# Patient Record
Sex: MALE | Race: BLACK OR AFRICAN AMERICAN | NOT HISPANIC OR LATINO
[De-identification: names, ages, dates, MRNs, and addresses within clinical notes are randomized per-mention and may not be internally consistent; named-entity substitution may affect disease eponyms.]

---

## 2017-01-06 ENCOUNTER — APPOINTMENT (OUTPATIENT)
Dept: INTERNAL MEDICINE | Facility: HOSPITAL | Age: 62
End: 2017-01-06

## 2017-03-29 ENCOUNTER — INPATIENT (INPATIENT)
Facility: HOSPITAL | Age: 62
LOS: 4 days | Discharge: HOME | End: 2017-04-03
Attending: HOSPITALIST | Admitting: HOSPITALIST
Payer: MEDICARE

## 2017-03-29 VITALS
HEART RATE: 88 BPM | TEMPERATURE: 98 F | OXYGEN SATURATION: 100 % | SYSTOLIC BLOOD PRESSURE: 147 MMHG | DIASTOLIC BLOOD PRESSURE: 88 MMHG | RESPIRATION RATE: 16 BRPM

## 2017-03-29 DIAGNOSIS — D57.00 HB-SS DISEASE WITH CRISIS, UNSPECIFIED: ICD-10-CM

## 2017-03-29 DIAGNOSIS — N17.9 ACUTE KIDNEY FAILURE, UNSPECIFIED: ICD-10-CM

## 2017-03-29 DIAGNOSIS — I82.90 ACUTE EMBOLISM AND THROMBOSIS OF UNSPECIFIED VEIN: ICD-10-CM

## 2017-03-29 LAB
ALBUMIN SERPL ELPH-MCNC: 3.4 G/DL — SIGNIFICANT CHANGE UP (ref 3.3–5)
ALP SERPL-CCNC: 179 U/L — HIGH (ref 40–120)
ALT FLD-CCNC: 39 U/L — SIGNIFICANT CHANGE UP (ref 4–41)
ANISOCYTOSIS BLD QL: SIGNIFICANT CHANGE UP
AST SERPL-CCNC: 60 U/L — HIGH (ref 4–40)
BASOPHILS # BLD AUTO: 0.15 K/UL — SIGNIFICANT CHANGE UP (ref 0–0.2)
BASOPHILS NFR BLD AUTO: 1 % — SIGNIFICANT CHANGE UP (ref 0–2)
BASOPHILS NFR SPEC: 4 % — HIGH (ref 0–2)
BILIRUB SERPL-MCNC: 1.7 MG/DL — HIGH (ref 0.2–1.2)
BUN SERPL-MCNC: 21 MG/DL — SIGNIFICANT CHANGE UP (ref 7–23)
CALCIUM SERPL-MCNC: 9.3 MG/DL — SIGNIFICANT CHANGE UP (ref 8.4–10.5)
CHLORIDE SERPL-SCNC: 108 MMOL/L — HIGH (ref 98–107)
CHLORIDE UR-SCNC: 105 MMOL/L — SIGNIFICANT CHANGE UP
CO2 SERPL-SCNC: 20 MMOL/L — LOW (ref 22–31)
CREAT ?TM UR-MCNC: 27.69 MG/DL — SIGNIFICANT CHANGE UP
CREAT SERPL-MCNC: 1.58 MG/DL — HIGH (ref 0.5–1.3)
EOSINOPHIL # BLD AUTO: 1.63 K/UL — HIGH (ref 0–0.5)
EOSINOPHIL NFR BLD AUTO: 10.9 % — HIGH (ref 0–6)
EOSINOPHIL NFR FLD: 6 % — SIGNIFICANT CHANGE UP (ref 0–6)
GLUCOSE SERPL-MCNC: 88 MG/DL — SIGNIFICANT CHANGE UP (ref 70–99)
HCT VFR BLD CALC: 20.9 % — CRITICAL LOW (ref 39–50)
HGB BLD-MCNC: 6.7 G/DL — CRITICAL LOW (ref 13–17)
HOWELL-JOLLY BOD BLD QL SMEAR: PRESENT — SIGNIFICANT CHANGE UP
HYPOCHROMIA BLD QL: SLIGHT — SIGNIFICANT CHANGE UP
IMM GRANULOCYTES NFR BLD AUTO: 5.4 % — HIGH (ref 0–1.5)
LG PLATELETS BLD QL AUTO: SLIGHT — SIGNIFICANT CHANGE UP
LYMPHOCYTES # BLD AUTO: 1.91 K/UL — SIGNIFICANT CHANGE UP (ref 1–3.3)
LYMPHOCYTES # BLD AUTO: 12.7 % — LOW (ref 13–44)
LYMPHOCYTES NFR SPEC AUTO: 7 % — LOW (ref 13–44)
MCHC RBC-ENTMCNC: 24.6 PG — LOW (ref 27–34)
MCHC RBC-ENTMCNC: 32.1 % — SIGNIFICANT CHANGE UP (ref 32–36)
MCV RBC AUTO: 76.8 FL — LOW (ref 80–100)
MICROCYTES BLD QL: SIGNIFICANT CHANGE UP
MONOCYTES # BLD AUTO: 1.42 K/UL — HIGH (ref 0–0.9)
MONOCYTES NFR BLD AUTO: 9.5 % — SIGNIFICANT CHANGE UP (ref 2–14)
MONOCYTES NFR BLD: 13 % — HIGH (ref 2–9)
MYELOCYTES NFR BLD: 1 % — HIGH (ref 0–0)
NEUTROPHIL AB SER-ACNC: 67 % — SIGNIFICANT CHANGE UP (ref 43–77)
NEUTROPHILS # BLD AUTO: 9.1 K/UL — HIGH (ref 1.8–7.4)
NEUTROPHILS NFR BLD AUTO: 60.5 % — SIGNIFICANT CHANGE UP (ref 43–77)
NEUTS BAND # BLD: 2 % — SIGNIFICANT CHANGE UP (ref 0–6)
NRBC # BLD: 11 /100WBC — SIGNIFICANT CHANGE UP
OSMOLALITY UR: 325 MOSMO/KG — SIGNIFICANT CHANGE UP (ref 50–1200)
PLATELET # BLD AUTO: 361 K/UL — SIGNIFICANT CHANGE UP (ref 150–400)
PLATELET COUNT - ESTIMATE: NORMAL — SIGNIFICANT CHANGE UP
PMV BLD: 9.7 FL — SIGNIFICANT CHANGE UP (ref 7–13)
POLYCHROMASIA BLD QL SMEAR: SIGNIFICANT CHANGE UP
POTASSIUM SERPL-MCNC: 4.4 MMOL/L — SIGNIFICANT CHANGE UP (ref 3.5–5.3)
POTASSIUM SERPL-SCNC: 4.4 MMOL/L — SIGNIFICANT CHANGE UP (ref 3.5–5.3)
POTASSIUM UR-SCNC: 14.9 MEQ/L — SIGNIFICANT CHANGE UP
PROT SERPL-MCNC: 6.9 G/DL — SIGNIFICANT CHANGE UP (ref 6–8.3)
RBC # BLD: 2.72 M/UL — LOW (ref 4.2–5.8)
RBC # FLD: 26.7 % — HIGH (ref 10.3–14.5)
RETICS #: 338.7 10X3/UL — HIGH (ref 17–73)
RETICS/RBC NFR: 12.4 % — HIGH (ref 0.5–2.5)
SICKLE CELLS BLD QL SMEAR: SLIGHT — SIGNIFICANT CHANGE UP
SODIUM SERPL-SCNC: 143 MMOL/L — SIGNIFICANT CHANGE UP (ref 135–145)
SODIUM UR-SCNC: 116 MEQ/L — SIGNIFICANT CHANGE UP
TARGETS BLD QL SMEAR: SLIGHT — SIGNIFICANT CHANGE UP
WBC # BLD: 15.02 K/UL — HIGH (ref 3.8–10.5)
WBC # FLD AUTO: 15.02 K/UL — HIGH (ref 3.8–10.5)

## 2017-03-29 PROCEDURE — 99223 1ST HOSP IP/OBS HIGH 75: CPT | Mod: AI

## 2017-03-29 PROCEDURE — 71020: CPT | Mod: 26

## 2017-03-29 RX ORDER — HYDROMORPHONE HYDROCHLORIDE 2 MG/ML
1 INJECTION INTRAMUSCULAR; INTRAVENOUS; SUBCUTANEOUS ONCE
Qty: 0 | Refills: 0 | Status: DISCONTINUED | OUTPATIENT
Start: 2017-03-29 | End: 2017-03-29

## 2017-03-29 RX ORDER — FOLIC ACID 0.8 MG
1 TABLET ORAL DAILY
Qty: 0 | Refills: 0 | Status: DISCONTINUED | OUTPATIENT
Start: 2017-03-29 | End: 2017-04-03

## 2017-03-29 RX ORDER — MORPHINE SULFATE 50 MG/1
30 CAPSULE, EXTENDED RELEASE ORAL
Qty: 0 | Refills: 0 | Status: DISCONTINUED | OUTPATIENT
Start: 2017-03-29 | End: 2017-03-29

## 2017-03-29 RX ORDER — HYDROMORPHONE HYDROCHLORIDE 2 MG/ML
2 INJECTION INTRAMUSCULAR; INTRAVENOUS; SUBCUTANEOUS ONCE
Qty: 0 | Refills: 0 | Status: DISCONTINUED | OUTPATIENT
Start: 2017-03-29 | End: 2017-03-29

## 2017-03-29 RX ORDER — POLYETHYLENE GLYCOL 3350 17 G/17G
17 POWDER, FOR SOLUTION ORAL DAILY
Qty: 0 | Refills: 0 | Status: DISCONTINUED | OUTPATIENT
Start: 2017-03-29 | End: 2017-04-03

## 2017-03-29 RX ORDER — SODIUM CHLORIDE 9 MG/ML
1000 INJECTION INTRAMUSCULAR; INTRAVENOUS; SUBCUTANEOUS ONCE
Qty: 0 | Refills: 0 | Status: COMPLETED | OUTPATIENT
Start: 2017-03-29 | End: 2017-03-29

## 2017-03-29 RX ORDER — KETAMINE HYDROCHLORIDE 100 MG/ML
8 INJECTION INTRAMUSCULAR; INTRAVENOUS ONCE
Qty: 0 | Refills: 0 | Status: DISCONTINUED | OUTPATIENT
Start: 2017-03-29 | End: 2017-03-29

## 2017-03-29 RX ORDER — SENNA PLUS 8.6 MG/1
2 TABLET ORAL AT BEDTIME
Qty: 0 | Refills: 0 | Status: DISCONTINUED | OUTPATIENT
Start: 2017-03-29 | End: 2017-04-03

## 2017-03-29 RX ORDER — NALOXONE HYDROCHLORIDE 4 MG/.1ML
0.1 SPRAY NASAL
Qty: 0 | Refills: 0 | Status: DISCONTINUED | OUTPATIENT
Start: 2017-03-29 | End: 2017-04-03

## 2017-03-29 RX ORDER — MORPHINE SULFATE 50 MG/1
30 CAPSULE, EXTENDED RELEASE ORAL
Qty: 0 | Refills: 0 | Status: DISCONTINUED | OUTPATIENT
Start: 2017-03-29 | End: 2017-04-03

## 2017-03-29 RX ORDER — ENOXAPARIN SODIUM 100 MG/ML
40 INJECTION SUBCUTANEOUS EVERY 24 HOURS
Qty: 0 | Refills: 0 | Status: DISCONTINUED | OUTPATIENT
Start: 2017-03-29 | End: 2017-03-29

## 2017-03-29 RX ORDER — DOCUSATE SODIUM 100 MG
100 CAPSULE ORAL THREE TIMES A DAY
Qty: 0 | Refills: 0 | Status: DISCONTINUED | OUTPATIENT
Start: 2017-03-29 | End: 2017-04-03

## 2017-03-29 RX ORDER — MORPHINE SULFATE 50 MG/1
4 CAPSULE, EXTENDED RELEASE ORAL ONCE
Qty: 0 | Refills: 0 | Status: DISCONTINUED | OUTPATIENT
Start: 2017-03-29 | End: 2017-03-29

## 2017-03-29 RX ORDER — HEPARIN SODIUM 5000 [USP'U]/ML
5000 INJECTION INTRAVENOUS; SUBCUTANEOUS EVERY 8 HOURS
Qty: 0 | Refills: 0 | Status: DISCONTINUED | OUTPATIENT
Start: 2017-03-29 | End: 2017-04-03

## 2017-03-29 RX ORDER — KETAMINE HYDROCHLORIDE 100 MG/ML
5 INJECTION INTRAMUSCULAR; INTRAVENOUS ONCE
Qty: 0 | Refills: 0 | Status: DISCONTINUED | OUTPATIENT
Start: 2017-03-29 | End: 2017-03-29

## 2017-03-29 RX ORDER — KETOROLAC TROMETHAMINE 30 MG/ML
15 SYRINGE (ML) INJECTION EVERY 6 HOURS
Qty: 0 | Refills: 0 | Status: DISCONTINUED | OUTPATIENT
Start: 2017-03-29 | End: 2017-03-29

## 2017-03-29 RX ORDER — SODIUM CHLORIDE 9 MG/ML
1000 INJECTION, SOLUTION INTRAVENOUS
Qty: 0 | Refills: 0 | Status: DISCONTINUED | OUTPATIENT
Start: 2017-03-29 | End: 2017-04-03

## 2017-03-29 RX ORDER — ONDANSETRON 8 MG/1
4 TABLET, FILM COATED ORAL EVERY 6 HOURS
Qty: 0 | Refills: 0 | Status: DISCONTINUED | OUTPATIENT
Start: 2017-03-29 | End: 2017-04-03

## 2017-03-29 RX ADMIN — HYDROMORPHONE HYDROCHLORIDE 2 MILLIGRAM(S): 2 INJECTION INTRAMUSCULAR; INTRAVENOUS; SUBCUTANEOUS at 07:18

## 2017-03-29 RX ADMIN — MORPHINE SULFATE 30 MILLILITER(S): 50 CAPSULE, EXTENDED RELEASE ORAL at 19:46

## 2017-03-29 RX ADMIN — HYDROMORPHONE HYDROCHLORIDE 2 MILLIGRAM(S): 2 INJECTION INTRAMUSCULAR; INTRAVENOUS; SUBCUTANEOUS at 07:35

## 2017-03-29 RX ADMIN — HEPARIN SODIUM 5000 UNIT(S): 5000 INJECTION INTRAVENOUS; SUBCUTANEOUS at 21:53

## 2017-03-29 RX ADMIN — HYDROMORPHONE HYDROCHLORIDE 1 MILLIGRAM(S): 2 INJECTION INTRAMUSCULAR; INTRAVENOUS; SUBCUTANEOUS at 05:23

## 2017-03-29 RX ADMIN — Medication 100 MILLIGRAM(S): at 14:16

## 2017-03-29 RX ADMIN — MORPHINE SULFATE 4 MILLIGRAM(S): 50 CAPSULE, EXTENDED RELEASE ORAL at 10:05

## 2017-03-29 RX ADMIN — HYDROMORPHONE HYDROCHLORIDE 1 MILLIGRAM(S): 2 INJECTION INTRAMUSCULAR; INTRAVENOUS; SUBCUTANEOUS at 05:40

## 2017-03-29 RX ADMIN — MORPHINE SULFATE 4 MILLIGRAM(S): 50 CAPSULE, EXTENDED RELEASE ORAL at 09:47

## 2017-03-29 RX ADMIN — MORPHINE SULFATE 30 MILLILITER(S): 50 CAPSULE, EXTENDED RELEASE ORAL at 18:54

## 2017-03-29 RX ADMIN — MORPHINE SULFATE 30 MILLILITER(S): 50 CAPSULE, EXTENDED RELEASE ORAL at 14:08

## 2017-03-29 RX ADMIN — HYDROMORPHONE HYDROCHLORIDE 1 MILLIGRAM(S): 2 INJECTION INTRAMUSCULAR; INTRAVENOUS; SUBCUTANEOUS at 06:45

## 2017-03-29 RX ADMIN — SODIUM CHLORIDE 75 MILLILITER(S): 9 INJECTION, SOLUTION INTRAVENOUS at 10:10

## 2017-03-29 RX ADMIN — KETAMINE HYDROCHLORIDE 8 MILLIGRAM(S): 100 INJECTION INTRAMUSCULAR; INTRAVENOUS at 05:43

## 2017-03-29 RX ADMIN — HYDROMORPHONE HYDROCHLORIDE 1 MILLIGRAM(S): 2 INJECTION INTRAMUSCULAR; INTRAVENOUS; SUBCUTANEOUS at 06:30

## 2017-03-29 RX ADMIN — SODIUM CHLORIDE 1000 MILLILITER(S): 9 INJECTION INTRAMUSCULAR; INTRAVENOUS; SUBCUTANEOUS at 05:43

## 2017-03-29 NOTE — H&P ADULT. - MUSCULOSKELETAL
details… detailed exam no joint erythema/no calf tenderness/ROM intact/no joint swelling/no joint warmth

## 2017-03-29 NOTE — ED ADULT TRIAGE NOTE - CHIEF COMPLAINT QUOTE
Pt brought in by EMS from home with c/o SCC x 1d. States "pain all over body." States aleve taken without relief. Also c/o abd pain with nausea. Denies all other adverse symptoms at this time.

## 2017-03-29 NOTE — ED PROVIDER NOTE - ATTENDING CONTRIBUTION TO CARE
ED Attending Dr. Ac: 47 yo male with sickle cell disease in ED with pain to "entire body" today.  Pt states his sickle cell disease pain crises usually present this way.  No fever, N/V/D.  On exam pt uncomfortable appearing, heart RRR, lungs CTAB, abd NTND, extremities without swelling, strength 5/5 in all extremities and skin without rash.

## 2017-03-29 NOTE — ED ADULT NURSE NOTE - OBJECTIVE STATEMENT
Pt received into room 10 BIBA co generalized body pain 9/10 due to SCC. Pt states pain started yesterday and worsened. Pt A&Ox4, appears uncomfortable. Pt states his last SCC was approximately 3 weeks ago and this feels similar to that. Pt denies SOB. MD evaluated, VS as noted, Medicated as ordered. call bell within reach, will continue to monitor for safety and comfort.

## 2017-03-29 NOTE — H&P ADULT. - PROBLEM SELECTOR PLAN 2
Creat. 1.58. Etiology is not clear.  Check urine lytes, creat.  IVF hydration  Trend BUN/creat  Consider further w/u if no improvement

## 2017-03-29 NOTE — H&P ADULT. - RS GEN PE MLT RESP DETAILS PC
no rhonchi/no wheezes/no rales/airway patent/clear to auscultation bilaterally/breath sounds equal/good air movement

## 2017-03-29 NOTE — H&P ADULT. - HISTORY OF PRESENT ILLNESS
47 yo male w/ pmh of sickle cell disease (unknown type) presents w/ sickle cell pain crisis.  Kelli states that last crisis was several weeks ago, was seen at [facility] at that time.  States that he has pain all over his body, most pronounced in the joints.  Patient denies chest pain, states that he has never had acute chest before.  Denies SOB, n/v, fevers or chills.  PMD: Dr. Hamm  Takes Folic acid at home 47 yo male w/ pmh of sickle cell disease (unknown type) presents w/ sickle cell pain crisis.  Kelli states that last crisis was several weeks ago, was seen at [facility] at that time.  States that he has pain all over his body, most pronounced in the joints. Only took Alleve PRN for pain.   Patient denies chest pain, states that he has never had acute chest before.  Denies SOB, n/v, fevers or chills.    Also Takes Folic acid at home

## 2017-03-29 NOTE — ED PROVIDER NOTE - OBJECTIVE STATEMENT
47 yo male w/ pmh of sickle cell disease (unknown type) presents w/ sickle cell pain crisis.  Kelli states that last crisis was several weeks ago, was seen at [facility] at that time.  States that he has pain all over his body, most pronounced in the joints.  Patient denies chest pain, states that he has never had acute chest before.  Denies SOB, n/v, fevers or chills.  PMD: Dr. Hamm  Takes Folic acid at home

## 2017-03-29 NOTE — H&P ADULT. - ASSESSMENT
46 y.o. BM with h/o sickle cell anemia, c/w pain all over his body, similar to his previous episodes of VOC. No h/o ACS.

## 2017-03-30 ENCOUNTER — TRANSCRIPTION ENCOUNTER (OUTPATIENT)
Age: 62
End: 2017-03-30

## 2017-03-30 LAB
ALBUMIN SERPL ELPH-MCNC: 3.1 G/DL — LOW (ref 3.3–5)
ALP SERPL-CCNC: 175 U/L — HIGH (ref 40–120)
ALT FLD-CCNC: 37 U/L — SIGNIFICANT CHANGE UP (ref 4–41)
AST SERPL-CCNC: 67 U/L — HIGH (ref 4–40)
BASOPHILS # BLD AUTO: 0.09 K/UL — SIGNIFICANT CHANGE UP (ref 0–0.2)
BASOPHILS NFR BLD AUTO: 0.5 % — SIGNIFICANT CHANGE UP (ref 0–2)
BILIRUB SERPL-MCNC: 1.7 MG/DL — HIGH (ref 0.2–1.2)
BUN SERPL-MCNC: 16 MG/DL — SIGNIFICANT CHANGE UP (ref 7–23)
CALCIUM SERPL-MCNC: 9 MG/DL — SIGNIFICANT CHANGE UP (ref 8.4–10.5)
CHLORIDE SERPL-SCNC: 106 MMOL/L — SIGNIFICANT CHANGE UP (ref 98–107)
CO2 SERPL-SCNC: 15 MMOL/L — LOW (ref 22–31)
CREAT SERPL-MCNC: 1.17 MG/DL — SIGNIFICANT CHANGE UP (ref 0.5–1.3)
EOSINOPHIL # BLD AUTO: 0.39 K/UL — SIGNIFICANT CHANGE UP (ref 0–0.5)
EOSINOPHIL NFR BLD AUTO: 2.1 % — SIGNIFICANT CHANGE UP (ref 0–6)
GLUCOSE SERPL-MCNC: 83 MG/DL — SIGNIFICANT CHANGE UP (ref 70–99)
HCT VFR BLD CALC: 19.7 % — CRITICAL LOW (ref 39–50)
HGB BLD-MCNC: 6.4 G/DL — CRITICAL LOW (ref 13–17)
IMM GRANULOCYTES NFR BLD AUTO: 1 % — SIGNIFICANT CHANGE UP (ref 0–1.5)
LDH SERPL L TO P-CCNC: 848 U/L — HIGH (ref 135–225)
LYMPHOCYTES # BLD AUTO: 24.3 % — SIGNIFICANT CHANGE UP (ref 13–44)
LYMPHOCYTES # BLD AUTO: 4.51 K/UL — HIGH (ref 1–3.3)
MCHC RBC-ENTMCNC: 25.3 PG — LOW (ref 27–34)
MCHC RBC-ENTMCNC: 32.5 % — SIGNIFICANT CHANGE UP (ref 32–36)
MCV RBC AUTO: 77.9 FL — LOW (ref 80–100)
MONOCYTES # BLD AUTO: 1.56 K/UL — HIGH (ref 0–0.9)
MONOCYTES NFR BLD AUTO: 8.4 % — SIGNIFICANT CHANGE UP (ref 2–14)
NEUTROPHILS # BLD AUTO: 11.79 K/UL — HIGH (ref 1.8–7.4)
NEUTROPHILS NFR BLD AUTO: 63.7 % — SIGNIFICANT CHANGE UP (ref 43–77)
PLATELET # BLD AUTO: 315 K/UL — SIGNIFICANT CHANGE UP (ref 150–400)
PMV BLD: 9.2 FL — SIGNIFICANT CHANGE UP (ref 7–13)
POTASSIUM SERPL-MCNC: 5.3 MMOL/L — SIGNIFICANT CHANGE UP (ref 3.5–5.3)
POTASSIUM SERPL-SCNC: 5.3 MMOL/L — SIGNIFICANT CHANGE UP (ref 3.5–5.3)
PROT SERPL-MCNC: 6.8 G/DL — SIGNIFICANT CHANGE UP (ref 6–8.3)
RBC # BLD: 2.53 M/UL — LOW (ref 4.2–5.8)
RBC # FLD: 26.4 % — HIGH (ref 10.3–14.5)
RETICS #: 270.5 10X3/UL — HIGH (ref 17–73)
RETICS/RBC NFR: 10.7 % — HIGH (ref 0.5–2.5)
SODIUM SERPL-SCNC: 142 MMOL/L — SIGNIFICANT CHANGE UP (ref 135–145)
WBC # BLD: 18.53 K/UL — HIGH (ref 3.8–10.5)
WBC # FLD AUTO: 18.53 K/UL — HIGH (ref 3.8–10.5)

## 2017-03-30 PROCEDURE — 99233 SBSQ HOSP IP/OBS HIGH 50: CPT

## 2017-03-30 RX ORDER — ACETAMINOPHEN 500 MG
650 TABLET ORAL ONCE
Qty: 0 | Refills: 0 | Status: COMPLETED | OUTPATIENT
Start: 2017-03-30 | End: 2017-03-30

## 2017-03-30 RX ORDER — KETOROLAC TROMETHAMINE 30 MG/ML
30 SYRINGE (ML) INJECTION EVERY 6 HOURS
Qty: 0 | Refills: 0 | Status: DISCONTINUED | OUTPATIENT
Start: 2017-03-30 | End: 2017-04-01

## 2017-03-30 RX ADMIN — Medication 1 MILLIGRAM(S): at 12:05

## 2017-03-30 RX ADMIN — Medication 30 MILLIGRAM(S): at 18:11

## 2017-03-30 RX ADMIN — Medication 650 MILLIGRAM(S): at 03:01

## 2017-03-30 RX ADMIN — Medication 30 MILLIGRAM(S): at 12:20

## 2017-03-30 RX ADMIN — SODIUM CHLORIDE 75 MILLILITER(S): 9 INJECTION, SOLUTION INTRAVENOUS at 00:34

## 2017-03-30 RX ADMIN — Medication 30 MILLIGRAM(S): at 12:05

## 2017-03-30 RX ADMIN — HEPARIN SODIUM 5000 UNIT(S): 5000 INJECTION INTRAVENOUS; SUBCUTANEOUS at 15:06

## 2017-03-30 RX ADMIN — Medication 1 TABLET(S): at 12:05

## 2017-03-30 RX ADMIN — MORPHINE SULFATE 30 MILLILITER(S): 50 CAPSULE, EXTENDED RELEASE ORAL at 19:33

## 2017-03-30 RX ADMIN — MORPHINE SULFATE 30 MILLILITER(S): 50 CAPSULE, EXTENDED RELEASE ORAL at 07:15

## 2017-03-30 RX ADMIN — Medication 30 MILLIGRAM(S): at 17:56

## 2017-03-30 RX ADMIN — Medication 650 MILLIGRAM(S): at 02:16

## 2017-03-30 RX ADMIN — HEPARIN SODIUM 5000 UNIT(S): 5000 INJECTION INTRAVENOUS; SUBCUTANEOUS at 05:00

## 2017-03-30 RX ADMIN — HEPARIN SODIUM 5000 UNIT(S): 5000 INJECTION INTRAVENOUS; SUBCUTANEOUS at 21:24

## 2017-03-30 NOTE — DISCHARGE NOTE ADULT - CARE PROVIDER_API CALL
Lluvia Hamm), Internal Medicine  [id number] [address] [place] [address]  Phone: [phone]  Fax: [phone]

## 2017-03-30 NOTE — DISCHARGE NOTE ADULT - PLAN OF CARE
pain control Pain medication as needed. Activity as tolerated. Remain well hydrated. Follow up with hematologist in 1 week, call for appointment resolved Monitor kidney function as outpatient within 1 week of discharge.

## 2017-03-30 NOTE — DISCHARGE NOTE ADULT - CARE PROVIDERS DIRECT ADDRESSES
[email or web address]_Unknown

## 2017-03-30 NOTE — DISCHARGE NOTE ADULT - MEDICATION SUMMARY - MEDICATIONS TO TAKE
I will START or STAY ON the medications listed below when I get home from the hospital:    Percocet 5/325 oral tablet  -- 1 tab(s) by mouth every 6 hours MDD:4  -- Indication: For Pain management    senna oral tablet  -- 2 tab(s) by mouth once a day (at bedtime), As needed, Constipation  -- Indication: For constipation    docusate sodium 100 mg oral capsule  -- 1 cap(s) by mouth 3 times a day  -- Indication: For constipation    polyethylene glycol 3350 oral powder for reconstitution  -- 17 gram(s) by mouth once a day  -- Indication: For constipation    Multiple Vitamins oral tablet  -- 1 tab(s) by mouth once a day  -- Indication: For supplement    folic acid 1 mg oral tablet  -- 1 tab(s) by mouth once a day  -- Indication: For vitamin

## 2017-03-30 NOTE — DISCHARGE NOTE ADULT - PATIENT PORTAL LINK FT
“You can access the FollowHealth Patient Portal, offered by [facility], by registering with the following website: [email or web address]”

## 2017-03-30 NOTE — DISCHARGE NOTE ADULT - HOSPITAL COURSE
45 yo male w/ pmh of sickle cell disease (unknown type) presents w/ sickle cell pain crisis.     Hospital Course:    Hb-SS disease with crisis  -No signs of acute chest  -IVF hydration  -O2   -PCA with Morphine  -toradol added for pain control  -CBC, retic, CMP, LDH daily.     EMEKA  -Creat. 1.58.   -IVF hydration  -Trend BUN/creat  -resolved    DVT prophylaxis  -SQ Heparin 45 yo male w/ pmh of sickle cell disease (unknown type) presents w/ sickle cell pain crisis.     Hospital Course:    Hb-SS disease with crisis  -No signs of acute chest  -IVF hydration  -O2   -PCA with Morphine  -toradol added for pain control  -CBC, retic, CMP, LDH daily.     EMEKA  -Creat. 1.58.   -IVF hydration  -Trend BUN/creat  -resolved    DVT prophylaxis  -SQ Heparin    Dispo-home  Follow up with [facility] as outpatient within 1 week of discharge. 47 yo male w/ pmh of sickle cell disease (unknown type) presents w/ sickle cell pain crisis.     Hospital Course:    Hb-SS disease with crisis  -No signs of acute chest  -IVF hydration  -O2   -PCA with Morphine  -toradol added for pain control  -CBC, retic, CMP, LDH daily.     EMEKA  -Creat. 1.58.   -IVF hydration  -Trend BUN/creat  -resolved    DVT prophylaxis  -SQ Heparin    Dispo-home  Follow up with Nehemias or Dr Hamm as outpatient within 1 week of discharge.

## 2017-03-30 NOTE — DISCHARGE NOTE ADULT - CARE PLAN
Principal Discharge DX:	Hb-SS disease with crisis  Goal:	pain control  Instructions for follow-up, activity and diet:	Pain medication as needed. Activity as tolerated. Remain well hydrated. Follow up with hematologist in 1 week, call for appointment  Secondary Diagnosis:	EMEKA (acute kidney injury)  Goal:	resolved Principal Discharge DX:	Hb-SS disease with crisis  Goal:	pain control  Instructions for follow-up, activity and diet:	Pain medication as needed. Activity as tolerated. Remain well hydrated. Follow up with hematologist in 1 week, call for appointment  Secondary Diagnosis:	EMEKA (acute kidney injury)  Goal:	resolved  Instructions for follow-up, activity and diet:	Monitor kidney function as outpatient within 1 week of discharge.

## 2017-03-31 LAB
ALBUMIN SERPL ELPH-MCNC: 3 G/DL — LOW (ref 3.3–5)
ALP SERPL-CCNC: 221 U/L — HIGH (ref 40–120)
ALT FLD-CCNC: 32 U/L — SIGNIFICANT CHANGE UP (ref 4–41)
AST SERPL-CCNC: 53 U/L — HIGH (ref 4–40)
BILIRUB SERPL-MCNC: 1.9 MG/DL — HIGH (ref 0.2–1.2)
BUN SERPL-MCNC: 22 MG/DL — SIGNIFICANT CHANGE UP (ref 7–23)
CALCIUM SERPL-MCNC: 8.6 MG/DL — SIGNIFICANT CHANGE UP (ref 8.4–10.5)
CHLORIDE SERPL-SCNC: 101 MMOL/L — SIGNIFICANT CHANGE UP (ref 98–107)
CO2 SERPL-SCNC: 22 MMOL/L — SIGNIFICANT CHANGE UP (ref 22–31)
CREAT SERPL-MCNC: 1.23 MG/DL — SIGNIFICANT CHANGE UP (ref 0.5–1.3)
GLUCOSE SERPL-MCNC: 98 MG/DL — SIGNIFICANT CHANGE UP (ref 70–99)
HCT VFR BLD CALC: 18.4 % — CRITICAL LOW (ref 39–50)
HGB BLD-MCNC: 5.9 G/DL — CRITICAL LOW (ref 13–17)
LDH SERPL L TO P-CCNC: 730 U/L — HIGH (ref 135–225)
MCHC RBC-ENTMCNC: 24.6 PG — LOW (ref 27–34)
MCHC RBC-ENTMCNC: 32.1 % — SIGNIFICANT CHANGE UP (ref 32–36)
MCV RBC AUTO: 76.7 FL — LOW (ref 80–100)
NRBC FLD-RTO: 9.1 — SIGNIFICANT CHANGE UP
PLATELET # BLD AUTO: 300 K/UL — SIGNIFICANT CHANGE UP (ref 150–400)
PMV BLD: 9.2 FL — SIGNIFICANT CHANGE UP (ref 7–13)
POTASSIUM SERPL-MCNC: 4.7 MMOL/L — SIGNIFICANT CHANGE UP (ref 3.5–5.3)
POTASSIUM SERPL-SCNC: 4.7 MMOL/L — SIGNIFICANT CHANGE UP (ref 3.5–5.3)
PROT SERPL-MCNC: 6.5 G/DL — SIGNIFICANT CHANGE UP (ref 6–8.3)
RBC # BLD: 2.4 M/UL — LOW (ref 4.2–5.8)
RBC # FLD: 24.6 % — HIGH (ref 10.3–14.5)
RETICS #: 199.7 10X3/UL — HIGH (ref 17–73)
RETICS/RBC NFR: 8.3 % — HIGH (ref 0.5–2.5)
SODIUM SERPL-SCNC: 140 MMOL/L — SIGNIFICANT CHANGE UP (ref 135–145)
WBC # BLD: 14.68 K/UL — HIGH (ref 3.8–10.5)
WBC # FLD AUTO: 14.68 K/UL — HIGH (ref 3.8–10.5)

## 2017-03-31 PROCEDURE — 99233 SBSQ HOSP IP/OBS HIGH 50: CPT

## 2017-03-31 RX ADMIN — Medication 30 MILLIGRAM(S): at 00:38

## 2017-03-31 RX ADMIN — Medication 30 MILLIGRAM(S): at 12:43

## 2017-03-31 RX ADMIN — Medication 1 TABLET(S): at 12:28

## 2017-03-31 RX ADMIN — HEPARIN SODIUM 5000 UNIT(S): 5000 INJECTION INTRAVENOUS; SUBCUTANEOUS at 21:29

## 2017-03-31 RX ADMIN — HEPARIN SODIUM 5000 UNIT(S): 5000 INJECTION INTRAVENOUS; SUBCUTANEOUS at 14:15

## 2017-03-31 RX ADMIN — Medication 30 MILLIGRAM(S): at 00:23

## 2017-03-31 RX ADMIN — HEPARIN SODIUM 5000 UNIT(S): 5000 INJECTION INTRAVENOUS; SUBCUTANEOUS at 05:53

## 2017-03-31 RX ADMIN — MORPHINE SULFATE 30 MILLILITER(S): 50 CAPSULE, EXTENDED RELEASE ORAL at 07:20

## 2017-03-31 RX ADMIN — MORPHINE SULFATE 30 MILLILITER(S): 50 CAPSULE, EXTENDED RELEASE ORAL at 19:31

## 2017-03-31 RX ADMIN — Medication 30 MILLIGRAM(S): at 06:08

## 2017-03-31 RX ADMIN — Medication 1 MILLIGRAM(S): at 12:29

## 2017-03-31 RX ADMIN — Medication 30 MILLIGRAM(S): at 18:21

## 2017-03-31 RX ADMIN — Medication 30 MILLIGRAM(S): at 12:28

## 2017-03-31 RX ADMIN — Medication 30 MILLIGRAM(S): at 05:53

## 2017-04-01 LAB
APPEARANCE UR: CLEAR — SIGNIFICANT CHANGE UP
BACTERIA # UR AUTO: SIGNIFICANT CHANGE UP
BILIRUB UR-MCNC: NEGATIVE — SIGNIFICANT CHANGE UP
BLOOD UR QL VISUAL: NEGATIVE — SIGNIFICANT CHANGE UP
COLOR SPEC: YELLOW — SIGNIFICANT CHANGE UP
GLUCOSE UR-MCNC: NEGATIVE — SIGNIFICANT CHANGE UP
KETONES UR-MCNC: NEGATIVE — SIGNIFICANT CHANGE UP
LEUKOCYTE ESTERASE UR-ACNC: NEGATIVE — SIGNIFICANT CHANGE UP
NITRITE UR-MCNC: NEGATIVE — SIGNIFICANT CHANGE UP
PH UR: 6 — SIGNIFICANT CHANGE UP (ref 4.6–8)
PROT UR-MCNC: 30 — SIGNIFICANT CHANGE UP
RBC CASTS # UR COMP ASSIST: SIGNIFICANT CHANGE UP (ref 0–?)
SP GR SPEC: 1.01 — SIGNIFICANT CHANGE UP (ref 1–1.03)
SQUAMOUS # UR AUTO: SIGNIFICANT CHANGE UP
UROBILINOGEN FLD QL: NORMAL E.U. — SIGNIFICANT CHANGE UP (ref 0.1–0.2)
WBC UR QL: SIGNIFICANT CHANGE UP (ref 0–?)

## 2017-04-01 PROCEDURE — 99233 SBSQ HOSP IP/OBS HIGH 50: CPT

## 2017-04-01 RX ORDER — OXYCODONE HYDROCHLORIDE 5 MG/1
7.5 TABLET ORAL EVERY 6 HOURS
Qty: 0 | Refills: 0 | Status: DISCONTINUED | OUTPATIENT
Start: 2017-04-01 | End: 2017-04-03

## 2017-04-01 RX ORDER — OXYCODONE HYDROCHLORIDE 5 MG/1
5 TABLET ORAL EVERY 6 HOURS
Qty: 0 | Refills: 0 | Status: DISCONTINUED | OUTPATIENT
Start: 2017-04-01 | End: 2017-04-03

## 2017-04-01 RX ADMIN — Medication 1 MILLIGRAM(S): at 11:41

## 2017-04-01 RX ADMIN — Medication 1 TABLET(S): at 11:41

## 2017-04-01 RX ADMIN — MORPHINE SULFATE 30 MILLILITER(S): 50 CAPSULE, EXTENDED RELEASE ORAL at 14:55

## 2017-04-01 RX ADMIN — Medication 30 MILLIGRAM(S): at 06:50

## 2017-04-01 RX ADMIN — OXYCODONE HYDROCHLORIDE 5 MILLIGRAM(S): 5 TABLET ORAL at 19:10

## 2017-04-01 RX ADMIN — Medication 100 MILLIGRAM(S): at 21:25

## 2017-04-01 RX ADMIN — HEPARIN SODIUM 5000 UNIT(S): 5000 INJECTION INTRAVENOUS; SUBCUTANEOUS at 06:23

## 2017-04-01 RX ADMIN — HEPARIN SODIUM 5000 UNIT(S): 5000 INJECTION INTRAVENOUS; SUBCUTANEOUS at 13:17

## 2017-04-01 RX ADMIN — Medication 30 MILLIGRAM(S): at 06:23

## 2017-04-01 RX ADMIN — HEPARIN SODIUM 5000 UNIT(S): 5000 INJECTION INTRAVENOUS; SUBCUTANEOUS at 21:25

## 2017-04-01 RX ADMIN — OXYCODONE HYDROCHLORIDE 7.5 MILLIGRAM(S): 5 TABLET ORAL at 21:55

## 2017-04-01 RX ADMIN — MORPHINE SULFATE 30 MILLILITER(S): 50 CAPSULE, EXTENDED RELEASE ORAL at 19:21

## 2017-04-01 RX ADMIN — Medication 30 MILLIGRAM(S): at 00:50

## 2017-04-01 RX ADMIN — Medication 30 MILLIGRAM(S): at 00:26

## 2017-04-01 RX ADMIN — MORPHINE SULFATE 30 MILLILITER(S): 50 CAPSULE, EXTENDED RELEASE ORAL at 07:25

## 2017-04-01 RX ADMIN — OXYCODONE HYDROCHLORIDE 5 MILLIGRAM(S): 5 TABLET ORAL at 18:39

## 2017-04-01 RX ADMIN — OXYCODONE HYDROCHLORIDE 7.5 MILLIGRAM(S): 5 TABLET ORAL at 21:25

## 2017-04-02 LAB
ALBUMIN SERPL ELPH-MCNC: 3.3 G/DL — SIGNIFICANT CHANGE UP (ref 3.3–5)
ALP SERPL-CCNC: 350 U/L — HIGH (ref 40–120)
ALT FLD-CCNC: 44 U/L — HIGH (ref 4–41)
AST SERPL-CCNC: 53 U/L — HIGH (ref 4–40)
B PERT DNA SPEC QL NAA+PROBE: SIGNIFICANT CHANGE UP
BACTERIA UR CULT: SIGNIFICANT CHANGE UP
BASOPHILS # BLD AUTO: 0.04 K/UL — SIGNIFICANT CHANGE UP (ref 0–0.2)
BASOPHILS NFR BLD AUTO: 0.3 % — SIGNIFICANT CHANGE UP (ref 0–2)
BILIRUB SERPL-MCNC: 1.3 MG/DL — HIGH (ref 0.2–1.2)
BUN SERPL-MCNC: 20 MG/DL — SIGNIFICANT CHANGE UP (ref 7–23)
C PNEUM DNA SPEC QL NAA+PROBE: NOT DETECTED — SIGNIFICANT CHANGE UP
CALCIUM SERPL-MCNC: 9.1 MG/DL — SIGNIFICANT CHANGE UP (ref 8.4–10.5)
CHLORIDE SERPL-SCNC: 101 MMOL/L — SIGNIFICANT CHANGE UP (ref 98–107)
CO2 SERPL-SCNC: 24 MMOL/L — SIGNIFICANT CHANGE UP (ref 22–31)
CREAT SERPL-MCNC: 1.18 MG/DL — SIGNIFICANT CHANGE UP (ref 0.5–1.3)
EOSINOPHIL # BLD AUTO: 1.09 K/UL — HIGH (ref 0–0.5)
EOSINOPHIL NFR BLD AUTO: 7.2 % — HIGH (ref 0–6)
FLUAV H1 2009 PAND RNA SPEC QL NAA+PROBE: NOT DETECTED — SIGNIFICANT CHANGE UP
FLUAV H1 RNA SPEC QL NAA+PROBE: NOT DETECTED — SIGNIFICANT CHANGE UP
FLUAV H3 RNA SPEC QL NAA+PROBE: NOT DETECTED — SIGNIFICANT CHANGE UP
FLUAV SUBTYP SPEC NAA+PROBE: SIGNIFICANT CHANGE UP
FLUBV RNA SPEC QL NAA+PROBE: NOT DETECTED — SIGNIFICANT CHANGE UP
GLUCOSE SERPL-MCNC: 104 MG/DL — HIGH (ref 70–99)
HADV DNA SPEC QL NAA+PROBE: NOT DETECTED — SIGNIFICANT CHANGE UP
HCOV 229E RNA SPEC QL NAA+PROBE: NOT DETECTED — SIGNIFICANT CHANGE UP
HCOV HKU1 RNA SPEC QL NAA+PROBE: NOT DETECTED — SIGNIFICANT CHANGE UP
HCOV NL63 RNA SPEC QL NAA+PROBE: NOT DETECTED — SIGNIFICANT CHANGE UP
HCOV OC43 RNA SPEC QL NAA+PROBE: NOT DETECTED — SIGNIFICANT CHANGE UP
HCT VFR BLD CALC: 19.2 % — CRITICAL LOW (ref 39–50)
HGB BLD-MCNC: 5.9 G/DL — CRITICAL LOW (ref 13–17)
HMPV RNA SPEC QL NAA+PROBE: NOT DETECTED — SIGNIFICANT CHANGE UP
HPIV1 RNA SPEC QL NAA+PROBE: NOT DETECTED — SIGNIFICANT CHANGE UP
HPIV2 RNA SPEC QL NAA+PROBE: NOT DETECTED — SIGNIFICANT CHANGE UP
HPIV3 RNA SPEC QL NAA+PROBE: NOT DETECTED — SIGNIFICANT CHANGE UP
HPIV4 RNA SPEC QL NAA+PROBE: NOT DETECTED — SIGNIFICANT CHANGE UP
IMM GRANULOCYTES NFR BLD AUTO: 0.6 % — SIGNIFICANT CHANGE UP (ref 0–1.5)
LDH SERPL L TO P-CCNC: 589 U/L — HIGH (ref 135–225)
LYMPHOCYTES # BLD AUTO: 0.99 K/UL — LOW (ref 1–3.3)
LYMPHOCYTES # BLD AUTO: 6.6 % — LOW (ref 13–44)
M PNEUMO DNA SPEC QL NAA+PROBE: NOT DETECTED — SIGNIFICANT CHANGE UP
MAGNESIUM SERPL-MCNC: 2.4 MG/DL — SIGNIFICANT CHANGE UP (ref 1.6–2.6)
MCHC RBC-ENTMCNC: 23.5 PG — LOW (ref 27–34)
MCHC RBC-ENTMCNC: 30.7 % — LOW (ref 32–36)
MCV RBC AUTO: 76.5 FL — LOW (ref 80–100)
MONOCYTES # BLD AUTO: 1.01 K/UL — HIGH (ref 0–0.9)
MONOCYTES NFR BLD AUTO: 6.7 % — SIGNIFICANT CHANGE UP (ref 2–14)
NEUTROPHILS # BLD AUTO: 11.84 K/UL — HIGH (ref 1.8–7.4)
NEUTROPHILS NFR BLD AUTO: 78.6 % — HIGH (ref 43–77)
PHOSPHATE SERPL-MCNC: 2.6 MG/DL — SIGNIFICANT CHANGE UP (ref 2.5–4.5)
PLATELET # BLD AUTO: 377 K/UL — SIGNIFICANT CHANGE UP (ref 150–400)
PMV BLD: 9.9 FL — SIGNIFICANT CHANGE UP (ref 7–13)
POTASSIUM SERPL-MCNC: 4.5 MMOL/L — SIGNIFICANT CHANGE UP (ref 3.5–5.3)
POTASSIUM SERPL-SCNC: 4.5 MMOL/L — SIGNIFICANT CHANGE UP (ref 3.5–5.3)
PROT SERPL-MCNC: 7.1 G/DL — SIGNIFICANT CHANGE UP (ref 6–8.3)
RBC # BLD: 2.51 M/UL — LOW (ref 4.2–5.8)
RBC # FLD: 23.3 % — HIGH (ref 10.3–14.5)
RETICS #: 210.8 10X3/UL — HIGH (ref 17–73)
RETICS/RBC NFR: 8.4 % — HIGH (ref 0.5–2.5)
RSV RNA SPEC QL NAA+PROBE: NOT DETECTED — SIGNIFICANT CHANGE UP
RV+EV RNA SPEC QL NAA+PROBE: NOT DETECTED — SIGNIFICANT CHANGE UP
SODIUM SERPL-SCNC: 139 MMOL/L — SIGNIFICANT CHANGE UP (ref 135–145)
SPECIMEN SOURCE: SIGNIFICANT CHANGE UP
WBC # BLD: 15.06 K/UL — HIGH (ref 3.8–10.5)
WBC # FLD AUTO: 15.06 K/UL — HIGH (ref 3.8–10.5)

## 2017-04-02 PROCEDURE — 71010: CPT | Mod: 26

## 2017-04-02 PROCEDURE — 99233 SBSQ HOSP IP/OBS HIGH 50: CPT

## 2017-04-02 RX ORDER — ACETAMINOPHEN 500 MG
650 TABLET ORAL EVERY 6 HOURS
Qty: 0 | Refills: 0 | Status: DISCONTINUED | OUTPATIENT
Start: 2017-04-02 | End: 2017-04-03

## 2017-04-02 RX ADMIN — MORPHINE SULFATE 30 MILLILITER(S): 50 CAPSULE, EXTENDED RELEASE ORAL at 07:31

## 2017-04-02 RX ADMIN — HEPARIN SODIUM 5000 UNIT(S): 5000 INJECTION INTRAVENOUS; SUBCUTANEOUS at 21:46

## 2017-04-02 RX ADMIN — SODIUM CHLORIDE 75 MILLILITER(S): 9 INJECTION, SOLUTION INTRAVENOUS at 00:23

## 2017-04-02 RX ADMIN — MORPHINE SULFATE 30 MILLILITER(S): 50 CAPSULE, EXTENDED RELEASE ORAL at 19:18

## 2017-04-02 RX ADMIN — HEPARIN SODIUM 5000 UNIT(S): 5000 INJECTION INTRAVENOUS; SUBCUTANEOUS at 14:51

## 2017-04-02 RX ADMIN — HEPARIN SODIUM 5000 UNIT(S): 5000 INJECTION INTRAVENOUS; SUBCUTANEOUS at 06:12

## 2017-04-02 RX ADMIN — Medication 650 MILLIGRAM(S): at 06:54

## 2017-04-03 VITALS
SYSTOLIC BLOOD PRESSURE: 121 MMHG | DIASTOLIC BLOOD PRESSURE: 56 MMHG | HEART RATE: 90 BPM | RESPIRATION RATE: 18 BRPM | TEMPERATURE: 98 F | OXYGEN SATURATION: 100 %

## 2017-04-03 LAB
ALBUMIN SERPL ELPH-MCNC: 2.2 G/DL — LOW (ref 3.3–5)
ALP SERPL-CCNC: 45 U/L — SIGNIFICANT CHANGE UP (ref 40–120)
ALT FLD-CCNC: 32 U/L — SIGNIFICANT CHANGE UP (ref 4–41)
AST SERPL-CCNC: 21 U/L — SIGNIFICANT CHANGE UP (ref 4–40)
BILIRUB SERPL-MCNC: 2.4 MG/DL — HIGH (ref 0.2–1.2)
BUN SERPL-MCNC: 6 MG/DL — LOW (ref 7–23)
CALCIUM SERPL-MCNC: 7.7 MG/DL — LOW (ref 8.4–10.5)
CHLORIDE SERPL-SCNC: 107 MMOL/L — SIGNIFICANT CHANGE UP (ref 98–107)
CO2 SERPL-SCNC: 24 MMOL/L — SIGNIFICANT CHANGE UP (ref 22–31)
CREAT SERPL-MCNC: 1.3 MG/DL — SIGNIFICANT CHANGE UP (ref 0.5–1.3)
GLUCOSE SERPL-MCNC: 117 MG/DL — HIGH (ref 70–99)
HCT VFR BLD CALC: 24 % — LOW (ref 39–50)
HGB BLD-MCNC: 8 G/DL — LOW (ref 13–17)
LDH SERPL L TO P-CCNC: 123 U/L — LOW (ref 135–225)
MAGNESIUM SERPL-MCNC: 1.5 MG/DL — LOW (ref 1.6–2.6)
MCHC RBC-ENTMCNC: 26.3 PG — LOW (ref 27–34)
MCHC RBC-ENTMCNC: 33.3 % — SIGNIFICANT CHANGE UP (ref 32–36)
MCV RBC AUTO: 78.9 FL — LOW (ref 80–100)
PHOSPHATE SERPL-MCNC: 3.3 MG/DL — SIGNIFICANT CHANGE UP (ref 2.5–4.5)
POTASSIUM SERPL-MCNC: 3.1 MMOL/L — LOW (ref 3.5–5.3)
POTASSIUM SERPL-SCNC: 3.1 MMOL/L — LOW (ref 3.5–5.3)
PROT SERPL-MCNC: 4.6 G/DL — LOW (ref 6–8.3)
RBC # BLD: 3.04 M/UL — LOW (ref 4.2–5.8)
RBC # FLD: 20 % — HIGH (ref 10.3–14.5)
RETICS #: 82.7 10X3/UL — HIGH (ref 17–73)
RETICS/RBC NFR: 2.7 % — HIGH (ref 0.5–2.5)
SODIUM SERPL-SCNC: 144 MMOL/L — SIGNIFICANT CHANGE UP (ref 135–145)
SPECIMEN SOURCE: SIGNIFICANT CHANGE UP

## 2017-04-03 PROCEDURE — 99239 HOSP IP/OBS DSCHRG MGMT >30: CPT

## 2017-04-03 RX ORDER — DOCUSATE SODIUM 100 MG
1 CAPSULE ORAL
Qty: 0 | Refills: 0 | COMMUNITY
Start: 2017-04-03

## 2017-04-03 RX ORDER — POLYETHYLENE GLYCOL 3350 17 G/17G
17 POWDER, FOR SOLUTION ORAL
Qty: 0 | Refills: 0 | COMMUNITY
Start: 2017-04-03

## 2017-04-03 RX ORDER — MORPHINE SULFATE 50 MG/1
30 CAPSULE, EXTENDED RELEASE ORAL
Qty: 0 | Refills: 0 | Status: DISCONTINUED | OUTPATIENT
Start: 2017-04-03 | End: 2017-04-03

## 2017-04-03 RX ORDER — SENNA PLUS 8.6 MG/1
2 TABLET ORAL
Qty: 0 | Refills: 0 | COMMUNITY
Start: 2017-04-03

## 2017-04-03 RX ADMIN — Medication 1 MILLIGRAM(S): at 11:54

## 2017-04-03 RX ADMIN — Medication 1 TABLET(S): at 11:54

## 2017-04-03 RX ADMIN — HEPARIN SODIUM 5000 UNIT(S): 5000 INJECTION INTRAVENOUS; SUBCUTANEOUS at 06:16

## 2017-04-03 RX ADMIN — MORPHINE SULFATE 30 MILLILITER(S): 50 CAPSULE, EXTENDED RELEASE ORAL at 11:52

## 2017-04-03 RX ADMIN — MORPHINE SULFATE 30 MILLILITER(S): 50 CAPSULE, EXTENDED RELEASE ORAL at 07:14

## 2017-04-03 NOTE — PROVIDER CONTACT NOTE (MEDICATION) - ASSESSMENT
Pt c/o pain in B/L arms, legs, hips, and back. Pt states pain is 7/10 and he is unable to walk when he has a sickle cell crisis. Last PCA 4 hour review showed pt attempting the pump 17 times and receiving 14 doses of medication.

## 2017-04-03 NOTE — PROVIDER CONTACT NOTE (OTHER) - SITUATION
Pt refusing morning blood draw. States he wants the doctor to do an arterial draw.
Pt. has an elevated temp.
Pt. has elevated temp.
Patient has a temperature of 100.3

## 2017-04-03 NOTE — PROVIDER CONTACT NOTE (OTHER) - BACKGROUND
Pt admitted with sickle cell crisis.
Pt. came in for sickle cell crisis.
Pt. came in for sickle cell crisis.
Patient admitted for SS crisis

## 2017-04-03 NOTE — PROVIDER CONTACT NOTE (OTHER) - ACTION/TREATMENT ORDERED:
Provide patient with Tylenol.
Give Tylenol STAT. Continue to monitor.
Possible Tylenol to be given. Apply icepacks. Continue to monitor.
Will do an arterial draw later.

## 2017-04-03 NOTE — PROVIDER CONTACT NOTE (OTHER) - ASSESSMENT
Pt refusing blood draw because he is a difficult stick and doesn't want us sticking him numerous times. States he wants the doctor to do an arterial draw.
Pt. denies chest pain & SOB.
Pt. is in no acute distress. Pt. denies chest pain & SOB.
Patient A + O x's 4. Patient on PCA pump

## 2017-04-07 LAB — BACTERIA BLD CULT: SIGNIFICANT CHANGE UP

## 2018-06-02 NOTE — ED PROVIDER NOTE - CPE EDP CARDIAC NORM
Date of Surgery: 4/12/18    Procedure: Left L5/S1 discectomy    History: Sanchez Zarate is seen today for follow-up following the above listed procedure. Overall the patient is doing well he is back to work light duty.    Exam: Incision is healing well. There is no sign of infection. Neuro exam is stable. No signs of DVT.    Radiographs: No new films today    Assessment/Plan: Doing well postoperatively. I will plan to see the patient back for the next postop visit in 5 weeks. Thank you for the opportunity to participate in this patient's care. Please give me a call if there are any concerns or questions.       normal...

## 2018-07-20 ENCOUNTER — INPATIENT (INPATIENT)
Facility: HOSPITAL | Age: 63
LOS: 9 days | Discharge: HOME | End: 2018-07-30
Attending: HOSPITALIST | Admitting: HOSPITALIST
Payer: MEDICARE

## 2018-07-20 VITALS
DIASTOLIC BLOOD PRESSURE: 95 MMHG | TEMPERATURE: 98 F | RESPIRATION RATE: 18 BRPM | HEART RATE: 82 BPM | SYSTOLIC BLOOD PRESSURE: 129 MMHG | OXYGEN SATURATION: 100 %

## 2018-07-20 NOTE — ED ADULT NURSE NOTE - OBJECTIVE STATEMENT
Received pt in spot 15. AA0X3. H/o sickle cell disease. C/o body aches, cough and chest pain x 1 week. States last crisis was last year. Pt also endorses subjective fevers. VS as noted. NSR on cardiac monitor. Appears comfortable. Awaits MD curtis, will continue to monitor.

## 2018-07-20 NOTE — ED ADULT TRIAGE NOTE - CHIEF COMPLAINT QUOTE
Pt arrives from home via EMS with complaints of all over body pain secondary to SCC. Pt states he often forgets to take his prescribed daily dose of folic acid and "maybe take it 2-3 times a week".

## 2018-07-21 DIAGNOSIS — D57.00 HB-SS DISEASE WITH CRISIS, UNSPECIFIED: ICD-10-CM

## 2018-07-21 LAB
ALBUMIN SERPL ELPH-MCNC: 3.8 G/DL — SIGNIFICANT CHANGE UP (ref 3.3–5)
ALP SERPL-CCNC: 110 U/L — SIGNIFICANT CHANGE UP (ref 40–120)
ALT FLD-CCNC: 24 U/L — SIGNIFICANT CHANGE UP (ref 4–41)
APPEARANCE UR: CLEAR — SIGNIFICANT CHANGE UP
AST SERPL-CCNC: 55 U/L — HIGH (ref 4–40)
B PERT DNA SPEC QL NAA+PROBE: SIGNIFICANT CHANGE UP
BASE EXCESS BLDV CALC-SCNC: -0.3 MMOL/L — SIGNIFICANT CHANGE UP
BASOPHILS # BLD AUTO: 0.11 K/UL — SIGNIFICANT CHANGE UP (ref 0–0.2)
BASOPHILS NFR BLD AUTO: 1 % — SIGNIFICANT CHANGE UP (ref 0–2)
BILIRUB SERPL-MCNC: 1.3 MG/DL — HIGH (ref 0.2–1.2)
BILIRUB UR-MCNC: NEGATIVE — SIGNIFICANT CHANGE UP
BLOOD GAS VENOUS - CREATININE: 1.01 MG/DL — SIGNIFICANT CHANGE UP (ref 0.5–1.3)
BLOOD UR QL VISUAL: NEGATIVE — SIGNIFICANT CHANGE UP
BUN SERPL-MCNC: 10 MG/DL — SIGNIFICANT CHANGE UP (ref 7–23)
C PNEUM DNA SPEC QL NAA+PROBE: NOT DETECTED — SIGNIFICANT CHANGE UP
CALCIUM SERPL-MCNC: 9.3 MG/DL — SIGNIFICANT CHANGE UP (ref 8.4–10.5)
CHLORIDE BLDV-SCNC: 110 MMOL/L — HIGH (ref 96–108)
CHLORIDE SERPL-SCNC: 106 MMOL/L — SIGNIFICANT CHANGE UP (ref 98–107)
CO2 SERPL-SCNC: 19 MMOL/L — LOW (ref 22–31)
COLOR SPEC: COLORLESS — SIGNIFICANT CHANGE UP
CREAT SERPL-MCNC: 0.99 MG/DL — SIGNIFICANT CHANGE UP (ref 0.5–1.3)
EOSINOPHIL # BLD AUTO: 0.29 K/UL — SIGNIFICANT CHANGE UP (ref 0–0.5)
EOSINOPHIL NFR BLD AUTO: 2.6 % — SIGNIFICANT CHANGE UP (ref 0–6)
FLUAV H1 2009 PAND RNA SPEC QL NAA+PROBE: NOT DETECTED — SIGNIFICANT CHANGE UP
FLUAV H1 RNA SPEC QL NAA+PROBE: NOT DETECTED — SIGNIFICANT CHANGE UP
FLUAV H3 RNA SPEC QL NAA+PROBE: NOT DETECTED — SIGNIFICANT CHANGE UP
FLUAV SUBTYP SPEC NAA+PROBE: SIGNIFICANT CHANGE UP
FLUBV RNA SPEC QL NAA+PROBE: NOT DETECTED — SIGNIFICANT CHANGE UP
GAS PNL BLDV: 137 MMOL/L — SIGNIFICANT CHANGE UP (ref 136–146)
GLUCOSE BLDV-MCNC: 75 — SIGNIFICANT CHANGE UP (ref 70–99)
GLUCOSE SERPL-MCNC: 76 MG/DL — SIGNIFICANT CHANGE UP (ref 70–99)
GLUCOSE UR-MCNC: NEGATIVE — SIGNIFICANT CHANGE UP
HADV DNA SPEC QL NAA+PROBE: NOT DETECTED — SIGNIFICANT CHANGE UP
HCO3 BLDV-SCNC: 24 MMOL/L — SIGNIFICANT CHANGE UP (ref 20–27)
HCOV 229E RNA SPEC QL NAA+PROBE: NOT DETECTED — SIGNIFICANT CHANGE UP
HCOV HKU1 RNA SPEC QL NAA+PROBE: NOT DETECTED — SIGNIFICANT CHANGE UP
HCOV NL63 RNA SPEC QL NAA+PROBE: NOT DETECTED — SIGNIFICANT CHANGE UP
HCOV OC43 RNA SPEC QL NAA+PROBE: NOT DETECTED — SIGNIFICANT CHANGE UP
HCT VFR BLD CALC: 21.9 % — LOW (ref 39–50)
HCT VFR BLDV CALC: 25.2 % — LOW (ref 39–51)
HGB BLD-MCNC: 7 G/DL — CRITICAL LOW (ref 13–17)
HGB BLDV-MCNC: 8.1 G/DL — LOW (ref 13–17)
HMPV RNA SPEC QL NAA+PROBE: NOT DETECTED — SIGNIFICANT CHANGE UP
HPIV1 RNA SPEC QL NAA+PROBE: NOT DETECTED — SIGNIFICANT CHANGE UP
HPIV2 RNA SPEC QL NAA+PROBE: NOT DETECTED — SIGNIFICANT CHANGE UP
HPIV3 RNA SPEC QL NAA+PROBE: NOT DETECTED — SIGNIFICANT CHANGE UP
HPIV4 RNA SPEC QL NAA+PROBE: NOT DETECTED — SIGNIFICANT CHANGE UP
IMM GRANULOCYTES # BLD AUTO: 0.15 # — SIGNIFICANT CHANGE UP
IMM GRANULOCYTES NFR BLD AUTO: 1.4 % — SIGNIFICANT CHANGE UP (ref 0–1.5)
KETONES UR-MCNC: NEGATIVE — SIGNIFICANT CHANGE UP
LACTATE BLDV-MCNC: 0.6 MMOL/L — SIGNIFICANT CHANGE UP (ref 0.5–2)
LDH SERPL L TO P-CCNC: 285 U/L — HIGH (ref 135–225)
LEUKOCYTE ESTERASE UR-ACNC: NEGATIVE — SIGNIFICANT CHANGE UP
LIDOCAIN IGE QN: 37.7 U/L — SIGNIFICANT CHANGE UP (ref 7–60)
LYMPHOCYTES # BLD AUTO: 1.84 K/UL — SIGNIFICANT CHANGE UP (ref 1–3.3)
LYMPHOCYTES # BLD AUTO: 16.8 % — SIGNIFICANT CHANGE UP (ref 13–44)
M PNEUMO DNA SPEC QL NAA+PROBE: NOT DETECTED — SIGNIFICANT CHANGE UP
MCHC RBC-ENTMCNC: 25.2 PG — LOW (ref 27–34)
MCHC RBC-ENTMCNC: 32 % — SIGNIFICANT CHANGE UP (ref 32–36)
MCV RBC AUTO: 78.8 FL — LOW (ref 80–100)
MONOCYTES # BLD AUTO: 1.03 K/UL — HIGH (ref 0–0.9)
MONOCYTES NFR BLD AUTO: 9.4 % — SIGNIFICANT CHANGE UP (ref 2–14)
NEUTROPHILS # BLD AUTO: 7.53 K/UL — HIGH (ref 1.8–7.4)
NEUTROPHILS NFR BLD AUTO: 68.8 % — SIGNIFICANT CHANGE UP (ref 43–77)
NITRITE UR-MCNC: NEGATIVE — SIGNIFICANT CHANGE UP
NRBC # FLD: 0.67 — SIGNIFICANT CHANGE UP
NRBC FLD-RTO: 6.1 — SIGNIFICANT CHANGE UP
PCO2 BLDV: 38 MMHG — LOW (ref 41–51)
PH BLDV: 7.41 PH — SIGNIFICANT CHANGE UP (ref 7.32–7.43)
PH UR: 7 — SIGNIFICANT CHANGE UP (ref 4.6–8)
PLATELET # BLD AUTO: 284 K/UL — SIGNIFICANT CHANGE UP (ref 150–400)
PMV BLD: 10.4 FL — SIGNIFICANT CHANGE UP (ref 7–13)
PO2 BLDV: 42 MMHG — HIGH (ref 35–40)
POTASSIUM BLDV-SCNC: 3.9 MMOL/L — SIGNIFICANT CHANGE UP (ref 3.4–4.5)
POTASSIUM SERPL-MCNC: 4.8 MMOL/L — SIGNIFICANT CHANGE UP (ref 3.5–5.3)
POTASSIUM SERPL-SCNC: 4.8 MMOL/L — SIGNIFICANT CHANGE UP (ref 3.5–5.3)
PROT SERPL-MCNC: 7.5 G/DL — SIGNIFICANT CHANGE UP (ref 6–8.3)
PROT UR-MCNC: 30 MG/DL — HIGH
RBC # BLD: 2.78 M/UL — LOW (ref 4.2–5.8)
RBC # FLD: 22.2 % — HIGH (ref 10.3–14.5)
RBC CASTS # UR COMP ASSIST: SIGNIFICANT CHANGE UP (ref 0–?)
RETICS #: 190 K/UL — HIGH (ref 25–125)
RETICS/RBC NFR: 6.9 % — HIGH (ref 0.5–2.5)
RSV RNA SPEC QL NAA+PROBE: NOT DETECTED — SIGNIFICANT CHANGE UP
RV+EV RNA SPEC QL NAA+PROBE: NOT DETECTED — SIGNIFICANT CHANGE UP
SAO2 % BLDV: 70.8 % — SIGNIFICANT CHANGE UP (ref 60–85)
SODIUM SERPL-SCNC: 140 MMOL/L — SIGNIFICANT CHANGE UP (ref 135–145)
SP GR SPEC: 1.01 — SIGNIFICANT CHANGE UP (ref 1–1.04)
TROPONIN T, HIGH SENSITIVITY: 12 NG/L — SIGNIFICANT CHANGE UP (ref ?–14)
TROPONIN T, HIGH SENSITIVITY: 16 NG/L — SIGNIFICANT CHANGE UP (ref ?–14)
UROBILINOGEN FLD QL: NORMAL MG/DL — SIGNIFICANT CHANGE UP
WBC # BLD: 10.95 K/UL — HIGH (ref 3.8–10.5)
WBC # FLD AUTO: 10.95 K/UL — HIGH (ref 3.8–10.5)
WBC UR QL: SIGNIFICANT CHANGE UP (ref 0–?)

## 2018-07-21 PROCEDURE — 93010 ELECTROCARDIOGRAM REPORT: CPT

## 2018-07-21 PROCEDURE — 99223 1ST HOSP IP/OBS HIGH 75: CPT

## 2018-07-21 PROCEDURE — 71045 X-RAY EXAM CHEST 1 VIEW: CPT | Mod: 26

## 2018-07-21 PROCEDURE — 86079 PHYS BLOOD BANK SERV AUTHRJ: CPT

## 2018-07-21 PROCEDURE — 99222 1ST HOSP IP/OBS MODERATE 55: CPT | Mod: GC

## 2018-07-21 RX ORDER — ONDANSETRON 8 MG/1
4 TABLET, FILM COATED ORAL EVERY 6 HOURS
Qty: 0 | Refills: 0 | Status: DISCONTINUED | OUTPATIENT
Start: 2018-07-21 | End: 2018-07-30

## 2018-07-21 RX ORDER — MORPHINE SULFATE 50 MG/1
4 CAPSULE, EXTENDED RELEASE ORAL ONCE
Qty: 0 | Refills: 0 | Status: DISCONTINUED | OUTPATIENT
Start: 2018-07-21 | End: 2018-07-21

## 2018-07-21 RX ORDER — ACETAMINOPHEN 500 MG
650 TABLET ORAL EVERY 6 HOURS
Qty: 0 | Refills: 0 | Status: DISCONTINUED | OUTPATIENT
Start: 2018-07-21 | End: 2018-07-30

## 2018-07-21 RX ORDER — OXYCODONE HYDROCHLORIDE 5 MG/1
5 TABLET ORAL ONCE
Qty: 0 | Refills: 0 | Status: DISCONTINUED | OUTPATIENT
Start: 2018-07-21 | End: 2018-07-21

## 2018-07-21 RX ORDER — SENNA PLUS 8.6 MG/1
2 TABLET ORAL AT BEDTIME
Qty: 0 | Refills: 0 | Status: DISCONTINUED | OUTPATIENT
Start: 2018-07-21 | End: 2018-07-30

## 2018-07-21 RX ORDER — ONDANSETRON 8 MG/1
4 TABLET, FILM COATED ORAL ONCE
Qty: 0 | Refills: 0 | Status: COMPLETED | OUTPATIENT
Start: 2018-07-21 | End: 2018-07-21

## 2018-07-21 RX ORDER — SODIUM CHLORIDE 9 MG/ML
1000 INJECTION INTRAMUSCULAR; INTRAVENOUS; SUBCUTANEOUS ONCE
Qty: 0 | Refills: 0 | Status: COMPLETED | OUTPATIENT
Start: 2018-07-21 | End: 2018-07-21

## 2018-07-21 RX ORDER — FOLIC ACID 0.8 MG
1 TABLET ORAL DAILY
Qty: 0 | Refills: 0 | Status: DISCONTINUED | OUTPATIENT
Start: 2018-07-21 | End: 2018-07-21

## 2018-07-21 RX ORDER — DOCUSATE SODIUM 100 MG
100 CAPSULE ORAL THREE TIMES A DAY
Qty: 0 | Refills: 0 | Status: DISCONTINUED | OUTPATIENT
Start: 2018-07-21 | End: 2018-07-30

## 2018-07-21 RX ORDER — KETOROLAC TROMETHAMINE 30 MG/ML
30 SYRINGE (ML) INJECTION EVERY 6 HOURS
Qty: 0 | Refills: 0 | Status: DISCONTINUED | OUTPATIENT
Start: 2018-07-21 | End: 2018-07-23

## 2018-07-21 RX ORDER — SODIUM CHLORIDE 9 MG/ML
1000 INJECTION, SOLUTION INTRAVENOUS
Qty: 0 | Refills: 0 | Status: DISCONTINUED | OUTPATIENT
Start: 2018-07-21 | End: 2018-07-22

## 2018-07-21 RX ORDER — NALOXONE HYDROCHLORIDE 4 MG/.1ML
0.1 SPRAY NASAL
Qty: 0 | Refills: 0 | Status: DISCONTINUED | OUTPATIENT
Start: 2018-07-21 | End: 2018-07-30

## 2018-07-21 RX ORDER — FOLIC ACID 0.8 MG
1 TABLET ORAL DAILY
Qty: 0 | Refills: 0 | Status: DISCONTINUED | OUTPATIENT
Start: 2018-07-21 | End: 2018-07-30

## 2018-07-21 RX ORDER — MORPHINE SULFATE 50 MG/1
30 CAPSULE, EXTENDED RELEASE ORAL
Qty: 0 | Refills: 0 | Status: DISCONTINUED | OUTPATIENT
Start: 2018-07-21 | End: 2018-07-23

## 2018-07-21 RX ADMIN — OXYCODONE HYDROCHLORIDE 5 MILLIGRAM(S): 5 TABLET ORAL at 04:34

## 2018-07-21 RX ADMIN — Medication 30 MILLIGRAM(S): at 23:58

## 2018-07-21 RX ADMIN — MORPHINE SULFATE 30 MILLILITER(S): 50 CAPSULE, EXTENDED RELEASE ORAL at 12:48

## 2018-07-21 RX ADMIN — MORPHINE SULFATE 4 MILLIGRAM(S): 50 CAPSULE, EXTENDED RELEASE ORAL at 05:32

## 2018-07-21 RX ADMIN — Medication 30 MILLIGRAM(S): at 11:25

## 2018-07-21 RX ADMIN — MORPHINE SULFATE 30 MILLILITER(S): 50 CAPSULE, EXTENDED RELEASE ORAL at 20:13

## 2018-07-21 RX ADMIN — SODIUM CHLORIDE 1000 MILLILITER(S): 9 INJECTION INTRAMUSCULAR; INTRAVENOUS; SUBCUTANEOUS at 04:34

## 2018-07-21 RX ADMIN — Medication 1 MILLIGRAM(S): at 11:25

## 2018-07-21 RX ADMIN — Medication 30 MILLIGRAM(S): at 17:39

## 2018-07-21 RX ADMIN — Medication 30 MILLIGRAM(S): at 11:47

## 2018-07-21 RX ADMIN — Medication 100 MILLIGRAM(S): at 14:20

## 2018-07-21 RX ADMIN — Medication 30 MILLIGRAM(S): at 17:38

## 2018-07-21 RX ADMIN — ONDANSETRON 4 MILLIGRAM(S): 8 TABLET, FILM COATED ORAL at 02:10

## 2018-07-21 RX ADMIN — MORPHINE SULFATE 4 MILLIGRAM(S): 50 CAPSULE, EXTENDED RELEASE ORAL at 06:40

## 2018-07-21 RX ADMIN — OXYCODONE HYDROCHLORIDE 5 MILLIGRAM(S): 5 TABLET ORAL at 02:10

## 2018-07-21 RX ADMIN — MORPHINE SULFATE 4 MILLIGRAM(S): 50 CAPSULE, EXTENDED RELEASE ORAL at 06:16

## 2018-07-21 RX ADMIN — Medication 1 TABLET(S): at 11:25

## 2018-07-21 RX ADMIN — MORPHINE SULFATE 4 MILLIGRAM(S): 50 CAPSULE, EXTENDED RELEASE ORAL at 07:20

## 2018-07-21 RX ADMIN — MORPHINE SULFATE 4 MILLIGRAM(S): 50 CAPSULE, EXTENDED RELEASE ORAL at 04:37

## 2018-07-21 RX ADMIN — SODIUM CHLORIDE 100 MILLILITER(S): 9 INJECTION, SOLUTION INTRAVENOUS at 10:32

## 2018-07-21 NOTE — ED PROVIDER NOTE - ATTENDING CONTRIBUTION TO CARE
Dr. Ac: 49 yo male with sickle cell disease, not regularly taking opioid medications, usually takes NSAIDs, has not been in hospital for sickle call pain in over 7 months, in ED with 3 days of body aches/chest pain/leg pain consistent with pain from sickle cell disease in the past.  Pt states that symptoms began with "the flu"--productive cough, subjective fever and chills and chest pain.  For the most part pt states that pain in his chest feels like pain he has had with sickle cell disease before.  He came to ED tonight due to continued pain.  On exam pt chronically-ill appearing but in NAD, heart RRR, lungs CTAB, abd NTND, extremities without swelling, strength 5/5 in all extremities and skin without rash.

## 2018-07-21 NOTE — H&P ADULT - PROBLEM SELECTOR PLAN 1
-IVF 1/2 NS @75   -Folic acid/MVI  -PCA pump  -Bowel regimen to avoid opiod induced constipation  -monitor retic count, LDH  -RVP -IVF 1/2 NS @75   -Folic acid/MVI  -PCA pump/toradolx 2 days  -Bowel regimen to avoid opiod induced constipation  -monitor retic count, LDH  -RVP-P since been >1 week will treat symptomatically  -Chest pain likely secondary to SSC crisis reproducible at sternum and EKGx 2 non ischemic hs troponin change in 3 hr window 12-->16 suspect elevation due to LVH if recurrent CP/SOB would re-evaluate with EKG and cardiac enzymes no acute infiltrate on CXR and O2 sat 100 on RA no evidence of acute chest -IVF 1/2 NS @75   -Folic acid/MVI  -PCA pump/toradolx 2 days  -Bowel regimen to avoid opiod induced constipation  -monitor retic count, LDH  -RVP-P since been >1 week will treat symptomatically  -Chest pain likely secondary to SSC crisis reproducible at sternum and EKGx 2 non ischemic hs troponin change in 3 hr window 12-->16 suspect elevation due to LVH if recurrent CP/SOB would re-evaluate with EKG and cardiac enzymes no acute infiltrate on CXR and O2 sat 100 on RA no evidence of acute chest. case d/w cards fellow unlikely ACS f/u official recs -IVF 1/2 NS @75   -Folic acid/MVI  -PCA pump/toradolx 2 days  -Bowel regimen to avoid opiod induced constipation  -monitor retic count, LDH  -RVP-P since been >1 week will treat symptomatically  -Chest pain likely secondary to SSC crisis reproducible at sternum and EKGx 2 non ischemic hs troponin change in 3 hr window 12-->16 suspect elevation due to LVH if recurrent CP/SOB would re-evaluate with EKG and cardiac enzymes no acute infiltrate on CXR and O2 sat 100 on RA no evidence of acute chest. case d/w cards fellow unlikely ACS f/u official recs  -incentive spirometer -IVF 1/2 NS @75   -Folic acid/MVI  -PCA pump/toradolx 2 days  -Bowel regimen to avoid opiod induced constipation  -monitor retic count, LDH  -RVP-P since been >1 week will treat symptomatically  -Chest pain likely secondary to SSC crisis reproducible at sternum and EKGx 2 non ischemic hs troponin change in 3 hr window 12-->16 suspect elevation due to LVH if recurrent CP/SOB would re-evaluate with EKG and cardiac enzymes no acute infiltrate on CXR and O2 sat 100 on RA no evidence of acute chest. case d/w cards fellow unlikely ACS f/u official recs  -incentive spirometer  istop Reference #: [id number]

## 2018-07-21 NOTE — H&P ADULT - HISTORY OF PRESENT ILLNESS
48M hx of sickle cell disease, last taken Aleve in the morning, not on any chronic opioid medication per pt, however not a reliable historian, noted chest pain that started last week with cold like symptoms, + fevers and chills, + sob, with dyspnea on exertion (noted due to sickness), chest pain characterized as midsternal dull pain, non positional, +n/v (twice today), no diarrhea, no dysuria, + headache. Current pain is typical of usual pain    49 yo male with sickle cell disease, not regularly taking opioid medications, usually takes NSAIDs, has not been in hospital for sickle call pain in over 7 months, in ED with 3 days of body aches/chest pain/leg pain consistent with pain from sickle cell disease in the past.  Pt states that symptoms began with "the flu"--productive cough, subjective fever and chills and chest pain.  For the most part pt states that pain in his chest feels like pain he has had with sickle cell disease before.  He came to ED tonight due to continued pain.  On exam pt chronically-ill appearing but in NAD, heart RRR, lungs CTAB, abd NTND, extremities without swelling, strength 5/5 in all extremities and skin without rash 47 yo male w/ Hx SSD (not regularly taking opioid medications, usually takes NSAIDs, has not been in hospital for sickle call pain in over 7 month), p/w diffuse body pain.  Pt states began with "the flu"--productive cough, subjective fever and chills and chest pain.  Pt states that pain in the chest is over sternal area similar to prior episodes of crisis. In ED was given IVF and morphine which helped relieve pain from 10 to 7 admitted for sickle cell crisis.

## 2018-07-21 NOTE — H&P ADULT - NSHPLABSRESULTS_GEN_ALL_CORE
7.0    10.95 )-----------( 284      ( 2018 00:50 )             21.9           140  |  106  |  10  ----------------------------<  76  4.8   |  19<L>  |  0.99    Ca    9.3      2018 00:50    TPro  7.5  /  Alb  3.8  /  TBili  1.3<H>  /  DBili  x   /  AST  55<H>  /  ALT  24  /  AlkPhos  110        CAPILLARY BLOOD GLUCOSE          Urinalysis Basic - ( 2018 06:12 )    Color: COLORLESS / Appearance: CLEAR / S.007 / pH: 7.0  Gluc: NEGATIVE / Ketone: NEGATIVE  / Bili: NEGATIVE / Urobili: NORMAL mg/dL   Blood: NEGATIVE / Protein: 30 mg/dL / Nitrite: NEGATIVE   Leuk Esterase: NEGATIVE / RBC: 0-2 / WBC 0-2   Sq Epi: x / Non Sq Epi: x / Bacteria: x            Radiology  < from: Xray Chest 1 View AP/PA (18 @ 02:08) >    EXAM:  XR CHEST AP OR PA 1V        PROCEDURE DATE:  2018         INTERPRETATION:  CLINICAL INDICATION: Cough    TECHNIQUE: Frontal view of the chest dated 2018    COMPARISON: X-Ray of the chest dated     FINDINGS:  Lungs are clear bilaterally. No pleural effusion or pneumothorax noted.   Cardiac silhouette is enlarged. Chronic osseous stigmata of underlying   sickle cell disease.    IMPRESSION:  Clear lungs.    < end of copied text > 7.0    10.95 )-----------( 284      ( 2018 00:50 )             21.9       -    140  |  106  |  10  ----------------------------<  76  4.8   |  19<L>  |  0.99    Ca    9.3      2018 00:50    TPro  7.5  /  Alb  3.8  /  TBili  1.3<H>  /  DBili  x   /  AST  55<H>  /  ALT  24  /  AlkPhos  110        CAPILLARY BLOOD GLUCOSE          Urinalysis Basic - ( 2018 06:12 )    Color: COLORLESS / Appearance: CLEAR / S.007 / pH: 7.0  Gluc: NEGATIVE / Ketone: NEGATIVE  / Bili: NEGATIVE / Urobili: NORMAL mg/dL   Blood: NEGATIVE / Protein: 30 mg/dL / Nitrite: NEGATIVE   Leuk Esterase: NEGATIVE / RBC: 0-2 / WBC 0-2   Sq Epi: x / Non Sq Epi: x / Bacteria: x      EKG- NSR w/ LVH no acute ischemic changes x2       Radiology  < from: Xray Chest 1 View AP/PA (18 @ 02:08) >    EXAM:  XR CHEST AP OR PA 1V        PROCEDURE DATE:  2018         INTERPRETATION:  CLINICAL INDICATION: Cough    TECHNIQUE: Frontal view of the chest dated 2018    COMPARISON: X-Ray of the chest dated     FINDINGS:  Lungs are clear bilaterally. No pleural effusion or pneumothorax noted.   Cardiac silhouette is enlarged. Chronic osseous stigmata of underlying   sickle cell disease.    IMPRESSION:  Clear lungs.    < end of copied text >

## 2018-07-21 NOTE — ED PROVIDER NOTE - MEDICAL DECISION MAKING DETAILS
48M with ss crisis vs cardiac vs flu like illness, pain control, ekg, cxr, reasses 48M with ss crisis vs cardiac vs flu like illness, pain control, ekg, cxr, trend trops, reasses

## 2018-07-21 NOTE — H&P ADULT - NSHPPHYSICALEXAM_GEN_ALL_CORE
Vital Signs Last 24 Hrs  T(C): 36.6 (21 Jul 2018 07:29), Max: 36.9 (20 Jul 2018 23:53)  T(F): 97.9 (21 Jul 2018 07:29), Max: 98.5 (20 Jul 2018 23:53)  HR: 78 (21 Jul 2018 07:29) (73 - 82)  BP: 158/86 (21 Jul 2018 07:29) (129/95 - 170/75)  BP(mean): --  RR: 17 (21 Jul 2018 07:29) (15 - 18)  SpO2: 94% (21 Jul 2018 07:29) (94% - 100%)    PHYSICAL EXAM:  GENERAL: NAD, well-developed  HEAD:  Atraumatic, Normocephalic  EYES: EOMI, PERRLA, conjunctiva and sclera clear  NECK: Supple, No JVD  CHEST/LUNG: Clear to auscultation bilaterally; No wheeze  HEART: Regular rate and rhythm; No murmurs, rubs, or gallops  ABDOMEN: Soft, Nontender, Nondistended; Bowel sounds present  EXTREMITIES:  2+ Peripheral Pulses, No clubbing, cyanosis, or edema  PSYCH: AAOx3  NEUROLOGY: non-focal  SKIN: No rashes or lesions Vital Signs Last 24 Hrs  T(C): 36.6 (21 Jul 2018 07:29), Max: 36.9 (20 Jul 2018 23:53)  T(F): 97.9 (21 Jul 2018 07:29), Max: 98.5 (20 Jul 2018 23:53)  HR: 78 (21 Jul 2018 07:29) (73 - 82)  BP: 158/86 (21 Jul 2018 07:29) (129/95 - 170/75)  BP(mean): --  RR: 17 (21 Jul 2018 07:29) (15 - 18)  SpO2: 94% (21 Jul 2018 07:29) (94% - 100%)    PHYSICAL EXAM:  GENERAL: NAD, well-developed  HEAD:  Atraumatic, Normocephalic  EYES: EOMI, PERRLA, conjunctiva and sclera clear  NECK: Supple, No JVD  CHEST/LUNG: Clear to auscultation bilaterally; No wheeze  HEART: +sternal tenderness on palpation   ABDOMEN: Soft, Nontender, Nondistended; Bowel sounds present  EXTREMITIES:  2+ Peripheral Pulses, No clubbing, cyanosis, or edema  PSYCH: AAOx3  NEUROLOGY: non-focal  SKIN: No rashes or lesions

## 2018-07-21 NOTE — ED PROVIDER NOTE - PROGRESS NOTE DETAILS
Aureliano PGY2: pain unremitting to morphine,. requiring much higher doses than usual - will admit for pain control. pt agreeable

## 2018-07-21 NOTE — CONSULT NOTE ADULT - ASSESSMENT
48M wiht SCD presents with sickle cell crisis with chest pain a few days ago now with positive troponins.    Chest pain with troponin elevation. In setting of sickle cell crisis. Chest pain was 4 days ago and non-cardiac.   - non-cardiac CP, cont ot treat sickle cell crisis  - TTE for EKG findings suggestive of LVH 48M wiht SCD presents with sickle cell crisis with chest pain a few days ago now with positive troponins.    Chest pain with troponin elevation. In setting of sickle cell crisis. Chest pain was 4 days ago and non-cardiac.   - non-cardiac CP, cont to treat sickle cell crisis  - TTE for EKG findings suggestive of LVH    Please call back with any questions. 48M with SCD presents with sickle cell crisis with chest pain a few days ago now with positive troponins.    Chest pain with troponin elevation. In setting of sickle cell crisis. Chest pain was 4 days ago and non-cardiac.   - non-cardiac CP, cont to treat sickle cell crisis  - TTE for EKG findings suggestive of LVH    Please call back with any questions. 48M with SCD presents with sickle cell crisis with chest pain a few days ago now with positive troponins.    Chest pain with troponin elevation. In setting of sickle cell crisis. Chest pain was 4 days ago and non-cardiac. EKG without concern for ischemia.  - non-cardiac CP, cont to treat sickle cell crisis  - TTE for EKG findings suggestive of LVH    Please call back with any questions.

## 2018-07-21 NOTE — H&P ADULT - ASSESSMENT
Pt is a 49 yo M w/ hx SSD p/w VOC crisis after flu like illness recieved 16mg morphine in ED and oxy IR x 1 RVP pending Pt is a 47 yo M w/ hx SSD p/w VOC crisis after flu like illness. CXR neg for acute infiltrate. o2 sat 100 on RA recieved 16mg morphine in ED and oxy IR x 1 RVP pending + Cp reproducible at sternum for past few days EKG non ischemic

## 2018-07-21 NOTE — H&P ADULT - NSHPREVIEWOFSYSTEMS_GEN_ALL_CORE
Review of Systems:   CONSTITUTIONAL: No fever, weight loss, or fatigue  EYES: No eye pain, visual disturbances, or discharge  ENMT:  No difficulty hearing, tinnitus, vertigo; No sinus or throat pain  NECK: No pain or stiffness  BREASTS: No pain, masses, or nipple discharge  RESPIRATORY: No cough, wheezing, chills or hemoptysis; No shortness of breath  CARDIOVASCULAR: No chest pain, palpitations, dizziness, or leg swelling  GASTROINTESTINAL: No abdominal or epigastric pain. No nausea, vomiting, or hematemesis; No diarrhea or constipation. No melena or hematochezia.  GENITOURINARY: No dysuria, frequency, hematuria, or incontinence  NEUROLOGICAL: No headaches, memory loss, loss of strength, numbness, or tremors  SKIN: No itching, burning, rashes, or lesions   LYMPH NODES: No enlarged glands  ENDOCRINE: No heat or cold intolerance; No hair loss  MUSCULOSKELETAL: No joint pain or swelling; No muscle, back, or extremity pain  PSYCHIATRIC: No depression, anxiety, mood swings, or difficulty sleeping  HEME/LYMPH: No easy bruising, or bleeding gums  ALLERY AND IMMUNOLOGIC: No hives or eczema Review of Systems:   CONSTITUTIONAL: No, weight loss, or fatigue +subjective fever  EYES: No eye pain, visual disturbances, or discharge  ENMT:  No difficulty hearing, tinnitus, vertigo; No sinus or throat pain  NECK: No pain or stiffness  BREASTS: No pain, masses, or nipple discharge  RESPIRATORY: No cough, wheezing, chills or hemoptysis; No shortness of breath  CARDIOVASCULAR: No, palpitations, dizziness, or leg swelling +chest pain  GASTROINTESTINAL: No abdominal or epigastric pain. No nausea, vomiting, or hematemesis; No diarrhea or constipation. No melena or hematochezia.  GENITOURINARY: No dysuria, frequency, hematuria, or incontinence  NEUROLOGICAL: No headaches, memory loss, loss of strength, numbness, or tremors  SKIN: No itching, burning, rashes, or lesions   LYMPH NODES: No enlarged glands  ENDOCRINE: No heat or cold intolerance; No hair loss  MUSCULOSKELETAL: No joint pain or swelling; No muscle, back, or extremity pain +diffuse myalagia  PSYCHIATRIC: No depression, anxiety, mood swings, or difficulty sleeping  HEME/LYMPH: No easy bruising, or bleeding gums  ALLERY AND IMMUNOLOGIC: No hives or eczema

## 2018-07-21 NOTE — ED PROVIDER NOTE - MUSCULOSKELETAL, MLM
Spine appears normal, range of motion is not limited, + TTP of sternum, right hip and left knee, no obvious deformities, erythema, crepitus or joint swelling, ROM not limited

## 2018-07-21 NOTE — ED PROVIDER NOTE - OBJECTIVE STATEMENT
48M hx of sickle cell disease, last taken alleve in the morning, not on any chronic opioid medication per pt, however not a reliable historian, noted chest pain that started last week with cold like symptoms, + fevers and chills, + sob, with dyspnea on exertion (noted due to sickness), chest pain characterized as midsternal dull pain, non positional, +n/v (twice today), no diarrhea, no dysuria, + headache. Current pain is typical of usual pain. 48M hx of sickle cell disease, last taken Aleve in the morning, not on any chronic opioid medication per pt, however not a reliable historian, noted chest pain that started last week with cold like symptoms, + fevers and chills, + sob, with dyspnea on exertion (noted due to sickness), chest pain characterized as midsternal dull pain, non positional, +n/v (twice today), no diarrhea, no dysuria, + headache. Current pain is typical of usual pain. 48M hx of sickle cell disease, last taken Aleve in the morning, not on any chronic opioid medication per pt, however not a reliable historian, noted chest pain that started last week with cold like symptoms, + fevers and chills, + sob, with dyspnea on exertion (noted due to sickness), chest pain characterized as midsternal dull pain, non positional, +n/v (twice today), no diarrhea, no dysuria, + headache. Current pain is typical of usual pain, involving generalized area of chest, abdomen and left knee and right hip.

## 2018-07-22 DIAGNOSIS — E43 UNSPECIFIED SEVERE PROTEIN-CALORIE MALNUTRITION: ICD-10-CM

## 2018-07-22 DIAGNOSIS — R82.8 ABNORMAL FINDINGS ON CYTOLOGICAL AND HISTOLOGICAL EXAMINATION OF URINE: ICD-10-CM

## 2018-07-22 LAB
ALBUMIN SERPL ELPH-MCNC: 3.1 G/DL — LOW (ref 3.3–5)
ALP SERPL-CCNC: 118 U/L — SIGNIFICANT CHANGE UP (ref 40–120)
ALT FLD-CCNC: 19 U/L — SIGNIFICANT CHANGE UP (ref 4–41)
AST SERPL-CCNC: 34 U/L — SIGNIFICANT CHANGE UP (ref 4–40)
BASOPHILS # BLD AUTO: 0.06 K/UL — SIGNIFICANT CHANGE UP (ref 0–0.2)
BASOPHILS # BLD AUTO: 0.06 K/UL — SIGNIFICANT CHANGE UP (ref 0–0.2)
BASOPHILS NFR BLD AUTO: 0.4 % — SIGNIFICANT CHANGE UP (ref 0–2)
BASOPHILS NFR BLD AUTO: 0.5 % — SIGNIFICANT CHANGE UP (ref 0–2)
BILIRUB SERPL-MCNC: 1.5 MG/DL — HIGH (ref 0.2–1.2)
BUN SERPL-MCNC: 10 MG/DL — SIGNIFICANT CHANGE UP (ref 7–23)
CALCIUM SERPL-MCNC: 8.2 MG/DL — LOW (ref 8.4–10.5)
CHLORIDE SERPL-SCNC: 109 MMOL/L — HIGH (ref 98–107)
CO2 SERPL-SCNC: 19 MMOL/L — LOW (ref 22–31)
CREAT SERPL-MCNC: 0.99 MG/DL — SIGNIFICANT CHANGE UP (ref 0.5–1.3)
EOSINOPHIL # BLD AUTO: 0.5 K/UL — SIGNIFICANT CHANGE UP (ref 0–0.5)
EOSINOPHIL # BLD AUTO: 0.57 K/UL — HIGH (ref 0–0.5)
EOSINOPHIL NFR BLD AUTO: 4.1 % — SIGNIFICANT CHANGE UP (ref 0–6)
EOSINOPHIL NFR BLD AUTO: 4.5 % — SIGNIFICANT CHANGE UP (ref 0–6)
GLUCOSE SERPL-MCNC: 68 MG/DL — LOW (ref 70–99)
HCT VFR BLD CALC: 16.9 % — CRITICAL LOW (ref 39–50)
HCT VFR BLD CALC: 17.1 % — CRITICAL LOW (ref 39–50)
HGB BLD-MCNC: 5.4 G/DL — CRITICAL LOW (ref 13–17)
HGB BLD-MCNC: 5.6 G/DL — CRITICAL LOW (ref 13–17)
IMM GRANULOCYTES # BLD AUTO: 0.14 # — SIGNIFICANT CHANGE UP
IMM GRANULOCYTES # BLD AUTO: 0.21 # — SIGNIFICANT CHANGE UP
IMM GRANULOCYTES NFR BLD AUTO: 1.3 % — SIGNIFICANT CHANGE UP (ref 0–1.5)
IMM GRANULOCYTES NFR BLD AUTO: 1.5 % — SIGNIFICANT CHANGE UP (ref 0–1.5)
LDH SERPL L TO P-CCNC: 300 U/L — HIGH (ref 135–225)
LYMPHOCYTES # BLD AUTO: 1.59 K/UL — SIGNIFICANT CHANGE UP (ref 1–3.3)
LYMPHOCYTES # BLD AUTO: 1.65 K/UL — SIGNIFICANT CHANGE UP (ref 1–3.3)
LYMPHOCYTES # BLD AUTO: 11.5 % — LOW (ref 13–44)
LYMPHOCYTES # BLD AUTO: 14.8 % — SIGNIFICANT CHANGE UP (ref 13–44)
MCHC RBC-ENTMCNC: 24.7 PG — LOW (ref 27–34)
MCHC RBC-ENTMCNC: 25.2 PG — LOW (ref 27–34)
MCHC RBC-ENTMCNC: 31.6 % — LOW (ref 32–36)
MCHC RBC-ENTMCNC: 33.1 % — SIGNIFICANT CHANGE UP (ref 32–36)
MCV RBC AUTO: 76.1 FL — LOW (ref 80–100)
MCV RBC AUTO: 78.1 FL — LOW (ref 80–100)
MONOCYTES # BLD AUTO: 0.86 K/UL — SIGNIFICANT CHANGE UP (ref 0–0.9)
MONOCYTES # BLD AUTO: 0.97 K/UL — HIGH (ref 0–0.9)
MONOCYTES NFR BLD AUTO: 7 % — SIGNIFICANT CHANGE UP (ref 2–14)
MONOCYTES NFR BLD AUTO: 7.7 % — SIGNIFICANT CHANGE UP (ref 2–14)
NEUTROPHILS # BLD AUTO: 10.46 K/UL — HIGH (ref 1.8–7.4)
NEUTROPHILS # BLD AUTO: 7.93 K/UL — HIGH (ref 1.8–7.4)
NEUTROPHILS NFR BLD AUTO: 71.2 % — SIGNIFICANT CHANGE UP (ref 43–77)
NEUTROPHILS NFR BLD AUTO: 75.5 % — SIGNIFICANT CHANGE UP (ref 43–77)
NRBC # FLD: 0.68 — SIGNIFICANT CHANGE UP
NRBC # FLD: 0.81 — SIGNIFICANT CHANGE UP
NRBC FLD-RTO: 5.8 — SIGNIFICANT CHANGE UP
NRBC FLD-RTO: 6.1 — SIGNIFICANT CHANGE UP
PLATELET # BLD AUTO: 226 K/UL — SIGNIFICANT CHANGE UP (ref 150–400)
PLATELET # BLD AUTO: 244 K/UL — SIGNIFICANT CHANGE UP (ref 150–400)
PMV BLD: 10.6 FL — SIGNIFICANT CHANGE UP (ref 7–13)
PMV BLD: 9.9 FL — SIGNIFICANT CHANGE UP (ref 7–13)
POTASSIUM SERPL-MCNC: 4.6 MMOL/L — SIGNIFICANT CHANGE UP (ref 3.5–5.3)
POTASSIUM SERPL-SCNC: 4.6 MMOL/L — SIGNIFICANT CHANGE UP (ref 3.5–5.3)
PROT SERPL-MCNC: 6.5 G/DL — SIGNIFICANT CHANGE UP (ref 6–8.3)
RBC # BLD: 2.19 M/UL — LOW (ref 4.2–5.8)
RBC # BLD: 2.22 M/UL — LOW (ref 4.2–5.8)
RBC # FLD: 23.1 % — HIGH (ref 10.3–14.5)
RBC # FLD: 23.5 % — HIGH (ref 10.3–14.5)
RETICS #: 186 K/UL — HIGH (ref 25–125)
RETICS/RBC NFR: 8.5 % — HIGH (ref 0.5–2.5)
SODIUM SERPL-SCNC: 140 MMOL/L — SIGNIFICANT CHANGE UP (ref 135–145)
SPECIMEN SOURCE: SIGNIFICANT CHANGE UP
WBC # BLD: 11.14 K/UL — HIGH (ref 3.8–10.5)
WBC # BLD: 13.86 K/UL — HIGH (ref 3.8–10.5)
WBC # FLD AUTO: 11.14 K/UL — HIGH (ref 3.8–10.5)
WBC # FLD AUTO: 13.86 K/UL — HIGH (ref 3.8–10.5)

## 2018-07-22 PROCEDURE — 99233 SBSQ HOSP IP/OBS HIGH 50: CPT

## 2018-07-22 RX ORDER — SODIUM CHLORIDE 9 MG/ML
1000 INJECTION, SOLUTION INTRAVENOUS
Qty: 0 | Refills: 0 | Status: DISCONTINUED | OUTPATIENT
Start: 2018-07-22 | End: 2018-07-24

## 2018-07-22 RX ADMIN — MORPHINE SULFATE 30 MILLILITER(S): 50 CAPSULE, EXTENDED RELEASE ORAL at 07:56

## 2018-07-22 RX ADMIN — Medication 30 MILLIGRAM(S): at 17:12

## 2018-07-22 RX ADMIN — Medication 30 MILLIGRAM(S): at 00:13

## 2018-07-22 RX ADMIN — SODIUM CHLORIDE 150 MILLILITER(S): 9 INJECTION, SOLUTION INTRAVENOUS at 15:32

## 2018-07-22 RX ADMIN — Medication 1 TABLET(S): at 11:16

## 2018-07-22 RX ADMIN — Medication 30 MILLIGRAM(S): at 06:15

## 2018-07-22 RX ADMIN — Medication 30 MILLIGRAM(S): at 11:17

## 2018-07-22 RX ADMIN — MORPHINE SULFATE 30 MILLILITER(S): 50 CAPSULE, EXTENDED RELEASE ORAL at 20:08

## 2018-07-22 RX ADMIN — Medication 30 MILLIGRAM(S): at 06:30

## 2018-07-22 RX ADMIN — Medication 1 MILLIGRAM(S): at 11:17

## 2018-07-22 NOTE — PROVIDER CONTACT NOTE (CRITICAL VALUE NOTIFICATION) - BACKGROUND
Pt with sickle cell crisis, H&H trending downward, repeat CBC sent with results of Hgb 5.6, Hct 16.9

## 2018-07-22 NOTE — PROGRESS NOTE ADULT - PROBLEM SELECTOR PLAN 1
-IVF 1/2 NS-increased to 150cc/hr  -Folic acid/MVI, PCA pump/toradol x 2 days  -Bowel regimen to avoid opioid induced constipation, incentive spirometer  -monitor retic count, LDH  -RVP negative, Chest pain likely secondary to SSC crisis: TTE pending  Hgb of 5.6 noted, no chest pain, SOB, will continue to monitor and trend LDH, retic ct, no urgent need for transfusion at this time. If pain worsening, can transfuse 1 U PRBC

## 2018-07-23 LAB
-  AMIKACIN: SIGNIFICANT CHANGE UP
-  AMPICILLIN/SULBACTAM: SIGNIFICANT CHANGE UP
-  AMPICILLIN: SIGNIFICANT CHANGE UP
-  AZTREONAM: SIGNIFICANT CHANGE UP
-  CEFAZOLIN: SIGNIFICANT CHANGE UP
-  CEFEPIME: SIGNIFICANT CHANGE UP
-  CEFOXITIN: SIGNIFICANT CHANGE UP
-  CEFTAZIDIME: SIGNIFICANT CHANGE UP
-  CEFTRIAXONE: SIGNIFICANT CHANGE UP
-  CIPROFLOXACIN: SIGNIFICANT CHANGE UP
-  ERTAPENEM: SIGNIFICANT CHANGE UP
-  GENTAMICIN: SIGNIFICANT CHANGE UP
-  IMIPENEM: SIGNIFICANT CHANGE UP
-  LEVOFLOXACIN: SIGNIFICANT CHANGE UP
-  MEROPENEM: SIGNIFICANT CHANGE UP
-  NITROFURANTOIN: SIGNIFICANT CHANGE UP
-  PIPERACILLIN/TAZOBACTAM: SIGNIFICANT CHANGE UP
-  TIGECYCLINE: SIGNIFICANT CHANGE UP
-  TOBRAMYCIN: SIGNIFICANT CHANGE UP
-  TRIMETHOPRIM/SULFAMETHOXAZOLE: SIGNIFICANT CHANGE UP
ALBUMIN SERPL ELPH-MCNC: 3 G/DL — LOW (ref 3.3–5)
ALP SERPL-CCNC: 148 U/L — HIGH (ref 40–120)
ALT FLD-CCNC: 19 U/L — SIGNIFICANT CHANGE UP (ref 4–41)
ANTIBODY ID 1_1: SIGNIFICANT CHANGE UP
AST SERPL-CCNC: 31 U/L — SIGNIFICANT CHANGE UP (ref 4–40)
BACTERIA UR CULT: SIGNIFICANT CHANGE UP
BASOPHILS # BLD AUTO: 0.05 K/UL — SIGNIFICANT CHANGE UP (ref 0–0.2)
BASOPHILS NFR BLD AUTO: 0.3 % — SIGNIFICANT CHANGE UP (ref 0–2)
BILIRUB SERPL-MCNC: 1.9 MG/DL — HIGH (ref 0.2–1.2)
BLD GP AB SCN SERPL QL: POSITIVE — SIGNIFICANT CHANGE UP
BUN SERPL-MCNC: 10 MG/DL — SIGNIFICANT CHANGE UP (ref 7–23)
CALCIUM SERPL-MCNC: 7.8 MG/DL — LOW (ref 8.4–10.5)
CHLORIDE SERPL-SCNC: 107 MMOL/L — SIGNIFICANT CHANGE UP (ref 98–107)
CO2 SERPL-SCNC: 21 MMOL/L — LOW (ref 22–31)
CREAT SERPL-MCNC: 1.09 MG/DL — SIGNIFICANT CHANGE UP (ref 0.5–1.3)
DAT C3-SP REAG RBC QL: NEGATIVE — SIGNIFICANT CHANGE UP
DAT POLY-SP REAG RBC QL: NEGATIVE — SIGNIFICANT CHANGE UP
DIRECT COOMBS IGG: NEGATIVE — SIGNIFICANT CHANGE UP
EOSINOPHIL # BLD AUTO: 0.86 K/UL — HIGH (ref 0–0.5)
EOSINOPHIL NFR BLD AUTO: 6 % — SIGNIFICANT CHANGE UP (ref 0–6)
GLUCOSE SERPL-MCNC: 83 MG/DL — SIGNIFICANT CHANGE UP (ref 70–99)
HCT VFR BLD CALC: 15.8 % — CRITICAL LOW (ref 39–50)
HGB BLD-MCNC: 5.2 G/DL — CRITICAL LOW (ref 13–17)
IMM GRANULOCYTES # BLD AUTO: 0.47 # — SIGNIFICANT CHANGE UP
IMM GRANULOCYTES NFR BLD AUTO: 3.3 % — HIGH (ref 0–1.5)
LDH SERPL L TO P-CCNC: 301 U/L — HIGH (ref 135–225)
LYMPHOCYTES # BLD AUTO: 1.64 K/UL — SIGNIFICANT CHANGE UP (ref 1–3.3)
LYMPHOCYTES # BLD AUTO: 11.5 % — LOW (ref 13–44)
MAGNESIUM SERPL-MCNC: 1.9 MG/DL — SIGNIFICANT CHANGE UP (ref 1.6–2.6)
MCHC RBC-ENTMCNC: 24.9 PG — LOW (ref 27–34)
MCHC RBC-ENTMCNC: 32.9 % — SIGNIFICANT CHANGE UP (ref 32–36)
MCV RBC AUTO: 75.6 FL — LOW (ref 80–100)
METHOD TYPE: SIGNIFICANT CHANGE UP
MONOCYTES # BLD AUTO: 1.03 K/UL — HIGH (ref 0–0.9)
MONOCYTES NFR BLD AUTO: 7.2 % — SIGNIFICANT CHANGE UP (ref 2–14)
NEUTROPHILS # BLD AUTO: 10.25 K/UL — HIGH (ref 1.8–7.4)
NEUTROPHILS NFR BLD AUTO: 71.7 % — SIGNIFICANT CHANGE UP (ref 43–77)
NRBC # FLD: 1.69 — SIGNIFICANT CHANGE UP
NRBC FLD-RTO: 11.8 — SIGNIFICANT CHANGE UP
ORGANISM # SPEC MICROSCOPIC CNT: SIGNIFICANT CHANGE UP
ORGANISM # SPEC MICROSCOPIC CNT: SIGNIFICANT CHANGE UP
PHOSPHATE SERPL-MCNC: 2.5 MG/DL — SIGNIFICANT CHANGE UP (ref 2.5–4.5)
PLATELET # BLD AUTO: 233 K/UL — SIGNIFICANT CHANGE UP (ref 150–400)
PMV BLD: 10.4 FL — SIGNIFICANT CHANGE UP (ref 7–13)
POTASSIUM SERPL-MCNC: 3.9 MMOL/L — SIGNIFICANT CHANGE UP (ref 3.5–5.3)
POTASSIUM SERPL-SCNC: 3.9 MMOL/L — SIGNIFICANT CHANGE UP (ref 3.5–5.3)
PROT SERPL-MCNC: 6.3 G/DL — SIGNIFICANT CHANGE UP (ref 6–8.3)
RBC # BLD: 2.09 M/UL — LOW (ref 4.2–5.8)
RBC # FLD: 23.9 % — HIGH (ref 10.3–14.5)
RETICS #: 230 K/UL — HIGH (ref 25–125)
RETICS/RBC NFR: 11 % — HIGH (ref 0.5–2.5)
RH IG SCN BLD-IMP: POSITIVE — SIGNIFICANT CHANGE UP
SODIUM SERPL-SCNC: 141 MMOL/L — SIGNIFICANT CHANGE UP (ref 135–145)
WBC # BLD: 14.3 K/UL — HIGH (ref 3.8–10.5)
WBC # FLD AUTO: 14.3 K/UL — HIGH (ref 3.8–10.5)

## 2018-07-23 PROCEDURE — 99233 SBSQ HOSP IP/OBS HIGH 50: CPT | Mod: GC

## 2018-07-23 PROCEDURE — 86077 PHYS BLOOD BANK SERV XMATCH: CPT

## 2018-07-23 PROCEDURE — 99233 SBSQ HOSP IP/OBS HIGH 50: CPT

## 2018-07-23 RX ORDER — MORPHINE SULFATE 50 MG/1
30 CAPSULE, EXTENDED RELEASE ORAL
Qty: 0 | Refills: 0 | Status: DISCONTINUED | OUTPATIENT
Start: 2018-07-23 | End: 2018-07-25

## 2018-07-23 RX ORDER — KETOROLAC TROMETHAMINE 30 MG/ML
30 SYRINGE (ML) INJECTION EVERY 6 HOURS
Qty: 0 | Refills: 0 | Status: DISCONTINUED | OUTPATIENT
Start: 2018-07-23 | End: 2018-07-25

## 2018-07-23 RX ADMIN — Medication 30 MILLIGRAM(S): at 23:19

## 2018-07-23 RX ADMIN — Medication 1 TABLET(S): at 12:10

## 2018-07-23 RX ADMIN — Medication 30 MILLIGRAM(S): at 00:00

## 2018-07-23 RX ADMIN — MORPHINE SULFATE 30 MILLILITER(S): 50 CAPSULE, EXTENDED RELEASE ORAL at 20:16

## 2018-07-23 RX ADMIN — Medication 30 MILLIGRAM(S): at 05:32

## 2018-07-23 RX ADMIN — Medication 30 MILLIGRAM(S): at 23:34

## 2018-07-23 RX ADMIN — Medication 30 MILLIGRAM(S): at 12:09

## 2018-07-23 RX ADMIN — Medication 30 MILLIGRAM(S): at 00:15

## 2018-07-23 RX ADMIN — MORPHINE SULFATE 30 MILLILITER(S): 50 CAPSULE, EXTENDED RELEASE ORAL at 15:00

## 2018-07-23 RX ADMIN — Medication 1 MILLIGRAM(S): at 12:10

## 2018-07-23 RX ADMIN — MORPHINE SULFATE 30 MILLILITER(S): 50 CAPSULE, EXTENDED RELEASE ORAL at 08:28

## 2018-07-23 RX ADMIN — Medication 30 MILLIGRAM(S): at 14:13

## 2018-07-23 RX ADMIN — Medication 30 MILLIGRAM(S): at 17:27

## 2018-07-23 RX ADMIN — Medication 30 MILLIGRAM(S): at 17:12

## 2018-07-23 NOTE — CONSULT NOTE ADULT - ASSESSMENT
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 Sickle cell crisis: previous Hb electropharesis with HbSS but could be HbS beta thal given low MCV and mildly elevated A2%. He has been afebrile, has bibasilar crackles on exam but in no respiratory distress and is saturating 98% on room air. CXR from 7/21 with clear lungs. RVP negative, UA negative. Hgb is 5.2 today (baseline around 6-7).  - given pt appears well clinically and increased risk of transfusion reaction given presence of antibodies, will recommend no transfusion at this point  - c/w 1/2 NS, has bibasilar crackles on exam, no LE edema. Monitor volume status closely while getting IVF and consider repeat CXR  - c/w pain control with dilaudid PCA and bowel regimen  - please provide pt with an incentive spirometer and encourage use  - monitor CBC with retic count, CMP, and LDH    Lakeshia Leroy, PGY-5  Hematology-Oncology Fellow  Pager: [phone] ([facility]), [phone] ([facility])

## 2018-07-23 NOTE — CONSULT NOTE ADULT - SUBJECTIVE AND OBJECTIVE BOX
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 Complaining of CP, pain in legs and back and is typical of his previous crises. Last admission for pain crisis was in 4/2017 and reports being admitted about once a year for sickle cell crisis. He sees Dr. Lluvia Hamm, however, hasn't seen her since 2015. States he is not currently on hydroxyurea.     Has dry cough, no fevers/chills. No nausea/vomiting/abdominal pain/diarrhea. Has rib pain but no shortness of breath. No headaches or dizziness. Pain is better than when he first came in to the hospital. Baseline Hgb is between 6-7 and is currently 5.2.    PAST MEDICAL & SURGICAL HISTORY:  Sickle Cell Disease  No significant past surgical history    Allergies    No Known Allergies    Intolerances    MEDICATIONS  (STANDING):  docusate sodium 100 milliGRAM(s) Oral three times a day  folic acid 1 milliGRAM(s) Oral daily  ketorolac   Injectable 30 milliGRAM(s) IV Push every 6 hours  morphine PCA (5 mG/mL) 30 milliLiter(s) PCA Continuous PCA Continuous  multivitamin 1 Tablet(s) Oral daily  sodium chloride 0.45%. 1000 milliLiter(s) (150 mL/Hr) IV Continuous <Continuous>    MEDICATIONS  (PRN):  acetaminophen   Tablet 650 milliGRAM(s) Oral every 6 hours PRN For Temp greater than 38 C (100.4 F)  acetaminophen   Tablet. 650 milliGRAM(s) Oral every 6 hours PRN Mild Pain (1 - 3)  bisacodyl Suppository 10 milliGRAM(s) Rectal once PRN Constipation  naloxone Injectable 0.1 milliGRAM(s) IV Push every 3 minutes PRN For ANY of the following changes in patient status:  A. RR LESS THAN 10 breaths per minute, B. Oxygen saturation LESS THAN 90%, C. Sedation score of 6  ondansetron Injectable 4 milliGRAM(s) IV Push every 6 hours PRN Nausea  senna 2 Tablet(s) Oral at bedtime PRN Constipation      FAMILY HISTORY:  No pertinent family history in first degree relatives    SOCIAL HISTORY: No EtOH, no tobacco    REVIEW OF SYSTEMS: see HPI  All other review of systems is negative unless indicated above    VITALS:   T(F): 98.4 (07-23-18 @ 05:37), Max: 99 (07-22-18 @ 20:51)  HR: 82 (07-23-18 @ 05:37)  BP: 142/83 (07-23-18 @ 05:37)  RR: 18 (07-23-18 @ 05:37)  SpO2: 98% (07-23-18 @ 05:37)  Wt(kg): --    PHYSICAL EXAM:  GENERAL: resting in bed comfortably  EYES: EOMI, + scleral icterus  NECK: supple  RESP: respirations unlabored, bibasilar crackles  HEART: RRR, S1/S2  ABDOMEN: +BS, soft, NT, ND  EXTREMITIES: no LE edema, well healed previous leg ulcers  NEUROLOGIC: no focal deficits, AAO x 3    LABS:                         5.2    14.30 )-----------( 233      ( 23 Jul 2018 05:30 )             15.8     07-23    141  |  107  |  10  ----------------------------<  83  3.9   |  21<L>  |  1.09    Ca    7.8<L>      23 Jul 2018 05:30  Phos  2.5     07-23  Mg     1.9     07-23    TPro  6.3  /  Alb  3.0<L>  /  TBili  1.9<H>  /  DBili  x   /  AST  31  /  ALT  19  /  AlkPhos  148<H>  07-23    Phosphorus Level, Serum: 2.5 mg/dL (07-23 @ 05:30)  Magnesium, Serum: 1.9 mg/dL (07-23 @ 05:30)  Lactate Dehydrogenase, Serum: 301 U/L (07-23 @ 05:30)    IMAGING:  - Xray Chest 1 View AP/PA (07.21.18 @ 02:08) >  FINDINGS: Lungs are clear bilaterally. No pleural effusion or pneumothorax noted. Cardiac silhouette is enlarged. Chronic osseous stigmata of underlying sickle cell disease.    IMPRESSION: Clear lungs.

## 2018-07-23 NOTE — PROGRESS NOTE ADULT - PROBLEM SELECTOR PLAN 1
-IVF 1/2 NS 150cc/hr  -Folic acid/MVI, morphine PCA pump, toradol ATC x 3 days  Requested RN check IV functioning properly  -Bowel regimen to avoid opioid induced constipation, incentive spirometer  -monitor retic count, LDH, 1 U PRBC today to help with pain reduction  -RVP negative, Chest pain resolved  will continue to monitor and trend LDH, retic ct -IVF 1/2 NS 150cc/hr  -Folic acid/MVI, morphine PCA pump increase initial demand to 2mg, toradol ATC x 3 days  Requested RN check IV functioning properly  -Bowel regimen to avoid opioid induced constipation, incentive spirometer  -monitor retic count, LDH, Heme consult emailed.  Per Dr. Rockwell from blood bank, has complicated Abs in blood and difficult crossmatch  -RVP negative, Chest pain resolved  will continue to monitor and trend LDH, retic ct

## 2018-07-24 DIAGNOSIS — K76.9 LIVER DISEASE, UNSPECIFIED: ICD-10-CM

## 2018-07-24 LAB
ALBUMIN SERPL ELPH-MCNC: 3.2 G/DL — LOW (ref 3.3–5)
ALP SERPL-CCNC: 176 U/L — HIGH (ref 40–120)
ALT FLD-CCNC: 24 U/L — SIGNIFICANT CHANGE UP (ref 4–41)
AST SERPL-CCNC: 41 U/L — HIGH (ref 4–40)
BASE EXCESS BLDA CALC-SCNC: -1.8 MMOL/L — SIGNIFICANT CHANGE UP
BILIRUB SERPL-MCNC: 3 MG/DL — HIGH (ref 0.2–1.2)
BUN SERPL-MCNC: 13 MG/DL — SIGNIFICANT CHANGE UP (ref 7–23)
CALCIUM SERPL-MCNC: 8.2 MG/DL — LOW (ref 8.4–10.5)
CHLORIDE SERPL-SCNC: 105 MMOL/L — SIGNIFICANT CHANGE UP (ref 98–107)
CO2 SERPL-SCNC: 21 MMOL/L — LOW (ref 22–31)
CREAT SERPL-MCNC: 1.2 MG/DL — SIGNIFICANT CHANGE UP (ref 0.5–1.3)
GLUCOSE SERPL-MCNC: 93 MG/DL — SIGNIFICANT CHANGE UP (ref 70–99)
HCO3 BLDA-SCNC: 23 MMOL/L — SIGNIFICANT CHANGE UP (ref 22–26)
HCT VFR BLD CALC: 15.1 % — CRITICAL LOW (ref 39–50)
HGB A MFR BLD: 0 % — LOW
HGB A2 MFR BLD: 4.4 % — HIGH (ref 2.4–3.5)
HGB BLD-MCNC: 5 G/DL — CRITICAL LOW (ref 13–17)
HGB F MFR BLD: 3.3 % — HIGH (ref 0–1.5)
HGB S MFR BLD: 92.3 % — HIGH (ref 0–0)
LDH SERPL L TO P-CCNC: 335 U/L — HIGH (ref 135–225)
MAGNESIUM SERPL-MCNC: 1.9 MG/DL — SIGNIFICANT CHANGE UP (ref 1.6–2.6)
MCHC RBC-ENTMCNC: 25 PG — LOW (ref 27–34)
MCHC RBC-ENTMCNC: 33.1 % — SIGNIFICANT CHANGE UP (ref 32–36)
MCV RBC AUTO: 75.5 FL — LOW (ref 80–100)
NRBC # FLD: 4.45 — SIGNIFICANT CHANGE UP
NRBC FLD-RTO: 32.4 — SIGNIFICANT CHANGE UP
PCO2 BLDA: 36 MMHG — SIGNIFICANT CHANGE UP (ref 35–48)
PH BLDA: 7.41 PH — SIGNIFICANT CHANGE UP (ref 7.35–7.45)
PHOSPHATE SERPL-MCNC: 2.8 MG/DL — SIGNIFICANT CHANGE UP (ref 2.5–4.5)
PLATELET # BLD AUTO: 222 K/UL — SIGNIFICANT CHANGE UP (ref 150–400)
PMV BLD: 10.1 FL — SIGNIFICANT CHANGE UP (ref 7–13)
PO2 BLDA: 62 MMHG — LOW (ref 83–108)
POTASSIUM SERPL-MCNC: 4 MMOL/L — SIGNIFICANT CHANGE UP (ref 3.5–5.3)
POTASSIUM SERPL-SCNC: 4 MMOL/L — SIGNIFICANT CHANGE UP (ref 3.5–5.3)
PROT SERPL-MCNC: 6.3 G/DL — SIGNIFICANT CHANGE UP (ref 6–8.3)
RBC # BLD: 2 M/UL — LOW (ref 4.2–5.8)
RBC # FLD: 26.1 % — HIGH (ref 10.3–14.5)
RETICS #: 214 K/UL — HIGH (ref 25–125)
RETICS/RBC NFR: 10.7 % — HIGH (ref 0.5–2.5)
SAO2 % BLDA: 84.6 % — LOW (ref 95–99)
SICKLE CELL DISEASE SCREENING TEST: POSITIVE — SIGNIFICANT CHANGE UP
SODIUM SERPL-SCNC: 140 MMOL/L — SIGNIFICANT CHANGE UP (ref 135–145)
WBC # BLD: 13.74 K/UL — HIGH (ref 3.8–10.5)
WBC # FLD AUTO: 13.74 K/UL — HIGH (ref 3.8–10.5)

## 2018-07-24 PROCEDURE — 99233 SBSQ HOSP IP/OBS HIGH 50: CPT | Mod: 25

## 2018-07-24 PROCEDURE — 99232 SBSQ HOSP IP/OBS MODERATE 35: CPT | Mod: GC

## 2018-07-24 PROCEDURE — 99291 CRITICAL CARE FIRST HOUR: CPT

## 2018-07-24 PROCEDURE — 71045 X-RAY EXAM CHEST 1 VIEW: CPT | Mod: 26

## 2018-07-24 RX ORDER — IPRATROPIUM/ALBUTEROL SULFATE 18-103MCG
3 AEROSOL WITH ADAPTER (GRAM) INHALATION ONCE
Qty: 0 | Refills: 0 | Status: COMPLETED | OUTPATIENT
Start: 2018-07-24 | End: 2018-07-24

## 2018-07-24 RX ORDER — OXYCODONE HYDROCHLORIDE 5 MG/1
5 TABLET ORAL EVERY 4 HOURS
Qty: 0 | Refills: 0 | Status: DISCONTINUED | OUTPATIENT
Start: 2018-07-24 | End: 2018-07-25

## 2018-07-24 RX ORDER — SODIUM CHLORIDE 9 MG/ML
1000 INJECTION, SOLUTION INTRAVENOUS
Qty: 0 | Refills: 0 | Status: DISCONTINUED | OUTPATIENT
Start: 2018-07-24 | End: 2018-07-24

## 2018-07-24 RX ORDER — OXYCODONE HYDROCHLORIDE 5 MG/1
5 TABLET ORAL EVERY 4 HOURS
Qty: 0 | Refills: 0 | Status: DISCONTINUED | OUTPATIENT
Start: 2018-07-24 | End: 2018-07-24

## 2018-07-24 RX ORDER — FUROSEMIDE 40 MG
40 TABLET ORAL DAILY
Qty: 0 | Refills: 0 | Status: DISCONTINUED | OUTPATIENT
Start: 2018-07-24 | End: 2018-07-24

## 2018-07-24 RX ORDER — FUROSEMIDE 40 MG
40 TABLET ORAL ONCE
Qty: 0 | Refills: 0 | Status: COMPLETED | OUTPATIENT
Start: 2018-07-24 | End: 2018-07-24

## 2018-07-24 RX ADMIN — MORPHINE SULFATE 30 MILLILITER(S): 50 CAPSULE, EXTENDED RELEASE ORAL at 20:25

## 2018-07-24 RX ADMIN — OXYCODONE HYDROCHLORIDE 5 MILLIGRAM(S): 5 TABLET ORAL at 20:59

## 2018-07-24 RX ADMIN — Medication 3 MILLILITER(S): at 16:57

## 2018-07-24 RX ADMIN — Medication 30 MILLIGRAM(S): at 05:26

## 2018-07-24 RX ADMIN — Medication 1 TABLET(S): at 12:55

## 2018-07-24 RX ADMIN — Medication 40 MILLIGRAM(S): at 16:52

## 2018-07-24 RX ADMIN — Medication 1 MILLIGRAM(S): at 12:55

## 2018-07-24 RX ADMIN — Medication 30 MILLIGRAM(S): at 19:41

## 2018-07-24 RX ADMIN — Medication 40 MILLIGRAM(S): at 22:38

## 2018-07-24 RX ADMIN — Medication 30 MILLIGRAM(S): at 19:57

## 2018-07-24 RX ADMIN — MORPHINE SULFATE 30 MILLILITER(S): 50 CAPSULE, EXTENDED RELEASE ORAL at 07:50

## 2018-07-24 RX ADMIN — Medication 100 MILLIGRAM(S): at 05:26

## 2018-07-24 RX ADMIN — Medication 30 MILLIGRAM(S): at 05:41

## 2018-07-24 RX ADMIN — OXYCODONE HYDROCHLORIDE 5 MILLIGRAM(S): 5 TABLET ORAL at 12:55

## 2018-07-24 RX ADMIN — OXYCODONE HYDROCHLORIDE 5 MILLIGRAM(S): 5 TABLET ORAL at 21:59

## 2018-07-24 RX ADMIN — OXYCODONE HYDROCHLORIDE 5 MILLIGRAM(S): 5 TABLET ORAL at 13:55

## 2018-07-24 NOTE — CHART NOTE - NSCHARTNOTEFT_GEN_A_CORE
Informed by NP Harmony that patient was hypoxic to 77% on room air and with worsening SOB. On exam patient tachypneic on nonrebreather, saturating 100%. Crackles increased from AM exam with left basilar wheezing. STAT dose Lasix 40 IVP given. STAT CXR called and ordered. Duonebs x1 ordered. Hematology was notified and was present at bedside at time of event. As per hematology, 1 U PRBC ordered. Will reassess oxygenation after neb treatment. Will attempt to deescalate to nasal cannula afterwards. Low threshold for MICU consult.    Time spent: 20min Informed by NP Harmony that patient was hypoxic to 77% on room air and with worsening SOB. On exam patient tachypneic on nonrebreather, saturating 100%. Crackles increased from AM exam with left basilar wheezing. STAT dose Lasix 40 IVP given. STAT CXR called and ordered. Duonebs x1 ordered. Hematology was notified and was present at bedside at time of event. As per hematology, 1 U PRBC ordered. Will reassess oxygenation after neb treatment. Will attempt to deescalate to nasal cannula afterwards. Low threshold for MICU consult.    Critical care Time spent: 30min

## 2018-07-24 NOTE — CHART NOTE - NSCHARTNOTEFT_GEN_A_CORE
NUTRITION SERVICES                                                                                  MALNUTRITION ALERT     Attention Health Care Provider: Upon nutritional assessment by the Registered Dietitian your patient was determined to meet criteria / has evidence of the following diagnosis/diagnoses:    [ ] Mild Protein Calorie Malnutrition   [ ] Moderate Protein Calorie Malnutrition   [ X] Severe Protein Calorie Malnutrition   [ ] Unspecified Protein Calorie Malnutrition   [X ] Underweight / BMI <19  [ ] Morbid Obesity / BMI >40          PLAN OF CARE: Refer to Initial Dietitian Evaluation or Nutrition Follow-Up Documentation for Nutritional Recommendations.

## 2018-07-24 NOTE — DIETITIAN INITIAL EVALUATION ADULT. - ENERGY NEEDS
Current weight 101.6lbs Ht 5'9" BMI 15kg/m2  IBW: 160lbs (+/-10%) %IBW: 64%  No edema noted. Skin intact.  Protein needs based on IBW.

## 2018-07-24 NOTE — PROGRESS NOTE ADULT - PROBLEM SELECTOR PLAN 1
- crackles noted on exam but saturating well on room air  - IVF will be reduced, CXR ordered, IV to be replaced by US  - No transfusion per Heme recs given complicated alloantibodies  -Folic acid/MVI, morphine PCA pump 2mg, toradol ATC x 3 days  -Bowel regimen to avoid opioid induced constipation, incentive spirometer  -monitor retic count, LDH

## 2018-07-24 NOTE — DIETITIAN INITIAL EVALUATION ADULT. - PROBLEM SELECTOR PLAN 1
-IVF 1/2 NS @75   -Folic acid/MVI  -PCA pump/toradolx 2 days  -Bowel regimen to avoid opiod induced constipation  -monitor retic count, LDH  -RVP-P since been >1 week will treat symptomatically  -Chest pain likely secondary to SSC crisis reproducible at sternum and EKGx 2 non ischemic hs troponin change in 3 hr window 12-->16 suspect elevation due to LVH if recurrent CP/SOB would re-evaluate with EKG and cardiac enzymes no acute infiltrate on CXR and O2 sat 100 on RA no evidence of acute chest. case d/w cards fellow unlikely ACS f/u official recs  -incentive spirometer  istop Reference #: [id number]

## 2018-07-24 NOTE — PROGRESS NOTE ADULT - ASSESSMENT
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 They are aware of patient and can request more blood from [place] [facility] but that would take a few hours once we notify them since it is frozen blood.  - please get follow hgbE following transfusion  - continue to monitor sickle cell crisis labs: CBC with diff, T.bili, LDH, retic count  - continue other supportive management: 1/2 NS IVF, adequate pain control, incentive spirotmeter  - would speak to MICU about patient so they are aware of him in case he decompensates  - call hematology O/N with acute clinical change

## 2018-07-24 NOTE — DIETITIAN INITIAL EVALUATION ADULT. - OTHER INFO
Nutrition consult received for assessment. 49 y/o Male admitted for sickle cell crisis. Report intermittent nausea and vomiting contributed to diminished appetite and po intake. He also reports one episode of nausea and vomiting last night. No further episode reported. Feeling better and consuming 50% PO intake and 1x Ensure Enlive per day. Patient denies any nausea/vomiting/diarrhea/constipation or difficulty chewing and swallowing at present time. Usual body weight reported 125lbs and admits to weight loss though unable to report time frame. Last admission 3/29/17, patient weighed 119.9lbs and he currently weighs 101.6lbs (7/21/18). Encouraged small frequent po intakes of nutrient and protein dense foods. No questions or concerns voiced at this time.

## 2018-07-24 NOTE — DIETITIAN INITIAL EVALUATION ADULT. - PERTINENT LABORATORY DATA
07-24 Na140 mmol/L Glu 93 mg/dL K+ 4.0 mmol/L Cr  1.20 mg/dL BUN 13 mg/dL 07-24 Phos 2.8 mg/dL 07-24 Alb 3.2 g/dL<L>

## 2018-07-24 NOTE — DIETITIAN INITIAL EVALUATION ADULT. - PHYSICAL APPEARANCE
BMI 15kg/m2; Nutrition focused physical exam conducted - found signs of malnutrition [ ]absent [X ]present Subcutaneous fat loss: [Severe ] Orbital fat pads region, [ Severe]Buccal fat region, [Severe ]Triceps region,  [ Severe]Ribs region  Muscle wasting: [ Severe]Temples region, [Severe]Clavicle region, [Severe ]Shoulder region, [Severe ]Scapula region, [Severe ]Interosseous region,  [Severe]thigh region, [ Severe]Calf region/underweight

## 2018-07-24 NOTE — DIETITIAN INITIAL EVALUATION ADULT. - ETIOLOGY
Patient meets criteria for moderate malnutrition in the context of acute/chronic illness Patient meets criteria for Severe malnutrition in the context of acute/chronic illness

## 2018-07-25 ENCOUNTER — TRANSCRIPTION ENCOUNTER (OUTPATIENT)
Age: 63
End: 2018-07-25

## 2018-07-25 DIAGNOSIS — J96.01 ACUTE RESPIRATORY FAILURE WITH HYPOXIA: ICD-10-CM

## 2018-07-25 LAB
ANISOCYTOSIS BLD QL: SIGNIFICANT CHANGE UP
BASOPHILS NFR SPEC: 0.9 % — SIGNIFICANT CHANGE UP (ref 0–2)
BLASTS # FLD: 0 % — SIGNIFICANT CHANGE UP (ref 0–0)
DACRYOCYTES BLD QL SMEAR: SLIGHT — SIGNIFICANT CHANGE UP
EOSINOPHIL NFR FLD: 7.2 % — HIGH (ref 0–6)
GIANT PLATELETS BLD QL SMEAR: PRESENT — SIGNIFICANT CHANGE UP
HAPTOGLOB SERPL-MCNC: < 20 MG/DL — LOW (ref 34–200)
HCT VFR BLD CALC: 18.6 % — CRITICAL LOW (ref 39–50)
HCT VFR BLD CALC: 20.6 % — CRITICAL LOW (ref 39–50)
HGB A MFR BLD: 14 % — LOW
HGB A2 MFR BLD: 4.2 % — HIGH (ref 2.4–3.5)
HGB BLD-MCNC: 6.2 G/DL — CRITICAL LOW (ref 13–17)
HGB BLD-MCNC: 6.7 G/DL — CRITICAL LOW (ref 13–17)
HGB F MFR BLD: 2.8 % — HIGH (ref 0–1.5)
HGB S MFR BLD: 79 % — HIGH (ref 0–0)
HYPOCHROMIA BLD QL: SIGNIFICANT CHANGE UP
LDH SERPL L TO P-CCNC: 355 U/L — HIGH (ref 135–225)
LYMPHOCYTES NFR SPEC AUTO: 8.1 % — LOW (ref 13–44)
MACROCYTES BLD QL: SLIGHT — SIGNIFICANT CHANGE UP
MCHC RBC-ENTMCNC: 23.9 PG — LOW (ref 27–34)
MCHC RBC-ENTMCNC: 24.3 PG — LOW (ref 27–34)
MCHC RBC-ENTMCNC: 32.5 % — SIGNIFICANT CHANGE UP (ref 32–36)
MCHC RBC-ENTMCNC: 33.3 % — SIGNIFICANT CHANGE UP (ref 32–36)
MCV RBC AUTO: 72.9 FL — LOW (ref 80–100)
MCV RBC AUTO: 73.6 FL — LOW (ref 80–100)
METAMYELOCYTES # FLD: 0 % — SIGNIFICANT CHANGE UP (ref 0–1)
MICROCYTES BLD QL: SLIGHT — SIGNIFICANT CHANGE UP
MONOCYTES NFR BLD: 2.7 % — SIGNIFICANT CHANGE UP (ref 2–9)
MYELOCYTES NFR BLD: 0 % — SIGNIFICANT CHANGE UP (ref 0–0)
NEUTROPHIL AB SER-ACNC: 76.6 % — SIGNIFICANT CHANGE UP (ref 43–77)
NEUTS BAND # BLD: 4.5 % — SIGNIFICANT CHANGE UP (ref 0–6)
NRBC # FLD: 6.19 — SIGNIFICANT CHANGE UP
NRBC # FLD: 6.57 — SIGNIFICANT CHANGE UP
NRBC FLD-RTO: 46.5 — SIGNIFICANT CHANGE UP
NRBC FLD-RTO: 56.1 — SIGNIFICANT CHANGE UP
OTHER - HEMATOLOGY %: 0 — SIGNIFICANT CHANGE UP
OVALOCYTES BLD QL SMEAR: SLIGHT — SIGNIFICANT CHANGE UP
PLATELET # BLD AUTO: 263 K/UL — SIGNIFICANT CHANGE UP (ref 150–400)
PLATELET # BLD AUTO: 270 K/UL — SIGNIFICANT CHANGE UP (ref 150–400)
PLATELET COUNT - ESTIMATE: NORMAL — SIGNIFICANT CHANGE UP
PMV BLD: 10 FL — SIGNIFICANT CHANGE UP (ref 7–13)
PMV BLD: 9.8 FL — SIGNIFICANT CHANGE UP (ref 7–13)
POIKILOCYTOSIS BLD QL AUTO: SIGNIFICANT CHANGE UP
POLYCHROMASIA BLD QL SMEAR: SIGNIFICANT CHANGE UP
PROMYELOCYTES # FLD: 0 % — SIGNIFICANT CHANGE UP (ref 0–0)
RBC # BLD: 2.55 M/UL — LOW (ref 4.2–5.8)
RBC # BLD: 2.8 M/UL — LOW (ref 4.2–5.8)
RBC # FLD: 24.2 % — HIGH (ref 10.3–14.5)
RBC # FLD: 24.3 % — HIGH (ref 10.3–14.5)
RETICS #: 332 K/UL — HIGH (ref 25–125)
RETICS/RBC NFR: 11.9 % — HIGH (ref 0.5–2.5)
SCHISTOCYTES BLD QL AUTO: SLIGHT — SIGNIFICANT CHANGE UP
SICKLE CELLS BLD QL SMEAR: SIGNIFICANT CHANGE UP
SMUDGE CELLS # BLD: PRESENT — SIGNIFICANT CHANGE UP
TARGETS BLD QL SMEAR: SIGNIFICANT CHANGE UP
VARIANT LYMPHS # BLD: 0 % — SIGNIFICANT CHANGE UP
WBC # BLD: 11.71 K/UL — HIGH (ref 3.8–10.5)
WBC # BLD: 13.32 K/UL — HIGH (ref 3.8–10.5)
WBC # FLD AUTO: 11.71 K/UL — HIGH (ref 3.8–10.5)
WBC # FLD AUTO: 13.32 K/UL — HIGH (ref 3.8–10.5)

## 2018-07-25 PROCEDURE — 71045 X-RAY EXAM CHEST 1 VIEW: CPT | Mod: 26

## 2018-07-25 PROCEDURE — 99291 CRITICAL CARE FIRST HOUR: CPT

## 2018-07-25 PROCEDURE — 86079 PHYS BLOOD BANK SERV AUTHRJ: CPT

## 2018-07-25 PROCEDURE — 99233 SBSQ HOSP IP/OBS HIGH 50: CPT

## 2018-07-25 PROCEDURE — 99232 SBSQ HOSP IP/OBS MODERATE 35: CPT | Mod: GC

## 2018-07-25 RX ORDER — FUROSEMIDE 40 MG
40 TABLET ORAL ONCE
Qty: 0 | Refills: 0 | Status: COMPLETED | OUTPATIENT
Start: 2018-07-25 | End: 2018-07-25

## 2018-07-25 RX ORDER — CHLORHEXIDINE GLUCONATE 213 G/1000ML
1 SOLUTION TOPICAL
Qty: 0 | Refills: 0 | Status: DISCONTINUED | OUTPATIENT
Start: 2018-07-25 | End: 2018-07-30

## 2018-07-25 RX ORDER — MORPHINE SULFATE 50 MG/1
2 CAPSULE, EXTENDED RELEASE ORAL ONCE
Qty: 0 | Refills: 0 | Status: DISCONTINUED | OUTPATIENT
Start: 2018-07-25 | End: 2018-07-25

## 2018-07-25 RX ORDER — MORPHINE SULFATE 50 MG/1
30 CAPSULE, EXTENDED RELEASE ORAL
Qty: 0 | Refills: 0 | Status: DISCONTINUED | OUTPATIENT
Start: 2018-07-25 | End: 2018-07-25

## 2018-07-25 RX ORDER — SODIUM CHLORIDE 9 MG/ML
1000 INJECTION, SOLUTION INTRAVENOUS
Qty: 0 | Refills: 0 | Status: DISCONTINUED | OUTPATIENT
Start: 2018-07-25 | End: 2018-07-25

## 2018-07-25 RX ORDER — MORPHINE SULFATE 50 MG/1
4 CAPSULE, EXTENDED RELEASE ORAL EVERY 4 HOURS
Qty: 0 | Refills: 0 | Status: DISCONTINUED | OUTPATIENT
Start: 2018-07-25 | End: 2018-07-26

## 2018-07-25 RX ADMIN — MORPHINE SULFATE 2 MILLIGRAM(S): 50 CAPSULE, EXTENDED RELEASE ORAL at 23:07

## 2018-07-25 RX ADMIN — Medication 40 MILLIGRAM(S): at 14:18

## 2018-07-25 RX ADMIN — MORPHINE SULFATE 30 MILLILITER(S): 50 CAPSULE, EXTENDED RELEASE ORAL at 08:08

## 2018-07-25 RX ADMIN — MORPHINE SULFATE 4 MILLIGRAM(S): 50 CAPSULE, EXTENDED RELEASE ORAL at 17:55

## 2018-07-25 RX ADMIN — Medication 30 MILLIGRAM(S): at 14:44

## 2018-07-25 RX ADMIN — MORPHINE SULFATE 4 MILLIGRAM(S): 50 CAPSULE, EXTENDED RELEASE ORAL at 18:10

## 2018-07-25 RX ADMIN — MORPHINE SULFATE 2 MILLIGRAM(S): 50 CAPSULE, EXTENDED RELEASE ORAL at 23:20

## 2018-07-25 RX ADMIN — Medication 1 TABLET(S): at 11:14

## 2018-07-25 RX ADMIN — Medication 30 MILLIGRAM(S): at 06:46

## 2018-07-25 RX ADMIN — Medication 30 MILLIGRAM(S): at 06:31

## 2018-07-25 RX ADMIN — Medication 30 MILLIGRAM(S): at 00:42

## 2018-07-25 RX ADMIN — Medication 100 MILLIGRAM(S): at 14:18

## 2018-07-25 RX ADMIN — Medication 30 MILLIGRAM(S): at 14:59

## 2018-07-25 RX ADMIN — OXYCODONE HYDROCHLORIDE 5 MILLIGRAM(S): 5 TABLET ORAL at 11:00

## 2018-07-25 RX ADMIN — SODIUM CHLORIDE 100 MILLILITER(S): 9 INJECTION, SOLUTION INTRAVENOUS at 02:30

## 2018-07-25 RX ADMIN — Medication 30 MILLIGRAM(S): at 00:27

## 2018-07-25 RX ADMIN — MORPHINE SULFATE 30 MILLILITER(S): 50 CAPSULE, EXTENDED RELEASE ORAL at 03:45

## 2018-07-25 RX ADMIN — OXYCODONE HYDROCHLORIDE 5 MILLIGRAM(S): 5 TABLET ORAL at 09:57

## 2018-07-25 RX ADMIN — SODIUM CHLORIDE 100 MILLILITER(S): 9 INJECTION, SOLUTION INTRAVENOUS at 03:48

## 2018-07-25 RX ADMIN — Medication 1 MILLIGRAM(S): at 11:14

## 2018-07-25 NOTE — PROGRESS NOTE ADULT - PROBLEM SELECTOR PLAN 1
ABG done on 7/24 with hypoxemia, CXR personally reviewed this AM shows some improvement but still with vascular congestion and pleural effusion, patient received total of 80 lasix IVP on 7/24, no output recorded  Continue supplemental oxygen, currently off IVF  MICU reconsulted this AM  Incentive spirometry

## 2018-07-25 NOTE — DISCHARGE NOTE ADULT - PLAN OF CARE
Pt will be free from Pain Follow up with your PCP in 1-2 weeks. Call for an appointment Pt will be free from Shortness of breath Follow up with your PCP in 1-2 weeks. Call for an appointment -   Continue oral pain medication control Complete treatment for pneumonia - you need to complete a 5 day course stable Please follow up with your pcp within 1 week of discharge.  Please call to make an appointment within 1 week of discharge.  Echo:. Mitral annular calcification, otherwise normal mitral  valve. Mild mitral regurgitation.  2. Normal left ventricular internal dimensions and wall  thicknesses.  3. Mild global left ventricular systolic dysfunction.  4. Normal right ventricular size and function.  -7/30: lisinopril 10mg added.  Please check your blood pressure prior to taking your blood pressure medication.  Maintain adequate blood pressure control.

## 2018-07-25 NOTE — PROGRESS NOTE ADULT - ASSESSMENT
47 yo M w/ hx SSD p/w CP and VOC crisis after flu like illness. Course c/b acute hypoxic respiratory failure s/p 1 U PRBC on 7/24 with appropriate response in H/H

## 2018-07-25 NOTE — DISCHARGE NOTE ADULT - HOSPITAL COURSE
Pt is a 49 yo M w/ hx SSD p/w VOC    Hospital Course     Sickle cell anemia with crisis.   -IVF 1/2 NS @75   -Folic acid/MVI  -PCA pump  -Bowel regimen to avoid opioid induced constipation  -monitor retic count, LDH  -RVP. Negative   -Hematology Consulted   -7/24 s/p 1 unit PRBC. Crossmatched     Hypoxia  -Chest X-ray Vascular Congestion   -7/25 Repeat Chest x-ray:____________  - Duo nebs  -Lasix 40xs 1 given  - 100 % NB---> Deescalate to NC  -MICU Consulted   7/25 Accepted to MICU Service Pt is a 49 yo M w/ hx SSD p/w VOC    Hospital Course     Sickle cell anemia with crisis.   -PCA pump  -Bowel regimen to avoid opiod induced constipation  -RVP. Negative   --s/p 3 units PRBC   -tx out of MICU - 7/26   -stable       Hypoxia  -Chest Xray  - Duo nebs  -Lasix 40xs 1 given  - 100 % NB---> Deescalate to NC  MICU admission on 7/24  -7/26 transferred to [facility]  - likely 2/2 low hemoglobin as well as component of fluid overload   - now improved s/p 3 uPRBCs; hemoglobin now stable with improved hemolysis parameters   - repeat Hg electrophoresis with Hg >50% still   - continue to monitor hemolysis parameters   - continue other supportive management: pain control, incentive spirometer  -7/25: CXR:No significant interval change in pulmonary edema and small   right pleural effusion.  Enlarged cardiac silhouette again seen.  -Levaquin x 5 days ------   -Echo:. Mitral annular calcification, otherwise normal mitral  valve. Mild mitral regurgitation.  2. Normal left ventricular internal dimensions and wall  thicknesses.  3. Mild global left ventricular systolic dysfunction.  4. Normal right ventricular size and function.    Dispo: Home

## 2018-07-25 NOTE — PROVIDER CONTACT NOTE (OTHER) - ACTION/TREATMENT ORDERED:
ADS made aware. Possible central line placement as per ADS and recommendation by MANUEL HAMMOND. No IV access at this time.

## 2018-07-25 NOTE — DISCHARGE NOTE ADULT - MEDICATION SUMMARY - MEDICATIONS TO STOP TAKING
I will STOP taking the medications listed below when I get home from the hospital:    polyethylene glycol 3350 oral powder for reconstitution  -- 17 gram(s) by mouth once a day    Percocet 5/325 oral tablet  -- 1 tab(s) by mouth every 6 hours MDD:4

## 2018-07-25 NOTE — CONSULT NOTE ADULT - ATTENDING COMMENTS
Mr. Marquez was seen by the hematology consultation service today. He was admitted to the hospital a few days ago with chest pain and generalized body pain. He is currently on a PCA pump. He states he is admitted to the hospital anywhere from 0-2 times per year. He has not seen Dr. Hamm in followup for some time. He has never been treated with hydroxyurea, likely as a result of his low frequency of crises. Crises used to be much more common in the past. He has a history of malleolar leg ulcers which has not been problematic for many years. His hemoglobin is typically approximately 5-6 g, and he has multiple alloantibodies.    He is on a PCA pump for pain control this time. He does not have any one specific area her pain is significant. He does have a cough. The chest x-ray on admission was clear. He currently has some rales in the lower third of the lungs. We recommended he should have another CT scan at this time. His oxygen saturation levels have been good. His cough is nonproductive.    We agree that he should not be transfused at this particular time. He has multiple alloantibodies, and exposure to blood may result in further alloantibodies. This is typically his level of hemoglobin as baseline, and his reticulocyte count is adequate. I agree with the assessment and plan as stated above in Dr. Leroy's note.
acute chest syndrome with sickle cell disease/severe hypoxemia/needs blood/guarded
Hira Marquez is a 48 year old man with sickle cell disease. He presented with diffuse body pain, starting with flu like symptoms, including productive cough, subjective fever and chills. There was one episode of sharp chest pain on 7/17/18, lasting several hours. Chest pain was over sternal area,  similar to pain during prior episodes of crisis. In ED, he was given IV fluids and morphine. Baseline ECG showed sinus rhythm at 69 bpm. There was voltage for LVH with non-specific ST-segment and T-wave abnormality that may represent repolarization abnormality. Chest X-ray noted enlarged cardiac silhouette. Hgb on admission was 7.1 mg/dL. There was mild hypertension on admission. Initial HS troponin T was 12 ng/L. Subsequent level was 16 ng/L.  A transthoracic echocardiogram will be done to assess for LVH.

## 2018-07-25 NOTE — CHART NOTE - NSCHARTNOTEFT_GEN_A_CORE
Patient still reporting SOB, crackles and coarse breath sounds on exam. Saturating 87-89% on room air. STAT CXR placed. Spoke to MICU fellowKlaudia for official consult and recommendations. IV access to be obtained.

## 2018-07-25 NOTE — DISCHARGE NOTE ADULT - CARE PLAN
Principal Discharge DX:	Sickle cell anemia with crisis  Goal:	Pt will be free from Pain  Assessment and plan of treatment:	Follow up with your PCP in 1-2 weeks. Call for an appointment  Secondary Diagnosis:	Acute hypoxemic respiratory failure  Goal:	Pt will be free from Shortness of breath Principal Discharge DX:	Sickle cell anemia with crisis  Goal:	Pt will be free from Pain  Assessment and plan of treatment:	Follow up with your PCP in 1-2 weeks. Call for an appointment -   Continue oral pain medication control  Secondary Diagnosis:	Acute hypoxemic respiratory failure  Goal:	Pt will be free from Shortness of breath  Assessment and plan of treatment:	Complete treatment for pneumonia - you need to complete a 5 day course Principal Discharge DX:	Sickle cell anemia with crisis  Goal:	Pt will be free from Pain  Assessment and plan of treatment:	Follow up with your PCP in 1-2 weeks. Call for an appointment -   Continue oral pain medication control  Secondary Diagnosis:	Acute hypoxemic respiratory failure  Goal:	Pt will be free from Shortness of breath  Assessment and plan of treatment:	Complete treatment for pneumonia - you need to complete a 5 day course  Secondary Diagnosis:	Systolic CHF  Goal:	stable  Assessment and plan of treatment:	Please follow up with your pcp within 1 week of discharge.  Please call to make an appointment within 1 week of discharge.  Echo:. Mitral annular calcification, otherwise normal mitral  valve. Mild mitral regurgitation.  2. Normal left ventricular internal dimensions and wall  thicknesses.  3. Mild global left ventricular systolic dysfunction.  4. Normal right ventricular size and function.  -7/30: lisinopril 10mg added.  Please check your blood pressure prior to taking your blood pressure medication.  Maintain adequate blood pressure control.

## 2018-07-25 NOTE — CHART NOTE - NSCHARTNOTEFT_GEN_A_CORE
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  Recent Travel:  Country of Birth: [place]  Advance Directives:    Allergies    No Known Allergies    Intolerances        HOME MEDICATIONS:    REVIEW OF SYSTEMS:  Constitutional: [ ] fevers [ ] chills [ ] weight loss [ ] weight gain  HEENT: [ ] dry eyes [ ] eye irritation [ ] postnasal drip [ ] nasal congestion  CV: [ ] chest pain [ ] orthopnea [ ] palpitations [ ] murmur  Resp: [x ] cough [x ] shortness of breath [ ] dyspnea [ ] wheezing [ ] sputum [ ] hemoptysis  GI: [ ] nausea [ ] vomiting [ ] diarrhea [ ] constipation [ ] abd pain [ ] dysphagia   : [ ] dysuria [ ] nocturia [ ] hematuria [ ] increased urinary frequency  Musculoskeletal: [ ] back pain [ ] myalgias [ ] arthralgias [ ] fracture  Skin: [ ] rash [ ] itch  Neurological: [ ] headache [ ] dizziness [ ] syncope [ ] weakness [ ] numbness  Psychiatric: [ ] anxiety [ ] depression  Endocrine: [ ] diabetes [ ] thyroid problem  Hematologic/Lymphatic: [ ] anemia [ ] bleeding problem  Allergic/Immunologic: [ ] itchy eyes [ ] nasal discharge [ ] hives [ ] angioedema  [ ] All other systems negative  [ ] Unable to assess ROS because ________    OBJECTIVE:  ICU Vital Signs Last 24 Hrs  T(C): 36 (25 Jul 2018 12:15), Max: 37.3 (25 Jul 2018 09:50)  T(F): 96.8 (25 Jul 2018 12:15), Max: 99.1 (25 Jul 2018 09:50)  HR: 79 (25 Jul 2018 14:00) (79 - 110)  BP: 161/88 (25 Jul 2018 14:00) (124/79 - 165/97)  BP(mean): 106 (25 Jul 2018 14:00) (106 - 113)  ABP: --  ABP(mean): --  RR: 25 (25 Jul 2018 14:00) (18 - 28)  SpO2: 100% (25 Jul 2018 14:00) (77% - 100%)        CAPILLARY BLOOD GLUCOSE    PHYSICAL EXAM:   GEN: Age appropriate male, resting comfortably in bed, no acute distress, non toxic appearing, speaking in complete sentences. On NRB  HEENT: Conjunctiva and sclera normal  PULM: crackles bilaterally, no wheezes, rales, rhonchi  CV: tachycardic RRR, S1S2, no MRG  Abdominal: Soft, nontender to palpation, non-distended, +BS  Extremities: No edema or cyanosis  NEURO: AAOx4  Skin: No rashes, lesions    LINES:     HOSPITAL MEDICATIONS:  MEDICATIONS  (STANDING):  docusate sodium 100 milliGRAM(s) Oral three times a day  folic acid 1 milliGRAM(s) Oral daily  ketorolac   Injectable 30 milliGRAM(s) IV Push every 6 hours  morphine PCA (5 mG/mL) 30 milliLiter(s) PCA Continuous PCA Continuous  multivitamin 1 Tablet(s) Oral daily    MEDICATIONS  (PRN):  acetaminophen   Tablet 650 milliGRAM(s) Oral every 6 hours PRN For Temp greater than 38 C (100.4 F)  acetaminophen   Tablet. 650 milliGRAM(s) Oral every 6 hours PRN Mild Pain (1 - 3)  bisacodyl Suppository 10 milliGRAM(s) Rectal once PRN Constipation  naloxone Injectable 0.1 milliGRAM(s) IV Push every 3 minutes PRN For ANY of the following changes in patient status:  A. RR LESS THAN 10 breaths per minute, B. Oxygen saturation LESS THAN 90%, C. Sedation score of 6  ondansetron Injectable 4 milliGRAM(s) IV Push every 6 hours PRN Nausea  oxyCODONE    IR 5 milliGRAM(s) Oral every 4 hours PRN moderate and severe pain  senna 2 Tablet(s) Oral at bedtime PRN Constipation      LABS:                        6.2    13.32 )-----------( 263      ( 25 Jul 2018 01:35 )             18.6     Hgb Trend: 6.2<--, 5.0<--, 5.2<--, 5.6<--, 5.4<--  07-24    140  |  105  |  13  ----------------------------<  93  4.0   |  21<L>  |  1.20    Ca    8.2<L>      24 Jul 2018 05:30  Phos  2.8     07-24  Mg     1.9     07-24    TPro  6.3  /  Alb  3.2<L>  /  TBili  3.0<H>  /  DBili  x   /  AST  41<H>  /  ALT  24  /  AlkPhos  176<H>  07-24    Creatinine Trend: 1.20<--, 1.09<--, 0.99<--, 0.99<--      Arterial Blood Gas:  07-24 @ 16:45  7.41/36/62/23/84.6/-1.8  ABG lactate: --      NEUROLOGIC:  No alteration of mental status present.     SKIN:  Lines: PIVs  Decubiti: none    GI:  Diet: regular  GI stress prophylaxis not indicated     METABOLIC:  BMP showing evidence of no abnormalities  Liver functions tests shows mildly elevated AST (hemolysis) and T bili  Endocrine abnormalities include  07-24    140  |  105  |  13  ----------------------------<  93  4.0   |  21<L>  |  1.20    Ca    8.2<L>      24 Jul 2018 05:30  Phos  2.8     07-24  Mg     1.9     07-24    TPro  6.3  /  Alb  3.2<L>  /  TBili  3.0<H>  /  DBili  x   /  AST  41<H>  /  ALT  24  /  AlkPhos  176<H>  07-24      VOLUME ASSESSMENT:  No peripheral edema  No evidence of disturbance of effective circulating volume    HEMATOLOGIC:  CBC results shows evidence of acute on chronic anemia  Problem: anemia  Assessment: acute sickle cell crisis. Pt has inhibitor disease.   Plan: Pt received 1 unit of specialized pRBCs last night, and will receive 2 more now that in the unit.   Coag panel shows  DVT prophylaxis with                         6.2    13.32 )-----------( 263      ( 25 Jul 2018 01:35 )             18.6         INFECTIOUS DISEASE:  Pt without strong objective or clinical evidence of infection. Will observe off antibiotics    HEMODYNAMICS:  Patient is on pressors due to ____ shock (cardiogenic due to muscle or valve failure, obstructive due to peff, PE, PTX, hypovolemic, vasopegic)     CARDIOVASCULAR:      RESPIRATORY: CHIEF COMPLAINT: sickle cell crisis    HPI:    PAST MEDICAL & SURGICAL HISTORY:  Sickle Cell Disease  No significant past surgical history      FAMILY HISTORY:  No pertinent family history in first degree relatives      SOCIAL HISTORY:  Smoking: [ x] Never Smoked [ ] Former Smoker (__ packs x ___ years) [ ] Current Smoker  (__ packs x ___ years)  Substance Use: [ x] Never Used [ ] Used ____  EtOH Use: none  Marital Status: [ ] Single [ ]  [ ]  [ ]   Sexual History:   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  Recent Travel:  Country of Birth: [place]  Advance Directives:    Allergies    No Known Allergies    Intolerances        HOME MEDICATIONS:    REVIEW OF SYSTEMS:  Constitutional: [ ] fevers [ ] chills [ ] weight loss [ ] weight gain  HEENT: [ ] dry eyes [ ] eye irritation [ ] postnasal drip [ ] nasal congestion  CV: [ ] chest pain [ ] orthopnea [ ] palpitations [ ] murmur  Resp: [x ] cough [x ] shortness of breath [ ] dyspnea [ ] wheezing [ ] sputum [ ] hemoptysis  GI: [ ] nausea [ ] vomiting [ ] diarrhea [ ] constipation [ ] abd pain [ ] dysphagia   : [ ] dysuria [ ] nocturia [ ] hematuria [ ] increased urinary frequency  Musculoskeletal: [x ] back pain [ ] myalgias [ ] arthralgias [ ] fracture  Skin: [ ] rash [ ] itch  Neurological: [ ] headache [ ] dizziness [ ] syncope [ ] weakness [ ] numbness  Psychiatric: [ ] anxiety [ ] depression  Endocrine: [ ] diabetes [ ] thyroid problem  Hematologic/Lymphatic: [ x] anemia [ ] bleeding problem  Allergic/Immunologic: [ ] itchy eyes [ ] nasal discharge [ ] hives [ ] angioedema  [ x] All other systems negative  [ ] Unable to assess ROS because ________    OBJECTIVE:  ICU Vital Signs Last 24 Hrs  T(C): 36 (25 Jul 2018 12:15), Max: 37.3 (25 Jul 2018 09:50)  T(F): 96.8 (25 Jul 2018 12:15), Max: 99.1 (25 Jul 2018 09:50)  HR: 79 (25 Jul 2018 14:00) (79 - 110)  BP: 161/88 (25 Jul 2018 14:00) (124/79 - 165/97)  BP(mean): 106 (25 Jul 2018 14:00) (106 - 113)  ABP: --  ABP(mean): --  RR: 25 (25 Jul 2018 14:00) (18 - 28)  SpO2: 100% (25 Jul 2018 14:00) (77% - 100%)        CAPILLARY BLOOD GLUCOSE    PHYSICAL EXAM:   GEN: Age appropriate male, resting comfortably in bed, no acute distress, non toxic appearing, speaking in complete sentences. On NRB  HEENT: Conjunctiva and sclera normal  PULM: crackles bilaterally, no wheezes, rales, rhonchi  CV: tachycardic RRR, S1S2, no MRG  Abdominal: Soft, nontender to palpation, non-distended, +BS  Extremities: No edema or cyanosis  NEURO: AAOx4  Skin: No rashes, lesions    LINES:     HOSPITAL MEDICATIONS:  MEDICATIONS  (STANDING):  docusate sodium 100 milliGRAM(s) Oral three times a day  folic acid 1 milliGRAM(s) Oral daily  ketorolac   Injectable 30 milliGRAM(s) IV Push every 6 hours  morphine PCA (5 mG/mL) 30 milliLiter(s) PCA Continuous PCA Continuous  multivitamin 1 Tablet(s) Oral daily    MEDICATIONS  (PRN):  acetaminophen   Tablet 650 milliGRAM(s) Oral every 6 hours PRN For Temp greater than 38 C (100.4 F)  acetaminophen   Tablet. 650 milliGRAM(s) Oral every 6 hours PRN Mild Pain (1 - 3)  bisacodyl Suppository 10 milliGRAM(s) Rectal once PRN Constipation  naloxone Injectable 0.1 milliGRAM(s) IV Push every 3 minutes PRN For ANY of the following changes in patient status:  A. RR LESS THAN 10 breaths per minute, B. Oxygen saturation LESS THAN 90%, C. Sedation score of 6  ondansetron Injectable 4 milliGRAM(s) IV Push every 6 hours PRN Nausea  oxyCODONE    IR 5 milliGRAM(s) Oral every 4 hours PRN moderate and severe pain  senna 2 Tablet(s) Oral at bedtime PRN Constipation      LABS:                        6.2    13.32 )-----------( 263      ( 25 Jul 2018 01:35 )             18.6     Hgb Trend: 6.2<--, 5.0<--, 5.2<--, 5.6<--, 5.4<--  07-24    140  |  105  |  13  ----------------------------<  93  4.0   |  21<L>  |  1.20    Ca    8.2<L>      24 Jul 2018 05:30  Phos  2.8     07-24  Mg     1.9     07-24    TPro  6.3  /  Alb  3.2<L>  /  TBili  3.0<H>  /  DBili  x   /  AST  41<H>  /  ALT  24  /  AlkPhos  176<H>  07-24    Creatinine Trend: 1.20<--, 1.09<--, 0.99<--, 0.99<--      Arterial Blood Gas:  07-24 @ 16:45  7.41/36/62/23/84.6/-1.8  ABG lactate: --    ASSESSMENT AND PLAN:  48 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 Became acutely hypoxic overnight to the 70s, now on venturimask. Acute hypoxic respiratory failure. Admitted to MICU. Concern for volume overload, acute chest, CAP.     NEUROLOGIC:  No alteration of mental status present.     SKIN:  Lines: PIVs  Decubiti: none    GI:  Diet: regular  GI stress prophylaxis not indicated     METABOLIC:  BMP showing evidence of no abnormalities  Liver functions tests shows mildly elevated AST (hemolysis) and T bili  Endocrine abnormalities include  07-24    140  |  105  |  13  ----------------------------<  93  4.0   |  21<L>  |  1.20    Ca    8.2<L>      24 Jul 2018 05:30  Phos  2.8     07-24  Mg     1.9     07-24    TPro  6.3  /  Alb  3.2<L>  /  TBili  3.0<H>  /  DBili  x   /  AST  41<H>  /  ALT  24  /  AlkPhos  176<H>  07-24      VOLUME ASSESSMENT:  No peripheral edema  No evidence of disturbance of effective circulating volume    HEMATOLOGIC:  CBC results shows evidence of acute on chronic anemia  Problem: anemia  Assessment: acute sickle cell crisis. Pt has inhibitor disease.   Plan: Pt received 1 unit of specialized pRBCs last night, and will receive 2 more now that in the unit. Hem-onc and blood bank team following. Will attempt to avoid exchange transfusion. Goal Hb between 8-9.   C/w hydromorphone PCA pump for pain control. Dc ketorolac and oxy    Coag panel shows  DVT prophylaxis with HSQ                        6.2    13.32 )-----------( 263      ( 25 Jul 2018 01:35 )             18.6         INFECTIOUS DISEASE:  Concern for pneumonia given cough, dyspnea, bilateral pleural effusions and B lines suggest cardiac however will txt for cap and atypicals with levoflox 500 BID.     HEMODYNAMICS:  Patient is not on pressors     CARDIOVASCULAR:  Bilateral B lines and small pleural effusions. Concern for volume overload. Pt received 80mg IV furosemide yesterday however ins and out not recorded. Will dose 40 today and place pt on strict ICOs.     RESPIRATORY:  Acute hypoxic resp failure  Likely 2/2 to sickle cell crisis vs acute chest vs CAP  C/w NRB will try to de escalate  Abx as above   Incentive spirometry

## 2018-07-25 NOTE — CHART NOTE - NSCHARTNOTEFT_GEN_A_CORE
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  Documented antibodies include: anti-C, Fya, Fyb, Doa, Jsa, K, V, and  VS.  Currently, anti-Fya is detected.  The other historical allo-RBC antibodies below detectable levels but we can assume they are still present and can cause a hemolytic transfusion reaction.  Very rare phenotype matched PRBC are required for transfusion.     Patient was transfused yesterday with one unit of rare phenotype matched PRBC, and had an appropriate rise in Hgb and retention of Hgb A as demonstrated on his hemoglobin electropheresis post transfusion.  Due to the complex serological profile it is not possible to completely exclude the possibility that the patient may have developed a new RBC allo-antibody.  Patient must be monitored carefully for immediate and delay hemolysis.  Patient should be monitored for fluid overload during a transfusion.  Patient is probably in a high cardiac state.      BB has one more unit of phenotype matched PRBC ready for transfusion.  Further transfusions will require obtaining phenotype matched PRBC from the [place] [facility] [place] ([facility]) rare frozen blood inventory.  Frozen blood takes about 3-4 hours to be delivered to [facility] once ordered.  Frozen blood must be taken from the frozen storage, thawed, washed, prepared for transport, and then delivered to [facility] via special messenger.    I have discussed the process with the MICU team.  Patient will be transfused two units of blood today.  One unit already in the BB and one unit to be ordered from the [facility] frozen blood inventory.    Patient will be evaluated for additional transfusions, after transfusion of these two units.    Patient should be monitored carefully for transfusion reactions, including hemolytic reactions and transfusion related cardiac overload (TACO).    Hemal Rockwell MD  Director Transfusion Medicine Service

## 2018-07-25 NOTE — DISCHARGE NOTE ADULT - PATIENT PORTAL LINK FT
You can access the [facility] Patient Portal, offered by [facility], by registering with the following website: [email or web address]

## 2018-07-25 NOTE — PROVIDER CONTACT NOTE (OTHER) - SITUATION
No IV access at this time. IV infiltrated. Attempted to place IV with vein finder 3x by 2 RNs. Unsuccessful. Called Clinical Impact Nurse. Escalated to A D N.

## 2018-07-25 NOTE — PROGRESS NOTE ADULT - ASSESSMENT
47 yo M with HbSS disease p/w diffuse body pain and recent viral URI like symptoms admitted for sickle cell crisis.    #Sickle cell crisis with hypoxemic respiratory failure:  - hypoxemia likely 2/2 low hemoglobin and fluid overload in high cardiac output state  - he received 1 unit prbc yesterday and plan is for him to receive 2 more units. One of those units will be coming from [place] [facility] [place]. Discussed case with Dr. Rockwell.   - please get repeat hemoglobin electrophoresis following transfusions to re-evaluate burden of hgb S  - continue to monitor sickle cell crisis labs: CBC with diff, T.bili, LDH, retic count. He did not have CMP today, but please get.   - continue other supportive management: 1/2 NS IVF, adequate pain control, incentive spirometer

## 2018-07-25 NOTE — DISCHARGE NOTE ADULT - CARE PROVIDER_API CALL
Lluvia Hamm), Internal Medicine  [address] [address] [place], [place] [id number]  Phone: [phone]  Fax: [phone]

## 2018-07-25 NOTE — PROVIDER CONTACT NOTE (OTHER) - ASSESSMENT
+1 - +2 edema on LUE. Elevated. No difference in temperature, denies pain upon touch. No blood return.

## 2018-07-25 NOTE — DISCHARGE NOTE ADULT - MEDICATION SUMMARY - MEDICATIONS TO TAKE
I will START or STAY ON the medications listed below when I get home from the hospital:    acetaminophen 325 mg oral tablet  -- 2 tab(s) by mouth every 6 hours, As needed, For Temp greater than 38 C (100.4 F)/Pain  -- Indication: For pain/fever    lisinopril 10 mg oral tablet  -- 1 tab(s) by mouth once a day  -- Indication: For Htn    docusate sodium 100 mg oral capsule  -- 1 cap(s) by mouth 3 times a day  -- Indication: For bowel regimen    senna oral tablet  -- 2 tab(s) by mouth once a day (at bedtime), As needed, Constipation  -- Indication: For bowel regimen    Multiple Vitamins oral tablet  -- 1 tab(s) by mouth once a day  -- Indication: For Supplement    folic acid 1 mg oral tablet  -- 1 tab(s) by mouth once a day  -- Indication: For Supplement

## 2018-07-25 NOTE — CONSULT NOTE ADULT - SUBJECTIVE AND OBJECTIVE BOX
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 Became acutely hypoxic overnight to the 70s, now on venturimask.     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  Country of Birth: [place]     Allergies    No Known Allergies    Intolerances        HOME MEDICATIONS:    REVIEW OF SYSTEMS:    [ x] All other systems negative  [ ] Unable to assess ROS because ________    OBJECTIVE:  ICU Vital Signs Last 24 Hrs  T(C): 36.7 (25 Jul 2018 05:46), Max: 37.2 (24 Jul 2018 21:30)  T(F): 98.1 (25 Jul 2018 05:46), Max: 99 (24 Jul 2018 21:30)  HR: 96 (25 Jul 2018 05:46) (95 - 110)  BP: 124/79 (25 Jul 2018 05:46) (124/79 - 153/86)  BP(mean): --  ABP: --  ABP(mean): --  RR: 18 (25 Jul 2018 06:00) (18 - 19)  SpO2: 100% (25 Jul 2018 06:00) (77% - 100%)    CAPILLARY BLOOD GLUCOSE    PHYSICAL EXAM:   GEN: Age appropriate, resting comfortably in bed, no acute distress, non toxic appearing, speaking in complete sentences. on venturimask  HEENT: Conjunctiva and sclera normal  PULM: bilateral crackles  CV: tachy RRR, S1S2, no MRG  Abdominal: epigastric abd pain Soft, nontender to palpation, non-distended, +BS  Extremities: No edema or cyanosis  NEURO: AAOx3  Skin: No rashes, lesionsGeneral:     HOSPITAL MEDICATIONS:  MEDICATIONS  (STANDING):  docusate sodium 100 milliGRAM(s) Oral three times a day  folic acid 1 milliGRAM(s) Oral daily  ketorolac   Injectable 30 milliGRAM(s) IV Push every 6 hours  morphine PCA (5 mG/mL) 30 milliLiter(s) PCA Continuous PCA Continuous  multivitamin 1 Tablet(s) Oral daily    MEDICATIONS  (PRN):  acetaminophen   Tablet 650 milliGRAM(s) Oral every 6 hours PRN For Temp greater than 38 C (100.4 F)  acetaminophen   Tablet. 650 milliGRAM(s) Oral every 6 hours PRN Mild Pain (1 - 3)  bisacodyl Suppository 10 milliGRAM(s) Rectal once PRN Constipation  naloxone Injectable 0.1 milliGRAM(s) IV Push every 3 minutes PRN For ANY of the following changes in patient status:  A. RR LESS THAN 10 breaths per minute, B. Oxygen saturation LESS THAN 90%, C. Sedation score of 6  ondansetron Injectable 4 milliGRAM(s) IV Push every 6 hours PRN Nausea  oxyCODONE    IR 5 milliGRAM(s) Oral every 4 hours PRN moderate and severe pain  senna 2 Tablet(s) Oral at bedtime PRN Constipation      LABS:                        6.2    13.32 )-----------( 263      ( 25 Jul 2018 01:35 )             18.6     07-24    140  |  105  |  13  ----------------------------<  93  4.0   |  21<L>  |  1.20    Ca    8.2<L>      24 Jul 2018 05:30  Phos  2.8     07-24  Mg     1.9     07-24    TPro  6.3  /  Alb  3.2<L>  /  TBili  3.0<H>  /  DBili  x   /  AST  41<H>  /  ALT  24  /  AlkPhos  176<H>  07-24        Arterial Blood Gas:  07-24 @ 16:45  7.41/36/62/23/84.6/-1.8  ABG lactate: --

## 2018-07-25 NOTE — CONSULT NOTE ADULT - ASSESSMENT
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 Became acutely hypoxic overnight to the 70s, now on venturimask. Acute hypoxic respiratory failure. To be admitted to MICU.

## 2018-07-26 LAB
ALBUMIN SERPL ELPH-MCNC: 3.3 G/DL — SIGNIFICANT CHANGE UP (ref 3.3–5)
ALP SERPL-CCNC: 199 U/L — HIGH (ref 40–120)
ALT FLD-CCNC: 30 U/L — SIGNIFICANT CHANGE UP (ref 4–41)
AST SERPL-CCNC: 47 U/L — HIGH (ref 4–40)
BASOPHILS # BLD AUTO: 0.05 K/UL — SIGNIFICANT CHANGE UP (ref 0–0.2)
BASOPHILS NFR BLD AUTO: 0.4 % — SIGNIFICANT CHANGE UP (ref 0–2)
BILIRUB SERPL-MCNC: 4.3 MG/DL — HIGH (ref 0.2–1.2)
BUN SERPL-MCNC: 18 MG/DL — SIGNIFICANT CHANGE UP (ref 7–23)
CALCIUM SERPL-MCNC: 8.6 MG/DL — SIGNIFICANT CHANGE UP (ref 8.4–10.5)
CHLORIDE SERPL-SCNC: 99 MMOL/L — SIGNIFICANT CHANGE UP (ref 98–107)
CO2 SERPL-SCNC: 26 MMOL/L — SIGNIFICANT CHANGE UP (ref 22–31)
CREAT SERPL-MCNC: 1.21 MG/DL — SIGNIFICANT CHANGE UP (ref 0.5–1.3)
EOSINOPHIL # BLD AUTO: 0.52 K/UL — HIGH (ref 0–0.5)
EOSINOPHIL NFR BLD AUTO: 4.1 % — SIGNIFICANT CHANGE UP (ref 0–6)
GLUCOSE SERPL-MCNC: 103 MG/DL — HIGH (ref 70–99)
HAPTOGLOB SERPL-MCNC: 190 MG/DL — SIGNIFICANT CHANGE UP (ref 34–200)
HCT VFR BLD CALC: 26 % — LOW (ref 39–50)
HGB A MFR BLD: 35.7 % — LOW
HGB A2 MFR BLD: 3.7 % — HIGH (ref 2.4–3.5)
HGB BLD-MCNC: 8.7 G/DL — LOW (ref 13–17)
HGB F MFR BLD: 2.1 % — HIGH (ref 0–1.5)
HGB S MFR BLD: 58.5 % — HIGH (ref 0–0)
IMM GRANULOCYTES # BLD AUTO: 0.22 # — SIGNIFICANT CHANGE UP
IMM GRANULOCYTES NFR BLD AUTO: 1.7 % — HIGH (ref 0–1.5)
LDH SERPL L TO P-CCNC: 399 U/L — HIGH (ref 135–225)
LYMPHOCYTES # BLD AUTO: 0.06 K/UL — LOW (ref 1–3.3)
LYMPHOCYTES # BLD AUTO: 0.5 % — LOW (ref 13–44)
MAGNESIUM SERPL-MCNC: 1.6 MG/DL — SIGNIFICANT CHANGE UP (ref 1.6–2.6)
MCHC RBC-ENTMCNC: 25.3 PG — LOW (ref 27–34)
MCHC RBC-ENTMCNC: 33.5 % — SIGNIFICANT CHANGE UP (ref 32–36)
MCV RBC AUTO: 75.6 FL — LOW (ref 80–100)
MONOCYTES # BLD AUTO: 0.73 K/UL — SIGNIFICANT CHANGE UP (ref 0–0.9)
MONOCYTES NFR BLD AUTO: 5.8 % — SIGNIFICANT CHANGE UP (ref 2–14)
NEUTROPHILS # BLD AUTO: 11.11 K/UL — HIGH (ref 1.8–7.4)
NEUTROPHILS NFR BLD AUTO: 87.5 % — HIGH (ref 43–77)
NRBC # FLD: 7.76 — SIGNIFICANT CHANGE UP
NRBC FLD-RTO: 61.2 — SIGNIFICANT CHANGE UP
PHOSPHATE SERPL-MCNC: 3.7 MG/DL — SIGNIFICANT CHANGE UP (ref 2.5–4.5)
PLATELET # BLD AUTO: 248 K/UL — SIGNIFICANT CHANGE UP (ref 150–400)
PMV BLD: 9.7 FL — SIGNIFICANT CHANGE UP (ref 7–13)
POTASSIUM SERPL-MCNC: 3.7 MMOL/L — SIGNIFICANT CHANGE UP (ref 3.5–5.3)
POTASSIUM SERPL-SCNC: 3.7 MMOL/L — SIGNIFICANT CHANGE UP (ref 3.5–5.3)
PROT SERPL-MCNC: 6.4 G/DL — SIGNIFICANT CHANGE UP (ref 6–8.3)
RBC # BLD: 3.44 M/UL — LOW (ref 4.2–5.8)
RBC # FLD: 24.8 % — HIGH (ref 10.3–14.5)
RETICS #: 377 K/UL — HIGH (ref 25–125)
RETICS/RBC NFR: 11 % — HIGH (ref 0.5–2.5)
SODIUM SERPL-SCNC: 139 MMOL/L — SIGNIFICANT CHANGE UP (ref 135–145)
WBC # BLD: 12.69 K/UL — HIGH (ref 3.8–10.5)
WBC # FLD AUTO: 12.69 K/UL — HIGH (ref 3.8–10.5)

## 2018-07-26 PROCEDURE — 99232 SBSQ HOSP IP/OBS MODERATE 35: CPT | Mod: GC

## 2018-07-26 RX ORDER — HEPARIN SODIUM 5000 [USP'U]/ML
5000 INJECTION INTRAVENOUS; SUBCUTANEOUS EVERY 8 HOURS
Qty: 0 | Refills: 0 | Status: DISCONTINUED | OUTPATIENT
Start: 2018-07-26 | End: 2018-07-26

## 2018-07-26 RX ORDER — MORPHINE SULFATE 50 MG/1
30 CAPSULE, EXTENDED RELEASE ORAL
Qty: 0 | Refills: 0 | Status: DISCONTINUED | OUTPATIENT
Start: 2018-07-26 | End: 2018-07-26

## 2018-07-26 RX ORDER — MORPHINE SULFATE 50 MG/1
2 CAPSULE, EXTENDED RELEASE ORAL EVERY 4 HOURS
Qty: 0 | Refills: 0 | Status: DISCONTINUED | OUTPATIENT
Start: 2018-07-26 | End: 2018-07-27

## 2018-07-26 RX ADMIN — MORPHINE SULFATE 2 MILLIGRAM(S): 50 CAPSULE, EXTENDED RELEASE ORAL at 12:41

## 2018-07-26 RX ADMIN — MORPHINE SULFATE 2 MILLIGRAM(S): 50 CAPSULE, EXTENDED RELEASE ORAL at 17:49

## 2018-07-26 RX ADMIN — MORPHINE SULFATE 2 MILLIGRAM(S): 50 CAPSULE, EXTENDED RELEASE ORAL at 13:00

## 2018-07-26 RX ADMIN — MORPHINE SULFATE 2 MILLIGRAM(S): 50 CAPSULE, EXTENDED RELEASE ORAL at 18:24

## 2018-07-26 RX ADMIN — MORPHINE SULFATE 4 MILLIGRAM(S): 50 CAPSULE, EXTENDED RELEASE ORAL at 03:15

## 2018-07-26 RX ADMIN — MORPHINE SULFATE 2 MILLIGRAM(S): 50 CAPSULE, EXTENDED RELEASE ORAL at 23:44

## 2018-07-26 RX ADMIN — MORPHINE SULFATE 4 MILLIGRAM(S): 50 CAPSULE, EXTENDED RELEASE ORAL at 08:45

## 2018-07-26 RX ADMIN — MORPHINE SULFATE 4 MILLIGRAM(S): 50 CAPSULE, EXTENDED RELEASE ORAL at 09:00

## 2018-07-26 RX ADMIN — MORPHINE SULFATE 4 MILLIGRAM(S): 50 CAPSULE, EXTENDED RELEASE ORAL at 02:57

## 2018-07-26 NOTE — CHART NOTE - NSCHARTNOTEFT_GEN_A_CORE
MICU Transfer Note    Transfer from: MICU  Transfer to:  ( X) Medicine    (  ) Telemetry    (  ) RCU    (  ) Palliative    (  ) Stroke Unit    (  ) _______________  Accepting physican:     MICU COURSE:  Initially admitted to floor for sickle cell crisis. The course was complicated by acute hypoxic respiratory failure with O2 desat 70s with Hgb of 5's. He was started on Venturi mask and 1 u pRBC and admitted to MICU. Hgb goal is 8-9 and since his hgb was 6's, 2u pRBC were given (3u pRBC total).     Levofloxacin 500 IV (started on 7/25, day 2 today) was given for possible atypical PNA (patient initially had SOB or cough). Initially on NRB for O2 support and now currently 4L NC with O2 sat 99%. Previously given IV Lasix for pleural effusion, B lines and volume overload. No alexandre is placed. For pain control, hydromorphone PCA was started and now currently morphine IVP 2-4 mg PRN. Hgb currently at 8-9 which is our goal. No exchange transfusion was given.       ASSESSMENT & PLAN:   48 year old male with pmh of HbSS disease s/p multiple blood transfusion p/w diffuse body pain and recent viral URI like symptoms admitted for sickle cell crisis. Became acutely hypoxic overnight to the 70s, now on venturimask. Acute hypoxic respiratory failure. Admitted to MICU. Concern for volume overload, acute chest, CAP.     NEUROLOGIC:  No alteration of mental status present.     SKIN:  Lines: PIVs  Decubiti: none    GI:  Diet: regular  GI stress prophylaxis not indicated     METABOLIC:  BMP showing evidence of no abnormalities  Liver functions tests shows mildly elevated AST (hemolysis) and T bili.  Continue to trend    VOLUME ASSESSMENT:  Previously given Lasix for volume overload and pleural effusion  No peripheral edema    HEMATOLOGIC:  CBC results shows evidence of acute on chronic anemia  Now s/p 3u pRBC  Heme/onc and blood bank following  Attempt to avoid exchange transfusion  Goal Hb between 8-9.   Today Hgb 8.7  Previously hydromorphone PCA pump for pain control. Currently morphine IVP 2-4 mg PRN.  DVT prophylaxis with HSQ             INFECTIOUS DISEASE:  Concern for pneumonia given cough, dyspnea, bilateral pleural effusions and B lines suggest cardiac however will txt for cap and atypicals with levoflox 500 BID (day 2 today. Started on 7/25)    HEMODYNAMICS:  Patient is not on pressors     CARDIOVASCULAR:  Previously bilateral B lines and small pleural effusions. Concern for volume overload. Pt received 80mg IV furosemide 7/24 and another 40 7/25.   Monitor I&O    RESPIRATORY:  Acute hypoxic resp failure  Likely 2/2 to sickle cell crisis vs acute chest vs CAP  Previously on NRB, currently 4L NC with O2 99%  Abx as above   Incentive spirometry      For Follow-Up:  [ ] Follow hgb electrophoresis. Goal is hgb S <30%. Admitted with Hgb S >90%.   [ ] Trend CBC for Hgb. Currently 8.7. When blood transfusion is needed, follow up with heme/onc to avoid transfusion reactions. Pt previously had multiple blood transfusions and developed antibodies to multiple antigens.   [ ] C/w Levofloxacin     Vital Signs Last 24 Hrs  T(C): 36.6 (26 Jul 2018 04:00), Max: 37.3 (25 Jul 2018 09:50)  T(F): 97.9 (26 Jul 2018 04:00), Max: 99.1 (25 Jul 2018 09:50)  HR: 77 (26 Jul 2018 08:00) (72 - 99)  BP: 152/78 (26 Jul 2018 07:00) (106/57 - 165/97)  BP(mean): 95 (26 Jul 2018 07:00) (70 - 113)  RR: 20 (26 Jul 2018 08:00) (17 - 28)  SpO2: 91% (26 Jul 2018 08:00) (91% - 100%)  I&O's Summary    25 Jul 2018 07:01  -  26 Jul 2018 07:00  --------------------------------------------------------  IN: 1760 mL / OUT: 2700 mL / NET: -940 mL          MEDICATIONS  (STANDING):  chlorhexidine 4% Liquid 1 Application(s) Topical <User Schedule>  docusate sodium 100 milliGRAM(s) Oral three times a day  folic acid 1 milliGRAM(s) Oral daily  levoFLOXacin IVPB      levoFLOXacin IVPB 500 milliGRAM(s) IV Intermittent every 24 hours  multivitamin 1 Tablet(s) Oral daily    MEDICATIONS  (PRN):  acetaminophen   Tablet 650 milliGRAM(s) Oral every 6 hours PRN For Temp greater than 38 C (100.4 F)  acetaminophen   Tablet. 650 milliGRAM(s) Oral every 6 hours PRN Mild Pain (1 - 3)  bisacodyl Suppository 10 milliGRAM(s) Rectal once PRN Constipation  morphine  - Injectable 4 milliGRAM(s) IV Push every 4 hours PRN Severe Pain (7 - 10)  naloxone Injectable 0.1 milliGRAM(s) IV Push every 3 minutes PRN For ANY of the following changes in patient status:  A. RR LESS THAN 10 breaths per minute, B. Oxygen saturation LESS THAN 90%, C. Sedation score of 6  ondansetron Injectable 4 milliGRAM(s) IV Push every 6 hours PRN Nausea  senna 2 Tablet(s) Oral at bedtime PRN Constipation        LABS                                            8.7                   Neurophils% (auto):   87.5   (07-26 @ 00:19):    12.69)-----------(248          Lymphocytes% (auto):  0.5                                           26.0                   Eosinphils% (auto):   4.1      Manual%: Neutrophils x    ; Lymphocytes x    ; Eosinophils x    ; Bands%: x    ; Blasts x                                    139    |  99     |  18                  Calcium: 8.6   / iCa: x      (07-26 @ 00:19)    ----------------------------<  103       Magnesium: 1.6                              3.7     |  26     |  1.21             Phosphorous: 3.7      TPro  6.4    /  Alb  3.3    /  TBili  4.3    /  DBili  x      /  AST  47     /  ALT  30     /  AlkPhos  199    26 Jul 2018 00:19 MICU Transfer Note    Transfer from: MICU  Transfer to:  ( X) Medicine    (  ) Telemetry    (  ) RCU    (  ) Palliative    (  ) Stroke Unit    (  ) _______________  Accepting physican: Dr. Bradford    MICU COURSE:  Initially admitted to floor for sickle cell crisis. The course was complicated by acute hypoxic respiratory failure with O2 desat 70s with Hgb of 5's. He was started on Venturi mask and 1 u pRBC and admitted to MICU. Hgb goal is 8-9 and since his hgb was 6's, 2u pRBC were given (3u pRBC total).     Levofloxacin 500 IV (started on 7/25, day 2 today) was given for possible atypical PNA (patient initially had SOB or cough). Initially on NRB for O2 support and now currently 4L NC with O2 sat 99%. Previously given IV Lasix for pleural effusion, B lines and volume overload. No alexandre is placed. For pain control, hydromorphone PCA was started and now currently morphine IVP 2-4 mg PRN. Hgb currently at 8-9 which is our goal. No exchange transfusion was given.       ASSESSMENT & PLAN:   48 year old male with pmh of HbSS disease s/p multiple blood transfusion p/w diffuse body pain and recent viral URI like symptoms admitted for sickle cell crisis. Became acutely hypoxic overnight to the 70s, now on venturimask. Acute hypoxic respiratory failure. Admitted to MICU. Concern for volume overload, acute chest, CAP.     NEUROLOGIC:  No alteration of mental status present.     SKIN:  Lines: PIVs  Decubiti: none    GI:  Diet: regular  GI stress prophylaxis not indicated     METABOLIC:  BMP showing evidence of no abnormalities  Liver functions tests shows mildly elevated AST (hemolysis) and T bili.  Continue to trend    VOLUME ASSESSMENT:  Previously given Lasix for volume overload and pleural effusion  No peripheral edema    HEMATOLOGIC:  CBC results shows evidence of acute on chronic anemia  Now s/p 3u pRBC  Heme/onc and blood bank following  Attempt to avoid exchange transfusion  Goal Hb between 8-9.   Today Hgb 8.7  Previously hydromorphone PCA pump for pain control. Currently morphine IVP 2-4 mg PRN.  DVT prophylaxis with HSQ             INFECTIOUS DISEASE:  Concern for pneumonia given cough, dyspnea, bilateral pleural effusions and B lines suggest cardiac however will txt for cap and atypicals with levoflox 500 BID (day 2 today. Started on 7/25)    HEMODYNAMICS:  Patient is not on pressors     CARDIOVASCULAR:  Previously bilateral B lines and small pleural effusions. Concern for volume overload. Pt received 80mg IV furosemide 7/24 and another 40 7/25.   Monitor I&O    RESPIRATORY:  Acute hypoxic resp failure  Likely 2/2 to sickle cell crisis vs acute chest vs CAP  Previously on NRB, currently 4L NC with O2 99%  Abx as above   Incentive spirometry      For Follow-Up:  [ ] Follow hgb electrophoresis. Goal is hgb S <30%. Admitted with Hgb S >90%.   [ ] Trend CBC for Hgb. Currently 8.7. When blood transfusion is needed, follow up with heme/onc to avoid transfusion reactions. Pt previously had multiple blood transfusions and developed antibodies to multiple antigens.   [ ] C/w Levofloxacin     Vital Signs Last 24 Hrs  T(C): 36.6 (26 Jul 2018 04:00), Max: 37.3 (25 Jul 2018 09:50)  T(F): 97.9 (26 Jul 2018 04:00), Max: 99.1 (25 Jul 2018 09:50)  HR: 77 (26 Jul 2018 08:00) (72 - 99)  BP: 152/78 (26 Jul 2018 07:00) (106/57 - 165/97)  BP(mean): 95 (26 Jul 2018 07:00) (70 - 113)  RR: 20 (26 Jul 2018 08:00) (17 - 28)  SpO2: 91% (26 Jul 2018 08:00) (91% - 100%)  I&O's Summary    25 Jul 2018 07:01  -  26 Jul 2018 07:00  --------------------------------------------------------  IN: 1760 mL / OUT: 2700 mL / NET: -940 mL          MEDICATIONS  (STANDING):  chlorhexidine 4% Liquid 1 Application(s) Topical <User Schedule>  docusate sodium 100 milliGRAM(s) Oral three times a day  folic acid 1 milliGRAM(s) Oral daily  levoFLOXacin IVPB      levoFLOXacin IVPB 500 milliGRAM(s) IV Intermittent every 24 hours  multivitamin 1 Tablet(s) Oral daily    MEDICATIONS  (PRN):  acetaminophen   Tablet 650 milliGRAM(s) Oral every 6 hours PRN For Temp greater than 38 C (100.4 F)  acetaminophen   Tablet. 650 milliGRAM(s) Oral every 6 hours PRN Mild Pain (1 - 3)  bisacodyl Suppository 10 milliGRAM(s) Rectal once PRN Constipation  morphine  - Injectable 4 milliGRAM(s) IV Push every 4 hours PRN Severe Pain (7 - 10)  naloxone Injectable 0.1 milliGRAM(s) IV Push every 3 minutes PRN For ANY of the following changes in patient status:  A. RR LESS THAN 10 breaths per minute, B. Oxygen saturation LESS THAN 90%, C. Sedation score of 6  ondansetron Injectable 4 milliGRAM(s) IV Push every 6 hours PRN Nausea  senna 2 Tablet(s) Oral at bedtime PRN Constipation        LABS                                            8.7                   Neurophils% (auto):   87.5   (07-26 @ 00:19):    12.69)-----------(248          Lymphocytes% (auto):  0.5                                           26.0                   Eosinphils% (auto):   4.1      Manual%: Neutrophils x    ; Lymphocytes x    ; Eosinophils x    ; Bands%: x    ; Blasts x                                    139    |  99     |  18                  Calcium: 8.6   / iCa: x      (07-26 @ 00:19)    ----------------------------<  103       Magnesium: 1.6                              3.7     |  26     |  1.21             Phosphorous: 3.7      TPro  6.4    /  Alb  3.3    /  TBili  4.3    /  DBili  x      /  AST  47     /  ALT  30     /  AlkPhos  199    26 Jul 2018 00:19

## 2018-07-26 NOTE — CHART NOTE - NSCHARTNOTEFT_GEN_A_CORE
MAR TRANSFER NOTE     48M PMH of HbSS disease originally admitted 7/21 with diffuse body pain 2/2 sickle cell crisis likely 2/2 viral URI.  Patient had increasing respiratory distress and became acutely hypoxic on 7/25 with concern for acute chest syndrome, was placed on venturimask.  CXR w/o infiltrate but with increased pulmonary edema and small R pleural effusion.  Bedside sono w/ blines suggestive of edema however patient treated empirically for CAP w/ levaquin 500 mg daily.  Patient diuresed w/ lasix for pleural effusions.  Patient received 3 U PRBCs total during admission as recommended by heme-onc, patient w/ multiple allo-antibodies requiring rare PRBCs per blood bank, and high risk for hemolytic reaction.  Patient did not require exchange transfusion.  Patient clinically improved, now     Velma English, PGY-3  [id number] MAR TRANSFER NOTE     48M PMH of HbSS disease originally admitted 7/21 with diffuse body pain 2/2 sickle cell crisis likely 2/2 viral URI.  Patient had increasing respiratory distress and became acutely hypoxic on 7/25 with concern for acute chest syndrome, was placed on venturimask.  CXR w/o infiltrate but with increased pulmonary edema and small R pleural effusion.  Bedside sono w/ blines suggestive of edema however patient treated empirically for CAP w/ levaquin 500 mg daily.  Patient diuresed w/ lasix for pleural effusions.  Patient received 3 U PRBCs total during admission as recommended by heme-onc, patient w/ multiple allo-antibodies requiring rare PRBCs per blood bank, and high risk for hemolytic reaction.  Patient did not require exchange transfusion.  Patient clinically improved, now on 4L NC.      To do:   - careful monitoring of respiratory status   - f/u heme onc recs - requesting repeat hemoglobin electrophoresis following transfusions to re-evaluate burden of hgb S  - c/w levaquin for 5 days for PNA  - pain control w/ morphine    Velma English, PGY-3  [id number] MAR TRANSFER NOTE     48M PMH of HbSS disease originally admitted 7/21 with diffuse body pain 2/2 sickle cell crisis likely 2/2 viral URI.  Patient had increasing respiratory distress and became acutely hypoxic on 7/25 with concern for acute chest syndrome, was placed on venturimask.  CXR w/o infiltrate but with increased pulmonary edema and small R pleural effusion.  Bedside sono w/ blines suggestive of edema however patient treated empirically for CAP w/ levaquin 500 mg daily.  Patient diuresed w/ lasix for pleural effusions.  Patient received 3 U PRBCs total during admission as recommended by heme-onc, patient w/ multiple allo-antibodies requiring rare PRBCs per blood bank, and high risk for hemolytic reaction.  Patient did not require exchange transfusion.  Patient clinically improved, now on 4L NC.      To do:   - careful monitoring of respiratory status   - f/u heme onc recs - requesting repeat hemoglobin electrophoresis following transfusions to re-evaluate burden of hgb S  - c/w levaquin for 5 days for PNA  - pain control w/ morphine    Patient to be signed out to the ADS NP by the MICU team.     Velma English, PGY-3  [id number] MAR TRANSFER NOTE     48M PMH of HbSS disease originally admitted 7/21 with diffuse body pain 2/2 sickle cell crisis likely 2/2 viral URI.  Patient had increasing respiratory distress and became acutely hypoxic on 7/25 with concern for acute chest syndrome, was placed on venturimask.  CXR w/o infiltrate but with increased pulmonary edema and small R pleural effusion.  Bedside sono w/ blines suggestive of edema however patient treated empirically for CAP w/ levaquin 500 mg daily.  Patient diuresed w/ lasix for pleural effusions.  Patient received 3 U PRBCs total during admission as recommended by heme-onc, patient w/ multiple allo-antibodies requiring rare PRBCs per blood bank, and high risk for hemolytic reaction.  Patient did not require exchange transfusion.  Patient clinically improved, now on 2L NC.      To do:   - careful monitoring of respiratory status   - f/u heme onc recs - requesting repeat hemoglobin electrophoresis following transfusions to re-evaluate burden of hgb S  - c/w levaquin for 5 days for PNA  - pain control w/ morphine    Patient to be signed out to the ADS NP by the MICU team.     Velma English, PGY-3  [id number]

## 2018-07-26 NOTE — PROGRESS NOTE ADULT - ASSESSMENT
Pt is a 47 yo M with HbSS disease p/w diffuse body pain and recent viral URI like symptoms admitted for sickle cell crisis. Course complicated for hypoxia secondary to fluid overload vs acute chest. Now improved after blood transfusions.     #Sickle cell crisis with hypoxemic respiratory failure  - hypoxemia likely 2/2 low hemoglobin as well as component of fluid overload   - now improved s/p 3 uPRBCs; hemoglobin now stable with improved hemolysis parameters   - repeat Hg electrophoresis with Hg >50% still   - continue to monitor hemolysis parameters   - continue other supportive management: pain control, incentive spirometer    Discussed with attending,  Kat Lombardo, PGY-4  Hematology-Oncology Fellow  [phone] ([place]) [phone] ([facility])

## 2018-07-27 LAB
ALBUMIN SERPL ELPH-MCNC: 3.2 G/DL — LOW (ref 3.3–5)
ALP SERPL-CCNC: 181 U/L — HIGH (ref 40–120)
ALT FLD-CCNC: 29 U/L — SIGNIFICANT CHANGE UP (ref 4–41)
AST SERPL-CCNC: 68 U/L — HIGH (ref 4–40)
BILIRUB DIRECT SERPL-MCNC: 0.5 MG/DL — HIGH (ref 0.1–0.2)
BILIRUB SERPL-MCNC: 3.5 MG/DL — HIGH (ref 0.2–1.2)
BUN SERPL-MCNC: 17 MG/DL — SIGNIFICANT CHANGE UP (ref 7–23)
CALCIUM SERPL-MCNC: 8.8 MG/DL — SIGNIFICANT CHANGE UP (ref 8.4–10.5)
CHLORIDE SERPL-SCNC: 102 MMOL/L — SIGNIFICANT CHANGE UP (ref 98–107)
CO2 SERPL-SCNC: 25 MMOL/L — SIGNIFICANT CHANGE UP (ref 22–31)
CREAT SERPL-MCNC: 1.03 MG/DL — SIGNIFICANT CHANGE UP (ref 0.5–1.3)
GLUCOSE SERPL-MCNC: 83 MG/DL — SIGNIFICANT CHANGE UP (ref 70–99)
HCT VFR BLD CALC: 25 % — LOW (ref 39–50)
HGB BLD-MCNC: 8.3 G/DL — LOW (ref 13–17)
MAGNESIUM SERPL-MCNC: 2 MG/DL — SIGNIFICANT CHANGE UP (ref 1.6–2.6)
MCHC RBC-ENTMCNC: 25.8 PG — LOW (ref 27–34)
MCHC RBC-ENTMCNC: 33.2 % — SIGNIFICANT CHANGE UP (ref 32–36)
MCV RBC AUTO: 77.6 FL — LOW (ref 80–100)
NRBC # FLD: 4.81 — SIGNIFICANT CHANGE UP
NRBC FLD-RTO: 46.2 — SIGNIFICANT CHANGE UP
PLATELET # BLD AUTO: 256 K/UL — SIGNIFICANT CHANGE UP (ref 150–400)
PMV BLD: 10 FL — SIGNIFICANT CHANGE UP (ref 7–13)
POTASSIUM SERPL-MCNC: 5 MMOL/L — SIGNIFICANT CHANGE UP (ref 3.5–5.3)
POTASSIUM SERPL-SCNC: 5 MMOL/L — SIGNIFICANT CHANGE UP (ref 3.5–5.3)
PROT SERPL-MCNC: 6.7 G/DL — SIGNIFICANT CHANGE UP (ref 6–8.3)
RBC # BLD: 3.22 M/UL — LOW (ref 4.2–5.8)
RBC # FLD: 25 % — HIGH (ref 10.3–14.5)
SODIUM SERPL-SCNC: 139 MMOL/L — SIGNIFICANT CHANGE UP (ref 135–145)
WBC # BLD: 10.42 K/UL — SIGNIFICANT CHANGE UP (ref 3.8–10.5)
WBC # FLD AUTO: 10.42 K/UL — SIGNIFICANT CHANGE UP (ref 3.8–10.5)

## 2018-07-27 PROCEDURE — 93306 TTE W/DOPPLER COMPLETE: CPT | Mod: 26

## 2018-07-27 PROCEDURE — 99233 SBSQ HOSP IP/OBS HIGH 50: CPT

## 2018-07-27 RX ORDER — OXYCODONE HYDROCHLORIDE 5 MG/1
10 TABLET ORAL EVERY 6 HOURS
Qty: 0 | Refills: 0 | Status: DISCONTINUED | OUTPATIENT
Start: 2018-07-27 | End: 2018-07-30

## 2018-07-27 RX ORDER — OXYCODONE HYDROCHLORIDE 5 MG/1
5 TABLET ORAL EVERY 6 HOURS
Qty: 0 | Refills: 0 | Status: DISCONTINUED | OUTPATIENT
Start: 2018-07-27 | End: 2018-07-30

## 2018-07-27 RX ORDER — MORPHINE SULFATE 50 MG/1
1 CAPSULE, EXTENDED RELEASE ORAL EVERY 8 HOURS
Qty: 0 | Refills: 0 | Status: DISCONTINUED | OUTPATIENT
Start: 2018-07-27 | End: 2018-07-30

## 2018-07-27 RX ADMIN — MORPHINE SULFATE 1 MILLIGRAM(S): 50 CAPSULE, EXTENDED RELEASE ORAL at 12:00

## 2018-07-27 RX ADMIN — MORPHINE SULFATE 1 MILLIGRAM(S): 50 CAPSULE, EXTENDED RELEASE ORAL at 11:45

## 2018-07-27 RX ADMIN — Medication 1 MILLIGRAM(S): at 13:17

## 2018-07-27 RX ADMIN — MORPHINE SULFATE 2 MILLIGRAM(S): 50 CAPSULE, EXTENDED RELEASE ORAL at 00:49

## 2018-07-27 RX ADMIN — Medication 1 TABLET(S): at 13:17

## 2018-07-27 RX ADMIN — OXYCODONE HYDROCHLORIDE 10 MILLIGRAM(S): 5 TABLET ORAL at 23:07

## 2018-07-27 RX ADMIN — MORPHINE SULFATE 2 MILLIGRAM(S): 50 CAPSULE, EXTENDED RELEASE ORAL at 07:45

## 2018-07-27 RX ADMIN — MORPHINE SULFATE 2 MILLIGRAM(S): 50 CAPSULE, EXTENDED RELEASE ORAL at 07:28

## 2018-07-27 NOTE — PROGRESS NOTE ADULT - PROBLEM SELECTOR PLAN 5
Ucx with 10-49k Ecoli, asymptomatic  Monitor off Abx
Ucx with 10-49k Ecoli, asymptomatic  currently on Levaquin for CAP, which covers according to sensitivity.

## 2018-07-27 NOTE — PROGRESS NOTE ADULT - PROBLEM SELECTOR PLAN 4
nutrition consult appreciated  Ensure TID
Ucx with 10-49k Ecoli, asymptomatic  Monitor off Abx
nutrition consult appreciated  Ensure TID

## 2018-07-27 NOTE — PROGRESS NOTE ADULT - PROBLEM SELECTOR PLAN 2
- s/p 3 U PRBC on 7/24, has complicated alloantibodies in blood, difficult crossmatch  - Folic acid/MVI, Pain controlled on IV morphine, taper to oral oxycodone as tolerated.   -Bowel regimen to avoid opioid induced constipation, incentive spirometer  -monitor retic count, LDH  -Heme consult appreciated, repeat Hg electrophoresis with Hbs >50% still
Ucx with 10-49k Ecoli, asymptomatic  Monitor off Abx
Ucx with 10-49k Ecoli, asymptomatic  Monitor off Abx
Procedure team to be contacted for IV access this AM  s/p 1 U PRBC on 7/24, has complicated alloantibodies in blood, difficult crossmatch  -Folic acid/MVI, morphine PCA pump 2mg (on Oxy IR po until IV access can be obtained), toradol ATC x 3 days  -Bowel regimen to avoid opioid induced constipation, incentive spirometer  -monitor retic count, LDH
elevated LFTS likely due to sickle cell hepatopathy  Continue IVF, monitor for respiratory distress  Trend LFTs

## 2018-07-27 NOTE — PROGRESS NOTE ADULT - PROBLEM SELECTOR PROBLEM 2
Abnormal urine cytology
Abnormal urine cytology
Sickle cell anemia with crisis
Sickle cell anemia with crisis
Hepatopathy

## 2018-07-27 NOTE — PROGRESS NOTE ADULT - ATTENDING COMMENTS
Mr. Marquez is in the ICU today. He appears much better. His oxygen saturation is 100% on supplemental oxygen. His lung status deteriorated yesterday. Combination of congestive heart failure and probable acute chest syndrome. He received one unit of blood yesterday, and his hemoglobin 8 today is 14%. Another unit is being deglycerolized for transfusion today. Another unit is being requested from the [facility] is compatible with his antibody panel.    We wanted to avoid transfusing him if possible, but his lung status deteriorated and he requires transfusion. With his low hemoglobin to begin with, and a short red blood survival of his sickle cells, 3 and this may be adequate to decline his hemoglobin S. levels less than 50% of total.    I agree with the assessment and plan as stated above in the note from Dr. Almodovar.
Mr. Marquez is markedly better today. He received 3 units of match blood. He has multiple alloantibodies. He is markedly improved. His lungs are much clearer with only some residual crepitation in the right base. His pain is improving as well. He is no longer requiring oxygen.    No further transfusion is planned at this time. He was not exchanged because of the high risk involved with aloe antigens. All questions were answered to the best of my ability and to his apparent satisfaction. I agree with the assessment and plan as stated in a note from Dr. Lombardo.
Mr. Marquez was seen in consultation yesterday and was stable. We did not recommend transfusion giving a great number of alloantibodies that he has in his blood. At that time, there was no significant findings on physical examination other than the presence of rales in both bases. It was not clear if these were chronic or acute, but his symptoms are minimal. We recommend a repeat chest x-ray at that time and decided that we should hold on transfusions given that the risk was likely greater than the benefit.    Today, we were contacted because he had difficulty breathing with a low oxygen saturation. A blood gas was being performed at the time he arrived. He was on a nonrebreather mask. Once again, he has significant rales in both bases, seemingly more than yesterday. Dr. Massey was also at the bedside, and she said there were more rales underwear this morning. He was administered Lasix.    We contacted the blood bank in regards to transfusion. They had one unit on hand, we recommend that this could be given at this time. This may make a difference in his oxygen carrying capacity. We recommend that he be monitored closely at this time, and that the MICU team should evaluate him. There is additional blood available for him, but it is frozen requires processing to deglycerolize the units. The least amount of blood he receives with some clinical benefit, the better off he may be in the long run, as exposure to further antigens are previously exposed antigen may trigger significant hemolysis or hyper-hemolysis. I agree with the assessment and plan as stated above and Dr. Almodovar's note.
Dispo: pending pain control, MICU consult
Dispo: pending pain control, CXR
D/c home planning once tolerated oral pain medication. Istop checked reference [id number]. Pt was not on any narcotics at home for pain. He want to avoid narcotics as much as possible. F/u with Dr. Hamm out patient. Explained above plan.

## 2018-07-27 NOTE — PROGRESS NOTE ADULT - PROBLEM SELECTOR PLAN 3
nutrition consult  Ensure TID
elevated LFTS likely due to sickle cell hepatopathy, trending down.   Off IVF given increased fluid overload
nutrition consult  Ensure added
nutrition consult  Ensure added
elevated LFTS likely due to sickle cell hepatopathy  Off IVF given increased fluid overload  Trend LFTs- was not able to obtain CMP this AM

## 2018-07-27 NOTE — PROGRESS NOTE ADULT - PROBLEM SELECTOR PROBLEM 1
Acute hypoxemic respiratory failure
Sickle cell anemia with crisis
Sickle cell anemia with crisis
Acute hypoxemic respiratory failure
Sickle cell anemia with crisis

## 2018-07-27 NOTE — PROGRESS NOTE ADULT - PROBLEM SELECTOR PLAN 1
- s/p MICU, tolerated well on NC, currently saturated well on Rm air.   - CXR w/o infiltrate but with increased pulmonary edema and small R pleural effusion.  MICU Bedside US showed B lines suggestive of edema, Pt was treated empirically for CAP w/ levaquin 500 mg daily (will finish 5 days), and s/p diuresed w/ lasix for pleural effusions.  off IVF  Incentive spirometry

## 2018-07-27 NOTE — PROGRESS NOTE ADULT - PROBLEM SELECTOR PROBLEM 3
Severe protein-calorie malnutrition
Hepatopathy
Severe protein-calorie malnutrition
Severe protein-calorie malnutrition
Hepatopathy

## 2018-07-28 PROCEDURE — 99233 SBSQ HOSP IP/OBS HIGH 50: CPT

## 2018-07-28 PROCEDURE — 93010 ELECTROCARDIOGRAM REPORT: CPT

## 2018-07-28 RX ADMIN — MORPHINE SULFATE 1 MILLIGRAM(S): 50 CAPSULE, EXTENDED RELEASE ORAL at 19:10

## 2018-07-28 RX ADMIN — MORPHINE SULFATE 1 MILLIGRAM(S): 50 CAPSULE, EXTENDED RELEASE ORAL at 09:47

## 2018-07-28 RX ADMIN — OXYCODONE HYDROCHLORIDE 10 MILLIGRAM(S): 5 TABLET ORAL at 13:39

## 2018-07-28 RX ADMIN — OXYCODONE HYDROCHLORIDE 10 MILLIGRAM(S): 5 TABLET ORAL at 23:04

## 2018-07-28 RX ADMIN — Medication 1 MILLIGRAM(S): at 13:39

## 2018-07-28 RX ADMIN — MORPHINE SULFATE 1 MILLIGRAM(S): 50 CAPSULE, EXTENDED RELEASE ORAL at 01:55

## 2018-07-28 RX ADMIN — OXYCODONE HYDROCHLORIDE 10 MILLIGRAM(S): 5 TABLET ORAL at 22:34

## 2018-07-28 RX ADMIN — OXYCODONE HYDROCHLORIDE 10 MILLIGRAM(S): 5 TABLET ORAL at 06:16

## 2018-07-28 RX ADMIN — OXYCODONE HYDROCHLORIDE 10 MILLIGRAM(S): 5 TABLET ORAL at 07:15

## 2018-07-28 RX ADMIN — CHLORHEXIDINE GLUCONATE 1 APPLICATION(S): 213 SOLUTION TOPICAL at 13:36

## 2018-07-28 RX ADMIN — MORPHINE SULFATE 1 MILLIGRAM(S): 50 CAPSULE, EXTENDED RELEASE ORAL at 10:00

## 2018-07-28 RX ADMIN — OXYCODONE HYDROCHLORIDE 10 MILLIGRAM(S): 5 TABLET ORAL at 00:07

## 2018-07-28 RX ADMIN — MORPHINE SULFATE 1 MILLIGRAM(S): 50 CAPSULE, EXTENDED RELEASE ORAL at 18:58

## 2018-07-28 RX ADMIN — Medication 1 TABLET(S): at 13:39

## 2018-07-28 RX ADMIN — MORPHINE SULFATE 1 MILLIGRAM(S): 50 CAPSULE, EXTENDED RELEASE ORAL at 01:02

## 2018-07-28 RX ADMIN — OXYCODONE HYDROCHLORIDE 10 MILLIGRAM(S): 5 TABLET ORAL at 14:30

## 2018-07-28 NOTE — PROGRESS NOTE ADULT - ASSESSMENT
49 yo M w/ hx SSD p/w CP and VOC crisis after flu like illness. Course c/b acute hypoxic respiratory failure s/p 1 U PRBC on 7/24 with appropriate response in H/H    1. Acute hypoxemic respiratory failure.     s/p MICU, tolerated well on NC, currently saturated well on Rm air.   - CXR w/o infiltrate but with increased pulmonary edema and small R pleural effusion.    s/p abx   and lasix given   2. Sickle cell anemia with crisis.    s/p 3 U PRBC on 7/24, has complicated alloantibodies in blood, difficult crossmatch  - Folic acid/MVI,   Pain controlled on IV morphine, taper to oral oxycodone as tolerated.   -Bowel regimen to avoid opioid induced constipation, incentive spirometer  -monitor retic count, LDH  -Heme consult appreciated, repeat Hg electrophoresis with Hbs >50% still.     3.  Hepatopathy.     elevated LFTS likely due to sickle cell hepatopathy, trending down.   Off IVF given increased fluid overload.      4. Severe protein-calorie malnutrition.     nutrition consult appreciated  Ensure TID.     5. Abnormal urine cytology.     Ucx with 10-49k Ecoli, asymptomatic  currently on Levaquin for CAP, which covers according to sensitivity.     Gi and DVT prophylaxis

## 2018-07-29 LAB
ALBUMIN SERPL ELPH-MCNC: 3.2 G/DL — LOW (ref 3.3–5)
ALP SERPL-CCNC: 201 U/L — HIGH (ref 40–120)
ALT FLD-CCNC: 22 U/L — SIGNIFICANT CHANGE UP (ref 4–41)
AST SERPL-CCNC: 34 U/L — SIGNIFICANT CHANGE UP (ref 4–40)
BILIRUB DIRECT SERPL-MCNC: 0.5 MG/DL — HIGH (ref 0.1–0.2)
BILIRUB SERPL-MCNC: 3.1 MG/DL — HIGH (ref 0.2–1.2)
BUN SERPL-MCNC: 14 MG/DL — SIGNIFICANT CHANGE UP (ref 7–23)
CALCIUM SERPL-MCNC: 9.8 MG/DL — SIGNIFICANT CHANGE UP (ref 8.4–10.5)
CHLORIDE SERPL-SCNC: 98 MMOL/L — SIGNIFICANT CHANGE UP (ref 98–107)
CO2 SERPL-SCNC: 23 MMOL/L — SIGNIFICANT CHANGE UP (ref 22–31)
CREAT SERPL-MCNC: 1.07 MG/DL — SIGNIFICANT CHANGE UP (ref 0.5–1.3)
GLUCOSE SERPL-MCNC: 83 MG/DL — SIGNIFICANT CHANGE UP (ref 70–99)
HCT VFR BLD CALC: 28.3 % — LOW (ref 39–50)
HGB BLD-MCNC: 9.5 G/DL — LOW (ref 13–17)
MAGNESIUM SERPL-MCNC: 2 MG/DL — SIGNIFICANT CHANGE UP (ref 1.6–2.6)
MCHC RBC-ENTMCNC: 25.9 PG — LOW (ref 27–34)
MCHC RBC-ENTMCNC: 33.6 % — SIGNIFICANT CHANGE UP (ref 32–36)
MCV RBC AUTO: 77.1 FL — LOW (ref 80–100)
NRBC # FLD: 1.15 — SIGNIFICANT CHANGE UP
NRBC FLD-RTO: 10.7 — SIGNIFICANT CHANGE UP
PLATELET # BLD AUTO: 290 K/UL — SIGNIFICANT CHANGE UP (ref 150–400)
PMV BLD: 10 FL — SIGNIFICANT CHANGE UP (ref 7–13)
POTASSIUM SERPL-MCNC: 4.1 MMOL/L — SIGNIFICANT CHANGE UP (ref 3.5–5.3)
POTASSIUM SERPL-SCNC: 4.1 MMOL/L — SIGNIFICANT CHANGE UP (ref 3.5–5.3)
PROT SERPL-MCNC: 7.3 G/DL — SIGNIFICANT CHANGE UP (ref 6–8.3)
RBC # BLD: 3.67 M/UL — LOW (ref 4.2–5.8)
RBC # FLD: 24.3 % — HIGH (ref 10.3–14.5)
SODIUM SERPL-SCNC: 135 MMOL/L — SIGNIFICANT CHANGE UP (ref 135–145)
WBC # BLD: 10.7 K/UL — HIGH (ref 3.8–10.5)
WBC # FLD AUTO: 10.7 K/UL — HIGH (ref 3.8–10.5)

## 2018-07-29 PROCEDURE — 99233 SBSQ HOSP IP/OBS HIGH 50: CPT

## 2018-07-29 RX ADMIN — Medication 1 MILLIGRAM(S): at 12:33

## 2018-07-29 RX ADMIN — Medication 1 TABLET(S): at 12:33

## 2018-07-29 RX ADMIN — CHLORHEXIDINE GLUCONATE 1 APPLICATION(S): 213 SOLUTION TOPICAL at 12:35

## 2018-07-29 NOTE — PROGRESS NOTE ADULT - ASSESSMENT
49 yo M w/ hx SSD p/w CP and VOC crisis after flu like illness. Course c/b acute hypoxic respiratory failure s/p 1 U PRBC on 7/24 with appropriate response in H/H    1. Acute hypoxemic respiratory failure.     s/p MICU, tolerated well on NC, currently saturated well on Rm air.   - CXR w/o infiltrate but with increased pulmonary edema and small R pleural effusion.    s/p abx   and lasix given   2. Sickle cell anemia with crisis.    s/p 3 U PRBC on 7/24, has complicated alloantibodies in blood, difficult crossmatch  - Folic acid/MVI,   Pain controlled on IV morphine, taper to oral oxycodone as tolerated.   -Bowel regimen to avoid opioid induced constipation, incentive spirometer  -monitor retic count, LDH  -Heme consult appreciated, repeat Hg electrophoresis with Hbs >50% still.     3.  Hepatopathy.     elevated LFTS likely due to sickle cell hepatopathy, trending down.   Off IVF given increased fluid overload.      4. Severe protein-calorie malnutrition.     nutrition consult appreciated  Ensure TID.     5. Abnormal urine cytology.     Ucx with 10-49k Ecoli, asymptomatic  currently on Levaquin for CAP, which covers according to sensitivity.     Gi and DVT prophylaxis      DC home today

## 2018-07-30 PROCEDURE — 99239 HOSP IP/OBS DSCHRG MGMT >30: CPT

## 2018-07-30 PROCEDURE — 99222 1ST HOSP IP/OBS MODERATE 55: CPT | Mod: GC

## 2018-07-30 RX ORDER — ACETAMINOPHEN 500 MG
2 TABLET ORAL
Qty: 0 | Refills: 0 | COMMUNITY
Start: 2018-07-30

## 2018-07-30 RX ORDER — DOCUSATE SODIUM 100 MG
1 CAPSULE ORAL
Qty: 90 | Refills: 0 | OUTPATIENT
Start: 2018-07-30 | End: 2018-08-28

## 2018-07-30 RX ORDER — SENNA PLUS 8.6 MG/1
2 TABLET ORAL
Qty: 60 | Refills: 0
Start: 2018-07-30 | End: 2018-08-28

## 2018-07-30 RX ORDER — LISINOPRIL 2.5 MG/1
10 TABLET ORAL DAILY
Qty: 0 | Refills: 0 | Status: DISCONTINUED | OUTPATIENT
Start: 2018-07-30 | End: 2018-07-30

## 2018-07-30 RX ORDER — LISINOPRIL 2.5 MG/1
1 TABLET ORAL
Qty: 30 | Refills: 0 | OUTPATIENT
Start: 2018-07-30 | End: 2018-08-28

## 2018-07-30 RX ADMIN — Medication 1 TABLET(S): at 14:18

## 2018-07-30 RX ADMIN — Medication 1 MILLIGRAM(S): at 14:18

## 2018-07-30 RX ADMIN — LISINOPRIL 10 MILLIGRAM(S): 2.5 TABLET ORAL at 14:18

## 2018-07-30 NOTE — PROGRESS NOTE ADULT - ASSESSMENT
47 yo M w/ hx SSD p/w CP and VOC crisis after flu like illness. Course c/b acute hypoxic respiratory failure s/p 1 U PRBC on 7/24 with appropriate response in H/H    1. Acute hypoxemic respiratory failure.    - likely due to VOC, now resolved, s/p MICU, currently saturated well on Room air.   - CXR w/o infiltrate but with increased pulmonary edema and small R pleural effusion likely reactive to VOC and possible acute systolic CHF.    - s/p abx and diuresis, now clinically euvolemic and asymptomatic   2. Sickle cell anemia with crisis.    s/p 3 U PRBC on 7/24, has complicated alloantibodies in blood, difficult crossmatch  - Folic acid/MVI,   - Pain controlled now transitioned to oral oxy IR  - Bowel regimen to avoid opioid induced constipation, incentive spirometer  - monitor retic count, LDH  - Heme consult appreciated, repeat Hg electrophoresis with Hbs >50% still.     3.  Hepatopathy.   - elevated LFTS likely due to sickle cell hepatopathy, trending down.   - Off IVF given increased fluid overload and dx of CHF     4. Severe protein-calorie malnutrition.    - nutrition consult appreciated  - Ensure TID.     5. Asymptomatic bacteruria   -Ucx with 10-49k Ecoli, asymptomatic  - currently on Levaquin for CAP, which covers according to sensitivity.     6. acute on chronic systolic CHF  - noted on TTE with mild systolic dysfx, with acute hypoxic resp failure and effusion, now s/p diuresis and euvolemic. would hold off on Lasix as decompensated CHF was only in setting of IVF administration and with EF of 52%, likely no need for diuresis will start Ace-i for both better BP vontrol and cardiomyopathy out-pt cards follow up     7. Essential HTN  - start Ace-i     DC home today, time spent on DC planning 35 min 47 yo M w/ hx SSD p/w CP and VOC crisis after flu like illness. Course c/b acute hypoxic respiratory failure s/p 1 U PRBC on 7/24 with appropriate response in H/H    1. Acute hypoxemic respiratory failure.    - likely due to VOC, now resolved, s/p MICU, currently saturated well on Room air.   - CXR w/o infiltrate but with increased pulmonary edema and small R pleural effusion likely reactive to VOC and possible acute systolic CHF.    - s/p abx and diuresis, now clinically euvolemic and asymptomatic   2. Sickle cell anemia with crisis.    s/p 3 U PRBC on 7/24, clinically resolved crisis   - Folic acid/MVI,   - Pain controlled now transitioned to oral oxy IR  - Bowel regimen to avoid opioid induced constipation, incentive spirometer  - monitor retic count, LDH  - Heme consult appreciated, repeat Hg electrophoresis with Hbs >50% still.     3.  Hepatopathy.   - elevated LFTS likely due to sickle cell hepatopathy, trending down.   - Off IVF given increased fluid overload and dx of CHF     4. Severe protein-calorie malnutrition.    - nutrition consult appreciated  - Ensure TID.     5. Asymptomatic bacteruria   -Ucx with 10-49k Ecoli, asymptomatic  - currently on Levaquin for CAP, which covers according to sensitivity.     6. acute on chronic systolic CHF  - noted on TTE with mild systolic dysfx, with acute hypoxic resp failure and effusion, now s/p diuresis and euvolemic. would hold off on Lasix as decompensated CHF was only in setting of IVF administration and with EF of 52%, likely no need for diuresis will start Ace-i for both better BP vontrol and cardiomyopathy out-pt cards follow up     7. Essential HTN  - start Ace-i     pt doesnot want opiates at home, he usually takes OTC NSAIDs as needed. will follow up with Dr Noris BLANCO home today, time spent on FRANCIS planning 35 min

## 2018-07-30 NOTE — PROGRESS NOTE ADULT - SUBJECTIVE AND OBJECTIVE BOX
48y old  Male who presents with a chief complaint of sickle cell crisis       patient seen and examine at bed side, no acute issue         MEDICATIONS  (STANDING):  chlorhexidine 4% Liquid 1 Application(s) Topical <User Schedule>  docusate sodium 100 milliGRAM(s) Oral three times a day  folic acid 1 milliGRAM(s) Oral daily  levoFLOXacin IVPB      levoFLOXacin IVPB 500 milliGRAM(s) IV Intermittent every 24 hours  multivitamin 1 Tablet(s) Oral daily    MEDICATIONS  (PRN):  acetaminophen   Tablet 650 milliGRAM(s) Oral every 6 hours PRN For Temp greater than 38 C (100.4 F)  acetaminophen   Tablet. 650 milliGRAM(s) Oral every 6 hours PRN Mild Pain (1 - 3)  bisacodyl Suppository 10 milliGRAM(s) Rectal once PRN Constipation  morphine  - Injectable 1 milliGRAM(s) IV Push every 8 hours PRN breakthrough pain  naloxone Injectable 0.1 milliGRAM(s) IV Push every 3 minutes PRN For ANY of the following changes in patient status:  A. RR LESS THAN 10 breaths per minute, B. Oxygen saturation LESS THAN 90%, C. Sedation score of 6  ondansetron Injectable 4 milliGRAM(s) IV Push every 6 hours PRN Nausea  oxyCODONE    IR 5 milliGRAM(s) Oral every 6 hours PRN Moderate Pain (4 - 6)  oxyCODONE    IR 10 milliGRAM(s) Oral every 6 hours PRN Severe Pain (7 - 10)  senna 2 Tablet(s) Oral at bedtime PRN Constipation      Vital Signs Last 24 Hrs  T(C): 37.3 (28 Jul 2018 09:41), Max: 37.3 (28 Jul 2018 09:41)  T(F): 99.1 (28 Jul 2018 09:41), Max: 99.1 (28 Jul 2018 09:41)  HR: 70 (28 Jul 2018 10:00) (70 - 95)  BP: 146/75 (28 Jul 2018 10:00) (141/81 - 148/76)  BP(mean): --  RR: 17 (28 Jul 2018 10:00) (17 - 18)  SpO2: 98% (28 Jul 2018 10:00) (98% - 100%)                  PHYSICAL EXAM:  GENERAL: NAD, well-developed  HEAD:  Atraumatic, Normocephalic  EYES: EOMI, PERRLA, conjunctiva and sclera clear  NECK: Supple, No JVD  CHEST/LUNG: Clear to auscultation bilaterally; No wheeze  HEART: Regular rate and rhythm; No murmurs, rubs, or gallops  ABDOMEN: Soft, Nontender, Nondistended; Bowel sounds present  EXTREMITIES:  2+ Peripheral Pulses, No clubbing, cyanosis, or edema  PSYCH: AAOx3  NEUROLOGY: non-focal  SKIN: No rashes or lesions    LABS:                                          8.3    10.42 )-----------( 256      ( 27 Jul 2018 08:23 )             25.0       07-27    139  |  102  |  17  ----------------------------<  83  5.0   |  25  |  1.03    Ca    8.8      27 Jul 2018 08:23  Mg     2.0     07-27    TPro  6.7  /  Alb  3.2<L>  /  TBili  3.5<H>  /  DBili  0.5<H>  /  AST  68<H>  /  ALT  29  /  AlkPhos  181<H>  07                  RADIOLOGY & ADDITIONAL TESTS:    Imaging Personally Reviewed:    Consultant(s) Notes Reviewed:  heme, MICU    Care Discussed with Consultants/Other Providers:
48y old  Male who presents with a chief complaint of sickle cell crisis       patient seen and examine at bed side, no acute issue   he state he feel weak and still have body aches      MEDICATIONS  (STANDING):  chlorhexidine 4% Liquid 1 Application(s) Topical <User Schedule>  docusate sodium 100 milliGRAM(s) Oral three times a day  folic acid 1 milliGRAM(s) Oral daily  levoFLOXacin IVPB      levoFLOXacin IVPB 500 milliGRAM(s) IV Intermittent every 24 hours  multivitamin 1 Tablet(s) Oral daily    MEDICATIONS  (PRN):  acetaminophen   Tablet 650 milliGRAM(s) Oral every 6 hours PRN For Temp greater than 38 C (100.4 F)  acetaminophen   Tablet. 650 milliGRAM(s) Oral every 6 hours PRN Mild Pain (1 - 3)  bisacodyl Suppository 10 milliGRAM(s) Rectal once PRN Constipation  morphine  - Injectable 1 milliGRAM(s) IV Push every 8 hours PRN breakthrough pain  naloxone Injectable 0.1 milliGRAM(s) IV Push every 3 minutes PRN For ANY of the following changes in patient status:  A. RR LESS THAN 10 breaths per minute, B. Oxygen saturation LESS THAN 90%, C. Sedation score of 6  ondansetron Injectable 4 milliGRAM(s) IV Push every 6 hours PRN Nausea  oxyCODONE    IR 5 milliGRAM(s) Oral every 6 hours PRN Moderate Pain (4 - 6)  oxyCODONE    IR 10 milliGRAM(s) Oral every 6 hours PRN Severe Pain (7 - 10)  senna 2 Tablet(s) Oral at bedtime PRN Constipation    Vital Signs Last 24 Hrs  T(C): 36.8 (29 Jul 2018 05:56), Max: 36.9 (28 Jul 2018 22:22)  T(F): 98.2 (29 Jul 2018 05:56), Max: 98.4 (28 Jul 2018 22:22)  HR: 74 (29 Jul 2018 05:56) (68 - 107)  BP: 149/88 (29 Jul 2018 05:56) (130/94 - 168/92)  BP(mean): --  RR: 17 (29 Jul 2018 05:56) (17 - 21)  SpO2: 100% (29 Jul 2018 05:56) (98% - 100%)                  PHYSICAL EXAM:  GENERAL: NAD, well-developed  HEAD:  Atraumatic, Normocephalic  EYES: EOMI, PERRLA, conjunctiva and sclera clear  NECK: Supple, No JVD  CHEST/LUNG: Clear to auscultation bilaterally; No wheeze  HEART: Regular rate and rhythm; No murmurs, rubs, or gallops  ABDOMEN: Soft, Nontender, Nondistended; Bowel sounds present  EXTREMITIES:  2+ Peripheral Pulses, No clubbing, cyanosis, or edema  PSYCH: AAOx3  NEUROLOGY: non-focal  SKIN: No rashes or lesions    LABS:                                                     9.5    10.70 )-----------( 290      ( 29 Jul 2018 06:15 )             28.3       07-29    135  |  98  |  14  ----------------------------<  83  4.1   |  23  |  1.07    Ca    9.8      29 Jul 2018 06:15  Mg     2.0     07-29    TPro  7.3  /  Alb  3.2<L>  /  TBili  3.1<H>  /  DBili  0.5<H>  /  AST  34  /  ALT  22  /  AlkPhos  201<H>  07-29                RADIOLOGY & ADDITIONAL TESTS:    Imaging Personally Reviewed:    Consultant(s) Notes Reviewed:  heme, MICU    Care Discussed with Consultants/Other Providers:
Hematology Follow-up    INTERVAL HPI/OVERNIGHT EVENTS:  Patient transferred to MICU today due to persistently high oxygen requirements. He complains of persistent SOB. He received 1 unit of prbc O/N. He denies fevers, chills, chest pain. He has a non-productive cough. Sickle cell crisis pain is slightly improved.     VITAL SIGNS:  T(F): 98.2 (07-25-18 @ 16:00)  HR: 85 (07-25-18 @ 17:00)  BP: 138/69 (07-25-18 @ 17:00)  RR: 21 (07-25-18 @ 17:00)  SpO2: 93% (07-25-18 @ 17:00)  Wt(kg): --    PHYSICAL EXAM:    Constitutional: AAOx3, NAD,   Eyes: PERRL, EOMI, sclera non-icteric  Neck: supple, no masses, no JVD  Respiratory: CTA b/l, good air entry b/l, no wheezing, rhonchi, rales, with normal respiratory effort and no intercostal retractions  Cardiovascular: RRR, normal S1S2, no M/R/G  Gastrointestinal: soft, NTND, no masses palpable, BS normal in all four quadrants, no HSM  Extremities:  no c/c/e  Neurological: Grossly intact  Skin: Normal temperature    MEDICATIONS  (STANDING):  docusate sodium 100 milliGRAM(s) Oral three times a day  folic acid 1 milliGRAM(s) Oral daily  levoFLOXacin IVPB      morphine PCA (5 mG/mL) 30 milliLiter(s) PCA Continuous PCA Continuous  multivitamin 1 Tablet(s) Oral daily    MEDICATIONS  (PRN):  acetaminophen   Tablet 650 milliGRAM(s) Oral every 6 hours PRN For Temp greater than 38 C (100.4 F)  acetaminophen   Tablet. 650 milliGRAM(s) Oral every 6 hours PRN Mild Pain (1 - 3)  bisacodyl Suppository 10 milliGRAM(s) Rectal once PRN Constipation  morphine  - Injectable 4 milliGRAM(s) IV Push every 4 hours PRN Severe Pain (7 - 10)  naloxone Injectable 0.1 milliGRAM(s) IV Push every 3 minutes PRN For ANY of the following changes in patient status:  A. RR LESS THAN 10 breaths per minute, B. Oxygen saturation LESS THAN 90%, C. Sedation score of 6  ondansetron Injectable 4 milliGRAM(s) IV Push every 6 hours PRN Nausea  senna 2 Tablet(s) Oral at bedtime PRN Constipation      No Known Allergies      LABS:                        6.7    11.71 )-----------( 270      ( 25 Jul 2018 17:27 )             20.6     07-24    140  |  105  |  13  ----------------------------<  93  4.0   |  21<L>  |  1.20    Ca    8.2<L>      24 Jul 2018 05:30  Phos  2.8     07-24  Mg     1.9     07-24    TPro  6.3  /  Alb  3.2<L>  /  TBili  3.0<H>  /  DBili  x   /  AST  41<H>  /  ALT  24  /  AlkPhos  176<H>  07-24     Haptoglobin, Serum: < 20 mg/dL (07-25 @ 14:37)        RADIOLOGY & ADDITIONAL TESTS:  Studies reviewed.
Hematology Follow-up    INTERVAL HPI/OVERNIGHT EVENTS:  We were called by medicine team this afternoon after patient became acutely hypoxic with oxygen saturation in the 70s on room air. He was placed on non re-breather and CXR and ABG ordered. Patient complaining of worsening SOB and cough. He denies chest pain. Crisis pain is about the same. He denies fevers or chills.     VITAL SIGNS:  T(F): 98.3 (07-24-18 @ 15:49)  HR: 95 (07-24-18 @ 16:57)  BP: 139/82 (07-24-18 @ 15:49)  RR: 19 (07-24-18 @ 16:15)  SpO2: 100% (07-24-18 @ 16:57)  Wt(kg): --    PHYSICAL EXAM:    Constitutional: AAOx3, NAD,   Eyes: PERRL, EOMI, sclera non-icteric  Neck: supple, no masses, no JVD  Respiratory: CTA b/l, good air entry b/l, no wheezing, rhonchi, rales, with normal respiratory effort and no intercostal retractions  Cardiovascular: RRR, normal S1S2, no M/R/G  Gastrointestinal: soft, NTND, no masses palpable, BS normal in all four quadrants, no HSM  Extremities:  no c/c/e  Neurological: Grossly intact  Skin: Normal temperature    MEDICATIONS  (STANDING):  docusate sodium 100 milliGRAM(s) Oral three times a day  folic acid 1 milliGRAM(s) Oral daily  furosemide   Injectable 40 milliGRAM(s) IV Push daily  furosemide   Injectable 40 milliGRAM(s) IV Push daily  ketorolac   Injectable 30 milliGRAM(s) IV Push every 6 hours  morphine PCA (5 mG/mL) 30 milliLiter(s) PCA Continuous PCA Continuous  multivitamin 1 Tablet(s) Oral daily    MEDICATIONS  (PRN):  acetaminophen   Tablet 650 milliGRAM(s) Oral every 6 hours PRN For Temp greater than 38 C (100.4 F)  acetaminophen   Tablet. 650 milliGRAM(s) Oral every 6 hours PRN Mild Pain (1 - 3)  bisacodyl Suppository 10 milliGRAM(s) Rectal once PRN Constipation  naloxone Injectable 0.1 milliGRAM(s) IV Push every 3 minutes PRN For ANY of the following changes in patient status:  A. RR LESS THAN 10 breaths per minute, B. Oxygen saturation LESS THAN 90%, C. Sedation score of 6  ondansetron Injectable 4 milliGRAM(s) IV Push every 6 hours PRN Nausea  senna 2 Tablet(s) Oral at bedtime PRN Constipation      No Known Allergies      LABS:                        5.0    13.74 )-----------( 222      ( 24 Jul 2018 05:30 )             15.1     07-24    140  |  105  |  13  ----------------------------<  93  4.0   |  21<L>  |  1.20    Ca    8.2<L>      24 Jul 2018 05:30  Phos  2.8     07-24  Mg     1.9     07-24    TPro  6.3  /  Alb  3.2<L>  /  TBili  3.0<H>  /  DBili  x   /  AST  41<H>  /  ALT  24  /  AlkPhos  176<H>  07-24     Lactate Dehydrogenase, Serum: 335 U/L (07-24 @ 05:30)        RADIOLOGY & ADDITIONAL TESTS:  Studies reviewed.
INTERVAL HPI/OVERNIGHT EVENTS:  Pt transitioned to NC. He feels much improved and having no issues with breathing. Still having pain but improved. No fevers/chills.     MEDICATIONS  (STANDING):  chlorhexidine 4% Liquid 1 Application(s) Topical <User Schedule>  docusate sodium 100 milliGRAM(s) Oral three times a day  folic acid 1 milliGRAM(s) Oral daily  levoFLOXacin IVPB      levoFLOXacin IVPB 500 milliGRAM(s) IV Intermittent every 24 hours  multivitamin 1 Tablet(s) Oral daily    MEDICATIONS  (PRN):  acetaminophen   Tablet 650 milliGRAM(s) Oral every 6 hours PRN For Temp greater than 38 C (100.4 F)  acetaminophen   Tablet. 650 milliGRAM(s) Oral every 6 hours PRN Mild Pain (1 - 3)  bisacodyl Suppository 10 milliGRAM(s) Rectal once PRN Constipation  morphine  - Injectable 2 milliGRAM(s) IV Push every 4 hours PRN Severe Pain (7 - 10)  naloxone Injectable 0.1 milliGRAM(s) IV Push every 3 minutes PRN For ANY of the following changes in patient status:  A. RR LESS THAN 10 breaths per minute, B. Oxygen saturation LESS THAN 90%, C. Sedation score of 6  ondansetron Injectable 4 milliGRAM(s) IV Push every 6 hours PRN Nausea  senna 2 Tablet(s) Oral at bedtime PRN Constipation    Allergies    No Known Allergies    Intolerances          VITAL SIGNS:  T(F): 98 (07-26-18 @ 08:00)  HR: 81 (07-26-18 @ 11:46)  BP: 146/94 (07-26-18 @ 11:46)  RR: 19 (07-26-18 @ 11:46)  SpO2: 94% (07-26-18 @ 11:46)  Wt(kg): --    PHYSICAL EXAM:    Constitutional: NAD, lying comfortably in bed  Eyes: EOMI, PERRLA, scleral icterus   Neck: supple, no masses, no JVD  Respiratory: right lower base crackles, otherwise clear   Cardiovascular: RRR, no M/R/G  Gastrointestinal: +BS, soft, NTND, no hepatosplenomegaly  Extremities: no c/c/e  Neurological: nonfocal    LABS:                        8.7    12.69 )-----------( 248      ( 26 Jul 2018 00:19 )             26.0     07-26    139  |  99  |  18  ----------------------------<  103<H>  3.7   |  26  |  1.21    Ca    8.6      26 Jul 2018 00:19  Phos  3.7     07-26  Mg     1.6     07-26    TPro  6.4  /  Alb  3.3  /  TBili  4.3<H>  /  DBili  x   /  AST  47<H>  /  ALT  30  /  AlkPhos  199<H>  07-26          RADIOLOGY & ADDITIONAL TESTS:  Studies reviewed.
Patient is a 48y old  Male who presents with a chief complaint of sickle cell crisis (2018 09:12)      SUBJECTIVE / OVERNIGHT EVENTS: No acute events overnight. Reports pain all over mostly in all joints. Pain currently 6/10. Denies chest pain, SOB, N/V/D. Requesting ensure. Per patient, last crisis was 1 yr ago. So far only requiring 2-3mg on PCA pump    MEDICATIONS  (STANDING):  docusate sodium 100 milliGRAM(s) Oral three times a day  folic acid 1 milliGRAM(s) Oral daily  ketorolac   Injectable 30 milliGRAM(s) IV Push every 6 hours  morphine PCA (5 mG/mL) 30 milliLiter(s) PCA Continuous PCA Continuous  multivitamin 1 Tablet(s) Oral daily  sodium chloride 0.45%. 1000 milliLiter(s) (100 mL/Hr) IV Continuous <Continuous>    MEDICATIONS  (PRN):  acetaminophen   Tablet 650 milliGRAM(s) Oral every 6 hours PRN For Temp greater than 38 C (100.4 F)  acetaminophen   Tablet. 650 milliGRAM(s) Oral every 6 hours PRN Mild Pain (1 - 3)  bisacodyl Suppository 10 milliGRAM(s) Rectal once PRN Constipation  naloxone Injectable 0.1 milliGRAM(s) IV Push every 3 minutes PRN For ANY of the following changes in patient status:  A. RR LESS THAN 10 breaths per minute, B. Oxygen saturation LESS THAN 90%, C. Sedation score of 6  ondansetron Injectable 4 milliGRAM(s) IV Push every 6 hours PRN Nausea  senna 2 Tablet(s) Oral at bedtime PRN Constipation      T(C): 37.1 (18 @ 05:17), Max: 37.1 (18 @ 05:17)  HR: 83 (18 @ 05:17) (72 - 83)  BP: 129/66 (18 @ 05:17) (129/66 - 149/98)  RR: 18 (18 @ 05:17) (16 - 18)  SpO2: 100% (18 @ 05:17) (100% - 100%)  CAPILLARY BLOOD GLUCOSE        I&O's Summary      PHYSICAL EXAM:  GENERAL: frail and thin man, no apparent distress, on room air  HEAD:  Atraumatic, Normocephalic  EYES: EOMI, sclera clear b/l  CHEST/LUNG: Clear to auscultation bilaterally; No wheezing or crackles  HEART: s1/s2, no murmurs appreciated  ABDOMEN: Soft, Nontender, Nondistended; Bowel sounds present  EXTREMITIES:  No clubbing, cyanosis, or edema  MSK: No joint effusions  NEUROLOGY: awake, alert, responds to Qs appropriately, no gross deficits  SKIN: No rashes or lesions    LABS:                        5.6    13.86 )-----------( 244      ( 2018 07:42 )             16.9     -    140  |  109<H>  |  10  ----------------------------<  68<L>  4.6   |  19<L>  |  0.99    Ca    8.2<L>      2018 05:25    TPro  6.5  /  Alb  3.1<L>  /  TBili  1.5<H>  /  DBili  x   /  AST  34  /  ALT  19  /  AlkPhos  118            Urinalysis Basic - ( 2018 06:12 )    Color: COLORLESS / Appearance: CLEAR / S.007 / pH: 7.0  Gluc: NEGATIVE / Ketone: NEGATIVE  / Bili: NEGATIVE / Urobili: NORMAL mg/dL   Blood: NEGATIVE / Protein: 30 mg/dL / Nitrite: NEGATIVE   Leuk Esterase: NEGATIVE / RBC: 0-2 / WBC 0-2   Sq Epi: x / Non Sq Epi: x / Bacteria: x        RADIOLOGY & ADDITIONAL TESTS:
Patient is a 48y old  Male who presents with a chief complaint of sickle cell crisis (21 Jul 2018 09:12)      SUBJECTIVE / OVERNIGHT EVENTS: No acute events overnight. 4 hr totals of 1, 8 and 2mg overnight. Reports pain is severe this AM, 10/10 still in all joints. States he hasn't felt improvement when pushing the morphine PCA. Also states he doesn't want to become dependent on morphine. Hasn't followed up with a hematologist in long time and unsure of name of doctor.    MEDICATIONS  (STANDING):  docusate sodium 100 milliGRAM(s) Oral three times a day  folic acid 1 milliGRAM(s) Oral daily  ketorolac   Injectable 30 milliGRAM(s) IV Push every 6 hours  morphine PCA (5 mG/mL) 30 milliLiter(s) PCA Continuous PCA Continuous  multivitamin 1 Tablet(s) Oral daily  sodium chloride 0.45%. 1000 milliLiter(s) (150 mL/Hr) IV Continuous <Continuous>    MEDICATIONS  (PRN):  acetaminophen   Tablet 650 milliGRAM(s) Oral every 6 hours PRN For Temp greater than 38 C (100.4 F)  acetaminophen   Tablet. 650 milliGRAM(s) Oral every 6 hours PRN Mild Pain (1 - 3)  bisacodyl Suppository 10 milliGRAM(s) Rectal once PRN Constipation  naloxone Injectable 0.1 milliGRAM(s) IV Push every 3 minutes PRN For ANY of the following changes in patient status:  A. RR LESS THAN 10 breaths per minute, B. Oxygen saturation LESS THAN 90%, C. Sedation score of 6  ondansetron Injectable 4 milliGRAM(s) IV Push every 6 hours PRN Nausea  senna 2 Tablet(s) Oral at bedtime PRN Constipation      T(C): 36.9 (07-23-18 @ 05:37), Max: 37.2 (07-22-18 @ 20:51)  HR: 82 (07-23-18 @ 05:37) (82 - 90)  BP: 142/83 (07-23-18 @ 05:37) (134/72 - 144/73)  RR: 18 (07-23-18 @ 05:37) (18 - 18)  SpO2: 98% (07-23-18 @ 05:37) (97% - 98%)  CAPILLARY BLOOD GLUCOSE        I&O's Summary    PHYSICAL EXAM:  GENERAL: frail and thin man, no apparent distress, on room air  EYES: EOMI, sclera clear b/l  CHEST/LUNG: Clear to auscultation bilaterally; No wheezing or crackles  HEART: s1/s2, no murmurs appreciated  ABDOMEN: Soft, Nontender, Nondistended; Bowel sounds present  EXTREMITIES:  No clubbing, cyanosis, or edema  MSK: No joint effusions  NEUROLOGY: awake, alert, responds to Qs appropriately, no gross deficits  SKIN: No rashes or lesions  LABS:                        5.2    14.30 )-----------( 233      ( 23 Jul 2018 05:30 )             15.8     07-23    141  |  107  |  10  ----------------------------<  83  3.9   |  21<L>  |  1.09    Ca    7.8<L>      23 Jul 2018 05:30  Phos  2.5     07-23  Mg     1.9     07-23    TPro  6.3  /  Alb  3.0<L>  /  TBili  1.9<H>  /  DBili  x   /  AST  31  /  ALT  19  /  AlkPhos  148<H>  07-23              RADIOLOGY & ADDITIONAL TESTS:
Patient is a 48y old  Male who presents with a chief complaint of sickle cell crisis (25 Jul 2018 10:40)      SUBJECTIVE / OVERNIGHT EVENTS: feels better, more energetic today, pain controlled.    ROS:  No HA/DZ  No Vision changes   No CP, SOB  No N/V/D  No Edema  No Rash  NO weakness, numbness    MEDICATIONS  (STANDING):  chlorhexidine 4% Liquid 1 Application(s) Topical <User Schedule>  docusate sodium 100 milliGRAM(s) Oral three times a day  folic acid 1 milliGRAM(s) Oral daily  levoFLOXacin IVPB      levoFLOXacin IVPB 500 milliGRAM(s) IV Intermittent every 24 hours  multivitamin 1 Tablet(s) Oral daily    MEDICATIONS  (PRN):  acetaminophen   Tablet 650 milliGRAM(s) Oral every 6 hours PRN For Temp greater than 38 C (100.4 F)  acetaminophen   Tablet. 650 milliGRAM(s) Oral every 6 hours PRN Mild Pain (1 - 3)  bisacodyl Suppository 10 milliGRAM(s) Rectal once PRN Constipation  morphine  - Injectable 1 milliGRAM(s) IV Push every 8 hours PRN breakthrough pain  naloxone Injectable 0.1 milliGRAM(s) IV Push every 3 minutes PRN For ANY of the following changes in patient status:  A. RR LESS THAN 10 breaths per minute, B. Oxygen saturation LESS THAN 90%, C. Sedation score of 6  ondansetron Injectable 4 milliGRAM(s) IV Push every 6 hours PRN Nausea  oxyCODONE    IR 5 milliGRAM(s) Oral every 6 hours PRN Moderate Pain (4 - 6)  oxyCODONE    IR 10 milliGRAM(s) Oral every 6 hours PRN Severe Pain (7 - 10)  senna 2 Tablet(s) Oral at bedtime PRN Constipation      T(C): 36.7 (07-30-18 @ 06:28)  HR: 78 (07-30-18 @ 06:28)  BP: 143/89 (07-30-18 @ 06:28)  RR: 18 (07-30-18 @ 06:28)  SpO2: 100% (07-30-18 @ 06:28)  CAPILLARY BLOOD GLUCOSE        I&O's Summary    29 Jul 2018 07:01  -  30 Jul 2018 07:00  --------------------------------------------------------  IN: 0 mL / OUT: 2200 mL / NET: -2200 mL        PHYSICAL EXAM:  GENERAL: NAD, well-developed, AOx3  HEAD:  Atraumatic, Normocephalic  EYES: EOMI, PERRL, conjunctiva and sclera clear  NECK: Supple, No JVD  CHEST/LUNG: Clear to auscultation bilaterally  HEART: Regular rate and rhythm; No murmurs, rubs, or gallops, No Edema  ABDOMEN: Soft, Nontender, Nondistended; Bowel sounds present  EXTREMITIES:  2+ Peripheral Pulses, No clubbing, cyanosis  PSYCH: No SI/HI  NEUROLOGY: non-focal  SKIN: No rashes or lesions    LABS:                        9.5    10.70 )-----------( 290      ( 29 Jul 2018 06:15 )             28.3     07-29    135  |  98  |  14  ----------------------------<  83  4.1   |  23  |  1.07    Ca    9.8      29 Jul 2018 06:15  Mg     2.0     07-29    TPro  7.3  /  Alb  3.2<L>  /  TBili  3.1<H>  /  DBili  0.5<H>  /  AST  34  /  ALT  22  /  AlkPhos  201<H>  07-29                  RADIOLOGY & ADDITIONAL TESTS:    Imaging Personally Reviewed:    Consultant(s) Notes Reviewed:      Care Discussed with Consultants/Other Providers:
Patient is a 48y old  Male who presents with a chief complaint of sickle cell crisis (25 Jul 2018 10:40)      SUBJECTIVE / OVERNIGHT EVENTS: saturated well on RM air, pain controlled.     MEDICATIONS  (STANDING):  chlorhexidine 4% Liquid 1 Application(s) Topical <User Schedule>  docusate sodium 100 milliGRAM(s) Oral three times a day  folic acid 1 milliGRAM(s) Oral daily  levoFLOXacin IVPB      levoFLOXacin IVPB 500 milliGRAM(s) IV Intermittent every 24 hours  multivitamin 1 Tablet(s) Oral daily    MEDICATIONS  (PRN):  acetaminophen   Tablet 650 milliGRAM(s) Oral every 6 hours PRN For Temp greater than 38 C (100.4 F)  acetaminophen   Tablet. 650 milliGRAM(s) Oral every 6 hours PRN Mild Pain (1 - 3)  bisacodyl Suppository 10 milliGRAM(s) Rectal once PRN Constipation  morphine  - Injectable 1 milliGRAM(s) IV Push every 8 hours PRN breakthrough pain  naloxone Injectable 0.1 milliGRAM(s) IV Push every 3 minutes PRN For ANY of the following changes in patient status:  A. RR LESS THAN 10 breaths per minute, B. Oxygen saturation LESS THAN 90%, C. Sedation score of 6  ondansetron Injectable 4 milliGRAM(s) IV Push every 6 hours PRN Nausea  oxyCODONE    IR 5 milliGRAM(s) Oral every 6 hours PRN Moderate Pain (4 - 6)  oxyCODONE    IR 10 milliGRAM(s) Oral every 6 hours PRN Severe Pain (7 - 10)  senna 2 Tablet(s) Oral at bedtime PRN Constipation      Vital Signs Last 24 Hrs  T(C): 37 (27 Jul 2018 13:48), Max: 37.3 (27 Jul 2018 05:37)  T(F): 98.6 (27 Jul 2018 13:48), Max: 99.2 (27 Jul 2018 05:37)  HR: 95 (27 Jul 2018 13:48) (78 - 95)  BP: 141/81 (27 Jul 2018 13:48) (133/80 - 148/90)  BP(mean): --  RR: 17 (27 Jul 2018 13:48) (17 - 18)  SpO2: 100% (27 Jul 2018 13:48) (95% - 100%)  CAPILLARY BLOOD GLUCOSE        I&O's Summary    26 Jul 2018 07:01  -  27 Jul 2018 07:00  --------------------------------------------------------  IN: 540 mL / OUT: 450 mL / NET: 90 mL        PHYSICAL EXAM:  GENERAL: NAD, well-developed  HEAD:  Atraumatic, Normocephalic  EYES: EOMI, PERRLA, conjunctiva and sclera clear  NECK: Supple, No JVD  CHEST/LUNG: Clear to auscultation bilaterally; No wheeze  HEART: Regular rate and rhythm; No murmurs, rubs, or gallops  ABDOMEN: Soft, Nontender, Nondistended; Bowel sounds present  EXTREMITIES:  2+ Peripheral Pulses, No clubbing, cyanosis, or edema  PSYCH: AAOx3  NEUROLOGY: non-focal  SKIN: No rashes or lesions    LABS:                        8.3    10.42 )-----------( 256      ( 27 Jul 2018 08:23 )             25.0     07-27    139  |  102  |  17  ----------------------------<  83  5.0   |  25  |  1.03    Ca    8.8      27 Jul 2018 08:23  Phos  3.7     07-26  Mg     2.0     07-27    TPro  6.7  /  Alb  3.2<L>  /  TBili  3.5<H>  /  DBili  0.5<H>  /  AST  68<H>  /  ALT  29  /  AlkPhos  181<H>  07-27                  RADIOLOGY & ADDITIONAL TESTS:    Imaging Personally Reviewed:    Consultant(s) Notes Reviewed:  heme, MICU    Care Discussed with Consultants/Other Providers:
Patient is a 48y old  Male who presents with a chief complaint of sickle cell crisis (21 Jul 2018 09:12)      SUBJECTIVE / OVERNIGHT EVENTS: No acute events overnight. Still reports SOB, states pain is generalized in abdomen and joints rated 6/10 today. No N/V/D. Lost IV access early this AM. Patient has poor venous access and multiple attempts were tried on 7/24 for venous access.    MEDICATIONS  (STANDING):  docusate sodium 100 milliGRAM(s) Oral three times a day  folic acid 1 milliGRAM(s) Oral daily  ketorolac   Injectable 30 milliGRAM(s) IV Push every 6 hours  morphine PCA (5 mG/mL) 30 milliLiter(s) PCA Continuous PCA Continuous  multivitamin 1 Tablet(s) Oral daily    MEDICATIONS  (PRN):  acetaminophen   Tablet 650 milliGRAM(s) Oral every 6 hours PRN For Temp greater than 38 C (100.4 F)  acetaminophen   Tablet. 650 milliGRAM(s) Oral every 6 hours PRN Mild Pain (1 - 3)  bisacodyl Suppository 10 milliGRAM(s) Rectal once PRN Constipation  naloxone Injectable 0.1 milliGRAM(s) IV Push every 3 minutes PRN For ANY of the following changes in patient status:  A. RR LESS THAN 10 breaths per minute, B. Oxygen saturation LESS THAN 90%, C. Sedation score of 6  ondansetron Injectable 4 milliGRAM(s) IV Push every 6 hours PRN Nausea  oxyCODONE    IR 5 milliGRAM(s) Oral every 4 hours PRN moderate and severe pain  senna 2 Tablet(s) Oral at bedtime PRN Constipation      T(C): 36.7 (07-25-18 @ 05:46), Max: 37.2 (07-24-18 @ 21:30)  HR: 96 (07-25-18 @ 05:46) (95 - 110)  BP: 124/79 (07-25-18 @ 05:46) (124/79 - 153/86)  RR: 18 (07-25-18 @ 06:00) (18 - 19)  SpO2: 100% (07-25-18 @ 06:00) (77% - 100%)  CAPILLARY BLOOD GLUCOSE        I&O's Summary    PHYSICAL EXAM:  GENERAL: frail and thin man, found on venti mask saturating 100% but on room air 87-89%  EYES: sclera clear b/l  CHEST/LUNG: bibasilar crackles and coarse breath sounds b/l, abdominal breathing   HEART: s1/s2, no murmurs appreciated  ABDOMEN: Soft, TTP, Nondistended; Bowel sounds present  EXTREMITIES:  No clubbing, cyanosis, or edema  NEUROLOGY: awake, alert, responds to Qs appropriately, no gross deficits  SKIN: No rashes or lesions    LABS:                        6.2    13.32 )-----------( 263      ( 25 Jul 2018 01:35 )             18.6     07-24    140  |  105  |  13  ----------------------------<  93  4.0   |  21<L>  |  1.20    Ca    8.2<L>      24 Jul 2018 05:30  Phos  2.8     07-24  Mg     1.9     07-24    TPro  6.3  /  Alb  3.2<L>  /  TBili  3.0<H>  /  DBili  x   /  AST  41<H>  /  ALT  24  /  AlkPhos  176<H>  07-24              RADIOLOGY & ADDITIONAL TESTS:
Patient is a 48y old  Male who presents with a chief complaint of sickle cell crisis (21 Jul 2018 09:12)      SUBJECTIVE / OVERNIGHT EVENTS: Seen and examined with BELÉN Isaac. No acute events overnight. Only attempting to use PCA 2-4 times every 4 hrs. IV nonfunctional and needs to be replaced per RN. States pain mostly in abdomen today and back with some SOB. States pain in joints is improved today. Denies chest pain, N/V/D. Having normal BMs.    MEDICATIONS  (STANDING):  docusate sodium 100 milliGRAM(s) Oral three times a day  folic acid 1 milliGRAM(s) Oral daily  ketorolac   Injectable 30 milliGRAM(s) IV Push every 6 hours  morphine PCA (5 mG/mL) 30 milliLiter(s) PCA Continuous PCA Continuous  multivitamin 1 Tablet(s) Oral daily  sodium chloride 0.45%. 1000 milliLiter(s) (150 mL/Hr) IV Continuous <Continuous>    MEDICATIONS  (PRN):  acetaminophen   Tablet 650 milliGRAM(s) Oral every 6 hours PRN For Temp greater than 38 C (100.4 F)  acetaminophen   Tablet. 650 milliGRAM(s) Oral every 6 hours PRN Mild Pain (1 - 3)  bisacodyl Suppository 10 milliGRAM(s) Rectal once PRN Constipation  naloxone Injectable 0.1 milliGRAM(s) IV Push every 3 minutes PRN For ANY of the following changes in patient status:  A. RR LESS THAN 10 breaths per minute, B. Oxygen saturation LESS THAN 90%, C. Sedation score of 6  ondansetron Injectable 4 milliGRAM(s) IV Push every 6 hours PRN Nausea  senna 2 Tablet(s) Oral at bedtime PRN Constipation      T(C): 37.2 (07-24-18 @ 04:53), Max: 37.2 (07-24-18 @ 04:53)  HR: 92 (07-24-18 @ 05:10) (92 - 102)  BP: 145/79 (07-24-18 @ 04:53) (145/79 - 148/80)  RR: 18 (07-24-18 @ 04:53) (18 - 18)  SpO2: 98% (07-24-18 @ 04:53) (98% - 98%)  CAPILLARY BLOOD GLUCOSE        I&O's Summary    PHYSICAL EXAM:  GENERAL: frail and thin man, no apparent distress, on room air  EYES: sclera clear b/l  CHEST/LUNG: bibasilar crackles  HEART: s1/s2, no murmurs appreciated  ABDOMEN: Soft, diffusely TTP, Nondistended; Bowel sounds present  EXTREMITIES:  No clubbing, cyanosis, or edema  NEUROLOGY: awake, alert, responds to Qs appropriately, no gross deficits  SKIN: No rashes or lesions, IV on right antecubital region almost out of skin    LABS:                        5.0    13.74 )-----------( 222      ( 24 Jul 2018 05:30 )             15.1     07-24    140  |  105  |  13  ----------------------------<  93  4.0   |  21<L>  |  1.20    Ca    8.2<L>      24 Jul 2018 05:30  Phos  2.8     07-24  Mg     1.9     07-24    TPro  6.3  /  Alb  3.2<L>  /  TBili  3.0<H>  /  DBili  x   /  AST  41<H>  /  ALT  24  /  AlkPhos  176<H>  07-24              RADIOLOGY & ADDITIONAL TESTS:

## 2018-07-30 NOTE — PROGRESS NOTE ADULT - PROVIDER SPECIALTY LIST ADULT
Heme/Onc
Hospitalist

## 2018-07-31 VITALS
TEMPERATURE: 98 F | DIASTOLIC BLOOD PRESSURE: 90 MMHG | HEART RATE: 85 BPM | OXYGEN SATURATION: 100 % | RESPIRATION RATE: 18 BRPM | SYSTOLIC BLOOD PRESSURE: 151 MMHG

## 2018-07-31 DIAGNOSIS — K59.00 CONSTIPATION, UNSPECIFIED: ICD-10-CM

## 2018-08-01 ENCOUNTER — OUTPATIENT (OUTPATIENT)
Dept: OUTPATIENT SERVICES | Facility: HOSPITAL | Age: 63
LOS: 1 days | End: 2018-08-01
Payer: MEDICARE

## 2018-08-29 DIAGNOSIS — Z71.89 OTHER SPECIFIED COUNSELING: ICD-10-CM

## 2018-10-04 ENCOUNTER — INPATIENT (INPATIENT)
Facility: HOSPITAL | Age: 63
LOS: 7 days | Discharge: HOME | End: 2018-10-12
Attending: INTERNAL MEDICINE | Admitting: INTERNAL MEDICINE
Payer: MEDICARE

## 2018-10-04 VITALS
TEMPERATURE: 98 F | SYSTOLIC BLOOD PRESSURE: 137 MMHG | OXYGEN SATURATION: 100 % | DIASTOLIC BLOOD PRESSURE: 66 MMHG | HEART RATE: 78 BPM | RESPIRATION RATE: 18 BRPM

## 2018-10-04 DIAGNOSIS — D57.00 HB-SS DISEASE WITH CRISIS, UNSPECIFIED: ICD-10-CM

## 2018-10-04 DIAGNOSIS — Z29.9 ENCOUNTER FOR PROPHYLACTIC MEASURES, UNSPECIFIED: ICD-10-CM

## 2018-10-04 DIAGNOSIS — N17.9 ACUTE KIDNEY FAILURE, UNSPECIFIED: ICD-10-CM

## 2018-10-04 DIAGNOSIS — R07.9 CHEST PAIN, UNSPECIFIED: ICD-10-CM

## 2018-10-04 LAB
ALBUMIN SERPL ELPH-MCNC: 3.8 G/DL — SIGNIFICANT CHANGE UP (ref 3.3–5)
ALP SERPL-CCNC: 98 U/L — SIGNIFICANT CHANGE UP (ref 40–120)
ALT FLD-CCNC: 15 U/L — SIGNIFICANT CHANGE UP (ref 4–41)
ANISOCYTOSIS BLD QL: SLIGHT — SIGNIFICANT CHANGE UP
APTT BLD: 29.4 SEC — SIGNIFICANT CHANGE UP (ref 27.5–37.4)
AST SERPL-CCNC: 30 U/L — SIGNIFICANT CHANGE UP (ref 4–40)
BASOPHILS # BLD AUTO: 0.05 K/UL — SIGNIFICANT CHANGE UP (ref 0–0.2)
BASOPHILS NFR BLD AUTO: 0.5 % — SIGNIFICANT CHANGE UP (ref 0–2)
BASOPHILS NFR SPEC: 0 % — SIGNIFICANT CHANGE UP (ref 0–2)
BILIRUB SERPL-MCNC: 2.7 MG/DL — HIGH (ref 0.2–1.2)
BLASTS # FLD: 0 % — SIGNIFICANT CHANGE UP (ref 0–0)
BUN SERPL-MCNC: 13 MG/DL — SIGNIFICANT CHANGE UP (ref 7–23)
CALCIUM SERPL-MCNC: 8.3 MG/DL — LOW (ref 8.4–10.5)
CHLORIDE SERPL-SCNC: 107 MMOL/L — SIGNIFICANT CHANGE UP (ref 98–107)
CO2 SERPL-SCNC: 21 MMOL/L — LOW (ref 22–31)
CREAT SERPL-MCNC: 1.39 MG/DL — HIGH (ref 0.5–1.3)
EOSINOPHIL # BLD AUTO: 1.25 K/UL — HIGH (ref 0–0.5)
EOSINOPHIL NFR BLD AUTO: 13.6 % — HIGH (ref 0–6)
EOSINOPHIL NFR FLD: 9.4 % — HIGH (ref 0–6)
GIANT PLATELETS BLD QL SMEAR: PRESENT — SIGNIFICANT CHANGE UP
GLUCOSE SERPL-MCNC: 70 MG/DL — SIGNIFICANT CHANGE UP (ref 70–99)
HCT VFR BLD CALC: 18.6 % — CRITICAL LOW (ref 39–50)
HGB BLD-MCNC: 6.4 G/DL — CRITICAL LOW (ref 13–17)
HYPOCHROMIA BLD QL: SIGNIFICANT CHANGE UP
IMM GRANULOCYTES # BLD AUTO: 0.07 # — SIGNIFICANT CHANGE UP
IMM GRANULOCYTES NFR BLD AUTO: 0.8 % — SIGNIFICANT CHANGE UP (ref 0–1.5)
INR BLD: 1.09 — SIGNIFICANT CHANGE UP (ref 0.88–1.17)
LYMPHOCYTES # BLD AUTO: 1.94 K/UL — SIGNIFICANT CHANGE UP (ref 1–3.3)
LYMPHOCYTES # BLD AUTO: 21.1 % — SIGNIFICANT CHANGE UP (ref 13–44)
LYMPHOCYTES NFR SPEC AUTO: 18.9 % — SIGNIFICANT CHANGE UP (ref 13–44)
MACROCYTES BLD QL: SLIGHT — SIGNIFICANT CHANGE UP
MCHC RBC-ENTMCNC: 29.8 PG — SIGNIFICANT CHANGE UP (ref 27–34)
MCHC RBC-ENTMCNC: 34.4 % — SIGNIFICANT CHANGE UP (ref 32–36)
MCV RBC AUTO: 86.5 FL — SIGNIFICANT CHANGE UP (ref 80–100)
METAMYELOCYTES # FLD: 0.9 % — SIGNIFICANT CHANGE UP (ref 0–1)
MONOCYTES # BLD AUTO: 1.02 K/UL — HIGH (ref 0–0.9)
MONOCYTES NFR BLD AUTO: 11.1 % — SIGNIFICANT CHANGE UP (ref 2–14)
MONOCYTES NFR BLD: 17 % — HIGH (ref 2–9)
MYELOCYTES NFR BLD: 0 % — SIGNIFICANT CHANGE UP (ref 0–0)
NEUTROPHIL AB SER-ACNC: 49.1 % — SIGNIFICANT CHANGE UP (ref 43–77)
NEUTROPHILS # BLD AUTO: 4.87 K/UL — SIGNIFICANT CHANGE UP (ref 1.8–7.4)
NEUTROPHILS NFR BLD AUTO: 52.9 % — SIGNIFICANT CHANGE UP (ref 43–77)
NEUTS BAND # BLD: 0.9 % — SIGNIFICANT CHANGE UP (ref 0–6)
NRBC # BLD: 1.9 /100WBC — SIGNIFICANT CHANGE UP
NRBC # FLD: 0.17 — SIGNIFICANT CHANGE UP
NRBC FLD-RTO: 1.8 — SIGNIFICANT CHANGE UP
OTHER - HEMATOLOGY %: 0 — SIGNIFICANT CHANGE UP
OVALOCYTES BLD QL SMEAR: SIGNIFICANT CHANGE UP
PLATELET # BLD AUTO: 271 K/UL — SIGNIFICANT CHANGE UP (ref 150–400)
PLATELET COUNT - ESTIMATE: NORMAL — SIGNIFICANT CHANGE UP
PMV BLD: 10.7 FL — SIGNIFICANT CHANGE UP (ref 7–13)
POIKILOCYTOSIS BLD QL AUTO: SLIGHT — SIGNIFICANT CHANGE UP
POLYCHROMASIA BLD QL SMEAR: SLIGHT — SIGNIFICANT CHANGE UP
POTASSIUM SERPL-MCNC: 4.6 MMOL/L — SIGNIFICANT CHANGE UP (ref 3.5–5.3)
POTASSIUM SERPL-SCNC: 4.6 MMOL/L — SIGNIFICANT CHANGE UP (ref 3.5–5.3)
PROMYELOCYTES # FLD: 0 % — SIGNIFICANT CHANGE UP (ref 0–0)
PROT SERPL-MCNC: 6.8 G/DL — SIGNIFICANT CHANGE UP (ref 6–8.3)
PROTHROM AB SERPL-ACNC: 12.5 SEC — SIGNIFICANT CHANGE UP (ref 9.8–13.1)
RBC # BLD: 2.15 M/UL — LOW (ref 4.2–5.8)
RBC # FLD: 21 % — HIGH (ref 10.3–14.5)
RETICS #: 208 K/UL — HIGH (ref 25–125)
RETICS/RBC NFR: 9.7 % — HIGH (ref 0.5–2.5)
SCHISTOCYTES BLD QL AUTO: SLIGHT — SIGNIFICANT CHANGE UP
SICKLE CELLS BLD QL SMEAR: SLIGHT — SIGNIFICANT CHANGE UP
SMUDGE CELLS # BLD: PRESENT — SIGNIFICANT CHANGE UP
SODIUM SERPL-SCNC: 142 MMOL/L — SIGNIFICANT CHANGE UP (ref 135–145)
TARGETS BLD QL SMEAR: SLIGHT — SIGNIFICANT CHANGE UP
TROPONIN T, HIGH SENSITIVITY: 10 NG/L — SIGNIFICANT CHANGE UP (ref ?–14)
TROPONIN T, HIGH SENSITIVITY: 11 NG/L — SIGNIFICANT CHANGE UP (ref ?–14)
VARIANT LYMPHS # BLD: 3.8 % — SIGNIFICANT CHANGE UP
WBC # BLD: 9.2 K/UL — SIGNIFICANT CHANGE UP (ref 3.8–10.5)
WBC # FLD AUTO: 9.2 K/UL — SIGNIFICANT CHANGE UP (ref 3.8–10.5)

## 2018-10-04 PROCEDURE — 71045 X-RAY EXAM CHEST 1 VIEW: CPT | Mod: 26

## 2018-10-04 PROCEDURE — 99223 1ST HOSP IP/OBS HIGH 75: CPT | Mod: AI

## 2018-10-04 RX ORDER — SODIUM CHLORIDE 9 MG/ML
1000 INJECTION INTRAMUSCULAR; INTRAVENOUS; SUBCUTANEOUS ONCE
Qty: 0 | Refills: 0 | Status: COMPLETED | OUTPATIENT
Start: 2018-10-04 | End: 2018-10-04

## 2018-10-04 RX ORDER — HYDROMORPHONE HYDROCHLORIDE 2 MG/ML
1 INJECTION INTRAMUSCULAR; INTRAVENOUS; SUBCUTANEOUS ONCE
Qty: 0 | Refills: 0 | Status: DISCONTINUED | OUTPATIENT
Start: 2018-10-04 | End: 2018-10-04

## 2018-10-04 RX ORDER — HEPARIN SODIUM 5000 [USP'U]/ML
5000 INJECTION INTRAVENOUS; SUBCUTANEOUS EVERY 12 HOURS
Qty: 0 | Refills: 0 | Status: DISCONTINUED | OUTPATIENT
Start: 2018-10-04 | End: 2018-10-12

## 2018-10-04 RX ORDER — ACETAMINOPHEN 500 MG
650 TABLET ORAL EVERY 6 HOURS
Qty: 0 | Refills: 0 | Status: DISCONTINUED | OUTPATIENT
Start: 2018-10-04 | End: 2018-10-12

## 2018-10-04 RX ORDER — HYDROMORPHONE HYDROCHLORIDE 2 MG/ML
30 INJECTION INTRAMUSCULAR; INTRAVENOUS; SUBCUTANEOUS
Qty: 0 | Refills: 0 | Status: DISCONTINUED | OUTPATIENT
Start: 2018-10-04 | End: 2018-10-11

## 2018-10-04 RX ORDER — NALOXONE HYDROCHLORIDE 4 MG/.1ML
0.1 SPRAY NASAL
Qty: 0 | Refills: 0 | Status: DISCONTINUED | OUTPATIENT
Start: 2018-10-04 | End: 2018-10-12

## 2018-10-04 RX ORDER — KETOROLAC TROMETHAMINE 30 MG/ML
15 SYRINGE (ML) INJECTION ONCE
Qty: 0 | Refills: 0 | Status: DISCONTINUED | OUTPATIENT
Start: 2018-10-04 | End: 2018-10-04

## 2018-10-04 RX ORDER — SENNA PLUS 8.6 MG/1
2 TABLET ORAL AT BEDTIME
Qty: 0 | Refills: 0 | Status: DISCONTINUED | OUTPATIENT
Start: 2018-10-04 | End: 2018-10-12

## 2018-10-04 RX ORDER — DOCUSATE SODIUM 100 MG
100 CAPSULE ORAL THREE TIMES A DAY
Qty: 0 | Refills: 0 | Status: DISCONTINUED | OUTPATIENT
Start: 2018-10-04 | End: 2018-10-12

## 2018-10-04 RX ORDER — ONDANSETRON 8 MG/1
4 TABLET, FILM COATED ORAL EVERY 6 HOURS
Qty: 0 | Refills: 0 | Status: DISCONTINUED | OUTPATIENT
Start: 2018-10-04 | End: 2018-10-12

## 2018-10-04 RX ORDER — FOLIC ACID 0.8 MG
1 TABLET ORAL DAILY
Qty: 0 | Refills: 0 | Status: DISCONTINUED | OUTPATIENT
Start: 2018-10-04 | End: 2018-10-12

## 2018-10-04 RX ORDER — SODIUM CHLORIDE 9 MG/ML
1000 INJECTION, SOLUTION INTRAVENOUS
Qty: 0 | Refills: 0 | Status: DISCONTINUED | OUTPATIENT
Start: 2018-10-04 | End: 2018-10-12

## 2018-10-04 RX ORDER — INFLUENZA VIRUS VACCINE 15; 15; 15; 15 UG/.5ML; UG/.5ML; UG/.5ML; UG/.5ML
0.5 SUSPENSION INTRAMUSCULAR ONCE
Qty: 0 | Refills: 0 | Status: COMPLETED | OUTPATIENT
Start: 2018-10-04 | End: 2018-10-12

## 2018-10-04 RX ORDER — MORPHINE SULFATE 50 MG/1
2 CAPSULE, EXTENDED RELEASE ORAL
Qty: 0 | Refills: 0 | Status: DISCONTINUED | OUTPATIENT
Start: 2018-10-04 | End: 2018-10-04

## 2018-10-04 RX ADMIN — HYDROMORPHONE HYDROCHLORIDE 1 MILLIGRAM(S): 2 INJECTION INTRAMUSCULAR; INTRAVENOUS; SUBCUTANEOUS at 07:28

## 2018-10-04 RX ADMIN — HYDROMORPHONE HYDROCHLORIDE 1 MILLIGRAM(S): 2 INJECTION INTRAMUSCULAR; INTRAVENOUS; SUBCUTANEOUS at 12:10

## 2018-10-04 RX ADMIN — HYDROMORPHONE HYDROCHLORIDE 1 MILLIGRAM(S): 2 INJECTION INTRAMUSCULAR; INTRAVENOUS; SUBCUTANEOUS at 06:10

## 2018-10-04 RX ADMIN — HYDROMORPHONE HYDROCHLORIDE 1 MILLIGRAM(S): 2 INJECTION INTRAMUSCULAR; INTRAVENOUS; SUBCUTANEOUS at 05:53

## 2018-10-04 RX ADMIN — HYDROMORPHONE HYDROCHLORIDE 1 MILLIGRAM(S): 2 INJECTION INTRAMUSCULAR; INTRAVENOUS; SUBCUTANEOUS at 12:40

## 2018-10-04 RX ADMIN — Medication 15 MILLIGRAM(S): at 06:10

## 2018-10-04 RX ADMIN — Medication 100 MILLIGRAM(S): at 22:34

## 2018-10-04 RX ADMIN — HYDROMORPHONE HYDROCHLORIDE 30 MILLILITER(S): 2 INJECTION INTRAMUSCULAR; INTRAVENOUS; SUBCUTANEOUS at 17:33

## 2018-10-04 RX ADMIN — Medication 15 MILLIGRAM(S): at 05:53

## 2018-10-04 RX ADMIN — SODIUM CHLORIDE 50 MILLILITER(S): 9 INJECTION, SOLUTION INTRAVENOUS at 16:00

## 2018-10-04 RX ADMIN — HYDROMORPHONE HYDROCHLORIDE 1 MILLIGRAM(S): 2 INJECTION INTRAMUSCULAR; INTRAVENOUS; SUBCUTANEOUS at 08:02

## 2018-10-04 RX ADMIN — HYDROMORPHONE HYDROCHLORIDE 30 MILLILITER(S): 2 INJECTION INTRAMUSCULAR; INTRAVENOUS; SUBCUTANEOUS at 19:13

## 2018-10-04 RX ADMIN — Medication 100 MILLIGRAM(S): at 13:19

## 2018-10-04 RX ADMIN — SODIUM CHLORIDE 1000 MILLILITER(S): 9 INJECTION INTRAMUSCULAR; INTRAVENOUS; SUBCUTANEOUS at 05:53

## 2018-10-04 RX ADMIN — SODIUM CHLORIDE 1000 MILLILITER(S): 9 INJECTION INTRAMUSCULAR; INTRAVENOUS; SUBCUTANEOUS at 07:00

## 2018-10-04 RX ADMIN — SENNA PLUS 2 TABLET(S): 8.6 TABLET ORAL at 22:34

## 2018-10-04 RX ADMIN — Medication 650 MILLIGRAM(S): at 22:33

## 2018-10-04 RX ADMIN — Medication 1 MILLIGRAM(S): at 17:32

## 2018-10-04 NOTE — CONSULT NOTE ADULT - ATTENDING COMMENTS
No suspicion for ACS. ECG is non-specific. No need for inpt echo, given study in July. Please call with questions.

## 2018-10-04 NOTE — CONSULT NOTE ADULT - ASSESSMENT
48M with SCD (not regularly taking opioid medications, usually takes NSAIDs, last admission was in july 2018) presenting with diffuse body pain.  Diffuse body pain, including chest pain. Not as bad as prior.  hsTrop 10 ->11, prior admission had troponin 16 and 12.    #Abnormal EKG  -- unlikely to be new ischemic process  -- repeat ECG  -- hsTrop not concerning for ACS  -- additionally hgb 6.4, would not proceed to cath lab in setting of sickle crisis  -- can repeat TTE    Tavo Brooks MD 48M with SCD (not regularly taking opioid medications, usually takes NSAIDs, last admission was in july 2018) presenting with diffuse body pain.  Diffuse body pain, including chest pain. Not as bad as prior.  hsTrop 10 ->11, prior admission had troponin 16 and 12.    #Abnormal EKG  -- unlikely to be new ischemic process  -- repeat ECG  -- can check CK and CK MB  -- hsTrop not concerning for ACS  -- additionally hgb 6.4, would not proceed to cath lab in setting of sickle crisis  -- can repeat TTE    Tavo Brooks MD

## 2018-10-04 NOTE — ED PROVIDER NOTE - NS ED ROS FT
GENERAL: No fever or chills, //             EYES: no change in vision, //             HEENT: no trouble swallowing or speaking, //             CARDIAC: +chest pain, //              PULMONARY: no cough or SOB, //             GI: no abdominal pain, no nausea or no vomiting, no diarrhea or constipation, //             : No changes in urination,  //            SKIN: no rashes,  //            NEURO: no headache,  //             MSK: as per HPI //  otherwise as HPI or negative.

## 2018-10-04 NOTE — ED ADULT NURSE REASSESSMENT NOTE - NS ED NURSE REASSESS COMMENT FT1
Pt continues to complain of generalized body pain, states it has gotten a little better but still in pain, MD Rao made aware, will continue to monitor.
Called to give report, no answer from floor, report faxed, confirmation copy in chart, pt resting comfortably, VSS, reports partial relief from pain 8/10, NAD, not requesting pain meds at this time. Will continue to monitor.
Pt continues to complain of generalized body pain, pain meds given as ordered, VSS, pt stable, resting comfortably will continue to monitor.

## 2018-10-04 NOTE — ED PROVIDER NOTE - CARE PLAN
Principal Discharge DX:	Sickle cell anemia with crisis Principal Discharge DX:	Sickle cell anemia with crisis  Secondary Diagnosis:	Chest pain in adult  Secondary Diagnosis:	Anemia, unspecified type

## 2018-10-04 NOTE — ED ADULT NURSE NOTE - OBJECTIVE STATEMENT
Pt presents to  4, A&O, ambulatory at baseline w/o assistance, here for evaluation of generalized body pain, when asked questions pt does not give much detail, just states yes/no, as per staff this is pt baseline, pt was recently seen for sickle cell- discharge papers were found on patient, Denies chest pain, shortness of breath, palpitations, diaphoresis, headaches, fevers, dizziness, nausea, vomiting, diarrhea, or urinary symptoms at this time. Pt NAD, VSS, pt stable, and respirations even and unlabored. IV established in right AC with a 22G by ANGELICA Mccrary, labs drawn and sent, call bell in reach, warm blanket provided, bed in lowest position, side rails up x2. Will continue to monitor. Pt presents to  4, A&O, ambulatory at baseline w/o assistance, here for evaluation of generalized body pain, when asked questions pt does not give much detail, mainly answers yes/no and can state pain level, as per staff this is pt baseline, pt was recently seen for sickle cell- discharge papers were found on patient, Denies chest pain, shortness of breath, palpitations, diaphoresis, headaches, fevers, dizziness, nausea, vomiting, diarrhea, or urinary symptoms at this time. Pt NAD, VSS, pt stable, and respirations even and unlabored. IV established in right AC with a 20G by ANGELICA Mccrary, labs drawn and sent, call bell in reach, warm blanket provided, bed in lowest position, side rails up x2. Will continue to monitor. Pt presents to  4, A&O, ambulatory at baseline w/o assistance, here for evaluation of generalized body pain, when asked questions pt does not give much detail, mainly answers yes/no and can state pain level, as per staff this is pt baseline, pt was recently seen 7/30 for sickle cell- discharge papers were found on patient. Takes advil and motrin at home w/o relief, skin is intact. Denies chest pain, shortness of breath, palpitations, diaphoresis, headaches, fevers, dizziness, nausea, vomiting, diarrhea, or urinary symptoms at this time. Pt NAD, VSS, pt stable, and respirations even and unlabored. IV established in right AC with a 20G by ANGELICA Mccrary, labs drawn and sent, call bell in reach, warm blanket provided, bed in lowest position, side rails up x2. Will continue to monitor.

## 2018-10-04 NOTE — ED PROVIDER NOTE - OBJECTIVE STATEMENT
49 y/o M w/ PMH of sickle cell disease presenting w/ full body pain. Stated the pain started this evening. Reports this feeling similar to his past episodes of sickle cell related pain. He said he was last admitted in July. States he has been taking Tylenol and Alieve for pain. Last took Alieve around 8pm this evening. Reports only taking folic acid and no other medications. States this pain is not as severe as the last time he was admitted. Reports pain in the center of his chest that is described as heavy. It does not radiate. His pain is moderate. No additional complaints.

## 2018-10-04 NOTE — ED ADULT NURSE REASSESSMENT NOTE - GENERAL PATIENT STATE
comfortable appearance/cooperative/resting/sleeping
comfortable appearance/resting/sleeping/cooperative
smiling/interactive/cooperative/comfortable appearance

## 2018-10-04 NOTE — H&P ADULT - HISTORY OF PRESENT ILLNESS
48 y/M PMH of SCD (not regularly taking opioid medications, usually takes NSAIDs, last admission was in july 2018) aw  diffuse body pain.   Generalized body pain x 1 day  chest pain , non radiating, Pain similar to previous SCC  States he has been taking Tylenol and Alieve for pain. Last took Alieve around 8pm this evening.      Last admission for SCC was in  July 2018 likely secondary to a viral illness. At that time his  course was complicated by acute hypoxic respiratory failure secondary to fluid overload vs acute chest with O2 desat 70s with Hgb of 5's. He was started on Venturi mask, admitted to MICU and  was  (3u pRBC total. He was also started on Levofloxacin for possible atypical PNA and was given IV Lasix for pleural effusion, B lines and volume overload.      Reports only taking folic acid and no other medications. States this pain is not as severe as the last time he was admitted. Reports pain in the center of his chest that is described as heavy.        In ED, pt was given 1 L bolusm  Dilaudid x 2,Ketorolac x 1, and was  admitted for sickle cell crisis. 48 y/M PMH of SCD (not regularly taking opioid medications, usually takes NSAIDs, last admission was in july 2018) aw  diffuse body pain.   Generalized body pain x 1 day. Pain 8/10, sharp, relief with meds  Chest pain , non radiating, similar to previous SCC  States this pain is not as severe as the last time he was admitted.  He usually takes  Tylenol and Aleve for pain.   Last took Alieve around 8pm yesterday   Denies fever/chills/SOB  pt with cold like symptoms a week back which is now improving  Last admission for SCC was in  July 2018 likely secondary to a viral illness. At that time his  course was complicated by acute hypoxic respiratory failure secondary to fluid overload vs acute chest with O2 desat 70s with Hgb of 5's. He was started on Venturi mask, admitted to MICU and  was  (3u pRBC total. He was also started on Levofloxacin for possible atypical PNA and was given IV Lasix for pleural effusion, B lines and volume overload.         In ED, pt was given 1 L bolusm  Dilaudid x 2,Ketorolac x 1, and was  admitted for sickle cell crisis.

## 2018-10-04 NOTE — H&P ADULT - PROBLEM SELECTOR PLAN 3
likely pre-renal  Assess response to IVF   Avoid nephrotoxins  Monitor I& Os likely secondary to NSAID use   Will hold lisinopril   Assess response to IVF   Avoid nephrotoxins  Monitor I& Os

## 2018-10-04 NOTE — ED PROVIDER NOTE - MEDICAL DECISION MAKING DETAILS
49 y/o M w/ full body pain. Likely related to his sickle cell disease, possible another episode of crisis. Pt with new EKG changes. Will get trops, CXR, labs, fluids, pain control.

## 2018-10-04 NOTE — CONSULT NOTE ADULT - SUBJECTIVE AND OBJECTIVE BOX
Patient seen and evaluated @ 12:50 PM  Chief Complaint: diffuse body pain    HPI:  48M with SCD (not regularly taking opioid medications, usually takes NSAIDs, last admission was in july 2018) presenting with diffuse body pain.   Generalized body pain x 1 day. Chest pain, non radiating. Pain similar to previous SCC  States he has been taking Tylenol and Aleve for pain. Last took Aleve around 8pm yesterday.    Last admission for SCC was in  July 2018 likely secondary to a viral illness. At that time his course was complicated by acute hypoxic respiratory failure secondary to fluid overload vs acute chest with O2 desat 70s with Hgb of 5's. He was started on Venturi mask, admitted to MICU and was transfused 3u pRBC total. He was also started on Levofloxacin for possible atypical PNA and was given IV Lasix for pleural effusion, B lines and volume overload.      Reports only taking folic acid and no other medications. States this pain is not as severe as the last time he was admitted. Reports pain in the center of his chest that is described as heavy.     In ED, pt was given 1 L bolus Dilaudid x 2, Ketorolac x 1, and was admitted for sickle cell crisis.    Patient reports diffuse body pain, not primarily in chest. No SOB. No exertional symptoms. No palpitations, nausea, vomiting, diaphoresis.    EKG sinus with LVH. Troponin 10 -> 11.    PMH:   Sickle Cell Disease    PSH:   No significant past surgical history    Medications:   docusate sodium 100 milliGRAM(s) Oral three times a day  heparin  Injectable 5000 Unit(s) SubCutaneous every 12 hours  HYDROmorphone PCA (1 mG/mL) 30 milliLiter(s) PCA Continuous PCA Continuous  influenza   Vaccine 0.5 milliLiter(s) IntraMuscular once  naloxone Injectable 0.1 milliGRAM(s) IV Push every 3 minutes PRN  ondansetron Injectable 4 milliGRAM(s) IV Push every 6 hours PRN  senna 2 Tablet(s) Oral at bedtime  sodium chloride 0.45%. 1000 milliLiter(s) IV Continuous <Continuous>    Allergies:  No Known Allergies    FAMILY HISTORY:  No pertinent family history in first degree relatives    Social History:  [place]    Review of Systems:  Constitutional: [ ] Fever [ ] Chills [ ] Fatigue [ ] Weight change   HEENT: [ ] Blurred vision [ ] Eye Pain [ ] Headache [ ] Runny nose [ ] Sore Throat   Respiratory: [ ] Cough [ ] Wheezing [ ] Shortness of breath  Cardiovascular: [ ] Chest Pain [ ] Palpitations [ ] ARRIETA [ ] PND [ ] Orthopnea  Gastrointestinal: [ ] Abdominal Pain [ ] Diarrhea [ ] Constipation [ ] Hemorrhoids [ ] Nausea [ ] Vomiting  Genitourinary: [ ] Nocturia [ ] Dysuria [ ] Incontinence  Extremities: [ ] Swelling [ ] Joint Pain  Neurologic: [ ] Focal deficit [ ] Paresthesias [ ] Syncope  Lymphatic: [ ] Swelling [ ] Lymphadenopathy   Skin: [ ] Rash [ ] Ecchymoses [ ] Wounds [ ] Lesions  Psychiatry: [ ] Depression [ ] Suicidal/Homicidal Ideation [ ] Anxiety [ ] Sleep Disturbances  [x] 10 point review of systems is otherwise negative except as mentioned above            [ ]Unable to obtain    Physical Exam:  T(C): 36.4 (10-04-18 @ 08:34), Max: 36.7 (10-04-18 @ 02:23)  HR: 73 (10-04-18 @ 08:34) (68 - 78)  BP: 158/68 (10-04-18 @ 08:34) (136/76 - 158/68)  RR: 16 (10-04-18 @ 08:34) (16 - 18)  SpO2: 100% (10-04-18 @ 08:34) (100% - 100%)  Wt(kg): --    Daily     Daily     Appearance: NAD  Eyes: PERRL, EOMI  HENT: Normal oral [place], NC/AT  Cardiovascular: normal S1 and S2, RRR, no m/r/g, no edema, normal JVP  Respiratory: Clear to auscultation bilaterally  Gastrointestinal: Soft, non-tender, non-distended, BS+  Musculoskeletal: No clubbing, no joint deformity   Neurologic: Non-focal  Lymphatic: No lymphadenopathy  Psychiatry: AAOx3, mood & affect appropriate  Skin: No rashes, no ecchymoses, no cyanosis    Cardiovascular Diagnostic Testing:  ECG: sinus 73 bpm, LVH, TWI in V5-V6    Echo:  TTE 7/27/2018  52%  CONCLUSIONS:  1. Mitral annular calcification, otherwise normal mitral  valve. Mild mitral regurgitation.  2. Normal left ventricular internal dimensions and wall  thicknesses.  3. Mild global left ventricular systolic dysfunction.  4. Normal right ventricular size and function.    Stress Testing: none    Cath: none    Imaging:  CXR  IMPRESSION:   Clear lungs.    Labs:                        6.4    9.20  )-----------( 271      ( 04 Oct 2018 04:10 )             18.6     10-04    142  |  107  |  13  ----------------------------<  70  4.6   |  21<L>  |  1.39<H>    Ca    8.3<L>      04 Oct 2018 04:10    TPro  6.8  /  Alb  3.8  /  TBili  2.7<H>  /  DBili  x   /  AST  30  /  ALT  15  /  AlkPhos  98  10-04    PT/INR - ( 04 Oct 2018 04:10 )   PT: 12.5 SEC;   INR: 1.09          PTT - ( 04 Oct 2018 04:10 )  PTT:29.4 SEC

## 2018-10-04 NOTE — ED ADULT TRIAGE NOTE - CHIEF COMPLAINT QUOTE
Pt brought in by EMS for full body pain, and palpitations 2/2 SCC. PT states this pain is similar to usual pain.

## 2018-10-04 NOTE — H&P ADULT - NEGATIVE CARDIOVASCULAR SYMPTOMS
no orthopnea/no peripheral edema/no dyspnea on exertion/no chest pain/no paroxysmal nocturnal dyspnea/no palpitations

## 2018-10-04 NOTE — ED PROVIDER NOTE - PHYSICAL EXAMINATION
Gen: NAD, AOx3, able to make needs known, tired appearing, non-toxic //            Head: NCAT //            HEENT: oral mucosa moist, normal conjunctiva //            Lung: CTAB, no respiratory distress, no wheezes/rhonchi/rales B/L, speaking in full sentences. //            CV: RRR, no murmurs or rubs //            Abd: soft, NTND, no guarding //            MSK: no edema LE b/l, no visible deformities //            Neuro: No focal sensory or motor deficits //            Skin: Warm, well perfused //            Psych: normal affect.

## 2018-10-04 NOTE — ED PROVIDER NOTE - ATTENDING CONTRIBUTION TO CARE
pt with SS presenting with his typical crisis which includes chest pain. No differences reported.  No fever and no resp symptoms.  Took NSAIDs at home without relief.      Gen: Well appearing in NAD  Head: NC/AT  Neck: trachea midline  Resp:  No distress CTAB  CV: RRR  Ext: no deformities  Neuro:  A&O appears non focal  Skin:  Warm and dry as visualized  Psych:  Normal affect and mood     Pt with SS crisis but labs showing significant anemia.  EKG changes also present with an indeterminate trop.  Aggressive pain control.  Will admit

## 2018-10-04 NOTE — ED ADULT NURSE NOTE - NSIMPLEMENTINTERV_GEN_ALL_ED
Implemented All Universal Safety Interventions:  [place] to call system. Call bell, personal items and telephone within reach. Instruct patient to call for assistance. Room bathroom lighting operational. Non-slip footwear when patient is off stretcher. Physically safe environment: no spills, clutter or unnecessary equipment. Stretcher in lowest position, wheels locked, appropriate side rails in place.

## 2018-10-04 NOTE — H&P ADULT - PROBLEM SELECTOR PLAN 1
Hb 6.4  Reviewed previous admission from July 2018- pt was started on Morphine PCA pump  Trend CBC/LDH/Retic count presenting with generalized pain   Hb 6.4   Reviewed previous admission from July 2018- pt was started on Morphine PCA pump. Will initiate Dilaudid PCA as pt with mild EMEKA at this time  Initiate gentle hydration NS at 50/hr given previous history of fluid overload and LV dysfunction   Trend CBC/LDH/Retic count/T bili  Advise bowel regimen/Incentive spirometry

## 2018-10-04 NOTE — ED PROVIDER NOTE - PROGRESS NOTE DETAILS
Jalen: Pt reports his pain has improved. When asked, states he would like to try and get his pain under control in the emergency department and if it does he feels he can go home. Nello: Hemoglobin 6.4. Pt to be admitted.

## 2018-10-04 NOTE — H&P ADULT - PROBLEM SELECTOR PLAN 2
Troponin x 2 negative   EKG with LVH, TWI in V5    TTE from 7/2018 with Mild global left ventricular systolic dysfunction. Troponin x 2 negative   EKG with LVH, TWI in V5    TTE from 7/2018 with Mild global left ventricular systolic dysfunction.  Cardiology consult called- DW Fellow- FU recommendations

## 2018-10-05 DIAGNOSIS — R79.89 OTHER SPECIFIED ABNORMAL FINDINGS OF BLOOD CHEMISTRY: ICD-10-CM

## 2018-10-05 LAB
APPEARANCE UR: CLEAR — SIGNIFICANT CHANGE UP
B PERT DNA SPEC QL NAA+PROBE: SIGNIFICANT CHANGE UP
BILIRUB UR-MCNC: NEGATIVE — SIGNIFICANT CHANGE UP
BLOOD UR QL VISUAL: SIGNIFICANT CHANGE UP
BUN SERPL-MCNC: 12 MG/DL — SIGNIFICANT CHANGE UP (ref 7–23)
C PNEUM DNA SPEC QL NAA+PROBE: NOT DETECTED — SIGNIFICANT CHANGE UP
CALCIUM SERPL-MCNC: 8.1 MG/DL — LOW (ref 8.4–10.5)
CHLORIDE SERPL-SCNC: 103 MMOL/L — SIGNIFICANT CHANGE UP (ref 98–107)
CHLORIDE UR-SCNC: 71 MMOL/L — SIGNIFICANT CHANGE UP
CO2 SERPL-SCNC: 22 MMOL/L — SIGNIFICANT CHANGE UP (ref 22–31)
COLOR SPEC: YELLOW — SIGNIFICANT CHANGE UP
CREAT ?TM UR-MCNC: 55.3 MG/DL — SIGNIFICANT CHANGE UP
CREAT SERPL-MCNC: 1.09 MG/DL — SIGNIFICANT CHANGE UP (ref 0.5–1.3)
FLUAV H1 2009 PAND RNA SPEC QL NAA+PROBE: NOT DETECTED — SIGNIFICANT CHANGE UP
FLUAV H1 RNA SPEC QL NAA+PROBE: NOT DETECTED — SIGNIFICANT CHANGE UP
FLUAV H3 RNA SPEC QL NAA+PROBE: NOT DETECTED — SIGNIFICANT CHANGE UP
FLUAV SUBTYP SPEC NAA+PROBE: SIGNIFICANT CHANGE UP
FLUBV RNA SPEC QL NAA+PROBE: NOT DETECTED — SIGNIFICANT CHANGE UP
GLUCOSE SERPL-MCNC: 107 MG/DL — HIGH (ref 70–99)
GLUCOSE UR-MCNC: NEGATIVE — SIGNIFICANT CHANGE UP
HADV DNA SPEC QL NAA+PROBE: NOT DETECTED — SIGNIFICANT CHANGE UP
HCOV 229E RNA SPEC QL NAA+PROBE: NOT DETECTED — SIGNIFICANT CHANGE UP
HCOV HKU1 RNA SPEC QL NAA+PROBE: NOT DETECTED — SIGNIFICANT CHANGE UP
HCOV NL63 RNA SPEC QL NAA+PROBE: NOT DETECTED — SIGNIFICANT CHANGE UP
HCOV OC43 RNA SPEC QL NAA+PROBE: NOT DETECTED — SIGNIFICANT CHANGE UP
HCT VFR BLD CALC: 19.8 % — CRITICAL LOW (ref 39–50)
HGB BLD-MCNC: 6.7 G/DL — CRITICAL LOW (ref 13–17)
HMPV RNA SPEC QL NAA+PROBE: NOT DETECTED — SIGNIFICANT CHANGE UP
HPIV1 RNA SPEC QL NAA+PROBE: NOT DETECTED — SIGNIFICANT CHANGE UP
HPIV2 RNA SPEC QL NAA+PROBE: NOT DETECTED — SIGNIFICANT CHANGE UP
HPIV3 RNA SPEC QL NAA+PROBE: NOT DETECTED — SIGNIFICANT CHANGE UP
HPIV4 RNA SPEC QL NAA+PROBE: NOT DETECTED — SIGNIFICANT CHANGE UP
KETONES UR-MCNC: NEGATIVE — SIGNIFICANT CHANGE UP
LDH SERPL L TO P-CCNC: 317 U/L — HIGH (ref 135–225)
LEUKOCYTE ESTERASE UR-ACNC: NEGATIVE — SIGNIFICANT CHANGE UP
M PNEUMO DNA SPEC QL NAA+PROBE: NOT DETECTED — SIGNIFICANT CHANGE UP
MCHC RBC-ENTMCNC: 29.3 PG — SIGNIFICANT CHANGE UP (ref 27–34)
MCHC RBC-ENTMCNC: 33.8 % — SIGNIFICANT CHANGE UP (ref 32–36)
MCV RBC AUTO: 86.5 FL — SIGNIFICANT CHANGE UP (ref 80–100)
NITRITE UR-MCNC: NEGATIVE — SIGNIFICANT CHANGE UP
NRBC # FLD: 0.08 — SIGNIFICANT CHANGE UP
PH UR: 7 — SIGNIFICANT CHANGE UP (ref 5–8)
PLATELET # BLD AUTO: 259 K/UL — SIGNIFICANT CHANGE UP (ref 150–400)
PMV BLD: 10.4 FL — SIGNIFICANT CHANGE UP (ref 7–13)
POTASSIUM SERPL-MCNC: 4.3 MMOL/L — SIGNIFICANT CHANGE UP (ref 3.5–5.3)
POTASSIUM SERPL-SCNC: 4.3 MMOL/L — SIGNIFICANT CHANGE UP (ref 3.5–5.3)
POTASSIUM UR-SCNC: 26.3 MMOL/L — SIGNIFICANT CHANGE UP
PROT UR-MCNC: SIGNIFICANT CHANGE UP
RBC # BLD: 2.29 M/UL — LOW (ref 4.2–5.8)
RBC # FLD: 21 % — HIGH (ref 10.3–14.5)
RBC CASTS # UR COMP ASSIST: SIGNIFICANT CHANGE UP (ref 0–?)
RETICS #: 199 K/UL — HIGH (ref 25–125)
RETICS/RBC NFR: 8.7 % — HIGH (ref 0.5–2.5)
RSV RNA SPEC QL NAA+PROBE: NOT DETECTED — SIGNIFICANT CHANGE UP
RV+EV RNA SPEC QL NAA+PROBE: POSITIVE — HIGH
SODIUM SERPL-SCNC: 137 MMOL/L — SIGNIFICANT CHANGE UP (ref 135–145)
SODIUM UR-SCNC: 86 MMOL/L — SIGNIFICANT CHANGE UP
SP GR SPEC: 1.01 — SIGNIFICANT CHANGE UP (ref 1–1.04)
UROBILINOGEN FLD QL: NORMAL — SIGNIFICANT CHANGE UP
UUN UR-MCNC: 267 MG/DL — SIGNIFICANT CHANGE UP
WBC # BLD: 16.31 K/UL — HIGH (ref 3.8–10.5)
WBC # FLD AUTO: 16.31 K/UL — HIGH (ref 3.8–10.5)
WBC UR QL: SIGNIFICANT CHANGE UP (ref 0–?)

## 2018-10-05 PROCEDURE — 99233 SBSQ HOSP IP/OBS HIGH 50: CPT

## 2018-10-05 RX ORDER — IBUPROFEN 200 MG
600 TABLET ORAL ONCE
Qty: 0 | Refills: 0 | Status: COMPLETED | OUTPATIENT
Start: 2018-10-05 | End: 2018-10-05

## 2018-10-05 RX ORDER — TAMSULOSIN HYDROCHLORIDE 0.4 MG/1
0.4 CAPSULE ORAL AT BEDTIME
Qty: 0 | Refills: 0 | Status: DISCONTINUED | OUTPATIENT
Start: 2018-10-05 | End: 2018-10-12

## 2018-10-05 RX ADMIN — Medication 100 MILLIGRAM(S): at 21:53

## 2018-10-05 RX ADMIN — HEPARIN SODIUM 5000 UNIT(S): 5000 INJECTION INTRAVENOUS; SUBCUTANEOUS at 17:33

## 2018-10-05 RX ADMIN — Medication 650 MILLIGRAM(S): at 00:38

## 2018-10-05 RX ADMIN — Medication 600 MILLIGRAM(S): at 03:56

## 2018-10-05 RX ADMIN — TAMSULOSIN HYDROCHLORIDE 0.4 MILLIGRAM(S): 0.4 CAPSULE ORAL at 21:56

## 2018-10-05 RX ADMIN — Medication 650 MILLIGRAM(S): at 22:53

## 2018-10-05 RX ADMIN — Medication 600 MILLIGRAM(S): at 02:56

## 2018-10-05 RX ADMIN — HEPARIN SODIUM 5000 UNIT(S): 5000 INJECTION INTRAVENOUS; SUBCUTANEOUS at 06:15

## 2018-10-05 RX ADMIN — Medication 650 MILLIGRAM(S): at 21:53

## 2018-10-05 RX ADMIN — SODIUM CHLORIDE 75 MILLILITER(S): 9 INJECTION, SOLUTION INTRAVENOUS at 14:08

## 2018-10-05 RX ADMIN — SODIUM CHLORIDE 50 MILLILITER(S): 9 INJECTION, SOLUTION INTRAVENOUS at 07:16

## 2018-10-05 RX ADMIN — Medication 1 MILLIGRAM(S): at 12:44

## 2018-10-05 RX ADMIN — SENNA PLUS 2 TABLET(S): 8.6 TABLET ORAL at 21:53

## 2018-10-05 RX ADMIN — HYDROMORPHONE HYDROCHLORIDE 30 MILLILITER(S): 2 INJECTION INTRAMUSCULAR; INTRAVENOUS; SUBCUTANEOUS at 19:17

## 2018-10-05 RX ADMIN — Medication 100 MILLIGRAM(S): at 14:08

## 2018-10-05 RX ADMIN — HYDROMORPHONE HYDROCHLORIDE 30 MILLILITER(S): 2 INJECTION INTRAMUSCULAR; INTRAVENOUS; SUBCUTANEOUS at 07:15

## 2018-10-05 RX ADMIN — Medication 100 MILLIGRAM(S): at 06:14

## 2018-10-05 NOTE — PROGRESS NOTE ADULT - PROBLEM SELECTOR PLAN 3
-likely secondary to sickle cell crisis  Troponin x 2 negative   EKG with LVH, TWI in V5    TTE from 7/2018 with Mild global left ventricular systolic dysfunction.  -repeat TTE  -follow up full cards consult

## 2018-10-05 NOTE — PROGRESS NOTE ADULT - PROBLEM SELECTOR PLAN 1
-fever ON presenting with generalized pain mild cold as per pt--> check RVP  -Trend CBC/LDH/Retic count/T bili- follow up AM labs  -dilaudid PCA encouraged   -lungs clear increase IVF to 75cc hr given likely insensible loss from fever and acute stress state from SCC frequent lung exam   -bowel regimen/Incentive spirometry

## 2018-10-05 NOTE — PROGRESS NOTE ADULT - SUBJECTIVE AND OBJECTIVE BOX
Patient is a 48y old  Male who presents with a chief complaint of SCC x 1-2 days (04 Oct 2018 11:16)      SUBJECTIVE / OVERNIGHT EVENTS: Pt sleeping bed states has pain on lateral aspect of LT leg in bone reports no swelling. febrile ON-101.3 Tele reviewed no acute events     MEDICATIONS  (STANDING):  docusate sodium 100 milliGRAM(s) Oral three times a day  folic acid 1 milliGRAM(s) Oral daily  heparin  Injectable 5000 Unit(s) SubCutaneous every 12 hours  HYDROmorphone PCA (1 mG/mL) 30 milliLiter(s) PCA Continuous PCA Continuous  influenza   Vaccine 0.5 milliLiter(s) IntraMuscular once  senna 2 Tablet(s) Oral at bedtime  sodium chloride 0.45%. 1000 milliLiter(s) (50 mL/Hr) IV Continuous <Continuous>  tamsulosin 0.4 milliGRAM(s) Oral at bedtime    MEDICATIONS  (PRN):  acetaminophen   Tablet .. 650 milliGRAM(s) Oral every 6 hours PRN Temp greater or equal to 38C (100.4F)  naloxone Injectable 0.1 milliGRAM(s) IV Push every 3 minutes PRN For ANY of the following changes in patient status:  A. RR LESS THAN 10 breaths per minute, B. Oxygen saturation LESS THAN 90%, C. Sedation score of 6  ondansetron Injectable 4 milliGRAM(s) IV Push every 6 hours PRN Nausea        CAPILLARY BLOOD GLUCOSE        I&O's Summary      T(C): 37.3 (10-05-18 @ 07:38), Max: 38.5 (10-04-18 @ 22:23)  HR: 66 (10-05-18 @ 07:38) (66 - 90)  BP: 118/56 (10-05-18 @ 07:38) (107/56 - 157/62)  RR: 17 (10-05-18 @ 07:38) (16 - 26)  SpO2: 98% (10-05-18 @ 07:38) (97% - 100%)    PHYSICAL EXAM:  GENERAL: NAD, well-developed  HEAD:  Atraumatic, Normocephalic  EYES: EOMI, PERRLA, conjunctiva and sclera clear  NECK: Supple, No JVD  CHEST/LUNG: Clear to auscultation bilaterally; No wheeze  HEART: Regular rate and rhythm; No murmurs, rubs, or gallops  ABDOMEN: Soft, Nontender, Nondistended; Bowel sounds present  EXTREMITIES:  2+ Peripheral Pulses, No clubbing, cyanosis, or edema  PSYCH: AAOx3  NEUROLOGY: non-focal  SKIN: No rashes or lesions    LABS:                        6.4    9.20  )-----------( 271      ( 04 Oct 2018 04:10 )             18.6     10-04    142  |  107  |  13  ----------------------------<  70  4.6   |  21<L>  |  1.39<H>    Ca    8.3<L>      04 Oct 2018 04:10    TPro  6.8  /  Alb  3.8  /  TBili  2.7<H>  /  DBili  x   /  AST  30  /  ALT  15  /  AlkPhos  98  10-04    PT/INR - ( 04 Oct 2018 04:10 )   PT: 12.5 SEC;   INR: 1.09          PTT - ( 04 Oct 2018 04:10 )  PTT:29.4 SEC      Urinalysis Basic - ( 04 Oct 2018 23:30 )    Color: YELLOW / Appearance: CLEAR / S.015 / pH: 7.0  Gluc: NEGATIVE / Ketone: NEGATIVE  / Bili: NEGATIVE / Urobili: NORMAL   Blood: TRACE / Protein: TRACE / Nitrite: NEGATIVE   Leuk Esterase: NEGATIVE / RBC: 2-5 / WBC 0-2   Sq Epi: x / Non Sq Epi: x / Bacteria: x          RADIOLOGY & ADDITIONAL TESTS:    Imaging Personally Reviewed:    Consultant(s) Notes Reviewed:      Care Discussed with Consultants/Other Providers:

## 2018-10-06 DIAGNOSIS — B34.1 ENTEROVIRUS INFECTION, UNSPECIFIED: ICD-10-CM

## 2018-10-06 LAB
APPEARANCE UR: CLEAR — SIGNIFICANT CHANGE UP
BACTERIA # UR AUTO: SIGNIFICANT CHANGE UP
BILIRUB UR-MCNC: NEGATIVE — SIGNIFICANT CHANGE UP
BLOOD UR QL VISUAL: NEGATIVE — SIGNIFICANT CHANGE UP
COLOR SPEC: SIGNIFICANT CHANGE UP
EPI CELLS # UR: SIGNIFICANT CHANGE UP
GLUCOSE UR-MCNC: NEGATIVE — SIGNIFICANT CHANGE UP
KETONES UR-MCNC: NEGATIVE — SIGNIFICANT CHANGE UP
LEUKOCYTE ESTERASE UR-ACNC: NEGATIVE — SIGNIFICANT CHANGE UP
MUCOUS THREADS # UR AUTO: SIGNIFICANT CHANGE UP
NITRITE UR-MCNC: NEGATIVE — SIGNIFICANT CHANGE UP
PH UR: 6.5 — SIGNIFICANT CHANGE UP (ref 5–8)
PROT UR-MCNC: 20 — SIGNIFICANT CHANGE UP
RBC CASTS # UR COMP ASSIST: SIGNIFICANT CHANGE UP (ref 0–?)
SP GR SPEC: 1.01 — SIGNIFICANT CHANGE UP (ref 1–1.04)
SPECIMEN SOURCE: SIGNIFICANT CHANGE UP
SPECIMEN SOURCE: SIGNIFICANT CHANGE UP
UROBILINOGEN FLD QL: NORMAL — SIGNIFICANT CHANGE UP
WBC UR QL: SIGNIFICANT CHANGE UP (ref 0–?)

## 2018-10-06 PROCEDURE — 93010 ELECTROCARDIOGRAM REPORT: CPT

## 2018-10-06 PROCEDURE — 99233 SBSQ HOSP IP/OBS HIGH 50: CPT

## 2018-10-06 RX ADMIN — TAMSULOSIN HYDROCHLORIDE 0.4 MILLIGRAM(S): 0.4 CAPSULE ORAL at 22:53

## 2018-10-06 RX ADMIN — HYDROMORPHONE HYDROCHLORIDE 30 MILLILITER(S): 2 INJECTION INTRAMUSCULAR; INTRAVENOUS; SUBCUTANEOUS at 07:23

## 2018-10-06 RX ADMIN — Medication 1 MILLIGRAM(S): at 12:39

## 2018-10-06 RX ADMIN — Medication 100 MILLIGRAM(S): at 22:53

## 2018-10-06 RX ADMIN — HEPARIN SODIUM 5000 UNIT(S): 5000 INJECTION INTRAVENOUS; SUBCUTANEOUS at 18:18

## 2018-10-06 RX ADMIN — SODIUM CHLORIDE 75 MILLILITER(S): 9 INJECTION, SOLUTION INTRAVENOUS at 07:24

## 2018-10-06 RX ADMIN — Medication 100 MILLIGRAM(S): at 06:26

## 2018-10-06 RX ADMIN — HEPARIN SODIUM 5000 UNIT(S): 5000 INJECTION INTRAVENOUS; SUBCUTANEOUS at 06:26

## 2018-10-06 RX ADMIN — Medication 650 MILLIGRAM(S): at 09:31

## 2018-10-06 RX ADMIN — Medication 650 MILLIGRAM(S): at 09:01

## 2018-10-06 RX ADMIN — Medication 100 MILLIGRAM(S): at 12:39

## 2018-10-06 RX ADMIN — HYDROMORPHONE HYDROCHLORIDE 30 MILLILITER(S): 2 INJECTION INTRAMUSCULAR; INTRAVENOUS; SUBCUTANEOUS at 19:41

## 2018-10-06 RX ADMIN — SODIUM CHLORIDE 75 MILLILITER(S): 9 INJECTION, SOLUTION INTRAVENOUS at 16:12

## 2018-10-06 NOTE — PROGRESS NOTE ADULT - PROBLEM SELECTOR PLAN 4
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  - card rec no further w/u

## 2018-10-06 NOTE — PROGRESS NOTE ADULT - PROBLEM SELECTOR PLAN 3
Now improving, no labs today for comparison   - monitor Cr-f/u labs  - hold lisinopril   - consider hydralazine if BP is an issue  - UA remains negative  - on trial of flomax Now improving, no labs today for comparison; FeNa 1.2%  - monitor Cr-f/u labs  - hold lisinopril   - consider hydralazine if BP is an issue  - UA remains negative  - on trial of flomax

## 2018-10-06 NOTE — PROGRESS NOTE ADULT - SUBJECTIVE AND OBJECTIVE BOX
Patient is a 48y old  Male who presents with a chief complaint of SCC x 1-2 days    SUBJECTIVE / OVERNIGHT EVENTS:    Still with pain in hips and legs  No SOB, no CP  trying not to use PCA  doesn't want to ambulate   BM yesterday  dysuria reported to RN, currently denies     MEDICATIONS  (STANDING):  docusate sodium 100 milliGRAM(s) Oral three times a day  folic acid 1 milliGRAM(s) Oral daily  heparin  Injectable 5000 Unit(s) SubCutaneous every 12 hours  HYDROmorphone PCA (1 mG/mL) 30 milliLiter(s) PCA Continuous PCA Continuous  influenza   Vaccine 0.5 milliLiter(s) IntraMuscular once  senna 2 Tablet(s) Oral at bedtime  sodium chloride 0.45%. 1000 milliLiter(s) (75 mL/Hr) IV Continuous <Continuous>  tamsulosin 0.4 milliGRAM(s) Oral at bedtime    MEDICATIONS  (PRN):  acetaminophen   Tablet .. 650 milliGRAM(s) Oral every 6 hours PRN Temp greater or equal to 38C (100.4F)  naloxone Injectable 0.1 milliGRAM(s) IV Push every 3 minutes PRN For ANY of the following changes in patient status:  A. RR LESS THAN 10 breaths per minute, B. Oxygen saturation LESS THAN 90%, C. Sedation score of 6  ondansetron Injectable 4 milliGRAM(s) IV Push every 6 hours PRN Nausea    T(C): 37 (10-06-18 @ 14:44), Max: 38.3 (10-06-18 @ 09:00)  HR: 86 (10-06-18 @ 14:44) (77 - 110)  BP: 136/78 (10-06-18 @ 14:44) (122/79 - 171/88)  RR: 18 (10-06-18 @ 14:44) (17 - 20)  SpO2: 100% (10-06-18 @ 14:44) (97% - 100%)    PHYSICAL EXAM:  GENERAL: NAD, well-developed  HEAD:  Atraumatic, Normocephalic  EYES: EOMI, PERRLA, conjunctiva and sclera clear  NECK: Supple, No JVD  CHEST/LUNG: Clear to auscultation bilaterally; No wheeze  HEART: Regular rate and rhythm; No murmurs, rubs, or gallops  ABDOMEN: Soft, Nontender, Nondistended; Bowel sounds present  EXTREMITIES:   warm and well perfused, No clubbing, cyanosis, or edema  PSYCH: AAOx3  NEUROLOGY: non-focal  SKIN: No rashes or lesions    LABS: refused labs    Urinalysis Basic - ( 06 Oct 2018 10:23 )    Color: LT. YELLOW / Appearance: CLEAR / S.007 / pH: 6.5  Gluc: NEGATIVE / Ketone: NEGATIVE  / Bili: NEGATIVE / Urobili: NORMAL   Blood: NEGATIVE / Protein: 20 / Nitrite: NEGATIVE   Leuk Esterase: NEGATIVE / RBC: 0-2 / WBC 0-2   Sq Epi: x / Non Sq Epi: OCC / Bacteria: FEW    Microbiology:     Blood Cx: NG x 24 hours  RVP + enterovirus Patient is a 48y old  Male who presents with a chief complaint of SCC x 1-2 days    SUBJECTIVE / OVERNIGHT EVENTS:    Still with pain in hips and legs  No SOB, no CP  trying not to use PCA  doesn't want to ambulate   BM yesterday  dysuria reported to RN, currently denies     Tele: tachy 120s, sinus    MEDICATIONS  (STANDING):  docusate sodium 100 milliGRAM(s) Oral three times a day  folic acid 1 milliGRAM(s) Oral daily  heparin  Injectable 5000 Unit(s) SubCutaneous every 12 hours  HYDROmorphone PCA (1 mG/mL) 30 milliLiter(s) PCA Continuous PCA Continuous  influenza   Vaccine 0.5 milliLiter(s) IntraMuscular once  senna 2 Tablet(s) Oral at bedtime  sodium chloride 0.45%. 1000 milliLiter(s) (75 mL/Hr) IV Continuous <Continuous>  tamsulosin 0.4 milliGRAM(s) Oral at bedtime    MEDICATIONS  (PRN):  acetaminophen   Tablet .. 650 milliGRAM(s) Oral every 6 hours PRN Temp greater or equal to 38C (100.4F)  naloxone Injectable 0.1 milliGRAM(s) IV Push every 3 minutes PRN For ANY of the following changes in patient status:  A. RR LESS THAN 10 breaths per minute, B. Oxygen saturation LESS THAN 90%, C. Sedation score of 6  ondansetron Injectable 4 milliGRAM(s) IV Push every 6 hours PRN Nausea    T(C): 37 (10-06-18 @ 14:44), Max: 38.3 (10-06-18 @ 09:00)  HR: 86 (10-06-18 @ 14:44) (77 - 110)  BP: 136/78 (10-06-18 @ 14:44) (122/79 - 171/88)  RR: 18 (10-06-18 @ 14:44) (17 - 20)  SpO2: 100% (10-06-18 @ 14:44) (97% - 100%)    PHYSICAL EXAM:  GENERAL: NAD, well-developed  HEAD:  Atraumatic, Normocephalic  EYES: EOMI, PERRLA, conjunctiva and sclera clear  NECK: Supple, No JVD  CHEST/LUNG: Clear to auscultation bilaterally; No wheeze  HEART: Regular rate and rhythm; No murmurs, rubs, or gallops  ABDOMEN: Soft, Nontender, Nondistended; Bowel sounds present  EXTREMITIES:   warm and well perfused, No clubbing, cyanosis, or edema  PSYCH: AAOx3  NEUROLOGY: non-focal  SKIN: No rashes or lesions    LABS: refused labs    Urinalysis Basic - ( 06 Oct 2018 10:23 )    Color: LT. YELLOW / Appearance: CLEAR / S.007 / pH: 6.5  Gluc: NEGATIVE / Ketone: NEGATIVE  / Bili: NEGATIVE / Urobili: NORMAL   Blood: NEGATIVE / Protein: 20 / Nitrite: NEGATIVE   Leuk Esterase: NEGATIVE / RBC: 0-2 / WBC 0-2   Sq Epi: x / Non Sq Epi: OCC / Bacteria: FEW    Microbiology:     Blood Cx: NG x 24 hours  RVP + enterovirus

## 2018-10-06 NOTE — DOWNTIME INTERRUPTION NOTE - WHICH MANUAL FORMS INITIATED?
Medication Administration Record Patient Controlled Analgeisa: Intravenous, Epidural, Regional Analgesia

## 2018-10-06 NOTE — PROGRESS NOTE ADULT - PROBLEM SELECTOR PLAN 1
Still with fever, enterovirus +; pain improving but not resolved   - refused labs today, amenable tomorrow   - Dilaudid PCA encouraged   - c/w IVF  - bowel regimen/incentive spirometry

## 2018-10-07 DIAGNOSIS — I50.22 CHRONIC SYSTOLIC (CONGESTIVE) HEART FAILURE: ICD-10-CM

## 2018-10-07 DIAGNOSIS — R63.6 UNDERWEIGHT: ICD-10-CM

## 2018-10-07 LAB
ALBUMIN SERPL ELPH-MCNC: 4 G/DL — SIGNIFICANT CHANGE UP (ref 3.3–5)
ALP SERPL-CCNC: 122 U/L — HIGH (ref 40–120)
ALT FLD-CCNC: 15 U/L — SIGNIFICANT CHANGE UP (ref 4–41)
AST SERPL-CCNC: 24 U/L — SIGNIFICANT CHANGE UP (ref 4–40)
BILIRUB SERPL-MCNC: 4.1 MG/DL — HIGH (ref 0.2–1.2)
BUN SERPL-MCNC: 14 MG/DL — SIGNIFICANT CHANGE UP (ref 7–23)
CALCIUM SERPL-MCNC: 9.3 MG/DL — SIGNIFICANT CHANGE UP (ref 8.4–10.5)
CHLORIDE SERPL-SCNC: 101 MMOL/L — SIGNIFICANT CHANGE UP (ref 98–107)
CO2 SERPL-SCNC: 20 MMOL/L — LOW (ref 22–31)
CREAT SERPL-MCNC: 1.06 MG/DL — SIGNIFICANT CHANGE UP (ref 0.5–1.3)
GLUCOSE SERPL-MCNC: 80 MG/DL — SIGNIFICANT CHANGE UP (ref 70–99)
HAPTOGLOB SERPL-MCNC: < 20 MG/DL — LOW (ref 34–200)
HCT VFR BLD CALC: 22.8 % — LOW (ref 39–50)
HGB BLD-MCNC: 7.7 G/DL — LOW (ref 13–17)
LDH SERPL L TO P-CCNC: 290 U/L — HIGH (ref 135–225)
MAGNESIUM SERPL-MCNC: 2.1 MG/DL — SIGNIFICANT CHANGE UP (ref 1.6–2.6)
MCHC RBC-ENTMCNC: 29.1 PG — SIGNIFICANT CHANGE UP (ref 27–34)
MCHC RBC-ENTMCNC: 33.8 % — SIGNIFICANT CHANGE UP (ref 32–36)
MCV RBC AUTO: 86 FL — SIGNIFICANT CHANGE UP (ref 80–100)
NRBC # FLD: 0.04 — SIGNIFICANT CHANGE UP
PHOSPHATE SERPL-MCNC: 3 MG/DL — SIGNIFICANT CHANGE UP (ref 2.5–4.5)
PLATELET # BLD AUTO: 285 K/UL — SIGNIFICANT CHANGE UP (ref 150–400)
PMV BLD: 10.8 FL — SIGNIFICANT CHANGE UP (ref 7–13)
POTASSIUM SERPL-MCNC: 4.4 MMOL/L — SIGNIFICANT CHANGE UP (ref 3.5–5.3)
POTASSIUM SERPL-SCNC: 4.4 MMOL/L — SIGNIFICANT CHANGE UP (ref 3.5–5.3)
PROT SERPL-MCNC: 7.9 G/DL — SIGNIFICANT CHANGE UP (ref 6–8.3)
RBC # BLD: 2.65 M/UL — LOW (ref 4.2–5.8)
RBC # FLD: 20.4 % — HIGH (ref 10.3–14.5)
SODIUM SERPL-SCNC: 137 MMOL/L — SIGNIFICANT CHANGE UP (ref 135–145)
WBC # BLD: 14.2 K/UL — HIGH (ref 3.8–10.5)
WBC # FLD AUTO: 14.2 K/UL — HIGH (ref 3.8–10.5)

## 2018-10-07 PROCEDURE — 99232 SBSQ HOSP IP/OBS MODERATE 35: CPT

## 2018-10-07 RX ORDER — LISINOPRIL 2.5 MG/1
2.5 TABLET ORAL DAILY
Qty: 0 | Refills: 0 | Status: DISCONTINUED | OUTPATIENT
Start: 2018-10-07 | End: 2018-10-12

## 2018-10-07 RX ORDER — METOPROLOL TARTRATE 50 MG
25 TABLET ORAL DAILY
Qty: 0 | Refills: 0 | Status: DISCONTINUED | OUTPATIENT
Start: 2018-10-07 | End: 2018-10-12

## 2018-10-07 RX ADMIN — HEPARIN SODIUM 5000 UNIT(S): 5000 INJECTION INTRAVENOUS; SUBCUTANEOUS at 17:29

## 2018-10-07 RX ADMIN — HYDROMORPHONE HYDROCHLORIDE 30 MILLILITER(S): 2 INJECTION INTRAMUSCULAR; INTRAVENOUS; SUBCUTANEOUS at 19:30

## 2018-10-07 RX ADMIN — HYDROMORPHONE HYDROCHLORIDE 30 MILLILITER(S): 2 INJECTION INTRAMUSCULAR; INTRAVENOUS; SUBCUTANEOUS at 08:18

## 2018-10-07 RX ADMIN — Medication 1 MILLIGRAM(S): at 11:51

## 2018-10-07 RX ADMIN — HEPARIN SODIUM 5000 UNIT(S): 5000 INJECTION INTRAVENOUS; SUBCUTANEOUS at 08:19

## 2018-10-07 RX ADMIN — LISINOPRIL 2.5 MILLIGRAM(S): 2.5 TABLET ORAL at 11:51

## 2018-10-07 RX ADMIN — HYDROMORPHONE HYDROCHLORIDE 30 MILLILITER(S): 2 INJECTION INTRAMUSCULAR; INTRAVENOUS; SUBCUTANEOUS at 22:00

## 2018-10-07 RX ADMIN — TAMSULOSIN HYDROCHLORIDE 0.4 MILLIGRAM(S): 0.4 CAPSULE ORAL at 22:05

## 2018-10-07 RX ADMIN — Medication 25 MILLIGRAM(S): at 11:51

## 2018-10-07 NOTE — PROGRESS NOTE ADULT - PROBLEM SELECTOR PLAN 1
Fever subsided since yesterday at 9 am (100.9), Tmax this am 100.1, enterovirus +; pain improving but not resolved   - refused labs 10/6, today encouraged to provide/allow labs   - Dilaudid PCA, will attempt PO transition   - c/w IVF  - bowel regimen/incentive spirometry Fever subsided since yesterday at 9 am (100.9), Tmax this am 100.1, enterovirus +; pain improving but not resolved   - refused labs 10/6, labs today improved   - Dilaudid PCA, minimal use, prefers PCA over transition to PO, will transition to PO tomorrow   - c/w IVF, lungs clear   - bowel regimen/incentive spirometry

## 2018-10-07 NOTE — PROGRESS NOTE ADULT - PROBLEM SELECTOR PLAN 5
TTE 7/2018:  Mild global left ventricular systolic dysfunction; Normal right ventricular size and function  - resume lisinopril if Cr stable, low dose of 5 mg  - will add low dose BB, metoprolol 25 XL TTE 7/2018:  Mild global left ventricular systolic dysfunction; Normal right ventricular size and function  - resume lisinopril, low dose of 5 mg  - will add low dose BB, metoprolol 25 XL

## 2018-10-07 NOTE — PROGRESS NOTE ADULT - PROBLEM SELECTOR PLAN 6
BMI 15  - ensure added   - nutrition eval BMI 15, no known weight loss, reports always skinny   - ensure added   - nutrition eval

## 2018-10-07 NOTE — PROGRESS NOTE ADULT - SUBJECTIVE AND OBJECTIVE BOX
Patient is a 48y old  Male who presents with a chief complaint of SCC    SUBJECTIVE / OVERNIGHT EVENTS:    No overnight events, no CP, SOB, nausea/vomiting    Tele:  PCA usage: 0.5-1.5 mg PCA usage x 4 hour interval    MEDICATIONS  (STANDING):  docusate sodium 100 milliGRAM(s) Oral three times a day  folic acid 1 milliGRAM(s) Oral daily  heparin  Injectable 5000 Unit(s) SubCutaneous every 12 hours  HYDROmorphone PCA (1 mG/mL) 30 milliLiter(s) PCA Continuous PCA Continuous  influenza   Vaccine 0.5 milliLiter(s) IntraMuscular once  senna 2 Tablet(s) Oral at bedtime  sodium chloride 0.45%. 1000 milliLiter(s) (75 mL/Hr) IV Continuous <Continuous>  tamsulosin 0.4 milliGRAM(s) Oral at bedtime    MEDICATIONS  (PRN):  acetaminophen   Tablet .. 650 milliGRAM(s) Oral every 6 hours PRN Temp greater or equal to 38C (100.4F)  naloxone Injectable 0.1 milliGRAM(s) IV Push every 3 minutes PRN For ANY of the following changes in patient status:  A. RR LESS THAN 10 breaths per minute, B. Oxygen saturation LESS THAN 90%, C. Sedation score of 6  ondansetron Injectable 4 milliGRAM(s) IV Push every 6 hours PRN Nausea    T(C): 37.8 (10-07-18 @ 08:10), Max: 38.3 (10-06-18 @ 09:00)  HR: 86 (10-07-18 @ 08:10) (86 - 110)  BP: 114/78 (10-07-18 @ 08:10) (112/73 - 136/78)  RR: 18 (10-07-18 @ 08:10) (16 - 18)  SpO2: 99% (10-07-18 @ 08:10) (98% - 100%)      I&O's Summary    07 Oct 2018 07:01  -  07 Oct 2018 08:30  --------------------------------------------------------  IN: 0 mL / OUT: 900 mL / NET: -900 mL    PHYSICAL EXAM:  GENERAL: NAD, thin  HEAD:  Atraumatic, Normocephalic  EYES: EOMI, PERRLA, conjunctiva and sclera clear  NECK: Supple, No JVD  CHEST/LUNG: Clear to auscultation bilaterally; No wheeze  HEART: Regular rate and rhythm; No murmurs, rubs, or gallops  ABDOMEN: Soft, Nontender, Nondistended; Bowel sounds present  EXTREMITIES:   warm and well perfused, No clubbing, cyanosis, or edema  PSYCH: AAOx3  NEUROLOGY: non-focal  SKIN: No rashes or lesions    LABS:  am labs pending            Microbiology:     RVP: + enterovirus  Blood Cx 2 sets: NG x 48 hours    Consultant(s) Notes Reviewed:    Care Discussed with Consultants/Other Providers: Patient is a 48y old  Male who presents with a chief complaint of SCC    SUBJECTIVE / OVERNIGHT EVENTS:    No overnight events, no CP, SOB, nausea/vomiting  Still with pain, feels improved but not ready for dc    Tele: PAT 130s, ST 120s  PCA usage: 0.5-1.5 mg PCA usage x 4 hour interval    MEDICATIONS  (STANDING):  docusate sodium 100 milliGRAM(s) Oral three times a day  folic acid 1 milliGRAM(s) Oral daily  heparin  Injectable 5000 Unit(s) SubCutaneous every 12 hours  HYDROmorphone PCA (1 mG/mL) 30 milliLiter(s) PCA Continuous PCA Continuous  influenza   Vaccine 0.5 milliLiter(s) IntraMuscular once  senna 2 Tablet(s) Oral at bedtime  sodium chloride 0.45%. 1000 milliLiter(s) (75 mL/Hr) IV Continuous <Continuous>  tamsulosin 0.4 milliGRAM(s) Oral at bedtime    MEDICATIONS  (PRN):  acetaminophen   Tablet .. 650 milliGRAM(s) Oral every 6 hours PRN Temp greater or equal to 38C (100.4F)  naloxone Injectable 0.1 milliGRAM(s) IV Push every 3 minutes PRN For ANY of the following changes in patient status:  A. RR LESS THAN 10 breaths per minute, B. Oxygen saturation LESS THAN 90%, C. Sedation score of 6  ondansetron Injectable 4 milliGRAM(s) IV Push every 6 hours PRN Nausea    T(C): 37.8 (10-07-18 @ 08:10), Max: 38.3 (10-06-18 @ 09:00)  HR: 86 (10-07-18 @ 08:10) (86 - 110)  BP: 114/78 (10-07-18 @ 08:10) (112/73 - 136/78)  RR: 18 (10-07-18 @ 08:10) (16 - 18)  SpO2: 99% (10-07-18 @ 08:10) (98% - 100%)    I&O's Summary    07 Oct 2018 07:01  -  07 Oct 2018 08:30  --------------------------------------------------------  IN: 0 mL / OUT: 900 mL / NET: -900 mL    PHYSICAL EXAM:  GENERAL: NAD, thin  HEAD:  Atraumatic, Normocephalic  EYES: EOMI, PERRLA, conjunctiva and sclera clear  NECK: Supple, No JVD  CHEST/LUNG: Clear to auscultation bilaterally; No wheeze  HEART: Regular rate and rhythm; No murmurs, rubs, or gallops  ABDOMEN: Soft, Nontender, Nondistended; Bowel sounds present  EXTREMITIES:   warm and well perfused, No clubbing, cyanosis, or edema  PSYCH: AAOx3  NEUROLOGY: non-focal  SKIN: No rashes or lesions    LABS:                        7.7    14.20 )-----------( 285      ( 07 Oct 2018 08:28 )             22.8     10-07    137  |  101  |  14  ----------------------------<  80  4.4   |  20<L>  |  1.06    Ca    9.3      07 Oct 2018 08:18  Phos  3.0     10-07  Mg     2.1     10-07    TPro  7.9  /  Alb  4.0  /  TBili  4.1<H>  /  DBili  x   /  AST  24  /  ALT  15  /  AlkPhos  122<H>  10-07      RVP: + enterovirus  Blood Cx 2 sets: NG x 48 hours    Consultant(s) Notes Reviewed:    Care Discussed with Consultants/Other Providers: Patient is a 48y old  Male who presents with a chief complaint of SCC    SUBJECTIVE / OVERNIGHT EVENTS:    No overnight events, no CP, SOB, nausea/vomiting  Still with pain, feels improved but not ready for dc  BM yest  still mild dysuria, improve from prior    Tele: PAT 130s, ST 120s  PCA usage: 0.5-1.5 mg PCA usage x 4 hour interval    MEDICATIONS  (STANDING):  docusate sodium 100 milliGRAM(s) Oral three times a day  folic acid 1 milliGRAM(s) Oral daily  heparin  Injectable 5000 Unit(s) SubCutaneous every 12 hours  HYDROmorphone PCA (1 mG/mL) 30 milliLiter(s) PCA Continuous PCA Continuous  influenza   Vaccine 0.5 milliLiter(s) IntraMuscular once  senna 2 Tablet(s) Oral at bedtime  sodium chloride 0.45%. 1000 milliLiter(s) (75 mL/Hr) IV Continuous <Continuous>  tamsulosin 0.4 milliGRAM(s) Oral at bedtime    MEDICATIONS  (PRN):  acetaminophen   Tablet .. 650 milliGRAM(s) Oral every 6 hours PRN Temp greater or equal to 38C (100.4F)  naloxone Injectable 0.1 milliGRAM(s) IV Push every 3 minutes PRN For ANY of the following changes in patient status:  A. RR LESS THAN 10 breaths per minute, B. Oxygen saturation LESS THAN 90%, C. Sedation score of 6  ondansetron Injectable 4 milliGRAM(s) IV Push every 6 hours PRN Nausea    T(C): 37.8 (10-07-18 @ 08:10), Max: 38.3 (10-06-18 @ 09:00)  HR: 86 (10-07-18 @ 08:10) (86 - 110)  BP: 114/78 (10-07-18 @ 08:10) (112/73 - 136/78)  RR: 18 (10-07-18 @ 08:10) (16 - 18)  SpO2: 99% (10-07-18 @ 08:10) (98% - 100%)    I&O's Summary    07 Oct 2018 07:01  -  07 Oct 2018 08:30  --------------------------------------------------------  IN: 0 mL / OUT: 900 mL / NET: -900 mL    PHYSICAL EXAM:  GENERAL: NAD, thin  HEAD:  Atraumatic, Normocephalic  EYES: EOMI, PERRLA, conjunctiva and sclera clear  NECK: Supple, No JVD  CHEST/LUNG: Clear to auscultation bilaterally; No wheeze  HEART: Regular rate and rhythm; No murmurs, rubs, or gallops  ABDOMEN: Soft, Nontender, Nondistended; Bowel sounds present  EXTREMITIES:   warm and well perfused, No clubbing, cyanosis, or edema  PSYCH: AAOx3  NEUROLOGY: non-focal  SKIN: lipoma mid back    LABS:                        7.7    14.20 )-----------( 285      ( 07 Oct 2018 08:28 )             22.8     10-07    137  |  101  |  14  ----------------------------<  80  4.4   |  20<L>  |  1.06    Ca    9.3      07 Oct 2018 08:18  Phos  3.0     10-07  Mg     2.1     10-07    TPro  7.9  /  Alb  4.0  /  TBili  4.1<H>  /  DBili  x   /  AST  24  /  ALT  15  /  AlkPhos  122<H>  10-07      RVP: + enterovirus  Blood Cx 2 sets: NG x 48 hours    Consultant(s) Notes Reviewed:    Care Discussed with Consultants/Other Providers:

## 2018-10-07 NOTE — PROGRESS NOTE ADULT - PROBLEM SELECTOR PLAN 3
Improved, no labs yet for trend/comparison; FeNa 1.2%  - monitor Cr  - f/u labs  - lisinopril on hold for EMEKA, BP ok, if creatinine stable can resume  - UA remains negative  - on trial of flomax Improved, no labs yet for trend/comparison; FeNa 1.2%  - monitor Cr  - f/u labs  - lisinopril on hold for EMEKA, BP ok, creatinine stable resume  - UA remains negative  - on trial of flomax

## 2018-10-07 NOTE — PROGRESS NOTE ADULT - PROBLEM SELECTOR PLAN 4
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  - card eval appreciated, rec no further w/u Likely secondary to sickle cell crisis  - Troponin x 2 negative   - EKG with LVH, TWI in V5   - TTE from 7/2018 with Mild global left ventricular systolic dysfunction.  - card eval appreciated, rec no further w/u  - ST/PAT, start metoprolol

## 2018-10-08 LAB
BACTERIA UR CULT: SIGNIFICANT CHANGE UP
SPECIMEN SOURCE: SIGNIFICANT CHANGE UP

## 2018-10-08 PROCEDURE — 99233 SBSQ HOSP IP/OBS HIGH 50: CPT

## 2018-10-08 RX ADMIN — SENNA PLUS 2 TABLET(S): 8.6 TABLET ORAL at 21:16

## 2018-10-08 RX ADMIN — LISINOPRIL 2.5 MILLIGRAM(S): 2.5 TABLET ORAL at 06:10

## 2018-10-08 RX ADMIN — Medication 1 MILLIGRAM(S): at 13:40

## 2018-10-08 RX ADMIN — HYDROMORPHONE HYDROCHLORIDE 30 MILLILITER(S): 2 INJECTION INTRAMUSCULAR; INTRAVENOUS; SUBCUTANEOUS at 05:55

## 2018-10-08 RX ADMIN — Medication 25 MILLIGRAM(S): at 06:10

## 2018-10-08 RX ADMIN — Medication 100 MILLIGRAM(S): at 13:40

## 2018-10-08 RX ADMIN — HEPARIN SODIUM 5000 UNIT(S): 5000 INJECTION INTRAVENOUS; SUBCUTANEOUS at 06:10

## 2018-10-08 RX ADMIN — Medication 100 MILLIGRAM(S): at 06:11

## 2018-10-08 RX ADMIN — SODIUM CHLORIDE 75 MILLILITER(S): 9 INJECTION, SOLUTION INTRAVENOUS at 03:00

## 2018-10-08 RX ADMIN — HYDROMORPHONE HYDROCHLORIDE 30 MILLILITER(S): 2 INJECTION INTRAMUSCULAR; INTRAVENOUS; SUBCUTANEOUS at 07:15

## 2018-10-08 RX ADMIN — HEPARIN SODIUM 5000 UNIT(S): 5000 INJECTION INTRAVENOUS; SUBCUTANEOUS at 18:09

## 2018-10-08 RX ADMIN — TAMSULOSIN HYDROCHLORIDE 0.4 MILLIGRAM(S): 0.4 CAPSULE ORAL at 21:16

## 2018-10-08 RX ADMIN — SODIUM CHLORIDE 75 MILLILITER(S): 9 INJECTION, SOLUTION INTRAVENOUS at 21:16

## 2018-10-08 RX ADMIN — HYDROMORPHONE HYDROCHLORIDE 30 MILLILITER(S): 2 INJECTION INTRAMUSCULAR; INTRAVENOUS; SUBCUTANEOUS at 19:32

## 2018-10-08 RX ADMIN — HYDROMORPHONE HYDROCHLORIDE 30 MILLILITER(S): 2 INJECTION INTRAMUSCULAR; INTRAVENOUS; SUBCUTANEOUS at 01:56

## 2018-10-08 RX ADMIN — Medication 100 MILLIGRAM(S): at 21:16

## 2018-10-08 NOTE — DIETITIAN INITIAL EVALUATION ADULT. - NS AS NUTRI INTERV STRATEGIES
1- Continue current diet order, which remains appropriate at this time. 2- increase Ensure to Ensure Enlive 240mls 3x daily (1050kcal, 60g protein). 3- Monitor weights, labs, BM's, skin integrity, p.o. intake. 4- Please Encourage po intake, assist with meals and menu selections, provide alternatives PRN. 5- RD remains available, re-consult as needed. Jennie Camacho RD Pager #[phone]

## 2018-10-08 NOTE — PROGRESS NOTE ADULT - PROBLEM SELECTOR PLAN 3
Improved, no labs yet for trend/comparison; FeNa 1.2%  - monitor Cr  -improved  - lisinopril on hold for EMEKA, BP ok, creatinine stable resume  - UA remains negative  - on trial of flomax

## 2018-10-08 NOTE — DIETITIAN INITIAL EVALUATION ADULT. - PERTINENT LABORATORY DATA
10-07 Na137 mmol/L Glu 80 mg/dL K+ 4.4 mmol/L Cr  1.06 mg/dL BUN 14 mg/dL 10-07 Phos 3.0 mg/dL 10-07 Alb 4.0 g/dL

## 2018-10-08 NOTE — DIETITIAN INITIAL EVALUATION ADULT. - PROBLEM SELECTOR PLAN 2
Troponin x 2 negative   EKG with LVH, TWI in V5    TTE from 7/2018 with Mild global left ventricular systolic dysfunction.  Cardiology consult called- DW Fellow- FU recommendations

## 2018-10-08 NOTE — PROGRESS NOTE ADULT - PROBLEM SELECTOR PLAN 5
TTE 7/2018:  Mild global left ventricular systolic dysfunction; Normal right ventricular size and function  - resume lisinopril, low dose of 5 mg  - will add low dose BB, metoprolol 25 XL

## 2018-10-08 NOTE — DIETITIAN INITIAL EVALUATION ADULT. - PROBLEM SELECTOR PLAN 3
likely secondary to NSAID use   Will hold lisinopril   Assess response to IVF   Avoid nephrotoxins  Monitor I& Os

## 2018-10-08 NOTE — PROGRESS NOTE ADULT - PROBLEM SELECTOR PLAN 4
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  - card eval appreciated, rec no further w/u  - ST/PAT, start metoprolol-no further episode of tachycardia

## 2018-10-08 NOTE — DIETITIAN INITIAL EVALUATION ADULT. - OTHER INFO
Nutrition consult received for assessment.  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  Pt reports "fair" PO intake and appetite at this time.  Pt ate ~ 50-75% lunch meal, but 0% supplement ordered 2/2 dislike of chocolate flavor. RDN to implement pt's preferences. Patient denies any nausea/vomiting/diarrhea/constipation or difficulty chewing and swallowing. Usual stated Wt is 120-130 pounds, suggest a 12-22 pounds Wt. loss since "the beginning of this year" per patient. PO intake and supplement intake.  Pt with Hx of severe malnutrition, Dx July '18. At this time Pt was 101.6 @ 7/21, this is consistent with Wt. obtained on this admission.

## 2018-10-08 NOTE — PROGRESS NOTE ADULT - PROBLEM SELECTOR PLAN 1
Fever likely secondary  to enterovirus infection temp curve improved   - Dilaudid PCA will tranisition to PO in AM  - c/w IVF, lungs clear   - bowel regimen/incentive spirometry  -refused labs today

## 2018-10-08 NOTE — PROGRESS NOTE ADULT - SUBJECTIVE AND OBJECTIVE BOX
Patient is a 48y old  Male who presents with a chief complaint of Sickle Crisis, Enterovirus infection (07 Oct 2018 08:23)      SUBJECTIVE / OVERNIGHT EVENTS: States noted pain this AM in LT lateral thigh and back requesting to stay on PCA pump. discussed minimal usage on PCA pump feels like he is getting better and tries not to use the pump educated on pump usage if in pain to use pump. Denies fever/SOB/N/V.     MEDICATIONS  (STANDING):  docusate sodium 100 milliGRAM(s) Oral three times a day  folic acid 1 milliGRAM(s) Oral daily  heparin  Injectable 5000 Unit(s) SubCutaneous every 12 hours  HYDROmorphone PCA (1 mG/mL) 30 milliLiter(s) PCA Continuous PCA Continuous  influenza   Vaccine 0.5 milliLiter(s) IntraMuscular once  lisinopril 2.5 milliGRAM(s) Oral daily  metoprolol succinate ER 25 milliGRAM(s) Oral daily  senna 2 Tablet(s) Oral at bedtime  sodium chloride 0.45%. 1000 milliLiter(s) (75 mL/Hr) IV Continuous <Continuous>  tamsulosin 0.4 milliGRAM(s) Oral at bedtime    MEDICATIONS  (PRN):  acetaminophen   Tablet .. 650 milliGRAM(s) Oral every 6 hours PRN Temp greater or equal to 38C (100.4F)  naloxone Injectable 0.1 milliGRAM(s) IV Push every 3 minutes PRN For ANY of the following changes in patient status:  A. RR LESS THAN 10 breaths per minute, B. Oxygen saturation LESS THAN 90%, C. Sedation score of 6  ondansetron Injectable 4 milliGRAM(s) IV Push every 6 hours PRN Nausea        CAPILLARY BLOOD GLUCOSE        I&O's Summary    07 Oct 2018 07:01  -  08 Oct 2018 07:00  --------------------------------------------------------  IN: 1170 mL / OUT: 2000 mL / NET: -830 mL    08 Oct 2018 07:01  -  08 Oct 2018 11:07  --------------------------------------------------------  IN: 0 mL / OUT: 400 mL / NET: -400 mL        T(C): 37.1 (10-08-18 @ 05:55), Max: 37.2 (10-07-18 @ 21:56)  HR: 70 (10-08-18 @ 05:55) (68 - 86)  BP: 110/69 (10-08-18 @ 05:55) (94/54 - 117/67)  RR: 18 (10-08-18 @ 05:55) (16 - 18)  SpO2: 100% (10-08-18 @ 05:55) (94% - 100%)    PHYSICAL EXAM:  GENERAL: NAD, well-developed  HEAD:  Atraumatic, Normocephalic  EYES: EOMI, PERRLA, conjunctiva and sclera clear  NECK: Supple, No JVD  CHEST/LUNG: Clear to auscultation bilaterally; No wheeze  HEART: Regular rate and rhythm; No murmurs, rubs, or gallops  ABDOMEN: Soft, Nontender, Nondistended; Bowel sounds present  EXTREMITIES:  2+ Peripheral Pulses, No clubbing, cyanosis, or edema  PSYCH: AAOx3      LABS:                        7.7    14.20 )-----------( 285      ( 07 Oct 2018 08:28 )             22.8     10-07    137  |  101  |  14  ----------------------------<  80  4.4   |  20<L>  |  1.06    Ca    9.3      07 Oct 2018 08:18  Phos  3.0     10-07  Mg     2.1     10-07    TPro  7.9  /  Alb  4.0  /  TBili  4.1<H>  /  DBili  x   /  AST  24  /  ALT  15  /  AlkPhos  122<H>  10-07        Specimen Source: URINE MIDSTREAM (10.06.18 @ 14:21)    Culture - Blood:   NO ORGANISMS ISOLATED  NO ORGANISMS ISOLATED AT 72 HRS. (10.05.18 @ 00:05)    Culture - Blood:   NO ORGANISMS ISOLATED  NO ORGANISMS ISOLATED AT 72 HRS. (10.05.18 @ 00:02)    RADIOLOGY & ADDITIONAL TESTS:    Imaging Personally Reviewed:    Consultant(s) Notes Reviewed:      Care Discussed with Consultants/Other Providers:

## 2018-10-09 LAB
BUN SERPL-MCNC: 14 MG/DL — SIGNIFICANT CHANGE UP (ref 7–23)
C TRACH RRNA SPEC QL NAA+PROBE: SIGNIFICANT CHANGE UP
CALCIUM SERPL-MCNC: 9.1 MG/DL — SIGNIFICANT CHANGE UP (ref 8.4–10.5)
CHLORIDE SERPL-SCNC: 102 MMOL/L — SIGNIFICANT CHANGE UP (ref 98–107)
CO2 SERPL-SCNC: 20 MMOL/L — LOW (ref 22–31)
CREAT SERPL-MCNC: 0.99 MG/DL — SIGNIFICANT CHANGE UP (ref 0.5–1.3)
GLUCOSE SERPL-MCNC: 94 MG/DL — SIGNIFICANT CHANGE UP (ref 70–99)
HCT VFR BLD CALC: 19.3 % — CRITICAL LOW (ref 39–50)
HGB BLD-MCNC: 6.3 G/DL — CRITICAL LOW (ref 13–17)
MAGNESIUM SERPL-MCNC: 1.9 MG/DL — SIGNIFICANT CHANGE UP (ref 1.6–2.6)
MCHC RBC-ENTMCNC: 27.5 PG — SIGNIFICANT CHANGE UP (ref 27–34)
MCHC RBC-ENTMCNC: 32.6 % — SIGNIFICANT CHANGE UP (ref 32–36)
MCV RBC AUTO: 84.3 FL — SIGNIFICANT CHANGE UP (ref 80–100)
N GONORRHOEA RRNA SPEC QL NAA+PROBE: SIGNIFICANT CHANGE UP
NRBC # FLD: 0.22 — SIGNIFICANT CHANGE UP
NRBC FLD-RTO: 1.6 — SIGNIFICANT CHANGE UP
PLATELET # BLD AUTO: 312 K/UL — SIGNIFICANT CHANGE UP (ref 150–400)
PMV BLD: 10.2 FL — SIGNIFICANT CHANGE UP (ref 7–13)
POTASSIUM SERPL-MCNC: 4.5 MMOL/L — SIGNIFICANT CHANGE UP (ref 3.5–5.3)
POTASSIUM SERPL-SCNC: 4.5 MMOL/L — SIGNIFICANT CHANGE UP (ref 3.5–5.3)
RBC # BLD: 2.29 M/UL — LOW (ref 4.2–5.8)
RBC # FLD: 21.6 % — HIGH (ref 10.3–14.5)
SODIUM SERPL-SCNC: 136 MMOL/L — SIGNIFICANT CHANGE UP (ref 135–145)
SPECIMEN SOURCE: SIGNIFICANT CHANGE UP
WBC # BLD: 13.39 K/UL — HIGH (ref 3.8–10.5)
WBC # FLD AUTO: 13.39 K/UL — HIGH (ref 3.8–10.5)

## 2018-10-09 PROCEDURE — 99233 SBSQ HOSP IP/OBS HIGH 50: CPT

## 2018-10-09 RX ORDER — HYDROMORPHONE HYDROCHLORIDE 2 MG/ML
2 INJECTION INTRAMUSCULAR; INTRAVENOUS; SUBCUTANEOUS ONCE
Qty: 0 | Refills: 0 | Status: DISCONTINUED | OUTPATIENT
Start: 2018-10-09 | End: 2018-10-09

## 2018-10-09 RX ADMIN — HEPARIN SODIUM 5000 UNIT(S): 5000 INJECTION INTRAVENOUS; SUBCUTANEOUS at 17:56

## 2018-10-09 RX ADMIN — Medication 25 MILLIGRAM(S): at 05:52

## 2018-10-09 RX ADMIN — HYDROMORPHONE HYDROCHLORIDE 30 MILLILITER(S): 2 INJECTION INTRAMUSCULAR; INTRAVENOUS; SUBCUTANEOUS at 20:54

## 2018-10-09 RX ADMIN — SODIUM CHLORIDE 50 MILLILITER(S): 9 INJECTION, SOLUTION INTRAVENOUS at 21:09

## 2018-10-09 RX ADMIN — HYDROMORPHONE HYDROCHLORIDE 2 MILLIGRAM(S): 2 INJECTION INTRAMUSCULAR; INTRAVENOUS; SUBCUTANEOUS at 19:53

## 2018-10-09 RX ADMIN — HYDROMORPHONE HYDROCHLORIDE 2 MILLIGRAM(S): 2 INJECTION INTRAMUSCULAR; INTRAVENOUS; SUBCUTANEOUS at 20:53

## 2018-10-09 RX ADMIN — Medication 1 MILLIGRAM(S): at 13:02

## 2018-10-09 RX ADMIN — LISINOPRIL 2.5 MILLIGRAM(S): 2.5 TABLET ORAL at 05:52

## 2018-10-09 RX ADMIN — HYDROMORPHONE HYDROCHLORIDE 30 MILLILITER(S): 2 INJECTION INTRAMUSCULAR; INTRAVENOUS; SUBCUTANEOUS at 16:57

## 2018-10-09 RX ADMIN — HYDROMORPHONE HYDROCHLORIDE 30 MILLILITER(S): 2 INJECTION INTRAMUSCULAR; INTRAVENOUS; SUBCUTANEOUS at 07:29

## 2018-10-09 RX ADMIN — TAMSULOSIN HYDROCHLORIDE 0.4 MILLIGRAM(S): 0.4 CAPSULE ORAL at 21:10

## 2018-10-09 RX ADMIN — HEPARIN SODIUM 5000 UNIT(S): 5000 INJECTION INTRAVENOUS; SUBCUTANEOUS at 05:52

## 2018-10-09 NOTE — PROGRESS NOTE ADULT - PROBLEM SELECTOR PLAN 4
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  - card eval appreciated, rec no further w/u  - ST/PAT, on metoprolol-no further episode of tachycardia

## 2018-10-09 NOTE — PROGRESS NOTE ADULT - PROBLEM SELECTOR PLAN 5
TTE 7/2018:  Mild global left ventricular systolic dysfunction; Normal right ventricular size and function  - resume lisinopril, low dose of 5 mg  -  metoprolol 25 XL

## 2018-10-09 NOTE — PROGRESS NOTE ADULT - SUBJECTIVE AND OBJECTIVE BOX
Patient is a 48y old  Male who presents with a chief complaint of SCC x 1-2 days (08 Oct 2018 11:06)      SUBJECTIVE / OVERNIGHT EVENTS: afebrile. used 6.5 mg of dilaudid our past 24 hrs    MEDICATIONS  (STANDING):  docusate sodium 100 milliGRAM(s) Oral three times a day  folic acid 1 milliGRAM(s) Oral daily  heparin  Injectable 5000 Unit(s) SubCutaneous every 12 hours  HYDROmorphone PCA (1 mG/mL) 30 milliLiter(s) PCA Continuous PCA Continuous  influenza   Vaccine 0.5 milliLiter(s) IntraMuscular once  lisinopril 2.5 milliGRAM(s) Oral daily  metoprolol succinate ER 25 milliGRAM(s) Oral daily  senna 2 Tablet(s) Oral at bedtime  sodium chloride 0.45%. 1000 milliLiter(s) (75 mL/Hr) IV Continuous <Continuous>  tamsulosin 0.4 milliGRAM(s) Oral at bedtime    MEDICATIONS  (PRN):  acetaminophen   Tablet .. 650 milliGRAM(s) Oral every 6 hours PRN Temp greater or equal to 38C (100.4F)  naloxone Injectable 0.1 milliGRAM(s) IV Push every 3 minutes PRN For ANY of the following changes in patient status:  A. RR LESS THAN 10 breaths per minute, B. Oxygen saturation LESS THAN 90%, C. Sedation score of 6  ondansetron Injectable 4 milliGRAM(s) IV Push every 6 hours PRN Nausea        CAPILLARY BLOOD GLUCOSE        I&O's Summary    08 Oct 2018 07:01  -  09 Oct 2018 07:00  --------------------------------------------------------  IN: 0 mL / OUT: 400 mL / NET: -400 mL        T(C): 36.7 (10-09-18 @ 07:26), Max: 36.9 (10-08-18 @ 11:14)  HR: 75 (10-09-18 @ 07:26) (63 - 76)  BP: 117/53 (10-09-18 @ 07:26) (111/65 - 130/62)  RR: 18 (10-09-18 @ 07:26) (16 - 18)  SpO2: 100% (10-09-18 @ 07:26) (100% - 100%)    PHYSICAL EXAM:  GENERAL: NAD, well-developed  HEAD:  Atraumatic, Normocephalic  EYES: EOMI, PERRLA, conjunctiva and sclera clear  NECK: Supple, No JVD  CHEST/LUNG: Clear to auscultation bilaterally; No wheeze  HEART: Regular rate and rhythm; No murmurs, rubs, or gallops  ABDOMEN: Soft, Nontender, Nondistended; Bowel sounds present  EXTREMITIES:  2+ Peripheral Pulses, No clubbing, cyanosis, or edema  PSYCH: AAOx3  NEUROLOGY: non-focal  SKIN: No rashes or lesions    LABS:                      RADIOLOGY & ADDITIONAL TESTS:    Imaging Personally Reviewed:    Consultant(s) Notes Reviewed:      Care Discussed with Consultants/Other Providers: Patient is a 48y old  Male who presents with a chief complaint of SCC x 1-2 days (08 Oct 2018 11:06)      SUBJECTIVE / OVERNIGHT EVENTS: afebrile. used 6.5 mg of dilaudid our past 24 hrs. does not feel ready to switch to orals today.     MEDICATIONS  (STANDING):  docusate sodium 100 milliGRAM(s) Oral three times a day  folic acid 1 milliGRAM(s) Oral daily  heparin  Injectable 5000 Unit(s) SubCutaneous every 12 hours  HYDROmorphone PCA (1 mG/mL) 30 milliLiter(s) PCA Continuous PCA Continuous  influenza   Vaccine 0.5 milliLiter(s) IntraMuscular once  lisinopril 2.5 milliGRAM(s) Oral daily  metoprolol succinate ER 25 milliGRAM(s) Oral daily  senna 2 Tablet(s) Oral at bedtime  sodium chloride 0.45%. 1000 milliLiter(s) (75 mL/Hr) IV Continuous <Continuous>  tamsulosin 0.4 milliGRAM(s) Oral at bedtime    MEDICATIONS  (PRN):  acetaminophen   Tablet .. 650 milliGRAM(s) Oral every 6 hours PRN Temp greater or equal to 38C (100.4F)  naloxone Injectable 0.1 milliGRAM(s) IV Push every 3 minutes PRN For ANY of the following changes in patient status:  A. RR LESS THAN 10 breaths per minute, B. Oxygen saturation LESS THAN 90%, C. Sedation score of 6  ondansetron Injectable 4 milliGRAM(s) IV Push every 6 hours PRN Nausea        CAPILLARY BLOOD GLUCOSE        I&O's Summary    08 Oct 2018 07:01  -  09 Oct 2018 07:00  --------------------------------------------------------  IN: 0 mL / OUT: 400 mL / NET: -400 mL        T(C): 36.7 (10-09-18 @ 07:26), Max: 36.9 (10-08-18 @ 11:14)  HR: 75 (10-09-18 @ 07:26) (63 - 76)  BP: 117/53 (10-09-18 @ 07:26) (111/65 - 130/62)  RR: 18 (10-09-18 @ 07:26) (16 - 18)  SpO2: 100% (10-09-18 @ 07:26) (100% - 100%)    PHYSICAL EXAM:  GENERAL: NAD, well-developed  HEAD:  Atraumatic, Normocephalic  EYES: EOMI, PERRLA, conjunctiva and sclera clear  NECK: Supple, No JVD  CHEST/LUNG: Clear to auscultation bilaterally; No wheeze  HEART: Regular rate and rhythm; No murmurs, rubs, or gallops  ABDOMEN: Soft, Nontender, Nondistended; Bowel sounds present  EXTREMITIES:  2+ Peripheral Pulses, No clubbing, cyanosis, or edema  PSYCH: AAOx3  NEUROLOGY: non-focal  SKIN: No rashes or lesions    LABS:          10-09    136  |  102  |  14  ----------------------------<  94  4.5   |  20<L>  |  0.99    Ca    9.1      09 Oct 2018 06:55  Mg     1.9     10-09        CAPILLARY BLOOD GLUCOSE                  Radiology                RADIOLOGY & ADDITIONAL TESTS:    Imaging Personally Reviewed:    Consultant(s) Notes Reviewed:      Care Discussed with Consultants/Other Providers:

## 2018-10-09 NOTE — PROGRESS NOTE ADULT - PROBLEM SELECTOR PLAN 1
Fever likely secondary  to enterovirus infection temp curve improved   - Dilaudid PCA will tranisition to PO in AM  - c/w IVF, lungs clear   - bowel regimen/incentive spirometry  -labs reviewed yesterday LDH improved Fever likely secondary  to enterovirus infection temp curve improved   - Dilaudid PCA on current dose   - c/w IVF, lungs clear   - bowel regimen/incentive spirometry  -f/u labs today

## 2018-10-10 LAB
BACTERIA BLD CULT: SIGNIFICANT CHANGE UP
BACTERIA BLD CULT: SIGNIFICANT CHANGE UP
BUN SERPL-MCNC: 11 MG/DL — SIGNIFICANT CHANGE UP (ref 7–23)
CALCIUM SERPL-MCNC: 9.3 MG/DL — SIGNIFICANT CHANGE UP (ref 8.4–10.5)
CHLORIDE SERPL-SCNC: 97 MMOL/L — LOW (ref 98–107)
CO2 SERPL-SCNC: 23 MMOL/L — SIGNIFICANT CHANGE UP (ref 22–31)
CREAT SERPL-MCNC: 0.94 MG/DL — SIGNIFICANT CHANGE UP (ref 0.5–1.3)
GLUCOSE SERPL-MCNC: 104 MG/DL — HIGH (ref 70–99)
HCT VFR BLD CALC: 20 % — CRITICAL LOW (ref 39–50)
HGB BLD-MCNC: 6.7 G/DL — CRITICAL LOW (ref 13–17)
MAGNESIUM SERPL-MCNC: 1.8 MG/DL — SIGNIFICANT CHANGE UP (ref 1.6–2.6)
MCHC RBC-ENTMCNC: 28.2 PG — SIGNIFICANT CHANGE UP (ref 27–34)
MCHC RBC-ENTMCNC: 33.5 % — SIGNIFICANT CHANGE UP (ref 32–36)
MCV RBC AUTO: 84 FL — SIGNIFICANT CHANGE UP (ref 80–100)
NRBC # FLD: 0.81 — SIGNIFICANT CHANGE UP
NRBC FLD-RTO: 7 — SIGNIFICANT CHANGE UP
PLATELET # BLD AUTO: 294 K/UL — SIGNIFICANT CHANGE UP (ref 150–400)
PMV BLD: 10 FL — SIGNIFICANT CHANGE UP (ref 7–13)
POTASSIUM SERPL-MCNC: 4.7 MMOL/L — SIGNIFICANT CHANGE UP (ref 3.5–5.3)
POTASSIUM SERPL-SCNC: 4.7 MMOL/L — SIGNIFICANT CHANGE UP (ref 3.5–5.3)
RBC # BLD: 2.38 M/UL — LOW (ref 4.2–5.8)
RBC # FLD: 22.2 % — HIGH (ref 10.3–14.5)
SODIUM SERPL-SCNC: 134 MMOL/L — LOW (ref 135–145)
WBC # BLD: 11.55 K/UL — HIGH (ref 3.8–10.5)
WBC # FLD AUTO: 11.55 K/UL — HIGH (ref 3.8–10.5)

## 2018-10-10 PROCEDURE — 99233 SBSQ HOSP IP/OBS HIGH 50: CPT

## 2018-10-10 RX ORDER — KETOROLAC TROMETHAMINE 30 MG/ML
15 SYRINGE (ML) INJECTION EVERY 6 HOURS
Qty: 0 | Refills: 0 | Status: DISCONTINUED | OUTPATIENT
Start: 2018-10-10 | End: 2018-10-12

## 2018-10-10 RX ADMIN — Medication 15 MILLIGRAM(S): at 17:52

## 2018-10-10 RX ADMIN — SODIUM CHLORIDE 50 MILLILITER(S): 9 INJECTION, SOLUTION INTRAVENOUS at 16:00

## 2018-10-10 RX ADMIN — HEPARIN SODIUM 5000 UNIT(S): 5000 INJECTION INTRAVENOUS; SUBCUTANEOUS at 17:52

## 2018-10-10 RX ADMIN — LISINOPRIL 2.5 MILLIGRAM(S): 2.5 TABLET ORAL at 05:25

## 2018-10-10 RX ADMIN — HYDROMORPHONE HYDROCHLORIDE 30 MILLILITER(S): 2 INJECTION INTRAMUSCULAR; INTRAVENOUS; SUBCUTANEOUS at 07:31

## 2018-10-10 RX ADMIN — Medication 25 MILLIGRAM(S): at 05:25

## 2018-10-10 RX ADMIN — SODIUM CHLORIDE 50 MILLILITER(S): 9 INJECTION, SOLUTION INTRAVENOUS at 07:29

## 2018-10-10 RX ADMIN — HEPARIN SODIUM 5000 UNIT(S): 5000 INJECTION INTRAVENOUS; SUBCUTANEOUS at 05:25

## 2018-10-10 RX ADMIN — HYDROMORPHONE HYDROCHLORIDE 30 MILLILITER(S): 2 INJECTION INTRAMUSCULAR; INTRAVENOUS; SUBCUTANEOUS at 19:08

## 2018-10-10 RX ADMIN — TAMSULOSIN HYDROCHLORIDE 0.4 MILLIGRAM(S): 0.4 CAPSULE ORAL at 21:43

## 2018-10-10 RX ADMIN — Medication 1 MILLIGRAM(S): at 11:46

## 2018-10-10 RX ADMIN — Medication 15 MILLIGRAM(S): at 18:00

## 2018-10-10 NOTE — PROGRESS NOTE ADULT - PROBLEM SELECTOR PLAN 3
Improved, no labs yet for trend/comparison; FeNa 1.2%  - monitor Cr  -improved  - lisinopril on hold for EMEKA, BP ok, creatinine stable resume  - UA remains negative  - on trial of flomax Improved, no labs yet for trend/comparison; FeNa 1.2%  - monitor Cr  -improved  - lisinopril on hold for EMEKA, BP ok, creatinine stable resume  - UA remains negative G/C neg   - on trial of flomax

## 2018-10-10 NOTE — PROGRESS NOTE ADULT - SUBJECTIVE AND OBJECTIVE BOX
Patient is a 48y old  Male who presents with a chief complaint of SCC x 1-2 days (09 Oct 2018 09:11)      SUBJECTIVE / OVERNIGHT EVENTS:    MEDICATIONS  (STANDING):  docusate sodium 100 milliGRAM(s) Oral three times a day  folic acid 1 milliGRAM(s) Oral daily  heparin  Injectable 5000 Unit(s) SubCutaneous every 12 hours  HYDROmorphone PCA (1 mG/mL) 30 milliLiter(s) PCA Continuous PCA Continuous  influenza   Vaccine 0.5 milliLiter(s) IntraMuscular once  lisinopril 2.5 milliGRAM(s) Oral daily  metoprolol succinate ER 25 milliGRAM(s) Oral daily  senna 2 Tablet(s) Oral at bedtime  sodium chloride 0.45%. 1000 milliLiter(s) (50 mL/Hr) IV Continuous <Continuous>  tamsulosin 0.4 milliGRAM(s) Oral at bedtime    MEDICATIONS  (PRN):  acetaminophen   Tablet .. 650 milliGRAM(s) Oral every 6 hours PRN Temp greater or equal to 38C (100.4F)  naloxone Injectable 0.1 milliGRAM(s) IV Push every 3 minutes PRN For ANY of the following changes in patient status:  A. RR LESS THAN 10 breaths per minute, B. Oxygen saturation LESS THAN 90%, C. Sedation score of 6  ondansetron Injectable 4 milliGRAM(s) IV Push every 6 hours PRN Nausea        CAPILLARY BLOOD GLUCOSE        I&O's Summary      T(C): 37 (10-10-18 @ 11:15), Max: 37 (10-10-18 @ 11:15)  HR: 82 (10-10-18 @ 11:15) (65 - 98)  BP: 127/67 (10-10-18 @ 11:15) (112/70 - 136/63)  RR: 17 (10-10-18 @ 11:15) (17 - 18)  SpO2: 100% (10-10-18 @ 11:15) (99% - 100%)    PHYSICAL EXAM:  GENERAL: NAD, well-developed  HEAD:  Atraumatic, Normocephalic  EYES: EOMI, PERRLA, conjunctiva and sclera clear  NECK: Supple, No JVD  CHEST/LUNG: Clear to auscultation bilaterally; No wheeze  HEART: Regular rate and rhythm; No murmurs, rubs, or gallops  ABDOMEN: Soft, Nontender, Nondistended; Bowel sounds present  EXTREMITIES:  2+ Peripheral Pulses, No clubbing, cyanosis, or edema  PSYCH: AAOx3  NEUROLOGY: non-focal  SKIN: No rashes or lesions    LABS:                        6.7    11.55 )-----------( 294      ( 10 Oct 2018 07:35 )             20.0     10-10    134<L>  |  97<L>  |  11  ----------------------------<  104<H>  4.7   |  23  |  0.94    Ca    9.3      10 Oct 2018 07:35  Mg     1.8     10-10                  RADIOLOGY & ADDITIONAL TESTS:    Imaging Personally Reviewed:    Consultant(s) Notes Reviewed:      Care Discussed with Consultants/Other Providers: Patient is a 48y old  Male who presents with a chief complaint of SCC x 1-2 days (09 Oct 2018 09:11)      SUBJECTIVE / OVERNIGHT EVENTS: lost IV access attempted to be converted to PO meds yesterday intense pain had to be placed on PCA. used 15 mg IV dilaudid on PCA pump since midnight last night. Pain all over on bones this AM states better. denies N/N +BM yesterday    MEDICATIONS  (STANDING):  docusate sodium 100 milliGRAM(s) Oral three times a day  folic acid 1 milliGRAM(s) Oral daily  heparin  Injectable 5000 Unit(s) SubCutaneous every 12 hours  HYDROmorphone PCA (1 mG/mL) 30 milliLiter(s) PCA Continuous PCA Continuous  influenza   Vaccine 0.5 milliLiter(s) IntraMuscular once  lisinopril 2.5 milliGRAM(s) Oral daily  metoprolol succinate ER 25 milliGRAM(s) Oral daily  senna 2 Tablet(s) Oral at bedtime  sodium chloride 0.45%. 1000 milliLiter(s) (50 mL/Hr) IV Continuous <Continuous>  tamsulosin 0.4 milliGRAM(s) Oral at bedtime    MEDICATIONS  (PRN):  acetaminophen   Tablet .. 650 milliGRAM(s) Oral every 6 hours PRN Temp greater or equal to 38C (100.4F)  naloxone Injectable 0.1 milliGRAM(s) IV Push every 3 minutes PRN For ANY of the following changes in patient status:  A. RR LESS THAN 10 breaths per minute, B. Oxygen saturation LESS THAN 90%, C. Sedation score of 6  ondansetron Injectable 4 milliGRAM(s) IV Push every 6 hours PRN Nausea        CAPILLARY BLOOD GLUCOSE        I&O's Summary      T(C): 37 (10-10-18 @ 11:15), Max: 37 (10-10-18 @ 11:15)  HR: 82 (10-10-18 @ 11:15) (65 - 98)  BP: 127/67 (10-10-18 @ 11:15) (112/70 - 136/63)  RR: 17 (10-10-18 @ 11:15) (17 - 18)  SpO2: 100% (10-10-18 @ 11:15) (99% - 100%)    PHYSICAL EXAM:  GENERAL: NAD, well-developed  HEAD:  Atraumatic, Normocephalic  EYES: EOMI, PERRLA, conjunctiva and sclera clear  NECK: Supple, No JVD  CHEST/LUNG: Clear to auscultation bilaterally; No wheeze  HEART: Regular rate and rhythm; No murmurs, rubs, or gallops  ABDOMEN: Soft, Nontender, Nondistended; Bowel sounds present  EXTREMITIES:  2+ Peripheral Pulses, No clubbing, cyanosis, or edema  PSYCH: AAOx3  NEUROLOGY: non-focal  SKIN: No rashes or lesions    LABS:                        6.7    11.55 )-----------( 294      ( 10 Oct 2018 07:35 )             20.0     10-10    134<L>  |  97<L>  |  11  ----------------------------<  104<H>  4.7   |  23  |  0.94    Ca    9.3      10 Oct 2018 07:35  Mg     1.8     10-10                  RADIOLOGY & ADDITIONAL TESTS:    Imaging Personally Reviewed:    Consultant(s) Notes Reviewed:      Care Discussed with Consultants/Other Providers:

## 2018-10-10 NOTE — PROGRESS NOTE ADULT - PROBLEM SELECTOR PLAN 1
Fever likely secondary  to enterovirus infection temp curve improved   - Dilaudid PCA on current dose   - c/w IVF, lungs clear   - bowel regimen/incentive spirometry  -f/u labs today Fever likely secondary  to enterovirus infection temp curve improved   - Dilaudid PCA on current dose add ketoralac 15 IV q6 to minimize opiate usage  - c/w IVF, lungs clear   - bowel regimen/incentive spirometry  -

## 2018-10-11 LAB
ALBUMIN SERPL ELPH-MCNC: 3.6 G/DL — SIGNIFICANT CHANGE UP (ref 3.3–5)
ALP SERPL-CCNC: 155 U/L — HIGH (ref 40–120)
ALT FLD-CCNC: 19 U/L — SIGNIFICANT CHANGE UP (ref 4–41)
AST SERPL-CCNC: 32 U/L — SIGNIFICANT CHANGE UP (ref 4–40)
BASOPHILS # BLD AUTO: 0.04 K/UL — SIGNIFICANT CHANGE UP (ref 0–0.2)
BASOPHILS NFR BLD AUTO: 0.4 % — SIGNIFICANT CHANGE UP (ref 0–2)
BILIRUB DIRECT SERPL-MCNC: 0.6 MG/DL — HIGH (ref 0.1–0.2)
BILIRUB SERPL-MCNC: 2.2 MG/DL — HIGH (ref 0.2–1.2)
BUN SERPL-MCNC: 21 MG/DL — SIGNIFICANT CHANGE UP (ref 7–23)
CALCIUM SERPL-MCNC: 9 MG/DL — SIGNIFICANT CHANGE UP (ref 8.4–10.5)
CHLORIDE SERPL-SCNC: 99 MMOL/L — SIGNIFICANT CHANGE UP (ref 98–107)
CO2 SERPL-SCNC: 24 MMOL/L — SIGNIFICANT CHANGE UP (ref 22–31)
CREAT SERPL-MCNC: 1.22 MG/DL — SIGNIFICANT CHANGE UP (ref 0.5–1.3)
EOSINOPHIL # BLD AUTO: 0.55 K/UL — HIGH (ref 0–0.5)
EOSINOPHIL NFR BLD AUTO: 5.7 % — SIGNIFICANT CHANGE UP (ref 0–6)
GLUCOSE SERPL-MCNC: 88 MG/DL — SIGNIFICANT CHANGE UP (ref 70–99)
HCT VFR BLD CALC: 18.9 % — CRITICAL LOW (ref 39–50)
HGB BLD-MCNC: 6.3 G/DL — CRITICAL LOW (ref 13–17)
IMM GRANULOCYTES # BLD AUTO: 0.12 # — SIGNIFICANT CHANGE UP
IMM GRANULOCYTES NFR BLD AUTO: 1.2 % — SIGNIFICANT CHANGE UP (ref 0–1.5)
LDH SERPL L TO P-CCNC: 320 U/L — HIGH (ref 135–225)
LYMPHOCYTES # BLD AUTO: 1.21 K/UL — SIGNIFICANT CHANGE UP (ref 1–3.3)
LYMPHOCYTES # BLD AUTO: 12.5 % — LOW (ref 13–44)
MAGNESIUM SERPL-MCNC: 2.1 MG/DL — SIGNIFICANT CHANGE UP (ref 1.6–2.6)
MCHC RBC-ENTMCNC: 27.4 PG — SIGNIFICANT CHANGE UP (ref 27–34)
MCHC RBC-ENTMCNC: 33.3 % — SIGNIFICANT CHANGE UP (ref 32–36)
MCV RBC AUTO: 82.2 FL — SIGNIFICANT CHANGE UP (ref 80–100)
MONOCYTES # BLD AUTO: 0.95 K/UL — HIGH (ref 0–0.9)
MONOCYTES NFR BLD AUTO: 9.8 % — SIGNIFICANT CHANGE UP (ref 2–14)
NEUTROPHILS # BLD AUTO: 6.83 K/UL — SIGNIFICANT CHANGE UP (ref 1.8–7.4)
NEUTROPHILS NFR BLD AUTO: 70.4 % — SIGNIFICANT CHANGE UP (ref 43–77)
NRBC # FLD: 0.84 — SIGNIFICANT CHANGE UP
NRBC FLD-RTO: 8.7 — SIGNIFICANT CHANGE UP
PLATELET # BLD AUTO: 276 K/UL — SIGNIFICANT CHANGE UP (ref 150–400)
PMV BLD: 10.4 FL — SIGNIFICANT CHANGE UP (ref 7–13)
POTASSIUM SERPL-MCNC: 4.8 MMOL/L — SIGNIFICANT CHANGE UP (ref 3.5–5.3)
POTASSIUM SERPL-SCNC: 4.8 MMOL/L — SIGNIFICANT CHANGE UP (ref 3.5–5.3)
PROT SERPL-MCNC: 6.9 G/DL — SIGNIFICANT CHANGE UP (ref 6–8.3)
RBC # BLD: 2.3 M/UL — LOW (ref 4.2–5.8)
RBC # FLD: 22.5 % — HIGH (ref 10.3–14.5)
RETICS #: 181 K/UL — HIGH (ref 25–125)
RETICS/RBC NFR: 7.9 % — HIGH (ref 0.5–2.5)
SODIUM SERPL-SCNC: 135 MMOL/L — SIGNIFICANT CHANGE UP (ref 135–145)
WBC # BLD: 9.7 K/UL — SIGNIFICANT CHANGE UP (ref 3.8–10.5)
WBC # FLD AUTO: 9.7 K/UL — SIGNIFICANT CHANGE UP (ref 3.8–10.5)

## 2018-10-11 PROCEDURE — 99233 SBSQ HOSP IP/OBS HIGH 50: CPT

## 2018-10-11 RX ORDER — HYDROMORPHONE HYDROCHLORIDE 2 MG/ML
30 INJECTION INTRAMUSCULAR; INTRAVENOUS; SUBCUTANEOUS
Qty: 0 | Refills: 0 | Status: DISCONTINUED | OUTPATIENT
Start: 2018-10-11 | End: 2018-10-12

## 2018-10-11 RX ADMIN — Medication 1 MILLIGRAM(S): at 14:31

## 2018-10-11 RX ADMIN — Medication 15 MILLIGRAM(S): at 21:14

## 2018-10-11 RX ADMIN — Medication 15 MILLIGRAM(S): at 05:37

## 2018-10-11 RX ADMIN — TAMSULOSIN HYDROCHLORIDE 0.4 MILLIGRAM(S): 0.4 CAPSULE ORAL at 22:18

## 2018-10-11 RX ADMIN — HEPARIN SODIUM 5000 UNIT(S): 5000 INJECTION INTRAVENOUS; SUBCUTANEOUS at 05:37

## 2018-10-11 RX ADMIN — Medication 15 MILLIGRAM(S): at 14:45

## 2018-10-11 RX ADMIN — SODIUM CHLORIDE 50 MILLILITER(S): 9 INJECTION, SOLUTION INTRAVENOUS at 10:32

## 2018-10-11 RX ADMIN — Medication 100 MILLIGRAM(S): at 22:18

## 2018-10-11 RX ADMIN — HYDROMORPHONE HYDROCHLORIDE 30 MILLILITER(S): 2 INJECTION INTRAMUSCULAR; INTRAVENOUS; SUBCUTANEOUS at 10:29

## 2018-10-11 RX ADMIN — HYDROMORPHONE HYDROCHLORIDE 30 MILLILITER(S): 2 INJECTION INTRAMUSCULAR; INTRAVENOUS; SUBCUTANEOUS at 07:10

## 2018-10-11 RX ADMIN — HYDROMORPHONE HYDROCHLORIDE 30 MILLILITER(S): 2 INJECTION INTRAMUSCULAR; INTRAVENOUS; SUBCUTANEOUS at 19:17

## 2018-10-11 RX ADMIN — HEPARIN SODIUM 5000 UNIT(S): 5000 INJECTION INTRAVENOUS; SUBCUTANEOUS at 18:33

## 2018-10-11 RX ADMIN — Medication 15 MILLIGRAM(S): at 14:30

## 2018-10-11 RX ADMIN — Medication 15 MILLIGRAM(S): at 00:02

## 2018-10-11 RX ADMIN — Medication 15 MILLIGRAM(S): at 20:10

## 2018-10-11 RX ADMIN — Medication 25 MILLIGRAM(S): at 05:37

## 2018-10-11 NOTE — PROGRESS NOTE ADULT - PROBLEM SELECTOR PLAN 1
Fever likely secondary  to enterovirus infection temp curve improved   - decrease Dilaudid PCA 0.3 mg demand cont with  ketoralac 15 IV q6 to minimize opiate usage decreased PCA usage if improved will transition to oral in AM  - c/w IVF, lungs clear   - bowel regimen/incentive spirometry Fever likely secondary  to enterovirus infection temp curve improved   - decrease Dilaudid PCA 0.3 mg demand cont with  ketoralac 15 IV q6 to minimize opiate usage decreased PCA usage if improved will transition to oral in AM pt agreeable.  - c/w IVF, lungs clear   - bowel regimen/incentive spirometry

## 2018-10-11 NOTE — PROGRESS NOTE ADULT - SUBJECTIVE AND OBJECTIVE BOX
Patient is a 48y old  Male who presents with a chief complaint of SCC x 1-2 days (10 Oct 2018 11:39)      SUBJECTIVE / OVERNIGHT EVENTS: 8mg dilaudid used since yesterday 8 am.     MEDICATIONS  (STANDING):  docusate sodium 100 milliGRAM(s) Oral three times a day  folic acid 1 milliGRAM(s) Oral daily  heparin  Injectable 5000 Unit(s) SubCutaneous every 12 hours  HYDROmorphone PCA (1 mG/mL) 30 milliLiter(s) PCA Continuous PCA Continuous  influenza   Vaccine 0.5 milliLiter(s) IntraMuscular once  ketorolac   Injectable 15 milliGRAM(s) IV Push every 6 hours  lisinopril 2.5 milliGRAM(s) Oral daily  metoprolol succinate ER 25 milliGRAM(s) Oral daily  senna 2 Tablet(s) Oral at bedtime  sodium chloride 0.45%. 1000 milliLiter(s) (50 mL/Hr) IV Continuous <Continuous>  tamsulosin 0.4 milliGRAM(s) Oral at bedtime    MEDICATIONS  (PRN):  acetaminophen   Tablet .. 650 milliGRAM(s) Oral every 6 hours PRN Temp greater or equal to 38C (100.4F)  naloxone Injectable 0.1 milliGRAM(s) IV Push every 3 minutes PRN For ANY of the following changes in patient status:  A. RR LESS THAN 10 breaths per minute, B. Oxygen saturation LESS THAN 90%, C. Sedation score of 6  ondansetron Injectable 4 milliGRAM(s) IV Push every 6 hours PRN Nausea        CAPILLARY BLOOD GLUCOSE        I&O's Summary      T(C): 36.9 (10-11-18 @ 07:00), Max: 37 (10-10-18 @ 11:15)  HR: 80 (10-11-18 @ 07:00) (75 - 84)  BP: 114/41 (10-11-18 @ 07:00) (99/55 - 127/67)  RR: 18 (10-11-18 @ 07:00) (17 - 18)  SpO2: 98% (10-11-18 @ 07:00) (98% - 100%)    PHYSICAL EXAM:  GENERAL: NAD, well-developed  HEAD:  Atraumatic, Normocephalic  EYES: EOMI, PERRLA, conjunctiva and sclera clear  NECK: Supple, No JVD  CHEST/LUNG: Clear to auscultation bilaterally; No wheeze  HEART: Regular rate and rhythm; No murmurs, rubs, or gallops  ABDOMEN: Soft, Nontender, Nondistended; Bowel sounds present  EXTREMITIES:  2+ Peripheral Pulses, No clubbing, cyanosis, or edema  PSYCH: AAOx3  NEUROLOGY: non-focal  SKIN: No rashes or lesions    LABS:                        6.3    9.70  )-----------( 276      ( 11 Oct 2018 07:45 )             18.9     10-10    134<L>  |  97<L>  |  11  ----------------------------<  104<H>  4.7   |  23  |  0.94    Ca    9.3      10 Oct 2018 07:35  Mg     1.8     10-10                  RADIOLOGY & ADDITIONAL TESTS:    Imaging Personally Reviewed:    Consultant(s) Notes Reviewed:      Care Discussed with Consultants/Other Providers: Patient is a 48y old  Male who presents with a chief complaint of SCC x 1-2 days (10 Oct 2018 11:39)      SUBJECTIVE / OVERNIGHT EVENTS: 8mg dilaudid used since yesterday 8 am. pain improved     MEDICATIONS  (STANDING):  docusate sodium 100 milliGRAM(s) Oral three times a day  folic acid 1 milliGRAM(s) Oral daily  heparin  Injectable 5000 Unit(s) SubCutaneous every 12 hours  HYDROmorphone PCA (1 mG/mL) 30 milliLiter(s) PCA Continuous PCA Continuous  influenza   Vaccine 0.5 milliLiter(s) IntraMuscular once  ketorolac   Injectable 15 milliGRAM(s) IV Push every 6 hours  lisinopril 2.5 milliGRAM(s) Oral daily  metoprolol succinate ER 25 milliGRAM(s) Oral daily  senna 2 Tablet(s) Oral at bedtime  sodium chloride 0.45%. 1000 milliLiter(s) (50 mL/Hr) IV Continuous <Continuous>  tamsulosin 0.4 milliGRAM(s) Oral at bedtime    MEDICATIONS  (PRN):  acetaminophen   Tablet .. 650 milliGRAM(s) Oral every 6 hours PRN Temp greater or equal to 38C (100.4F)  naloxone Injectable 0.1 milliGRAM(s) IV Push every 3 minutes PRN For ANY of the following changes in patient status:  A. RR LESS THAN 10 breaths per minute, B. Oxygen saturation LESS THAN 90%, C. Sedation score of 6  ondansetron Injectable 4 milliGRAM(s) IV Push every 6 hours PRN Nausea        CAPILLARY BLOOD GLUCOSE        I&O's Summary      T(C): 36.9 (10-11-18 @ 07:00), Max: 37 (10-10-18 @ 11:15)  HR: 80 (10-11-18 @ 07:00) (75 - 84)  BP: 114/41 (10-11-18 @ 07:00) (99/55 - 127/67)  RR: 18 (10-11-18 @ 07:00) (17 - 18)  SpO2: 98% (10-11-18 @ 07:00) (98% - 100%)    PHYSICAL EXAM:  GENERAL: NAD, well-developed  HEAD:  Atraumatic, Normocephalic  EYES: EOMI, PERRLA, conjunctiva and sclera clear  NECK: Supple, No JVD  CHEST/LUNG: Clear to auscultation bilaterally; No wheeze  HEART: Regular rate and rhythm; No murmurs, rubs, or gallops  ABDOMEN: Soft, Nontender, Nondistended; Bowel sounds present  EXTREMITIES:  2+ Peripheral Pulses, No clubbing, cyanosis, or edema  PSYCH: AAOx3  NEUROLOGY: non-focal  SKIN: No rashes or lesions    LABS:                        6.3    9.70  )-----------( 276      ( 11 Oct 2018 07:45 )             18.9     10-10    134<L>  |  97<L>  |  11  ----------------------------<  104<H>  4.7   |  23  |  0.94    Ca    9.3      10 Oct 2018 07:35  Mg     1.8     10-10                  RADIOLOGY & ADDITIONAL TESTS:    Imaging Personally Reviewed:    Consultant(s) Notes Reviewed:      Care Discussed with Consultants/Other Providers:

## 2018-10-11 NOTE — PROGRESS NOTE ADULT - PROBLEM SELECTOR PLAN 3
Improved, no labs yet for trend/comparison; FeNa 1.2%  - monitor Cr  -improved  - lisinopril on hold for EMEKA, BP ok, creatinine stable resume  - UA remains negative G/C neg   - on trial of flomax

## 2018-10-12 ENCOUNTER — TRANSCRIPTION ENCOUNTER (OUTPATIENT)
Age: 63
End: 2018-10-12

## 2018-10-12 VITALS
DIASTOLIC BLOOD PRESSURE: 60 MMHG | TEMPERATURE: 98 F | HEART RATE: 62 BPM | OXYGEN SATURATION: 100 % | SYSTOLIC BLOOD PRESSURE: 116 MMHG | RESPIRATION RATE: 16 BRPM

## 2018-10-12 PROCEDURE — 99239 HOSP IP/OBS DSCHRG MGMT >30: CPT

## 2018-10-12 RX ORDER — HYDROMORPHONE HYDROCHLORIDE 2 MG/ML
4 INJECTION INTRAMUSCULAR; INTRAVENOUS; SUBCUTANEOUS EVERY 4 HOURS
Qty: 0 | Refills: 0 | Status: DISCONTINUED | OUTPATIENT
Start: 2018-10-12 | End: 2018-10-12

## 2018-10-12 RX ORDER — TAMSULOSIN HYDROCHLORIDE 0.4 MG/1
1 CAPSULE ORAL
Qty: 30 | Refills: 0
Start: 2018-10-12 | End: 2018-11-10

## 2018-10-12 RX ORDER — HYDROMORPHONE HYDROCHLORIDE 2 MG/ML
1 INJECTION INTRAMUSCULAR; INTRAVENOUS; SUBCUTANEOUS
Qty: 30 | Refills: 0 | OUTPATIENT
Start: 2018-10-12 | End: 2018-10-16

## 2018-10-12 RX ORDER — LISINOPRIL 2.5 MG/1
1 TABLET ORAL
Qty: 30 | Refills: 0 | OUTPATIENT
Start: 2018-10-12 | End: 2018-11-10

## 2018-10-12 RX ORDER — METOPROLOL TARTRATE 50 MG
1 TABLET ORAL
Qty: 30 | Refills: 0 | OUTPATIENT
Start: 2018-10-12 | End: 2018-11-10

## 2018-10-12 RX ADMIN — Medication 15 MILLIGRAM(S): at 12:07

## 2018-10-12 RX ADMIN — Medication 25 MILLIGRAM(S): at 05:55

## 2018-10-12 RX ADMIN — INFLUENZA VIRUS VACCINE 0.5 MILLILITER(S): 15; 15; 15; 15 SUSPENSION INTRAMUSCULAR at 14:38

## 2018-10-12 RX ADMIN — SODIUM CHLORIDE 50 MILLILITER(S): 9 INJECTION, SOLUTION INTRAVENOUS at 12:07

## 2018-10-12 RX ADMIN — Medication 15 MILLIGRAM(S): at 03:06

## 2018-10-12 RX ADMIN — Medication 1 MILLIGRAM(S): at 12:13

## 2018-10-12 RX ADMIN — HEPARIN SODIUM 5000 UNIT(S): 5000 INJECTION INTRAVENOUS; SUBCUTANEOUS at 05:55

## 2018-10-12 RX ADMIN — LISINOPRIL 2.5 MILLIGRAM(S): 2.5 TABLET ORAL at 05:55

## 2018-10-12 RX ADMIN — HYDROMORPHONE HYDROCHLORIDE 30 MILLILITER(S): 2 INJECTION INTRAMUSCULAR; INTRAVENOUS; SUBCUTANEOUS at 07:48

## 2018-10-12 RX ADMIN — Medication 100 MILLIGRAM(S): at 13:29

## 2018-10-12 RX ADMIN — HYDROMORPHONE HYDROCHLORIDE 4 MILLIGRAM(S): 2 INJECTION INTRAMUSCULAR; INTRAVENOUS; SUBCUTANEOUS at 13:33

## 2018-10-12 RX ADMIN — Medication 15 MILLIGRAM(S): at 04:07

## 2018-10-12 RX ADMIN — Medication 100 MILLIGRAM(S): at 05:55

## 2018-10-12 NOTE — PROGRESS NOTE ADULT - SUBJECTIVE AND OBJECTIVE BOX
Patient is a 48y old  Male who presents with a chief complaint of SCC x 1-2 days (11 Oct 2018 09:01)      SUBJECTIVE / OVERNIGHT EVENTS:    MEDICATIONS  (STANDING):  docusate sodium 100 milliGRAM(s) Oral three times a day  folic acid 1 milliGRAM(s) Oral daily  heparin  Injectable 5000 Unit(s) SubCutaneous every 12 hours  HYDROmorphone PCA (1 mG/mL) 30 milliLiter(s) PCA Continuous PCA Continuous  influenza   Vaccine 0.5 milliLiter(s) IntraMuscular once  ketorolac   Injectable 15 milliGRAM(s) IV Push every 6 hours  lisinopril 2.5 milliGRAM(s) Oral daily  metoprolol succinate ER 25 milliGRAM(s) Oral daily  senna 2 Tablet(s) Oral at bedtime  sodium chloride 0.45%. 1000 milliLiter(s) (50 mL/Hr) IV Continuous <Continuous>  tamsulosin 0.4 milliGRAM(s) Oral at bedtime    MEDICATIONS  (PRN):  acetaminophen   Tablet .. 650 milliGRAM(s) Oral every 6 hours PRN Temp greater or equal to 38C (100.4F)  naloxone Injectable 0.1 milliGRAM(s) IV Push every 3 minutes PRN For ANY of the following changes in patient status:  A. RR LESS THAN 10 breaths per minute, B. Oxygen saturation LESS THAN 90%, C. Sedation score of 6  ondansetron Injectable 4 milliGRAM(s) IV Push every 6 hours PRN Nausea        CAPILLARY BLOOD GLUCOSE        I&O's Summary    11 Oct 2018 07:01  -  12 Oct 2018 07:00  --------------------------------------------------------  IN: 0 mL / OUT: 375 mL / NET: -375 mL        T(C): 36.8 (10-12-18 @ 07:45), Max: 37.2 (10-11-18 @ 14:25)  HR: 60 (10-12-18 @ 07:45) (60 - 82)  BP: 106/63 (10-12-18 @ 07:45) (99/515 - 132/72)  RR: 18 (10-12-18 @ 07:45) (18 - 18)  SpO2: 100% (10-12-18 @ 07:45) (99% - 100%)    PHYSICAL EXAM:  GENERAL: NAD, well-developed  HEAD:  Atraumatic, Normocephalic  EYES: EOMI, PERRLA, conjunctiva and sclera clear  NECK: Supple, No JVD  CHEST/LUNG: Clear to auscultation bilaterally; No wheeze  HEART: Regular rate and rhythm; No murmurs, rubs, or gallops  ABDOMEN: Soft, Nontender, Nondistended; Bowel sounds present  EXTREMITIES:  2+ Peripheral Pulses, No clubbing, cyanosis, or edema  PSYCH: AAOx3  NEUROLOGY: non-focal  SKIN: No rashes or lesions    LABS:                        6.3    9.70  )-----------( 276      ( 11 Oct 2018 07:45 )             18.9     10-11    135  |  99  |  21  ----------------------------<  88  4.8   |  24  |  1.22    Ca    9.0      11 Oct 2018 07:45  Mg     2.1     10-11    TPro  6.9  /  Alb  3.6  /  TBili  2.2<H>  /  DBili  0.6<H>  /  AST  32  /  ALT  19  /  AlkPhos  155<H>  10-11                RADIOLOGY & ADDITIONAL TESTS:    Imaging Personally Reviewed:    Consultant(s) Notes Reviewed:      Care Discussed with Consultants/Other Providers: Patient is a 48y old  Male who presents with a chief complaint of SCC x 1-2 days (11 Oct 2018 09:01)      SUBJECTIVE / OVERNIGHT EVENTS: reports generalized aches and pain improved. PCA pump maximal use in 4 hrs period 1.5 mg IV dilaudid.     MEDICATIONS  (STANDING):  docusate sodium 100 milliGRAM(s) Oral three times a day  folic acid 1 milliGRAM(s) Oral daily  heparin  Injectable 5000 Unit(s) SubCutaneous every 12 hours  HYDROmorphone PCA (1 mG/mL) 30 milliLiter(s) PCA Continuous PCA Continuous  influenza   Vaccine 0.5 milliLiter(s) IntraMuscular once  ketorolac   Injectable 15 milliGRAM(s) IV Push every 6 hours  lisinopril 2.5 milliGRAM(s) Oral daily  metoprolol succinate ER 25 milliGRAM(s) Oral daily  senna 2 Tablet(s) Oral at bedtime  sodium chloride 0.45%. 1000 milliLiter(s) (50 mL/Hr) IV Continuous <Continuous>  tamsulosin 0.4 milliGRAM(s) Oral at bedtime    MEDICATIONS  (PRN):  acetaminophen   Tablet .. 650 milliGRAM(s) Oral every 6 hours PRN Temp greater or equal to 38C (100.4F)  naloxone Injectable 0.1 milliGRAM(s) IV Push every 3 minutes PRN For ANY of the following changes in patient status:  A. RR LESS THAN 10 breaths per minute, B. Oxygen saturation LESS THAN 90%, C. Sedation score of 6  ondansetron Injectable 4 milliGRAM(s) IV Push every 6 hours PRN Nausea        CAPILLARY BLOOD GLUCOSE        I&O's Summary    11 Oct 2018 07:01  -  12 Oct 2018 07:00  --------------------------------------------------------  IN: 0 mL / OUT: 375 mL / NET: -375 mL        T(C): 36.8 (10-12-18 @ 07:45), Max: 37.2 (10-11-18 @ 14:25)  HR: 60 (10-12-18 @ 07:45) (60 - 82)  BP: 106/63 (10-12-18 @ 07:45) (99/515 - 132/72)  RR: 18 (10-12-18 @ 07:45) (18 - 18)  SpO2: 100% (10-12-18 @ 07:45) (99% - 100%)    PHYSICAL EXAM:  GENERAL: NAD, well-developed  HEAD:  Atraumatic, Normocephalic  EYES: EOMI, PERRLA, conjunctiva and sclera clear  NECK: Supple, No JVD  CHEST/LUNG: Clear to auscultation bilaterally; No wheeze  HEART: Regular rate and rhythm; No murmurs, rubs, or gallops  ABDOMEN: Soft, Nontender, Nondistended; Bowel sounds present  EXTREMITIES:  2+ Peripheral Pulses, No clubbing, cyanosis, or edema  PSYCH: AAOx3      LABS:                        6.3    9.70  )-----------( 276      ( 11 Oct 2018 07:45 )             18.9     10-11    135  |  99  |  21  ----------------------------<  88  4.8   |  24  |  1.22    Ca    9.0      11 Oct 2018 07:45  Mg     2.1     10-11    TPro  6.9  /  Alb  3.6  /  TBili  2.2<H>  /  DBili  0.6<H>  /  AST  32  /  ALT  19  /  AlkPhos  155<H>  10-11                RADIOLOGY & ADDITIONAL TESTS:    Imaging Personally Reviewed:    Consultant(s) Notes Reviewed:      Care Discussed with Consultants/Other Providers:

## 2018-10-12 NOTE — DISCHARGE NOTE ADULT - MEDICATION SUMMARY - MEDICATIONS TO TAKE
I will START or STAY ON the medications listed below when I get home from the hospital:    HYDROmorphone 4 mg oral tablet  -- 1 tab(s) by mouth every 4 hours MDD:6 tabs  -- Indication: For Pain control    acetaminophen 325 mg oral tablet  -- 2 tab(s) by mouth every 6 hours, As needed, For Temp greater than 38 C (100.4 F)/Pain  -- Indication: For pain control/fever    lisinopril 2.5 mg oral tablet  -- 1 tab(s) by mouth once a day  -- Indication: For Essential Hypertension    tamsulosin 0.4 mg oral capsule  -- 1 cap(s) by mouth once a day (at bedtime)  -- Indication: For BPH    metoprolol succinate 25 mg oral tablet, extended release  -- 1 tab(s) by mouth once a day  -- Indication: For Essential hypertension    docusate sodium 100 mg oral capsule  -- 1 cap(s) by mouth 3 times a day  -- Indication: For Constipation    senna oral tablet  -- 2 tab(s) by mouth once a day (at bedtime), As needed, Constipation  -- Indication: For Constipation    folic acid 1 mg oral tablet  -- 1 tab(s) by mouth once a day  -- Indication: For Supplement

## 2018-10-12 NOTE — DISCHARGE NOTE ADULT - PLAN OF CARE
Relief of pain from sickle cell crisis Discharged with PO dilaudid  Outpatient appointment with sickle cell specialist Outpatient follow up with cardiologist for echo  Continue home meds  Diet education Patient was treated symptomatically  Continue oral hydration as necessary if symptomatic Discharged with PO dilaudid  Outpatient appointment with sickle cell specialist, Dr. Hamm, set up for Monday 10/15/2018 @ Outpatient follow up with cardiologist for routine echocardiogram  Continue home meds  Diet education Discharged with PO dilaudid  Outpatient appointment with sickle cell specialist, Dr. Hamm, set up for 10/18 @ 4PM.

## 2018-10-12 NOTE — PROGRESS NOTE ADULT - PROBLEM SELECTOR PLAN 5
TTE 7/2018:  Mild global left ventricular systolic dysfunction; Normal right ventricular size and function  - resume lisinopril, low dose of 5 mg  -  metoprolol 25 XL TTE 7/2018:  Mild global left ventricular systolic dysfunction; Normal right ventricular size and function  - resume lisinopril, low dose of 2.5 mg  -  metoprolol 25 XL

## 2018-10-12 NOTE — PROGRESS NOTE ADULT - PROBLEM SELECTOR PROBLEM 5
Chronic systolic heart failure
Need for prophylactic measure
Chronic systolic heart failure

## 2018-10-12 NOTE — PROGRESS NOTE ADULT - PROBLEM SELECTOR PROBLEM 2
Enterovirus infection
Creatinine elevation
Enterovirus infection

## 2018-10-12 NOTE — DISCHARGE NOTE ADULT - PATIENT PORTAL LINK FT
You can access the [facility] Patient Portal, offered by [facility], by registering with the following website: [email or web address]

## 2018-10-12 NOTE — DISCHARGE NOTE ADULT - HOSPITAL COURSE
48 y/M PMH of SCD (not regularly taking opioid medications, usually takes NSAIDs, last admission was in july 2018) aw diffuse body pain.   Generalized body pain x 1 day. Pain 8/10, sharp, relief with meds  Chest pain , non radiating, similar to previous SCC  States this pain is not as severe as the last time he was admitted.  He usually takes  Tylenol and Aleve for pain.   Last took Alieve around 8pm yesterday   Denies fever/chills/SOB  pt with cold like symptoms a week back which is now improving  Last admission for SCC was in  July 2018 likely secondary to a viral illness. At that time his  course was complicated by acute hypoxic respiratory failure secondary to fluid overload vs acute chest with O2 desat 70s with Hgb of 5's. He was started on Venturi mask, admitted to MICU and  was  (3u pRBC total. He was also started on Levofloxacin for possible atypical PNA and was given IV Lasix for pleural effusion, B lines and volume overload.      In ED, pt was given 1 L bolusm  Dilaudid x 2,Ketorolac x 1, and was  admitted for sickle cell crisis.   Respiratory viral panel found +Enterovirus/Rhinovirus and treated symptomatically  PCA pump of Dilaudid managed by attending for sickle cell crisis and discharged with PO Dilaudid.   Consulted with attending for patient to follow up outpatient with sickle cell specialist.  Case reviewed with attending who cleared for discharge. 48 y/M PMH of SCD (not regularly taking opioid medications, usually takes NSAIDs, last admission was in july 2018) aw diffuse body pain.   Generalized body pain x 1 day. Pain 8/10, sharp, relief with meds  Chest pain , non radiating, similar to previous SCC,  States this pain is not as severe as the last time he was admitted. He usually takes  Tylenol and Aleve for pain.  Last took Alieve around 8pm yesterday  Denies fever/chills/SOB  pt with cold like symptoms a week back which is now improving Last admission for SCC was in  July 2018 likely secondary to a viral illness. At that time his  course was complicated by acute hypoxic respiratory failure secondary to fluid overload vs acute chest with O2 desat 70s with Hgb of 5's. He was started on Venturi mask, admitted to MICU and  was  (3u pRBC total. He was also started on Levofloxacin for possible atypical PNA and was given IV Lasix for pleural effusion, B lines and volume overload.      In ED, pt was given 1 L bolusm  Dilaudid x 2,Ketorolac x 1, and was  admitted for sickle cell crisis.   Respiratory viral panel found +Enterovirus/Rhinovirus and treated symptomatically  PCA pump of Dilaudid managed by attending for sickle cell crisis and discharged with PO Dilaudid.   Consulted with attending for patient to follow up outpatient with sickle cell specialist.  Cardiology evaluated and chest pain was likely sickle induced, no need for inpatient ischemic evaluation.   Case reviewed with attending who cleared for discharge.

## 2018-10-12 NOTE — PROGRESS NOTE ADULT - REASON FOR ADMISSION
Sickle Crisis, Enterovirus infection
SCC x 1-2 days
SCC
SCC x 1-2 days

## 2018-10-12 NOTE — PHYSICAL THERAPY INITIAL EVALUATION ADULT - ADDITIONAL COMMENTS
Pt. was left supine in bed post PT Evaluation, NAD, call bell within reach, +bed alarm. RN made aware of pt. status and participation in PT.

## 2018-10-12 NOTE — PROGRESS NOTE ADULT - PROBLEM SELECTOR PROBLEM 3
Creatinine elevation
Chest pain in adult
Creatinine elevation

## 2018-10-12 NOTE — PROGRESS NOTE ADULT - PROBLEM SELECTOR PROBLEM 1
Sickle cell anemia with crisis

## 2018-10-12 NOTE — PHYSICAL THERAPY INITIAL EVALUATION ADULT - CRITERIA FOR SKILLED THERAPEUTIC INTERVENTIONS
impairments found/anticipated discharge recommendation/therapy frequency/predicted duration of therapy intervention/rehab potential

## 2018-10-12 NOTE — PROGRESS NOTE ADULT - PROBLEM SELECTOR PLAN 2
Likely source of fevers  - blood cultures 2 sets, NGTD, UA negative  - c/w supportive care
-likely secondary to NSAID use/possibly pre-renal state  -monitor Cr-f/u labs  -Will hold lisinopril   -consider hydralazine if BP is an issue  Monitor I& Os  -check Fe urea and bladder scan UA neg states has some trouble voiding will start flomax
Likely source of fevers  - blood cultures 2 sets, NGTD, UA negative  - c/w supportive care
Likely source of fevers  - f/u cultures  - c/w supportive care
Likely source of fevers  - blood cultures 2 sets, NG x 48 hours, UA negative  - c/w supportive care

## 2018-10-12 NOTE — PROGRESS NOTE ADULT - PROBLEM SELECTOR PLAN 1
Fever likely secondary  to enterovirus infection temp curve improved   - decrease Dilaudid PCA 0.3 mg demand cont with  ketoralac 15 IV q6 to minimize opiate usage decreased PCA usage if improved will transition to oral in AM pt agreeable.  - c/w IVF, lungs clear   - bowel regimen/incentive spirometry Fever likely secondary  to enterovirus infection temp curve improved   - change to dilaudid 4 mg PO q4 PRN for pain   - c/w IVF, lungs clear   - bowel regimen/incentive spirometry  -outpt f/u with Dr. Lluvia Hamm Fever likely secondary  to enterovirus infection temp curve improved   - change to dilaudid 4 mg PO q4 PRN for pain   - s/p IVF, lungs clear   - bowel regimen/incentive spirometry  -outpt f/u with d/w Dr. Lluvia Hamm

## 2018-10-12 NOTE — PROGRESS NOTE ADULT - PROBLEM SELECTOR PLAN 3
Improved, no labs yet for trend/comparison; FeNa 1.2%  - monitor Cr  -improved  - lisinopril on hold for EMEKA, BP ok, creatinine stable resume  - UA remains negative G/C neg   - on trial of flomax Improved, no labs yet for trend/comparison; FeNa 1.2%  - monitor Cr  -improved  - UA remains negative G/C neg   - on trial of flomax Improved FeNa 1.2%  - monitor Cr  -improved  - UA remains negative G/C neg   - on trial of flomax

## 2018-10-12 NOTE — DISCHARGE NOTE ADULT - CARE PLAN
Principal Discharge DX:	Sickle cell anemia with crisis  Goal:	Relief of pain from sickle cell crisis  Assessment and plan of treatment:	Discharged with PO dilaudid  Outpatient appointment with sickle cell specialist  Secondary Diagnosis:	Chronic systolic heart failure  Assessment and plan of treatment:	Outpatient follow up with cardiologist for echo  Continue home meds  Diet education  Secondary Diagnosis:	Enterovirus infection  Assessment and plan of treatment:	Patient was treated symptomatically  Continue oral hydration as necessary if symptomatic Principal Discharge DX:	Sickle cell anemia with crisis  Goal:	Relief of pain from sickle cell crisis  Assessment and plan of treatment:	Discharged with PO dilaudid  Outpatient appointment with sickle cell specialist, Dr. Hamm, set up for Monday 10/15/2018 @  Secondary Diagnosis:	Chronic systolic heart failure  Assessment and plan of treatment:	Outpatient follow up with cardiologist for routine echocardiogram  Continue home meds  Diet education  Secondary Diagnosis:	Enterovirus infection  Assessment and plan of treatment:	Patient was treated symptomatically  Continue oral hydration as necessary if symptomatic Principal Discharge DX:	Sickle cell anemia with crisis  Goal:	Relief of pain from sickle cell crisis  Assessment and plan of treatment:	Discharged with PO dilaudid  Outpatient appointment with sickle cell specialist, Dr. Hamm, set up for 10/18 @ 4PM.  Secondary Diagnosis:	Chronic systolic heart failure  Assessment and plan of treatment:	Outpatient follow up with cardiologist for routine echocardiogram  Continue home meds  Diet education  Secondary Diagnosis:	Enterovirus infection  Assessment and plan of treatment:	Patient was treated symptomatically  Continue oral hydration as necessary if symptomatic

## 2018-10-12 NOTE — DISCHARGE NOTE ADULT - CARE PROVIDER_API CALL
Lluvia Hamm), Internal Medicine  [id number] [address] [place], [place] [address]  Phone: [phone]  Fax: [phone]

## 2018-10-12 NOTE — PROGRESS NOTE ADULT - ASSESSMENT
47 yo M with HTN, mild chronic systolic CHF (EF 52%, TTE 7/2018) sickle cell disease p/w generalized aches and chest pain EKG non-ischemic and trop negative, still with pain, some fevers likely due to enterovirus.
49 yo M with HTN, mild chronic systolic CHF (EF 52%, TTE 7/2018) sickle cell disease p/w generalized aches and chest pain EKG non-ischemic and trop negative, still with pain, some fevers likely due to enterovirus.
49 yo M with sickle cell disease p/w generalized aches and chest pain EKG non-ischemic and trop negative, still with pain, some fevers likely due to enterovirus.
Pt is a 47 yo M w/ Hx SSD,   p/w generalized aches and chest pain. EKG non ischemic consistent with LVH CXR- neg. Hs trop-10-11 Cr 1.07 7/29-->1.39
47 yo M with HTN, mild chronic systolic CHF (EF 52%, TTE 7/2018) sickle cell disease p/w generalized aches and chest pain EKG non-ischemic and trop negative, still with pain, some fevers likely due to enterovirus.

## 2018-10-12 NOTE — PROGRESS NOTE ADULT - PROBLEM SELECTOR PLAN 4
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  - card eval appreciated, rec no further w/u  - ST/PAT, on metoprolol-no further episode of tachycardia Likely secondary to sickle cell crisis  - Troponin x 2 negative   - EKG with LVH, TWI in V5   - TTE from 7/2018 with Mild global left ventricular systolic dysfunction.  - card eval appreciated, rec no further w/u  - ST/PAT, on metoprolol-no further episode of tachycardia off tele

## 2018-10-12 NOTE — PROGRESS NOTE ADULT - PROBLEM SELECTOR PROBLEM 4
Chest pain in adult
Need for prophylactic measure
Chest pain in adult

## 2018-10-18 ENCOUNTER — OUTPATIENT (OUTPATIENT)
Dept: OUTPATIENT SERVICES | Facility: HOSPITAL | Age: 63
LOS: 1 days | End: 2018-10-18

## 2018-10-18 ENCOUNTER — APPOINTMENT (OUTPATIENT)
Dept: INTERNAL MEDICINE | Facility: HOSPITAL | Age: 63
End: 2018-10-18
Payer: MEDICARE

## 2018-10-18 VITALS — SYSTOLIC BLOOD PRESSURE: 130 MMHG | OXYGEN SATURATION: 99 % | HEART RATE: 74 BPM | DIASTOLIC BLOOD PRESSURE: 70 MMHG

## 2018-10-18 PROCEDURE — 99214 OFFICE O/P EST MOD 30 MIN: CPT

## 2018-10-19 DIAGNOSIS — D57.1 SICKLE-CELL DISEASE WITHOUT CRISIS: ICD-10-CM

## 2018-10-19 NOTE — HISTORY OF PRESENT ILLNESS
[FreeTextEntry1] : 43 yo male ( birthdate incorrect) here for routine sickle cell appointment. He was last seen in 2015. This past year, his number of hospitalizations has increased markedly. He has had 4  major pain crises. He still refuses narcotic treatment at home ( ISTOP neg for the last 6 months)\par No complaints today.Last hospitalization was 1 week ago.\par \par PE: thin male in nad\par vss-\par icteric\par Heent- poor dentition\par lungs- cta\par cor:rrr-m\par abd- soft, nt\par ext: -c/c/e\par neuro- affect generally simple, otherwise non-focal\par \par HGB_ 6s\par creat- 1.22\par \par A/P- 43 yo male with HGB SS\par 1. start HU-1000 mg QD, 20 mg /kg\par 2. ophtho referral\par 3. echo - LV dysfunction\par 4. f/u one month\par \par Lluvia Hamm MD\par \par

## 2018-12-25 ENCOUNTER — INPATIENT (INPATIENT)
Facility: HOSPITAL | Age: 63
LOS: 7 days | Discharge: HOME | End: 2019-01-02
Attending: INTERNAL MEDICINE | Admitting: INTERNAL MEDICINE
Payer: MEDICARE

## 2018-12-25 VITALS
TEMPERATURE: 98 F | RESPIRATION RATE: 16 BRPM | OXYGEN SATURATION: 100 % | HEART RATE: 80 BPM | SYSTOLIC BLOOD PRESSURE: 129 MMHG | DIASTOLIC BLOOD PRESSURE: 79 MMHG

## 2018-12-25 DIAGNOSIS — D57.00 HB-SS DISEASE WITH CRISIS, UNSPECIFIED: ICD-10-CM

## 2018-12-25 DIAGNOSIS — D64.89 OTHER SPECIFIED ANEMIAS: ICD-10-CM

## 2018-12-25 DIAGNOSIS — Z29.9 ENCOUNTER FOR PROPHYLACTIC MEASURES, UNSPECIFIED: ICD-10-CM

## 2018-12-25 LAB
ALBUMIN SERPL ELPH-MCNC: 4 G/DL — SIGNIFICANT CHANGE UP (ref 3.3–5)
ALP SERPL-CCNC: 77 U/L — SIGNIFICANT CHANGE UP (ref 40–120)
ALT FLD-CCNC: 17 U/L — SIGNIFICANT CHANGE UP (ref 4–41)
ANISOCYTOSIS BLD QL: SLIGHT — SIGNIFICANT CHANGE UP
AST SERPL-CCNC: 33 U/L — SIGNIFICANT CHANGE UP (ref 4–40)
B PERT DNA SPEC QL NAA+PROBE: NOT DETECTED — SIGNIFICANT CHANGE UP
BASOPHILS # BLD AUTO: 0.06 K/UL — SIGNIFICANT CHANGE UP (ref 0–0.2)
BASOPHILS NFR BLD AUTO: 0.6 % — SIGNIFICANT CHANGE UP (ref 0–2)
BASOPHILS NFR SPEC: 2.7 % — HIGH (ref 0–2)
BILIRUB SERPL-MCNC: 2 MG/DL — HIGH (ref 0.2–1.2)
BLASTS # FLD: 0 % — SIGNIFICANT CHANGE UP (ref 0–0)
BUN SERPL-MCNC: 12 MG/DL — SIGNIFICANT CHANGE UP (ref 7–23)
C PNEUM DNA SPEC QL NAA+PROBE: NOT DETECTED — SIGNIFICANT CHANGE UP
CALCIUM SERPL-MCNC: 9.1 MG/DL — SIGNIFICANT CHANGE UP (ref 8.4–10.5)
CHLORIDE SERPL-SCNC: 111 MMOL/L — HIGH (ref 98–107)
CO2 SERPL-SCNC: 19 MMOL/L — LOW (ref 22–31)
CREAT SERPL-MCNC: 1.13 MG/DL — SIGNIFICANT CHANGE UP (ref 0.5–1.3)
DACRYOCYTES BLD QL SMEAR: SLIGHT — SIGNIFICANT CHANGE UP
EOSINOPHIL # BLD AUTO: 0.5 K/UL — SIGNIFICANT CHANGE UP (ref 0–0.5)
EOSINOPHIL NFR BLD AUTO: 5.3 % — SIGNIFICANT CHANGE UP (ref 0–6)
EOSINOPHIL NFR FLD: 9 % — HIGH (ref 0–6)
FLUAV H1 2009 PAND RNA SPEC QL NAA+PROBE: NOT DETECTED — SIGNIFICANT CHANGE UP
FLUAV H1 RNA SPEC QL NAA+PROBE: NOT DETECTED — SIGNIFICANT CHANGE UP
FLUAV H3 RNA SPEC QL NAA+PROBE: NOT DETECTED — SIGNIFICANT CHANGE UP
FLUAV SUBTYP SPEC NAA+PROBE: SIGNIFICANT CHANGE UP
FLUBV RNA SPEC QL NAA+PROBE: NOT DETECTED — SIGNIFICANT CHANGE UP
GIANT PLATELETS BLD QL SMEAR: PRESENT — SIGNIFICANT CHANGE UP
GLUCOSE SERPL-MCNC: 88 MG/DL — SIGNIFICANT CHANGE UP (ref 70–99)
HADV DNA SPEC QL NAA+PROBE: NOT DETECTED — SIGNIFICANT CHANGE UP
HCOV PNL SPEC NAA+PROBE: SIGNIFICANT CHANGE UP
HCT VFR BLD CALC: 17.6 % — CRITICAL LOW (ref 39–50)
HGB BLD-MCNC: 6 G/DL — CRITICAL LOW (ref 13–17)
HMPV RNA SPEC QL NAA+PROBE: NOT DETECTED — SIGNIFICANT CHANGE UP
HPIV1 RNA SPEC QL NAA+PROBE: NOT DETECTED — SIGNIFICANT CHANGE UP
HPIV2 RNA SPEC QL NAA+PROBE: NOT DETECTED — SIGNIFICANT CHANGE UP
HPIV3 RNA SPEC QL NAA+PROBE: NOT DETECTED — SIGNIFICANT CHANGE UP
HPIV4 RNA SPEC QL NAA+PROBE: NOT DETECTED — SIGNIFICANT CHANGE UP
IMM GRANULOCYTES # BLD AUTO: 0.05 # — SIGNIFICANT CHANGE UP
IMM GRANULOCYTES NFR BLD AUTO: 0.5 % — SIGNIFICANT CHANGE UP (ref 0–1.5)
LYMPHOCYTES # BLD AUTO: 1.21 K/UL — SIGNIFICANT CHANGE UP (ref 1–3.3)
LYMPHOCYTES # BLD AUTO: 12.8 % — LOW (ref 13–44)
LYMPHOCYTES NFR SPEC AUTO: 9.9 % — LOW (ref 13–44)
MACROCYTES BLD QL: SLIGHT — SIGNIFICANT CHANGE UP
MCHC RBC-ENTMCNC: 28 PG — SIGNIFICANT CHANGE UP (ref 27–34)
MCHC RBC-ENTMCNC: 34.1 % — SIGNIFICANT CHANGE UP (ref 32–36)
MCV RBC AUTO: 82.2 FL — SIGNIFICANT CHANGE UP (ref 80–100)
METAMYELOCYTES # FLD: 0 % — SIGNIFICANT CHANGE UP (ref 0–1)
MONOCYTES # BLD AUTO: 1.05 K/UL — HIGH (ref 0–0.9)
MONOCYTES NFR BLD AUTO: 11.1 % — SIGNIFICANT CHANGE UP (ref 2–14)
MONOCYTES NFR BLD: 6.3 % — SIGNIFICANT CHANGE UP (ref 2–9)
MYELOCYTES NFR BLD: 0 % — SIGNIFICANT CHANGE UP (ref 0–0)
NEUTROPHIL AB SER-ACNC: 72.1 % — SIGNIFICANT CHANGE UP (ref 43–77)
NEUTROPHILS # BLD AUTO: 6.59 K/UL — SIGNIFICANT CHANGE UP (ref 1.8–7.4)
NEUTROPHILS NFR BLD AUTO: 69.7 % — SIGNIFICANT CHANGE UP (ref 43–77)
NEUTS BAND # BLD: 0 % — SIGNIFICANT CHANGE UP (ref 0–6)
NRBC # BLD: 3.6 /100WBC — SIGNIFICANT CHANGE UP
NRBC # FLD: 0.22 — SIGNIFICANT CHANGE UP
NRBC FLD-RTO: 2.3 — SIGNIFICANT CHANGE UP
OTHER - HEMATOLOGY %: 0 — SIGNIFICANT CHANGE UP
PLATELET # BLD AUTO: 311 K/UL — SIGNIFICANT CHANGE UP (ref 150–400)
PLATELET COUNT - ESTIMATE: NORMAL — SIGNIFICANT CHANGE UP
PMV BLD: 9.3 FL — SIGNIFICANT CHANGE UP (ref 7–13)
POIKILOCYTOSIS BLD QL AUTO: SIGNIFICANT CHANGE UP
POTASSIUM SERPL-MCNC: 4.6 MMOL/L — SIGNIFICANT CHANGE UP (ref 3.5–5.3)
POTASSIUM SERPL-SCNC: 4.6 MMOL/L — SIGNIFICANT CHANGE UP (ref 3.5–5.3)
PROMYELOCYTES # FLD: 0 % — SIGNIFICANT CHANGE UP (ref 0–0)
PROT SERPL-MCNC: 6.7 G/DL — SIGNIFICANT CHANGE UP (ref 6–8.3)
RBC # BLD: 2.14 M/UL — LOW (ref 4.2–5.8)
RBC # FLD: 23.2 % — HIGH (ref 10.3–14.5)
RETICS #: 166 K/UL — HIGH (ref 25–125)
RETICS/RBC NFR: 7.8 % — HIGH (ref 0.5–2.5)
RSV RNA SPEC QL NAA+PROBE: NOT DETECTED — SIGNIFICANT CHANGE UP
RV+EV RNA SPEC QL NAA+PROBE: NOT DETECTED — SIGNIFICANT CHANGE UP
SICKLE CELLS BLD QL SMEAR: SIGNIFICANT CHANGE UP
SMUDGE CELLS # BLD: PRESENT — SIGNIFICANT CHANGE UP
SODIUM SERPL-SCNC: 141 MMOL/L — SIGNIFICANT CHANGE UP (ref 135–145)
TARGETS BLD QL SMEAR: SLIGHT — SIGNIFICANT CHANGE UP
VARIANT LYMPHS # BLD: 0 % — SIGNIFICANT CHANGE UP
WBC # BLD: 9.46 K/UL — SIGNIFICANT CHANGE UP (ref 3.8–10.5)
WBC # FLD AUTO: 9.46 K/UL — SIGNIFICANT CHANGE UP (ref 3.8–10.5)

## 2018-12-25 PROCEDURE — 71046 X-RAY EXAM CHEST 2 VIEWS: CPT | Mod: 26

## 2018-12-25 PROCEDURE — 99223 1ST HOSP IP/OBS HIGH 75: CPT

## 2018-12-25 RX ORDER — SODIUM CHLORIDE 9 MG/ML
1000 INJECTION, SOLUTION INTRAVENOUS
Qty: 0 | Refills: 0 | Status: DISCONTINUED | OUTPATIENT
Start: 2018-12-25 | End: 2018-12-27

## 2018-12-25 RX ORDER — ONDANSETRON 8 MG/1
4 TABLET, FILM COATED ORAL EVERY 6 HOURS
Qty: 0 | Refills: 0 | Status: DISCONTINUED | OUTPATIENT
Start: 2018-12-25 | End: 2019-01-02

## 2018-12-25 RX ORDER — FOLIC ACID 0.8 MG
1 TABLET ORAL DAILY
Qty: 0 | Refills: 0 | Status: DISCONTINUED | OUTPATIENT
Start: 2018-12-25 | End: 2019-01-02

## 2018-12-25 RX ORDER — HYDROMORPHONE HYDROCHLORIDE 2 MG/ML
1 INJECTION INTRAMUSCULAR; INTRAVENOUS; SUBCUTANEOUS ONCE
Qty: 0 | Refills: 0 | Status: DISCONTINUED | OUTPATIENT
Start: 2018-12-25 | End: 2018-12-25

## 2018-12-25 RX ORDER — MORPHINE SULFATE 50 MG/1
30 CAPSULE, EXTENDED RELEASE ORAL
Qty: 0 | Refills: 0 | Status: DISCONTINUED | OUTPATIENT
Start: 2018-12-25 | End: 2018-12-30

## 2018-12-25 RX ORDER — ENOXAPARIN SODIUM 100 MG/ML
40 INJECTION SUBCUTANEOUS DAILY
Qty: 0 | Refills: 0 | Status: DISCONTINUED | OUTPATIENT
Start: 2018-12-25 | End: 2019-01-02

## 2018-12-25 RX ORDER — MORPHINE SULFATE 50 MG/1
30 CAPSULE, EXTENDED RELEASE ORAL
Qty: 0 | Refills: 0 | Status: DISCONTINUED | OUTPATIENT
Start: 2018-12-25 | End: 2018-12-25

## 2018-12-25 RX ADMIN — HYDROMORPHONE HYDROCHLORIDE 1 MILLIGRAM(S): 2 INJECTION INTRAMUSCULAR; INTRAVENOUS; SUBCUTANEOUS at 06:09

## 2018-12-25 RX ADMIN — HYDROMORPHONE HYDROCHLORIDE 1 MILLIGRAM(S): 2 INJECTION INTRAMUSCULAR; INTRAVENOUS; SUBCUTANEOUS at 09:18

## 2018-12-25 RX ADMIN — ENOXAPARIN SODIUM 40 MILLIGRAM(S): 100 INJECTION SUBCUTANEOUS at 13:30

## 2018-12-25 RX ADMIN — SODIUM CHLORIDE 100 MILLILITER(S): 9 INJECTION, SOLUTION INTRAVENOUS at 13:30

## 2018-12-25 RX ADMIN — MORPHINE SULFATE 30 MILLILITER(S): 50 CAPSULE, EXTENDED RELEASE ORAL at 19:29

## 2018-12-25 RX ADMIN — MORPHINE SULFATE 30 MILLILITER(S): 50 CAPSULE, EXTENDED RELEASE ORAL at 13:29

## 2018-12-25 RX ADMIN — HYDROMORPHONE HYDROCHLORIDE 1 MILLIGRAM(S): 2 INJECTION INTRAMUSCULAR; INTRAVENOUS; SUBCUTANEOUS at 07:29

## 2018-12-25 RX ADMIN — Medication 1 MILLIGRAM(S): at 13:30

## 2018-12-25 RX ADMIN — HYDROMORPHONE HYDROCHLORIDE 1 MILLIGRAM(S): 2 INJECTION INTRAMUSCULAR; INTRAVENOUS; SUBCUTANEOUS at 09:19

## 2018-12-25 NOTE — H&P ADULT - PROBLEM SELECTOR PLAN 1
-CXR reviewed unremarkable  -check UA, RVP, stool studies r/o infectious cause sickle cell crisis   -istop checked, no records  -pain control discussed with pharmacy- appears to respond to morphine better than Dilaudid based on previous records. Will start morphine PCA 2mg q6 with 4hr limit of 10mg as previously ordered. titrate as needed  -hold bowel regimen for now as pt has diarrhea  -start IVF 1/2NS@ 100cc/hr  -start folate, MVI -CXR reviewed unremarkable  -check UA, RVP, stool studies r/o infectious cause sickle cell crisis   -istop checked, no records  -pain control discussed with pharmacy- appears to respond to morphine better than Dilaudid based on previous records. Will start morphine PCA 2mg q6min with 4hr limit of 10mg as previously ordered. titrate as needed  -hold bowel regimen for now as pt has diarrhea  -start IVF 1/2NS@ 100cc/hr  -start folate, MVI

## 2018-12-25 NOTE — ED ADULT NURSE NOTE - NSIMPLEMENTINTERV_GEN_ALL_ED
Implemented All Fall Risk Interventions:  [place] to call system. Call bell, personal items and telephone within reach. Instruct patient to call for assistance. Room bathroom lighting operational. Non-slip footwear when patient is off stretcher. Physically safe environment: no spills, clutter or unnecessary equipment. Stretcher in lowest position, wheels locked, appropriate side rails in place. Provide visual cue, wrist band, yellow gown, etc. Monitor gait and stability. Monitor for mental status changes and reorient to person, place, and time. Review medications for side effects contributing to fall risk. Reinforce activity limits and safety measures with patient and family.

## 2018-12-25 NOTE — ED PROVIDER NOTE - OBJECTIVE STATEMENT
48M PMH sickle cell disease p/w several days of pain all over body. No specific worse pain in head, chest, back or abdomen (but does have pain in all of those places as well). Feels like multiple similar episodes attributed to sickle crisis. Associated w/ subjective fevers which pt states is also usual for him. Slight cough, rhinorrhea x1w. HAd sore throat 1w ago now resolved. No other specific precipitating factors. Has unchanged ARRIETA over last few mos, no SOB. PMD Noris, Taking occasional advil for the pain w/ minimal relief.   Last crisis ~2.5 mos ago

## 2018-12-25 NOTE — H&P ADULT - HISTORY OF PRESENT ILLNESS
HPI:    48 y/M PMH of SCD (not regularly taking opioid medications, usually takes NSAIDs, last admission was in Oct 2018) p/w diffuse body pain.   Generalized body pain x 1 day. Pain 8/10, sharp, relief with meds  Chest pain , non radiating, similar to previous SCC,  States this pain is not as severe as the last time he was admitted. He usually takes  Tylenol and Aleve for pain.  Last took Alieve around 8pm yesterday  Denies fever/chills/SOB  pt with cold like symptoms a week back which is now improving Last admission for SCC was in  July 2018 likely secondary to a viral illness. At that time his  course was complicated by acute hypoxic respiratory failure secondary to fluid overload vs acute chest with O2 desat 70s with Hgb of 5's. He was started on Venturi mask, admitted to MICU and  was  (3u pRBC total. He was also started on Levofloxacin for possible atypical PNA and was given IV Lasix for pleural effusion, B lines and volume overload.        PAST MEDICAL & SURGICAL HISTORY:  Left ventricular dysfunction  Sickle Cell Disease  No significant past surgical history      Review of Systems:   CONSTITUTIONAL: No fever, weight loss, or fatigue  EYES: No eye pain, visual disturbances, or discharge  ENMT:  No difficulty hearing, tinnitus, vertigo; No sinus or throat pain  NECK: No pain or stiffness  BREASTS: No pain, masses, or nipple discharge  RESPIRATORY: No cough, wheezing, chills or hemoptysis; No shortness of breath  CARDIOVASCULAR: No chest pain, palpitations, dizziness, or leg swelling  GASTROINTESTINAL: No abdominal or epigastric pain. No nausea, vomiting, or hematemesis; No diarrhea or constipation. No melena or hematochezia.  GENITOURINARY: No dysuria, frequency, hematuria, or incontinence  NEUROLOGICAL: No headaches, memory loss, loss of strength, numbness, or tremors  SKIN: No itching, burning, rashes, or lesions   LYMPH NODES: No enlarged glands  ENDOCRINE: No heat or cold intolerance; No hair loss  MUSCULOSKELETAL: No joint pain or swelling; No muscle, back, or extremity pain  PSYCHIATRIC: No depression, anxiety, mood swings, or difficulty sleeping  HEME/LYMPH: No easy bruising, or bleeding gums  ALLERGY AND IMMUNOLOGIC: No hives or eczema    Allergies    No Known Drug Allergies  peanuts (Anaphylaxis)    Intolerances        Social History:     FAMILY HISTORY:  No pertinent family history in first degree relatives      MEDICATIONS  (STANDING):  enoxaparin Injectable 40 milliGRAM(s) SubCutaneous daily  folic acid 1 milliGRAM(s) Oral daily  morphine PCA (1 mG/mL) 30 milliLiter(s) PCA Continuous PCA Continuous  sodium chloride 0.45%. 1000 milliLiter(s) (100 mL/Hr) IV Continuous <Continuous>    MEDICATIONS  (PRN):  ondansetron Injectable 4 milliGRAM(s) IV Push every 6 hours PRN Nausea      T(C): 36.8 (12-25-18 @ 11:08), Max: 36.8 (12-25-18 @ 11:08)  HR: 61 (12-25-18 @ 11:08) (60 - 80)  BP: 147/69 (12-25-18 @ 11:08) (129/79 - 170/81)  RR: 16 (12-25-18 @ 11:08) (14 - 16)  SpO2: 100% (12-25-18 @ 11:08) (100% - 100%)    CAPILLARY BLOOD GLUCOSE        I&O's Summary      PHYSICAL EXAM:  GENERAL: NAD, well-developed  HEAD:  Atraumatic, Normocephalic  EYES: EOMI, PERRLA, conjunctiva and sclera clear  NECK: Supple, No elevated JVD  CHEST/LUNG: Clear to auscultation bilaterally; No wheeze  HEART: Regular rate and rhythm; No murmurs, rubs, or gallops  ABDOMEN: Soft, Nontender, Nondistended; Bowel sounds present  EXTREMITIES:  2+ Peripheral Pulses, No clubbing, cyanosis, or edema  PSYCH: AAOx3  NEUROLOGY: CN II-XII grossly intact, moving all extremities  SKIN: No rashes or lesions    LABS:                        6.0    9.46  )-----------( 311      ( 25 Dec 2018 05:26 )             17.6     12-25    141  |  111<H>  |  12  ----------------------------<  88  4.6   |  19<L>  |  1.13    Ca    9.1      25 Dec 2018 05:26    TPro  6.7  /  Alb  4.0  /  TBili  2.0<H>  /  DBili  x   /  AST  33  /  ALT  17  /  AlkPhos  77  12-25                RADIOLOGY & ADDITIONAL TESTS:    ECG Personally Reviewed -     Imaging Personally Reviewed:    Consultant(s) Notes Reviewed:      Care Discussed with Consultants/Other Providers: HPI:    48 y/M PMH of sickle cell disease (SS)  (not regularly taking opioid medications, usually takes NSAIDs, last admission was in Oct 2018), previous acute chest syndrome required MICU admission,  p/w diffuse body pain. Pt reports generalized body pain since 3 days, worse in head, chest, lower abdomen.  Pain 8/10, sharp, relief with meds.  Pain is persistent,  non radiating, similar to previous SCC. He endorses feeling sick in the past week, ROS +subjective fever, nasal congestion, sore throat, cough, N/V, diarrhea (4 non- bloody watery stool yesterday), and dysuria.     In ER, pt afebrile, VSS. Labs notable for Hgb 6, retic 7%. Received iv dilaudid 1mg x 3. Admitted for sickle cell pain crisis     PAST MEDICAL & SURGICAL HISTORY:    Sickle Cell Disease    No significant past surgical history      Review of Systems:   CONSTITUTIONAL: + fever, weight loss, or fatigue  EYES: No eye pain, visual disturbances, or discharge  ENMT: + nasal congestion, +sore throat  NECK: No pain or stiffness  BREASTS: No pain, masses, or nipple discharge  RESPIRATORY: + cough. No wheezing, chills or hemoptysis; No shortness of breath  CARDIOVASCULAR:+ chest pain. No palpitations, dizziness, or leg swelling  GASTROINTESTINAL: + abdominal  pain. + nausea, vomiting, diarrhea . No melena or hematochezia.  GENITOURINARY: + dysuria, No frequency, hematuria, or incontinence  NEUROLOGICAL: No headaches, memory loss, loss of strength, numbness, or tremors  SKIN: No itching, burning, rashes, or lesions   LYMPH NODES: No enlarged glands  ENDOCRINE: No heat or cold intolerance; No hair loss  MUSCULOSKELETAL: No joint pain or swelling; No muscle, back, or extremity pain  PSYCHIATRIC: No depression, anxiety, mood swings, or difficulty sleeping  HEME/LYMPH: No easy bruising, or bleeding gums  ALLERGY AND IMMUNOLOGIC: No hives or eczema    Allergies    No Known Drug Allergies  peanuts (Anaphylaxis)        Social History:     Denies ETOH, tobacco, and illicit drug use     FAMILY HISTORY:    No pertinent family history in first degree relatives      MEDICATIONS  (STANDING):  enoxaparin Injectable 40 milliGRAM(s) SubCutaneous daily  folic acid 1 milliGRAM(s) Oral daily  morphine PCA (1 mG/mL) 30 milliLiter(s) PCA Continuous PCA Continuous  sodium chloride 0.45%. 1000 milliLiter(s) (100 mL/Hr) IV Continuous <Continuous>    MEDICATIONS  (PRN):  ondansetron Injectable 4 milliGRAM(s) IV Push every 6 hours PRN Nausea      T(C): 36.8 (12-25-18 @ 11:08), Max: 36.8 (12-25-18 @ 11:08)  HR: 61 (12-25-18 @ 11:08) (60 - 80)  BP: 147/69 (12-25-18 @ 11:08) (129/79 - 170/81)  RR: 16 (12-25-18 @ 11:08) (14 - 16)  SpO2: 100% (12-25-18 @ 11:08) (100% - 100%)    CAPILLARY BLOOD GLUCOSE        PHYSICAL EXAM:  GENERAL: thin aa male lying in bed in NAD  HEAD:  Atraumatic, Normocephalic  EYES: EOMI, PERRLA, conjunctiva and sclera clear  NECK: Supple, No elevated JVD  CHEST/LUNG: Clear to auscultation bilaterally; No wheeze  HEART: Regular rate and rhythm; No murmurs, rubs, or gallops  ABDOMEN: soft, diffuse TTP  EXTREMITIES:  2+ Peripheral Pulses, No clubbing, cyanosis, or edema  PSYCH: AAOx3   NEUROLOGY: CN II-XII grossly intact, moving all extremities  SKIN: No rashes or lesions    LABS:                        6.0    9.46  )-----------( 311      ( 25 Dec 2018 05:26 )             17.6     12-25    141  |  111<H>  |  12  ----------------------------<  88  4.6   |  19<L>  |  1.13    Ca    9.1      25 Dec 2018 05:26    TPro  6.7  /  Alb  4.0  /  TBili  2.0<H>  /  DBili  x   /  AST  33  /  ALT  17  /  AlkPhos  77  12-25                RADIOLOGY & ADDITIONAL TESTS:  CXR- clear lungs

## 2018-12-25 NOTE — ED PROVIDER NOTE - ATTENDING CONTRIBUTION TO CARE
48M PMH sickle cell disease p/w several days of pain all over body. Subjective fevers. These symptoms are similar to multiple prior episodes. Slight cough/rhinorrhea x1w. Vitals wnl, exam as above, ekg grossly unchanged from prior.   ddx: Likely sickle crisis, possible URI.  CBC, cmp, retic. CXR. Symptom control, reassess.

## 2018-12-25 NOTE — H&P ADULT - PROBLEM SELECTOR PLAN 2
anemia likely d/t SCD. hgb close to baseline. no e/o acute bleeding  -hold pRBC transfusion for now. cont to trend hgb, retic count

## 2018-12-25 NOTE — ED ADULT TRIAGE NOTE - CHIEF COMPLAINT QUOTE
Pt brought in for pain to chest, back and knees that began since over the weekend. Pt reports taking Advil without much relief and came in for evaluation. Pt appears in no acute distress, vs as noted.

## 2018-12-25 NOTE — ED PROVIDER NOTE - PROGRESS NOTE DETAILS
US IV placed to L. AC. labs sent. pain medication to be given shortly Klepfish: Labs grossly at pt's baseline. Giving additional pain meds. cxr wnl. Will reassess. Ho: pt s/o to me, still complaining of severe pain. endorsed to hospitalist for sickle cell crisis.

## 2018-12-25 NOTE — ED ADULT NURSE NOTE - OBJECTIVE STATEMENT
pt a&o x3 c/o chest and arm pain since over the weekend. pt states he was told to take motrin but with no relief. reports that this pain is similar to his sickle cell pain. appears comfortable. md at bedside. nad noted will monitor

## 2018-12-26 LAB
ALBUMIN SERPL ELPH-MCNC: 3.6 G/DL — SIGNIFICANT CHANGE UP (ref 3.3–5)
ALP SERPL-CCNC: 72 U/L — SIGNIFICANT CHANGE UP (ref 40–120)
ALT FLD-CCNC: 15 U/L — SIGNIFICANT CHANGE UP (ref 4–41)
AST SERPL-CCNC: 34 U/L — SIGNIFICANT CHANGE UP (ref 4–40)
BASOPHILS # BLD AUTO: 0.08 K/UL — SIGNIFICANT CHANGE UP (ref 0–0.2)
BASOPHILS NFR BLD AUTO: 0.7 % — SIGNIFICANT CHANGE UP (ref 0–2)
BILIRUB SERPL-MCNC: 1.7 MG/DL — HIGH (ref 0.2–1.2)
BUN SERPL-MCNC: 8 MG/DL — SIGNIFICANT CHANGE UP (ref 7–23)
CALCIUM SERPL-MCNC: 8.8 MG/DL — SIGNIFICANT CHANGE UP (ref 8.4–10.5)
CHLORIDE SERPL-SCNC: 108 MMOL/L — HIGH (ref 98–107)
CO2 SERPL-SCNC: 19 MMOL/L — LOW (ref 22–31)
CREAT SERPL-MCNC: 0.94 MG/DL — SIGNIFICANT CHANGE UP (ref 0.5–1.3)
EOSINOPHIL # BLD AUTO: 0.99 K/UL — HIGH (ref 0–0.5)
EOSINOPHIL NFR BLD AUTO: 8.6 % — HIGH (ref 0–6)
GLUCOSE SERPL-MCNC: 102 MG/DL — HIGH (ref 70–99)
HCT VFR BLD CALC: 18.5 % — CRITICAL LOW (ref 39–50)
HGB BLD-MCNC: 6.2 G/DL — CRITICAL LOW (ref 13–17)
IMM GRANULOCYTES # BLD AUTO: 0.07 # — SIGNIFICANT CHANGE UP
IMM GRANULOCYTES NFR BLD AUTO: 0.6 % — SIGNIFICANT CHANGE UP (ref 0–1.5)
LDH SERPL L TO P-CCNC: 332 U/L — HIGH (ref 135–225)
LYMPHOCYTES # BLD AUTO: 1.55 K/UL — SIGNIFICANT CHANGE UP (ref 1–3.3)
LYMPHOCYTES # BLD AUTO: 13.4 % — SIGNIFICANT CHANGE UP (ref 13–44)
MAGNESIUM SERPL-MCNC: 1.6 MG/DL — SIGNIFICANT CHANGE UP (ref 1.6–2.6)
MCHC RBC-ENTMCNC: 27.7 PG — SIGNIFICANT CHANGE UP (ref 27–34)
MCHC RBC-ENTMCNC: 33.5 % — SIGNIFICANT CHANGE UP (ref 32–36)
MCV RBC AUTO: 82.6 FL — SIGNIFICANT CHANGE UP (ref 80–100)
MONOCYTES # BLD AUTO: 0.76 K/UL — SIGNIFICANT CHANGE UP (ref 0–0.9)
MONOCYTES NFR BLD AUTO: 6.6 % — SIGNIFICANT CHANGE UP (ref 2–14)
NEUTROPHILS # BLD AUTO: 8.1 K/UL — HIGH (ref 1.8–7.4)
NEUTROPHILS NFR BLD AUTO: 70.1 % — SIGNIFICANT CHANGE UP (ref 43–77)
NRBC # FLD: 0.17 — SIGNIFICANT CHANGE UP
NRBC FLD-RTO: 1.5 — SIGNIFICANT CHANGE UP
PHOSPHATE SERPL-MCNC: 3.3 MG/DL — SIGNIFICANT CHANGE UP (ref 2.5–4.5)
PLATELET # BLD AUTO: 299 K/UL — SIGNIFICANT CHANGE UP (ref 150–400)
PMV BLD: 9.4 FL — SIGNIFICANT CHANGE UP (ref 7–13)
POTASSIUM SERPL-MCNC: 4.6 MMOL/L — SIGNIFICANT CHANGE UP (ref 3.5–5.3)
POTASSIUM SERPL-SCNC: 4.6 MMOL/L — SIGNIFICANT CHANGE UP (ref 3.5–5.3)
PROT SERPL-MCNC: 6.4 G/DL — SIGNIFICANT CHANGE UP (ref 6–8.3)
RBC # BLD: 2.24 M/UL — LOW (ref 4.2–5.8)
RBC # FLD: 22.3 % — HIGH (ref 10.3–14.5)
SODIUM SERPL-SCNC: 137 MMOL/L — SIGNIFICANT CHANGE UP (ref 135–145)
WBC # BLD: 11.55 K/UL — HIGH (ref 3.8–10.5)
WBC # FLD AUTO: 11.55 K/UL — HIGH (ref 3.8–10.5)

## 2018-12-26 PROCEDURE — 99233 SBSQ HOSP IP/OBS HIGH 50: CPT

## 2018-12-26 RX ORDER — POLYETHYLENE GLYCOL 3350 17 G/17G
17 POWDER, FOR SOLUTION ORAL DAILY
Qty: 0 | Refills: 0 | Status: DISCONTINUED | OUTPATIENT
Start: 2018-12-26 | End: 2019-01-02

## 2018-12-26 RX ORDER — DOCUSATE SODIUM 100 MG
100 CAPSULE ORAL DAILY
Qty: 0 | Refills: 0 | Status: DISCONTINUED | OUTPATIENT
Start: 2018-12-26 | End: 2018-12-27

## 2018-12-26 RX ADMIN — MORPHINE SULFATE 30 MILLILITER(S): 50 CAPSULE, EXTENDED RELEASE ORAL at 07:07

## 2018-12-26 RX ADMIN — Medication 1 MILLIGRAM(S): at 12:37

## 2018-12-26 RX ADMIN — MORPHINE SULFATE 30 MILLILITER(S): 50 CAPSULE, EXTENDED RELEASE ORAL at 12:35

## 2018-12-26 RX ADMIN — ENOXAPARIN SODIUM 40 MILLIGRAM(S): 100 INJECTION SUBCUTANEOUS at 12:37

## 2018-12-26 RX ADMIN — MORPHINE SULFATE 30 MILLILITER(S): 50 CAPSULE, EXTENDED RELEASE ORAL at 19:12

## 2018-12-26 RX ADMIN — MORPHINE SULFATE 30 MILLILITER(S): 50 CAPSULE, EXTENDED RELEASE ORAL at 07:20

## 2018-12-26 RX ADMIN — SODIUM CHLORIDE 100 MILLILITER(S): 9 INJECTION, SOLUTION INTRAVENOUS at 12:37

## 2018-12-26 NOTE — PROGRESS NOTE ADULT - SUBJECTIVE AND OBJECTIVE BOX
Patient is a 48y old  Male who presents with a chief complaint of generalized body pain     SUBJECTIVE / OVERNIGHT EVENTS: patient seen and examined by bedside at 11:15 AM, pt reports generalized body pain, denies CP, SOB, n/v  Diarrhea has resolved       MEDICATIONS  (STANDING):  enoxaparin Injectable 40 milliGRAM(s) SubCutaneous daily  folic acid 1 milliGRAM(s) Oral daily  morphine PCA (1 mG/mL) 30 milliLiter(s) PCA Continuous PCA Continuous  polyethylene glycol 3350 17 Gram(s) Oral daily  sodium chloride 0.45%. 1000 milliLiter(s) (100 mL/Hr) IV Continuous <Continuous>    MEDICATIONS  (PRN):  docusate sodium 100 milliGRAM(s) Oral daily PRN Constipation  ondansetron Injectable 4 milliGRAM(s) IV Push every 6 hours PRN Nausea      Vital Signs Last 24 Hrs  T(C): 37.1 (26 Dec 2018 13:20), Max: 37.1 (26 Dec 2018 13:20)  T(F): 98.8 (26 Dec 2018 13:20), Max: 98.8 (26 Dec 2018 13:20)  HR: 72 (26 Dec 2018 13:20) (62 - 76)  BP: 134/68 (26 Dec 2018 13:20) (122/78 - 143/62)  BP(mean): --  RR: 18 (26 Dec 2018 13:20) (16 - 18)  SpO2: 98% (26 Dec 2018 13:20) (96% - 100%)  CAPILLARY BLOOD GLUCOSE        I&O's Summary      PHYSICAL EXAM:  GENERAL: NAD, well-developed  HEAD:  Atraumatic, Normocephalic  EYES: EOMI, PERRLA, conjunctiva and sclera clear  NECK: Supple,   CHEST/LUNG: Clear to auscultation bilaterally; No wheeze  HEART: Regular rate and rhythm;   ABDOMEN: Soft, Nontender, Nondistended; Bowel sounds present  EXTREMITIES:  2+ Peripheral Pulses, No clubbing, cyanosis, or edema  PSYCH: AAOx3  NEUROLOGY: non-focal  SKIN: No rashes or lesions    LABS:                        6.0    9.46  )-----------( 311      ( 25 Dec 2018 05:26 )             17.6     12-26    137  |  108<H>  |  8   ----------------------------<  102<H>  4.6   |  19<L>  |  0.94    Ca    8.8      26 Dec 2018 09:55  Phos  3.3     12-26  Mg     1.6     12-26    TPro  6.4  /  Alb  3.6  /  TBili  1.7<H>  /  DBili  x   /  AST  34  /  ALT  15  /  AlkPhos  72  12-26

## 2018-12-26 NOTE — PROGRESS NOTE ADULT - PROBLEM SELECTOR PLAN 1
-CXR unremarkable, RVP negative   -check UA,  stool studies r/o infectious cause sickle cell crisis   -istop checked, no records  -pain control discussed with pharmacy- appears to respond to morphine better than Dilaudid based on previous records. Will start morphine PCA 2mg q6min with 4hr limit of 10mg as previously ordered. titrate as needed  - bowel regimen on hold  as pt had reported  diarrhea  -start IVF 1/2NS@ 100cc/hr  -start folate, MVI  -trend cbc, retic, LDH, LFTs

## 2018-12-27 ENCOUNTER — TRANSCRIPTION ENCOUNTER (OUTPATIENT)
Age: 63
End: 2018-12-27

## 2018-12-27 DIAGNOSIS — I51.9 HEART DISEASE, UNSPECIFIED: ICD-10-CM

## 2018-12-27 LAB
ALBUMIN SERPL ELPH-MCNC: 3.8 G/DL — SIGNIFICANT CHANGE UP (ref 3.3–5)
ALP SERPL-CCNC: 76 U/L — SIGNIFICANT CHANGE UP (ref 40–120)
ALT FLD-CCNC: 16 U/L — SIGNIFICANT CHANGE UP (ref 4–41)
AST SERPL-CCNC: 32 U/L — SIGNIFICANT CHANGE UP (ref 4–40)
BASOPHILS # BLD AUTO: 0.05 K/UL — SIGNIFICANT CHANGE UP (ref 0–0.2)
BASOPHILS NFR BLD AUTO: 0.5 % — SIGNIFICANT CHANGE UP (ref 0–2)
BILIRUB SERPL-MCNC: 1.9 MG/DL — HIGH (ref 0.2–1.2)
BUN SERPL-MCNC: 9 MG/DL — SIGNIFICANT CHANGE UP (ref 7–23)
CALCIUM SERPL-MCNC: 9.3 MG/DL — SIGNIFICANT CHANGE UP (ref 8.4–10.5)
CHLORIDE SERPL-SCNC: 107 MMOL/L — SIGNIFICANT CHANGE UP (ref 98–107)
CO2 SERPL-SCNC: 22 MMOL/L — SIGNIFICANT CHANGE UP (ref 22–31)
CREAT SERPL-MCNC: 0.88 MG/DL — SIGNIFICANT CHANGE UP (ref 0.5–1.3)
EOSINOPHIL # BLD AUTO: 1.26 K/UL — HIGH (ref 0–0.5)
EOSINOPHIL NFR BLD AUTO: 12.2 % — HIGH (ref 0–6)
GLUCOSE SERPL-MCNC: 99 MG/DL — SIGNIFICANT CHANGE UP (ref 70–99)
HCT VFR BLD CALC: 19.5 % — CRITICAL LOW (ref 39–50)
HGB BLD-MCNC: 6.4 G/DL — CRITICAL LOW (ref 13–17)
IMM GRANULOCYTES # BLD AUTO: 0.08 # — SIGNIFICANT CHANGE UP
IMM GRANULOCYTES NFR BLD AUTO: 0.8 % — SIGNIFICANT CHANGE UP (ref 0–1.5)
LDH SERPL L TO P-CCNC: 328 U/L — HIGH (ref 135–225)
LYMPHOCYTES # BLD AUTO: 1.23 K/UL — SIGNIFICANT CHANGE UP (ref 1–3.3)
LYMPHOCYTES # BLD AUTO: 12 % — LOW (ref 13–44)
MCHC RBC-ENTMCNC: 27.6 PG — SIGNIFICANT CHANGE UP (ref 27–34)
MCHC RBC-ENTMCNC: 32.8 % — SIGNIFICANT CHANGE UP (ref 32–36)
MCV RBC AUTO: 84.1 FL — SIGNIFICANT CHANGE UP (ref 80–100)
MONOCYTES # BLD AUTO: 0.73 K/UL — SIGNIFICANT CHANGE UP (ref 0–0.9)
MONOCYTES NFR BLD AUTO: 7.1 % — SIGNIFICANT CHANGE UP (ref 2–14)
NEUTROPHILS # BLD AUTO: 6.94 K/UL — SIGNIFICANT CHANGE UP (ref 1.8–7.4)
NEUTROPHILS NFR BLD AUTO: 67.4 % — SIGNIFICANT CHANGE UP (ref 43–77)
NRBC # FLD: 0.16 — SIGNIFICANT CHANGE UP
NRBC FLD-RTO: 1.6 — SIGNIFICANT CHANGE UP
PLATELET # BLD AUTO: 334 K/UL — SIGNIFICANT CHANGE UP (ref 150–400)
PMV BLD: 10.1 FL — SIGNIFICANT CHANGE UP (ref 7–13)
POTASSIUM SERPL-MCNC: 4.5 MMOL/L — SIGNIFICANT CHANGE UP (ref 3.5–5.3)
POTASSIUM SERPL-SCNC: 4.5 MMOL/L — SIGNIFICANT CHANGE UP (ref 3.5–5.3)
PROT SERPL-MCNC: 6.7 G/DL — SIGNIFICANT CHANGE UP (ref 6–8.3)
RBC # BLD: 2.32 M/UL — LOW (ref 4.2–5.8)
RBC # FLD: 22.3 % — HIGH (ref 10.3–14.5)
RETICS #: 187 K/UL — HIGH (ref 25–125)
RETICS/RBC NFR: 8.1 % — HIGH (ref 0.5–2.5)
SODIUM SERPL-SCNC: 140 MMOL/L — SIGNIFICANT CHANGE UP (ref 135–145)
WBC # BLD: 10.29 K/UL — SIGNIFICANT CHANGE UP (ref 3.8–10.5)
WBC # FLD AUTO: 10.29 K/UL — SIGNIFICANT CHANGE UP (ref 3.8–10.5)

## 2018-12-27 PROCEDURE — 99233 SBSQ HOSP IP/OBS HIGH 50: CPT

## 2018-12-27 RX ORDER — SODIUM CHLORIDE 9 MG/ML
1000 INJECTION, SOLUTION INTRAVENOUS
Qty: 0 | Refills: 0 | Status: DISCONTINUED | OUTPATIENT
Start: 2018-12-27 | End: 2019-01-01

## 2018-12-27 RX ORDER — DOCUSATE SODIUM 100 MG
100 CAPSULE ORAL THREE TIMES A DAY
Qty: 0 | Refills: 0 | Status: DISCONTINUED | OUTPATIENT
Start: 2018-12-27 | End: 2019-01-02

## 2018-12-27 RX ADMIN — SODIUM CHLORIDE 75 MILLILITER(S): 9 INJECTION, SOLUTION INTRAVENOUS at 19:06

## 2018-12-27 RX ADMIN — SODIUM CHLORIDE 75 MILLILITER(S): 9 INJECTION, SOLUTION INTRAVENOUS at 17:21

## 2018-12-27 RX ADMIN — ENOXAPARIN SODIUM 40 MILLIGRAM(S): 100 INJECTION SUBCUTANEOUS at 11:37

## 2018-12-27 RX ADMIN — Medication 1 MILLIGRAM(S): at 11:38

## 2018-12-27 RX ADMIN — MORPHINE SULFATE 30 MILLILITER(S): 50 CAPSULE, EXTENDED RELEASE ORAL at 19:06

## 2018-12-27 RX ADMIN — MORPHINE SULFATE 30 MILLILITER(S): 50 CAPSULE, EXTENDED RELEASE ORAL at 07:10

## 2018-12-27 RX ADMIN — SODIUM CHLORIDE 100 MILLILITER(S): 9 INJECTION, SOLUTION INTRAVENOUS at 07:11

## 2018-12-27 NOTE — DISCHARGE NOTE ADULT - HOSPITAL COURSE
48 y/M PMH of sickle cell disease (SS)  (not regularly taking opioid medications, usually takes NSAIDs, last admission was in Oct 2018), previous acute chest syndrome required MICU admission,  p/w diffuse body pain. Pt reports generalized body pain since 3 days, worse in head, chest, lower abdomen.  Pain 8/10, sharp, relief with meds.  Pain is persistent,  non radiating, similar to previous SCC. He endorses feeling sick in the past week, ROS +subjective fever, nasal congestion, sore throat, cough, N/V, diarrhea (4 non- bloody watery stool yesterday), and dysuria.  In ER, pt afebrile, VSS. Labs notable for Hgb 6, retic 7%. Received iv dilaudid 1mg x 3. Admitted for sickle cell pain crisis    COURSE______ 48 y/M PMH of sickle cell disease (SS)  (not regularly taking opioid medications, usually takes NSAIDs, last admission was in Oct 2018), previous acute chest syndrome required MICU admission,  p/w diffuse body pain. Pt reports generalized body pain since 3 days, worse in head, chest, lower abdomen.  Pain 8/10, sharp, relief with meds.  Pain is persistent,  non radiating, similar to previous SCC. He endorses feeling sick in the past week, ROS +subjective fever, nasal congestion, sore throat, cough, N/V, diarrhea (4 non- bloody watery stool yesterday), and dysuria.  In ER, pt afebrile, VSS. Labs notable for Hgb 6, retic 7%. Received iv dilaudid 1mg x 3. Admitted for sickle cell pain crisis    Sickle cell crisis.    -CXR reviewed unremarkable  -istop checked, no records  -pain control discussed with pharmacy- appears to respond to morphine better than Dilaudid based on previous records. Will start morphine PCA 2mg q6min with 4hr limit of 10mg as previously ordered. titrate as needed  -hold bowel regimen for now as pt has diarrhea  -start IVF 1/2NS@ 100cc/hr  -start folate, MVI.     Anemia due to other cause.    anemia likely d/t SCD. hgb close to baseline. no e/o acute bleeding  -hold pRBC transfusion for now. cont to trend hgb, retic count.     Chronic Systolic Heart Failure   - damon consulted - low dose BB ordered  - f/u outpatient with damon    Dispo - Home 48 y/M PMH of sickle cell disease (SS)  (not regularly taking opioid medications, usually takes NSAIDs, last admission was in Oct 2018), previous acute chest syndrome required MICU admission,  p/w diffuse body pain. Pt reports generalized body pain since 3 days, worse in head, chest, lower abdomen.  Pain 8/10, sharp, relief with meds.  Pain is persistent,  non radiating, similar to previous SCC. He endorses feeling sick in the past week, ROS +subjective fever, nasal congestion, sore throat, cough, N/V, diarrhea (4 non- bloody watery stool yesterday), and dysuria.  In ER, pt afebrile, VSS. Labs notable for Hgb 6, retic 7%. Received iv dilaudid 1mg x 3. Admitted for sickle cell pain crisis    Sickle cell crisis.    -CXR reviewed unremarkable  -istop checked, no records  -pain control discussed with pharmacy- appears to respond to morphine better than Dilaudid based on previous records.     Anemia due to other cause.    anemia likely d/t SCD. hgb close to baseline. no e/o acute bleeding  Pt asymptomatic     Chronic Systolic Heart Failure   - damon consulted - low dose BB ordered  repeat TTE grossly normal   - f/u outpatient with damon    Dispo - Home

## 2018-12-27 NOTE — DISCHARGE NOTE ADULT - CARE PLAN
Principal Discharge DX:	Sickle cell crisis  Goal:	Resolution and return to baseline  Assessment and plan of treatment:	Continue with your prescribed medications as directed, as well as, pain medications as needed, bowel regimen as needed for constipation, and vitamin/mineral supplementation. Follow-up with your hematologist as outpatient for further care/recommendations. Monitor for signs/symptoms indicating worsening of disease, such as, easy bleeding/bruising, pale skin, fatigue, dizziness, increased heart rate, or chest pain. Principal Discharge DX:	Sickle cell crisis  Goal:	Resolution and return to baseline  Assessment and plan of treatment:	Continue with your prescribed medications as directed, as well as, pain medications as needed, bowel regimen as needed for constipation, and vitamin/mineral supplementation. Follow-up with your hematologist as outpatient for further care/recommendations. Monitor for signs/symptoms indicating worsening of disease, such as, easy bleeding/bruising, pale skin, fatigue, dizziness, increased heart rate, or chest pain.  Secondary Diagnosis:	Left ventricular dysfunction  Assessment and plan of treatment:	Follow up with cardiology outpatient Principal Discharge DX:	Sickle cell crisis  Goal:	Resolution and return to baseline  Assessment and plan of treatment:	Continue with your prescribed medications as directed, as well as, pain medications as needed, bowel regimen as needed for constipation, and vitamin/mineral supplementation. Follow-up with your hematologist as outpatient for further care/recommendations. Monitor for signs/symptoms indicating worsening of disease, such as, easy bleeding/bruising, pale skin, fatigue, dizziness, increased heart rate, or chest pain.  Secondary Diagnosis:	Left ventricular dysfunction  Assessment and plan of treatment:	Follow up with cardiology outpatient - ECHO performed inpatient with ejection fraction of 53%. Continue with Lopressor as directed

## 2018-12-27 NOTE — DISCHARGE NOTE ADULT - MEDICATION SUMMARY - MEDICATIONS TO STOP TAKING
I will STOP taking the medications listed below when I get home from the hospital:    acetaminophen 325 mg oral tablet  -- 2 tab(s) by mouth every 6 hours, As needed, For Temp greater than 38 C (100.4 F)/Pain    HYDROmorphone 4 mg oral tablet  -- 1 tab(s) by mouth every 4 hours MDD:6 tabs    lisinopril 2.5 mg oral tablet  -- 1 tab(s) by mouth once a day I will STOP taking the medications listed below when I get home from the hospital:    HYDROmorphone 4 mg oral tablet  -- 1 tab(s) by mouth every 4 hours MDD:6 tabs    lisinopril 2.5 mg oral tablet  -- 1 tab(s) by mouth once a day

## 2018-12-27 NOTE — DISCHARGE NOTE ADULT - MEDICATION SUMMARY - MEDICATIONS TO TAKE
I will START or STAY ON the medications listed below when I get home from the hospital:    morphine 15 mg oral tablet  -- 1 tab(s) by mouth every 6 hours, As needed, Severe Pain MDD:4 tablets  -- Indication: For Pain    tamsulosin 0.4 mg oral capsule  -- 1 cap(s) by mouth once a day (at bedtime)  -- Indication: For BPH    metoprolol tartrate 25 mg oral tablet  -- Take half a tablet (12.5mg) by mouth two times per day   -- It is very important that you take or use this exactly as directed.  Do not skip doses or discontinue unless directed by your doctor.  May cause drowsiness.  Alcohol may intensify this effect.  Use care when operating dangerous machinery.  Some non-prescription drugs may aggravate your condition.  Read all labels carefully.  If a warning appears, check with your doctor before taking.  Take with food or milk.  This drug may impair the ability to drive or operate machinery.  Use care until you become familiar with its effects.    -- Indication: For Hypertension    polyethylene glycol 3350 oral powder for reconstitution  -- 17 gram(s) by mouth once a day  -- Indication: For Constipation    docusate sodium 100 mg oral capsule  -- 1 cap(s) by mouth 3 times a day as needed for constipation   -- Indication: For Constipation    senna oral tablet  -- 2 tab(s) by mouth once a day (at bedtime), As needed, Constipation  -- Indication: For Constipation    folic acid 1 mg oral tablet  -- 1 tab(s) by mouth once a day  -- Indication: For Supplementation I will START or STAY ON the medications listed below when I get home from the hospital:    morphine 15 mg oral tablet  -- 1 tab(s) by mouth every 6 hours, As needed, Severe Pain MDD:4 tablets  -- Indication: For Pain    tamsulosin 0.4 mg oral capsule  -- 1 cap(s) by mouth once a day (at bedtime)  -- Indication: For BPH    metoprolol tartrate 25 mg oral tablet  -- Take half a tablet (12.5mg) by mouth two times per day   -- It is very important that you take or use this exactly as directed.  Do not skip doses or discontinue unless directed by your doctor.  May cause drowsiness.  Alcohol may intensify this effect.  Use care when operating dangerous machinery.  Some non-prescription drugs may aggravate your condition.  Read all labels carefully.  If a warning appears, check with your doctor before taking.  Take with food or milk.  This drug may impair the ability to drive or operate machinery.  Use care until you become familiar with its effects.    -- Indication: For Left ventricular dysfunction    polyethylene glycol 3350 oral powder for reconstitution  -- 17 gram(s) by mouth once a day  -- Indication: For Constipation    docusate sodium 100 mg oral capsule  -- 1 cap(s) by mouth 3 times a day as needed for constipation   -- Indication: For Constipation    senna oral tablet  -- 2 tab(s) by mouth once a day (at bedtime), As needed, Constipation  -- Indication: For Constipation    folic acid 1 mg oral tablet  -- 1 tab(s) by mouth once a day  -- Indication: For Supplementation

## 2018-12-27 NOTE — DISCHARGE NOTE ADULT - PLAN OF CARE
Continue with your prescribed medications as directed, as well as, pain medications as needed, bowel regimen as needed for constipation, and vitamin/mineral supplementation. Follow-up with your hematologist as outpatient for further care/recommendations. Monitor for signs/symptoms indicating worsening of disease, such as, easy bleeding/bruising, pale skin, fatigue, dizziness, increased heart rate, or chest pain. Resolution and return to baseline Follow up with cardiology outpatient Follow up with cardiology outpatient - ECHO performed inpatient with ejection fraction of 53%. Continue with Lopressor as directed

## 2018-12-27 NOTE — DISCHARGE NOTE ADULT - CARE PROVIDERS DIRECT ADDRESSES
,DirectAddress_Unknown ,DirectAddress_Unknown,[email or web address]

## 2018-12-27 NOTE — PROGRESS NOTE ADULT - SUBJECTIVE AND OBJECTIVE BOX
Patient is a 48y old  Male who presents with a chief complaint of Sickle cell crisis (27 Dec 2018 10:17)      SUBJECTIVE / OVERNIGHT EVENTS: patient seen and examined by bedside at 11:40 AM, still c/o generalized body pain, denies headache, dizziness, SOB, CP, Palpitations , N/V/D,       MEDICATIONS  (STANDING):  enoxaparin Injectable 40 milliGRAM(s) SubCutaneous daily  folic acid 1 milliGRAM(s) Oral daily  morphine PCA (1 mG/mL) 30 milliLiter(s) PCA Continuous PCA Continuous  polyethylene glycol 3350 17 Gram(s) Oral daily  sodium chloride 0.45%. 1000 milliLiter(s) (100 mL/Hr) IV Continuous <Continuous>    MEDICATIONS  (PRN):  docusate sodium 100 milliGRAM(s) Oral daily PRN Constipation  ondansetron Injectable 4 milliGRAM(s) IV Push every 6 hours PRN Nausea      Vital Signs Last 24 Hrs  T(C): 36.8 (27 Dec 2018 13:20), Max: 36.8 (26 Dec 2018 17:20)  T(F): 98.2 (27 Dec 2018 13:20), Max: 98.2 (26 Dec 2018 17:20)  HR: 76 (27 Dec 2018 13:20) (71 - 88)  BP: 112/52 (27 Dec 2018 13:20) (112/52 - 137/55)  BP(mean): --  RR: 17 (27 Dec 2018 13:20) (17 - 18)  SpO2: 95% (27 Dec 2018 13:20) (95% - 100%)  CAPILLARY BLOOD GLUCOSE        I&O's Summary      PHYSICAL EXAM:  GENERAL: NAD, well-developed  HEAD:  Atraumatic, Normocephalic  EYES: EOMI, PERRLA, conjunctiva and sclera clear  NECK: Supple,   CHEST/LUNG: Clear to auscultation bilaterally; No wheeze  HEART: Regular rate and rhythm;   ABDOMEN: Soft, Nontender, Nondistended; Bowel sounds present  EXTREMITIES:  2+ Peripheral Pulses, No clubbing, cyanosis, or edema  PSYCH: AAOx3  NEUROLOGY: non-focal  SKIN: No rashes or lesions    LABS:                        6.4    10.29 )-----------( 334      ( 27 Dec 2018 07:30 )             19.5     12-27    140  |  107  |  9   ----------------------------<  99  4.5   |  22  |  0.88    Ca    9.3      27 Dec 2018 07:30  Phos  3.3     12-26  Mg     1.6     12-26    TPro  6.7  /  Alb  3.8  /  TBili  1.9<H>  /  DBili  x   /  AST  32  /  ALT  16  /  AlkPhos  76  12-27

## 2018-12-27 NOTE — DISCHARGE NOTE ADULT - CARE PROVIDER_API CALL
Uli Hawkins), Cardiovascular Disease; Internal Medicine  [address] [address]  Phone: [phone]  Fax: [phone] Uli Hawkins), Cardiovascular Disease; Internal Medicine  [address] [address]  Phone: [phone]  Fax: [phone]    Lluvia Hamm), Internal Medicine  [address] [place] [address]  Phone: [phone]  Fax: [phone]

## 2018-12-27 NOTE — PROGRESS NOTE ADULT - PROBLEM SELECTOR PLAN 1
-CXR unremarkable, RVP negative   -no dysuria and diarrhea, will continue to monitor   -istop checked, no records  -pain control discussed with pharmacy- appears to respond to morphine better than Dilaudid based on previous records. Will start morphine PCA 2mg q6min with 4hr limit of 10mg as previously ordered. titrate as needed  - bowel regimen PRN   -start IVF 1/2NS@ 100cc/hr  -start folate, MVI  -trend cbc, retic, LDH, LFTs

## 2018-12-27 NOTE — PROGRESS NOTE ADULT - PROBLEM SELECTOR PLAN 3
-lovenox for DVT ppx  -regular diet TTE in July 2018 had shown mild global systolic dysfn, pt not on any cardiac medication  will d/w Pmd    will cut back fluids to 75 ml/hr

## 2018-12-27 NOTE — DISCHARGE NOTE ADULT - PATIENT PORTAL LINK FT
You can access the [facility] Patient Portal, offered by [facility], by registering with the following website: [email or web address]

## 2018-12-28 LAB
ALBUMIN SERPL ELPH-MCNC: 3.7 G/DL — SIGNIFICANT CHANGE UP (ref 3.3–5)
ALP SERPL-CCNC: 80 U/L — SIGNIFICANT CHANGE UP (ref 40–120)
ALT FLD-CCNC: 17 U/L — SIGNIFICANT CHANGE UP (ref 4–41)
APPEARANCE UR: CLEAR — SIGNIFICANT CHANGE UP
AST SERPL-CCNC: 31 U/L — SIGNIFICANT CHANGE UP (ref 4–40)
BILIRUB DIRECT SERPL-MCNC: 0.3 MG/DL — HIGH (ref 0.1–0.2)
BILIRUB SERPL-MCNC: 1.9 MG/DL — HIGH (ref 0.2–1.2)
BILIRUB SERPL-MCNC: 1.9 MG/DL — HIGH (ref 0.2–1.2)
BILIRUB UR-MCNC: NEGATIVE — SIGNIFICANT CHANGE UP
BLOOD UR QL VISUAL: NEGATIVE — SIGNIFICANT CHANGE UP
BUN SERPL-MCNC: 9 MG/DL — SIGNIFICANT CHANGE UP (ref 7–23)
CALCIUM SERPL-MCNC: 9.2 MG/DL — SIGNIFICANT CHANGE UP (ref 8.4–10.5)
CHLORIDE SERPL-SCNC: 106 MMOL/L — SIGNIFICANT CHANGE UP (ref 98–107)
CO2 SERPL-SCNC: 24 MMOL/L — SIGNIFICANT CHANGE UP (ref 22–31)
COLOR SPEC: SIGNIFICANT CHANGE UP
CREAT SERPL-MCNC: 0.86 MG/DL — SIGNIFICANT CHANGE UP (ref 0.5–1.3)
GLUCOSE SERPL-MCNC: 116 MG/DL — HIGH (ref 70–99)
GLUCOSE UR-MCNC: NEGATIVE — SIGNIFICANT CHANGE UP
HAPTOGLOB SERPL-MCNC: < 20 MG/DL — LOW (ref 34–200)
KETONES UR-MCNC: NEGATIVE — SIGNIFICANT CHANGE UP
LDH SERPL L TO P-CCNC: 336 U/L — HIGH (ref 135–225)
LEUKOCYTE ESTERASE UR-ACNC: NEGATIVE — SIGNIFICANT CHANGE UP
MAGNESIUM SERPL-MCNC: 1.7 MG/DL — SIGNIFICANT CHANGE UP (ref 1.6–2.6)
NITRITE UR-MCNC: NEGATIVE — SIGNIFICANT CHANGE UP
PH UR: 6.5 — SIGNIFICANT CHANGE UP (ref 5–8)
PHOSPHATE SERPL-MCNC: 3 MG/DL — SIGNIFICANT CHANGE UP (ref 2.5–4.5)
POTASSIUM SERPL-MCNC: 4.7 MMOL/L — SIGNIFICANT CHANGE UP (ref 3.5–5.3)
POTASSIUM SERPL-SCNC: 4.7 MMOL/L — SIGNIFICANT CHANGE UP (ref 3.5–5.3)
PROT SERPL-MCNC: 6.5 G/DL — SIGNIFICANT CHANGE UP (ref 6–8.3)
PROT UR-MCNC: 10 — SIGNIFICANT CHANGE UP
SODIUM SERPL-SCNC: 140 MMOL/L — SIGNIFICANT CHANGE UP (ref 135–145)
SP GR SPEC: 1.01 — SIGNIFICANT CHANGE UP (ref 1–1.04)
UROBILINOGEN FLD QL: NORMAL — SIGNIFICANT CHANGE UP

## 2018-12-28 PROCEDURE — 99233 SBSQ HOSP IP/OBS HIGH 50: CPT

## 2018-12-28 RX ORDER — METOPROLOL TARTRATE 50 MG
12.5 TABLET ORAL
Qty: 0 | Refills: 0 | Status: DISCONTINUED | OUTPATIENT
Start: 2018-12-28 | End: 2019-01-02

## 2018-12-28 RX ADMIN — Medication 1 MILLIGRAM(S): at 13:30

## 2018-12-28 RX ADMIN — Medication 12.5 MILLIGRAM(S): at 19:00

## 2018-12-28 RX ADMIN — SODIUM CHLORIDE 75 MILLILITER(S): 9 INJECTION, SOLUTION INTRAVENOUS at 07:23

## 2018-12-28 RX ADMIN — SODIUM CHLORIDE 75 MILLILITER(S): 9 INJECTION, SOLUTION INTRAVENOUS at 13:29

## 2018-12-28 RX ADMIN — ENOXAPARIN SODIUM 40 MILLIGRAM(S): 100 INJECTION SUBCUTANEOUS at 13:30

## 2018-12-28 RX ADMIN — MORPHINE SULFATE 30 MILLILITER(S): 50 CAPSULE, EXTENDED RELEASE ORAL at 07:23

## 2018-12-28 RX ADMIN — MORPHINE SULFATE 30 MILLILITER(S): 50 CAPSULE, EXTENDED RELEASE ORAL at 19:31

## 2018-12-28 RX ADMIN — MORPHINE SULFATE 30 MILLILITER(S): 50 CAPSULE, EXTENDED RELEASE ORAL at 09:04

## 2018-12-28 NOTE — PROGRESS NOTE ADULT - SUBJECTIVE AND OBJECTIVE BOX
Patient is a 48y old  Male who presents with a chief complaint of Sickle cell crisis (27 Dec 2018 14:48)      SUBJECTIVE / OVERNIGHT EVENTS: patient seen and examined by bedside qt 9:25 Am, pt feeling better, pain better controlled , denies headache, dizziness, SOB, CP, Palpitations , N/V/D, abdominal pain        MEDICATIONS  (STANDING):  docusate sodium 100 milliGRAM(s) Oral three times a day  enoxaparin Injectable 40 milliGRAM(s) SubCutaneous daily  folic acid 1 milliGRAM(s) Oral daily  metoprolol tartrate 12.5 milliGRAM(s) Oral two times a day  morphine PCA (1 mG/mL) 30 milliLiter(s) PCA Continuous PCA Continuous  polyethylene glycol 3350 17 Gram(s) Oral daily  sodium chloride 0.45%. 1000 milliLiter(s) (75 mL/Hr) IV Continuous <Continuous>    MEDICATIONS  (PRN):  ondansetron Injectable 4 milliGRAM(s) IV Push every 6 hours PRN Nausea      Vital Signs Last 24 Hrs  T(C): 36.8 (28 Dec 2018 09:15), Max: 37.1 (27 Dec 2018 17:20)  T(F): 98.3 (28 Dec 2018 09:15), Max: 98.8 (27 Dec 2018 17:20)  HR: 68 (28 Dec 2018 09:15) (68 - 87)  BP: 132/51 (28 Dec 2018 09:15) (112/52 - 152/71)  BP(mean): --  RR: 18 (28 Dec 2018 09:15) (17 - 18)  SpO2: 96% (28 Dec 2018 09:15) (95% - 100%)  CAPILLARY BLOOD GLUCOSE        I&O's Summary      PHYSICAL EXAM:  GENERAL: NAD, well-developed  HEAD:  Atraumatic, Normocephalic  EYES: EOMI, PERRLA, conjunctiva and sclera clear  NECK: Supple, No JVD  CHEST/LUNG: Clear to auscultation bilaterally; No wheeze  HEART: Regular rate and rhythm; No murmurs, rubs, or gallops  ABDOMEN: Soft, Nontender, Nondistended; Bowel sounds present  EXTREMITIES:  2+ Peripheral Pulses, No clubbing, cyanosis, or edema  PSYCH: AAOx3  NEUROLOGY: non-focal  SKIN: No rashes or lesions    LABS:                        6.4    10.29 )-----------( 334      ( 27 Dec 2018 07:30 )             19.5         140  |  106  |  9   ----------------------------<  116<H>  4.7   |  24  |  0.86    Ca    9.2      28 Dec 2018 07:35  Phos  3.0       Mg     1.7         TPro  6.5  /  Alb  3.7  /  TBili  1.9<H>  /  DBili  0.3<H>  /  AST  31  /  ALT  17  /  AlkPhos  80            Urinalysis Basic - ( 28 Dec 2018 04:00 )    Color: LIGHT YELLOW / Appearance: CLEAR / S.008 / pH: 6.5  Gluc: NEGATIVE / Ketone: NEGATIVE  / Bili: NEGATIVE / Urobili: NORMAL   Blood: NEGATIVE / Protein: 10 / Nitrite: NEGATIVE   Leuk Esterase: NEGATIVE / RBC: x / WBC x   Sq Epi: x / Non Sq Epi: x / Bacteria: x        RADIOLOGY & ADDITIONAL TESTS:    Imaging Personally Reviewed:    Consultant(s) Notes Reviewed:      Care Discussed with Consultants/Other Providers: Patient is a 48y old  Male who presents with a chief complaint of Sickle cell crisis (27 Dec 2018 14:48)      SUBJECTIVE / OVERNIGHT EVENTS: patient seen and examined by bedside qt 9:25 Am, pt feeling better, pain better controlled , denies headache, dizziness, SOB, CP, Palpitations , N/V/D, abdominal pain        MEDICATIONS  (STANDING):  docusate sodium 100 milliGRAM(s) Oral three times a day  enoxaparin Injectable 40 milliGRAM(s) SubCutaneous daily  folic acid 1 milliGRAM(s) Oral daily  metoprolol tartrate 12.5 milliGRAM(s) Oral two times a day  morphine PCA (1 mG/mL) 30 milliLiter(s) PCA Continuous PCA Continuous  polyethylene glycol 3350 17 Gram(s) Oral daily  sodium chloride 0.45%. 1000 milliLiter(s) (75 mL/Hr) IV Continuous <Continuous>    MEDICATIONS  (PRN):  ondansetron Injectable 4 milliGRAM(s) IV Push every 6 hours PRN Nausea      Vital Signs Last 24 Hrs  T(C): 36.8 (28 Dec 2018 09:15), Max: 37.1 (27 Dec 2018 17:20)  T(F): 98.3 (28 Dec 2018 09:15), Max: 98.8 (27 Dec 2018 17:20)  HR: 68 (28 Dec 2018 09:15) (68 - 87)  BP: 132/51 (28 Dec 2018 09:15) (112/52 - 152/71)  BP(mean): --  RR: 18 (28 Dec 2018 09:15) (17 - 18)  SpO2: 96% (28 Dec 2018 09:15) (95% - 100%)  CAPILLARY BLOOD GLUCOSE        I&O's Summary    PHYSICAL EXAM:  GENERAL: NAD, well-developed  HEAD:  Atraumatic, Normocephalic  EYES: EOMI, PERRLA, conjunctiva and sclera clear  NECK: Supple,   CHEST/LUNG: Clear to auscultation bilaterally; No wheeze  HEART: Regular rate and rhythm;   ABDOMEN: Soft, Nontender, Nondistended; Bowel sounds present  EXTREMITIES:  2+ Peripheral Pulses, No clubbing, cyanosis, or edema  PSYCH: AAOx3  NEUROLOGY: non-focal  SKIN: No rashes or lesions      LABS:                        6.4    10.29 )-----------( 334      ( 27 Dec 2018 07:30 )             19.5         140  |  106  |  9   ----------------------------<  116<H>  4.7   |  24  |  0.86    Ca    9.2      28 Dec 2018 07:35  Phos  3.0       Mg     1.7         TPro  6.5  /  Alb  3.7  /  TBili  1.9<H>  /  DBili  0.3<H>  /  AST  31  /  ALT  17  /  AlkPhos  80            Urinalysis Basic - ( 28 Dec 2018 04:00 )    Color: LIGHT YELLOW / Appearance: CLEAR / S.008 / pH: 6.5  Gluc: NEGATIVE / Ketone: NEGATIVE  / Bili: NEGATIVE / Urobili: NORMAL   Blood: NEGATIVE / Protein: 10 / Nitrite: NEGATIVE   Leuk Esterase: NEGATIVE / RBC: x / WBC x   Sq Epi: x / Non Sq Epi: x / Bacteria: x         Consultant(s) Notes Reviewed:  cardiology     Care Discussed with Consultants/Other Providers: cardiology

## 2018-12-28 NOTE — CONSULT NOTE ADULT - ASSESSMENT
chronic systolic chf  likely nonischemic cm due to high outpt failure   optimize treatment of his sickle cell disease  will start low dose BB    Sickle cell disease  pain control  O2    Outpt follow up with me

## 2018-12-28 NOTE — CONSULT NOTE ADULT - SUBJECTIVE AND OBJECTIVE BOX
CHIEF COMPLAINT:Patient is a 48y old  Male who presents with a chief complaint of Sickle cell crisis (27 Dec 2018 14:48)      HISTORY OF PRESENT ILLNESS:  this is a pleasant 48 year old with history as below , SSD noted to have low EF  He denies any chest pain, sob, palpitation, dizziness or syncope.     PAST MEDICAL & SURGICAL HISTORY:  Left ventricular dysfunction  Sickle Cell Disease  No significant past surgical history          MEDICATIONS:  enoxaparin Injectable 40 milliGRAM(s) SubCutaneous daily        morphine PCA (1 mG/mL) 30 milliLiter(s) PCA Continuous PCA Continuous  ondansetron Injectable 4 milliGRAM(s) IV Push every 6 hours PRN    docusate sodium 100 milliGRAM(s) Oral three times a day  polyethylene glycol 3350 17 Gram(s) Oral daily      folic acid 1 milliGRAM(s) Oral daily  sodium chloride 0.45%. 1000 milliLiter(s) IV Continuous <Continuous>      FAMILY HISTORY:  No pertinent family history in first degree relatives      Non-contributory    SOCIAL HISTORY:    No tobacco, drugs or etoh    Allergies    No Known Drug Allergies  peanuts (Anaphylaxis)    Intolerances    	    REVIEW OF SYSTEMS:  as above  The rest of the 14 points ROS reviewed and except above they are unremarkable.        PHYSICAL EXAM:  T(C): 36.8 (12-28-18 @ 09:15), Max: 37.1 (12-27-18 @ 17:20)  HR: 68 (12-28-18 @ 09:15) (68 - 87)  BP: 132/51 (12-28-18 @ 09:15) (112/52 - 152/71)  RR: 18 (12-28-18 @ 09:15) (17 - 18)  SpO2: 96% (12-28-18 @ 09:15) (95% - 100%)  Wt(kg): --  I&O's Summary      Appearance: Normal	  HEENT:   no gross abnormality   Cardiovascular: Normal S1 S2,    Murmur:   Neck: JVP normal  Respiratory: Lungs clear   Gastrointestinal:  Soft, Non-tender  Skin: normal   Neuro: No gross deficits.   Psychiatry:  Mood & affect flat  Ext: No edema    LABS/DATA:    TELEMETRY: 	    ECG:  	   	  CARDIAC MARKERS:                                      6.4    10.29 )-----------( 334      ( 27 Dec 2018 07:30 )             19.5     12-28    140  |  106  |  9   ----------------------------<  116<H>  4.7   |  24  |  0.86    Ca    9.2      28 Dec 2018 07:35  Phos  3.0     12-28  Mg     1.7     12-28    TPro  6.5  /  Alb  3.7  /  TBili  1.9<H>  /  DBili  0.3<H>  /  AST  31  /  ALT  17  /  AlkPhos  80  12-28    proBNP:   Lipid Profile:   HgA1c:   TSH:

## 2018-12-28 NOTE — PROGRESS NOTE ADULT - ATTENDING COMMENTS
Sharmin Alonzo MD   Pager  # [phone] DC planning in 1-2 days if stable     Sharmin Alonzo MD   Pager  # [phone]

## 2018-12-28 NOTE — PROGRESS NOTE ADULT - PROBLEM SELECTOR PLAN 3
TTE in July 2018 had shown mild global systolic dysfn, pt not on any cardiac medication  will d/w Pmd    will cut back fluids to 75 ml/hr TTE in July 2018 had shown mild global systolic dysfn, pt not on any cardiac medication  cardiology eval appreciated , likely chronic systolic HF  , likely nonischemic cm due to high outpt failure   Cardiology recommend adding low dose BB    cut back fluids to 75 ml/hr

## 2018-12-29 PROCEDURE — 99233 SBSQ HOSP IP/OBS HIGH 50: CPT

## 2018-12-29 RX ADMIN — Medication 1 MILLIGRAM(S): at 12:16

## 2018-12-29 RX ADMIN — SODIUM CHLORIDE 75 MILLILITER(S): 9 INJECTION, SOLUTION INTRAVENOUS at 17:50

## 2018-12-29 RX ADMIN — Medication 12.5 MILLIGRAM(S): at 05:36

## 2018-12-29 RX ADMIN — MORPHINE SULFATE 30 MILLILITER(S): 50 CAPSULE, EXTENDED RELEASE ORAL at 19:16

## 2018-12-29 RX ADMIN — MORPHINE SULFATE 30 MILLILITER(S): 50 CAPSULE, EXTENDED RELEASE ORAL at 07:38

## 2018-12-29 RX ADMIN — SODIUM CHLORIDE 75 MILLILITER(S): 9 INJECTION, SOLUTION INTRAVENOUS at 01:37

## 2018-12-29 RX ADMIN — MORPHINE SULFATE 30 MILLILITER(S): 50 CAPSULE, EXTENDED RELEASE ORAL at 12:13

## 2018-12-29 RX ADMIN — ENOXAPARIN SODIUM 40 MILLIGRAM(S): 100 INJECTION SUBCUTANEOUS at 12:16

## 2018-12-29 RX ADMIN — Medication 12.5 MILLIGRAM(S): at 17:50

## 2018-12-29 NOTE — PROGRESS NOTE ADULT - PROBLEM SELECTOR PLAN 3
TTE in July 2018 had shown mild global systolic dysfn, pt not on any cardiac medication  cardiology eval appreciated , likely chronic systolic HF  , likely nonischemic cm due to high outpt failure   Cardiology recommend adding low dose BB    cut back fluids to 75 ml/hr TTE in July 2018 had shown mild global systolic dysfn, pt not on any cardiac medication  cardiology eval appreciated , likely chronic systolic HF  , likely nonischemic cm due to high outpt failure   Cardiology recommend adding low dose BB ,will monitor ,pt advised to f/u with cardiology as outpt for optimization of HF treatment    cut back fluids to 75 ml/hr

## 2018-12-29 NOTE — PROGRESS NOTE ADULT - PROBLEM SELECTOR PLAN 2
anemia likely d/t SCD. hgb close to baseline. no e/o acute bleeding  -hold pRBC transfusion for now. cont to trend hgb, retic count anemia likely d/t SCD. hgb close to baseline. no e/o acute bleeding  -hold pRBC transfusion for now. cont to trend hgb, retic count  - check CBC

## 2018-12-29 NOTE — PROGRESS NOTE ADULT - SUBJECTIVE AND OBJECTIVE BOX
Patient is a 48y old  Male who presents with a chief complaint of Sickle cell crisis (27 Dec 2018 14:48)      SUBJECTIVE / OVERNIGHT EVENTS:    MEDICATIONS  (STANDING):  docusate sodium 100 milliGRAM(s) Oral three times a day  enoxaparin Injectable 40 milliGRAM(s) SubCutaneous daily  folic acid 1 milliGRAM(s) Oral daily  metoprolol tartrate 12.5 milliGRAM(s) Oral two times a day  morphine PCA (1 mG/mL) 30 milliLiter(s) PCA Continuous PCA Continuous  polyethylene glycol 3350 17 Gram(s) Oral daily  sodium chloride 0.45%. 1000 milliLiter(s) (75 mL/Hr) IV Continuous <Continuous>    MEDICATIONS  (PRN):  ondansetron Injectable 4 milliGRAM(s) IV Push every 6 hours PRN Nausea      Vital Signs Last 24 Hrs  T(C): 36.8 (29 Dec 2018 13:31), Max: 37.2 (28 Dec 2018 21:30)  T(F): 98.3 (29 Dec 2018 13:31), Max: 99 (28 Dec 2018 21:30)  HR: 77 (29 Dec 2018 13:31) (65 - 85)  BP: 99/60 (29 Dec 2018 13:31) (99/60 - 129/52)  BP(mean): --  RR: 18 (29 Dec 2018 13:31) (18 - 18)  SpO2: 99% (29 Dec 2018 13:31) (96% - 100%)  CAPILLARY BLOOD GLUCOSE        I&O's Summary      PHYSICAL EXAM:  GENERAL: NAD, well-developed  HEAD:  Atraumatic, Normocephalic  EYES: EOMI, PERRLA, conjunctiva and sclera clear  NECK: Supple, No JVD  CHEST/LUNG: Clear to auscultation bilaterally; No wheeze  HEART: Regular rate and rhythm; No murmurs, rubs, or gallops  ABDOMEN: Soft, Nontender, Nondistended; Bowel sounds present  EXTREMITIES:  2+ Peripheral Pulses, No clubbing, cyanosis, or edema  PSYCH: AAOx3  NEUROLOGY: non-focal  SKIN: No rashes or lesions    LABS:        140  |  106  |  9   ----------------------------<  116<H>  4.7   |  24  |  0.86    Ca    9.2      28 Dec 2018 07:35  Phos  3.0       Mg     1.7         TPro  6.5  /  Alb  3.7  /  TBili  1.9<H>  /  DBili  0.3<H>  /  AST  31  /  ALT  17  /  AlkPhos  80            Urinalysis Basic - ( 28 Dec 2018 04:00 )    Color: LIGHT YELLOW / Appearance: CLEAR / S.008 / pH: 6.5  Gluc: NEGATIVE / Ketone: NEGATIVE  / Bili: NEGATIVE / Urobili: NORMAL   Blood: NEGATIVE / Protein: 10 / Nitrite: NEGATIVE   Leuk Esterase: NEGATIVE / RBC: x / WBC x   Sq Epi: x / Non Sq Epi: x / Bacteria: x        RADIOLOGY & ADDITIONAL TESTS:    Imaging Personally Reviewed:    Consultant(s) Notes Reviewed:      Care Discussed with Consultants/Other Providers: Patient is a 48y old  Male who presents with a chief complaint of Sickle cell crisis (27 Dec 2018 14:48)      SUBJECTIVE / OVERNIGHT EVENTS: patient seen and examined by bedside at 12;25 Pm, pt still has generalized bidy pain , but has improved, denies CP,  sob, palpitations       MEDICATIONS  (STANDING):  docusate sodium 100 milliGRAM(s) Oral three times a day  enoxaparin Injectable 40 milliGRAM(s) SubCutaneous daily  folic acid 1 milliGRAM(s) Oral daily  metoprolol tartrate 12.5 milliGRAM(s) Oral two times a day  morphine PCA (1 mG/mL) 30 milliLiter(s) PCA Continuous PCA Continuous  polyethylene glycol 3350 17 Gram(s) Oral daily  sodium chloride 0.45%. 1000 milliLiter(s) (75 mL/Hr) IV Continuous <Continuous>    MEDICATIONS  (PRN):  ondansetron Injectable 4 milliGRAM(s) IV Push every 6 hours PRN Nausea      Vital Signs Last 24 Hrs  T(C): 36.8 (29 Dec 2018 13:31), Max: 37.2 (28 Dec 2018 21:30)  T(F): 98.3 (29 Dec 2018 13:31), Max: 99 (28 Dec 2018 21:30)  HR: 77 (29 Dec 2018 13:31) (65 - 85)  BP: 99/60 (29 Dec 2018 13:31) (99/60 - 129/52)  BP(mean): --  RR: 18 (29 Dec 2018 13:31) (18 - 18)  SpO2: 99% (29 Dec 2018 13:31) (96% - 100%)  CAPILLARY BLOOD GLUCOSE            PHYSICAL EXAM:  GENERAL: NAD, well-developed  HEAD:  Atraumatic, Normocephalic  EYES: EOMI, PERRLA, conjunctiva and sclera clear  NECK: Supple,   CHEST/LUNG: Clear to auscultation bilaterally; No wheeze  HEART: Regular rate and rhythm;   ABDOMEN: Soft, Nontender, Nondistended; Bowel sounds present  EXTREMITIES:  2+ Peripheral Pulses, No clubbing, cyanosis, or edema  PSYCH: AAOx3  NEUROLOGY: non-focal  SKIN: No rashes or lesions    LABS:        140  |  106  |  9   ----------------------------<  116<H>  4.7   |  24  |  0.86    Ca    9.2      28 Dec 2018 07:35  Phos  3.0       Mg     1.7         TPro  6.5  /  Alb  3.7  /  TBili  1.9<H>  /  DBili  0.3<H>  /  AST  31  /  ALT  17  /  AlkPhos  80            Urinalysis Basic - ( 28 Dec 2018 04:00 )    Color: LIGHT YELLOW / Appearance: CLEAR / S.008 / pH: 6.5  Gluc: NEGATIVE / Ketone: NEGATIVE  / Bili: NEGATIVE / Urobili: NORMAL   Blood: NEGATIVE / Protein: 10 / Nitrite: NEGATIVE   Leuk Esterase: NEGATIVE / RBC: x / WBC x   Sq Epi: x / Non Sq Epi: x / Bacteria: x          Consultant(s) Notes Reviewed:   cardiology

## 2018-12-29 NOTE — PROGRESS NOTE ADULT - PROBLEM SELECTOR PLAN 1
-CXR unremarkable, RVP negative   -no dysuria and diarrhea, will continue to monitor   -istop checked, no records  -pain control discussed with pharmacy- appears to respond to morphine better than Dilaudid based on previous records. Will start morphine PCA 2mg q6min with 4hr limit of 10mg as previously ordered. titrate as needed  - bowel regimen PRN   -start IVF 1/2NS@ 100cc/hr  -start folate, MVI  -trend cbc, retic, LDH, LFTs -CXR unremarkable, RVP negative   -no dysuria and diarrhea, will continue to monitor   -istop checked, no records  -pain control discussed with pharmacy- appears to respond to morphine better than Dilaudid based on previous records. Will start morphine PCA 2mg q6min with 4hr limit of 10mg as previously ordered. titrate as needed  - bowel regimen PRN   -c/w IVF 1/2NS@ 1  -start folate, MVI  -trend cbc, retic, LDH, LFTs

## 2018-12-29 NOTE — PROGRESS NOTE ADULT - SUBJECTIVE AND OBJECTIVE BOX
Subjective: Patient seen and examined. No new events except as noted.     SUBJECTIVE/ROS:  No chest pain, dyspnea, palpitation, or dizziness.       MEDICATIONS:  MEDICATIONS  (STANDING):  docusate sodium 100 milliGRAM(s) Oral three times a day  enoxaparin Injectable 40 milliGRAM(s) SubCutaneous daily  folic acid 1 milliGRAM(s) Oral daily  metoprolol tartrate 12.5 milliGRAM(s) Oral two times a day  morphine PCA (1 mG/mL) 30 milliLiter(s) PCA Continuous PCA Continuous  polyethylene glycol 3350 17 Gram(s) Oral daily  sodium chloride 0.45%. 1000 milliLiter(s) (75 mL/Hr) IV Continuous <Continuous>      PHYSICAL EXAM:  T(C): 36.8 (12-29-18 @ 13:31), Max: 37.2 (12-28-18 @ 21:30)  HR: 77 (12-29-18 @ 13:31) (65 - 85)  BP: 99/60 (12-29-18 @ 13:31) (99/60 - 129/52)  RR: 18 (12-29-18 @ 13:31) (18 - 18)  SpO2: 99% (12-29-18 @ 13:31) (96% - 100%)  Wt(kg): --  I&O's Summary      JVP: Normal  Neck: supple  Lung: clear   CV: S1 S2 , Murmur:  Abd: soft  Ext: No edema  neuro: Awake / alert  Psych: flat affect  Skin: normal        LABS/DATA:    CARDIAC MARKERS:            12-28    140  |  106  |  9   ----------------------------<  116<H>  4.7   |  24  |  0.86    Ca    9.2      28 Dec 2018 07:35  Phos  3.0     12-28  Mg     1.7     12-28    TPro  6.5  /  Alb  3.7  /  TBili  1.9<H>  /  DBili  0.3<H>  /  AST  31  /  ALT  17  /  AlkPhos  80  12-28    proBNP:   Lipid Profile:   HgA1c:   TSH:     TELE:  EKG:

## 2018-12-30 LAB
ALBUMIN SERPL ELPH-MCNC: 3.8 G/DL — SIGNIFICANT CHANGE UP (ref 3.3–5)
ALP SERPL-CCNC: 87 U/L — SIGNIFICANT CHANGE UP (ref 40–120)
ALT FLD-CCNC: 18 U/L — SIGNIFICANT CHANGE UP (ref 4–41)
AST SERPL-CCNC: 32 U/L — SIGNIFICANT CHANGE UP (ref 4–40)
BASOPHILS # BLD AUTO: 0.08 K/UL — SIGNIFICANT CHANGE UP (ref 0–0.2)
BASOPHILS NFR BLD AUTO: 0.8 % — SIGNIFICANT CHANGE UP (ref 0–2)
BILIRUB DIRECT SERPL-MCNC: 0.3 MG/DL — HIGH (ref 0.1–0.2)
BILIRUB SERPL-MCNC: 2.5 MG/DL — HIGH (ref 0.2–1.2)
BILIRUB SERPL-MCNC: 2.5 MG/DL — HIGH (ref 0.2–1.2)
BUN SERPL-MCNC: 13 MG/DL — SIGNIFICANT CHANGE UP (ref 7–23)
CALCIUM SERPL-MCNC: 9.5 MG/DL — SIGNIFICANT CHANGE UP (ref 8.4–10.5)
CHLORIDE SERPL-SCNC: 101 MMOL/L — SIGNIFICANT CHANGE UP (ref 98–107)
CO2 SERPL-SCNC: 26 MMOL/L — SIGNIFICANT CHANGE UP (ref 22–31)
CREAT SERPL-MCNC: 0.99 MG/DL — SIGNIFICANT CHANGE UP (ref 0.5–1.3)
EOSINOPHIL # BLD AUTO: 1.22 K/UL — HIGH (ref 0–0.5)
EOSINOPHIL NFR BLD AUTO: 11.7 % — HIGH (ref 0–6)
GLUCOSE SERPL-MCNC: 108 MG/DL — HIGH (ref 70–99)
HAPTOGLOB SERPL-MCNC: < 20 MG/DL — LOW (ref 34–200)
HCT VFR BLD CALC: 20.5 % — CRITICAL LOW (ref 39–50)
HGB BLD-MCNC: 6.8 G/DL — CRITICAL LOW (ref 13–17)
IMM GRANULOCYTES # BLD AUTO: 0.06 # — SIGNIFICANT CHANGE UP
IMM GRANULOCYTES NFR BLD AUTO: 0.6 % — SIGNIFICANT CHANGE UP (ref 0–1.5)
LDH SERPL L TO P-CCNC: 361 U/L — HIGH (ref 135–225)
LYMPHOCYTES # BLD AUTO: 1.23 K/UL — SIGNIFICANT CHANGE UP (ref 1–3.3)
LYMPHOCYTES # BLD AUTO: 11.8 % — LOW (ref 13–44)
MAGNESIUM SERPL-MCNC: 1.8 MG/DL — SIGNIFICANT CHANGE UP (ref 1.6–2.6)
MCHC RBC-ENTMCNC: 28 PG — SIGNIFICANT CHANGE UP (ref 27–34)
MCHC RBC-ENTMCNC: 33.2 % — SIGNIFICANT CHANGE UP (ref 32–36)
MCV RBC AUTO: 84.4 FL — SIGNIFICANT CHANGE UP (ref 80–100)
MONOCYTES # BLD AUTO: 1.04 K/UL — HIGH (ref 0–0.9)
MONOCYTES NFR BLD AUTO: 9.9 % — SIGNIFICANT CHANGE UP (ref 2–14)
NEUTROPHILS # BLD AUTO: 6.83 K/UL — SIGNIFICANT CHANGE UP (ref 1.8–7.4)
NEUTROPHILS NFR BLD AUTO: 65.2 % — SIGNIFICANT CHANGE UP (ref 43–77)
NRBC # FLD: 0.08 — SIGNIFICANT CHANGE UP
PHOSPHATE SERPL-MCNC: 3.6 MG/DL — SIGNIFICANT CHANGE UP (ref 2.5–4.5)
PLATELET # BLD AUTO: 286 K/UL — SIGNIFICANT CHANGE UP (ref 150–400)
PMV BLD: 9.5 FL — SIGNIFICANT CHANGE UP (ref 7–13)
POTASSIUM SERPL-MCNC: 4.6 MMOL/L — SIGNIFICANT CHANGE UP (ref 3.5–5.3)
POTASSIUM SERPL-SCNC: 4.6 MMOL/L — SIGNIFICANT CHANGE UP (ref 3.5–5.3)
PROT SERPL-MCNC: 6.9 G/DL — SIGNIFICANT CHANGE UP (ref 6–8.3)
RBC # BLD: 2.43 M/UL — LOW (ref 4.2–5.8)
RBC # FLD: 21.1 % — HIGH (ref 10.3–14.5)
RETICS #: 182 K/UL — HIGH (ref 25–125)
RETICS/RBC NFR: 7.5 % — HIGH (ref 0.5–2.5)
SODIUM SERPL-SCNC: 138 MMOL/L — SIGNIFICANT CHANGE UP (ref 135–145)
WBC # BLD: 10.46 K/UL — SIGNIFICANT CHANGE UP (ref 3.8–10.5)
WBC # FLD AUTO: 10.46 K/UL — SIGNIFICANT CHANGE UP (ref 3.8–10.5)

## 2018-12-30 PROCEDURE — 99232 SBSQ HOSP IP/OBS MODERATE 35: CPT

## 2018-12-30 PROCEDURE — 93306 TTE W/DOPPLER COMPLETE: CPT | Mod: 26

## 2018-12-30 RX ORDER — MORPHINE SULFATE 50 MG/1
30 CAPSULE, EXTENDED RELEASE ORAL
Qty: 0 | Refills: 0 | Status: DISCONTINUED | OUTPATIENT
Start: 2018-12-30 | End: 2019-01-01

## 2018-12-30 RX ORDER — MORPHINE SULFATE 50 MG/1
30 CAPSULE, EXTENDED RELEASE ORAL
Qty: 0 | Refills: 0 | Status: DISCONTINUED | OUTPATIENT
Start: 2018-12-30 | End: 2018-12-30

## 2018-12-30 RX ADMIN — MORPHINE SULFATE 30 MILLILITER(S): 50 CAPSULE, EXTENDED RELEASE ORAL at 18:08

## 2018-12-30 RX ADMIN — Medication 12.5 MILLIGRAM(S): at 05:54

## 2018-12-30 RX ADMIN — Medication 100 MILLIGRAM(S): at 21:48

## 2018-12-30 RX ADMIN — MORPHINE SULFATE 30 MILLILITER(S): 50 CAPSULE, EXTENDED RELEASE ORAL at 09:53

## 2018-12-30 RX ADMIN — Medication 1 MILLIGRAM(S): at 12:32

## 2018-12-30 RX ADMIN — MORPHINE SULFATE 30 MILLILITER(S): 50 CAPSULE, EXTENDED RELEASE ORAL at 07:30

## 2018-12-30 RX ADMIN — Medication 12.5 MILLIGRAM(S): at 18:11

## 2018-12-30 RX ADMIN — SODIUM CHLORIDE 75 MILLILITER(S): 9 INJECTION, SOLUTION INTRAVENOUS at 18:12

## 2018-12-30 RX ADMIN — SODIUM CHLORIDE 75 MILLILITER(S): 9 INJECTION, SOLUTION INTRAVENOUS at 18:06

## 2018-12-30 RX ADMIN — ENOXAPARIN SODIUM 40 MILLIGRAM(S): 100 INJECTION SUBCUTANEOUS at 12:32

## 2018-12-30 RX ADMIN — MORPHINE SULFATE 30 MILLILITER(S): 50 CAPSULE, EXTENDED RELEASE ORAL at 19:04

## 2018-12-30 NOTE — PROGRESS NOTE ADULT - PROBLEM SELECTOR PLAN 3
TTE in July 2018 had shown mild global systolic dysfn, pt not on any cardiac medication  cardiology eval appreciated , likely chronic systolic HF  , likely nonischemic cm due to high outpt failure   Cardiology recommend adding low dose BB ,will monitor ,pt advised to f/u with cardiology as outpt for optimization of HF treatment    cut back fluids to 75 ml/hr

## 2018-12-30 NOTE — PROGRESS NOTE ADULT - PROBLEM SELECTOR PLAN 1
-CXR unremarkable, RVP negative   -no dysuria and diarrhea, will continue to monitor   -istop checked, no records  -pain control discussed with pharmacy- appears to respond to morphine better than Dilaudid based on previous records. Will start morphine PCA 2mg q6min with 4hr limit of 10mg as previously ordered. titrate as needed  - bowel regimen PRN   -c/w IVF 1/2NS@ 1  -start folate, MVI  -trend cbc, retic, LDH, LFTs -CXR unremarkable, RVP negative   -no dysuria and diarrhea, will continue to monitor   -istop checked, no records  -pain control discussed with pharmacy- appears to respond to morphine better than Dilaudid based on previous records. Will start morphine PCA 2mg q6min with 4hr limit of 10mg as previously ordered. titrate as needed  - bowel regimen PRN   -c/w IVF 1/2NS@   -start folate, MVI  -trend cbc, retic, LDH, LFTs, stable at this time

## 2018-12-30 NOTE — PROGRESS NOTE ADULT - PROBLEM SELECTOR PLAN 2
anemia likely d/t SCD. hgb close to baseline. no e/o acute bleeding  -hold pRBC transfusion for now. cont to trend hgb, retic count  - check CBC anemia likely d/t SCD. hgb close to baseline. no e/o acute bleeding  -hold pRBC transfusion for now. cont to trend hgb, retic count  - monitor  CBC

## 2018-12-30 NOTE — PROGRESS NOTE ADULT - SUBJECTIVE AND OBJECTIVE BOX
Patient is a 48y old  Male who presents with a chief complaint of Sickle cell crisis (27 Dec 2018 14:48)      SUBJECTIVE / OVERNIGHT EVENTS:    MEDICATIONS  (STANDING):  docusate sodium 100 milliGRAM(s) Oral three times a day  enoxaparin Injectable 40 milliGRAM(s) SubCutaneous daily  folic acid 1 milliGRAM(s) Oral daily  metoprolol tartrate 12.5 milliGRAM(s) Oral two times a day  morphine PCA (1 mG/mL) 30 milliLiter(s) PCA Continuous PCA Continuous  polyethylene glycol 3350 17 Gram(s) Oral daily  sodium chloride 0.45%. 1000 milliLiter(s) (75 mL/Hr) IV Continuous <Continuous>    MEDICATIONS  (PRN):  ondansetron Injectable 4 milliGRAM(s) IV Push every 6 hours PRN Nausea      Vital Signs Last 24 Hrs  T(C): 36.8 (30 Dec 2018 14:00), Max: 36.9 (30 Dec 2018 09:30)  T(F): 98.3 (30 Dec 2018 14:00), Max: 98.5 (30 Dec 2018 09:30)  HR: 70 (30 Dec 2018 14:00) (69 - 100)  BP: 112/61 (30 Dec 2018 14:00) (112/61 - 126/47)  BP(mean): --  RR: 18 (30 Dec 2018 14:00) (18 - 18)  SpO2: 95% (30 Dec 2018 14:00) (95% - 99%)  CAPILLARY BLOOD GLUCOSE        I&O's Summary      PHYSICAL EXAM:  GENERAL: NAD, well-developed  HEAD:  Atraumatic, Normocephalic  EYES: EOMI, PERRLA, conjunctiva and sclera clear  NECK: Supple, No JVD  CHEST/LUNG: Clear to auscultation bilaterally; No wheeze  HEART: Regular rate and rhythm; No murmurs, rubs, or gallops  ABDOMEN: Soft, Nontender, Nondistended; Bowel sounds present  EXTREMITIES:  2+ Peripheral Pulses, No clubbing, cyanosis, or edema  PSYCH: AAOx3  NEUROLOGY: non-focal  SKIN: No rashes or lesions    LABS:                        6.8    10.46 )-----------( 286      ( 30 Dec 2018 12:49 )             20.5     12-30    138  |  101  |  13  ----------------------------<  108<H>  4.6   |  26  |  0.99    Ca    9.5      30 Dec 2018 12:49  Phos  3.6     12-30  Mg     1.8     12-30    TPro  6.9  /  Alb  3.8  /  TBili  2.5<H>  /  DBili  0.3<H>  /  AST  32  /  ALT  18  /  AlkPhos  87  12-30              RADIOLOGY & ADDITIONAL TESTS:    Imaging Personally Reviewed:    Consultant(s) Notes Reviewed:      Care Discussed with Consultants/Other Providers: Patient is a 48y old  Male who presents with a chief complaint of Sickle cell crisis (27 Dec 2018 14:48)      SUBJECTIVE / OVERNIGHT EVENTS: patient seen and examined by bedside at 11:05 am, pt c/o generalized body pain, denies headache, dizziness, SOB, CP, Palpitations , N/V/D,      MEDICATIONS  (STANDING):  docusate sodium 100 milliGRAM(s) Oral three times a day  enoxaparin Injectable 40 milliGRAM(s) SubCutaneous daily  folic acid 1 milliGRAM(s) Oral daily  metoprolol tartrate 12.5 milliGRAM(s) Oral two times a day  morphine PCA (1 mG/mL) 30 milliLiter(s) PCA Continuous PCA Continuous  polyethylene glycol 3350 17 Gram(s) Oral daily  sodium chloride 0.45%. 1000 milliLiter(s) (75 mL/Hr) IV Continuous <Continuous>    MEDICATIONS  (PRN):  ondansetron Injectable 4 milliGRAM(s) IV Push every 6 hours PRN Nausea      Vital Signs Last 24 Hrs  T(C): 36.8 (30 Dec 2018 14:00), Max: 36.9 (30 Dec 2018 09:30)  T(F): 98.3 (30 Dec 2018 14:00), Max: 98.5 (30 Dec 2018 09:30)  HR: 70 (30 Dec 2018 14:00) (69 - 100)  BP: 112/61 (30 Dec 2018 14:00) (112/61 - 126/47)  BP(mean): --  RR: 18 (30 Dec 2018 14:00) (18 - 18)  SpO2: 95% (30 Dec 2018 14:00) (95% - 99%)            PHYSICAL EXAM:  GENERAL: NAD, well-developed  HEAD:  Atraumatic, Normocephalic  EYES: EOMI, PERRLA, conjunctiva and sclera clear  NECK: Supple,   CHEST/LUNG: Clear to auscultation bilaterally; No wheeze  HEART: Regular rate and rhythm;   ABDOMEN: Soft, Nontender, Nondistended; Bowel sounds present  EXTREMITIES:  2+ Peripheral Pulses, No clubbing, cyanosis, or edema  PSYCH: AAOx3  NEUROLOGY: non-focal  SKIN: No rashes or lesions      LABS:                        6.8    10.46 )-----------( 286      ( 30 Dec 2018 12:49 )             20.5     12-30    138  |  101  |  13  ----------------------------<  108<H>  4.6   |  26  |  0.99    Ca    9.5      30 Dec 2018 12:49  Phos  3.6     12-30  Mg     1.8     12-30    TPro  6.9  /  Alb  3.8  /  TBili  2.5<H>  /  DBili  0.3<H>  /  AST  32  /  ALT  18  /  AlkPhos  87  12-30                Consultant(s) Notes Reviewed:  cardiology

## 2018-12-30 NOTE — PROGRESS NOTE ADULT - SUBJECTIVE AND OBJECTIVE BOX
Subjective: Patient seen and examined. No new events except as noted.     SUBJECTIVE/ROS:  feels ok     MEDICATIONS:  MEDICATIONS  (STANDING):  docusate sodium 100 milliGRAM(s) Oral three times a day  enoxaparin Injectable 40 milliGRAM(s) SubCutaneous daily  folic acid 1 milliGRAM(s) Oral daily  metoprolol tartrate 12.5 milliGRAM(s) Oral two times a day  morphine PCA (1 mG/mL) 30 milliLiter(s) PCA Continuous PCA Continuous  polyethylene glycol 3350 17 Gram(s) Oral daily  sodium chloride 0.45%. 1000 milliLiter(s) (75 mL/Hr) IV Continuous <Continuous>      PHYSICAL EXAM:  T(C): 36.8 (12-30-18 @ 05:52), Max: 36.9 (12-29-18 @ 09:29)  HR: 100 (12-30-18 @ 05:52) (69 - 100)  BP: 121/71 (12-30-18 @ 05:52) (99/60 - 129/52)  RR: 18 (12-30-18 @ 05:52) (18 - 18)  SpO2: 95% (12-30-18 @ 05:52) (95% - 99%)  Wt(kg): --  I&O's Summary      JVP: Normal  Neck: supple  Lung: clear   CV: S1 S2 , Murmur:  Abd: soft  Ext: No edema  neuro: Awake / alert  Psych: flat affect  Skin: normal        LABS/DATA:    CARDIAC MARKERS:                  proBNP:   Lipid Profile:   HgA1c:   TSH:     TELE:  EKG:

## 2018-12-31 LAB
ALBUMIN SERPL ELPH-MCNC: 3.9 G/DL — SIGNIFICANT CHANGE UP (ref 3.3–5)
ALP SERPL-CCNC: 94 U/L — SIGNIFICANT CHANGE UP (ref 40–120)
ALT FLD-CCNC: 15 U/L — SIGNIFICANT CHANGE UP (ref 4–41)
AST SERPL-CCNC: 33 U/L — SIGNIFICANT CHANGE UP (ref 4–40)
BASOPHILS # BLD AUTO: 0.08 K/UL — SIGNIFICANT CHANGE UP (ref 0–0.2)
BASOPHILS NFR BLD AUTO: 0.7 % — SIGNIFICANT CHANGE UP (ref 0–2)
BILIRUB DIRECT SERPL-MCNC: 0.3 MG/DL — HIGH (ref 0.1–0.2)
BILIRUB SERPL-MCNC: 2.5 MG/DL — HIGH (ref 0.2–1.2)
BILIRUB SERPL-MCNC: 2.5 MG/DL — HIGH (ref 0.2–1.2)
BUN SERPL-MCNC: 17 MG/DL — SIGNIFICANT CHANGE UP (ref 7–23)
CALCIUM SERPL-MCNC: 9.5 MG/DL — SIGNIFICANT CHANGE UP (ref 8.4–10.5)
CHLORIDE SERPL-SCNC: 101 MMOL/L — SIGNIFICANT CHANGE UP (ref 98–107)
CO2 SERPL-SCNC: 27 MMOL/L — SIGNIFICANT CHANGE UP (ref 22–31)
CREAT SERPL-MCNC: 1.05 MG/DL — SIGNIFICANT CHANGE UP (ref 0.5–1.3)
EOSINOPHIL # BLD AUTO: 0.89 K/UL — HIGH (ref 0–0.5)
EOSINOPHIL NFR BLD AUTO: 8.3 % — HIGH (ref 0–6)
GLUCOSE SERPL-MCNC: 103 MG/DL — HIGH (ref 70–99)
HAPTOGLOB SERPL-MCNC: < 20 MG/DL — LOW (ref 34–200)
HCT VFR BLD CALC: 21.5 % — LOW (ref 39–50)
HGB BLD-MCNC: 6.9 G/DL — CRITICAL LOW (ref 13–17)
IMM GRANULOCYTES # BLD AUTO: 0.05 # — SIGNIFICANT CHANGE UP
IMM GRANULOCYTES NFR BLD AUTO: 0.5 % — SIGNIFICANT CHANGE UP (ref 0–1.5)
LDH SERPL L TO P-CCNC: 348 U/L — HIGH (ref 135–225)
LYMPHOCYTES # BLD AUTO: 1.21 K/UL — SIGNIFICANT CHANGE UP (ref 1–3.3)
LYMPHOCYTES # BLD AUTO: 11.3 % — LOW (ref 13–44)
MAGNESIUM SERPL-MCNC: 1.9 MG/DL — SIGNIFICANT CHANGE UP (ref 1.6–2.6)
MCHC RBC-ENTMCNC: 27.3 PG — SIGNIFICANT CHANGE UP (ref 27–34)
MCHC RBC-ENTMCNC: 32.1 % — SIGNIFICANT CHANGE UP (ref 32–36)
MCV RBC AUTO: 85 FL — SIGNIFICANT CHANGE UP (ref 80–100)
MONOCYTES # BLD AUTO: 0.99 K/UL — HIGH (ref 0–0.9)
MONOCYTES NFR BLD AUTO: 9.2 % — SIGNIFICANT CHANGE UP (ref 2–14)
NEUTROPHILS # BLD AUTO: 7.53 K/UL — HIGH (ref 1.8–7.4)
NEUTROPHILS NFR BLD AUTO: 70 % — SIGNIFICANT CHANGE UP (ref 43–77)
NRBC # FLD: 0.09 — SIGNIFICANT CHANGE UP
PHOSPHATE SERPL-MCNC: 3.6 MG/DL — SIGNIFICANT CHANGE UP (ref 2.5–4.5)
PLATELET # BLD AUTO: 290 K/UL — SIGNIFICANT CHANGE UP (ref 150–400)
PMV BLD: 9.6 FL — SIGNIFICANT CHANGE UP (ref 7–13)
POTASSIUM SERPL-MCNC: 4.5 MMOL/L — SIGNIFICANT CHANGE UP (ref 3.5–5.3)
POTASSIUM SERPL-SCNC: 4.5 MMOL/L — SIGNIFICANT CHANGE UP (ref 3.5–5.3)
PROT SERPL-MCNC: 7.1 G/DL — SIGNIFICANT CHANGE UP (ref 6–8.3)
RBC # BLD: 2.53 M/UL — LOW (ref 4.2–5.8)
RBC # FLD: 21 % — HIGH (ref 10.3–14.5)
RETICS #: 182 K/UL — HIGH (ref 25–125)
RETICS/RBC NFR: 7.4 % — HIGH (ref 0.5–2.5)
SODIUM SERPL-SCNC: 138 MMOL/L — SIGNIFICANT CHANGE UP (ref 135–145)
WBC # BLD: 10.75 K/UL — HIGH (ref 3.8–10.5)
WBC # FLD AUTO: 10.75 K/UL — HIGH (ref 3.8–10.5)

## 2018-12-31 PROCEDURE — 99233 SBSQ HOSP IP/OBS HIGH 50: CPT

## 2018-12-31 RX ADMIN — ENOXAPARIN SODIUM 40 MILLIGRAM(S): 100 INJECTION SUBCUTANEOUS at 13:12

## 2018-12-31 RX ADMIN — SODIUM CHLORIDE 75 MILLILITER(S): 9 INJECTION, SOLUTION INTRAVENOUS at 17:18

## 2018-12-31 RX ADMIN — SODIUM CHLORIDE 75 MILLILITER(S): 9 INJECTION, SOLUTION INTRAVENOUS at 07:11

## 2018-12-31 RX ADMIN — Medication 12.5 MILLIGRAM(S): at 05:32

## 2018-12-31 RX ADMIN — MORPHINE SULFATE 30 MILLILITER(S): 50 CAPSULE, EXTENDED RELEASE ORAL at 07:09

## 2018-12-31 RX ADMIN — SODIUM CHLORIDE 75 MILLILITER(S): 9 INJECTION, SOLUTION INTRAVENOUS at 05:32

## 2018-12-31 RX ADMIN — Medication 1 MILLIGRAM(S): at 13:12

## 2018-12-31 RX ADMIN — MORPHINE SULFATE 30 MILLILITER(S): 50 CAPSULE, EXTENDED RELEASE ORAL at 19:09

## 2018-12-31 RX ADMIN — Medication 12.5 MILLIGRAM(S): at 17:19

## 2018-12-31 RX ADMIN — Medication 100 MILLIGRAM(S): at 05:32

## 2018-12-31 NOTE — PROGRESS NOTE ADULT - PROBLEM SELECTOR PLAN 3
TTE in July 2018 had shown mild global systolic dysfn, repeat TTE 12/30: grossly normal   cardiology eval appreciated  Cardiology recommend adding low dose BB, pt advised to f/u with cardiology as outpt

## 2018-12-31 NOTE — PROGRESS NOTE ADULT - PROBLEM SELECTOR PLAN 1
CXR unremarkable, RVP negative   pain control discussed with pharmacy by prior team  appears to respond to morphine better than Dilaudid based on previous records.   c/w morphine PCA which pt rarely used it   - bowel regimen PRN  -c/w IVF 1/2NS  c/w folate, MVI

## 2018-12-31 NOTE — PROGRESS NOTE ADULT - SUBJECTIVE AND OBJECTIVE BOX
Patient is a 48y old  Male who presents with a chief complaint of Sickle cell crisis (30 Dec 2018 14:23)      SUBJECTIVE / OVERNIGHT EVENTS:  Pt reports he has pain in stomach and lower back which was his SCD pain. denied fever/cp/sob    MEDICATIONS  (STANDING):  docusate sodium 100 milliGRAM(s) Oral three times a day  enoxaparin Injectable 40 milliGRAM(s) SubCutaneous daily  folic acid 1 milliGRAM(s) Oral daily  metoprolol tartrate 12.5 milliGRAM(s) Oral two times a day  morphine PCA (1 mG/mL) 30 milliLiter(s) PCA Continuous PCA Continuous  polyethylene glycol 3350 17 Gram(s) Oral daily  sodium chloride 0.45%. 1000 milliLiter(s) (75 mL/Hr) IV Continuous <Continuous>    MEDICATIONS  (PRN):  ondansetron Injectable 4 milliGRAM(s) IV Push every 6 hours PRN Nausea      T(C): 36.7 (12-31-18 @ 13:25), Max: 37.2 (12-31-18 @ 01:28)  HR: 82 (12-31-18 @ 13:25) (72 - 722)  BP: 114/65 (12-31-18 @ 13:25) (114/65 - 130/53)  RR: 18 (12-31-18 @ 13:25) (17 - 18)  SpO2: 100% (12-31-18 @ 13:25) (85% - 100%)  CAPILLARY BLOOD GLUCOSE        I&O's Summary      PHYSICAL EXAM:  GENERAL: NAD, thin built   HEAD:  Atraumatic, Normocephalic  EYES: EOMI, PERRLA, conjunctiva and sclera clear  NECK: Supple, No JVD  CHEST/LUNG: Clear to auscultation bilaterally; No wheeze  HEART: s1 s2, regular rhythm and rate   ABDOMEN: Soft, Nontender, Nondistended; Bowel sounds present  EXTREMITIES:  2+ Peripheral Pulses, No clubbing, cyanosis, or edema  PSYCH: AAOx3, calm   NEUROLOGY: non-focal  SKIN: No rashes or lesions    LABS:                        6.9    10.75 )-----------( 290      ( 31 Dec 2018 08:45 )             21.5     12-31    138  |  101  |  17  ----------------------------<  103<H>  4.5   |  27  |  1.05    Ca    9.5      31 Dec 2018 08:45  Phos  3.6     12-31  Mg     1.9     12-31    TPro  7.1  /  Alb  3.9  /  TBili  2.5<H>  /  DBili  0.3<H>  /  AST  33  /  ALT  15  /  AlkPhos  94  12-31              RADIOLOGY & ADDITIONAL TESTS:    Imaging Personally Reviewed: < from: Transthoracic Echocardiogram (12.30.18 @ 14:31) >  CONCLUSIONS:  1. Normal trileaflet aortic valve. Minimal aortic  regurgitation.  2. Mildly dilated left atrium.  LA volume index = 35 cc/m2.  3. Eccentric left ventricular hypertrophy (dilated left  ventricle with normal relative wall thickness).  4. Normal left ventricular systolic function. No segmental  wall motion abnormalities.  5. Normal right ventricular size and function.    < end of copied text >      Consultant(s) Notes Reviewed:      Care Discussed with Consultants/Other Providers:

## 2018-12-31 NOTE — PROGRESS NOTE ADULT - SUBJECTIVE AND OBJECTIVE BOX
Subjective: Patient seen and examined. No new events except as noted.     SUBJECTIVE/ROS:  resting       MEDICATIONS:  MEDICATIONS  (STANDING):  docusate sodium 100 milliGRAM(s) Oral three times a day  enoxaparin Injectable 40 milliGRAM(s) SubCutaneous daily  folic acid 1 milliGRAM(s) Oral daily  metoprolol tartrate 12.5 milliGRAM(s) Oral two times a day  morphine PCA (1 mG/mL) 30 milliLiter(s) PCA Continuous PCA Continuous  polyethylene glycol 3350 17 Gram(s) Oral daily  sodium chloride 0.45%. 1000 milliLiter(s) (75 mL/Hr) IV Continuous <Continuous>      PHYSICAL EXAM:  T(C): 37.1 (12-31-18 @ 05:25), Max: 37.2 (12-31-18 @ 01:28)  HR: 78 (12-31-18 @ 05:25) (70 - 722)  BP: 121/60 (12-31-18 @ 05:25) (112/61 - 130/53)  RR: 18 (12-31-18 @ 05:25) (17 - 18)  SpO2: 100% (12-31-18 @ 05:25) (85% - 100%)  Wt(kg): --  I&O's Summary        JVP: Normal  Neck: supple  Lung: clear   CV: S1 S2 , Murmur:  Abd: soft  Ext: No edema  neuro: Awake / alert  Psych: flat affect  Skin: normal        LABS/DATA:    CARDIAC MARKERS:                                6.8    10.46 )-----------( 286      ( 30 Dec 2018 12:49 )             20.5     12-30    138  |  101  |  13  ----------------------------<  108<H>  4.6   |  26  |  0.99    Ca    9.5      30 Dec 2018 12:49  Phos  3.6     12-30  Mg     1.8     12-30    TPro  6.9  /  Alb  3.8  /  TBili  2.5<H>  /  DBili  0.3<H>  /  AST  32  /  ALT  18  /  AlkPhos  87  12-30    proBNP:   Lipid Profile:   HgA1c:   TSH:     TELE:  EKG:

## 2018-12-31 NOTE — PROGRESS NOTE ADULT - PROBLEM SELECTOR PLAN 2
anemia likely d/t SCD. hgb close to baseline. no e/o acute bleeding  -hold pRBC transfusion for now as pt is asymptomatic   cont to trend hgb, retic count

## 2019-01-01 LAB
ALBUMIN SERPL ELPH-MCNC: 3.8 G/DL — SIGNIFICANT CHANGE UP (ref 3.3–5)
ALP SERPL-CCNC: 99 U/L — SIGNIFICANT CHANGE UP (ref 40–120)
ALT FLD-CCNC: 21 U/L — SIGNIFICANT CHANGE UP (ref 4–41)
AST SERPL-CCNC: 34 U/L — SIGNIFICANT CHANGE UP (ref 4–40)
BASOPHILS # BLD AUTO: 0.06 K/UL — SIGNIFICANT CHANGE UP (ref 0–0.2)
BASOPHILS NFR BLD AUTO: 0.7 % — SIGNIFICANT CHANGE UP (ref 0–2)
BILIRUB SERPL-MCNC: 2.3 MG/DL — HIGH (ref 0.2–1.2)
BUN SERPL-MCNC: 19 MG/DL — SIGNIFICANT CHANGE UP (ref 7–23)
CALCIUM SERPL-MCNC: 9.7 MG/DL — SIGNIFICANT CHANGE UP (ref 8.4–10.5)
CHLORIDE SERPL-SCNC: 101 MMOL/L — SIGNIFICANT CHANGE UP (ref 98–107)
CO2 SERPL-SCNC: 26 MMOL/L — SIGNIFICANT CHANGE UP (ref 22–31)
CREAT SERPL-MCNC: 0.97 MG/DL — SIGNIFICANT CHANGE UP (ref 0.5–1.3)
EOSINOPHIL # BLD AUTO: 0.99 K/UL — HIGH (ref 0–0.5)
EOSINOPHIL NFR BLD AUTO: 11.3 % — HIGH (ref 0–6)
GLUCOSE SERPL-MCNC: 98 MG/DL — SIGNIFICANT CHANGE UP (ref 70–99)
HCT VFR BLD CALC: 19.3 % — CRITICAL LOW (ref 39–50)
HGB BLD-MCNC: 6.4 G/DL — CRITICAL LOW (ref 13–17)
IMM GRANULOCYTES # BLD AUTO: 0.06 # — SIGNIFICANT CHANGE UP
IMM GRANULOCYTES NFR BLD AUTO: 0.7 % — SIGNIFICANT CHANGE UP (ref 0–1.5)
LDH SERPL L TO P-CCNC: 338 U/L — HIGH (ref 135–225)
LYMPHOCYTES # BLD AUTO: 1.62 K/UL — SIGNIFICANT CHANGE UP (ref 1–3.3)
LYMPHOCYTES # BLD AUTO: 18.5 % — SIGNIFICANT CHANGE UP (ref 13–44)
MAGNESIUM SERPL-MCNC: 2 MG/DL — SIGNIFICANT CHANGE UP (ref 1.6–2.6)
MCHC RBC-ENTMCNC: 28.2 PG — SIGNIFICANT CHANGE UP (ref 27–34)
MCHC RBC-ENTMCNC: 33.2 % — SIGNIFICANT CHANGE UP (ref 32–36)
MCV RBC AUTO: 85 FL — SIGNIFICANT CHANGE UP (ref 80–100)
MONOCYTES # BLD AUTO: 1.14 K/UL — HIGH (ref 0–0.9)
MONOCYTES NFR BLD AUTO: 13 % — SIGNIFICANT CHANGE UP (ref 2–14)
NEUTROPHILS # BLD AUTO: 4.9 K/UL — SIGNIFICANT CHANGE UP (ref 1.8–7.4)
NEUTROPHILS NFR BLD AUTO: 55.8 % — SIGNIFICANT CHANGE UP (ref 43–77)
NRBC # FLD: 0.07 — SIGNIFICANT CHANGE UP
PHOSPHATE SERPL-MCNC: 3.8 MG/DL — SIGNIFICANT CHANGE UP (ref 2.5–4.5)
PLATELET # BLD AUTO: 256 K/UL — SIGNIFICANT CHANGE UP (ref 150–400)
PMV BLD: 9.5 FL — SIGNIFICANT CHANGE UP (ref 7–13)
POTASSIUM SERPL-MCNC: 4.7 MMOL/L — SIGNIFICANT CHANGE UP (ref 3.5–5.3)
POTASSIUM SERPL-SCNC: 4.7 MMOL/L — SIGNIFICANT CHANGE UP (ref 3.5–5.3)
PROT SERPL-MCNC: 6.5 G/DL — SIGNIFICANT CHANGE UP (ref 6–8.3)
RBC # BLD: 2.27 M/UL — LOW (ref 4.2–5.8)
RBC # FLD: 20.9 % — HIGH (ref 10.3–14.5)
RETICS #: 172 K/UL — HIGH (ref 25–125)
RETICS/RBC NFR: 7.5 % — HIGH (ref 0.5–2.5)
SODIUM SERPL-SCNC: 137 MMOL/L — SIGNIFICANT CHANGE UP (ref 135–145)
WBC # BLD: 8.77 K/UL — SIGNIFICANT CHANGE UP (ref 3.8–10.5)
WBC # FLD AUTO: 8.77 K/UL — SIGNIFICANT CHANGE UP (ref 3.8–10.5)

## 2019-01-01 PROCEDURE — 99232 SBSQ HOSP IP/OBS MODERATE 35: CPT

## 2019-01-01 RX ORDER — MORPHINE SULFATE 50 MG/1
15 CAPSULE, EXTENDED RELEASE ORAL EVERY 6 HOURS
Qty: 0 | Refills: 0 | Status: DISCONTINUED | OUTPATIENT
Start: 2019-01-01 | End: 2019-01-02

## 2019-01-01 RX ADMIN — ENOXAPARIN SODIUM 40 MILLIGRAM(S): 100 INJECTION SUBCUTANEOUS at 11:18

## 2019-01-01 RX ADMIN — MORPHINE SULFATE 30 MILLILITER(S): 50 CAPSULE, EXTENDED RELEASE ORAL at 03:39

## 2019-01-01 RX ADMIN — MORPHINE SULFATE 30 MILLILITER(S): 50 CAPSULE, EXTENDED RELEASE ORAL at 07:13

## 2019-01-01 RX ADMIN — Medication 12.5 MILLIGRAM(S): at 17:40

## 2019-01-01 RX ADMIN — SODIUM CHLORIDE 75 MILLILITER(S): 9 INJECTION, SOLUTION INTRAVENOUS at 06:10

## 2019-01-01 RX ADMIN — Medication 12.5 MILLIGRAM(S): at 06:05

## 2019-01-01 RX ADMIN — Medication 1 MILLIGRAM(S): at 11:18

## 2019-01-01 NOTE — PROGRESS NOTE ADULT - PROBLEM SELECTOR PLAN 1
CXR unremarkable, RVP negative   pain control discussed with pharmacy by prior team  appears to respond to morphine better than Dilaudid based on previous records.   VOC likely resolved. Pt rarely used the PCA, will switch to oral pain medicine   - bowel regimen PRN  c/w folate, MVI

## 2019-01-01 NOTE — PROGRESS NOTE ADULT - SUBJECTIVE AND OBJECTIVE BOX
Patient is a 48y old  Male who presents with a chief complaint of SCD (31 Dec 2018 15:47)      SUBJECTIVE / OVERNIGHT EVENTS:  Pt reports still has diffuse body pain, but improved. denied cp/sob/fever.     MEDICATIONS  (STANDING):  docusate sodium 100 milliGRAM(s) Oral three times a day  enoxaparin Injectable 40 milliGRAM(s) SubCutaneous daily  folic acid 1 milliGRAM(s) Oral daily  metoprolol tartrate 12.5 milliGRAM(s) Oral two times a day  polyethylene glycol 3350 17 Gram(s) Oral daily    MEDICATIONS  (PRN):  morphine  IR 15 milliGRAM(s) Oral every 6 hours PRN Severe Pain (7 - 10)  ondansetron Injectable 4 milliGRAM(s) IV Push every 6 hours PRN Nausea      T(C): 36.9 (01-01-19 @ 14:00), Max: 37.1 (01-01-19 @ 02:06)  HR: 75 (01-01-19 @ 14:00) (75 - 100)  BP: 119/82 (01-01-19 @ 14:00) (110/85 - 123/72)  RR: 18 (01-01-19 @ 14:00) (18 - 18)  SpO2: 100% (01-01-19 @ 14:00) (100% - 100%)  CAPILLARY BLOOD GLUCOSE        I&O's Summary      PHYSICAL EXAM:  GENERAL: NAD, thin built   HEAD:  Atraumatic, Normocephalic  EYES: EOMI, PERRLA, conjunctiva and sclera clear  NECK: Supple, No JVD  CHEST/LUNG: Clear to auscultation bilaterally; No wheeze  HEART: s1 s2, regular rhythm and rate   ABDOMEN: Soft, Nontender, Nondistended; Bowel sounds present  EXTREMITIES:  2+ Peripheral Pulses, No clubbing, cyanosis, or edema  PSYCH: AAOx3, calm   NEUROLOGY: non-focal  SKIN: No rashes or lesions    LABS:                        6.4    8.77  )-----------( 256      ( 01 Jan 2019 11:00 )             19.3     01-01    137  |  101  |  19  ----------------------------<  98  4.7   |  26  |  0.97    Ca    9.7      01 Jan 2019 11:00  Phos  3.8     01-01  Mg     2.0     01-01    TPro  6.5  /  Alb  3.8  /  TBili  2.3<H>  /  DBili  x   /  AST  34  /  ALT  21  /  AlkPhos  99  01-01              RADIOLOGY & ADDITIONAL TESTS:    Imaging Personally Reviewed:    Consultant(s) Notes Reviewed:      Care Discussed with Consultants/Other Providers:

## 2019-01-01 NOTE — PROGRESS NOTE ADULT - SUBJECTIVE AND OBJECTIVE BOX
Subjective: Patient seen and examined. No new events except as noted.     SUBJECTIVE/ROS:  feels ok     MEDICATIONS:  MEDICATIONS  (STANDING):  docusate sodium 100 milliGRAM(s) Oral three times a day  enoxaparin Injectable 40 milliGRAM(s) SubCutaneous daily  folic acid 1 milliGRAM(s) Oral daily  metoprolol tartrate 12.5 milliGRAM(s) Oral two times a day  morphine PCA (1 mG/mL) 30 milliLiter(s) PCA Continuous PCA Continuous  polyethylene glycol 3350 17 Gram(s) Oral daily  sodium chloride 0.45%. 1000 milliLiter(s) (75 mL/Hr) IV Continuous <Continuous>      PHYSICAL EXAM:  T(C): 36.8 (01-01-19 @ 06:02), Max: 37.1 (01-01-19 @ 02:06)  HR: 88 (01-01-19 @ 06:02) (82 - 100)  BP: 112/80 (01-01-19 @ 06:02) (111/65 - 123/72)  RR: 18 (01-01-19 @ 06:02) (18 - 18)  SpO2: 100% (01-01-19 @ 06:02) (100% - 100%)  Wt(kg): --  I&O's Summary        Appearance: Normal	  HEENT:   no gross abnormality   Cardiovascular: Normal S1 S2,    Murmur:   Neck: JVP normal  Respiratory: Lungs clear   Gastrointestinal:  Soft, Non-tender  Skin: normal   Neuro: No gross deficits.   Psychiatry:  Mood & affect flat  Ext: No edema      LABS/DATA:    CARDIAC MARKERS:                                6.9    10.75 )-----------( 290      ( 31 Dec 2018 08:45 )             21.5     12-31    138  |  101  |  17  ----------------------------<  103<H>  4.5   |  27  |  1.05    Ca    9.5      31 Dec 2018 08:45  Phos  3.6     12-31  Mg     1.9     12-31    TPro  7.1  /  Alb  3.9  /  TBili  2.5<H>  /  DBili  0.3<H>  /  AST  33  /  ALT  15  /  AlkPhos  94  12-31    proBNP:   Lipid Profile:   HgA1c:   TSH:     TELE:  EKG:

## 2019-01-01 NOTE — PROGRESS NOTE ADULT - PROBLEM SELECTOR PLAN 4
-lovenox for DVT ppx  -regular diet

## 2019-01-02 VITALS
OXYGEN SATURATION: 100 % | RESPIRATION RATE: 18 BRPM | SYSTOLIC BLOOD PRESSURE: 126 MMHG | DIASTOLIC BLOOD PRESSURE: 79 MMHG | HEART RATE: 74 BPM | TEMPERATURE: 98 F

## 2019-01-02 LAB
ALBUMIN SERPL ELPH-MCNC: 4 G/DL — SIGNIFICANT CHANGE UP (ref 3.3–5)
ALP SERPL-CCNC: 102 U/L — SIGNIFICANT CHANGE UP (ref 40–120)
ALT FLD-CCNC: 19 U/L — SIGNIFICANT CHANGE UP (ref 4–41)
AST SERPL-CCNC: 31 U/L — SIGNIFICANT CHANGE UP (ref 4–40)
BASOPHILS # BLD AUTO: 0.08 K/UL — SIGNIFICANT CHANGE UP (ref 0–0.2)
BASOPHILS NFR BLD AUTO: 0.9 % — SIGNIFICANT CHANGE UP (ref 0–2)
BILIRUB SERPL-MCNC: 2.3 MG/DL — HIGH (ref 0.2–1.2)
BUN SERPL-MCNC: 17 MG/DL — SIGNIFICANT CHANGE UP (ref 7–23)
CALCIUM SERPL-MCNC: 9.8 MG/DL — SIGNIFICANT CHANGE UP (ref 8.4–10.5)
CHLORIDE SERPL-SCNC: 101 MMOL/L — SIGNIFICANT CHANGE UP (ref 98–107)
CO2 SERPL-SCNC: 26 MMOL/L — SIGNIFICANT CHANGE UP (ref 22–31)
CREAT SERPL-MCNC: 1.01 MG/DL — SIGNIFICANT CHANGE UP (ref 0.5–1.3)
EOSINOPHIL # BLD AUTO: 1.42 K/UL — HIGH (ref 0–0.5)
EOSINOPHIL NFR BLD AUTO: 16.1 % — HIGH (ref 0–6)
GLUCOSE SERPL-MCNC: 91 MG/DL — SIGNIFICANT CHANGE UP (ref 70–99)
HCT VFR BLD CALC: 19.8 % — CRITICAL LOW (ref 39–50)
HGB BLD-MCNC: 6.6 G/DL — CRITICAL LOW (ref 13–17)
IMM GRANULOCYTES # BLD AUTO: 0.05 # — SIGNIFICANT CHANGE UP
IMM GRANULOCYTES NFR BLD AUTO: 0.6 % — SIGNIFICANT CHANGE UP (ref 0–1.5)
LDH SERPL L TO P-CCNC: 348 U/L — HIGH (ref 135–225)
LYMPHOCYTES # BLD AUTO: 1.64 K/UL — SIGNIFICANT CHANGE UP (ref 1–3.3)
LYMPHOCYTES # BLD AUTO: 18.6 % — SIGNIFICANT CHANGE UP (ref 13–44)
MAGNESIUM SERPL-MCNC: 2.1 MG/DL — SIGNIFICANT CHANGE UP (ref 1.6–2.6)
MCHC RBC-ENTMCNC: 28.2 PG — SIGNIFICANT CHANGE UP (ref 27–34)
MCHC RBC-ENTMCNC: 33.3 % — SIGNIFICANT CHANGE UP (ref 32–36)
MCV RBC AUTO: 84.6 FL — SIGNIFICANT CHANGE UP (ref 80–100)
MONOCYTES # BLD AUTO: 1.02 K/UL — HIGH (ref 0–0.9)
MONOCYTES NFR BLD AUTO: 11.6 % — SIGNIFICANT CHANGE UP (ref 2–14)
NEUTROPHILS # BLD AUTO: 4.61 K/UL — SIGNIFICANT CHANGE UP (ref 1.8–7.4)
NEUTROPHILS NFR BLD AUTO: 52.2 % — SIGNIFICANT CHANGE UP (ref 43–77)
NRBC # FLD: 0.06 — SIGNIFICANT CHANGE UP
PHOSPHATE SERPL-MCNC: 3.5 MG/DL — SIGNIFICANT CHANGE UP (ref 2.5–4.5)
PLATELET # BLD AUTO: 280 K/UL — SIGNIFICANT CHANGE UP (ref 150–400)
PMV BLD: 9.9 FL — SIGNIFICANT CHANGE UP (ref 7–13)
POTASSIUM SERPL-MCNC: 4.5 MMOL/L — SIGNIFICANT CHANGE UP (ref 3.5–5.3)
POTASSIUM SERPL-SCNC: 4.5 MMOL/L — SIGNIFICANT CHANGE UP (ref 3.5–5.3)
PROT SERPL-MCNC: 7 G/DL — SIGNIFICANT CHANGE UP (ref 6–8.3)
RBC # BLD: 2.34 M/UL — LOW (ref 4.2–5.8)
RBC # FLD: 20.7 % — HIGH (ref 10.3–14.5)
RETICS #: 182 K/UL — HIGH (ref 25–125)
RETICS/RBC NFR: 7.8 % — HIGH (ref 0.5–2.5)
SODIUM SERPL-SCNC: 138 MMOL/L — SIGNIFICANT CHANGE UP (ref 135–145)
WBC # BLD: 8.82 K/UL — SIGNIFICANT CHANGE UP (ref 3.8–10.5)
WBC # FLD AUTO: 8.82 K/UL — SIGNIFICANT CHANGE UP (ref 3.8–10.5)

## 2019-01-02 PROCEDURE — 99239 HOSP IP/OBS DSCHRG MGMT >30: CPT

## 2019-01-02 RX ORDER — METOPROLOL TARTRATE 50 MG
0.5 TABLET ORAL
Qty: 15 | Refills: 0 | OUTPATIENT
Start: 2019-01-02

## 2019-01-02 RX ORDER — POLYETHYLENE GLYCOL 3350 17 G/17G
17 POWDER, FOR SOLUTION ORAL
Qty: 0 | Refills: 0 | DISCHARGE
Start: 2019-01-02

## 2019-01-02 RX ORDER — MORPHINE SULFATE 50 MG/1
1 CAPSULE, EXTENDED RELEASE ORAL
Qty: 20 | Refills: 0 | OUTPATIENT
Start: 2019-01-02 | End: 2019-01-06

## 2019-01-02 RX ORDER — DOCUSATE SODIUM 100 MG
1 CAPSULE ORAL
Qty: 90 | Refills: 0
Start: 2019-01-02 | End: 2019-01-31

## 2019-01-02 RX ADMIN — MORPHINE SULFATE 15 MILLIGRAM(S): 50 CAPSULE, EXTENDED RELEASE ORAL at 01:51

## 2019-01-02 RX ADMIN — ENOXAPARIN SODIUM 40 MILLIGRAM(S): 100 INJECTION SUBCUTANEOUS at 13:22

## 2019-01-02 RX ADMIN — Medication 1 MILLIGRAM(S): at 13:22

## 2019-01-02 RX ADMIN — Medication 12.5 MILLIGRAM(S): at 06:17

## 2019-01-02 RX ADMIN — MORPHINE SULFATE 15 MILLIGRAM(S): 50 CAPSULE, EXTENDED RELEASE ORAL at 03:15

## 2019-01-02 NOTE — PROVIDER CONTACT NOTE (CRITICAL VALUE NOTIFICATION) - BACKGROUND
pt admitted for sickle cell crisis
Patient admitted for sickle cell crisis.
pt admitted for sickle cell crisis

## 2019-01-02 NOTE — PROVIDER CONTACT NOTE (CRITICAL VALUE NOTIFICATION) - ASSESSMENT
asymptomatic
Pt not in any distress
asymptomatic
Patient alert and oriented to baseline. No s/s of distress or bleeding noted.
asymptomatic

## 2019-01-02 NOTE — PROGRESS NOTE ADULT - ASSESSMENT
47 yo M with SCD here for pain crisis, possibly triggered by viral illness. Currently no e/o ACS
chronic systolic chf  likely nonischemic cm due to high outpt failure   optimize treatment of his sickle cell disease  cont  low dose BB  stable   outpt follow up
47 yo M with SCD here for pain crisis, possibly triggered by viral illness. Currently no e/o ACS
47 yo M with SCD here for pain crisis, possibly triggered by viral illness. Currently no e/o ACS
49 yo M with SCD here for pain crisis, possibly triggered by viral illness. Currently no e/o ACS
chronic systolic chf  likely nonischemic cm due to high outpt failure   optimize treatment of his sickle cell disease  cont  low dose BB    Sickle cell disease  pain control  O2    Outpt follow up with me
chronic systolic chf  likely nonischemic cm due to high outpt failure   optimize treatment of his sickle cell disease  cont  low dose BB  stable
chronic systolic chf  likely nonischemic cm due to high outpt failure   optimize treatment of his sickle cell disease  cont  low dose BB  stable   outpt follow up
chronic systolic chf  stable  cont BB   outpt follow up
49 yo M with SCD here for pain crisis, possibly triggered by viral illness. Currently no e/o ACS

## 2019-01-02 NOTE — PROGRESS NOTE ADULT - PROVIDER SPECIALTY LIST ADULT
Cardiology
Hospitalist
Cardiology
Hospitalist
Hospitalist

## 2019-01-02 NOTE — PROGRESS NOTE ADULT - PROBLEM SELECTOR PROBLEM 1
Sickle cell crisis

## 2019-01-02 NOTE — PROGRESS NOTE ADULT - PROBLEM SELECTOR PROBLEM 2
Anemia due to other cause

## 2019-01-02 NOTE — PROVIDER CONTACT NOTE (CRITICAL VALUE NOTIFICATION) - RECOMMENDATIONS
continue to monitor
ADS will be notified
at patients baseline
continue to monitor
continue to monitor

## 2019-01-02 NOTE — PROVIDER CONTACT NOTE (CRITICAL VALUE NOTIFICATION) - ACTION/TREATMENT ORDERED:
nothing new ordered at this time
ADS [place] [id number], made aware
nothing new ordered at this time
nothing new ordered at this time
will monitor

## 2019-01-02 NOTE — PROGRESS NOTE ADULT - SUBJECTIVE AND OBJECTIVE BOX
Patient is a 48y old  Male who presents with a chief complaint of VOC (01 Jan 2019 17:16)      SUBJECTIVE / OVERNIGHT EVENTS:  Pt feels well, denied cp/sob/fever. Pt only required morphine IR 15mg x 1 at 1am. Pt is ambulatory. + BM    MEDICATIONS  (STANDING):  docusate sodium 100 milliGRAM(s) Oral three times a day  enoxaparin Injectable 40 milliGRAM(s) SubCutaneous daily  folic acid 1 milliGRAM(s) Oral daily  metoprolol tartrate 12.5 milliGRAM(s) Oral two times a day  polyethylene glycol 3350 17 Gram(s) Oral daily    MEDICATIONS  (PRN):  morphine  IR 15 milliGRAM(s) Oral every 6 hours PRN Severe Pain (7 - 10)  ondansetron Injectable 4 milliGRAM(s) IV Push every 6 hours PRN Nausea      T(C): 36.9 (01-02-19 @ 06:13), Max: 36.9 (01-01-19 @ 14:00)  HR: 64 (01-02-19 @ 06:13) (63 - 76)  BP: 120/70 (01-02-19 @ 06:13) (105/52 - 120/70)  RR: 18 (01-02-19 @ 06:13) (18 - 18)  SpO2: 98% (01-02-19 @ 06:13) (98% - 100%)  CAPILLARY BLOOD GLUCOSE        I&O's Summary      PHYSICAL EXAM:  GENERAL: NAD, thin built  HEAD:  Atraumatic, Normocephalic  EYES: EOMI, PERRLA, conjunctiva and sclera clear  NECK: Supple, No JVD  CHEST/LUNG: Clear to auscultation bilaterally; No wheeze  HEART: s1 s2, regular rhythm and rate   ABDOMEN: Soft, Nontender, Nondistended; Bowel sounds present  EXTREMITIES:  2+ Peripheral Pulses, No clubbing, cyanosis, or edema  PSYCH: AAOx3, calm   NEUROLOGY: non-focal  SKIN: No rashes or lesions    LABS:                        6.6    8.82  )-----------( 280      ( 02 Jan 2019 07:00 )             19.8     01-02    138  |  101  |  17  ----------------------------<  91  4.5   |  26  |  1.01    Ca    9.8      02 Jan 2019 07:00  Phos  3.5     01-02  Mg     2.1     01-02    TPro  7.0  /  Alb  4.0  /  TBili  2.3<H>  /  DBili  x   /  AST  31  /  ALT  19  /  AlkPhos  102  01-02              RADIOLOGY & ADDITIONAL TESTS:    Imaging Personally Reviewed:    Consultant(s) Notes Reviewed:      Care Discussed with Consultants/Other Providers:

## 2019-01-02 NOTE — PROGRESS NOTE ADULT - SUBJECTIVE AND OBJECTIVE BOX
Subjective: Patient seen and examined. No new events except as noted.     SUBJECTIVE/ROS:  No chest pain, dyspnea, palpitation, or dizziness.       MEDICATIONS:  MEDICATIONS  (STANDING):  docusate sodium 100 milliGRAM(s) Oral three times a day  enoxaparin Injectable 40 milliGRAM(s) SubCutaneous daily  folic acid 1 milliGRAM(s) Oral daily  metoprolol tartrate 12.5 milliGRAM(s) Oral two times a day  polyethylene glycol 3350 17 Gram(s) Oral daily      PHYSICAL EXAM:  T(C): 36.9 (01-02-19 @ 06:13), Max: 37.1 (01-01-19 @ 10:00)  HR: 64 (01-02-19 @ 06:13) (63 - 80)  BP: 120/70 (01-02-19 @ 06:13) (105/52 - 120/70)  RR: 18 (01-02-19 @ 06:13) (18 - 18)  SpO2: 98% (01-02-19 @ 06:13) (98% - 100%)  Wt(kg): --  I&O's Summary        Appearance: Normal	  HEENT:   no gross abnormality   Cardiovascular: Normal S1 S2,    Murmur:   Neck: JVP normal  Respiratory: Lungs clear   Gastrointestinal:  Soft, Non-tender  Skin: normal   Neuro: No gross deficits.   Psychiatry:  Mood & affect flat  Ext: No edema      LABS/DATA:    CARDIAC MARKERS:                                6.6    8.82  )-----------( 280      ( 02 Jan 2019 07:00 )             19.8     01-02    138  |  101  |  17  ----------------------------<  91  4.5   |  26  |  1.01    Ca    9.8      02 Jan 2019 07:00  Phos  3.5     01-02  Mg     2.1     01-02    TPro  7.0  /  Alb  4.0  /  TBili  2.3<H>  /  DBili  x   /  AST  31  /  ALT  19  /  AlkPhos  102  01-02    proBNP:   Lipid Profile:   HgA1c:   TSH:     TELE:  EKG:

## 2019-01-02 NOTE — PROGRESS NOTE ADULT - PROBLEM SELECTOR PLAN 3
Chronic systolic heart failure, TTE in July 2018 had shown mild global systolic dysfn, repeat TTE 12/30: grossly normal   cardiology eval appreciated  Cardiology recommend adding low dose BB, pt advised to f/u with cardiology as outpt  Pt appears euvolemic at this time

## 2019-01-02 NOTE — PROVIDER CONTACT NOTE (CRITICAL VALUE NOTIFICATION) - SITUATION
Critical lab results Hemoglobin 6.6/ Hematocrit 19.8
6.8/20.5 hgb/hct
Hbg 6, Hct 18.5
hgb 6.4/19.3
6.9/21.5 hgb/hct

## 2019-01-31 ENCOUNTER — APPOINTMENT (OUTPATIENT)
Dept: INTERNAL MEDICINE | Facility: HOSPITAL | Age: 64
End: 2019-01-31
Payer: MEDICARE

## 2019-01-31 ENCOUNTER — OUTPATIENT (OUTPATIENT)
Dept: OUTPATIENT SERVICES | Facility: HOSPITAL | Age: 64
LOS: 1 days | End: 2019-01-31

## 2019-01-31 VITALS
HEIGHT: 70 IN | OXYGEN SATURATION: 99 % | HEART RATE: 77 BPM | BODY MASS INDEX: 17.04 KG/M2 | WEIGHT: 119 LBS | SYSTOLIC BLOOD PRESSURE: 135 MMHG | DIASTOLIC BLOOD PRESSURE: 84 MMHG

## 2019-01-31 PROCEDURE — 99213 OFFICE O/P EST LOW 20 MIN: CPT

## 2019-02-05 DIAGNOSIS — I10 ESSENTIAL (PRIMARY) HYPERTENSION: ICD-10-CM

## 2019-02-05 DIAGNOSIS — D57.1 SICKLE-CELL DISEASE WITHOUT CRISIS: ICD-10-CM

## 2019-02-28 ENCOUNTER — APPOINTMENT (OUTPATIENT)
Dept: INTERNAL MEDICINE | Facility: HOSPITAL | Age: 64
End: 2019-02-28

## 2019-03-14 ENCOUNTER — APPOINTMENT (OUTPATIENT)
Dept: OPHTHALMOLOGY | Facility: CLINIC | Age: 64
End: 2019-03-14

## 2019-04-08 ENCOUNTER — APPOINTMENT (OUTPATIENT)
Dept: INTERNAL MEDICINE | Facility: HOSPITAL | Age: 64
End: 2019-04-08
Payer: MEDICARE

## 2019-04-08 ENCOUNTER — OUTPATIENT (OUTPATIENT)
Dept: OUTPATIENT SERVICES | Facility: HOSPITAL | Age: 64
LOS: 1 days | End: 2019-04-08

## 2019-04-08 VITALS — SYSTOLIC BLOOD PRESSURE: 135 MMHG | DIASTOLIC BLOOD PRESSURE: 79 MMHG

## 2019-04-08 VITALS
WEIGHT: 119 LBS | BODY MASS INDEX: 17.04 KG/M2 | OXYGEN SATURATION: 99 % | HEART RATE: 89 BPM | SYSTOLIC BLOOD PRESSURE: 161 MMHG | HEIGHT: 70 IN | DIASTOLIC BLOOD PRESSURE: 88 MMHG

## 2019-04-08 PROCEDURE — 99213 OFFICE O/P EST LOW 20 MIN: CPT

## 2019-04-08 RX ORDER — HYDROXYUREA 500 MG/1
500 CAPSULE ORAL DAILY
Qty: 60 | Refills: 6 | Status: DISCONTINUED | COMMUNITY
Start: 2018-10-18 | End: 2019-04-08

## 2019-04-08 NOTE — HISTORY OF PRESENT ILLNESS
[de-identified] : 43 yo male with HGB SS  here for f/u.Says that HU caused him more pain than not- Uses motrin for pain. Never uses opiates at home.\par Is being evicted from his home because the landlords are selling the building. He does not receive enough funding from disability to  find new housing. He is not in contact with his family, and feels that they would not take him in. \par \par PE: thin male in nad\par neuro-A and O X 3, non-focal\par he has mild mental retardation\par vss-\par icteric\par Heent- poor dentition\par lungs- cta\par cor:rrr-m\par abd- soft, nt\par ext: -c/c/e\par neuro- affect generally simple, otherwise non-focal\par \par \par A/P- 43 yo male with HGB SS\par 1. DC HU-patient is not tolerating it\par 2. ophtho-scheduled\par 3. f/u three months\par 4. refer to  for possible help with housing dilemna\par \par \par Lluvia Hamm MD\par \par

## 2019-04-08 NOTE — HISTORY OF PRESENT ILLNESS
[de-identified] : 43 yo male with HGB SS  here for f/u. Never started HU. Was unable to get to his pharmacy. No pain. Also never scheduled ophtho.\par No complaints today\par \par PE: thin male in nad\par vss-\par icteric\par Heent- poor dentition\par lungs- cta\par cor:rrr-m\par abd- soft, nt\par ext: -c/c/e\par neuro- affect generally simple, otherwise non-focal\par \par HGB_ 6s\par creat- 1.22\par \par A/P- 43 yo male with HGB SS\par 1. DC Hu because the patient does not tolerate it. \par 2. ophtho- has appointment \par 3. f/u one month\par \par \par Lluvia Hamm MD\par \par

## 2019-04-08 NOTE — HISTORY OF PRESENT ILLNESS
[de-identified] : 41 yo male with HGB SS  here for f/u. Never started HU. Was unable to get to his pharmacy. No pain. Also never scheduled ophtho.\par No complaints today\par \par PE: thin male in nad\par vss-\par icteric\par Heent- poor dentition\par lungs- cta\par cor:rrr-m\par abd- soft, nt\par ext: -c/c/e\par neuro- affect generally simple, otherwise non-focal\par \par HGB_ 6s\par creat- 1.22\par \par A/P- 41 yo male with HGB SS\par 1. DC Hu because the patient does not tolerate it. \par 2. ophtho- has appointment \par 3. f/u one month\par \par \par Lluvia Hamm MD\par \par

## 2019-04-08 NOTE — HISTORY OF PRESENT ILLNESS
[de-identified] : 41 yo male with HGB SS  here for f/u. Never started HU. Was unable to get to his pharmacy. No pain. Also never scheduled ophtho.\par No complaints today\par \par PE: thin male in nad\par vss-\par icteric\par Heent- poor dentition\par lungs- cta\par cor:rrr-m\par abd- soft, nt\par ext: -c/c/e\par neuro- affect generally simple, otherwise non-focal\par \par HGB_ 6s\par creat- 1.22\par \par A/P- 41 yo male with HGB SS\par 1. DC Hu because the patient does not tolerate it. \par 2. ophtho- has appointment \par 3. f/u one month\par \par \par Lluvia Hamm MD\par \par

## 2019-04-08 NOTE — HISTORY OF PRESENT ILLNESS
[de-identified] : 43 yo male with HGB SS  here for f/u. Never started HU. Was unable to get to his pharmacy. No pain. Also never scheduled ophtho.\par No complaints today\par \par PE: thin male in nad\par vss-\par icteric\par Heent- poor dentition\par lungs- cta\par cor:rrr-m\par abd- soft, nt\par ext: -c/c/e\par neuro- affect generally simple, otherwise non-focal\par \par HGB_ 6s\par creat- 1.22\par \par A/P- 43 yo male with HGB SS\par 1. DC Hu because the patient does not tolerate it. \par 2. ophtho- has appointment \par 3. f/u one month\par \par \par Lluvia Hamm MD\par \par

## 2019-04-08 NOTE — DISCUSSION/SUMMARY
[FreeTextEntry1] : Received VM from patient who is requesting a return call. T/C to patient who was not available, VM left requesting he contact me is assistance still required.

## 2019-04-09 DIAGNOSIS — D57.1 SICKLE-CELL DISEASE WITHOUT CRISIS: ICD-10-CM

## 2019-05-01 ENCOUNTER — INPATIENT (INPATIENT)
Facility: HOSPITAL | Age: 64
LOS: 13 days | Discharge: HOME | End: 2019-05-15
Attending: HOSPITALIST | Admitting: HOSPITALIST
Payer: MEDICARE

## 2019-05-01 VITALS
TEMPERATURE: 99 F | RESPIRATION RATE: 16 BRPM | DIASTOLIC BLOOD PRESSURE: 84 MMHG | OXYGEN SATURATION: 95 % | SYSTOLIC BLOOD PRESSURE: 130 MMHG | HEART RATE: 82 BPM

## 2019-05-01 NOTE — ED ADULT TRIAGE NOTE - CHIEF COMPLAINT QUOTE
Patient c/o "pain all over" since the weekend. Patient denies any fevers/chills. Hx. SCC, Patient reports pain is typical to crisis. Last took tylenol 3am with no relief.

## 2019-05-02 DIAGNOSIS — E63.9 NUTRITIONAL DEFICIENCY, UNSPECIFIED: ICD-10-CM

## 2019-05-02 DIAGNOSIS — Z79.899 OTHER LONG TERM (CURRENT) DRUG THERAPY: ICD-10-CM

## 2019-05-02 DIAGNOSIS — D57.00 HB-SS DISEASE WITH CRISIS, UNSPECIFIED: ICD-10-CM

## 2019-05-02 DIAGNOSIS — N17.9 ACUTE KIDNEY FAILURE, UNSPECIFIED: ICD-10-CM

## 2019-05-02 DIAGNOSIS — R19.7 DIARRHEA, UNSPECIFIED: ICD-10-CM

## 2019-05-02 DIAGNOSIS — R05 COUGH: ICD-10-CM

## 2019-05-02 DIAGNOSIS — R30.0 DYSURIA: ICD-10-CM

## 2019-05-02 DIAGNOSIS — D64.89 OTHER SPECIFIED ANEMIAS: ICD-10-CM

## 2019-05-02 LAB
ALBUMIN SERPL ELPH-MCNC: 3.7 G/DL — SIGNIFICANT CHANGE UP (ref 3.3–5)
ALBUMIN SERPL ELPH-MCNC: 4 G/DL — SIGNIFICANT CHANGE UP (ref 3.3–5)
ALP SERPL-CCNC: 62 U/L — SIGNIFICANT CHANGE UP (ref 40–120)
ALP SERPL-CCNC: 66 U/L — SIGNIFICANT CHANGE UP (ref 40–120)
ALT FLD-CCNC: 15 U/L — SIGNIFICANT CHANGE UP (ref 4–41)
ALT FLD-CCNC: 19 U/L — SIGNIFICANT CHANGE UP (ref 4–41)
ANION GAP SERPL CALC-SCNC: 12 MMO/L — SIGNIFICANT CHANGE UP (ref 7–14)
ANION GAP SERPL CALC-SCNC: 9 MMO/L — SIGNIFICANT CHANGE UP (ref 7–14)
ANISOCYTOSIS BLD QL: SLIGHT — SIGNIFICANT CHANGE UP
ANTIBODY ID 1_1: SIGNIFICANT CHANGE UP
ANTIBODY ID 1_2: SIGNIFICANT CHANGE UP
APPEARANCE UR: CLEAR — SIGNIFICANT CHANGE UP
APTT BLD: 28.3 SEC — SIGNIFICANT CHANGE UP (ref 27.5–36.3)
AST SERPL-CCNC: 32 U/L — SIGNIFICANT CHANGE UP (ref 4–40)
AST SERPL-CCNC: 33 U/L — SIGNIFICANT CHANGE UP (ref 4–40)
B PERT DNA SPEC QL NAA+PROBE: NOT DETECTED — SIGNIFICANT CHANGE UP
BACTERIA # UR AUTO: SIGNIFICANT CHANGE UP
BASOPHILS # BLD AUTO: 0.08 K/UL — SIGNIFICANT CHANGE UP (ref 0–0.2)
BASOPHILS # BLD AUTO: 0.1 K/UL — SIGNIFICANT CHANGE UP (ref 0–0.2)
BASOPHILS # BLD AUTO: 0.11 K/UL — SIGNIFICANT CHANGE UP (ref 0–0.2)
BASOPHILS NFR BLD AUTO: 0.6 % — SIGNIFICANT CHANGE UP (ref 0–2)
BASOPHILS NFR BLD AUTO: 0.7 % — SIGNIFICANT CHANGE UP (ref 0–2)
BASOPHILS NFR BLD AUTO: 0.8 % — SIGNIFICANT CHANGE UP (ref 0–2)
BASOPHILS NFR SPEC: 0 % — SIGNIFICANT CHANGE UP (ref 0–2)
BILIRUB SERPL-MCNC: 2.2 MG/DL — HIGH (ref 0.2–1.2)
BILIRUB SERPL-MCNC: 2.2 MG/DL — HIGH (ref 0.2–1.2)
BILIRUB UR-MCNC: NEGATIVE — SIGNIFICANT CHANGE UP
BLASTS # FLD: 0 % — SIGNIFICANT CHANGE UP (ref 0–0)
BLD GP AB SCN SERPL QL: POSITIVE — SIGNIFICANT CHANGE UP
BLOOD UR QL VISUAL: NEGATIVE — SIGNIFICANT CHANGE UP
BUN SERPL-MCNC: 12 MG/DL — SIGNIFICANT CHANGE UP (ref 7–23)
BUN SERPL-MCNC: 15 MG/DL — SIGNIFICANT CHANGE UP (ref 7–23)
C PNEUM DNA SPEC QL NAA+PROBE: NOT DETECTED — SIGNIFICANT CHANGE UP
CALCIUM SERPL-MCNC: 8.8 MG/DL — SIGNIFICANT CHANGE UP (ref 8.4–10.5)
CALCIUM SERPL-MCNC: 9.3 MG/DL — SIGNIFICANT CHANGE UP (ref 8.4–10.5)
CHLORIDE SERPL-SCNC: 108 MMOL/L — HIGH (ref 98–107)
CHLORIDE SERPL-SCNC: 111 MMOL/L — HIGH (ref 98–107)
CO2 SERPL-SCNC: 20 MMOL/L — LOW (ref 22–31)
CO2 SERPL-SCNC: 20 MMOL/L — LOW (ref 22–31)
COLOR SPEC: SIGNIFICANT CHANGE UP
CREAT SERPL-MCNC: 1.2 MG/DL — SIGNIFICANT CHANGE UP (ref 0.5–1.3)
CREAT SERPL-MCNC: 1.33 MG/DL — HIGH (ref 0.5–1.3)
DACRYOCYTES BLD QL SMEAR: SLIGHT — SIGNIFICANT CHANGE UP
DAT POLY-SP REAG RBC QL: NEGATIVE — SIGNIFICANT CHANGE UP
ELLIPTOCYTES BLD QL SMEAR: SLIGHT — SIGNIFICANT CHANGE UP
EOSINOPHIL # BLD AUTO: 1.25 K/UL — HIGH (ref 0–0.5)
EOSINOPHIL # BLD AUTO: 1.66 K/UL — HIGH (ref 0–0.5)
EOSINOPHIL # BLD AUTO: 1.94 K/UL — HIGH (ref 0–0.5)
EOSINOPHIL NFR BLD AUTO: 12.3 % — HIGH (ref 0–6)
EOSINOPHIL NFR BLD AUTO: 13.8 % — HIGH (ref 0–6)
EOSINOPHIL NFR BLD AUTO: 9.9 % — HIGH (ref 0–6)
EOSINOPHIL NFR FLD: 9.5 % — HIGH (ref 0–6)
FLUAV H1 2009 PAND RNA SPEC QL NAA+PROBE: NOT DETECTED — SIGNIFICANT CHANGE UP
FLUAV H1 RNA SPEC QL NAA+PROBE: NOT DETECTED — SIGNIFICANT CHANGE UP
FLUAV H3 RNA SPEC QL NAA+PROBE: NOT DETECTED — SIGNIFICANT CHANGE UP
FLUAV SUBTYP SPEC NAA+PROBE: NOT DETECTED — SIGNIFICANT CHANGE UP
FLUBV RNA SPEC QL NAA+PROBE: NOT DETECTED — SIGNIFICANT CHANGE UP
GIANT PLATELETS BLD QL SMEAR: PRESENT — SIGNIFICANT CHANGE UP
GLUCOSE SERPL-MCNC: 73 MG/DL — SIGNIFICANT CHANGE UP (ref 70–99)
GLUCOSE SERPL-MCNC: 84 MG/DL — SIGNIFICANT CHANGE UP (ref 70–99)
GLUCOSE UR-MCNC: NEGATIVE — SIGNIFICANT CHANGE UP
HADV DNA SPEC QL NAA+PROBE: NOT DETECTED — SIGNIFICANT CHANGE UP
HAPTOGLOB SERPL-MCNC: < 20 MG/DL — LOW (ref 34–200)
HBA1C BLD-MCNC: < 4 % — LOW (ref 4–5.6)
HCOV PNL SPEC NAA+PROBE: SIGNIFICANT CHANGE UP
HCT VFR BLD CALC: 18.1 % — CRITICAL LOW (ref 39–50)
HCT VFR BLD CALC: 18.5 % — CRITICAL LOW (ref 39–50)
HCT VFR BLD CALC: 19.2 % — CRITICAL LOW (ref 39–50)
HGB BLD-MCNC: 5.8 G/DL — CRITICAL LOW (ref 13–17)
HGB BLD-MCNC: 5.8 G/DL — CRITICAL LOW (ref 13–17)
HGB BLD-MCNC: 6.1 G/DL — CRITICAL LOW (ref 13–17)
HMPV RNA SPEC QL NAA+PROBE: NOT DETECTED — SIGNIFICANT CHANGE UP
HPIV1 RNA SPEC QL NAA+PROBE: NOT DETECTED — SIGNIFICANT CHANGE UP
HPIV2 RNA SPEC QL NAA+PROBE: NOT DETECTED — SIGNIFICANT CHANGE UP
HPIV3 RNA SPEC QL NAA+PROBE: NOT DETECTED — SIGNIFICANT CHANGE UP
HPIV4 RNA SPEC QL NAA+PROBE: NOT DETECTED — SIGNIFICANT CHANGE UP
HYALINE CASTS # UR AUTO: NEGATIVE — SIGNIFICANT CHANGE UP
HYPOCHROMIA BLD QL: SLIGHT — SIGNIFICANT CHANGE UP
IMM GRANULOCYTES NFR BLD AUTO: 1.3 % — SIGNIFICANT CHANGE UP (ref 0–1.5)
IMM GRANULOCYTES NFR BLD AUTO: 1.4 % — SIGNIFICANT CHANGE UP (ref 0–1.5)
IMM GRANULOCYTES NFR BLD AUTO: 1.4 % — SIGNIFICANT CHANGE UP (ref 0–1.5)
INR BLD: 1.15 — SIGNIFICANT CHANGE UP (ref 0.88–1.17)
KETONES UR-MCNC: NEGATIVE — SIGNIFICANT CHANGE UP
LDH SERPL L TO P-CCNC: 298 U/L — HIGH (ref 135–225)
LEUKOCYTE ESTERASE UR-ACNC: NEGATIVE — SIGNIFICANT CHANGE UP
LIDOCAIN IGE QN: 38.7 U/L — SIGNIFICANT CHANGE UP (ref 7–60)
LYMPHOCYTES # BLD AUTO: 1.07 K/UL — SIGNIFICANT CHANGE UP (ref 1–3.3)
LYMPHOCYTES # BLD AUTO: 1.86 K/UL — SIGNIFICANT CHANGE UP (ref 1–3.3)
LYMPHOCYTES # BLD AUTO: 14.7 % — SIGNIFICANT CHANGE UP (ref 13–44)
LYMPHOCYTES # BLD AUTO: 19.5 % — SIGNIFICANT CHANGE UP (ref 13–44)
LYMPHOCYTES # BLD AUTO: 2.74 K/UL — SIGNIFICANT CHANGE UP (ref 1–3.3)
LYMPHOCYTES # BLD AUTO: 7.9 % — LOW (ref 13–44)
LYMPHOCYTES NFR SPEC AUTO: 9.6 % — LOW (ref 13–44)
MAGNESIUM SERPL-MCNC: 1.7 MG/DL — SIGNIFICANT CHANGE UP (ref 1.6–2.6)
MANUAL SMEAR VERIFICATION: SIGNIFICANT CHANGE UP
MCHC RBC-ENTMCNC: 22.7 PG — LOW (ref 27–34)
MCHC RBC-ENTMCNC: 23.9 PG — LOW (ref 27–34)
MCHC RBC-ENTMCNC: 23.9 PG — LOW (ref 27–34)
MCHC RBC-ENTMCNC: 31.4 % — LOW (ref 32–36)
MCHC RBC-ENTMCNC: 31.8 % — LOW (ref 32–36)
MCHC RBC-ENTMCNC: 32 % — SIGNIFICANT CHANGE UP (ref 32–36)
MCV RBC AUTO: 72.5 FL — LOW (ref 80–100)
MCV RBC AUTO: 74.5 FL — LOW (ref 80–100)
MCV RBC AUTO: 75.3 FL — LOW (ref 80–100)
METAMYELOCYTES # FLD: 0 % — SIGNIFICANT CHANGE UP (ref 0–1)
MICROCYTES BLD QL: SLIGHT — SIGNIFICANT CHANGE UP
MONOCYTES # BLD AUTO: 1.12 K/UL — HIGH (ref 0–0.9)
MONOCYTES # BLD AUTO: 1.2 K/UL — HIGH (ref 0–0.9)
MONOCYTES # BLD AUTO: 1.24 K/UL — HIGH (ref 0–0.9)
MONOCYTES NFR BLD AUTO: 8.3 % — SIGNIFICANT CHANGE UP (ref 2–14)
MONOCYTES NFR BLD AUTO: 8.8 % — SIGNIFICANT CHANGE UP (ref 2–14)
MONOCYTES NFR BLD AUTO: 9.5 % — SIGNIFICANT CHANGE UP (ref 2–14)
MONOCYTES NFR BLD: 5.2 % — SIGNIFICANT CHANGE UP (ref 2–9)
MYELOCYTES NFR BLD: 0 % — SIGNIFICANT CHANGE UP (ref 0–0)
NEUTROPHIL AB SER-ACNC: 72.2 % — SIGNIFICANT CHANGE UP (ref 43–77)
NEUTROPHILS # BLD AUTO: 7.8 K/UL — HIGH (ref 1.8–7.4)
NEUTROPHILS # BLD AUTO: 8.1 K/UL — HIGH (ref 1.8–7.4)
NEUTROPHILS # BLD AUTO: 9.37 K/UL — HIGH (ref 1.8–7.4)
NEUTROPHILS NFR BLD AUTO: 55.7 % — SIGNIFICANT CHANGE UP (ref 43–77)
NEUTROPHILS NFR BLD AUTO: 63.9 % — SIGNIFICANT CHANGE UP (ref 43–77)
NEUTROPHILS NFR BLD AUTO: 69.5 % — SIGNIFICANT CHANGE UP (ref 43–77)
NEUTS BAND # BLD: 0 % — SIGNIFICANT CHANGE UP (ref 0–6)
NITRITE UR-MCNC: NEGATIVE — SIGNIFICANT CHANGE UP
NRBC # BLD: 3 /100WBC — SIGNIFICANT CHANGE UP
NRBC # FLD: 0.24 K/UL — SIGNIFICANT CHANGE UP (ref 0–0)
NRBC # FLD: 0.29 K/UL — SIGNIFICANT CHANGE UP (ref 0–0)
NRBC # FLD: 0.44 K/UL — SIGNIFICANT CHANGE UP (ref 0–0)
NRBC FLD-RTO: 1.9 — SIGNIFICANT CHANGE UP
NRBC FLD-RTO: 2.1 — SIGNIFICANT CHANGE UP
NRBC FLD-RTO: 3.3 — SIGNIFICANT CHANGE UP
OTHER - HEMATOLOGY %: 0 — SIGNIFICANT CHANGE UP
OVALOCYTES BLD QL SMEAR: SLIGHT — SIGNIFICANT CHANGE UP
PH UR: 6 — SIGNIFICANT CHANGE UP (ref 5–8)
PHOSPHATE SERPL-MCNC: 3.4 MG/DL — SIGNIFICANT CHANGE UP (ref 2.5–4.5)
PLATELET # BLD AUTO: 234 K/UL — SIGNIFICANT CHANGE UP (ref 150–400)
PLATELET # BLD AUTO: 262 K/UL — SIGNIFICANT CHANGE UP (ref 150–400)
PLATELET # BLD AUTO: 271 K/UL — SIGNIFICANT CHANGE UP (ref 150–400)
PLATELET COUNT - ESTIMATE: NORMAL — SIGNIFICANT CHANGE UP
PMV BLD: 10.1 FL — SIGNIFICANT CHANGE UP (ref 7–13)
PMV BLD: 9.7 FL — SIGNIFICANT CHANGE UP (ref 7–13)
PMV BLD: 9.9 FL — SIGNIFICANT CHANGE UP (ref 7–13)
POIKILOCYTOSIS BLD QL AUTO: SIGNIFICANT CHANGE UP
POLYCHROMASIA BLD QL SMEAR: SLIGHT — SIGNIFICANT CHANGE UP
POTASSIUM SERPL-MCNC: 4.3 MMOL/L — SIGNIFICANT CHANGE UP (ref 3.5–5.3)
POTASSIUM SERPL-MCNC: 4.5 MMOL/L — SIGNIFICANT CHANGE UP (ref 3.5–5.3)
POTASSIUM SERPL-SCNC: 4.3 MMOL/L — SIGNIFICANT CHANGE UP (ref 3.5–5.3)
POTASSIUM SERPL-SCNC: 4.5 MMOL/L — SIGNIFICANT CHANGE UP (ref 3.5–5.3)
PROMYELOCYTES # FLD: 0 % — SIGNIFICANT CHANGE UP (ref 0–0)
PROT SERPL-MCNC: 6.4 G/DL — SIGNIFICANT CHANGE UP (ref 6–8.3)
PROT SERPL-MCNC: 6.8 G/DL — SIGNIFICANT CHANGE UP (ref 6–8.3)
PROT UR-MCNC: 30 — SIGNIFICANT CHANGE UP
PROTHROM AB SERPL-ACNC: 12.8 SEC — SIGNIFICANT CHANGE UP (ref 9.8–13.1)
RBC # BLD: 2.43 M/UL — LOW (ref 4.2–5.8)
RBC # BLD: 2.55 M/UL — LOW (ref 4.2–5.8)
RBC # BLD: 2.55 M/UL — LOW (ref 4.2–5.8)
RBC # FLD: 21.9 % — HIGH (ref 10.3–14.5)
RBC # FLD: 23.1 % — HIGH (ref 10.3–14.5)
RBC # FLD: 24 % — HIGH (ref 10.3–14.5)
RBC CASTS # UR COMP ASSIST: SIGNIFICANT CHANGE UP (ref 0–?)
RETICS #: 133 K/UL — HIGH (ref 25–125)
RETICS #: 158 K/UL — HIGH (ref 25–125)
RETICS/RBC NFR: 5.2 % — HIGH (ref 0.5–2.5)
RETICS/RBC NFR: 6.5 % — HIGH (ref 0.5–2.5)
RH IG SCN BLD-IMP: POSITIVE — SIGNIFICANT CHANGE UP
RSV RNA SPEC QL NAA+PROBE: NOT DETECTED — SIGNIFICANT CHANGE UP
RV+EV RNA SPEC QL NAA+PROBE: NOT DETECTED — SIGNIFICANT CHANGE UP
SICKLE CELLS BLD QL SMEAR: SLIGHT — SIGNIFICANT CHANGE UP
SMUDGE CELLS # BLD: PRESENT — SIGNIFICANT CHANGE UP
SODIUM SERPL-SCNC: 140 MMOL/L — SIGNIFICANT CHANGE UP (ref 135–145)
SODIUM SERPL-SCNC: 140 MMOL/L — SIGNIFICANT CHANGE UP (ref 135–145)
SP GR SPEC: 1.01 — SIGNIFICANT CHANGE UP (ref 1–1.04)
SQUAMOUS # UR AUTO: SIGNIFICANT CHANGE UP
TARGETS BLD QL SMEAR: SLIGHT — SIGNIFICANT CHANGE UP
TSH SERPL-MCNC: 7.64 UIU/ML — HIGH (ref 0.27–4.2)
UROBILINOGEN FLD QL: NORMAL — SIGNIFICANT CHANGE UP
VARIANT LYMPHS # BLD: 3.5 % — SIGNIFICANT CHANGE UP
WBC # BLD: 12.67 K/UL — HIGH (ref 3.8–10.5)
WBC # BLD: 13.5 K/UL — HIGH (ref 3.8–10.5)
WBC # BLD: 14.02 K/UL — HIGH (ref 3.8–10.5)
WBC # FLD AUTO: 12.67 K/UL — HIGH (ref 3.8–10.5)
WBC # FLD AUTO: 13.5 K/UL — HIGH (ref 3.8–10.5)
WBC # FLD AUTO: 14.02 K/UL — HIGH (ref 3.8–10.5)
WBC UR QL: SIGNIFICANT CHANGE UP (ref 0–?)

## 2019-05-02 PROCEDURE — 12345: CPT | Mod: NC

## 2019-05-02 PROCEDURE — 99223 1ST HOSP IP/OBS HIGH 75: CPT

## 2019-05-02 PROCEDURE — 86077 PHYS BLOOD BANK SERV XMATCH: CPT

## 2019-05-02 PROCEDURE — 71046 X-RAY EXAM CHEST 2 VIEWS: CPT | Mod: 26

## 2019-05-02 RX ORDER — HYDROMORPHONE HYDROCHLORIDE 2 MG/ML
1 INJECTION INTRAMUSCULAR; INTRAVENOUS; SUBCUTANEOUS ONCE
Qty: 0 | Refills: 0 | Status: DISCONTINUED | OUTPATIENT
Start: 2019-05-02 | End: 2019-05-02

## 2019-05-02 RX ORDER — ONDANSETRON 8 MG/1
4 TABLET, FILM COATED ORAL ONCE
Qty: 0 | Refills: 0 | Status: COMPLETED | OUTPATIENT
Start: 2019-05-02 | End: 2019-05-02

## 2019-05-02 RX ORDER — DOCUSATE SODIUM 100 MG
100 CAPSULE ORAL THREE TIMES A DAY
Qty: 0 | Refills: 0 | Status: DISCONTINUED | OUTPATIENT
Start: 2019-05-02 | End: 2019-05-15

## 2019-05-02 RX ORDER — ONDANSETRON 8 MG/1
4 TABLET, FILM COATED ORAL EVERY 6 HOURS
Qty: 0 | Refills: 0 | Status: DISCONTINUED | OUTPATIENT
Start: 2019-05-02 | End: 2019-05-15

## 2019-05-02 RX ORDER — HYDROXYUREA 500 MG/1
2 CAPSULE ORAL
Qty: 0 | Refills: 0 | COMMUNITY

## 2019-05-02 RX ORDER — TAMSULOSIN HYDROCHLORIDE 0.4 MG/1
0.4 CAPSULE ORAL AT BEDTIME
Qty: 0 | Refills: 0 | Status: DISCONTINUED | OUTPATIENT
Start: 2019-05-02 | End: 2019-05-15

## 2019-05-02 RX ORDER — FOLIC ACID 0.8 MG
1 TABLET ORAL DAILY
Qty: 0 | Refills: 0 | Status: DISCONTINUED | OUTPATIENT
Start: 2019-05-02 | End: 2019-05-15

## 2019-05-02 RX ORDER — HYDROMORPHONE HYDROCHLORIDE 2 MG/ML
30 INJECTION INTRAMUSCULAR; INTRAVENOUS; SUBCUTANEOUS
Qty: 0 | Refills: 0 | Status: DISCONTINUED | OUTPATIENT
Start: 2019-05-02 | End: 2019-05-06

## 2019-05-02 RX ORDER — DIPHENHYDRAMINE HCL 50 MG
25 CAPSULE ORAL EVERY 6 HOURS
Qty: 0 | Refills: 0 | Status: DISCONTINUED | OUTPATIENT
Start: 2019-05-02 | End: 2019-05-15

## 2019-05-02 RX ORDER — FAMOTIDINE 10 MG/ML
20 INJECTION INTRAVENOUS ONCE
Qty: 0 | Refills: 0 | Status: COMPLETED | OUTPATIENT
Start: 2019-05-02 | End: 2019-05-02

## 2019-05-02 RX ORDER — HEPARIN SODIUM 5000 [USP'U]/ML
5000 INJECTION INTRAVENOUS; SUBCUTANEOUS EVERY 12 HOURS
Qty: 0 | Refills: 0 | Status: DISCONTINUED | OUTPATIENT
Start: 2019-05-02 | End: 2019-05-05

## 2019-05-02 RX ORDER — SODIUM CHLORIDE 9 MG/ML
1000 INJECTION INTRAMUSCULAR; INTRAVENOUS; SUBCUTANEOUS ONCE
Qty: 0 | Refills: 0 | Status: COMPLETED | OUTPATIENT
Start: 2019-05-02 | End: 2019-05-02

## 2019-05-02 RX ORDER — SODIUM CHLORIDE 9 MG/ML
1000 INJECTION, SOLUTION INTRAVENOUS
Qty: 0 | Refills: 0 | Status: DISCONTINUED | OUTPATIENT
Start: 2019-05-02 | End: 2019-05-03

## 2019-05-02 RX ORDER — NALOXONE HYDROCHLORIDE 4 MG/.1ML
0.1 SPRAY NASAL
Qty: 0 | Refills: 0 | Status: DISCONTINUED | OUTPATIENT
Start: 2019-05-02 | End: 2019-05-15

## 2019-05-02 RX ORDER — SENNA PLUS 8.6 MG/1
2 TABLET ORAL AT BEDTIME
Qty: 0 | Refills: 0 | Status: DISCONTINUED | OUTPATIENT
Start: 2019-05-02 | End: 2019-05-15

## 2019-05-02 RX ADMIN — FAMOTIDINE 20 MILLIGRAM(S): 10 INJECTION INTRAVENOUS at 03:51

## 2019-05-02 RX ADMIN — TAMSULOSIN HYDROCHLORIDE 0.4 MILLIGRAM(S): 0.4 CAPSULE ORAL at 21:18

## 2019-05-02 RX ADMIN — ONDANSETRON 4 MILLIGRAM(S): 8 TABLET, FILM COATED ORAL at 03:51

## 2019-05-02 RX ADMIN — Medication 1 MILLIGRAM(S): at 13:16

## 2019-05-02 RX ADMIN — HYDROMORPHONE HYDROCHLORIDE 1 MILLIGRAM(S): 2 INJECTION INTRAMUSCULAR; INTRAVENOUS; SUBCUTANEOUS at 10:07

## 2019-05-02 RX ADMIN — HYDROMORPHONE HYDROCHLORIDE 1 MILLIGRAM(S): 2 INJECTION INTRAMUSCULAR; INTRAVENOUS; SUBCUTANEOUS at 08:48

## 2019-05-02 RX ADMIN — HYDROMORPHONE HYDROCHLORIDE 30 MILLILITER(S): 2 INJECTION INTRAMUSCULAR; INTRAVENOUS; SUBCUTANEOUS at 20:29

## 2019-05-02 RX ADMIN — HEPARIN SODIUM 5000 UNIT(S): 5000 INJECTION INTRAVENOUS; SUBCUTANEOUS at 06:27

## 2019-05-02 RX ADMIN — Medication 100 MILLIGRAM(S): at 16:43

## 2019-05-02 RX ADMIN — HYDROMORPHONE HYDROCHLORIDE 1 MILLIGRAM(S): 2 INJECTION INTRAMUSCULAR; INTRAVENOUS; SUBCUTANEOUS at 02:19

## 2019-05-02 RX ADMIN — HYDROMORPHONE HYDROCHLORIDE 1 MILLIGRAM(S): 2 INJECTION INTRAMUSCULAR; INTRAVENOUS; SUBCUTANEOUS at 03:51

## 2019-05-02 RX ADMIN — HYDROMORPHONE HYDROCHLORIDE 1 MILLIGRAM(S): 2 INJECTION INTRAMUSCULAR; INTRAVENOUS; SUBCUTANEOUS at 08:47

## 2019-05-02 RX ADMIN — HYDROMORPHONE HYDROCHLORIDE 1 MILLIGRAM(S): 2 INJECTION INTRAMUSCULAR; INTRAVENOUS; SUBCUTANEOUS at 02:34

## 2019-05-02 RX ADMIN — HEPARIN SODIUM 5000 UNIT(S): 5000 INJECTION INTRAVENOUS; SUBCUTANEOUS at 18:11

## 2019-05-02 RX ADMIN — SODIUM CHLORIDE 100 MILLILITER(S): 9 INJECTION, SOLUTION INTRAVENOUS at 18:12

## 2019-05-02 RX ADMIN — HYDROMORPHONE HYDROCHLORIDE 1 MILLIGRAM(S): 2 INJECTION INTRAMUSCULAR; INTRAVENOUS; SUBCUTANEOUS at 04:06

## 2019-05-02 RX ADMIN — SODIUM CHLORIDE 100 MILLILITER(S): 9 INJECTION, SOLUTION INTRAVENOUS at 06:38

## 2019-05-02 RX ADMIN — HYDROMORPHONE HYDROCHLORIDE 1 MILLIGRAM(S): 2 INJECTION INTRAMUSCULAR; INTRAVENOUS; SUBCUTANEOUS at 06:27

## 2019-05-02 RX ADMIN — SODIUM CHLORIDE 2000 MILLILITER(S): 9 INJECTION INTRAMUSCULAR; INTRAVENOUS; SUBCUTANEOUS at 02:19

## 2019-05-02 RX ADMIN — HYDROMORPHONE HYDROCHLORIDE 30 MILLILITER(S): 2 INJECTION INTRAMUSCULAR; INTRAVENOUS; SUBCUTANEOUS at 13:09

## 2019-05-02 RX ADMIN — Medication 25 MILLIGRAM(S): at 06:27

## 2019-05-02 RX ADMIN — Medication 100 MILLIGRAM(S): at 21:18

## 2019-05-02 NOTE — H&P ADULT - PROBLEM SELECTOR PLAN 2
- Hgb = 5.8 9 (6.6 in 01/2019)  - no reports of any bleeding, and none appreciated in the ED  - no transfusion indicated  - states not taking iron, but takes a type of MVI  - f/u trend w/ repeat lab-work  - Hematology consult in the AM

## 2019-05-02 NOTE — H&P ADULT - PROBLEM SELECTOR PLAN 5
- noted with intermittent rattling, non-productive coughing with sore throat; present for ~ one week, per patient  -  has central patchy erythema on tongue  - f/u RVP (in progress)  - CXR without any acute findings  - no sick contacts  - Robitussin PRN  -

## 2019-05-02 NOTE — H&P ADULT - NSICDXFAMILYHX_GEN_ALL_CORE_FT
FAMILY HISTORY:  No pertinent family history in first degree relatives FAMILY HISTORY:  Family history of sickle cell trait, ~ presumed in parents & 7 siblings (none suffer w/ the disease, per patient)  FH: hypertension, ~ mother

## 2019-05-02 NOTE — ED PROVIDER NOTE - ATTENDING CONTRIBUTION TO CARE
HPI: 49M presenting with SCC, similar to his SCC pain. Pain started over the weekends, and getting worse today. Endorsed to pain all over his body. No fever, chills, but had one episode nausea and non blood emesis today after drinking ginger ale. No hx of acute chest syndrome in the past. hx of blood transfusion in the past due to anemia, last 5 years ago. Pt took Ibuprofen and Tylenol without improvements. Denies travel, trauma, rash.   EXAM: NAD, abd tenderness to palpation in epigastrium, no rebound, no guarding. lungs ctab, heart RRR, no M/R/G/JVD. no BLE edema. FROM on all joints but limited by pain.   MDM: concern for SCC and anemia. Will obtain labs, provide pain meds, obtain CXR and reassess. Possible admission given previous labs showing severe anemia. Denies GIB.

## 2019-05-02 NOTE — H&P ADULT - HISTORY OF PRESENT ILLNESS
49 year old male, with past history significant for Sickle cell disease w/ Crisis, Left ventricular dysfunction and pRBC transfusion, presented to the ED secondary to worsening of sickle cell type pains.  Seen and evaluated at bedside;    Vital signs upon ED presentation as follows: BP = 130/84, HR = 82, RR = 16, T = 37 C (98.6 F), O2 Sat = 99% on RA.  Diagnosed with Sickle Cell Crisis and Anemia Due to Other Cause.  Prescribed NaCl 1 liter, famotidine 20 mg IV x one, Dilaudid 1 mg IV x 2 and Zofran 4 mg IV x one in the ED. 49 year old male, with past history significant for Sickle cell disease (SS; does not usually take opioids, but takes NSAIDs instead), Sickle cell crisis, Acute chest syndrome, Left ventricular dysfunction and pRBC transfusion, presented to the ED secondary to worsening of sickle cell type pains.  Seen and evaluated at bedside;    Vital signs upon ED presentation as follows: BP = 130/84, HR = 82, RR = 16, T = 37 C (98.6 F), O2 Sat = 99% on RA.  Diagnosed with Sickle Cell Crisis and Anemia Due to Other Cause.  Prescribed NaCl 1 liter, famotidine 20 mg IV x one, Dilaudid 1 mg IV x 2 and Zofran 4 mg IV x one in the ED. 49 year old male, with past history significant for Sickle cell disease (SS; does not usually take opioids, but takes NSAIDs instead), Sickle cell crisis, Acute chest syndrome, Left ventricular dysfunction and pRBC transfusion, presented to the ED secondary to worsening of sickle cell type pains.  Seen and evaluated at bedside; restless, but in NAD.  Patient reports onset of generalized body pains - chiefly of the joints, approximately one week ago; thinks this episode is triggered by not having any heat at home - "someone" having turned it off.  Patient has also been suffering with a non-productive cough and sore throat since then, and is dehydrated.  Reports burning with micturition, as well as decreased urine output for the past approximately one week.  Feels dizzy with deep inspiration.  No report of fevers, chills, vomiting.  States had 3 bowel movements since yesterday AM; the first was difficult, and the others were diarrheal.  Comments on poor appetite and drinks Ensure to help balance his diet.  Always feel cold.    States he no longer takes hydroxyurea because of stomach upset; indicates matter discussed with hematologist.  Only medications taken at home are Aleve, Tylenol and folic acid - per patient.      Vital signs upon ED presentation as follows: BP = 130/84, HR = 82, RR = 16, T = 37 C (98.6 F), O2 Sat = 99% on RA.  Diagnosed with Sickle Cell Crisis and Anemia Due to Other Cause.  Prescribed NaCl 1 liter, famotidine 20 mg IV x one, Dilaudid 1 mg IV x 2 and Zofran 4 mg IV x one in the ED.    Previous charts, including recent radiological studies, as well as Allscripts reviewed.

## 2019-05-02 NOTE — H&P ADULT - NSICDXPASTMEDICALHX_GEN_ALL_CORE_FT
PAST MEDICAL HISTORY:  H/O transfusion ~ pRBC    Left ventricular dysfunction     Sickle Cell Disease PAST MEDICAL HISTORY:  Acute chest syndrome     H/O transfusion ~ pRBC    Left ventricular dysfunction     Sickle Cell Disease PAST MEDICAL HISTORY:  Acute chest syndrome     Constipation     H/O transfusion ~ pRBC    Hypertension     Intellectual disability ~ mild    Left ventricular dysfunction     Sickle Cell Disease

## 2019-05-02 NOTE — ED ADULT NURSE REASSESSMENT NOTE - NS ED NURSE REASSESS COMMENT FT1
Patient received from previous RN. Patient receiving continuous fluids via #20g left AC. Patient requesting break-through pain medication, last dose received 2 hrs prior for pain related to SCC. Patient provided comfort care with apple juice. Patient to receive Dilaudid 1 mg pending verification. Will continue to monitor patient, VS WNL. Patient received from previous RN. Patient receiving continuous fluids via #20g left AC. Patient requesting break-through pain medication, last dose received 2 hrs prior for pain related to SCC. Patient provided comfort care with apple juice. Patient to receive Dilaudid 1 mg pending verification. Patient to receive PCA pump when transferred to medicine floor. ADS contacted for pain medication order. RVP pending for patient to be transferred to floor. Will continue to monitor patient, VS WNL. Patient received from previous RN. Patient receiving continuous fluids via #20g left AC. Patient requesting break-through pain medication, last dose received 2 hrs prior for pain related to SCC. Patient provided comfort care with apple juice. Patient to receive Dilaudid 1 mg pending verification. Patient to receive PCA pump when transferred to medicine floor. ADS contacted for pain medication order. RVP pending for patient to be transferred to floor. Will continue to monitor patient, VS WNL.    Pt's Hbg <6.0, ADS #[id number] aware.

## 2019-05-02 NOTE — PROGRESS NOTE ADULT - PROBLEM SELECTOR PLAN 6
- states difficulty w/ first bowel movement, and having diarrhea with successive bathroom usage (x 2)  - because of Dilaudid PCA, placed on senna and colace once daily  - please modify bowel regimen, as needed  - ensure optimal hydration  -  TSH mildly elevated f/u as outpt

## 2019-05-02 NOTE — H&P ADULT - MUSCULOSKELETAL COMMENTS
generalized pain - especially of the joints - related to sickle cell crisis mildly decreased ROM of some joints due to pain

## 2019-05-02 NOTE — PROGRESS NOTE ADULT - PROBLEM SELECTOR PLAN 7
- chronic issue and drinks Ensure to supplement diet  - appears very thin and malnourished  - regular diet w/ Ensure TID prescribed  - may need Nutrition consult

## 2019-05-02 NOTE — H&P ADULT - PROBLEM SELECTOR PLAN 1
- onset one week ago, but with acute worsening x one day  - not relieved w/ usual Aleve or Tylenol  - likely triggers - cold, dehydration, possible infection  - reports no longer taking Hydroxyurea because of GI upset (states discussed w/ hematologist)  - continues on folic acid  - s/p Dilaudid 1 mg IV x 2, IVF NS 1 liter, Zofran 4 mg IV x one, famotidine 20 mg IV x one in the ED  - Dilaudid PCA started, along w/ Zofran, Benadryl  - continues on folic acid  - IVF hydration of 0.45 NS at 100 mL/Hr x 20 hours added  - f/u RVP  - Hematology consult in the AM (Lluvia Hamm MD)

## 2019-05-02 NOTE — PROGRESS NOTE ADULT - PROBLEM SELECTOR PLAN 1
- onset one week ago, but with acute worsening x one day  - not relieved w/ usual Aleve or Tylenol  - likely triggers - cold, dehydration, possible infection  - reports no longer taking Hydroxyurea because of GI upset (states discussed w/ hematologist)  - continues on folic acid  - s/p Dilaudid 1 mg IV x 2, IVF NS 1 liter, Zofran 4 mg IV x one, famotidine 20 mg IV x one in the ED  - Cont Dilaudid PCA along w/ Zofran, Benadryl  - continues on folic acid  - IVF hydration of 0.45 NS at 100 mL/Hr x 20 hours added  - f/u RVP - onset one week ago, but with acute worsening x one day  - not relieved w/ usual Aleve or Tylenol  - likely triggers - cold, dehydration, possible infection  - reports no longer taking Hydroxyurea because of GI upset (states discussed w/ hematologist)  - continues on folic acid  - s/p Dilaudid 1 mg IV x 2, IVF NS 1 liter, Zofran 4 mg IV x one, famotidine 20 mg IV x one in the ED  - Cont Dilaudid PCA along w/ Zofran, Benadryl  - continues on folic acid  - IVF hydration of 0.45 NS at 100 mL/Hr x 20 hours added  -  RVP neg

## 2019-05-02 NOTE — H&P ADULT - PROBLEM SELECTOR PLAN 7
- chronic issue and drinks Ensure to supplement diet  - appears very thin and malnourished  - regular diet w/ Ensure TID prescribed  - f/u AM lab-work  - may need Nutrition consult  - could also benefit from appetite enhancer/stimulant, if Hematology agrees

## 2019-05-02 NOTE — ED ADULT NURSE NOTE - OBJECTIVE STATEMENT
pt received in spot 2, A&Ox4, NAD. c/o sickle cell crisis with generalized body pain x 3 days. unrelieved with Tylenol or Advil at home. endorses nausea, vomiting, denes abdominal pain. appears comfortable, unable to obtain IV. MD Galloway aware, will continue to monitor.

## 2019-05-02 NOTE — H&P ADULT - NSHPLABSRESULTS_GEN_ALL_CORE
5.8    12.67 )-----------( 271      ( 02 May 2019 02:24 )             18.5       05-02    140  |  108<H>  |  15  ----------------------------<  73  4.5   |  20<L>  |  1.33<H>    Ca    9.3      02 May 2019 02:24    TPro  6.8  /  Alb  4.0  /  TBili  2.2<H>  /  DBili  x   /  AST  32  /  ALT  19  /  AlkPhos  66  05-02          PT/INR - ( 02 May 2019 03:50 )   PT: 12.8 SEC;   INR: 1.15          PTT - ( 02 May 2019 03:50 )  PTT:28.3 SEC    Lactate Trend    CAPILLARY BLOOD GLUCOSE

## 2019-05-02 NOTE — ED ADULT NURSE NOTE - NSIMPLEMENTINTERV_GEN_ALL_ED
Implemented All Universal Safety Interventions:  [place] to call system. Call bell, personal items and telephone within reach. Instruct patient to call for assistance. Room bathroom lighting operational. Non-slip footwear when patient is off stretcher. Physically safe environment: no spills, clutter or unnecessary equipment. Stretcher in lowest position, wheels locked, appropriate side rails in place.

## 2019-05-02 NOTE — H&P ADULT - PROBLEM SELECTOR PLAN 3
- BUN/Cr = 15/1.33 (previously 17/1.01 in 01/2019)  - appears very dehydrated, which has likely contributed to onset of sickle cell crisis  - IVF hydration as above  - has EF of 53%  - f/u renal function; if no improvement, would obtain urine electrolytes, creatinine, renal ultrasound, nephrology consult  - lisinopril on hold (noted no Allscripts; but patient reports not taking any meds, aside from FA & MVI at home)  - renal function possibly affected by urinary retention due to suspected non-compliance w/ Flomax (restarted)

## 2019-05-02 NOTE — H&P ADULT - PROBLEM SELECTOR PLAN 8
- patient does not appear to adequately understand the importance of medication adherence  - states only takes folic acid, "focus factor"  - per Allscripts, should be taking hydroxyurea 1000 mg QD (states stopped because of GI upset, and had discussed it w/ hematologist), tamsulosin (now w/ oliguria), lisinopril 10 mg, as well as bowel regimen  - likely being affected by GI upset  - encourage medication compliance, and if necessary, would prescribe GI prophylaxis PRN  - would explain/discuss each med and allow for feedback re understanding

## 2019-05-02 NOTE — H&P ADULT - PROBLEM SELECTOR PLAN 6
- states difficulty w/ first bowel movement, and having diarrhea with successive bathroom usage (x 2)  - because of Dilaudid PCA, placed on senna and colace once daily  - please modify bowel regimen, as needed  - ensure optimal hydration  - f/u TSH

## 2019-05-02 NOTE — PROGRESS NOTE ADULT - PROBLEM SELECTOR PLAN 8
- patient does not appear to adequately understand the importance of medication adherence  - states only takes folic acid, "focus factor"  - per Allscripts, should be taking hydroxyurea 1000 mg QD (states stopped because of GI upset, and had discussed it w/ hematologist), tamsulosin (now w/ oliguria), lisinopril 10 mg, as well as bowel regimen  - likely being affected by GI upset  - encourage medication compliance, and if necessary, would prescribe GI prophylaxis PRN

## 2019-05-02 NOTE — ED ADULT NURSE REASSESSMENT NOTE - NS ED NURSE REASSESS COMMENT FT1
Patient is asleep, appears comfortable and in no apparent distress.  Dr. Lee at bedside from medicine.  Will continue to monitor patient.

## 2019-05-02 NOTE — H&P ADULT - ASSESSMENT
49 year old male, with past history significant for Sickle cell disease w/ Crisis, Left ventricular dysfunction and pRBC transfusion, presented to the ED secondary to worsening of sickle cell type pains.    Vital signs upon ED presentation as follows: BP = 130/84, HR = 82, RR = 16, T = 37 C (98.6 F), O2 Sat = 99% on RA.  Diagnosed with Sickle Cell Crisis and Anemia Due to Other Cause.  Admitted for further management of: 49 year old male, with past history significant for Sickle cell disease w/ Crisis, Left ventricular dysfunction and pRBC transfusion, presented to the ED secondary to worsening of sickle cell type pains.  Vital signs upon ED presentation as follows: BP = 130/84, HR = 82, RR = 16, T = 37 C (98.6 F), O2 Sat = 99% on RA.  Diagnosed with Sickle Cell Crisis and Anemia Due to Other Cause.  Admitted for further management of:

## 2019-05-02 NOTE — H&P ADULT - PROBLEM SELECTOR PLAN 4
- for approximately one week, and associated with burning, per patient  - impaired renal function appreciated (see above)  - likely due to non-compliance w/ Flomax (restarted)  - IVF in progress; evaluate for urinary retention  - UA returned negative for signs of infection; no abx

## 2019-05-02 NOTE — ED PROVIDER NOTE - OBJECTIVE STATEMENT
49M presenting with SCC, similar to his SCC pain. Pain started over the weekends, and getting worse today. Endorsed to pain all over his body. No fever, chills, nausea or vomiting. No hx of acute chest syndrome in the past. hx of blood transfusion in the past. Pt took Ibuprofen and Tylenol without improvements.

## 2019-05-02 NOTE — PROGRESS NOTE ADULT - PROBLEM SELECTOR PLAN 5
- noted with intermittent rattling, non-productive coughing with sore throat; present for ~ one week, per patient  - f/u RVP (in progress)  - CXR without any acute findings  - no sick contacts  - Robitussin PRN - noted with intermittent rattling, non-productive coughing with sore throat; present for ~ one week, per patient  - RVP neg  - CXR without any acute findings  - no sick contacts  - Robitussin PRN

## 2019-05-03 DIAGNOSIS — R79.89 OTHER SPECIFIED ABNORMAL FINDINGS OF BLOOD CHEMISTRY: ICD-10-CM

## 2019-05-03 LAB
ALBUMIN SERPL ELPH-MCNC: 4 G/DL — SIGNIFICANT CHANGE UP (ref 3.3–5)
ALP SERPL-CCNC: 73 U/L — SIGNIFICANT CHANGE UP (ref 40–120)
ALT FLD-CCNC: 17 U/L — SIGNIFICANT CHANGE UP (ref 4–41)
ANION GAP SERPL CALC-SCNC: 14 MMO/L — SIGNIFICANT CHANGE UP (ref 7–14)
AST SERPL-CCNC: 35 U/L — SIGNIFICANT CHANGE UP (ref 4–40)
BASOPHILS # BLD AUTO: 0.11 K/UL — SIGNIFICANT CHANGE UP (ref 0–0.2)
BASOPHILS NFR BLD AUTO: 0.6 % — SIGNIFICANT CHANGE UP (ref 0–2)
BILIRUB SERPL-MCNC: 2.4 MG/DL — HIGH (ref 0.2–1.2)
BUN SERPL-MCNC: 9 MG/DL — SIGNIFICANT CHANGE UP (ref 7–23)
CALCIUM SERPL-MCNC: 9.1 MG/DL — SIGNIFICANT CHANGE UP (ref 8.4–10.5)
CHLORIDE SERPL-SCNC: 105 MMOL/L — SIGNIFICANT CHANGE UP (ref 98–107)
CO2 SERPL-SCNC: 20 MMOL/L — LOW (ref 22–31)
CREAT SERPL-MCNC: 1.27 MG/DL — SIGNIFICANT CHANGE UP (ref 0.5–1.3)
EOSINOPHIL # BLD AUTO: 1.1 K/UL — HIGH (ref 0–0.5)
EOSINOPHIL NFR BLD AUTO: 5.6 % — SIGNIFICANT CHANGE UP (ref 0–6)
GLUCOSE SERPL-MCNC: 91 MG/DL — SIGNIFICANT CHANGE UP (ref 70–99)
HCT VFR BLD CALC: 19.7 % — CRITICAL LOW (ref 39–50)
HGB BLD-MCNC: 6.4 G/DL — CRITICAL LOW (ref 13–17)
IMM GRANULOCYTES NFR BLD AUTO: 1.1 % — SIGNIFICANT CHANGE UP (ref 0–1.5)
LDH SERPL L TO P-CCNC: 310 U/L — HIGH (ref 135–225)
LYMPHOCYTES # BLD AUTO: 1.17 K/UL — SIGNIFICANT CHANGE UP (ref 1–3.3)
LYMPHOCYTES # BLD AUTO: 6 % — LOW (ref 13–44)
MAGNESIUM SERPL-MCNC: 1.6 MG/DL — SIGNIFICANT CHANGE UP (ref 1.6–2.6)
MANUAL SMEAR VERIFICATION: SIGNIFICANT CHANGE UP
MCHC RBC-ENTMCNC: 24 PG — LOW (ref 27–34)
MCHC RBC-ENTMCNC: 32.5 % — SIGNIFICANT CHANGE UP (ref 32–36)
MCV RBC AUTO: 73.8 FL — LOW (ref 80–100)
MONOCYTES # BLD AUTO: 1.7 K/UL — HIGH (ref 0–0.9)
MONOCYTES NFR BLD AUTO: 8.7 % — SIGNIFICANT CHANGE UP (ref 2–14)
NEUTROPHILS # BLD AUTO: 15.25 K/UL — HIGH (ref 1.8–7.4)
NEUTROPHILS NFR BLD AUTO: 78 % — HIGH (ref 43–77)
NRBC # FLD: 0.82 K/UL — SIGNIFICANT CHANGE UP (ref 0–0)
NRBC FLD-RTO: 4.2 — SIGNIFICANT CHANGE UP
PHOSPHATE SERPL-MCNC: 2.2 MG/DL — LOW (ref 2.5–4.5)
PLATELET # BLD AUTO: 299 K/UL — SIGNIFICANT CHANGE UP (ref 150–400)
PMV BLD: 10 FL — SIGNIFICANT CHANGE UP (ref 7–13)
POTASSIUM SERPL-MCNC: 4.5 MMOL/L — SIGNIFICANT CHANGE UP (ref 3.5–5.3)
POTASSIUM SERPL-SCNC: 4.5 MMOL/L — SIGNIFICANT CHANGE UP (ref 3.5–5.3)
PROT SERPL-MCNC: 7.2 G/DL — SIGNIFICANT CHANGE UP (ref 6–8.3)
RBC # BLD: 2.67 M/UL — LOW (ref 4.2–5.8)
RBC # FLD: 23.6 % — HIGH (ref 10.3–14.5)
RETICS #: 206 K/UL — HIGH (ref 25–125)
RETICS/RBC NFR: 7.6 % — HIGH (ref 0.5–2.5)
SODIUM SERPL-SCNC: 139 MMOL/L — SIGNIFICANT CHANGE UP (ref 135–145)
T4 FREE SERPL-MCNC: 1.12 NG/DL — SIGNIFICANT CHANGE UP (ref 0.9–1.8)
WBC # BLD: 19.54 K/UL — HIGH (ref 3.8–10.5)
WBC # FLD AUTO: 19.54 K/UL — HIGH (ref 3.8–10.5)

## 2019-05-03 PROCEDURE — 99233 SBSQ HOSP IP/OBS HIGH 50: CPT

## 2019-05-03 PROCEDURE — 99222 1ST HOSP IP/OBS MODERATE 55: CPT | Mod: GC

## 2019-05-03 RX ORDER — SODIUM,POTASSIUM PHOSPHATES 278-250MG
1 POWDER IN PACKET (EA) ORAL
Qty: 0 | Refills: 0 | Status: COMPLETED | OUTPATIENT
Start: 2019-05-03 | End: 2019-05-04

## 2019-05-03 RX ORDER — ACETAMINOPHEN 500 MG
650 TABLET ORAL EVERY 6 HOURS
Qty: 0 | Refills: 0 | Status: DISCONTINUED | OUTPATIENT
Start: 2019-05-03 | End: 2019-05-15

## 2019-05-03 RX ORDER — SODIUM CHLORIDE 9 MG/ML
1000 INJECTION, SOLUTION INTRAVENOUS
Qty: 0 | Refills: 0 | Status: DISCONTINUED | OUTPATIENT
Start: 2019-05-03 | End: 2019-05-05

## 2019-05-03 RX ADMIN — HYDROMORPHONE HYDROCHLORIDE 30 MILLILITER(S): 2 INJECTION INTRAMUSCULAR; INTRAVENOUS; SUBCUTANEOUS at 07:39

## 2019-05-03 RX ADMIN — Medication 1 TABLET(S): at 14:03

## 2019-05-03 RX ADMIN — SODIUM CHLORIDE 125 MILLILITER(S): 9 INJECTION, SOLUTION INTRAVENOUS at 18:45

## 2019-05-03 RX ADMIN — Medication 1 MILLIGRAM(S): at 14:04

## 2019-05-03 RX ADMIN — Medication 100 MILLIGRAM(S): at 21:47

## 2019-05-03 RX ADMIN — HEPARIN SODIUM 5000 UNIT(S): 5000 INJECTION INTRAVENOUS; SUBCUTANEOUS at 18:44

## 2019-05-03 RX ADMIN — Medication 650 MILLIGRAM(S): at 04:49

## 2019-05-03 RX ADMIN — TAMSULOSIN HYDROCHLORIDE 0.4 MILLIGRAM(S): 0.4 CAPSULE ORAL at 21:47

## 2019-05-03 RX ADMIN — HYDROMORPHONE HYDROCHLORIDE 30 MILLILITER(S): 2 INJECTION INTRAMUSCULAR; INTRAVENOUS; SUBCUTANEOUS at 20:28

## 2019-05-03 RX ADMIN — Medication 650 MILLIGRAM(S): at 03:56

## 2019-05-03 RX ADMIN — Medication 1 TABLET(S): at 18:45

## 2019-05-03 RX ADMIN — Medication 100 MILLIGRAM(S): at 05:57

## 2019-05-03 RX ADMIN — Medication 1 TABLET(S): at 21:47

## 2019-05-03 RX ADMIN — Medication 100 MILLIGRAM(S): at 14:04

## 2019-05-03 RX ADMIN — HEPARIN SODIUM 5000 UNIT(S): 5000 INJECTION INTRAVENOUS; SUBCUTANEOUS at 05:57

## 2019-05-03 NOTE — PROGRESS NOTE ADULT - PROBLEM SELECTOR PLAN 1
- onset one week ago, but with acute worsening x one day  - not relieved w/ usual Aleve or Tylenol  -now with fever of unclear etiology, f/u blood culture, will get ID consult  - continues on folic acid  - Cont Dilaudid PCA along w/ Zofran, Benadryl  - continues on folic acid  - IVF hydration with 0.45 NS  -  RVP neg  - Discussed with Dr. Hamm who reports that he is usually pleasant and talkative given irritability will get CT head, also pmd is concerned about homelessness, per pt he is living in [place] and is not homeless, discussed with CM as well who said that she spoke with him and reports that he lives in a room in [place].  Addendum: Discussed with ID, per ID had ruq mild tenderness, will get RUQ sono, no abx for now, if spikes again will rpt blood cultures and start on zosyn  f/u RUQ sono and CT head, plan discussed with Resident covering ads

## 2019-05-03 NOTE — CONSULT NOTE ADULT - ATTENDING COMMENTS
Shefali Winkler MD  Pager: [phone]  After 5 PM or weekends please call fellow on call or office [phone]

## 2019-05-03 NOTE — CONSULT NOTE ADULT - SUBJECTIVE AND OBJECTIVE BOX
HPI:  49 year old male, with past history significant for Sickle cell disease (SS; does not usually take opioids, but takes NSAIDs instead) who presented to [facility] on 19 secondary to worsening of sickle cell type pains. Of note, last admitted 19 - 19 for sickle cell crisis.   Seen and evaluated at bedside; restless, but in NAD.  Patient reports onset of generalized body pains - chiefly of the joints, approximately one week ago; thinks this episode is triggered by not having any heat at home - "someone" having turned it off.  Patient has also been suffering with a non-productive cough and sore throat since then, and is dehydrated.  Reports burning with micturition, as well as decreased urine output for the past approximately one week.  Feels dizzy with deep inspiration.  No report of fevers, chills, vomiting.  States had 3 bowel movements since yesterday AM; the first was difficult, and the others were diarrheal.  Comments on poor appetite and drinks Ensure to help balance his diet.  Always feel cold.    On presentation afebrile, normotensive and not tachycardic. WBC on presentation of 12.67 with 63.9% PMN and 9.9% Eos. U/A with 0-2 WBC. RVP negative. CXR with clear lungs. Febrile overnight to Tmax of 103.       PAST MEDICAL & SURGICAL HISTORY:  Hypertension  Constipation  Intellectual disability: ~ mild  Acute chest syndrome  H/O transfusion: ~ pRBC  Left ventricular dysfunction  Sickle Cell Disease  No significant past surgical history      Allergies  No Known Drug Allergies  peanuts (Anaphylaxis)        ANTIMICROBIALS:      OTHER MEDS: MEDICATIONS  (STANDING):  acetaminophen   Tablet .. 650 every 6 hours PRN  diphenhydrAMINE 25 every 6 hours PRN  docusate sodium 100 three times a day  guaiFENesin   Syrup  (Sugar-Free) 100 every 6 hours PRN  heparin  Injectable 5000 every 12 hours  HYDROmorphone PCA (1 mG/mL) 30 PCA Continuous  ondansetron Injectable 4 every 6 hours PRN  senna 2 at bedtime PRN  tamsulosin 0.4 at bedtime      SOCIAL HISTORY:  [ ] etoh [ ] tobacco [ ] former smoker [ ] IVDU    FAMILY HISTORY:  Family history of sickle cell trait: ~ presumed in parents &amp; 7 siblings (none suffer w/ the disease, per patient)  FH: hypertension: ~ mother      REVIEW OF SYSTEMS  [  ] ROS unobtainable because:    [  ] All other systems negative except as noted below:	    Constitutional:  [ ] fever [ ] chills  [ ] weight loss  [ ] weakness  Skin:  [ ] rash [ ] phlebitis	  Eyes: [ ] icterus [ ] pain  [ ] discharge	  ENMT: [ ] sore throat  [ ] thrush [ ] ulcers [ ] exudates  Respiratory: [ ] dyspnea [ ] hemoptysis [ ] cough [ ] sputum	  Cardiovascular:  [ ] chest pain [ ] palpitations [ ] edema	  Gastrointestinal:  [ ] nausea [ ] vomiting [ ] diarrhea [ ] constipation [ ] pain	  Genitourinary:  [ ] dysuria [ ] frequency [ ] hematuria [ ] discharge [ ] flank pain  [ ] incontinence  Musculoskeletal:  [ ] myalgias [ ] arthralgias [ ] arthritis  [ ] back pain  Neurological:  [ ] headache [ ] seizures  [ ] confusion/altered mental status  Psychiatric:  [ ] anxiety [ ] depression	  Hematology/Lymphatics:  [ ] lymphadenopathy  Endocrine:  [ ] adrenal [ ] thyroid  Allergic/Immunologic:	 [ ] transplant [ ] seasonal    Vital Signs Last 24 Hrs  T(F): 98.7 (19 @ 10:32), Max: 103 (19 @ 01:43)    Vital Signs Last 24 Hrs  HR: 94 (19 @ 10:32) (82 - 121)  BP: 116/63 (19 @ 10:32) (111/57 - 149/80)  RR: 18 (19 @ 10:32)  SpO2: 100% (19 @ 10:32) (94% - 100%)  Wt(kg): --    PHYSICAL EXAM:  General: non-toxic  HEAD/EYES: anicteric, PERRL  ENT:  supple  Cardiovascular:   S1, S2  Respiratory:  clear bilaterally  GI:  soft, non-tender, normal bowel sounds  :  no CVA tenderness   Musculoskeletal:  no synovitis  Neurologic:  grossly non-focal  Skin:  no rash  Lymph: no lymphadenopathy  Psychiatric:  appropriate affect  Vascular:  no phlebitis                                6.4    19.54 )-----------( 299      ( 03 May 2019 04:04 )             19.7           139  |  105  |  9   ----------------------------<  91  4.5   |  20<L>  |  1.27    Ca    9.1      03 May 2019 04:03  Phos  2.2     05-03  Mg     1.6     05-03    TPro  7.2  /  Alb  4.0  /  TBili  2.4<H>  /  DBili  x   /  AST  35  /  ALT  17  /  AlkPhos  73  05-03      Urinalysis Basic - ( 02 May 2019 07:40 )    Color: LIGHT YELLOW / Appearance: CLEAR / S.011 / pH: 6.0  Gluc: NEGATIVE / Ketone: NEGATIVE  / Bili: NEGATIVE / Urobili: NORMAL   Blood: NEGATIVE / Protein: 30 / Nitrite: NEGATIVE   Leuk Esterase: NEGATIVE / RBC: 0-2 / WBC 0-2   Sq Epi: OCC / Non Sq Epi: x / Bacteria: FEW        MICROBIOLOGY:          v            RADIOLOGY: HPI:  49 year old male PMH Sickle cell disease (SS; does not usually take opioids, but takes NSAIDs instead) who presented to [facility] on 19 secondary to worsening of sickle cell type pains. Of note, last admitted 19 - 19 for sickle cell crisis. Patient notes that over the preceding Saturday and  he developed sore throat, nonproductive cough as well as chills. He notes improvement in symptoms and resolution of fever but then develops worsening generalized joint pains which is typical of his sickle cell crisis over the 48 hours prior to presentation. He denies N/V/D but does not abdominal pain which occurs in context of his sickle cell crises. Denies dysuria, urinary frequency or hesitancy.      On presentation afebrile, normotensive and not tachycardic. WBC on presentation of 12.67 with 63.9% PMN and 9.9% Eos. U/A with 0-2 WBC. RVP negative. CXR with clear lungs. Febrile overnight to Tmax of 103.       PAST MEDICAL & SURGICAL HISTORY:  Hypertension  Constipation  Intellectual disability: ~ mild  Acute chest syndrome  H/O transfusion: ~ pRBC  Left ventricular dysfunction  Sickle Cell Disease  No significant past surgical history      Allergies  No Known Drug Allergies  peanuts (Anaphylaxis)        ANTIMICROBIALS:      OTHER MEDS: MEDICATIONS  (STANDING):  acetaminophen   Tablet .. 650 every 6 hours PRN  diphenhydrAMINE 25 every 6 hours PRN  docusate sodium 100 three times a day  guaiFENesin   Syrup  (Sugar-Free) 100 every 6 hours PRN  heparin  Injectable 5000 every 12 hours  HYDROmorphone PCA (1 mG/mL) 30 PCA Continuous  ondansetron Injectable 4 every 6 hours PRN  senna 2 at bedtime PRN  tamsulosin 0.4 at bedtime      SOCIAL HISTORY:  [ ] etoh [ ] tobacco [ ] former smoker [ ] IVDU    FAMILY HISTORY:  Family history of sickle cell trait: ~ presumed in parents &amp; 7 siblings (none suffer w/ the disease, per patient)  FH: hypertension: ~ mother      REVIEW OF SYSTEMS  [  ] ROS unobtainable because:    [  ] All other systems negative except as noted below:	    Constitutional:  [ ] fever [ ] chills  [ ] weight loss  [ ] weakness  Skin:  [ ] rash [ ] phlebitis	  Eyes: [ ] icterus [ ] pain  [ ] discharge	  ENMT: [ ] sore throat  [ ] thrush [ ] ulcers [ ] exudates  Respiratory: [ ] dyspnea [ ] hemoptysis [ ] cough [ ] sputum	  Cardiovascular:  [ ] chest pain [ ] palpitations [ ] edema	  Gastrointestinal:  [ ] nausea [ ] vomiting [ ] diarrhea [ ] constipation [ ] pain	  Genitourinary:  [ ] dysuria [ ] frequency [ ] hematuria [ ] discharge [ ] flank pain  [ ] incontinence  Musculoskeletal:  [ ] myalgias [ ] arthralgias [ ] arthritis  [ ] back pain  Neurological:  [ ] headache [ ] seizures  [ ] confusion/altered mental status  Psychiatric:  [ ] anxiety [ ] depression	  Hematology/Lymphatics:  [ ] lymphadenopathy  Endocrine:  [ ] adrenal [ ] thyroid  Allergic/Immunologic:	 [ ] transplant [ ] seasonal    Vital Signs Last 24 Hrs  T(F): 98.7 (19 @ 10:32), Max: 103 (19 @ 01:43)    Vital Signs Last 24 Hrs  HR: 94 (19 @ 10:32) (82 - 121)  BP: 116/63 (19 @ 10:32) (111/57 - 149/80)  RR: 18 (19 @ 10:32)  SpO2: 100% (19 @ 10:32) (94% - 100%)  Wt(kg): --    PHYSICAL EXAM:  General: non-toxic  HEAD/EYES: anicteric, PERRL  ENT:  supple  Cardiovascular:   S1, S2  Respiratory:  clear bilaterally  GI:  soft, non-tender, normal bowel sounds  :  no CVA tenderness   Musculoskeletal:  no synovitis  Neurologic:  grossly non-focal  Skin:  no rash  Lymph: no lymphadenopathy  Psychiatric:  appropriate affect  Vascular:  no phlebitis                                6.4    19.54 )-----------( 299      ( 03 May 2019 04:04 )             19.7       05-03    139  |  105  |  9   ----------------------------<  91  4.5   |  20<L>  |  1.27    Ca    9.1      03 May 2019 04:03  Phos  2.2     05-03  Mg     1.6     05-    TPro  7.2  /  Alb  4.0  /  TBili  2.4<H>  /  DBili  x   /  AST  35  /  ALT  17  /  AlkPhos  73  05-03      Urinalysis Basic - ( 02 May 2019 07:40 )    Color: LIGHT YELLOW / Appearance: CLEAR / S.011 / pH: 6.0  Gluc: NEGATIVE / Ketone: NEGATIVE  / Bili: NEGATIVE / Urobili: NORMAL   Blood: NEGATIVE / Protein: 30 / Nitrite: NEGATIVE   Leuk Esterase: NEGATIVE / RBC: 0-2 / WBC 0-2   Sq Epi: OCC / Non Sq Epi: x / Bacteria: FEW        MICROBIOLOGY:          v            RADIOLOGY: HPI:    49 year old male PMH Sickle cell disease (SS; does not usually take opioids, but takes NSAIDs instead) who presented to [facility] on 19 secondary to worsening of sickle cell type pains. Of note, last admitted 19 - 19 for sickle cell crisis. Patient notes that over the preceding Saturday and  he developed sore throat, nonproductive cough as well as chills. He notes improvement in symptoms and resolution of fever but then develops worsening generalized joint pains which is typical of his sickle cell crisis over the 48 hours prior to presentation. He denies N/V/D but does not abdominal pain which occurs in context of his sickle cell crises. Denies dysuria, urinary frequency or hesitancy.      On presentation afebrile, normotensive and not tachycardic. WBC on presentation of 12.67 with 63.9% PMN and 9.9% Eos. U/A with 0-2 WBC. RVP negative. CXR with clear lungs. Febrile overnight to Tmax of 103.       PAST MEDICAL & SURGICAL HISTORY:  Hypertension  Constipation  Intellectual disability: ~ mild  Acute chest syndrome  H/O transfusion: ~ pRBC  Left ventricular dysfunction  Sickle Cell Disease  No significant past surgical history      Allergies  No Known Drug Allergies  peanuts (Anaphylaxis)        ANTIMICROBIALS:      OTHER MEDS: MEDICATIONS  (STANDING):  acetaminophen   Tablet .. 650 every 6 hours PRN  diphenhydrAMINE 25 every 6 hours PRN  docusate sodium 100 three times a day  guaiFENesin   Syrup  (Sugar-Free) 100 every 6 hours PRN  heparin  Injectable 5000 every 12 hours  HYDROmorphone PCA (1 mG/mL) 30 PCA Continuous  ondansetron Injectable 4 every 6 hours PRN  senna 2 at bedtime PRN  tamsulosin 0.4 at bedtime      SOCIAL HISTORY:  Denies prior smoking history, etoh use or recreational drug use. Born in [place] and moved to  in . No recent travel. Believes he had TB testing when he moved to . No personal history of TB and no family or friend exposures. Denies incarceration Hx.     FAMILY HISTORY:  Family history of sickle cell trait: ~ presumed in parents &amp; 7 siblings (none suffer w/ the disease, per patient)  FH: hypertension: ~ mother      REVIEW OF SYSTEMS  [  ] ROS unobtainable because:    [ x ] All other systems negative except as noted below:	    Constitutional:  [x ] fever [ ] chills  [ ] weight loss  [ ] weakness  Skin:  [ ] rash [ ] phlebitis	  Eyes: [ ] icterus [ ] pain  [ ] discharge	  ENMT: [ ] sore throat  [ ] thrush [ ] ulcers [ ] exudates  Respiratory: [ ] dyspnea [ ] hemoptysis [x ] cough [ ] sputum	  Cardiovascular:  [ ] chest pain [ ] palpitations [ ] edema	  Gastrointestinal:  [ ] nausea [ ] vomiting [ ] diarrhea [ ] constipation [ ] pain	  Genitourinary:  [ ] dysuria [ ] frequency [ ] hematuria [ ] discharge [ ] flank pain  [ ] incontinence  Musculoskeletal:  [ x] myalgias [x ] arthralgias [ ] arthritis  [ x] back pain  Neurological:  [ ] headache [ ] seizures  [ ] confusion/altered mental status  Psychiatric:  [ ] anxiety [ ] depression	  Hematology/Lymphatics:  [ ] lymphadenopathy  Endocrine:  [ ] adrenal [ ] thyroid  Allergic/Immunologic:	 [ ] transplant [ ] seasonal    Vital Signs Last 24 Hrs  T(F): 98.7 (19 @ 10:32), Max: 103 (19 @ 01:43)    Vital Signs Last 24 Hrs  HR: 94 (19 @ 10:32) (82 - 121)  BP: 116/63 (19 @ 10:32) (111/57 - 149/80)  RR: 18 (19 @ 10:32)  SpO2: 100% (19 @ 10:32) (94% - 100%)  Wt(kg): --    PHYSICAL EXAMINATION:  General: Alert and Awake, NAD  HEENT: PERRL, EOMI, No subconjunctival hemorrhages, Oropharynx Clear, MMM  Neck: Supple, No CARLIE  Cardiac: RRR, No M/R/G  Resp: CTAB, No Wh/Rh/Ra  Abdomen: NBS, ND, Diffusely tender to palpation but worse in RLQ, No HSM, No rigidity or guarding  MSK: +Deformity below level of the left wrist with some surrounding tenderness to palpation. No LE edema. No stigmata of IE. No evidence of phlebitis. No evidence of synovitis.  Back: No CVA Tenderness  : No alexandre  Skin: No rashes or lesions. Skin is warm and dry to the touch.   Neuro: Alert and Awake. CN 2-12 Grossly intact. Moves all four extremities spontaneously.  Psych: Calm, Pleasant, Cooperative                        6.4    19.54 )-----------( 299      ( 03 May 2019 04:04 )             19.7       05-03    139  |  105  |  9   ----------------------------<  91  4.5   |  20<L>  |  1.27    Ca    9.1      03 May 2019 04:03  Phos  2.2     05-03  Mg     1.6     05-03    TPro  7.2  /  Alb  4.0  /  TBili  2.4<H>  /  DBili  x   /  AST  35  /  ALT  17  /  AlkPhos  73  05-03      Urinalysis Basic - ( 02 May 2019 07:40 )    Color: LIGHT YELLOW / Appearance: CLEAR / S.011 / pH: 6.0  Gluc: NEGATIVE / Ketone: NEGATIVE  / Bili: NEGATIVE / Urobili: NORMAL   Blood: NEGATIVE / Protein: 30 / Nitrite: NEGATIVE   Leuk Esterase: NEGATIVE / RBC: 0-2 / WBC 0-2   Sq Epi: OCC / Non Sq Epi: x / Bacteria: FEW        MICROBIOLOGY:    Rapid Respiratory Viral Panel (19 @ 07:40)    Adenovirus (RapRVP): Not Detected    Influenza A (RapRVP): Not Detected    Influenza AH1 2009 (RapRVP): Not Detected    Influenza AH1 (RapRVP): Not Detected    Influenza AH3 (RapRVP): Not Detected    Influenza B (RapRVP): Not Detected    Parainfluenza 1 (RapRVP): Not Detected    Parainfluenza 2 (RapRVP): Not Detected    Parainfluenza 3 (RapRVP): Not Detected    Parainfluenza 4 (RapRVP): Not Detected    Resp Syncytial Virus (RapRVP): Not Detected    Chlamydia pneumoniae (RapRVP): Not Detected    Mycoplasma pneumoniae (RapRVP): Not Detected    Entero/Rhinovirus (RapRVP): Not Detected    hMPV (RapRVP): Not Detected    Coronavirus (229E,HKU1,NL63,OC43): Not Detected This Respiratory Panel uses polymerase chain reaction (PCR)  to detect for adenovirus; coronavirus (HKU1, NL63, 229E,  OC43); human metapneumovirus (hMPV); human  enterovirus/rhinovirus (Entero/RV); influenza A; influenza  A/H1: influenza A/H3; influenza A/H1-2009; influenza B;  parainfluenza viruses 1,2,3,4; respiratory syncytial virus;  Mycoplasma pneumoniae; and Chlamydophila pneumoniae.    RADIOLOGY:    EXAM:  XR CHEST PA LAT 2V    PROCEDURE DATE:  May  2 2019   Clear lungs. HPI:    49 year old male PMH Sickle cell disease (SS; does not usually take opioids, but takes NSAIDs instead) who presented to [facility] on 19 secondary to worsening of sickle cell type pains. Of note, last admitted 19 - 19 for sickle cell crisis. Patient notes that over the preceding Saturday and  he developed sore throat, nonproductive cough as well as chills. He notes improvement in symptoms and resolution of fever but then develops worsening generalized joint pains which is typical of his sickle cell crisis over the 48 hours prior to presentation. He denies N/V/D but does not abdominal pain which occurs in context of his sickle cell crises. Denies dysuria, urinary frequency or hesitancy.      On presentation afebrile, normotensive and not tachycardic. WBC on presentation of 12.67 with 63.9% PMN and 9.9% Eos. U/A with 0-2 WBC. RVP negative. CXR with clear lungs. Febrile overnight to Tmax of 103.     PAST MEDICAL & SURGICAL HISTORY:  Hypertension  Constipation  Intellectual disability: ~ mild  Acute chest syndrome  H/O transfusion: ~ pRBC  Left ventricular dysfunction  Sickle Cell Disease  No significant past surgical history      Allergies  No Known Drug Allergies  peanuts (Anaphylaxis)        ANTIMICROBIALS:      OTHER MEDS: MEDICATIONS  (STANDING):  acetaminophen   Tablet .. 650 every 6 hours PRN  diphenhydrAMINE 25 every 6 hours PRN  docusate sodium 100 three times a day  guaiFENesin   Syrup  (Sugar-Free) 100 every 6 hours PRN  heparin  Injectable 5000 every 12 hours  HYDROmorphone PCA (1 mG/mL) 30 PCA Continuous  ondansetron Injectable 4 every 6 hours PRN  senna 2 at bedtime PRN  tamsulosin 0.4 at bedtime      SOCIAL HISTORY:  Denies prior smoking history, etoh use or recreational drug use. Born in [place] and moved to  in . No recent travel. Believes he had TB testing when he moved to . No personal history of TB and no family or friend exposures. Denies incarceration Hx.     FAMILY HISTORY:  Family history of sickle cell trait: ~ presumed in parents &amp; 7 siblings (none suffer w/ the disease, per patient)  FH: hypertension: ~ mother      REVIEW OF SYSTEMS  [  ] ROS unobtainable because:    [ x ] All other systems negative except as noted below:	    Constitutional:  [x ] fever [ ] chills  [ ] weight loss  [ ] weakness  Skin:  [ ] rash [ ] phlebitis	  Eyes: [ ] icterus [ ] pain  [ ] discharge	  ENMT: [ ] sore throat  [ ] thrush [ ] ulcers [ ] exudates  Respiratory: [ ] dyspnea [ ] hemoptysis [x ] cough [ ] sputum	  Cardiovascular:  [ ] chest pain [ ] palpitations [ ] edema	  Gastrointestinal:  [ ] nausea [ ] vomiting [ ] diarrhea [ ] constipation [ ] pain	  Genitourinary:  [ ] dysuria [ ] frequency [ ] hematuria [ ] discharge [ ] flank pain  [ ] incontinence  Musculoskeletal:  [ x] myalgias [x ] arthralgias [ ] arthritis  [ x] back pain  Neurological:  [ ] headache [ ] seizures  [ ] confusion/altered mental status  Psychiatric:  [ ] anxiety [ ] depression	  Hematology/Lymphatics:  [ ] lymphadenopathy  Endocrine:  [ ] adrenal [ ] thyroid  Allergic/Immunologic:	 [ ] transplant [ ] seasonal    Vital Signs Last 24 Hrs  T(F): 98.7 (19 @ 10:32), Max: 103 (19 @ 01:43)    Vital Signs Last 24 Hrs  HR: 94 (19 @ 10:32) (82 - 121)  BP: 116/63 (19 @ 10:32) (111/57 - 149/80)  RR: 18 (19 @ 10:32)  SpO2: 100% (19 @ 10:32) (94% - 100%)  Wt(kg): --    PHYSICAL EXAMINATION:  General: Alert and Awake, NAD  HEENT: PERRL, EOMI, No subconjunctival hemorrhages, Oropharynx Clear, MMM  Neck: Supple, No CARLIE  Cardiac: RRR, No M/R/G  Resp: CTAB, No Wh/Rh/Ra  Abdomen: NBS, ND, Diffusely tender to palpation but worse in RLQ, No HSM, No rigidity or guarding  MSK: +Deformity below level of the left wrist with some surrounding tenderness to palpation. No LE edema. No stigmata of IE. No evidence of phlebitis. No evidence of synovitis.  Back: No CVA Tenderness  : No alexandre  Skin: No rashes or lesions. Skin is warm and dry to the touch.   Neuro: Alert and Awake. CN 2-12 Grossly intact. Moves all four extremities spontaneously.  Psych: Calm, Pleasant, Cooperative                        6.4    19.54 )-----------( 299      ( 03 May 2019 04:04 )             19.7       05-03    139  |  105  |  9   ----------------------------<  91  4.5   |  20<L>  |  1.27    Ca    9.1      03 May 2019 04:03  Phos  2.2     05-03  Mg     1.6     05-03    TPro  7.2  /  Alb  4.0  /  TBili  2.4<H>  /  DBili  x   /  AST  35  /  ALT  17  /  AlkPhos  73  05-03      Urinalysis Basic - ( 02 May 2019 07:40 )    Color: LIGHT YELLOW / Appearance: CLEAR / S.011 / pH: 6.0  Gluc: NEGATIVE / Ketone: NEGATIVE  / Bili: NEGATIVE / Urobili: NORMAL   Blood: NEGATIVE / Protein: 30 / Nitrite: NEGATIVE   Leuk Esterase: NEGATIVE / RBC: 0-2 / WBC 0-2   Sq Epi: OCC / Non Sq Epi: x / Bacteria: FEW        MICROBIOLOGY:    Rapid Respiratory Viral Panel (19 @ 07:40)    Adenovirus (RapRVP): Not Detected    Influenza A (RapRVP): Not Detected    Influenza AH1 2009 (RapRVP): Not Detected    Influenza AH1 (RapRVP): Not Detected    Influenza AH3 (RapRVP): Not Detected    Influenza B (RapRVP): Not Detected    Parainfluenza 1 (RapRVP): Not Detected    Parainfluenza 2 (RapRVP): Not Detected    Parainfluenza 3 (RapRVP): Not Detected    Parainfluenza 4 (RapRVP): Not Detected    Resp Syncytial Virus (RapRVP): Not Detected    Chlamydia pneumoniae (RapRVP): Not Detected    Mycoplasma pneumoniae (RapRVP): Not Detected    Entero/Rhinovirus (RapRVP): Not Detected    hMPV (RapRVP): Not Detected    Coronavirus (229E,HKU1,NL63,OC43): Not Detected This Respiratory Panel uses polymerase chain reaction (PCR)  to detect for adenovirus; coronavirus (HKU1, NL63, 229E,  OC43); human metapneumovirus (hMPV); human  enterovirus/rhinovirus (Entero/RV); influenza A; influenza  A/H1: influenza A/H3; influenza A/H1-2009; influenza B;  parainfluenza viruses 1,2,3,4; respiratory syncytial virus;  Mycoplasma pneumoniae; and Chlamydophila pneumoniae.    RADIOLOGY:    EXAM:  XR CHEST PA LAT 2V    PROCEDURE DATE:  May  2 2019   Clear lungs.

## 2019-05-03 NOTE — CONSULT NOTE ADULT - ASSESSMENT
49 year old male PMH Sickle cell disease (SS; does not usually take opioids, but takes NSAIDs instead) who presented to [facility] on 5/2/19 secondary to worsening of sickle cell type pain and possible preceding viral URI. On presentation afebrile, normotensive and not tachycardic. WBC on presentation of 12.67 with 63.9% PMN and 9.9% Eos. U/A with 0-2 WBC. RVP negative. CXR with clear lungs. Febrile overnight to Tmax of 103.     Overall, fever in a sickle cell crisis patient. Most likely fever is secondary to sickle cell - if there is source of infection possibly pulmonary as he has some residual cough - however, CXR clear. Patient doing well off of antibiotics. 49 year old male PMH Sickle cell disease (SS; does not usually take opioids, but takes NSAIDs instead) who presented to [facility] on 5/2/19 secondary to worsening of sickle cell type pain and possible preceding viral URI. On presentation afebrile, normotensive and not tachycardic. WBC on presentation of 12.67 with 63.9% PMN and 9.9% Eos. U/A with 0-2 WBC. RVP negative. CXR with clear lungs. Febrile overnight to Tmax of 103.     Overall, fever in a sickle cell crisis patient. Most likely fever is secondary to sickle cell - if there is source of infection possibly pulmonary as he has some residual cough - however, CXR clear. Patient doing well off of antibiotics. CXR commenting on Gallstone and abdominal pain on palpation - would obtain US Abdomen    --Recommend monitoring off of antibiotics  --Recommend RUQ US  --Followup on Prelim BCx  --If recurrence of fever would obtain 2 more sets of BCx and start Zosyn 49 year old male PMH Sickle cell disease (SS; does not usually take opioids, but takes NSAIDs instead) who presented to [facility] on 5/2/19 secondary to worsening of sickle cell type pain and possible preceding viral URI. On presentation afebrile, normotensive and not tachycardic. WBC on presentation of 12.67 with 63.9% PMN and 9.9% Eos. U/A with 0-2 WBC. RVP negative. CXR with clear lungs. Febrile overnight to Tmax of 103.     Overall, fever in a sickle cell crisis patient. Most likely fever is secondary to sickle cell - if there is source of infection possibly pulmonary as he has some residual cough - however, CXR clear. Patient doing well off of antibiotics. CXR commenting on Gallstone and abdominal pain on palpation - would obtain US Abdomen    --Recommend monitoring off of antibiotics  --Recommend RUQ US  --Followup on Prelim BCx  --If recurrence of fever would obtain 2 more sets of BCx and start Zosyn    ID service will follow over weekend.

## 2019-05-04 LAB
ALBUMIN SERPL ELPH-MCNC: 3.9 G/DL — SIGNIFICANT CHANGE UP (ref 3.3–5)
ALP SERPL-CCNC: 68 U/L — SIGNIFICANT CHANGE UP (ref 40–120)
ALT FLD-CCNC: 18 U/L — SIGNIFICANT CHANGE UP (ref 4–41)
ANION GAP SERPL CALC-SCNC: 13 MMO/L — SIGNIFICANT CHANGE UP (ref 7–14)
AST SERPL-CCNC: 34 U/L — SIGNIFICANT CHANGE UP (ref 4–40)
BASOPHILS # BLD AUTO: 0.08 K/UL — SIGNIFICANT CHANGE UP (ref 0–0.2)
BASOPHILS NFR BLD AUTO: 0.6 % — SIGNIFICANT CHANGE UP (ref 0–2)
BILIRUB SERPL-MCNC: 1.9 MG/DL — HIGH (ref 0.2–1.2)
BUN SERPL-MCNC: 13 MG/DL — SIGNIFICANT CHANGE UP (ref 7–23)
CALCIUM SERPL-MCNC: 9 MG/DL — SIGNIFICANT CHANGE UP (ref 8.4–10.5)
CHLORIDE SERPL-SCNC: 99 MMOL/L — SIGNIFICANT CHANGE UP (ref 98–107)
CO2 SERPL-SCNC: 24 MMOL/L — SIGNIFICANT CHANGE UP (ref 22–31)
CREAT SERPL-MCNC: 1.09 MG/DL — SIGNIFICANT CHANGE UP (ref 0.5–1.3)
EOSINOPHIL # BLD AUTO: 0.53 K/UL — HIGH (ref 0–0.5)
EOSINOPHIL NFR BLD AUTO: 4.2 % — SIGNIFICANT CHANGE UP (ref 0–6)
GLUCOSE SERPL-MCNC: 115 MG/DL — HIGH (ref 70–99)
HAPTOGLOB SERPL-MCNC: < 20 MG/DL — LOW (ref 34–200)
HCT VFR BLD CALC: 19.7 % — CRITICAL LOW (ref 39–50)
HGB BLD-MCNC: 6.4 G/DL — CRITICAL LOW (ref 13–17)
IMM GRANULOCYTES NFR BLD AUTO: 0.8 % — SIGNIFICANT CHANGE UP (ref 0–1.5)
LDH SERPL L TO P-CCNC: 318 U/L — HIGH (ref 135–225)
LYMPHOCYTES # BLD AUTO: 1.72 K/UL — SIGNIFICANT CHANGE UP (ref 1–3.3)
LYMPHOCYTES # BLD AUTO: 13.6 % — SIGNIFICANT CHANGE UP (ref 13–44)
MAGNESIUM SERPL-MCNC: 1.9 MG/DL — SIGNIFICANT CHANGE UP (ref 1.6–2.6)
MANUAL SMEAR VERIFICATION: SIGNIFICANT CHANGE UP
MCHC RBC-ENTMCNC: 23.7 PG — LOW (ref 27–34)
MCHC RBC-ENTMCNC: 32.5 % — SIGNIFICANT CHANGE UP (ref 32–36)
MCV RBC AUTO: 73 FL — LOW (ref 80–100)
MONOCYTES # BLD AUTO: 1.28 K/UL — HIGH (ref 0–0.9)
MONOCYTES NFR BLD AUTO: 10.1 % — SIGNIFICANT CHANGE UP (ref 2–14)
NEUTROPHILS # BLD AUTO: 8.98 K/UL — HIGH (ref 1.8–7.4)
NEUTROPHILS NFR BLD AUTO: 70.7 % — SIGNIFICANT CHANGE UP (ref 43–77)
NRBC # FLD: 0.35 K/UL — SIGNIFICANT CHANGE UP (ref 0–0)
NRBC FLD-RTO: 2.8 — SIGNIFICANT CHANGE UP
PHOSPHATE SERPL-MCNC: 3.4 MG/DL — SIGNIFICANT CHANGE UP (ref 2.5–4.5)
PLATELET # BLD AUTO: 299 K/UL — SIGNIFICANT CHANGE UP (ref 150–400)
PMV BLD: 9.8 FL — SIGNIFICANT CHANGE UP (ref 7–13)
POTASSIUM SERPL-MCNC: 4.2 MMOL/L — SIGNIFICANT CHANGE UP (ref 3.5–5.3)
POTASSIUM SERPL-SCNC: 4.2 MMOL/L — SIGNIFICANT CHANGE UP (ref 3.5–5.3)
PROT SERPL-MCNC: 7.3 G/DL — SIGNIFICANT CHANGE UP (ref 6–8.3)
RBC # BLD: 2.7 M/UL — LOW (ref 4.2–5.8)
RBC # FLD: 23.9 % — HIGH (ref 10.3–14.5)
RETICS #: 202 K/UL — HIGH (ref 25–125)
RETICS/RBC NFR: 7.5 % — HIGH (ref 0.5–2.5)
SODIUM SERPL-SCNC: 136 MMOL/L — SIGNIFICANT CHANGE UP (ref 135–145)
SPECIMEN SOURCE: SIGNIFICANT CHANGE UP
T4 FREE SERPL-MCNC: 1.09 NG/DL — SIGNIFICANT CHANGE UP (ref 0.9–1.8)
WBC # BLD: 12.69 K/UL — HIGH (ref 3.8–10.5)
WBC # FLD AUTO: 12.69 K/UL — HIGH (ref 3.8–10.5)

## 2019-05-04 PROCEDURE — 99232 SBSQ HOSP IP/OBS MODERATE 35: CPT

## 2019-05-04 PROCEDURE — 70450 CT HEAD/BRAIN W/O DYE: CPT | Mod: 26

## 2019-05-04 PROCEDURE — 99233 SBSQ HOSP IP/OBS HIGH 50: CPT

## 2019-05-04 RX ORDER — KETOROLAC TROMETHAMINE 30 MG/ML
15 SYRINGE (ML) INJECTION ONCE
Qty: 0 | Refills: 0 | Status: DISCONTINUED | OUTPATIENT
Start: 2019-05-04 | End: 2019-05-04

## 2019-05-04 RX ORDER — ONDANSETRON 8 MG/1
4 TABLET, FILM COATED ORAL ONCE
Qty: 0 | Refills: 0 | Status: COMPLETED | OUTPATIENT
Start: 2019-05-04 | End: 2019-05-04

## 2019-05-04 RX ORDER — HYDROMORPHONE HYDROCHLORIDE 2 MG/ML
1 INJECTION INTRAMUSCULAR; INTRAVENOUS; SUBCUTANEOUS ONCE
Qty: 0 | Refills: 0 | Status: DISCONTINUED | OUTPATIENT
Start: 2019-05-04 | End: 2019-05-04

## 2019-05-04 RX ADMIN — SODIUM CHLORIDE 125 MILLILITER(S): 9 INJECTION, SOLUTION INTRAVENOUS at 09:36

## 2019-05-04 RX ADMIN — ONDANSETRON 4 MILLIGRAM(S): 8 TABLET, FILM COATED ORAL at 14:57

## 2019-05-04 RX ADMIN — Medication 1 MILLIGRAM(S): at 14:41

## 2019-05-04 RX ADMIN — HEPARIN SODIUM 5000 UNIT(S): 5000 INJECTION INTRAVENOUS; SUBCUTANEOUS at 05:17

## 2019-05-04 RX ADMIN — SODIUM CHLORIDE 125 MILLILITER(S): 9 INJECTION, SOLUTION INTRAVENOUS at 00:05

## 2019-05-04 RX ADMIN — Medication 100 MILLIGRAM(S): at 14:40

## 2019-05-04 RX ADMIN — Medication 15 MILLIGRAM(S): at 14:41

## 2019-05-04 RX ADMIN — Medication 100 MILLIGRAM(S): at 23:18

## 2019-05-04 RX ADMIN — Medication 15 MILLIGRAM(S): at 14:56

## 2019-05-04 RX ADMIN — Medication 1 TABLET(S): at 09:05

## 2019-05-04 RX ADMIN — Medication 100 MILLIGRAM(S): at 05:17

## 2019-05-04 RX ADMIN — HYDROMORPHONE HYDROCHLORIDE 30 MILLILITER(S): 2 INJECTION INTRAMUSCULAR; INTRAVENOUS; SUBCUTANEOUS at 20:30

## 2019-05-04 RX ADMIN — TAMSULOSIN HYDROCHLORIDE 0.4 MILLIGRAM(S): 0.4 CAPSULE ORAL at 23:18

## 2019-05-04 RX ADMIN — HYDROMORPHONE HYDROCHLORIDE 30 MILLILITER(S): 2 INJECTION INTRAMUSCULAR; INTRAVENOUS; SUBCUTANEOUS at 08:37

## 2019-05-04 RX ADMIN — Medication 25 MILLIGRAM(S): at 14:41

## 2019-05-04 NOTE — PROGRESS NOTE ADULT - PROBLEM SELECTOR PLAN 1
- onset one week ago, but with acute worsening x one day  - not relieved w/ usual Aleve or Tylenol  -now with fever of unclear etiology likely secondary to sickle cell crisis, blood culture neg to date, appreciate ID consult and f/u, will give toradol 1 dose until iv acces is established, f/u ultrasound abdomen, and ct head  - continues on folic acid  - Cont Dilaudid PCA along w/ Zofran, Benadryl  - continues on folic acid  - IVF hydration with 0.45 NS  -  RVP neg  - Discussed with Dr. Hamm on 5/3 who reports that he is usually pleasant and talkative given irritability will get CT head, also pmd is concerned about homelessness, per pt he is living in [place] and is not homeless, discussed with CM as well who said that she spoke with him and reports that he lives in a room in [place]  f/u RUQ sono and CT head, plan discussed with NP  Discussed with NP to attempt iv access as well  observe off abx, leukocytosis improving, afebrile now

## 2019-05-05 DIAGNOSIS — D69.6 THROMBOCYTOPENIA, UNSPECIFIED: ICD-10-CM

## 2019-05-05 LAB
BASOPHILS # BLD AUTO: 0.05 K/UL — SIGNIFICANT CHANGE UP (ref 0–0.2)
BASOPHILS NFR BLD AUTO: 0.5 % — SIGNIFICANT CHANGE UP (ref 0–2)
EOSINOPHIL # BLD AUTO: 1.13 K/UL — HIGH (ref 0–0.5)
EOSINOPHIL NFR BLD AUTO: 10.7 % — HIGH (ref 0–6)
HCT VFR BLD CALC: 19.8 % — CRITICAL LOW (ref 39–50)
HCT VFR BLD CALC: 21.1 % — LOW (ref 39–50)
HGB BLD-MCNC: 6.3 G/DL — CRITICAL LOW (ref 13–17)
HGB BLD-MCNC: 6.5 G/DL — CRITICAL LOW (ref 13–17)
IMM GRANULOCYTES NFR BLD AUTO: 0.6 % — SIGNIFICANT CHANGE UP (ref 0–1.5)
LYMPHOCYTES # BLD AUTO: 1.34 K/UL — SIGNIFICANT CHANGE UP (ref 1–3.3)
LYMPHOCYTES # BLD AUTO: 12.7 % — LOW (ref 13–44)
MCHC RBC-ENTMCNC: 23.3 PG — LOW (ref 27–34)
MCHC RBC-ENTMCNC: 23.8 PG — LOW (ref 27–34)
MCHC RBC-ENTMCNC: 30.8 % — LOW (ref 32–36)
MCHC RBC-ENTMCNC: 31.8 % — LOW (ref 32–36)
MCV RBC AUTO: 74.7 FL — LOW (ref 80–100)
MCV RBC AUTO: 75.6 FL — LOW (ref 80–100)
MONOCYTES # BLD AUTO: 0.77 K/UL — SIGNIFICANT CHANGE UP (ref 0–0.9)
MONOCYTES NFR BLD AUTO: 7.3 % — SIGNIFICANT CHANGE UP (ref 2–14)
NEUTROPHILS # BLD AUTO: 7.18 K/UL — SIGNIFICANT CHANGE UP (ref 1.8–7.4)
NEUTROPHILS NFR BLD AUTO: 68.2 % — SIGNIFICANT CHANGE UP (ref 43–77)
NRBC # FLD: 0.1 K/UL — SIGNIFICANT CHANGE UP (ref 0–0)
NRBC # FLD: 0.19 K/UL — SIGNIFICANT CHANGE UP (ref 0–0)
NRBC FLD-RTO: 1.8 — SIGNIFICANT CHANGE UP
PLATELET # BLD AUTO: 146 K/UL — LOW (ref 150–400)
PLATELET # BLD AUTO: 279 K/UL — SIGNIFICANT CHANGE UP (ref 150–400)
PMV BLD: 9.2 FL — SIGNIFICANT CHANGE UP (ref 7–13)
PMV BLD: 9.8 FL — SIGNIFICANT CHANGE UP (ref 7–13)
RBC # BLD: 2.65 M/UL — LOW (ref 4.2–5.8)
RBC # BLD: 2.79 M/UL — LOW (ref 4.2–5.8)
RBC # FLD: 22.2 % — HIGH (ref 10.3–14.5)
RBC # FLD: 23.3 % — HIGH (ref 10.3–14.5)
WBC # BLD: 10.53 K/UL — HIGH (ref 3.8–10.5)
WBC # BLD: 11.44 K/UL — HIGH (ref 3.8–10.5)
WBC # FLD AUTO: 10.53 K/UL — HIGH (ref 3.8–10.5)
WBC # FLD AUTO: 11.44 K/UL — HIGH (ref 3.8–10.5)

## 2019-05-05 PROCEDURE — 99233 SBSQ HOSP IP/OBS HIGH 50: CPT

## 2019-05-05 RX ORDER — SODIUM CHLORIDE 9 MG/ML
1000 INJECTION, SOLUTION INTRAVENOUS
Qty: 0 | Refills: 0 | Status: DISCONTINUED | OUTPATIENT
Start: 2019-05-05 | End: 2019-05-14

## 2019-05-05 RX ADMIN — HEPARIN SODIUM 5000 UNIT(S): 5000 INJECTION INTRAVENOUS; SUBCUTANEOUS at 05:07

## 2019-05-05 RX ADMIN — SODIUM CHLORIDE 80 MILLILITER(S): 9 INJECTION, SOLUTION INTRAVENOUS at 21:41

## 2019-05-05 RX ADMIN — TAMSULOSIN HYDROCHLORIDE 0.4 MILLIGRAM(S): 0.4 CAPSULE ORAL at 21:41

## 2019-05-05 RX ADMIN — Medication 100 MILLIGRAM(S): at 21:40

## 2019-05-05 RX ADMIN — HYDROMORPHONE HYDROCHLORIDE 30 MILLILITER(S): 2 INJECTION INTRAMUSCULAR; INTRAVENOUS; SUBCUTANEOUS at 21:39

## 2019-05-05 RX ADMIN — Medication 100 MILLIGRAM(S): at 13:04

## 2019-05-05 RX ADMIN — Medication 1 MILLIGRAM(S): at 13:04

## 2019-05-05 RX ADMIN — Medication 100 MILLIGRAM(S): at 05:07

## 2019-05-05 RX ADMIN — HYDROMORPHONE HYDROCHLORIDE 30 MILLILITER(S): 2 INJECTION INTRAMUSCULAR; INTRAVENOUS; SUBCUTANEOUS at 18:43

## 2019-05-05 NOTE — PROGRESS NOTE ADULT - PROBLEM SELECTOR PLAN 1
-now with fever of unclear etiology likely secondary to sickle cell crisis, blood culture neg to date, appreciate ID consult, f/u ID recs, pt with minimal RUQ pain, await ultrasound abdomen.  Ct head done was neg ( pt had irritability and wound not answer questions 2 days ago and Dr. Hamm was concerned as that is not his usual self, spoke to her about his admission)  - continues on folic acid  - Cont Dilaudid PCA along w/ Zofran, Benadryl  - continues on folic acid  - IVF hydration with 0.45 NS  -  RVP neg  - Discussed with Dr. Hamm on 5/3 who reports that he is usually pleasant and talkative given irritability will get CT head, also pmd is concerned about homelessness, per pt he is living in [place] and is not homeless, discussed with CM as well who said that she spoke with him and reports that he lives in a room in [place]  f/u RUQ sono, plan discussed with NP  observe off abx, leukocytosis improving, afebrile now

## 2019-05-06 PROCEDURE — 99232 SBSQ HOSP IP/OBS MODERATE 35: CPT

## 2019-05-06 PROCEDURE — 76700 US EXAM ABDOM COMPLETE: CPT | Mod: 26

## 2019-05-06 RX ORDER — HYDROMORPHONE HYDROCHLORIDE 2 MG/ML
30 INJECTION INTRAMUSCULAR; INTRAVENOUS; SUBCUTANEOUS
Qty: 0 | Refills: 0 | Status: DISCONTINUED | OUTPATIENT
Start: 2019-05-06 | End: 2019-05-07

## 2019-05-06 RX ADMIN — Medication 100 MILLIGRAM(S): at 05:03

## 2019-05-06 RX ADMIN — Medication 100 MILLIGRAM(S): at 21:48

## 2019-05-06 RX ADMIN — HYDROMORPHONE HYDROCHLORIDE 30 MILLILITER(S): 2 INJECTION INTRAMUSCULAR; INTRAVENOUS; SUBCUTANEOUS at 08:02

## 2019-05-06 RX ADMIN — TAMSULOSIN HYDROCHLORIDE 0.4 MILLIGRAM(S): 0.4 CAPSULE ORAL at 21:48

## 2019-05-06 RX ADMIN — Medication 1 MILLIGRAM(S): at 12:25

## 2019-05-06 RX ADMIN — SODIUM CHLORIDE 80 MILLILITER(S): 9 INJECTION, SOLUTION INTRAVENOUS at 08:02

## 2019-05-06 RX ADMIN — HYDROMORPHONE HYDROCHLORIDE 30 MILLILITER(S): 2 INJECTION INTRAMUSCULAR; INTRAVENOUS; SUBCUTANEOUS at 16:52

## 2019-05-06 RX ADMIN — HYDROMORPHONE HYDROCHLORIDE 30 MILLILITER(S): 2 INJECTION INTRAMUSCULAR; INTRAVENOUS; SUBCUTANEOUS at 20:24

## 2019-05-06 RX ADMIN — SODIUM CHLORIDE 80 MILLILITER(S): 9 INJECTION, SOLUTION INTRAVENOUS at 21:48

## 2019-05-06 NOTE — PROGRESS NOTE ADULT - PROBLEM SELECTOR PLAN 1
-now with fever of unclear etiology likely secondary to sickle cell crisis, blood culture neg to date, appreciate ID consult, f/u ID recs, pt with minimal RUQ pain, await ultrasound abdomen.  Ct head done was neg   - continues on folic acid  - Cont Dilaudid PCA along w/ Zofran, Benadryl  - continues on folic acid  - IVF hydration with 0.45 NS  -  RVP neg  - Dr Castro with Dr. Hamm on 5/3 who reports that he is usually pleasant and talkative given irritability will get CT head, also pmd is concerned about homelessness, per pt he is living in [place] and is not homeless, discussed with CM as well who said that she spoke with him and reports that he lives in a room in [place]  observe off abx, leukocytosis improving, afebrile now -now with fever of unclear etiology likely secondary to sickle cell crisis, blood culture neg to date, appreciate ID consult, f/u ID recs, pt with minimal RUQ pain, await ultrasound abdomen.  Ct head done was neg   - continues on folic acid  - Cont Dilaudid PCA along w/ Zofran, Benadryl  - continues on folic acid  - IVF hydration with 0.45 NS  -  RVP neg  - Dr Castro with Dr. Hamm on 5/3 who reports that he is usually pleasant and talkative given irritability will get CT head, also pmd is concerned about homelessness, per pt he is living in [place] and is not homeless, discussed with CM as well who said that she spoke with him and reports that he lives in a room in [place]  observe off abx, leukocytosis improving, afebrile now  will dec pump as pt not pushing excessively

## 2019-05-06 NOTE — PROGRESS NOTE ADULT - PROBLEM SELECTOR PLAN 5
- patient does not appear to adequately understand the importance of medication adherence  - states only takes folic acid, "focus factor"  - per Allscripts, should be taking hydroxyurea 1000 mg QD (states stopped because of GI upset, and had discussed it w/ hematologist), tamsulosin (now w/ oliguria), lisinopril 10 mg, as well as bowel regimen

## 2019-05-07 LAB
ALBUMIN SERPL ELPH-MCNC: 3.8 G/DL — SIGNIFICANT CHANGE UP (ref 3.3–5)
ALP SERPL-CCNC: 71 U/L — SIGNIFICANT CHANGE UP (ref 40–120)
ALT FLD-CCNC: 14 U/L — SIGNIFICANT CHANGE UP (ref 4–41)
ANION GAP SERPL CALC-SCNC: 11 MMO/L — SIGNIFICANT CHANGE UP (ref 7–14)
AST SERPL-CCNC: 23 U/L — SIGNIFICANT CHANGE UP (ref 4–40)
BASOPHILS # BLD AUTO: 0.06 K/UL — SIGNIFICANT CHANGE UP (ref 0–0.2)
BASOPHILS NFR BLD AUTO: 0.6 % — SIGNIFICANT CHANGE UP (ref 0–2)
BILIRUB SERPL-MCNC: 1.5 MG/DL — HIGH (ref 0.2–1.2)
BUN SERPL-MCNC: 9 MG/DL — SIGNIFICANT CHANGE UP (ref 7–23)
CALCIUM SERPL-MCNC: 9.6 MG/DL — SIGNIFICANT CHANGE UP (ref 8.4–10.5)
CHLORIDE SERPL-SCNC: 103 MMOL/L — SIGNIFICANT CHANGE UP (ref 98–107)
CO2 SERPL-SCNC: 24 MMOL/L — SIGNIFICANT CHANGE UP (ref 22–31)
CREAT SERPL-MCNC: 0.92 MG/DL — SIGNIFICANT CHANGE UP (ref 0.5–1.3)
EOSINOPHIL # BLD AUTO: 1.68 K/UL — HIGH (ref 0–0.5)
EOSINOPHIL NFR BLD AUTO: 16.3 % — HIGH (ref 0–6)
GLUCOSE SERPL-MCNC: 96 MG/DL — SIGNIFICANT CHANGE UP (ref 70–99)
HCT VFR BLD CALC: 21.5 % — LOW (ref 39–50)
HGB BLD-MCNC: 6.7 G/DL — CRITICAL LOW (ref 13–17)
IMM GRANULOCYTES NFR BLD AUTO: 0.6 % — SIGNIFICANT CHANGE UP (ref 0–1.5)
LDH SERPL L TO P-CCNC: 279 U/L — HIGH (ref 135–225)
LYMPHOCYTES # BLD AUTO: 19.5 % — SIGNIFICANT CHANGE UP (ref 13–44)
LYMPHOCYTES # BLD AUTO: 2.01 K/UL — SIGNIFICANT CHANGE UP (ref 1–3.3)
MCHC RBC-ENTMCNC: 23.7 PG — LOW (ref 27–34)
MCHC RBC-ENTMCNC: 31.2 % — LOW (ref 32–36)
MCV RBC AUTO: 76 FL — LOW (ref 80–100)
MONOCYTES # BLD AUTO: 1.11 K/UL — HIGH (ref 0–0.9)
MONOCYTES NFR BLD AUTO: 10.8 % — SIGNIFICANT CHANGE UP (ref 2–14)
NEUTROPHILS # BLD AUTO: 5.38 K/UL — SIGNIFICANT CHANGE UP (ref 1.8–7.4)
NEUTROPHILS NFR BLD AUTO: 52.2 % — SIGNIFICANT CHANGE UP (ref 43–77)
NRBC # FLD: 0.07 K/UL — SIGNIFICANT CHANGE UP (ref 0–0)
PLATELET # BLD AUTO: 333 K/UL — SIGNIFICANT CHANGE UP (ref 150–400)
PMV BLD: 9.7 FL — SIGNIFICANT CHANGE UP (ref 7–13)
POTASSIUM SERPL-MCNC: 4.4 MMOL/L — SIGNIFICANT CHANGE UP (ref 3.5–5.3)
POTASSIUM SERPL-SCNC: 4.4 MMOL/L — SIGNIFICANT CHANGE UP (ref 3.5–5.3)
PROT SERPL-MCNC: 6.9 G/DL — SIGNIFICANT CHANGE UP (ref 6–8.3)
RBC # BLD: 2.83 M/UL — LOW (ref 4.2–5.8)
RBC # FLD: 21.6 % — HIGH (ref 10.3–14.5)
RETICS #: 178 K/UL — HIGH (ref 25–125)
RETICS/RBC NFR: 6.3 % — HIGH (ref 0.5–2.5)
SODIUM SERPL-SCNC: 138 MMOL/L — SIGNIFICANT CHANGE UP (ref 135–145)
WBC # BLD: 10.3 K/UL — SIGNIFICANT CHANGE UP (ref 3.8–10.5)
WBC # FLD AUTO: 10.3 K/UL — SIGNIFICANT CHANGE UP (ref 3.8–10.5)

## 2019-05-07 PROCEDURE — 99232 SBSQ HOSP IP/OBS MODERATE 35: CPT

## 2019-05-07 RX ORDER — HYDROMORPHONE HYDROCHLORIDE 2 MG/ML
30 INJECTION INTRAMUSCULAR; INTRAVENOUS; SUBCUTANEOUS
Qty: 0 | Refills: 0 | Status: DISCONTINUED | OUTPATIENT
Start: 2019-05-07 | End: 2019-05-08

## 2019-05-07 RX ORDER — KETOROLAC TROMETHAMINE 30 MG/ML
15 SYRINGE (ML) INJECTION EVERY 8 HOURS
Qty: 0 | Refills: 0 | Status: DISCONTINUED | OUTPATIENT
Start: 2019-05-07 | End: 2019-05-09

## 2019-05-07 RX ADMIN — SODIUM CHLORIDE 80 MILLILITER(S): 9 INJECTION, SOLUTION INTRAVENOUS at 21:08

## 2019-05-07 RX ADMIN — SODIUM CHLORIDE 80 MILLILITER(S): 9 INJECTION, SOLUTION INTRAVENOUS at 09:31

## 2019-05-07 RX ADMIN — Medication 100 MILLIGRAM(S): at 06:06

## 2019-05-07 RX ADMIN — Medication 15 MILLIGRAM(S): at 21:08

## 2019-05-07 RX ADMIN — Medication 15 MILLIGRAM(S): at 21:23

## 2019-05-07 RX ADMIN — HYDROMORPHONE HYDROCHLORIDE 30 MILLILITER(S): 2 INJECTION INTRAMUSCULAR; INTRAVENOUS; SUBCUTANEOUS at 17:00

## 2019-05-07 RX ADMIN — HYDROMORPHONE HYDROCHLORIDE 30 MILLILITER(S): 2 INJECTION INTRAMUSCULAR; INTRAVENOUS; SUBCUTANEOUS at 08:13

## 2019-05-07 RX ADMIN — TAMSULOSIN HYDROCHLORIDE 0.4 MILLIGRAM(S): 0.4 CAPSULE ORAL at 21:08

## 2019-05-07 RX ADMIN — HYDROMORPHONE HYDROCHLORIDE 30 MILLILITER(S): 2 INJECTION INTRAMUSCULAR; INTRAVENOUS; SUBCUTANEOUS at 20:08

## 2019-05-07 RX ADMIN — Medication 1 MILLIGRAM(S): at 13:20

## 2019-05-07 NOTE — PROGRESS NOTE ADULT - PROBLEM SELECTOR PLAN 1
-fever resolved  Ct head done was neg   - continues on folic acid  - Dilaudid pca lowered  - IVF hydration with 0.45 NS  -  RVP neg  - Dr Castro with Dr. Hamm on 5/3 who reports that he is usually pleasant and talkative given irritability will get CT head, also pmd is concerned about homelessness, per pt he is living in [place] and is not homeless, discussed with CM as well who said that she spoke with him and reports that he lives in a room in [place]  trial of toradol for pain

## 2019-05-08 LAB
ALBUMIN SERPL ELPH-MCNC: 3.8 G/DL — SIGNIFICANT CHANGE UP (ref 3.3–5)
ALP SERPL-CCNC: 69 U/L — SIGNIFICANT CHANGE UP (ref 40–120)
ALT FLD-CCNC: 16 U/L — SIGNIFICANT CHANGE UP (ref 4–41)
ANION GAP SERPL CALC-SCNC: 13 MMO/L — SIGNIFICANT CHANGE UP (ref 7–14)
AST SERPL-CCNC: 25 U/L — SIGNIFICANT CHANGE UP (ref 4–40)
BACTERIA BLD CULT: SIGNIFICANT CHANGE UP
BASOPHILS # BLD AUTO: 0.08 K/UL — SIGNIFICANT CHANGE UP (ref 0–0.2)
BASOPHILS NFR BLD AUTO: 0.7 % — SIGNIFICANT CHANGE UP (ref 0–2)
BILIRUB SERPL-MCNC: 1.7 MG/DL — HIGH (ref 0.2–1.2)
BUN SERPL-MCNC: 12 MG/DL — SIGNIFICANT CHANGE UP (ref 7–23)
CALCIUM SERPL-MCNC: 9.7 MG/DL — SIGNIFICANT CHANGE UP (ref 8.4–10.5)
CHLORIDE SERPL-SCNC: 100 MMOL/L — SIGNIFICANT CHANGE UP (ref 98–107)
CO2 SERPL-SCNC: 24 MMOL/L — SIGNIFICANT CHANGE UP (ref 22–31)
CREAT SERPL-MCNC: 0.98 MG/DL — SIGNIFICANT CHANGE UP (ref 0.5–1.3)
EOSINOPHIL # BLD AUTO: 1.52 K/UL — HIGH (ref 0–0.5)
EOSINOPHIL NFR BLD AUTO: 13.2 % — HIGH (ref 0–6)
GLUCOSE SERPL-MCNC: 93 MG/DL — SIGNIFICANT CHANGE UP (ref 70–99)
HCT VFR BLD CALC: 22 % — LOW (ref 39–50)
HGB BLD-MCNC: 6.8 G/DL — CRITICAL LOW (ref 13–17)
IMM GRANULOCYTES NFR BLD AUTO: 0.7 % — SIGNIFICANT CHANGE UP (ref 0–1.5)
LDH SERPL L TO P-CCNC: 275 U/L — HIGH (ref 135–225)
LYMPHOCYTES # BLD AUTO: 19.4 % — SIGNIFICANT CHANGE UP (ref 13–44)
LYMPHOCYTES # BLD AUTO: 2.23 K/UL — SIGNIFICANT CHANGE UP (ref 1–3.3)
MCHC RBC-ENTMCNC: 23.3 PG — LOW (ref 27–34)
MCHC RBC-ENTMCNC: 30.9 % — LOW (ref 32–36)
MCV RBC AUTO: 75.3 FL — LOW (ref 80–100)
MONOCYTES # BLD AUTO: 1.1 K/UL — HIGH (ref 0–0.9)
MONOCYTES NFR BLD AUTO: 9.5 % — SIGNIFICANT CHANGE UP (ref 2–14)
NEUTROPHILS # BLD AUTO: 6.51 K/UL — SIGNIFICANT CHANGE UP (ref 1.8–7.4)
NEUTROPHILS NFR BLD AUTO: 56.5 % — SIGNIFICANT CHANGE UP (ref 43–77)
NRBC # FLD: 0.09 K/UL — SIGNIFICANT CHANGE UP (ref 0–0)
PLATELET # BLD AUTO: 354 K/UL — SIGNIFICANT CHANGE UP (ref 150–400)
PMV BLD: 9.9 FL — SIGNIFICANT CHANGE UP (ref 7–13)
POTASSIUM SERPL-MCNC: 4.5 MMOL/L — SIGNIFICANT CHANGE UP (ref 3.5–5.3)
POTASSIUM SERPL-SCNC: 4.5 MMOL/L — SIGNIFICANT CHANGE UP (ref 3.5–5.3)
PROT SERPL-MCNC: 7 G/DL — SIGNIFICANT CHANGE UP (ref 6–8.3)
RBC # BLD: 2.92 M/UL — LOW (ref 4.2–5.8)
RBC # FLD: 22 % — HIGH (ref 10.3–14.5)
RETICS #: 172 K/UL — HIGH (ref 25–125)
RETICS/RBC NFR: 5.9 % — HIGH (ref 0.5–2.5)
SODIUM SERPL-SCNC: 137 MMOL/L — SIGNIFICANT CHANGE UP (ref 135–145)
WBC # BLD: 11.52 K/UL — HIGH (ref 3.8–10.5)
WBC # FLD AUTO: 11.52 K/UL — HIGH (ref 3.8–10.5)

## 2019-05-08 PROCEDURE — 99232 SBSQ HOSP IP/OBS MODERATE 35: CPT

## 2019-05-08 RX ORDER — HYDROMORPHONE HYDROCHLORIDE 2 MG/ML
30 INJECTION INTRAMUSCULAR; INTRAVENOUS; SUBCUTANEOUS
Qty: 0 | Refills: 0 | Status: DISCONTINUED | OUTPATIENT
Start: 2019-05-08 | End: 2019-05-09

## 2019-05-08 RX ADMIN — HYDROMORPHONE HYDROCHLORIDE 30 MILLILITER(S): 2 INJECTION INTRAMUSCULAR; INTRAVENOUS; SUBCUTANEOUS at 08:30

## 2019-05-08 RX ADMIN — Medication 15 MILLIGRAM(S): at 14:40

## 2019-05-08 RX ADMIN — HYDROMORPHONE HYDROCHLORIDE 30 MILLILITER(S): 2 INJECTION INTRAMUSCULAR; INTRAVENOUS; SUBCUTANEOUS at 20:05

## 2019-05-08 RX ADMIN — Medication 100 MILLIGRAM(S): at 06:02

## 2019-05-08 RX ADMIN — TAMSULOSIN HYDROCHLORIDE 0.4 MILLIGRAM(S): 0.4 CAPSULE ORAL at 21:40

## 2019-05-08 RX ADMIN — Medication 15 MILLIGRAM(S): at 21:56

## 2019-05-08 RX ADMIN — Medication 15 MILLIGRAM(S): at 06:02

## 2019-05-08 RX ADMIN — Medication 15 MILLIGRAM(S): at 06:15

## 2019-05-08 RX ADMIN — Medication 15 MILLIGRAM(S): at 14:25

## 2019-05-08 RX ADMIN — Medication 1 MILLIGRAM(S): at 14:22

## 2019-05-08 RX ADMIN — HYDROMORPHONE HYDROCHLORIDE 30 MILLILITER(S): 2 INJECTION INTRAMUSCULAR; INTRAVENOUS; SUBCUTANEOUS at 17:27

## 2019-05-08 RX ADMIN — Medication 15 MILLIGRAM(S): at 21:41

## 2019-05-08 NOTE — PROVIDER CONTACT NOTE (CRITICAL VALUE NOTIFICATION) - ASSESSMENT
Hemoglobin 6.4, Hematocrit 19.7. Patient sleeping. No s/s of distress.
Asymptomatic
Hemoglobin 6.1, Hematocrit 19.2. Patient sleeping in bed. No s/s of acute distress.
Hemoglobin 6.4, Hematocrit 19.7. No s/s of acute distress.
No s/s of distress
PT ASYMPTOMATIC
Patient stable, asymptomatic.

## 2019-05-08 NOTE — PROGRESS NOTE ADULT - PROBLEM SELECTOR PLAN 1
-fever resolved  Ct head done was neg   - continues on folic acid  - Dilaudid pca lowered  - IVF hydration with 0.45 NS  -  RVP neg  - Dr Castro with Dr. Hamm on 5/3 who reports that he is usually pleasant and talkative given irritability will get CT head, also pmd is concerned about homelessness, per pt he is living in [place] and is not homeless, discussed with CM as well who said that she spoke with him and reports that he lives in a room in [place]  trial of toradol for pain

## 2019-05-09 LAB
ALBUMIN SERPL ELPH-MCNC: 3.32 G/DL — SIGNIFICANT CHANGE UP (ref 3.3–5)
ALP SERPL-CCNC: 65 U/L — SIGNIFICANT CHANGE UP (ref 40–120)
ALT FLD-CCNC: 13 U/L — SIGNIFICANT CHANGE UP (ref 4–41)
ANION GAP SERPL CALC-SCNC: 10 MMO/L — SIGNIFICANT CHANGE UP (ref 7–14)
ANISOCYTOSIS BLD QL: SLIGHT — SIGNIFICANT CHANGE UP
AST SERPL-CCNC: 20 U/L — SIGNIFICANT CHANGE UP (ref 4–40)
BASOPHILS # BLD AUTO: 0.08 K/UL — SIGNIFICANT CHANGE UP (ref 0–0.2)
BASOPHILS NFR BLD AUTO: 0.5 % — SIGNIFICANT CHANGE UP (ref 0–2)
BASOPHILS NFR SPEC: 1.8 % — SIGNIFICANT CHANGE UP (ref 0–2)
BILIRUB SERPL-MCNC: 1.2 MG/DL — SIGNIFICANT CHANGE UP (ref 0.2–1.2)
BLASTS # FLD: 0 % — SIGNIFICANT CHANGE UP (ref 0–0)
BUN SERPL-MCNC: 13 MG/DL — SIGNIFICANT CHANGE UP (ref 7–23)
CALCIUM SERPL-MCNC: 9.1 MG/DL — SIGNIFICANT CHANGE UP (ref 8.4–10.5)
CHLORIDE SERPL-SCNC: 103 MMOL/L — SIGNIFICANT CHANGE UP (ref 98–107)
CO2 SERPL-SCNC: 23 MMOL/L — SIGNIFICANT CHANGE UP (ref 22–31)
CREAT SERPL-MCNC: 1.04 MG/DL — SIGNIFICANT CHANGE UP (ref 0.5–1.3)
EOSINOPHIL # BLD AUTO: 2.11 K/UL — HIGH (ref 0–0.5)
EOSINOPHIL NFR BLD AUTO: 12.1 % — HIGH (ref 0–6)
EOSINOPHIL NFR FLD: 20.2 % — HIGH (ref 0–6)
GIANT PLATELETS BLD QL SMEAR: PRESENT — SIGNIFICANT CHANGE UP
GLUCOSE SERPL-MCNC: 95 MG/DL — SIGNIFICANT CHANGE UP (ref 70–99)
HCT VFR BLD CALC: 19.8 % — CRITICAL LOW (ref 39–50)
HGB BLD-MCNC: 6.2 G/DL — CRITICAL LOW (ref 13–17)
HYPOCHROMIA BLD QL: SLIGHT — SIGNIFICANT CHANGE UP
IMM GRANULOCYTES NFR BLD AUTO: 0.8 % — SIGNIFICANT CHANGE UP (ref 0–1.5)
LDH SERPL L TO P-CCNC: 236 U/L — HIGH (ref 135–225)
LYMPHOCYTES # BLD AUTO: 1.85 K/UL — SIGNIFICANT CHANGE UP (ref 1–3.3)
LYMPHOCYTES # BLD AUTO: 10.6 % — LOW (ref 13–44)
LYMPHOCYTES NFR SPEC AUTO: 11.9 % — LOW (ref 13–44)
MACROCYTES BLD QL: SLIGHT — SIGNIFICANT CHANGE UP
MAGNESIUM SERPL-MCNC: 1.9 MG/DL — SIGNIFICANT CHANGE UP (ref 1.6–2.6)
MCHC RBC-ENTMCNC: 23.5 PG — LOW (ref 27–34)
MCHC RBC-ENTMCNC: 31.3 % — LOW (ref 32–36)
MCV RBC AUTO: 75 FL — LOW (ref 80–100)
METAMYELOCYTES # FLD: 0 % — SIGNIFICANT CHANGE UP (ref 0–1)
MICROCYTES BLD QL: SLIGHT — SIGNIFICANT CHANGE UP
MONOCYTES # BLD AUTO: 1.32 K/UL — HIGH (ref 0–0.9)
MONOCYTES NFR BLD AUTO: 7.6 % — SIGNIFICANT CHANGE UP (ref 2–14)
MONOCYTES NFR BLD: 2.8 % — SIGNIFICANT CHANGE UP (ref 2–9)
MYELOCYTES NFR BLD: 0 % — SIGNIFICANT CHANGE UP (ref 0–0)
NEUTROPHIL AB SER-ACNC: 63.3 % — SIGNIFICANT CHANGE UP (ref 43–77)
NEUTROPHILS # BLD AUTO: 11.92 K/UL — HIGH (ref 1.8–7.4)
NEUTROPHILS NFR BLD AUTO: 68.4 % — SIGNIFICANT CHANGE UP (ref 43–77)
NEUTS BAND # BLD: 0 % — SIGNIFICANT CHANGE UP (ref 0–6)
NRBC # BLD: 1 /100WBC — SIGNIFICANT CHANGE UP
NRBC # FLD: 0.14 K/UL — SIGNIFICANT CHANGE UP (ref 0–0)
OTHER - HEMATOLOGY %: 0 — SIGNIFICANT CHANGE UP
PHOSPHATE SERPL-MCNC: 3.3 MG/DL — SIGNIFICANT CHANGE UP (ref 2.5–4.5)
PLATELET # BLD AUTO: 315 K/UL — SIGNIFICANT CHANGE UP (ref 150–400)
PLATELET COUNT - ESTIMATE: NORMAL — SIGNIFICANT CHANGE UP
PMV BLD: 9.3 FL — SIGNIFICANT CHANGE UP (ref 7–13)
POIKILOCYTOSIS BLD QL AUTO: SIGNIFICANT CHANGE UP
POLYCHROMASIA BLD QL SMEAR: SLIGHT — SIGNIFICANT CHANGE UP
POTASSIUM SERPL-MCNC: 4.5 MMOL/L — SIGNIFICANT CHANGE UP (ref 3.5–5.3)
POTASSIUM SERPL-SCNC: 4.5 MMOL/L — SIGNIFICANT CHANGE UP (ref 3.5–5.3)
PROMYELOCYTES # FLD: 0 % — SIGNIFICANT CHANGE UP (ref 0–0)
PROT SERPL-MCNC: 6.3 G/DL — SIGNIFICANT CHANGE UP (ref 6–8.3)
RBC # BLD: 2.64 M/UL — LOW (ref 4.2–5.8)
RBC # FLD: 21 % — HIGH (ref 10.3–14.5)
RETICS #: 189 K/UL — HIGH (ref 25–125)
RETICS/RBC NFR: 7.2 % — HIGH (ref 0.5–2.5)
SICKLE CELLS BLD QL SMEAR: SLIGHT — SIGNIFICANT CHANGE UP
SMUDGE CELLS # BLD: PRESENT — SIGNIFICANT CHANGE UP
SODIUM SERPL-SCNC: 136 MMOL/L — SIGNIFICANT CHANGE UP (ref 135–145)
TARGETS BLD QL SMEAR: SIGNIFICANT CHANGE UP
VARIANT LYMPHS # BLD: 0 % — SIGNIFICANT CHANGE UP
WBC # BLD: 17.42 K/UL — HIGH (ref 3.8–10.5)
WBC # FLD AUTO: 17.42 K/UL — HIGH (ref 3.8–10.5)

## 2019-05-09 PROCEDURE — 99232 SBSQ HOSP IP/OBS MODERATE 35: CPT

## 2019-05-09 RX ORDER — HYDROMORPHONE HYDROCHLORIDE 2 MG/ML
30 INJECTION INTRAMUSCULAR; INTRAVENOUS; SUBCUTANEOUS
Refills: 0 | Status: DISCONTINUED | OUTPATIENT
Start: 2019-05-09 | End: 2019-05-13

## 2019-05-09 RX ADMIN — SODIUM CHLORIDE 80 MILLILITER(S): 9 INJECTION, SOLUTION INTRAVENOUS at 13:39

## 2019-05-09 RX ADMIN — Medication 100 MILLIGRAM(S): at 13:39

## 2019-05-09 RX ADMIN — Medication 15 MILLIGRAM(S): at 14:39

## 2019-05-09 RX ADMIN — Medication 1 MILLIGRAM(S): at 13:39

## 2019-05-09 RX ADMIN — Medication 15 MILLIGRAM(S): at 06:27

## 2019-05-09 RX ADMIN — Medication 15 MILLIGRAM(S): at 13:39

## 2019-05-09 RX ADMIN — Medication 15 MILLIGRAM(S): at 06:42

## 2019-05-09 RX ADMIN — HYDROMORPHONE HYDROCHLORIDE 30 MILLILITER(S): 2 INJECTION INTRAMUSCULAR; INTRAVENOUS; SUBCUTANEOUS at 18:47

## 2019-05-09 RX ADMIN — HYDROMORPHONE HYDROCHLORIDE 30 MILLILITER(S): 2 INJECTION INTRAMUSCULAR; INTRAVENOUS; SUBCUTANEOUS at 08:18

## 2019-05-09 RX ADMIN — HYDROMORPHONE HYDROCHLORIDE 30 MILLILITER(S): 2 INJECTION INTRAMUSCULAR; INTRAVENOUS; SUBCUTANEOUS at 13:36

## 2019-05-09 RX ADMIN — HYDROMORPHONE HYDROCHLORIDE 30 MILLILITER(S): 2 INJECTION INTRAMUSCULAR; INTRAVENOUS; SUBCUTANEOUS at 20:07

## 2019-05-09 RX ADMIN — Medication 100 MILLIGRAM(S): at 06:33

## 2019-05-09 NOTE — PROVIDER CONTACT NOTE (CRITICAL VALUE NOTIFICATION) - TEST AND RESULT REPORTED:
Hemoglobin 6.4, Hematocrit 19.7
H.H 6.5
H/H 6.7/21.5
HGB 6.3 HTC 19.8
Hemoglobin 5.8, hematocrit 18.1
Hemoglobin 6.1, Hematocrit 19.2
Hemoglobin 6.4, Hematocrit 19.7
Hgb 6.8
Hgb. 6.2, Hct. 19.2

## 2019-05-09 NOTE — PROVIDER CONTACT NOTE (CRITICAL VALUE NOTIFICATION) - BACKGROUND
Patient admitted for sickle cell crisis.
Sickle Cell Crisis
LAST H/H 6.4
Patient admitted for sickle cell crisis
Patient admitted for sickle cell crisis
Patient admitted with sickle cell crisis. Previous HGB 6.5, HTC 21.1
SCC
sickle cell crisis, anemia

## 2019-05-09 NOTE — PROGRESS NOTE ADULT - PROBLEM SELECTOR PLAN 1
-fever resolved  Ct head done was neg   - continues on folic acid  - Dilaudid pca lowered- pt simple- thinks the pump is helping him- always in bed- says pain bad at night- wants to go home next week  - IVF hydration with 0.45 NS  -  RVP neg  - Dr Castro with Dr. Hamm on 5/3 who reports that he is usually pleasant and talkative given irritability will get CT head, also pmd is concerned about homelessness, per pt he is living in [place] and is not homeless, discussed with CM as well who said that she spoke with him and reports that he lives in a room in [place]  trial of toradol for pain

## 2019-05-09 NOTE — PROVIDER CONTACT NOTE (CRITICAL VALUE NOTIFICATION) - NAME OF MD/NP/PA/DO NOTIFIED:
ADS [id number] Sokul
Nancie Osorio (BELÉN)
BELÉN Coronado ADS
Deanna NP ADS #[id number]
Ivonne Zaidi
MD
[facility]
Nancie Osorio (BELÉN)
ads QIAN

## 2019-05-09 NOTE — PROVIDER CONTACT NOTE (CRITICAL VALUE NOTIFICATION) - PERSON GIVING RESULT:
Salvador Samuel
AYLEEN HINTON
Dhiraj/ Brayan Sanchez
Esdras Katelyn
Jaison Mcknight, Microbiology
Larissa, K
Sunday Perry
Sunday. Vicky Hearn
Traci Plascencia

## 2019-05-09 NOTE — PROVIDER CONTACT NOTE (CRITICAL VALUE NOTIFICATION) - ACTION/TREATMENT ORDERED:
ADS [id number] Sokul aware. No new orders.
No interventions ordered at this time. Will continue to monitor.
Cont to monitor
NO NEW ORDERS
No orders.

## 2019-05-09 NOTE — PROVIDER CONTACT NOTE (CRITICAL VALUE NOTIFICATION) - SITUATION
Denies pain, chest pain or shortness of breath
Hemoglobin 6.4, Hematocrit 19.7
HG 6.5
Hemoglobin 5.8, hematocrit 18.1
Hemoglobin 6.1, Hematocrit 19.2
Hemoglobin 6.4, Hematocrit 19.7
Hgb 6.8
Patient HGB 6.3 HTC 19.8
Pt with H/H 6.7/21.5 NP Kylah made aware. 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 No new orders given by NP.

## 2019-05-10 DIAGNOSIS — N40.0 BENIGN PROSTATIC HYPERPLASIA WITHOUT LOWER URINARY TRACT SYMPTOMS: ICD-10-CM

## 2019-05-10 DIAGNOSIS — D64.9 ANEMIA, UNSPECIFIED: ICD-10-CM

## 2019-05-10 DIAGNOSIS — R50.9 FEVER, UNSPECIFIED: ICD-10-CM

## 2019-05-10 DIAGNOSIS — Z29.9 ENCOUNTER FOR PROPHYLACTIC MEASURES, UNSPECIFIED: ICD-10-CM

## 2019-05-10 PROCEDURE — 99232 SBSQ HOSP IP/OBS MODERATE 35: CPT

## 2019-05-10 RX ADMIN — TAMSULOSIN HYDROCHLORIDE 0.4 MILLIGRAM(S): 0.4 CAPSULE ORAL at 02:27

## 2019-05-10 RX ADMIN — Medication 100 MILLIGRAM(S): at 13:27

## 2019-05-10 RX ADMIN — SODIUM CHLORIDE 80 MILLILITER(S): 9 INJECTION, SOLUTION INTRAVENOUS at 21:54

## 2019-05-10 RX ADMIN — HYDROMORPHONE HYDROCHLORIDE 30 MILLILITER(S): 2 INJECTION INTRAMUSCULAR; INTRAVENOUS; SUBCUTANEOUS at 08:28

## 2019-05-10 RX ADMIN — TAMSULOSIN HYDROCHLORIDE 0.4 MILLIGRAM(S): 0.4 CAPSULE ORAL at 21:54

## 2019-05-10 RX ADMIN — Medication 1 MILLIGRAM(S): at 13:27

## 2019-05-10 RX ADMIN — HYDROMORPHONE HYDROCHLORIDE 30 MILLILITER(S): 2 INJECTION INTRAMUSCULAR; INTRAVENOUS; SUBCUTANEOUS at 20:23

## 2019-05-10 NOTE — CHART NOTE - NSCHARTNOTEFT_GEN_A_CORE
NUTRITION SERVICES                                                                                  MALNUTRITION ALERT     Attention Health Care Provider: Upon nutritional assessment by the Registered Dietitian your patient was determined to meet criteria / has evidence of the following diagnosis/diagnoses:    [ ] Mild Protein Calorie Malnutrition   [ ] Moderate Protein Calorie Malnutrition   [X] Severe Protein Calorie Malnutrition   [ ] Unspecified Protein Calorie Malnutrition   [X] Underweight / BMI <19  [ ] Morbid Obesity / BMI >40          PLAN OF CARE: Refer to Initial Dietitian Evaluation or Nutrition Follow-Up Documentation for Nutritional Recommendations.

## 2019-05-10 NOTE — DIETITIAN INITIAL EVALUATION ADULT. - PERTINENT LABORATORY DATA
05-09 Na136 mmol/L Glu 95 mg/dL K+ 4.5 mmol/L Cr  1.04 mg/dL BUN 13 mg/dL 05-09 Phos 3.3 mg/dL 05-09 Alb 3.32 g/dL 05-02 [id number] < 4.0 %<L>

## 2019-05-10 NOTE — DIETITIAN INITIAL EVALUATION ADULT. - PHYSICAL APPEARANCE
underweight/BMI 18.4kg/m2, Nutrition focused physical exam conducted - found signs of malnutrition [ ]absent [X ]presentSubcutaneous fat loss: [Severe ] Orbital fat pads region, [Severe ]Buccal fat region, [ Severe]Triceps region,  Muscle wasting: [Severe ]Temples region, [ Severe]Clavicle region, [Severe ]Shoulder region, [Severe ]Scapula region, [Severe ]Interosseous region,  [Severe ]thigh region, [Severe ]Calf region

## 2019-05-10 NOTE — DIETITIAN INITIAL EVALUATION ADULT. - OTHER INFO
Initial Dietitian Evaluation 2/2 to extended length of stay. Patient admitted for sickle cell crisis and anemia. Patient reports appetite had been low PTA x 2 weeks 2/2 pain and during the course of admission. However, last few days started to feel  better and appetite improving. Doesn't like hospital food options. Encouraged menu selection and other food alternatives provided. Patient consumed ~50% of breakfast as observed completed tray at bedside. He also consumes PO supplement Ensure Enlive 240mls 3x daily (1050kcal, 60g protein) per day. Allergies to peanut, pork, shellfish and lactose intolerance noted in chart. No other food allergies reported. Discussed importance of having balanced meals, nutrient/protein dense foods, and snacks between meals. Patient was very receptive to information provided and verbalized good understanding. No nausea, vomiting, diarrhea, constipation or any difficulty chewing/swallowing reported.

## 2019-05-10 NOTE — DIETITIAN INITIAL EVALUATION ADULT. - ENERGY NEEDS
Weight 110.6lbs (5/2) Height 69" BMI 16.3kg/m2  IBW: 160lbs (+/-10%) %IBW: 69%  No edema or pressure injury per flowsheet.

## 2019-05-10 NOTE — PROGRESS NOTE ADULT - PROBLEM SELECTOR PLAN 1
-Continues to have pain however improving  -C/w PCA pump and IVF for now  -C/w incentive spirometry, folic acid and bowel regimen

## 2019-05-10 NOTE — DIETITIAN INITIAL EVALUATION ADULT. - NS FNS WEIGHT CHANGE REASON
Patient recall usual weight to be 130lbs (1/2018) and had lost weight to 101.4lbs (10/2018)-per RDN note 10/8/2018. Started to gain weight back and recalls weight of 119lbs in April 2019. Confirmed with outpatient record, patient weighed 119lbs on 4/8/19. He weighed 110.6lbs on 5/2/19 this admission. This is indicative of 7% weight loss over 1 month./unintentional

## 2019-05-10 NOTE — DIETITIAN INITIAL EVALUATION ADULT. - PERTINENT MEDS FT
MEDICATIONS  (STANDING):  docusate sodium 100 milliGRAM(s) Oral three times a day  folic acid 1 milliGRAM(s) Oral daily  HYDROmorphone PCA (1 mG/mL) 30 milliLiter(s) PCA Continuous PCA Continuous  sodium chloride 0.45%. 1000 milliLiter(s) (80 mL/Hr) IV Continuous <Continuous>  tamsulosin 0.4 milliGRAM(s) Oral at bedtime    MEDICATIONS  (PRN):  acetaminophen   Tablet .. 650 milliGRAM(s) Oral every 6 hours PRN Temp greater or equal to 38.5C (101.3F)  diphenhydrAMINE 25 milliGRAM(s) Oral every 6 hours PRN Rash and/or Itching  guaiFENesin   Syrup  (Sugar-Free) 100 milliGRAM(s) Oral every 6 hours PRN Cough  naloxone Injectable 0.1 milliGRAM(s) IV Push every 3 minutes PRN For ANY of the following changes in patient status:  A. RR LESS THAN 10 breaths per minute, B. Oxygen saturation LESS THAN 90%, C. Sedation score of 6  ondansetron Injectable 4 milliGRAM(s) IV Push every 6 hours PRN Nausea  senna 2 Tablet(s) Oral at bedtime PRN Constipation

## 2019-05-11 ENCOUNTER — TRANSCRIPTION ENCOUNTER (OUTPATIENT)
Age: 64
End: 2019-05-11

## 2019-05-11 LAB
ALBUMIN SERPL ELPH-MCNC: 3.72 G/DL — SIGNIFICANT CHANGE UP (ref 3.3–5)
ALP SERPL-CCNC: 78 U/L — SIGNIFICANT CHANGE UP (ref 40–120)
ALT FLD-CCNC: 14 U/L — SIGNIFICANT CHANGE UP (ref 4–41)
ANION GAP SERPL CALC-SCNC: 10 MMO/L — SIGNIFICANT CHANGE UP (ref 7–14)
AST SERPL-CCNC: 22 U/L — SIGNIFICANT CHANGE UP (ref 4–40)
BASOPHILS # BLD AUTO: 0.09 K/UL — SIGNIFICANT CHANGE UP (ref 0–0.2)
BASOPHILS NFR BLD AUTO: 0.9 % — SIGNIFICANT CHANGE UP (ref 0–2)
BILIRUB SERPL-MCNC: 1.4 MG/DL — HIGH (ref 0.2–1.2)
BUN SERPL-MCNC: 13 MG/DL — SIGNIFICANT CHANGE UP (ref 7–23)
CALCIUM SERPL-MCNC: 9.9 MG/DL — SIGNIFICANT CHANGE UP (ref 8.4–10.5)
CHLORIDE SERPL-SCNC: 106 MMOL/L — SIGNIFICANT CHANGE UP (ref 98–107)
CO2 SERPL-SCNC: 24 MMOL/L — SIGNIFICANT CHANGE UP (ref 22–31)
CREAT SERPL-MCNC: 0.96 MG/DL — SIGNIFICANT CHANGE UP (ref 0.5–1.3)
EOSINOPHIL # BLD AUTO: 1.77 K/UL — HIGH (ref 0–0.5)
EOSINOPHIL NFR BLD AUTO: 17.6 % — HIGH (ref 0–6)
FERRITIN SERPL-MCNC: 48.08 NG/ML — SIGNIFICANT CHANGE UP (ref 30–400)
GLUCOSE SERPL-MCNC: 97 MG/DL — SIGNIFICANT CHANGE UP (ref 70–99)
HCT VFR BLD CALC: 23.5 % — LOW (ref 39–50)
HGB BLD-MCNC: 7.1 G/DL — LOW (ref 13–17)
IMM GRANULOCYTES NFR BLD AUTO: 0.6 % — SIGNIFICANT CHANGE UP (ref 0–1.5)
IRON SATN MFR SERPL: 303 UG/DL — SIGNIFICANT CHANGE UP (ref 155–535)
IRON SATN MFR SERPL: 35 UG/DL — LOW (ref 45–165)
LDH SERPL L TO P-CCNC: 229 U/L — HIGH (ref 135–225)
LYMPHOCYTES # BLD AUTO: 1.32 K/UL — SIGNIFICANT CHANGE UP (ref 1–3.3)
LYMPHOCYTES # BLD AUTO: 13.1 % — SIGNIFICANT CHANGE UP (ref 13–44)
MAGNESIUM SERPL-MCNC: 1.8 MG/DL — SIGNIFICANT CHANGE UP (ref 1.6–2.6)
MCHC RBC-ENTMCNC: 23.2 PG — LOW (ref 27–34)
MCHC RBC-ENTMCNC: 30.2 % — LOW (ref 32–36)
MCV RBC AUTO: 76.8 FL — LOW (ref 80–100)
MONOCYTES # BLD AUTO: 0.75 K/UL — SIGNIFICANT CHANGE UP (ref 0–0.9)
MONOCYTES NFR BLD AUTO: 7.5 % — SIGNIFICANT CHANGE UP (ref 2–14)
NEUTROPHILS # BLD AUTO: 6.05 K/UL — SIGNIFICANT CHANGE UP (ref 1.8–7.4)
NEUTROPHILS NFR BLD AUTO: 60.3 % — SIGNIFICANT CHANGE UP (ref 43–77)
NRBC # FLD: 0.04 K/UL — SIGNIFICANT CHANGE UP (ref 0–0)
PHOSPHATE SERPL-MCNC: 3.5 MG/DL — SIGNIFICANT CHANGE UP (ref 2.5–4.5)
PLATELET # BLD AUTO: 376 K/UL — SIGNIFICANT CHANGE UP (ref 150–400)
PMV BLD: 9.1 FL — SIGNIFICANT CHANGE UP (ref 7–13)
POTASSIUM SERPL-MCNC: 4.5 MMOL/L — SIGNIFICANT CHANGE UP (ref 3.5–5.3)
POTASSIUM SERPL-SCNC: 4.5 MMOL/L — SIGNIFICANT CHANGE UP (ref 3.5–5.3)
PROT SERPL-MCNC: 7 G/DL — SIGNIFICANT CHANGE UP (ref 6–8.3)
RBC # BLD: 3.06 M/UL — LOW (ref 4.2–5.8)
RBC # FLD: 20.1 % — HIGH (ref 10.3–14.5)
RETICS #: 171 K/UL — HIGH (ref 25–125)
RETICS/RBC NFR: 5.6 % — HIGH (ref 0.5–2.5)
SODIUM SERPL-SCNC: 140 MMOL/L — SIGNIFICANT CHANGE UP (ref 135–145)
UIBC SERPL-MCNC: 268.1 UG/DL — SIGNIFICANT CHANGE UP (ref 110–370)
WBC # BLD: 10.04 K/UL — SIGNIFICANT CHANGE UP (ref 3.8–10.5)
WBC # FLD AUTO: 10.04 K/UL — SIGNIFICANT CHANGE UP (ref 3.8–10.5)

## 2019-05-11 PROCEDURE — 99232 SBSQ HOSP IP/OBS MODERATE 35: CPT

## 2019-05-11 RX ADMIN — Medication 100 MILLIGRAM(S): at 13:13

## 2019-05-11 RX ADMIN — HYDROMORPHONE HYDROCHLORIDE 30 MILLILITER(S): 2 INJECTION INTRAMUSCULAR; INTRAVENOUS; SUBCUTANEOUS at 20:46

## 2019-05-11 RX ADMIN — TAMSULOSIN HYDROCHLORIDE 0.4 MILLIGRAM(S): 0.4 CAPSULE ORAL at 21:27

## 2019-05-11 RX ADMIN — SODIUM CHLORIDE 80 MILLILITER(S): 9 INJECTION, SOLUTION INTRAVENOUS at 20:47

## 2019-05-11 RX ADMIN — HYDROMORPHONE HYDROCHLORIDE 30 MILLILITER(S): 2 INJECTION INTRAMUSCULAR; INTRAVENOUS; SUBCUTANEOUS at 08:03

## 2019-05-11 RX ADMIN — Medication 1 MILLIGRAM(S): at 11:34

## 2019-05-11 RX ADMIN — Medication 100 MILLIGRAM(S): at 21:27

## 2019-05-11 NOTE — PROVIDER CONTACT NOTE (OTHER) - ASSESSMENT
Patient's temp 103. Patient sleeping in bed. No s/s of acute distress.
/97. Patient is asymptomatic. Vitals were rechecked. Repeat BP showed to be 136/83.

## 2019-05-11 NOTE — DISCHARGE NOTE PROVIDER - CARE PROVIDER_API CALL
Lluvia Hamm)  Internal Medicine  [id number] [address] [place] [address]  Phone: [phone]  Fax: [phone]  Follow Up Time: Lluvia Hamm)  Internal Medicine  [address] [place] [address]  Phone: [phone]  Fax: [phone]  Follow Up Time:     Radha Rizzo; MPH)  Orthopaedic Surgery  [address] [place] [address] [address]  [place] [address]  Phone: [phone]  Fax: [phone]  Follow Up Time:

## 2019-05-11 NOTE — PROVIDER CONTACT NOTE (OTHER) - BACKGROUND
Patient admitted for sickle cell crisis.
Patient admitted for sickle cell crisis. PMH includes constipation, HTN, left ventricular dysfunction

## 2019-05-11 NOTE — DISCHARGE NOTE PROVIDER - NSDCCPCAREPLAN_GEN_ALL_CORE_FT
PRINCIPAL DISCHARGE DIAGNOSIS  Diagnosis: Sickle cell crisis  Assessment and Plan of Treatment: Stay hydrated with water. Continue medications as prescribed. Follow up with your PCP for further evaluation and refills of pain medication. Please call to make an appointment        SECONDARY DISCHARGE DIAGNOSES  Diagnosis: Anemia due to other cause  Assessment and Plan of Treatment: PRINCIPAL DISCHARGE DIAGNOSIS  Diagnosis: Sickle cell crisis  Assessment and Plan of Treatment: Stay hydrated with water. Continue medications as prescribed. Follow up with your PCP for further evaluation and refills of pain medication. Please call to make an appointment      SECONDARY DISCHARGE DIAGNOSES  Diagnosis: BPH (benign prostatic hyperplasia)  Assessment and Plan of Treatment: Continue with Flomax    Diagnosis: Anemia due to other cause  Assessment and Plan of Treatment:     Diagnosis: BPH (benign prostatic hyperplasia)  Assessment and Plan of Treatment: BPH (benign prostatic hyperplasia)

## 2019-05-11 NOTE — DISCHARGE NOTE PROVIDER - HOSPITAL COURSE
49 year old male, with past history significant for Sickle cell disease w/ Crisis, Left ventricular dysfunction and pRBC transfusion, presented to the ED secondary to worsening of sickle cell type pains.  Vital signs upon ED presentation as follows: BP = 130/84, HR = 82, RR = 16, T = 37 C (98.6 F), O2 Sat = 99% on RA.  Diagnosed with Sickle Cell Crisis and fever. Infectious workup negative, ID consulted and rec observing off abx. CT head neg for acute pathology. For pain crisis, treated w/ IVF and PCA dilaudid. Encouraged incentive spirometry. Pt also found to have EMEKA, Cr improved s/p IVF, lisinopril held. Flomax restarted. Mildly elevated TSH, Free T4 WNL- can f/u with PCP as outpatient. Educated patient on importance of medication adherence.         Dispo: 49 year old male, with past history significant for Sickle cell disease w/ Crisis, Left ventricular dysfunction and pRBC transfusion, presented to the ED secondary to worsening of sickle cell type pains.  Vital signs upon ED presentation as follows: BP = 130/84, HR = 82, RR = 16, T = 37 C (98.6 F), O2 Sat = 99% on RA.  Diagnosed with Sickle Cell Crisis and fever. Infectious workup negative, ID consulted and rec observing off abx. CT head neg for acute pathology. For pain crisis, treated w/ IVF and PCA dilaudid. Encouraged incentive spirometry. Pt also found to have EMEKA, Cr improved s/p IVF, lisinopril held. Flomax restarted. Mildly elevated TSH, Free T4 WNL- can f/u with PCP as outpatient. Educated patient on importance of medication adherence.         Dispo: Home 49 year old male, with past history significant for Sickle cell disease w/ Crisis, Left ventricular dysfunction and pRBC transfusion, presented to the ED secondary to worsening of sickle cell type pains.  Vital signs upon ED presentation as follows: BP = 130/84, HR = 82, RR = 16, T = 37 C (98.6 F), O2 Sat = 99% on RA.  Diagnosed with Sickle Cell Crisis and fever. Infectious workup negative, ID consulted and rec observing off abx. CT head neg for acute pathology. For pain crisis, treated w/ IVF and PCA dilaudid. Encouraged incentive spirometry. Pt also found to have EMEKA, Cr improved s/p IVF, lisinopril held. Flomax restarted. Mildly elevated TSH, Free T4 WNL- can f/u with PCP as outpatient. Educated patient on importance of medication adherence.         Dispo: Home             pt preferred to take his  home meds on d/c for pain- attempted to give script for oxycodone however d/t discrepancy in  on his insurance card- this could not be filled at [facility].  pt will take his own meds at home.      remote h/o left wrist fracture- xray done for pain- d/w ortho- no acute intervention- cna w/u w/ wrist ortho Dr. Rizzo    also wrote letter for [employer] housing for pt

## 2019-05-11 NOTE — DISCHARGE NOTE PROVIDER - CARE PROVIDERS DIRECT ADDRESSES
[email or web address]

## 2019-05-12 LAB
ALBUMIN SERPL ELPH-MCNC: 3.8 G/DL — SIGNIFICANT CHANGE UP (ref 3.3–5)
ALP SERPL-CCNC: 76 U/L — SIGNIFICANT CHANGE UP (ref 40–120)
ALT FLD-CCNC: 16 U/L — SIGNIFICANT CHANGE UP (ref 4–41)
ANION GAP SERPL CALC-SCNC: 12 MMO/L — SIGNIFICANT CHANGE UP (ref 7–14)
AST SERPL-CCNC: 20 U/L — SIGNIFICANT CHANGE UP (ref 4–40)
BASOPHILS # BLD AUTO: 0.11 K/UL — SIGNIFICANT CHANGE UP (ref 0–0.2)
BASOPHILS NFR BLD AUTO: 1.2 % — SIGNIFICANT CHANGE UP (ref 0–2)
BILIRUB SERPL-MCNC: 1.3 MG/DL — HIGH (ref 0.2–1.2)
BUN SERPL-MCNC: 14 MG/DL — SIGNIFICANT CHANGE UP (ref 7–23)
CALCIUM SERPL-MCNC: 9.5 MG/DL — SIGNIFICANT CHANGE UP (ref 8.4–10.5)
CHLORIDE SERPL-SCNC: 103 MMOL/L — SIGNIFICANT CHANGE UP (ref 98–107)
CO2 SERPL-SCNC: 22 MMOL/L — SIGNIFICANT CHANGE UP (ref 22–31)
CREAT SERPL-MCNC: 0.97 MG/DL — SIGNIFICANT CHANGE UP (ref 0.5–1.3)
EOSINOPHIL # BLD AUTO: 1.21 K/UL — HIGH (ref 0–0.5)
EOSINOPHIL NFR BLD AUTO: 12.7 % — HIGH (ref 0–6)
GLUCOSE SERPL-MCNC: 111 MG/DL — HIGH (ref 70–99)
HCT VFR BLD CALC: 23.7 % — LOW (ref 39–50)
HGB BLD-MCNC: 7.3 G/DL — LOW (ref 13–17)
IMM GRANULOCYTES NFR BLD AUTO: 0.5 % — SIGNIFICANT CHANGE UP (ref 0–1.5)
LDH SERPL L TO P-CCNC: 213 U/L — SIGNIFICANT CHANGE UP (ref 135–225)
LYMPHOCYTES # BLD AUTO: 1.66 K/UL — SIGNIFICANT CHANGE UP (ref 1–3.3)
LYMPHOCYTES # BLD AUTO: 17.4 % — SIGNIFICANT CHANGE UP (ref 13–44)
MAGNESIUM SERPL-MCNC: 1.9 MG/DL — SIGNIFICANT CHANGE UP (ref 1.6–2.6)
MCHC RBC-ENTMCNC: 23 PG — LOW (ref 27–34)
MCHC RBC-ENTMCNC: 30.8 % — LOW (ref 32–36)
MCV RBC AUTO: 74.8 FL — LOW (ref 80–100)
MONOCYTES # BLD AUTO: 1.01 K/UL — HIGH (ref 0–0.9)
MONOCYTES NFR BLD AUTO: 10.6 % — SIGNIFICANT CHANGE UP (ref 2–14)
NEUTROPHILS # BLD AUTO: 5.52 K/UL — SIGNIFICANT CHANGE UP (ref 1.8–7.4)
NEUTROPHILS NFR BLD AUTO: 57.6 % — SIGNIFICANT CHANGE UP (ref 43–77)
NRBC # FLD: 0.05 K/UL — SIGNIFICANT CHANGE UP (ref 0–0)
PHOSPHATE SERPL-MCNC: 3.7 MG/DL — SIGNIFICANT CHANGE UP (ref 2.5–4.5)
PLATELET # BLD AUTO: 450 K/UL — HIGH (ref 150–400)
PMV BLD: 9.6 FL — SIGNIFICANT CHANGE UP (ref 7–13)
POTASSIUM SERPL-MCNC: 4.1 MMOL/L — SIGNIFICANT CHANGE UP (ref 3.5–5.3)
POTASSIUM SERPL-SCNC: 4.1 MMOL/L — SIGNIFICANT CHANGE UP (ref 3.5–5.3)
PROT SERPL-MCNC: 7 G/DL — SIGNIFICANT CHANGE UP (ref 6–8.3)
RBC # BLD: 3.17 M/UL — LOW (ref 4.2–5.8)
RBC # FLD: 20.2 % — HIGH (ref 10.3–14.5)
RETICS #: 162 K/UL — HIGH (ref 25–125)
RETICS/RBC NFR: 5.1 % — HIGH (ref 0.5–2.5)
SODIUM SERPL-SCNC: 137 MMOL/L — SIGNIFICANT CHANGE UP (ref 135–145)
WBC # BLD: 9.56 K/UL — SIGNIFICANT CHANGE UP (ref 3.8–10.5)
WBC # FLD AUTO: 9.56 K/UL — SIGNIFICANT CHANGE UP (ref 3.8–10.5)

## 2019-05-12 PROCEDURE — 99232 SBSQ HOSP IP/OBS MODERATE 35: CPT

## 2019-05-12 RX ORDER — FERROUS SULFATE 325(65) MG
325 TABLET ORAL DAILY
Refills: 0 | Status: DISCONTINUED | OUTPATIENT
Start: 2019-05-12 | End: 2019-05-15

## 2019-05-12 RX ADMIN — Medication 1 MILLIGRAM(S): at 13:05

## 2019-05-12 RX ADMIN — HYDROMORPHONE HYDROCHLORIDE 30 MILLILITER(S): 2 INJECTION INTRAMUSCULAR; INTRAVENOUS; SUBCUTANEOUS at 20:30

## 2019-05-12 RX ADMIN — Medication 100 MILLIGRAM(S): at 05:27

## 2019-05-12 RX ADMIN — HYDROMORPHONE HYDROCHLORIDE 30 MILLILITER(S): 2 INJECTION INTRAMUSCULAR; INTRAVENOUS; SUBCUTANEOUS at 08:11

## 2019-05-12 RX ADMIN — Medication 100 MILLIGRAM(S): at 21:50

## 2019-05-12 RX ADMIN — TAMSULOSIN HYDROCHLORIDE 0.4 MILLIGRAM(S): 0.4 CAPSULE ORAL at 21:50

## 2019-05-12 RX ADMIN — Medication 100 MILLIGRAM(S): at 13:05

## 2019-05-12 NOTE — PROGRESS NOTE ADULT - PROBLEM SELECTOR PROBLEM 5
Prophylactic measure
Cough in adult
Elevated TSH
Medication management
Prophylactic measure
Prophylactic measure

## 2019-05-12 NOTE — PROGRESS NOTE ADULT - PROBLEM SELECTOR PLAN 4
-c/w flomax
- BUN/Cr = 15/1.33 (previously 17/1.01 in 01/2019)  - renal function improved  - lisinopril on hold (noted no Allscripts; but patient reports not taking any meds, aside from FA & MVI at home)  - renal function possibly affected by urinary retention due to suspected non-compliance w/ Flomax (restarted), cont flomax
- for approximately one week, and associated with burning, per patient  - impaired renal function appreciated (see above)  - likely due to non-compliance w/ Flomax (restarted)  - IVF in progress; evaluate for urinary retention  - UA returned negative for signs of infection; no abx
-c/w flomax
-c/w flomax
mildly elevated tsh, nl free t4, can f/u as outpt.

## 2019-05-12 NOTE — PROGRESS NOTE ADULT - PROBLEM SELECTOR PROBLEM 4
BPH (benign prostatic hyperplasia)
Acute renal failure, unspecified acute renal failure type
BPH (benign prostatic hyperplasia)
BPH (benign prostatic hyperplasia)
Dysuria
Elevated TSH

## 2019-05-13 LAB
ALBUMIN SERPL ELPH-MCNC: 3.56 G/DL — SIGNIFICANT CHANGE UP (ref 3.3–5)
ALP SERPL-CCNC: 71 U/L — SIGNIFICANT CHANGE UP (ref 40–120)
ALT FLD-CCNC: 12 U/L — SIGNIFICANT CHANGE UP (ref 4–41)
ANION GAP SERPL CALC-SCNC: 12 MMO/L — SIGNIFICANT CHANGE UP (ref 7–14)
AST SERPL-CCNC: 20 U/L — SIGNIFICANT CHANGE UP (ref 4–40)
BASOPHILS # BLD AUTO: 0.11 K/UL — SIGNIFICANT CHANGE UP (ref 0–0.2)
BASOPHILS NFR BLD AUTO: 1.3 % — SIGNIFICANT CHANGE UP (ref 0–2)
BILIRUB SERPL-MCNC: 1.2 MG/DL — SIGNIFICANT CHANGE UP (ref 0.2–1.2)
BUN SERPL-MCNC: 13 MG/DL — SIGNIFICANT CHANGE UP (ref 7–23)
CALCIUM SERPL-MCNC: 9.5 MG/DL — SIGNIFICANT CHANGE UP (ref 8.4–10.5)
CHLORIDE SERPL-SCNC: 107 MMOL/L — SIGNIFICANT CHANGE UP (ref 98–107)
CO2 SERPL-SCNC: 21 MMOL/L — LOW (ref 22–31)
CREAT SERPL-MCNC: 1 MG/DL — SIGNIFICANT CHANGE UP (ref 0.5–1.3)
EOSINOPHIL # BLD AUTO: 1.16 K/UL — HIGH (ref 0–0.5)
EOSINOPHIL NFR BLD AUTO: 13.2 % — HIGH (ref 0–6)
GLUCOSE SERPL-MCNC: 79 MG/DL — SIGNIFICANT CHANGE UP (ref 70–99)
HCT VFR BLD CALC: 22.4 % — LOW (ref 39–50)
HGB BLD-MCNC: 6.9 G/DL — CRITICAL LOW (ref 13–17)
IMM GRANULOCYTES NFR BLD AUTO: 0.7 % — SIGNIFICANT CHANGE UP (ref 0–1.5)
LDH SERPL L TO P-CCNC: 214 U/L — SIGNIFICANT CHANGE UP (ref 135–225)
LYMPHOCYTES # BLD AUTO: 1.71 K/UL — SIGNIFICANT CHANGE UP (ref 1–3.3)
LYMPHOCYTES # BLD AUTO: 19.5 % — SIGNIFICANT CHANGE UP (ref 13–44)
MAGNESIUM SERPL-MCNC: 1.8 MG/DL — SIGNIFICANT CHANGE UP (ref 1.6–2.6)
MCHC RBC-ENTMCNC: 23.2 PG — LOW (ref 27–34)
MCHC RBC-ENTMCNC: 30.8 % — LOW (ref 32–36)
MCV RBC AUTO: 75.4 FL — LOW (ref 80–100)
MONOCYTES # BLD AUTO: 0.95 K/UL — HIGH (ref 0–0.9)
MONOCYTES NFR BLD AUTO: 10.8 % — SIGNIFICANT CHANGE UP (ref 2–14)
NEUTROPHILS # BLD AUTO: 4.79 K/UL — SIGNIFICANT CHANGE UP (ref 1.8–7.4)
NEUTROPHILS NFR BLD AUTO: 54.5 % — SIGNIFICANT CHANGE UP (ref 43–77)
NRBC # FLD: 0.02 K/UL — SIGNIFICANT CHANGE UP (ref 0–0)
PHOSPHATE SERPL-MCNC: 3.5 MG/DL — SIGNIFICANT CHANGE UP (ref 2.5–4.5)
PLATELET # BLD AUTO: 418 K/UL — HIGH (ref 150–400)
PMV BLD: 9.8 FL — SIGNIFICANT CHANGE UP (ref 7–13)
POTASSIUM SERPL-MCNC: 4.8 MMOL/L — SIGNIFICANT CHANGE UP (ref 3.5–5.3)
POTASSIUM SERPL-SCNC: 4.8 MMOL/L — SIGNIFICANT CHANGE UP (ref 3.5–5.3)
PROT SERPL-MCNC: 6.8 G/DL — SIGNIFICANT CHANGE UP (ref 6–8.3)
RBC # BLD: 2.97 M/UL — LOW (ref 4.2–5.8)
RBC # FLD: 20.1 % — HIGH (ref 10.3–14.5)
RETICS #: 128 K/UL — HIGH (ref 25–125)
RETICS/RBC NFR: 4.3 % — HIGH (ref 0.5–2.5)
SODIUM SERPL-SCNC: 140 MMOL/L — SIGNIFICANT CHANGE UP (ref 135–145)
WBC # BLD: 8.78 K/UL — SIGNIFICANT CHANGE UP (ref 3.8–10.5)
WBC # FLD AUTO: 8.78 K/UL — SIGNIFICANT CHANGE UP (ref 3.8–10.5)

## 2019-05-13 PROCEDURE — 73110 X-RAY EXAM OF WRIST: CPT | Mod: 26,LT

## 2019-05-13 PROCEDURE — 99232 SBSQ HOSP IP/OBS MODERATE 35: CPT

## 2019-05-13 RX ORDER — HYDROMORPHONE HYDROCHLORIDE 2 MG/ML
30 INJECTION INTRAMUSCULAR; INTRAVENOUS; SUBCUTANEOUS
Refills: 0 | Status: DISCONTINUED | OUTPATIENT
Start: 2019-05-13 | End: 2019-05-14

## 2019-05-13 RX ORDER — HYDROMORPHONE HYDROCHLORIDE 2 MG/ML
2 INJECTION INTRAMUSCULAR; INTRAVENOUS; SUBCUTANEOUS EVERY 4 HOURS
Refills: 0 | Status: DISCONTINUED | OUTPATIENT
Start: 2019-05-13 | End: 2019-05-14

## 2019-05-13 RX ADMIN — Medication 1 MILLIGRAM(S): at 11:46

## 2019-05-13 RX ADMIN — TAMSULOSIN HYDROCHLORIDE 0.4 MILLIGRAM(S): 0.4 CAPSULE ORAL at 21:22

## 2019-05-13 RX ADMIN — Medication 100 MILLIGRAM(S): at 21:22

## 2019-05-13 RX ADMIN — Medication 100 MILLIGRAM(S): at 06:07

## 2019-05-13 RX ADMIN — HYDROMORPHONE HYDROCHLORIDE 30 MILLILITER(S): 2 INJECTION INTRAMUSCULAR; INTRAVENOUS; SUBCUTANEOUS at 20:36

## 2019-05-13 RX ADMIN — HYDROMORPHONE HYDROCHLORIDE 30 MILLILITER(S): 2 INJECTION INTRAMUSCULAR; INTRAVENOUS; SUBCUTANEOUS at 11:15

## 2019-05-13 RX ADMIN — Medication 325 MILLIGRAM(S): at 11:46

## 2019-05-13 RX ADMIN — HYDROMORPHONE HYDROCHLORIDE 30 MILLILITER(S): 2 INJECTION INTRAMUSCULAR; INTRAVENOUS; SUBCUTANEOUS at 08:04

## 2019-05-14 PROCEDURE — 99232 SBSQ HOSP IP/OBS MODERATE 35: CPT

## 2019-05-14 RX ORDER — HYDROMORPHONE HYDROCHLORIDE 2 MG/ML
2 INJECTION INTRAMUSCULAR; INTRAVENOUS; SUBCUTANEOUS EVERY 4 HOURS
Refills: 0 | Status: DISCONTINUED | OUTPATIENT
Start: 2019-05-14 | End: 2019-05-15

## 2019-05-14 RX ORDER — KETOROLAC TROMETHAMINE 30 MG/ML
15 SYRINGE (ML) INJECTION ONCE
Refills: 0 | Status: DISCONTINUED | OUTPATIENT
Start: 2019-05-14 | End: 2019-05-14

## 2019-05-14 RX ADMIN — Medication 15 MILLIGRAM(S): at 23:43

## 2019-05-14 RX ADMIN — TAMSULOSIN HYDROCHLORIDE 0.4 MILLIGRAM(S): 0.4 CAPSULE ORAL at 21:15

## 2019-05-14 RX ADMIN — Medication 15 MILLIGRAM(S): at 23:28

## 2019-05-14 RX ADMIN — Medication 1 MILLIGRAM(S): at 12:23

## 2019-05-14 RX ADMIN — HYDROMORPHONE HYDROCHLORIDE 2 MILLIGRAM(S): 2 INJECTION INTRAMUSCULAR; INTRAVENOUS; SUBCUTANEOUS at 22:15

## 2019-05-14 RX ADMIN — HYDROMORPHONE HYDROCHLORIDE 2 MILLIGRAM(S): 2 INJECTION INTRAMUSCULAR; INTRAVENOUS; SUBCUTANEOUS at 21:12

## 2019-05-14 RX ADMIN — HYDROMORPHONE HYDROCHLORIDE 30 MILLILITER(S): 2 INJECTION INTRAMUSCULAR; INTRAVENOUS; SUBCUTANEOUS at 08:07

## 2019-05-14 RX ADMIN — Medication 325 MILLIGRAM(S): at 12:23

## 2019-05-14 RX ADMIN — SENNA PLUS 2 TABLET(S): 8.6 TABLET ORAL at 21:13

## 2019-05-14 RX ADMIN — Medication 100 MILLIGRAM(S): at 21:16

## 2019-05-14 NOTE — PROGRESS NOTE ADULT - PROVIDER SPECIALTY LIST ADULT
Hospitalist
Infectious Disease
Infectious Disease
Hospitalist
Hospitalist

## 2019-05-14 NOTE — PROGRESS NOTE ADULT - REASON FOR ADMISSION
Sickle cell crisis

## 2019-05-14 NOTE — PROGRESS NOTE ADULT - PROBLEM SELECTOR PROBLEM 2
Fever
Anemia due to other cause
BPH (benign prostatic hyperplasia)
BPH (benign prostatic hyperplasia)
Elevated TSH
Elevated TSH
Fever
Fever

## 2019-05-14 NOTE — PROGRESS NOTE ADULT - PROBLEM SELECTOR PLAN 2
-Fever of 103 on 5/3  -No fevers since  -Infectious workup negative  -Likely in setting of VOC  -ID input appreciated  -Monitor off abx
- Hgb = 5.8 9 (6.6 in 01/2019)  - no reports of any bleeding, and none appreciated in the ED  - no transfusion indicated  - states not taking iron, but takes a type of MVI  - f/u trend
- Hgb = 5.8 9 (6.6 in 01/2019)  - no reports of any bleeding, and none appreciated in the ED  - no transfusion indicated  - states not taking iron, but takes a type of MVI  - f/u trend w/ repeat lab-work
- no transfusion indicated  - states not taking iron, but takes a type of MVI  - f/u trend
-Fever of 103 on 5/3  -No fevers since  -Infectious workup negative  -Likely in setting of VOC  -ID input appreciated  -Monitor off abx
-Fever of 103 on 5/3  -No fevers since  -Infectious workup negative  -Likely in setting of VOC  -ID input appreciated  -Monitor off abx
-c/w flomax
-c/w flomax
mildly elevated tsh, nl free t4, can f/u as outpt.
mildly elevated tsh, nl free t4, can f/u as outpt.

## 2019-05-14 NOTE — PROGRESS NOTE ADULT - SUBJECTIVE AND OBJECTIVE BOX
Dr. Goldstein pager [phone]    Patient is a 49y old  Male who presents with a chief complaint of Sickle cell crisis (08 May 2019 17:02)      SUBJECTIVE / OVERNIGHT EVENTS: pt still in pain when seen at 1235p- not pushing the pump a lot during the day but said that the pain worsens at night    MEDICATIONS  (STANDING):  docusate sodium 100 milliGRAM(s) Oral three times a day  folic acid 1 milliGRAM(s) Oral daily  HYDROmorphone PCA (1 mG/mL) 30 milliLiter(s) PCA Continuous PCA Continuous  sodium chloride 0.45%. 1000 milliLiter(s) (80 mL/Hr) IV Continuous <Continuous>  tamsulosin 0.4 milliGRAM(s) Oral at bedtime    MEDICATIONS  (PRN):  acetaminophen   Tablet .. 650 milliGRAM(s) Oral every 6 hours PRN Temp greater or equal to 38.5C (101.3F)  diphenhydrAMINE 25 milliGRAM(s) Oral every 6 hours PRN Rash and/or Itching  guaiFENesin   Syrup  (Sugar-Free) 100 milliGRAM(s) Oral every 6 hours PRN Cough  naloxone Injectable 0.1 milliGRAM(s) IV Push every 3 minutes PRN For ANY of the following changes in patient status:  A. RR LESS THAN 10 breaths per minute, B. Oxygen saturation LESS THAN 90%, C. Sedation score of 6  ondansetron Injectable 4 milliGRAM(s) IV Push every 6 hours PRN Nausea  senna 2 Tablet(s) Oral at bedtime PRN Constipation      Meds ordered within last 24hours  HYDROmorphone PCA (1 mG/mL): Known as DILAUDID PCA 1 mG/mL    Volume:                            30 milliLiter(s)    Initial Bolus Dose:            0 milliGRAM(s)    Initial Demand Dose:       0.4 milliGRAM(s)    Lockout:                            6 Minute(s)    Continuous Rate:             0 mg/hr    Four Hour Limit:                4 milliGRAM(s)  Administration Instructions: This is a Look-alike/Sound-alike Medication  Special Instructions: ~ Please start as soon as patient arrives on the floor or in a PCA compatible unit.  Thanks.  Provider's Contact #: [phone] (05-08 @ 17:04)      T(C): 36.6 (05-09-19 @ 14:00), Max: 37.2 (05-08-19 @ 21:14)  HR: 68 (05-09-19 @ 14:00) (68 - 97)  BP: 141/71 (05-09-19 @ 14:00) (123/59 - 149/69)  RR: 16 (05-09-19 @ 14:00) (16 - 20)  SpO2: 100% (05-09-19 @ 14:00) (94% - 100%)    CAPILLARY BLOOD GLUCOSE        I&O's Summary      PHYSICAL EXAM:  GENERAL: NAD  CHEST/LUNG: Clear to auscultation bilaterally; No wheeze  HEART: Regular rate and rhythm; No murmurs, rubs, or gallops  ABDOMEN: Soft, Nontender, Nondistended; Bowel sounds present  EXTREMITIES:  No clubbing, cyanosis, or edema        LABS:                        6.2    17.42 )-----------( 315      ( 09 May 2019 05:40 )             19.8     05-09    136  |  103  |  13  ----------------------------<  95  4.5   |  23  |  1.04    Ca    9.1      09 May 2019 05:40  Phos  3.3     05-09  Mg     1.9     05-09    TPro  6.3  /  Alb  3.32  /  TBili  1.2  /  DBili  x   /  AST  20  /  ALT  13  /  AlkPhos  65  05-09              RADIOLOGY & ADDITIONAL TESTS:    Imaging Personally Reviewed:    Consultant(s) Notes Reviewed:      Care Discussed with Consultants/Other Providers:
CC: Patient is a 49y old  Male who presents with a chief complaint of Sickle cell crisis (03 May 2019 18:51)    ID following for fever, Sickle cell crisis    Interval History/ROS:  c/o pain in joint mostly hips, legs. abdominal pain less. afebrile.    Rest of ROS negative.    Allergies  No Known Drug Allergies  peanuts (Anaphylaxis)    ANTIMICROBIALS:  none    OTHER MEDS:  acetaminophen   Tablet .. 650 milliGRAM(s) Oral every 6 hours PRN  diphenhydrAMINE 25 milliGRAM(s) Oral every 6 hours PRN  docusate sodium 100 milliGRAM(s) Oral three times a day  folic acid 1 milliGRAM(s) Oral daily  guaiFENesin   Syrup  (Sugar-Free) 100 milliGRAM(s) Oral every 6 hours PRN  heparin  Injectable 5000 Unit(s) SubCutaneous every 12 hours  HYDROmorphone  Injectable 1 milliGRAM(s) IV Push once  HYDROmorphone PCA (1 mG/mL) 30 milliLiter(s) PCA Continuous PCA Continuous  naloxone Injectable 0.1 milliGRAM(s) IV Push every 3 minutes PRN  ondansetron Injectable 4 milliGRAM(s) IV Push every 6 hours PRN  senna 2 Tablet(s) Oral at bedtime PRN  sodium chloride 0.45%. 1000 milliLiter(s) IV Continuous <Continuous>  tamsulosin 0.4 milliGRAM(s) Oral at bedtime    PE:  Vital Signs Last 24 Hrs  T(F): 98.8 (05-06-19 @ 14:30), Max: 99.3 (05-05-19 @ 18:14)  HR: 90 (05-06-19 @ 14:30)  BP: 111/73 (05-06-19 @ 14:30)  RR: 16 (05-06-19 @ 14:30)  SpO2: 99% (05-06-19 @ 14:30) (98% - 99%)    Gen: AOx3, NAD, non-toxic, non icteric  CV: S1+S2 normal, no murmurs  Resp: Clear bilat, no resp distress  Abd: Soft, nontender, +BS  Ext: No LE edema, no wounds  : No Burns  IV/Skin: No thrombophlebitis  Neuro: no focal deficits    LABS:                             6.3    11.44 )-----------( 279      ( 05 May 2019 15:40 )             19.8         MICROBIOLOGY:    BLOOD  05-03-19 --  --  --    Culture - Blood (05.03.19 @ 04:02)    Culture - Blood:   NO ORGANISMS ISOLATED  NO ORGANISMS ISOLATED AT 72 HRS.    Specimen Source: BLOOD        RADIOLOGY:    < from: Xray Chest 2 Views PA/Lat (05.02.19 @ 01:26) >  IMPRESSION:   Clear lungs.    < end of copied text >  < from: US Abdomen Complete (05.06.19 @ 07:57) >  IMPRESSION:     Cholelithiasis in an underdistended gallbladder. No sonographic evidence   of acute cholecystitis. However, if clinical concern persists, consider   nuclear medicineHIDA scan for further evaluation.    < end of copied text >
CC: Patient is a 49y old  Male who presents with a chief complaint of Sickle cell crisis (03 May 2019 18:51)    ID following for fever, Sickle cell crisis    Interval History/ROS: Patient remains with diffuse body pain. Afebrile. Leukocytosis improving.    Rest of ROS negative.    Allergies  No Known Drug Allergies  peanuts (Anaphylaxis)    ANTIMICROBIALS:  None    OTHER MEDS:  acetaminophen   Tablet .. 650 milliGRAM(s) Oral every 6 hours PRN  diphenhydrAMINE 25 milliGRAM(s) Oral every 6 hours PRN  docusate sodium 100 milliGRAM(s) Oral three times a day  folic acid 1 milliGRAM(s) Oral daily  guaiFENesin   Syrup  (Sugar-Free) 100 milliGRAM(s) Oral every 6 hours PRN  heparin  Injectable 5000 Unit(s) SubCutaneous every 12 hours  HYDROmorphone  Injectable 1 milliGRAM(s) IV Push once  HYDROmorphone PCA (1 mG/mL) 30 milliLiter(s) PCA Continuous PCA Continuous  naloxone Injectable 0.1 milliGRAM(s) IV Push every 3 minutes PRN  ondansetron Injectable 4 milliGRAM(s) IV Push every 6 hours PRN  senna 2 Tablet(s) Oral at bedtime PRN  sodium chloride 0.45%. 1000 milliLiter(s) IV Continuous <Continuous>  tamsulosin 0.4 milliGRAM(s) Oral at bedtime    PE:    Vital Signs Last 24 Hrs  T(C): 37.6 (04 May 2019 09:48), Max: 37.6 (04 May 2019 09:48)  T(F): 99.6 (04 May 2019 09:48), Max: 99.6 (04 May 2019 09:48)  HR: 88 (04 May 2019 09:48) (78 - 91)  BP: 129/84 (04 May 2019 09:48) (124/58 - 145/69)  BP(mean): --  RR: 18 (04 May 2019 09:48) (17 - 18)  SpO2: 97% (04 May 2019 09:48) (97% - 100%)    Gen: AOx3, NAD, non-toxic  CV: S1+S2 normal, no murmurs  Resp: Clear bilat, no resp distress  Abd: Soft, nontender, +BS  Ext: No LE edema, no wounds  : No Burns  IV/Skin: No thrombophlebitis  Neuro: no focal deficits    LABS:                          6.4    12.69 )-----------( 299      ( 04 May 2019 06:49 )             19.7       05-04    136  |  99  |  13  ----------------------------<  115<H>  4.2   |  24  |  1.09    Ca    9.0      04 May 2019 06:49  Phos  3.4     05-04  Mg     1.9     05-04    TPro  7.3  /  Alb  3.9  /  TBili  1.9<H>  /  DBili  x   /  AST  34  /  ALT  18  /  AlkPhos  68  05-04    MICROBIOLOGY:  v  BLOOD  05-03-19 --  --  --    RADIOLOGY:    < from: Xray Chest 2 Views PA/Lat (05.02.19 @ 01:26) >  IMPRESSION:   Clear lungs.    < end of copied text >
Dr. Goldstein pager [phone]    Patient is a 49y old  Male who presents with a chief complaint of Sickle cell crisis (05 May 2019 15:13)      SUBJECTIVE / OVERNIGHT EVENTS: pt smiling; seen at 3p- feels a bit better; pca  helping    MEDICATIONS  (STANDING):  docusate sodium 100 milliGRAM(s) Oral three times a day  folic acid 1 milliGRAM(s) Oral daily  HYDROmorphone PCA (1 mG/mL) 30 milliLiter(s) PCA Continuous PCA Continuous  sodium chloride 0.45%. 1000 milliLiter(s) (80 mL/Hr) IV Continuous <Continuous>  tamsulosin 0.4 milliGRAM(s) Oral at bedtime    MEDICATIONS  (PRN):  acetaminophen   Tablet .. 650 milliGRAM(s) Oral every 6 hours PRN Temp greater or equal to 38.5C (101.3F)  diphenhydrAMINE 25 milliGRAM(s) Oral every 6 hours PRN Rash and/or Itching  guaiFENesin   Syrup  (Sugar-Free) 100 milliGRAM(s) Oral every 6 hours PRN Cough  naloxone Injectable 0.1 milliGRAM(s) IV Push every 3 minutes PRN For ANY of the following changes in patient status:  A. RR LESS THAN 10 breaths per minute, B. Oxygen saturation LESS THAN 90%, C. Sedation score of 6  ondansetron Injectable 4 milliGRAM(s) IV Push every 6 hours PRN Nausea  senna 2 Tablet(s) Oral at bedtime PRN Constipation      Meds ordered within last 24hours  sodium chloride 0.45%.: Solution, 1000 milliLiter(s) infuse at 80 mL/Hr  Provider's Contact #: [phone] (05-05 @ 18:35)      T(C): 37.1 (05-06-19 @ 14:30), Max: 37.4 (05-05-19 @ 18:14)  HR: 90 (05-06-19 @ 14:30) (88 - 95)  BP: 111/73 (05-06-19 @ 14:30) (111/73 - 131/74)  RR: 16 (05-06-19 @ 14:30) (16 - 18)  SpO2: 99% (05-06-19 @ 14:30) (98% - 99%)    CAPILLARY BLOOD GLUCOSE        I&O's Summary      PHYSICAL EXAM:  GENERAL: NAD  CHEST/LUNG: Clear to auscultation bilaterally; No wheeze  HEART: Regular rate and rhythm; No murmurs, rubs, or gallops  ABDOMEN: Soft, Nontender, Nondistended; Bowel sounds present  EXTREMITIES:  No clubbing, cyanosis, or edema  NEURO: A&Ox3      LABS:                        6.3    11.44 )-----------( 279      ( 05 May 2019 15:40 )             19.8                     RADIOLOGY & ADDITIONAL TESTS:    Imaging Personally Reviewed:    Consultant(s) Notes Reviewed:      Care Discussed with Consultants/Other Providers:
Dr. Goldstein pager [phone]    Patient is a 49y old  Male who presents with a chief complaint of Sickle cell crisis (13 May 2019 16:21)      SUBJECTIVE / OVERNIGHT EVENTS: pt seen at - did not want to go home today- unclear why he wants to stay another day- told him he will be discharged tomorrow- pca turned off.      MEDICATIONS  (STANDING):  docusate sodium 100 milliGRAM(s) Oral three times a day  ferrous    sulfate 325 milliGRAM(s) Oral daily  folic acid 1 milliGRAM(s) Oral daily  tamsulosin 0.4 milliGRAM(s) Oral at bedtime    MEDICATIONS  (PRN):  acetaminophen   Tablet .. 650 milliGRAM(s) Oral every 6 hours PRN Temp greater or equal to 38.5C (101.3F)  diphenhydrAMINE 25 milliGRAM(s) Oral every 6 hours PRN Rash and/or Itching  guaiFENesin   Syrup  (Sugar-Free) 100 milliGRAM(s) Oral every 6 hours PRN Cough  HYDROmorphone   Tablet 2 milliGRAM(s) Oral every 4 hours PRN moderate to severe pain  naloxone Injectable 0.1 milliGRAM(s) IV Push every 3 minutes PRN For ANY of the following changes in patient status:  A. RR LESS THAN 10 breaths per minute, B. Oxygen saturation LESS THAN 90%, C. Sedation score of 6  ondansetron Injectable 4 milliGRAM(s) IV Push every 6 hours PRN Nausea  senna 2 Tablet(s) Oral at bedtime PRN Constipation      Meds ordered within last 24hours  HYDROmorphone   Tablet: [Ordered as DILAUDID]  2 milliGRAM(s), Oral, every 4 hours, PRN for moderate to severe pain  Provider's Contact #: [phone] (05-14 @ 14:04)      T(C): 36.7 (05-14-19 @ 14:55), Max: 37 (05-13-19 @ 21:21)  HR: 98 (05-14-19 @ 14:55) (76 - 98)  BP: 126/73 (05-14-19 @ 14:55) (118/71 - 126/73)  RR: 16 (05-14-19 @ 14:55) (16 - 18)  SpO2: 100% (05-14-19 @ 14:55) (100% - 100%)    CAPILLARY BLOOD GLUCOSE        I&O's Summary    13 May 2019 07:01  -  14 May 2019 07:00  --------------------------------------------------------  IN: 640 mL / OUT: 1900 mL / NET: -1260 mL        PHYSICAL EXAM:  GENERAL: NAD  CHEST/LUNG: Clear to auscultation bilaterally; No wheeze  HEART: Regular rate and rhythm; No murmurs, rubs, or gallops  ABDOMEN: Soft, Nontender, Nondistended; Bowel sounds present  EXTREMITIES:  No clubbing, cyanosis, or edema  NEURO: A&Ox3      LABS:                        6.9    8.78  )-----------( 418      ( 13 May 2019 06:05 )             22.4     05-13    140  |  107  |  13  ----------------------------<  79  4.8   |  21<L>  |  1.00    Ca    9.5      13 May 2019 06:05  Phos  3.5     05-13  Mg     1.8     05-13    TPro  6.8  /  Alb  3.56  /  TBili  1.2  /  DBili  x   /  AST  20  /  ALT  12  /  AlkPhos  71  05-13              RADIOLOGY & ADDITIONAL TESTS:    Imaging Personally Reviewed:    Consultant(s) Notes Reviewed:      Care Discussed with Consultants/Other Providers:
Dr. Goldstein pager [phone]    Patient is a 49y old  Male who presents with a chief complaint of Sickle cell crisis (07 May 2019 16:27)      SUBJECTIVE / OVERNIGHT EVENTS: sleeping but arousable this am at 1030  pushed pump last night a few times    MEDICATIONS  (STANDING):  docusate sodium 100 milliGRAM(s) Oral three times a day  folic acid 1 milliGRAM(s) Oral daily  HYDROmorphone PCA (1 mG/mL) 30 milliLiter(s) PCA Continuous PCA Continuous  ketorolac   Injectable 15 milliGRAM(s) IV Push every 8 hours  sodium chloride 0.45%. 1000 milliLiter(s) (80 mL/Hr) IV Continuous <Continuous>  tamsulosin 0.4 milliGRAM(s) Oral at bedtime    MEDICATIONS  (PRN):  acetaminophen   Tablet .. 650 milliGRAM(s) Oral every 6 hours PRN Temp greater or equal to 38.5C (101.3F)  diphenhydrAMINE 25 milliGRAM(s) Oral every 6 hours PRN Rash and/or Itching  guaiFENesin   Syrup  (Sugar-Free) 100 milliGRAM(s) Oral every 6 hours PRN Cough  naloxone Injectable 0.1 milliGRAM(s) IV Push every 3 minutes PRN For ANY of the following changes in patient status:  A. RR LESS THAN 10 breaths per minute, B. Oxygen saturation LESS THAN 90%, C. Sedation score of 6  ondansetron Injectable 4 milliGRAM(s) IV Push every 6 hours PRN Nausea  senna 2 Tablet(s) Oral at bedtime PRN Constipation      Meds ordered within last 24hours      T(C): 36.8 (05-08-19 @ 15:40), Max: 37.2 (05-08-19 @ 05:43)  HR: 105 (05-08-19 @ 15:40) (92 - 105)  BP: 131/84 (05-08-19 @ 15:40) (131/84 - 151/93)  RR: 18 (05-08-19 @ 15:40) (18 - 18)  SpO2: 100% (05-08-19 @ 15:40) (97% - 100%)    CAPILLARY BLOOD GLUCOSE        I&O's Summary    07 May 2019 07:01  -  08 May 2019 07:00  --------------------------------------------------------  IN: 0 mL / OUT: 1100 mL / NET: -1100 mL        PHYSICAL EXAM:  GENERAL: NAD  CHEST/LUNG: Clear to auscultation bilaterally; No wheeze  HEART: Regular rate and rhythm; No murmurs, rubs, or gallops  ABDOMEN: Soft, Nontender, Nondistended; Bowel sounds present  EXTREMITIES:  No clubbing, cyanosis, or edema  NEURO: A&Ox3      LABS:                        6.8    11.52 )-----------( 354      ( 08 May 2019 07:07 )             22.0     05-08    137  |  100  |  12  ----------------------------<  93  4.5   |  24  |  0.98    Ca    9.7      08 May 2019 07:07    TPro  7.0  /  Alb  3.8  /  TBili  1.7<H>  /  DBili  x   /  AST  25  /  ALT  16  /  AlkPhos  69  05-08              RADIOLOGY & ADDITIONAL TESTS:    Imaging Personally Reviewed:    Consultant(s) Notes Reviewed:      Care Discussed with Consultants/Other Providers:
Dr. Goldstein pager [phone]    Patient is a 49y old  Male who presents with a chief complaint of Sickle cell crisis (06 May 2019 17:44)      SUBJECTIVE / OVERNIGHT EVENTS: pt seen and examined at 330p- said he's a little better- pca is helping (Not pushing pump a lot); agrees to trial of toradol    MEDICATIONS  (STANDING):  docusate sodium 100 milliGRAM(s) Oral three times a day  folic acid 1 milliGRAM(s) Oral daily  HYDROmorphone PCA (1 mG/mL) 30 milliLiter(s) PCA Continuous PCA Continuous  ketorolac   Injectable 15 milliGRAM(s) IV Push every 8 hours  sodium chloride 0.45%. 1000 milliLiter(s) (80 mL/Hr) IV Continuous <Continuous>  tamsulosin 0.4 milliGRAM(s) Oral at bedtime    MEDICATIONS  (PRN):  acetaminophen   Tablet .. 650 milliGRAM(s) Oral every 6 hours PRN Temp greater or equal to 38.5C (101.3F)  diphenhydrAMINE 25 milliGRAM(s) Oral every 6 hours PRN Rash and/or Itching  guaiFENesin   Syrup  (Sugar-Free) 100 milliGRAM(s) Oral every 6 hours PRN Cough  naloxone Injectable 0.1 milliGRAM(s) IV Push every 3 minutes PRN For ANY of the following changes in patient status:  A. RR LESS THAN 10 breaths per minute, B. Oxygen saturation LESS THAN 90%, C. Sedation score of 6  ondansetron Injectable 4 milliGRAM(s) IV Push every 6 hours PRN Nausea  senna 2 Tablet(s) Oral at bedtime PRN Constipation      Meds ordered within last 24hours  HYDROmorphone PCA (1 mG/mL): Known as DILAUDID PCA 1 mG/mL    Volume:                            30 milliLiter(s)    Initial Bolus Dose:            0 milliGRAM(s)    Initial Demand Dose:       0.5 milliGRAM(s)    Lockout:                            6 Minute(s)    Continuous Rate:             0 mg/hr    Four Hour Limit:                5 milliGRAM(s)  Administration Instructions: This is a Look-alike/Sound-alike Medication  Special Instructions: ~ Please start as soon as patient arrives on the floor or in a PCA compatible unit.  Thanks.  Provider's Contact #: [phone] (05-07 @ 15:59)  ketorolac   Injectable: [Ordered as TORADOL Injectable]  15 milliGRAM(s), IV Push, every 8 hours, Stop After 2 Days  Administration Instructions: IF IV PUSH, ADMINISTER OVER 1 MINUTE  Provider's Contact #: [phone] (05-07 @ 15:59)      T(C): 36.7 (05-07-19 @ 13:24), Max: 37 (05-06-19 @ 21:57)  HR: 81 (05-07-19 @ 13:24) (81 - 93)  BP: 119/65 (05-07-19 @ 13:24) (119/65 - 145/76)  RR: 17 (05-07-19 @ 13:24) (17 - 18)  SpO2: 98% (05-07-19 @ 13:24) (97% - 100%)    CAPILLARY BLOOD GLUCOSE        I&O's Summary      PHYSICAL EXAM:  GENERAL: NAD  CHEST/LUNG: Clear to auscultation bilaterally; No wheeze  HEART: Regular rate and rhythm; No murmurs, rubs, or gallops  ABDOMEN: Soft, Nontender, Nondistended; Bowel sounds present  EXTREMITIES:  No clubbing, cyanosis, or edema  NEURO: A&Ox3      LABS:                        6.7    10.30 )-----------( 333      ( 07 May 2019 06:35 )             21.5     05-07    138  |  103  |  9   ----------------------------<  96  4.4   |  24  |  0.92    Ca    9.6      07 May 2019 06:35    TPro  6.9  /  Alb  3.8  /  TBili  1.5<H>  /  DBili  x   /  AST  23  /  ALT  14  /  AlkPhos  71  05-07              RADIOLOGY & ADDITIONAL TESTS:    Imaging Personally Reviewed:    Consultant(s) Notes Reviewed:      Care Discussed with Consultants/Other Providers:
Dr. Goldstein pager [phone]    Patient is a 49y old  Male who presents with a chief complaint of Sickle cell crisis (12 May 2019 11:19)      SUBJECTIVE / OVERNIGHT EVENTS: pt seen at 1030a- still in pain but not pushing the pump  agreeable to taking oral dilaudid    MEDICATIONS  (STANDING):  docusate sodium 100 milliGRAM(s) Oral three times a day  ferrous    sulfate 325 milliGRAM(s) Oral daily  folic acid 1 milliGRAM(s) Oral daily  HYDROmorphone PCA (1 mG/mL) 30 milliLiter(s) PCA Continuous PCA Continuous  sodium chloride 0.45%. 1000 milliLiter(s) (80 mL/Hr) IV Continuous <Continuous>  tamsulosin 0.4 milliGRAM(s) Oral at bedtime    MEDICATIONS  (PRN):  acetaminophen   Tablet .. 650 milliGRAM(s) Oral every 6 hours PRN Temp greater or equal to 38.5C (101.3F)  diphenhydrAMINE 25 milliGRAM(s) Oral every 6 hours PRN Rash and/or Itching  guaiFENesin   Syrup  (Sugar-Free) 100 milliGRAM(s) Oral every 6 hours PRN Cough  HYDROmorphone  Injectable 2 milliGRAM(s) IV Push every 4 hours PRN moderate and severe pain  naloxone Injectable 0.1 milliGRAM(s) IV Push every 3 minutes PRN For ANY of the following changes in patient status:  A. RR LESS THAN 10 breaths per minute, B. Oxygen saturation LESS THAN 90%, C. Sedation score of 6  ondansetron Injectable 4 milliGRAM(s) IV Push every 6 hours PRN Nausea  senna 2 Tablet(s) Oral at bedtime PRN Constipation      Meds ordered within last 24hours  ferrous    sulfate:   325 milliGRAM(s), Oral, daily  Administration Instructions: Contains 65mG elemental iron  Provider's Contact #: [phone] (05-12 @ 17:00)  HYDROmorphone  Injectable: [Ordered as DILAUDID Injectable]  2 milliGRAM(s), IV Push, every 4 hours, PRN for moderate and severe pain  Administration Instructions: This is a Look-alike/Sound-alike Medication  Provider's Contact #: [phone] (05-13 @ 10:47)  HYDROmorphone PCA (1 mG/mL): Known as DILAUDID PCA 1 mG/mL    Volume:                            30 milliLiter(s)    Initial Bolus Dose:            0 milliGRAM(s)    Initial Demand Dose:       0.3 milliGRAM(s)    Lockout:                            6 Minute(s)    Continuous Rate:             0 mg/hr    Four Hour Limit:                3 milliGRAM(s)  Administration Instructions: This is a Look-alike/Sound-alike Medication  Special Instructions: ~ Please start as soon as patient arrives on the floor or in a PCA compatible unit.  Thanks.  Provider's Contact #: [phone] (05-13 @ 10:50)      T(C): 36.8 (05-13-19 @ 14:53), Max: 36.9 (05-13-19 @ 06:06)  HR: 85 (05-13-19 @ 14:53) (80 - 85)  BP: 133/75 (05-13-19 @ 14:53) (132/91 - 133/75)  RR: 16 (05-13-19 @ 14:53) (16 - 18)  SpO2: 100% (05-13-19 @ 14:53) (100% - 100%)    CAPILLARY BLOOD GLUCOSE        I&O's Summary      PHYSICAL EXAM:  GENERAL: NAD  CHEST/LUNG: Clear to auscultation bilaterally; No wheeze  HEART: Regular rate and rhythm; No murmurs, rubs, or gallops  ABDOMEN: Soft, Nontender, Nondistended; Bowel sounds present  EXTREMITIES:  No clubbing, cyanosis, or edema        LABS:                        6.9    8.78  )-----------( 418      ( 13 May 2019 06:05 )             22.4     05-13    140  |  107  |  13  ----------------------------<  79  4.8   |  21<L>  |  1.00    Ca    9.5      13 May 2019 06:05  Phos  3.5     05-13  Mg     1.8     05-13    TPro  6.8  /  Alb  3.56  /  TBili  1.2  /  DBili  x   /  AST  20  /  ALT  12  /  AlkPhos  71  05-13              RADIOLOGY & ADDITIONAL TESTS:    Imaging Personally Reviewed:    Consultant(s) Notes Reviewed:      Care Discussed with Consultants/Other Providers:
Patient is a 49y old  Male who presents with a chief complaint of Sickle cell crisis (02 May 2019 05:34)      SUBJECTIVE / OVERNIGHT EVENTS: pt seen and examined at 2:35pm, no overnight events, reports having pain all over and something wrong with his iv, has some dry cough, no sob, no chest pain, no other new issues reported.    MEDICATIONS  (STANDING):  docusate sodium 100 milliGRAM(s) Oral three times a day  folic acid 1 milliGRAM(s) Oral daily  heparin  Injectable 5000 Unit(s) SubCutaneous every 12 hours  HYDROmorphone PCA (1 mG/mL) 30 milliLiter(s) PCA Continuous PCA Continuous  sodium chloride 0.45%. 1000 milliLiter(s) (100 mL/Hr) IV Continuous <Continuous>  tamsulosin 0.4 milliGRAM(s) Oral at bedtime    MEDICATIONS  (PRN):  diphenhydrAMINE 25 milliGRAM(s) Oral every 6 hours PRN Rash and/or Itching  guaiFENesin   Syrup  (Sugar-Free) 100 milliGRAM(s) Oral every 6 hours PRN Cough  naloxone Injectable 0.1 milliGRAM(s) IV Push every 3 minutes PRN For ANY of the following changes in patient status:  A. RR LESS THAN 10 breaths per minute, B. Oxygen saturation LESS THAN 90%, C. Sedation score of 6  ondansetron Injectable 4 milliGRAM(s) IV Push every 6 hours PRN Nausea  senna 2 Tablet(s) Oral at bedtime PRN Constipation      Vital Signs Last 24 Hrs  T(C): 37 (02 May 2019 18:00), Max: 37.2 (02 May 2019 03:49)  T(F): 98.6 (02 May 2019 18:00), Max: 98.9 (02 May 2019 03:49)  HR: 107 (02 May 2019 18:00) (66 - 107)  BP: 127/62 (02 May 2019 18:00) (127/62 - 154/74)  BP(mean): --  RR: 15 (02 May 2019 18:00) (15 - 18)  SpO2: 100% (02 May 2019 18:00) (94% - 100%)  CAPILLARY BLOOD GLUCOSE        I&O's Summary    02 May 2019 07:01  -  02 May 2019 19:34  --------------------------------------------------------  IN: 0 mL / OUT: 200 mL / NET: -200 mL        PHYSICAL EXAM:  GENERAL: NAD, well-developed  CHEST/LUNG: Clear to auscultation bilaterally; No wheeze  HEART: Regular rate and rhythm; No murmurs  ABDOMEN: Soft, Nontender, Nondistended  EXTREMITIES: no LE edema  PSYCH: Calm  NEUROLOGY: AAOx3  SKIN: No rashes or lesions    LABS:                        5.8    14.02 )-----------( 262      ( 02 May 2019 09:40 )             18.1     05-02    140  |  111<H>  |  12  ----------------------------<  84  4.3   |  20<L>  |  1.20    Ca    8.8      02 May 2019 09:40  Phos  3.4     05-  Mg     1.7     05-    TPro  6.4  /  Alb  3.7  /  TBili  2.2<H>  /  DBili  x   /  AST  33  /  ALT  15  /  AlkPhos  62  05-02    PT/INR - ( 02 May 2019 03:50 )   PT: 12.8 SEC;   INR: 1.15          PTT - ( 02 May 2019 03:50 )  PTT:28.3 SEC      Urinalysis Basic - ( 02 May 2019 07:40 )    Color: LIGHT YELLOW / Appearance: CLEAR / S.011 / pH: 6.0  Gluc: NEGATIVE / Ketone: NEGATIVE  / Bili: NEGATIVE / Urobili: NORMAL   Blood: NEGATIVE / Protein: 30 / Nitrite: NEGATIVE   Leuk Esterase: NEGATIVE / RBC: 0-2 / WBC 0-2   Sq Epi: OCC / Non Sq Epi: x / Bacteria: FEW        RADIOLOGY & ADDITIONAL TESTS:    Imaging Personally Reviewed:    Consultant(s) Notes Reviewed:      Care Discussed with Consultants/Other Providers:
Patient is a 49y old  Male who presents with a chief complaint of Sickle cell crisis (03 May 2019 13:26)      SUBJECTIVE / OVERNIGHT EVENTS: pt seen and examined at 1:45pm, had fever up to 103 overnight, reports pain is better today, is irritable wants to be left alone, denies any sob, cough, dysuria, diarrhea or chest pain, no other new issues reported.      MEDICATIONS  (STANDING):  docusate sodium 100 milliGRAM(s) Oral three times a day  folic acid 1 milliGRAM(s) Oral daily  heparin  Injectable 5000 Unit(s) SubCutaneous every 12 hours  HYDROmorphone PCA (1 mG/mL) 30 milliLiter(s) PCA Continuous PCA Continuous  potassium acid phosphate/sodium acid phosphate tablet (K-PHOS No. 2) 1 Tablet(s) Oral four times a day with meals  sodium chloride 0.45%. 1000 milliLiter(s) (125 mL/Hr) IV Continuous <Continuous>  tamsulosin 0.4 milliGRAM(s) Oral at bedtime    MEDICATIONS  (PRN):  acetaminophen   Tablet .. 650 milliGRAM(s) Oral every 6 hours PRN Temp greater or equal to 38.5C (101.3F)  diphenhydrAMINE 25 milliGRAM(s) Oral every 6 hours PRN Rash and/or Itching  guaiFENesin   Syrup  (Sugar-Free) 100 milliGRAM(s) Oral every 6 hours PRN Cough  naloxone Injectable 0.1 milliGRAM(s) IV Push every 3 minutes PRN For ANY of the following changes in patient status:  A. RR LESS THAN 10 breaths per minute, B. Oxygen saturation LESS THAN 90%, C. Sedation score of 6  ondansetron Injectable 4 milliGRAM(s) IV Push every 6 hours PRN Nausea  senna 2 Tablet(s) Oral at bedtime PRN Constipation      Vital Signs Last 24 Hrs  T(C): 36.9 (03 May 2019 18:46), Max: 39.4 (03 May 2019 01:43)  T(F): 98.4 (03 May 2019 18:46), Max: 103 (03 May 2019 01:43)  HR: 89 (03 May 2019 18:46) (78 - 121)  BP: 145/69 (03 May 2019 18:46) (111/57 - 145/69)  BP(mean): --  RR: 18 (03 May 2019 18:46) (16 - 22)  SpO2: 100% (03 May 2019 18:46) (95% - 100%)  CAPILLARY BLOOD GLUCOSE      POCT Blood Glucose.: 152 mg/dL (03 May 2019 11:56)  POCT Blood Glucose.: 103 mg/dL (03 May 2019 08:04)    I&O's Summary    02 May 2019 07:01  -  03 May 2019 07:00  --------------------------------------------------------  IN: 700 mL / OUT: 200 mL / NET: 500 mL    03 May 2019 07:01  -  03 May 2019 18:51  --------------------------------------------------------  IN: 0 mL / OUT: 700 mL / NET: -700 mL        PHYSICAL EXAM:  GENERAL: NAD, well-developed  CHEST/LUNG: Clear to auscultation bilaterally; No wheeze  HEART: Regular rate and rhythm; No murmurs  ABDOMEN: Soft, Nontender, Nondistended  EXTREMITIES: no LE edema  PSYCH: Calm  NEUROLOGY: AAOx3 is irritable however, asking why I am asking all these questions  SKIN: No rashes or lesions      LABS:                        6.4    19.54 )-----------( 299      ( 03 May 2019 04:04 )             19.7     05-03    139  |  105  |  9   ----------------------------<  91  4.5   |  20<L>  |  1.27    Ca    9.1      03 May 2019 04:03  Phos  2.2     05-03  Mg     1.6     05-03    TPro  7.2  /  Alb  4.0  /  TBili  2.4<H>  /  DBili  x   /  AST  35  /  ALT  17  /  AlkPhos  73  05-03    PT/INR - ( 02 May 2019 03:50 )   PT: 12.8 SEC;   INR: 1.15          PTT - ( 02 May 2019 03:50 )  PTT:28.3 SEC      Urinalysis Basic - ( 02 May 2019 07:40 )    Color: LIGHT YELLOW / Appearance: CLEAR / S.011 / pH: 6.0  Gluc: NEGATIVE / Ketone: NEGATIVE  / Bili: NEGATIVE / Urobili: NORMAL   Blood: NEGATIVE / Protein: 30 / Nitrite: NEGATIVE   Leuk Esterase: NEGATIVE / RBC: 0-2 / WBC 0-2   Sq Epi: OCC / Non Sq Epi: x / Bacteria: FEW        RADIOLOGY & ADDITIONAL TESTS:    Imaging Personally Reviewed:    Consultant(s) Notes Reviewed:      Care Discussed with Consultants/Other Providers:
Patient is a 49y old  Male who presents with a chief complaint of Sickle cell crisis (04 May 2019 13:41)      SUBJECTIVE / OVERNIGHT EVENTS: Pt seen and examined at 2:15pm, overnight lost iv access and has not been getting dilaudid pca, afebrile, c/o pain all over and his abdomen, refused to go for ct scan due to his pain, per nsg could not get iv access, has some non productive cough, denies any sob, dysuria, diarrhea or chest pain, no other new issues reported.        MEDICATIONS  (STANDING):  docusate sodium 100 milliGRAM(s) Oral three times a day  folic acid 1 milliGRAM(s) Oral daily  heparin  Injectable 5000 Unit(s) SubCutaneous every 12 hours  HYDROmorphone PCA (1 mG/mL) 30 milliLiter(s) PCA Continuous PCA Continuous  ketorolac   Injectable 15 milliGRAM(s) IntraMuscular once  ondansetron   Disintegrating Tablet 4 milliGRAM(s) Oral once  sodium chloride 0.45%. 1000 milliLiter(s) (125 mL/Hr) IV Continuous <Continuous>  tamsulosin 0.4 milliGRAM(s) Oral at bedtime    MEDICATIONS  (PRN):  acetaminophen   Tablet .. 650 milliGRAM(s) Oral every 6 hours PRN Temp greater or equal to 38.5C (101.3F)  diphenhydrAMINE 25 milliGRAM(s) Oral every 6 hours PRN Rash and/or Itching  guaiFENesin   Syrup  (Sugar-Free) 100 milliGRAM(s) Oral every 6 hours PRN Cough  naloxone Injectable 0.1 milliGRAM(s) IV Push every 3 minutes PRN For ANY of the following changes in patient status:  A. RR LESS THAN 10 breaths per minute, B. Oxygen saturation LESS THAN 90%, C. Sedation score of 6  ondansetron Injectable 4 milliGRAM(s) IV Push every 6 hours PRN Nausea  senna 2 Tablet(s) Oral at bedtime PRN Constipation      Vital Signs Last 24 Hrs  T(C): 37.6 (04 May 2019 09:48), Max: 37.6 (04 May 2019 09:48)  T(F): 99.6 (04 May 2019 09:48), Max: 99.6 (04 May 2019 09:48)  HR: 88 (04 May 2019 09:48) (84 - 91)  BP: 129/84 (04 May 2019 09:48) (129/84 - 145/69)  BP(mean): --  RR: 18 (04 May 2019 09:48) (17 - 18)  SpO2: 97% (04 May 2019 09:48) (97% - 100%)  CAPILLARY BLOOD GLUCOSE        I&O's Summary    03 May 2019 07:01  -  04 May 2019 07:00  --------------------------------------------------------  IN: 0 mL / OUT: 700 mL / NET: -700 mL        PHYSICAL EXAM:  GENERAL: NAD, well-developed  CHEST/LUNG: Clear to auscultation bilaterally; No wheeze  HEART: Regular rate and rhythm; No murmurs  ABDOMEN: Soft, mildly tender in the epigastrium, RUQ and RLQ, Nondistended  EXTREMITIES: no LE edema  PSYCH: Calm  NEUROLOGY: AAOx3, moves all extremities  SKIN: No rashes or lesions      LABS:                        6.4    12.69 )-----------( 299      ( 04 May 2019 06:49 )             19.7     05-04    136  |  99  |  13  ----------------------------<  115<H>  4.2   |  24  |  1.09    Ca    9.0      04 May 2019 06:49  Phos  3.4     05-04  Mg     1.9     05-04    TPro  7.3  /  Alb  3.9  /  TBili  1.9<H>  /  DBili  x   /  AST  34  /  ALT  18  /  AlkPhos  68  05-04              RADIOLOGY & ADDITIONAL TESTS:    Imaging Personally Reviewed:    Consultant(s) Notes Reviewed:      Care Discussed with Consultants/Other Providers:
Patient is a 49y old  Male who presents with a chief complaint of Sickle cell crisis (04 May 2019 14:42)      SUBJECTIVE / OVERNIGHT EVENTS: Pt seen and examined at 2:35pm, no overnight events, afebrile, pt feels his pain is better today, no sob, or chest pain, had bm yesterday, still with minimal cough, no other new issues reported.        MEDICATIONS  (STANDING):  docusate sodium 100 milliGRAM(s) Oral three times a day  folic acid 1 milliGRAM(s) Oral daily  HYDROmorphone PCA (1 mG/mL) 30 milliLiter(s) PCA Continuous PCA Continuous  sodium chloride 0.45%. 1000 milliLiter(s) (125 mL/Hr) IV Continuous <Continuous>  tamsulosin 0.4 milliGRAM(s) Oral at bedtime    MEDICATIONS  (PRN):  acetaminophen   Tablet .. 650 milliGRAM(s) Oral every 6 hours PRN Temp greater or equal to 38.5C (101.3F)  diphenhydrAMINE 25 milliGRAM(s) Oral every 6 hours PRN Rash and/or Itching  guaiFENesin   Syrup  (Sugar-Free) 100 milliGRAM(s) Oral every 6 hours PRN Cough  naloxone Injectable 0.1 milliGRAM(s) IV Push every 3 minutes PRN For ANY of the following changes in patient status:  A. RR LESS THAN 10 breaths per minute, B. Oxygen saturation LESS THAN 90%, C. Sedation score of 6  ondansetron Injectable 4 milliGRAM(s) IV Push every 6 hours PRN Nausea  senna 2 Tablet(s) Oral at bedtime PRN Constipation      Vital Signs Last 24 Hrs  T(C): 37 (05 May 2019 13:03), Max: 37.4 (05 May 2019 04:00)  T(F): 98.6 (05 May 2019 13:03), Max: 99.3 (05 May 2019 04:00)  HR: 94 (05 May 2019 13:03) (85 - 113)  BP: 119/74 (05 May 2019 13:03) (119/74 - 149/84)  BP(mean): --  RR: 16 (05 May 2019 13:03) (15 - 17)  SpO2: 97% (05 May 2019 13:03) (97% - 100%)  CAPILLARY BLOOD GLUCOSE        I&O's Summary    04 May 2019 07:01  -  05 May 2019 07:00  --------------------------------------------------------  IN: 1500 mL / OUT: 750 mL / NET: 750 mL        PHYSICAL EXAM:  GENERAL: NAD, well-developed  CHEST/LUNG: Clear to auscultation bilaterally; No wheeze  HEART: Regular rate and rhythm; No murmurs  ABDOMEN: Soft, mildly tender in the epigastrium, RUQ Nondistended  EXTREMITIES: no LE edema  PSYCH: Calm  NEUROLOGY: AAOx3  SKIN: No rashes or lesions        LABS:                        6.5    10.53 )-----------( 146      ( 05 May 2019 05:25 )             21.1     05-04    136  |  99  |  13  ----------------------------<  115<H>  4.2   |  24  |  1.09    Ca    9.0      04 May 2019 06:49  Phos  3.4     05-04  Mg     1.9     05-04    TPro  7.3  /  Alb  3.9  /  TBili  1.9<H>  /  DBili  x   /  AST  34  /  ALT  18  /  AlkPhos  68  05-04              RADIOLOGY & ADDITIONAL TESTS:    Imaging Personally Reviewed:    Consultant(s) Notes Reviewed:      Care Discussed with Consultants/Other Providers:
Patient is a 49y old  Male who presents with a chief complaint of Sickle cell crisis (09 May 2019 16:33)    SUBJECTIVE / OVERNIGHT EVENTS: Still with pain in left hip down to leg and also occasional back pain. Overall states that he is doing better     MEDICATIONS  (STANDING):  docusate sodium 100 milliGRAM(s) Oral three times a day  folic acid 1 milliGRAM(s) Oral daily  HYDROmorphone PCA (1 mG/mL) 30 milliLiter(s) PCA Continuous PCA Continuous  sodium chloride 0.45%. 1000 milliLiter(s) (80 mL/Hr) IV Continuous <Continuous>  tamsulosin 0.4 milliGRAM(s) Oral at bedtime    MEDICATIONS  (PRN):  acetaminophen   Tablet .. 650 milliGRAM(s) Oral every 6 hours PRN Temp greater or equal to 38.5C (101.3F)  diphenhydrAMINE 25 milliGRAM(s) Oral every 6 hours PRN Rash and/or Itching  guaiFENesin   Syrup  (Sugar-Free) 100 milliGRAM(s) Oral every 6 hours PRN Cough  naloxone Injectable 0.1 milliGRAM(s) IV Push every 3 minutes PRN For ANY of the following changes in patient status:  A. RR LESS THAN 10 breaths per minute, B. Oxygen saturation LESS THAN 90%, C. Sedation score of 6  ondansetron Injectable 4 milliGRAM(s) IV Push every 6 hours PRN Nausea  senna 2 Tablet(s) Oral at bedtime PRN Constipation    Vital Signs Last 24 Hrs  T(C): 36.7 (11 May 2019 11:30), Max: 36.9 (10 May 2019 22:01)  T(F): 98 (11 May 2019 11:30), Max: 98.4 (10 May 2019 22:01)  HR: 88 (11 May 2019 11:30) (85 - 89)  BP: 127/81 (11 May 2019 11:30) (127/81 - 135/84)  BP(mean): --  RR: 16 (11 May 2019 11:30) (16 - 18)  SpO2: 100% (11 May 2019 11:30) (100% - 100%)    I&O's Summary    09 May 2019 07:01  -  10 May 2019 07:00  --------------------------------------------------------  IN: 0 mL / OUT: 300 mL / NET: -300 mL        PHYSICAL EXAM:  GENERAL: NAD, well-developed  EYES: EOMI, PERRLA  NECK: Supple, No JVD  CHEST/LUNG: Clear to auscultation bilaterally; No wheeze  HEART: Regular rate and rhythm; No murmurs, rubs, or gallops  ABDOMEN: Soft, Nontender, Nondistended; Bowel sounds present  EXTREMITIES:  2+ Peripheral Pulses, No clubbing, cyanosis, or edema  PSYCH: AAOx3  NEUROLOGY: non-focal  SKIN: No rashes or lesions    LABS:                                   7.1    10.04 )-----------( 376      ( 11 May 2019 09:25 )             23.5   05-11    140  |  106  |  13  ----------------------------<  97  4.5   |  24  |  0.96    Ca    9.9      11 May 2019 09:25  Phos  3.5     05-11  Mg     1.8     05-11    TPro  7.0  /  Alb  3.72  /  TBili  1.4<H>  /  DBili  x   /  AST  22  /  ALT  14  /  AlkPhos  78  05-11      RADIOLOGY & ADDITIONAL TESTS:    Imaging Personally Reviewed:    Consultant(s) Notes Reviewed:      Care Discussed with Consultants/Other Providers:    Assessment and Plan:
Patient is a 49y old  Male who presents with a chief complaint of Sickle cell crisis (09 May 2019 16:33)    SUBJECTIVE / OVERNIGHT EVENTS: Still with pain in left hip down to leg and also occasional back pain. Overall states that he is doing better     MEDICATIONS  (STANDING):  docusate sodium 100 milliGRAM(s) Oral three times a day  folic acid 1 milliGRAM(s) Oral daily  HYDROmorphone PCA (1 mG/mL) 30 milliLiter(s) PCA Continuous PCA Continuous  sodium chloride 0.45%. 1000 milliLiter(s) (80 mL/Hr) IV Continuous <Continuous>  tamsulosin 0.4 milliGRAM(s) Oral at bedtime    MEDICATIONS  (PRN):  acetaminophen   Tablet .. 650 milliGRAM(s) Oral every 6 hours PRN Temp greater or equal to 38.5C (101.3F)  diphenhydrAMINE 25 milliGRAM(s) Oral every 6 hours PRN Rash and/or Itching  guaiFENesin   Syrup  (Sugar-Free) 100 milliGRAM(s) Oral every 6 hours PRN Cough  naloxone Injectable 0.1 milliGRAM(s) IV Push every 3 minutes PRN For ANY of the following changes in patient status:  A. RR LESS THAN 10 breaths per minute, B. Oxygen saturation LESS THAN 90%, C. Sedation score of 6  ondansetron Injectable 4 milliGRAM(s) IV Push every 6 hours PRN Nausea  senna 2 Tablet(s) Oral at bedtime PRN Constipation    Vital Signs Last 24 Hrs  T(C): 36.7 (12 May 2019 06:31), Max: 37.1 (11 May 2019 15:08)  T(F): 98 (12 May 2019 06:31), Max: 98.7 (11 May 2019 15:08)  HR: 88 (12 May 2019 06:31) (88 - 95)  BP: 151/95 (12 May 2019 06:31) (127/81 - 181/97)  BP(mean): --  RR: 18 (12 May 2019 06:31) (16 - 18)  SpO2: 100% (12 May 2019 06:31) (99% - 100%)      PHYSICAL EXAM:  GENERAL: NAD, well-developed  EYES: EOMI, PERRLA  NECK: Supple, No JVD  CHEST/LUNG: Clear to auscultation bilaterally; No wheeze  HEART: Regular rate and rhythm; No murmurs, rubs, or gallops  ABDOMEN: Soft, Nontender, Nondistended; Bowel sounds present  EXTREMITIES:  2+ Peripheral Pulses, No clubbing, cyanosis, or edema  PSYCH: AAOx3  NEUROLOGY: non-focal  SKIN: No rashes or lesions    LABS:                                   7.3    9.56  )-----------( 450      ( 12 May 2019 06:29 )             23.7   05-12    137  |  103  |  14  ----------------------------<  111<H>  4.1   |  22  |  0.97    Ca    9.5      12 May 2019 06:29  Phos  3.7     05-12  Mg     1.9     05-12    TPro  7.0  /  Alb  3.8  /  TBili  1.3<H>  /  DBili  x   /  AST  20  /  ALT  16  /  AlkPhos  76  05-12      RADIOLOGY & ADDITIONAL TESTS:    Imaging Personally Reviewed:    Consultant(s) Notes Reviewed:      Care Discussed with Consultants/Other Providers:    Assessment and Plan:
Patient is a 49y old  Male who presents with a chief complaint of Sickle cell crisis (09 May 2019 16:33)    SUBJECTIVE / OVERNIGHT EVENTS: Still with pain in left hip down to leg and also occasional back pain. Overall states that his is doing better     MEDICATIONS  (STANDING):  docusate sodium 100 milliGRAM(s) Oral three times a day  folic acid 1 milliGRAM(s) Oral daily  HYDROmorphone PCA (1 mG/mL) 30 milliLiter(s) PCA Continuous PCA Continuous  sodium chloride 0.45%. 1000 milliLiter(s) (80 mL/Hr) IV Continuous <Continuous>  tamsulosin 0.4 milliGRAM(s) Oral at bedtime    MEDICATIONS  (PRN):  acetaminophen   Tablet .. 650 milliGRAM(s) Oral every 6 hours PRN Temp greater or equal to 38.5C (101.3F)  diphenhydrAMINE 25 milliGRAM(s) Oral every 6 hours PRN Rash and/or Itching  guaiFENesin   Syrup  (Sugar-Free) 100 milliGRAM(s) Oral every 6 hours PRN Cough  naloxone Injectable 0.1 milliGRAM(s) IV Push every 3 minutes PRN For ANY of the following changes in patient status:  A. RR LESS THAN 10 breaths per minute, B. Oxygen saturation LESS THAN 90%, C. Sedation score of 6  ondansetron Injectable 4 milliGRAM(s) IV Push every 6 hours PRN Nausea  senna 2 Tablet(s) Oral at bedtime PRN Constipation    Vital Signs Last 24 Hrs  T(C): 36.8 (10 May 2019 05:40), Max: 37 (09 May 2019 18:08)  T(F): 98.2 (10 May 2019 05:40), Max: 98.6 (09 May 2019 18:08)  HR: 76 (10 May 2019 05:40) (68 - 88)  BP: 147/78 (10 May 2019 05:40) (137/79 - 147/78)  BP(mean): --  RR: 18 (10 May 2019 05:40) (16 - 18)  SpO2: 100% (10 May 2019 05:40) (100% - 100%)      I&O's Summary    09 May 2019 07:01  -  10 May 2019 07:00  --------------------------------------------------------  IN: 0 mL / OUT: 300 mL / NET: -300 mL        PHYSICAL EXAM:  GENERAL: NAD, well-developed  EYES: EOMI, PERRLA  NECK: Supple, No JVD  CHEST/LUNG: Clear to auscultation bilaterally; No wheeze  HEART: Regular rate and rhythm; No murmurs, rubs, or gallops  ABDOMEN: Soft, Nontender, Nondistended; Bowel sounds present  EXTREMITIES:  2+ Peripheral Pulses, No clubbing, cyanosis, or edema  PSYCH: AAOx3  NEUROLOGY: non-focal  SKIN: No rashes or lesions    LABS:                        6.2    17.42 )-----------( 315      ( 09 May 2019 05:40 )             19.8     05-09    136  |  103  |  13  ----------------------------<  95  4.5   |  23  |  1.04    Ca    9.1      09 May 2019 05:40  Phos  3.3     05-09  Mg     1.9     05-09    TPro  6.3  /  Alb  3.32  /  TBili  1.2  /  DBili  x   /  AST  20  /  ALT  13  /  AlkPhos  65  05-09      RADIOLOGY & ADDITIONAL TESTS:    Imaging Personally Reviewed:    Consultant(s) Notes Reviewed:      Care Discussed with Consultants/Other Providers:    Assessment and Plan:

## 2019-05-14 NOTE — PROVIDER CONTACT NOTE (MEDICATION) - ASSESSMENT
Pt c/o generalized joint pain in shoulders, arms. back and legs. 2mg Dilaudid PO administered. Pt reassessed as having increase in pain and no relief from Dilaudid.

## 2019-05-14 NOTE — PROGRESS NOTE ADULT - PROBLEM SELECTOR PLAN 1
-on po dilaudid- crisis has resolved- needs to leave by tomorrow- asking us to arrange cab home  -C/w incentive spirometry, folic acid and bowel regimen

## 2019-05-14 NOTE — PROGRESS NOTE ADULT - PROBLEM SELECTOR PLAN 3
-Likely due to sickle cell disease  -At baseline  -Currently asymptomatic  -No need for transfusion at this time  -Check iron studies
- BUN/Cr = 15/1.33 (previously 17/1.01 in 01/2019)  - appears very dehydrated, which has likely contributed to onset of sickle cell crisis  - IVF hydration as above  - has EF of 53%  - f/u renal function; if no improvement, would obtain urine electrolytes, creatinine, renal ultrasound  - lisinopril on hold (noted no [employer]; but patient reports not taking any meds, aside from FA & MVI at home)  - renal function possibly affected by urinary retention due to suspected non-compliance w/ Flomax (restarted)
- BUN/Cr = 15/1.33 (previously 17/1.01 in 01/2019)  - appears very dehydrated, which has likely contributed to onset of sickle cell crisis  - IVF hydration as above  - has EF of 53%  - f/u renal function; if no improvement, would obtain urine electrolytes, creatinine, renal ultrasound  - lisinopril on hold (noted no [employer]; but patient reports not taking any meds, aside from FA & MVI at home)  - renal function possibly affected by urinary retention due to suspected non-compliance w/ Flomax (restarted)
- BUN/Cr = 15/1.33 (previously 17/1.01 in 01/2019)  - renal function improved  - lisinopril on hold (noted no [employer]; but patient reports not taking any meds, aside from FA & MVI at home)  - renal function possibly affected by urinary retention due to suspected non-compliance w/ Flomax (restarted), cont flomax
- BUN/Cr = 15/1.33 (previously 17/1.01 in 01/2019)  - renal function improved  - lisinopril on hold (noted no [employer]; but patient reports not taking any meds, aside from FA & MVI at home)  - renal function possibly affected by urinary retention due to suspected non-compliance w/ Flomax (restarted), cont flomax
- patient does not appear to adequately understand the importance of medication adherence  - states only takes folic acid, "focus factor"  - per [employer], should be taking hydroxyurea 1000 mg QD (states stopped because of GI upset, and had discussed it w/ hematologist), tamsulosin (now w/ oliguria), lisinopril 10 mg, as well as bowel regimen
- patient does not appear to adequately understand the importance of medication adherence  - states only takes folic acid, "focus factor"  - per [employer], should be taking hydroxyurea 1000 mg QD (states stopped because of GI upset, and had discussed it w/ hematologist), tamsulosin (now w/ oliguria), lisinopril 10 mg, as well as bowel regimen
-Likely due to sickle cell disease  -At baseline  -Currently asymptomatic  -No need for transfusion at this time  -Check iron studies
-Likely due to sickle cell disease  -At baseline  -Currently asymptomatic  -No need for transfusion at this time  -Check iron studies
-scds  -Nutrition - severe protein fran malnutrition- encourage PO intake
-scds  -Nutrition - severe protein fran malnutrition- encourage PO intake
will rpt cbc later today, hsq d/c, f/u plt count, discussed with NP

## 2019-05-14 NOTE — PROGRESS NOTE ADULT - PROBLEM SELECTOR PROBLEM 1
Sickle cell crisis

## 2019-05-14 NOTE — PROGRESS NOTE ADULT - ASSESSMENT
49 year old male, with past history significant for Sickle cell disease w/ Crisis, Left ventricular dysfunction and pRBC transfusion, presented to the ED secondary to worsening of sickle cell type pains. Had fever this adm no source found
49 year old male, with past history significant for Sickle cell disease w/ Crisis, Left ventricular dysfunction and pRBC transfusion, presented to the ED secondary to worsening of sickle cell type pains. had fever this adm no source found  slowly titrating down pca pump
49 year old male PMH Sickle cell disease (SS; does not usually take opioids, but takes NSAIDs instead) who presented to [facility] on 5/2/19 secondary to worsening of sickle cell type pain and possible preceding viral URI. On presentation afebrile, normotensive and not tachycardic. WBC on presentation of 12.67 with 63.9% PMN and 9.9% Eos. U/A with 0-2 WBC. RVP negative. CXR with clear lungs. Febrile to Tmax of 103 on 5/3.  Afebrile since.    Fever suspect secondary to sickle cell       Recommend:  -Continue to monitor off of antibiotics    Shefali Winkler MD  Pager: [phone]  After 5 PM or weekends please call fellow on call or office [phone]
49 year old male PMH Sickle cell disease (SS; does not usually take opioids, but takes NSAIDs instead) who presented to [facility] on 5/2/19 secondary to worsening of sickle cell type pain and possible preceding viral URI. On presentation afebrile, normotensive and not tachycardic. WBC on presentation of 12.67 with 63.9% PMN and 9.9% Eos. U/A with 0-2 WBC. RVP negative. CXR with clear lungs. Febrile to Tmax of 103.     Fever suspect secondary to sickle cell   CXR commenting on Gallstone and abdominal pain on palpation - pending US Abdomen    Recommend:  -Continue to monitor off of antibiotics  -F/U RUQ US  -Followup on Prelim BCx  -If recurrence of fever would obtain 2 more sets of BCx and start Zosyn    ID service available on the weekend. For questions, please call [phone].    Jesus Pereira MD  Pager [phone]  After 5pm/weekends call [phone]
49 year old male, with past history significant for Sickle cell disease w/ Crisis, Left ventricular dysfunction and pRBC transfusion, presented to the ED secondary to worsening of sickle cell type pains.  Vital signs upon ED presentation as follows: BP = 130/84, HR = 82, RR = 16, T = 37 C (98.6 F), O2 Sat = 99% on RA.  Diagnosed with Sickle Cell Crisis and Anemia Due to Other Cause.  Admitted for further management of: VOC
49 year old male, with past history significant for Sickle cell disease w/ Crisis, Left ventricular dysfunction and pRBC transfusion, presented to the ED secondary to worsening of sickle cell type pains.  Vital signs upon ED presentation as follows: BP = 130/84, HR = 82, RR = 16, T = 37 C (98.6 F), O2 Sat = 99% on RA.  Diagnosed with Sickle Cell Crisis and Anemia Due to Other Cause.  Admitted for further management of: VOC now with fever
49 year old male, with past history significant for Sickle cell disease w/ Crisis, Left ventricular dysfunction and pRBC transfusion, presented to the ED secondary to worsening of sickle cell type pains. Had fever this adm no source found
49 year old male, with past history significant for Sickle cell disease w/ Crisis, Left ventricular dysfunction and pRBC transfusion, presented to the ED secondary to worsening of sickle cell type pains. Had fever this adm no source found
49 year old male, with past history significant for Sickle cell disease w/ Crisis, Left ventricular dysfunction and pRBC transfusion, presented to the ED secondary to worsening of sickle cell type pains. Had fever this adm no source found- not pushing the pump a lot- trying to get pt home- switched to PO
49 year old male, with past history significant for Sickle cell disease w/ Crisis, Left ventricular dysfunction and pRBC transfusion, presented to the ED secondary to worsening of sickle cell type pains. Had fever this adm no source found- not pushing the pump a lot- trying to get pt home- switched to PO
49 year old male, with past history significant for Sickle cell disease w/ Crisis, Left ventricular dysfunction and pRBC transfusion, presented to the ED secondary to worsening of sickle cell type pains. had fever this adm
49 year old male, with past history significant for Sickle cell disease w/ Crisis, Left ventricular dysfunction and pRBC transfusion, presented to the ED secondary to worsening of sickle cell type pains. had fever this adm
49 year old male, with past history significant for Sickle cell disease w/ Crisis, Left ventricular dysfunction and pRBC transfusion, presented to the ED secondary to worsening of sickle cell type pains. had fever this adm no source found  slowly titrating down pca pump

## 2019-05-14 NOTE — PROGRESS NOTE ADULT - PROBLEM SELECTOR PROBLEM 3
Anemia
Acute renal failure, unspecified acute renal failure type
Anemia
Anemia
Medication management
Medication management
Prophylactic measure
Prophylactic measure
Thrombocytopenia

## 2019-05-15 ENCOUNTER — TRANSCRIPTION ENCOUNTER (OUTPATIENT)
Age: 64
End: 2019-05-15

## 2019-05-15 VITALS
SYSTOLIC BLOOD PRESSURE: 140 MMHG | TEMPERATURE: 98 F | DIASTOLIC BLOOD PRESSURE: 82 MMHG | OXYGEN SATURATION: 100 % | HEART RATE: 75 BPM | RESPIRATION RATE: 16 BRPM

## 2019-05-15 PROCEDURE — 99239 HOSP IP/OBS DSCHRG MGMT >30: CPT

## 2019-05-15 RX ORDER — LISINOPRIL 2.5 MG/1
1 TABLET ORAL
Qty: 0 | Refills: 0 | DISCHARGE

## 2019-05-15 RX ORDER — OXYCODONE HYDROCHLORIDE 5 MG/1
1 TABLET ORAL
Qty: 20 | Refills: 0
Start: 2019-05-15 | End: 2019-05-19

## 2019-05-15 RX ADMIN — HYDROMORPHONE HYDROCHLORIDE 2 MILLIGRAM(S): 2 INJECTION INTRAMUSCULAR; INTRAVENOUS; SUBCUTANEOUS at 02:51

## 2019-05-15 RX ADMIN — Medication 100 MILLIGRAM(S): at 07:03

## 2019-05-15 RX ADMIN — HYDROMORPHONE HYDROCHLORIDE 2 MILLIGRAM(S): 2 INJECTION INTRAMUSCULAR; INTRAVENOUS; SUBCUTANEOUS at 10:56

## 2019-05-15 RX ADMIN — Medication 100 MILLIGRAM(S): at 11:57

## 2019-05-15 RX ADMIN — Medication 325 MILLIGRAM(S): at 11:56

## 2019-05-15 RX ADMIN — HYDROMORPHONE HYDROCHLORIDE 2 MILLIGRAM(S): 2 INJECTION INTRAMUSCULAR; INTRAVENOUS; SUBCUTANEOUS at 03:50

## 2019-05-15 RX ADMIN — HYDROMORPHONE HYDROCHLORIDE 2 MILLIGRAM(S): 2 INJECTION INTRAMUSCULAR; INTRAVENOUS; SUBCUTANEOUS at 09:56

## 2019-05-15 RX ADMIN — Medication 1 MILLIGRAM(S): at 11:56

## 2019-05-15 NOTE — DISCHARGE NOTE NURSING/CASE MANAGEMENT/SOCIAL WORK - NSDCDPATPORTLINK_GEN_ALL_CORE
You can access the [email or web address] Patient Portal, offered by [facility], by registering with the following website: [email or web address]

## 2019-05-15 NOTE — CHART NOTE - NSCHARTNOTEFT_GEN_A_CORE
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

## 2019-05-30 NOTE — PROGRESS NOTE ADULT - PROBLEM SELECTOR PLAN 5
Breath sounds clear and equal bilaterally. TTE 7/2018:  Mild global left ventricular systolic dysfunction; Normal right ventricular size and function  - resume lisinopril, low dose of 5 mg  -  metoprolol 25 XL

## 2019-06-19 PROBLEM — Z92.89 PERSONAL HISTORY OF OTHER MEDICAL TREATMENT: Chronic | Status: ACTIVE | Noted: 2019-05-02

## 2019-06-19 PROBLEM — I10 ESSENTIAL (PRIMARY) HYPERTENSION: Chronic | Status: ACTIVE | Noted: 2019-05-02

## 2019-06-19 PROBLEM — F79 UNSPECIFIED INTELLECTUAL DISABILITIES: Chronic | Status: ACTIVE | Noted: 2019-05-02

## 2019-06-27 ENCOUNTER — APPOINTMENT (OUTPATIENT)
Dept: OPHTHALMOLOGY | Facility: CLINIC | Age: 64
End: 2019-06-27

## 2019-07-11 NOTE — CONSULT NOTE ADULT - SUBJECTIVE AND OBJECTIVE BOX
Addended by: ANDERS GARCIA on: 7/11/2019 04:30 PM     Modules accepted: Orders     Patient seen and evaluated at bedside    Chief Complaint: generalized pain    HPI:  48M wiht SCD presents with diffuse body pain that started a few days ago. Pt states it began with flu like symptoms with productive cough, subjective fever and chills. He had one episode of sharp chest pain on Tuesday that lasted for a few hours. Patient states that pain in the chest is over sternal area similar to prior episodes of crisis. In ED was given IVF and morphine which helped relieve pain from 10 to 7 admitted for sickle cell crisis. His HsT went from 16 to 12 in approx 3 hours.     Patient denies any cardiac history. He says he doesn't have chest pain or SOB at baseline. He says he can walk more than a few blocks without any complaints. CP last Tuesday did not get worse with exertion and felt like a stabbing sensation.      PMH:   Sickle Cell Disease      PSH:   No significant past surgical history      Medications:   acetaminophen   Tablet 650 milliGRAM(s) Oral every 6 hours PRN  acetaminophen   Tablet. 650 milliGRAM(s) Oral every 6 hours PRN  bisacodyl Suppository 10 milliGRAM(s) Rectal once PRN  docusate sodium 100 milliGRAM(s) Oral three times a day  folic acid 1 milliGRAM(s) Oral daily  ketorolac   Injectable 30 milliGRAM(s) IV Push every 6 hours  morphine PCA (5 mG/mL) 30 milliLiter(s) PCA Continuous PCA Continuous  multivitamin 1 Tablet(s) Oral daily  naloxone Injectable 0.1 milliGRAM(s) IV Push every 3 minutes PRN  ondansetron Injectable 4 milliGRAM(s) IV Push every 6 hours PRN  senna 2 Tablet(s) Oral at bedtime PRN  sodium chloride 0.45%. 1000 milliLiter(s) IV Continuous <Continuous>      Allergies:  No Known Allergies      FAMILY HISTORY:  No pertinent family history in first degree relatives      Social History:  Smoking History:  Alcohol Use:  Drug Use:    Review of Systems:  REVIEW OF SYSTEMS:  CONSTITUTIONAL: No weakness, fevers or chills  EYES/ENT: No visual changes;  No dysphagia  NECK: No pain or stiffness  RESPIRATORY: No cough, wheezing, hemoptysis; No shortness of breath  CARDIOVASCULAR: No chest pain or palpitations; No lower extremity edema  GASTROINTESTINAL: No abdominal or epigastric pain. No nausea, vomiting, or hematemesis; No diarrhea or constipation. No melena or hematochezia.  BACK: No back pain  GENITOURINARY: No dysuria, frequency or hematuria  NEUROLOGICAL: No numbness or weakness  SKIN: No itching, burning, rashes, or lesions   All other review of systems is negative unless indicated above.    Physical Exam:  T(F): 97.9 (07-21), Max: 98.5 (07-20)  HR: 78 (07-21) (73 - 82)  BP: 158/86 (07-21) (129/95 - 170/75)  RR: 17 (07-21)  SpO2: 94% (07-21)  GENERAL: No acute distress, well-developed  HEAD:  Atraumatic, Normocephalic  ENT: EOMI, PERRLA, conjunctiva and sclera clear, Neck supple, No JVD, moist mucosa  CHEST/LUNG: Clear to auscultation bilaterally; No wheeze, equal breath sounds bilaterally   BACK: No spinal tenderness  HEART: Regular rate and rhythm; No murmurs, rubs, or gallops  ABDOMEN: Soft, Nontender, Nondistended; Bowel sounds present  EXTREMITIES:  No clubbing, cyanosis, or edema  PSYCH: Nl behavior, nl affect  NEUROLOGY: AAOx3, non-focal, cranial nerves intact  SKIN: Normal color, No rashes or lesions  LINES:    Cardiovascular Diagnostic Testing:    ECG: Personally reviewed    Echo:    Stress Testing:    Cath:    Interpretation of Telemetry:    Imaging:    Labs: Personally reviewed                        7.0    10.95 )-----------( 284      ( 21 Jul 2018 00:50 )             21.9     07-21    140  |  106  |  10  ----------------------------<  76  4.8   |  19<L>  |  0.99    Ca    9.3      21 Jul 2018 00:50    TPro  7.5  /  Alb  3.8  /  TBili  1.3<H>  /  DBili  x   /  AST  55<H>  /  ALT  24  /  AlkPhos  110  07-21 Patient seen and evaluated at bedside    Chief Complaint: generalized pain    HPI:  48M wiht SCD presents with diffuse body pain that started a few days ago. Pt states it began with flu like symptoms with productive cough, subjective fever and chills. He had one episode of sharp chest pain on Tuesday that lasted for a few hours. Patient states that pain in the chest is over sternal area similar to prior episodes of crisis. In ED was given IVF and morphine which helped relieve pain from 10 to 7 admitted for sickle cell crisis. His HsT went from 16 to 12 in approx 3 hours.     Patient denies any cardiac history. He says he doesn't have chest pain or SOB at baseline. He says he can walk more than a few blocks without any complaints. CP last Tuesday did not get worse with exertion and felt like a stabbing sensation.      PMH:   Sickle Cell Disease      PSH:   No significant past surgical history      Medications:   acetaminophen   Tablet 650 milliGRAM(s) Oral every 6 hours PRN  acetaminophen   Tablet. 650 milliGRAM(s) Oral every 6 hours PRN  bisacodyl Suppository 10 milliGRAM(s) Rectal once PRN  docusate sodium 100 milliGRAM(s) Oral three times a day  folic acid 1 milliGRAM(s) Oral daily  ketorolac   Injectable 30 milliGRAM(s) IV Push every 6 hours  morphine PCA (5 mG/mL) 30 milliLiter(s) PCA Continuous PCA Continuous  multivitamin 1 Tablet(s) Oral daily  naloxone Injectable 0.1 milliGRAM(s) IV Push every 3 minutes PRN  ondansetron Injectable 4 milliGRAM(s) IV Push every 6 hours PRN  senna 2 Tablet(s) Oral at bedtime PRN  sodium chloride 0.45%. 1000 milliLiter(s) IV Continuous <Continuous>      Allergies:  No Known Allergies      FAMILY HISTORY:  No pertinent family history in first degree relatives    Review of Systems:  REVIEW OF SYSTEMS:  CONSTITUTIONAL: +weakness; no fevers or chills  EYES/ENT: No visual changes;  No dysphagia  NECK: No pain or stiffness  RESPIRATORY: No cough, wheezing, hemoptysis; No shortness of breath  CARDIOVASCULAR: No chest pain or palpitations; No lower extremity edema  GASTROINTESTINAL: No abdominal or epigastric pain. No nausea, vomiting, or hematemesis; No diarrhea or constipation. No melena or hematochezia.  BACK: No back pain  GENITOURINARY: No dysuria, frequency or hematuria  NEUROLOGICAL: No numbness or weakness  SKIN: No itching, burning, rashes, or lesions     Physical Exam:  T(F): 97.9 (07-21), Max: 98.5 (07-20)  HR: 78 (07-21) (73 - 82)  BP: 158/86 (07-21) (129/95 - 170/75)  RR: 17 (07-21)  SpO2: 94% (07-21)  GENERAL: No acute distress, well-developed  HEAD:  Atraumatic, Normocephalic  ENT: EOMI, No JVD, moist mucosa  CHEST/LUNG: Clear to auscultation bilaterally; No wheeze, equal breath sounds bilaterally   HEART: Regular rate and rhythm; No murmurs, rubs, or gallops  ABDOMEN: Soft, Nontender, Nondistended; Bowel sounds present  EXTREMITIES:  No clubbing, cyanosis, or edema  PSYCH: Nl behavior, nl affect  NEUROLOGY: AAOx3, non-focal    Cardiovascular Diagnostic Testing:    ECG: Personally reviewed  HR 69, SR with APCs, LVH with resultant non-specific ST-T wave abnormalities    Labs: Personally reviewed                        7.0    10.95 )-----------( 284      ( 21 Jul 2018 00:50 )             21.9     07-21    140  |  106  |  10  ----------------------------<  76  4.8   |  19<L>  |  0.99    Ca    9.3      21 Jul 2018 00:50    TPro  7.5  /  Alb  3.8  /  TBili  1.3<H>  /  DBili  x   /  AST  55<H>  /  ALT  24  /  AlkPhos  110  07-21

## 2019-08-27 ENCOUNTER — INPATIENT (INPATIENT)
Facility: HOSPITAL | Age: 64
LOS: 7 days | Discharge: HOME | End: 2019-09-04
Attending: HOSPITALIST | Admitting: HOSPITALIST
Payer: MEDICARE

## 2019-08-27 VITALS
RESPIRATION RATE: 16 BRPM | SYSTOLIC BLOOD PRESSURE: 153 MMHG | OXYGEN SATURATION: 100 % | TEMPERATURE: 98 F | DIASTOLIC BLOOD PRESSURE: 72 MMHG | HEART RATE: 72 BPM

## 2019-08-27 DIAGNOSIS — I10 ESSENTIAL (PRIMARY) HYPERTENSION: ICD-10-CM

## 2019-08-27 DIAGNOSIS — Z29.9 ENCOUNTER FOR PROPHYLACTIC MEASURES, UNSPECIFIED: ICD-10-CM

## 2019-08-27 DIAGNOSIS — R07.9 CHEST PAIN, UNSPECIFIED: ICD-10-CM

## 2019-08-27 DIAGNOSIS — D57.00 HB-SS DISEASE WITH CRISIS, UNSPECIFIED: ICD-10-CM

## 2019-08-27 LAB
ALBUMIN SERPL ELPH-MCNC: 4.1 G/DL — SIGNIFICANT CHANGE UP (ref 3.3–5)
ALP SERPL-CCNC: 79 U/L — SIGNIFICANT CHANGE UP (ref 40–120)
ALT FLD-CCNC: 15 U/L — SIGNIFICANT CHANGE UP (ref 4–41)
AMPHET UR-MCNC: NEGATIVE — SIGNIFICANT CHANGE UP
ANION GAP SERPL CALC-SCNC: 11 MMO/L — SIGNIFICANT CHANGE UP (ref 7–14)
ANISOCYTOSIS BLD QL: SIGNIFICANT CHANGE UP
ANTIBODY ID 1_1: SIGNIFICANT CHANGE UP
ANTIBODY ID 1_2: SIGNIFICANT CHANGE UP
APAP SERPL-MCNC: < 15 UG/ML — LOW (ref 15–25)
APPEARANCE UR: CLEAR — SIGNIFICANT CHANGE UP
APTT BLD: 28.1 SEC — SIGNIFICANT CHANGE UP (ref 27.5–36.3)
AST SERPL-CCNC: 28 U/L — SIGNIFICANT CHANGE UP (ref 4–40)
BACTERIA # UR AUTO: NEGATIVE — SIGNIFICANT CHANGE UP
BARBITURATES UR SCN-MCNC: NEGATIVE — SIGNIFICANT CHANGE UP
BASOPHILS # BLD AUTO: 0.1 K/UL — SIGNIFICANT CHANGE UP (ref 0–0.2)
BASOPHILS NFR BLD AUTO: 0.9 % — SIGNIFICANT CHANGE UP (ref 0–2)
BASOPHILS NFR SPEC: 0 % — SIGNIFICANT CHANGE UP (ref 0–2)
BENZODIAZ UR-MCNC: NEGATIVE — SIGNIFICANT CHANGE UP
BILIRUB SERPL-MCNC: 1.3 MG/DL — HIGH (ref 0.2–1.2)
BILIRUB UR-MCNC: NEGATIVE — SIGNIFICANT CHANGE UP
BLASTS # FLD: 0 % — SIGNIFICANT CHANGE UP (ref 0–0)
BLD GP AB SCN SERPL QL: POSITIVE — SIGNIFICANT CHANGE UP
BLOOD UR QL VISUAL: NEGATIVE — SIGNIFICANT CHANGE UP
BUN SERPL-MCNC: 17 MG/DL — SIGNIFICANT CHANGE UP (ref 7–23)
CALCIUM SERPL-MCNC: 9.1 MG/DL — SIGNIFICANT CHANGE UP (ref 8.4–10.5)
CANNABINOIDS UR-MCNC: NEGATIVE — SIGNIFICANT CHANGE UP
CHLORIDE SERPL-SCNC: 110 MMOL/L — HIGH (ref 98–107)
CO2 SERPL-SCNC: 20 MMOL/L — LOW (ref 22–31)
COCAINE METAB.OTHER UR-MCNC: NEGATIVE — SIGNIFICANT CHANGE UP
COLOR SPEC: YELLOW — SIGNIFICANT CHANGE UP
CREAT SERPL-MCNC: 1.16 MG/DL — SIGNIFICANT CHANGE UP (ref 0.5–1.3)
DAT POLY-SP REAG RBC QL: NEGATIVE — SIGNIFICANT CHANGE UP
EOSINOPHIL # BLD AUTO: 1 K/UL — HIGH (ref 0–0.5)
EOSINOPHIL NFR BLD AUTO: 8.5 % — HIGH (ref 0–6)
EOSINOPHIL NFR FLD: 7.6 % — HIGH (ref 0–6)
ETHANOL BLD-MCNC: < 10 MG/DL — SIGNIFICANT CHANGE UP
GIANT PLATELETS BLD QL SMEAR: PRESENT — SIGNIFICANT CHANGE UP
GLUCOSE SERPL-MCNC: 80 MG/DL — SIGNIFICANT CHANGE UP (ref 70–99)
GLUCOSE UR-MCNC: NEGATIVE — SIGNIFICANT CHANGE UP
HCT VFR BLD CALC: 20 % — CRITICAL LOW (ref 39–50)
HGB BLD-MCNC: 6.4 G/DL — CRITICAL LOW (ref 13–17)
HYALINE CASTS # UR AUTO: NEGATIVE — SIGNIFICANT CHANGE UP
HYPOCHROMIA BLD QL: SLIGHT — SIGNIFICANT CHANGE UP
IMM GRANULOCYTES NFR BLD AUTO: 0.8 % — SIGNIFICANT CHANGE UP (ref 0–1.5)
INR BLD: 1.03 — SIGNIFICANT CHANGE UP (ref 0.88–1.17)
KETONES UR-MCNC: NEGATIVE — SIGNIFICANT CHANGE UP
LDH SERPL L TO P-CCNC: 257 U/L — HIGH (ref 135–225)
LEUKOCYTE ESTERASE UR-ACNC: NEGATIVE — SIGNIFICANT CHANGE UP
LYMPHOCYTES # BLD AUTO: 1.7 K/UL — SIGNIFICANT CHANGE UP (ref 1–3.3)
LYMPHOCYTES # BLD AUTO: 14.5 % — SIGNIFICANT CHANGE UP (ref 13–44)
LYMPHOCYTES NFR SPEC AUTO: 13.3 % — SIGNIFICANT CHANGE UP (ref 13–44)
MCHC RBC-ENTMCNC: 23.8 PG — LOW (ref 27–34)
MCHC RBC-ENTMCNC: 32 % — SIGNIFICANT CHANGE UP (ref 32–36)
MCV RBC AUTO: 74.3 FL — LOW (ref 80–100)
METAMYELOCYTES # FLD: 0 % — SIGNIFICANT CHANGE UP (ref 0–1)
METHADONE UR-MCNC: NEGATIVE — SIGNIFICANT CHANGE UP
MICROCYTES BLD QL: SLIGHT — SIGNIFICANT CHANGE UP
MONOCYTES # BLD AUTO: 0.94 K/UL — HIGH (ref 0–0.9)
MONOCYTES NFR BLD AUTO: 8 % — SIGNIFICANT CHANGE UP (ref 2–14)
MONOCYTES NFR BLD: 11.4 % — HIGH (ref 2–9)
MYELOCYTES NFR BLD: 0 % — SIGNIFICANT CHANGE UP (ref 0–0)
NEUTROPHIL AB SER-ACNC: 64.8 % — SIGNIFICANT CHANGE UP (ref 43–77)
NEUTROPHILS # BLD AUTO: 7.87 K/UL — HIGH (ref 1.8–7.4)
NEUTROPHILS NFR BLD AUTO: 67.3 % — SIGNIFICANT CHANGE UP (ref 43–77)
NEUTS BAND # BLD: 0 % — SIGNIFICANT CHANGE UP (ref 0–6)
NITRITE UR-MCNC: NEGATIVE — SIGNIFICANT CHANGE UP
NRBC # BLD: 4 /100WBC — SIGNIFICANT CHANGE UP
NRBC # FLD: 0.27 K/UL — SIGNIFICANT CHANGE UP (ref 0–0)
NRBC FLD-RTO: 2.3 — SIGNIFICANT CHANGE UP
OPIATES UR-MCNC: NEGATIVE — SIGNIFICANT CHANGE UP
OTHER - HEMATOLOGY %: 0 — SIGNIFICANT CHANGE UP
OVALOCYTES BLD QL SMEAR: SLIGHT — SIGNIFICANT CHANGE UP
OXYCODONE UR-MCNC: NEGATIVE — SIGNIFICANT CHANGE UP
PCP UR-MCNC: NEGATIVE — SIGNIFICANT CHANGE UP
PH UR: 6 — SIGNIFICANT CHANGE UP (ref 5–8)
PLATELET # BLD AUTO: 395 K/UL — SIGNIFICANT CHANGE UP (ref 150–400)
PLATELET COUNT - ESTIMATE: NORMAL — SIGNIFICANT CHANGE UP
PMV BLD: 9.5 FL — SIGNIFICANT CHANGE UP (ref 7–13)
POIKILOCYTOSIS BLD QL AUTO: SIGNIFICANT CHANGE UP
POLYCHROMASIA BLD QL SMEAR: SLIGHT — SIGNIFICANT CHANGE UP
POTASSIUM SERPL-MCNC: 4.3 MMOL/L — SIGNIFICANT CHANGE UP (ref 3.5–5.3)
POTASSIUM SERPL-SCNC: 4.3 MMOL/L — SIGNIFICANT CHANGE UP (ref 3.5–5.3)
PROMYELOCYTES # FLD: 0 % — SIGNIFICANT CHANGE UP (ref 0–0)
PROT SERPL-MCNC: 7.4 G/DL — SIGNIFICANT CHANGE UP (ref 6–8.3)
PROT UR-MCNC: 20 — SIGNIFICANT CHANGE UP
PROTHROM AB SERPL-ACNC: 11.5 SEC — SIGNIFICANT CHANGE UP (ref 9.8–13.1)
RBC # BLD: 2.69 M/UL — LOW (ref 4.2–5.8)
RBC # FLD: 23.1 % — HIGH (ref 10.3–14.5)
RBC CASTS # UR COMP ASSIST: SIGNIFICANT CHANGE UP (ref 0–?)
RETICS #: 159 K/UL — HIGH (ref 25–125)
RETICS/RBC NFR: 5.9 % — HIGH (ref 0.5–2.5)
RH IG SCN BLD-IMP: POSITIVE — SIGNIFICANT CHANGE UP
SALICYLATES SERPL-MCNC: < 5 MG/DL — LOW (ref 15–30)
SICKLE CELLS BLD QL SMEAR: SLIGHT — SIGNIFICANT CHANGE UP
SMUDGE CELLS # BLD: PRESENT — SIGNIFICANT CHANGE UP
SODIUM SERPL-SCNC: 141 MMOL/L — SIGNIFICANT CHANGE UP (ref 135–145)
SP GR SPEC: 1.01 — SIGNIFICANT CHANGE UP (ref 1–1.04)
SQUAMOUS # UR AUTO: SIGNIFICANT CHANGE UP
TARGETS BLD QL SMEAR: SIGNIFICANT CHANGE UP
TROPONIN T, HIGH SENSITIVITY: 11 NG/L — SIGNIFICANT CHANGE UP (ref ?–14)
TROPONIN T, HIGH SENSITIVITY: 14 NG/L — SIGNIFICANT CHANGE UP (ref ?–14)
UROBILINOGEN FLD QL: NORMAL — SIGNIFICANT CHANGE UP
VARIANT LYMPHS # BLD: 2.9 % — SIGNIFICANT CHANGE UP
WBC # BLD: 11.7 K/UL — HIGH (ref 3.8–10.5)
WBC # FLD AUTO: 11.7 K/UL — HIGH (ref 3.8–10.5)
WBC UR QL: SIGNIFICANT CHANGE UP (ref 0–?)

## 2019-08-27 PROCEDURE — 99223 1ST HOSP IP/OBS HIGH 75: CPT

## 2019-08-27 PROCEDURE — 86077 PHYS BLOOD BANK SERV XMATCH: CPT

## 2019-08-27 PROCEDURE — 71045 X-RAY EXAM CHEST 1 VIEW: CPT | Mod: 26

## 2019-08-27 RX ORDER — HEPARIN SODIUM 5000 [USP'U]/ML
5000 INJECTION INTRAVENOUS; SUBCUTANEOUS EVERY 8 HOURS
Refills: 0 | Status: DISCONTINUED | OUTPATIENT
Start: 2019-08-27 | End: 2019-08-27

## 2019-08-27 RX ORDER — ENOXAPARIN SODIUM 100 MG/ML
40 INJECTION SUBCUTANEOUS DAILY
Refills: 0 | Status: DISCONTINUED | OUTPATIENT
Start: 2019-08-27 | End: 2019-08-30

## 2019-08-27 RX ORDER — ASPIRIN/CALCIUM CARB/MAGNESIUM 324 MG
162 TABLET ORAL ONCE
Refills: 0 | Status: COMPLETED | OUTPATIENT
Start: 2019-08-27 | End: 2019-08-27

## 2019-08-27 RX ORDER — IBUPROFEN 200 MG
1 TABLET ORAL
Qty: 0 | Refills: 0 | DISCHARGE

## 2019-08-27 RX ORDER — DIPHENHYDRAMINE HCL 50 MG
25 CAPSULE ORAL EVERY 6 HOURS
Refills: 0 | Status: DISCONTINUED | OUTPATIENT
Start: 2019-08-27 | End: 2019-09-04

## 2019-08-27 RX ORDER — SODIUM CHLORIDE 9 MG/ML
1000 INJECTION INTRAMUSCULAR; INTRAVENOUS; SUBCUTANEOUS ONCE
Refills: 0 | Status: COMPLETED | OUTPATIENT
Start: 2019-08-27 | End: 2019-08-27

## 2019-08-27 RX ORDER — IBUPROFEN 200 MG
600 TABLET ORAL THREE TIMES A DAY
Refills: 0 | Status: DISCONTINUED | OUTPATIENT
Start: 2019-08-27 | End: 2019-08-27

## 2019-08-27 RX ORDER — MORPHINE SULFATE 50 MG/1
30 CAPSULE, EXTENDED RELEASE ORAL
Refills: 0 | Status: DISCONTINUED | OUTPATIENT
Start: 2019-08-27 | End: 2019-08-30

## 2019-08-27 RX ORDER — MORPHINE SULFATE 50 MG/1
4 CAPSULE, EXTENDED RELEASE ORAL ONCE
Refills: 0 | Status: DISCONTINUED | OUTPATIENT
Start: 2019-08-27 | End: 2019-08-27

## 2019-08-27 RX ORDER — SODIUM CHLORIDE 9 MG/ML
1000 INJECTION INTRAMUSCULAR; INTRAVENOUS; SUBCUTANEOUS
Refills: 0 | Status: DISCONTINUED | OUTPATIENT
Start: 2019-08-27 | End: 2019-08-27

## 2019-08-27 RX ORDER — ACETAMINOPHEN 500 MG
650 TABLET ORAL EVERY 6 HOURS
Refills: 0 | Status: DISCONTINUED | OUTPATIENT
Start: 2019-08-27 | End: 2019-09-04

## 2019-08-27 RX ORDER — FOLIC ACID 0.8 MG
1 TABLET ORAL
Qty: 0 | Refills: 0 | DISCHARGE

## 2019-08-27 RX ORDER — MORPHINE SULFATE 50 MG/1
30 CAPSULE, EXTENDED RELEASE ORAL
Refills: 0 | Status: DISCONTINUED | OUTPATIENT
Start: 2019-08-27 | End: 2019-08-27

## 2019-08-27 RX ORDER — SODIUM CHLORIDE 9 MG/ML
1000 INJECTION, SOLUTION INTRAVENOUS
Refills: 0 | Status: DISCONTINUED | OUTPATIENT
Start: 2019-08-27 | End: 2019-09-03

## 2019-08-27 RX ORDER — FOLIC ACID 0.8 MG
1 TABLET ORAL DAILY
Refills: 0 | Status: DISCONTINUED | OUTPATIENT
Start: 2019-08-27 | End: 2019-09-04

## 2019-08-27 RX ADMIN — MORPHINE SULFATE 4 MILLIGRAM(S): 50 CAPSULE, EXTENDED RELEASE ORAL at 14:10

## 2019-08-27 RX ADMIN — Medication 162 MILLIGRAM(S): at 07:21

## 2019-08-27 RX ADMIN — Medication 1 MILLIGRAM(S): at 18:14

## 2019-08-27 RX ADMIN — MORPHINE SULFATE 4 MILLIGRAM(S): 50 CAPSULE, EXTENDED RELEASE ORAL at 14:20

## 2019-08-27 RX ADMIN — MORPHINE SULFATE 4 MILLIGRAM(S): 50 CAPSULE, EXTENDED RELEASE ORAL at 07:21

## 2019-08-27 RX ADMIN — SODIUM CHLORIDE 1000 MILLILITER(S): 9 INJECTION INTRAMUSCULAR; INTRAVENOUS; SUBCUTANEOUS at 06:44

## 2019-08-27 RX ADMIN — HEPARIN SODIUM 5000 UNIT(S): 5000 INJECTION INTRAVENOUS; SUBCUTANEOUS at 12:16

## 2019-08-27 RX ADMIN — MORPHINE SULFATE 4 MILLIGRAM(S): 50 CAPSULE, EXTENDED RELEASE ORAL at 09:16

## 2019-08-27 RX ADMIN — MORPHINE SULFATE 30 MILLILITER(S): 50 CAPSULE, EXTENDED RELEASE ORAL at 20:13

## 2019-08-27 RX ADMIN — SODIUM CHLORIDE 75 MILLILITER(S): 9 INJECTION, SOLUTION INTRAVENOUS at 17:54

## 2019-08-27 RX ADMIN — SODIUM CHLORIDE 75 MILLILITER(S): 9 INJECTION INTRAMUSCULAR; INTRAVENOUS; SUBCUTANEOUS at 12:17

## 2019-08-27 RX ADMIN — MORPHINE SULFATE 4 MILLIGRAM(S): 50 CAPSULE, EXTENDED RELEASE ORAL at 08:02

## 2019-08-27 RX ADMIN — Medication 600 MILLIGRAM(S): at 12:16

## 2019-08-27 RX ADMIN — SODIUM CHLORIDE 1000 MILLILITER(S): 9 INJECTION INTRAMUSCULAR; INTRAVENOUS; SUBCUTANEOUS at 09:16

## 2019-08-27 RX ADMIN — Medication 600 MILLIGRAM(S): at 13:10

## 2019-08-27 RX ADMIN — ENOXAPARIN SODIUM 40 MILLIGRAM(S): 100 INJECTION SUBCUTANEOUS at 18:14

## 2019-08-27 NOTE — H&P ADULT - LYMPHATIC
posterior cervical L/anterior cervical L/supraclavicular R/posterior cervical R/supraclavicular L/anterior cervical R

## 2019-08-27 NOTE — H&P ADULT - PROBLEM SELECTOR PLAN 2
Admit to telemetry for monitoring   Hematology consult  Lovenox 40mg SQ daily  CXR done: heart size could not be accurately assessed, otherwise normal Pain mgmt, FA, IVF, Consider Hematology c/s Pain mgmt, FA, IVF

## 2019-08-27 NOTE — ED PROCEDURE NOTE - ATTENDING CONTRIBUTION TO CARE
I was physically present for the E/M service provided. I was physically present for the key portions of the service provided. TIMBO.

## 2019-08-27 NOTE — H&P ADULT - MUSCULOSKELETAL
detailed exam no joint erythema/ROM intact/no joint swelling/normal/no joint warmth/no calf tenderness/normal strength details… ROM intact/no joint swelling/no joint erythema/no calf tenderness/no joint warmth/normal strength

## 2019-08-27 NOTE — H&P ADULT - ASSESSMENT
Patient is a 49 year old male with a history of sickle cell disease, acute chest syndrome, LV dysfunction, and hypertension presenting with diffuse body aches and chest pain admitted to telemetry for further evaluation and treatment of sick cell crisis. Patient is a 49 year old male with a history of sickle cell disease presenting with diffuse body aches and chest pain admitted to telemetry for further evaluation and treatment of sick cell crisis.     EKG- NSR @ 87 q waves v5-6, I, L Patient is a 49 year old male with a history of sickle cell disease presenting with diffuse body aches and chest pain admitted to telemetry for further evaluation and treatment of sick cell crisis.

## 2019-08-27 NOTE — H&P ADULT - NSHPPHYSICALEXAM_GEN_ALL_CORE
Vital Signs Last 24 Hrs  T(C): 36.8 (08-27-19 @ 11:10), Max: 36.8 (08-27-19 @ 03:09)  T(F): 98.2 (08-27-19 @ 11:10), Max: 98.3 (08-27-19 @ 03:09)  HR: 58 (08-27-19 @ 11:10) (58 - 72)  BP: 159/44 (08-27-19 @ 11:10) (147/51 - 159/44)  BP(mean): --  RR: 18 (08-27-19 @ 11:10) (16 - 18)  SpO2: 100% (08-27-19 @ 11:10) (100% - 100%)

## 2019-08-27 NOTE — H&P ADULT - NEGATIVE OPHTHALMOLOGIC SYMPTOMS
no photophobia/no pain L/no scleral injection R/no discharge L/no scleral injection L/no lacrimation L/no lacrimation R/no blurred vision R/no discharge R/no irritation L/no irritation R/no pain R/no loss of vision R/no loss of vision L/no blurred vision L/no diplopia

## 2019-08-27 NOTE — H&P ADULT - NSICDXFAMILYHX_GEN_ALL_CORE_FT
FAMILY HISTORY:  Family history of sickle cell trait, ~ presumed in parents & 7 siblings (none suffer w/ the disease, per patient)  FH: hypertension, ~ mother

## 2019-08-27 NOTE — ED PROVIDER NOTE - PROGRESS NOTE DETAILS
Given presentation, as well as med hx, will admit for further care and evaluation. Stable, no acute distress. Concern for sickle cell crisis vs cardiac etiology vs other. Will admit to medicine on tele.   Nahun Calvo MD, PGY3 Emergency Medicine

## 2019-08-27 NOTE — H&P ADULT - HISTORY OF PRESENT ILLNESS
Patient is a 49 year old male with a history of sickle cell disease, acute chest syndrome, LV dysfunction, and hypertension presenting with diffuse body aches and chest pain. Patient states that he began having intermittent bilateral pain in his chest, shoulders, upper back, abdomen and knees one week ago which has progressively worsened to 8/10 and become constant since last night. Patient takes Tylenol and Advil for pain as needed, but does not take any other medication. He admits to feeling nauseous, lightheaded, fatigued and weak. Patient experienced similar episode in April 2019 and was hospitalized. Patient sees Dr. Lluvia Hamm at the sickle cell clinic every two months and has his next appointment at the end of September.   Patient reports pain and difficulty urinating for the past three weeks. Patient denies blood in his urine, frequency and urgency.   Patient states that he has a "cold" for the past week with sore throat, sneezing and nonproductive cough. Patient denies shortness of breath, fever, chills, nausea, vomiting, diarrhea, headache, nasal congestion. 49 year old male with a history of sickle cell disease presenting with diffuse body aches and chest pain. Pt admits to an exertional, reproducible and pleuritic component to his CP, and also a positional component where it feels better lying down and worse sitting up, no dietary component. Patient states that he began having intermittent bilateral pain in his chest, shoulders, upper back, abdomen and knees one week ago which has progressively worsened to 8/10 and became constant since last night. Patient takes Tylenol and Advil for pain as needed, but does not take any other medication. He admits to feeling nauseous, lightheaded, fatigued and weak. Patient experienced a similar episode in April 2019 and was hospitalized. Patient sees Dr. Lluvia Hamm at the sickle cell clinic every two months and has his next appointment at the end of September. Pt also c/o ARRIETA after 1 block over the last 1 week.  Patient reports pain and difficulty urinating for the past three weeks. Patient denies blood in his urine, frequency and urgency. No recent prolonged travel.  Patient states that he has a "cold" for the past week with sore throat, sneezing and nonproductive cough. Patient denies shortness of breath, fever, chills, nausea, vomiting, diarrhea, headache, nasal congestion.

## 2019-08-27 NOTE — H&P ADULT - NEGATIVE CARDIOVASCULAR SYMPTOMS
no peripheral edema/no claudication/no dyspnea on exertion/no palpitations/no paroxysmal nocturnal dyspnea/no orthopnea no peripheral edema/no claudication/no palpitations/no paroxysmal nocturnal dyspnea/no orthopnea

## 2019-08-27 NOTE — H&P ADULT - NEGATIVE GASTROINTESTINAL SYMPTOMS
no abdominal pain/no melena/no jaundice/no vomiting/no constipation/no change in bowel habits/no hematochezia/no hiccoughs/no flatulence/no steatorrhea/no diarrhea

## 2019-08-27 NOTE — ED ADULT NURSE NOTE - OBJECTIVE STATEMENT
Pt received in trauma C, A&OX3 c/o chest pain. Pt has history of sickle cell, acute chest syndrome, p/w chest pain and overall body aches, primarily R sided chest and R sided abdomen, started earlier tonight. Also reporting SOB earlier, headache, nausea, lightheadedness. Respirations even and unlabored. NSR on cardiac monitor. Denies SOB at this time, fevers, vomiting or diarrhea. Pt appears in no acute or apparent distress. US IV placed by MD. Labs drawn and sent. Will continue to monitor.

## 2019-08-27 NOTE — H&P ADULT - NSHPSOCIALHISTORY_GEN_ALL_CORE
Patient lives alone in [place]. Patient works and is a student at [employer]. Patient denies history of smoking, drinking or illicit drug use.

## 2019-08-27 NOTE — H&P ADULT - RS GEN PE MLT RESP DETAILS PC
no rales/no wheezes/respirations non-labored/no intercostal retractions/no subcutaneous emphysema/normal/clear to auscultation bilaterally/no rhonchi/good air movement/no chest wall tenderness/airway patent/breath sounds equal no rhonchi/no rales/respirations non-labored/no chest wall tenderness/no subcutaneous emphysema/no wheezes/good air movement/no intercostal retractions/clear to auscultation bilaterally/airway patent/breath sounds equal

## 2019-08-27 NOTE — H&P ADULT - NEGATIVE SKIN SYMPTOMS
no itching/no dryness/no brittle nails/no change in size/color of mole/no tumor/no pitted nails/no rash/no hair loss

## 2019-08-27 NOTE — H&P ADULT - NEGATIVE GENERAL SYMPTOMS
no fever/no chills/no weight gain/no polyuria/no sweating/no anorexia/no weight loss/no polyphagia/no polydipsia

## 2019-08-27 NOTE — ED ADULT TRIAGE NOTE - CHIEF COMPLAINT QUOTE
Pt comes in for c/o all over joint pain and CP for the last 2 days, denies taking medication for pain and has hx of acute CP. Pt appears in no acute distress, vs as noted

## 2019-08-27 NOTE — ED PROVIDER NOTE - OBJECTIVE STATEMENT
49M, hx sickle cell disease, hx acute chest, LV dysfunction, presents w chief complaint of diffuse body aches and chest pain. Patient states he woke up tonight with diffuse body aches as well as right sided chest pain and right sided abdominal pain. Also endorses shortness of breath since waking up, as well as nausea, headache, lightheadedness, and feeling off balance. Also endorses 1 week dry cough, as well as intermittent dysuria. Denies vomiting, diarrhea, fevers or chills, blurry vision, weakness, numbness. Patient denies meds, tobacco, ethanol, or drug use, allergies. Denies recent illness.

## 2019-08-27 NOTE — H&P ADULT - NEGATIVE ENMT SYMPTOMS
no hearing difficulty/no vertigo/no gum bleeding/no dry mouth/no ear pain/no tinnitus/no sinus symptoms/no recurrent cold sores/no abnormal taste sensation/no nasal obstruction/no nasal congestion/no nasal discharge/no post-nasal discharge/no nose bleeds

## 2019-08-27 NOTE — H&P ADULT - NEGATIVE GENERAL GENITOURINARY SYMPTOMS
no renal colic/no flank pain L/no flank pain R/no urine discoloration/no bladder infections/no gas in urine/no hematuria/no incontinence/no urinary hesitancy/normal urinary frequency/no nocturia

## 2019-08-27 NOTE — ED PROVIDER NOTE - NS ED ROS FT
diffuse body aches, chest pain. right sided abdominal pain. shortness of breath, nausea, headache, lightheadedness, feeling off balance

## 2019-08-27 NOTE — H&P ADULT - PROBLEM SELECTOR PLAN 1
Admit to telemetry for monitoring  Hematology consult  Treat underlying cause  Consider cardiology consult Admit to telemetry for monitoring, serial CE's, ASA, F/U MD note

## 2019-08-27 NOTE — H&P ADULT - PHARYNX
normal/no redness/no peritonsillar abscess/no discharge no redness/no peritonsillar abscess/no discharge

## 2019-08-27 NOTE — H&P ADULT - NSICDXPASTMEDICALHX_GEN_ALL_CORE_FT
PAST MEDICAL HISTORY:  Acute chest syndrome     Constipation     H/O transfusion ~ pRBC    Hypertension     Intellectual disability ~ mild    Left ventricular dysfunction     Sickle Cell Disease PAST MEDICAL HISTORY:  Acute chest syndrome Pt unaware    Constipation     H/O transfusion ~ pRBC    Hypertension     Intellectual disability ~ mild    Left ventricular dysfunction mild LVD on echo in 7/18, normal LVF in 12/18    Sickle Cell Disease

## 2019-08-27 NOTE — ED PROVIDER NOTE - PMH
Acute chest syndrome    Constipation    H/O transfusion  ~ pRBC  Hypertension    Intellectual disability  ~ mild  Left ventricular dysfunction    Sickle Cell Disease

## 2019-08-27 NOTE — H&P ADULT - GASTROINTESTINAL DETAILS
no distention/no masses palpable/bowel sounds normal/no bruit/soft/no guarding/no rebound tenderness/no rigidity/no organomegaly/normal/nontender no masses palpable/bowel sounds normal/no bruit/no guarding/soft/no rebound tenderness/no rigidity/no organomegaly/no distention/nontender

## 2019-08-27 NOTE — ED PROVIDER NOTE - PHYSICAL EXAMINATION
General: Well appearing, alert, oriented, no acute distress. Resting in bed.  HEENT: PERRLA EOMI. No trauma/bruising noted to head or face. No lip/tongue/throat swelling noted on exam.  CV: Regular rate and rhythm, S1/S2, no murmurs/rubs/gallops noted on exam. No tenderness to palpation to chest wall.  Lungs: Clear to ascultation bilaterally, no wheezes/crackles/rales noted on exam. Equal chest wall excursion noted.   Abdomen: Soft, non tender, non distended, no guarding or rebound. No CVA tenderness to palpation.   MSK: Full ROM of upper and lower extremities bilaterally. No tenderness to palpation to extremities. Full ROM of neck. No C-spine or spinal bony tenderness to palpation. No gross deformities noted to extremities.  Neuro: Awake, A+O x4, moving all extremities spontaneously. CN 2-12 grossly intact. No nystagmus noted. Strength and sensation grossly intact to all extremities.   Extremities: No swelling or edema noted to extremities.   Skin: No rash noted on exam.

## 2019-08-27 NOTE — H&P ADULT - ATTENDING COMMENTS
I was physically present for the key portions of the evaluation and management (E/M) service provided.  I agree with the above history, physical, and plan which I have reviewed and edited where appropriate.     49M h/o SCD, mild MR, presenting with diffuse body aches (chest, shoulders, upper back, abdomen and knees) that started about 1 week ago but acutely worsened in past 24 hours. Patient reports symptoms similar to previous sickle cell crisis. Took advil and motrin at home with some pain relief. Reports pleuritic and positional component to his CP, feels better lying down and worse sitting up, no dietary component. Admits to feeling nauseous, lightheaded, sore throat, fatigued and weak. Also reports pain and difficulty urinating for the past three weeks. Patient denies blood in his urine, frequency and urgency. No recent prolonged travel. Last hospitalization in May 2019 for SCC. Patient sees Dr. Lluvia Hamm at the sickle cell clinic every two months and has his next appointment at the end of September. A/f SCC:     #SCC  -c/w 1/2 NS IVF  -PCA pump for pain control    -folate qd  -Dr. Hamm emailed about admission, patient not currently on hydrea per allscripts review, poorly tolerated   -no evidence of acute chest on CXR   -infectious w/u: check RVP, UA, urine cx and blood cultures   -H/H, bili at baseline and will trend.   -dvt ppx- lovenox     #CP   -suspect 2/2 SCC   -trop 14>11. EKG NSR with no ischemic change   -given pleuritic nature, will check TTE     Plan discussed with resident.

## 2019-08-27 NOTE — ED PROVIDER NOTE - CLINICAL SUMMARY MEDICAL DECISION MAKING FREE TEXT BOX
49M, hx sickle cell disease, hx acute chest, LV dysfunction, presents w chief complaint of diffuse body aches and chest pain. Associated with numerous other sx including cough, shortness of breath. Exam s above, resting in bed, no acute distress, vitals stable. Eval for ACS vs sickle cell crisis vs other. Labs, trop, UA, CXR, and re-assessment. Currently stable, no acute distress. Will continue to follow up and re-assess. Case discussed with Attending.  Nahun Calvo MD, PGY3 Emergency Medicine

## 2019-08-27 NOTE — ED PROVIDER NOTE - ATTENDING CONTRIBUTION TO CARE
Afebrile. Awake and Alert. Lungs CTA. Heart RRR. Abdomen soft NTND. CN II-XII grossly intact. Moves all extremities without lateralization.    h/o SCC w/ CP and SOB r/o Chest crisis, no hypoxia or tachycardia, no fever  r/o ACS  r/o SCC  r/o PNA Afebrile. Awake and Alert. Lungs CTA. Heart RRR. Abdomen soft NTND. CN II-XII grossly intact. Moves all extremities without lateralization. Bizarre affect, pt avoids eye contact and provides limited history.    h/o SCC w/ CP and SOB r/o Chest crisis, no hypoxia or tachycardia, no fever  r/o ACS  r/o SCC  r/o PNA

## 2019-08-28 LAB
ALBUMIN SERPL ELPH-MCNC: 3.6 G/DL — SIGNIFICANT CHANGE UP (ref 3.3–5)
ALP SERPL-CCNC: 71 U/L — SIGNIFICANT CHANGE UP (ref 40–120)
ALT FLD-CCNC: 14 U/L — SIGNIFICANT CHANGE UP (ref 4–41)
ANION GAP SERPL CALC-SCNC: 11 MMO/L — SIGNIFICANT CHANGE UP (ref 7–14)
AST SERPL-CCNC: 31 U/L — SIGNIFICANT CHANGE UP (ref 4–40)
BASOPHILS # BLD AUTO: 0.12 K/UL — SIGNIFICANT CHANGE UP (ref 0–0.2)
BASOPHILS NFR BLD AUTO: 1 % — SIGNIFICANT CHANGE UP (ref 0–2)
BILIRUB SERPL-MCNC: 1.5 MG/DL — HIGH (ref 0.2–1.2)
BUN SERPL-MCNC: 14 MG/DL — SIGNIFICANT CHANGE UP (ref 7–23)
CALCIUM SERPL-MCNC: 9 MG/DL — SIGNIFICANT CHANGE UP (ref 8.4–10.5)
CHLORIDE SERPL-SCNC: 109 MMOL/L — HIGH (ref 98–107)
CO2 SERPL-SCNC: 20 MMOL/L — LOW (ref 22–31)
CREAT SERPL-MCNC: 0.97 MG/DL — SIGNIFICANT CHANGE UP (ref 0.5–1.3)
EOSINOPHIL # BLD AUTO: 1.4 K/UL — HIGH (ref 0–0.5)
EOSINOPHIL NFR BLD AUTO: 11.2 % — HIGH (ref 0–6)
GLUCOSE SERPL-MCNC: 75 MG/DL — SIGNIFICANT CHANGE UP (ref 70–99)
HCT VFR BLD CALC: 20.8 % — CRITICAL LOW (ref 39–50)
HGB BLD-MCNC: 6.7 G/DL — CRITICAL LOW (ref 13–17)
IMM GRANULOCYTES NFR BLD AUTO: 0.6 % — SIGNIFICANT CHANGE UP (ref 0–1.5)
LDH SERPL L TO P-CCNC: 266 U/L — HIGH (ref 135–225)
LYMPHOCYTES # BLD AUTO: 1.7 K/UL — SIGNIFICANT CHANGE UP (ref 1–3.3)
LYMPHOCYTES # BLD AUTO: 13.6 % — SIGNIFICANT CHANGE UP (ref 13–44)
MAGNESIUM SERPL-MCNC: 1.8 MG/DL — SIGNIFICANT CHANGE UP (ref 1.6–2.6)
MCHC RBC-ENTMCNC: 24.2 PG — LOW (ref 27–34)
MCHC RBC-ENTMCNC: 32.2 % — SIGNIFICANT CHANGE UP (ref 32–36)
MCV RBC AUTO: 75.1 FL — LOW (ref 80–100)
MONOCYTES # BLD AUTO: 0.99 K/UL — HIGH (ref 0–0.9)
MONOCYTES NFR BLD AUTO: 7.9 % — SIGNIFICANT CHANGE UP (ref 2–14)
NEUTROPHILS # BLD AUTO: 8.25 K/UL — HIGH (ref 1.8–7.4)
NEUTROPHILS NFR BLD AUTO: 65.7 % — SIGNIFICANT CHANGE UP (ref 43–77)
NRBC # FLD: 0.25 K/UL — SIGNIFICANT CHANGE UP (ref 0–0)
NRBC FLD-RTO: 2 — SIGNIFICANT CHANGE UP
PHOSPHATE SERPL-MCNC: 3.6 MG/DL — SIGNIFICANT CHANGE UP (ref 2.5–4.5)
PLATELET # BLD AUTO: 384 K/UL — SIGNIFICANT CHANGE UP (ref 150–400)
PMV BLD: 9.9 FL — SIGNIFICANT CHANGE UP (ref 7–13)
POTASSIUM SERPL-MCNC: 4.7 MMOL/L — SIGNIFICANT CHANGE UP (ref 3.5–5.3)
POTASSIUM SERPL-SCNC: 4.7 MMOL/L — SIGNIFICANT CHANGE UP (ref 3.5–5.3)
PROT SERPL-MCNC: 6.8 G/DL — SIGNIFICANT CHANGE UP (ref 6–8.3)
RBC # BLD: 2.77 M/UL — LOW (ref 4.2–5.8)
RBC # FLD: 23.2 % — HIGH (ref 10.3–14.5)
RETICS #: 196 K/UL — HIGH (ref 25–125)
RETICS/RBC NFR: 7 % — HIGH (ref 0.5–2.5)
SODIUM SERPL-SCNC: 140 MMOL/L — SIGNIFICANT CHANGE UP (ref 135–145)
WBC # BLD: 12.54 K/UL — HIGH (ref 3.8–10.5)
WBC # FLD AUTO: 12.54 K/UL — HIGH (ref 3.8–10.5)

## 2019-08-28 PROCEDURE — 99233 SBSQ HOSP IP/OBS HIGH 50: CPT

## 2019-08-28 PROCEDURE — 99222 1ST HOSP IP/OBS MODERATE 55: CPT

## 2019-08-28 RX ORDER — SENNA PLUS 8.6 MG/1
2 TABLET ORAL AT BEDTIME
Refills: 0 | Status: DISCONTINUED | OUTPATIENT
Start: 2019-08-28 | End: 2019-09-04

## 2019-08-28 RX ORDER — DOCUSATE SODIUM 100 MG
100 CAPSULE ORAL THREE TIMES A DAY
Refills: 0 | Status: DISCONTINUED | OUTPATIENT
Start: 2019-08-28 | End: 2019-09-04

## 2019-08-28 RX ADMIN — MORPHINE SULFATE 30 MILLILITER(S): 50 CAPSULE, EXTENDED RELEASE ORAL at 07:07

## 2019-08-28 RX ADMIN — Medication 1 MILLIGRAM(S): at 13:13

## 2019-08-28 RX ADMIN — SODIUM CHLORIDE 75 MILLILITER(S): 9 INJECTION, SOLUTION INTRAVENOUS at 21:22

## 2019-08-28 RX ADMIN — Medication 100 MILLIGRAM(S): at 13:13

## 2019-08-28 RX ADMIN — MORPHINE SULFATE 30 MILLILITER(S): 50 CAPSULE, EXTENDED RELEASE ORAL at 19:08

## 2019-08-28 RX ADMIN — Medication 100 MILLIGRAM(S): at 21:22

## 2019-08-28 RX ADMIN — ENOXAPARIN SODIUM 40 MILLIGRAM(S): 100 INJECTION SUBCUTANEOUS at 13:13

## 2019-08-28 RX ADMIN — SENNA PLUS 2 TABLET(S): 8.6 TABLET ORAL at 21:21

## 2019-08-28 NOTE — PROGRESS NOTE ADULT - PROBLEM SELECTOR PLAN 2
hasn't been pushing pump, but pump was also malfunctioning, now fixed.  on prior admissions pt has required PCA, will cw PCA for now and if not using, will transition to PRN or PO   cw FA and 1/2ns hydration  no s.s of acute chest, h/h close to baseline, no need for transfusion at this time

## 2019-08-28 NOTE — PROGRESS NOTE ADULT - SUBJECTIVE AND OBJECTIVE BOX
Patient is a 49y old  Male who presents with a chief complaint of chest pain (27 Aug 2019 11:08)      SUBJECTIVE / OVERNIGHT EVENTS: pt hasn't been pressing pump, says pain better but still present. pump tubing was also kinked, nursing addressed and fixed. CP still present. on Tele with 17 beats Vtach (5 sec). pt states at home he has been having palpitations, no LOC, but at times DZ. denies SOB     ROS:  No HA  No Vision changes   No SOB  No N/V/D  No Edema  No Rash  NO weakness, numbness    MEDICATIONS  (STANDING):  enoxaparin Injectable 40 milliGRAM(s) SubCutaneous daily  folic acid 1 milliGRAM(s) Oral daily  morphine PCA (5 mG/mL) 30 milliLiter(s) PCA Continuous PCA Continuous  sodium chloride 0.45%. 1000 milliLiter(s) (75 mL/Hr) IV Continuous <Continuous>    MEDICATIONS  (PRN):  acetaminophen   Tablet .. 650 milliGRAM(s) Oral every 6 hours PRN Temp greater or equal to 38C (100.4F), Mild Pain (1 - 3)  diphenhydrAMINE 25 milliGRAM(s) Oral every 6 hours PRN Rash and/or Itching      T(C): 36.9 (19 @ 08:10)  HR: 63 (19 @ 08:10)  BP: 144/76 (19 @ 08:10)  RR: 16 (19 @ 08:10)  SpO2: 97% (19 @ 08:10)  CAPILLARY BLOOD GLUCOSE        I&O's Summary    27 Aug 2019 07:  -  28 Aug 2019 07:00  --------------------------------------------------------  IN: 925 mL / OUT: 1000 mL / NET: -75 mL    28 Aug 2019 07:  -  28 Aug 2019 10:54  --------------------------------------------------------  IN: 360 mL / OUT: 0 mL / NET: 360 mL        PHYSICAL EXAM:  GENERAL: NAD, well-developed, AOx3  HEAD:  Atraumatic, Normocephalic  EYES: EOMI, PERRL, conjunctiva and sclera clear  NECK: Supple, No JVD  CHEST/LUNG: Clear to auscultation bilaterally  HEART: Regular rate and rhythm; No murmurs, rubs, or gallops, No Edema  ABDOMEN: Soft, Nontender, Nondistended; Bowel sounds present  EXTREMITIES:  2+ Peripheral Pulses, No clubbing, cyanosis  PSYCH: No SI/HI  NEUROLOGY: non-focal  SKIN: No rashes or lesions    LABS:                        6.7    12.54 )-----------( 384      ( 28 Aug 2019 06:00 )             20.8     08-    140  |  109<H>  |  14  ----------------------------<  75  4.7   |  20<L>  |  0.97    Ca    9.0      28 Aug 2019 06:00  Phos  3.6       Mg     1.8         TPro  6.8  /  Alb  3.6  /  TBili  1.5<H>  /  DBili  x   /  AST  31  /  ALT  14  /  AlkPhos  71      PT/INR - ( 27 Aug 2019 05:30 )   PT: 11.5 SEC;   INR: 1.03          PTT - ( 27 Aug 2019 05:30 )  PTT:28.1 SEC      Urinalysis Basic - ( 27 Aug 2019 06:40 )    Color: YELLOW / Appearance: CLEAR / S.011 / pH: 6.0  Gluc: NEGATIVE / Ketone: NEGATIVE  / Bili: NEGATIVE / Urobili: NORMAL   Blood: NEGATIVE / Protein: 20 / Nitrite: NEGATIVE   Leuk Esterase: NEGATIVE / RBC: 0-2 / WBC 0-2   Sq Epi: OCC / Non Sq Epi: x / Bacteria: NEGATIVE            RADIOLOGY & ADDITIONAL TESTS:    Imaging Personally Reviewed:    Consultant(s) Notes Reviewed:      Care Discussed with Consultants/Other Providers:

## 2019-08-28 NOTE — PROGRESS NOTE ADULT - ASSESSMENT
Patient is a 49 year old male with a history of sickle cell disease presenting with diffuse body aches and chest pain admitted to telemetry for further evaluation and treatment of sick cell crisis.

## 2019-08-28 NOTE — PROGRESS NOTE ADULT - PROBLEM SELECTOR PLAN 1
- likely due to VOC, atypica. Trops neg.   prior TTE reviewed, known mild systolic chf, EF 52%, LVH. CP is typical for his VOC. on Tele however with NSVT and states with palpitations at home. on review of [facility] admissions was on Ace-i and bb. here HR borderline to resume BB. repeat TTE pending, EKG unchanged from prior and not acutely ischemic. will have cards eval for  tele findings

## 2019-08-28 NOTE — PROGRESS NOTE ADULT - PROBLEM SELECTOR PLAN 3
Patient does not take medications to lower blood pressure, (last measured 147/51)  Monitor blood pressure - partly due to pain   DASH diet

## 2019-08-29 LAB
ANION GAP SERPL CALC-SCNC: 11 MMO/L — SIGNIFICANT CHANGE UP (ref 7–14)
BUN SERPL-MCNC: 14 MG/DL — SIGNIFICANT CHANGE UP (ref 7–23)
CALCIUM SERPL-MCNC: 9.3 MG/DL — SIGNIFICANT CHANGE UP (ref 8.4–10.5)
CHLORIDE SERPL-SCNC: 109 MMOL/L — HIGH (ref 98–107)
CO2 SERPL-SCNC: 21 MMOL/L — LOW (ref 22–31)
CREAT SERPL-MCNC: 0.98 MG/DL — SIGNIFICANT CHANGE UP (ref 0.5–1.3)
GLUCOSE SERPL-MCNC: 66 MG/DL — LOW (ref 70–99)
HCT VFR BLD CALC: 20.3 % — CRITICAL LOW (ref 39–50)
HGB BLD-MCNC: 6.4 G/DL — CRITICAL LOW (ref 13–17)
MCHC RBC-ENTMCNC: 23.5 PG — LOW (ref 27–34)
MCHC RBC-ENTMCNC: 31.5 % — LOW (ref 32–36)
MCV RBC AUTO: 74.6 FL — LOW (ref 80–100)
NRBC # FLD: 0.23 K/UL — SIGNIFICANT CHANGE UP (ref 0–0)
NRBC FLD-RTO: 2.1 — SIGNIFICANT CHANGE UP
PLATELET # BLD AUTO: 348 K/UL — SIGNIFICANT CHANGE UP (ref 150–400)
PMV BLD: 9.8 FL — SIGNIFICANT CHANGE UP (ref 7–13)
POTASSIUM SERPL-MCNC: 4.5 MMOL/L — SIGNIFICANT CHANGE UP (ref 3.5–5.3)
POTASSIUM SERPL-SCNC: 4.5 MMOL/L — SIGNIFICANT CHANGE UP (ref 3.5–5.3)
RBC # BLD: 2.72 M/UL — LOW (ref 4.2–5.8)
RBC # FLD: 22.9 % — HIGH (ref 10.3–14.5)
SODIUM SERPL-SCNC: 141 MMOL/L — SIGNIFICANT CHANGE UP (ref 135–145)
WBC # BLD: 10.93 K/UL — HIGH (ref 3.8–10.5)
WBC # FLD AUTO: 10.93 K/UL — HIGH (ref 3.8–10.5)

## 2019-08-29 PROCEDURE — 99233 SBSQ HOSP IP/OBS HIGH 50: CPT

## 2019-08-29 RX ORDER — ONDANSETRON 8 MG/1
4 TABLET, FILM COATED ORAL ONCE
Refills: 0 | Status: COMPLETED | OUTPATIENT
Start: 2019-08-29 | End: 2019-08-29

## 2019-08-29 RX ORDER — METOPROLOL TARTRATE 50 MG
12.5 TABLET ORAL
Refills: 0 | Status: DISCONTINUED | OUTPATIENT
Start: 2019-08-29 | End: 2019-09-04

## 2019-08-29 RX ADMIN — ENOXAPARIN SODIUM 40 MILLIGRAM(S): 100 INJECTION SUBCUTANEOUS at 14:52

## 2019-08-29 RX ADMIN — Medication 12.5 MILLIGRAM(S): at 17:21

## 2019-08-29 RX ADMIN — MORPHINE SULFATE 30 MILLILITER(S): 50 CAPSULE, EXTENDED RELEASE ORAL at 07:07

## 2019-08-29 RX ADMIN — SODIUM CHLORIDE 75 MILLILITER(S): 9 INJECTION, SOLUTION INTRAVENOUS at 22:06

## 2019-08-29 RX ADMIN — SODIUM CHLORIDE 75 MILLILITER(S): 9 INJECTION, SOLUTION INTRAVENOUS at 15:13

## 2019-08-29 RX ADMIN — Medication 1 MILLIGRAM(S): at 14:52

## 2019-08-29 RX ADMIN — MORPHINE SULFATE 30 MILLILITER(S): 50 CAPSULE, EXTENDED RELEASE ORAL at 19:15

## 2019-08-29 RX ADMIN — Medication 100 MILLIGRAM(S): at 22:05

## 2019-08-29 RX ADMIN — ONDANSETRON 4 MILLIGRAM(S): 8 TABLET, FILM COATED ORAL at 12:58

## 2019-08-29 RX ADMIN — Medication 100 MILLIGRAM(S): at 14:52

## 2019-08-29 RX ADMIN — SENNA PLUS 2 TABLET(S): 8.6 TABLET ORAL at 22:05

## 2019-08-29 RX ADMIN — Medication 100 MILLIGRAM(S): at 05:21

## 2019-08-29 RX ADMIN — SODIUM CHLORIDE 75 MILLILITER(S): 9 INJECTION, SOLUTION INTRAVENOUS at 05:21

## 2019-08-29 NOTE — PROGRESS NOTE ADULT - PROBLEM SELECTOR PLAN 1
- likely due to VOC, improving   prior TTE reviewed, known mild systolic chf, EF 52%, LVH. CP is typical for his VOC. on Tele however with NSVT and states with palpitations at home. on review of [facility] admissions was on Ace-i and bb. HR improved so will start low dose BB as per cards and possible NST once crisis is resolved.

## 2019-08-29 NOTE — PROGRESS NOTE ADULT - ASSESSMENT
49M with PMH of sickle cell disease presenting with diffuse body aches and chest pain admitted to telemetry for further evaluation and treatment of sick cell crisis, consulted for NSVT.     #NSVT  -Pt had 17 beats NSVT 2 nights ago (hemodynamically stable at time with HR 64, /59, pt asymptomatic)  -Trops 14 -> 11  -Prior echo showed mid systolic HF, EF 52%, LVH  -Echo ordered, awaiting results  -Suggest low dose metoprolol after acute crisis is treated  -Will need stress test in future

## 2019-08-29 NOTE — PROGRESS NOTE ADULT - PROBLEM SELECTOR PLAN 2
now using pump  h/h stable, no s.s of acute chest  cw 1/2ns, FA, bowel regimen  monitor hemolysis labs   no e/o infx

## 2019-08-29 NOTE — PROGRESS NOTE ADULT - SUBJECTIVE AND OBJECTIVE BOX
Patient is a 49y old  Male who presents with a chief complaint of chest pain (29 Aug 2019 10:53)      SUBJECTIVE / OVERNIGHT EVENTS: This morning CP is much improved, is suing the PCA. still has body aches similar to prior crisis. remains afebrile, no SOB, no palpations, no LOC. on Tele, PVC, no further vtach     ROS:  No HA/DZ  No Vision changes   No SOB  No N/V/D  No Edema  No Rash  NO weakness, numbness    MEDICATIONS  (STANDING):  docusate sodium 100 milliGRAM(s) Oral three times a day  enoxaparin Injectable 40 milliGRAM(s) SubCutaneous daily  folic acid 1 milliGRAM(s) Oral daily  morphine PCA (5 mG/mL) 30 milliLiter(s) PCA Continuous PCA Continuous  senna 2 Tablet(s) Oral at bedtime  sodium chloride 0.45%. 1000 milliLiter(s) (75 mL/Hr) IV Continuous <Continuous>    MEDICATIONS  (PRN):  acetaminophen   Tablet .. 650 milliGRAM(s) Oral every 6 hours PRN Temp greater or equal to 38C (100.4F), Mild Pain (1 - 3)  diphenhydrAMINE 25 milliGRAM(s) Oral every 6 hours PRN Rash and/or Itching      T(C): 37 (08-29-19 @ 05:00)  HR: 72 (08-29-19 @ 05:00)  BP: 134/72 (08-29-19 @ 05:00)  RR: 17 (08-29-19 @ 05:00)  SpO2: 98% (08-29-19 @ 05:00)  CAPILLARY BLOOD GLUCOSE        I&O's Summary    28 Aug 2019 07:01  -  29 Aug 2019 07:00  --------------------------------------------------------  IN: 1675 mL / OUT: 1100 mL / NET: 575 mL    29 Aug 2019 07:01  -  29 Aug 2019 11:18  --------------------------------------------------------  IN: 150 mL / OUT: 0 mL / NET: 150 mL        PHYSICAL EXAM:  GENERAL: NAD, well-developed, AOx3  HEAD:  Atraumatic, Normocephalic  EYES: EOMI, PERRL, conjunctiva and sclera clear  NECK: Supple, No JVD  CHEST/LUNG: Clear to auscultation bilaterally  HEART: Regular rate and rhythm; No murmurs, rubs, or gallops, No Edema  ABDOMEN: Soft, Nontender, Nondistended; Bowel sounds present  EXTREMITIES:  2+ Peripheral Pulses, No clubbing, cyanosis  PSYCH: No SI/HI  NEUROLOGY: non-focal  SKIN: No rashes or lesions    LABS:                        6.4    10.93 )-----------( 348      ( 29 Aug 2019 05:45 )             20.3     08-29    141  |  109<H>  |  14  ----------------------------<  66<L>  4.5   |  21<L>  |  0.98    Ca    9.3      29 Aug 2019 05:45  Phos  3.6     08-28  Mg     1.8     08-28    TPro  6.8  /  Alb  3.6  /  TBili  1.5<H>  /  DBili  x   /  AST  31  /  ALT  14  /  AlkPhos  71  08-28                  RADIOLOGY & ADDITIONAL TESTS:    Imaging Personally Reviewed:    Consultant(s) Notes Reviewed:      Care Discussed with Consultants/Other Providers:

## 2019-08-29 NOTE — PROGRESS NOTE ADULT - PROBLEM SELECTOR PLAN 3
Patient does not take medications to lower blood pressure, (last measured 147/51)  Monitor blood pressure - partly due to pain   DASH diet  BB for NSVT

## 2019-08-29 NOTE — PROGRESS NOTE ADULT - SUBJECTIVE AND OBJECTIVE BOX
Patient is a 49y old  Male who presents with a chief complaint of chest pain (28 Aug 2019 10:54)      HPI:  49 year old male with a history of sickle cell disease presenting with diffuse body aches and chest pain. Pt admits to an exertional, reproducible and pleuritic component to his CP, and also a positional component where it feels better lying down and worse sitting up, no dietary component. Patient states that he began having intermittent bilateral pain in his chest, shoulders, upper back, abdomen and knees one week ago which has progressively worsened to 8/10 and became constant since last night. Patient takes Tylenol and Advil for pain as needed, but does not take any other medication. He admits to feeling nauseous, lightheaded, fatigued and weak. Patient experienced a similar episode in 2019 and was hospitalized. Patient sees Dr. Lluvia Hamm at the sickle cell clinic every two months and has his next appointment at the end of September. Pt also c/o ARRIETA after 1 block over the last 1 week.  Patient reports pain and difficulty urinating for the past three weeks. Patient denies blood in his urine, frequency and urgency. No recent prolonged travel.  Patient states that he has a "cold" for the past week with sore throat, sneezing and nonproductive cough. Patient denies shortness of breath, fever, chills, nausea, vomiting, diarrhea, headache, nasal congestion. (27 Aug 2019 11:08)    Today, pt is stating that the pain medications he's been receiving have been helping to alleviate his pain crisis. Does not currently endorse CP, SOB, palpitations, diaphoresis, N/V/D.       PAST MEDICAL & SURGICAL HISTORY:  Hypertension  Constipation  Intellectual disability: ~ mild  Acute chest syndrome: Pt unaware  H/O transfusion: ~ pRBC  Left ventricular dysfunction: mild LVD on echo in , normal LVF in   Sickle Cell Disease  No significant past surgical history            ECHO  FINDINGS:      MEDICATIONS  (STANDING):  docusate sodium 100 milliGRAM(s) Oral three times a day  enoxaparin Injectable 40 milliGRAM(s) SubCutaneous daily  folic acid 1 milliGRAM(s) Oral daily  morphine PCA (5 mG/mL) 30 milliLiter(s) PCA Continuous PCA Continuous  senna 2 Tablet(s) Oral at bedtime  sodium chloride 0.45%. 1000 milliLiter(s) (75 mL/Hr) IV Continuous <Continuous>    MEDICATIONS  (PRN):  acetaminophen   Tablet .. 650 milliGRAM(s) Oral every 6 hours PRN Temp greater or equal to 38C (100.4F), Mild Pain (1 - 3)  diphenhydrAMINE 25 milliGRAM(s) Oral every 6 hours PRN Rash and/or Itching      FAMILY HISTORY:  Family history of sickle cell trait: ~ presumed in parents &amp; 7 siblings (none suffer w/ the disease, per patient)  FH: hypertension: ~ mother          REVIEW OF SYSTEMS:    CONSTITUTIONAL: No weakness, fevers or chills  EYES/ENT: No visual changes;  No vertigo or throat pain   NECK: No pain or stiffness  RESPIRATORY: No cough, wheezing, hemoptysis; No shortness of breath  CARDIOVASCULAR: Chest pain, palpitations  GASTROINTESTINAL: No abdominal or epigastric pain. Nausea. No vomiting, or hematemesis; No diarrhea or constipation. No melena or hematochezia.  GENITOURINARY: No dysuria, frequency or hematuria  NEUROLOGICAL: No numbness or weakness  All other review of systems is negative unless indicated above.SOCIAL HISTORY:    CIGARETTES:    ALCOHOL:  Vital Signs Last 24 Hrs  T(C): 36.5 (28 Aug 2019 13:10), Max: 36.9 (28 Aug 2019 04:10)  T(F): 97.7 (28 Aug 2019 13:10), Max: 98.5 (28 Aug 2019 04:10)  HR: 73 (28 Aug 2019 13:10) (63 - 73)  BP: 134/73 (28 Aug 2019 13:10) (113/59 - 152/73)  BP(mean): --  RR: 16 (28 Aug 2019 13:10) (16 - 17)  SpO2: 95% (28 Aug 2019 13:10) (94% - 100%)    VITALS:     GENERAL: NAD, lying in bed comfortably  HEAD:  Atraumatic, Normocephalic  EYES: EOMI, PERRLA, conjunctiva and sclera clear  ENT: Moist mucous membranes  NECK: Supple, No JVD  CHEST/LUNG: Clear to auscultation bilaterally; No rales, rhonchi, wheezing, or rubs. Unlabored respirations  HEART: Regular rate and rhythm; No murmurs, rubs, or gallops  ABDOMEN: Bowel sounds present; Soft, Nontender, Nondistended. No hepatomegaly  EXTREMITIES:  2+ Peripheral Pulses, brisk capillary refill. No clubbing, cyanosis, or edema  NERVOUS SYSTEM:  Alert & Oriented X3, speech clear. No deficits   MSK: FROM all 4 extremities, full and equal strength  SKIN: No rashes or lesions        ECG:    I&O's Detail    27 Aug 2019 07:01  -  28 Aug 2019 07:00  --------------------------------------------------------  IN:    Oral Fluid: 100 mL    sodium chloride 0.45%.: 825 mL  Total IN: 925 mL    OUT:    Voided: 1000 mL  Total OUT: 1000 mL    Total NET: -75 mL      28 Aug 2019 07:01  -  28 Aug 2019 15:11  --------------------------------------------------------  IN:    Oral Fluid: 600 mL  Total IN: 600 mL    OUT:    Voided: 250 mL  Total OUT: 250 mL    Total NET: 350 mL          LABS:                        6.7    12.54 )-----------( 384      ( 28 Aug 2019 06:00 )             20.8     0828    140  |  109<H>  |  14  ----------------------------<  75  4.7   |  20<L>  |  0.97    Ca    9.0      28 Aug 2019 06:00  Phos  3.6       Mg     1.8         TPro  6.8  /  Alb  3.6  /  TBili  1.5<H>  /  DBili  x   /  AST  31  /  ALT  14  /  AlkPhos  71          PT/INR - ( 27 Aug 2019 05:30 )   PT: 11.5 SEC;   INR: 1.03          PTT - ( 27 Aug 2019 05:30 )  PTT:28.1 SEC  Urinalysis Basic - ( 27 Aug 2019 06:40 )    Color: YELLOW / Appearance: CLEAR / S.011 / pH: 6.0  Gluc: NEGATIVE / Ketone: NEGATIVE  / Bili: NEGATIVE / Urobili: NORMAL   Blood: NEGATIVE / Protein: 20 / Nitrite: NEGATIVE   Leuk Esterase: NEGATIVE / RBC: 0-2 / WBC 0-2   Sq Epi: OCC / Non Sq Epi: x / Bacteria: NEGATIVE      I&O's Summary    27 Aug 2019 07:  -  28 Aug 2019 07:00  --------------------------------------------------------  IN: 925 mL / OUT: 1000 mL / NET: -75 mL    28 Aug 2019 07:  -  28 Aug 2019 15:11  --------------------------------------------------------  IN: 600 mL / OUT: 250 mL / NET: 350 mL      BNP  RADIOLOGY & ADDITIONAL STUDIES:

## 2019-08-30 LAB
ANION GAP SERPL CALC-SCNC: 12 MMO/L — SIGNIFICANT CHANGE UP (ref 7–14)
BASOPHILS # BLD AUTO: 0.1 K/UL — SIGNIFICANT CHANGE UP (ref 0–0.2)
BASOPHILS NFR BLD AUTO: 0.9 % — SIGNIFICANT CHANGE UP (ref 0–2)
BUN SERPL-MCNC: 14 MG/DL — SIGNIFICANT CHANGE UP (ref 7–23)
CALCIUM SERPL-MCNC: 9.2 MG/DL — SIGNIFICANT CHANGE UP (ref 8.4–10.5)
CHLORIDE SERPL-SCNC: 105 MMOL/L — SIGNIFICANT CHANGE UP (ref 98–107)
CLOSURE TME COLL+EPINEP BLD: 308 K/UL — SIGNIFICANT CHANGE UP (ref 150–400)
CO2 SERPL-SCNC: 23 MMOL/L — SIGNIFICANT CHANGE UP (ref 22–31)
CREAT SERPL-MCNC: 1.04 MG/DL — SIGNIFICANT CHANGE UP (ref 0.5–1.3)
EOSINOPHIL # BLD AUTO: 1.66 K/UL — HIGH (ref 0–0.5)
EOSINOPHIL NFR BLD AUTO: 15.6 % — HIGH (ref 0–6)
GLUCOSE SERPL-MCNC: 87 MG/DL — SIGNIFICANT CHANGE UP (ref 70–99)
HCT VFR BLD CALC: 23 % — LOW (ref 39–50)
HCT VFR BLD CALC: 25 % — LOW (ref 39–50)
HGB BLD-MCNC: 7 G/DL — CRITICAL LOW (ref 13–17)
HGB BLD-MCNC: 7.9 G/DL — LOW (ref 13–17)
IMM GRANULOCYTES NFR BLD AUTO: 0.8 % — SIGNIFICANT CHANGE UP (ref 0–1.5)
LYMPHOCYTES # BLD AUTO: 19.5 % — SIGNIFICANT CHANGE UP (ref 13–44)
LYMPHOCYTES # BLD AUTO: 2.08 K/UL — SIGNIFICANT CHANGE UP (ref 1–3.3)
MAGNESIUM SERPL-MCNC: 1.9 MG/DL — SIGNIFICANT CHANGE UP (ref 1.6–2.6)
MCHC RBC-ENTMCNC: 23.5 PG — LOW (ref 27–34)
MCHC RBC-ENTMCNC: 23.6 PG — LOW (ref 27–34)
MCHC RBC-ENTMCNC: 30.4 % — LOW (ref 32–36)
MCHC RBC-ENTMCNC: 31.6 % — LOW (ref 32–36)
MCV RBC AUTO: 74.6 FL — LOW (ref 80–100)
MCV RBC AUTO: 77.2 FL — LOW (ref 80–100)
MONOCYTES # BLD AUTO: 0.81 K/UL — SIGNIFICANT CHANGE UP (ref 0–0.9)
MONOCYTES NFR BLD AUTO: 7.6 % — SIGNIFICANT CHANGE UP (ref 2–14)
NEUTROPHILS # BLD AUTO: 5.92 K/UL — SIGNIFICANT CHANGE UP (ref 1.8–7.4)
NEUTROPHILS NFR BLD AUTO: 55.6 % — SIGNIFICANT CHANGE UP (ref 43–77)
NRBC # FLD: 0.06 K/UL — SIGNIFICANT CHANGE UP (ref 0–0)
NRBC # FLD: 0.2 K/UL — SIGNIFICANT CHANGE UP (ref 0–0)
NRBC FLD-RTO: 2.1 — SIGNIFICANT CHANGE UP
NRBC FLD-RTO: SIGNIFICANT CHANGE UP
PLATELET # BLD AUTO: 178 K/UL — SIGNIFICANT CHANGE UP (ref 150–400)
PLATELET # BLD AUTO: SIGNIFICANT CHANGE UP K/UL (ref 150–400)
PMV BLD: 10.6 FL — SIGNIFICANT CHANGE UP (ref 7–13)
PMV BLD: SIGNIFICANT CHANGE UP FL (ref 7–13)
POTASSIUM SERPL-MCNC: 4.9 MMOL/L — SIGNIFICANT CHANGE UP (ref 3.5–5.3)
POTASSIUM SERPL-SCNC: 4.9 MMOL/L — SIGNIFICANT CHANGE UP (ref 3.5–5.3)
RBC # BLD: 2.98 M/UL — LOW (ref 4.2–5.8)
RBC # BLD: 3.35 M/UL — LOW (ref 4.2–5.8)
RBC # FLD: 22.5 % — HIGH (ref 10.3–14.5)
RBC # FLD: 22.7 % — HIGH (ref 10.3–14.5)
REVIEW TO FOLLOW: YES — SIGNIFICANT CHANGE UP
SODIUM SERPL-SCNC: 140 MMOL/L — SIGNIFICANT CHANGE UP (ref 135–145)
WBC # BLD: 10.65 K/UL — HIGH (ref 3.8–10.5)
WBC # BLD: 9.36 K/UL — SIGNIFICANT CHANGE UP (ref 3.8–10.5)
WBC # FLD AUTO: 10.65 K/UL — HIGH (ref 3.8–10.5)
WBC # FLD AUTO: 9.36 K/UL — SIGNIFICANT CHANGE UP (ref 3.8–10.5)

## 2019-08-30 PROCEDURE — 93306 TTE W/DOPPLER COMPLETE: CPT | Mod: 26

## 2019-08-30 PROCEDURE — 99233 SBSQ HOSP IP/OBS HIGH 50: CPT

## 2019-08-30 RX ORDER — NALOXONE HYDROCHLORIDE 4 MG/.1ML
0.1 SPRAY NASAL
Refills: 0 | Status: DISCONTINUED | OUTPATIENT
Start: 2019-08-30 | End: 2019-09-04

## 2019-08-30 RX ORDER — ONDANSETRON 8 MG/1
4 TABLET, FILM COATED ORAL EVERY 6 HOURS
Refills: 0 | Status: DISCONTINUED | OUTPATIENT
Start: 2019-08-30 | End: 2019-09-04

## 2019-08-30 RX ORDER — MORPHINE SULFATE 50 MG/1
30 CAPSULE, EXTENDED RELEASE ORAL
Refills: 0 | Status: DISCONTINUED | OUTPATIENT
Start: 2019-08-30 | End: 2019-09-03

## 2019-08-30 RX ADMIN — Medication 12.5 MILLIGRAM(S): at 06:14

## 2019-08-30 RX ADMIN — Medication 100 MILLIGRAM(S): at 06:14

## 2019-08-30 RX ADMIN — Medication 100 MILLIGRAM(S): at 13:05

## 2019-08-30 RX ADMIN — Medication 1 MILLIGRAM(S): at 13:05

## 2019-08-30 RX ADMIN — MORPHINE SULFATE 30 MILLILITER(S): 50 CAPSULE, EXTENDED RELEASE ORAL at 13:35

## 2019-08-30 RX ADMIN — MORPHINE SULFATE 30 MILLILITER(S): 50 CAPSULE, EXTENDED RELEASE ORAL at 07:10

## 2019-08-30 RX ADMIN — MORPHINE SULFATE 30 MILLILITER(S): 50 CAPSULE, EXTENDED RELEASE ORAL at 19:08

## 2019-08-30 NOTE — PROGRESS NOTE ADULT - SUBJECTIVE AND OBJECTIVE BOX
Patient is a 49y old  Male who presents with a chief complaint of chest pain (30 Aug 2019 08:07)      SUBJECTIVE / OVERNIGHT EVENTS: pain is not as well controlled today, pushed total of 7 times yesterday with 3 times overnight. pt states his usual DD is around 1.5ml, I reviewed prior dises and it is accurate that he is usually on higher DD for morphine PCA. CP overall better.     ROS:  No HA/DZ  No Vision changes   No SOB  No N/V/D  No Edema  No Rash  NO weakness, numbness    MEDICATIONS  (STANDING):  docusate sodium 100 milliGRAM(s) Oral three times a day  folic acid 1 milliGRAM(s) Oral daily  metoprolol tartrate 12.5 milliGRAM(s) Oral two times a day  morphine PCA (5 mG/mL) 30 milliLiter(s) PCA Continuous PCA Continuous  senna 2 Tablet(s) Oral at bedtime  sodium chloride 0.45%. 1000 milliLiter(s) (75 mL/Hr) IV Continuous <Continuous>    MEDICATIONS  (PRN):  acetaminophen   Tablet .. 650 milliGRAM(s) Oral every 6 hours PRN Temp greater or equal to 38C (100.4F), Mild Pain (1 - 3)  diphenhydrAMINE 25 milliGRAM(s) Oral every 6 hours PRN Rash and/or Itching      T(C): 36.6 (08-30-19 @ 11:00)  HR: 63 (08-30-19 @ 11:00)  BP: 114/56 (08-30-19 @ 11:00)  RR: 16 (08-30-19 @ 11:00)  SpO2: 100% (08-30-19 @ 11:00)  CAPILLARY BLOOD GLUCOSE        I&O's Summary    29 Aug 2019 07:01  -  30 Aug 2019 07:00  --------------------------------------------------------  IN: 2000 mL / OUT: 2500 mL / NET: -500 mL    30 Aug 2019 07:01  -  30 Aug 2019 12:54  --------------------------------------------------------  IN: 440 mL / OUT: 125 mL / NET: 315 mL        PHYSICAL EXAM:  GENERAL: NAD, well-developed, AOx3  HEAD:  Atraumatic, Normocephalic  EYES: EOMI, PERRL, conjunctiva and sclera clear  NECK: Supple, No JVD  CHEST/LUNG: Clear to auscultation bilaterally  HEART: Regular rate and rhythm; No murmurs, rubs, or gallops, No Edema  ABDOMEN: Soft, Nontender, Nondistended; Bowel sounds present  EXTREMITIES:  2+ Peripheral Pulses, No clubbing, cyanosis  PSYCH: No SI/HI  NEUROLOGY: non-focal  SKIN: No rashes or lesions    LABS:                        Test not performed 08/30/19 1117:  HEMOGLOBIN previously reported as: 7.9  L g/dL  Test not performed 08/30/19 1113:  WBC COUNT previously reported as: 10.65  H K/uL  PLS. DISREGARD RESULTS.  CLOTTED SPECIMEN,NOTIFIED GARCIA ALBERTO RN )-----------( Test not performed    ( 30 Aug 2019 09:55 )             Test not performed 08/30/19 1117:  HEMATOCRIT previously reported as: 25.0  L %    08-30    140  |  105  |  14  ----------------------------<  87  4.9   |  23  |  1.04    Ca    9.2      30 Aug 2019 06:00  Mg     1.9     08-30                    RADIOLOGY & ADDITIONAL TESTS:    Imaging Personally Reviewed:    Consultant(s) Notes Reviewed:      Care Discussed with Consultants/Other Providers: Patient is a 49y old  Male who presents with a chief complaint of chest pain (30 Aug 2019 08:07)      SUBJECTIVE / OVERNIGHT EVENTS: pain is not as well controlled today, pushed total of 7 times yesterday daytime and with 3 pushes overnight. pt states his usual DD is around 1.5ml, I reviewed prior doses and it is accurate that he is usually on higher DD for morphine PCA. CP overall better.     ROS:  No HA/DZ  No Vision changes   No SOB  No N/V/D  No Edema  No Rash  NO weakness, numbness    MEDICATIONS  (STANDING):  docusate sodium 100 milliGRAM(s) Oral three times a day  folic acid 1 milliGRAM(s) Oral daily  metoprolol tartrate 12.5 milliGRAM(s) Oral two times a day  morphine PCA (5 mG/mL) 30 milliLiter(s) PCA Continuous PCA Continuous  senna 2 Tablet(s) Oral at bedtime  sodium chloride 0.45%. 1000 milliLiter(s) (75 mL/Hr) IV Continuous <Continuous>    MEDICATIONS  (PRN):  acetaminophen   Tablet .. 650 milliGRAM(s) Oral every 6 hours PRN Temp greater or equal to 38C (100.4F), Mild Pain (1 - 3)  diphenhydrAMINE 25 milliGRAM(s) Oral every 6 hours PRN Rash and/or Itching      T(C): 36.6 (08-30-19 @ 11:00)  HR: 63 (08-30-19 @ 11:00)  BP: 114/56 (08-30-19 @ 11:00)  RR: 16 (08-30-19 @ 11:00)  SpO2: 100% (08-30-19 @ 11:00)  CAPILLARY BLOOD GLUCOSE        I&O's Summary    29 Aug 2019 07:01  -  30 Aug 2019 07:00  --------------------------------------------------------  IN: 2000 mL / OUT: 2500 mL / NET: -500 mL    30 Aug 2019 07:01  -  30 Aug 2019 12:54  --------------------------------------------------------  IN: 440 mL / OUT: 125 mL / NET: 315 mL        PHYSICAL EXAM:  GENERAL: NAD, well-developed, AOx3  HEAD:  Atraumatic, Normocephalic  EYES: EOMI, PERRL, conjunctiva and sclera clear  NECK: Supple, No JVD  CHEST/LUNG: Clear to auscultation bilaterally  HEART: Regular rate and rhythm; No murmurs, rubs, or gallops, No Edema  ABDOMEN: Soft, Nontender, Nondistended; Bowel sounds present  EXTREMITIES:  2+ Peripheral Pulses, No clubbing, cyanosis  PSYCH: No SI/HI  NEUROLOGY: non-focal  SKIN: No rashes or lesions    LABS:                        Test not performed 08/30/19 1117:  HEMOGLOBIN previously reported as: 7.9  L g/dL  Test not performed 08/30/19 1113:  WBC COUNT previously reported as: 10.65  H K/uL  PLS. DISREGARD RESULTS.  CLOTTED SPECIMEN,NOTIFIED GARCIA ALBERTO RN )-----------( Test not performed    ( 30 Aug 2019 09:55 )             Test not performed 08/30/19 1117:  HEMATOCRIT previously reported as: 25.0  L %    08-30    140  |  105  |  14  ----------------------------<  87  4.9   |  23  |  1.04    Ca    9.2      30 Aug 2019 06:00  Mg     1.9     08-30                    RADIOLOGY & ADDITIONAL TESTS:    Imaging Personally Reviewed:    Consultant(s) Notes Reviewed:      Care Discussed with Consultants/Other Providers:

## 2019-08-30 NOTE — PROGRESS NOTE ADULT - PROBLEM SELECTOR PLAN 2
now using pump - dw nursing in detail.  he is not on opiates at home and therefore not very tolerant, as well as with underlying MR, therefore not utilizing PCA as much as other VOC patients tend to. switching to standing IV will take the benefit of OD prevention and pt autonomy that PCA offers, and based on clinical picture he is in crisis. pain not well controlled today, will increase DD to to 1.5 mg and 4 hour limit to 16 mg. once crisis is resolving, will switch to po   cw 1/2 ns, FA, incentive spirometry, monitor counts   2 cbcs clotted today, so unclear if low plt is accurate, for now DCd Lovenox and ask for repeat CBC and if plt truly low, will check blue top and send HIT and if +, will send GITA assay

## 2019-08-30 NOTE — CONSULT NOTE ADULT - ASSESSMENT
49M with PMH of sickle cell disease presenting with diffuse body aches and chest pain admitted to telemetry for further evaluation and treatment of sick cell crisis, consulted for NSVT.     #NSVT  -Pt had 17 beats NSVT last night 21:55  -Hemodynamically stable at time with HR 64, /59, pt asymptomatic   -Trops 14 -> 11  -Prior echo showed mid systolic HF, EF 52%, LVH  -Agree with obtaining repeat echo  -Suggest low dose metoprolol after acute crisis is treated  -Will need stress test in future
49M with PMH of sickle cell disease presenting with diffuse body aches and chest pain admitted to telemetry for further evaluation and treatment of sick cell crisis, consulted for NSVT.     #NSVT  -Pt had 17 beats NSVT 8/27  -Hemodynamically stable at time with HR 64, /59, pt asymptomatic   -Trops 14 -> 11  -Prior echo showed mid systolic HF, EF 52%, LVH  -Awaiting results of repeat echo  -Suggest low dose metoprolol after acute crisis is treated  -Will need stress test in future

## 2019-08-30 NOTE — PROGRESS NOTE ADULT - ASSESSMENT
Patient is a 49 year old male with a history of sickle cell disease, mild MR, chronic mild systolic CHF, presenting with diffuse body aches and chest pain admitted to telemetry for further evaluation and treatment of sick cell crisis.

## 2019-08-30 NOTE — CONSULT NOTE ADULT - SUBJECTIVE AND OBJECTIVE BOX
Patient is a 49y old  Male who presents with a chief complaint of chest pain (28 Aug 2019 10:54)      HPI:  49 year old male with a history of sickle cell disease presenting with diffuse body aches and chest pain. Pt admits to an exertional, reproducible and pleuritic component to his CP, and also a positional component where it feels better lying down and worse sitting up, no dietary component. Patient states that he began having intermittent bilateral pain in his chest, shoulders, upper back, abdomen and knees one week ago which has progressively worsened to 8/10 and became constant since last night. Patient takes Tylenol and Advil for pain as needed, but does not take any other medication. He admits to feeling nauseous, lightheaded, fatigued and weak. Patient experienced a similar episode in 2019 and was hospitalized. Patient sees Dr. Lluvia Hamm at the [facility] every two months and has his next appointment at the end of September. Pt also c/o ARRIETA after 1 block over the last 1 week.  Patient reports pain and difficulty urinating for the past three weeks. Patient denies blood in his urine, frequency and urgency. No recent prolonged travel.  Patient states that he has a "cold" for the past week with sore throat, sneezing and nonproductive cough. Patient denies shortness of breath, fever, chills, nausea, vomiting, diarrhea, headache, nasal congestion. (27 Aug 2019 11:08)      PAST MEDICAL & SURGICAL HISTORY:  Hypertension  Constipation  Intellectual disability: ~ mild  Acute chest syndrome: Pt unaware  H/O transfusion: ~ pRBC  Left ventricular dysfunction: mild LVD on echo in , normal LVF in   Sickle Cell Disease  No significant past surgical history            ECHO  FINDINGS:      MEDICATIONS  (STANDING):  docusate sodium 100 milliGRAM(s) Oral three times a day  enoxaparin Injectable 40 milliGRAM(s) SubCutaneous daily  folic acid 1 milliGRAM(s) Oral daily  morphine PCA (5 mG/mL) 30 milliLiter(s) PCA Continuous PCA Continuous  senna 2 Tablet(s) Oral at bedtime  sodium chloride 0.45%. 1000 milliLiter(s) (75 mL/Hr) IV Continuous <Continuous>    MEDICATIONS  (PRN):  acetaminophen   Tablet .. 650 milliGRAM(s) Oral every 6 hours PRN Temp greater or equal to 38C (100.4F), Mild Pain (1 - 3)  diphenhydrAMINE 25 milliGRAM(s) Oral every 6 hours PRN Rash and/or Itching      FAMILY HISTORY:  Family history of sickle cell trait: ~ presumed in parents &amp; 7 siblings (none suffer w/ the disease, per patient)  FH: hypertension: ~ mother          REVIEW OF SYSTEMS:    CONSTITUTIONAL: No weakness, fevers or chills  EYES/ENT: No visual changes;  No vertigo or throat pain   NECK: No pain or stiffness  RESPIRATORY: No cough, wheezing, hemoptysis; No shortness of breath  CARDIOVASCULAR: Chest pain, palpitations  GASTROINTESTINAL: No abdominal or epigastric pain. Nausea. No vomiting, or hematemesis; No diarrhea or constipation. No melena or hematochezia.  GENITOURINARY: No dysuria, frequency or hematuria  NEUROLOGICAL: No numbness or weakness  All other review of systems is negative unless indicated above.SOCIAL HISTORY:    CIGARETTES:    ALCOHOL:  Vital Signs Last 24 Hrs  T(C): 36.5 (28 Aug 2019 13:10), Max: 36.9 (28 Aug 2019 04:10)  T(F): 97.7 (28 Aug 2019 13:10), Max: 98.5 (28 Aug 2019 04:10)  HR: 73 (28 Aug 2019 13:10) (63 - 73)  BP: 134/73 (28 Aug 2019 13:10) (113/59 - 152/73)  BP(mean): --  RR: 16 (28 Aug 2019 13:10) (16 - 17)  SpO2: 95% (28 Aug 2019 13:10) (94% - 100%)    VITALS:     GENERAL: NAD, lying in bed comfortably  HEAD:  Atraumatic, Normocephalic  EYES: EOMI, PERRLA, conjunctiva and sclera clear  ENT: Moist mucous membranes  NECK: Supple, No JVD  CHEST/LUNG: Clear to auscultation bilaterally; No rales, rhonchi, wheezing, or rubs. Unlabored respirations  HEART: Regular rate and rhythm; No murmurs, rubs, or gallops  ABDOMEN: Bowel sounds present; Soft, Nontender, Nondistended. No hepatomegaly  EXTREMITIES:  2+ Peripheral Pulses, brisk capillary refill. No clubbing, cyanosis, or edema  NERVOUS SYSTEM:  Alert & Oriented X3, speech clear. No deficits   MSK: FROM all 4 extremities, full and equal strength  SKIN: No rashes or lesions        ECG:    I&O's Detail    27 Aug 2019 07:01  -  28 Aug 2019 07:00  --------------------------------------------------------  IN:    Oral Fluid: 100 mL    sodium chloride 0.45%.: 825 mL  Total IN: 925 mL    OUT:    Voided: 1000 mL  Total OUT: 1000 mL    Total NET: -75 mL      28 Aug 2019 07:  -  28 Aug 2019 15:11  --------------------------------------------------------  IN:    Oral Fluid: 600 mL  Total IN: 600 mL    OUT:    Voided: 250 mL  Total OUT: 250 mL    Total NET: 350 mL          LABS:                        6.7    12.54 )-----------( 384      ( 28 Aug 2019 06:00 )             20.8     08-28    140  |  109<H>  |  14  ----------------------------<  75  4.7   |  20<L>  |  0.97    Ca    9.0      28 Aug 2019 06:00  Phos  3.6     -  Mg     1.8         TPro  6.8  /  Alb  3.6  /  TBili  1.5<H>  /  DBili  x   /  AST  31  /  ALT  14  /  AlkPhos  71  08-        PT/INR - ( 27 Aug 2019 05:30 )   PT: 11.5 SEC;   INR: 1.03          PTT - ( 27 Aug 2019 05:30 )  PTT:28.1 SEC  Urinalysis Basic - ( 27 Aug 2019 06:40 )    Color: YELLOW / Appearance: CLEAR / S.011 / pH: 6.0  Gluc: NEGATIVE / Ketone: NEGATIVE  / Bili: NEGATIVE / Urobili: NORMAL   Blood: NEGATIVE / Protein: 20 / Nitrite: NEGATIVE   Leuk Esterase: NEGATIVE / RBC: 0-2 / WBC 0-2   Sq Epi: OCC / Non Sq Epi: x / Bacteria: NEGATIVE      I&O's Summary    27 Aug 2019 07:01  -  28 Aug 2019 07:00  --------------------------------------------------------  IN: 925 mL / OUT: 1000 mL / NET: -75 mL    28 Aug 2019 07:01  -  28 Aug 2019 15:11  --------------------------------------------------------  IN: 600 mL / OUT: 250 mL / NET: 350 mL      BNP  RADIOLOGY & ADDITIONAL STUDIES:
Patient is a 49y old  Male who presents with a chief complaint of chest pain (28 Aug 2019 10:54)      HPI:  49 year old male with a history of sickle cell disease presenting with diffuse body aches and chest pain. Pt admits to an exertional, reproducible and pleuritic component to his CP, and also a positional component where it feels better lying down and worse sitting up, no dietary component. Patient states that he began having intermittent bilateral pain in his chest, shoulders, upper back, abdomen and knees one week ago which has progressively worsened to 8/10 and became constant since last night. Patient takes Tylenol and Advil for pain as needed, but does not take any other medication. He admits to feeling nauseous, lightheaded, fatigued and weak. Patient experienced a similar episode in 2019 and was hospitalized. Patient sees Dr. Lluvia Hamm at the sickle cell clinic every two months and has his next appointment at the end of September. Pt also c/o ARRIETA after 1 block over the last 1 week.  Patient reports pain and difficulty urinating for the past three weeks. Patient denies blood in his urine, frequency and urgency. No recent prolonged travel.  Patient states that he has a "cold" for the past week with sore throat, sneezing and nonproductive cough. Patient denies shortness of breath, fever, chills, nausea, vomiting, diarrhea, headache, nasal congestion. (27 Aug 2019 11:08)      PAST MEDICAL & SURGICAL HISTORY:  Hypertension  Constipation  Intellectual disability: ~ mild  Acute chest syndrome: Pt unaware  H/O transfusion: ~ pRBC  Left ventricular dysfunction: mild LVD on echo in , normal LVF in   Sickle Cell Disease  No significant past surgical history            ECHO  FINDINGS:      MEDICATIONS  (STANDING):  docusate sodium 100 milliGRAM(s) Oral three times a day  enoxaparin Injectable 40 milliGRAM(s) SubCutaneous daily  folic acid 1 milliGRAM(s) Oral daily  morphine PCA (5 mG/mL) 30 milliLiter(s) PCA Continuous PCA Continuous  senna 2 Tablet(s) Oral at bedtime  sodium chloride 0.45%. 1000 milliLiter(s) (75 mL/Hr) IV Continuous <Continuous>    MEDICATIONS  (PRN):  acetaminophen   Tablet .. 650 milliGRAM(s) Oral every 6 hours PRN Temp greater or equal to 38C (100.4F), Mild Pain (1 - 3)  diphenhydrAMINE 25 milliGRAM(s) Oral every 6 hours PRN Rash and/or Itching      FAMILY HISTORY:  Family history of sickle cell trait: ~ presumed in parents &amp; 7 siblings (none suffer w/ the disease, per patient)  FH: hypertension: ~ mother          REVIEW OF SYSTEMS:    CONSTITUTIONAL: No weakness, fevers or chills  EYES/ENT: No visual changes;  No vertigo or throat pain   NECK: No pain or stiffness  RESPIRATORY: No cough, wheezing, hemoptysis; No shortness of breath  CARDIOVASCULAR: Chest pain, palpitations  GASTROINTESTINAL: No abdominal or epigastric pain. Nausea. No vomiting, or hematemesis; No diarrhea or constipation. No melena or hematochezia.  GENITOURINARY: No dysuria, frequency or hematuria  NEUROLOGICAL: No numbness or weakness  All other review of systems is negative unless indicated above.SOCIAL HISTORY:    CIGARETTES:    ALCOHOL:  Vital Signs Last 24 Hrs  T(C): 36.5 (28 Aug 2019 13:10), Max: 36.9 (28 Aug 2019 04:10)  T(F): 97.7 (28 Aug 2019 13:10), Max: 98.5 (28 Aug 2019 04:10)  HR: 73 (28 Aug 2019 13:10) (63 - 73)  BP: 134/73 (28 Aug 2019 13:10) (113/59 - 152/73)  BP(mean): --  RR: 16 (28 Aug 2019 13:10) (16 - 17)  SpO2: 95% (28 Aug 2019 13:10) (94% - 100%)    VITALS:     GENERAL: NAD, lying in bed comfortably  HEAD:  Atraumatic, Normocephalic  EYES: EOMI, PERRLA, conjunctiva and sclera clear  ENT: Moist mucous membranes  NECK: Supple, No JVD  CHEST/LUNG: Clear to auscultation bilaterally; No rales, rhonchi, wheezing, or rubs. Unlabored respirations  HEART: Regular rate and rhythm; No murmurs, rubs, or gallops  ABDOMEN: Bowel sounds present; Soft, Nontender, Nondistended. No hepatomegaly  EXTREMITIES:  2+ Peripheral Pulses, brisk capillary refill. No clubbing, cyanosis, or edema  NERVOUS SYSTEM:  Alert & Oriented X3, speech clear. No deficits   MSK: FROM all 4 extremities, full and equal strength  SKIN: No rashes or lesions        ECG:    I&O's Detail    27 Aug 2019 07:01  -  28 Aug 2019 07:00  --------------------------------------------------------  IN:    Oral Fluid: 100 mL    sodium chloride 0.45%.: 825 mL  Total IN: 925 mL    OUT:    Voided: 1000 mL  Total OUT: 1000 mL    Total NET: -75 mL      28 Aug 2019 07:  -  28 Aug 2019 15:11  --------------------------------------------------------  IN:    Oral Fluid: 600 mL  Total IN: 600 mL    OUT:    Voided: 250 mL  Total OUT: 250 mL    Total NET: 350 mL          LABS:                        6.7    12.54 )-----------( 384      ( 28 Aug 2019 06:00 )             20.8     08-28    140  |  109<H>  |  14  ----------------------------<  75  4.7   |  20<L>  |  0.97    Ca    9.0      28 Aug 2019 06:00  Phos  3.6     -  Mg     1.8         TPro  6.8  /  Alb  3.6  /  TBili  1.5<H>  /  DBili  x   /  AST  31  /  ALT  14  /  AlkPhos  71  08-        PT/INR - ( 27 Aug 2019 05:30 )   PT: 11.5 SEC;   INR: 1.03          PTT - ( 27 Aug 2019 05:30 )  PTT:28.1 SEC  Urinalysis Basic - ( 27 Aug 2019 06:40 )    Color: YELLOW / Appearance: CLEAR / S.011 / pH: 6.0  Gluc: NEGATIVE / Ketone: NEGATIVE  / Bili: NEGATIVE / Urobili: NORMAL   Blood: NEGATIVE / Protein: 20 / Nitrite: NEGATIVE   Leuk Esterase: NEGATIVE / RBC: 0-2 / WBC 0-2   Sq Epi: OCC / Non Sq Epi: x / Bacteria: NEGATIVE      I&O's Summary    27 Aug 2019 07:01  -  28 Aug 2019 07:00  --------------------------------------------------------  IN: 925 mL / OUT: 1000 mL / NET: -75 mL    28 Aug 2019 07:01  -  28 Aug 2019 15:11  --------------------------------------------------------  IN: 600 mL / OUT: 250 mL / NET: 350 mL      BNP  RADIOLOGY & ADDITIONAL STUDIES:

## 2019-08-30 NOTE — PROGRESS NOTE ADULT - PROBLEM SELECTOR PLAN 1
- likely due to VOC, improving   - repeat TTE reviewed, EF a bit lower than prior but overall unchanged cardiac fx. - NSVT on tele, started on lopressor. will defer ischemic w.u to cards

## 2019-08-31 PROCEDURE — 99232 SBSQ HOSP IP/OBS MODERATE 35: CPT | Mod: GC

## 2019-08-31 RX ADMIN — Medication 12.5 MILLIGRAM(S): at 05:29

## 2019-08-31 RX ADMIN — MORPHINE SULFATE 30 MILLILITER(S): 50 CAPSULE, EXTENDED RELEASE ORAL at 18:59

## 2019-08-31 RX ADMIN — Medication 100 MILLIGRAM(S): at 05:29

## 2019-08-31 RX ADMIN — Medication 1 MILLIGRAM(S): at 13:24

## 2019-08-31 RX ADMIN — MORPHINE SULFATE 30 MILLILITER(S): 50 CAPSULE, EXTENDED RELEASE ORAL at 07:04

## 2019-08-31 RX ADMIN — Medication 12.5 MILLIGRAM(S): at 18:59

## 2019-08-31 RX ADMIN — Medication 100 MILLIGRAM(S): at 23:20

## 2019-08-31 RX ADMIN — SODIUM CHLORIDE 75 MILLILITER(S): 9 INJECTION, SOLUTION INTRAVENOUS at 19:28

## 2019-08-31 RX ADMIN — Medication 100 MILLIGRAM(S): at 13:24

## 2019-08-31 RX ADMIN — SENNA PLUS 2 TABLET(S): 8.6 TABLET ORAL at 23:20

## 2019-08-31 NOTE — PROVIDER CONTACT NOTE (OTHER) - RECOMMENDATIONS
Pt educated on the importance of lab work and monitoring lab values to appropriately provide medical treatment.

## 2019-08-31 NOTE — PROGRESS NOTE ADULT - PROBLEM SELECTOR PLAN 2
-improving on current PCA pump settings.  -will re-assess tomorrow and consider downtitrating settings if pain improving.  -plt count re-assessed, is 308.   -can still hold lovenox as long as patient ambulating.

## 2019-08-31 NOTE — PROGRESS NOTE ADULT - SUBJECTIVE AND OBJECTIVE BOX
Patient is a 49y old  Male who presents with a chief complaint of chest pain (30 Aug 2019 08:07)      SUBJECTIVE / OVERNIGHT EVENTS: pain is improving today. No events on telemetry. No other complainst.         MEDICATIONS  (STANDING):  docusate sodium 100 milliGRAM(s) Oral three times a day  folic acid 1 milliGRAM(s) Oral daily  metoprolol tartrate 12.5 milliGRAM(s) Oral two times a day  morphine PCA (5 mG/mL) 30 milliLiter(s) PCA Continuous PCA Continuous  senna 2 Tablet(s) Oral at bedtime  sodium chloride 0.45%. 1000 milliLiter(s) (75 mL/Hr) IV Continuous <Continuous>    MEDICATIONS  (PRN):  acetaminophen   Tablet .. 650 milliGRAM(s) Oral every 6 hours PRN Temp greater or equal to 38C (100.4F), Mild Pain (1 - 3)  diphenhydrAMINE 25 milliGRAM(s) Oral every 6 hours PRN Rash and/or Itching      Vital Signs Last 24 Hrs  T(C): 36.5 (31 Aug 2019 11:00), Max: 36.8 (30 Aug 2019 19:00)  T(F): 97.7 (31 Aug 2019 11:00), Max: 98.3 (30 Aug 2019 23:00)  HR: 78 (31 Aug 2019 11:00) (61 - 78)  BP: 137/63 (31 Aug 2019 11:00) (108/74 - 158/82)  BP(mean): --  RR: 16 (31 Aug 2019 11:00) (16 - 16)  SpO2: 100% (31 Aug 2019 11:00) (100% - 100%)      PHYSICAL EXAM:  GENERAL: NAD, well-developed, AOx3  EYES: EOMI,  conjunctiva and sclera clear  NECK: Supple, No JVD  CHEST/LUNG: Clear to auscultation bilaterally  HEART: Regular rate and rhythm; No murmurs, rubs, or gallops, No Edema  ABDOMEN: Soft, Nontender, Nondistended; Bowel sounds present  EXTREMITIES:  2+ Peripheral Pulses, No clubbing, cyanosis  PSYCH: No SI/HI  NEUROLOGY: non-focal  SKIN: No rashes or lesions    LABS:                  Platelet Count, Blue Top (08.30.19 @ 16:50)    Platelet, Blue: 308: Platelet count performed using whole blood from Sodium  Citrate anticoagulant tube. k/uL      140  |  105  |  14  ----------------------------<  87  4.9   |  23  |  1.04    Ca    9.2      30 Aug 2019 06:00  Mg     1.9     08-30                    RADIOLOGY & ADDITIONAL TESTS:    Imaging Personally Reviewed:    Consultant(s) Notes Reviewed:      Care Discussed with Consultants/Other Providers:

## 2019-08-31 NOTE — PROGRESS NOTE ADULT - PROBLEM SELECTOR PLAN 1
- likely due to VOC, improving   -no events on telemetry, continue with beta blocker,  -TTE reviewed, EF=46%, not far off from 52% and could be technique.  -defer to cards, but doubt any further inpatient workup necessary from cards standpoitn.

## 2019-09-01 DIAGNOSIS — R30.0 DYSURIA: ICD-10-CM

## 2019-09-01 LAB
ANION GAP SERPL CALC-SCNC: 11 MMO/L — SIGNIFICANT CHANGE UP (ref 7–14)
APPEARANCE UR: CLEAR — SIGNIFICANT CHANGE UP
BASOPHILS # BLD AUTO: 0.1 K/UL — SIGNIFICANT CHANGE UP (ref 0–0.2)
BASOPHILS NFR BLD AUTO: 0.9 % — SIGNIFICANT CHANGE UP (ref 0–2)
BILIRUB UR-MCNC: NEGATIVE — SIGNIFICANT CHANGE UP
BLOOD UR QL VISUAL: NEGATIVE — SIGNIFICANT CHANGE UP
BUN SERPL-MCNC: 18 MG/DL — SIGNIFICANT CHANGE UP (ref 7–23)
CALCIUM SERPL-MCNC: 9.5 MG/DL — SIGNIFICANT CHANGE UP (ref 8.4–10.5)
CHLORIDE SERPL-SCNC: 104 MMOL/L — SIGNIFICANT CHANGE UP (ref 98–107)
CO2 SERPL-SCNC: 23 MMOL/L — SIGNIFICANT CHANGE UP (ref 22–31)
COLOR SPEC: SIGNIFICANT CHANGE UP
CREAT SERPL-MCNC: 1.01 MG/DL — SIGNIFICANT CHANGE UP (ref 0.5–1.3)
EOSINOPHIL # BLD AUTO: 1.68 K/UL — HIGH (ref 0–0.5)
EOSINOPHIL NFR BLD AUTO: 15.6 % — HIGH (ref 0–6)
GLUCOSE SERPL-MCNC: 99 MG/DL — SIGNIFICANT CHANGE UP (ref 70–99)
GLUCOSE UR-MCNC: NEGATIVE — SIGNIFICANT CHANGE UP
HCT VFR BLD CALC: 20.5 % — CRITICAL LOW (ref 39–50)
HGB BLD-MCNC: 6.6 G/DL — CRITICAL LOW (ref 13–17)
IMM GRANULOCYTES NFR BLD AUTO: 0.6 % — SIGNIFICANT CHANGE UP (ref 0–1.5)
KETONES UR-MCNC: NEGATIVE — SIGNIFICANT CHANGE UP
LEUKOCYTE ESTERASE UR-ACNC: NEGATIVE — SIGNIFICANT CHANGE UP
LYMPHOCYTES # BLD AUTO: 1.44 K/UL — SIGNIFICANT CHANGE UP (ref 1–3.3)
LYMPHOCYTES # BLD AUTO: 13.3 % — SIGNIFICANT CHANGE UP (ref 13–44)
MAGNESIUM SERPL-MCNC: 1.8 MG/DL — SIGNIFICANT CHANGE UP (ref 1.6–2.6)
MCHC RBC-ENTMCNC: 24.4 PG — LOW (ref 27–34)
MCHC RBC-ENTMCNC: 32.2 % — SIGNIFICANT CHANGE UP (ref 32–36)
MCV RBC AUTO: 75.9 FL — LOW (ref 80–100)
MONOCYTES # BLD AUTO: 0.93 K/UL — HIGH (ref 0–0.9)
MONOCYTES NFR BLD AUTO: 8.6 % — SIGNIFICANT CHANGE UP (ref 2–14)
NEUTROPHILS # BLD AUTO: 6.57 K/UL — SIGNIFICANT CHANGE UP (ref 1.8–7.4)
NEUTROPHILS NFR BLD AUTO: 61 % — SIGNIFICANT CHANGE UP (ref 43–77)
NITRITE UR-MCNC: NEGATIVE — SIGNIFICANT CHANGE UP
NRBC # FLD: 0.17 K/UL — SIGNIFICANT CHANGE UP (ref 0–0)
NRBC FLD-RTO: 1.6 — SIGNIFICANT CHANGE UP
PH UR: 7 — SIGNIFICANT CHANGE UP (ref 5–8)
PLATELET # BLD AUTO: 317 K/UL — SIGNIFICANT CHANGE UP (ref 150–400)
PMV BLD: 10.2 FL — SIGNIFICANT CHANGE UP (ref 7–13)
POTASSIUM SERPL-MCNC: 4.5 MMOL/L — SIGNIFICANT CHANGE UP (ref 3.5–5.3)
POTASSIUM SERPL-SCNC: 4.5 MMOL/L — SIGNIFICANT CHANGE UP (ref 3.5–5.3)
PROT UR-MCNC: NEGATIVE — SIGNIFICANT CHANGE UP
RBC # BLD: 2.7 M/UL — LOW (ref 4.2–5.8)
RBC # FLD: 22.5 % — HIGH (ref 10.3–14.5)
SODIUM SERPL-SCNC: 138 MMOL/L — SIGNIFICANT CHANGE UP (ref 135–145)
SP GR SPEC: 1.01 — SIGNIFICANT CHANGE UP (ref 1–1.04)
UROBILINOGEN FLD QL: NORMAL — SIGNIFICANT CHANGE UP
WBC # BLD: 10.79 K/UL — HIGH (ref 3.8–10.5)
WBC # FLD AUTO: 10.79 K/UL — HIGH (ref 3.8–10.5)

## 2019-09-01 PROCEDURE — 99233 SBSQ HOSP IP/OBS HIGH 50: CPT | Mod: GC

## 2019-09-01 RX ORDER — TAMSULOSIN HYDROCHLORIDE 0.4 MG/1
0.4 CAPSULE ORAL AT BEDTIME
Refills: 0 | Status: DISCONTINUED | OUTPATIENT
Start: 2019-09-01 | End: 2019-09-04

## 2019-09-01 RX ADMIN — SODIUM CHLORIDE 75 MILLILITER(S): 9 INJECTION, SOLUTION INTRAVENOUS at 18:57

## 2019-09-01 RX ADMIN — MORPHINE SULFATE 30 MILLILITER(S): 50 CAPSULE, EXTENDED RELEASE ORAL at 18:57

## 2019-09-01 RX ADMIN — SODIUM CHLORIDE 75 MILLILITER(S): 9 INJECTION, SOLUTION INTRAVENOUS at 05:09

## 2019-09-01 RX ADMIN — MORPHINE SULFATE 30 MILLILITER(S): 50 CAPSULE, EXTENDED RELEASE ORAL at 23:40

## 2019-09-01 RX ADMIN — SODIUM CHLORIDE 75 MILLILITER(S): 9 INJECTION, SOLUTION INTRAVENOUS at 07:13

## 2019-09-01 RX ADMIN — Medication 12.5 MILLIGRAM(S): at 17:27

## 2019-09-01 RX ADMIN — MORPHINE SULFATE 30 MILLILITER(S): 50 CAPSULE, EXTENDED RELEASE ORAL at 07:15

## 2019-09-01 RX ADMIN — Medication 100 MILLIGRAM(S): at 23:12

## 2019-09-01 RX ADMIN — Medication 100 MILLIGRAM(S): at 05:09

## 2019-09-01 RX ADMIN — Medication 12.5 MILLIGRAM(S): at 05:09

## 2019-09-01 RX ADMIN — TAMSULOSIN HYDROCHLORIDE 0.4 MILLIGRAM(S): 0.4 CAPSULE ORAL at 23:12

## 2019-09-01 RX ADMIN — Medication 1 MILLIGRAM(S): at 13:38

## 2019-09-01 RX ADMIN — SENNA PLUS 2 TABLET(S): 8.6 TABLET ORAL at 23:12

## 2019-09-01 RX ADMIN — Medication 100 MILLIGRAM(S): at 13:37

## 2019-09-01 NOTE — PROGRESS NOTE ADULT - PROBLEM SELECTOR PLAN 3
-improving on current PCA pump settings.  -despite patient not using pump, does not want to change settings until tomorrow  -can still hold lovenox as long as patient ambulating. -improving on current PCA pump settings.  -despite patient not using pump, patient very reluctant to decrease pump settings  -decrease tomorrow hopefully.   -can still hold lovenox as long as patient ambulating.

## 2019-09-01 NOTE — PROGRESS NOTE ADULT - PROBLEM SELECTOR PLAN 1
patient complaining of dysuria. This plus urinary frequency and "dribblng" poitns more towards prostate issues.  -start flomax  -UA.  -patient is not obstructed, no need for alexandre at this time. improvied. UA negative.  -patient is not obstructed, no need for alexandre at this time.

## 2019-09-01 NOTE — PROGRESS NOTE ADULT - SUBJECTIVE AND OBJECTIVE BOX
Patient is a 49y old  Male who presents with a chief complaint of chest pain (30 Aug 2019 08:07)      SUBJECTIVE / OVERNIGHT EVENTS: No acute events overnight. Patient complaining about burning in penis with urination and dribbling of urine.  Pain improving.  States has an "uneasiness" about him, but denies any abdominal or chest pain, no palpitations, no cough, no SOB.  Tele reviewed with very few PVCs.        MEDICATIONS  (STANDING):  docusate sodium 100 milliGRAM(s) Oral three times a day  folic acid 1 milliGRAM(s) Oral daily  metoprolol tartrate 12.5 milliGRAM(s) Oral two times a day  morphine PCA (5 mG/mL) 30 milliLiter(s) PCA Continuous PCA Continuous  senna 2 Tablet(s) Oral at bedtime  sodium chloride 0.45%. 1000 milliLiter(s) (75 mL/Hr) IV Continuous <Continuous>    MEDICATIONS  (PRN):  acetaminophen   Tablet .. 650 milliGRAM(s) Oral every 6 hours PRN Temp greater or equal to 38C (100.4F), Mild Pain (1 - 3)  diphenhydrAMINE 25 milliGRAM(s) Oral every 6 hours PRN Rash and/or Itching    Vital Signs Last 24 Hrs  T(C): 36.6 (01 Sep 2019 11:00), Max: 36.9 (31 Aug 2019 19:00)  T(F): 97.8 (01 Sep 2019 11:00), Max: 98.4 (31 Aug 2019 19:00)  HR: 75 (01 Sep 2019 11:00) (64 - 79)  BP: 123/75 (01 Sep 2019 11:00) (113/74 - 137/78)  BP(mean): --  RR: 16 (01 Sep 2019 11:00) (16 - 18)  SpO2: 98% (01 Sep 2019 11:00) (98% - 100%)    PHYSICAL EXAM:  GENERAL: NAD, well-developed, AOx3  EYES: EOMI,  conjunctiva and sclera clear  NECK: Supple, No JVD  CHEST/LUNG: Clear to auscultation bilaterally  HEART: Regular rate and rhythm; No murmurs, rubs, or gallops, No Edema  ABDOMEN: Soft, Nontender, Nondistended; Bowel sounds present  EXTREMITIES:  2+ Peripheral Pulses, No clubbing, cyanosis  PSYCH: No SI/HI  NEUROLOGY: non-focal  SKIN: No rashes or lesions  unofficial POCUS: No bladder distension.     LABS:                                   6.6    10.79 )-----------( 317      ( 01 Sep 2019 07:00 )             20.5   09-01    138  |  104  |  18  ----------------------------<  99  4.5   |  23  |  1.01    Ca    9.5      01 Sep 2019 07:00  Mg     1.8     09-01                      RADIOLOGY & ADDITIONAL TESTS:    Imaging Personally Reviewed:    Consultant(s) Notes Reviewed:      Care Discussed with Consultants/Other Providers: Patient is a 49y old  Male who presents with a chief complaint of chest pain (30 Aug 2019 08:07)      SUBJECTIVE / OVERNIGHT EVENTS: No acute events overnight. Patient complaining about burning in penis with urination and dribbling of urine.  Pain improving.  States has an "uneasiness" about him, but denies any abdominal or chest pain, no palpitations, no cough, no SOB.  Tele reviewed with very few PVCs.        MEDICATIONS  (STANDING):  docusate sodium 100 milliGRAM(s) Oral three times a day  folic acid 1 milliGRAM(s) Oral daily  metoprolol tartrate 12.5 milliGRAM(s) Oral two times a day  morphine PCA (5 mG/mL) 30 milliLiter(s) PCA Continuous PCA Continuous  senna 2 Tablet(s) Oral at bedtime  sodium chloride 0.45%. 1000 milliLiter(s) (75 mL/Hr) IV Continuous <Continuous>    MEDICATIONS  (PRN):  acetaminophen   Tablet .. 650 milliGRAM(s) Oral every 6 hours PRN Temp greater or equal to 38C (100.4F), Mild Pain (1 - 3)  diphenhydrAMINE 25 milliGRAM(s) Oral every 6 hours PRN Rash and/or Itching    Vital Signs Last 24 Hrs  T(C): 36.6 (01 Sep 2019 11:00), Max: 36.9 (31 Aug 2019 19:00)  T(F): 97.8 (01 Sep 2019 11:00), Max: 98.4 (31 Aug 2019 19:00)  HR: 75 (01 Sep 2019 11:00) (64 - 79)  BP: 123/75 (01 Sep 2019 11:00) (113/74 - 137/78)  BP(mean): --  RR: 16 (01 Sep 2019 11:00) (16 - 18)  SpO2: 98% (01 Sep 2019 11:00) (98% - 100%)    PHYSICAL EXAM:  GENERAL: NAD, well-developed, AOx3  EYES: EOMI,  conjunctiva and sclera clear  NECK: Supple, No JVD  CHEST/LUNG: Clear to auscultation bilaterally  HEART: Regular rate and rhythm; No murmurs, rubs, or gallops, No Edema  ABDOMEN: Soft, Nontender, Nondistended; Bowel sounds present  : penis examined. Foreskin easily retracted. No signs of phimosis, erythema.  Nio pain at urethral meatus.   EXTREMITIES:  2+ Peripheral Pulses, No clubbing, cyanosis  PSYCH: No SI/HI  NEUROLOGY: non-focal  SKIN: No rashes or lesions  unofficial POCUS: No bladder distension.     LABS:                                   6.6    10.79 )-----------( 317      ( 01 Sep 2019 07:00 )             20.5   09-01    138  |  104  |  18  ----------------------------<  99  4.5   |  23  |  1.01    Ca    9.5      01 Sep 2019 07:00  Mg     1.8     09-01                      RADIOLOGY & ADDITIONAL TESTS:    Imaging Personally Reviewed:    Consultant(s) Notes Reviewed:      Care Discussed with Consultants/Other Providers: Patient is a 49y old  Male who presents with a chief complaint of chest pain (30 Aug 2019 08:07)      SUBJECTIVE / OVERNIGHT EVENTS: No acute events overnight. Burning in penis improved along with uneasy feeling from yestrday. Patient is very reluctant to go down on the PCA pump, even though utilization is not much.  Discussed will keep on for 24 more hours and then downtitrate vs. transition to PO.        MEDICATIONS  (STANDING):  docusate sodium 100 milliGRAM(s) Oral three times a day  folic acid 1 milliGRAM(s) Oral daily  metoprolol tartrate 12.5 milliGRAM(s) Oral two times a day  morphine PCA (5 mG/mL) 30 milliLiter(s) PCA Continuous PCA Continuous  senna 2 Tablet(s) Oral at bedtime  sodium chloride 0.45%. 1000 milliLiter(s) (75 mL/Hr) IV Continuous <Continuous>    MEDICATIONS  (PRN):  acetaminophen   Tablet .. 650 milliGRAM(s) Oral every 6 hours PRN Temp greater or equal to 38C (100.4F), Mild Pain (1 - 3)  diphenhydrAMINE 25 milliGRAM(s) Oral every 6 hours PRN Rash and/or Itching    Vital Signs Last 24 Hrs  T(C): 36.8 (02 Sep 2019 11:53), Max: 36.8 (02 Sep 2019 11:53)  T(F): 98.2 (02 Sep 2019 11:53), Max: 98.2 (02 Sep 2019 11:53)  HR: 66 (02 Sep 2019 11:53) (60 - 75)  BP: 145/68 (02 Sep 2019 11:53) (110/67 - 145/68)  BP(mean): --  RR: 16 (02 Sep 2019 11:53) (16 - 16)  SpO2: 100% (02 Sep 2019 11:53) (98% - 100%)  PHYSICAL EXAM:  GENERAL: NAD, well-developed, AOx3  EYES: EOMI,  conjunctiva and sclera clear  NECK: Supple, No JVD  CHEST/LUNG: Clear to auscultation bilaterally  HEART: Regular rate and rhythm; No murmurs, rubs, or gallops, No Edema  ABDOMEN: Soft, Nontender, Nondistended; Bowel sounds present    EXTREMITIES:  2+ Peripheral Pulses, No clubbing, cyanosis  PSYCH: No SI/HI  NEUROLOGY: non-focal  SKIN: No rashes or lesions  unofficial POCUS: No bladder distension.     LABS:                                   7.5    13.61 )-----------( 362      ( 02 Sep 2019 06:00 )             24.5   09-02    137  |  102  |  17  ----------------------------<  78  4.6   |  24  |  0.88    Ca    9.6      02 Sep 2019 06:00  Mg     1.8     09-02                        RADIOLOGY & ADDITIONAL TESTS:    Imaging Personally Reviewed:    Consultant(s) Notes Reviewed:      Care Discussed with Consultants/Other Providers:

## 2019-09-02 LAB
ANION GAP SERPL CALC-SCNC: 11 MMO/L — SIGNIFICANT CHANGE UP (ref 7–14)
BASOPHILS # BLD AUTO: 0.12 K/UL — SIGNIFICANT CHANGE UP (ref 0–0.2)
BASOPHILS NFR BLD AUTO: 0.9 % — SIGNIFICANT CHANGE UP (ref 0–2)
BUN SERPL-MCNC: 17 MG/DL — SIGNIFICANT CHANGE UP (ref 7–23)
CALCIUM SERPL-MCNC: 9.6 MG/DL — SIGNIFICANT CHANGE UP (ref 8.4–10.5)
CHLORIDE SERPL-SCNC: 102 MMOL/L — SIGNIFICANT CHANGE UP (ref 98–107)
CO2 SERPL-SCNC: 24 MMOL/L — SIGNIFICANT CHANGE UP (ref 22–31)
CREAT SERPL-MCNC: 0.88 MG/DL — SIGNIFICANT CHANGE UP (ref 0.5–1.3)
EOSINOPHIL # BLD AUTO: 1.93 K/UL — HIGH (ref 0–0.5)
EOSINOPHIL NFR BLD AUTO: 14.2 % — HIGH (ref 0–6)
GLUCOSE SERPL-MCNC: 78 MG/DL — SIGNIFICANT CHANGE UP (ref 70–99)
HCT VFR BLD CALC: 24.5 % — LOW (ref 39–50)
HGB BLD-MCNC: 7.5 G/DL — LOW (ref 13–17)
IMM GRANULOCYTES NFR BLD AUTO: 0.6 % — SIGNIFICANT CHANGE UP (ref 0–1.5)
LYMPHOCYTES # BLD AUTO: 16.1 % — SIGNIFICANT CHANGE UP (ref 13–44)
LYMPHOCYTES # BLD AUTO: 2.19 K/UL — SIGNIFICANT CHANGE UP (ref 1–3.3)
MAGNESIUM SERPL-MCNC: 1.8 MG/DL — SIGNIFICANT CHANGE UP (ref 1.6–2.6)
MCHC RBC-ENTMCNC: 23.6 PG — LOW (ref 27–34)
MCHC RBC-ENTMCNC: 30.6 % — LOW (ref 32–36)
MCV RBC AUTO: 77 FL — LOW (ref 80–100)
MONOCYTES # BLD AUTO: 1.22 K/UL — HIGH (ref 0–0.9)
MONOCYTES NFR BLD AUTO: 9 % — SIGNIFICANT CHANGE UP (ref 2–14)
NEUTROPHILS # BLD AUTO: 8.07 K/UL — HIGH (ref 1.8–7.4)
NEUTROPHILS NFR BLD AUTO: 59.2 % — SIGNIFICANT CHANGE UP (ref 43–77)
NRBC # FLD: 0.14 K/UL — SIGNIFICANT CHANGE UP (ref 0–0)
NRBC FLD-RTO: 1 — SIGNIFICANT CHANGE UP
PLATELET # BLD AUTO: 362 K/UL — SIGNIFICANT CHANGE UP (ref 150–400)
PMV BLD: 9.6 FL — SIGNIFICANT CHANGE UP (ref 7–13)
POTASSIUM SERPL-MCNC: 4.6 MMOL/L — SIGNIFICANT CHANGE UP (ref 3.5–5.3)
POTASSIUM SERPL-SCNC: 4.6 MMOL/L — SIGNIFICANT CHANGE UP (ref 3.5–5.3)
RBC # BLD: 3.18 M/UL — LOW (ref 4.2–5.8)
RBC # FLD: 22.2 % — HIGH (ref 10.3–14.5)
SODIUM SERPL-SCNC: 137 MMOL/L — SIGNIFICANT CHANGE UP (ref 135–145)
WBC # BLD: 13.61 K/UL — HIGH (ref 3.8–10.5)
WBC # FLD AUTO: 13.61 K/UL — HIGH (ref 3.8–10.5)

## 2019-09-02 PROCEDURE — 99232 SBSQ HOSP IP/OBS MODERATE 35: CPT | Mod: GC

## 2019-09-02 RX ADMIN — SODIUM CHLORIDE 75 MILLILITER(S): 9 INJECTION, SOLUTION INTRAVENOUS at 05:13

## 2019-09-02 RX ADMIN — SENNA PLUS 2 TABLET(S): 8.6 TABLET ORAL at 21:09

## 2019-09-02 RX ADMIN — Medication 12.5 MILLIGRAM(S): at 05:13

## 2019-09-02 RX ADMIN — MORPHINE SULFATE 30 MILLILITER(S): 50 CAPSULE, EXTENDED RELEASE ORAL at 23:00

## 2019-09-02 RX ADMIN — MORPHINE SULFATE 30 MILLILITER(S): 50 CAPSULE, EXTENDED RELEASE ORAL at 19:00

## 2019-09-02 RX ADMIN — Medication 12.5 MILLIGRAM(S): at 17:07

## 2019-09-02 RX ADMIN — Medication 100 MILLIGRAM(S): at 21:09

## 2019-09-02 RX ADMIN — Medication 100 MILLIGRAM(S): at 05:13

## 2019-09-02 RX ADMIN — Medication 1 MILLIGRAM(S): at 13:46

## 2019-09-02 RX ADMIN — Medication 100 MILLIGRAM(S): at 13:46

## 2019-09-02 RX ADMIN — TAMSULOSIN HYDROCHLORIDE 0.4 MILLIGRAM(S): 0.4 CAPSULE ORAL at 21:09

## 2019-09-02 RX ADMIN — MORPHINE SULFATE 30 MILLILITER(S): 50 CAPSULE, EXTENDED RELEASE ORAL at 06:55

## 2019-09-02 RX ADMIN — SODIUM CHLORIDE 75 MILLILITER(S): 9 INJECTION, SOLUTION INTRAVENOUS at 19:07

## 2019-09-02 NOTE — PROGRESS NOTE ADULT - SUBJECTIVE AND OBJECTIVE BOX
Patient is a 49y old  Male who presents with a chief complaint of chest pain (30 Aug 2019 08:07)    ********THIS IS A COPY FORWARD FROM SEPTEMBER 1ST NOTE. THIS INFORMATION REFLECTS WHAT HAPPENED ON SEPTEMBER 2ND.  I ACCIDENTALLY EDITED SEPT 1ST NOTE INSTAD OF NEW NOTE FOR SEPTEMBER 2ND AND CANNOT CHANGE.        SUBJECTIVE / OVERNIGHT EVENTS: No acute events overnight. Burning in penis improved along with uneasy feeling from yestrday. Patient is very reluctant to go down on the PCA pump, even though utilization is not much.  Discussed will keep on for 24 more hours and then downtitrate vs. transition to PO.        MEDICATIONS  (STANDING):  docusate sodium 100 milliGRAM(s) Oral three times a day  folic acid 1 milliGRAM(s) Oral daily  metoprolol tartrate 12.5 milliGRAM(s) Oral two times a day  morphine PCA (5 mG/mL) 30 milliLiter(s) PCA Continuous PCA Continuous  senna 2 Tablet(s) Oral at bedtime  sodium chloride 0.45%. 1000 milliLiter(s) (75 mL/Hr) IV Continuous <Continuous>    MEDICATIONS  (PRN):  acetaminophen   Tablet .. 650 milliGRAM(s) Oral every 6 hours PRN Temp greater or equal to 38C (100.4F), Mild Pain (1 - 3)  diphenhydrAMINE 25 milliGRAM(s) Oral every 6 hours PRN Rash and/or Itching    Vital Signs Last 24 Hrs  T(C): 36.8 (02 Sep 2019 11:53), Max: 36.8 (02 Sep 2019 11:53)  T(F): 98.2 (02 Sep 2019 11:53), Max: 98.2 (02 Sep 2019 11:53)  HR: 66 (02 Sep 2019 11:53) (60 - 75)  BP: 145/68 (02 Sep 2019 11:53) (110/67 - 145/68)  BP(mean): --  RR: 16 (02 Sep 2019 11:53) (16 - 16)  SpO2: 100% (02 Sep 2019 11:53) (98% - 100%)  PHYSICAL EXAM:  GENERAL: NAD, well-developed, AOx3  EYES: EOMI,  conjunctiva and sclera clear  NECK: Supple, No JVD  CHEST/LUNG: Clear to auscultation bilaterally  HEART: Regular rate and rhythm; No murmurs, rubs, or gallops, No Edema  ABDOMEN: Soft, Nontender, Nondistended; Bowel sounds present    EXTREMITIES:  2+ Peripheral Pulses, No clubbing, cyanosis  PSYCH: No SI/HI  NEUROLOGY: non-focal  SKIN: No rashes or lesions  unofficial POCUS: No bladder distension.     LABS:                                   7.5    13.61 )-----------( 362      ( 02 Sep 2019 06:00 )             24.5   09-02    137  |  102  |  17  ----------------------------<  78  4.6   |  24  |  0.88    Ca    9.6      02 Sep 2019 06:00  Mg     1.8     09-02                        RADIOLOGY & ADDITIONAL TESTS:    Imaging Personally Reviewed:    Consultant(s) Notes Reviewed:      Care Discussed with Consultants/Other Providers:

## 2019-09-02 NOTE — PROGRESS NOTE ADULT - PROBLEM SELECTOR PLAN 3
-improving on current PCA pump settings.  -despite patient not using pump, patient very reluctant to decrease pump settings  -decrease tomorrow hopefully.   -can still hold lovenox as long as patient ambulating.

## 2019-09-03 ENCOUNTER — TRANSCRIPTION ENCOUNTER (OUTPATIENT)
Age: 64
End: 2019-09-03

## 2019-09-03 PROCEDURE — 99232 SBSQ HOSP IP/OBS MODERATE 35: CPT

## 2019-09-03 RX ORDER — TAMSULOSIN HYDROCHLORIDE 0.4 MG/1
1 CAPSULE ORAL
Qty: 30 | Refills: 0
Start: 2019-09-03 | End: 2019-10-02

## 2019-09-03 RX ORDER — ACETAMINOPHEN 500 MG
2 TABLET ORAL
Qty: 0 | Refills: 0 | DISCHARGE
Start: 2019-09-03

## 2019-09-03 RX ORDER — METOPROLOL TARTRATE 50 MG
12.5 TABLET ORAL
Qty: 30 | Refills: 0
Start: 2019-09-03 | End: 2019-10-02

## 2019-09-03 RX ORDER — ACETAMINOPHEN 500 MG
2 TABLET ORAL
Qty: 0 | Refills: 0 | DISCHARGE

## 2019-09-03 RX ORDER — FOLIC ACID 0.8 MG
1 TABLET ORAL
Qty: 0 | Refills: 0 | DISCHARGE
Start: 2019-09-03

## 2019-09-03 RX ORDER — IBUPROFEN 200 MG
400 TABLET ORAL EVERY 8 HOURS
Refills: 0 | Status: DISCONTINUED | OUTPATIENT
Start: 2019-09-03 | End: 2019-09-04

## 2019-09-03 RX ADMIN — SENNA PLUS 2 TABLET(S): 8.6 TABLET ORAL at 21:07

## 2019-09-03 RX ADMIN — TAMSULOSIN HYDROCHLORIDE 0.4 MILLIGRAM(S): 0.4 CAPSULE ORAL at 21:07

## 2019-09-03 RX ADMIN — MORPHINE SULFATE 30 MILLILITER(S): 50 CAPSULE, EXTENDED RELEASE ORAL at 06:59

## 2019-09-03 RX ADMIN — Medication 12.5 MILLIGRAM(S): at 17:34

## 2019-09-03 RX ADMIN — Medication 12.5 MILLIGRAM(S): at 06:02

## 2019-09-03 RX ADMIN — Medication 100 MILLIGRAM(S): at 17:33

## 2019-09-03 RX ADMIN — Medication 100 MILLIGRAM(S): at 21:07

## 2019-09-03 RX ADMIN — Medication 1 MILLIGRAM(S): at 17:33

## 2019-09-03 RX ADMIN — SODIUM CHLORIDE 75 MILLILITER(S): 9 INJECTION, SOLUTION INTRAVENOUS at 06:58

## 2019-09-03 RX ADMIN — Medication 100 MILLIGRAM(S): at 06:02

## 2019-09-03 NOTE — DISCHARGE NOTE PROVIDER - CARE PROVIDERS DIRECT ADDRESSES
[email or web address] [email or web address]

## 2019-09-03 NOTE — DISCHARGE NOTE PROVIDER - CARE PROVIDER_API CALL
Kalia Paris)  Cardiovascular Disease; Nuclear Cardiology  [id number] [address] [place] [address]  Phone: [phone]  Fax: [phone]  Follow Up Time:

## 2019-09-03 NOTE — PROGRESS NOTE ADULT - PROBLEM SELECTOR PLAN 2
- likely due to VOC, improving   -no events on telemetry, continue with beta blocker,  -TTE reviewed, EF=46%, not far off from 52% and could be technique.  -Dw cards, no plan for inpt stress

## 2019-09-03 NOTE — DISCHARGE NOTE PROVIDER - HOSPITAL COURSE
Patient is a 49 year old male with a history of sickle cell disease, chronic mild systolic CHF, presenting with diffuse body aches and chest pain admitted to telemetry for further evaluation and treatment of sick cell crisis.                  Problem/Plan - 1:    ·  Problem: Dysuria.  Plan: improved. UA negative.    -patient is not obstructed, no need for Burns at this time.          Problem/Plan - 2:    ·  Problem: Chest pain.  Plan: - likely due to VOC, improving      continue with beta blocker, Tele with NSVT    -TTE reviewed, EF=46%, not far off from 52% and could be technique.    -Dw cards, no plan for in-pt stress. out-pt follow up         Problem/Plan - 3:    ·  Problem: Sickle cell anemia with crisis.  Plan: -resolved, DC PCA and resume home orals.          Problem/Plan - 4:    ·  Problem: Hypertension.  Plan: systolic 130's-150's,    -hold off on other medications, was started on metoprolol.          Problem/Plan - 5:    ·  Problem: Prophylactic measure.  Plan: SCD    DC home today, Time spent 35 min. Patient is a 49 year old male with a history of sickle cell disease, chronic mild systolic CHF, presenting with diffuse body aches and chest pain admitted to telemetry for further evaluation and treatment of sick cell crisis.                 Dysuria     -UA negative.    -patient is not obstructed, no need for Burns                 Chest pain      continue with beta blocker, Tele with NSVT    -TTE reviewed, EF=46%, not far off from 52% and could be technique.    -Dw cards, no plan for in-pt stress. out-pt follow up            Sickle cell anemia with crisis.      on pca pump for pain control    pain resolved, DC PCA and resume home orals.             Hypertension    systolic 130's-150's,    -hold off on other medications, was started on metoprolol.

## 2019-09-03 NOTE — DISCHARGE NOTE PROVIDER - NSDCCPCAREPLAN_GEN_ALL_CORE_FT
PRINCIPAL DISCHARGE DIAGNOSIS  Diagnosis: Sickle cell anemia with crisis  Assessment and Plan of Treatment: follow up with your doctor and contine home medications.  keep hydrated      SECONDARY DISCHARGE DIAGNOSES  Diagnosis: Chest pain  Assessment and Plan of Treatment: follow up with cardiology will need stress test as an outpatient.  call for appointment    Diagnosis: Dysuria  Assessment and Plan of Treatment: follow up with urology as needed call for appiontment.  follow up with your primary care doctor    Diagnosis: Hypertension  Assessment and Plan of Treatment: continue to take your blood pressure medication.  follow up with your primary care doctor call for apointment

## 2019-09-03 NOTE — DISCHARGE NOTE PROVIDER - NSFOLLOWUPCLINICS_GEN_ALL_ED_FT
[facility] - Urology  Urology  [address], 3rd & [address]th [place] [address]  Phone: [phone]  Fax:   Follow Up Time:

## 2019-09-03 NOTE — PROGRESS NOTE ADULT - SUBJECTIVE AND OBJECTIVE BOX
Patient is a 49y old  Male who presents with a chief complaint of chest pain (02 Sep 2019 12:00)      SUBJECTIVE / OVERNIGHT EVENTS: feels well, denies further CP.     ROS:  No HA/DZ  No Vision changes   No CP, SOB  No N/V/D  No Edema  No Rash  NO weakness, numbness    MEDICATIONS  (STANDING):  docusate sodium 100 milliGRAM(s) Oral three times a day  folic acid 1 milliGRAM(s) Oral daily  metoprolol tartrate 12.5 milliGRAM(s) Oral two times a day  morphine PCA (5 mG/mL) 30 milliLiter(s) PCA Continuous PCA Continuous  senna 2 Tablet(s) Oral at bedtime  sodium chloride 0.45%. 1000 milliLiter(s) (75 mL/Hr) IV Continuous <Continuous>  tamsulosin 0.4 milliGRAM(s) Oral at bedtime    MEDICATIONS  (PRN):  acetaminophen   Tablet .. 650 milliGRAM(s) Oral every 6 hours PRN Temp greater or equal to 38C (100.4F), Mild Pain (1 - 3)  diphenhydrAMINE 25 milliGRAM(s) Oral every 6 hours PRN Rash and/or Itching  naloxone Injectable 0.1 milliGRAM(s) IV Push every 3 minutes PRN For ANY of the following changes in patient status:  A. RR LESS THAN 10 breaths per minute, B. Oxygen saturation LESS THAN 90%, C. Sedation score of 6  ondansetron Injectable 4 milliGRAM(s) IV Push every 6 hours PRN Nausea      T(C): 36.7 (19 @ 11:00)  HR: 76 (19 @ 11:00)  BP: 119/67 (19 @ 11:00)  RR: 16 (19 @ 11:00)  SpO2: 99% (19 @ 11:00)  CAPILLARY BLOOD GLUCOSE        I&O's Summary    02 Sep 2019 07:01  -  03 Sep 2019 07:00  --------------------------------------------------------  IN: 0 mL / OUT: 600 mL / NET: -600 mL        PHYSICAL EXAM:  GENERAL: NAD, well-developed, AOx3  HEAD:  Atraumatic, Normocephalic  EYES: EOMI, PERRL, conjunctiva and sclera clear  NECK: Supple, No JVD  CHEST/LUNG: Clear to auscultation bilaterally  HEART: Regular rate and rhythm; No murmurs, rubs, or gallops, No Edema  ABDOMEN: Soft, Nontender, Nondistended; Bowel sounds present  EXTREMITIES:  2+ Peripheral Pulses, No clubbing, cyanosis  PSYCH: No SI/HI  NEUROLOGY: non-focal  SKIN: No rashes or lesions    LABS:                        7.5    13.61 )-----------( 362      ( 02 Sep 2019 06:00 )             24.5     09-02    137  |  102  |  17  ----------------------------<  78  4.6   |  24  |  0.88    Ca    9.6      02 Sep 2019 06:00  Mg     1.8     09-02            Urinalysis Basic - ( 01 Sep 2019 14:45 )    Color: LIGHT YELLOW / Appearance: CLEAR / S.008 / pH: 7.0  Gluc: NEGATIVE / Ketone: NEGATIVE  / Bili: NEGATIVE / Urobili: NORMAL   Blood: NEGATIVE / Protein: NEGATIVE / Nitrite: NEGATIVE   Leuk Esterase: NEGATIVE / RBC: x / WBC x   Sq Epi: x / Non Sq Epi: x / Bacteria: x            RADIOLOGY & ADDITIONAL TESTS:    Imaging Personally Reviewed:    Consultant(s) Notes Reviewed:      Care Discussed with Consultants/Other Providers:

## 2019-09-03 NOTE — DISCHARGE NOTE NURSING/CASE MANAGEMENT/SOCIAL WORK - PATIENT PORTAL LINK FT
You can access the FollowMyHealth Patient Portal offered by [facility] by registering at the following website: [email or web address]. By joining [facility]’s FollowMyHealth portal, you will also be able to view your health information using other applications (apps) compatible with our system.

## 2019-09-03 NOTE — DISCHARGE NOTE PROVIDER - NSDCFUSCHEDAPPT_GEN_ALL_CORE_FT
DINORA SHOEMAKER ; 09/20/2019 ; NPP Intmed OP [id number] [facility] DINORA SHOEMAKER ; 09/20/2019 ; NPP Intmed OP [id number] [facility] DINORA SHOEMAKER ; 09/20/2019 ; NPP Intmed OP [id number] [facility] DINORA SHOEMAKER ; 09/20/2019 ; NPP Intmed OP [id number] [facility]

## 2019-09-04 VITALS
HEART RATE: 71 BPM | SYSTOLIC BLOOD PRESSURE: 113 MMHG | OXYGEN SATURATION: 99 % | TEMPERATURE: 98 F | DIASTOLIC BLOOD PRESSURE: 62 MMHG | RESPIRATION RATE: 17 BRPM

## 2019-09-04 PROCEDURE — 99239 HOSP IP/OBS DSCHRG MGMT >30: CPT

## 2019-09-04 RX ADMIN — Medication 100 MILLIGRAM(S): at 06:06

## 2019-09-04 RX ADMIN — Medication 12.5 MILLIGRAM(S): at 06:06

## 2019-09-04 RX ADMIN — Medication 1 MILLIGRAM(S): at 13:05

## 2019-09-04 NOTE — PROGRESS NOTE ADULT - SUBJECTIVE AND OBJECTIVE BOX
Patient is a 49y old  Male who presents with a chief complaint of chest pain (03 Sep 2019 13:41)      SUBJECTIVE / OVERNIGHT EVENTS: no events, didn't want to go home yesterday     ROS:  No HA/DZ  No Vision changes   No CP, SOB  No N/V/D  No Edema  No Rash  NO weakness, numbness    MEDICATIONS  (STANDING):  docusate sodium 100 milliGRAM(s) Oral three times a day  folic acid 1 milliGRAM(s) Oral daily  metoprolol tartrate 12.5 milliGRAM(s) Oral two times a day  senna 2 Tablet(s) Oral at bedtime  tamsulosin 0.4 milliGRAM(s) Oral at bedtime    MEDICATIONS  (PRN):  acetaminophen   Tablet .. 650 milliGRAM(s) Oral every 6 hours PRN Temp greater or equal to 38C (100.4F), Mild Pain (1 - 3)  diphenhydrAMINE 25 milliGRAM(s) Oral every 6 hours PRN Rash and/or Itching  ibuprofen  Tablet. 400 milliGRAM(s) Oral every 8 hours PRN Moderate Pain (4 - 6), Severe Pain (7 - 10)  naloxone Injectable 0.1 milliGRAM(s) IV Push every 3 minutes PRN For ANY of the following changes in patient status:  A. RR LESS THAN 10 breaths per minute, B. Oxygen saturation LESS THAN 90%, C. Sedation score of 6  ondansetron Injectable 4 milliGRAM(s) IV Push every 6 hours PRN Nausea      T(C): 36.8 (09-04-19 @ 06:05)  HR: 77 (09-04-19 @ 06:05)  BP: 115/65 (09-04-19 @ 06:05)  RR: 16 (09-04-19 @ 06:05)  SpO2: 99% (09-04-19 @ 06:05)  CAPILLARY BLOOD GLUCOSE        I&O's Summary    03 Sep 2019 07:01  -  04 Sep 2019 07:00  --------------------------------------------------------  IN: 0 mL / OUT: 1300 mL / NET: -1300 mL        PHYSICAL EXAM:  GENERAL: NAD, well-developed, AOx3  HEAD:  Atraumatic, Normocephalic  EYES: EOMI, PERRL, conjunctiva and sclera clear  NECK: Supple, No JVD  CHEST/LUNG: Clear to auscultation bilaterally  HEART: Regular rate and rhythm; No murmurs, rubs, or gallops, No Edema  ABDOMEN: Soft, Nontender, Nondistended; Bowel sounds present  EXTREMITIES:  2+ Peripheral Pulses, No clubbing, cyanosis  PSYCH: No SI/HI  NEUROLOGY: non-focal  SKIN: No rashes or lesions    LABS:                        RADIOLOGY & ADDITIONAL TESTS:    Imaging Personally Reviewed:    Consultant(s) Notes Reviewed:      Care Discussed with Consultants/Other Providers:

## 2019-09-20 ENCOUNTER — OUTPATIENT (OUTPATIENT)
Dept: OUTPATIENT SERVICES | Facility: HOSPITAL | Age: 64
LOS: 1 days | End: 2019-09-20

## 2019-09-20 ENCOUNTER — APPOINTMENT (OUTPATIENT)
Dept: INTERNAL MEDICINE | Facility: CLINIC | Age: 64
End: 2019-09-20
Payer: MEDICARE

## 2019-09-20 VITALS
OXYGEN SATURATION: 98 % | HEART RATE: 73 BPM | BODY MASS INDEX: 17.18 KG/M2 | WEIGHT: 120 LBS | DIASTOLIC BLOOD PRESSURE: 80 MMHG | HEIGHT: 70 IN | SYSTOLIC BLOOD PRESSURE: 125 MMHG

## 2019-09-20 PROCEDURE — 99213 OFFICE O/P EST LOW 20 MIN: CPT

## 2019-09-23 DIAGNOSIS — D57.1 SICKLE-CELL DISEASE WITHOUT CRISIS: ICD-10-CM

## 2019-09-23 NOTE — HISTORY OF PRESENT ILLNESS
[de-identified] : 41 yo male with HGB SS  here for f/u.Says that HU caused him more pain than not- Would like to start Endari-\par Best [phone]\par Uses motrin for pain. Never uses opiates at home.\par Is being evicted from his home because the landlords are selling the building. He does not receive enough funding from disability to  find new housing. He is not in contact with his family, and feels that they would not take him in. \par \par PE: thin male in nad\par neuro-A and O X 3, non-focal\par he has mild mental retardation\par vss-\par icteric\par Heent- poor dentition\par lungs- cta\par cor:rrr-m\par abd- soft, nt\par ext: -c/c/e\par neuro- affect generally simple, otherwise non-focal\par \par \par A/P- 41 yo male with HGB SS\par 1. DC HU-patient is not tolerating it, start Endari\par 2. ophtho-scheduled\par 3. f/u three months\par 4. refer "[facility]" for possible help with housing dilemna\par \par Lluvia Hamm MD\par \par

## 2019-10-03 NOTE — PATIENT PROFILE ADULT - NSPROEXTENSIONSOFSELF_GEN_A_NUR
Detail Level: Detailed Show Follow-Up Variable: Yes Quality 138: Melanoma: Coordination Of Care: A treatment plan was communicated to the physicians providing continuing care within one month of diagnosis outlining: diagnosis, tumor thickness and a plan for surgery or alternate care. none

## 2019-11-01 ENCOUNTER — APPOINTMENT (OUTPATIENT)
Dept: INTERNAL MEDICINE | Facility: CLINIC | Age: 64
End: 2019-11-01

## 2019-12-02 ENCOUNTER — APPOINTMENT (OUTPATIENT)
Dept: INTERNAL MEDICINE | Facility: CLINIC | Age: 64
End: 2019-12-02

## 2020-01-09 ENCOUNTER — INPATIENT (INPATIENT)
Facility: HOSPITAL | Age: 65
LOS: 21 days | Discharge: HOME | End: 2020-01-31
Attending: HOSPITALIST | Admitting: HOSPITALIST
Payer: MEDICARE

## 2020-01-09 VITALS
OXYGEN SATURATION: 98 % | TEMPERATURE: 98 F | SYSTOLIC BLOOD PRESSURE: 101 MMHG | HEART RATE: 71 BPM | RESPIRATION RATE: 20 BRPM | WEIGHT: 134.92 LBS | DIASTOLIC BLOOD PRESSURE: 38 MMHG | HEIGHT: 69 IN

## 2020-01-09 DIAGNOSIS — D57.00 HB-SS DISEASE WITH CRISIS, UNSPECIFIED: ICD-10-CM

## 2020-01-09 LAB
ALBUMIN SERPL ELPH-MCNC: 3 G/DL — LOW (ref 3.3–5)
ALP SERPL-CCNC: 170 U/L — HIGH (ref 40–120)
ALT FLD-CCNC: 39 U/L — SIGNIFICANT CHANGE UP (ref 12–78)
ANION GAP SERPL CALC-SCNC: 10 MMOL/L — SIGNIFICANT CHANGE UP (ref 5–17)
ANISOCYTOSIS BLD QL: SIGNIFICANT CHANGE UP
AST SERPL-CCNC: 52 U/L — HIGH (ref 15–37)
BASOPHILS # BLD AUTO: 0 K/UL — SIGNIFICANT CHANGE UP (ref 0–0.2)
BASOPHILS NFR BLD AUTO: 0 % — SIGNIFICANT CHANGE UP (ref 0–2)
BILIRUB SERPL-MCNC: 0.9 MG/DL — SIGNIFICANT CHANGE UP (ref 0.2–1.2)
BUN SERPL-MCNC: 19 MG/DL — SIGNIFICANT CHANGE UP (ref 7–23)
CALCIUM SERPL-MCNC: 8.4 MG/DL — LOW (ref 8.5–10.1)
CHLORIDE SERPL-SCNC: 114 MMOL/L — HIGH (ref 96–108)
CO2 SERPL-SCNC: 18 MMOL/L — LOW (ref 22–31)
CREAT SERPL-MCNC: 1.68 MG/DL — HIGH (ref 0.5–1.3)
DACRYOCYTES BLD QL SMEAR: SLIGHT — SIGNIFICANT CHANGE UP
ELLIPTOCYTES BLD QL SMEAR: SLIGHT — SIGNIFICANT CHANGE UP
EOSINOPHIL # BLD AUTO: 0 K/UL — SIGNIFICANT CHANGE UP (ref 0–0.5)
EOSINOPHIL NFR BLD AUTO: 0 % — SIGNIFICANT CHANGE UP (ref 0–6)
GLUCOSE SERPL-MCNC: 78 MG/DL — SIGNIFICANT CHANGE UP (ref 70–99)
HCT VFR BLD CALC: 17.9 % — CRITICAL LOW (ref 39–50)
HGB BLD-MCNC: 5.5 G/DL — CRITICAL LOW (ref 13–17)
HYPOCHROMIA BLD QL: SIGNIFICANT CHANGE UP
LACTATE SERPL-SCNC: 0.7 MMOL/L — SIGNIFICANT CHANGE UP (ref 0.7–2)
LG PLATELETS BLD QL AUTO: SLIGHT — SIGNIFICANT CHANGE UP
LYMPHOCYTES # BLD AUTO: 0.64 K/UL — LOW (ref 1–3.3)
LYMPHOCYTES # BLD AUTO: 4 % — LOW (ref 13–44)
MACROCYTES BLD QL: SLIGHT — SIGNIFICANT CHANGE UP
MANUAL SMEAR VERIFICATION: SIGNIFICANT CHANGE UP
MCHC RBC-ENTMCNC: 22 PG — LOW (ref 27–34)
MCHC RBC-ENTMCNC: 30.7 GM/DL — LOW (ref 32–36)
MCV RBC AUTO: 71.6 FL — LOW (ref 80–100)
MICROCYTES BLD QL: SLIGHT — SIGNIFICANT CHANGE UP
MONOCYTES # BLD AUTO: 0.48 K/UL — SIGNIFICANT CHANGE UP (ref 0–0.9)
MONOCYTES NFR BLD AUTO: 3 % — SIGNIFICANT CHANGE UP (ref 2–14)
MYELOCYTES NFR BLD: 1 % — HIGH (ref 0–0)
NEUTROPHILS # BLD AUTO: 14.76 K/UL — HIGH (ref 1.8–7.4)
NEUTROPHILS NFR BLD AUTO: 86 % — HIGH (ref 43–77)
NEUTS BAND # BLD: 6 % — SIGNIFICANT CHANGE UP (ref 0–8)
NRBC # BLD: 17 /100 — HIGH (ref 0–0)
NRBC # BLD: SIGNIFICANT CHANGE UP /100 WBCS (ref 0–0)
PLAT MORPH BLD: NORMAL — SIGNIFICANT CHANGE UP
PLATELET # BLD AUTO: 265 K/UL — SIGNIFICANT CHANGE UP (ref 150–400)
POIKILOCYTOSIS BLD QL AUTO: SIGNIFICANT CHANGE UP
POLYCHROMASIA BLD QL SMEAR: SLIGHT — SIGNIFICANT CHANGE UP
POTASSIUM SERPL-MCNC: 4.7 MMOL/L — SIGNIFICANT CHANGE UP (ref 3.5–5.3)
POTASSIUM SERPL-SCNC: 4.7 MMOL/L — SIGNIFICANT CHANGE UP (ref 3.5–5.3)
PROT SERPL-MCNC: 7.3 GM/DL — SIGNIFICANT CHANGE UP (ref 6–8.3)
RBC # BLD: 2.5 M/UL — LOW (ref 4.2–5.8)
RBC # BLD: 2.5 M/UL — LOW (ref 4.2–5.8)
RBC # FLD: 22.2 % — HIGH (ref 10.3–14.5)
RBC BLD AUTO: ABNORMAL
RETICS #: 73.5 K/UL — SIGNIFICANT CHANGE UP (ref 25–125)
RETICS/RBC NFR: 2.9 % — HIGH (ref 0.5–2.5)
SCHISTOCYTES BLD QL AUTO: SLIGHT — SIGNIFICANT CHANGE UP
SICKLE CELLS BLD QL SMEAR: SLIGHT — SIGNIFICANT CHANGE UP
SODIUM SERPL-SCNC: 142 MMOL/L — SIGNIFICANT CHANGE UP (ref 135–145)
TARGETS BLD QL SMEAR: SLIGHT — SIGNIFICANT CHANGE UP
WBC # BLD: 16.04 K/UL — HIGH (ref 3.8–10.5)
WBC # FLD AUTO: 16.04 K/UL — HIGH (ref 3.8–10.5)

## 2020-01-09 PROCEDURE — 99291 CRITICAL CARE FIRST HOUR: CPT

## 2020-01-09 PROCEDURE — 99222 1ST HOSP IP/OBS MODERATE 55: CPT | Mod: AI

## 2020-01-09 PROCEDURE — 86077 PHYS BLOOD BANK SERV XMATCH: CPT

## 2020-01-09 PROCEDURE — 71045 X-RAY EXAM CHEST 1 VIEW: CPT | Mod: 26

## 2020-01-09 RX ORDER — ACETAMINOPHEN 500 MG
650 TABLET ORAL EVERY 6 HOURS
Refills: 0 | Status: DISCONTINUED | OUTPATIENT
Start: 2020-01-09 | End: 2020-01-10

## 2020-01-09 RX ORDER — SODIUM CHLORIDE 9 MG/ML
1000 INJECTION, SOLUTION INTRAVENOUS
Refills: 0 | Status: DISCONTINUED | OUTPATIENT
Start: 2020-01-09 | End: 2020-01-18

## 2020-01-09 RX ORDER — SODIUM CHLORIDE 9 MG/ML
1000 INJECTION, SOLUTION INTRAVENOUS ONCE
Refills: 0 | Status: COMPLETED | OUTPATIENT
Start: 2020-01-09 | End: 2020-01-09

## 2020-01-09 RX ORDER — INFLUENZA VIRUS VACCINE 15; 15; 15; 15 UG/.5ML; UG/.5ML; UG/.5ML; UG/.5ML
0.5 SUSPENSION INTRAMUSCULAR ONCE
Refills: 0 | Status: DISCONTINUED | OUTPATIENT
Start: 2020-01-09 | End: 2020-01-31

## 2020-01-09 RX ORDER — HYDROMORPHONE HYDROCHLORIDE 2 MG/ML
1 INJECTION INTRAMUSCULAR; INTRAVENOUS; SUBCUTANEOUS EVERY 4 HOURS
Refills: 0 | Status: DISCONTINUED | OUTPATIENT
Start: 2020-01-09 | End: 2020-01-10

## 2020-01-09 RX ORDER — HYDROMORPHONE HYDROCHLORIDE 2 MG/ML
1 INJECTION INTRAMUSCULAR; INTRAVENOUS; SUBCUTANEOUS ONCE
Refills: 0 | Status: DISCONTINUED | OUTPATIENT
Start: 2020-01-09 | End: 2020-01-09

## 2020-01-09 RX ORDER — DIPHENHYDRAMINE HCL 50 MG
50 CAPSULE ORAL ONCE
Refills: 0 | Status: COMPLETED | OUTPATIENT
Start: 2020-01-09 | End: 2020-01-09

## 2020-01-09 RX ORDER — DIPHENHYDRAMINE HCL 50 MG
50 CAPSULE ORAL EVERY 4 HOURS
Refills: 0 | Status: DISCONTINUED | OUTPATIENT
Start: 2020-01-09 | End: 2020-01-31

## 2020-01-09 RX ADMIN — Medication 50 MILLIGRAM(S): at 09:41

## 2020-01-09 RX ADMIN — SODIUM CHLORIDE 1000 MILLILITER(S): 9 INJECTION, SOLUTION INTRAVENOUS at 09:58

## 2020-01-09 RX ADMIN — HYDROMORPHONE HYDROCHLORIDE 1 MILLIGRAM(S): 2 INJECTION INTRAMUSCULAR; INTRAVENOUS; SUBCUTANEOUS at 09:41

## 2020-01-09 RX ADMIN — HYDROMORPHONE HYDROCHLORIDE 1 MILLIGRAM(S): 2 INJECTION INTRAMUSCULAR; INTRAVENOUS; SUBCUTANEOUS at 22:19

## 2020-01-09 RX ADMIN — Medication 50 MILLIGRAM(S): at 18:06

## 2020-01-09 RX ADMIN — SODIUM CHLORIDE 1000 MILLILITER(S): 9 INJECTION, SOLUTION INTRAVENOUS at 11:15

## 2020-01-09 RX ADMIN — HYDROMORPHONE HYDROCHLORIDE 1 MILLIGRAM(S): 2 INJECTION INTRAMUSCULAR; INTRAVENOUS; SUBCUTANEOUS at 22:44

## 2020-01-09 RX ADMIN — HYDROMORPHONE HYDROCHLORIDE 1 MILLIGRAM(S): 2 INJECTION INTRAMUSCULAR; INTRAVENOUS; SUBCUTANEOUS at 18:05

## 2020-01-09 RX ADMIN — HYDROMORPHONE HYDROCHLORIDE 1 MILLIGRAM(S): 2 INJECTION INTRAMUSCULAR; INTRAVENOUS; SUBCUTANEOUS at 14:40

## 2020-01-09 RX ADMIN — HYDROMORPHONE HYDROCHLORIDE 1 MILLIGRAM(S): 2 INJECTION INTRAMUSCULAR; INTRAVENOUS; SUBCUTANEOUS at 18:30

## 2020-01-09 RX ADMIN — Medication 50 MILLIGRAM(S): at 22:21

## 2020-01-09 RX ADMIN — SODIUM CHLORIDE 100 MILLILITER(S): 9 INJECTION, SOLUTION INTRAVENOUS at 11:58

## 2020-01-09 NOTE — ED ADULT NURSE NOTE - OBJECTIVE STATEMENT
BIBA,  pt c/o generalized body pain, states he is having a sickle cell crisis x 2 days. pt presented to the Ed c/o generalized body pain and coughing. pt states he is having a sickle cell crisis x 2 days. pt is laying calmly in bed

## 2020-01-09 NOTE — H&P ADULT - NSHPPHYSICALEXAM_GEN_ALL_CORE
ICU Vital Signs Last 24 Hrs  T(C): 36.8 (09 Jan 2020 09:33), Max: 36.8 (09 Jan 2020 09:33)  T(F): 98.3 (09 Jan 2020 09:33), Max: 98.3 (09 Jan 2020 09:33)  HR: 62 (09 Jan 2020 09:33) (62 - 71)  BP: 145/73 (09 Jan 2020 09:33) (101/38 - 145/73)  BP(mean): --  ABP: --  ABP(mean): --  RR: 16 (09 Jan 2020 09:33) (16 - 20)  SpO2: 100% (09 Jan 2020 09:33) (98% - 100%)  GENERAL: NAD well-developed  HEAD:  Atraumatic, Normocephalic  EYES: EOMI, PERRLA, conjunctiva and sclera clear  ENMT: No tonsillar erythema, exudates, or enlargement; Moist mucous membranes, Good dentition, No lesions  NECK: Supple, No JVD, Normal thyroid  NERVOUS SYSTEM:  Alert & Oriented X3, Good concentration; Motor Strength 5/5 B/L upper and lower extremities; DTRs 2+ intact and symmetric  CHEST/LUNG: Clear to percussion bilaterally; No rales, rhonchi, wheezing, or rubs  HEART: Regular rate and rhythm; No murmurs, rubs, or gallops  ABDOMEN: Soft, Nontender, Nondistended; Bowel sounds present  EXTREMITIES:  2+ Peripheral Pulses, No clubbing, cyanosis, or edema  LYMPH: No lymphadenopathy   SKIN: No rashes or lesions

## 2020-01-09 NOTE — H&P ADULT - HISTORY OF PRESENT ILLNESS
The patient is a 49y Male complaining of body pain. with history of  SCC x 2 days; cough x 2 weeks; patient has prior [facility] records under  1955

## 2020-01-09 NOTE — PROCEDURE NOTE - ADDITIONAL PROCEDURE DETAILS
pt s/p midline placement inserted into the right brachial vein under US guidance.  4fr x 20cm SL.  Pt tolerated procedure well  -midline can be accessed

## 2020-01-09 NOTE — ED ADULT NURSE REASSESSMENT NOTE - NS ED NURSE REASSESS COMMENT FT1
Breakfast relief Pt received bed 13 s/p midline insertion left arm registration at bedside Pt in no distress.

## 2020-01-09 NOTE — H&P ADULT - NSHPREVIEWOFSYSTEMS_GEN_ALL_CORE

## 2020-01-09 NOTE — H&P ADULT - ASSESSMENT
49 male with history of ssd with painful crisis 49 male with history of ssd with painful crisis       IMPROVE VTE Individual Risk Assessment          RISK                                                          Points  [  ] Previous VTE                                                3  [  ] Thrombophilia                                             2  [  ] Lower limb paralysis                                   2        (unable to hold up >15 seconds)    [  ] Current Cancer                                             2         (within 6 months)  [  ] Immobilization > 24 hrs                              1  [  ] ICU/CCU stay > 24 hours                             1  [  ] Age > 60                                                         1    IMPROVE VTE Score: 0

## 2020-01-09 NOTE — ED ADULT NURSE REASSESSMENT NOTE - NS ED NURSE REASSESS COMMENT FT1
Dr. lópez made aware of pt's condition. pt is laying calm in bed at this time. no acute distress noted. will continue to monitor,

## 2020-01-10 LAB
ABO RH CONFIRMATION: SIGNIFICANT CHANGE UP
ANION GAP SERPL CALC-SCNC: 6 MMOL/L — SIGNIFICANT CHANGE UP (ref 5–17)
BLD GP AB SCN SERPL QL: SIGNIFICANT CHANGE UP
BUN SERPL-MCNC: 13 MG/DL — SIGNIFICANT CHANGE UP (ref 7–23)
CALCIUM SERPL-MCNC: 8.4 MG/DL — LOW (ref 8.5–10.1)
CHLORIDE SERPL-SCNC: 112 MMOL/L — HIGH (ref 96–108)
CO2 SERPL-SCNC: 20 MMOL/L — LOW (ref 22–31)
CREAT SERPL-MCNC: 1.26 MG/DL — SIGNIFICANT CHANGE UP (ref 0.5–1.3)
GLUCOSE SERPL-MCNC: 100 MG/DL — HIGH (ref 70–99)
HCT VFR BLD CALC: 17.6 % — CRITICAL LOW (ref 39–50)
HGB BLD-MCNC: 5.6 G/DL — CRITICAL LOW (ref 13–17)
MCHC RBC-ENTMCNC: 21.9 PG — LOW (ref 27–34)
MCHC RBC-ENTMCNC: 31.8 GM/DL — LOW (ref 32–36)
MCV RBC AUTO: 68.8 FL — LOW (ref 80–100)
NRBC # BLD: 16 /100 WBCS — HIGH (ref 0–0)
PLATELET # BLD AUTO: 295 K/UL — SIGNIFICANT CHANGE UP (ref 150–400)
POTASSIUM SERPL-MCNC: 4.2 MMOL/L — SIGNIFICANT CHANGE UP (ref 3.5–5.3)
POTASSIUM SERPL-SCNC: 4.2 MMOL/L — SIGNIFICANT CHANGE UP (ref 3.5–5.3)
RBC # BLD: 2.56 M/UL — LOW (ref 4.2–5.8)
RBC # FLD: 22.3 % — HIGH (ref 10.3–14.5)
SODIUM SERPL-SCNC: 138 MMOL/L — SIGNIFICANT CHANGE UP (ref 135–145)
WBC # BLD: 14.7 K/UL — HIGH (ref 3.8–10.5)
WBC # FLD AUTO: 14.7 K/UL — HIGH (ref 3.8–10.5)

## 2020-01-10 PROCEDURE — 99232 SBSQ HOSP IP/OBS MODERATE 35: CPT

## 2020-01-10 RX ORDER — ACETAMINOPHEN 500 MG
650 TABLET ORAL ONCE
Refills: 0 | Status: COMPLETED | OUTPATIENT
Start: 2020-01-10 | End: 2020-01-11

## 2020-01-10 RX ORDER — OXYCODONE AND ACETAMINOPHEN 5; 325 MG/1; MG/1
1 TABLET ORAL EVERY 6 HOURS
Refills: 0 | Status: DISCONTINUED | OUTPATIENT
Start: 2020-01-10 | End: 2020-01-15

## 2020-01-10 RX ORDER — HYDROMORPHONE HYDROCHLORIDE 2 MG/ML
2 INJECTION INTRAMUSCULAR; INTRAVENOUS; SUBCUTANEOUS EVERY 4 HOURS
Refills: 0 | Status: DISCONTINUED | OUTPATIENT
Start: 2020-01-10 | End: 2020-01-12

## 2020-01-10 RX ORDER — ACETAMINOPHEN 500 MG
650 TABLET ORAL EVERY 6 HOURS
Refills: 0 | Status: DISCONTINUED | OUTPATIENT
Start: 2020-01-10 | End: 2020-01-31

## 2020-01-10 RX ORDER — POLYETHYLENE GLYCOL 3350 17 G/17G
17 POWDER, FOR SOLUTION ORAL DAILY
Refills: 0 | Status: DISCONTINUED | OUTPATIENT
Start: 2020-01-10 | End: 2020-01-31

## 2020-01-10 RX ADMIN — HYDROMORPHONE HYDROCHLORIDE 1 MILLIGRAM(S): 2 INJECTION INTRAMUSCULAR; INTRAVENOUS; SUBCUTANEOUS at 15:47

## 2020-01-10 RX ADMIN — HYDROMORPHONE HYDROCHLORIDE 1 MILLIGRAM(S): 2 INJECTION INTRAMUSCULAR; INTRAVENOUS; SUBCUTANEOUS at 02:40

## 2020-01-10 RX ADMIN — SODIUM CHLORIDE 100 MILLILITER(S): 9 INJECTION, SOLUTION INTRAVENOUS at 02:06

## 2020-01-10 RX ADMIN — SODIUM CHLORIDE 100 MILLILITER(S): 9 INJECTION, SOLUTION INTRAVENOUS at 11:18

## 2020-01-10 RX ADMIN — Medication 50 MILLIGRAM(S): at 07:14

## 2020-01-10 RX ADMIN — HYDROMORPHONE HYDROCHLORIDE 1 MILLIGRAM(S): 2 INJECTION INTRAMUSCULAR; INTRAVENOUS; SUBCUTANEOUS at 07:35

## 2020-01-10 RX ADMIN — Medication 650 MILLIGRAM(S): at 23:20

## 2020-01-10 RX ADMIN — HYDROMORPHONE HYDROCHLORIDE 1 MILLIGRAM(S): 2 INJECTION INTRAMUSCULAR; INTRAVENOUS; SUBCUTANEOUS at 07:14

## 2020-01-10 RX ADMIN — Medication 650 MILLIGRAM(S): at 01:40

## 2020-01-10 RX ADMIN — HYDROMORPHONE HYDROCHLORIDE 2 MILLIGRAM(S): 2 INJECTION INTRAMUSCULAR; INTRAVENOUS; SUBCUTANEOUS at 20:51

## 2020-01-10 RX ADMIN — Medication 650 MILLIGRAM(S): at 00:46

## 2020-01-10 RX ADMIN — HYDROMORPHONE HYDROCHLORIDE 1 MILLIGRAM(S): 2 INJECTION INTRAMUSCULAR; INTRAVENOUS; SUBCUTANEOUS at 15:32

## 2020-01-10 RX ADMIN — Medication 50 MILLIGRAM(S): at 20:51

## 2020-01-10 RX ADMIN — HYDROMORPHONE HYDROCHLORIDE 1 MILLIGRAM(S): 2 INJECTION INTRAMUSCULAR; INTRAVENOUS; SUBCUTANEOUS at 11:14

## 2020-01-10 RX ADMIN — HYDROMORPHONE HYDROCHLORIDE 1 MILLIGRAM(S): 2 INJECTION INTRAMUSCULAR; INTRAVENOUS; SUBCUTANEOUS at 11:30

## 2020-01-10 RX ADMIN — HYDROMORPHONE HYDROCHLORIDE 1 MILLIGRAM(S): 2 INJECTION INTRAMUSCULAR; INTRAVENOUS; SUBCUTANEOUS at 02:20

## 2020-01-10 RX ADMIN — Medication 650 MILLIGRAM(S): at 22:22

## 2020-01-10 RX ADMIN — HYDROMORPHONE HYDROCHLORIDE 2 MILLIGRAM(S): 2 INJECTION INTRAMUSCULAR; INTRAVENOUS; SUBCUTANEOUS at 21:12

## 2020-01-10 NOTE — PROGRESS NOTE ADULT - PROBLEM SELECTOR PLAN 1
analgesics /benadryl   intravenous hydration   hgb at 5.6low will transfuse 2 units prbc    ensure on oxygen and IVF analgesics /benadryl   intravenous hydration   hgb at 5.6 low  no old o compare , will transfuse 2 units prbc    ensure on oxygen and IVF

## 2020-01-10 NOTE — PROGRESS NOTE ADULT - SUBJECTIVE AND OBJECTIVE BOX
Patient is a 49y old  Male who presents with a chief complaint of     OVERNIGHT EVENTS:      REVIEW OF SYSTEMS: denies chest pain/SOB, diaphoresis, no F/C, cough, dizziness, headache, blurry vision, nausea, vomiting, abdominal pain. Rest unremarkable     MEDICATIONS  (STANDING):  dextrose 5% + sodium chloride 0.9%. 1000 milliLiter(s) (100 mL/Hr) IV Continuous <Continuous>  influenza   Vaccine 0.5 milliLiter(s) IntraMuscular once    MEDICATIONS  (PRN):  acetaminophen   Tablet .. 650 milliGRAM(s) Oral every 6 hours PRN Mild Pain (1 - 3)  diphenhydrAMINE 50 milliGRAM(s) Oral every 4 hours PRN Rash and/or Itching  HYDROmorphone  Injectable 1 milliGRAM(s) IV Push every 4 hours PRN Moderate Pain (4 - 6)      Allergies    No Known Drug Allergies  peanuts (Angioedema)    Intolerances        SUBJECTIVE: in bed in NAD, no acute events overnight     T(F): 100.4 (01-10-20 @ 11:40), Max: 100.4 (01-10-20 @ 11:40)  HR: 91 (01-10-20 @ 11:40) (79 - 91)  BP: 153/80 (01-10-20 @ 11:40) (111/69 - 153/80)  RR: 18 (01-10-20 @ 11:40) (16 - 19)  SpO2: 97% (01-10-20 @ 11:40) (97% - 100%)  Wt(kg): --    PHYSICAL EXAM:  GENERAL: NAD, well-groomed, well-developed  HEAD:  Atraumatic, Normocephalic  EYES: EOMI, PERRLA, conjunctiva and sclera clear  ENMT: No tonsillar erythema, exudates, or enlargement; Moist mucous membranes, Good dentition, No lesions  NECK: Supple, No JVD, Normal thyroid  CHEST/LUNG: Clear to  auscultation bilaterally; No rales, rhonchi, wheezing, or rubs  bilaterally  HEART: Regular rate and rhythm; No murmurs, rubs, or gallops  ABDOMEN: Soft, Nontender, Nondistended; Bowel sounds present  EXTREMITIES:  2+ Peripheral Pulses, No clubbing, cyanosis, or edema BL LE  LYMPH: No lymphadenopathy noted  SKIN: No rashes or lesions  NERVOUS SYSTEM:  Alert & Oriented X3, Good concentration; Motor Strength 5/5 B/L upper and lower extremities;   DTRs 2+ intact and symmetric, sensation intact BL    LABS:                        5.6    14.70 )-----------( 295      ( 10 Henri 2020 07:23 )             17.6     01-10    138  |  112<H>  |  13  ----------------------------<  100<H>  4.2   |  20<L>  |  1.26    Ca    8.4<L>      10 Ehnri 2020 07:23    TPro  7.3  /  Alb  3.0<L>  /  TBili  0.9  /  DBili  x   /  AST  52<H>  /  ALT  39  /  AlkPhos  170<H>  01-09        Cultures;   CAPILLARY BLOOD GLUCOSE        Lipid panel:           RADIOLOGY & ADDITIONAL TESTS:      Imaging Personally Reviewed:  [ x] YES      Consultant(s) Notes Reviewed:  [x ] YES     Care Discussed with [x ] Consultants [X ] Patient [x ] Family  [x ]    [x ]  Other; RN

## 2020-01-11 DIAGNOSIS — R50.9 FEVER, UNSPECIFIED: ICD-10-CM

## 2020-01-11 DIAGNOSIS — N17.9 ACUTE KIDNEY FAILURE, UNSPECIFIED: ICD-10-CM

## 2020-01-11 LAB
ANION GAP SERPL CALC-SCNC: 10 MMOL/L — SIGNIFICANT CHANGE UP (ref 5–17)
APPEARANCE UR: ABNORMAL
BACTERIA # UR AUTO: ABNORMAL
BILIRUB UR-MCNC: NEGATIVE — SIGNIFICANT CHANGE UP
BUN SERPL-MCNC: 14 MG/DL — SIGNIFICANT CHANGE UP (ref 7–23)
CALCIUM SERPL-MCNC: 8.6 MG/DL — SIGNIFICANT CHANGE UP (ref 8.5–10.1)
CHLORIDE SERPL-SCNC: 111 MMOL/L — HIGH (ref 96–108)
CO2 SERPL-SCNC: 19 MMOL/L — LOW (ref 22–31)
COLOR SPEC: YELLOW — SIGNIFICANT CHANGE UP
CREAT SERPL-MCNC: 1.19 MG/DL — SIGNIFICANT CHANGE UP (ref 0.5–1.3)
DIFF PNL FLD: ABNORMAL
EPI CELLS # UR: SIGNIFICANT CHANGE UP
FLU A RESULT: SIGNIFICANT CHANGE UP
FLU A RESULT: SIGNIFICANT CHANGE UP
FLUAV AG NPH QL: SIGNIFICANT CHANGE UP
FLUBV AG NPH QL: SIGNIFICANT CHANGE UP
GLUCOSE SERPL-MCNC: 93 MG/DL — SIGNIFICANT CHANGE UP (ref 70–99)
GLUCOSE UR QL: NEGATIVE MG/DL — SIGNIFICANT CHANGE UP
HCT VFR BLD CALC: 24.5 % — LOW (ref 39–50)
HGB BLD-MCNC: 7.8 G/DL — LOW (ref 13–17)
KETONES UR-MCNC: NEGATIVE — SIGNIFICANT CHANGE UP
LEUKOCYTE ESTERASE UR-ACNC: ABNORMAL
MAGNESIUM SERPL-MCNC: 1.9 MG/DL — SIGNIFICANT CHANGE UP (ref 1.6–2.6)
MCHC RBC-ENTMCNC: 23.3 PG — LOW (ref 27–34)
MCHC RBC-ENTMCNC: 31.8 GM/DL — LOW (ref 32–36)
MCV RBC AUTO: 73.1 FL — LOW (ref 80–100)
NITRITE UR-MCNC: NEGATIVE — SIGNIFICANT CHANGE UP
NRBC # BLD: 8 /100 WBCS — HIGH (ref 0–0)
PH UR: 6 — SIGNIFICANT CHANGE UP (ref 5–8)
PHOSPHATE SERPL-MCNC: 3.6 MG/DL — SIGNIFICANT CHANGE UP (ref 2.5–4.5)
PLATELET # BLD AUTO: 254 K/UL — SIGNIFICANT CHANGE UP (ref 150–400)
POTASSIUM SERPL-MCNC: 4.3 MMOL/L — SIGNIFICANT CHANGE UP (ref 3.5–5.3)
POTASSIUM SERPL-SCNC: 4.3 MMOL/L — SIGNIFICANT CHANGE UP (ref 3.5–5.3)
PROT UR-MCNC: 100 MG/DL
RBC # BLD: 3.35 M/UL — LOW (ref 4.2–5.8)
RBC # BLD: 3.35 M/UL — LOW (ref 4.2–5.8)
RBC # FLD: 23.3 % — HIGH (ref 10.3–14.5)
RBC CASTS # UR COMP ASSIST: SIGNIFICANT CHANGE UP /HPF (ref 0–4)
RETICS #: 114.9 K/UL — SIGNIFICANT CHANGE UP (ref 25–125)
RETICS/RBC NFR: 3.4 % — HIGH (ref 0.5–2.5)
RSV RESULT: SIGNIFICANT CHANGE UP
RSV RNA RESP QL NAA+PROBE: SIGNIFICANT CHANGE UP
SODIUM SERPL-SCNC: 140 MMOL/L — SIGNIFICANT CHANGE UP (ref 135–145)
SP GR SPEC: 1.01 — SIGNIFICANT CHANGE UP (ref 1.01–1.02)
UROBILINOGEN FLD QL: NEGATIVE MG/DL — SIGNIFICANT CHANGE UP
WBC # BLD: 18.44 K/UL — HIGH (ref 3.8–10.5)
WBC # FLD AUTO: 18.44 K/UL — HIGH (ref 3.8–10.5)
WBC UR QL: ABNORMAL

## 2020-01-11 PROCEDURE — 99233 SBSQ HOSP IP/OBS HIGH 50: CPT

## 2020-01-11 RX ADMIN — HYDROMORPHONE HYDROCHLORIDE 2 MILLIGRAM(S): 2 INJECTION INTRAMUSCULAR; INTRAVENOUS; SUBCUTANEOUS at 15:34

## 2020-01-11 RX ADMIN — SODIUM CHLORIDE 100 MILLILITER(S): 9 INJECTION, SOLUTION INTRAVENOUS at 05:30

## 2020-01-11 RX ADMIN — Medication 50 MILLIGRAM(S): at 10:54

## 2020-01-11 RX ADMIN — HYDROMORPHONE HYDROCHLORIDE 2 MILLIGRAM(S): 2 INJECTION INTRAMUSCULAR; INTRAVENOUS; SUBCUTANEOUS at 23:54

## 2020-01-11 RX ADMIN — Medication 50 MILLIGRAM(S): at 23:53

## 2020-01-11 RX ADMIN — HYDROMORPHONE HYDROCHLORIDE 2 MILLIGRAM(S): 2 INJECTION INTRAMUSCULAR; INTRAVENOUS; SUBCUTANEOUS at 15:50

## 2020-01-11 RX ADMIN — Medication 50 MILLIGRAM(S): at 01:23

## 2020-01-11 RX ADMIN — HYDROMORPHONE HYDROCHLORIDE 2 MILLIGRAM(S): 2 INJECTION INTRAMUSCULAR; INTRAVENOUS; SUBCUTANEOUS at 20:27

## 2020-01-11 RX ADMIN — HYDROMORPHONE HYDROCHLORIDE 2 MILLIGRAM(S): 2 INJECTION INTRAMUSCULAR; INTRAVENOUS; SUBCUTANEOUS at 19:57

## 2020-01-11 RX ADMIN — HYDROMORPHONE HYDROCHLORIDE 2 MILLIGRAM(S): 2 INJECTION INTRAMUSCULAR; INTRAVENOUS; SUBCUTANEOUS at 05:31

## 2020-01-11 RX ADMIN — Medication 50 MILLIGRAM(S): at 15:34

## 2020-01-11 RX ADMIN — HYDROMORPHONE HYDROCHLORIDE 2 MILLIGRAM(S): 2 INJECTION INTRAMUSCULAR; INTRAVENOUS; SUBCUTANEOUS at 05:55

## 2020-01-11 RX ADMIN — SODIUM CHLORIDE 100 MILLILITER(S): 9 INJECTION, SOLUTION INTRAVENOUS at 15:34

## 2020-01-11 RX ADMIN — Medication 650 MILLIGRAM(S): at 07:00

## 2020-01-11 RX ADMIN — HYDROMORPHONE HYDROCHLORIDE 2 MILLIGRAM(S): 2 INJECTION INTRAMUSCULAR; INTRAVENOUS; SUBCUTANEOUS at 01:23

## 2020-01-11 RX ADMIN — Medication 50 MILLIGRAM(S): at 05:32

## 2020-01-11 RX ADMIN — Medication 650 MILLIGRAM(S): at 05:32

## 2020-01-11 RX ADMIN — HYDROMORPHONE HYDROCHLORIDE 2 MILLIGRAM(S): 2 INJECTION INTRAMUSCULAR; INTRAVENOUS; SUBCUTANEOUS at 01:45

## 2020-01-11 RX ADMIN — Medication 50 MILLIGRAM(S): at 19:57

## 2020-01-11 RX ADMIN — HYDROMORPHONE HYDROCHLORIDE 2 MILLIGRAM(S): 2 INJECTION INTRAMUSCULAR; INTRAVENOUS; SUBCUTANEOUS at 10:52

## 2020-01-11 RX ADMIN — HYDROMORPHONE HYDROCHLORIDE 2 MILLIGRAM(S): 2 INJECTION INTRAMUSCULAR; INTRAVENOUS; SUBCUTANEOUS at 11:10

## 2020-01-11 NOTE — PROGRESS NOTE ADULT - PROBLEM SELECTOR PLAN 1
analgesics /benadryl   intravenous hydration   hgb at 5.6 -->7.8,  no old o compare , s/p 2 units on 1/10/2020   ensure on oxygen and IVF

## 2020-01-11 NOTE — PROGRESS NOTE ADULT - SUBJECTIVE AND OBJECTIVE BOX
Patient is a 49y old  Male who presents with a chief complaint of     OVERNIGHT EVENTS:      REVIEW OF SYSTEMS: denies chest pain/SOB, diaphoresis, no F/C, cough, dizziness, headache, blurry vision, nausea, vomiting, abdominal pain. Rest unremarkable   MEDICATIONS  (STANDING):  dextrose 5% + sodium chloride 0.9%. 1000 milliLiter(s) (100 mL/Hr) IV Continuous <Continuous>  influenza   Vaccine 0.5 milliLiter(s) IntraMuscular once  polyethylene glycol 3350 17 Gram(s) Oral daily    MEDICATIONS  (PRN):  acetaminophen   Tablet .. 650 milliGRAM(s) Oral every 6 hours PRN Temp greater or equal to 38C (100.4F), Mild Pain (1 - 3)  diphenhydrAMINE 50 milliGRAM(s) Oral every 4 hours PRN Rash and/or Itching  HYDROmorphone  Injectable 2 milliGRAM(s) IV Push every 4 hours PRN Severe Pain (7 - 10)  oxycodone    5 mG/acetaminophen 325 mG 1 Tablet(s) Oral every 6 hours PRN Moderate Pain (4 - 6)      Allergies    No Known Drug Allergies  peanuts (Angioedema)    Intolerances        SUBJECTIVE: in bed in NAD, no acute events overnight     Vital Signs Last 24 Hrs  T(C): 37.6 (11 Jan 2020 11:26), Max: 38.3 (11 Jan 2020 05:29)  T(F): 99.7 (11 Jan 2020 11:26), Max: 101 (11 Jan 2020 05:29)  HR: 91 (11 Jan 2020 11:26) (78 - 99)  BP: 148/79 (11 Jan 2020 11:26) (132/63 - 160/85)  BP(mean): --  RR: 16 (11 Jan 2020 11:26) (16 - 20)  SpO2: 97% (11 Jan 2020 11:26) (95% - 100%)    PHYSICAL EXAM:  GENERAL: NAD, well-groomed, well-developed, thin   HEAD:  Atraumatic, Normocephalic  EYES: EOMI, PERRLA, conjunctiva and sclera clear  ENMT: No tonsillar erythema, exudates, or enlargement; Moist mucous membranes, Good dentition, No lesions  NECK: Supple, No JVD, Normal thyroid  CHEST/LUNG: Clear to  auscultation bilaterally; No rales, rhonchi, wheezing, or rubs  bilaterally  HEART: Regular rate and rhythm; No murmurs, rubs, or gallops  ABDOMEN: Soft, Nontender, Nondistended; Bowel sounds present  EXTREMITIES:  2+ Peripheral Pulses, No clubbing, cyanosis, or edema BL LE  SKIN: No rashes or lesions  NERVOUS SYSTEM:  Alert & Oriented X3, Good concentration; Motor Strength 5/5 B/L upper and lower extremities;   DTRs 2+ intact and symmetric, sensation intact BL    LABS:                                 7.8    18.44 )-----------( 254      ( 11 Jan 2020 07:47 )             24.5   01-11    140  |  111<H>  |  14  ----------------------------<  93  4.3   |  19<L>  |  1.19    Ca    8.6      11 Jan 2020 07:47  Phos  3.6     01-11  Mg     1.9     01-11        Cultures;   CAPILLARY BLOOD GLUCOSE        Lipid panel:           RADIOLOGY & ADDITIONAL TESTS:      Imaging Personally Reviewed:  [ x] YES      Consultant(s) Notes Reviewed:  [x ] YES     Care Discussed with [x ] Consultants [X ] Patient [x ] Family  [x ]    [x ]  Other; RN

## 2020-01-11 NOTE — PROGRESS NOTE ADULT - PROBLEM SELECTOR PLAN 3
unckera I deu to scc , tranfusion or infection   cxr and blood cx on admission remain negative   will check ua urine cx,  flu and rvp .   monitor wbc

## 2020-01-12 DIAGNOSIS — I50.22 CHRONIC SYSTOLIC (CONGESTIVE) HEART FAILURE: ICD-10-CM

## 2020-01-12 DIAGNOSIS — N40.0 BENIGN PROSTATIC HYPERPLASIA WITHOUT LOWER URINARY TRACT SYMPTOMS: ICD-10-CM

## 2020-01-12 LAB
ANION GAP SERPL CALC-SCNC: 6 MMOL/L — SIGNIFICANT CHANGE UP (ref 5–17)
BUN SERPL-MCNC: 12 MG/DL — SIGNIFICANT CHANGE UP (ref 7–23)
CALCIUM SERPL-MCNC: 8.6 MG/DL — SIGNIFICANT CHANGE UP (ref 8.5–10.1)
CHLORIDE SERPL-SCNC: 113 MMOL/L — HIGH (ref 96–108)
CO2 SERPL-SCNC: 22 MMOL/L — SIGNIFICANT CHANGE UP (ref 22–31)
CREAT SERPL-MCNC: 1.14 MG/DL — SIGNIFICANT CHANGE UP (ref 0.5–1.3)
GLUCOSE SERPL-MCNC: 94 MG/DL — SIGNIFICANT CHANGE UP (ref 70–99)
HCT VFR BLD CALC: 22.8 % — LOW (ref 39–50)
HGB BLD-MCNC: 7.1 G/DL — LOW (ref 13–17)
MAGNESIUM SERPL-MCNC: 1.8 MG/DL — SIGNIFICANT CHANGE UP (ref 1.6–2.6)
MCHC RBC-ENTMCNC: 22.9 PG — LOW (ref 27–34)
MCHC RBC-ENTMCNC: 31.1 GM/DL — LOW (ref 32–36)
MCV RBC AUTO: 73.5 FL — LOW (ref 80–100)
NRBC # BLD: 8 /100 WBCS — HIGH (ref 0–0)
PHOSPHATE SERPL-MCNC: 2.9 MG/DL — SIGNIFICANT CHANGE UP (ref 2.5–4.5)
PLATELET # BLD AUTO: 259 K/UL — SIGNIFICANT CHANGE UP (ref 150–400)
POTASSIUM SERPL-MCNC: 4.1 MMOL/L — SIGNIFICANT CHANGE UP (ref 3.5–5.3)
POTASSIUM SERPL-SCNC: 4.1 MMOL/L — SIGNIFICANT CHANGE UP (ref 3.5–5.3)
RAPID RVP RESULT: SIGNIFICANT CHANGE UP
RBC # BLD: 3.1 M/UL — LOW (ref 4.2–5.8)
RBC # BLD: 3.1 M/UL — LOW (ref 4.2–5.8)
RBC # FLD: 23.7 % — HIGH (ref 10.3–14.5)
RETICS #: 96.1 K/UL — SIGNIFICANT CHANGE UP (ref 25–125)
RETICS/RBC NFR: 3.1 % — HIGH (ref 0.5–2.5)
SODIUM SERPL-SCNC: 141 MMOL/L — SIGNIFICANT CHANGE UP (ref 135–145)
WBC # BLD: 11.07 K/UL — HIGH (ref 3.8–10.5)
WBC # FLD AUTO: 11.07 K/UL — HIGH (ref 3.8–10.5)

## 2020-01-12 PROCEDURE — 99233 SBSQ HOSP IP/OBS HIGH 50: CPT

## 2020-01-12 RX ORDER — TAMSULOSIN HYDROCHLORIDE 0.4 MG/1
0.4 CAPSULE ORAL AT BEDTIME
Refills: 0 | Status: DISCONTINUED | OUTPATIENT
Start: 2020-01-12 | End: 2020-01-31

## 2020-01-12 RX ORDER — METOPROLOL TARTRATE 50 MG
25 TABLET ORAL
Refills: 0 | Status: DISCONTINUED | OUTPATIENT
Start: 2020-01-12 | End: 2020-01-31

## 2020-01-12 RX ORDER — HYDROMORPHONE HYDROCHLORIDE 2 MG/ML
3 INJECTION INTRAMUSCULAR; INTRAVENOUS; SUBCUTANEOUS EVERY 4 HOURS
Refills: 0 | Status: DISCONTINUED | OUTPATIENT
Start: 2020-01-12 | End: 2020-01-15

## 2020-01-12 RX ORDER — FLUTICASONE PROPIONATE 50 MCG
1 SPRAY, SUSPENSION NASAL
Refills: 0 | Status: DISCONTINUED | OUTPATIENT
Start: 2020-01-12 | End: 2020-01-31

## 2020-01-12 RX ADMIN — Medication 50 MILLIGRAM(S): at 15:11

## 2020-01-12 RX ADMIN — SODIUM CHLORIDE 100 MILLILITER(S): 9 INJECTION, SOLUTION INTRAVENOUS at 17:15

## 2020-01-12 RX ADMIN — Medication 50 MILLIGRAM(S): at 04:32

## 2020-01-12 RX ADMIN — POLYETHYLENE GLYCOL 3350 17 GRAM(S): 17 POWDER, FOR SOLUTION ORAL at 11:30

## 2020-01-12 RX ADMIN — HYDROMORPHONE HYDROCHLORIDE 3 MILLIGRAM(S): 2 INJECTION INTRAMUSCULAR; INTRAVENOUS; SUBCUTANEOUS at 21:28

## 2020-01-12 RX ADMIN — HYDROMORPHONE HYDROCHLORIDE 3 MILLIGRAM(S): 2 INJECTION INTRAMUSCULAR; INTRAVENOUS; SUBCUTANEOUS at 21:58

## 2020-01-12 RX ADMIN — Medication 50 MILLIGRAM(S): at 21:29

## 2020-01-12 RX ADMIN — HYDROMORPHONE HYDROCHLORIDE 2 MILLIGRAM(S): 2 INJECTION INTRAMUSCULAR; INTRAVENOUS; SUBCUTANEOUS at 04:32

## 2020-01-12 RX ADMIN — HYDROMORPHONE HYDROCHLORIDE 2 MILLIGRAM(S): 2 INJECTION INTRAMUSCULAR; INTRAVENOUS; SUBCUTANEOUS at 00:24

## 2020-01-12 RX ADMIN — HYDROMORPHONE HYDROCHLORIDE 2 MILLIGRAM(S): 2 INJECTION INTRAMUSCULAR; INTRAVENOUS; SUBCUTANEOUS at 04:47

## 2020-01-12 RX ADMIN — HYDROMORPHONE HYDROCHLORIDE 3 MILLIGRAM(S): 2 INJECTION INTRAMUSCULAR; INTRAVENOUS; SUBCUTANEOUS at 15:10

## 2020-01-12 RX ADMIN — Medication 1 SPRAY(S): at 17:15

## 2020-01-12 RX ADMIN — SODIUM CHLORIDE 100 MILLILITER(S): 9 INJECTION, SOLUTION INTRAVENOUS at 05:46

## 2020-01-12 RX ADMIN — Medication 50 MILLIGRAM(S): at 09:43

## 2020-01-12 RX ADMIN — TAMSULOSIN HYDROCHLORIDE 0.4 MILLIGRAM(S): 0.4 CAPSULE ORAL at 21:29

## 2020-01-12 RX ADMIN — HYDROMORPHONE HYDROCHLORIDE 3 MILLIGRAM(S): 2 INJECTION INTRAMUSCULAR; INTRAVENOUS; SUBCUTANEOUS at 15:25

## 2020-01-12 RX ADMIN — Medication 25 MILLIGRAM(S): at 17:15

## 2020-01-12 RX ADMIN — HYDROMORPHONE HYDROCHLORIDE 2 MILLIGRAM(S): 2 INJECTION INTRAMUSCULAR; INTRAVENOUS; SUBCUTANEOUS at 09:43

## 2020-01-12 RX ADMIN — HYDROMORPHONE HYDROCHLORIDE 2 MILLIGRAM(S): 2 INJECTION INTRAMUSCULAR; INTRAVENOUS; SUBCUTANEOUS at 10:00

## 2020-01-12 NOTE — DIETITIAN INITIAL EVALUATION ADULT. - PERTINENT LABORATORY DATA
01-12 Na 141 mmol/L Glu 94 mg/dL K+ 4.1 mmol/L Cr 1.14 mg/dL BUN 12 mg/dL Phos 2.9 mg/dL Alb 3.0(1/9)   PAB n/a   Hgb 7.1 g/dL<L> Hct 22.8 %<L> HgA1C n/a    Glucose, Serum: 94 mg/dL   24Hr FS:, WBC=11.07(1/12)

## 2020-01-12 NOTE — DIETITIAN INITIAL EVALUATION ADULT. - OTHER INFO
Per RN pt alert & forgetful. Unable to obtain diet hx due to pt refusing diet hx. Observed pt not eating lunch today. Pt consuming ~60-15% meals during hospitalization & RN states pt requesting Ensure Enlive supplement. Last BM x 1(1/11).   Pt states VAD=423#. Noted wt loss ?amount time. Per RN pt alert & forgetful. Unable to obtain diet hx due to pt refusing diet hx. Observed pt not eating lunch today. Pt consuming ~60-15% meals during hospitalization & RN states pt requesting Ensure Enlive supplement. Last BM x 1(1/11).   Pt states WOA=985#. Noted wt loss ?amount time.    Pt with food insecurity, eligible nutrition related diagnosis stating "I'm not refusing Food as health, I just don't want to talk about it now!". Pt unable to express if he wants [facility] program at this time. Will reassess at a later time.

## 2020-01-12 NOTE — PROGRESS NOTE ADULT - PROBLEM SELECTOR PLAN 1
analgesics /benadryl   intravenous hydration   hgb at 5.6 -->7.8-->7.1 no old to compare , s/p 2 units on 1/10/2020   ensure on oxygen and IVF analgesics /benadryl   intravenous hydration   hgb at 5.6 -->7.8-->7.1 no old to compare , s/p 2 units on 1/10/2020   ensure on oxygen and IVF   refusess oxygen

## 2020-01-12 NOTE — DIETITIAN INITIAL EVALUATION ADULT. - PHYSICAL APPEARANCE
emaciated/BMI=15.6 BMI=15.6/other (specify)/emaciated Nutrition focused physical exam conducted: Subcutaneous fat loss: [moderate ] Orbital fat pads region, [ severe]Buccal fat region, [severe ]Triceps region,  [moderate ]Ribs region.  Muscle wasting: [severe ]Temples region, [mild ]Clavicle region, [ mild]Shoulder region, [unable ]Scapula region, [moderate ]Interosseous region,  [severe ]thigh region, [severe ]Calf region

## 2020-01-12 NOTE — PROGRESS NOTE ADULT - SUBJECTIVE AND OBJECTIVE BOX
Patient is a 49y old  Male who presents with a chief complaint of     OVERNIGHT EVENTS:    MEDICATIONS  (STANDING):  dextrose 5% + sodium chloride 0.9%. 1000 milliLiter(s) (100 mL/Hr) IV Continuous <Continuous>  influenza   Vaccine 0.5 milliLiter(s) IntraMuscular once  polyethylene glycol 3350 17 Gram(s) Oral daily    MEDICATIONS  (PRN):  acetaminophen   Tablet .. 650 milliGRAM(s) Oral every 6 hours PRN Temp greater or equal to 38C (100.4F), Mild Pain (1 - 3)  diphenhydrAMINE 50 milliGRAM(s) Oral every 4 hours PRN Rash and/or Itching  HYDROmorphone  Injectable 2 milliGRAM(s) IV Push every 4 hours PRN Severe Pain (7 - 10)  oxycodone    5 mG/acetaminophen 325 mG 1 Tablet(s) Oral every 6 hours PRN Moderate Pain (4 - 6)    Allergies    No Known Drug Allergies  peanuts (Angioedema)    Intolerances        SUBJECTIVE: in bed in NAD, no acute events overnight     Vital Signs Last 24 Hrs  T(C): 37.1 (12 Jan 2020 05:22), Max: 37.6 (11 Jan 2020 11:26)  T(F): 98.7 (12 Jan 2020 05:22), Max: 99.7 (11 Jan 2020 11:26)  HR: 66 (12 Jan 2020 05:22) (66 - 92)  BP: 151/79 (12 Jan 2020 05:22) (148/79 - 165/87)  BP(mean): --  RR: 18 (12 Jan 2020 05:22) (16 - 20)  SpO2: 95% (12 Jan 2020 05:22) (95% - 100%)      PHYSICAL EXAM:  GENERAL: NAD, well-groomed, well-developed, thin   HEAD:  Atraumatic, Normocephalic  EYES: EOMI, PERRLA, conjunctiva and sclera clear  ENMT: No tonsillar erythema, exudates, or enlargement; Moist mucous membranes, Good dentition, No lesions  NECK: Supple, No JVD, Normal thyroid  CHEST/LUNG: Clear to  auscultation bilaterally; No rales, rhonchi, wheezing, or rubs  bilaterally  HEART: Regular rate and rhythm; No murmurs, rubs, or gallops  ABDOMEN: Soft, Nontender, Nondistended; Bowel sounds present  EXTREMITIES:  2+ Peripheral Pulses, No clubbing, cyanosis, or edema BL LE  SKIN: No rashes or lesions  NERVOUS SYSTEM:  Alert & Oriented X3, Good concentration; Motor Strength 5/5 B/L upper and lower extremities;   DTRs 2+ intact and symmetric, sensation intact BL    LABS:                                                 7.1    11.07 )-----------( 259      ( 12 Jan 2020 08:59 )             22.8   01-12    141  |  113<H>  |  12  ----------------------------<  94  4.1   |  22  |  1.14    Ca    8.6      12 Jan 2020 08:59  Phos  2.9     01-12  Mg     1.8     01-12        Cultures;   CAPILLARY BLOOD GLUCOSE        Lipid panel:           RADIOLOGY & ADDITIONAL TESTS:      Imaging Personally Reviewed:  [ x] YES      Consultant(s) Notes Reviewed:  [x ] YES     Care Discussed with [x ] Consultants [X ] Patient [x ] Family  [x ]    [x ]  Other; RN Patient is a 49y old  Male who presents with a chief complaint of     OVERNIGHT EVENTS:    MEDICATIONS  (STANDING):  dextrose 5% + sodium chloride 0.9%. 1000 milliLiter(s) (100 mL/Hr) IV Continuous <Continuous>  influenza   Vaccine 0.5 milliLiter(s) IntraMuscular once  polyethylene glycol 3350 17 Gram(s) Oral daily    MEDICATIONS  (PRN):  acetaminophen   Tablet .. 650 milliGRAM(s) Oral every 6 hours PRN Temp greater or equal to 38C (100.4F), Mild Pain (1 - 3)  diphenhydrAMINE 50 milliGRAM(s) Oral every 4 hours PRN Rash and/or Itching  HYDROmorphone  Injectable 2 milliGRAM(s) IV Push every 4 hours PRN Severe Pain (7 - 10)  oxycodone    5 mG/acetaminophen 325 mG 1 Tablet(s) Oral every 6 hours PRN Moderate Pain (4 - 6)    Allergies    No Known Drug Allergies  peanuts (Angioedema)    Intolerances        SUBJECTIVE: in bed c/o pain oall over and nasal congestion   refusing to wear oxygen      Vital Signs Last 24 Hrs  T(C): 37.1 (12 Jan 2020 05:22), Max: 37.6 (11 Jan 2020 11:26)  T(F): 98.7 (12 Jan 2020 05:22), Max: 99.7 (11 Jan 2020 11:26)  HR: 66 (12 Jan 2020 05:22) (66 - 92)  BP: 151/79 (12 Jan 2020 05:22) (148/79 - 165/87)  BP(mean): --  RR: 18 (12 Jan 2020 05:22) (16 - 20)  SpO2: 95% (12 Jan 2020 05:22) (95% - 100%)      PHYSICAL EXAM:  GENERAL: NAD, well-groomed, well-developed, thin   HEAD:  Atraumatic, Normocephalic  EYES: EOMI, PERRLA, conjunctiva and sclera clear  ENMT: No tonsillar erythema, exudates, or enlargement; Moist mucous membranes, Good dentition, No lesions  NECK: Supple, No JVD, Normal thyroid  CHEST/LUNG: Clear to  auscultation bilaterally; No rales, rhonchi, wheezing, or rubs  bilaterally  HEART: Regular rate and rhythm; No murmurs, rubs, or gallops  ABDOMEN: Soft, Nontender, Nondistended; Bowel sounds present  EXTREMITIES:  2+ Peripheral Pulses, No clubbing, cyanosis, or edema BL LE  SKIN: No rashes or lesions  NERVOUS SYSTEM:  Alert & Oriented X3, Good concentration; Motor Strength 5/5 B/L upper and lower extremities;   DTRs 2+ intact and symmetric, sensation intact BL    LABS:                                                 7.1    11.07 )-----------( 259      ( 12 Jan 2020 08:59 )             22.8   01-12    141  |  113<H>  |  12  ----------------------------<  94  4.1   |  22  |  1.14    Ca    8.6      12 Jan 2020 08:59  Phos  2.9     01-12  Mg     1.8     01-12        Cultures;   CAPILLARY BLOOD GLUCOSE        Lipid panel:           RADIOLOGY & ADDITIONAL TESTS:      Imaging Personally Reviewed:  [ x] YES      Consultant(s) Notes Reviewed:  [x ] YES     Care Discussed with [x ] Consultants [X ] Patient [x ] Family  [x ]    [x ]  Other; RN Patient is a 49y old  Male who presents with a chief complaint of     OVERNIGHT EVENTS:    MEDICATIONS  (STANDING):  dextrose 5% + sodium chloride 0.9%. 1000 milliLiter(s) (100 mL/Hr) IV Continuous <Continuous>  influenza   Vaccine 0.5 milliLiter(s) IntraMuscular once  polyethylene glycol 3350 17 Gram(s) Oral daily    MEDICATIONS  (PRN):  acetaminophen   Tablet .. 650 milliGRAM(s) Oral every 6 hours PRN Temp greater or equal to 38C (100.4F), Mild Pain (1 - 3)  diphenhydrAMINE 50 milliGRAM(s) Oral every 4 hours PRN Rash and/or Itching  HYDROmorphone  Injectable 2 milliGRAM(s) IV Push every 4 hours PRN Severe Pain (7 - 10)  oxycodone    5 mG/acetaminophen 325 mG 1 Tablet(s) Oral every 6 hours PRN Moderate Pain (4 - 6)    Allergies    No Known Drug Allergies  peanuts (Angioedema)    Intolerances        SUBJECTIVE: in bed c/o pain oall over and nasal congestion   refusing to wear oxygen      Vital Signs Last 24 Hrs  T(C): 37.1 (12 Jan 2020 05:22), Max: 37.6 (11 Jan 2020 11:26)  T(F): 98.7 (12 Jan 2020 05:22), Max: 99.7 (11 Jan 2020 11:26)  HR: 66 (12 Jan 2020 05:22) (66 - 92)  BP: 151/79 (12 Jan 2020 05:22) (148/79 - 165/87)  BP(mean): --  RR: 18 (12 Jan 2020 05:22) (16 - 20)  SpO2: 95% (12 Jan 2020 05:22) (95% - 100%)      PHYSICAL EXAM:  GENERAL: NAD, well-groomed, well-developed, thin   HEAD:  Atraumatic, Normocephalic  EYES: EOMI, PERRLA, conjunctiva and sclera clear  ENMT: No tonsillar erythema, exudates, or enlargement; Moist mucous membranes, Good dentition, No lesions  NECK: Supple, No JVD, Normal thyroid  CHEST/LUNG: Clear to  auscultation bilaterally; No rales, rhonchi, wheezing, or rubs  bilaterally  HEART: Regular rate and rhythm; No murmurs, rubs, or gallops  ABDOMEN: Soft, Nontender, Nondistended; Bowel sounds present  EXTREMITIES:  2+ Peripheral Pulses, No clubbing, cyanosis, or edema BL LE  SKIN: No rashes or lesions  NERVOUS SYSTEM:  Alert & Oriented X3, Good concentration; Motor Strength 5/5 B/L upper and lower extremities; but doesn't like to talk and seem annoyed with any questioning .   DTRs 2+ intact and symmetric, sensation intact BL    LABS:                                                 7.1    11.07 )-----------( 259      ( 12 Jan 2020 08:59 )             22.8   01-12    141  |  113<H>  |  12  ----------------------------<  94  4.1   |  22  |  1.14    Ca    8.6      12 Jan 2020 08:59  Phos  2.9     01-12  Mg     1.8     01-12        Cultures;   CAPILLARY BLOOD GLUCOSE        Lipid panel:           RADIOLOGY & ADDITIONAL TESTS:      Imaging Personally Reviewed:  [ x] YES      Consultant(s) Notes Reviewed:  [x ] YES     Care Discussed with [x ] Consultants [X ] Patient [x ] Family  [x ]    [x ]  Other; RN

## 2020-01-12 NOTE — PROGRESS NOTE ADULT - PROBLEM SELECTOR PLAN 3
resolved   unclear  if due to scc , transfusion or infection   cxr and blood cx on admission remain negative  ua  noted   await urine cx,    flu and rvp .  are negative  monitor wbc which is down trending  no antibx for now

## 2020-01-12 NOTE — CHART NOTE - NSCHARTNOTEFT_GEN_A_CORE
Upon Nutritional Assessment by the Registered Dietitian your patient was determined to meet criteria / has evidence of the following diagnosis/diagnoses:          [ ]  Mild Protein Calorie Malnutrition        [ ]  Moderate Protein Calorie Malnutrition        [x ] Severe Protein Calorie Malnutrition        [ ] Unspecified Protein Calorie Malnutrition        [x ] Underweight / BMI <19        [ ] Morbid Obesity / BMI > 40      Findings as based on:  •  Comprehensive nutrition assessment and consultation  •  Calorie counts (nutrient intake analysis)  •  Food acceptance and intake status from observations by staff  •  Follow up  •  Patient education  •  Intervention secondary to interdisciplinary rounds  •   concerns      Treatment:    The following diet has been recommended:  Regular/Ensure Enlive 3 x day(1050kcal & 60gm protein)    PROVIDER Section:     By signing this assessment you are acknowledging and agree with the diagnosis/diagnoses assigned by the Registered Dietitian    Comments:

## 2020-01-12 NOTE — DIETITIAN INITIAL EVALUATION ADULT. - PERTINENT MEDS FT
acetaminophen   Tablet .. PRN  dextrose 5% + sodium chloride 0.9%.  diphenhydrAMINE PRN  HYDROmorphone  Injectable PRN  influenza   Vaccine  oxycodone    5 mG/acetaminophen 325 mG PRN  polyethylene glycol 3350

## 2020-01-12 NOTE — PROGRESS NOTE ADULT - PROBLEM SELECTOR PLAN 5
reviewed prior admission info from [facility] noted pt with Congestive Heart Failure  currently presumed chronic and stable not in acute exacerbation   will resume metoprolol.

## 2020-01-13 DIAGNOSIS — E43 UNSPECIFIED SEVERE PROTEIN-CALORIE MALNUTRITION: ICD-10-CM

## 2020-01-13 LAB
HCT VFR BLD CALC: 22.7 % — LOW (ref 39–50)
HGB BLD-MCNC: 7.2 G/DL — LOW (ref 13–17)
MCHC RBC-ENTMCNC: 23.3 PG — LOW (ref 27–34)
MCHC RBC-ENTMCNC: 31.7 GM/DL — LOW (ref 32–36)
MCV RBC AUTO: 73.5 FL — LOW (ref 80–100)
NRBC # BLD: 4 /100 WBCS — HIGH (ref 0–0)
PLATELET # BLD AUTO: 315 K/UL — SIGNIFICANT CHANGE UP (ref 150–400)
RBC # BLD: 3.09 M/UL — LOW (ref 4.2–5.8)
RBC # FLD: 23.7 % — HIGH (ref 10.3–14.5)
WBC # BLD: 17.41 K/UL — HIGH (ref 3.8–10.5)
WBC # FLD AUTO: 17.41 K/UL — HIGH (ref 3.8–10.5)

## 2020-01-13 PROCEDURE — 99233 SBSQ HOSP IP/OBS HIGH 50: CPT

## 2020-01-13 RX ORDER — HEPARIN SODIUM 5000 [USP'U]/ML
5000 INJECTION INTRAVENOUS; SUBCUTANEOUS EVERY 12 HOURS
Refills: 0 | Status: DISCONTINUED | OUTPATIENT
Start: 2020-01-13 | End: 2020-01-31

## 2020-01-13 RX ADMIN — Medication 25 MILLIGRAM(S): at 17:16

## 2020-01-13 RX ADMIN — Medication 50 MILLIGRAM(S): at 01:50

## 2020-01-13 RX ADMIN — HYDROMORPHONE HYDROCHLORIDE 3 MILLIGRAM(S): 2 INJECTION INTRAMUSCULAR; INTRAVENOUS; SUBCUTANEOUS at 02:11

## 2020-01-13 RX ADMIN — HYDROMORPHONE HYDROCHLORIDE 3 MILLIGRAM(S): 2 INJECTION INTRAMUSCULAR; INTRAVENOUS; SUBCUTANEOUS at 21:09

## 2020-01-13 RX ADMIN — SODIUM CHLORIDE 100 MILLILITER(S): 9 INJECTION, SOLUTION INTRAVENOUS at 15:24

## 2020-01-13 RX ADMIN — Medication 50 MILLIGRAM(S): at 08:21

## 2020-01-13 RX ADMIN — HYDROMORPHONE HYDROCHLORIDE 3 MILLIGRAM(S): 2 INJECTION INTRAMUSCULAR; INTRAVENOUS; SUBCUTANEOUS at 16:00

## 2020-01-13 RX ADMIN — HYDROMORPHONE HYDROCHLORIDE 3 MILLIGRAM(S): 2 INJECTION INTRAMUSCULAR; INTRAVENOUS; SUBCUTANEOUS at 21:32

## 2020-01-13 RX ADMIN — Medication 25 MILLIGRAM(S): at 05:43

## 2020-01-13 RX ADMIN — HYDROMORPHONE HYDROCHLORIDE 3 MILLIGRAM(S): 2 INJECTION INTRAMUSCULAR; INTRAVENOUS; SUBCUTANEOUS at 01:51

## 2020-01-13 RX ADMIN — SODIUM CHLORIDE 100 MILLILITER(S): 9 INJECTION, SOLUTION INTRAVENOUS at 05:50

## 2020-01-13 RX ADMIN — HEPARIN SODIUM 5000 UNIT(S): 5000 INJECTION INTRAVENOUS; SUBCUTANEOUS at 17:16

## 2020-01-13 RX ADMIN — HYDROMORPHONE HYDROCHLORIDE 3 MILLIGRAM(S): 2 INJECTION INTRAMUSCULAR; INTRAVENOUS; SUBCUTANEOUS at 08:21

## 2020-01-13 RX ADMIN — Medication 1 SPRAY(S): at 05:43

## 2020-01-13 RX ADMIN — TAMSULOSIN HYDROCHLORIDE 0.4 MILLIGRAM(S): 0.4 CAPSULE ORAL at 21:15

## 2020-01-13 RX ADMIN — Medication 50 MILLIGRAM(S): at 15:25

## 2020-01-13 RX ADMIN — HYDROMORPHONE HYDROCHLORIDE 3 MILLIGRAM(S): 2 INJECTION INTRAMUSCULAR; INTRAVENOUS; SUBCUTANEOUS at 09:00

## 2020-01-13 RX ADMIN — SODIUM CHLORIDE 100 MILLILITER(S): 9 INJECTION, SOLUTION INTRAVENOUS at 17:18

## 2020-01-13 RX ADMIN — HYDROMORPHONE HYDROCHLORIDE 3 MILLIGRAM(S): 2 INJECTION INTRAMUSCULAR; INTRAVENOUS; SUBCUTANEOUS at 15:24

## 2020-01-13 RX ADMIN — Medication 50 MILLIGRAM(S): at 21:14

## 2020-01-13 RX ADMIN — Medication 1 SPRAY(S): at 17:16

## 2020-01-13 NOTE — PROGRESS NOTE ADULT - PROBLEM SELECTOR PLAN 1
analgesics /benadryl   intravenous hydration   hgb at 5.6 -->7.8-->7.1 no old to compare , s/p 2 units on 1/10/2020   ensure on oxygen and IVF   refusess oxygen

## 2020-01-13 NOTE — PROGRESS NOTE ADULT - SUBJECTIVE AND OBJECTIVE BOX
Patient is a 49y old  Male who presents with a chief complaint of     OVERNIGHT EVENTS: none    MEDICATIONS  (STANDING):  dextrose 5% + sodium chloride 0.9%. 1000 milliLiter(s) (100 mL/Hr) IV Continuous <Continuous>  fluticasone propionate 50 MICROgram(s)/spray Nasal Spray 1 Spray(s) Both Nostrils two times a day  influenza   Vaccine 0.5 milliLiter(s) IntraMuscular once  metoprolol tartrate 25 milliGRAM(s) Oral two times a day  polyethylene glycol 3350 17 Gram(s) Oral daily  tamsulosin 0.4 milliGRAM(s) Oral at bedtime    MEDICATIONS  (PRN):  acetaminophen   Tablet .. 650 milliGRAM(s) Oral every 6 hours PRN Temp greater or equal to 38C (100.4F), Mild Pain (1 - 3)  diphenhydrAMINE 50 milliGRAM(s) Oral every 4 hours PRN Rash and/or Itching  HYDROmorphone  Injectable 3 milliGRAM(s) IV Push every 4 hours PRN Severe Pain (7 - 10)  oxycodone    5 mG/acetaminophen 325 mG 1 Tablet(s) Oral every 6 hours PRN Moderate Pain (4 - 6)  Allergies    No Known Drug Allergies  peanuts (Angioedema)    Intolerances        SUBJECTIVE: in bed c/o pain all over and nasal congestion   refusing to wear oxygen      Vital Signs Last 24 Hrs  T(C): 37 (13 Jan 2020 05:41), Max: 37 (13 Jan 2020 05:41)  T(F): 98.6 (13 Jan 2020 05:41), Max: 98.6 (13 Jan 2020 05:41)  HR: 90 (13 Jan 2020 05:41) (64 - 90)  BP: 139/83 (13 Jan 2020 05:41) (120/75 - 162/82)  BP(mean): --  RR: 18 (13 Jan 2020 05:41) (16 - 18)  SpO2: 98% (13 Jan 2020 05:41) (96% - 98%)    PHYSICAL EXAM:  GENERAL: NAD, well-groomed, well-developed, thin   HEAD:  Atraumatic, Normocephalic  EYES: EOMI, PERRLA, conjunctiva and sclera clear  ENMT: No tonsillar erythema, exudates, or enlargement; Moist mucous membranes, Good dentition, No lesions  NECK: Supple, No JVD, Normal thyroid  CHEST/LUNG: Clear to  auscultation bilaterally; No rales, rhonchi, wheezing, or rubs  bilaterally  HEART: Regular rate and rhythm; No murmurs, rubs, or gallops  ABDOMEN: Soft, Nontender, Nondistended; Bowel sounds present  EXTREMITIES:  2+ Peripheral Pulses, No clubbing, cyanosis, or edema BL LE  SKIN: No rashes or lesions  NERVOUS SYSTEM:  Alert & Oriented X3, Good concentration; Motor Strength 5/5 B/L upper and lower extremities; but doesn't like to talk and seem annoyed with any questioning .   DTRs 2+ intact and symmetric, sensation intact BL    LABS:                                            7.1    11.07 )-----------( 259      ( 12 Jan 2020 08:59 )             22.8   01-12    141  |  113<H>  |  12  ----------------------------<  94  4.1   |  22  |  1.14    Ca    8.6      12 Jan 2020 08:59  Phos  2.9     01-12  Mg     1.8     01-12        Cultures;   CAPILLARY BLOOD GLUCOSE        Lipid panel:           RADIOLOGY & ADDITIONAL TESTS:      Imaging Personally Reviewed:  [ x] YES      Consultant(s) Notes Reviewed:  [x ] YES     Care Discussed with [x ] Consultants [X ] Patient [x ] Family  [x ]    [x ]  Other; RN Patient is a 49y old  Male who presents with a chief complaint of     OVERNIGHT EVENTS: none    MEDICATIONS  (STANDING):  dextrose 5% + sodium chloride 0.9%. 1000 milliLiter(s) (100 mL/Hr) IV Continuous <Continuous>  fluticasone propionate 50 MICROgram(s)/spray Nasal Spray 1 Spray(s) Both Nostrils two times a day  influenza   Vaccine 0.5 milliLiter(s) IntraMuscular once  metoprolol tartrate 25 milliGRAM(s) Oral two times a day  polyethylene glycol 3350 17 Gram(s) Oral daily  tamsulosin 0.4 milliGRAM(s) Oral at bedtime    MEDICATIONS  (PRN):  acetaminophen   Tablet .. 650 milliGRAM(s) Oral every 6 hours PRN Temp greater or equal to 38C (100.4F), Mild Pain (1 - 3)  diphenhydrAMINE 50 milliGRAM(s) Oral every 4 hours PRN Rash and/or Itching  HYDROmorphone  Injectable 3 milliGRAM(s) IV Push every 4 hours PRN Severe Pain (7 - 10)  oxycodone    5 mG/acetaminophen 325 mG 1 Tablet(s) Oral every 6 hours PRN Moderate Pain (4 - 6)  Allergies    No Known Drug Allergies  peanuts (Angioedema)    Intolerances        SUBJECTIVE: in bed states pain is better and mild improvement in nasal congestion    still refusing  to wear oxygen      Vital Signs Last 24 Hrs  T(C): 37 (13 Jan 2020 05:41), Max: 37 (13 Jan 2020 05:41)  T(F): 98.6 (13 Jan 2020 05:41), Max: 98.6 (13 Jan 2020 05:41)  HR: 90 (13 Jan 2020 05:41) (64 - 90)  BP: 139/83 (13 Jan 2020 05:41) (120/75 - 162/82)  BP(mean): --  RR: 18 (13 Jan 2020 05:41) (16 - 18)  SpO2: 98% (13 Jan 2020 05:41) (96% - 98%)    PHYSICAL EXAM:  GENERAL: NAD, well-groomed, well-developed, thin   HEAD:  Atraumatic, Normocephalic  EYES: EOMI, PERRLA, conjunctiva and sclera clear  ENMT: No tonsillar erythema, exudates, or enlargement; Moist mucous membranes, Good dentition, No lesions  NECK: Supple, No JVD, Normal thyroid  CHEST/LUNG: Clear to  auscultation bilaterally; No rales, rhonchi, wheezing, or rubs  bilaterally  HEART: Regular rate and rhythm; No murmurs, rubs, or gallops  ABDOMEN: Soft, Nontender, Nondistended; Bowel sounds present  EXTREMITIES:  2+ Peripheral Pulses, No clubbing, cyanosis, or edema BL LE  SKIN: No rashes or lesions  NERVOUS SYSTEM:  Alert & Oriented X3, Good concentration; Motor Strength 5/5 B/L upper and lower extremities; but doesn't like to talk and seem annoyed with any questioning .   DTRs 2+ intact and symmetric, sensation intact BL    LABS:                                            7.1    11.07 )-----------( 259      ( 12 Jan 2020 08:59 )             22.8   01-12    141  |  113<H>  |  12  ----------------------------<  94  4.1   |  22  |  1.14    Ca    8.6      12 Jan 2020 08:59  Phos  2.9     01-12  Mg     1.8     01-12        Cultures;   CAPILLARY BLOOD GLUCOSE        Lipid panel:           RADIOLOGY & ADDITIONAL TESTS:      Imaging Personally Reviewed:  [ x] YES      Consultant(s) Notes Reviewed:  [x ] YES     Care Discussed with [x ] Consultants [X ] Patient [x ] Family  [x ]    [x ]  Other; RN

## 2020-01-13 NOTE — PROGRESS NOTE ADULT - PROBLEM SELECTOR PLAN 3
resolved   unclear  if due to scc , transfusion or infection   cxr and blood cx on admission remain negative  ua  noted   await urine cx,    flu and rvp .  are negative  monitor wbc which is down trending  no antibx for now resolved last 48 hours  unclear  if due to scc , transfusion or infection   cxr and blood cx on admission remain negative  ua  noted   f/u  urine cx,    flu and rvp .  are negative  monitor wbc

## 2020-01-13 NOTE — PROGRESS NOTE ADULT - PROBLEM SELECTOR PLAN 5
reviewed prior admission info from [facility] noted pt with Congestive Heart Failure  currently presumed chronic and stable not in acute exacerbation   will resume metoprolol. reviewed prior admission info from [facility] noted pt with Congestive Heart Failure  currently presumed chronic and stable not in acute exacerbation    resumed metoprolol.

## 2020-01-14 LAB
-  AMIKACIN: SIGNIFICANT CHANGE UP
-  AMPICILLIN/SULBACTAM: SIGNIFICANT CHANGE UP
-  AMPICILLIN: SIGNIFICANT CHANGE UP
-  AZTREONAM: SIGNIFICANT CHANGE UP
-  CEFAZOLIN: SIGNIFICANT CHANGE UP
-  CEFEPIME: SIGNIFICANT CHANGE UP
-  CEFOXITIN: SIGNIFICANT CHANGE UP
-  CEFTRIAXONE: SIGNIFICANT CHANGE UP
-  CIPROFLOXACIN: SIGNIFICANT CHANGE UP
-  GENTAMICIN: SIGNIFICANT CHANGE UP
-  IMIPENEM: SIGNIFICANT CHANGE UP
-  LEVOFLOXACIN: SIGNIFICANT CHANGE UP
-  MEROPENEM: SIGNIFICANT CHANGE UP
-  NITROFURANTOIN: SIGNIFICANT CHANGE UP
-  PIPERACILLIN/TAZOBACTAM: SIGNIFICANT CHANGE UP
-  TIGECYCLINE: SIGNIFICANT CHANGE UP
-  TOBRAMYCIN: SIGNIFICANT CHANGE UP
-  TRIMETHOPRIM/SULFAMETHOXAZOLE: SIGNIFICANT CHANGE UP
ANION GAP SERPL CALC-SCNC: 9 MMOL/L — SIGNIFICANT CHANGE UP (ref 5–17)
BUN SERPL-MCNC: 12 MG/DL — SIGNIFICANT CHANGE UP (ref 7–23)
CALCIUM SERPL-MCNC: 8.7 MG/DL — SIGNIFICANT CHANGE UP (ref 8.5–10.1)
CHLORIDE SERPL-SCNC: 113 MMOL/L — HIGH (ref 96–108)
CO2 SERPL-SCNC: 22 MMOL/L — SIGNIFICANT CHANGE UP (ref 22–31)
CREAT SERPL-MCNC: 1.1 MG/DL — SIGNIFICANT CHANGE UP (ref 0.5–1.3)
CULTURE RESULTS: SIGNIFICANT CHANGE UP
GLUCOSE SERPL-MCNC: 96 MG/DL — SIGNIFICANT CHANGE UP (ref 70–99)
HCT VFR BLD CALC: 23 % — LOW (ref 39–50)
HGB BLD-MCNC: 7.1 G/DL — LOW (ref 13–17)
MAGNESIUM SERPL-MCNC: 1.7 MG/DL — SIGNIFICANT CHANGE UP (ref 1.6–2.6)
MCHC RBC-ENTMCNC: 23 PG — LOW (ref 27–34)
MCHC RBC-ENTMCNC: 30.9 GM/DL — LOW (ref 32–36)
MCV RBC AUTO: 74.4 FL — LOW (ref 80–100)
METHOD TYPE: SIGNIFICANT CHANGE UP
NRBC # BLD: 4 /100 WBCS — HIGH (ref 0–0)
ORGANISM # SPEC MICROSCOPIC CNT: SIGNIFICANT CHANGE UP
ORGANISM # SPEC MICROSCOPIC CNT: SIGNIFICANT CHANGE UP
PHOSPHATE SERPL-MCNC: 3 MG/DL — SIGNIFICANT CHANGE UP (ref 2.5–4.5)
PLATELET # BLD AUTO: 352 K/UL — SIGNIFICANT CHANGE UP (ref 150–400)
POTASSIUM SERPL-MCNC: 4.3 MMOL/L — SIGNIFICANT CHANGE UP (ref 3.5–5.3)
POTASSIUM SERPL-SCNC: 4.3 MMOL/L — SIGNIFICANT CHANGE UP (ref 3.5–5.3)
RBC # BLD: 3.09 M/UL — LOW (ref 4.2–5.8)
RBC # BLD: 3.09 M/UL — LOW (ref 4.2–5.8)
RBC # FLD: 23.8 % — HIGH (ref 10.3–14.5)
RETICS #: 102.6 K/UL — SIGNIFICANT CHANGE UP (ref 25–125)
RETICS/RBC NFR: 3.3 % — HIGH (ref 0.5–2.5)
SODIUM SERPL-SCNC: 144 MMOL/L — SIGNIFICANT CHANGE UP (ref 135–145)
SPECIMEN SOURCE: SIGNIFICANT CHANGE UP
WBC # BLD: 13.33 K/UL — HIGH (ref 3.8–10.5)
WBC # FLD AUTO: 13.33 K/UL — HIGH (ref 3.8–10.5)

## 2020-01-14 PROCEDURE — 99233 SBSQ HOSP IP/OBS HIGH 50: CPT

## 2020-01-14 RX ORDER — CEFTRIAXONE 500 MG/1
1000 INJECTION, POWDER, FOR SOLUTION INTRAMUSCULAR; INTRAVENOUS EVERY 24 HOURS
Refills: 0 | Status: COMPLETED | OUTPATIENT
Start: 2020-01-14 | End: 2020-01-18

## 2020-01-14 RX ADMIN — HYDROMORPHONE HYDROCHLORIDE 3 MILLIGRAM(S): 2 INJECTION INTRAMUSCULAR; INTRAVENOUS; SUBCUTANEOUS at 10:01

## 2020-01-14 RX ADMIN — HYDROMORPHONE HYDROCHLORIDE 3 MILLIGRAM(S): 2 INJECTION INTRAMUSCULAR; INTRAVENOUS; SUBCUTANEOUS at 14:18

## 2020-01-14 RX ADMIN — Medication 25 MILLIGRAM(S): at 05:38

## 2020-01-14 RX ADMIN — HYDROMORPHONE HYDROCHLORIDE 3 MILLIGRAM(S): 2 INJECTION INTRAMUSCULAR; INTRAVENOUS; SUBCUTANEOUS at 14:03

## 2020-01-14 RX ADMIN — HYDROMORPHONE HYDROCHLORIDE 3 MILLIGRAM(S): 2 INJECTION INTRAMUSCULAR; INTRAVENOUS; SUBCUTANEOUS at 03:33

## 2020-01-14 RX ADMIN — HYDROMORPHONE HYDROCHLORIDE 3 MILLIGRAM(S): 2 INJECTION INTRAMUSCULAR; INTRAVENOUS; SUBCUTANEOUS at 10:16

## 2020-01-14 RX ADMIN — TAMSULOSIN HYDROCHLORIDE 0.4 MILLIGRAM(S): 0.4 CAPSULE ORAL at 22:16

## 2020-01-14 RX ADMIN — HYDROMORPHONE HYDROCHLORIDE 3 MILLIGRAM(S): 2 INJECTION INTRAMUSCULAR; INTRAVENOUS; SUBCUTANEOUS at 18:22

## 2020-01-14 RX ADMIN — SODIUM CHLORIDE 100 MILLILITER(S): 9 INJECTION, SOLUTION INTRAVENOUS at 11:44

## 2020-01-14 RX ADMIN — Medication 50 MILLIGRAM(S): at 03:13

## 2020-01-14 RX ADMIN — HYDROMORPHONE HYDROCHLORIDE 3 MILLIGRAM(S): 2 INJECTION INTRAMUSCULAR; INTRAVENOUS; SUBCUTANEOUS at 18:07

## 2020-01-14 RX ADMIN — Medication 1 SPRAY(S): at 05:39

## 2020-01-14 RX ADMIN — HEPARIN SODIUM 5000 UNIT(S): 5000 INJECTION INTRAVENOUS; SUBCUTANEOUS at 18:06

## 2020-01-14 RX ADMIN — HEPARIN SODIUM 5000 UNIT(S): 5000 INJECTION INTRAVENOUS; SUBCUTANEOUS at 05:39

## 2020-01-14 RX ADMIN — HYDROMORPHONE HYDROCHLORIDE 3 MILLIGRAM(S): 2 INJECTION INTRAMUSCULAR; INTRAVENOUS; SUBCUTANEOUS at 03:08

## 2020-01-14 RX ADMIN — Medication 25 MILLIGRAM(S): at 18:09

## 2020-01-14 RX ADMIN — CEFTRIAXONE 100 MILLIGRAM(S): 500 INJECTION, POWDER, FOR SOLUTION INTRAMUSCULAR; INTRAVENOUS at 14:00

## 2020-01-14 NOTE — PHYSICAL THERAPY INITIAL EVALUATION ADULT - CRITERIA FOR SKILLED THERAPEUTIC INTERVENTIONS
anticipated discharge recommendation/therapy frequency/impairments found/rehab potential/predicted duration of therapy intervention/risk reduction/prevention/functional limitations in following categories

## 2020-01-14 NOTE — PROGRESS NOTE ADULT - PROBLEM SELECTOR PLAN 1
analgesics /benadryl   intravenous hydration   hgb at 5.6 -->7.8-->7.1 no old to compare , s/p 2 units on 1/10/2020   ensure on oxygen and IVF   refuses oxygen or at least everytime I see him the oxygen is off despite multilple conversations to replace

## 2020-01-14 NOTE — PHYSICAL THERAPY INITIAL EVALUATION ADULT - GAIT TRAINING, PT EVAL
Patient will ambulate 500 feet with rolling walker independently for community ambulation in 2-3 weeks.

## 2020-01-14 NOTE — PROGRESS NOTE ADULT - PROBLEM SELECTOR PLAN 5
reviewed prior admission info from [facility] noted pt with Congestive Heart Failure  currently presumed chronic and stable not in acute exacerbation    resumed metoprolol.

## 2020-01-14 NOTE — PROGRESS NOTE ADULT - PROBLEM SELECTOR PLAN 3
resolved last 72 hours  unclear  if due to scc , transfusion or infection   cxr and blood cx on admission remain negative  ua  noted   f/u  urine cx GNR    flu and rvp  are negative  monitor wbc resolved last 72 hours  unclear  if due to scc , transfusion or infection   cxr and blood cx on admission remain negative  ua  noted   f/u urine cx GNR will start Rocephin as pt now reports dysuria  flu and rvp  are negative  monitor wbc resolved last 72 hours  unclear  if due to scc , transfusion or infection   cxr and blood cx on admission remain negative  ua  noted   1/14/2020  urine cx GNR f/u results , will start Rocephin as pt now reports dysuria  flu and rvp  are negative  monitor wbc

## 2020-01-14 NOTE — PHYSICAL THERAPY INITIAL EVALUATION ADULT - GAIT DEVIATIONS NOTED, PT EVAL
toe walking (patient reports he is having ankle pain and does not want to put pressure on heels)/decreased quyen/decreased step length/decreased stride length/decreased weight-shifting ability

## 2020-01-14 NOTE — PROGRESS NOTE ADULT - PROBLEM SELECTOR PLAN 4
reviewed prior health record and noted pt on flomax from prior [facility] admission reviewed prior health record and noted pt on Flomax from prior [facility] admission

## 2020-01-14 NOTE — PROGRESS NOTE ADULT - SUBJECTIVE AND OBJECTIVE BOX
Patient is a 49y old  Male who presents with a chief complaint of     OVERNIGHT EVENTS: none      MEDICATIONS  (STANDING):  dextrose 5% + sodium chloride 0.9%. 1000 milliLiter(s) (100 mL/Hr) IV Continuous <Continuous>  fluticasone propionate 50 MICROgram(s)/spray Nasal Spray 1 Spray(s) Both Nostrils two times a day  heparin  Injectable 5000 Unit(s) SubCutaneous every 12 hours  influenza   Vaccine 0.5 milliLiter(s) IntraMuscular once  metoprolol tartrate 25 milliGRAM(s) Oral two times a day  polyethylene glycol 3350 17 Gram(s) Oral daily  tamsulosin 0.4 milliGRAM(s) Oral at bedtime    MEDICATIONS  (PRN):  acetaminophen   Tablet .. 650 milliGRAM(s) Oral every 6 hours PRN Temp greater or equal to 38C (100.4F), Mild Pain (1 - 3)  diphenhydrAMINE 50 milliGRAM(s) Oral every 4 hours PRN Rash and/or Itching  HYDROmorphone  Injectable 3 milliGRAM(s) IV Push every 4 hours PRN Severe Pain (7 - 10)  oxycodone    5 mG/acetaminophen 325 mG 1 Tablet(s) Oral every 6 hours PRN Moderate Pain (4 - 6)  Allergies    No Known Drug Allergies  peanuts (Angioedema)    Intolerances        SUBJECTIVE: in bed states pain is better and mild improvement in nasal congestion    still refusing  to wear oxygen     Vital Signs Last 24 Hrs  T(C): 36.9 (14 Jan 2020 05:35), Max: 37.1 (14 Jan 2020 00:50)  T(F): 98.5 (14 Jan 2020 05:35), Max: 98.8 (14 Jan 2020 00:50)  HR: 73 (14 Jan 2020 05:35) (64 - 77)  BP: 148/78 (14 Jan 2020 05:35) (142/79 - 154/77)  BP(mean): --  RR: 16 (14 Jan 2020 05:35) (16 - 19)  SpO2: 96% (14 Jan 2020 05:35) (95% - 100%)  PHYSICAL EXAM:  GENERAL: NAD, well-groomed, well-developed, thin   HEAD:  Atraumatic, Normocephalic  EYES: EOMI, PERRLA, conjunctiva and sclera clear  ENMT: No tonsillar erythema, exudates, or enlargement; Moist mucous membranes, Good dentition, No lesions  NECK: Supple, No JVD, Normal thyroid  CHEST/LUNG: Clear to  auscultation bilaterally; No rales, rhonchi, wheezing, or rubs  bilaterally  HEART: Regular rate and rhythm; No murmurs, rubs, or gallops  ABDOMEN: Soft, Nontender, Nondistended; Bowel sounds present  EXTREMITIES:  2+ Peripheral Pulses, No clubbing, cyanosis, or edema BL LE  SKIN: No rashes or lesions  NERVOUS SYSTEM:  Alert & Oriented X3, Good concentration; Motor Strength 5/5 B/L upper and lower extremities; but doesn't like to talk and seems annoyed with any questioning .   DTRs 2+ intact and symmetric, sensation intact BL    LABS:                                    Cultures;   CAPILLARY BLOOD GLUCOSE        Lipid panel:           RADIOLOGY & ADDITIONAL TESTS:      Imaging Personally Reviewed:  [ x] YES      Consultant(s) Notes Reviewed:  [x ] YES     Care Discussed with [x ] Consultants [X ] Patient [x ] Family  [x ]    [x ]  Other; RN

## 2020-01-14 NOTE — PHYSICAL THERAPY INITIAL EVALUATION ADULT - ADDITIONAL COMMENTS
As per patient, he lives in a private house with 2 steps to enter, no steps once inside. Patient denies owning assistive device.

## 2020-01-14 NOTE — PHYSICAL THERAPY INITIAL EVALUATION ADULT - BALANCE TRAINING, PT EVAL
Patient will be normal in static and dynamic standing balance with rolling walker in 2-3 weeks to improve safety and decrease risk of falls.

## 2020-01-15 PROCEDURE — 99232 SBSQ HOSP IP/OBS MODERATE 35: CPT

## 2020-01-15 RX ORDER — HYDROMORPHONE HYDROCHLORIDE 2 MG/ML
1.5 INJECTION INTRAMUSCULAR; INTRAVENOUS; SUBCUTANEOUS EVERY 4 HOURS
Refills: 0 | Status: DISCONTINUED | OUTPATIENT
Start: 2020-01-15 | End: 2020-01-16

## 2020-01-15 RX ORDER — HYDROMORPHONE HYDROCHLORIDE 2 MG/ML
2 INJECTION INTRAMUSCULAR; INTRAVENOUS; SUBCUTANEOUS EVERY 4 HOURS
Refills: 0 | Status: DISCONTINUED | OUTPATIENT
Start: 2020-01-15 | End: 2020-01-16

## 2020-01-15 RX ADMIN — SODIUM CHLORIDE 100 MILLILITER(S): 9 INJECTION, SOLUTION INTRAVENOUS at 08:45

## 2020-01-15 RX ADMIN — Medication 25 MILLIGRAM(S): at 17:39

## 2020-01-15 RX ADMIN — TAMSULOSIN HYDROCHLORIDE 0.4 MILLIGRAM(S): 0.4 CAPSULE ORAL at 22:16

## 2020-01-15 RX ADMIN — CEFTRIAXONE 100 MILLIGRAM(S): 500 INJECTION, POWDER, FOR SOLUTION INTRAMUSCULAR; INTRAVENOUS at 12:03

## 2020-01-15 RX ADMIN — Medication 50 MILLIGRAM(S): at 17:39

## 2020-01-15 RX ADMIN — HYDROMORPHONE HYDROCHLORIDE 2 MILLIGRAM(S): 2 INJECTION INTRAMUSCULAR; INTRAVENOUS; SUBCUTANEOUS at 17:39

## 2020-01-15 RX ADMIN — SODIUM CHLORIDE 100 MILLILITER(S): 9 INJECTION, SOLUTION INTRAVENOUS at 22:16

## 2020-01-15 RX ADMIN — HYDROMORPHONE HYDROCHLORIDE 3 MILLIGRAM(S): 2 INJECTION INTRAMUSCULAR; INTRAVENOUS; SUBCUTANEOUS at 08:05

## 2020-01-15 RX ADMIN — HYDROMORPHONE HYDROCHLORIDE 3 MILLIGRAM(S): 2 INJECTION INTRAMUSCULAR; INTRAVENOUS; SUBCUTANEOUS at 12:45

## 2020-01-15 RX ADMIN — Medication 50 MILLIGRAM(S): at 12:33

## 2020-01-15 RX ADMIN — HYDROMORPHONE HYDROCHLORIDE 2 MILLIGRAM(S): 2 INJECTION INTRAMUSCULAR; INTRAVENOUS; SUBCUTANEOUS at 23:13

## 2020-01-15 RX ADMIN — HYDROMORPHONE HYDROCHLORIDE 3 MILLIGRAM(S): 2 INJECTION INTRAMUSCULAR; INTRAVENOUS; SUBCUTANEOUS at 12:32

## 2020-01-15 RX ADMIN — Medication 25 MILLIGRAM(S): at 05:16

## 2020-01-15 RX ADMIN — HYDROMORPHONE HYDROCHLORIDE 3 MILLIGRAM(S): 2 INJECTION INTRAMUSCULAR; INTRAVENOUS; SUBCUTANEOUS at 03:20

## 2020-01-15 RX ADMIN — HYDROMORPHONE HYDROCHLORIDE 3 MILLIGRAM(S): 2 INJECTION INTRAMUSCULAR; INTRAVENOUS; SUBCUTANEOUS at 03:04

## 2020-01-15 RX ADMIN — HYDROMORPHONE HYDROCHLORIDE 2 MILLIGRAM(S): 2 INJECTION INTRAMUSCULAR; INTRAVENOUS; SUBCUTANEOUS at 17:55

## 2020-01-15 RX ADMIN — Medication 50 MILLIGRAM(S): at 07:50

## 2020-01-15 RX ADMIN — Medication 50 MILLIGRAM(S): at 22:16

## 2020-01-15 RX ADMIN — Medication 1 SPRAY(S): at 05:16

## 2020-01-15 RX ADMIN — HYDROMORPHONE HYDROCHLORIDE 2 MILLIGRAM(S): 2 INJECTION INTRAMUSCULAR; INTRAVENOUS; SUBCUTANEOUS at 22:16

## 2020-01-15 RX ADMIN — HYDROMORPHONE HYDROCHLORIDE 3 MILLIGRAM(S): 2 INJECTION INTRAMUSCULAR; INTRAVENOUS; SUBCUTANEOUS at 07:51

## 2020-01-15 RX ADMIN — HEPARIN SODIUM 5000 UNIT(S): 5000 INJECTION INTRAVENOUS; SUBCUTANEOUS at 05:16

## 2020-01-15 RX ADMIN — HEPARIN SODIUM 5000 UNIT(S): 5000 INJECTION INTRAVENOUS; SUBCUTANEOUS at 17:38

## 2020-01-15 NOTE — PROGRESS NOTE ADULT - PROBLEM SELECTOR PLAN 5
reviewed prior admission info from [facility] noted pt with Congestive Heart Failure  currently presumed chronic and stable not in acute exacerbation    resumed metoprolol.  lungs clear

## 2020-01-15 NOTE — PROGRESS NOTE ADULT - SUBJECTIVE AND OBJECTIVE BOX
Patient is a 49y old  Male who presents with a chief complaint of     OVERNIGHT EVENTS: still c.o left leg pain       MEDICATIONS  (STANDING):  cefTRIAXone   IVPB 1000 milliGRAM(s) IV Intermittent every 24 hours  dextrose 5% + sodium chloride 0.9%. 1000 milliLiter(s) (100 mL/Hr) IV Continuous <Continuous>  fluticasone propionate 50 MICROgram(s)/spray Nasal Spray 1 Spray(s) Both Nostrils two times a day  heparin  Injectable 5000 Unit(s) SubCutaneous every 12 hours  HYDROmorphone   Tablet 2 milliGRAM(s) Oral every 4 hours  influenza   Vaccine 0.5 milliLiter(s) IntraMuscular once  metoprolol tartrate 25 milliGRAM(s) Oral two times a day  polyethylene glycol 3350 17 Gram(s) Oral daily  tamsulosin 0.4 milliGRAM(s) Oral at bedtime    MEDICATIONS  (PRN):  acetaminophen   Tablet .. 650 milliGRAM(s) Oral every 6 hours PRN Temp greater or equal to 38C (100.4F), Mild Pain (1 - 3)  diphenhydrAMINE 50 milliGRAM(s) Oral every 4 hours PRN Rash and/or Itching  HYDROmorphone  Injectable 1.5 milliGRAM(s) IV Push every 4 hours PRN Severe Pain (7 - 10)  oxycodone    5 mG/acetaminophen 325 mG 1 Tablet(s) Oral every 6 hours PRN Moderate Pain (4 - 6)      No Known Drug Allergies  peanuts (Angioedema)    Intolerances        Vital Signs Last 24 Hrs  T(C): 37 (15 Henri 2020 12:29), Max: 37.1 (14 Jan 2020 23:46)  T(F): 98.6 (15 Henri 2020 12:29), Max: 98.8 (14 Jan 2020 23:46)  HR: 75 (15 Henri 2020 12:29) (55 - 78)  BP: 137/70 (15 Henri 2020 12:29) (118/76 - 137/70)  BP(mean): --  RR: 16 (15 Henri 2020 12:29) (16 - 18)  SpO2: 98% (15 Henri 2020 12:29) (98% - 100%)      PHYSICAL EXAM:  GENERAL: NAD, well-groomed, well-developed, thin   HEAD:  Atraumatic, Normocephalic  EYES: EOMI, PERRLA, conjunctiva and sclera clear  ENMT: No tonsillar erythema, exudates, or enlargement; Moist mucous membranes, Good dentition, No lesions  NECK: Supple, No JVD, Normal thyroid  CHEST/LUNG: Clear to  auscultation bilaterally; No rales, rhonchi, wheezing, or rubs  bilaterally  HEART: Regular rate and rhythm; No murmurs, rubs, or gallops  ABDOMEN: Soft, Nontender, Nondistended; Bowel sounds present  EXTREMITIES:  2+ Peripheral Pulses, No clubbing, cyanosis, or edema BL LE  SKIN: No rashes or lesions  NERVOUS SYSTEM:  Alert & Oriented X3, Good concentration; Motor Strength 5/5 B/L upper and lower extremities; but doesn't like to talk and seems annoyed with any questioning .   DTRs 2+ intact and symmetric, sensation intact BL    LABS:                        7.1    13.33 )-----------( 352      ( 14 Jan 2020 10:04 )             23.0     01-14    144  |  113<H>  |  12  ----------------------------<  96  4.3   |  22  |  1.10    Ca    8.7      14 Jan 2020 10:04  Phos  3.0     01-14  Mg     1.7     01-14                                              Cultures;   CAPILLARY BLOOD GLUCOSE        Lipid panel:           RADIOLOGY & ADDITIONAL TESTS:      Imaging Personally Reviewed:  [ x] YES      Consultant(s) Notes Reviewed:  [x ] YES     Care Discussed with [x ] Consultants [X ] Patient [x ] Family  [x ]    [x ]  Other; RN

## 2020-01-15 NOTE — PROGRESS NOTE ADULT - PROBLEM SELECTOR PLAN 3
resolved  unclear  if due to scc  vs UTI   1/14/2020  urine cx + for klebsiella   c/w  antibiotics  till the 16   flu and rvp  are negative  monitor wbc

## 2020-01-15 NOTE — PROGRESS NOTE ADULT - PROBLEM SELECTOR PROBLEM 4
Benign prostatic hyperplasia without lower urinary tract symptoms

## 2020-01-15 NOTE — PROGRESS NOTE ADULT - PROBLEM SELECTOR PLAN 1
analgesics /benadryl. decreased IV dilaudid and added po. c.w titration off IV meds   cbc stable   intravenous hydration   hgb at 5.6 -->7.8-->7.1 no old to compare , s/p 2 units on 1/10/2020   ensure on oxygen and IVF   refuses oxygen routinely

## 2020-01-16 ENCOUNTER — APPOINTMENT (OUTPATIENT)
Dept: INTERNAL MEDICINE | Facility: CLINIC | Age: 65
End: 2020-01-16

## 2020-01-16 LAB
HCT VFR BLD CALC: 25 % — LOW (ref 39–50)
HGB BLD-MCNC: 7.7 G/DL — LOW (ref 13–17)
MCHC RBC-ENTMCNC: 23.3 PG — LOW (ref 27–34)
MCHC RBC-ENTMCNC: 30.8 GM/DL — LOW (ref 32–36)
MCV RBC AUTO: 75.5 FL — LOW (ref 80–100)
NRBC # BLD: 4 /100 WBCS — HIGH (ref 0–0)
PLATELET # BLD AUTO: 400 K/UL — SIGNIFICANT CHANGE UP (ref 150–400)
RBC # BLD: 3.31 M/UL — LOW (ref 4.2–5.8)
RBC # FLD: 23.2 % — HIGH (ref 10.3–14.5)
WBC # BLD: 10.27 K/UL — SIGNIFICANT CHANGE UP (ref 3.8–10.5)
WBC # FLD AUTO: 10.27 K/UL — SIGNIFICANT CHANGE UP (ref 3.8–10.5)

## 2020-01-16 PROCEDURE — 99233 SBSQ HOSP IP/OBS HIGH 50: CPT

## 2020-01-16 RX ORDER — FLUTICASONE PROPIONATE 50 MCG
1 SPRAY, SUSPENSION NASAL ONCE
Refills: 0 | Status: COMPLETED | OUTPATIENT
Start: 2020-01-16 | End: 2020-01-16

## 2020-01-16 RX ORDER — HYDROMORPHONE HYDROCHLORIDE 2 MG/ML
2 INJECTION INTRAMUSCULAR; INTRAVENOUS; SUBCUTANEOUS EVERY 6 HOURS
Refills: 0 | Status: DISCONTINUED | OUTPATIENT
Start: 2020-01-16 | End: 2020-01-17

## 2020-01-16 RX ADMIN — HEPARIN SODIUM 5000 UNIT(S): 5000 INJECTION INTRAVENOUS; SUBCUTANEOUS at 17:47

## 2020-01-16 RX ADMIN — Medication 50 MILLIGRAM(S): at 02:16

## 2020-01-16 RX ADMIN — HYDROMORPHONE HYDROCHLORIDE 2 MILLIGRAM(S): 2 INJECTION INTRAMUSCULAR; INTRAVENOUS; SUBCUTANEOUS at 17:47

## 2020-01-16 RX ADMIN — Medication 50 MILLIGRAM(S): at 23:21

## 2020-01-16 RX ADMIN — HYDROMORPHONE HYDROCHLORIDE 2 MILLIGRAM(S): 2 INJECTION INTRAMUSCULAR; INTRAVENOUS; SUBCUTANEOUS at 23:21

## 2020-01-16 RX ADMIN — Medication 25 MILLIGRAM(S): at 17:47

## 2020-01-16 RX ADMIN — CEFTRIAXONE 100 MILLIGRAM(S): 500 INJECTION, POWDER, FOR SOLUTION INTRAMUSCULAR; INTRAVENOUS at 12:01

## 2020-01-16 RX ADMIN — HYDROMORPHONE HYDROCHLORIDE 2 MILLIGRAM(S): 2 INJECTION INTRAMUSCULAR; INTRAVENOUS; SUBCUTANEOUS at 02:14

## 2020-01-16 RX ADMIN — SODIUM CHLORIDE 100 MILLILITER(S): 9 INJECTION, SOLUTION INTRAVENOUS at 05:57

## 2020-01-16 RX ADMIN — HEPARIN SODIUM 5000 UNIT(S): 5000 INJECTION INTRAVENOUS; SUBCUTANEOUS at 05:56

## 2020-01-16 RX ADMIN — Medication 25 MILLIGRAM(S): at 05:56

## 2020-01-16 RX ADMIN — Medication 50 MILLIGRAM(S): at 06:16

## 2020-01-16 RX ADMIN — Medication 50 MILLIGRAM(S): at 10:22

## 2020-01-16 RX ADMIN — HYDROMORPHONE HYDROCHLORIDE 2 MILLIGRAM(S): 2 INJECTION INTRAMUSCULAR; INTRAVENOUS; SUBCUTANEOUS at 10:37

## 2020-01-16 RX ADMIN — Medication 1 SPRAY(S): at 03:20

## 2020-01-16 RX ADMIN — HYDROMORPHONE HYDROCHLORIDE 2 MILLIGRAM(S): 2 INJECTION INTRAMUSCULAR; INTRAVENOUS; SUBCUTANEOUS at 06:16

## 2020-01-16 RX ADMIN — TAMSULOSIN HYDROCHLORIDE 0.4 MILLIGRAM(S): 0.4 CAPSULE ORAL at 22:18

## 2020-01-16 RX ADMIN — HYDROMORPHONE HYDROCHLORIDE 2 MILLIGRAM(S): 2 INJECTION INTRAMUSCULAR; INTRAVENOUS; SUBCUTANEOUS at 03:14

## 2020-01-16 RX ADMIN — HYDROMORPHONE HYDROCHLORIDE 2 MILLIGRAM(S): 2 INJECTION INTRAMUSCULAR; INTRAVENOUS; SUBCUTANEOUS at 10:22

## 2020-01-16 RX ADMIN — Medication 50 MILLIGRAM(S): at 17:47

## 2020-01-16 RX ADMIN — HYDROMORPHONE HYDROCHLORIDE 2 MILLIGRAM(S): 2 INJECTION INTRAMUSCULAR; INTRAVENOUS; SUBCUTANEOUS at 07:15

## 2020-01-16 RX ADMIN — HYDROMORPHONE HYDROCHLORIDE 2 MILLIGRAM(S): 2 INJECTION INTRAMUSCULAR; INTRAVENOUS; SUBCUTANEOUS at 18:29

## 2020-01-16 RX ADMIN — SODIUM CHLORIDE 100 MILLILITER(S): 9 INJECTION, SOLUTION INTRAVENOUS at 22:19

## 2020-01-16 NOTE — PROGRESS NOTE ADULT - ASSESSMENT
49 male with history of ssd with painful crisis        Problem/Plan - 1:  ·  Problem: Sickle cell crisis.  Plan: analgesics /benadryl  Titrated meds to PO Dilaudid  intravenous hydration   hgb at 5.6 -->7.8-->7.1 no old to compare , s/p 2 units on 1/10/2020   ensure on oxygen and IVF   refuses oxygen routinely.      Problem/Plan - 2:  ·  Problem: EMEKA (acute kidney injury).  Plan: POA resolved.      Problem/Plan - 3:  ·  Problem: Fever, unspecified fever cause.  Plan: resolved  unclear  if due to scc  vs UTI   1/14/2020  urine cx + for klebsiella   c/w  antibiotics, end 1/19  flu and rvp  are negative  monitor wbc.      Problem/Plan - 4:  ·  Problem: Benign prostatic hyperplasia without lower urinary tract symptoms.  Plan: reviewed prior health record and noted pt on Flomax from prior [facility] admission.      Problem/Plan - 5:  ·  Problem: Chronic systolic congestive heart failure.  Plan: reviewed prior admission info from [facility] noted pt with Congestive Heart Failure  currently presumed chronic and stable not in acute exacerbation    resumed metoprolol.  lungs clear.      Problem/Plan - 6:  Problem: Severe protein-calorie malnutrition. Plan: appreciate nutrition consult.

## 2020-01-16 NOTE — PROGRESS NOTE ADULT - SUBJECTIVE AND OBJECTIVE BOX
49 male with history of ssd with painful crisis.    Today:  Pt seen at bedside, states he is doing well with his PO meds, still in some pain.        PAST MEDICAL & SURGICAL HISTORY:  Sickle cell disease  No significant past surgical history      REVIEW OF SYSTEMS:  + pain      MEDICATIONS  (STANDING):  cefTRIAXone   IVPB 1000 milliGRAM(s) IV Intermittent every 24 hours  dextrose 5% + sodium chloride 0.9%. 1000 milliLiter(s) (100 mL/Hr) IV Continuous <Continuous>  fluticasone propionate 50 MICROgram(s)/spray Nasal Spray 1 Spray(s) Both Nostrils two times a day  heparin  Injectable 5000 Unit(s) SubCutaneous every 12 hours  influenza   Vaccine 0.5 milliLiter(s) IntraMuscular once  metoprolol tartrate 25 milliGRAM(s) Oral two times a day  polyethylene glycol 3350 17 Gram(s) Oral daily  tamsulosin 0.4 milliGRAM(s) Oral at bedtime    MEDICATIONS  (PRN):  acetaminophen   Tablet .. 650 milliGRAM(s) Oral every 6 hours PRN Temp greater or equal to 38C (100.4F), Mild Pain (1 - 3)  diphenhydrAMINE 50 milliGRAM(s) Oral every 4 hours PRN Rash and/or Itching  HYDROmorphone   Tablet 2 milliGRAM(s) Oral every 6 hours PRN Severe Pain (7 - 10)      Allergies    No Known Drug Allergies  peanuts (Angioedema)      FAMILY HISTORY:  No pertinent family history in first degree relatives      Vital Signs Last 24 Hrs  T(C): 36.8 (16 Jan 2020 12:28), Max: 37.1 (16 Jan 2020 00:45)  T(F): 98.2 (16 Jan 2020 12:28), Max: 98.8 (16 Jan 2020 00:45)  HR: 77 (16 Jan 2020 12:28) (63 - 77)  BP: 144/78 (16 Jan 2020 12:28) (144/78 - 176/93)  BP(mean): --  RR: 16 (16 Jan 2020 12:28) (16 - 17)  SpO2: 96% (16 Jan 2020 12:28) (95% - 96%)    PHYSICAL EXAM:    GENERAL: NAD, well-groomed, well-developed  HEAD:  Atraumatic, Normocephalic  EYES: EOMI, PERRLA, conjunctiva and sclera clear  ENMT: No tonsillar erythema, exudates, or enlargement; Moist mucous membranes, Good dentition, No lesions  NECK: Supple, No JVD, Normal thyroid  NERVOUS SYSTEM:  Alert & Oriented X3, Good concentration  CHEST/LUNG: Clear to percussion bilaterally; No rales, rhonchi, wheezing, or rubs  HEART: Regular rate and rhythm; No murmurs, rubs, or gallops  ABDOMEN: Soft, Nontender, Nondistended; Bowel sounds present      LABS:                        7.7    10.27 )-----------( 400      ( 16 Jan 2020 07:48 )             25.0

## 2020-01-16 NOTE — CHART NOTE - NSCHARTNOTEFT_GEN_A_CORE
Assessment: Pt seen for follow-up & continues chronic severe malnutrition. Pt with sikckle cell crisis on pain medication & hx chronic CHF    Factors impacting intake: [x ] none [ ] nausea  [ ] vomiting [ ] diarrhea [ ] constipation  [ ]chewing problems [ ] swallowing issues  [ ] other:     Diet Prescription: Diet, Regular:   Supplement Feeding Modality:  Oral  Ensure Enlive Cans or Servings Per Day:  1       Frequency:  Three Times a day (01-12-20 @ 12:54)    Intake: Po intake 50-75% meals & supplements & tolerated well. Last BM x 2(1/15)    Current Weight: Weight (kg): Wt=48.3kg(1/16), Wt=48.1 (01-09 @ 18:36)  % Weight Change  Wt stable x 1 week    Physical appearance: No edema    Pertinent Medications: MEDICATIONS  (STANDING):  cefTRIAXone   IVPB 1000 milliGRAM(s) IV Intermittent every 24 hours  dextrose 5% + sodium chloride 0.9%. 1000 milliLiter(s) (100 mL/Hr) IV Continuous <Continuous>  fluticasone propionate 50 MICROgram(s)/spray Nasal Spray 1 Spray(s) Both Nostrils two times a day  heparin  Injectable 5000 Unit(s) SubCutaneous every 12 hours  HYDROmorphone   Tablet 2 milliGRAM(s) Oral every 4 hours  influenza   Vaccine 0.5 milliLiter(s) IntraMuscular once  metoprolol tartrate 25 milliGRAM(s) Oral two times a day  polyethylene glycol 3350 17 Gram(s) Oral daily  tamsulosin 0.4 milliGRAM(s) Oral at bedtime    MEDICATIONS  (PRN):  acetaminophen   Tablet .. 650 milliGRAM(s) Oral every 6 hours PRN Temp greater or equal to 38C (100.4F), Mild Pain (1 - 3)  diphenhydrAMINE 50 milliGRAM(s) Oral every 4 hours PRN Rash and/or Itching  HYDROmorphone  Injectable 1.5 milliGRAM(s) IV Push every 4 hours PRN Severe Pain (7 - 10)    Pertinent Labs: 01-14 Na 144   Glu 96   K+ 4.3   Cr 1.10   BUN 12   Phos 3.0   Alb 3.0(1/9)   PAB n/a   Hgb 7.7 g/dL<L> Hct 25.0 %<L> HgA1C n/a     24Hr FS:  Skin: intact    Estimated Needs:   [ ] no change since previous assessment (1/12/2020 )  [ ] recalculated:     Previous Nutrition Diagnosis: #1 Malnutrition...   Malnutrition Severe malnutrition in the context of chronic illness.   Etiology Inadequate energy & protein intake related to sickle cell.   Signs/Symptoms Severe subcutaneous fat loss & muscle wasting.     Goal/Expected Outcome Pt to consume >75% meals/supplements; maintain stable wt +-2#.      Nutrition Diagnosis is [x ] ongoing  [ ] resolved  [ ] improved  [ ] not applicable       New Nutrition Diagnosis: [x ] not applicable       Interventions:   Recommend  [ ] Continue:   [x ] Change Diet To: Low Na/Ensure Enlive 3 x day(1050kcal & 60gm protein/Dan active 2 x day  [ ] Nutrition Supplement:  [ ] Nutrition Support:  [ ] Other:     Monitoring and Evaluation:   [x ] PO intake [ x ] Tolerance to diet prescription [ x ] weights [ x ] labs[ x ] follow up per protocol  [ ] other:

## 2020-01-17 PROCEDURE — 99233 SBSQ HOSP IP/OBS HIGH 50: CPT

## 2020-01-17 RX ORDER — HYDROMORPHONE HYDROCHLORIDE 2 MG/ML
1 INJECTION INTRAMUSCULAR; INTRAVENOUS; SUBCUTANEOUS EVERY 6 HOURS
Refills: 0 | Status: DISCONTINUED | OUTPATIENT
Start: 2020-01-17 | End: 2020-01-19

## 2020-01-17 RX ORDER — SENNA PLUS 8.6 MG/1
2 TABLET ORAL AT BEDTIME
Refills: 0 | Status: DISCONTINUED | OUTPATIENT
Start: 2020-01-17 | End: 2020-01-31

## 2020-01-17 RX ORDER — POLYETHYLENE GLYCOL 3350 17 G/17G
17 POWDER, FOR SOLUTION ORAL AT BEDTIME
Refills: 0 | Status: DISCONTINUED | OUTPATIENT
Start: 2020-01-17 | End: 2020-01-31

## 2020-01-17 RX ADMIN — Medication 1 SPRAY(S): at 05:27

## 2020-01-17 RX ADMIN — Medication 50 MILLIGRAM(S): at 18:11

## 2020-01-17 RX ADMIN — SODIUM CHLORIDE 100 MILLILITER(S): 9 INJECTION, SOLUTION INTRAVENOUS at 06:20

## 2020-01-17 RX ADMIN — HYDROMORPHONE HYDROCHLORIDE 1 MILLIGRAM(S): 2 INJECTION INTRAMUSCULAR; INTRAVENOUS; SUBCUTANEOUS at 18:30

## 2020-01-17 RX ADMIN — HYDROMORPHONE HYDROCHLORIDE 1 MILLIGRAM(S): 2 INJECTION INTRAMUSCULAR; INTRAVENOUS; SUBCUTANEOUS at 18:11

## 2020-01-17 RX ADMIN — Medication 50 MILLIGRAM(S): at 05:26

## 2020-01-17 RX ADMIN — Medication 50 MILLIGRAM(S): at 11:24

## 2020-01-17 RX ADMIN — HYDROMORPHONE HYDROCHLORIDE 2 MILLIGRAM(S): 2 INJECTION INTRAMUSCULAR; INTRAVENOUS; SUBCUTANEOUS at 06:38

## 2020-01-17 RX ADMIN — HYDROMORPHONE HYDROCHLORIDE 2 MILLIGRAM(S): 2 INJECTION INTRAMUSCULAR; INTRAVENOUS; SUBCUTANEOUS at 02:05

## 2020-01-17 RX ADMIN — SODIUM CHLORIDE 100 MILLILITER(S): 9 INJECTION, SOLUTION INTRAVENOUS at 22:50

## 2020-01-17 RX ADMIN — CEFTRIAXONE 100 MILLIGRAM(S): 500 INJECTION, POWDER, FOR SOLUTION INTRAMUSCULAR; INTRAVENOUS at 11:33

## 2020-01-17 RX ADMIN — Medication 25 MILLIGRAM(S): at 05:27

## 2020-01-17 RX ADMIN — TAMSULOSIN HYDROCHLORIDE 0.4 MILLIGRAM(S): 0.4 CAPSULE ORAL at 22:12

## 2020-01-17 RX ADMIN — Medication 1 SPRAY(S): at 18:12

## 2020-01-17 RX ADMIN — HYDROMORPHONE HYDROCHLORIDE 2 MILLIGRAM(S): 2 INJECTION INTRAMUSCULAR; INTRAVENOUS; SUBCUTANEOUS at 05:27

## 2020-01-17 RX ADMIN — HYDROMORPHONE HYDROCHLORIDE 2 MILLIGRAM(S): 2 INJECTION INTRAMUSCULAR; INTRAVENOUS; SUBCUTANEOUS at 11:23

## 2020-01-17 RX ADMIN — HYDROMORPHONE HYDROCHLORIDE 2 MILLIGRAM(S): 2 INJECTION INTRAMUSCULAR; INTRAVENOUS; SUBCUTANEOUS at 11:58

## 2020-01-17 RX ADMIN — Medication 25 MILLIGRAM(S): at 18:10

## 2020-01-17 RX ADMIN — SODIUM CHLORIDE 100 MILLILITER(S): 9 INJECTION, SOLUTION INTRAVENOUS at 11:28

## 2020-01-17 RX ADMIN — HEPARIN SODIUM 5000 UNIT(S): 5000 INJECTION INTRAVENOUS; SUBCUTANEOUS at 05:26

## 2020-01-17 RX ADMIN — POLYETHYLENE GLYCOL 3350 17 GRAM(S): 17 POWDER, FOR SOLUTION ORAL at 11:22

## 2020-01-17 NOTE — PROGRESS NOTE ADULT - ASSESSMENT
49 male with history of ssd with painful crisis        Problem/Plan - 1:  ·  Problem: Sickle cell crisis.  Plan: analgesics /benadryl  intravenous hydration   hgb stable, no old to compare , s/p 2 units on 1/10/2020 for hgb <6.   ensure on oxygen and IVF   refuses oxygen routinely.   Restarted IVP dilaudid PRN at low dose; try to titrate tomorrow     Problem/Plan - 2:  ·  Problem: EMEKA (acute kidney injury).  Plan: POA resolved.      Problem/Plan - 3:  ·  Problem: Fever, unspecified fever cause.  Plan: resolved  unclear  if due to scc  vs UTI   1/14/2020  urine cx + for klebsiella   c/w  antibiotics x 5 days  flu and rvp  are negative  monitor wbc.      Problem/Plan - 4:  ·  Problem: Benign prostatic hyperplasia without lower urinary tract symptoms.  Plan: reviewed prior health record and noted pt on Fl[facility].      Problem/Plan - 5:  ·  Problem: Chronic systolic congestive heart failure.  Plan: reviewed prior admission info [facility] noted pt with Congestive Heart Failure  currently presumed chronic and stable not in acute exacerbation    resumed metoprolol.  lungs clear.      Problem/Plan - 6:  Problem: Severe protein-calorie malnutrition. Plan: appreciate nutrition consult.

## 2020-01-17 NOTE — PROGRESS NOTE ADULT - SUBJECTIVE AND OBJECTIVE BOX
49 male with history of ssd with painful crisis.    Today:  Pt seen at bedside, states his pain has gotten much worse after staying only on PO meds since yesterday.        PAST MEDICAL & SURGICAL HISTORY:  Sickle cell disease  No significant past surgical history      REVIEW OF SYSTEMS:  + pain            MEDICATIONS  (STANDING):  cefTRIAXone   IVPB 1000 milliGRAM(s) IV Intermittent every 24 hours  dextrose 5% + sodium chloride 0.9%. 1000 milliLiter(s) (100 mL/Hr) IV Continuous <Continuous>  fluticasone propionate 50 MICROgram(s)/spray Nasal Spray 1 Spray(s) Both Nostrils two times a day  heparin  Injectable 5000 Unit(s) SubCutaneous every 12 hours  influenza   Vaccine 0.5 milliLiter(s) IntraMuscular once  metoprolol tartrate 25 milliGRAM(s) Oral two times a day  polyethylene glycol 3350 17 Gram(s) Oral daily  tamsulosin 0.4 milliGRAM(s) Oral at bedtime    MEDICATIONS  (PRN):  acetaminophen   Tablet .. 650 milliGRAM(s) Oral every 6 hours PRN Temp greater or equal to 38C (100.4F), Mild Pain (1 - 3)  diphenhydrAMINE 50 milliGRAM(s) Oral every 4 hours PRN Rash and/or Itching  HYDROmorphone  Injectable 1 milliGRAM(s) IV Push every 6 hours PRN Severe Pain (7 - 10)      Allergies  No Known Drug Allergies  peanuts (Angioedema)        FAMILY HISTORY:  No pertinent family history in first degree relatives      Vital Signs Last 24 Hrs  T(C): 36.9 (17 Jan 2020 11:29), Max: 37.4 (16 Jan 2020 17:48)  T(F): 98.4 (17 Jan 2020 11:29), Max: 99.3 (16 Jan 2020 17:48)  HR: 66 (17 Jan 2020 11:29) (66 - 79)  BP: 155/92 (17 Jan 2020 11:29) (114/78 - 155/92)  RR: 16 (17 Jan 2020 11:29) (16 - 17)  SpO2: 97% (17 Jan 2020 11:29) (96% - 99%)    PHYSICAL EXAM:    GENERAL: NAD, well-groomed, well-developed  HEAD:  Atraumatic, Normocephalic  EYES: EOMI, PERRLA, conjunctiva and sclera clear  ENMT: No tonsillar erythema, exudates, or enlargement; Moist mucous membranes, Good dentition, No lesions  NECK: Supple, No JVD, Normal thyroid  NERVOUS SYSTEM:  Alert & Oriented X3, Good concentration  CHEST/LUNG: Clear to percussion bilaterally; No rales, rhonchi, wheezing, or rubs  HEART: Regular rate and rhythm; No murmurs, rubs, or gallops  ABDOMEN: Soft, Nontender, Nondistended; Bowel sounds present  EXTREMITIES:  2+ Peripheral Pulses, No clubbing, cyanosis, or edema      LABS:                        7.7    10.27 )-----------( 400      ( 16 Jan 2020 07:48 )             25.0

## 2020-01-18 LAB
ALBUMIN SERPL ELPH-MCNC: 3.1 G/DL — LOW (ref 3.3–5)
ALP SERPL-CCNC: 289 U/L — HIGH (ref 40–120)
ALT FLD-CCNC: 36 U/L — SIGNIFICANT CHANGE UP (ref 12–78)
ANION GAP SERPL CALC-SCNC: 7 MMOL/L — SIGNIFICANT CHANGE UP (ref 5–17)
ANISOCYTOSIS BLD QL: SIGNIFICANT CHANGE UP
AST SERPL-CCNC: 41 U/L — HIGH (ref 15–37)
BASOPHILS # BLD AUTO: 0 K/UL — SIGNIFICANT CHANGE UP (ref 0–0.2)
BASOPHILS NFR BLD AUTO: 0 % — SIGNIFICANT CHANGE UP (ref 0–2)
BILIRUB SERPL-MCNC: 1.5 MG/DL — HIGH (ref 0.2–1.2)
BUN SERPL-MCNC: 10 MG/DL — SIGNIFICANT CHANGE UP (ref 7–23)
CALCIUM SERPL-MCNC: 9.5 MG/DL — SIGNIFICANT CHANGE UP (ref 8.5–10.1)
CHLORIDE SERPL-SCNC: 110 MMOL/L — HIGH (ref 96–108)
CO2 SERPL-SCNC: 25 MMOL/L — SIGNIFICANT CHANGE UP (ref 22–31)
CREAT SERPL-MCNC: 1.08 MG/DL — SIGNIFICANT CHANGE UP (ref 0.5–1.3)
DACRYOCYTES BLD QL SMEAR: SLIGHT — SIGNIFICANT CHANGE UP
ELLIPTOCYTES BLD QL SMEAR: SLIGHT — SIGNIFICANT CHANGE UP
EOSINOPHIL # BLD AUTO: 0.49 K/UL — SIGNIFICANT CHANGE UP (ref 0–0.5)
EOSINOPHIL NFR BLD AUTO: 3 % — SIGNIFICANT CHANGE UP (ref 0–6)
GLUCOSE SERPL-MCNC: 100 MG/DL — HIGH (ref 70–99)
HCT VFR BLD CALC: 24.4 % — LOW (ref 39–50)
HGB BLD-MCNC: 7.3 G/DL — LOW (ref 13–17)
HYPOCHROMIA BLD QL: SIGNIFICANT CHANGE UP
LG PLATELETS BLD QL AUTO: SLIGHT — SIGNIFICANT CHANGE UP
LYMPHOCYTES # BLD AUTO: 1.63 K/UL — SIGNIFICANT CHANGE UP (ref 1–3.3)
LYMPHOCYTES # BLD AUTO: 10 % — LOW (ref 13–44)
MACROCYTES BLD QL: SLIGHT — SIGNIFICANT CHANGE UP
MANUAL SMEAR VERIFICATION: SIGNIFICANT CHANGE UP
MCHC RBC-ENTMCNC: 22.4 PG — LOW (ref 27–34)
MCHC RBC-ENTMCNC: 29.9 GM/DL — LOW (ref 32–36)
MCV RBC AUTO: 74.8 FL — LOW (ref 80–100)
METAMYELOCYTES # FLD: 1 % — HIGH (ref 0–0)
MICROCYTES BLD QL: SIGNIFICANT CHANGE UP
MONOCYTES # BLD AUTO: 1.46 K/UL — HIGH (ref 0–0.9)
MONOCYTES NFR BLD AUTO: 9 % — SIGNIFICANT CHANGE UP (ref 2–14)
MYELOCYTES NFR BLD: 1 % — HIGH (ref 0–0)
NEUTROPHILS # BLD AUTO: 12.19 K/UL — HIGH (ref 1.8–7.4)
NEUTROPHILS NFR BLD AUTO: 74 % — SIGNIFICANT CHANGE UP (ref 43–77)
NEUTS BAND # BLD: 1 % — SIGNIFICANT CHANGE UP (ref 0–8)
NRBC # BLD: 18 /100 — HIGH (ref 0–0)
NRBC # BLD: SIGNIFICANT CHANGE UP /100 WBCS (ref 0–0)
OVALOCYTES BLD QL SMEAR: SLIGHT — SIGNIFICANT CHANGE UP
PLAT MORPH BLD: ABNORMAL
PLATELET # BLD AUTO: 488 K/UL — HIGH (ref 150–400)
PLATELET COUNT - ESTIMATE: ABNORMAL
POIKILOCYTOSIS BLD QL AUTO: SLIGHT — SIGNIFICANT CHANGE UP
POLYCHROMASIA BLD QL SMEAR: SLIGHT — SIGNIFICANT CHANGE UP
POTASSIUM SERPL-MCNC: 4 MMOL/L — SIGNIFICANT CHANGE UP (ref 3.5–5.3)
POTASSIUM SERPL-SCNC: 4 MMOL/L — SIGNIFICANT CHANGE UP (ref 3.5–5.3)
PROT SERPL-MCNC: 8.1 GM/DL — SIGNIFICANT CHANGE UP (ref 6–8.3)
RBC # BLD: 3.26 M/UL — LOW (ref 4.2–5.8)
RBC # FLD: 23.6 % — HIGH (ref 10.3–14.5)
RBC BLD AUTO: ABNORMAL
SCHISTOCYTES BLD QL AUTO: SLIGHT — SIGNIFICANT CHANGE UP
SMUDGE CELLS # BLD: PRESENT — SIGNIFICANT CHANGE UP
SODIUM SERPL-SCNC: 142 MMOL/L — SIGNIFICANT CHANGE UP (ref 135–145)
SPHEROCYTES BLD QL SMEAR: SLIGHT — SIGNIFICANT CHANGE UP
TARGETS BLD QL SMEAR: SIGNIFICANT CHANGE UP
VARIANT LYMPHS # BLD: 1 % — SIGNIFICANT CHANGE UP (ref 0–6)
WBC # BLD: 16.25 K/UL — HIGH (ref 3.8–10.5)
WBC # FLD AUTO: 16.25 K/UL — HIGH (ref 3.8–10.5)

## 2020-01-18 PROCEDURE — 99233 SBSQ HOSP IP/OBS HIGH 50: CPT

## 2020-01-18 RX ORDER — HYDROMORPHONE HYDROCHLORIDE 2 MG/ML
1 INJECTION INTRAMUSCULAR; INTRAVENOUS; SUBCUTANEOUS ONCE
Refills: 0 | Status: DISCONTINUED | OUTPATIENT
Start: 2020-01-18 | End: 2020-01-18

## 2020-01-18 RX ORDER — OXYCODONE AND ACETAMINOPHEN 5; 325 MG/1; MG/1
1 TABLET ORAL EVERY 4 HOURS
Refills: 0 | Status: DISCONTINUED | OUTPATIENT
Start: 2020-01-18 | End: 2020-01-20

## 2020-01-18 RX ORDER — SODIUM CHLORIDE 9 MG/ML
1000 INJECTION, SOLUTION INTRAVENOUS
Refills: 0 | Status: DISCONTINUED | OUTPATIENT
Start: 2020-01-18 | End: 2020-01-19

## 2020-01-18 RX ADMIN — SODIUM CHLORIDE 150 MILLILITER(S): 9 INJECTION, SOLUTION INTRAVENOUS at 18:16

## 2020-01-18 RX ADMIN — OXYCODONE AND ACETAMINOPHEN 1 TABLET(S): 5; 325 TABLET ORAL at 14:11

## 2020-01-18 RX ADMIN — HYDROMORPHONE HYDROCHLORIDE 1 MILLIGRAM(S): 2 INJECTION INTRAMUSCULAR; INTRAVENOUS; SUBCUTANEOUS at 00:06

## 2020-01-18 RX ADMIN — HYDROMORPHONE HYDROCHLORIDE 1 MILLIGRAM(S): 2 INJECTION INTRAMUSCULAR; INTRAVENOUS; SUBCUTANEOUS at 06:49

## 2020-01-18 RX ADMIN — OXYCODONE AND ACETAMINOPHEN 1 TABLET(S): 5; 325 TABLET ORAL at 21:22

## 2020-01-18 RX ADMIN — HYDROMORPHONE HYDROCHLORIDE 1 MILLIGRAM(S): 2 INJECTION INTRAMUSCULAR; INTRAVENOUS; SUBCUTANEOUS at 13:22

## 2020-01-18 RX ADMIN — SODIUM CHLORIDE 100 MILLILITER(S): 9 INJECTION, SOLUTION INTRAVENOUS at 09:47

## 2020-01-18 RX ADMIN — Medication 1 SPRAY(S): at 06:06

## 2020-01-18 RX ADMIN — POLYETHYLENE GLYCOL 3350 17 GRAM(S): 17 POWDER, FOR SOLUTION ORAL at 17:15

## 2020-01-18 RX ADMIN — OXYCODONE AND ACETAMINOPHEN 1 TABLET(S): 5; 325 TABLET ORAL at 13:11

## 2020-01-18 RX ADMIN — HYDROMORPHONE HYDROCHLORIDE 1 MILLIGRAM(S): 2 INJECTION INTRAMUSCULAR; INTRAVENOUS; SUBCUTANEOUS at 23:53

## 2020-01-18 RX ADMIN — Medication 25 MILLIGRAM(S): at 17:13

## 2020-01-18 RX ADMIN — HYDROMORPHONE HYDROCHLORIDE 1 MILLIGRAM(S): 2 INJECTION INTRAMUSCULAR; INTRAVENOUS; SUBCUTANEOUS at 18:13

## 2020-01-18 RX ADMIN — HYDROMORPHONE HYDROCHLORIDE 1 MILLIGRAM(S): 2 INJECTION INTRAMUSCULAR; INTRAVENOUS; SUBCUTANEOUS at 18:28

## 2020-01-18 RX ADMIN — HYDROMORPHONE HYDROCHLORIDE 1 MILLIGRAM(S): 2 INJECTION INTRAMUSCULAR; INTRAVENOUS; SUBCUTANEOUS at 12:07

## 2020-01-18 RX ADMIN — Medication 25 MILLIGRAM(S): at 06:05

## 2020-01-18 RX ADMIN — HEPARIN SODIUM 5000 UNIT(S): 5000 INJECTION INTRAVENOUS; SUBCUTANEOUS at 17:13

## 2020-01-18 RX ADMIN — Medication 50 MILLIGRAM(S): at 00:09

## 2020-01-18 RX ADMIN — TAMSULOSIN HYDROCHLORIDE 0.4 MILLIGRAM(S): 0.4 CAPSULE ORAL at 22:25

## 2020-01-18 RX ADMIN — OXYCODONE AND ACETAMINOPHEN 1 TABLET(S): 5; 325 TABLET ORAL at 18:13

## 2020-01-18 RX ADMIN — HYDROMORPHONE HYDROCHLORIDE 1 MILLIGRAM(S): 2 INJECTION INTRAMUSCULAR; INTRAVENOUS; SUBCUTANEOUS at 04:11

## 2020-01-18 RX ADMIN — OXYCODONE AND ACETAMINOPHEN 1 TABLET(S): 5; 325 TABLET ORAL at 21:52

## 2020-01-18 RX ADMIN — HYDROMORPHONE HYDROCHLORIDE 1 MILLIGRAM(S): 2 INJECTION INTRAMUSCULAR; INTRAVENOUS; SUBCUTANEOUS at 06:01

## 2020-01-18 RX ADMIN — HYDROMORPHONE HYDROCHLORIDE 1 MILLIGRAM(S): 2 INJECTION INTRAMUSCULAR; INTRAVENOUS; SUBCUTANEOUS at 04:48

## 2020-01-18 RX ADMIN — CEFTRIAXONE 100 MILLIGRAM(S): 500 INJECTION, POWDER, FOR SOLUTION INTRAMUSCULAR; INTRAVENOUS at 12:08

## 2020-01-18 RX ADMIN — HYDROMORPHONE HYDROCHLORIDE 1 MILLIGRAM(S): 2 INJECTION INTRAMUSCULAR; INTRAVENOUS; SUBCUTANEOUS at 00:40

## 2020-01-18 RX ADMIN — OXYCODONE AND ACETAMINOPHEN 1 TABLET(S): 5; 325 TABLET ORAL at 17:13

## 2020-01-18 RX ADMIN — HEPARIN SODIUM 5000 UNIT(S): 5000 INJECTION INTRAVENOUS; SUBCUTANEOUS at 06:05

## 2020-01-18 NOTE — PROGRESS NOTE ADULT - ASSESSMENT
49 male with history of sickle cell disease was presented with acute pain all over his body. patient was diagnosed with acute sickle cell crisis with severe pain. patient was started on oxygen, IVF and iv pain meds. patient also received blood transfusion for worsening anemia. patient was found to have fever and finished antibiotic course for 5 days for UTI. patient's  pain meds were changed to oral but pain got worse and patient was again started back on iv dilaudid.     ·     Sickle cell crisis with acute severe generalized pain.   cont iv dilaudid. add percocet prn. increase IVF. advised to use oxygen. watch for any oversedation.     ·	 EMEKA (acute kidney injury).  resolved.     ·	Klebsiella UTI. finished antibiotic course.       ·  : Benign prostatic hyperplasia without lower urinary tract symptoms.  Plan: reviewed prior health record and noted pt on Flomax from prior [facility] admission.     ·   Chronic systolic congestive heart failure.  compensated. follow I and Os on IVF. follow for any respiratory distress with volume overload.     ·	 Severe protein-calorie malnutrition.  appreciate nutrition consult.    DVT PPx: HSQ  Full code

## 2020-01-18 NOTE — PROGRESS NOTE ADULT - SUBJECTIVE AND OBJECTIVE BOX
CHIEF COMPLAINT: follow up of SSD with acute painful crisis. patient is demanding more iv pain meds. stated having pain all over the body. not using oxygen. no n/v/d/sob       PHYSICAL EXAM:    GENERAL: malnourished.   CHEST/LUNG: Clear to ausculation bilaterally, no wheezing, no crackles   HEART: Regular rate and rhythm; No murmurs, rubs  ABDOMEN: Soft, Nontender, Nondistended; Bowel sounds present  EXTREMITIES:  Moving all four extremities spontaneously, No clubbing, cyanosis, or edema  NERVOUS SYSTEM:  Grossly non focal.  Psychiatry: AAO x 3, mood is appropriate     ------------------------------------------------------------------------------------------------------------------------------------------------------------  OBJECTIVE DATA:     Vital Signs Last 24 Hrs  T(C): 37.4 (2020 11:10), Max: 37.7 (2020 05:00)  T(F): 99.4 (2020 11:10), Max: 99.9 (2020 05:00)  HR: 82 (2020 11:10) (66 - 82)  BP: 163/80 (2020 11:10) (149/83 - 163/80)  BP(mean): --  RR: 17 (2020 11:10) (17 - 18)  SpO2: 97% (2020 11:10) (96% - 99%)           Daily     Daily Weight in k (2020 05:00)      LABS:                        7.3    16.25 )-----------( 488      ( 2020 07:40 )             24.4             01-18    142  |  110<H>  |  10  ----------------------------<  100<H>  4.0   |  25  |  1.08    Ca    9.5      2020 07:40    TPro  8.1  /  Alb  3.1<L>  /  TBili  1.5<H>  /  DBili  x   /  AST  41<H>  /  ALT  36  /  AlkPhos  289<H>  01-18                        	    Interval Radiology studies: reviewed     MEDICATIONS  (STANDING):  dextrose 5% + sodium chloride 0.9%. 1000 milliLiter(s) (150 mL/Hr) IV Continuous <Continuous>  fluticasone propionate 50 MICROgram(s)/spray Nasal Spray 1 Spray(s) Both Nostrils two times a day  heparin  Injectable 5000 Unit(s) SubCutaneous every 12 hours  influenza   Vaccine 0.5 milliLiter(s) IntraMuscular once  metoprolol tartrate 25 milliGRAM(s) Oral two times a day  polyethylene glycol 3350 17 Gram(s) Oral daily  senna 2 Tablet(s) Oral at bedtime  tamsulosin 0.4 milliGRAM(s) Oral at bedtime    MEDICATIONS  (PRN):  acetaminophen   Tablet .. 650 milliGRAM(s) Oral every 6 hours PRN Temp greater or equal to 38C (100.4F), Mild Pain (1 - 3)  diphenhydrAMINE 50 milliGRAM(s) Oral every 4 hours PRN Rash and/or Itching  HYDROmorphone  Injectable 1 milliGRAM(s) IV Push every 6 hours PRN Severe Pain (7 - 10)  oxycodone    5 mG/acetaminophen 325 mG 1 Tablet(s) Oral every 4 hours PRN Moderate Pain (4 - 6)  polyethylene glycol 3350 17 Gram(s) Oral at bedtime PRN Constipation      --------------------------------------------------------------------------------------------------------------------------------------------------------------

## 2020-01-19 LAB
ANION GAP SERPL CALC-SCNC: 9 MMOL/L — SIGNIFICANT CHANGE UP (ref 5–17)
BUN SERPL-MCNC: 8 MG/DL — SIGNIFICANT CHANGE UP (ref 7–23)
CALCIUM SERPL-MCNC: 9 MG/DL — SIGNIFICANT CHANGE UP (ref 8.5–10.1)
CHLORIDE SERPL-SCNC: 115 MMOL/L — HIGH (ref 96–108)
CO2 SERPL-SCNC: 22 MMOL/L — SIGNIFICANT CHANGE UP (ref 22–31)
CREAT SERPL-MCNC: 1.1 MG/DL — SIGNIFICANT CHANGE UP (ref 0.5–1.3)
GLUCOSE SERPL-MCNC: 102 MG/DL — HIGH (ref 70–99)
HCT VFR BLD CALC: 22.1 % — LOW (ref 39–50)
HGB BLD-MCNC: 6.8 G/DL — CRITICAL LOW (ref 13–17)
MCHC RBC-ENTMCNC: 23.4 PG — LOW (ref 27–34)
MCHC RBC-ENTMCNC: 30.8 GM/DL — LOW (ref 32–36)
MCV RBC AUTO: 76.2 FL — LOW (ref 80–100)
NRBC # BLD: 5 /100 WBCS — HIGH (ref 0–0)
PLATELET # BLD AUTO: 408 K/UL — HIGH (ref 150–400)
POTASSIUM SERPL-MCNC: 4 MMOL/L — SIGNIFICANT CHANGE UP (ref 3.5–5.3)
POTASSIUM SERPL-SCNC: 4 MMOL/L — SIGNIFICANT CHANGE UP (ref 3.5–5.3)
RBC # BLD: 2.9 M/UL — LOW (ref 4.2–5.8)
RBC # FLD: 24.2 % — HIGH (ref 10.3–14.5)
SODIUM SERPL-SCNC: 146 MMOL/L — HIGH (ref 135–145)
WBC # BLD: 19.12 K/UL — HIGH (ref 3.8–10.5)
WBC # FLD AUTO: 19.12 K/UL — HIGH (ref 3.8–10.5)

## 2020-01-19 PROCEDURE — 71045 X-RAY EXAM CHEST 1 VIEW: CPT | Mod: 26

## 2020-01-19 PROCEDURE — 99233 SBSQ HOSP IP/OBS HIGH 50: CPT

## 2020-01-19 RX ORDER — HYDROMORPHONE HYDROCHLORIDE 2 MG/ML
2 INJECTION INTRAMUSCULAR; INTRAVENOUS; SUBCUTANEOUS ONCE
Refills: 0 | Status: DISCONTINUED | OUTPATIENT
Start: 2020-01-19 | End: 2020-01-19

## 2020-01-19 RX ORDER — HYDROMORPHONE HYDROCHLORIDE 2 MG/ML
1 INJECTION INTRAMUSCULAR; INTRAVENOUS; SUBCUTANEOUS EVERY 4 HOURS
Refills: 0 | Status: DISCONTINUED | OUTPATIENT
Start: 2020-01-19 | End: 2020-01-20

## 2020-01-19 RX ORDER — HYDROMORPHONE HYDROCHLORIDE 2 MG/ML
1 INJECTION INTRAMUSCULAR; INTRAVENOUS; SUBCUTANEOUS ONCE
Refills: 0 | Status: DISCONTINUED | OUTPATIENT
Start: 2020-01-19 | End: 2020-01-19

## 2020-01-19 RX ORDER — SODIUM CHLORIDE 9 MG/ML
2000 INJECTION, SOLUTION INTRAVENOUS
Refills: 0 | Status: DISCONTINUED | OUTPATIENT
Start: 2020-01-19 | End: 2020-01-20

## 2020-01-19 RX ORDER — SODIUM CHLORIDE 9 MG/ML
1000 INJECTION, SOLUTION INTRAVENOUS
Refills: 0 | Status: DISCONTINUED | OUTPATIENT
Start: 2020-01-19 | End: 2020-01-19

## 2020-01-19 RX ADMIN — Medication 25 MILLIGRAM(S): at 05:38

## 2020-01-19 RX ADMIN — HYDROMORPHONE HYDROCHLORIDE 1 MILLIGRAM(S): 2 INJECTION INTRAMUSCULAR; INTRAVENOUS; SUBCUTANEOUS at 00:23

## 2020-01-19 RX ADMIN — TAMSULOSIN HYDROCHLORIDE 0.4 MILLIGRAM(S): 0.4 CAPSULE ORAL at 22:08

## 2020-01-19 RX ADMIN — HEPARIN SODIUM 5000 UNIT(S): 5000 INJECTION INTRAVENOUS; SUBCUTANEOUS at 05:39

## 2020-01-19 RX ADMIN — SODIUM CHLORIDE 125 MILLILITER(S): 9 INJECTION, SOLUTION INTRAVENOUS at 22:10

## 2020-01-19 RX ADMIN — HYDROMORPHONE HYDROCHLORIDE 2 MILLIGRAM(S): 2 INJECTION INTRAMUSCULAR; INTRAVENOUS; SUBCUTANEOUS at 10:01

## 2020-01-19 RX ADMIN — SENNA PLUS 2 TABLET(S): 8.6 TABLET ORAL at 22:08

## 2020-01-19 RX ADMIN — HYDROMORPHONE HYDROCHLORIDE 2 MILLIGRAM(S): 2 INJECTION INTRAMUSCULAR; INTRAVENOUS; SUBCUTANEOUS at 10:30

## 2020-01-19 RX ADMIN — HYDROMORPHONE HYDROCHLORIDE 1 MILLIGRAM(S): 2 INJECTION INTRAMUSCULAR; INTRAVENOUS; SUBCUTANEOUS at 04:35

## 2020-01-19 RX ADMIN — Medication 1 SPRAY(S): at 05:40

## 2020-01-19 RX ADMIN — HYDROMORPHONE HYDROCHLORIDE 1 MILLIGRAM(S): 2 INJECTION INTRAMUSCULAR; INTRAVENOUS; SUBCUTANEOUS at 14:45

## 2020-01-19 RX ADMIN — OXYCODONE AND ACETAMINOPHEN 1 TABLET(S): 5; 325 TABLET ORAL at 13:00

## 2020-01-19 RX ADMIN — OXYCODONE AND ACETAMINOPHEN 1 TABLET(S): 5; 325 TABLET ORAL at 12:25

## 2020-01-19 RX ADMIN — HYDROMORPHONE HYDROCHLORIDE 1 MILLIGRAM(S): 2 INJECTION INTRAMUSCULAR; INTRAVENOUS; SUBCUTANEOUS at 14:11

## 2020-01-19 RX ADMIN — HYDROMORPHONE HYDROCHLORIDE 1 MILLIGRAM(S): 2 INJECTION INTRAMUSCULAR; INTRAVENOUS; SUBCUTANEOUS at 05:05

## 2020-01-19 RX ADMIN — OXYCODONE AND ACETAMINOPHEN 1 TABLET(S): 5; 325 TABLET ORAL at 18:36

## 2020-01-19 RX ADMIN — HYDROMORPHONE HYDROCHLORIDE 1 MILLIGRAM(S): 2 INJECTION INTRAMUSCULAR; INTRAVENOUS; SUBCUTANEOUS at 22:34

## 2020-01-19 RX ADMIN — OXYCODONE AND ACETAMINOPHEN 1 TABLET(S): 5; 325 TABLET ORAL at 09:00

## 2020-01-19 RX ADMIN — Medication 1 SPRAY(S): at 17:41

## 2020-01-19 RX ADMIN — HEPARIN SODIUM 5000 UNIT(S): 5000 INJECTION INTRAVENOUS; SUBCUTANEOUS at 17:41

## 2020-01-19 RX ADMIN — OXYCODONE AND ACETAMINOPHEN 1 TABLET(S): 5; 325 TABLET ORAL at 17:36

## 2020-01-19 RX ADMIN — SODIUM CHLORIDE 125 MILLILITER(S): 9 INJECTION, SOLUTION INTRAVENOUS at 17:45

## 2020-01-19 RX ADMIN — HYDROMORPHONE HYDROCHLORIDE 1 MILLIGRAM(S): 2 INJECTION INTRAMUSCULAR; INTRAVENOUS; SUBCUTANEOUS at 06:49

## 2020-01-19 RX ADMIN — HYDROMORPHONE HYDROCHLORIDE 1 MILLIGRAM(S): 2 INJECTION INTRAMUSCULAR; INTRAVENOUS; SUBCUTANEOUS at 22:04

## 2020-01-19 RX ADMIN — Medication 25 MILLIGRAM(S): at 17:41

## 2020-01-19 RX ADMIN — OXYCODONE AND ACETAMINOPHEN 1 TABLET(S): 5; 325 TABLET ORAL at 02:40

## 2020-01-19 RX ADMIN — POLYETHYLENE GLYCOL 3350 17 GRAM(S): 17 POWDER, FOR SOLUTION ORAL at 11:32

## 2020-01-19 RX ADMIN — OXYCODONE AND ACETAMINOPHEN 1 TABLET(S): 5; 325 TABLET ORAL at 08:05

## 2020-01-19 RX ADMIN — OXYCODONE AND ACETAMINOPHEN 1 TABLET(S): 5; 325 TABLET ORAL at 03:10

## 2020-01-19 RX ADMIN — SODIUM CHLORIDE 125 MILLILITER(S): 9 INJECTION, SOLUTION INTRAVENOUS at 08:49

## 2020-01-19 RX ADMIN — HYDROMORPHONE HYDROCHLORIDE 1 MILLIGRAM(S): 2 INJECTION INTRAMUSCULAR; INTRAVENOUS; SUBCUTANEOUS at 06:19

## 2020-01-19 NOTE — PROGRESS NOTE ADULT - ASSESSMENT
49 male with history of sickle cell disease was presented with acute pain all over his body. patient was diagnosed with acute sickle cell crisis with severe pain. patient was started on oxygen, IVF and iv pain meds. patient also received blood transfusion for worsening anemia. patient was found to have fever and finished antibiotic course for 5 days for UTI. patient's  pain meds were changed to oral but pain got worse and patient was again started back on iv dilaudid.     ·     Sickle cell crisis with acute severe generalized pain.   Increase iv dilaudid and cont percocet. change IVF to 1/2 for hypernatremia. advised to use oxygen. watch for any oversedation. follow I and Os.     ·	SOB. CXR reviewed by me showed no acute infiltrates. no antibiotics. patient does have low grade fever and leukocytosis. follow CBC closely. if any fever spikes. might need to repeat infectious work up. low grade fever could be sickle cell crisis.   ·	Acute on chronic anemia. give one unit of blood transfusion. follow CBC, LDH and bili. watch for any volume overload.      ·	 EMEKA (acute kidney injury).  resolved.     ·	Klebsiella UTI. finished antibiotic course.       ·  : Benign prostatic hyperplasia without lower urinary tract symptoms.  Plan: reviewed prior health record and noted pt on Flomax from prior [facility] admission.     ·   Chronic systolic congestive heart failure.  compensated. follow I and Os on IVF.     ·	 Severe protein-calorie malnutrition.  appreciate nutrition consult.    DVT PPx: HSQ  Full code

## 2020-01-19 NOTE — PROGRESS NOTE ADULT - SUBJECTIVE AND OBJECTIVE BOX
CHIEF COMPLAINT: complaining of severe pain all over. + sob. s/p CXR this morning. no fever. no vomiting. no DM or active gross bleeding       PHYSICAL EXAM:    GENERAL: malnourished.   CHEST/LUNG: Clear to ausculation bilaterally, no wheezing, no crackles   HEART: Regular rate and rhythm; No murmurs, rubs  ABDOMEN: Soft, Nontender, Nondistended; Bowel sounds present  EXTREMITIES:  Moving all four extremities spontaneously, No clubbing, cyanosis, or edema  NERVOUS SYSTEM:  Grossly non focal.  Psychiatry: AAO x 3, mood is appropriate     ------------------------------------------------------------------------------------------------------------------------------------------------------------  OBJECTIVE DATA:       Vital Signs Last 24 Hrs  T(C): 37.3 (2020 05:54), Max: 37.7 (2020 04:03)  T(F): 99.2 (2020 05:54), Max: 99.8 (2020 04:03)  HR: 93 (2020 05:54) (81 - 93)  BP: 162/80 (2020 05:54) (156/83 - 174/84)  BP(mean): --  RR: 20 (2020 05:54) (17 - 24)  SpO2: 95% (2020 05:54) (95% - 100%)           Daily     Daily Weight in k.6 (2020 05:54)      LABS:                        6.8    19.12 )-----------( 408      ( 2020 07:06 )             22.1             01-19    146<H>  |  115<H>  |  8   ----------------------------<  102<H>  4.0   |  22  |  1.10    Ca    9.0      2020 07:06    TPro  8.1  /  Alb  3.1<L>  /  TBili  1.5<H>  /  DBili  x   /  AST  41<H>  /  ALT  36  /  AlkPhos  289<H>  01-18                       CAPILLARY BLOOD GLUCOSE                Interval Radiology studies: reviewed     MEDICATIONS  (STANDING):  dextrose 5% + sodium chloride 0.45%. 2000 milliLiter(s) (125 mL/Hr) IV Continuous <Continuous>  fluticasone propionate 50 MICROgram(s)/spray Nasal Spray 1 Spray(s) Both Nostrils two times a day  heparin  Injectable 5000 Unit(s) SubCutaneous every 12 hours  influenza   Vaccine 0.5 milliLiter(s) IntraMuscular once  metoprolol tartrate 25 milliGRAM(s) Oral two times a day  polyethylene glycol 3350 17 Gram(s) Oral daily  senna 2 Tablet(s) Oral at bedtime  tamsulosin 0.4 milliGRAM(s) Oral at bedtime    MEDICATIONS  (PRN):  acetaminophen   Tablet .. 650 milliGRAM(s) Oral every 6 hours PRN Temp greater or equal to 38C (100.4F), Mild Pain (1 - 3)  diphenhydrAMINE 50 milliGRAM(s) Oral every 4 hours PRN Rash and/or Itching  HYDROmorphone  Injectable 1 milliGRAM(s) IV Push every 4 hours PRN Severe Pain (7 - 10)  oxycodone    5 mG/acetaminophen 325 mG 1 Tablet(s) Oral every 4 hours PRN Moderate Pain (4 - 6)  polyethylene glycol 3350 17 Gram(s) Oral at bedtime PRN Constipation

## 2020-01-19 NOTE — CHART NOTE - NSCHARTNOTEFT_GEN_A_CORE
Called to evaluate pt C/O SOB. Pt was examined in bed; stated "I can't catch my breath".  Denies CP or palpitation  Diaphoretic (had 2 thermal undershirts and gown)    64 year old  male with history of sickle cell disease was presented with acute pain all over his body. patient was diagnosed with acute sickle cell crisis with severe pain. patient was started on oxygen, IVF and iv pain meds. patient also received blood transfusion for worsening anemia.  Pain management with IV Dilaudid; IVF was increased to 150 ml/hr on 1/18 now with SOB which resolved with O2 and stopping IVF    A+Ox3  CV: S1S2 reg  Respiratory; crackles left base      T(C): 37.7 (19 Jan 2020 04:03), Max: 37.7 (19 Jan 2020 04:03)  T(F): 99.8 (19 Jan 2020 04:03), Max: 99.8 (19 Jan 2020 04:03)  HR: 92 (19 Jan 2020 04:03) (81 - 92)  BP: 156/83 (19 Jan 2020 04:03) (156/83 - 174/84)  RR: 24 (19 Jan 2020 04:03) (17 - 24)  SpO2: 100% (18 Jan 2020 23:58) (97% - 100%)    Plan  CXR  O2 2L NC  pain management Called to evaluate pt C/O SOB. Pt was examined in bed; stated "I can't catch my breath".  Denies CP or palpitation  Diaphoretic (had 2 thermal undershirts and gown)    64 year old  male with history of sickle cell disease was presented with acute pain all over his body. patient was diagnosed with acute sickle cell crisis with severe pain. patient was started on oxygen, IVF and iv pain meds. patient also received blood transfusion for worsening anemia.  Pain management with IV Dilaudid; IVF was increased to 150 ml/hr on 1/18 now with SOB which resolved with O2  and Dilaudid IV. IVF was stopped at 0415; will  be resumed at 75 ml/hr if pt remains asymptomatic.    A+Ox3  CV: S1S2 reg  Respiratory; crackles left base      T(C): 37.7 (19 Jan 2020 04:03), Max: 37.7 (19 Jan 2020 04:03)  T(F): 99.8 (19 Jan 2020 04:03), Max: 99.8 (19 Jan 2020 04:03)  HR: 92 (19 Jan 2020 04:03) (81 - 92)  BP: 156/83 (19 Jan 2020 04:03) (156/83 - 174/84)  RR: 24 (19 Jan 2020 04:03) (17 - 24)  SpO2: 100% (18 Jan 2020 23:58) (97% - 100%)    Plan  CXR  O2 2L NC  pain management

## 2020-01-20 LAB
ALBUMIN SERPL ELPH-MCNC: 2.7 G/DL — LOW (ref 3.3–5)
ALP SERPL-CCNC: 227 U/L — HIGH (ref 40–120)
ALT FLD-CCNC: 30 U/L — SIGNIFICANT CHANGE UP (ref 12–78)
ANION GAP SERPL CALC-SCNC: 5 MMOL/L — SIGNIFICANT CHANGE UP (ref 5–17)
APPEARANCE UR: CLEAR — SIGNIFICANT CHANGE UP
AST SERPL-CCNC: 51 U/L — HIGH (ref 15–37)
BACTERIA # UR AUTO: ABNORMAL
BILIRUB DIRECT SERPL-MCNC: 0.66 MG/DL — HIGH (ref 0.05–0.2)
BILIRUB INDIRECT FLD-MCNC: 1.5 MG/DL — HIGH (ref 0.2–1)
BILIRUB SERPL-MCNC: 2.2 MG/DL — HIGH (ref 0.2–1.2)
BILIRUB UR-MCNC: NEGATIVE — SIGNIFICANT CHANGE UP
BUN SERPL-MCNC: 13 MG/DL — SIGNIFICANT CHANGE UP (ref 7–23)
CALCIUM SERPL-MCNC: 8.6 MG/DL — SIGNIFICANT CHANGE UP (ref 8.5–10.1)
CHLORIDE SERPL-SCNC: 109 MMOL/L — HIGH (ref 96–108)
CO2 SERPL-SCNC: 24 MMOL/L — SIGNIFICANT CHANGE UP (ref 22–31)
COLOR SPEC: YELLOW — SIGNIFICANT CHANGE UP
CREAT SERPL-MCNC: 1.24 MG/DL — SIGNIFICANT CHANGE UP (ref 0.5–1.3)
DAT IGG-SP REAG RBC-IMP: ABNORMAL
DIFF PNL FLD: ABNORMAL
EPI CELLS # UR: SIGNIFICANT CHANGE UP
GLUCOSE SERPL-MCNC: 109 MG/DL — HIGH (ref 70–99)
GLUCOSE UR QL: NEGATIVE MG/DL — SIGNIFICANT CHANGE UP
HCT VFR BLD CALC: 20.1 % — CRITICAL LOW (ref 39–50)
HGB BLD-MCNC: 6.2 G/DL — CRITICAL LOW (ref 13–17)
IAT COMP-SP REAG SERPL QL: SIGNIFICANT CHANGE UP
KETONES UR-MCNC: ABNORMAL
LDH SERPL L TO P-CCNC: 698 U/L — HIGH (ref 50–242)
LEUKOCYTE ESTERASE UR-ACNC: NEGATIVE — SIGNIFICANT CHANGE UP
MCHC RBC-ENTMCNC: 23.1 PG — LOW (ref 27–34)
MCHC RBC-ENTMCNC: 30.8 GM/DL — LOW (ref 32–36)
MCV RBC AUTO: 75 FL — LOW (ref 80–100)
NITRITE UR-MCNC: NEGATIVE — SIGNIFICANT CHANGE UP
NRBC # BLD: 4 /100 WBCS — HIGH (ref 0–0)
PH UR: 6 — SIGNIFICANT CHANGE UP (ref 5–8)
PLATELET # BLD AUTO: 446 K/UL — HIGH (ref 150–400)
POTASSIUM SERPL-MCNC: 3.6 MMOL/L — SIGNIFICANT CHANGE UP (ref 3.5–5.3)
POTASSIUM SERPL-SCNC: 3.6 MMOL/L — SIGNIFICANT CHANGE UP (ref 3.5–5.3)
PROT SERPL-MCNC: 7.1 GM/DL — SIGNIFICANT CHANGE UP (ref 6–8.3)
PROT UR-MCNC: 500 MG/DL
RBC # BLD: 2.68 M/UL — LOW (ref 4.2–5.8)
RBC # FLD: 24.9 % — HIGH (ref 10.3–14.5)
RBC CASTS # UR COMP ASSIST: SIGNIFICANT CHANGE UP /HPF (ref 0–4)
SODIUM SERPL-SCNC: 138 MMOL/L — SIGNIFICANT CHANGE UP (ref 135–145)
SP GR SPEC: 1.01 — SIGNIFICANT CHANGE UP (ref 1.01–1.02)
UROBILINOGEN FLD QL: NEGATIVE MG/DL — SIGNIFICANT CHANGE UP
WBC # BLD: 16.62 K/UL — HIGH (ref 3.8–10.5)
WBC # FLD AUTO: 16.62 K/UL — HIGH (ref 3.8–10.5)
WBC UR QL: SIGNIFICANT CHANGE UP

## 2020-01-20 PROCEDURE — 99232 SBSQ HOSP IP/OBS MODERATE 35: CPT

## 2020-01-20 PROCEDURE — 71250 CT THORAX DX C-: CPT | Mod: 26

## 2020-01-20 RX ORDER — SODIUM CHLORIDE 9 MG/ML
1000 INJECTION, SOLUTION INTRAVENOUS
Refills: 0 | Status: DISCONTINUED | OUTPATIENT
Start: 2020-01-20 | End: 2020-01-23

## 2020-01-20 RX ORDER — ACETAMINOPHEN 500 MG
325 TABLET ORAL ONCE
Refills: 0 | Status: COMPLETED | OUTPATIENT
Start: 2020-01-20 | End: 2020-01-20

## 2020-01-20 RX ORDER — HYDROMORPHONE HYDROCHLORIDE 2 MG/ML
1 INJECTION INTRAMUSCULAR; INTRAVENOUS; SUBCUTANEOUS ONCE
Refills: 0 | Status: DISCONTINUED | OUTPATIENT
Start: 2020-01-20 | End: 2020-01-20

## 2020-01-20 RX ORDER — HYDROMORPHONE HYDROCHLORIDE 2 MG/ML
2 INJECTION INTRAMUSCULAR; INTRAVENOUS; SUBCUTANEOUS EVERY 4 HOURS
Refills: 0 | Status: DISCONTINUED | OUTPATIENT
Start: 2020-01-20 | End: 2020-01-25

## 2020-01-20 RX ADMIN — Medication 25 MILLIGRAM(S): at 05:27

## 2020-01-20 RX ADMIN — Medication 650 MILLIGRAM(S): at 12:13

## 2020-01-20 RX ADMIN — HYDROMORPHONE HYDROCHLORIDE 2 MILLIGRAM(S): 2 INJECTION INTRAMUSCULAR; INTRAVENOUS; SUBCUTANEOUS at 20:44

## 2020-01-20 RX ADMIN — HYDROMORPHONE HYDROCHLORIDE 1 MILLIGRAM(S): 2 INJECTION INTRAMUSCULAR; INTRAVENOUS; SUBCUTANEOUS at 01:32

## 2020-01-20 RX ADMIN — HYDROMORPHONE HYDROCHLORIDE 2 MILLIGRAM(S): 2 INJECTION INTRAMUSCULAR; INTRAVENOUS; SUBCUTANEOUS at 16:19

## 2020-01-20 RX ADMIN — SODIUM CHLORIDE 125 MILLILITER(S): 9 INJECTION, SOLUTION INTRAVENOUS at 22:28

## 2020-01-20 RX ADMIN — HYDROMORPHONE HYDROCHLORIDE 1 MILLIGRAM(S): 2 INJECTION INTRAMUSCULAR; INTRAVENOUS; SUBCUTANEOUS at 10:24

## 2020-01-20 RX ADMIN — Medication 25 MILLIGRAM(S): at 17:47

## 2020-01-20 RX ADMIN — Medication 325 MILLIGRAM(S): at 01:31

## 2020-01-20 RX ADMIN — Medication 650 MILLIGRAM(S): at 13:09

## 2020-01-20 RX ADMIN — Medication 325 MILLIGRAM(S): at 00:59

## 2020-01-20 RX ADMIN — SODIUM CHLORIDE 125 MILLILITER(S): 9 INJECTION, SOLUTION INTRAVENOUS at 12:07

## 2020-01-20 RX ADMIN — HYDROMORPHONE HYDROCHLORIDE 2 MILLIGRAM(S): 2 INJECTION INTRAMUSCULAR; INTRAVENOUS; SUBCUTANEOUS at 20:23

## 2020-01-20 RX ADMIN — Medication 1 SPRAY(S): at 05:28

## 2020-01-20 RX ADMIN — OXYCODONE AND ACETAMINOPHEN 1 TABLET(S): 5; 325 TABLET ORAL at 00:18

## 2020-01-20 RX ADMIN — HYDROMORPHONE HYDROCHLORIDE 1 MILLIGRAM(S): 2 INJECTION INTRAMUSCULAR; INTRAVENOUS; SUBCUTANEOUS at 05:28

## 2020-01-20 RX ADMIN — Medication 50 MILLIGRAM(S): at 20:23

## 2020-01-20 RX ADMIN — HYDROMORPHONE HYDROCHLORIDE 1 MILLIGRAM(S): 2 INJECTION INTRAMUSCULAR; INTRAVENOUS; SUBCUTANEOUS at 02:02

## 2020-01-20 RX ADMIN — HYDROMORPHONE HYDROCHLORIDE 1 MILLIGRAM(S): 2 INJECTION INTRAMUSCULAR; INTRAVENOUS; SUBCUTANEOUS at 10:40

## 2020-01-20 RX ADMIN — POLYETHYLENE GLYCOL 3350 17 GRAM(S): 17 POWDER, FOR SOLUTION ORAL at 11:19

## 2020-01-20 RX ADMIN — Medication 50 MILLIGRAM(S): at 16:19

## 2020-01-20 RX ADMIN — HEPARIN SODIUM 5000 UNIT(S): 5000 INJECTION INTRAVENOUS; SUBCUTANEOUS at 05:27

## 2020-01-20 RX ADMIN — HYDROMORPHONE HYDROCHLORIDE 2 MILLIGRAM(S): 2 INJECTION INTRAMUSCULAR; INTRAVENOUS; SUBCUTANEOUS at 16:35

## 2020-01-20 RX ADMIN — HEPARIN SODIUM 5000 UNIT(S): 5000 INJECTION INTRAVENOUS; SUBCUTANEOUS at 17:47

## 2020-01-20 RX ADMIN — SENNA PLUS 2 TABLET(S): 8.6 TABLET ORAL at 22:23

## 2020-01-20 RX ADMIN — Medication 50 MILLIGRAM(S): at 10:24

## 2020-01-20 RX ADMIN — SODIUM CHLORIDE 75 MILLILITER(S): 9 INJECTION, SOLUTION INTRAVENOUS at 23:15

## 2020-01-20 RX ADMIN — Medication 1 SPRAY(S): at 17:47

## 2020-01-20 RX ADMIN — OXYCODONE AND ACETAMINOPHEN 1 TABLET(S): 5; 325 TABLET ORAL at 00:48

## 2020-01-20 RX ADMIN — SODIUM CHLORIDE 125 MILLILITER(S): 9 INJECTION, SOLUTION INTRAVENOUS at 04:07

## 2020-01-20 RX ADMIN — TAMSULOSIN HYDROCHLORIDE 0.4 MILLIGRAM(S): 0.4 CAPSULE ORAL at 22:23

## 2020-01-20 NOTE — CHART NOTE - NSCHARTNOTEFT_GEN_A_CORE
fever   called to bedside for pt with fever 100.8 rectal c/o + non productive  cough , +diaphoresis , no chills, no sore throat, no polyuria, no dysuria, no headache , co pain to b/l hips thigh's and calves intractable to medication   gen adult male with cachexia   heent ncat  pharynx slight erythema no exudate , moist buccal mucosa  lungs -clear  cvs s1s2 rr no rg  abd sorfr bs+ ,nt,nd  ext right hip tender to palpation b/l calves tender no edema     imp   fever /pain  ,1/19 4am no opacity   order blood culture, ua   1  mg dilaudid for breakthrough pain x 1 dose

## 2020-01-20 NOTE — CHART NOTE - NSCHARTNOTEFT_GEN_A_CORE
Assessment:     Factors impacting intake: [ ] none [ ] nausea  [ ] vomiting [ ] diarrhea [ ] constipation  [ ]chewing problems [ ] swallowing issues  [ ] other:     Diet Prescription: Diet, Regular:   Low Sodium  Supplement Feeding Modality:  Oral  Ensure Enlive Cans or Servings Per Day:  1       Frequency:  Three Times a day (01-16-20 @ 10:36)    Intake: Po intake 50-75% meals & states consuming Ensure Enlive ~1 x day. Pt refusing breakfast this am due to c/o generalized pain. Last BM x 3 (1/15); pt on Miralax & senna; RN aware    Current Weight: Wt=48.3kg(1/20), Wt=48.1kg(1/9)  % Weight Change Wt remains stable x 11 days    Physical appearance:  No edema; Nutrition focused physical exam conducted: Subcutaneous fat loss: [ ] Orbital fat pads region, [ ]Buccal fat region, [ ]Triceps region,  [ ]Ribs region.  Muscle wasting: [ ]Temples region, [ ]Clavicle region, [ ]Shoulder region, [ ]Scapula region, [ ]Interosseous region,  [ ]thigh region, [ ]Calf region    Pertinent Medications: MEDICATIONS  (STANDING):  dextrose 5% + sodium chloride 0.45%. 2000 milliLiter(s) (125 mL/Hr) IV Continuous <Continuous>  fluticasone propionate 50 MICROgram(s)/spray Nasal Spray 1 Spray(s) Both Nostrils two times a day  heparin  Injectable 5000 Unit(s) SubCutaneous every 12 hours  influenza   Vaccine 0.5 milliLiter(s) IntraMuscular once  metoprolol tartrate 25 milliGRAM(s) Oral two times a day  polyethylene glycol 3350 17 Gram(s) Oral daily  senna 2 Tablet(s) Oral at bedtime  tamsulosin 0.4 milliGRAM(s) Oral at bedtime    MEDICATIONS  (PRN):  acetaminophen   Tablet .. 650 milliGRAM(s) Oral every 6 hours PRN Temp greater or equal to 38C (100.4F), Mild Pain (1 - 3)  diphenhydrAMINE 50 milliGRAM(s) Oral every 4 hours PRN Rash and/or Itching  HYDROmorphone  Injectable 1 milliGRAM(s) IV Push every 4 hours PRN Severe Pain (7 - 10)  oxycodone    5 mG/acetaminophen 325 mG 1 Tablet(s) Oral every 4 hours PRN Moderate Pain (4 - 6)  polyethylene glycol 3350 17 Gram(s) Oral at bedtime PRN Constipation    Pertinent Labs: 01-20 Na 138 mmol/L Glu 109 mg/dL<H> K+ 3.6 mmol/L Cr 1.24 mg/dL BUN 13 mg/dL Phos n/a   Alb 2.7 g/dL<L> PAB n/a   Hgb 6.2 g/dL<LL> Hct 20.1 %<LL> HgA1C n/a    Glucose, Serum: 109 mg/dL <H>   24Hr FS:  Skin: intact    Estimated Needs:   [ ] no change since previous assessment (1/12/19)  [ ] recalculated:     Previous Nutrition Diagnosis:  #1 Malnutrition...   Malnutrition Severe malnutrition in the context of chronic illness.   Etiology Inadequate energy & protein intake related to sickle cell.   Signs/Symptoms Severe subcutaneous fat loss & muscle wasting.     Goal/Expected Outcome Pt to consume >75% meals/supplements; maintain stable wt +-2#  Nutrition Diagnosis is [x ] ongoing  [ ] resolved  [ ] improved  [ ] not applicable       New Nutrition Diagnosis: [x ] not applicable       Interventions:   Recommend  [x ] Continue: Low Na/Ensure Enlive 3 x day(1050kcal & 60gm protein)  [ ] Change Diet To:  [ ] Nutrition Supplement:  [ ] Nutrition Support:  [ ] Other:     Monitoring and Evaluation:   [ ] PO intake [ x ] Tolerance to diet prescription [ x ] weights [ x ] labs[ x ] follow up per protocol  [ ] other: Assessment: Pt seen for follow-up & continues Chronic severe malnutrition. Pt with sickle cell remains on Pain medications. Pt refuses meals at times due to pain & continues po supplements Ensure Enlive 3 x day .     Factors impacting intake: [ ] none [ ] nausea  [ ] vomiting [ ] diarrhea [ ] constipation  [ ]chewing problems [ ] swallowing issues  [x ] other: generalized pain    Diet Prescription: Diet, Regular:   Low Sodium  Supplement Feeding Modality:  Oral  Ensure Enlive Cans or Servings Per Day:  1       Frequency:  Three Times a day (01-16-20 @ 10:36)    Intake: Po intake 50-75% meals & states consuming Ensure Enlive ~1 x day. Pt refusing breakfast this am due to c/o generalized pain. Last BM x 3 (1/15); pt on Miralax & senna; RN aware    Current Weight: Wt=48.3kg(1/20), Wt=48.1kg(1/9)  % Weight Change Wt remains stable x 11 days    Physical appearance:  No edema; Nutrition focused physical exam conducted: Subcutaneous fat loss: [Moderate ] Orbital fat pads region, [severe ]Buccal fat region, [severe ]Triceps region,  [moderate ]Ribs region.  Muscle wasting: [severe ]Temples region, [mild ]Clavicle region, [mild ]Shoulder region, [unable ]Scapula region, [moderate ]Interosseous region,  [severe ]thigh region, [severe ]Calf region    Pertinent Medications: MEDICATIONS  (STANDING):  dextrose 5% + sodium chloride 0.45%. 2000 milliLiter(s) (125 mL/Hr) IV Continuous <Continuous>  fluticasone propionate 50 MICROgram(s)/spray Nasal Spray 1 Spray(s) Both Nostrils two times a day  heparin  Injectable 5000 Unit(s) SubCutaneous every 12 hours  influenza   Vaccine 0.5 milliLiter(s) IntraMuscular once  metoprolol tartrate 25 milliGRAM(s) Oral two times a day  polyethylene glycol 3350 17 Gram(s) Oral daily  senna 2 Tablet(s) Oral at bedtime  tamsulosin 0.4 milliGRAM(s) Oral at bedtime    MEDICATIONS  (PRN):  acetaminophen   Tablet .. 650 milliGRAM(s) Oral every 6 hours PRN Temp greater or equal to 38C (100.4F), Mild Pain (1 - 3)  diphenhydrAMINE 50 milliGRAM(s) Oral every 4 hours PRN Rash and/or Itching  HYDROmorphone  Injectable 1 milliGRAM(s) IV Push every 4 hours PRN Severe Pain (7 - 10)  oxycodone    5 mG/acetaminophen 325 mG 1 Tablet(s) Oral every 4 hours PRN Moderate Pain (4 - 6)  polyethylene glycol 3350 17 Gram(s) Oral at bedtime PRN Constipation    Pertinent Labs: 01-20 Na 138 mmol/L Glu 109 mg/dL<H> K+ 3.6 mmol/L Cr 1.24 mg/dL BUN 13 mg/dL Phos n/a   Alb 2.7 g/dL<L> PAB n/a   Hgb 6.2 g/dL<LL> Hct 20.1 %<LL> HgA1C n/a    Glucose, Serum: 109 mg/dL <H>   24Hr FS:  Skin: intact    Estimated Needs:   [ ] no change since previous assessment (1/12/19)  [ ] recalculated:     Previous Nutrition Diagnosis:  #1 Malnutrition...   Malnutrition Severe malnutrition in the context of chronic illness.   Etiology Inadequate energy & protein intake related to sickle cell.   Signs/Symptoms Severe subcutaneous fat loss & muscle wasting.     Goal/Expected Outcome Pt to consume >75% meals/supplements; not met at times   Pt to maintain stable wt +-2#; met  Nutrition Diagnosis is [x ] ongoing  [ ] resolved  [ ] improved  [ ] not applicable       New Nutrition Diagnosis: [x ] not applicable       Interventions:   Recommend  [x ] Continue: Low Na/Ensure Enlive 3 x day(1050kcal & 60gm protein)  [ ] Change Diet To:  [ ] Nutrition Supplement:  [ ] Nutrition Support:  [ ] Other:     Monitoring and Evaluation:   [x ] PO intake [ x ] Tolerance to diet prescription [ x ] weights [ x ] labs[ x ] follow up per protocol  [ ] other:

## 2020-01-20 NOTE — PROGRESS NOTE ADULT - SUBJECTIVE AND OBJECTIVE BOX
CHIEF COMPLAINT: complaining of severe pain all over.  01/20/2020 patient spiking fevers     T(C): 38.2 (01-20-20 @ 13:09), Max: 38.7 (01-20-20 @ 12:13)  HR: 104 (01-20-20 @ 11:13) (99 - 104)  BP: 154/86 (01-20-20 @ 11:13) (130/69 - 154/86)  RR: 16 (01-20-20 @ 11:13) (16 - 18)  SpO2: 96% (01-20-20 @ 11:13) (96% - 96%)    MEDICATIONS  (STANDING):  dextrose 5% + sodium chloride 0.45%. 2000 milliLiter(s) (125 mL/Hr) IV Continuous <Continuous>  fluticasone propionate 50 MICROgram(s)/spray Nasal Spray 1 Spray(s) Both Nostrils two times a day  heparin  Injectable 5000 Unit(s) SubCutaneous every 12 hours  influenza   Vaccine 0.5 milliLiter(s) IntraMuscular once  metoprolol tartrate 25 milliGRAM(s) Oral two times a day  polyethylene glycol 3350 17 Gram(s) Oral daily  senna 2 Tablet(s) Oral at bedtime  tamsulosin 0.4 milliGRAM(s) Oral at bedtime    MEDICATIONS  (PRN):  acetaminophen   Tablet .. 650 milliGRAM(s) Oral every 6 hours PRN Temp greater or equal to 38C (100.4F), Mild Pain (1 - 3)  diphenhydrAMINE 50 milliGRAM(s) Oral every 4 hours PRN Rash and/or Itching  HYDROmorphone  Injectable 1 milliGRAM(s) IV Push every 4 hours PRN Severe Pain (7 - 10)  oxycodone    5 mG/acetaminophen 325 mG 1 Tablet(s) Oral every 4 hours PRN Moderate Pain (4 - 6)  polyethylene glycol 3350 17 Gram(s) Oral at bedtime PRN Constipation    PHYSICAL EXAM:    GENERAL: malnourished.   CHEST/LUNG: Clear to ausculation bilaterally, no wheezing, no crackles   HEART: Regular rate and rhythm; No murmurs, rubs  ABDOMEN: Soft, Nontender, Nondistended; Bowel sounds present  EXTREMITIES:  Moving all four extremities spontaneously, No clubbing, cyanosis, or edema  NERVOUS SYSTEM:  Grossly non focal.  Psychiatry: AAO x 3, mood is appropriate     ------------------------------------------------------------------------------------------------------------------------------------------------------------                         6.2    16.62 )-----------( 446      ( 20 Jan 2020 06:51 )             20.1           LIVER FUNCTIONS - ( 20 Jan 2020 06:51 )  Alb: 2.7 g/dL / Pro: 7.1 gm/dL / ALK PHOS: 227 U/L / ALT: 30 U/L / AST: 51 U/L / GGT: x             138|109|13<109  3.6|24|1.24  8.6,--,--  01-20 @ 06:51      Interval Radiology studies: reviewed

## 2020-01-20 NOTE — PROGRESS NOTE ADULT - ASSESSMENT
49 male with history of sickle cell disease was presented with acute pain all over his body. patient was diagnosed with acute sickle cell crisis with severe pain. patient was started on oxygen, IVF and iv pain meds. patient also received blood transfusion for worsening anemia. patient was found to have fever and finished antibiotic course for 5 days for UTI. patient's  pain meds were changed to oral but pain got worse and patient was again started back on iv dilaudid.       . Fevers ? source, likely from sickle cell crisis, follow up blood cultures, CT chest r/o pneumonia.     ·     Sickle cell crisis with acute severe generalized pain.   Increase iv dilaudid and cont percocet. change IVF to 1/2 for hypernatremia. advised to use oxygen. watch for any oversedation. follow I and Os.     ·	Acute on chronic anemia. give one unit of blood transfusion. follow CBC, LDH and bili. watch for any volume overload.      ·	 EMEKA (acute kidney injury).  resolving     ·	Klebsiella UTI. finished antibiotic course.       ·  : Benign prostatic hyperplasia without lower urinary tract symptoms.  Plan: reviewed prior health record and noted pt on Flomax from prior [facility] admission.     ·   Chronic systolic congestive heart failure.  compensated. follow I and Os on IVF.     ·	 Severe protein-calorie malnutrition.  appreciate nutrition consult.    DVT PPx: HSQ  Full code

## 2020-01-21 LAB
ALLERGY+IMMUNOLOGY DIAG STUDY NOTE: SIGNIFICANT CHANGE UP
ANION GAP SERPL CALC-SCNC: 9 MMOL/L — SIGNIFICANT CHANGE UP (ref 5–17)
BASOPHILS # BLD AUTO: 0.05 K/UL — SIGNIFICANT CHANGE UP (ref 0–0.2)
BASOPHILS NFR BLD AUTO: 0.3 % — SIGNIFICANT CHANGE UP (ref 0–2)
BUN SERPL-MCNC: 18 MG/DL — SIGNIFICANT CHANGE UP (ref 7–23)
CALCIUM SERPL-MCNC: 8.7 MG/DL — SIGNIFICANT CHANGE UP (ref 8.5–10.1)
CHLORIDE SERPL-SCNC: 109 MMOL/L — HIGH (ref 96–108)
CO2 SERPL-SCNC: 22 MMOL/L — SIGNIFICANT CHANGE UP (ref 22–31)
CREAT SERPL-MCNC: 1.22 MG/DL — SIGNIFICANT CHANGE UP (ref 0.5–1.3)
EOSINOPHIL # BLD AUTO: 0 K/UL — SIGNIFICANT CHANGE UP (ref 0–0.5)
EOSINOPHIL NFR BLD AUTO: 0 % — SIGNIFICANT CHANGE UP (ref 0–6)
GLUCOSE SERPL-MCNC: 126 MG/DL — HIGH (ref 70–99)
HCT VFR BLD CALC: 18.7 % — CRITICAL LOW (ref 39–50)
HGB BLD-MCNC: 5.9 G/DL — CRITICAL LOW (ref 13–17)
IMM GRANULOCYTES NFR BLD AUTO: 1 % — SIGNIFICANT CHANGE UP (ref 0–1.5)
LDH SERPL L TO P-CCNC: 755 U/L — HIGH (ref 50–242)
LYMPHOCYTES # BLD AUTO: 1.28 K/UL — SIGNIFICANT CHANGE UP (ref 1–3.3)
LYMPHOCYTES # BLD AUTO: 8.7 % — LOW (ref 13–44)
MCHC RBC-ENTMCNC: 23 PG — LOW (ref 27–34)
MCHC RBC-ENTMCNC: 31.6 GM/DL — LOW (ref 32–36)
MCV RBC AUTO: 72.8 FL — LOW (ref 80–100)
MONOCYTES # BLD AUTO: 1.58 K/UL — HIGH (ref 0–0.9)
MONOCYTES NFR BLD AUTO: 10.7 % — SIGNIFICANT CHANGE UP (ref 2–14)
NEUTROPHILS # BLD AUTO: 11.65 K/UL — HIGH (ref 1.8–7.4)
NEUTROPHILS NFR BLD AUTO: 79.3 % — HIGH (ref 43–77)
NRBC # BLD: 3 /100 WBCS — HIGH (ref 0–0)
PLATELET # BLD AUTO: 410 K/UL — HIGH (ref 150–400)
POTASSIUM SERPL-MCNC: 3.8 MMOL/L — SIGNIFICANT CHANGE UP (ref 3.5–5.3)
POTASSIUM SERPL-SCNC: 3.8 MMOL/L — SIGNIFICANT CHANGE UP (ref 3.5–5.3)
RBC # BLD: 2.56 M/UL — LOW (ref 4.2–5.8)
RBC # BLD: 2.57 M/UL — LOW (ref 4.2–5.8)
RBC # FLD: 24.1 % — HIGH (ref 10.3–14.5)
RETICS #: 145.9 K/UL — HIGH (ref 25–125)
RETICS/RBC NFR: 5.7 % — HIGH (ref 0.5–2.5)
SODIUM SERPL-SCNC: 140 MMOL/L — SIGNIFICANT CHANGE UP (ref 135–145)
WBC # BLD: 14.71 K/UL — HIGH (ref 3.8–10.5)
WBC # FLD AUTO: 14.71 K/UL — HIGH (ref 3.8–10.5)

## 2020-01-21 PROCEDURE — 83020 HEMOGLOBIN ELECTROPHORESIS: CPT | Mod: 26

## 2020-01-21 PROCEDURE — 99222 1ST HOSP IP/OBS MODERATE 55: CPT

## 2020-01-21 PROCEDURE — 99232 SBSQ HOSP IP/OBS MODERATE 35: CPT

## 2020-01-21 RX ORDER — ACETAMINOPHEN 500 MG
750 TABLET ORAL ONCE
Refills: 0 | Status: COMPLETED | OUTPATIENT
Start: 2020-01-21 | End: 2020-01-21

## 2020-01-21 RX ADMIN — Medication 25 MILLIGRAM(S): at 18:55

## 2020-01-21 RX ADMIN — Medication 50 MILLIGRAM(S): at 09:14

## 2020-01-21 RX ADMIN — HYDROMORPHONE HYDROCHLORIDE 2 MILLIGRAM(S): 2 INJECTION INTRAMUSCULAR; INTRAVENOUS; SUBCUTANEOUS at 00:23

## 2020-01-21 RX ADMIN — Medication 650 MILLIGRAM(S): at 00:16

## 2020-01-21 RX ADMIN — Medication 1 SPRAY(S): at 05:33

## 2020-01-21 RX ADMIN — Medication 25 MILLIGRAM(S): at 05:32

## 2020-01-21 RX ADMIN — HEPARIN SODIUM 5000 UNIT(S): 5000 INJECTION INTRAVENOUS; SUBCUTANEOUS at 18:55

## 2020-01-21 RX ADMIN — HYDROMORPHONE HYDROCHLORIDE 2 MILLIGRAM(S): 2 INJECTION INTRAMUSCULAR; INTRAVENOUS; SUBCUTANEOUS at 20:19

## 2020-01-21 RX ADMIN — HYDROMORPHONE HYDROCHLORIDE 2 MILLIGRAM(S): 2 INJECTION INTRAMUSCULAR; INTRAVENOUS; SUBCUTANEOUS at 04:33

## 2020-01-21 RX ADMIN — POLYETHYLENE GLYCOL 3350 17 GRAM(S): 17 POWDER, FOR SOLUTION ORAL at 13:06

## 2020-01-21 RX ADMIN — SODIUM CHLORIDE 75 MILLILITER(S): 9 INJECTION, SOLUTION INTRAVENOUS at 05:32

## 2020-01-21 RX ADMIN — Medication 650 MILLIGRAM(S): at 14:03

## 2020-01-21 RX ADMIN — HYDROMORPHONE HYDROCHLORIDE 2 MILLIGRAM(S): 2 INJECTION INTRAMUSCULAR; INTRAVENOUS; SUBCUTANEOUS at 14:21

## 2020-01-21 RX ADMIN — Medication 650 MILLIGRAM(S): at 13:06

## 2020-01-21 RX ADMIN — HEPARIN SODIUM 5000 UNIT(S): 5000 INJECTION INTRAVENOUS; SUBCUTANEOUS at 05:15

## 2020-01-21 RX ADMIN — Medication 50 MILLIGRAM(S): at 00:23

## 2020-01-21 RX ADMIN — SENNA PLUS 2 TABLET(S): 8.6 TABLET ORAL at 21:57

## 2020-01-21 RX ADMIN — Medication 300 MILLIGRAM(S): at 23:55

## 2020-01-21 RX ADMIN — HYDROMORPHONE HYDROCHLORIDE 2 MILLIGRAM(S): 2 INJECTION INTRAMUSCULAR; INTRAVENOUS; SUBCUTANEOUS at 14:06

## 2020-01-21 RX ADMIN — TAMSULOSIN HYDROCHLORIDE 0.4 MILLIGRAM(S): 0.4 CAPSULE ORAL at 21:58

## 2020-01-21 RX ADMIN — HYDROMORPHONE HYDROCHLORIDE 2 MILLIGRAM(S): 2 INJECTION INTRAMUSCULAR; INTRAVENOUS; SUBCUTANEOUS at 00:53

## 2020-01-21 RX ADMIN — HYDROMORPHONE HYDROCHLORIDE 2 MILLIGRAM(S): 2 INJECTION INTRAMUSCULAR; INTRAVENOUS; SUBCUTANEOUS at 05:00

## 2020-01-21 RX ADMIN — HYDROMORPHONE HYDROCHLORIDE 2 MILLIGRAM(S): 2 INJECTION INTRAMUSCULAR; INTRAVENOUS; SUBCUTANEOUS at 09:14

## 2020-01-21 RX ADMIN — Medication 650 MILLIGRAM(S): at 01:14

## 2020-01-21 RX ADMIN — HYDROMORPHONE HYDROCHLORIDE 2 MILLIGRAM(S): 2 INJECTION INTRAMUSCULAR; INTRAVENOUS; SUBCUTANEOUS at 09:29

## 2020-01-21 RX ADMIN — HYDROMORPHONE HYDROCHLORIDE 2 MILLIGRAM(S): 2 INJECTION INTRAMUSCULAR; INTRAVENOUS; SUBCUTANEOUS at 20:03

## 2020-01-21 RX ADMIN — Medication 50 MILLIGRAM(S): at 04:33

## 2020-01-21 NOTE — PROGRESS NOTE ADULT - SUBJECTIVE AND OBJECTIVE BOX
CHIEF COMPLAINT: complaining of severe pain all over.    01/21/2020 patient spiking fevers, no complaints, reports pain much better than yesterday.     T(C): 37.7 (01-21-20 @ 15:14), Max: 38.7 (01-21-20 @ 11:11)  HR: 102 (01-21-20 @ 11:11) (99 - 102)  BP: 146/71 (01-21-20 @ 11:11) (127/71 - 146/71)  RR: 20 (01-21-20 @ 11:11) (16 - 20)  SpO2: 98% (01-21-20 @ 11:11) (97% - 98%)     MEDICATIONS  (STANDING):  dextrose 5% + sodium chloride 0.45%. 1000 milliLiter(s) (75 mL/Hr) IV Continuous <Continuous>  fluticasone propionate 50 MICROgram(s)/spray Nasal Spray 1 Spray(s) Both Nostrils two times a day  heparin  Injectable 5000 Unit(s) SubCutaneous every 12 hours  influenza   Vaccine 0.5 milliLiter(s) IntraMuscular once  metoprolol tartrate 25 milliGRAM(s) Oral two times a day  polyethylene glycol 3350 17 Gram(s) Oral daily  senna 2 Tablet(s) Oral at bedtime  tamsulosin 0.4 milliGRAM(s) Oral at bedtime    MEDICATIONS  (PRN):  acetaminophen   Tablet .. 650 milliGRAM(s) Oral every 6 hours PRN Temp greater or equal to 38C (100.4F), Mild Pain (1 - 3)  diphenhydrAMINE 50 milliGRAM(s) Oral every 4 hours PRN Rash and/or Itching  HYDROmorphone  Injectable 2 milliGRAM(s) IV Push every 4 hours PRN Severe Pain (7 - 10)  oxycodone    5 mG/acetaminophen 325 mG 1 Tablet(s) Oral every 4 hours PRN Moderate Pain (4 - 6)  polyethylene glycol 3350 17 Gram(s) Oral at bedtime PRN Constipation    PHYSICAL EXAM:    GENERAL: malnourished.   CHEST/LUNG: Clear to ausculation bilaterally, no wheezing, no crackles   HEART: Regular rate and rhythm; No murmurs, rubs  ABDOMEN: Soft, Nontender, Nondistended; Bowel sounds present  EXTREMITIES:  Moving all four extremities spontaneously, No clubbing, cyanosis, or edema  NERVOUS SYSTEM:  Grossly non focal.  Psychiatry: AAO x 3, mood is appropriate     ------------------------------------------------------------------------------------------------------------------------------------------------------------                        5.9    14.71 )-----------( 410      ( 21 Jan 2020 13:53 )             18.7           LIVER FUNCTIONS - ( 20 Jan 2020 06:51 )  Alb: 2.7 g/dL / Pro: 7.1 gm/dL / ALK PHOS: 227 U/L / ALT: 30 U/L / AST: 51 U/L / GGT: x             140|109|18<126  3.8|22|1.22  8.7,--,--  01-21 @ 13:53  01-20 @ 06:51      Interval Radiology studies: reviewed

## 2020-01-21 NOTE — CONSULT NOTE ADULT - ASSESSMENT
The patient is a 49y Male complaining of body pain. with history of  SCC x 2 days; cough x 2 weeks; patient has prior [facility] records under  1955 (2020 11:18)Pt in the hospital for last 10 days .  on admission he was found to have UTI with klebsiella and was treated with Rocephin for five days. no dysuria right now .fever + chills+ mild throat pain ,now seen with :   1.Fever since last two days with leukocytosis .Pt had resolved to normal and then went up again in the last few days and is resolving now  LDH and  Retic count high   fever likely sec to crisis   blood c/s ordered yesterday so far neg  Repeat UA neg   send RVP ,mild throat discomfort  monitor off antibiotics   IV fluids   2.Klebsiella UTI :treated with Rocephin for five days

## 2020-01-21 NOTE — PROGRESS NOTE ADULT - ASSESSMENT
49 male with history of sickle cell disease was presented with acute pain all over his body. patient was diagnosed with acute sickle cell crisis with severe pain. patient was started on oxygen, IVF and iv pain meds. patient also received blood transfusion for worsening anemia. patient was found to have fever and finished antibiotic course for 5 days for UTI. patient's  pain meds were changed to oral but pain got worse and patient was again started back on iv dilaudid.       ·	 Fevers Patient spiking fevers spoke to ID, ? source, likely from sickle cell crisis, follow up blood cultures, reviewed CT chest shows atelectasis, no pneumonia, follow up Hb Electrophoresis, Hem consult called today, follow up recs.    ·     Sickle cell crisis with acute severe generalized pain.   Increased iv dilaudid 2 mg iv q4 prn , continue with percocet. continue with iv hydration, Oxygen.     ·	Acute on chronic anemia. s/p PRBC transfusion, follow CBC, LDH and bili.     ·	 EMEKA (acute kidney injury).  resolving     ·	Klebsiella UTI. finished antibiotic course.       ·  : Benign prostatic hyperplasia without lower urinary tract symptoms.  Plan: reviewed prior health record and noted pt on Flomax from prior [facility] admission.     ·   Chronic systolic congestive heart failure.  compensated. follow I and Os on IVF.     ·	 Severe protein-calorie malnutrition.  appreciate nutrition consult.    DVT PPx: HSQ  Full code

## 2020-01-21 NOTE — CONSULT NOTE ADULT - SUBJECTIVE AND OBJECTIVE BOX
Infectious Diseases - Attending at Dr. Kunz    HPI:  The patient is a 49y Male complaining of body pain. with history of  SCC x 2 days; cough x 2 weeks; patient has prior [facility] records under  1955 (2020 11:18)Pt in the hospital for last 10 days .he started having fever since last two days   on admission he was found to have UTI with klebsiella and was treated with Rocephin for five days. no dysuria right now .fever + chills+ mild throat pain       PAST MEDICAL & SURGICAL HISTORY:  Sickle cell disease  No significant past surgical history      Allergies    No Known Drug Allergies  peanuts (Angioedema)    Intolerances        FAMILY HISTORY:  No h/o Dm,HTN in mother ,father      SOCIAL HISTORY: smoker    REVIEW OF SYSTEMS:    Constitutional: + fever, fatigue,generalised body aches  Eyes: No eye pain, visual disturbances, or discharge  ENT:  No difficulty hearing, tinnitus, vertigo; No sinus or throat pain  Neck: No pain or stiffness  Respiratory: No cough, wheezing, chills or hemoptysis  Cardiovascular: No chest pain, palpitations, shortness of breath, dizziness or leg swelling  Gastrointestinal: No abdominal or epigastric pain. No nausea, vomiting or hematemesis; No diarrhea or constipation. No melena or hematochezia.  Genitourinary: No dysuria, frequency, hematuria or incontinence  Neurological: No headaches, memory loss, loss of strength, numbness or tremors  Skin: No itching, burning, rashes or lesions       MEDICATIONS  (STANDING):  dextrose 5% + sodium chloride 0.45%. 1000 milliLiter(s) (75 mL/Hr) IV Continuous <Continuous>  fluticasone propionate 50 MICROgram(s)/spray Nasal Spray 1 Spray(s) Both Nostrils two times a day  heparin  Injectable 5000 Unit(s) SubCutaneous every 12 hours  influenza   Vaccine 0.5 milliLiter(s) IntraMuscular once  metoprolol tartrate 25 milliGRAM(s) Oral two times a day  polyethylene glycol 3350 17 Gram(s) Oral daily  senna 2 Tablet(s) Oral at bedtime  tamsulosin 0.4 milliGRAM(s) Oral at bedtime    MEDICATIONS  (PRN):  acetaminophen   Tablet .. 650 milliGRAM(s) Oral every 6 hours PRN Temp greater or equal to 38C (100.4F), Mild Pain (1 - 3)  diphenhydrAMINE 50 milliGRAM(s) Oral every 4 hours PRN Rash and/or Itching  HYDROmorphone  Injectable 2 milliGRAM(s) IV Push every 4 hours PRN Severe Pain (7 - 10)  oxycodone    5 mG/acetaminophen 325 mG 1 Tablet(s) Oral every 4 hours PRN Moderate Pain (4 - 6)  polyethylene glycol 3350 17 Gram(s) Oral at bedtime PRN Constipation      Vital Signs Last 24 Hrs  T(C): 38.7 (2020 11:11), Max: 38.7 (2020 11:11)  T(F): 101.7 (2020 11:11), Max: 101.7 (2020 11:11)  HR: 102 (2020 11:11) (89 - 106)  BP: 146/71 (2020 11:11) (127/71 - 148/88)  BP(mean): --  RR: 20 (2020 11:11) (16 - 20)  SpO2: 98% (2020 11:11) (95% - 98%)    PHYSICAL EXAM:    Constitutional: NAD, thin built  HEENT: PERRLA, EOMI, Normal Hearing, MMM  Neck: No LAD, No JVD  Back: Normal spine flexure, No CVA tenderness  Respiratory: CTAB/L  Cardiovascular: S1 and S2, RRR, no M/G/R  Gastrointestinal: BS+, soft, NT/ND  Extremities: No peripheral edema  Vascular: 2+ peripheral pulses  Neurological: A/O x 3, no focal deficits  Skin: No rashes      LABS:                        5.9    14.71 )-----------( 410      ( 2020 13:53 )             18.7     -    138  |  109<H>  |  13  ----------------------------<  109<H>  3.6   |  24  |  1.24    Ca    8.6      2020 06:51    TPro  7.1  /  Alb  2.7<L>  /  TBili  2.2<H>  /  DBili  .66<H>  /  AST  51<H>  /  ALT  30  /  AlkPhos  227<H>        Urinalysis Basic - ( 2020 04:20 )    Color: Yellow / Appearance: Clear / S.010 / pH: x  Gluc: x / Ketone: Trace  / Bili: Negative / Urobili: Negative mg/dL   Blood: x / Protein: 500 mg/dL / Nitrite: Negative   Leuk Esterase: Negative / RBC: 0-2 /HPF / WBC 3-5   Sq Epi: x / Non Sq Epi: Occasional / Bacteria: Occasional        MICROBIOLOGY:  RECENT CULTURES:   .Blood Blood XXXX XXXX   No growth to date.          RADIOLOGY & ADDITIONAL STUDIES:    Ct chest  IMPRESSION:     Minimal bibasilar atelectasis/scarring. No pneumonia.    Diffuse osseous changes consistent with a history of sickle cell disease.

## 2020-01-22 LAB
ANION GAP SERPL CALC-SCNC: 7 MMOL/L — SIGNIFICANT CHANGE UP (ref 5–17)
BLD GP AB SCN SERPL QL: SIGNIFICANT CHANGE UP
BUN SERPL-MCNC: 20 MG/DL — SIGNIFICANT CHANGE UP (ref 7–23)
CALCIUM SERPL-MCNC: 8.6 MG/DL — SIGNIFICANT CHANGE UP (ref 8.5–10.1)
CHLORIDE SERPL-SCNC: 110 MMOL/L — HIGH (ref 96–108)
CO2 SERPL-SCNC: 24 MMOL/L — SIGNIFICANT CHANGE UP (ref 22–31)
CREAT SERPL-MCNC: 1.13 MG/DL — SIGNIFICANT CHANGE UP (ref 0.5–1.3)
GLUCOSE SERPL-MCNC: 97 MG/DL — SIGNIFICANT CHANGE UP (ref 70–99)
HCT VFR BLD CALC: 21.4 % — LOW (ref 39–50)
HGB BLD-MCNC: 6.7 G/DL — CRITICAL LOW (ref 13–17)
HGB S BLD QL: POSITIVE
LDH SERPL L TO P-CCNC: 696 U/L — HIGH (ref 50–242)
MCHC RBC-ENTMCNC: 23.5 PG — LOW (ref 27–34)
MCHC RBC-ENTMCNC: 31.3 GM/DL — LOW (ref 32–36)
MCV RBC AUTO: 75.1 FL — LOW (ref 80–100)
NRBC # BLD: 2 /100 WBCS — HIGH (ref 0–0)
OB PNL STL: NEGATIVE — SIGNIFICANT CHANGE UP
PLATELET # BLD AUTO: 326 K/UL — SIGNIFICANT CHANGE UP (ref 150–400)
POTASSIUM SERPL-MCNC: 4.1 MMOL/L — SIGNIFICANT CHANGE UP (ref 3.5–5.3)
POTASSIUM SERPL-SCNC: 4.1 MMOL/L — SIGNIFICANT CHANGE UP (ref 3.5–5.3)
RAPID RVP RESULT: SIGNIFICANT CHANGE UP
RBC # BLD: 2.85 M/UL — LOW (ref 4.2–5.8)
RBC # FLD: 23.5 % — HIGH (ref 10.3–14.5)
SODIUM SERPL-SCNC: 141 MMOL/L — SIGNIFICANT CHANGE UP (ref 135–145)
SOLUBILITY: POSITIVE
WBC # BLD: 12.25 K/UL — HIGH (ref 3.8–10.5)
WBC # FLD AUTO: 12.25 K/UL — HIGH (ref 3.8–10.5)

## 2020-01-22 PROCEDURE — 99233 SBSQ HOSP IP/OBS HIGH 50: CPT

## 2020-01-22 PROCEDURE — 99232 SBSQ HOSP IP/OBS MODERATE 35: CPT

## 2020-01-22 RX ADMIN — SENNA PLUS 2 TABLET(S): 8.6 TABLET ORAL at 23:17

## 2020-01-22 RX ADMIN — Medication 650 MILLIGRAM(S): at 13:26

## 2020-01-22 RX ADMIN — HYDROMORPHONE HYDROCHLORIDE 2 MILLIGRAM(S): 2 INJECTION INTRAMUSCULAR; INTRAVENOUS; SUBCUTANEOUS at 12:26

## 2020-01-22 RX ADMIN — HYDROMORPHONE HYDROCHLORIDE 2 MILLIGRAM(S): 2 INJECTION INTRAMUSCULAR; INTRAVENOUS; SUBCUTANEOUS at 23:56

## 2020-01-22 RX ADMIN — TAMSULOSIN HYDROCHLORIDE 0.4 MILLIGRAM(S): 0.4 CAPSULE ORAL at 23:17

## 2020-01-22 RX ADMIN — HEPARIN SODIUM 5000 UNIT(S): 5000 INJECTION INTRAVENOUS; SUBCUTANEOUS at 05:10

## 2020-01-22 RX ADMIN — Medication 1 SPRAY(S): at 05:10

## 2020-01-22 RX ADMIN — Medication 50 MILLIGRAM(S): at 12:26

## 2020-01-22 RX ADMIN — HYDROMORPHONE HYDROCHLORIDE 2 MILLIGRAM(S): 2 INJECTION INTRAMUSCULAR; INTRAVENOUS; SUBCUTANEOUS at 01:22

## 2020-01-22 RX ADMIN — Medication 25 MILLIGRAM(S): at 05:10

## 2020-01-22 RX ADMIN — SODIUM CHLORIDE 75 MILLILITER(S): 9 INJECTION, SOLUTION INTRAVENOUS at 23:17

## 2020-01-22 RX ADMIN — HEPARIN SODIUM 5000 UNIT(S): 5000 INJECTION INTRAVENOUS; SUBCUTANEOUS at 18:01

## 2020-01-22 RX ADMIN — Medication 25 MILLIGRAM(S): at 18:01

## 2020-01-22 RX ADMIN — SODIUM CHLORIDE 75 MILLILITER(S): 9 INJECTION, SOLUTION INTRAVENOUS at 06:13

## 2020-01-22 RX ADMIN — POLYETHYLENE GLYCOL 3350 17 GRAM(S): 17 POWDER, FOR SOLUTION ORAL at 12:26

## 2020-01-22 RX ADMIN — HYDROMORPHONE HYDROCHLORIDE 2 MILLIGRAM(S): 2 INJECTION INTRAMUSCULAR; INTRAVENOUS; SUBCUTANEOUS at 23:22

## 2020-01-22 RX ADMIN — HYDROMORPHONE HYDROCHLORIDE 2 MILLIGRAM(S): 2 INJECTION INTRAMUSCULAR; INTRAVENOUS; SUBCUTANEOUS at 12:41

## 2020-01-22 RX ADMIN — Medication 750 MILLIGRAM(S): at 00:29

## 2020-01-22 RX ADMIN — Medication 650 MILLIGRAM(S): at 12:26

## 2020-01-22 RX ADMIN — HYDROMORPHONE HYDROCHLORIDE 2 MILLIGRAM(S): 2 INJECTION INTRAMUSCULAR; INTRAVENOUS; SUBCUTANEOUS at 01:06

## 2020-01-22 NOTE — PROGRESS NOTE ADULT - SUBJECTIVE AND OBJECTIVE BOX
Patient is a 64y old  Male who presents with a chief complaint of sickle cell crisis (21 Jan 2020 14:17)      INTERVAL HPI / OVERNIGHT EVENTS: doing ok , less body pain and fever resolving     MEDICATIONS  (STANDING):  dextrose 5% + sodium chloride 0.45%. 1000 milliLiter(s) (75 mL/Hr) IV Continuous <Continuous>  fluticasone propionate 50 MICROgram(s)/spray Nasal Spray 1 Spray(s) Both Nostrils two times a day  heparin  Injectable 5000 Unit(s) SubCutaneous every 12 hours  influenza   Vaccine 0.5 milliLiter(s) IntraMuscular once  metoprolol tartrate 25 milliGRAM(s) Oral two times a day  polyethylene glycol 3350 17 Gram(s) Oral daily  senna 2 Tablet(s) Oral at bedtime  tamsulosin 0.4 milliGRAM(s) Oral at bedtime    MEDICATIONS  (PRN):  acetaminophen   Tablet .. 650 milliGRAM(s) Oral every 6 hours PRN Temp greater or equal to 38C (100.4F), Mild Pain (1 - 3)  diphenhydrAMINE 50 milliGRAM(s) Oral every 4 hours PRN Rash and/or Itching  HYDROmorphone  Injectable 2 milliGRAM(s) IV Push every 4 hours PRN Severe Pain (7 - 10)  oxycodone    5 mG/acetaminophen 325 mG 1 Tablet(s) Oral every 4 hours PRN Moderate Pain (4 - 6)  polyethylene glycol 3350 17 Gram(s) Oral at bedtime PRN Constipation      Vital Signs Last 24 Hrs  T(C): 37.4 (22 Jan 2020 14:44), Max: 38.6 (22 Jan 2020 00:17)  T(F): 99.3 (22 Jan 2020 14:44), Max: 101.5 (22 Jan 2020 00:17)  HR: 82 (22 Jan 2020 06:54) (82 - 123)  BP: 144/84 (22 Jan 2020 12:04) (109/59 - 144/84)  BP(mean): --  RR: 16 (22 Jan 2020 12:04) (16 - 20)  SpO2: 96% (22 Jan 2020 12:04) (95% - 98%)    Review of systems:  General : +fever /chills, fatigue  CVS : no chest pain, palpitations  Lungs : no shortness of breath, cough  GI : no abdominal pain, vomiting, diarrhea   : no dysuria,hematuria        PHYSICAL EXAM:  General :NAD  Constitutional:  thin built  Respiratory: CTAB/L  Cardiovascular: S1 and S2, RRR, no M/G/R  Gastrointestinal: BS+, soft, NT/ND  Extremities: No peripheral edema  Vascular: 2+ peripheral pulses  Skin: No rashes      LABS:                        6.7    12.25 )-----------( 326      ( 22 Jan 2020 07:47 )             21.4     01-22    141  |  110<H>  |  20  ----------------------------<  97  4.1   |  24  |  1.13    Ca    8.6      22 Jan 2020 07:47            MICROBIOLOGY:  RECENT CULTURES:  01-20 .Blood Blood XXXX XXXX   No growth to date.    01-20 .Blood Blood XXXX XXXX   No growth to date.          RADIOLOGY & ADDITIONAL STUDIES:

## 2020-01-22 NOTE — PROGRESS NOTE ADULT - ASSESSMENT
The patient is a 49y Male complaining of body pain. with history of  SCC x 2 days; cough x 2 weeks; patient has prior [facility] records under  1955 (2020 11:18)Pt in the hospital for last 10 days .  on admission he was found to have UTI with klebsiella and was treated with Rocephin for five days. no dysuria right now .fever + chills+ mild throat pain ,now seen with :   1.Fever since last two days with leukocytosis : resolving now  LDH and  Retic count high   fever likely sec to crisis   blood c/s ordered yesterday so far neg  Repeat UA neg   negative RVP ,mild throat discomfort  monitor off antibiotics   IV fluids     2.Klebsiella UTI :treated with Rocephin for five days

## 2020-01-22 NOTE — PROGRESS NOTE ADULT - SUBJECTIVE AND OBJECTIVE BOX
CHIEF COMPLAINT: complaining of severe pain all over.      MEDICATIONS  (STANDING):  dextrose 5% + sodium chloride 0.45%. 1000 milliLiter(s) (75 mL/Hr) IV Continuous <Continuous>  fluticasone propionate 50 MICROgram(s)/spray Nasal Spray 1 Spray(s) Both Nostrils two times a day  heparin  Injectable 5000 Unit(s) SubCutaneous every 12 hours  influenza   Vaccine 0.5 milliLiter(s) IntraMuscular once  metoprolol tartrate 25 milliGRAM(s) Oral two times a day  polyethylene glycol 3350 17 Gram(s) Oral daily  senna 2 Tablet(s) Oral at bedtime  tamsulosin 0.4 milliGRAM(s) Oral at bedtime    MEDICATIONS  (PRN):  acetaminophen   Tablet .. 650 milliGRAM(s) Oral every 6 hours PRN Temp greater or equal to 38C (100.4F), Mild Pain (1 - 3)  diphenhydrAMINE 50 milliGRAM(s) Oral every 4 hours PRN Rash and/or Itching  HYDROmorphone  Injectable 2 milliGRAM(s) IV Push every 4 hours PRN Severe Pain (7 - 10)  oxycodone    5 mG/acetaminophen 325 mG 1 Tablet(s) Oral every 4 hours PRN Moderate Pain (4 - 6)  polyethylene glycol 3350 17 Gram(s) Oral at bedtime PRN Constipation        Vital Signs Last 24 Hrs  T(C): 36.7 (22 Jan 2020 06:54), Max: 38.7 (21 Jan 2020 11:11)  T(F): 98 (22 Jan 2020 06:54), Max: 101.7 (21 Jan 2020 11:11)  HR: 82 (22 Jan 2020 06:54) (82 - 123)  BP: 139/74 (22 Jan 2020 06:54) (109/59 - 146/71)  BP(mean): --  RR: 18 (22 Jan 2020 06:54) (18 - 20)  SpO2: 97% (22 Jan 2020 06:54) (95% - 98%)    PHYSICAL EXAM:    GENERAL: malnourished.   CHEST/LUNG: Clear to ausculation bilaterally, no wheezing, no crackles   HEART: Regular rate and rhythm; No murmurs, rubs  ABDOMEN: Soft, Nontender, Nondistended; Bowel sounds present  EXTREMITIES:  Moving all four extremities spontaneously, No clubbing, cyanosis, or edema  NERVOUS SYSTEM:  Grossly non focal.  Psychiatry: AAO x 3, mood is appropriate     ------------------------------------------------------------------------------------------------------------------------------------------------------------               LABS:                         6.7    12.25 )-----------( 326      ( 22 Jan 2020 07:47 )             21.4     01-22    141  |  110<H>  |  20  ----------------------------<  97  4.1   |  24  |  1.13    Ca    8.6      22 Jan 2020 07:47          Interval Radiology studies: reviewed

## 2020-01-22 NOTE — CONSULT NOTE ADULT - SUBJECTIVE AND OBJECTIVE BOX
Reason for Consultation: SCD    HPI: Patient is a 64y Male seen on consultatioin for the evaluation and management of SCD    49 male with history of sickle cell disease was presented with acute pain all over his body. patient was diagnosed with acute sickle cell crisis with severe pain. patient was started on oxygen, IVF and iv pain meds. patient also received blood transfusion for worsening anemia. patient was found to have fever and finished antibiotic course for 5 days for UTI. patient's  pain meds were changed to oral but pain got worse and patient was again started back on iv dilaudid.       PAST MEDICAL & SURGICAL HISTORY:  Sickle cell disease  No significant past surgical history      MEDICATIONS  (STANDING):  dextrose 5% + sodium chloride 0.45%. 1000 milliLiter(s) (75 mL/Hr) IV Continuous <Continuous>  fluticasone propionate 50 MICROgram(s)/spray Nasal Spray 1 Spray(s) Both Nostrils two times a day  heparin  Injectable 5000 Unit(s) SubCutaneous every 12 hours  influenza   Vaccine 0.5 milliLiter(s) IntraMuscular once  metoprolol tartrate 25 milliGRAM(s) Oral two times a day  polyethylene glycol 3350 17 Gram(s) Oral daily  senna 2 Tablet(s) Oral at bedtime  tamsulosin 0.4 milliGRAM(s) Oral at bedtime    MEDICATIONS  (PRN):  acetaminophen   Tablet .. 650 milliGRAM(s) Oral every 6 hours PRN Temp greater or equal to 38C (100.4F), Mild Pain (1 - 3)  diphenhydrAMINE 50 milliGRAM(s) Oral every 4 hours PRN Rash and/or Itching  HYDROmorphone  Injectable 2 milliGRAM(s) IV Push every 4 hours PRN Severe Pain (7 - 10)  oxycodone    5 mG/acetaminophen 325 mG 1 Tablet(s) Oral every 4 hours PRN Moderate Pain (4 - 6)  polyethylene glycol 3350 17 Gram(s) Oral at bedtime PRN Constipation      Allergies    No Known Drug Allergies  peanuts (Angioedema)    Intolerances        SOCIAL HISTORY:    Smoking Status: no  Alcohol: no  Occupation: yes    FAMILY HISTORY:  No pertinent family history in first degree relatives        Vital Signs Last 24 Hrs  T(C): 36.7 (22 Jan 2020 06:54), Max: 38.7 (21 Jan 2020 11:11)  T(F): 98 (22 Jan 2020 06:54), Max: 101.7 (21 Jan 2020 11:11)  HR: 82 (22 Jan 2020 06:54) (82 - 123)  BP: 139/74 (22 Jan 2020 06:54) (109/59 - 146/71)  BP(mean): --  RR: 18 (22 Jan 2020 06:54) (18 - 20)  SpO2: 97% (22 Jan 2020 06:54) (95% - 98%)    PHYSICAL EXAM:    general - AAO x 3  HEENT - No Icterus  CVS - RRR  RS - AE B/L  Abd - soft, NT  Ext - Pulses +        LABS:                        6.7    12.25 )-----------( 326      ( 22 Jan 2020 07:47 )             21.4     01-22    141  |  110<H>  |  20  ----------------------------<  97  4.1   |  24  |  1.13    Ca    8.6      22 Jan 2020 07:47            Culture - Blood (collected 20 Jan 2020 15:49)  Source: .Blood Blood  Preliminary Report (21 Jan 2020 16:01):    No growth to date.    Culture - Blood (collected 20 Jan 2020 11:00)  Source: .Blood Blood  Preliminary Report (21 Jan 2020 11:01):    No growth to date.    Culture - Urine (collected 12 Jan 2020 16:32)  Source: .Urine Clean Catch (Midstream)  Final Report (14 Jan 2020 19:19):    >100,000 CFU/ml Klebsiella pneumoniae  Organism: Klebsiella pneumoniae (14 Jan 2020 19:19)  Organism: Klebsiella pneumoniae (14 Jan 2020 19:19)        RADIOLOGY & ADDITIONAL STUDIES:

## 2020-01-22 NOTE — PROGRESS NOTE ADULT - ASSESSMENT
49 male with history of sickle cell disease was presented with acute pain all over his body. patient was diagnosed with acute sickle cell crisis with severe pain. patient was started on oxygen, IVF and iv pain meds. patient also received blood transfusion for worsening anemia. patient was found to have fever and finished antibiotic course for 5 days for UTI. patient's  pain meds were changed to oral but pain got worse and patient was again started back on iv dilaudid.       ·	 Fevers Patient spiking fevers  my colleague obtained ID consult  which  I have reviewed,  ? source, likely from sickle cell crisis, follow up blood cultures and RVP  done on 1/20/2020, CT chest shows atelectasis, no pneumonia,  ·	 follow up Hb Electrophoresis as ordered by my colleague , Hem consult  was also called by colleague and pending   ·	.    ·     Sickle cell crisis with acute severe generalized pain.   Increased iv dilaudid 2 mg iv q4 prn , continue with percocet. continue with iv hydration, Oxygen.     ·	Acute on chronic anemia. s/p 1 PRBC transfusion on 1/21/2020  ( had 2 prbc on 1/10/2020 during this admission)i.     ·	 EMEKA (acute kidney injury).  resolved    ·	Klebsiella UTI. finished antibiotic course.       ·  : Benign prostatic hyperplasia without lower urinary tract symptoms.  Plan: reviewed prior health record and noted pt on Flomax from prior [facility] admission.     ·   Chronic systolic congestive heart failure.  compensated. follow I and Os on IVF.     ·	 Severe protein-calorie malnutrition.  appreciate nutrition consult.    DVT PPx: HSQ  Full code    will need Jamie at discharge

## 2020-01-23 DIAGNOSIS — D57.1 SICKLE-CELL DISEASE WITHOUT CRISIS: ICD-10-CM

## 2020-01-23 LAB
HCT VFR BLD CALC: 20.2 % — CRITICAL LOW (ref 39–50)
HGB BLD-MCNC: 6.4 G/DL — CRITICAL LOW (ref 13–17)
LDH SERPL L TO P-CCNC: 623 U/L — HIGH (ref 50–242)
MCHC RBC-ENTMCNC: 23.1 PG — LOW (ref 27–34)
MCHC RBC-ENTMCNC: 31.7 GM/DL — LOW (ref 32–36)
MCV RBC AUTO: 72.9 FL — LOW (ref 80–100)
NRBC # BLD: 3 /100 WBCS — HIGH (ref 0–0)
PLATELET # BLD AUTO: 407 K/UL — HIGH (ref 150–400)
RBC # BLD: 2.77 M/UL — LOW (ref 4.2–5.8)
RBC # BLD: 2.77 M/UL — LOW (ref 4.2–5.8)
RBC # FLD: 22.9 % — HIGH (ref 10.3–14.5)
RETICS #: 153.7 K/UL — HIGH (ref 25–125)
RETICS/RBC NFR: 5.6 % — HIGH (ref 0.5–2.5)
WBC # BLD: 11.21 K/UL — HIGH (ref 3.8–10.5)
WBC # FLD AUTO: 11.21 K/UL — HIGH (ref 3.8–10.5)

## 2020-01-23 PROCEDURE — 99233 SBSQ HOSP IP/OBS HIGH 50: CPT

## 2020-01-23 RX ORDER — SODIUM CHLORIDE 9 MG/ML
1000 INJECTION, SOLUTION INTRAVENOUS
Refills: 0 | Status: DISCONTINUED | OUTPATIENT
Start: 2020-01-23 | End: 2020-01-31

## 2020-01-23 RX ADMIN — HEPARIN SODIUM 5000 UNIT(S): 5000 INJECTION INTRAVENOUS; SUBCUTANEOUS at 05:21

## 2020-01-23 RX ADMIN — Medication 1 SPRAY(S): at 05:21

## 2020-01-23 RX ADMIN — Medication 25 MILLIGRAM(S): at 05:21

## 2020-01-23 RX ADMIN — SODIUM CHLORIDE 75 MILLILITER(S): 9 INJECTION, SOLUTION INTRAVENOUS at 08:27

## 2020-01-23 RX ADMIN — HYDROMORPHONE HYDROCHLORIDE 2 MILLIGRAM(S): 2 INJECTION INTRAMUSCULAR; INTRAVENOUS; SUBCUTANEOUS at 03:36

## 2020-01-23 RX ADMIN — Medication 50 MILLIGRAM(S): at 13:37

## 2020-01-23 RX ADMIN — HYDROMORPHONE HYDROCHLORIDE 2 MILLIGRAM(S): 2 INJECTION INTRAMUSCULAR; INTRAVENOUS; SUBCUTANEOUS at 04:16

## 2020-01-23 RX ADMIN — SODIUM CHLORIDE 125 MILLILITER(S): 9 INJECTION, SOLUTION INTRAVENOUS at 23:34

## 2020-01-23 RX ADMIN — HYDROMORPHONE HYDROCHLORIDE 2 MILLIGRAM(S): 2 INJECTION INTRAMUSCULAR; INTRAVENOUS; SUBCUTANEOUS at 13:37

## 2020-01-23 RX ADMIN — HYDROMORPHONE HYDROCHLORIDE 2 MILLIGRAM(S): 2 INJECTION INTRAMUSCULAR; INTRAVENOUS; SUBCUTANEOUS at 13:50

## 2020-01-23 RX ADMIN — Medication 50 MILLIGRAM(S): at 08:54

## 2020-01-23 RX ADMIN — Medication 50 MILLIGRAM(S): at 23:35

## 2020-01-23 RX ADMIN — HYDROMORPHONE HYDROCHLORIDE 2 MILLIGRAM(S): 2 INJECTION INTRAMUSCULAR; INTRAVENOUS; SUBCUTANEOUS at 23:34

## 2020-01-23 RX ADMIN — HYDROMORPHONE HYDROCHLORIDE 2 MILLIGRAM(S): 2 INJECTION INTRAMUSCULAR; INTRAVENOUS; SUBCUTANEOUS at 09:05

## 2020-01-23 RX ADMIN — Medication 25 MILLIGRAM(S): at 17:54

## 2020-01-23 RX ADMIN — HYDROMORPHONE HYDROCHLORIDE 2 MILLIGRAM(S): 2 INJECTION INTRAMUSCULAR; INTRAVENOUS; SUBCUTANEOUS at 08:53

## 2020-01-23 NOTE — PROGRESS NOTE ADULT - SUBJECTIVE AND OBJECTIVE BOX
CHIEF COMPLAINT: complaining of severe pain all over.    MEDICATIONS  (STANDING):  dextrose 5% + sodium chloride 0.45%. 1000 milliLiter(s) (75 mL/Hr) IV Continuous <Continuous>  fluticasone propionate 50 MICROgram(s)/spray Nasal Spray 1 Spray(s) Both Nostrils two times a day  heparin  Injectable 5000 Unit(s) SubCutaneous every 12 hours  influenza   Vaccine 0.5 milliLiter(s) IntraMuscular once  metoprolol tartrate 25 milliGRAM(s) Oral two times a day  polyethylene glycol 3350 17 Gram(s) Oral daily  senna 2 Tablet(s) Oral at bedtime  tamsulosin 0.4 milliGRAM(s) Oral at bedtime    MEDICATIONS  (PRN):  acetaminophen   Tablet .. 650 milliGRAM(s) Oral every 6 hours PRN Temp greater or equal to 38C (100.4F), Mild Pain (1 - 3)  diphenhydrAMINE 50 milliGRAM(s) Oral every 4 hours PRN Rash and/or Itching  HYDROmorphone  Injectable 2 milliGRAM(s) IV Push every 4 hours PRN Severe Pain (7 - 10)  oxycodone    5 mG/acetaminophen 325 mG 1 Tablet(s) Oral every 4 hours PRN Moderate Pain (4 - 6)  polyethylene glycol 3350 17 Gram(s) Oral at bedtime PRN Constipation    Vital Signs Last 24 Hrs  T(C): 37.5 (23 Jan 2020 05:16), Max: 37.9 (22 Jan 2020 12:04)  T(F): 99.5 (23 Jan 2020 05:16), Max: 100.2 (22 Jan 2020 12:04)  HR: 87 (23 Jan 2020 05:16) (75 - 87)  BP: 137/61 (23 Jan 2020 05:16) (124/69 - 144/84)  BP(mean): --  RR: 16 (23 Jan 2020 05:16) (16 - 18)  SpO2: 97% (23 Jan 2020 05:16) (94% - 99%)  PHYSICAL EXAM:    GENERAL: malnourished.   CHEST/LUNG: Clear to ausculation bilaterally, no wheezing, no crackles   HEART: Regular rate and rhythm; No murmurs, rubs  ABDOMEN: Soft, Nontender, Nondistended; Bowel sounds present  EXTREMITIES:  Moving all four extremities spontaneously, No clubbing, cyanosis, or edema  NERVOUS SYSTEM:  Grossly non focal.  Psychiatry: AAO x 3, mood is appropriate     ------------------------------------------------------------------------------------------------------------------------------------------------------------               LABS:                                        6.4    11.21 )-----------( 407      ( 23 Jan 2020 08:00 )             20.2           Interval Radiology studies: reviewed

## 2020-01-23 NOTE — PROGRESS NOTE ADULT - SUBJECTIVE AND OBJECTIVE BOX
still complaing of pain    Vital Signs Last 24 Hrs  T(C): 37.5 (23 Jan 2020 05:16), Max: 37.9 (22 Jan 2020 12:04)  T(F): 99.5 (23 Jan 2020 05:16), Max: 100.2 (22 Jan 2020 12:04)  HR: 87 (23 Jan 2020 05:16) (75 - 87)  BP: 137/61 (23 Jan 2020 05:16) (124/69 - 144/84)  BP(mean): --  RR: 16 (23 Jan 2020 05:16) (16 - 18)  SpO2: 97% (23 Jan 2020 05:16) (94% - 99%)    PHYSICAL EXAM:    general - AAO x 3  HEENT - No Icterus  CVS - RRR  RS - AE B/L  Abd - soft, NT  Ext - Pulses +        LABS:                        6.4    11.21 )-----------( 407      ( 23 Jan 2020 08:00 )             20.2     01-22    141  |  110<H>  |  20  ----------------------------<  97  4.1   |  24  |  1.13    Ca    8.6      22 Jan 2020 07:47            Culture - Blood (collected 20 Jan 2020 15:49)  Source: .Blood Blood  Preliminary Report (21 Jan 2020 16:01):    No growth to date.    Culture - Blood (collected 20 Jan 2020 11:00)  Source: .Blood Blood  Preliminary Report (21 Jan 2020 11:01):    No growth to date.        RADIOLOGY & ADDITIONAL STUDIES:

## 2020-01-23 NOTE — PROGRESS NOTE ADULT - ASSESSMENT
49 male with history of sickle cell disease was presented with acute pain all over his body. patient was diagnosed with acute sickle cell crisis with severe pain. patient was started on oxygen, IVF and iv pain meds. patient also received blood transfusion for worsening anemia. patient was found to have fever and finished antibiotic course for 5 days for UTI. patient's  pain meds were changed to oral but pain got worse and patient was again started back on iv dilaudid.       ·	 Fevers Patient spiking fevers  my colleague obtained ID consult  which  I have reviewed,  ? source, likely from sickle cell crisis, follow up blood cultures and RVP  done on 1/20/2020 are negative, CT chest shows atelectasis, no pneumonia, also ID recommend monitor off antibx last fever at midnight on 1/22/2020    ·	 follow up Hb Electrophoresis as ordered by my colleague ,  ·	 Hem consult  was called by my colleague and reviewed continue with current management    ·	.    ·     Sickle cell crisis with acute severe generalized pain.    iv dilaudid 2 mg iv q4 prn , continue with percocet. continue with iv hydration, Oxygen (still refuses).     ·	Acute on chronic anemia. s/p 1 PRBC transfusion on 1/21/2020  ( had 2 prbc on 1/10/2020 during this admission),  ·	 1/23/2020 may require another f/u am labs    ·	 EMEKA (acute kidney injury).  resolved    ·	Klebsiella UTI. finished antibiotic course.       ·  : Benign prostatic hyperplasia without lower urinary tract symptoms.  Plan: reviewed prior health record and noted pt on Flomax from prior [facility] admission.     ·   Chronic systolic congestive heart failure.  compensated. follow I and Os on IVF.     ·	 Severe protein-calorie malnutrition.  appreciate nutrition consult.    DVT PPx: HSQ  Full code    will need LOYDA at discharge

## 2020-01-24 LAB
HCT VFR BLD CALC: 23.1 % — LOW (ref 39–50)
HEMOGLOBIN INTERPRETATION: SIGNIFICANT CHANGE UP
HGB A MFR BLD: 1.6 % — LOW (ref 95.8–98)
HGB A2 MFR BLD: 3.3 % — HIGH (ref 2–3.2)
HGB BLD-MCNC: 7 G/DL — CRITICAL LOW (ref 13–17)
HGB F MFR BLD: 2.7 % — HIGH (ref 0–1)
HGB S MFR BLD: 92.4 % — HIGH
MCHC RBC-ENTMCNC: 22.9 PG — LOW (ref 27–34)
MCHC RBC-ENTMCNC: 30.3 GM/DL — LOW (ref 32–36)
MCV RBC AUTO: 75.5 FL — LOW (ref 80–100)
NRBC # BLD: 3 /100 WBCS — HIGH (ref 0–0)
PLATELET # BLD AUTO: SIGNIFICANT CHANGE UP K/UL (ref 150–400)
RBC # BLD: 3.06 M/UL — LOW (ref 4.2–5.8)
RBC # FLD: 24.1 % — HIGH (ref 10.3–14.5)
WBC # BLD: 9.08 K/UL — SIGNIFICANT CHANGE UP (ref 3.8–10.5)
WBC # FLD AUTO: 9.08 K/UL — SIGNIFICANT CHANGE UP (ref 3.8–10.5)

## 2020-01-24 PROCEDURE — 99233 SBSQ HOSP IP/OBS HIGH 50: CPT

## 2020-01-24 RX ADMIN — TAMSULOSIN HYDROCHLORIDE 0.4 MILLIGRAM(S): 0.4 CAPSULE ORAL at 21:06

## 2020-01-24 RX ADMIN — HYDROMORPHONE HYDROCHLORIDE 2 MILLIGRAM(S): 2 INJECTION INTRAMUSCULAR; INTRAVENOUS; SUBCUTANEOUS at 03:54

## 2020-01-24 RX ADMIN — Medication 1 SPRAY(S): at 06:22

## 2020-01-24 RX ADMIN — HEPARIN SODIUM 5000 UNIT(S): 5000 INJECTION INTRAVENOUS; SUBCUTANEOUS at 18:30

## 2020-01-24 RX ADMIN — Medication 50 MILLIGRAM(S): at 09:41

## 2020-01-24 RX ADMIN — Medication 50 MILLIGRAM(S): at 03:55

## 2020-01-24 RX ADMIN — Medication 1 SPRAY(S): at 18:30

## 2020-01-24 RX ADMIN — HYDROMORPHONE HYDROCHLORIDE 2 MILLIGRAM(S): 2 INJECTION INTRAMUSCULAR; INTRAVENOUS; SUBCUTANEOUS at 12:32

## 2020-01-24 RX ADMIN — HYDROMORPHONE HYDROCHLORIDE 2 MILLIGRAM(S): 2 INJECTION INTRAMUSCULAR; INTRAVENOUS; SUBCUTANEOUS at 21:04

## 2020-01-24 RX ADMIN — HYDROMORPHONE HYDROCHLORIDE 2 MILLIGRAM(S): 2 INJECTION INTRAMUSCULAR; INTRAVENOUS; SUBCUTANEOUS at 04:24

## 2020-01-24 RX ADMIN — Medication 25 MILLIGRAM(S): at 18:30

## 2020-01-24 RX ADMIN — HYDROMORPHONE HYDROCHLORIDE 2 MILLIGRAM(S): 2 INJECTION INTRAMUSCULAR; INTRAVENOUS; SUBCUTANEOUS at 20:05

## 2020-01-24 RX ADMIN — SODIUM CHLORIDE 125 MILLILITER(S): 9 INJECTION, SOLUTION INTRAVENOUS at 14:42

## 2020-01-24 RX ADMIN — HEPARIN SODIUM 5000 UNIT(S): 5000 INJECTION INTRAVENOUS; SUBCUTANEOUS at 06:22

## 2020-01-24 RX ADMIN — SENNA PLUS 2 TABLET(S): 8.6 TABLET ORAL at 21:06

## 2020-01-24 RX ADMIN — HYDROMORPHONE HYDROCHLORIDE 2 MILLIGRAM(S): 2 INJECTION INTRAMUSCULAR; INTRAVENOUS; SUBCUTANEOUS at 09:39

## 2020-01-24 RX ADMIN — HYDROMORPHONE HYDROCHLORIDE 2 MILLIGRAM(S): 2 INJECTION INTRAMUSCULAR; INTRAVENOUS; SUBCUTANEOUS at 00:04

## 2020-01-24 RX ADMIN — Medication 50 MILLIGRAM(S): at 20:05

## 2020-01-24 RX ADMIN — Medication 25 MILLIGRAM(S): at 06:21

## 2020-01-24 NOTE — PROGRESS NOTE ADULT - ASSESSMENT
49 male with history of sickle cell disease was presented with acute pain all over his body. patient was diagnosed with acute sickle cell crisis with severe pain. patient was started on oxygen, IVF and iv pain meds. patient also received blood transfusion for worsening anemia. patient was found to have fever and finished antibiotic course for 5 days for UTI. patient's  pain meds were changed to oral but pain got worse and patient was again started back on iv dilaudid.       ·	 Fevers Patient spiking fevers  my colleague obtained ID consult  which  I have reviewed,  ? source, likely from sickle cell crisis, follow up blood cultures and RVP  done on 1/20/2020 are negative, CT chest shows atelectasis, no pneumonia, also ID recommend monitor off antibx last fever at midnight on 1/22/2020    ·	 follow up Hb Electrophoresis as ordered by my colleague ,  ·	 Hem consult  was called by my colleague and reviewed continue with current management    ·	.    ·     Sickle cell crisis with acute severe generalized pain.    iv dilaudid 2 mg iv q4 prn , continue with percocet. continue with iv hydration, Oxygen (still refuses).     ·	Acute on chronic anemia. s/p 1 PRBC transfusion on 1/21/2020  ( had 2 prbc on 1/10/2020 during this admission),  ·	 1/23/2020 may require another f/u am labs    ·	 EMEKA (acute kidney injury).  resolved    ·	Klebsiella UTI. finished antibiotic course.       ·  : Benign prostatic hyperplasia without lower urinary tract symptoms.  Plan: reviewed prior health record and noted pt on Flomax from prior [facility] admission.     ·   Chronic systolic congestive heart failure.  compensated. follow I and Os on IVF.     ·	 Severe protein-calorie malnutrition.  appreciate nutrition consult.    DVT PPx: HSQ  Full code    will need LOYDA at discharge

## 2020-01-24 NOTE — PROGRESS NOTE ADULT - SUBJECTIVE AND OBJECTIVE BOX
Patient is a 64y old  Male who presents with a chief complaint of sickle cell crisis (21 Jan 2020 14:17)      INTERVAL HPI/OVERNIGHT EVENTS:no events     MEDICATIONS  (STANDING):  dextrose 5% + sodium chloride 0.45%. 1000 milliLiter(s) (125 mL/Hr) IV Continuous <Continuous>  fluticasone propionate 50 MICROgram(s)/spray Nasal Spray 1 Spray(s) Both Nostrils two times a day  heparin  Injectable 5000 Unit(s) SubCutaneous every 12 hours  influenza   Vaccine 0.5 milliLiter(s) IntraMuscular once  metoprolol tartrate 25 milliGRAM(s) Oral two times a day  polyethylene glycol 3350 17 Gram(s) Oral daily  senna 2 Tablet(s) Oral at bedtime  tamsulosin 0.4 milliGRAM(s) Oral at bedtime    MEDICATIONS  (PRN):  acetaminophen   Tablet .. 650 milliGRAM(s) Oral every 6 hours PRN Temp greater or equal to 38C (100.4F), Mild Pain (1 - 3)  diphenhydrAMINE 50 milliGRAM(s) Oral every 4 hours PRN Rash and/or Itching  HYDROmorphone  Injectable 2 milliGRAM(s) IV Push every 4 hours PRN Severe Pain (7 - 10)  oxycodone    5 mG/acetaminophen 325 mG 1 Tablet(s) Oral every 4 hours PRN Moderate Pain (4 - 6)  polyethylene glycol 3350 17 Gram(s) Oral at bedtime PRN Constipation      Allergies    No Known Drug Allergies  peanuts (Angioedema)    Intolerances         Vital Signs Last 24 Hrs  T(C): 37.3 (24 Jan 2020 11:57), Max: 37.8 (23 Jan 2020 23:51)  T(F): 99.2 (24 Jan 2020 11:57), Max: 100 (23 Jan 2020 23:51)  HR: 84 (24 Jan 2020 11:57) (75 - 98)  BP: 129/81 (24 Jan 2020 11:57) (125/61 - 143/73)  BP(mean): --  RR: 16 (24 Jan 2020 11:57) (16 - 18)  SpO2: 96% (24 Jan 2020 11:57) (96% - 100%)    PHYSICAL EXAM:  GENERAL: NAD, well-groomed, well-developed  HEAD:  Atraumatic, Normocephalic  EYES: EOMI, PERRLA, conjunctiva and sclera clear  ENMT: No tonsillar erythema, exudates, or enlargement; Moist mucous membranes, Good dentition, No lesions  NECK: Supple, No JVD, Normal thyroid  NERVOUS SYSTEM:  Alert & Oriented X3, Good concentration; Motor Strength 5/5 B/L upper and lower extremities; DTRs 2+ intact and symmetric  CHEST/LUNG: Clear to percussion bilaterally; No rales, rhonchi, wheezing, or rubs  HEART: Regular rate and rhythm; No murmurs, rubs, or gallops  ABDOMEN: Soft, Nontender, Nondistended; Bowel sounds present  EXTREMITIES:  2+ Peripheral Pulses, No clubbing, cyanosis, or edema  LYMPH: No lymphadenopathy noted  SKIN: No rashes or lesions    LABS:                        7.0    9.08  )-----------( clumped    ( 24 Jan 2020 07:50 )             23.1               CAPILLARY BLOOD GLUCOSE          RADIOLOGY & ADDITIONAL TESTS:    Imaging Personally Reviewed:  [ X] YES  [ ] NO    Consultant(s) Notes Reviewed:  [ X] YES  [ ] NO    Care Discussed with Consultants/Other Providers [X ] YES  [ ] NO

## 2020-01-24 NOTE — PROGRESS NOTE ADULT - SUBJECTIVE AND OBJECTIVE BOX
lying in bed, feeling better    Vital Signs Last 24 Hrs  T(C): 36.6 (24 Jan 2020 05:46), Max: 37.8 (23 Jan 2020 23:51)  T(F): 97.9 (24 Jan 2020 05:46), Max: 100 (23 Jan 2020 23:51)  HR: 75 (24 Jan 2020 05:46) (75 - 98)  BP: 125/61 (24 Jan 2020 05:46) (115/73 - 143/73)  BP(mean): --  RR: 16 (24 Jan 2020 05:46) (16 - 18)  SpO2: 98% (24 Jan 2020 05:46) (97% - 100%)    PHYSICAL EXAM:    general - AAO x 3  HEENT - No Icterus  CVS - RRR  RS - AE B/L  Abd - soft, NT  Ext - Pulses +        LABS:                        7.0    9.08  )-----------( clumped    ( 24 Jan 2020 07:50 )             23.1                 Culture - Blood (collected 20 Jan 2020 15:49)  Source: .Blood Blood  Preliminary Report (21 Jan 2020 16:01):    No growth to date.    Culture - Blood (collected 20 Jan 2020 11:00)  Source: .Blood Blood  Preliminary Report (21 Jan 2020 11:01):    No growth to date.        RADIOLOGY & ADDITIONAL STUDIES:

## 2020-01-24 NOTE — PROGRESS NOTE ADULT - PROBLEM SELECTOR PLAN 1
- pain meds, ivf, folic acid  - DVT Prophylaxsis  - patient follows with Dr Holley at [facility]

## 2020-01-25 LAB
CULTURE RESULTS: SIGNIFICANT CHANGE UP
CULTURE RESULTS: SIGNIFICANT CHANGE UP
HCT VFR BLD CALC: 20.6 % — CRITICAL LOW (ref 39–50)
HCT VFR BLD CALC: 22.4 % — LOW (ref 39–50)
HGB BLD-MCNC: 6.5 G/DL — CRITICAL LOW (ref 13–17)
HGB BLD-MCNC: 7 G/DL — CRITICAL LOW (ref 13–17)
MCHC RBC-ENTMCNC: 23.2 PG — LOW (ref 27–34)
MCHC RBC-ENTMCNC: 23.2 PG — LOW (ref 27–34)
MCHC RBC-ENTMCNC: 31.3 GM/DL — LOW (ref 32–36)
MCHC RBC-ENTMCNC: 31.6 GM/DL — LOW (ref 32–36)
MCV RBC AUTO: 73.6 FL — LOW (ref 80–100)
MCV RBC AUTO: 74.2 FL — LOW (ref 80–100)
NRBC # BLD: 4 /100 WBCS — HIGH (ref 0–0)
NRBC # BLD: 4 /100 WBCS — HIGH (ref 0–0)
PLATELET # BLD AUTO: 384 K/UL — SIGNIFICANT CHANGE UP (ref 150–400)
PLATELET # BLD AUTO: 408 K/UL — HIGH (ref 150–400)
RBC # BLD: 2.8 M/UL — LOW (ref 4.2–5.8)
RBC # BLD: 3.02 M/UL — LOW (ref 4.2–5.8)
RBC # FLD: 24.5 % — HIGH (ref 10.3–14.5)
RBC # FLD: 24.8 % — HIGH (ref 10.3–14.5)
SPECIMEN SOURCE: SIGNIFICANT CHANGE UP
SPECIMEN SOURCE: SIGNIFICANT CHANGE UP
WBC # BLD: 12.63 K/UL — HIGH (ref 3.8–10.5)
WBC # BLD: 9.19 K/UL — SIGNIFICANT CHANGE UP (ref 3.8–10.5)
WBC # FLD AUTO: 12.63 K/UL — HIGH (ref 3.8–10.5)
WBC # FLD AUTO: 9.19 K/UL — SIGNIFICANT CHANGE UP (ref 3.8–10.5)

## 2020-01-25 PROCEDURE — 99233 SBSQ HOSP IP/OBS HIGH 50: CPT

## 2020-01-25 RX ORDER — HYDROMORPHONE HYDROCHLORIDE 2 MG/ML
2 INJECTION INTRAMUSCULAR; INTRAVENOUS; SUBCUTANEOUS EVERY 6 HOURS
Refills: 0 | Status: DISCONTINUED | OUTPATIENT
Start: 2020-01-25 | End: 2020-01-28

## 2020-01-25 RX ADMIN — SODIUM CHLORIDE 125 MILLILITER(S): 9 INJECTION, SOLUTION INTRAVENOUS at 01:30

## 2020-01-25 RX ADMIN — SENNA PLUS 2 TABLET(S): 8.6 TABLET ORAL at 21:37

## 2020-01-25 RX ADMIN — HYDROMORPHONE HYDROCHLORIDE 2 MILLIGRAM(S): 2 INJECTION INTRAMUSCULAR; INTRAVENOUS; SUBCUTANEOUS at 13:26

## 2020-01-25 RX ADMIN — HEPARIN SODIUM 5000 UNIT(S): 5000 INJECTION INTRAVENOUS; SUBCUTANEOUS at 18:37

## 2020-01-25 RX ADMIN — HYDROMORPHONE HYDROCHLORIDE 2 MILLIGRAM(S): 2 INJECTION INTRAMUSCULAR; INTRAVENOUS; SUBCUTANEOUS at 06:15

## 2020-01-25 RX ADMIN — HYDROMORPHONE HYDROCHLORIDE 2 MILLIGRAM(S): 2 INJECTION INTRAMUSCULAR; INTRAVENOUS; SUBCUTANEOUS at 05:27

## 2020-01-25 RX ADMIN — Medication 1 SPRAY(S): at 05:28

## 2020-01-25 RX ADMIN — Medication 25 MILLIGRAM(S): at 05:28

## 2020-01-25 RX ADMIN — HYDROMORPHONE HYDROCHLORIDE 2 MILLIGRAM(S): 2 INJECTION INTRAMUSCULAR; INTRAVENOUS; SUBCUTANEOUS at 01:30

## 2020-01-25 RX ADMIN — SODIUM CHLORIDE 125 MILLILITER(S): 9 INJECTION, SOLUTION INTRAVENOUS at 21:53

## 2020-01-25 RX ADMIN — HYDROMORPHONE HYDROCHLORIDE 2 MILLIGRAM(S): 2 INJECTION INTRAMUSCULAR; INTRAVENOUS; SUBCUTANEOUS at 22:08

## 2020-01-25 RX ADMIN — HYDROMORPHONE HYDROCHLORIDE 2 MILLIGRAM(S): 2 INJECTION INTRAMUSCULAR; INTRAVENOUS; SUBCUTANEOUS at 13:41

## 2020-01-25 RX ADMIN — Medication 25 MILLIGRAM(S): at 18:37

## 2020-01-25 RX ADMIN — Medication 50 MILLIGRAM(S): at 01:30

## 2020-01-25 RX ADMIN — Medication 50 MILLIGRAM(S): at 05:28

## 2020-01-25 RX ADMIN — HYDROMORPHONE HYDROCHLORIDE 2 MILLIGRAM(S): 2 INJECTION INTRAMUSCULAR; INTRAVENOUS; SUBCUTANEOUS at 04:14

## 2020-01-25 RX ADMIN — HEPARIN SODIUM 5000 UNIT(S): 5000 INJECTION INTRAVENOUS; SUBCUTANEOUS at 05:27

## 2020-01-25 RX ADMIN — HYDROMORPHONE HYDROCHLORIDE 2 MILLIGRAM(S): 2 INJECTION INTRAMUSCULAR; INTRAVENOUS; SUBCUTANEOUS at 21:48

## 2020-01-25 RX ADMIN — SODIUM CHLORIDE 125 MILLILITER(S): 9 INJECTION, SOLUTION INTRAVENOUS at 12:05

## 2020-01-25 RX ADMIN — TAMSULOSIN HYDROCHLORIDE 0.4 MILLIGRAM(S): 0.4 CAPSULE ORAL at 21:37

## 2020-01-25 NOTE — PROGRESS NOTE ADULT - SUBJECTIVE AND OBJECTIVE BOX
lying in bed    Vital Signs Last 24 Hrs  T(C): 37.3 (25 Jan 2020 05:26), Max: 37.8 (24 Jan 2020 16:00)  T(F): 99.2 (25 Jan 2020 05:26), Max: 100.1 (24 Jan 2020 16:00)  HR: 87 (25 Jan 2020 05:26) (80 - 104)  BP: 137/74 (25 Jan 2020 05:26) (129/78 - 137/74)  BP(mean): --  RR: 16 (25 Jan 2020 05:26) (16 - 18)  SpO2: 97% (25 Jan 2020 05:26) (96% - 97%)    PHYSICAL EXAM:    general - AAO x 3  HEENT - No Icterus  CVS - RRR  RS - AE B/L  Abd - soft, NT  Ext - Pulses +        LABS:                        6.5    9.19  )-----------( 384      ( 25 Jan 2020 10:06 )             20.6                 Culture - Blood (collected 20 Jan 2020 15:49)  Source: .Blood Blood  Preliminary Report (21 Jan 2020 16:01):    No growth to date.    Culture - Blood (collected 20 Jan 2020 11:00)  Source: .Blood Blood  Preliminary Report (21 Jan 2020 11:01):    No growth to date.        RADIOLOGY & ADDITIONAL STUDIES:

## 2020-01-25 NOTE — PROGRESS NOTE ADULT - SUBJECTIVE AND OBJECTIVE BOX
64y old  Male who presents with a chief complaint of sickle cell crisis.    Today:  Pt seen at bedside, still complains of pain and weakness.        PAST MEDICAL & SURGICAL HISTORY:  Sickle cell disease  No significant past surgical history      REVIEW OF SYSTEMS:  Weakness, pain      MEDICATIONS  (STANDING):  dextrose 5% + sodium chloride 0.45%. 1000 milliLiter(s) (125 mL/Hr) IV Continuous <Continuous>  fluticasone propionate 50 MICROgram(s)/spray Nasal Spray 1 Spray(s) Both Nostrils two times a day  heparin  Injectable 5000 Unit(s) SubCutaneous every 12 hours  influenza   Vaccine 0.5 milliLiter(s) IntraMuscular once  metoprolol tartrate 25 milliGRAM(s) Oral two times a day  polyethylene glycol 3350 17 Gram(s) Oral daily  senna 2 Tablet(s) Oral at bedtime  tamsulosin 0.4 milliGRAM(s) Oral at bedtime    MEDICATIONS  (PRN):  acetaminophen   Tablet .. 650 milliGRAM(s) Oral every 6 hours PRN Temp greater or equal to 38C (100.4F), Mild Pain (1 - 3)  diphenhydrAMINE 50 milliGRAM(s) Oral every 4 hours PRN Rash and/or Itching  HYDROmorphone  Injectable 2 milliGRAM(s) IV Push every 4 hours PRN Severe Pain (7 - 10)  oxycodone    5 mG/acetaminophen 325 mG 1 Tablet(s) Oral every 4 hours PRN Moderate Pain (4 - 6)  polyethylene glycol 3350 17 Gram(s) Oral at bedtime PRN Constipation      Allergies    No Known Drug Allergies  peanuts (Angioedema)        FAMILY HISTORY:  No pertinent family history in first degree relatives      Vital Signs Last 24 Hrs  T(C): 37.3 (25 Jan 2020 05:26), Max: 37.8 (24 Jan 2020 16:00)  T(F): 99.2 (25 Jan 2020 05:26), Max: 100.1 (24 Jan 2020 16:00)  HR: 87 (25 Jan 2020 05:26) (80 - 104)  BP: 137/74 (25 Jan 2020 05:26) (129/78 - 137/74)  RR: 16 (25 Jan 2020 05:26) (16 - 18)  SpO2: 97% (25 Jan 2020 05:26) (96% - 97%)    PHYSICAL EXAM:    GENERAL: NAD, well-groomed, well-developed  HEAD:  Atraumatic, Normocephalic  EYES: EOMI, PERRLA, conjunctiva and sclera clear  ENMT: No tonsillar erythema, exudates, or enlargement; Moist mucous membranes, Good dentition, No lesions  NECK: Supple, No JVD, Normal thyroid  NERVOUS SYSTEM:  Alert & Oriented X3, Good concentration  CHEST/LUNG: Clear to percussion bilaterally; No rales, rhonchi, wheezing, or rubs  HEART: Regular rate and rhythm; No murmurs, rubs, or gallops  ABDOMEN: Soft, Nontender, Nondistended; Bowel sounds present      LABS:                        6.5    9.19  )-----------( 384      ( 25 Jan 2020 10:06 )             20.6

## 2020-01-25 NOTE — PROGRESS NOTE ADULT - ASSESSMENT
49 male with history of sickle cell disease was presented with acute pain all over his body. patient was diagnosed with acute sickle cell crisis with severe pain. patient was started on oxygen, IVF and iv pain meds. patient also received blood transfusion for worsening anemia. patient was found to have fever and finished antibiotic course for 5 days for UTI. patient's  pain meds were changed to oral but pain got worse and patient was again started back on iv dilaudid.       ·	 Fevers Patient spiking fevers  my colleague obtained ID consult  which  I have reviewed,  ? source, likely from sickle cell crisis, follow up blood cultures and RVP  done on 1/20/2020 are negative, CT chest shows atelectasis, no pneumonia, also ID recommend monitor off antibx last fever at midnight on 1/22/2020.  Pt had low grade temp 100.1 1/24.    ·	 Hem consult  was called by my colleague and reviewed continue with current management    ·	.    ·     Sickle cell crisis with acute severe generalized pain.    Taper iv dilaudid to 2 mg iv q6 prn , continue with percocet. continue with iv hydration, Oxygen (still refuses).     ·	Acute on chronic anemia. s/p 1 PRBC transfusion on 1/21/2020  ( had 2 prbc on 1/10/2020 during this admission),  ·	 1/23/2020 may require another f/u am labs    ·	 EMEKA (acute kidney injury).  resolved    ·	Klebsiella UTI. finished antibiotic course.       ·  : Benign prostatic hyperplasia without lower urinary tract symptoms.  Plan: reviewed prior health record and noted pt on Flomax from prior [facility] admission.     ·   Chronic systolic congestive heart failure.  compensated. follow I and Os on IVF.     ·	 Severe protein-calorie malnutrition.  appreciate nutrition consult.    DVT PPx: HSQ  Full code    will need LOYDA at discharge

## 2020-01-26 LAB
ANION GAP SERPL CALC-SCNC: 10 MMOL/L — SIGNIFICANT CHANGE UP (ref 5–17)
ANISOCYTOSIS BLD QL: SIGNIFICANT CHANGE UP
BASOPHILS # BLD AUTO: 0 K/UL — SIGNIFICANT CHANGE UP (ref 0–0.2)
BASOPHILS NFR BLD AUTO: 0 % — SIGNIFICANT CHANGE UP (ref 0–2)
BUN SERPL-MCNC: 13 MG/DL — SIGNIFICANT CHANGE UP (ref 7–23)
CALCIUM SERPL-MCNC: 9.4 MG/DL — SIGNIFICANT CHANGE UP (ref 8.5–10.1)
CHLORIDE SERPL-SCNC: 106 MMOL/L — SIGNIFICANT CHANGE UP (ref 96–108)
CO2 SERPL-SCNC: 23 MMOL/L — SIGNIFICANT CHANGE UP (ref 22–31)
CREAT SERPL-MCNC: 1.03 MG/DL — SIGNIFICANT CHANGE UP (ref 0.5–1.3)
DACRYOCYTES BLD QL SMEAR: SLIGHT — SIGNIFICANT CHANGE UP
ELLIPTOCYTES BLD QL SMEAR: SLIGHT — SIGNIFICANT CHANGE UP
EOSINOPHIL # BLD AUTO: 1.12 K/UL — HIGH (ref 0–0.5)
EOSINOPHIL NFR BLD AUTO: 10 % — HIGH (ref 0–6)
GLUCOSE SERPL-MCNC: 99 MG/DL — SIGNIFICANT CHANGE UP (ref 70–99)
HCT VFR BLD CALC: 20.7 % — CRITICAL LOW (ref 39–50)
HGB BLD-MCNC: 6.3 G/DL — CRITICAL LOW (ref 13–17)
HYPOCHROMIA BLD QL: SIGNIFICANT CHANGE UP
LYMPHOCYTES # BLD AUTO: 2.9 K/UL — SIGNIFICANT CHANGE UP (ref 1–3.3)
LYMPHOCYTES # BLD AUTO: 26 % — SIGNIFICANT CHANGE UP (ref 13–44)
MACROCYTES BLD QL: SLIGHT — SIGNIFICANT CHANGE UP
MANUAL SMEAR VERIFICATION: SIGNIFICANT CHANGE UP
MCHC RBC-ENTMCNC: 22.7 PG — LOW (ref 27–34)
MCHC RBC-ENTMCNC: 30.4 GM/DL — LOW (ref 32–36)
MCV RBC AUTO: 74.5 FL — LOW (ref 80–100)
MICROCYTES BLD QL: SLIGHT — SIGNIFICANT CHANGE UP
MONOCYTES # BLD AUTO: 1.34 K/UL — HIGH (ref 0–0.9)
MONOCYTES NFR BLD AUTO: 12 % — SIGNIFICANT CHANGE UP (ref 2–14)
NEUTROPHILS # BLD AUTO: 5.8 K/UL — SIGNIFICANT CHANGE UP (ref 1.8–7.4)
NEUTROPHILS NFR BLD AUTO: 48 % — SIGNIFICANT CHANGE UP (ref 43–77)
NEUTS BAND # BLD: 4 % — SIGNIFICANT CHANGE UP (ref 0–8)
NRBC # BLD: 12 /100 — HIGH (ref 0–0)
NRBC # BLD: SIGNIFICANT CHANGE UP /100 WBCS (ref 0–0)
PLAT MORPH BLD: NORMAL — SIGNIFICANT CHANGE UP
PLATELET # BLD AUTO: 405 K/UL — HIGH (ref 150–400)
POIKILOCYTOSIS BLD QL AUTO: SIGNIFICANT CHANGE UP
POLYCHROMASIA BLD QL SMEAR: SLIGHT — SIGNIFICANT CHANGE UP
POTASSIUM SERPL-MCNC: 4.3 MMOL/L — SIGNIFICANT CHANGE UP (ref 3.5–5.3)
POTASSIUM SERPL-SCNC: 4.3 MMOL/L — SIGNIFICANT CHANGE UP (ref 3.5–5.3)
RBC # BLD: 2.78 M/UL — LOW (ref 4.2–5.8)
RBC # FLD: 25.2 % — HIGH (ref 10.3–14.5)
RBC BLD AUTO: ABNORMAL
SICKLE CELLS BLD QL SMEAR: SLIGHT — SIGNIFICANT CHANGE UP
SODIUM SERPL-SCNC: 139 MMOL/L — SIGNIFICANT CHANGE UP (ref 135–145)
TARGETS BLD QL SMEAR: SIGNIFICANT CHANGE UP
WBC # BLD: 11.15 K/UL — HIGH (ref 3.8–10.5)
WBC # FLD AUTO: 11.15 K/UL — HIGH (ref 3.8–10.5)

## 2020-01-26 PROCEDURE — 99233 SBSQ HOSP IP/OBS HIGH 50: CPT

## 2020-01-26 RX ADMIN — TAMSULOSIN HYDROCHLORIDE 0.4 MILLIGRAM(S): 0.4 CAPSULE ORAL at 22:21

## 2020-01-26 RX ADMIN — HYDROMORPHONE HYDROCHLORIDE 2 MILLIGRAM(S): 2 INJECTION INTRAMUSCULAR; INTRAVENOUS; SUBCUTANEOUS at 04:40

## 2020-01-26 RX ADMIN — Medication 25 MILLIGRAM(S): at 05:08

## 2020-01-26 RX ADMIN — HEPARIN SODIUM 5000 UNIT(S): 5000 INJECTION INTRAVENOUS; SUBCUTANEOUS at 17:03

## 2020-01-26 RX ADMIN — HYDROMORPHONE HYDROCHLORIDE 2 MILLIGRAM(S): 2 INJECTION INTRAMUSCULAR; INTRAVENOUS; SUBCUTANEOUS at 16:28

## 2020-01-26 RX ADMIN — HYDROMORPHONE HYDROCHLORIDE 2 MILLIGRAM(S): 2 INJECTION INTRAMUSCULAR; INTRAVENOUS; SUBCUTANEOUS at 10:38

## 2020-01-26 RX ADMIN — HYDROMORPHONE HYDROCHLORIDE 2 MILLIGRAM(S): 2 INJECTION INTRAMUSCULAR; INTRAVENOUS; SUBCUTANEOUS at 10:23

## 2020-01-26 RX ADMIN — HYDROMORPHONE HYDROCHLORIDE 2 MILLIGRAM(S): 2 INJECTION INTRAMUSCULAR; INTRAVENOUS; SUBCUTANEOUS at 16:45

## 2020-01-26 RX ADMIN — HYDROMORPHONE HYDROCHLORIDE 2 MILLIGRAM(S): 2 INJECTION INTRAMUSCULAR; INTRAVENOUS; SUBCUTANEOUS at 22:27

## 2020-01-26 RX ADMIN — Medication 25 MILLIGRAM(S): at 17:03

## 2020-01-26 RX ADMIN — Medication 50 MILLIGRAM(S): at 10:24

## 2020-01-26 RX ADMIN — SODIUM CHLORIDE 125 MILLILITER(S): 9 INJECTION, SOLUTION INTRAVENOUS at 16:03

## 2020-01-26 RX ADMIN — Medication 1 SPRAY(S): at 17:03

## 2020-01-26 RX ADMIN — Medication 1 SPRAY(S): at 05:08

## 2020-01-26 RX ADMIN — HYDROMORPHONE HYDROCHLORIDE 2 MILLIGRAM(S): 2 INJECTION INTRAMUSCULAR; INTRAVENOUS; SUBCUTANEOUS at 04:19

## 2020-01-26 RX ADMIN — HYDROMORPHONE HYDROCHLORIDE 2 MILLIGRAM(S): 2 INJECTION INTRAMUSCULAR; INTRAVENOUS; SUBCUTANEOUS at 22:57

## 2020-01-26 RX ADMIN — Medication 50 MILLIGRAM(S): at 00:31

## 2020-01-26 RX ADMIN — Medication 50 MILLIGRAM(S): at 22:26

## 2020-01-26 RX ADMIN — HEPARIN SODIUM 5000 UNIT(S): 5000 INJECTION INTRAVENOUS; SUBCUTANEOUS at 05:08

## 2020-01-26 NOTE — PROGRESS NOTE ADULT - SUBJECTIVE AND OBJECTIVE BOX
lying in bed, complaning of pain    Vital Signs Last 24 Hrs  T(C): 37.7 (26 Jan 2020 06:24), Max: 37.7 (26 Jan 2020 06:24)  T(F): 99.9 (26 Jan 2020 06:24), Max: 99.9 (26 Jan 2020 06:24)  HR: 89 (26 Jan 2020 06:24) (78 - 89)  BP: 117/47 (26 Jan 2020 06:24) (112/49 - 156/63)  BP(mean): --  RR: 16 (26 Jan 2020 06:24) (16 - 16)  SpO2: 99% (26 Jan 2020 06:24) (96% - 100%)    PHYSICAL EXAM:    general - AAO x 3  HEENT - No Icterus  CVS - RRR  RS - AE B/L  Abd - soft, NT  Ext - Pulses +        LABS:                        6.3    11.15 )-----------( 405      ( 26 Jan 2020 10:28 )             20.7     01-26    139  |  106  |  13  ----------------------------<  99  4.3   |  23  |  1.03    Ca    9.4      26 Jan 2020 10:28            Culture - Blood (collected 20 Jan 2020 15:49)  Source: .Blood Blood  Final Report (25 Jan 2020 16:01):    No growth at 5 days.    Culture - Blood (collected 20 Jan 2020 11:00)  Source: .Blood Blood  Final Report (25 Jan 2020 11:01):    No growth at 5 days.        RADIOLOGY & ADDITIONAL STUDIES:

## 2020-01-26 NOTE — PROGRESS NOTE ADULT - SUBJECTIVE AND OBJECTIVE BOX
64y old  Male who presents with a chief complaint of sickle cell crisis.    Today:  Pt seen at bedside, still complains of pain and weakness.          PAST MEDICAL & SURGICAL HISTORY:  Sickle cell disease  No significant past surgical history      REVIEW OF SYSTEMS:  Pain, weakness+      MEDICATIONS  (STANDING):  dextrose 5% + sodium chloride 0.45%. 1000 milliLiter(s) (125 mL/Hr) IV Continuous <Continuous>  fluticasone propionate 50 MICROgram(s)/spray Nasal Spray 1 Spray(s) Both Nostrils two times a day  heparin  Injectable 5000 Unit(s) SubCutaneous every 12 hours  influenza   Vaccine 0.5 milliLiter(s) IntraMuscular once  metoprolol tartrate 25 milliGRAM(s) Oral two times a day  polyethylene glycol 3350 17 Gram(s) Oral daily  senna 2 Tablet(s) Oral at bedtime  tamsulosin 0.4 milliGRAM(s) Oral at bedtime    MEDICATIONS  (PRN):  acetaminophen   Tablet .. 650 milliGRAM(s) Oral every 6 hours PRN Temp greater or equal to 38C (100.4F), Mild Pain (1 - 3)  diphenhydrAMINE 50 milliGRAM(s) Oral every 4 hours PRN Rash and/or Itching  HYDROmorphone  Injectable 2 milliGRAM(s) IV Push every 6 hours PRN Severe Pain (7 - 10)  polyethylene glycol 3350 17 Gram(s) Oral at bedtime PRN Constipation      Allergies  No Known Drug Allergies  peanuts (Angioedema)        FAMILY HISTORY:  No pertinent family history in first degree relatives      Vital Signs Last 24 Hrs  T(C): 37.7 (26 Jan 2020 06:24), Max: 37.7 (26 Jan 2020 06:24)  T(F): 99.9 (26 Jan 2020 06:24), Max: 99.9 (26 Jan 2020 06:24)  HR: 89 (26 Jan 2020 06:24) (78 - 89)  BP: 117/47 (26 Jan 2020 06:24) (112/49 - 156/63)  RR: 16 (26 Jan 2020 06:24) (16 - 16)  SpO2: 99% (26 Jan 2020 06:24) (96% - 100%)    PHYSICAL EXAM:    GENERAL: NAD, well-groomed, well-developed  HEAD:  Atraumatic, Normocephalic  EYES: EOMI, PERRLA, conjunctiva and sclera clear  ENMT: No tonsillar erythema, exudates, or enlargement; Moist mucous membranes, Good dentition, No lesions  NECK: Supple, No JVD, Normal thyroid  NERVOUS SYSTEM:  Alert & Oriented X3, Good concentration  CHEST/LUNG: Clear to percussion bilaterally; No rales, rhonchi, wheezing, or rubs  HEART: Regular rate and rhythm; No murmurs, rubs, or gallops  ABDOMEN: Soft, Nontender, Nondistended; Bowel sounds present    LABS:                        6.3    11.15 )-----------( 405      ( 26 Jan 2020 10:28 )             20.7     01-26    139  |  106  |  13  ----------------------------<  99  4.3   |  23  |  1.03    Ca    9.4      26 Jan 2020 10:28

## 2020-01-26 NOTE — PROVIDER CONTACT NOTE (CRITICAL VALUE NOTIFICATION) - TEST AND RESULT REPORTED:
hemoglobin  hemoglobin 6.3 hematocrit 20.7 hemoglobin 6.3 hematocrit 20.7  hemoglobin 6.3 hematocrit 20.7

## 2020-01-26 NOTE — PROGRESS NOTE ADULT - ASSESSMENT
49 male with history of sickle cell disease was presented with acute pain all over his body. patient was diagnosed with acute sickle cell crisis with severe pain. patient was started on oxygen, IVF and iv pain meds. patient also received blood transfusion for worsening anemia. patient was found to have fever and finished antibiotic course for 5 days for UTI. patient's  pain meds were changed to oral but pain got worse and patient was again started back on iv dilaudid.       ·	 Fevers Patient spiking fevers  my colleague obtained ID consult  which  I have reviewed,  ? source, likely from sickle cell crisis, follow up blood cultures and RVP  done on 1/20/2020 are negative, CT chest shows atelectasis, no pneumonia, also ID recommend monitor off antibx last fever at midnight on 1/22/2020.  Pt had low grade temp 100.1 1/24.  No fevers since.    ·	 Hem consult  was called by my colleague and reviewed continue with current management    ·	.    ·     Sickle cell crisis with acute severe generalized pain.    Taper iv dilaudid to 2 mg iv q6 prn , continue with percocet. continue with iv hydration, Oxygen (still refuses).     ·	Acute on chronic anemia. s/p 1 PRBC transfusion on 1/21/2020  ( had 2 prbc on 1/10/2020 during this admission),  ·	 1/23/2020 may require another, monitor AM labs    ·	 EMEKA (acute kidney injury).  resolved    ·	Klebsiella UTI. finished antibiotic course.       ·  : Benign prostatic hyperplasia without lower urinary tract symptoms.  Plan: reviewed prior health record and noted pt on Flomax from prior [facility] admission.     ·   Chronic systolic congestive heart failure.  compensated. follow I and Os on IVF.     ·	 Severe protein-calorie malnutrition.  appreciate nutrition consult.    DVT PPx: HSQ  Full code    will need LOYDA at discharge

## 2020-01-27 LAB
ANION GAP SERPL CALC-SCNC: 6 MMOL/L — SIGNIFICANT CHANGE UP (ref 5–17)
BASOPHILS # BLD AUTO: 0.12 K/UL — SIGNIFICANT CHANGE UP (ref 0–0.2)
BASOPHILS NFR BLD AUTO: 1.2 % — SIGNIFICANT CHANGE UP (ref 0–2)
BUN SERPL-MCNC: 11 MG/DL — SIGNIFICANT CHANGE UP (ref 7–23)
CALCIUM SERPL-MCNC: 9.2 MG/DL — SIGNIFICANT CHANGE UP (ref 8.5–10.1)
CHLORIDE SERPL-SCNC: 105 MMOL/L — SIGNIFICANT CHANGE UP (ref 96–108)
CO2 SERPL-SCNC: 26 MMOL/L — SIGNIFICANT CHANGE UP (ref 22–31)
CREAT SERPL-MCNC: 1.13 MG/DL — SIGNIFICANT CHANGE UP (ref 0.5–1.3)
EOSINOPHIL # BLD AUTO: 1 K/UL — HIGH (ref 0–0.5)
EOSINOPHIL NFR BLD AUTO: 9.9 % — HIGH (ref 0–6)
GLUCOSE SERPL-MCNC: 102 MG/DL — HIGH (ref 70–99)
HCT VFR BLD CALC: 21.7 % — LOW (ref 39–50)
HGB BLD-MCNC: 6.8 G/DL — CRITICAL LOW (ref 13–17)
IMM GRANULOCYTES NFR BLD AUTO: 1.9 % — HIGH (ref 0–1.5)
LYMPHOCYTES # BLD AUTO: 2.11 K/UL — SIGNIFICANT CHANGE UP (ref 1–3.3)
LYMPHOCYTES # BLD AUTO: 20.9 % — SIGNIFICANT CHANGE UP (ref 13–44)
MCHC RBC-ENTMCNC: 23.5 PG — LOW (ref 27–34)
MCHC RBC-ENTMCNC: 31.3 GM/DL — LOW (ref 32–36)
MCV RBC AUTO: 75.1 FL — LOW (ref 80–100)
MONOCYTES # BLD AUTO: 1.29 K/UL — HIGH (ref 0–0.9)
MONOCYTES NFR BLD AUTO: 12.8 % — SIGNIFICANT CHANGE UP (ref 2–14)
NEUTROPHILS # BLD AUTO: 5.38 K/UL — SIGNIFICANT CHANGE UP (ref 1.8–7.4)
NEUTROPHILS NFR BLD AUTO: 53.3 % — SIGNIFICANT CHANGE UP (ref 43–77)
NRBC # BLD: 10 /100 WBCS — HIGH (ref 0–0)
PLATELET # BLD AUTO: 404 K/UL — HIGH (ref 150–400)
POTASSIUM SERPL-MCNC: 4.1 MMOL/L — SIGNIFICANT CHANGE UP (ref 3.5–5.3)
POTASSIUM SERPL-SCNC: 4.1 MMOL/L — SIGNIFICANT CHANGE UP (ref 3.5–5.3)
RBC # BLD: 2.89 M/UL — LOW (ref 4.2–5.8)
RBC # FLD: 25.7 % — HIGH (ref 10.3–14.5)
SODIUM SERPL-SCNC: 137 MMOL/L — SIGNIFICANT CHANGE UP (ref 135–145)
WBC # BLD: 10.09 K/UL — SIGNIFICANT CHANGE UP (ref 3.8–10.5)
WBC # FLD AUTO: 10.09 K/UL — SIGNIFICANT CHANGE UP (ref 3.8–10.5)

## 2020-01-27 PROCEDURE — 99233 SBSQ HOSP IP/OBS HIGH 50: CPT

## 2020-01-27 RX ADMIN — Medication 50 MILLIGRAM(S): at 12:04

## 2020-01-27 RX ADMIN — Medication 1 SPRAY(S): at 17:05

## 2020-01-27 RX ADMIN — HEPARIN SODIUM 5000 UNIT(S): 5000 INJECTION INTRAVENOUS; SUBCUTANEOUS at 17:05

## 2020-01-27 RX ADMIN — HYDROMORPHONE HYDROCHLORIDE 2 MILLIGRAM(S): 2 INJECTION INTRAMUSCULAR; INTRAVENOUS; SUBCUTANEOUS at 12:55

## 2020-01-27 RX ADMIN — HYDROMORPHONE HYDROCHLORIDE 2 MILLIGRAM(S): 2 INJECTION INTRAMUSCULAR; INTRAVENOUS; SUBCUTANEOUS at 05:55

## 2020-01-27 RX ADMIN — SODIUM CHLORIDE 125 MILLILITER(S): 9 INJECTION, SOLUTION INTRAVENOUS at 11:14

## 2020-01-27 RX ADMIN — SODIUM CHLORIDE 125 MILLILITER(S): 9 INJECTION, SOLUTION INTRAVENOUS at 17:07

## 2020-01-27 RX ADMIN — Medication 25 MILLIGRAM(S): at 05:26

## 2020-01-27 RX ADMIN — HYDROMORPHONE HYDROCHLORIDE 2 MILLIGRAM(S): 2 INJECTION INTRAMUSCULAR; INTRAVENOUS; SUBCUTANEOUS at 19:00

## 2020-01-27 RX ADMIN — Medication 1 SPRAY(S): at 05:25

## 2020-01-27 RX ADMIN — HEPARIN SODIUM 5000 UNIT(S): 5000 INJECTION INTRAVENOUS; SUBCUTANEOUS at 05:26

## 2020-01-27 RX ADMIN — TAMSULOSIN HYDROCHLORIDE 0.4 MILLIGRAM(S): 0.4 CAPSULE ORAL at 22:02

## 2020-01-27 RX ADMIN — HYDROMORPHONE HYDROCHLORIDE 2 MILLIGRAM(S): 2 INJECTION INTRAMUSCULAR; INTRAVENOUS; SUBCUTANEOUS at 18:25

## 2020-01-27 RX ADMIN — HYDROMORPHONE HYDROCHLORIDE 2 MILLIGRAM(S): 2 INJECTION INTRAMUSCULAR; INTRAVENOUS; SUBCUTANEOUS at 12:04

## 2020-01-27 RX ADMIN — HYDROMORPHONE HYDROCHLORIDE 2 MILLIGRAM(S): 2 INJECTION INTRAMUSCULAR; INTRAVENOUS; SUBCUTANEOUS at 05:25

## 2020-01-27 RX ADMIN — Medication 50 MILLIGRAM(S): at 18:25

## 2020-01-27 RX ADMIN — Medication 25 MILLIGRAM(S): at 17:05

## 2020-01-27 RX ADMIN — Medication 50 MILLIGRAM(S): at 05:25

## 2020-01-27 NOTE — PROGRESS NOTE ADULT - SUBJECTIVE AND OBJECTIVE BOX
feels better    Vital Signs Last 24 Hrs  T(C): 36 (27 Jan 2020 05:19), Max: 37.1 (27 Jan 2020 00:39)  T(F): 96.8 (27 Jan 2020 05:19), Max: 98.8 (27 Jan 2020 00:39)  HR: 85 (27 Jan 2020 05:19) (85 - 97)  BP: 146/89 (27 Jan 2020 05:19) (145/59 - 155/57)  BP(mean): --  RR: 17 (27 Jan 2020 05:19) (16 - 17)  SpO2: 97% (27 Jan 2020 05:19) (97% - 97%)    PHYSICAL EXAM:    general - AAO x 3  HEENT - No Icterus  CVS - RRR  RS - AE B/L  Abd - soft, NT  Ext - Pulses +        LABS:                        6.3    11.15 )-----------( 405      ( 26 Jan 2020 10:28 )             20.7     01-26    139  |  106  |  13  ----------------------------<  99  4.3   |  23  |  1.03    Ca    9.4      26 Jan 2020 10:28            Culture - Blood (collected 20 Jan 2020 15:49)  Source: .Blood Blood  Final Report (25 Jan 2020 16:01):    No growth at 5 days.    Culture - Blood (collected 20 Jan 2020 11:00)  Source: .Blood Blood  Final Report (25 Jan 2020 11:01):    No growth at 5 days.        RADIOLOGY & ADDITIONAL STUDIES:

## 2020-01-27 NOTE — PROGRESS NOTE ADULT - ASSESSMENT
49 male with history of sickle cell disease was presented with acute pain all over his body. patient was diagnosed with acute sickle cell crisis with severe pain. patient was started on oxygen, IVF and iv pain meds. patient also received blood transfusion for worsening anemia. patient was found to have fever and finished antibiotic course for 5 days for UTI. patient's  pain meds were changed to oral but pain got worse and patient was again started back on iv dilaudid.       ·	 Fevers Patient was spiking fevers my colleague obtained ID consult which was reviewed,  ? source, likely from sickle cell crisis, follow up blood cultures and RVP  done on 1/20/2020 are negative, CT chest shows atelectasis, no pneumonia, also ID recommend monitor off antibx last fever at midnight on 1/22/2020.  Pt had low grade temp 100.1 1/24.  No fevers since.    ·	 Hem consult  was called by my colleague and reviewed continue with current management    ·	.    ·     Sickle cell crisis with acute severe generalized pain.    Taper iv dilaudid to 2 mg iv q6 prn , continue with percocet. continue with iv hydration, Oxygen (still refuses intermittently).     ·	Acute on chronic anemia. s/p 1 PRBC transfusion on 1/21/2020  (had 2 prbc on 1/10/2020 during this admission),  ·	 1/23/2020 may require another, monitor AM labs    ·	 EMEKA (acute kidney injury).  resolved    ·	Klebsiella UTI. finished antibiotic course.       ·  : Benign prostatic hyperplasia without lower urinary tract symptoms.  Plan: reviewed prior health record and noted pt on Flomax from prior [facility] admission.     ·   Chronic systolic congestive heart failure.  compensated. follow I and Os on IVF.     ·	 Severe protein-calorie malnutrition.  appreciate nutrition consult.    DVT PPx: HSQ  Full code    will need LOYDA at discharge

## 2020-01-27 NOTE — PROGRESS NOTE ADULT - SUBJECTIVE AND OBJECTIVE BOX
64y old  Male who presents with a chief complaint of sickle cell crisis.    Today:  Pt seen at bedside, states his pain is improving.          PAST MEDICAL & SURGICAL HISTORY:  Sickle cell disease  No significant past surgical history      REVIEW OF SYSTEMS:  + Pain      MEDICATIONS  (STANDING):  dextrose 5% + sodium chloride 0.45%. 1000 milliLiter(s) (125 mL/Hr) IV Continuous <Continuous>  fluticasone propionate 50 MICROgram(s)/spray Nasal Spray 1 Spray(s) Both Nostrils two times a day  heparin  Injectable 5000 Unit(s) SubCutaneous every 12 hours  influenza   Vaccine 0.5 milliLiter(s) IntraMuscular once  metoprolol tartrate 25 milliGRAM(s) Oral two times a day  polyethylene glycol 3350 17 Gram(s) Oral daily  senna 2 Tablet(s) Oral at bedtime  tamsulosin 0.4 milliGRAM(s) Oral at bedtime    MEDICATIONS  (PRN):  acetaminophen   Tablet .. 650 milliGRAM(s) Oral every 6 hours PRN Temp greater or equal to 38C (100.4F), Mild Pain (1 - 3)  diphenhydrAMINE 50 milliGRAM(s) Oral every 4 hours PRN Rash and/or Itching  HYDROmorphone  Injectable 2 milliGRAM(s) IV Push every 6 hours PRN Severe Pain (7 - 10)  polyethylene glycol 3350 17 Gram(s) Oral at bedtime PRN Constipation      Allergies  No Known Drug Allergies  peanuts (Angioedema)        FAMILY HISTORY:  No pertinent family history in first degree relatives      Vital Signs Last 24 Hrs  T(C): 36.9 (27 Jan 2020 11:16), Max: 37.1 (27 Jan 2020 00:39)  T(F): 98.5 (27 Jan 2020 11:16), Max: 98.8 (27 Jan 2020 00:39)  HR: 73 (27 Jan 2020 11:16) (73 - 97)  BP: 144/72 (27 Jan 2020 11:16) (144/72 - 155/57)  RR: 18 (27 Jan 2020 11:16) (16 - 18)  SpO2: 93% (27 Jan 2020 11:16) (93% - 97%)    PHYSICAL EXAM:    GENERAL: NAD, well-groomed  HEAD:  Atraumatic, Normocephalic  EYES: EOMI, PERRLA, conjunctiva and sclera clear  ENMT: No tonsillar erythema, exudates, or enlargement; Moist mucous membranes, Good dentition, No lesions  NECK: Supple, No JVD, Normal thyroid  NERVOUS SYSTEM:  Alert & Oriented X3, Good concentration  CHEST/LUNG: Clear to percussion bilaterally; No rales, rhonchi, wheezing, or rubs  HEART: Regular rate and rhythm; No murmurs, rubs, or gallops  ABDOMEN: Soft, Nontender, Nondistended; Bowel sounds present      LABS:                        6.8    10.09 )-----------( 404      ( 27 Jan 2020 10:43 )             21.7     01-27    137  |  105  |  11  ----------------------------<  102<H>  4.1   |  26  |  1.13    Ca    9.2      27 Jan 2020 10:43

## 2020-01-27 NOTE — PROVIDER CONTACT NOTE (CRITICAL VALUE NOTIFICATION) - ACTION/TREATMENT ORDERED:
Cisco Simental NP
Cisco Simental NP made aware
Will notify the attending MD
monitoring
no new order giving, will continue to monitor.
continue monitoring H-H .

## 2020-01-27 NOTE — CHART NOTE - NSCHARTNOTEFT_GEN_A_CORE
Assessment: Pt seen for and remains c severe malnutrition. Pt admitted with sickle cell, CHF & EMEKA. Pt reported improved PO intake with meals and supplements. Pt reported consuming 100% of supplement 3 x per day. Improved PO intake of food items but still remains <50%. Pt reports some nausea at meal time. Provided encouragement to pt to continue good PO intake. Pt taught back to RD.     Factors impacting intake: [ ] none [ x] nausea  [ ] vomiting [ ] diarrhea [ ] constipation  [ ]chewing problems [ ] swallowing issues  [ x] other: varying appetite      Diet Prescription: Diet, Regular:   Low Sodium  Supplement Feeding Modality:  Oral  Ensure Enlive Cans or Servings Per Day:  1       Frequency:  Three Times a day (01-16-20 @ 10:36)    Intake: 0-50% meals;  75% or > of supplements     Current Weight: 46.9 kg ( 01-25, most recent)   ; 48 kg (01-18)   % Weight Change: 2% loss ( 2.5# x 1 week )     Physical appearance: BMI 15.2; no edema noted     Pertinent Medications: MEDICATIONS  (STANDING):  dextrose 5% + sodium chloride 0.45%. 1000 milliLiter(s) (125 mL/Hr) IV Continuous <Continuous>  fluticasone propionate 50 MICROgram(s)/spray Nasal Spray 1 Spray(s) Both Nostrils two times a day  heparin  Injectable 5000 Unit(s) SubCutaneous every 12 hours  influenza   Vaccine 0.5 milliLiter(s) IntraMuscular once  metoprolol tartrate 25 milliGRAM(s) Oral two times a day  polyethylene glycol 3350 17 Gram(s) Oral daily  senna 2 Tablet(s) Oral at bedtime  tamsulosin 0.4 milliGRAM(s) Oral at bedtime    MEDICATIONS  (PRN):  acetaminophen   Tablet .. 650 milliGRAM(s) Oral every 6 hours PRN Temp greater or equal to 38C (100.4F), Mild Pain (1 - 3)  diphenhydrAMINE 50 milliGRAM(s) Oral every 4 hours PRN Rash and/or Itching  HYDROmorphone  Injectable 2 milliGRAM(s) IV Push every 6 hours PRN Severe Pain (7 - 10)  polyethylene glycol 3350 17 Gram(s) Oral at bedtime PRN Constipation    Pertinent Labs: 01-27 Na 137 mmol/L Glu 102 mg/dL<H> K+ 4.1 mmol/L Cr 1.13 mg/dL BUN 11 mg/dL Phos n/a   Alb n/a   PAB n/a   Hgb 6.8 g/dL<LL> Hct 21.7 %<L> HgA1C n/a    Glucose, Serum: 102 mg/dL <H>   24Hr FS:  Skin: intact     Estimated Needs:   [x ] no change since previous assessment (01/12/2020)  [ ] recalculated:     Previous Nutrition Diagnosis:   Malnutrition Severe malnutrition in the context of chronic illness.   Etiology Inadequate energy & protein intake related to sickle cell.   Signs/Symptoms Severe subcutaneous fat loss & muscle wasting.     Goal/Expected Outcome Pt to consume >75% meals/supplements; Pt to maintain stable wt +-2# (not met but improved ; not met )     Nutrition Diagnosis is [ x] ongoing  [ ] resolved  [ x] improved  [ ] not applicable       New Nutrition Diagnosis: [ x] not applicable       Interventions:   Recommend  [x ] Continue: Current diet Rx   [ ] Change Diet To:  [ ] Nutrition Supplement:  [ ] Nutrition Support:  [ ] Other:     Monitoring and Evaluation:   [x ] PO intake [ x ] Tolerance to diet prescription [ x ] weights [ x ] labs[ x ] follow up per protocol  [ ] other:

## 2020-01-28 PROCEDURE — 99233 SBSQ HOSP IP/OBS HIGH 50: CPT

## 2020-01-28 RX ORDER — HYDROMORPHONE HYDROCHLORIDE 2 MG/ML
1 INJECTION INTRAMUSCULAR; INTRAVENOUS; SUBCUTANEOUS EVERY 6 HOURS
Refills: 0 | Status: DISCONTINUED | OUTPATIENT
Start: 2020-01-28 | End: 2020-01-29

## 2020-01-28 RX ORDER — HYDROMORPHONE HYDROCHLORIDE 2 MG/ML
2 INJECTION INTRAMUSCULAR; INTRAVENOUS; SUBCUTANEOUS EVERY 6 HOURS
Refills: 0 | Status: DISCONTINUED | OUTPATIENT
Start: 2020-01-28 | End: 2020-01-28

## 2020-01-28 RX ADMIN — SODIUM CHLORIDE 125 MILLILITER(S): 9 INJECTION, SOLUTION INTRAVENOUS at 02:44

## 2020-01-28 RX ADMIN — HYDROMORPHONE HYDROCHLORIDE 2 MILLIGRAM(S): 2 INJECTION INTRAMUSCULAR; INTRAVENOUS; SUBCUTANEOUS at 01:09

## 2020-01-28 RX ADMIN — HYDROMORPHONE HYDROCHLORIDE 2 MILLIGRAM(S): 2 INJECTION INTRAMUSCULAR; INTRAVENOUS; SUBCUTANEOUS at 01:41

## 2020-01-28 RX ADMIN — HEPARIN SODIUM 5000 UNIT(S): 5000 INJECTION INTRAVENOUS; SUBCUTANEOUS at 18:04

## 2020-01-28 RX ADMIN — Medication 50 MILLIGRAM(S): at 08:10

## 2020-01-28 RX ADMIN — HYDROMORPHONE HYDROCHLORIDE 2 MILLIGRAM(S): 2 INJECTION INTRAMUSCULAR; INTRAVENOUS; SUBCUTANEOUS at 14:20

## 2020-01-28 RX ADMIN — Medication 50 MILLIGRAM(S): at 19:05

## 2020-01-28 RX ADMIN — HEPARIN SODIUM 5000 UNIT(S): 5000 INJECTION INTRAVENOUS; SUBCUTANEOUS at 05:34

## 2020-01-28 RX ADMIN — HYDROMORPHONE HYDROCHLORIDE 2 MILLIGRAM(S): 2 INJECTION INTRAMUSCULAR; INTRAVENOUS; SUBCUTANEOUS at 13:26

## 2020-01-28 RX ADMIN — Medication 1 SPRAY(S): at 18:03

## 2020-01-28 RX ADMIN — HYDROMORPHONE HYDROCHLORIDE 1 MILLIGRAM(S): 2 INJECTION INTRAMUSCULAR; INTRAVENOUS; SUBCUTANEOUS at 19:05

## 2020-01-28 RX ADMIN — HYDROMORPHONE HYDROCHLORIDE 2 MILLIGRAM(S): 2 INJECTION INTRAMUSCULAR; INTRAVENOUS; SUBCUTANEOUS at 09:00

## 2020-01-28 RX ADMIN — Medication 25 MILLIGRAM(S): at 18:04

## 2020-01-28 RX ADMIN — HYDROMORPHONE HYDROCHLORIDE 2 MILLIGRAM(S): 2 INJECTION INTRAMUSCULAR; INTRAVENOUS; SUBCUTANEOUS at 08:10

## 2020-01-28 RX ADMIN — TAMSULOSIN HYDROCHLORIDE 0.4 MILLIGRAM(S): 0.4 CAPSULE ORAL at 22:02

## 2020-01-28 RX ADMIN — HYDROMORPHONE HYDROCHLORIDE 1 MILLIGRAM(S): 2 INJECTION INTRAMUSCULAR; INTRAVENOUS; SUBCUTANEOUS at 19:44

## 2020-01-28 RX ADMIN — Medication 25 MILLIGRAM(S): at 05:34

## 2020-01-28 RX ADMIN — SODIUM CHLORIDE 125 MILLILITER(S): 9 INJECTION, SOLUTION INTRAVENOUS at 11:24

## 2020-01-28 NOTE — PROGRESS NOTE ADULT - SUBJECTIVE AND OBJECTIVE BOX
lying in bed    Vital Signs Last 24 Hrs  T(C): 37.1 (28 Jan 2020 06:16), Max: 37.1 (28 Jan 2020 06:16)  T(F): 98.7 (28 Jan 2020 06:16), Max: 98.7 (28 Jan 2020 06:16)  HR: 81 (28 Jan 2020 06:16) (73 - 82)  BP: 123/57 (28 Jan 2020 06:16) (123/57 - 144/72)  BP(mean): --  RR: 16 (28 Jan 2020 06:16) (16 - 18)  SpO2: 96% (28 Jan 2020 06:16) (93% - 98%)    PHYSICAL EXAM:    general - AAO x 3  HEENT - No Icterus  CVS - RRR  RS - AE B/L  Abd - soft, NT  Ext - Pulses +        LABS:                        6.8    10.09 )-----------( 404      ( 27 Jan 2020 10:43 )             21.7     01-27    137  |  105  |  11  ----------------------------<  102<H>  4.1   |  26  |  1.13    Ca    9.2      27 Jan 2020 10:43            Culture - Blood (collected 20 Jan 2020 15:49)  Source: .Blood Blood  Final Report (25 Jan 2020 16:01):    No growth at 5 days.    Culture - Blood (collected 20 Jan 2020 11:00)  Source: .Blood Blood  Final Report (25 Jan 2020 11:01):    No growth at 5 days.        RADIOLOGY & ADDITIONAL STUDIES:

## 2020-01-28 NOTE — PROGRESS NOTE ADULT - ASSESSMENT
49 male with history of sickle cell disease was presented with acute pain all over his body. patient was diagnosed with acute sickle cell crisis with severe pain. patient was started on oxygen, IVF and iv pain meds. patient also received blood transfusion for worsening anemia. patient was found to have fever and finished antibiotic course for 5 days for UTI. patient's  pain meds were changed to oral but pain got worse and patient was again started back on iv dilaudid.       ·	 Fevers Patient was spiking fevers my colleague obtained ID consult which was reviewed,  ? source, likely from sickle cell crisis, follow up blood cultures and RVP  done on 1/20/2020 are negative, CT chest shows atelectasis, no pneumonia, also ID recommend monitor off antibx last fever at midnight on 1/22/2020.  Pt had low grade temp 100.1 1/24.  No fevers since.    ·	 Hem consult  was called by my colleague and reviewed continue with current management    ·	.    ·     Sickle cell crisis with acute severe generalized pain.    Taper iv dilaudid to 1 mg iv q6 prn (1/28), try to taper to PO 1/29, continue with percocet. continue with iv hydration, Oxygen (still refuses intermittently).     ·	Acute on chronic anemia. s/p 1 PRBC transfusion on 1/21/2020  (had 2 prbc on 1/10/2020 during this admission),  ·	 1/23/2020 may require another, monitor AM labs    ·	 EMEKA (acute kidney injury).  resolved    ·	Klebsiella UTI. finished antibiotic course.       ·  : Benign prostatic hyperplasia without lower urinary tract symptoms.  Plan: reviewed prior health record and noted pt on Flomax from prior [facility] admission.     ·   Chronic systolic congestive heart failure.  compensated. follow I and Os on IVF.     ·	 Severe protein-calorie malnutrition.  appreciate nutrition consult.    DVT PPx: HSQ  Full code    will need LOYDA at discharge  If h/h stable and tolerates opiate taper can DC to LOYDA. 49 male with history of sickle cell disease was presented with acute pain all over his body. patient was diagnosed with acute sickle cell crisis with severe pain. patient was started on oxygen, IVF and iv pain meds. patient also received blood transfusion for worsening anemia. patient was found to have fever and finished antibiotic course for 5 days for UTI. patient's  pain meds were changed to oral but pain got worse and patient was again started back on iv dilaudid.       ·	 Fevers Patient was spiking fevers my colleague obtained ID consult which was reviewed,  ? source, likely from sickle cell crisis, follow up blood cultures and RVP  done on 1/20/2020 are negative, CT chest shows atelectasis, no pneumonia, also ID recommend monitor off antibx last fever at midnight on 1/22/2020.  Pt had low grade temp 100.1 1/24.  No fevers since.    ·	 Hem consult  was called by my colleague and reviewed continue with current management    ·	.    ·     Sickle cell crisis with acute severe generalized pain-Patient refused O2, fluids, and labs frequently   Taper iv dilaudid to 1 mg iv q6 prn (1/28), try to taper to PO 1/29, continue with percocet. continue with iv hydration, Oxygen (still refuses intermittently).     ·	Acute on chronic anemia. s/p 1 PRBC transfusion on 1/21/2020  (had 2 prbc on 1/10/2020 during this admission),  ·	 1/23/2020 may require another, monitor AM labs    ·	 EMEKA (acute kidney injury).  resolved    ·	Klebsiella UTI. finished antibiotic course.       ·  : Benign prostatic hyperplasia without lower urinary tract symptoms.  Plan: reviewed prior health record and noted pt on Flomax from prior [facility] admission.     ·   Chronic systolic congestive heart failure.  compensated. follow I and Os on IVF.     ·	 Severe protein-calorie malnutrition.  appreciate nutrition consult.    DVT PPx: HSQ  Full code    will need LOYDA at discharge  If h/h stable and tolerates opiate taper can DC to LOYDA.

## 2020-01-28 NOTE — PROGRESS NOTE ADULT - PROBLEM SELECTOR PLAN 1
- patient feels better  - patient follows with Dr Holley for SCD  - DVT prophylaxsis  - pain meds, folic acid

## 2020-01-28 NOTE — PROGRESS NOTE ADULT - SUBJECTIVE AND OBJECTIVE BOX
64y old  Male who presents with a chief complaint of sickle cell crisis.    Today:  Pt seen at bedside, states his pain is improving.          PAST MEDICAL & SURGICAL HISTORY:  Sickle cell disease  No significant past surgical history      REVIEW OF SYSTEMS:  + pain      MEDICATIONS  (STANDING):  dextrose 5% + sodium chloride 0.45%. 1000 milliLiter(s) (125 mL/Hr) IV Continuous <Continuous>  fluticasone propionate 50 MICROgram(s)/spray Nasal Spray 1 Spray(s) Both Nostrils two times a day  heparin  Injectable 5000 Unit(s) SubCutaneous every 12 hours  influenza   Vaccine 0.5 milliLiter(s) IntraMuscular once  metoprolol tartrate 25 milliGRAM(s) Oral two times a day  polyethylene glycol 3350 17 Gram(s) Oral daily  senna 2 Tablet(s) Oral at bedtime  tamsulosin 0.4 milliGRAM(s) Oral at bedtime    MEDICATIONS  (PRN):  acetaminophen   Tablet .. 650 milliGRAM(s) Oral every 6 hours PRN Temp greater or equal to 38C (100.4F), Mild Pain (1 - 3)  diphenhydrAMINE 50 milliGRAM(s) Oral every 4 hours PRN Rash and/or Itching  HYDROmorphone  Injectable 1 milliGRAM(s) IV Push every 6 hours PRN Severe Pain (7 - 10)  polyethylene glycol 3350 17 Gram(s) Oral at bedtime PRN Constipation      Allergies  No Known Drug Allergies  peanuts (Angioedema)        FAMILY HISTORY:  No pertinent family history in first degree relatives      Vital Signs Last 24 Hrs  T(C): 36.8 (28 Jan 2020 11:05), Max: 37.1 (28 Jan 2020 06:16)  T(F): 98.2 (28 Jan 2020 11:05), Max: 98.7 (28 Jan 2020 06:16)  HR: 80 (28 Jan 2020 11:05) (80 - 81)  BP: 130/73 (28 Jan 2020 11:05) (123/57 - 130/73)  RR: 16 (28 Jan 2020 11:05) (16 - 18)  SpO2: 97% (28 Jan 2020 11:05) (96% - 97%)    PHYSICAL EXAM:    GENERAL: NAD, well-groomed, well-developed  HEAD:  Atraumatic, Normocephalic  EYES: EOMI, PERRLA, conjunctiva and sclera clear  ENMT: No tonsillar erythema, exudates, or enlargement; Moist mucous membranes, Good dentition, No lesions  NECK: Supple, No JVD, Normal thyroid  NERVOUS SYSTEM:  Alert & Oriented X3, Good concentration  CHEST/LUNG: Clear to percussion bilaterally; No rales, rhonchi, wheezing, or rubs  HEART: Regular rate and rhythm; No murmurs, rubs, or gallops  ABDOMEN: Soft, Nontender, Nondistended; Bowel sounds present      LABS:                        6.8    10.09 )-----------( 404      ( 27 Jan 2020 10:43 )             21.7     01-27    137  |  105  |  11  ----------------------------<  102<H>  4.1   |  26  |  1.13    Ca    9.2      27 Jan 2020 10:43

## 2020-01-29 PROCEDURE — 99232 SBSQ HOSP IP/OBS MODERATE 35: CPT

## 2020-01-29 RX ORDER — HYDROMORPHONE HYDROCHLORIDE 2 MG/ML
4 INJECTION INTRAMUSCULAR; INTRAVENOUS; SUBCUTANEOUS EVERY 6 HOURS
Refills: 0 | Status: DISCONTINUED | OUTPATIENT
Start: 2020-01-29 | End: 2020-01-31

## 2020-01-29 RX ORDER — MORPHINE SULFATE 50 MG/1
30 CAPSULE, EXTENDED RELEASE ORAL
Refills: 0 | Status: DISCONTINUED | OUTPATIENT
Start: 2020-01-29 | End: 2020-01-31

## 2020-01-29 RX ADMIN — HYDROMORPHONE HYDROCHLORIDE 1 MILLIGRAM(S): 2 INJECTION INTRAMUSCULAR; INTRAVENOUS; SUBCUTANEOUS at 01:58

## 2020-01-29 RX ADMIN — SODIUM CHLORIDE 125 MILLILITER(S): 9 INJECTION, SOLUTION INTRAVENOUS at 02:53

## 2020-01-29 RX ADMIN — Medication 25 MILLIGRAM(S): at 17:19

## 2020-01-29 RX ADMIN — HYDROMORPHONE HYDROCHLORIDE 1 MILLIGRAM(S): 2 INJECTION INTRAMUSCULAR; INTRAVENOUS; SUBCUTANEOUS at 01:49

## 2020-01-29 RX ADMIN — TAMSULOSIN HYDROCHLORIDE 0.4 MILLIGRAM(S): 0.4 CAPSULE ORAL at 21:44

## 2020-01-29 RX ADMIN — HYDROMORPHONE HYDROCHLORIDE 1 MILLIGRAM(S): 2 INJECTION INTRAMUSCULAR; INTRAVENOUS; SUBCUTANEOUS at 09:15

## 2020-01-29 RX ADMIN — Medication 1 SPRAY(S): at 17:19

## 2020-01-29 RX ADMIN — HEPARIN SODIUM 5000 UNIT(S): 5000 INJECTION INTRAVENOUS; SUBCUTANEOUS at 06:01

## 2020-01-29 RX ADMIN — Medication 25 MILLIGRAM(S): at 06:01

## 2020-01-29 RX ADMIN — HEPARIN SODIUM 5000 UNIT(S): 5000 INJECTION INTRAVENOUS; SUBCUTANEOUS at 17:18

## 2020-01-29 RX ADMIN — HYDROMORPHONE HYDROCHLORIDE 4 MILLIGRAM(S): 2 INJECTION INTRAMUSCULAR; INTRAVENOUS; SUBCUTANEOUS at 21:42

## 2020-01-29 RX ADMIN — HYDROMORPHONE HYDROCHLORIDE 4 MILLIGRAM(S): 2 INJECTION INTRAMUSCULAR; INTRAVENOUS; SUBCUTANEOUS at 22:12

## 2020-01-29 RX ADMIN — HYDROMORPHONE HYDROCHLORIDE 1 MILLIGRAM(S): 2 INJECTION INTRAMUSCULAR; INTRAVENOUS; SUBCUTANEOUS at 09:00

## 2020-01-29 RX ADMIN — MORPHINE SULFATE 30 MILLIGRAM(S): 50 CAPSULE, EXTENDED RELEASE ORAL at 17:19

## 2020-01-29 RX ADMIN — MORPHINE SULFATE 30 MILLIGRAM(S): 50 CAPSULE, EXTENDED RELEASE ORAL at 18:20

## 2020-01-29 RX ADMIN — SODIUM CHLORIDE 125 MILLILITER(S): 9 INJECTION, SOLUTION INTRAVENOUS at 17:18

## 2020-01-29 NOTE — PROGRESS NOTE ADULT - SUBJECTIVE AND OBJECTIVE BOX
lying in bed    Vital Signs Last 24 Hrs  T(C): 37.1 (29 Jan 2020 05:16), Max: 37.3 (29 Jan 2020 00:10)  T(F): 98.8 (29 Jan 2020 05:16), Max: 99.2 (29 Jan 2020 00:10)  HR: 89 (29 Jan 2020 05:16) (78 - 89)  BP: 128/85 (29 Jan 2020 05:16) (109/71 - 130/73)  BP(mean): --  RR: 16 (29 Jan 2020 05:16) (16 - 18)  SpO2: 99% (29 Jan 2020 05:16) (97% - 99%)    PHYSICAL EXAM:    general - AAO x 3  HEENT - No Icterus  CVS - RRR  RS - AE B/L  Abd - soft, NT  Ext - Pulses +        LABS:                        6.8    10.09 )-----------( 404      ( 27 Jan 2020 10:43 )             21.7     01-27    137  |  105  |  11  ----------------------------<  102<H>  4.1   |  26  |  1.13    Ca    9.2      27 Jan 2020 10:43            Culture - Blood (collected 20 Jan 2020 15:49)  Source: .Blood Blood  Final Report (25 Jan 2020 16:01):    No growth at 5 days.    Culture - Blood (collected 20 Jan 2020 11:00)  Source: .Blood Blood  Final Report (25 Jan 2020 11:01):    No growth at 5 days.        RADIOLOGY & ADDITIONAL STUDIES:

## 2020-01-29 NOTE — PROGRESS NOTE ADULT - ASSESSMENT
49 male with history of sickle cell disease was presented with acute pain all over his body. patient was diagnosed with acute sickle cell crisis with severe pain. patient was started on oxygen, IVF and iv pain meds. patient also received blood transfusion for worsening anemia. patient was found to have fever and finished antibiotic course for 5 days for UTI. patient's  pain meds were changed to oral but pain got worse and patient was again started back on iv dilaudid.       ·	 Fevers Patient was spiking fevers my colleague obtained ID consult which was reviewed,  ? source, likely from sickle cell crisis, follow up blood cultures and RVP  done on 1/20/2020 are negative, CT chest shows atelectasis, no pneumonia, also ID recommend monitor off antibx last fever at midnight on 1/22/2020.  Pt had low grade temp 100.1 1/24.  No fevers since.    ·	 Hem consult  was called by my colleague and reviewed continue with current management. as per heme stable for dc, no need to transfuse     ·	.    ·     Sickle cell crisis with acute severe generalized pain-Patient refused O2, fluids, and labs frequently  will change to PO Dilaudid and add ms contin     ·	Acute on chronic anemia. s/p 1 PRBC transfusion on 1/21/2020  (had 2 prbc on 1/10/2020 during this admission),  ·	 1/23/2020 . as per heme no need for transfusion     ·	 EMEKA (acute kidney injury).  resolved    ·	Klebsiella UTI. finished antibiotic course.       ·  : Benign prostatic hyperplasia without lower urinary tract symptoms.  Plan: reviewed prior health record and noted pt on Flomax from prior [facility] admission.     ·   Chronic systolic congestive heart failure.  compensated.     ·	 Severe protein-calorie malnutrition.  appreciate nutrition consult.    DVT PPx: HSQ  Full code   pt refusing LOYDA. possible dc tomorrow if tolerating po meds

## 2020-01-30 ENCOUNTER — TRANSCRIPTION ENCOUNTER (OUTPATIENT)
Age: 65
End: 2020-01-30

## 2020-01-30 LAB
HCT VFR BLD CALC: 22.3 % — LOW (ref 39–50)
HGB BLD-MCNC: 6.9 G/DL — CRITICAL LOW (ref 13–17)
MCHC RBC-ENTMCNC: 23.4 PG — LOW (ref 27–34)
MCHC RBC-ENTMCNC: 30.9 GM/DL — LOW (ref 32–36)
MCV RBC AUTO: 75.6 FL — LOW (ref 80–100)
NRBC # BLD: 2 /100 WBCS — HIGH (ref 0–0)
PLATELET # BLD AUTO: 358 K/UL — SIGNIFICANT CHANGE UP (ref 150–400)
RBC # BLD: 2.95 M/UL — LOW (ref 4.2–5.8)
RBC # FLD: 26.4 % — HIGH (ref 10.3–14.5)
WBC # BLD: 9.09 K/UL — SIGNIFICANT CHANGE UP (ref 3.8–10.5)
WBC # FLD AUTO: 9.09 K/UL — SIGNIFICANT CHANGE UP (ref 3.8–10.5)

## 2020-01-30 PROCEDURE — 99239 HOSP IP/OBS DSCHRG MGMT >30: CPT

## 2020-01-30 RX ORDER — HYDROMORPHONE HYDROCHLORIDE 2 MG/ML
1 INJECTION INTRAMUSCULAR; INTRAVENOUS; SUBCUTANEOUS
Qty: 9 | Refills: 0
Start: 2020-01-30 | End: 2020-02-01

## 2020-01-30 RX ORDER — TAMSULOSIN HYDROCHLORIDE 0.4 MG/1
1 CAPSULE ORAL
Qty: 30 | Refills: 0
Start: 2020-01-30 | End: 2020-02-28

## 2020-01-30 RX ORDER — SENNA PLUS 8.6 MG/1
2 TABLET ORAL
Qty: 0 | Refills: 0 | DISCHARGE
Start: 2020-01-30

## 2020-01-30 RX ORDER — METOPROLOL TARTRATE 50 MG
1 TABLET ORAL
Qty: 60 | Refills: 0
Start: 2020-01-30 | End: 2020-02-28

## 2020-01-30 RX ORDER — MORPHINE SULFATE 50 MG/1
1 CAPSULE, EXTENDED RELEASE ORAL
Qty: 8 | Refills: 0
Start: 2020-01-30 | End: 2020-02-02

## 2020-01-30 RX ADMIN — TAMSULOSIN HYDROCHLORIDE 0.4 MILLIGRAM(S): 0.4 CAPSULE ORAL at 21:25

## 2020-01-30 RX ADMIN — Medication 1 SPRAY(S): at 18:34

## 2020-01-30 RX ADMIN — Medication 1 SPRAY(S): at 05:24

## 2020-01-30 RX ADMIN — Medication 25 MILLIGRAM(S): at 18:37

## 2020-01-30 RX ADMIN — HYDROMORPHONE HYDROCHLORIDE 4 MILLIGRAM(S): 2 INJECTION INTRAMUSCULAR; INTRAVENOUS; SUBCUTANEOUS at 11:34

## 2020-01-30 RX ADMIN — MORPHINE SULFATE 30 MILLIGRAM(S): 50 CAPSULE, EXTENDED RELEASE ORAL at 06:00

## 2020-01-30 RX ADMIN — HYDROMORPHONE HYDROCHLORIDE 4 MILLIGRAM(S): 2 INJECTION INTRAMUSCULAR; INTRAVENOUS; SUBCUTANEOUS at 04:16

## 2020-01-30 RX ADMIN — HYDROMORPHONE HYDROCHLORIDE 4 MILLIGRAM(S): 2 INJECTION INTRAMUSCULAR; INTRAVENOUS; SUBCUTANEOUS at 23:06

## 2020-01-30 RX ADMIN — Medication 650 MILLIGRAM(S): at 03:11

## 2020-01-30 RX ADMIN — HYDROMORPHONE HYDROCHLORIDE 4 MILLIGRAM(S): 2 INJECTION INTRAMUSCULAR; INTRAVENOUS; SUBCUTANEOUS at 23:36

## 2020-01-30 RX ADMIN — HEPARIN SODIUM 5000 UNIT(S): 5000 INJECTION INTRAVENOUS; SUBCUTANEOUS at 05:25

## 2020-01-30 RX ADMIN — SODIUM CHLORIDE 125 MILLILITER(S): 9 INJECTION, SOLUTION INTRAVENOUS at 12:14

## 2020-01-30 RX ADMIN — HYDROMORPHONE HYDROCHLORIDE 4 MILLIGRAM(S): 2 INJECTION INTRAMUSCULAR; INTRAVENOUS; SUBCUTANEOUS at 10:34

## 2020-01-30 RX ADMIN — Medication 650 MILLIGRAM(S): at 02:41

## 2020-01-30 RX ADMIN — Medication 25 MILLIGRAM(S): at 05:26

## 2020-01-30 RX ADMIN — MORPHINE SULFATE 30 MILLIGRAM(S): 50 CAPSULE, EXTENDED RELEASE ORAL at 05:26

## 2020-01-30 RX ADMIN — HYDROMORPHONE HYDROCHLORIDE 4 MILLIGRAM(S): 2 INJECTION INTRAMUSCULAR; INTRAVENOUS; SUBCUTANEOUS at 03:46

## 2020-01-30 RX ADMIN — HEPARIN SODIUM 5000 UNIT(S): 5000 INJECTION INTRAVENOUS; SUBCUTANEOUS at 18:37

## 2020-01-30 RX ADMIN — Medication 50 MILLIGRAM(S): at 03:47

## 2020-01-30 NOTE — DISCHARGE NOTE PROVIDER - NSDCMRMEDTOKEN_GEN_ALL_CORE_FT
Dilaudid 2 mg oral tablet: 1 tab(s) orally every 8 hours MDD:3  metoprolol tartrate 25 mg oral tablet: 1 tab(s) orally 2 times a day  morphine 30 mg/8 to 12 hr oral tablet, extended release: 1 tab(s) orally 2 times a day MDD:2  senna oral tablet: 2 tab(s) orally once a day (at bedtime)  tamsulosin 0.4 mg oral capsule: 1 cap(s) orally once a day (at bedtime)

## 2020-01-30 NOTE — DISCHARGE NOTE PROVIDER - NSDCCPCAREPLAN_GEN_ALL_CORE_FT
PRINCIPAL DISCHARGE DIAGNOSIS  Diagnosis: Sickle cell crisis  Assessment and Plan of Treatment: 49 male with history of sickle cell disease was presented with acute pain all over his body. patient was diagnosed with acute sickle cell crisis with severe pain. patient was started on oxygen, IVF and iv pain meds. patient also received blood transfusion for worsening anemia. patient was found to have fever and finished antibiotic course for 5 days for UTI. PT now remains afebrile and cbc stable  patient's  pain meds were changed to oral and tolerating and stable for discharge.   Fevers Patient was spiking fevers my colleague obtained ID consult which was reviewed,  ? source, likely from sickle cell crisis, follow up blood cultures and RVP  done on 1/20/2020 are negative, CT chest shows atelectasis, no pneumonia, also ID recommend monitor off antibx last fever at midnight on 1/22/2020.  Pt had low grade temp 100.1 1/24.  No fevers since.  as per heme stable for dc, CBC at baseline    Benign prostatic hyperplasia without lower urinary tract symptoms.  Plan: reviewed prior health record and noted pt on Flomax from prior [facility] admission.   Chronic systolic congestive heart failure.  compensated.

## 2020-01-30 NOTE — PROGRESS NOTE ADULT - SUBJECTIVE AND OBJECTIVE BOX
lying in bed    Vital Signs Last 24 Hrs  T(C): 36.5 (30 Jan 2020 05:25), Max: 37 (29 Jan 2020 23:58)  T(F): 97.7 (30 Jan 2020 05:25), Max: 98.6 (29 Jan 2020 23:58)  HR: 91 (30 Jan 2020 05:25) (80 - 91)  BP: 112/79 (30 Jan 2020 05:25) (105/45 - 134/71)  BP(mean): --  RR: 16 (30 Jan 2020 05:25) (16 - 18)  SpO2: 95% (30 Jan 2020 05:25) (95% - 100%)    PHYSICAL EXAM:    general - AAO x 3  HEENT - No Icterus  CVS - RRR  RS - AE B/L  Abd - soft, NT  Ext - Pulses +        LABS:                        6.9    9.09  )-----------( 358      ( 30 Jan 2020 08:02 )             22.3                 Culture - Blood (collected 20 Jan 2020 15:49)  Source: .Blood Blood  Final Report (25 Jan 2020 16:01):    No growth at 5 days.    Culture - Blood (collected 20 Jan 2020 11:00)  Source: .Blood Blood  Final Report (25 Jan 2020 11:01):    No growth at 5 days.        RADIOLOGY & ADDITIONAL STUDIES:

## 2020-01-30 NOTE — PROVIDER CONTACT NOTE (CRITICAL VALUE NOTIFICATION) - NAME OF MD/NP/PA/DO NOTIFIED:
BELÉN Simental
BELÉN Simental
Cisco Simental NP
Cisco Simental NP
Dr Ivey
Larissa SILVA
Liss SILVA
Marilin Simental
SHIRA Whitfield
dr clifford
house pa
Dr. Finley

## 2020-01-30 NOTE — DISCHARGE NOTE PROVIDER - HOSPITAL COURSE
49 male with history of sickle cell disease was presented with acute pain all over his body. patient was diagnosed with acute sickle cell crisis with severe pain. patient was started on oxygen, IVF and iv pain meds. patient also received blood transfusion for worsening anemia. patient was found to have fever and finished antibiotic course for 5 days for UTI. PT now remians afebile and cbc stable  patient's  pain meds were changed to oral and tolerating and stable for discharge.         Fevers Patient was spiking fevers my colleague obtained ID consult which was reviewed,  ? source, likely from sickle cell crisis, follow up blood cultures and RVP  done on 1/20/2020 are negative, CT chest shows atelectasis, no pneumonia, also ID recommend monitor off antibx last fever at midnight on 1/22/2020.  Pt had low grade temp 100.1 1/24.  No fevers since.    as per heme stable for dc, CBC at baseline          Benign prostatic hyperplasia without lower urinary tract symptoms.  Plan: reviewed prior health record and noted pt on Flomax from prior [facility] admission.         Chronic systolic congestive heart failure.  compensated.

## 2020-01-30 NOTE — PROGRESS NOTE ADULT - PROBLEM SELECTOR PLAN 1
- patient at baseline Hb as per him, he is not symptomatic  - pain is better   - folic acid, pain meds  - patient can f/u with Primary Heme/Onc as outpatient Dr vidal

## 2020-01-31 ENCOUNTER — TRANSCRIPTION ENCOUNTER (OUTPATIENT)
Age: 65
End: 2020-01-31

## 2020-01-31 VITALS
OXYGEN SATURATION: 98 % | TEMPERATURE: 98 F | SYSTOLIC BLOOD PRESSURE: 110 MMHG | DIASTOLIC BLOOD PRESSURE: 71 MMHG | RESPIRATION RATE: 16 BRPM | HEART RATE: 72 BPM

## 2020-01-31 RX ORDER — HYDROMORPHONE HYDROCHLORIDE 2 MG/ML
1 INJECTION INTRAMUSCULAR; INTRAVENOUS; SUBCUTANEOUS
Qty: 9 | Refills: 0
Start: 2020-01-31

## 2020-01-31 RX ORDER — MORPHINE SULFATE 50 MG/1
1 CAPSULE, EXTENDED RELEASE ORAL
Qty: 8 | Refills: 0
Start: 2020-01-31 | End: 2020-02-03

## 2020-01-31 RX ORDER — TAMSULOSIN HYDROCHLORIDE 0.4 MG/1
1 CAPSULE ORAL
Qty: 30 | Refills: 0
Start: 2020-01-31 | End: 2020-02-29

## 2020-01-31 RX ORDER — METOPROLOL TARTRATE 50 MG
1 TABLET ORAL
Qty: 60 | Refills: 0
Start: 2020-01-31 | End: 2020-02-29

## 2020-01-31 RX ADMIN — HYDROMORPHONE HYDROCHLORIDE 4 MILLIGRAM(S): 2 INJECTION INTRAMUSCULAR; INTRAVENOUS; SUBCUTANEOUS at 05:08

## 2020-01-31 RX ADMIN — Medication 1 SPRAY(S): at 05:09

## 2020-01-31 RX ADMIN — HEPARIN SODIUM 5000 UNIT(S): 5000 INJECTION INTRAVENOUS; SUBCUTANEOUS at 05:11

## 2020-01-31 RX ADMIN — MORPHINE SULFATE 30 MILLIGRAM(S): 50 CAPSULE, EXTENDED RELEASE ORAL at 06:23

## 2020-01-31 RX ADMIN — MORPHINE SULFATE 30 MILLIGRAM(S): 50 CAPSULE, EXTENDED RELEASE ORAL at 06:24

## 2020-01-31 RX ADMIN — Medication 25 MILLIGRAM(S): at 05:11

## 2020-01-31 NOTE — PROGRESS NOTE ADULT - SUBJECTIVE AND OBJECTIVE BOX
patient feels better    Vital Signs Last 24 Hrs  T(C): 37 (31 Jan 2020 05:17), Max: 37.1 (30 Jan 2020 23:55)  T(F): 98.6 (31 Jan 2020 05:17), Max: 98.7 (30 Jan 2020 23:55)  HR: 100 (31 Jan 2020 05:17) (76 - 100)  BP: 121/69 (31 Jan 2020 05:17) (118/68 - 140/86)  BP(mean): --  RR: 16 (31 Jan 2020 05:17) (16 - 18)  SpO2: 99% (31 Jan 2020 05:17) (98% - 99%)    PHYSICAL EXAM:    general - AAO x 3  HEENT - No Icterus  CVS - RRR  RS - AE B/L  Abd - soft, NT  Ext - Pulses +        LABS:                        6.9    9.09  )-----------( 358      ( 30 Jan 2020 08:02 )             22.3                   RADIOLOGY & ADDITIONAL STUDIES:

## 2020-01-31 NOTE — DISCHARGE NOTE NURSING/CASE MANAGEMENT/SOCIAL WORK - PATIENT PORTAL LINK FT
You can access the FollowMyHealth Patient Portal offered by [facility] by registering at the following website: [email or web address]. By joining [facility]’s FollowMyHealth portal, you will also be able to view your health information using other applications (apps) compatible with our system.

## 2020-01-31 NOTE — CHART NOTE - NSCHARTNOTEFT_GEN_A_CORE
Assessment: Pt seen for severe malnutrition follow-up. Pt admitted with sickle cell crisis with severe pain; pt also with acute on chronic anemia s/p several PRBC transfusions during admission, Klebsiella UTI (finished antibiotic course), BPH, and CHF.    Factors impacting intake: [ ] none [ ] nausea  [ ] vomiting [ ] diarrhea [ ] constipation  [ ]chewing problems [ ] swallowing issues [x] other: generalized pain    Diet Prescription: Diet, Regular:   Low Sodium  Supplement Feeding Modality:  Oral  Ensure Enlive Cans or Servings Per Day:  1       Frequency:  Three Times a day (01-16-20 @ 10:36)    Intake:     Current Weight:   % Weight Change    Pertinent Medications: MEDICATIONS  (STANDING):  dextrose 5% + sodium chloride 0.45%. 1000 milliLiter(s) (125 mL/Hr) IV Continuous <Continuous>  fluticasone propionate 50 MICROgram(s)/spray Nasal Spray 1 Spray(s) Both Nostrils two times a day  heparin  Injectable 5000 Unit(s) SubCutaneous every 12 hours  influenza   Vaccine 0.5 milliLiter(s) IntraMuscular once  metoprolol tartrate 25 milliGRAM(s) Oral two times a day  morphine ER Tablet 30 milliGRAM(s) Oral two times a day  polyethylene glycol 3350 17 Gram(s) Oral daily  senna 2 Tablet(s) Oral at bedtime  tamsulosin 0.4 milliGRAM(s) Oral at bedtime    MEDICATIONS  (PRN):  acetaminophen   Tablet .. 650 milliGRAM(s) Oral every 6 hours PRN Temp greater or equal to 38C (100.4F), Mild Pain (1 - 3)  diphenhydrAMINE 50 milliGRAM(s) Oral every 4 hours PRN Rash and/or Itching  HYDROmorphone   Tablet 4 milliGRAM(s) Oral every 6 hours PRN Severe Pain (7 - 10)  polyethylene glycol 3350 17 Gram(s) Oral at bedtime PRN Constipation    Pertinent Labs:      CAPILLARY BLOOD GLUCOSE        Skin:     Estimated Needs:   [ ] no change since previous assessment  [ ] recalculated:     Previous Nutrition Diagnosis:   [ ] Inadequate Energy Intake [ ]Inadequate Oral Intake [ ] Excessive Energy Intake   [ ] Underweight [ ] Increased Nutrient Needs [ ] Overweight/Obesity   [ ] Altered GI Function [ ] Unintended Weight Loss [ ] Food & Nutrition Related Knowledge Deficit [ ] Malnutrition     Nutrition Diagnosis is [ ] ongoing  [ ] resolved [ ] not applicable     New Nutrition Diagnosis: [ ] not applicable       Interventions:   Recommend  [ ] Change Diet To:  [ ] Nutrition Supplement  [ ] Nutrition Support  [ ] Other:     Monitoring and Evaluation:   [ ] PO intake [ x ] Tolerance to diet prescription [ x ] weights [ x ] labs[ x ] follow up per protocol  [ ] other: Assessment: Pt admitted with sickle cell crisis with severe pain; pt also with acute on chronic anemia s/p several PRBC transfusions during admission, Klebsiella UTI (finished antibiotic course), BPH, CHF, and severe chronic protein-calorie malnutrition.    Factors impacting intake: [ ] none [ ] nausea  [ ] vomiting [ ] diarrhea [ ] constipation  [ ]chewing problems [ ] swallowing issues [x] other: generalized pain; persistent lack of appetite    Diet Prescription: Diet, Regular:   Low Sodium  Supplement Feeding Modality:  Oral  Ensure Enlive Cans or Servings Per Day:  1       Frequency:  Three Times a day (01-16-20 @ 10:36)    Intake: Po intake varies, 0-75%; Pt reported enjoying and consuming Ensure Enlive supplements; Pt with independent feeding skill.    Current Weight: 46.5 kg (1/31), 47 kg (1/24)  % Weight Change: 1%  (0.5 kg) wt loss x 1 week; No edema    Nutrition focused physical exam conducted; Subcutaneous fat loss: [severe] Orbital fat pads region, [severe]Buccal fat region, [severe]Triceps region,  [moderate]Ribs region.  Muscle wasting: [severe]Temples region, [mild]Clavicle region, [moderate]Shoulder region, [unable]Scapula region, [moderate]Interosseous region,  [severe]thigh region, [severe]Calf region    Pertinent Medications: MEDICATIONS  (STANDING):  dextrose 5% + sodium chloride 0.45%. 1000 milliLiter(s) (125 mL/Hr) IV Continuous <Continuous>  fluticasone propionate 50 MICROgram(s)/spray Nasal Spray 1 Spray(s) Both Nostrils two times a day  heparin  Injectable 5000 Unit(s) SubCutaneous every 12 hours  influenza   Vaccine 0.5 milliLiter(s) IntraMuscular once  metoprolol tartrate 25 milliGRAM(s) Oral two times a day  morphine ER Tablet 30 milliGRAM(s) Oral two times a day  polyethylene glycol 3350 17 Gram(s) Oral daily  senna 2 Tablet(s) Oral at bedtime  tamsulosin 0.4 milliGRAM(s) Oral at bedtime    MEDICATIONS  (PRN):  acetaminophen   Tablet .. 650 milliGRAM(s) Oral every 6 hours PRN Temp greater or equal to 38C (100.4F), Mild Pain (1 - 3)  diphenhydrAMINE 50 milliGRAM(s) Oral every 4 hours PRN Rash and/or Itching  HYDROmorphone   Tablet 4 milliGRAM(s) Oral every 6 hours PRN Severe Pain (7 - 10)  polyethylene glycol 3350 17 Gram(s) Oral at bedtime PRN Constipation    Pertinent Labs:     CAPILLARY BLOOD GLUCOSE    Skin: WDL    Estimated Needs:   [x] no change since previous assessment on 1/12/20  [ ] recalculated:     Previous Nutrition Diagnosis:   Malnutrition Severe malnutrition in the context of chronic illness.   Etiology Inadequate energy & protein intake related to sickle cell.   Signs/Symptoms Severe subcutaneous fat loss & muscle wasting.     Goal/Expected Outcome Pt to consume >75% meals/supplements (goal not consistently met); Pt to maintain stable wt +-2# (goal met)    Nutrition Diagnosis is [x] ongoing  [ ] resolved [ ] not applicable     New Nutrition Diagnosis: [x] not applicable       Interventions:   Recommend  [x] Continue with current diet as rx'd  [ ] Change Diet To:  [ ] Nutrition Supplement  [ ] Nutrition Support  [ ] Other:     Monitoring and Evaluation:   [ x ] PO intake [ x ] Tolerance to diet prescription [ x ] weights [ x ] labs[ x ] follow up per protocol  [ ] other:

## 2020-01-31 NOTE — PROGRESS NOTE ADULT - PROBLEM SELECTOR PLAN 1
- patient at baseline Hb as per him, he is not symptomatic  - pain is better   - folic acid, pain meds  - patient can f/u with Primary Heme/Onc as outpatient Dr vidal.

## 2020-02-04 DIAGNOSIS — Z91.010 ALLERGY TO PEANUTS: ICD-10-CM

## 2020-02-04 DIAGNOSIS — B96.1 KLEBSIELLA PNEUMONIAE [K. PNEUMONIAE] AS THE CAUSE OF DISEASES CLASSIFIED ELSEWHERE: ICD-10-CM

## 2020-02-04 DIAGNOSIS — D57.00 HB-SS DISEASE WITH CRISIS, UNSPECIFIED: ICD-10-CM

## 2020-02-04 DIAGNOSIS — N39.0 URINARY TRACT INFECTION, SITE NOT SPECIFIED: ICD-10-CM

## 2020-02-04 DIAGNOSIS — I50.22 CHRONIC SYSTOLIC (CONGESTIVE) HEART FAILURE: ICD-10-CM

## 2020-02-04 DIAGNOSIS — E43 UNSPECIFIED SEVERE PROTEIN-CALORIE MALNUTRITION: ICD-10-CM

## 2020-02-04 DIAGNOSIS — N17.9 ACUTE KIDNEY FAILURE, UNSPECIFIED: ICD-10-CM

## 2020-02-04 DIAGNOSIS — N40.0 BENIGN PROSTATIC HYPERPLASIA WITHOUT LOWER URINARY TRACT SYMPTOMS: ICD-10-CM

## 2020-02-06 ENCOUNTER — APPOINTMENT (OUTPATIENT)
Dept: HEMATOLOGY ONCOLOGY | Facility: CLINIC | Age: 65
End: 2020-02-06

## 2020-02-07 ENCOUNTER — INPATIENT (INPATIENT)
Facility: HOSPITAL | Age: 65
LOS: 6 days | Discharge: HOME | End: 2020-02-14
Attending: HOSPITALIST | Admitting: HOSPITALIST
Payer: MEDICARE

## 2020-02-07 VITALS
HEART RATE: 108 BPM | SYSTOLIC BLOOD PRESSURE: 111 MMHG | TEMPERATURE: 98 F | OXYGEN SATURATION: 97 % | DIASTOLIC BLOOD PRESSURE: 63 MMHG | RESPIRATION RATE: 20 BRPM

## 2020-02-07 DIAGNOSIS — Z02.9 ENCOUNTER FOR ADMINISTRATIVE EXAMINATIONS, UNSPECIFIED: ICD-10-CM

## 2020-02-07 DIAGNOSIS — Z29.9 ENCOUNTER FOR PROPHYLACTIC MEASURES, UNSPECIFIED: ICD-10-CM

## 2020-02-07 DIAGNOSIS — N17.9 ACUTE KIDNEY FAILURE, UNSPECIFIED: ICD-10-CM

## 2020-02-07 DIAGNOSIS — I50.22 CHRONIC SYSTOLIC (CONGESTIVE) HEART FAILURE: ICD-10-CM

## 2020-02-07 DIAGNOSIS — D57.00 HB-SS DISEASE WITH CRISIS, UNSPECIFIED: ICD-10-CM

## 2020-02-07 DIAGNOSIS — B34.9 VIRAL INFECTION, UNSPECIFIED: ICD-10-CM

## 2020-02-07 LAB
ALBUMIN SERPL ELPH-MCNC: 3.7 G/DL — SIGNIFICANT CHANGE UP (ref 3.3–5)
ALP SERPL-CCNC: 178 U/L — HIGH (ref 40–120)
ALT FLD-CCNC: 19 U/L — SIGNIFICANT CHANGE UP (ref 4–41)
ANION GAP SERPL CALC-SCNC: 11 MMO/L — SIGNIFICANT CHANGE UP (ref 7–14)
AST SERPL-CCNC: 40 U/L — SIGNIFICANT CHANGE UP (ref 4–40)
B PERT DNA SPEC QL NAA+PROBE: NOT DETECTED — SIGNIFICANT CHANGE UP
BASOPHILS # BLD AUTO: 0.11 K/UL — SIGNIFICANT CHANGE UP (ref 0–0.2)
BASOPHILS NFR BLD AUTO: 0.9 % — SIGNIFICANT CHANGE UP (ref 0–2)
BILIRUB SERPL-MCNC: 2.2 MG/DL — HIGH (ref 0.2–1.2)
BUN SERPL-MCNC: 35 MG/DL — HIGH (ref 7–23)
C PNEUM DNA SPEC QL NAA+PROBE: NOT DETECTED — SIGNIFICANT CHANGE UP
CALCIUM SERPL-MCNC: 9.5 MG/DL — SIGNIFICANT CHANGE UP (ref 8.4–10.5)
CHLORIDE SERPL-SCNC: 103 MMOL/L — SIGNIFICANT CHANGE UP (ref 98–107)
CO2 SERPL-SCNC: 20 MMOL/L — LOW (ref 22–31)
CREAT SERPL-MCNC: 1.45 MG/DL — HIGH (ref 0.5–1.3)
EOSINOPHIL # BLD AUTO: 0.21 K/UL — SIGNIFICANT CHANGE UP (ref 0–0.5)
EOSINOPHIL NFR BLD AUTO: 1.8 % — SIGNIFICANT CHANGE UP (ref 0–6)
FLUAV H1 2009 PAND RNA SPEC QL NAA+PROBE: NOT DETECTED — SIGNIFICANT CHANGE UP
FLUAV H1 RNA SPEC QL NAA+PROBE: NOT DETECTED — SIGNIFICANT CHANGE UP
FLUAV H3 RNA SPEC QL NAA+PROBE: NOT DETECTED — SIGNIFICANT CHANGE UP
FLUAV SUBTYP SPEC NAA+PROBE: NOT DETECTED — SIGNIFICANT CHANGE UP
FLUBV RNA SPEC QL NAA+PROBE: NOT DETECTED — SIGNIFICANT CHANGE UP
GLUCOSE SERPL-MCNC: 88 MG/DL — SIGNIFICANT CHANGE UP (ref 70–99)
HADV DNA SPEC QL NAA+PROBE: NOT DETECTED — SIGNIFICANT CHANGE UP
HCOV PNL SPEC NAA+PROBE: SIGNIFICANT CHANGE UP
HCT VFR BLD CALC: 22.9 % — LOW (ref 39–50)
HGB BLD-MCNC: 7.5 G/DL — LOW (ref 13–17)
HMPV RNA SPEC QL NAA+PROBE: NOT DETECTED — SIGNIFICANT CHANGE UP
HPIV1 RNA SPEC QL NAA+PROBE: NOT DETECTED — SIGNIFICANT CHANGE UP
HPIV2 RNA SPEC QL NAA+PROBE: NOT DETECTED — SIGNIFICANT CHANGE UP
HPIV3 RNA SPEC QL NAA+PROBE: NOT DETECTED — SIGNIFICANT CHANGE UP
HPIV4 RNA SPEC QL NAA+PROBE: NOT DETECTED — SIGNIFICANT CHANGE UP
IMM GRANULOCYTES NFR BLD AUTO: 0.8 % — SIGNIFICANT CHANGE UP (ref 0–1.5)
LYMPHOCYTES # BLD AUTO: 1.38 K/UL — SIGNIFICANT CHANGE UP (ref 1–3.3)
LYMPHOCYTES # BLD AUTO: 11.7 % — LOW (ref 13–44)
MCHC RBC-ENTMCNC: 24.8 PG — LOW (ref 27–34)
MCHC RBC-ENTMCNC: 32.8 % — SIGNIFICANT CHANGE UP (ref 32–36)
MCV RBC AUTO: 75.8 FL — LOW (ref 80–100)
MONOCYTES # BLD AUTO: 1.13 K/UL — HIGH (ref 0–0.9)
MONOCYTES NFR BLD AUTO: 9.5 % — SIGNIFICANT CHANGE UP (ref 2–14)
NEUTROPHILS # BLD AUTO: 8.92 K/UL — HIGH (ref 1.8–7.4)
NEUTROPHILS NFR BLD AUTO: 75.3 % — SIGNIFICANT CHANGE UP (ref 43–77)
NRBC # FLD: 0.07 K/UL — SIGNIFICANT CHANGE UP (ref 0–0)
PLATELET # BLD AUTO: 413 K/UL — HIGH (ref 150–400)
PMV BLD: 9.4 FL — SIGNIFICANT CHANGE UP (ref 7–13)
POTASSIUM SERPL-MCNC: 5.3 MMOL/L — SIGNIFICANT CHANGE UP (ref 3.5–5.3)
POTASSIUM SERPL-SCNC: 5.3 MMOL/L — SIGNIFICANT CHANGE UP (ref 3.5–5.3)
PROT SERPL-MCNC: 8 G/DL — SIGNIFICANT CHANGE UP (ref 6–8.3)
RBC # BLD: 3.02 M/UL — LOW (ref 4.2–5.8)
RBC # FLD: 27.7 % — HIGH (ref 10.3–14.5)
RETICS #: 145 K/UL — HIGH (ref 25–125)
RETICS/RBC NFR: 4.8 % — HIGH (ref 0.5–2.5)
RSV RNA SPEC QL NAA+PROBE: NOT DETECTED — SIGNIFICANT CHANGE UP
RV+EV RNA SPEC QL NAA+PROBE: NOT DETECTED — SIGNIFICANT CHANGE UP
SODIUM SERPL-SCNC: 134 MMOL/L — LOW (ref 135–145)
WBC # BLD: 11.84 K/UL — HIGH (ref 3.8–10.5)
WBC # FLD AUTO: 11.84 K/UL — HIGH (ref 3.8–10.5)

## 2020-02-07 PROCEDURE — 71046 X-RAY EXAM CHEST 2 VIEWS: CPT | Mod: 26

## 2020-02-07 PROCEDURE — 99223 1ST HOSP IP/OBS HIGH 75: CPT

## 2020-02-07 PROCEDURE — 76700 US EXAM ABDOM COMPLETE: CPT | Mod: 26

## 2020-02-07 RX ORDER — SODIUM CHLORIDE 9 MG/ML
1000 INJECTION, SOLUTION INTRAVENOUS
Refills: 0 | Status: DISCONTINUED | OUTPATIENT
Start: 2020-02-07 | End: 2020-02-11

## 2020-02-07 RX ORDER — SODIUM CHLORIDE 9 MG/ML
1000 INJECTION INTRAMUSCULAR; INTRAVENOUS; SUBCUTANEOUS ONCE
Refills: 0 | Status: COMPLETED | OUTPATIENT
Start: 2020-02-07 | End: 2020-02-07

## 2020-02-07 RX ORDER — HYDROMORPHONE HYDROCHLORIDE 2 MG/ML
2 INJECTION INTRAMUSCULAR; INTRAVENOUS; SUBCUTANEOUS ONCE
Refills: 0 | Status: DISCONTINUED | OUTPATIENT
Start: 2020-02-07 | End: 2020-02-07

## 2020-02-07 RX ORDER — ONDANSETRON 8 MG/1
4 TABLET, FILM COATED ORAL EVERY 6 HOURS
Refills: 0 | Status: DISCONTINUED | OUTPATIENT
Start: 2020-02-07 | End: 2020-02-14

## 2020-02-07 RX ORDER — HYDROMORPHONE HYDROCHLORIDE 2 MG/ML
30 INJECTION INTRAMUSCULAR; INTRAVENOUS; SUBCUTANEOUS
Refills: 0 | Status: DISCONTINUED | OUTPATIENT
Start: 2020-02-07 | End: 2020-02-12

## 2020-02-07 RX ORDER — TAMSULOSIN HYDROCHLORIDE 0.4 MG/1
0.4 CAPSULE ORAL AT BEDTIME
Refills: 0 | Status: DISCONTINUED | OUTPATIENT
Start: 2020-02-07 | End: 2020-02-14

## 2020-02-07 RX ORDER — METOPROLOL TARTRATE 50 MG
25 TABLET ORAL
Refills: 0 | Status: DISCONTINUED | OUTPATIENT
Start: 2020-02-07 | End: 2020-02-14

## 2020-02-07 RX ORDER — SENNA PLUS 8.6 MG/1
2 TABLET ORAL AT BEDTIME
Refills: 0 | Status: DISCONTINUED | OUTPATIENT
Start: 2020-02-07 | End: 2020-02-14

## 2020-02-07 RX ORDER — INFLUENZA VIRUS VACCINE 15; 15; 15; 15 UG/.5ML; UG/.5ML; UG/.5ML; UG/.5ML
0.5 SUSPENSION INTRAMUSCULAR ONCE
Refills: 0 | Status: COMPLETED | OUTPATIENT
Start: 2020-02-07 | End: 2020-02-07

## 2020-02-07 RX ORDER — FOLIC ACID 0.8 MG
1 TABLET ORAL DAILY
Refills: 0 | Status: DISCONTINUED | OUTPATIENT
Start: 2020-02-07 | End: 2020-02-14

## 2020-02-07 RX ORDER — NALOXONE HYDROCHLORIDE 4 MG/.1ML
0.1 SPRAY NASAL
Refills: 0 | Status: DISCONTINUED | OUTPATIENT
Start: 2020-02-07 | End: 2020-02-12

## 2020-02-07 RX ORDER — ACETAMINOPHEN 500 MG
650 TABLET ORAL EVERY 6 HOURS
Refills: 0 | Status: DISCONTINUED | OUTPATIENT
Start: 2020-02-07 | End: 2020-02-14

## 2020-02-07 RX ORDER — ENOXAPARIN SODIUM 100 MG/ML
40 INJECTION SUBCUTANEOUS DAILY
Refills: 0 | Status: DISCONTINUED | OUTPATIENT
Start: 2020-02-07 | End: 2020-02-14

## 2020-02-07 RX ORDER — HYDROMORPHONE HYDROCHLORIDE 2 MG/ML
1 INJECTION INTRAMUSCULAR; INTRAVENOUS; SUBCUTANEOUS EVERY 4 HOURS
Refills: 0 | Status: DISCONTINUED | OUTPATIENT
Start: 2020-02-07 | End: 2020-02-09

## 2020-02-07 RX ADMIN — SENNA PLUS 2 TABLET(S): 8.6 TABLET ORAL at 23:22

## 2020-02-07 RX ADMIN — HYDROMORPHONE HYDROCHLORIDE 2 MILLIGRAM(S): 2 INJECTION INTRAMUSCULAR; INTRAVENOUS; SUBCUTANEOUS at 06:14

## 2020-02-07 RX ADMIN — SODIUM CHLORIDE 1000 MILLILITER(S): 9 INJECTION INTRAMUSCULAR; INTRAVENOUS; SUBCUTANEOUS at 06:14

## 2020-02-07 RX ADMIN — SODIUM CHLORIDE 1000 MILLILITER(S): 9 INJECTION INTRAMUSCULAR; INTRAVENOUS; SUBCUTANEOUS at 06:42

## 2020-02-07 RX ADMIN — HYDROMORPHONE HYDROCHLORIDE 2 MILLIGRAM(S): 2 INJECTION INTRAMUSCULAR; INTRAVENOUS; SUBCUTANEOUS at 06:56

## 2020-02-07 RX ADMIN — HYDROMORPHONE HYDROCHLORIDE 2 MILLIGRAM(S): 2 INJECTION INTRAMUSCULAR; INTRAVENOUS; SUBCUTANEOUS at 06:41

## 2020-02-07 RX ADMIN — TAMSULOSIN HYDROCHLORIDE 0.4 MILLIGRAM(S): 0.4 CAPSULE ORAL at 23:22

## 2020-02-07 RX ADMIN — Medication 1 MILLIGRAM(S): at 12:46

## 2020-02-07 RX ADMIN — HYDROMORPHONE HYDROCHLORIDE 2 MILLIGRAM(S): 2 INJECTION INTRAMUSCULAR; INTRAVENOUS; SUBCUTANEOUS at 06:00

## 2020-02-07 RX ADMIN — Medication 25 MILLIGRAM(S): at 17:14

## 2020-02-07 RX ADMIN — HYDROMORPHONE HYDROCHLORIDE 30 MILLILITER(S): 2 INJECTION INTRAMUSCULAR; INTRAVENOUS; SUBCUTANEOUS at 20:29

## 2020-02-07 RX ADMIN — SODIUM CHLORIDE 100 MILLILITER(S): 9 INJECTION, SOLUTION INTRAVENOUS at 12:53

## 2020-02-07 RX ADMIN — HYDROMORPHONE HYDROCHLORIDE 30 MILLILITER(S): 2 INJECTION INTRAMUSCULAR; INTRAVENOUS; SUBCUTANEOUS at 12:48

## 2020-02-07 RX ADMIN — SODIUM CHLORIDE 1000 MILLILITER(S): 9 INJECTION INTRAMUSCULAR; INTRAVENOUS; SUBCUTANEOUS at 05:14

## 2020-02-07 RX ADMIN — ENOXAPARIN SODIUM 40 MILLIGRAM(S): 100 INJECTION SUBCUTANEOUS at 12:45

## 2020-02-07 NOTE — H&P ADULT - NSHPREVIEWOFSYSTEMS_GEN_ALL_CORE
Review of Systems:   CONSTITUTIONAL: No fever, weight loss, or fatigue  EYES: No eye pain, visual disturbances, or discharge  ENMT:  No difficulty hearing, tinnitus, vertigo; No sinus or throat pain  NECK: No pain or stiffness  BREASTS: No pain, masses, or nipple discharge  RESPIRATORY: No cough, wheezing, chills or hemoptysis; No shortness of breath  CARDIOVASCULAR: No chest pain, palpitations, dizziness, or leg swelling  GASTROINTESTINAL:. No hematemesis; No diarrhea or constipation. No melena or hematochezia. +abdominal pain with nausea and vomiting   GENITOURINARY: No dysuria, frequency, hematuria, or incontinence  NEUROLOGICAL: No headaches, memory loss, loss of strength, numbness, or tremors  SKIN: No itching, burning, rashes, or lesions   LYMPH NODES: No enlarged glands  ENDOCRINE: No heat or cold intolerance; No hair loss  MUSCULOSKELETAL: No joint pain or swelling; No muscle, back, or extremity pain  PSYCHIATRIC: No depression, anxiety, mood swings, or difficulty sleeping  HEME/LYMPH: No easy bruising, or bleeding gums  ALLERGY AND IMMUNOLOGIC: No hives or eczema Review of Systems:   CONSTITUTIONAL: No fever, weight loss, or fatigue  EYES: No eye pain, visual disturbances, or discharge  ENMT:  No difficulty hearing, tinnitus, vertigo; No sinus or throat pain  NECK: No pain or stiffness  BREASTS: No pain, masses, or nipple discharge  RESPIRATORY: No cough, wheezing, chills or hemoptysis; No shortness of breath  CARDIOVASCULAR: No chest pain, palpitations, dizziness, or leg swelling  GASTROINTESTINAL:. No hematemesis; No diarrhea or constipation. No melena or hematochezia. +abdominal pain with nausea and vomiting   GENITOURINARY: No dysuria, frequency, hematuria, or incontinence  NEUROLOGICAL: No headaches, memory loss, loss of strength, numbness, or tremors  SKIN: No itching, burning, rashes, or lesions   LYMPH NODES: No enlarged glands  ENDOCRINE: No heat or cold intolerance; No hair loss  MUSCULOSKELETAL: No joint pain or swelling; +b/l arm and leg and lower back pain similar to prior sickle crisis  PSYCHIATRIC: No depression, anxiety, mood swings, or difficulty sleeping  HEME/LYMPH: No easy bruising, or bleeding gums  ALLERGY AND IMMUNOLOGIC: No hives or eczema

## 2020-02-07 NOTE — H&P ADULT - HISTORY OF PRESENT ILLNESS
Pt is a 48 yo M w/ Hx SSD, HTN, Chronic systolic HF, mild intellectual disability p/w pain. Pt states on thursday began having abdominal pain unable to recall location with associated nausea and vomiting NBNB with subjective fever. He took Tylenol and ibuprofen which did not alleviate pain. He subsequently began having pain in his arm, Legs and lower back similar to his sickle cell crisis pain. Denies sick contacts,  black or bloody stool.

## 2020-02-07 NOTE — ED PROVIDER NOTE - NS ED ROS FT
GENERAL: no fever, chills  HEENT: no cough, congestion, odynophagia, dysphagia  CARDIAC: no chest pain, palpitations, lightheadedness  PULM: no dyspnea, wheezing   GI: + abdominal pain, nausea  : no urinary dysuria, frequency, incontinence, hematuria  NEURO: no headache, motor weakness, sensory changes  MSK: no joint or muscle pain  SKIN: no rashes, hives  HEME: no active bleeding, bruising

## 2020-02-07 NOTE — H&P ADULT - NSICDXPASTMEDICALHX_GEN_ALL_CORE_FT
PAST MEDICAL HISTORY:  Acute chest syndrome Pt unaware    Constipation     H/O transfusion ~ pRBC    Hypertension     Intellectual disability ~ mild    Left ventricular dysfunction mild LVD on echo in 7/18, normal LVF in 12/18    Sickle Cell Disease

## 2020-02-07 NOTE — ED ADULT NURSE NOTE - CHIEF COMPLAINT QUOTE
Pt. brought to [facility] ED for sickle cell pain. Pt.'s last crisis was in December, 2019. c/o "pain all over" NAD noted.
The patient is a 67y Female complaining of weakness.

## 2020-02-07 NOTE — ED ADULT NURSE REASSESSMENT NOTE - NS ED NURSE REASSESS COMMENT FT1
pt tolerated medication well and indicates pain level is 2/10. pt is smiling and laughing with staff.

## 2020-02-07 NOTE — ED PROVIDER NOTE - CLINICAL SUMMARY MEDICAL DECISION MAKING FREE TEXT BOX
49M with hx of DM, HTN, HLD, cholecystectomy presenting with 2 weeks of constant abdominal pain associated with nausea. Will eval for ACS. Plan - basics, retic, CXR, IV pain meds, IVF, likely tba. 50 y/o M with sickle cell disease with 1 week of body pain in setting of associated URI sxs.  Poss viral uri, vasooclusive crises, no resp sxs cp or fever to suggest ACS - will obtain labs, cxr, ivfs, pain meds, rvp, reassess.

## 2020-02-07 NOTE — H&P ADULT - ASSESSMENT
Pt is 30 yo M w/ hx SCD, HTN, chronic systolic HF EF 46% (8/19) p/w pain. Pt is 30 yo M w/ hx SCD, HTN, chronic systolic HF EF 46% (8/19) p/w abdominal pain and pain in b/l arms and legs. Pt is 28 yo M w/ hx SCD, HTN, chronic systolic HF EF 46% (8/19) p/w abdominal pain and pain in b/l arms and legs. RVP neg CXR- no acute infiltrate. EKG- NSR w/ LVH no acute ST/T wave changes. mild elevation in Cr

## 2020-02-07 NOTE — H&P ADULT - PROBLEM SELECTOR PLAN 4
IMPROVE VTE Individual Risk Assessment    RISK                                                          Points  [] Previous VTE                                           3  [] Thrombophilia                                        2  [] Lower limb paralysis                              2   [] Current Cancer                                       2   [x] Immobilization > 24 hrs                        1  [] ICU/CCU stay > 24 hours                       1  [] Age > 60                                                   1    IMPROVE VTE Score: 2 lovenox sq

## 2020-02-07 NOTE — H&P ADULT - PROBLEM SELECTOR PLAN 1
-IVF 1/2 NS @ 100ml/hr  -PCA   -folic acid  -retic count/LDH/LFTs daily -possibly secondary to acute viral illness vs gallstone  -IVF 1/2 NS @ 100ml/hr  -PCA   -folic acid  -retic count/LDH/LFTs daily  -check fractionated alkphos  -Pt is poor historian will check US r/o billary process prior US showing cholelithiasis

## 2020-02-07 NOTE — ED PROVIDER NOTE - PMH
Acute chest syndrome  Pt unaware  Constipation    H/O transfusion  ~ pRBC  Hypertension    Intellectual disability  ~ mild  Left ventricular dysfunction  mild LVD on echo in 7/18, normal LVF in 12/18  Sickle Cell Disease

## 2020-02-07 NOTE — ED ADULT NURSE REASSESSMENT NOTE - NS ED NURSE REASSESS COMMENT FT1
pt alert,oriented x3. states comfortable at present. pt noted with l upper arm iv in place. ultrasound by md. awaits med bed. will continue to monitor

## 2020-02-07 NOTE — ED ADULT TRIAGE NOTE - CHIEF COMPLAINT QUOTE
Pt. brought to [facility] ED for sickle cell pain. Pt.'s last crisis was in December, 2019. c/o "pain all over" NAD noted.

## 2020-02-07 NOTE — H&P ADULT - PROBLEM SELECTOR PLAN 5
Transitions of Care Status:  1.  Name of PCP: Lluvia Hamm   2.  PCP Contacted on Admission: [x ] Y    [ ] N    3.  PCP contacted at Discharge: [ ] Y    [ ] N    [ ] N/A  4.  Post-Discharge Appointment Date and Location:  5.  Summary of Handoff given to PCP:

## 2020-02-07 NOTE — H&P ADULT - PROBLEM SELECTOR PLAN 2
-Cr 0.88 9/2/2019--Cr 1.45 2/7/2019  -gentle IVF -Cr 0.88 9/2/2019--Cr 1.45 2/7/2019  -check US r/o hydropnephrosis hx of BPH   -PVR   -cont flomax  -gentle IVF

## 2020-02-07 NOTE — H&P ADULT - NSHPPHYSICALEXAM_GEN_ALL_CORE
Vital Signs Last 24 Hrs  T(C): 37.6 (07 Feb 2020 05:19), Max: 37.6 (07 Feb 2020 05:19)  T(F): 99.7 (07 Feb 2020 05:19), Max: 99.7 (07 Feb 2020 05:19)  HR: 87 (07 Feb 2020 06:00) (87 - 108)  BP: 115/75 (07 Feb 2020 06:00) (105/67 - 115/75)  BP(mean): --  RR: 20 (07 Feb 2020 06:00) (16 - 20)  SpO2: 99% (07 Feb 2020 06:00) (97% - 99%)    PHYSICAL EXAM:  GENERAL: NAD, well-developed  HEAD:  Atraumatic, Normocephalic  EYES: EOMI, PERRLA, conjunctiva and sclera clear  NECK: Supple, No JVD  CHEST/LUNG: Clear to auscultation bilaterally; No wheeze  HEART: Regular rate and rhythm; No murmurs, rubs, or gallops  ABDOMEN: Soft, Nontender, Nondistended; Bowel sounds present  EXTREMITIES:  2+ Peripheral Pulses, No clubbing, cyanosis, or edema  PSYCH: AAOx3  NEUROLOGY: non-focal  SKIN: No rashes or lesions Vital Signs Last 24 Hrs  T(C): 37.6 (07 Feb 2020 05:19), Max: 37.6 (07 Feb 2020 05:19)  T(F): 99.7 (07 Feb 2020 05:19), Max: 99.7 (07 Feb 2020 05:19)  HR: 87 (07 Feb 2020 06:00) (87 - 108)  BP: 115/75 (07 Feb 2020 06:00) (105/67 - 115/75)  BP(mean): --  RR: 20 (07 Feb 2020 06:00) (16 - 20)  SpO2: 99% (07 Feb 2020 06:00) (97% - 99%)    PHYSICAL EXAM:  GENERAL: NAD, well-developed  HEAD:  Atraumatic, Normocephalic  EYES: EOMI conjunctiva and sclera clear  NECK: Supple, No JVD  CHEST/LUNG: Clear to auscultation bilaterally; No wheeze  HEART: Regular rate and rhythm; No murmurs, rubs, or gallops  ABDOMEN: Soft, Nontender Bowel sounds present +vague abdominal tenderness unable to localize   EXTREMITIES:  2+ Peripheral Pulses, No clubbing, cyanosis, or edema  PSYCH: AAOx3  NEUROLOGY: non-focal

## 2020-02-07 NOTE — H&P ADULT - NSHPLABSRESULTS_GEN_ALL_CORE
7.5    11.84 )-----------( 413      ( 07 Feb 2020 04:15 )             22.9       02-07    134<L>  |  103  |  35<H>  ----------------------------<  88  5.3   |  20<L>  |  1.45<H>    Ca    9.5      07 Feb 2020 04:15    TPro  8.0  /  Alb  3.7  /  TBili  2.2<H>  /  DBili  x   /  AST  40  /  ALT  19  /  AlkPhos  178<H>  02-07      CAPILLARY BLOOD GLUCOSE                  Radiology 7.5    11.84 )-----------( 413      ( 07 Feb 2020 04:15 )             22.9       02-07    134<L>  |  103  |  35<H>  ----------------------------<  88  5.3   |  20<L>  |  1.45<H>    Ca    9.5      07 Feb 2020 04:15    TPro  8.0  /  Alb  3.7  /  TBili  2.2<H>  /  DBili  x   /  AST  40  /  ALT  19  /  AlkPhos  178<H>  02-07      CAPILLARY BLOOD GLUCOSE                  Radiology    < from: US Abdomen Complete (05.06.19 @ 07:57) >    TECHNIQUE: Sonography of the abdomen.     FINDINGS:    Liver: Within normal limits.    Bile ducts: Borderline prominent extrahepatic common bile duct measuring   up to 6 mm. No intrahepatic biliary distention.    Gallbladder: Cholelithiasis. Underdistended gallbladder. No   pericholecystic fluid. Negative sonographic Mcghee sign.    Pancreas: Visualized portions are within normal limits.    Spleen: 9.3 cm. Within normal limits.    Right kidney: 7.5 cm. No hydronephrosis.    Left kidney: 7.2 cm.  No hydronephrosis.    Ascites: None.    Aorta and IVC: Visualized portions are within normal limits.    IMPRESSION:     Cholelithiasis in an underdistended gallbladder. No sonographic evidence   of acute cholecystitis. However, if clinical concern persists, consider   nuclear medicineHIDA scan for further evaluation.    < end of copied text > 7.5    11.84 )-----------( 413      ( 07 Feb 2020 04:15 )             22.9       02-07    134<L>  |  103  |  35<H>  ----------------------------<  88  5.3   |  20<L>  |  1.45<H>    Ca    9.5      07 Feb 2020 04:15    TPro  8.0  /  Alb  3.7  /  TBili  2.2<H>  /  DBili  x   /  AST  40  /  ALT  19  /  AlkPhos  178<H>  02-07      CAPILLARY BLOOD GLUCOSE                  Radiology    < from: US Abdomen Complete (05.06.19 @ 07:57) >    TECHNIQUE: Sonography of the abdomen.     FINDINGS:    Liver: Within normal limits.    Bile ducts: Borderline prominent extrahepatic common bile duct measuring   up to 6 mm. No intrahepatic biliary distention.    Gallbladder: Cholelithiasis. Underdistended gallbladder. No   pericholecystic fluid. Negative sonographic Mcghee sign.    Pancreas: Visualized portions are within normal limits.    Spleen: 9.3 cm. Within normal limits.    Right kidney: 7.5 cm. No hydronephrosis.    Left kidney: 7.2 cm.  No hydronephrosis.    Ascites: None.    Aorta and IVC: Visualized portions are within normal limits.    IMPRESSION:     Cholelithiasis in an underdistended gallbladder. No sonographic evidence   of acute cholecystitis. However, if clinical concern persists, consider   nuclear medicineHIDA scan for further evaluation.    < end of copied text >    EKG-NSR 71 BPM with LVH no ST/T wave changes

## 2020-02-07 NOTE — ED PROVIDER NOTE - OBJECTIVE STATEMENT
49M with hx of DM, HTN, HLD, cholecystectomy presenting with 2 weeks of constant abdominal pain associated with nausea. Patient also has associated bilateral feet burning. Pain is worse before and after eating. No actual vomiting. Last BM was today. No blood in stool. Patient had an endoscopy 2-3 years ago showing "redness in the stomach". Patient denies hx of abdominal surgery. 49M with hx of sickle cell disease here with body pain.  Pt reports 1 week of diffuse body pain associated with subjective fever and chills, rhinorrhea, sore throat.  He was due to follow-up with libby (Dr Hamm) yesterday, but missed his appointment because he felt unwell.  No recent travel, known sick contacts.

## 2020-02-08 ENCOUNTER — TRANSCRIPTION ENCOUNTER (OUTPATIENT)
Age: 65
End: 2020-02-08

## 2020-02-08 LAB
ALBUMIN SERPL ELPH-MCNC: 3.2 G/DL — LOW (ref 3.3–5)
ALP SERPL-CCNC: 134 U/L — HIGH (ref 40–120)
ALT FLD-CCNC: 13 U/L — SIGNIFICANT CHANGE UP (ref 4–41)
ANION GAP SERPL CALC-SCNC: 6 MMO/L — LOW (ref 7–14)
ANISOCYTOSIS BLD QL: SIGNIFICANT CHANGE UP
AST SERPL-CCNC: 25 U/L — SIGNIFICANT CHANGE UP (ref 4–40)
BASOPHILS # BLD AUTO: 0.08 K/UL — SIGNIFICANT CHANGE UP (ref 0–0.2)
BASOPHILS NFR BLD AUTO: 0.7 % — SIGNIFICANT CHANGE UP (ref 0–2)
BASOPHILS NFR SPEC: 1.8 % — SIGNIFICANT CHANGE UP (ref 0–2)
BILIRUB SERPL-MCNC: 1.6 MG/DL — HIGH (ref 0.2–1.2)
BLASTS # FLD: 0 % — SIGNIFICANT CHANGE UP (ref 0–0)
BUN SERPL-MCNC: 16 MG/DL — SIGNIFICANT CHANGE UP (ref 7–23)
CALCIUM SERPL-MCNC: 8.9 MG/DL — SIGNIFICANT CHANGE UP (ref 8.4–10.5)
CHLORIDE SERPL-SCNC: 107 MMOL/L — SIGNIFICANT CHANGE UP (ref 98–107)
CO2 SERPL-SCNC: 22 MMOL/L — SIGNIFICANT CHANGE UP (ref 22–31)
CREAT SERPL-MCNC: 1.07 MG/DL — SIGNIFICANT CHANGE UP (ref 0.5–1.3)
EOSINOPHIL # BLD AUTO: 1.12 K/UL — HIGH (ref 0–0.5)
EOSINOPHIL NFR BLD AUTO: 9.5 % — HIGH (ref 0–6)
EOSINOPHIL NFR FLD: 7 % — HIGH (ref 0–6)
GIANT PLATELETS BLD QL SMEAR: PRESENT — SIGNIFICANT CHANGE UP
GLUCOSE SERPL-MCNC: 85 MG/DL — SIGNIFICANT CHANGE UP (ref 70–99)
HCT VFR BLD CALC: 19.1 % — CRITICAL LOW (ref 39–50)
HGB BLD-MCNC: 6.2 G/DL — CRITICAL LOW (ref 13–17)
HYPOCHROMIA BLD QL: SIGNIFICANT CHANGE UP
IMM GRANULOCYTES NFR BLD AUTO: 0.9 % — SIGNIFICANT CHANGE UP (ref 0–1.5)
LDH SERPL L TO P-CCNC: 294 U/L — HIGH (ref 135–225)
LYMPHOCYTES # BLD AUTO: 1.56 K/UL — SIGNIFICANT CHANGE UP (ref 1–3.3)
LYMPHOCYTES # BLD AUTO: 13.3 % — SIGNIFICANT CHANGE UP (ref 13–44)
LYMPHOCYTES NFR SPEC AUTO: 10.5 % — LOW (ref 13–44)
MCHC RBC-ENTMCNC: 24.6 PG — LOW (ref 27–34)
MCHC RBC-ENTMCNC: 32.5 % — SIGNIFICANT CHANGE UP (ref 32–36)
MCV RBC AUTO: 75.8 FL — LOW (ref 80–100)
METAMYELOCYTES # FLD: 0 % — SIGNIFICANT CHANGE UP (ref 0–1)
MICROCYTES BLD QL: SIGNIFICANT CHANGE UP
MONOCYTES # BLD AUTO: 1.07 K/UL — HIGH (ref 0–0.9)
MONOCYTES NFR BLD AUTO: 9.1 % — SIGNIFICANT CHANGE UP (ref 2–14)
MONOCYTES NFR BLD: 11.4 % — HIGH (ref 2–9)
MYELOCYTES NFR BLD: 0 % — SIGNIFICANT CHANGE UP (ref 0–0)
NEUTROPHIL AB SER-ACNC: 64.9 % — SIGNIFICANT CHANGE UP (ref 43–77)
NEUTROPHILS # BLD AUTO: 7.8 K/UL — HIGH (ref 1.8–7.4)
NEUTROPHILS NFR BLD AUTO: 66.5 % — SIGNIFICANT CHANGE UP (ref 43–77)
NEUTS BAND # BLD: 0 % — SIGNIFICANT CHANGE UP (ref 0–6)
NRBC # BLD: 3 /100WBC — SIGNIFICANT CHANGE UP
NRBC # FLD: 0.07 K/UL — SIGNIFICANT CHANGE UP (ref 0–0)
OTHER - HEMATOLOGY %: 0 — SIGNIFICANT CHANGE UP
PLATELET # BLD AUTO: 348 K/UL — SIGNIFICANT CHANGE UP (ref 150–400)
PLATELET COUNT - ESTIMATE: NORMAL — SIGNIFICANT CHANGE UP
PMV BLD: 9.6 FL — SIGNIFICANT CHANGE UP (ref 7–13)
POIKILOCYTOSIS BLD QL AUTO: SIGNIFICANT CHANGE UP
POLYCHROMASIA BLD QL SMEAR: SLIGHT — SIGNIFICANT CHANGE UP
POTASSIUM SERPL-MCNC: 4.7 MMOL/L — SIGNIFICANT CHANGE UP (ref 3.5–5.3)
POTASSIUM SERPL-SCNC: 4.7 MMOL/L — SIGNIFICANT CHANGE UP (ref 3.5–5.3)
PROMYELOCYTES # FLD: 0 % — SIGNIFICANT CHANGE UP (ref 0–0)
PROT SERPL-MCNC: 6.5 G/DL — SIGNIFICANT CHANGE UP (ref 6–8.3)
RBC # BLD: 2.52 M/UL — LOW (ref 4.2–5.8)
RBC # FLD: 27.2 % — HIGH (ref 10.3–14.5)
RETICS #: 161 K/UL — HIGH (ref 25–125)
RETICS/RBC NFR: 6.5 % — HIGH (ref 0.5–2.5)
REVIEW TO FOLLOW: YES — SIGNIFICANT CHANGE UP
SCHISTOCYTES BLD QL AUTO: SLIGHT — SIGNIFICANT CHANGE UP
SODIUM SERPL-SCNC: 135 MMOL/L — SIGNIFICANT CHANGE UP (ref 135–145)
TARGETS BLD QL SMEAR: SIGNIFICANT CHANGE UP
VARIANT LYMPHS # BLD: 4.4 % — SIGNIFICANT CHANGE UP
WBC # BLD: 11.73 K/UL — HIGH (ref 3.8–10.5)
WBC # FLD AUTO: 11.73 K/UL — HIGH (ref 3.8–10.5)

## 2020-02-08 PROCEDURE — 99233 SBSQ HOSP IP/OBS HIGH 50: CPT

## 2020-02-08 RX ORDER — POLYETHYLENE GLYCOL 3350 17 G/17G
17 POWDER, FOR SOLUTION ORAL DAILY
Refills: 0 | Status: DISCONTINUED | OUTPATIENT
Start: 2020-02-08 | End: 2020-02-14

## 2020-02-08 RX ADMIN — Medication 1 MILLIGRAM(S): at 12:30

## 2020-02-08 RX ADMIN — SENNA PLUS 2 TABLET(S): 8.6 TABLET ORAL at 22:23

## 2020-02-08 RX ADMIN — Medication 25 MILLIGRAM(S): at 06:07

## 2020-02-08 RX ADMIN — Medication 25 MILLIGRAM(S): at 17:25

## 2020-02-08 RX ADMIN — ENOXAPARIN SODIUM 40 MILLIGRAM(S): 100 INJECTION SUBCUTANEOUS at 12:30

## 2020-02-08 RX ADMIN — SODIUM CHLORIDE 100 MILLILITER(S): 9 INJECTION, SOLUTION INTRAVENOUS at 12:31

## 2020-02-08 RX ADMIN — HYDROMORPHONE HYDROCHLORIDE 30 MILLILITER(S): 2 INJECTION INTRAMUSCULAR; INTRAVENOUS; SUBCUTANEOUS at 08:35

## 2020-02-08 RX ADMIN — HYDROMORPHONE HYDROCHLORIDE 30 MILLILITER(S): 2 INJECTION INTRAMUSCULAR; INTRAVENOUS; SUBCUTANEOUS at 19:37

## 2020-02-08 RX ADMIN — TAMSULOSIN HYDROCHLORIDE 0.4 MILLIGRAM(S): 0.4 CAPSULE ORAL at 22:23

## 2020-02-08 NOTE — PROGRESS NOTE ADULT - PROBLEM SELECTOR PLAN 1
Possibly secondary to acute viral illness vs gallstone  US Cholelithiasis, without evidence of acute cholecystitis  -IVF 1/2 NS @ 100ml/hr  -PCA   -folic acid  -retic count/LDH/LFTs daily  -check fractionated alkphos

## 2020-02-08 NOTE — PROGRESS NOTE ADULT - SUBJECTIVE AND OBJECTIVE BOX
[facility] Division of Hospital Medicine  Nola Rodriguez DO  Pager: [phone]  Other Times:  [phone]    Patient is a 49y old  Male who presents with a chief complaint of Pain (08 Feb 2020 08:50)    SUBJECTIVE / OVERNIGHT EVENTS:  Patient c/o nausea, able to drink ensure, no vomiting or diarrhea.    ADDITIONAL REVIEW OF SYSTEMS:    MEDICATIONS  (STANDING):  enoxaparin Injectable 40 milliGRAM(s) SubCutaneous daily  folic acid 1 milliGRAM(s) Oral daily  HYDROmorphone PCA (1 mG/mL) 30 milliLiter(s) PCA Continuous PCA Continuous  influenza   Vaccine 0.5 milliLiter(s) IntraMuscular once  metoprolol tartrate 25 milliGRAM(s) Oral two times a day  senna 2 Tablet(s) Oral at bedtime  sodium chloride 0.45%. 1000 milliLiter(s) (100 mL/Hr) IV Continuous <Continuous>  tamsulosin 0.4 milliGRAM(s) Oral at bedtime    MEDICATIONS  (PRN):  acetaminophen   Tablet .. 650 milliGRAM(s) Oral every 6 hours PRN Temp greater or equal to 38C (100.4F), Mild Pain (1 - 3)  HYDROmorphone  Injectable 1 milliGRAM(s) IV Push every 4 hours PRN moderate and severe  naloxone Injectable 0.1 milliGRAM(s) IV Push every 3 minutes PRN For ANY of the following changes in patient status:  A. RR LESS THAN 10 breaths per minute, B. Oxygen saturation LESS THAN 90%, C. Sedation score of 6  ondansetron Injectable 4 milliGRAM(s) IV Push every 6 hours PRN Nausea      CAPILLARY BLOOD GLUCOSE        I&O's Summary      PHYSICAL EXAM:  Vital Signs Last 24 Hrs  T(C): 37.4 (08 Feb 2020 13:36), Max: 37.4 (08 Feb 2020 13:36)  T(F): 99.3 (08 Feb 2020 13:36), Max: 99.3 (08 Feb 2020 13:36)  HR: 78 (08 Feb 2020 13:36) (62 - 92)  BP: 114/69 (08 Feb 2020 13:36) (114/69 - 126/49)  BP(mean): --  RR: 17 (08 Feb 2020 13:36) (17 - 18)  SpO2: 99% (08 Feb 2020 13:36) (95% - 99%)  CONSTITUTIONAL: NAD, well-developed, well-groomed  EYES: PERRLA; conjunctiva and sclera clear  ENMT: Moist oral mucosa, no pharyngeal injection or exudates; normal dentition  NECK: Supple, no palpable masses; no thyromegaly  RESPIRATORY: Normal respiratory effort; lungs are clear to auscultation bilaterally  CARDIOVASCULAR: Regular rate and rhythm, normal S1 and S2, no murmur/rub/gallop; No lower extremity edema; Peripheral pulses are 2+ bilaterally  ABDOMEN: Nontender to palpation, normoactive bowel sounds, no rebound/guarding; No hepatosplenomegaly  MUSCLOSKELETAL:  Normal gait; no clubbing or cyanosis of digits; no joint swelling or tenderness to palpation  PSYCH: A+O to person, place, and time; affect appropriate  NEUROLOGY: CN 2-12 are intact and symmetric; no gross sensory deficits;   SKIN: No rashes; no palpable lesions    LABS:                        7.5    11.84 )-----------( 413      ( 07 Feb 2020 04:15 )             22.9     02-07    134<L>  |  103  |  35<H>  ----------------------------<  88  5.3   |  20<L>  |  1.45<H>    Ca    9.5      07 Feb 2020 04:15    TPro  8.0  /  Alb  3.7  /  TBili  2.2<H>  /  DBili  x   /  AST  40  /  ALT  19  /  AlkPhos  178<H>  02-07                RADIOLOGY & ADDITIONAL TESTS:  Results Reviewed:   Imaging Personally Reviewed:  Electrocardiogram Personally Reviewed:    COORDINATION OF CARE:  Care Discussed with Consultants/Other Providers [Y/N]:  Prior or Outpatient Records Reviewed [Y/N]:

## 2020-02-08 NOTE — DISCHARGE NOTE PROVIDER - NSDCCPCAREPLAN_GEN_ALL_CORE_FT
PRINCIPAL DISCHARGE DIAGNOSIS  Diagnosis: Sickle cell anemia with crisis  Assessment and Plan of Treatment: follow up with your Hematologist Dr. Hamm.      SECONDARY DISCHARGE DIAGNOSES  Diagnosis: Eosinophilia  Assessment and Plan of Treatment: follow up with your Hematologist. PRINCIPAL DISCHARGE DIAGNOSIS  Diagnosis: Sickle cell anemia with crisis  Assessment and Plan of Treatment: Stay well hydrated. Take pain medications as needed for pain. Follow up with your Hematologist Dr. Hamm.      SECONDARY DISCHARGE DIAGNOSES  Diagnosis: Systolic heart failure  Assessment and Plan of Treatment: Continue Lisinopril and metoprolol. Follow up with PCP and cardiology in 1-2 weeks for re-eval and further management    Diagnosis: Eosinophilia  Assessment and Plan of Treatment: Follow up with your Hematologist.

## 2020-02-08 NOTE — PROVIDER CONTACT NOTE (OTHER) - SITUATION
patient with AM labs unable in morning after MD requests this Rn attemped again X 2 unable MD notified

## 2020-02-08 NOTE — PROGRESS NOTE ADULT - ASSESSMENT
Pt is 30 yo M w/ hx SCD, HTN, chronic systolic HF EF 46% (8/19) p/w abdominal pain and pain in b/l arms and legs. RVP neg CXR- no acute infiltrate. EKG- NSR w/ LVH no acute ST/T wave changes. mild elevation in Cr.

## 2020-02-08 NOTE — DISCHARGE NOTE PROVIDER - HOSPITAL COURSE
50 yo M w/ Hx SSD, HTN, Chronic systolic HF, mild intellectual disability p/w pain. Pt states on Thursday began having abdominal pain unable to recall location with associated nausea and vomiting NBNB with subjective fever. He took Tylenol and ibuprofen which did not alleviate pain. He subsequently began having pain in his arm, legs and lower back similar to his sickle cell crisis pain.         Hospital Course:    1. Sickle cell anemia with crisis    - possibly secondary to acute viral illness vs gallstone    - IVF 1/2 NS @ 100ml/hr    - PCA     - RVP neg     - Folic acid    - Retic count/LDH/LFTs daily    - Check fractionated alkphos    - Pt is poor historian will check US r/o billary process prior US showing cholelithiasis.     - US abd- Bilateral atrophic kidneys. Mild central fullness of the right renal collecting system. Cholelithiasis, without evidence of acute cholecystitis.          EMEKA (acute kidney injury)    - Cr 0.88 9/2/2019--Cr 1.45 2/7/2019    - PVR     - Cont flomax    - Gentle IVF.    - US abd- Bilateral atrophic kidneys. Mild central fullness of the right renal collecting system.        Chronic systolic (congestive) heart failure    - Appears euvolemic     - Cont BB hold ACE due to EMEKA.         ***INCOMPLETE 50 yo M w/ Hx SSD, HTN, Chronic systolic HF, mild intellectual disability p/w pain. Pt states on Thursday began having abdominal pain unable to recall location with associated nausea and vomiting NBNB with subjective fever. He took Tylenol and ibuprofen which did not alleviate pain. He subsequently began having pain in his arm, legs and lower back similar to his sickle cell crisis pain.         Hospital Course:    1. Sickle cell anemia with crisis    - possibly secondary to acute viral illness vs gallstone    - IVF 1/2 NS @ 100ml/hr    - PCA Dilaudid     - RVP neg     - Folic acid    - Retic count/LDH/LFTs daily    - Elevated alkphos, check abd US    -US abd- Bilateral atrophic kidneys. Mild central fullness of the right renal collecting system. Cholelithiasis, without evidence of acute cholecystitis.          EMEKA (acute kidney injury)    - Cr 0.88 9/2/2019--Cr 1.45 2/7/2019    - PVR     - Cont flomax    - Gentle IVF.    -Cr normalized w/IVF        Chronic systolic (congestive) heart failure    - Appears euvolemic     - Cont BB hold ACE due to EMEKA.         ***INCOMPLETE 48 yo M w/ Hx SSD, HTN, Chronic systolic HF, mild intellectual disability p/w pain. Pt states on Thursday began having abdominal pain unable to recall location with associated nausea and vomiting NBNB with subjective fever. He took Tylenol and ibuprofen which did not alleviate pain. He subsequently began having pain in his arm, legs and lower back similar to his sickle cell crisis pain.         Hospital Course:    1. Sickle cell anemia with crisis    - possibly secondary to acute viral illness vs gallstone    - IVF 1/2 NS @ 100ml/hr    - PCA Dilaudid- now PO pain meds, PCA d/c'd    - RVP neg     - Folic acid    - Retic count/LDH/LFTs daily    - Elevated alkphos, check abd US    -US abd- Bilateral atrophic kidneys. Mild central fullness of the right renal collecting system. Cholelithiasis, without evidence of acute cholecystitis.          EMEKA (acute kidney injury)    - Cr 0.88 9/2/2019--Cr 1.45 2/7/2019    - PVR     - Cont flomax    - Gentle IVF.    -Cr normalized w/IVF        Chronic systolic (congestive) heart failure    - Appears euvolemic     - Cont BB hold ACE due to EMEKA.         Eosinophilia    -stable    -outpatient heme follow up 50 yo M w/ Hx SSD, HTN, Chronic systolic HF, mild intellectual disability p/w pain. Pt states on Thursday began having abdominal pain unable to recall location with associated nausea and vomiting NBNB with subjective fever. He took Tylenol and ibuprofen which did not alleviate pain. He subsequently began having pain in his arm, legs and lower back similar to his sickle cell crisis pain.         Hospital Course:    1. Sickle cell anemia with crisis    - possibly secondary to acute viral illness vs gallstone    - IVF 1/2 NS @ 100ml/hr    - PCA Dilaudid- now PO pain meds, PCA d/c'd    - RVP neg     - Folic acid    - Retic count/LDH/LFTs daily    - Elevated alkphos, check abd US    -US abd- Bilateral atrophic kidneys. Mild central fullness of the right renal collecting system. Cholelithiasis, without evidence of acute cholecystitis.          EMEKA (acute kidney injury)    - Cr 0.88 9/2/2019--Cr 1.45 2/7/2019    - PVR     - Cont flomax    - Gentle IVF.    -Cr normalized w/IVF        Chronic systolic (congestive) heart failure    - Appears euvolemic     - Cont BB hold ACE due to EMEKA.         Eosinophilia    -stable    -outpatient heme follow up        Pt's pain improved, Pt is medically stable for discharge, d/c home to f/u with hematology Dr. Hamm. 48 yo M w/ Hx SSD, HTN, Chronic systolic HF, mild intellectual disability p/w pain. Pt states on Thursday began having abdominal pain unable to recall location with associated nausea and vomiting NBNB with subjective fever. He took Tylenol and ibuprofen which did not alleviate pain. He subsequently began having pain in his arm, legs and lower back similar to his sickle cell crisis pain.         Hospital Course:    1. Sickle cell anemia with crisis    - possibly secondary to acute viral illness vs gallstone    - IVF 1/2 NS @ 100ml/hr    - PCA Dilaudid- now PO pain meds, PCA d/c'd    - RVP neg     - Folic acid    - Retic count/LDH/LFTs daily    - Elevated alkphos, check abd US    -US abd- Bilateral atrophic kidneys. Mild central fullness of the right renal collecting system. Cholelithiasis, without evidence of acute cholecystitis.          EMEKA (acute kidney injury)    - Cr 0.88 9/2/2019--Cr 1.45 2/7/2019    - PVR     - Cont flomax    - Gentle IVF.    -Cr normalized w/IVF        Chronic systolic (congestive) heart failure    - Appears euvolemic     - Cont BB hold ACE due to EMEKA.         Eosinophilia    -stable    -outpatient heme follow up        Pt's pain improved, Pt is medically stable for discharge, d/c home to f/u with hematology Dr. Hamm. Script for hydromorphone issued, ISTOP # [id number] 48 yo M w/ Hx SSD, HTN, Chronic systolic HF, mild intellectual disability p/w pain. Pt states on Thursday began having abdominal pain unable to recall location with associated nausea and vomiting NBNB with subjective fever. He took Tylenol and ibuprofen which did not alleviate pain. He subsequently began having pain in his arm, legs and lower back similar to his sickle cell crisis pain.         Hospital Course:        # Sickle cell anemia with crisis    - possibly secondary to acute viral illness    - uncomplicated, titrated off PCA, pain controlled on orals     -  No abdominal pain, no nausea/vomiting for days, tolerating regular diet;  US abd- Bilateral atrophic kidneys. Mild central fullness of the right renal collecting system. Cholelithiasis, without evidence of acute cholecystitis.         # EMEKA (acute kidney injury) resolved     - Cr 0.88 9/2/2019--Cr 1.45 2/7/2019    - Cr normalized    - started on flomax which he found helpful, eased voiding         # Chronic systolic (congestive) heart failure    - Appears euvolemic     - Cont BB and ACE on dc         # Eosinophilia    -stable    -outpatient heme follow up        Pt's pain improved, Pt is medically stable for discharge, d/c home to f/u with hematology Dr. Hamm. Script for hydromorphone issued, ISTOP # [id number]

## 2020-02-08 NOTE — DISCHARGE NOTE PROVIDER - CARE PROVIDER_API CALL
Lluvia Hamm)  Internal Medicine  [address] [address] [place], [place] [address]  Phone: [phone]  Fax: [phone]  Follow Up Time:

## 2020-02-08 NOTE — DISCHARGE NOTE PROVIDER - NSDCMRMEDTOKEN_GEN_ALL_CORE_FT
acetaminophen 325 mg oral tablet: 2 tab(s) orally every 6 hours, As needed, Temp greater or equal to 38C (100.4F), Mild Pain (1 - 3)  folic acid 1 mg oral tablet: 1 tab(s) orally once a day  ibuprofen 800 mg oral tablet: 1 tab(s) orally 3 times a day, As Needed  Metoprolol Tartrate 25 mg oral tablet: 12.5 milligram(s) orally 2 times a day   please cut pill in half and take half a pill twice a day   tamsulosin 0.4 mg oral capsule: 1 cap(s) orally once a day (at bedtime) acetaminophen 325 mg oral tablet: 2 tab(s) orally every 6 hours, As needed, Temp greater or equal to 38C (100.4F), Mild Pain (1 - 3)  folic acid 1 mg oral tablet: 1 tab(s) orally once a day  ibuprofen 800 mg oral tablet: 1 tab(s) orally 3 times a day, As Needed  tamsulosin 0.4 mg oral capsule: 1 cap(s) orally once a day (at bedtime) acetaminophen 325 mg oral tablet: 2 tab(s) orally every 6 hours, As needed, Temp greater or equal to 38C (100.4F), Mild Pain (1 - 3)  folic acid 1 mg oral tablet: 1 tab(s) orally once a day  HYDROmorphone 2 mg oral tablet: 1 tab(s) orally every 6 hours, As Needed -Moderate Pain (4 - 6) MDD:4 tabs  ibuprofen 800 mg oral tablet: 1 tab(s) orally 3 times a day, As Needed  lisinopril 10 mg oral tablet: 1 tab(s) orally once a day  metoprolol tartrate 25 mg oral tablet: 1 tab(s) orally 2 times a day  polyethylene glycol 3350 oral powder for reconstitution: 17 gram(s) orally once a day  senna oral tablet: 1 tab(s) orally once a day (at bedtime)   tamsulosin 0.4 mg oral capsule: 1 cap(s) orally once a day (at bedtime)

## 2020-02-08 NOTE — PROGRESS NOTE ADULT - PROBLEM SELECTOR PLAN 2
Cr 0.88 9/2/2019--Cr 1.45 2/7/2019  Pending labs from today  US with B/L atrophic kidneys, mild central fullness of the right renal collecting system  -PVR   -cont flomax  -gentle IVF

## 2020-02-08 NOTE — DISCHARGE NOTE PROVIDER - NSDCFUSCHEDAPPT_GEN_ALL_CORE_FT
DINORA SHOEMAKER ; 02/13/2020 ; [id number] ; N[facility] Intmed OP [id number] [place] [facility] DINORA SHOEMAKER ; 02/13/2020 ; [id number] ; NPP Intmed OP [id number] [place] [facility] DINORA SHOEMAKER ; 03/09/2020 ; [facility] Intmed OP [id number] [facility] DINORA SHOEMAKER ; 03/09/2020 ; [facility] Intmed OP [id number] [facility] DINORA SHOEMAKER ; 03/09/2020 ; [facility] Intmed OP [id number] [facility] DINORA SHOEMAKER ; 03/09/2020 ; AMINA Intmed OP [id number] [facility] DINORA SHOEMAKER ; 03/09/2020 ; [facility] Intmed OP [id number] [facility]

## 2020-02-09 PROCEDURE — 99233 SBSQ HOSP IP/OBS HIGH 50: CPT

## 2020-02-09 RX ADMIN — TAMSULOSIN HYDROCHLORIDE 0.4 MILLIGRAM(S): 0.4 CAPSULE ORAL at 22:20

## 2020-02-09 RX ADMIN — SODIUM CHLORIDE 100 MILLILITER(S): 9 INJECTION, SOLUTION INTRAVENOUS at 22:20

## 2020-02-09 RX ADMIN — HYDROMORPHONE HYDROCHLORIDE 30 MILLILITER(S): 2 INJECTION INTRAMUSCULAR; INTRAVENOUS; SUBCUTANEOUS at 19:51

## 2020-02-09 RX ADMIN — HYDROMORPHONE HYDROCHLORIDE 30 MILLILITER(S): 2 INJECTION INTRAMUSCULAR; INTRAVENOUS; SUBCUTANEOUS at 08:07

## 2020-02-09 RX ADMIN — ENOXAPARIN SODIUM 40 MILLIGRAM(S): 100 INJECTION SUBCUTANEOUS at 11:15

## 2020-02-09 RX ADMIN — Medication 25 MILLIGRAM(S): at 05:08

## 2020-02-09 RX ADMIN — Medication 25 MILLIGRAM(S): at 17:16

## 2020-02-09 RX ADMIN — SENNA PLUS 2 TABLET(S): 8.6 TABLET ORAL at 22:20

## 2020-02-09 NOTE — PROGRESS NOTE ADULT - PROBLEM SELECTOR PLAN 1
Possibly secondary to acute viral illness vs gallstone  US Cholelithiasis, without evidence of acute cholecystitis  -IVF 1/2 NS @ 100ml/hr  -PCA   -folic acid  -retic count/LDH/LFTs daily

## 2020-02-09 NOTE — PROGRESS NOTE ADULT - SUBJECTIVE AND OBJECTIVE BOX
[facility] Division of Hospital Medicine  Nola Rodriguez DO  Pager: [phone]  Other Times:  [phone]    Patient is a 49y old  Male who presents with a chief complaint of Pain (08 Feb 2020 15:25)    SUBJECTIVE / OVERNIGHT EVENTS:  Patient seen still complaining of pain, eating well, no N/V/D.    ADDITIONAL REVIEW OF SYSTEMS:    MEDICATIONS  (STANDING):  enoxaparin Injectable 40 milliGRAM(s) SubCutaneous daily  folic acid 1 milliGRAM(s) Oral daily  HYDROmorphone PCA (1 mG/mL) 30 milliLiter(s) PCA Continuous PCA Continuous  influenza   Vaccine 0.5 milliLiter(s) IntraMuscular once  metoprolol tartrate 25 milliGRAM(s) Oral two times a day  polyethylene glycol 3350 17 Gram(s) Oral daily  senna 2 Tablet(s) Oral at bedtime  sodium chloride 0.45%. 1000 milliLiter(s) (100 mL/Hr) IV Continuous <Continuous>  tamsulosin 0.4 milliGRAM(s) Oral at bedtime    MEDICATIONS  (PRN):  acetaminophen   Tablet .. 650 milliGRAM(s) Oral every 6 hours PRN Temp greater or equal to 38C (100.4F), Mild Pain (1 - 3)  HYDROmorphone  Injectable 1 milliGRAM(s) IV Push every 4 hours PRN moderate and severe  naloxone Injectable 0.1 milliGRAM(s) IV Push every 3 minutes PRN For ANY of the following changes in patient status:  A. RR LESS THAN 10 breaths per minute, B. Oxygen saturation LESS THAN 90%, C. Sedation score of 6  ondansetron Injectable 4 milliGRAM(s) IV Push every 6 hours PRN Nausea      CAPILLARY BLOOD GLUCOSE        I&O's Summary    08 Feb 2020 07:01  -  09 Feb 2020 07:00  --------------------------------------------------------  IN: 700 mL / OUT: 1500 mL / NET: -800 mL        PHYSICAL EXAM:  Vital Signs Last 24 Hrs  T(C): 36.8 (09 Feb 2020 12:00), Max: 37.4 (08 Feb 2020 13:36)  T(F): 98.3 (09 Feb 2020 12:00), Max: 99.3 (08 Feb 2020 13:36)  HR: 78 (09 Feb 2020 12:00) (65 - 91)  BP: 120/68 (09 Feb 2020 12:00) (112/72 - 140/76)  BP(mean): --  RR: 19 (09 Feb 2020 12:00) (16 - 19)  SpO2: 99% (09 Feb 2020 12:00) (94% - 99%)  CONSTITUTIONAL: NAD, well-developed, well-groomed  EYES: PERRLA; conjunctiva and sclera clear  ENMT: Moist oral mucosa, no pharyngeal injection or exudates; normal dentition  NECK: Supple, no palpable masses; no thyromegaly  RESPIRATORY: Normal respiratory effort; lungs are clear to auscultation bilaterally  CARDIOVASCULAR: Regular rate and rhythm, normal S1 and S2, no murmur/rub/gallop; No lower extremity edema; Peripheral pulses are 2+ bilaterally  ABDOMEN: Nontender to palpation, normoactive bowel sounds, no rebound/guarding; No hepatosplenomegaly  MUSCLOSKELETAL:  Normal gait; no clubbing or cyanosis of digits; no joint swelling or tenderness to palpation  PSYCH: A+O to person, place, and time; affect appropriate  NEUROLOGY: CN 2-12 are intact and symmetric; no gross sensory deficits;   SKIN: No rashes; no palpable lesions    LABS:                        6.2    11.73 )-----------( 348      ( 08 Feb 2020 20:10 )             19.1     02-08    135  |  107  |  16  ----------------------------<  85  4.7   |  22  |  1.07    Ca    8.9      08 Feb 2020 20:10    TPro  6.5  /  Alb  3.2<L>  /  TBili  1.6<H>  /  DBili  x   /  AST  25  /  ALT  13  /  AlkPhos  134<H>  02-08                RADIOLOGY & ADDITIONAL TESTS:  Results Reviewed:   Imaging Personally Reviewed:  Electrocardiogram Personally Reviewed:    COORDINATION OF CARE:  Care Discussed with Consultants/Other Providers [Y/N]:  Prior or Outpatient Records Reviewed [Y/N]:

## 2020-02-09 NOTE — PROGRESS NOTE ADULT - PROBLEM SELECTOR PLAN 2
Improved  US with B/L atrophic kidneys, mild central fullness of the right renal collecting system  -encourage po intake  -cont flomax  -monitor BMP

## 2020-02-10 LAB
ALBUMIN SERPL ELPH-MCNC: 3.5 G/DL — SIGNIFICANT CHANGE UP (ref 3.3–5)
ALP SERPL-CCNC: 134 U/L — HIGH (ref 40–120)
ALT FLD-CCNC: 15 U/L — SIGNIFICANT CHANGE UP (ref 4–41)
ANION GAP SERPL CALC-SCNC: 10 MMO/L — SIGNIFICANT CHANGE UP (ref 7–14)
AST SERPL-CCNC: 28 U/L — SIGNIFICANT CHANGE UP (ref 4–40)
BILIRUB SERPL-MCNC: 2.7 MG/DL — HIGH (ref 0.2–1.2)
BUN SERPL-MCNC: 14 MG/DL — SIGNIFICANT CHANGE UP (ref 7–23)
CALCIUM SERPL-MCNC: 9.7 MG/DL — SIGNIFICANT CHANGE UP (ref 8.4–10.5)
CHLORIDE SERPL-SCNC: 110 MMOL/L — HIGH (ref 98–107)
CO2 SERPL-SCNC: 20 MMOL/L — LOW (ref 22–31)
CREAT SERPL-MCNC: 1.04 MG/DL — SIGNIFICANT CHANGE UP (ref 0.5–1.3)
GLUCOSE SERPL-MCNC: 93 MG/DL — SIGNIFICANT CHANGE UP (ref 70–99)
HCT VFR BLD CALC: 19.9 % — CRITICAL LOW (ref 39–50)
HGB BLD-MCNC: 6.6 G/DL — CRITICAL LOW (ref 13–17)
LDH SERPL L TO P-CCNC: 294 U/L — HIGH (ref 135–225)
MCHC RBC-ENTMCNC: 25.8 PG — LOW (ref 27–34)
MCHC RBC-ENTMCNC: 33.2 % — SIGNIFICANT CHANGE UP (ref 32–36)
MCV RBC AUTO: 77.7 FL — LOW (ref 80–100)
NRBC # FLD: 0.09 K/UL — SIGNIFICANT CHANGE UP (ref 0–0)
PLATELET # BLD AUTO: 320 K/UL — SIGNIFICANT CHANGE UP (ref 150–400)
PMV BLD: 9.6 FL — SIGNIFICANT CHANGE UP (ref 7–13)
POTASSIUM SERPL-MCNC: 4.7 MMOL/L — SIGNIFICANT CHANGE UP (ref 3.5–5.3)
POTASSIUM SERPL-SCNC: 4.7 MMOL/L — SIGNIFICANT CHANGE UP (ref 3.5–5.3)
PROT SERPL-MCNC: 6.9 G/DL — SIGNIFICANT CHANGE UP (ref 6–8.3)
RBC # BLD: 2.56 M/UL — LOW (ref 4.2–5.8)
RBC # FLD: 26.8 % — HIGH (ref 10.3–14.5)
RETICS #: 183 K/UL — HIGH (ref 25–125)
RETICS/RBC NFR: 7.1 % — HIGH (ref 0.5–2.5)
SODIUM SERPL-SCNC: 140 MMOL/L — SIGNIFICANT CHANGE UP (ref 135–145)
WBC # BLD: 11.24 K/UL — HIGH (ref 3.8–10.5)
WBC # FLD AUTO: 11.24 K/UL — HIGH (ref 3.8–10.5)

## 2020-02-10 PROCEDURE — 70450 CT HEAD/BRAIN W/O DYE: CPT | Mod: 26

## 2020-02-10 PROCEDURE — 99233 SBSQ HOSP IP/OBS HIGH 50: CPT

## 2020-02-10 RX ORDER — LISINOPRIL 2.5 MG/1
5 TABLET ORAL DAILY
Refills: 0 | Status: DISCONTINUED | OUTPATIENT
Start: 2020-02-10 | End: 2020-02-13

## 2020-02-10 RX ADMIN — Medication 25 MILLIGRAM(S): at 22:13

## 2020-02-10 RX ADMIN — HYDROMORPHONE HYDROCHLORIDE 30 MILLILITER(S): 2 INJECTION INTRAMUSCULAR; INTRAVENOUS; SUBCUTANEOUS at 07:56

## 2020-02-10 RX ADMIN — LISINOPRIL 5 MILLIGRAM(S): 2.5 TABLET ORAL at 22:13

## 2020-02-10 RX ADMIN — TAMSULOSIN HYDROCHLORIDE 0.4 MILLIGRAM(S): 0.4 CAPSULE ORAL at 22:14

## 2020-02-10 RX ADMIN — HYDROMORPHONE HYDROCHLORIDE 30 MILLILITER(S): 2 INJECTION INTRAMUSCULAR; INTRAVENOUS; SUBCUTANEOUS at 20:37

## 2020-02-10 RX ADMIN — SODIUM CHLORIDE 100 MILLILITER(S): 9 INJECTION, SOLUTION INTRAVENOUS at 05:32

## 2020-02-10 RX ADMIN — Medication 25 MILLIGRAM(S): at 05:32

## 2020-02-10 NOTE — PROGRESS NOTE ADULT - PROBLEM SELECTOR PLAN 2
Improved  US with B/L atrophic kidneys, mild central fullness of the right renal collecting system  -encourage po intake  -cont flomax  -monitor BMP Improved.  US with B/L atrophic kidneys, mild central fullness of the right renal collecting system  - reports voiding improved  - c/w flomax  - monitor BMP, refusing

## 2020-02-10 NOTE — PROGRESS NOTE ADULT - PROBLEM SELECTOR PLAN 4
IMPROVE VTE Individual Risk Assessment    RISK                                                          Points  [] Previous VTE                                           3  [] Thrombophilia                                        2  [] Lower limb paralysis                              2   [] Current Cancer                                       2   [x] Immobilization > 24 hrs                        1  [] ICU/CCU stay > 24 hours                       1  [] Age > 60                                                   1    IMPROVE VTE Score: 2 lovenox sq Lovenox ppx

## 2020-02-10 NOTE — PROGRESS NOTE ADULT - SUBJECTIVE AND OBJECTIVE BOX
Patient is a 49y old  Male who presents with a chief complaint of Pain    SUBJECTIVE / OVERNIGHT EVENTS:    No CP, SOB, f/c/n/v  Reports pain is better, now just the lower legs and joints, back is better  Still finds it hard to walk 2/2 pain  Reports some weight loss over last several months, states got ill and never gained the weight back     MEDICATIONS  (STANDING):  enoxaparin Injectable 40 milliGRAM(s) SubCutaneous daily  folic acid 1 milliGRAM(s) Oral daily  HYDROmorphone PCA (1 mG/mL) 30 milliLiter(s) PCA Continuous PCA Continuous  influenza   Vaccine 0.5 milliLiter(s) IntraMuscular once  metoprolol tartrate 25 milliGRAM(s) Oral two times a day  polyethylene glycol 3350 17 Gram(s) Oral daily  senna 2 Tablet(s) Oral at bedtime  sodium chloride 0.45%. 1000 milliLiter(s) (75 mL/Hr) IV Continuous <Continuous>  tamsulosin 0.4 milliGRAM(s) Oral at bedtime    MEDICATIONS  (PRN):  acetaminophen   Tablet .. 650 milliGRAM(s) Oral every 6 hours PRN Temp greater or equal to 38C (100.4F), Mild Pain (1 - 3)  naloxone Injectable 0.1 milliGRAM(s) IV Push every 3 minutes PRN For ANY of the following changes in patient status:  A. RR LESS THAN 10 breaths per minute, B. Oxygen saturation LESS THAN 90%, C. Sedation score of 6  ondansetron Injectable 4 milliGRAM(s) IV Push every 6 hours PRN Nausea    T(C): 37.1 (02-10-20 @ 12:00), Max: 37.1 (02-10-20 @ 12:00)  HR: 68 (02-10-20 @ 12:00) (66 - 80)  BP: 134/72 (02-10-20 @ 12:00) (118/78 - 138/68)  RR: 18 (02-10-20 @ 12:00) (18 - 18)  SpO2: 99% (02-10-20 @ 12:00) (98% - 100%)    PHYSICAL EXAM:  GENERAL: NAD, thin   NECK: Supple, No JVD  CHEST/LUNG: Clear to auscultation bilaterally; No wheeze  HEART: Regular rate and rhythm; No murmurs, rubs, or gallops  ABDOMEN: Soft, Nontender, Nondistended; Bowel sounds present  EXTREMITIES:   warm and well perfused, No clubbing, cyanosis, or edema  PSYCH: AAOx3  NEUROLOGY: non-focal  SKIN: No rashes or lesions    LABS:                        6.2    11.73 )-----------( 348      ( 08 Feb 2020 20:10 )             19.1     02-08    135  |  107  |  16  ----------------------------<  85  4.7   |  22  |  1.07    Ca    8.9      08 Feb 2020 20:10    TPro  6.5  /  Alb  3.2<L>  /  TBili  1.6<H>  /  DBili  x   /  AST  25  /  ALT  13  /  AlkPhos  134<H>  02-08      Consultant(s) Notes Reviewed:    Care Discussed with Consultants/Other Providers: Patient is a 49y old  Male who presents with a chief complaint of Pain    SUBJECTIVE / OVERNIGHT EVENTS:    No CP, SOB, f/c/n/v  Reports pain is better, now just the lower legs and joints, back is better  Still finds it hard to walk 2/2 pain  Reports some weight loss over last several months, states got ill and never gained the weight back     MEDICATIONS  (STANDING):  enoxaparin Injectable 40 milliGRAM(s) SubCutaneous daily  folic acid 1 milliGRAM(s) Oral daily  HYDROmorphone PCA (1 mG/mL) 30 milliLiter(s) PCA Continuous PCA Continuous  influenza   Vaccine 0.5 milliLiter(s) IntraMuscular once  metoprolol tartrate 25 milliGRAM(s) Oral two times a day  polyethylene glycol 3350 17 Gram(s) Oral daily  senna 2 Tablet(s) Oral at bedtime  sodium chloride 0.45%. 1000 milliLiter(s) (75 mL/Hr) IV Continuous <Continuous>  tamsulosin 0.4 milliGRAM(s) Oral at bedtime    MEDICATIONS  (PRN):  acetaminophen   Tablet .. 650 milliGRAM(s) Oral every 6 hours PRN Temp greater or equal to 38C (100.4F), Mild Pain (1 - 3)  naloxone Injectable 0.1 milliGRAM(s) IV Push every 3 minutes PRN For ANY of the following changes in patient status:  A. RR LESS THAN 10 breaths per minute, B. Oxygen saturation LESS THAN 90%, C. Sedation score of 6  ondansetron Injectable 4 milliGRAM(s) IV Push every 6 hours PRN Nausea    T(C): 37.1 (02-10-20 @ 12:00), Max: 37.1 (02-10-20 @ 12:00)  HR: 68 (02-10-20 @ 12:00) (66 - 80)  BP: 134/72 (02-10-20 @ 12:00) (118/78 - 138/68)  RR: 18 (02-10-20 @ 12:00) (18 - 18)  SpO2: 99% (02-10-20 @ 12:00) (98% - 100%)    PHYSICAL EXAM:  GENERAL: NAD, thin   NECK: Supple, No JVD  CHEST/LUNG: Clear to auscultation bilaterally; No wheeze  HEART: Regular rate and rhythm; No murmurs, rubs, or gallops  ABDOMEN: Soft, Nontender, Nondistended; Bowel sounds present  EXTREMITIES:   warm and well perfused, No clubbing, cyanosis, or edema  PSYCH: AAOx3  NEUROLOGY: non-focal  SKIN: No rashes or lesions    LABS:                        6.2    11.73 )-----------( 348      ( 08 Feb 2020 20:10 )             19.1     02-08    135  |  107  |  16  ----------------------------<  85  4.7   |  22  |  1.07    Ca    8.9      08 Feb 2020 20:10    TPro  6.5  /  Alb  3.2<L>  /  TBili  1.6<H>  /  DBili  x   /  AST  25  /  ALT  13  /  AlkPhos  134<H>  02-08      TTE 8/2019  CONCLUSIONS:  1. Mild global left ventricular systolic dysfunction.  2. Normal right ventricular size and function.  3. Normal tricuspid valve. Mild-moderate tricuspid  regurgitation.  4. Estimated pulmonary artery systolic pressure equals 36  mm Hg, assuming right atrial pressure equals 10  mm Hg,  consistent with borderline pulmonary hypertension.    Consultant(s) Notes Reviewed:    Care Discussed with Consultants/Other Providers:

## 2020-02-10 NOTE — PROGRESS NOTE ADULT - PROBLEM SELECTOR PLAN 3
-appears euvolemic   -cont BB hold ACE due to EMEKA EF 46% on TTE 8/2019  - c/w BB  - pt refusing labs however Cr had returned to baseline, will resume low dose ACE

## 2020-02-10 NOTE — PROGRESS NOTE ADULT - ASSESSMENT
48 yo M with HTN, chronic systolic HF EF 46% (8/19) presents with pain consistent with vasoocclusive joseph due to sickle cell. 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

## 2020-02-10 NOTE — PROGRESS NOTE ADULT - PROBLEM SELECTOR PLAN 1
No s/s of infxn.  CXR clear, RVP negative.    - c/w 1/2 NS @ 75 cc/hr  - PCA   -folic acid  -retic count/LDH/LFTs daily No s/s of infxn.  CXR clear, RVP negative.    - c/w 1/2 NS @ 75 cc/hr  - c/w PCA, reports pain still not fully better   - c/w folic acid

## 2020-02-11 PROCEDURE — 99233 SBSQ HOSP IP/OBS HIGH 50: CPT

## 2020-02-11 PROCEDURE — 71045 X-RAY EXAM CHEST 1 VIEW: CPT | Mod: 26

## 2020-02-11 RX ADMIN — HYDROMORPHONE HYDROCHLORIDE 30 MILLILITER(S): 2 INJECTION INTRAMUSCULAR; INTRAVENOUS; SUBCUTANEOUS at 20:25

## 2020-02-11 RX ADMIN — SODIUM CHLORIDE 75 MILLILITER(S): 9 INJECTION, SOLUTION INTRAVENOUS at 05:22

## 2020-02-11 RX ADMIN — Medication 25 MILLIGRAM(S): at 21:45

## 2020-02-11 RX ADMIN — HYDROMORPHONE HYDROCHLORIDE 30 MILLILITER(S): 2 INJECTION INTRAMUSCULAR; INTRAVENOUS; SUBCUTANEOUS at 08:28

## 2020-02-11 RX ADMIN — LISINOPRIL 5 MILLIGRAM(S): 2.5 TABLET ORAL at 05:22

## 2020-02-11 RX ADMIN — Medication 25 MILLIGRAM(S): at 05:22

## 2020-02-11 RX ADMIN — Medication 1 MILLIGRAM(S): at 12:15

## 2020-02-11 RX ADMIN — ENOXAPARIN SODIUM 40 MILLIGRAM(S): 100 INJECTION SUBCUTANEOUS at 12:15

## 2020-02-11 RX ADMIN — TAMSULOSIN HYDROCHLORIDE 0.4 MILLIGRAM(S): 0.4 CAPSULE ORAL at 21:45

## 2020-02-11 NOTE — PROGRESS NOTE ADULT - PROBLEM SELECTOR PLAN 2
Improved.  US with B/L atrophic kidneys, mild central fullness of the right renal collecting system  - reports voiding improved since initiation of flomax  - c/w flomax, likely should be dc with it   - Cr at baseline

## 2020-02-11 NOTE — PROGRESS NOTE ADULT - ASSESSMENT
48 yo M with sickle cell (HbSS), HTN, chronic systolic HF EF 46% presents with pain consistent with vasoocclusive joseph due to sickle cell.

## 2020-02-11 NOTE — PROVIDER CONTACT NOTE (OTHER) - ASSESSMENT
See above. Patient denies lightheadedness, dizziness, chest pain. Patient appears anxious and is requesting to open the window. States he is unable to take a "deep breath." Lungs clear but slightly diminished (same as beginning of shift).

## 2020-02-11 NOTE — PROGRESS NOTE ADULT - PROBLEM SELECTOR PLAN 1
No s/s of infxn.  CXR clear, RVP negative.  CTH w/o old strokes.   - given HF will hold IVF for now as taking PO, CXR clear, RA sat wnl  - c/w PCA, reports pain still not fully better   - c/w folic acid

## 2020-02-11 NOTE — PROGRESS NOTE ADULT - SUBJECTIVE AND OBJECTIVE BOX
Patient is a 49y old  Male who presents with a chief complaint of Sickle pain    SUBJECTIVE / OVERNIGHT EVENTS:    SOB this am, resolved quickly, was given O2, now off O2 and reports feels back to baseline  No cough,  no CP, thinks SOB while having pain  Tolerating diet, taking ensure  Reports continued pain in lower legs, a bit better, no new areas of pain     MEDICATIONS  (STANDING):  enoxaparin Injectable 40 milliGRAM(s) SubCutaneous daily  folic acid 1 milliGRAM(s) Oral daily  HYDROmorphone PCA (1 mG/mL) 30 milliLiter(s) PCA Continuous PCA Continuous  influenza   Vaccine 0.5 milliLiter(s) IntraMuscular once  lisinopril 5 milliGRAM(s) Oral daily  metoprolol tartrate 25 milliGRAM(s) Oral two times a day  polyethylene glycol 3350 17 Gram(s) Oral daily  senna 2 Tablet(s) Oral at bedtime  tamsulosin 0.4 milliGRAM(s) Oral at bedtime    MEDICATIONS  (PRN):  acetaminophen   Tablet .. 650 milliGRAM(s) Oral every 6 hours PRN Temp greater or equal to 38C (100.4F), Mild Pain (1 - 3)  naloxone Injectable 0.1 milliGRAM(s) IV Push every 3 minutes PRN For ANY of the following changes in patient status:  A. RR LESS THAN 10 breaths per minute, B. Oxygen saturation LESS THAN 90%, C. Sedation score of 6  ondansetron Injectable 4 milliGRAM(s) IV Push every 6 hours PRN Nausea    T(C): 36.7 (02-11-20 @ 11:34), Max: 36.9 (02-10-20 @ 21:48)  HR: 79 (02-11-20 @ 11:34) (64 - 99)  BP: 116/69 (02-11-20 @ 11:34) (116/69 - 128/68)  RR: 18 (02-11-20 @ 11:34) (17 - 20)  SpO2: 100% (02-11-20 @ 11:34) (95% - 100%)    PHYSICAL EXAM:  GENERAL: NAD, thin   NECK: Supple, No JVD  CHEST/LUNG: Clear to auscultation bilaterally; No wheeze  HEART: Regular rate and rhythm; No murmurs, rubs, or gallops  ABDOMEN: Soft, Nontender, Nondistended; Bowel sounds present  EXTREMITIES:   thin legs, no edema   PSYCH: AAOx3  NEUROLOGY: non-focal  SKIN: No rashes or lesions    LABS:  no new labs, will defer today as yesterday stable and pt need a-stick for labs       CXR (2/11) personally reviewed   Lungs are clear. No pleural effusion or pneumothorax.  Bilateral sclerotic humeral heads compatible with avascular necrosis.      Consultant(s) Notes Reviewed:    Care Discussed with Consultants/Other Providers:

## 2020-02-11 NOTE — PROVIDER CONTACT NOTE (OTHER) - ACTION/TREATMENT ORDERED:
ADS BELÉN Wright made aware. IVF on hold as per order. 2L O2 via NC for comfort. No further action ordered at this time.

## 2020-02-12 DIAGNOSIS — D72.1 EOSINOPHILIA: ICD-10-CM

## 2020-02-12 DIAGNOSIS — D57.1 SICKLE-CELL DISEASE WITHOUT CRISIS: ICD-10-CM

## 2020-02-12 LAB
ALBUMIN SERPL ELPH-MCNC: 3.3 G/DL — SIGNIFICANT CHANGE UP (ref 3.3–5)
ALP SERPL-CCNC: 123 U/L — HIGH (ref 40–120)
ALT FLD-CCNC: 15 U/L — SIGNIFICANT CHANGE UP (ref 4–41)
ANION GAP SERPL CALC-SCNC: 12 MMO/L — SIGNIFICANT CHANGE UP (ref 7–14)
AST SERPL-CCNC: 26 U/L — SIGNIFICANT CHANGE UP (ref 4–40)
BASOPHILS # BLD AUTO: 0.09 K/UL — SIGNIFICANT CHANGE UP (ref 0–0.2)
BASOPHILS NFR BLD AUTO: 0.8 % — SIGNIFICANT CHANGE UP (ref 0–2)
BILIRUB SERPL-MCNC: 2.2 MG/DL — HIGH (ref 0.2–1.2)
BUN SERPL-MCNC: 19 MG/DL — SIGNIFICANT CHANGE UP (ref 7–23)
CALCIUM SERPL-MCNC: 9.3 MG/DL — SIGNIFICANT CHANGE UP (ref 8.4–10.5)
CHLORIDE SERPL-SCNC: 107 MMOL/L — SIGNIFICANT CHANGE UP (ref 98–107)
CO2 SERPL-SCNC: 20 MMOL/L — LOW (ref 22–31)
CREAT SERPL-MCNC: 1.07 MG/DL — SIGNIFICANT CHANGE UP (ref 0.5–1.3)
EOSINOPHIL # BLD AUTO: 1.61 K/UL — HIGH (ref 0–0.5)
EOSINOPHIL NFR BLD AUTO: 13.9 % — HIGH (ref 0–6)
GLUCOSE SERPL-MCNC: 127 MG/DL — HIGH (ref 70–99)
HCT VFR BLD CALC: 19.7 % — CRITICAL LOW (ref 39–50)
HGB BLD-MCNC: 6.4 G/DL — CRITICAL LOW (ref 13–17)
IMM GRANULOCYTES NFR BLD AUTO: 0.6 % — SIGNIFICANT CHANGE UP (ref 0–1.5)
LDH SERPL L TO P-CCNC: 287 U/L — HIGH (ref 135–225)
LYMPHOCYTES # BLD AUTO: 1.72 K/UL — SIGNIFICANT CHANGE UP (ref 1–3.3)
LYMPHOCYTES # BLD AUTO: 14.8 % — SIGNIFICANT CHANGE UP (ref 13–44)
MAGNESIUM SERPL-MCNC: 1.9 MG/DL — SIGNIFICANT CHANGE UP (ref 1.6–2.6)
MCHC RBC-ENTMCNC: 25.6 PG — LOW (ref 27–34)
MCHC RBC-ENTMCNC: 32.5 % — SIGNIFICANT CHANGE UP (ref 32–36)
MCV RBC AUTO: 78.8 FL — LOW (ref 80–100)
MONOCYTES # BLD AUTO: 1.07 K/UL — HIGH (ref 0–0.9)
MONOCYTES NFR BLD AUTO: 9.2 % — SIGNIFICANT CHANGE UP (ref 2–14)
NEUTROPHILS # BLD AUTO: 7.03 K/UL — SIGNIFICANT CHANGE UP (ref 1.8–7.4)
NEUTROPHILS NFR BLD AUTO: 60.7 % — SIGNIFICANT CHANGE UP (ref 43–77)
NRBC # FLD: 0.05 K/UL — SIGNIFICANT CHANGE UP (ref 0–0)
PHOSPHATE SERPL-MCNC: 3.6 MG/DL — SIGNIFICANT CHANGE UP (ref 2.5–4.5)
PLATELET # BLD AUTO: 274 K/UL — SIGNIFICANT CHANGE UP (ref 150–400)
PMV BLD: 9.6 FL — SIGNIFICANT CHANGE UP (ref 7–13)
POTASSIUM SERPL-MCNC: 4.2 MMOL/L — SIGNIFICANT CHANGE UP (ref 3.5–5.3)
POTASSIUM SERPL-SCNC: 4.2 MMOL/L — SIGNIFICANT CHANGE UP (ref 3.5–5.3)
PROT SERPL-MCNC: 6.9 G/DL — SIGNIFICANT CHANGE UP (ref 6–8.3)
RBC # BLD: 2.5 M/UL — LOW (ref 4.2–5.8)
RBC # FLD: 25.9 % — HIGH (ref 10.3–14.5)
RETICS #: 161 K/UL — HIGH (ref 25–125)
RETICS/RBC NFR: 6.4 % — HIGH (ref 0.5–2.5)
SODIUM SERPL-SCNC: 139 MMOL/L — SIGNIFICANT CHANGE UP (ref 135–145)
URATE SERPL-MCNC: 7.6 MG/DL — SIGNIFICANT CHANGE UP (ref 3.4–8.8)
WBC # BLD: 11.59 K/UL — HIGH (ref 3.8–10.5)
WBC # FLD AUTO: 11.59 K/UL — HIGH (ref 3.8–10.5)

## 2020-02-12 PROCEDURE — 99232 SBSQ HOSP IP/OBS MODERATE 35: CPT

## 2020-02-12 RX ORDER — HYDROMORPHONE HYDROCHLORIDE 2 MG/ML
2 INJECTION INTRAMUSCULAR; INTRAVENOUS; SUBCUTANEOUS
Refills: 0 | Status: DISCONTINUED | OUTPATIENT
Start: 2020-02-12 | End: 2020-02-13

## 2020-02-12 RX ORDER — HYDROMORPHONE HYDROCHLORIDE 2 MG/ML
4 INJECTION INTRAMUSCULAR; INTRAVENOUS; SUBCUTANEOUS
Refills: 0 | Status: DISCONTINUED | OUTPATIENT
Start: 2020-02-12 | End: 2020-02-13

## 2020-02-12 RX ADMIN — TAMSULOSIN HYDROCHLORIDE 0.4 MILLIGRAM(S): 0.4 CAPSULE ORAL at 21:04

## 2020-02-12 RX ADMIN — HYDROMORPHONE HYDROCHLORIDE 2 MILLIGRAM(S): 2 INJECTION INTRAMUSCULAR; INTRAVENOUS; SUBCUTANEOUS at 22:09

## 2020-02-12 RX ADMIN — HYDROMORPHONE HYDROCHLORIDE 2 MILLIGRAM(S): 2 INJECTION INTRAMUSCULAR; INTRAVENOUS; SUBCUTANEOUS at 18:17

## 2020-02-12 RX ADMIN — Medication 25 MILLIGRAM(S): at 05:52

## 2020-02-12 RX ADMIN — ENOXAPARIN SODIUM 40 MILLIGRAM(S): 100 INJECTION SUBCUTANEOUS at 11:48

## 2020-02-12 RX ADMIN — HYDROMORPHONE HYDROCHLORIDE 2 MILLIGRAM(S): 2 INJECTION INTRAMUSCULAR; INTRAVENOUS; SUBCUTANEOUS at 17:17

## 2020-02-12 RX ADMIN — LISINOPRIL 5 MILLIGRAM(S): 2.5 TABLET ORAL at 05:52

## 2020-02-12 RX ADMIN — Medication 1 MILLIGRAM(S): at 11:48

## 2020-02-12 RX ADMIN — Medication 25 MILLIGRAM(S): at 17:14

## 2020-02-12 RX ADMIN — HYDROMORPHONE HYDROCHLORIDE 30 MILLILITER(S): 2 INJECTION INTRAMUSCULAR; INTRAVENOUS; SUBCUTANEOUS at 08:27

## 2020-02-12 RX ADMIN — HYDROMORPHONE HYDROCHLORIDE 2 MILLIGRAM(S): 2 INJECTION INTRAMUSCULAR; INTRAVENOUS; SUBCUTANEOUS at 21:09

## 2020-02-12 NOTE — PROVIDER CONTACT NOTE (CRITICAL VALUE NOTIFICATION) - ACTION/TREATMENT ORDERED:
Patient asymptomatic. will continue to monitor and assess for any signs and symptoms of bleeding.
Will continue to monitor patient
ACP made aware

## 2020-02-12 NOTE — PROGRESS NOTE ADULT - SUBJECTIVE AND OBJECTIVE BOX
Patient is a 49y old  Male who presents with a chief complaint of Sickle Crisis    SUBJECTIVE / OVERNIGHT EVENTS:    No CP, SOB, f/c/n/v  Reports pain in LE but slowly improving     MEDICATIONS  (STANDING):  enoxaparin Injectable 40 milliGRAM(s) SubCutaneous daily  folic acid 1 milliGRAM(s) Oral daily  HYDROmorphone PCA (1 mG/mL) 30 milliLiter(s) PCA Continuous PCA Continuous  influenza   Vaccine 0.5 milliLiter(s) IntraMuscular once  lisinopril 5 milliGRAM(s) Oral daily  metoprolol tartrate 25 milliGRAM(s) Oral two times a day  polyethylene glycol 3350 17 Gram(s) Oral daily  senna 2 Tablet(s) Oral at bedtime  tamsulosin 0.4 milliGRAM(s) Oral at bedtime    MEDICATIONS  (PRN):  acetaminophen   Tablet .. 650 milliGRAM(s) Oral every 6 hours PRN Temp greater or equal to 38C (100.4F), Mild Pain (1 - 3)  naloxone Injectable 0.1 milliGRAM(s) IV Push every 3 minutes PRN For ANY of the following changes in patient status:  A. RR LESS THAN 10 breaths per minute, B. Oxygen saturation LESS THAN 90%, C. Sedation score of 6  ondansetron Injectable 4 milliGRAM(s) IV Push every 6 hours PRN Nausea    T(C): 36.6 (02-12-20 @ 13:45), Max: 37.7 (02-12-20 @ 01:38)  HR: 80 (02-12-20 @ 13:45) (62 - 84)  BP: 114/66 (02-12-20 @ 13:45) (98/46 - 117/70)  RR: 19 (02-12-20 @ 13:45) (18 - 19)  SpO2: 100% (02-12-20 @ 13:45) (95% - 100%)    I&O's Summary    12 Feb 2020 07:01  -  12 Feb 2020 14:06  --------------------------------------------------------  IN: 0 mL / OUT: 300 mL / NET: -300 mL    GENERAL: NAD, thin   NECK: Supple, No JVD  CHEST/LUNG: Clear to auscultation bilaterally; No wheeze  HEART: Regular rate and rhythm; No murmurs, rubs, or gallops  ABDOMEN: Soft, Nontender, Nondistended; Bowel sounds present  EXTREMITIES:   thin legs, no edema   PSYCH: AAOx3  NEUROLOGY: non-focal  SKIN: No rashes or lesions    LABS:                        6.4    11.59 )-----------( 274      ( 12 Feb 2020 11:05 )             19.7     02-12    139  |  107  |  19  ----------------------------<  127<H>  4.2   |  20<L>  |  1.07    Ca    9.3      12 Feb 2020 11:05  Phos  3.6     02-12  Mg     1.9     02-12    TPro  6.9  /  Alb  3.3  /  TBili  2.2<H>  /  DBili  x   /  AST  26  /  ALT  15  /  AlkPhos  123<H>  02-12      Consultant(s) Notes Reviewed:    Care Discussed with Consultants/Other Providers:

## 2020-02-12 NOTE — PROGRESS NOTE ADULT - PROBLEM SELECTOR PLAN 2
Unclear cause, slowly rising, no fevers or LFT issues  - monitor  - ?flomax is a new med Fluctuating since 2017, unclear why, seems asymptomatic  - monitor  - OP heme f/u

## 2020-02-12 NOTE — PROGRESS NOTE ADULT - PROBLEM SELECTOR PLAN 1
No s/s of infxn.  CXR clear, RVP negative.  CTH w/o old strokes.   - given HF will hold IVF for now as taking PO  - c/w PCA, transition to PO this afternoon   - c/w folic acid

## 2020-02-13 ENCOUNTER — APPOINTMENT (OUTPATIENT)
Dept: HEMATOLOGY ONCOLOGY | Facility: CLINIC | Age: 65
End: 2020-02-13

## 2020-02-13 PROCEDURE — 99232 SBSQ HOSP IP/OBS MODERATE 35: CPT

## 2020-02-13 RX ORDER — LISINOPRIL 2.5 MG/1
10 TABLET ORAL DAILY
Refills: 0 | Status: DISCONTINUED | OUTPATIENT
Start: 2020-02-14 | End: 2020-02-14

## 2020-02-13 RX ORDER — HYDROMORPHONE HYDROCHLORIDE 2 MG/ML
2 INJECTION INTRAMUSCULAR; INTRAVENOUS; SUBCUTANEOUS
Refills: 0 | Status: DISCONTINUED | OUTPATIENT
Start: 2020-02-13 | End: 2020-02-14

## 2020-02-13 RX ORDER — HYDROMORPHONE HYDROCHLORIDE 2 MG/ML
4 INJECTION INTRAMUSCULAR; INTRAVENOUS; SUBCUTANEOUS
Refills: 0 | Status: DISCONTINUED | OUTPATIENT
Start: 2020-02-13 | End: 2020-02-14

## 2020-02-13 RX ADMIN — Medication 25 MILLIGRAM(S): at 05:29

## 2020-02-13 RX ADMIN — TAMSULOSIN HYDROCHLORIDE 0.4 MILLIGRAM(S): 0.4 CAPSULE ORAL at 22:10

## 2020-02-13 RX ADMIN — HYDROMORPHONE HYDROCHLORIDE 4 MILLIGRAM(S): 2 INJECTION INTRAMUSCULAR; INTRAVENOUS; SUBCUTANEOUS at 02:17

## 2020-02-13 RX ADMIN — HYDROMORPHONE HYDROCHLORIDE 4 MILLIGRAM(S): 2 INJECTION INTRAMUSCULAR; INTRAVENOUS; SUBCUTANEOUS at 01:17

## 2020-02-13 RX ADMIN — LISINOPRIL 5 MILLIGRAM(S): 2.5 TABLET ORAL at 05:29

## 2020-02-13 NOTE — PROGRESS NOTE ADULT - SUBJECTIVE AND OBJECTIVE BOX
Patient is a 49y old  Male who presents with a chief complaint of Crisis pain    SUBJECTIVE / OVERNIGHT EVENTS:    No new complaints  Pain is better now just in LLE, feels walking is improving  Tolerated coming off pump, pain relieved by PO  Still nervous about taking pain meds at home     MEDICATIONS  (STANDING):  enoxaparin Injectable 40 milliGRAM(s) SubCutaneous daily  folic acid 1 milliGRAM(s) Oral daily  influenza   Vaccine 0.5 milliLiter(s) IntraMuscular once  lisinopril 5 milliGRAM(s) Oral daily  metoprolol tartrate 25 milliGRAM(s) Oral two times a day  polyethylene glycol 3350 17 Gram(s) Oral daily  senna 2 Tablet(s) Oral at bedtime  tamsulosin 0.4 milliGRAM(s) Oral at bedtime    MEDICATIONS  (PRN):  acetaminophen   Tablet .. 650 milliGRAM(s) Oral every 6 hours PRN Temp greater or equal to 38C (100.4F), Mild Pain (1 - 3)  HYDROmorphone   Tablet 2 milliGRAM(s) Oral every 3 hours PRN Moderate Pain (4 - 6)  HYDROmorphone   Tablet 4 milliGRAM(s) Oral every 3 hours PRN Severe Pain (7 - 10)  ondansetron Injectable 4 milliGRAM(s) IV Push every 6 hours PRN Nausea    T(C): 37.1 (02-13-20 @ 15:06), Max: 37.1 (02-13-20 @ 15:06)  HR: 79 (02-13-20 @ 15:06) (60 - 79)  BP: 108/65 (02-13-20 @ 15:06) (108/62 - 142/114)  RR: 18 (02-13-20 @ 15:06) (18 - 18)  SpO2: 100% (02-13-20 @ 15:06) (100% - 100%)    I&O's Summary    12 Feb 2020 07:01  -  13 Feb 2020 07:00  --------------------------------------------------------  IN: 250 mL / OUT: 300 mL / NET: -50 mL    13 Feb 2020 07:01 - 13 Feb 2020 15:53  --------------------------------------------------------  IN: 0 mL / OUT: 250 mL / NET: -250 mL    GENERAL: NAD, thin   NECK: Supple, No JVD  CHEST/LUNG: Clear to auscultation bilaterally; No wheeze  HEART: Regular rate and rhythm; No murmurs, rubs, or gallops  ABDOMEN: Soft, Nontender, Nondistended; Bowel sounds present  EXTREMITIES:   thin legs, no edema   PSYCH: AAOx3  NEUROLOGY: non-focal  SKIN: No rashes or lesions    LABS:  no new labs    Consultant(s) Notes Reviewed:    Care Discussed with Consultants/Other Providers:

## 2020-02-13 NOTE — PROGRESS NOTE ADULT - PROBLEM SELECTOR PLAN 5
Lovenox ppx  dispo pending improvement of pain, possibly later today vs am depending on pain  dispo planning, dispo time 33 min

## 2020-02-13 NOTE — PROGRESS NOTE ADULT - PROBLEM SELECTOR PLAN 6
Transitions of Care Status:  1.  Name of PCP: Lluvia Hamm   2.  PCP Contacted on Admission: [x ] Y    [ ] N    3.  PCP contacted at Discharge: [X] Y    [ ] N    [ ] N/A  4.  Post-Discharge Appointment Date and Location:  5.  Summary of Handoff given to PCP:

## 2020-02-13 NOTE — PROGRESS NOTE ADULT - PROBLEM SELECTOR PLAN 1
No s/s of infxn.  CXR clear, RVP negative.  CTH w/o old strokes.   - given HF will hold IVF for now as taking PO  - c/w PO pain meds   - c/w folic acid

## 2020-02-13 NOTE — PROGRESS NOTE ADULT - ASSESSMENT
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

## 2020-02-14 ENCOUNTER — TRANSCRIPTION ENCOUNTER (OUTPATIENT)
Age: 65
End: 2020-02-14

## 2020-02-14 VITALS — WEIGHT: 129.85 LBS

## 2020-02-14 DIAGNOSIS — E43 UNSPECIFIED SEVERE PROTEIN-CALORIE MALNUTRITION: ICD-10-CM

## 2020-02-14 PROCEDURE — 99239 HOSP IP/OBS DSCHRG MGMT >30: CPT

## 2020-02-14 RX ORDER — SENNA PLUS 8.6 MG/1
1 TABLET ORAL
Qty: 30 | Refills: 0
Start: 2020-02-14 | End: 2020-03-14

## 2020-02-14 RX ORDER — HYDROMORPHONE HYDROCHLORIDE 2 MG/ML
1 INJECTION INTRAMUSCULAR; INTRAVENOUS; SUBCUTANEOUS
Qty: 48 | Refills: 0
Start: 2020-02-14

## 2020-02-14 RX ORDER — HYDROMORPHONE HYDROCHLORIDE 2 MG/ML
1 INJECTION INTRAMUSCULAR; INTRAVENOUS; SUBCUTANEOUS
Qty: 12 | Refills: 0
Start: 2020-02-14 | End: 2020-02-16

## 2020-02-14 RX ORDER — POLYETHYLENE GLYCOL 3350 17 G/17G
17 POWDER, FOR SOLUTION ORAL
Qty: 1 | Refills: 0
Start: 2020-02-14

## 2020-02-14 RX ORDER — METOPROLOL TARTRATE 50 MG
0.5 TABLET ORAL
Qty: 0 | Refills: 0 | DISCHARGE
Start: 2020-02-14 | End: 2020-03-14

## 2020-02-14 RX ORDER — LISINOPRIL 2.5 MG/1
1 TABLET ORAL
Qty: 30 | Refills: 0
Start: 2020-02-14 | End: 2020-03-14

## 2020-02-14 RX ORDER — METOPROLOL TARTRATE 50 MG
1 TABLET ORAL
Qty: 60 | Refills: 0
Start: 2020-02-14 | End: 2020-03-14

## 2020-02-14 RX ADMIN — HYDROMORPHONE HYDROCHLORIDE 2 MILLIGRAM(S): 2 INJECTION INTRAMUSCULAR; INTRAVENOUS; SUBCUTANEOUS at 04:31

## 2020-02-14 RX ADMIN — HYDROMORPHONE HYDROCHLORIDE 2 MILLIGRAM(S): 2 INJECTION INTRAMUSCULAR; INTRAVENOUS; SUBCUTANEOUS at 05:20

## 2020-02-14 NOTE — DIETITIAN INITIAL EVALUATION ADULT. - PROBLEM SELECTOR PLAN 2
-Cr 0.88 9/2/2019--Cr 1.45 2/7/2019  -check US r/o hydropnephrosis hx of BPH   -PVR   -cont flomax  -gentle IVF

## 2020-02-14 NOTE — DIETITIAN INITIAL EVALUATION ADULT. - CONTINUE CURRENT NUTRITION CARE PLAN
1. Continue current diet order, which remains appropriate at this time. Continue Ensure Enlive 240mls 3x daily (1050kcal, 60g protein) 2. Monitor weights, labs, BM's, skin integrity, p.o. intake. 3. Please Encourage po intake, assist with meals and menu selections, provide alternatives PRN./yes

## 2020-02-14 NOTE — PROGRESS NOTE ADULT - SUBJECTIVE AND OBJECTIVE BOX
Patient is a 49y old  Male who presents with a chief complaint of Sickle Crisis    SUBJECTIVE / OVERNIGHT EVENTS:    No CP, SOB, f/c/n/v  Feels fine, only pain is in L shin now but overall pain greatly improved     MEDICATIONS  (STANDING):  enoxaparin Injectable 40 milliGRAM(s) SubCutaneous daily  folic acid 1 milliGRAM(s) Oral daily  influenza   Vaccine 0.5 milliLiter(s) IntraMuscular once  lisinopril 10 milliGRAM(s) Oral daily  metoprolol tartrate 25 milliGRAM(s) Oral two times a day  polyethylene glycol 3350 17 Gram(s) Oral daily  senna 2 Tablet(s) Oral at bedtime  tamsulosin 0.4 milliGRAM(s) Oral at bedtime    MEDICATIONS  (PRN):  acetaminophen   Tablet .. 650 milliGRAM(s) Oral every 6 hours PRN Temp greater or equal to 38C (100.4F), Mild Pain (1 - 3)  HYDROmorphone   Tablet 2 milliGRAM(s) Oral every 3 hours PRN Moderate Pain (4 - 6)  HYDROmorphone   Tablet 4 milliGRAM(s) Oral every 3 hours PRN Severe Pain (7 - 10)  ondansetron Injectable 4 milliGRAM(s) IV Push every 6 hours PRN Nausea    T(C): 36.9 (02-14-20 @ 04:34), Max: 37.2 (02-13-20 @ 21:47)  HR: 79 (02-14-20 @ 04:34) (79 - 93)  BP: 114/81 (02-14-20 @ 04:34) (108/65 - 119/71)  RR: 18 (02-14-20 @ 04:34) (18 - 18)  SpO2: 99% (02-14-20 @ 04:34) (99% - 100%)    I&O's Summary    13 Feb 2020 07:01  -  14 Feb 2020 07:00  --------------------------------------------------------  IN: 0 mL / OUT: 250 mL / NET: -250 mL    GENERAL: NAD, thin   NECK: Supple, No JVD  CHEST/LUNG: Clear to auscultation bilaterally; No wheeze  HEART: Regular rate and rhythm; No murmurs, rubs, or gallops  ABDOMEN: Soft, Nontender, Nondistended; Bowel sounds present  EXTREMITIES:   thin legs, no edema   PSYCH: AAOx3  NEUROLOGY: non-focal  SKIN: No rashes or lesions    LABS:  no new labs     Consultant(s) Notes Reviewed:    Care Discussed with Consultants/Other Providers:

## 2020-02-14 NOTE — DISCHARGE NOTE NURSING/CASE MANAGEMENT/SOCIAL WORK - PATIENT PORTAL LINK FT
You can access the FollowMyHealth Patient Portal offered by [facility] by registering at the following website: [email or web address]. By joining [facility]’s FollowMyHealth portal, you will also be able to view your health information using other applications (apps) compatible with our system.

## 2020-02-14 NOTE — CHART NOTE - NSCHARTNOTEFT_GEN_A_CORE
NUTRITION SERVICES     Upon Nutritional Assessment by the Registered Dietitian your patient was determined to meet criteria/ has evidence of the following diagnosis/diagnoses:  [ ] Mild Protein Calorie Malnutrition   [ ] Moderate Protein Calorie Malnutrition   [X ] Severe Protein Calorie Malnutrition   [ ] Unspecified Protein Calorie Malnutrition   [ ] Underweight / BMI <19  [ ] Morbid Obesity / BMI >40    Findings as based on:  •  Comprehensive nutritional assessment and consultation    Please refer to Initial Dietitian Evaluation or Nutrition Follow-up via documents section of [employer] EMR for further recommendations.

## 2020-02-14 NOTE — DIETITIAN INITIAL EVALUATION ADULT. - OTHER INFO
Patient admitted for sickle cell anemia crisis. Patient endorses good appetite and PO intake >75%. Denies any nausea/vomiting/diarrhea/constipation or difficulty chewing and swallowing. NKFA. Avoids Peanut, Pork, Shellfish and is lactose intolerant. Patient familiar to RDN from previous admission. Usual weight to be 130lbs (1/2018), weighed 119lbs on 4/8/19- per outpatient record and was 110lbs (5/2/19) and he currently weighs 106.9lbs. Patient reports variable po intake PTA and occasionally consuming PO supplement Ensure Enlive. Encouraged good po intakes of nutrient and protein dense foods and PO supplement between meals for optimal nutritional status. Strategies to increase po intake and better snacking options discussed.

## 2020-02-14 NOTE — DIETITIAN INITIAL EVALUATION ADULT. - PERTINENT LABORATORY DATA
02-12 Na139 mmol/L Glu 127 mg/dL<H> K+ 4.2 mmol/L Cr  1.07 mg/dL BUN 19 mg/dL 02-12 Phos 3.6 mg/dL 02-12 Alb 3.3 g/dL

## 2020-02-14 NOTE — DIETITIAN INITIAL EVALUATION ADULT. - PERTINENT MEDS FT
MEDICATIONS  (STANDING):  enoxaparin Injectable 40 milliGRAM(s) SubCutaneous daily  folic acid 1 milliGRAM(s) Oral daily  influenza   Vaccine 0.5 milliLiter(s) IntraMuscular once  lisinopril 10 milliGRAM(s) Oral daily  metoprolol tartrate 25 milliGRAM(s) Oral two times a day  polyethylene glycol 3350 17 Gram(s) Oral daily  senna 2 Tablet(s) Oral at bedtime  tamsulosin 0.4 milliGRAM(s) Oral at bedtime    MEDICATIONS  (PRN):  acetaminophen   Tablet .. 650 milliGRAM(s) Oral every 6 hours PRN Temp greater or equal to 38C (100.4F), Mild Pain (1 - 3)  HYDROmorphone   Tablet 2 milliGRAM(s) Oral every 3 hours PRN Moderate Pain (4 - 6)  HYDROmorphone   Tablet 4 milliGRAM(s) Oral every 3 hours PRN Severe Pain (7 - 10)  ondansetron Injectable 4 milliGRAM(s) IV Push every 6 hours PRN Nausea

## 2020-02-14 NOTE — PROGRESS NOTE ADULT - PROBLEM SELECTOR PLAN 7
Transitions of Care Status:  1.  Name of PCP: Lluvia Hamm   2.  PCP Contacted on Admission: [x ] Y    [ ] N    3.  PCP contacted at Discharge: [X] Y    [ ] N    [ ] N/A  4.  Post-Discharge Appointment Date and Location:  5.  Summary of Handoff given to PCP: Y

## 2020-02-14 NOTE — PROGRESS NOTE ADULT - PROBLEM SELECTOR PLAN 3
Improved.  US with B/L atrophic kidneys, mild central fullness of the right renal collecting system  - reports voiding improved since initiation of Flomax

## 2020-02-14 NOTE — DIETITIAN INITIAL EVALUATION ADULT. - PROBLEM SELECTOR PLAN 1
-possibly secondary to acute viral illness vs gallstone  -IVF 1/2 NS @ 100ml/hr  -PCA   -folic acid  -retic count/LDH/LFTs daily  -check fractionated alkphos  -Pt is poor historian will check US r/o billary process prior US showing cholelithiasis

## 2020-02-14 NOTE — DIETITIAN INITIAL EVALUATION ADULT. - PHYSICAL APPEARANCE
other (specify) Nutrition focused physical exam conducted - found signs of malnutrition [ ]absent [ X]present   Subcutaneous fat loss: [Severe ] Orbital fat pads region, [Severe]Buccal fat region, [Severe]Triceps region,  [ Severe]Ribs region  Muscle wasting: [Severe]Temples region, [Severe ]Clavicle region, [Severe ]Shoulder region,  [Severe ]Interosseous region,  [Severe ]thigh region, [Severe ]Calf region

## 2020-02-14 NOTE — PROGRESS NOTE ADULT - PROBLEM SELECTOR PLAN 1
No s/s of infxn.  CXR clear, RVP negative.  CTH w/o old strokes.   - c/w PO pain meds, few days for dc home  - c/w folic acid

## 2020-03-09 ENCOUNTER — APPOINTMENT (OUTPATIENT)
Dept: INTERNAL MEDICINE | Facility: CLINIC | Age: 65
End: 2020-03-09

## 2020-03-30 ENCOUNTER — APPOINTMENT (OUTPATIENT)
Dept: INTERNAL MEDICINE | Facility: CLINIC | Age: 65
End: 2020-03-30
Payer: MEDICARE

## 2020-03-30 ENCOUNTER — OUTPATIENT (OUTPATIENT)
Dept: OUTPATIENT SERVICES | Facility: HOSPITAL | Age: 65
LOS: 1 days | End: 2020-03-30

## 2020-03-30 DIAGNOSIS — D57.1 SICKLE-CELL DISEASE WITHOUT CRISIS: ICD-10-CM

## 2020-03-30 PROCEDURE — 99442: CPT | Mod: 95

## 2020-03-30 RX ORDER — HYDROXYUREA 500 MG/1
500 CAPSULE ORAL DAILY
Qty: 60 | Refills: 6 | Status: DISCONTINUED | COMMUNITY
Start: 2018-12-12 | End: 2020-03-30

## 2020-03-30 RX ORDER — ETHYL ALCOHOL 700 MG/ML
70 LIQUID TOPICAL
Qty: 1 | Refills: 3 | Status: ACTIVE | COMMUNITY
Start: 2020-03-30 | End: 1900-01-01

## 2020-04-08 ENCOUNTER — APPOINTMENT (OUTPATIENT)
Dept: INTERNAL MEDICINE | Facility: CLINIC | Age: 65
End: 2020-04-08

## 2020-04-08 ENCOUNTER — LABORATORY RESULT (OUTPATIENT)
Age: 65
End: 2020-04-08

## 2020-04-08 ENCOUNTER — OUTPATIENT (OUTPATIENT)
Dept: OUTPATIENT SERVICES | Facility: HOSPITAL | Age: 65
LOS: 1 days | End: 2020-04-08

## 2020-04-08 DIAGNOSIS — D57.1 SICKLE-CELL DISEASE WITHOUT CRISIS: ICD-10-CM

## 2020-04-08 LAB
ALBUMIN SERPL ELPH-MCNC: 3.6 G/DL — SIGNIFICANT CHANGE UP (ref 3.3–5)
ALP SERPL-CCNC: 130 U/L — HIGH (ref 40–120)
ALT FLD-CCNC: < 5 U/L — SIGNIFICANT CHANGE UP (ref 4–41)
ANION GAP SERPL CALC-SCNC: 10 MMO/L — SIGNIFICANT CHANGE UP (ref 7–14)
ANISOCYTOSIS BLD QL: SIGNIFICANT CHANGE UP
AST SERPL-CCNC: < 5 U/L — SIGNIFICANT CHANGE UP (ref 4–40)
BASOPHILS # BLD AUTO: 0.1 K/UL — SIGNIFICANT CHANGE UP (ref 0–0.2)
BASOPHILS NFR BLD AUTO: 0.9 % — SIGNIFICANT CHANGE UP (ref 0–2)
BASOPHILS NFR SPEC: 0 % — SIGNIFICANT CHANGE UP (ref 0–2)
BILIRUB SERPL-MCNC: 1.4 MG/DL — HIGH (ref 0.2–1.2)
BLASTS # FLD: 0 % — SIGNIFICANT CHANGE UP (ref 0–0)
BUN SERPL-MCNC: 18 MG/DL — SIGNIFICANT CHANGE UP (ref 7–23)
CALCIUM SERPL-MCNC: 9.3 MG/DL — SIGNIFICANT CHANGE UP (ref 8.4–10.5)
CHLORIDE SERPL-SCNC: 112 MMOL/L — HIGH (ref 98–107)
CO2 SERPL-SCNC: 18 MMOL/L — LOW (ref 22–31)
CREAT SERPL-MCNC: 1.23 MG/DL — SIGNIFICANT CHANGE UP (ref 0.5–1.3)
DACRYOCYTES BLD QL SMEAR: SLIGHT — SIGNIFICANT CHANGE UP
ELLIPTOCYTES BLD QL SMEAR: SLIGHT — SIGNIFICANT CHANGE UP
EOSINOPHIL # BLD AUTO: 1.04 K/UL — HIGH (ref 0–0.5)
EOSINOPHIL NFR BLD AUTO: 9.4 % — HIGH (ref 0–6)
EOSINOPHIL NFR FLD: 8.5 % — HIGH (ref 0–6)
GIANT PLATELETS BLD QL SMEAR: PRESENT — SIGNIFICANT CHANGE UP
GLUCOSE SERPL-MCNC: 80 MG/DL — SIGNIFICANT CHANGE UP (ref 70–99)
HCT VFR BLD CALC: 19.3 % — CRITICAL LOW (ref 39–50)
HGB BLD-MCNC: 6.1 G/DL — CRITICAL LOW (ref 13–17)
HYPOCHROMIA BLD QL: SIGNIFICANT CHANGE UP
IMM GRANULOCYTES NFR BLD AUTO: 0.5 % — SIGNIFICANT CHANGE UP (ref 0–1.5)
LYMPHOCYTES # BLD AUTO: 1.31 K/UL — SIGNIFICANT CHANGE UP (ref 1–3.3)
LYMPHOCYTES # BLD AUTO: 11.8 % — LOW (ref 13–44)
LYMPHOCYTES NFR SPEC AUTO: 7.5 % — LOW (ref 13–44)
MACROCYTES BLD QL: SLIGHT — SIGNIFICANT CHANGE UP
MCHC RBC-ENTMCNC: 26.1 PG — LOW (ref 27–34)
MCHC RBC-ENTMCNC: 31.6 % — LOW (ref 32–36)
MCV RBC AUTO: 82.5 FL — SIGNIFICANT CHANGE UP (ref 80–100)
METAMYELOCYTES # FLD: 0 % — SIGNIFICANT CHANGE UP (ref 0–1)
MICROCYTES BLD QL: SLIGHT — SIGNIFICANT CHANGE UP
MONOCYTES # BLD AUTO: 0.95 K/UL — HIGH (ref 0–0.9)
MONOCYTES NFR BLD AUTO: 8.6 % — SIGNIFICANT CHANGE UP (ref 2–14)
MONOCYTES NFR BLD: 4.7 % — SIGNIFICANT CHANGE UP (ref 2–9)
MYELOCYTES NFR BLD: 0 % — SIGNIFICANT CHANGE UP (ref 0–0)
NEUTROPHIL AB SER-ACNC: 75.5 % — SIGNIFICANT CHANGE UP (ref 43–77)
NEUTROPHILS # BLD AUTO: 7.65 K/UL — HIGH (ref 1.8–7.4)
NEUTROPHILS NFR BLD AUTO: 68.8 % — SIGNIFICANT CHANGE UP (ref 43–77)
NEUTS BAND # BLD: 0 % — SIGNIFICANT CHANGE UP (ref 0–6)
NRBC # BLD: 2 /100WBC — SIGNIFICANT CHANGE UP
NRBC # FLD: 0.28 K/UL — SIGNIFICANT CHANGE UP (ref 0–0)
NRBC FLD-RTO: 2.5 — SIGNIFICANT CHANGE UP
OTHER - HEMATOLOGY %: 0 — SIGNIFICANT CHANGE UP
OVALOCYTES BLD QL SMEAR: SLIGHT — SIGNIFICANT CHANGE UP
PLATELET # BLD AUTO: 414 K/UL — HIGH (ref 150–400)
PLATELET COUNT - ESTIMATE: NORMAL — SIGNIFICANT CHANGE UP
PMV BLD: 10.1 FL — SIGNIFICANT CHANGE UP (ref 7–13)
POIKILOCYTOSIS BLD QL AUTO: SLIGHT — SIGNIFICANT CHANGE UP
POLYCHROMASIA BLD QL SMEAR: SIGNIFICANT CHANGE UP
POTASSIUM SERPL-MCNC: 6.1 MMOL/L — HIGH (ref 3.5–5.3)
POTASSIUM SERPL-SCNC: 6.1 MMOL/L — HIGH (ref 3.5–5.3)
PROMYELOCYTES # FLD: 0 % — SIGNIFICANT CHANGE UP (ref 0–0)
PROT SERPL-MCNC: 7.4 G/DL — SIGNIFICANT CHANGE UP (ref 6–8.3)
RBC # BLD: 2.34 M/UL — LOW (ref 4.2–5.8)
RBC # FLD: 24.3 % — HIGH (ref 10.3–14.5)
RETICS #: 174 K/UL — HIGH (ref 25–125)
RETICS/RBC NFR: 7.4 % — HIGH (ref 0.5–2.5)
SICKLE CELLS BLD QL SMEAR: SIGNIFICANT CHANGE UP
SMUDGE CELLS # BLD: PRESENT — SIGNIFICANT CHANGE UP
SODIUM SERPL-SCNC: 140 MMOL/L — SIGNIFICANT CHANGE UP (ref 135–145)
TARGETS BLD QL SMEAR: SLIGHT — SIGNIFICANT CHANGE UP
VARIANT LYMPHS # BLD: 3.8 % — SIGNIFICANT CHANGE UP
WBC # BLD: 11.1 K/UL — HIGH (ref 3.8–10.5)
WBC # FLD AUTO: 11.1 K/UL — HIGH (ref 3.8–10.5)

## 2020-04-10 LAB
GAMMA INTERFERON BACKGROUND BLD IA-ACNC: 0.01 IU/ML — SIGNIFICANT CHANGE UP
M TB IFN-G BLD-IMP: NEGATIVE — SIGNIFICANT CHANGE UP
M TB IFN-G CD4+ BCKGRND COR BLD-ACNC: 0 IU/ML — SIGNIFICANT CHANGE UP
M TB IFN-G CD4+CD8+ BCKGRND COR BLD-ACNC: 0 IU/ML — SIGNIFICANT CHANGE UP
QUANT TB PLUS MITOGEN MINUS NIL: 1.55 IU/ML — SIGNIFICANT CHANGE UP

## 2020-06-22 ENCOUNTER — APPOINTMENT (OUTPATIENT)
Dept: INTERNAL MEDICINE | Facility: CLINIC | Age: 65
End: 2020-06-22

## 2020-06-22 NOTE — HISTORY OF PRESENT ILLNESS
[Other Location: e.g. Home (Enter Location, City,State)___] : at [unfilled] [Home] : at home, [unfilled] , at the time of the visit. [Verbal consent obtained from patient] : the patient, [unfilled] [FreeTextEntry1] : 49 yo male with HGB SS, for f/u [de-identified] : 49 yo male with HGB SS, for f/u. This visit is being conducted via telephone.\par The patient would like to see an eye doctor because he feels his vision is worsening. It is not acute, has been happening over time\par Otherwise, he has no complaints.\par He has been taking his Endari 2 scoops in am, and sometimes two scoops at night.\par ROS- otherwise negative\par \par a/P- 49 yo male with HGB SS\par 1. occasional pain\par ISTOP checked\par percocet- 10/325- #28\par 2. covid discussed\par 3. f/u 9/21 @ 4pm\par 4. refer to ophtho\par \par Lluvia Hamm MD\par \par

## 2020-09-21 ENCOUNTER — APPOINTMENT (OUTPATIENT)
Dept: INTERNAL MEDICINE | Facility: CLINIC | Age: 65
End: 2020-09-21

## 2020-10-21 ENCOUNTER — APPOINTMENT (OUTPATIENT)
Dept: INTERNAL MEDICINE | Facility: CLINIC | Age: 65
End: 2020-10-21

## 2020-11-02 ENCOUNTER — APPOINTMENT (OUTPATIENT)
Dept: INTERNAL MEDICINE | Facility: CLINIC | Age: 65
End: 2020-11-02

## 2020-11-02 ENCOUNTER — OUTPATIENT (OUTPATIENT)
Dept: OUTPATIENT SERVICES | Facility: HOSPITAL | Age: 65
LOS: 1 days | End: 2020-11-02

## 2020-11-02 ENCOUNTER — NON-APPOINTMENT (OUTPATIENT)
Age: 65
End: 2020-11-02

## 2020-11-02 DIAGNOSIS — N52.1 ERECTILE DYSFUNCTION DUE TO DISEASES CLASSIFIED ELSEWHERE: ICD-10-CM

## 2020-11-02 NOTE — HISTORY OF PRESENT ILLNESS
[Home] : at home, [unfilled] , at the time of the visit. [Other Location: e.g. Home (Enter Location, City,State)___] : at [unfilled] [FreeTextEntry1] : 49 yo male with hgb ss for f/u. [de-identified] : 51 yo male with hgb ss, for f/u.\par He feels well today, and does not have any sickle cell pain. He has not been taking Endari regularly.\par C/O erectile dysfunction. No h/o priapism. Has been taking metoprolol for tachycardia. \par Otherwsie the patient is well.\par See ROS\par \par A/P- 51 yo male with HGB SS\par 1. encourage compliance with Endari\par 2. ED-\par Hold metoprolol X 1 week. If no improvement, refer to \par 3. flu shot to be done at pharmacy ( unavailable at the practice today)\par 4. percocet- 10/325- #28\par ISTOP checked\par 5. f/u 3/1 @ 3pm\par Lluvia Hamm MD

## 2020-11-13 ENCOUNTER — INPATIENT (INPATIENT)
Facility: HOSPITAL | Age: 65
LOS: 2 days | Discharge: HOME | End: 2020-11-16
Attending: HOSPITALIST | Admitting: HOSPITALIST
Payer: MEDICARE

## 2020-11-13 VITALS
DIASTOLIC BLOOD PRESSURE: 78 MMHG | HEIGHT: 61 IN | SYSTOLIC BLOOD PRESSURE: 141 MMHG | RESPIRATION RATE: 15 BRPM | TEMPERATURE: 98 F | HEART RATE: 67 BPM | OXYGEN SATURATION: 100 %

## 2020-11-13 DIAGNOSIS — N40.0 BENIGN PROSTATIC HYPERPLASIA WITHOUT LOWER URINARY TRACT SYMPTOMS: ICD-10-CM

## 2020-11-13 DIAGNOSIS — Z29.9 ENCOUNTER FOR PROPHYLACTIC MEASURES, UNSPECIFIED: ICD-10-CM

## 2020-11-13 DIAGNOSIS — N17.9 ACUTE KIDNEY FAILURE, UNSPECIFIED: ICD-10-CM

## 2020-11-13 DIAGNOSIS — D57.1 SICKLE-CELL DISEASE WITHOUT CRISIS: ICD-10-CM

## 2020-11-13 DIAGNOSIS — I10 ESSENTIAL (PRIMARY) HYPERTENSION: ICD-10-CM

## 2020-11-13 DIAGNOSIS — K80.20 CALCULUS OF GALLBLADDER WITHOUT CHOLECYSTITIS WITHOUT OBSTRUCTION: ICD-10-CM

## 2020-11-13 DIAGNOSIS — D57.00 HB-SS DISEASE WITH CRISIS, UNSPECIFIED: ICD-10-CM

## 2020-11-13 DIAGNOSIS — I50.22 CHRONIC SYSTOLIC (CONGESTIVE) HEART FAILURE: ICD-10-CM

## 2020-11-13 LAB
ALBUMIN SERPL ELPH-MCNC: 4.3 G/DL — SIGNIFICANT CHANGE UP (ref 3.3–5)
ALP SERPL-CCNC: 74 U/L — SIGNIFICANT CHANGE UP (ref 40–120)
ALT FLD-CCNC: 14 U/L — SIGNIFICANT CHANGE UP (ref 4–41)
ANION GAP SERPL CALC-SCNC: 12 MMO/L — SIGNIFICANT CHANGE UP (ref 7–14)
APTT BLD: 30.3 SEC — SIGNIFICANT CHANGE UP (ref 27–36.3)
AST SERPL-CCNC: 24 U/L — SIGNIFICANT CHANGE UP (ref 4–40)
BASOPHILS # BLD AUTO: 0.09 K/UL — SIGNIFICANT CHANGE UP (ref 0–0.2)
BASOPHILS NFR BLD AUTO: 0.8 % — SIGNIFICANT CHANGE UP (ref 0–2)
BILIRUB SERPL-MCNC: 1.4 MG/DL — HIGH (ref 0.2–1.2)
BLD GP AB SCN SERPL QL: POSITIVE — SIGNIFICANT CHANGE UP
BUN SERPL-MCNC: 37 MG/DL — HIGH (ref 7–23)
CALCIUM SERPL-MCNC: 9 MG/DL — SIGNIFICANT CHANGE UP (ref 8.4–10.5)
CHLORIDE SERPL-SCNC: 104 MMOL/L — SIGNIFICANT CHANGE UP (ref 98–107)
CO2 SERPL-SCNC: 21 MMOL/L — LOW (ref 22–31)
CREAT SERPL-MCNC: 2.2 MG/DL — HIGH (ref 0.5–1.3)
DAT POLY-SP REAG RBC QL: NEGATIVE — SIGNIFICANT CHANGE UP
EOSINOPHIL # BLD AUTO: 0.29 K/UL — SIGNIFICANT CHANGE UP (ref 0–0.5)
EOSINOPHIL NFR BLD AUTO: 2.4 % — SIGNIFICANT CHANGE UP (ref 0–6)
GLUCOSE SERPL-MCNC: 88 MG/DL — SIGNIFICANT CHANGE UP (ref 70–99)
HCT VFR BLD CALC: 20.8 % — CRITICAL LOW (ref 39–50)
HGB BLD-MCNC: 6.2 G/DL — CRITICAL LOW (ref 13–17)
IMM GRANULOCYTES NFR BLD AUTO: 0.6 % — SIGNIFICANT CHANGE UP (ref 0–1.5)
INR BLD: 1.13 — SIGNIFICANT CHANGE UP (ref 0.88–1.16)
LDH SERPL L TO P-CCNC: 231 U/L — HIGH (ref 135–225)
LYMPHOCYTES # BLD AUTO: 1.54 K/UL — SIGNIFICANT CHANGE UP (ref 1–3.3)
LYMPHOCYTES # BLD AUTO: 13 % — SIGNIFICANT CHANGE UP (ref 13–44)
MCHC RBC-ENTMCNC: 18.7 PG — LOW (ref 27–34)
MCHC RBC-ENTMCNC: 29.8 % — LOW (ref 32–36)
MCV RBC AUTO: 62.7 FL — LOW (ref 80–100)
MONOCYTES # BLD AUTO: 0.86 K/UL — SIGNIFICANT CHANGE UP (ref 0–0.9)
MONOCYTES NFR BLD AUTO: 7.3 % — SIGNIFICANT CHANGE UP (ref 2–14)
NEUTROPHILS # BLD AUTO: 9 K/UL — HIGH (ref 1.8–7.4)
NEUTROPHILS NFR BLD AUTO: 75.9 % — SIGNIFICANT CHANGE UP (ref 43–77)
NRBC # FLD: 0.02 K/UL — SIGNIFICANT CHANGE UP (ref 0–0)
PLATELET # BLD AUTO: 323 K/UL — SIGNIFICANT CHANGE UP (ref 150–400)
PMV BLD: 9.3 FL — SIGNIFICANT CHANGE UP (ref 7–13)
POTASSIUM SERPL-MCNC: 5.1 MMOL/L — SIGNIFICANT CHANGE UP (ref 3.5–5.3)
POTASSIUM SERPL-SCNC: 5.1 MMOL/L — SIGNIFICANT CHANGE UP (ref 3.5–5.3)
PROT SERPL-MCNC: 7.6 G/DL — SIGNIFICANT CHANGE UP (ref 6–8.3)
PROTHROM AB SERPL-ACNC: 12.9 SEC — SIGNIFICANT CHANGE UP (ref 10.6–13.6)
RBC # BLD: 3.32 M/UL — LOW (ref 4.2–5.8)
RBC # FLD: 22.4 % — HIGH (ref 10.3–14.5)
RETICS #: 81 K/UL — SIGNIFICANT CHANGE UP (ref 25–125)
RETICS/RBC NFR: 2.4 % — SIGNIFICANT CHANGE UP (ref 0.5–2.5)
RH IG SCN BLD-IMP: POSITIVE — SIGNIFICANT CHANGE UP
SARS-COV-2 RNA SPEC QL NAA+PROBE: SIGNIFICANT CHANGE UP
SODIUM SERPL-SCNC: 137 MMOL/L — SIGNIFICANT CHANGE UP (ref 135–145)
WBC # BLD: 11.85 K/UL — HIGH (ref 3.8–10.5)
WBC # FLD AUTO: 11.85 K/UL — HIGH (ref 3.8–10.5)

## 2020-11-13 PROCEDURE — 99285 EMERGENCY DEPT VISIT HI MDM: CPT

## 2020-11-13 PROCEDURE — 76705 ECHO EXAM OF ABDOMEN: CPT | Mod: 26

## 2020-11-13 PROCEDURE — 71045 X-RAY EXAM CHEST 1 VIEW: CPT | Mod: 26

## 2020-11-13 PROCEDURE — 74176 CT ABD & PELVIS W/O CONTRAST: CPT | Mod: 26

## 2020-11-13 PROCEDURE — 99223 1ST HOSP IP/OBS HIGH 75: CPT

## 2020-11-13 RX ORDER — HYDROMORPHONE HYDROCHLORIDE 2 MG/ML
1 INJECTION INTRAMUSCULAR; INTRAVENOUS; SUBCUTANEOUS ONCE
Refills: 0 | Status: DISCONTINUED | OUTPATIENT
Start: 2020-11-13 | End: 2020-11-13

## 2020-11-13 RX ORDER — HYDROMORPHONE HYDROCHLORIDE 2 MG/ML
1.5 INJECTION INTRAMUSCULAR; INTRAVENOUS; SUBCUTANEOUS EVERY 4 HOURS
Refills: 0 | Status: DISCONTINUED | OUTPATIENT
Start: 2020-11-13 | End: 2020-11-14

## 2020-11-13 RX ORDER — ONDANSETRON 8 MG/1
4 TABLET, FILM COATED ORAL EVERY 6 HOURS
Refills: 0 | Status: DISCONTINUED | OUTPATIENT
Start: 2020-11-13 | End: 2020-11-16

## 2020-11-13 RX ORDER — GLUTAMINE 5 G/1
10 POWDER, FOR SOLUTION ORAL EVERY 12 HOURS
Refills: 0 | Status: DISCONTINUED | OUTPATIENT
Start: 2020-11-13 | End: 2020-11-16

## 2020-11-13 RX ORDER — METOPROLOL TARTRATE 50 MG
12.5 TABLET ORAL
Refills: 0 | Status: DISCONTINUED | OUTPATIENT
Start: 2020-11-13 | End: 2020-11-16

## 2020-11-13 RX ORDER — IBUPROFEN 200 MG
1 TABLET ORAL
Qty: 0 | Refills: 0 | DISCHARGE

## 2020-11-13 RX ORDER — HYDROMORPHONE HYDROCHLORIDE 2 MG/ML
1.5 INJECTION INTRAMUSCULAR; INTRAVENOUS; SUBCUTANEOUS ONCE
Refills: 0 | Status: DISCONTINUED | OUTPATIENT
Start: 2020-11-13 | End: 2020-11-13

## 2020-11-13 RX ORDER — FINASTERIDE 5 MG/1
5 TABLET, FILM COATED ORAL DAILY
Refills: 0 | Status: DISCONTINUED | OUTPATIENT
Start: 2020-11-13 | End: 2020-11-16

## 2020-11-13 RX ORDER — ONDANSETRON 8 MG/1
4 TABLET, FILM COATED ORAL ONCE
Refills: 0 | Status: COMPLETED | OUTPATIENT
Start: 2020-11-13 | End: 2020-11-13

## 2020-11-13 RX ORDER — HEPARIN SODIUM 5000 [USP'U]/ML
5000 INJECTION INTRAVENOUS; SUBCUTANEOUS EVERY 12 HOURS
Refills: 0 | Status: DISCONTINUED | OUTPATIENT
Start: 2020-11-13 | End: 2020-11-16

## 2020-11-13 RX ORDER — SODIUM CHLORIDE 9 MG/ML
1000 INJECTION, SOLUTION INTRAVENOUS
Refills: 0 | Status: DISCONTINUED | OUTPATIENT
Start: 2020-11-13 | End: 2020-11-13

## 2020-11-13 RX ORDER — SODIUM CHLORIDE 9 MG/ML
1000 INJECTION, SOLUTION INTRAVENOUS
Refills: 0 | Status: DISCONTINUED | OUTPATIENT
Start: 2020-11-13 | End: 2020-11-14

## 2020-11-13 RX ORDER — INFLUENZA VIRUS VACCINE 15; 15; 15; 15 UG/.5ML; UG/.5ML; UG/.5ML; UG/.5ML
0.5 SUSPENSION INTRAMUSCULAR ONCE
Refills: 0 | Status: DISCONTINUED | OUTPATIENT
Start: 2020-11-13 | End: 2020-11-14

## 2020-11-13 RX ORDER — OXYCODONE HYDROCHLORIDE 5 MG/1
10 TABLET ORAL EVERY 4 HOURS
Refills: 0 | Status: DISCONTINUED | OUTPATIENT
Start: 2020-11-13 | End: 2020-11-15

## 2020-11-13 RX ORDER — SENNA PLUS 8.6 MG/1
2 TABLET ORAL AT BEDTIME
Refills: 0 | Status: DISCONTINUED | OUTPATIENT
Start: 2020-11-13 | End: 2020-11-16

## 2020-11-13 RX ORDER — FOLIC ACID 0.8 MG
1 TABLET ORAL DAILY
Refills: 0 | Status: DISCONTINUED | OUTPATIENT
Start: 2020-11-13 | End: 2020-11-16

## 2020-11-13 RX ADMIN — SENNA PLUS 2 TABLET(S): 8.6 TABLET ORAL at 21:49

## 2020-11-13 RX ADMIN — HYDROMORPHONE HYDROCHLORIDE 1.5 MILLIGRAM(S): 2 INJECTION INTRAMUSCULAR; INTRAVENOUS; SUBCUTANEOUS at 10:01

## 2020-11-13 RX ADMIN — HYDROMORPHONE HYDROCHLORIDE 1 MILLIGRAM(S): 2 INJECTION INTRAMUSCULAR; INTRAVENOUS; SUBCUTANEOUS at 05:38

## 2020-11-13 RX ADMIN — Medication 1 MILLIGRAM(S): at 12:31

## 2020-11-13 RX ADMIN — HYDROMORPHONE HYDROCHLORIDE 1 MILLIGRAM(S): 2 INJECTION INTRAMUSCULAR; INTRAVENOUS; SUBCUTANEOUS at 06:41

## 2020-11-13 RX ADMIN — Medication 12.5 MILLIGRAM(S): at 17:13

## 2020-11-13 RX ADMIN — ONDANSETRON 4 MILLIGRAM(S): 8 TABLET, FILM COATED ORAL at 05:45

## 2020-11-13 RX ADMIN — HYDROMORPHONE HYDROCHLORIDE 1.5 MILLIGRAM(S): 2 INJECTION INTRAMUSCULAR; INTRAVENOUS; SUBCUTANEOUS at 17:25

## 2020-11-13 RX ADMIN — HYDROMORPHONE HYDROCHLORIDE 1 MILLIGRAM(S): 2 INJECTION INTRAMUSCULAR; INTRAVENOUS; SUBCUTANEOUS at 07:05

## 2020-11-13 RX ADMIN — HYDROMORPHONE HYDROCHLORIDE 1 MILLIGRAM(S): 2 INJECTION INTRAMUSCULAR; INTRAVENOUS; SUBCUTANEOUS at 06:05

## 2020-11-13 RX ADMIN — HYDROMORPHONE HYDROCHLORIDE 1 MILLIGRAM(S): 2 INJECTION INTRAMUSCULAR; INTRAVENOUS; SUBCUTANEOUS at 09:32

## 2020-11-13 RX ADMIN — HYDROMORPHONE HYDROCHLORIDE 1.5 MILLIGRAM(S): 2 INJECTION INTRAMUSCULAR; INTRAVENOUS; SUBCUTANEOUS at 11:32

## 2020-11-13 RX ADMIN — ONDANSETRON 4 MILLIGRAM(S): 8 TABLET, FILM COATED ORAL at 10:01

## 2020-11-13 RX ADMIN — HYDROMORPHONE HYDROCHLORIDE 1.5 MILLIGRAM(S): 2 INJECTION INTRAMUSCULAR; INTRAVENOUS; SUBCUTANEOUS at 23:01

## 2020-11-13 RX ADMIN — SODIUM CHLORIDE 125 MILLILITER(S): 9 INJECTION, SOLUTION INTRAVENOUS at 05:38

## 2020-11-13 RX ADMIN — HYDROMORPHONE HYDROCHLORIDE 1.5 MILLIGRAM(S): 2 INJECTION INTRAMUSCULAR; INTRAVENOUS; SUBCUTANEOUS at 22:46

## 2020-11-13 RX ADMIN — HYDROMORPHONE HYDROCHLORIDE 1.5 MILLIGRAM(S): 2 INJECTION INTRAMUSCULAR; INTRAVENOUS; SUBCUTANEOUS at 17:13

## 2020-11-13 RX ADMIN — SODIUM CHLORIDE 75 MILLILITER(S): 9 INJECTION, SOLUTION INTRAVENOUS at 12:32

## 2020-11-13 RX ADMIN — GLUTAMINE 10 GRAM(S): 5 POWDER, FOR SOLUTION ORAL at 17:13

## 2020-11-13 RX ADMIN — HYDROMORPHONE HYDROCHLORIDE 1 MILLIGRAM(S): 2 INJECTION INTRAMUSCULAR; INTRAVENOUS; SUBCUTANEOUS at 08:41

## 2020-11-13 RX ADMIN — FINASTERIDE 5 MILLIGRAM(S): 5 TABLET, FILM COATED ORAL at 12:31

## 2020-11-13 NOTE — ED PROVIDER NOTE - CARE PLAN
Principal Discharge DX:	Sickle cell pain crisis  Secondary Diagnosis:	Abdominal pain   Principal Discharge DX:	Sickle cell pain crisis  Secondary Diagnosis:	Abdominal pain  Secondary Diagnosis:	Acute kidney injury  Secondary Diagnosis:	Cholelithiasis

## 2020-11-13 NOTE — H&P ADULT - NSICDXPASTMEDICALHX_GEN_ALL_CORE_FT
PAST MEDICAL HISTORY:  Acute chest syndrome Pt unaware    Constipation     H/O transfusion ~ pRBC    Hypertension     Intellectual disability ~ mild    Left ventricular dysfunction mild LVD on echo in 7/18, normal LVF in 12/18    Sickle Cell Disease     Sickle cell disease

## 2020-11-13 NOTE — H&P ADULT - PROBLEM SELECTOR PLAN 2
-Likely 2' prerenal azotemia. Pt was dry on exam and reported poor appetite.  -Hydrate w/ IVF. Encourage PO as tolerated.  -Monitor fluid status.  -Reassess for urinary retention.  -Trend labs, avoid nephrotoxins. Advised to hold off ibuprofen (which he takes at home).

## 2020-11-13 NOTE — ED PROVIDER NOTE - PHYSICAL EXAMINATION
Gen: AAOx3, non-toxic  Head: NCAT  HEENT: oral mucosa moist, normal conjunctiva  Lung: CTAB, no respiratory distress, no wheezes/rhonchi/rales B/L, speaking in full sentences  CV: RRR, no murmurs, rubs or gallops  Abd: soft, diffuse tenderness, no guarding  MSK: no visible deformities  Neuro: No focal sensory or motor deficits  Skin: Warm, well perfused, no rash  Psych: normal affect.   ~Bandar Binu PGY3

## 2020-11-13 NOTE — ED PROVIDER NOTE - NS ED ROS FT
Gen: No fever, No chills  Eyes: No vision changes  ENT: No congestion, sore throat  Resp: No cough . +SOB  Cardiovascular: +chest pain or palpitations  GI: + abdominal pain, nausea. No vomiting, diarrhea, constipation  :  No change in urine output or frequency; no dysuria  MS: + joint and muscle pain  Skin: No rashes  Neuro: No headache; No numbness or weakness  Remainder negative, except as per the HPI Gen: No fever, No chills  Eyes: No vision changes  ENT: No congestion, sore throat  Resp: No cough . +SOB  Cardiovascular: +chest pain, no palpitations  GI: + abdominal pain, nausea. No vomiting, diarrhea, constipation  :  No change in urine output or frequency; no dysuria  MS: + joint and muscle pain  Skin: No rashes  Neuro: No headache; No numbness or weakness  Remainder negative, except as per the HPI

## 2020-11-13 NOTE — ED PROVIDER NOTE - CLINICAL SUMMARY MEDICAL DECISION MAKING FREE TEXT BOX
65y male with diffuse pain, including chest and abdomen, nausea, SOB. Concern for sickle cell crisis, acute chest. Has Hx cholelithiasis, now with abdominal pain will get US RUQ. Will get labs, cxr, ekg, give pain meds, likely admit.

## 2020-11-13 NOTE — H&P ADULT - NSHPLABSRESULTS_GEN_ALL_CORE
6.2    11.85 )-----------( 323      ( 13 Nov 2020 05:25 )             20.8     11-13    137  |  104  |  37<H>  ----------------------------<  88  5.1   |  21<L>  |  2.20<H>    Ca    9.0      13 Nov 2020 05:25    TPro  7.6  /  Alb  4.3  /  TBili  1.4<H>  /  DBili  x   /  AST  24  /  ALT  14  /  AlkPhos  74  11-13    PT/INR - ( 13 Nov 2020 05:25 )   PT: 12.9 SEC;   INR: 1.13          PTT - ( 13 Nov 2020 05:25 )  PTT:30.3 SEC        EKG, personally reviewed - SR 65, QTc 416. No ischemic ST/T wave changes.        RADIOLOGY, images reviewed:  < from: US Abdomen Limited (11.13.20 @ 06:34) >    IMPRESSION:    Gallstones without sonographic evidence of acute cholecystitis.      < from: CT Abdomen and Pelvis w/ Oral Cont (11.13.20 @ 10:01) >    FINDINGS:  LOWER CHEST: Unchanged peripheral opacity in the right lower lobe (series 2, image 16). Left lower lobe atelectasis.    LIVER: Within normal limits.  BILE DUCTS: Normal caliber.  GALLBLADDER: Cholelithiasis.No evidence of gallbladder wall thickening or surrounding inflammation.  SPLEEN: Within normal limits.  PANCREAS: Within normal limits.  ADRENALS: Within normal limits.  KIDNEYS/URETERS: Within normal limits.    BLADDER: Within normal limits.  REPRODUCTIVE ORGANS: Prostate within normal limits.    BOWEL: No bowel obstruction. Appendix is normal.  PERITONEUM: No ascites.  VESSELS: Within normal limits.  RETROPERITONEUM/LYMPH NODES: No lymphadenopathy.  ABDOMINAL WALL: Within normal limits.  BONES: Degenerative changes. Diffuse sclerosis of the visualized osseous structures and multiple endplate deformities, consistent with known sickle cell disease.    IMPRESSION:  Cholelithiasis without cholecystitis.      < end of copied text >

## 2020-11-13 NOTE — H&P ADULT - PROBLEM SELECTOR PLAN 1
-Pain improved.  -Continue w/ IV dilaudid 1.5mg q4h PRN for severe pain, PO oxycodone 10mg q4h for moderate pain.  -Titrate PRN  -Cont folic acid.  -Gentle hydration 1/2 NS 75cc/h.  -Trend labs.

## 2020-11-13 NOTE — ED ADULT TRIAGE NOTE - CHIEF COMPLAINT QUOTE
alert oriented c/o sickle cell crisis has generalized pain x 3 days hasn't taken any medicine   no chest pain or SOB    hx sickle cell disease

## 2020-11-13 NOTE — H&P ADULT - NSHPREVIEWOFSYSTEMS_GEN_ALL_CORE
ROS:    Constitutional: [ ] fevers [ ] chills [ ] weight loss [ ] weight gain  HEENT: [ ] dry eyes [ ] eye irritation [ ] postnasal drip [ ] nasal congestion  CV: [ ] chest pain [ ] orthopnea [ ] palpitations [ ] murmur  Resp: [ ] cough [ ] shortness of breath [ x] dyspnea [ ] wheezing [ ] sputum [ ] hemoptysis  GI: [ ] nausea [ ] vomiting [ ] diarrhea [ ] constipation [x ] abd pain [ ] dysphagia   : [ ] dysuria [ ] nocturia [ ] hematuria [ ] increased urinary frequency   Musculoskeletal: [ ] back pain [ ] myalgias [ ] arthralgias [ ] fracture  Skin: [ ] rash [ ] itch  Neurological: [ ] headache [ ] dizziness [ ] syncope [x ] weakness [ ] numbness  Psychiatric: [ ] anxiety [ ] depression  Endocrine: [ ] diabetes [ ] thyroid problem  Hematologic/Lymphatic: [x ] anemia [ ] bleeding problem  Allergic/Immunologic: [ ] itchy eyes [ ] nasal discharge [ ] hives [ ] angioedema  [x ] All other systems negative except as noted in HPI  [ ] Unable to assess ROS because ________

## 2020-11-13 NOTE — PATIENT PROFILE ADULT - LIFE CHALLENGES - DETAILS
lives alone, 2 brothers in [place] live in [facility] but doesn't see them. wife and kids are back home in [place]. states he doesn't have a support system

## 2020-11-13 NOTE — H&P ADULT - NSHPPHYSICALEXAM_GEN_ALL_CORE
Vital Signs Last 24 Hrs  T(C): 37.1 (13 Nov 2020 09:50), Max: 37.1 (13 Nov 2020 09:50)  T(F): 98.7 (13 Nov 2020 09:50), Max: 98.7 (13 Nov 2020 09:50)  HR: 67 (13 Nov 2020 07:15) (67 - 72)  BP: 165/81 (13 Nov 2020 09:50) (141/78 - 165/81)  BP(mean): --  RR: 16 (13 Nov 2020 09:50) (15 - 16)  SpO2: 99% (13 Nov 2020 09:50) (98% - 100%)      Gen- In bed, comfortable, NAD  Eyes- EOMI, PERRLA, nonicteric.  EMNT- Fair dentition. Dry MM. No tonsilar exudates. No posterior pharynx erythema.  Neck- Supple. No masses. No tracheal deviation.  Resp- CTAB, good effort. No r/r/w. No accessory muscle use.  CVS- RRR, S1S2, no g/r/m. No LE edema.  GI- Soft abd, NT, ND, +BSx4. No HSM.  MSK- No C/C. ROM intact. No crepitus.  Neuro- CN II-XII intact. Speech fluent/face symmetric. Sensation intact.  Skin- No rashes/ulcers. Warm/moist.  Psych- AAOx3. Appropriate mood/affect.

## 2020-11-13 NOTE — ED PROVIDER NOTE - PROGRESS NOTE DETAILS
Benton Mayer MD. pt signed out to me pending CT, reevaluation. given bump in creatinine, cancelled IV contrast and ordered for CT w/ oral contrast. pt states his baseline Hb is in the low 6 ranges. states thi sis typical of his previous sickle cell pain. folows dr. babcock. states his pain is returning, ordered for additional dilaudid. pending ct then likely admit Benton Mayer MD. pt signed out to me pending CT, reevaluation. given bump in creatinine, cancelled IV contrast and ordered for CT w/ oral contrast. pt states his baseline Hb is in the low 6 ranges. states this is typical of his previous sickle cell pain. folows dr. babcock. states his pain is returning, ordered for additional dilaudid. pending ct then likely admit Benton Mayer MD. ct negative except for cholelithiasis. pt requriing multiple roudns of analgesia. admitted to hospitalist

## 2020-11-13 NOTE — ED ADULT NURSE REASSESSMENT NOTE - NS ED NURSE REASSESS COMMENT FT1
Pt awake, alert and oriented x 4 c/o generalized body pain and abdominal pain secondary to SCC.   Pt denies chest pain or SOB.   Denies nausea - tolerated PO contrast and awaiting CT scan.   IV site unremarkable.

## 2020-11-13 NOTE — H&P ADULT - HISTORY OF PRESENT ILLNESS
This is a 65-year-old man with PMH of HbSS disease, chronic sCHF, ?BPH who presented to the hospital with 1 week of body pain. Patient's primarily in the abdominal region w/ radiation across his flank. He also reports generalized weakness and joint point, worse at the bilateral knees. At the onset of pain, he tried to manage symptoms at home w/ PO pain meds. He was unsuccessful. He presented w/ 10/10 pain. Since ED intervention, pain has improved to 6/10. He reports pain as constant, worse w/ movement, palpation. No notable alleviating factors. Given knee pain, he reports difficulty ambulating. He also reports increased fatigue in recent days. Pt denied any recent sick contacts, fevers, chills, sore throat, SOB, CP, NV, diarrhea, dysuria. He does report difficulty voiding at times (known issue). Last sickle cell pain crisis was in Feb of this yr. He was seen by Dr. Hamm earlier in the month.     ED Course: 141/78, 67, 15, 98.2F, 100% RA. Received dilaudid x4 (total 4.5mg), Zofran, LR.

## 2020-11-13 NOTE — H&P ADULT - ASSESSMENT
This is a 65M with PMH of HbSS, HTN, sCHF, mild intellectual disabilities who presented with 1 wk of generalized pain, worse at the abdomen and b/l knees. Initial labs notable for Hb 6.2, Cr 2.2, K 5, T bili 1.4. US of abd and CT of abd/pelvis revealed cholelithiasis but no other acute intraabdominal pathologies. Sx consistent w/ sickle cell pain crisis.  Patient now admitted for further management.

## 2020-11-13 NOTE — ED PROVIDER NOTE - PMH
Acute chest syndrome  Pt unaware  Constipation    H/O transfusion  ~ pRBC  Hypertension    Intellectual disability  ~ mild  Left ventricular dysfunction  mild LVD on echo in 7/18, normal LVF in 12/18  Sickle cell disease    Sickle Cell Disease

## 2020-11-13 NOTE — ED PROVIDER NOTE - ATTENDING CONTRIBUTION TO CARE
66yo M with PMHx Sickle Cell-SS, HTN, sCHF, mild intellectual disability, p/w 5 days pain in all of body which pt says feels like sickle cell pain- mostly in shoulders and back but includes all extremities and generalized abdominal pains (mostly upper he states).  No n/v/d/c, fevers, or prior abdominal surgeries    General: Patient in no apparent distress, AAO x 3  Skin: Dry and intact  HEENT: Head atraumatic. Oral mucosa moist. No pharyngeal exudates or tonsillar enlargement  Eyes: Conjunctiva normal  Cardiac: Regular rhythm and rate. No pretibial edema b/l  Respiratory: Lungs clear b/l and symmetric. No respiratory distress. Able to speak in complete sentences.  Gastrointestinal: Abdomen soft, nondistended, diffusely tender but more in upper abdomen  Musculoskeletal: Moves all extremities spontaneously  Neurological: alert and oriented to person, place, and time  Psychiatric: Cooperative    a/p  Sickle cell crisis  abd pain- possible gallbladder dz given known h/o Sickle cell vs other acute pathology  will need biliary ultrasound and then CT abd, pre-op labs, IV dilaudid for pain

## 2020-11-13 NOTE — ED ADULT NURSE NOTE - OBJECTIVE STATEMENT
Pt is A&OX4, ambulatory c/o body pain all over, specifically in abdominal region and back/shoulder. Pt has hx of Sickle cell and states this pain he is experiencing is similar to his last SCC. Pt endorses vomiting. Pt denies, V/D, SOB, cough, dizziness, fevers, chills, taking any medications prior to coming to ED. Skin is warm, dry, intact. Breathing is even and unlabored, 100% on room air. 20 gauge placed in right AC. Vitally stable. Will continue to monitor.

## 2020-11-13 NOTE — H&P ADULT - PROBLEM SELECTOR PLAN 4
-Stable. Continue metoprolol.  -Holding lisinopril due to EMEKA, potassium level.  -Resume if appropriate.

## 2020-11-13 NOTE — ED PROVIDER NOTE - OBJECTIVE STATEMENT
65y male with PMH of sickle cell disease, HTN, Chronic systolic HF, mild intellectual disability presenting with pain. Patient has pain all over, worse in the back and shoulders. Started about 5 days ago, pain now refractory to his home oxycodone. +nausea, SOB, chest pain. No vomiting, fevers, diarrhea, cough.

## 2020-11-14 LAB
ALBUMIN SERPL ELPH-MCNC: 4.4 G/DL — SIGNIFICANT CHANGE UP (ref 3.3–5)
ALP SERPL-CCNC: 81 U/L — SIGNIFICANT CHANGE UP (ref 40–120)
ALT FLD-CCNC: 14 U/L — SIGNIFICANT CHANGE UP (ref 4–41)
ANION GAP SERPL CALC-SCNC: 9 MMO/L — SIGNIFICANT CHANGE UP (ref 7–14)
ANION GAP SERPL CALC-SCNC: 9 MMO/L — SIGNIFICANT CHANGE UP (ref 7–14)
AST SERPL-CCNC: 22 U/L — SIGNIFICANT CHANGE UP (ref 4–40)
BASOPHILS # BLD AUTO: 0.07 K/UL — SIGNIFICANT CHANGE UP (ref 0–0.2)
BASOPHILS NFR BLD AUTO: 0.5 % — SIGNIFICANT CHANGE UP (ref 0–2)
BILIRUB SERPL-MCNC: 1.1 MG/DL — SIGNIFICANT CHANGE UP (ref 0.2–1.2)
BUN SERPL-MCNC: 22 MG/DL — SIGNIFICANT CHANGE UP (ref 7–23)
BUN SERPL-MCNC: 22 MG/DL — SIGNIFICANT CHANGE UP (ref 7–23)
CALCIUM SERPL-MCNC: 9.5 MG/DL — SIGNIFICANT CHANGE UP (ref 8.4–10.5)
CALCIUM SERPL-MCNC: 9.5 MG/DL — SIGNIFICANT CHANGE UP (ref 8.4–10.5)
CHLORIDE SERPL-SCNC: 107 MMOL/L — SIGNIFICANT CHANGE UP (ref 98–107)
CHLORIDE SERPL-SCNC: 107 MMOL/L — SIGNIFICANT CHANGE UP (ref 98–107)
CO2 SERPL-SCNC: 22 MMOL/L — SIGNIFICANT CHANGE UP (ref 22–31)
CO2 SERPL-SCNC: 22 MMOL/L — SIGNIFICANT CHANGE UP (ref 22–31)
CREAT SERPL-MCNC: 1.37 MG/DL — HIGH (ref 0.5–1.3)
CREAT SERPL-MCNC: 1.37 MG/DL — HIGH (ref 0.5–1.3)
EOSINOPHIL # BLD AUTO: 1.01 K/UL — HIGH (ref 0–0.5)
EOSINOPHIL NFR BLD AUTO: 7.2 % — HIGH (ref 0–6)
GLUCOSE SERPL-MCNC: 92 MG/DL — SIGNIFICANT CHANGE UP (ref 70–99)
GLUCOSE SERPL-MCNC: 92 MG/DL — SIGNIFICANT CHANGE UP (ref 70–99)
HCT VFR BLD CALC: 22.4 % — LOW (ref 39–50)
HCT VFR BLD CALC: 22.4 % — LOW (ref 39–50)
HGB BLD-MCNC: 6.6 G/DL — CRITICAL LOW (ref 13–17)
HGB BLD-MCNC: 6.6 G/DL — CRITICAL LOW (ref 13–17)
IMM GRANULOCYTES NFR BLD AUTO: 0.6 % — SIGNIFICANT CHANGE UP (ref 0–1.5)
LDH SERPL L TO P-CCNC: 208 U/L — SIGNIFICANT CHANGE UP (ref 135–225)
LYMPHOCYTES # BLD AUTO: 1.29 K/UL — SIGNIFICANT CHANGE UP (ref 1–3.3)
LYMPHOCYTES # BLD AUTO: 9.2 % — LOW (ref 13–44)
MAGNESIUM SERPL-MCNC: 2.3 MG/DL — SIGNIFICANT CHANGE UP (ref 1.6–2.6)
MCHC RBC-ENTMCNC: 18.6 PG — LOW (ref 27–34)
MCHC RBC-ENTMCNC: 18.6 PG — LOW (ref 27–34)
MCHC RBC-ENTMCNC: 29.5 % — LOW (ref 32–36)
MCHC RBC-ENTMCNC: 29.5 % — LOW (ref 32–36)
MCV RBC AUTO: 63.3 FL — LOW (ref 80–100)
MCV RBC AUTO: 63.3 FL — LOW (ref 80–100)
MONOCYTES # BLD AUTO: 0.92 K/UL — HIGH (ref 0–0.9)
MONOCYTES NFR BLD AUTO: 6.6 % — SIGNIFICANT CHANGE UP (ref 2–14)
NEUTROPHILS # BLD AUTO: 10.65 K/UL — HIGH (ref 1.8–7.4)
NEUTROPHILS NFR BLD AUTO: 75.9 % — SIGNIFICANT CHANGE UP (ref 43–77)
NRBC # FLD: 0.05 K/UL — SIGNIFICANT CHANGE UP (ref 0–0)
NRBC # FLD: 0.05 K/UL — SIGNIFICANT CHANGE UP (ref 0–0)
PHOSPHATE SERPL-MCNC: 2.9 MG/DL — SIGNIFICANT CHANGE UP (ref 2.5–4.5)
PLATELET # BLD AUTO: 327 K/UL — SIGNIFICANT CHANGE UP (ref 150–400)
PLATELET # BLD AUTO: 327 K/UL — SIGNIFICANT CHANGE UP (ref 150–400)
PMV BLD: 9.6 FL — SIGNIFICANT CHANGE UP (ref 7–13)
PMV BLD: 9.6 FL — SIGNIFICANT CHANGE UP (ref 7–13)
POTASSIUM SERPL-MCNC: 5.4 MMOL/L — HIGH (ref 3.5–5.3)
POTASSIUM SERPL-MCNC: 5.4 MMOL/L — HIGH (ref 3.5–5.3)
POTASSIUM SERPL-SCNC: 5.4 MMOL/L — HIGH (ref 3.5–5.3)
POTASSIUM SERPL-SCNC: 5.4 MMOL/L — HIGH (ref 3.5–5.3)
PROT SERPL-MCNC: 8 G/DL — SIGNIFICANT CHANGE UP (ref 6–8.3)
RBC # BLD: 3.54 M/UL — LOW (ref 4.2–5.8)
RBC # BLD: 3.54 M/UL — LOW (ref 4.2–5.8)
RBC # FLD: 22.8 % — HIGH (ref 10.3–14.5)
RBC # FLD: 22.8 % — HIGH (ref 10.3–14.5)
RETICS #: 123 K/UL — SIGNIFICANT CHANGE UP (ref 25–125)
RETICS/RBC NFR: 3.5 % — HIGH (ref 0.5–2.5)
SODIUM SERPL-SCNC: 138 MMOL/L — SIGNIFICANT CHANGE UP (ref 135–145)
SODIUM SERPL-SCNC: 138 MMOL/L — SIGNIFICANT CHANGE UP (ref 135–145)
WBC # BLD: 14.02 K/UL — HIGH (ref 3.8–10.5)
WBC # BLD: 14.02 K/UL — HIGH (ref 3.8–10.5)
WBC # FLD AUTO: 14.02 K/UL — HIGH (ref 3.8–10.5)
WBC # FLD AUTO: 14.02 K/UL — HIGH (ref 3.8–10.5)

## 2020-11-14 PROCEDURE — 99233 SBSQ HOSP IP/OBS HIGH 50: CPT

## 2020-11-14 RX ORDER — INFLUENZA VIRUS VACCINE 15; 15; 15; 15 UG/.5ML; UG/.5ML; UG/.5ML; UG/.5ML
0.7 SUSPENSION INTRAMUSCULAR ONCE
Refills: 0 | Status: DISCONTINUED | OUTPATIENT
Start: 2020-11-14 | End: 2020-11-16

## 2020-11-14 RX ORDER — SODIUM CHLORIDE 9 MG/ML
1000 INJECTION, SOLUTION INTRAVENOUS
Refills: 0 | Status: DISCONTINUED | OUTPATIENT
Start: 2020-11-14 | End: 2020-11-14

## 2020-11-14 RX ORDER — SODIUM CHLORIDE 9 MG/ML
1000 INJECTION, SOLUTION INTRAVENOUS
Refills: 0 | Status: DISCONTINUED | OUTPATIENT
Start: 2020-11-14 | End: 2020-11-16

## 2020-11-14 RX ADMIN — FINASTERIDE 5 MILLIGRAM(S): 5 TABLET, FILM COATED ORAL at 10:31

## 2020-11-14 RX ADMIN — HYDROMORPHONE HYDROCHLORIDE 1.5 MILLIGRAM(S): 2 INJECTION INTRAMUSCULAR; INTRAVENOUS; SUBCUTANEOUS at 11:00

## 2020-11-14 RX ADMIN — Medication 12.5 MILLIGRAM(S): at 07:17

## 2020-11-14 RX ADMIN — Medication 1 MILLIGRAM(S): at 10:30

## 2020-11-14 RX ADMIN — Medication 12.5 MILLIGRAM(S): at 17:06

## 2020-11-14 RX ADMIN — GLUTAMINE 10 GRAM(S): 5 POWDER, FOR SOLUTION ORAL at 17:06

## 2020-11-14 RX ADMIN — OXYCODONE HYDROCHLORIDE 10 MILLIGRAM(S): 5 TABLET ORAL at 21:38

## 2020-11-14 RX ADMIN — HEPARIN SODIUM 5000 UNIT(S): 5000 INJECTION INTRAVENOUS; SUBCUTANEOUS at 07:17

## 2020-11-14 RX ADMIN — HYDROMORPHONE HYDROCHLORIDE 1.5 MILLIGRAM(S): 2 INJECTION INTRAMUSCULAR; INTRAVENOUS; SUBCUTANEOUS at 10:31

## 2020-11-14 RX ADMIN — OXYCODONE HYDROCHLORIDE 10 MILLIGRAM(S): 5 TABLET ORAL at 17:04

## 2020-11-14 RX ADMIN — OXYCODONE HYDROCHLORIDE 10 MILLIGRAM(S): 5 TABLET ORAL at 18:00

## 2020-11-14 RX ADMIN — GLUTAMINE 10 GRAM(S): 5 POWDER, FOR SOLUTION ORAL at 07:17

## 2020-11-14 RX ADMIN — OXYCODONE HYDROCHLORIDE 10 MILLIGRAM(S): 5 TABLET ORAL at 22:20

## 2020-11-14 RX ADMIN — SODIUM CHLORIDE 100 MILLILITER(S): 9 INJECTION, SOLUTION INTRAVENOUS at 17:05

## 2020-11-14 NOTE — PROGRESS NOTE ADULT - PROBLEM SELECTOR PLAN 1
No s/s of infxn.  CXR clear, RVP negative.  CTH w/o old strokes.   - Favor all PO pain meds (oxy IR); if inadquate will switch to low-dose dilaudid PCA (in past, pt had bolus of 0.2-0.4, with 4-hour max of ~4mg).  - c/w folic acid

## 2020-11-14 NOTE — PROGRESS NOTE ADULT - SUBJECTIVE AND OBJECTIVE BOX
Patient is a 65y old  Male who presents with a chief complaint of Sickle cell pain crisis (13 Nov 2020 12:11)      SUBJECTIVE / OVERNIGHT EVENTS: Pt had one episode of hip/knee pain last night, relieved with dilaudid. Slept well. Pain steadily increasing this morning.    MEDICATIONS  (STANDING):  finasteride 5 milliGRAM(s) Oral daily  folic acid 1 milliGRAM(s) Oral daily  glutamine Powder 10 Gram(s) Oral every 12 hours  heparin   Injectable 5000 Unit(s) SubCutaneous every 12 hours  influenza  Vaccine (HIGH DOSE) 0.7 milliLiter(s) IntraMuscular once  metoprolol tartrate 12.5 milliGRAM(s) Oral two times a day  senna 2 Tablet(s) Oral at bedtime  sodium chloride 0.45%. 1000 milliLiter(s) (75 mL/Hr) IV Continuous <Continuous>    MEDICATIONS  (PRN):  bisacodyl 5 milliGRAM(s) Oral daily PRN Constipation  HYDROmorphone  Injectable 1.5 milliGRAM(s) IV Push every 4 hours PRN Severe Pain (7 - 10)  ondansetron Injectable 4 milliGRAM(s) IV Push every 6 hours PRN Nausea  oxyCODONE    IR 10 milliGRAM(s) Oral every 4 hours PRN Moderate Pain (4 - 6)      CAPILLARY BLOOD GLUCOSE        I&O's Summary    13 Nov 2020 07:01  -  14 Nov 2020 07:00  --------------------------------------------------------  IN: 0 mL / OUT: 175 mL / NET: -175 mL        PHYSICAL EXAM:  Vital Signs Last 24 Hrs  T(C): 36.7 (14 Nov 2020 07:14), Max: 36.8 (13 Nov 2020 14:44)  T(F): 98.1 (14 Nov 2020 07:14), Max: 98.2 (13 Nov 2020 14:44)  HR: 62 (14 Nov 2020 07:14) (62 - 73)  BP: 142/73 (14 Nov 2020 07:14) (113/91 - 142/73)  BP(mean): --  RR: 18 (14 Nov 2020 07:14) (17 - 18)  SpO2: 100% (14 Nov 2020 07:14) (95% - 100%)  CONSTITUTIONAL: NAD, well-developed, well-groomed  EYES: PERRLA; conjunctiva and sclera clear  ENMT: Moist oral mucosa, no pharyngeal injection or exudates; normal dentition  NECK: Supple, no palpable masses; no thyromegaly  RESPIRATORY: Normal respiratory effort; lungs are clear to auscultation bilaterally  CARDIOVASCULAR: Regular rate and rhythm, normal S1 and S2, no murmur/rub/gallop; No lower extremity edema; Peripheral pulses are 2+ bilaterally  ABDOMEN: Nontender to palpation, normoactive bowel sounds, no rebound/guarding; No hepatosplenomegaly      LABS:                        6.2    11.85 )-----------( 323      ( 13 Nov 2020 05:25 )             20.8     11-13    137  |  104  |  37<H>  ----------------------------<  88  5.1   |  21<L>  |  2.20<H>    Ca    9.0      13 Nov 2020 05:25    TPro  7.6  /  Alb  4.3  /  TBili  1.4<H>  /  DBili  x   /  AST  24  /  ALT  14  /  AlkPhos  74  11-13    PT/INR - ( 13 Nov 2020 05:25 )   PT: 12.9 SEC;   INR: 1.13          PTT - ( 13 Nov 2020 05:25 )  PTT:30.3 SEC            RADIOLOGY & ADDITIONAL TESTS:  Results Reviewed:   Imaging Personally Reviewed:  Electrocardiogram Personally Reviewed:    COORDINATION OF CARE:  Care Discussed with Consultants/Other Providers [Y/N]:  Prior or Outpatient Records Reviewed [Y/N]:

## 2020-11-14 NOTE — PROGRESS NOTE ADULT - PROBLEM/PLAN-7
----- Message from Elia Manning sent at 1/17/2019 11:04 AM CST -----  Contact: [facility]  Can the clinic reply in [facility]:No      Please refill the medication(s) listed below. A refill authorization is needed. Please advise. Please call the patient when the prescription(s) is ready for  at the phone number       Medication #1:WELLBUTRIN 75 mg tablet      Medication #2:       Preferred Pharmacy:[id number] - [place], [place] - [facility] [facility] [phone]          DISPLAY PLAN FREE TEXT

## 2020-11-15 ENCOUNTER — TRANSCRIPTION ENCOUNTER (OUTPATIENT)
Age: 65
End: 2020-11-15

## 2020-11-15 LAB
ANION GAP SERPL CALC-SCNC: 10 MMO/L — SIGNIFICANT CHANGE UP (ref 7–14)
BUN SERPL-MCNC: 20 MG/DL — SIGNIFICANT CHANGE UP (ref 7–23)
CALCIUM SERPL-MCNC: 9.3 MG/DL — SIGNIFICANT CHANGE UP (ref 8.4–10.5)
CHLORIDE SERPL-SCNC: 105 MMOL/L — SIGNIFICANT CHANGE UP (ref 98–107)
CO2 SERPL-SCNC: 21 MMOL/L — LOW (ref 22–31)
CREAT SERPL-MCNC: 1.12 MG/DL — SIGNIFICANT CHANGE UP (ref 0.5–1.3)
GLUCOSE SERPL-MCNC: 91 MG/DL — SIGNIFICANT CHANGE UP (ref 70–99)
HCT VFR BLD CALC: 21.5 % — LOW (ref 39–50)
HCV AB S/CO SERPL IA: 0.19 S/CO — SIGNIFICANT CHANGE UP (ref 0–0.99)
HCV AB SERPL-IMP: SIGNIFICANT CHANGE UP
HGB BLD-MCNC: 6.2 G/DL — CRITICAL LOW (ref 13–17)
LDH SERPL L TO P-CCNC: 187 U/L — SIGNIFICANT CHANGE UP (ref 135–225)
MAGNESIUM SERPL-MCNC: 1.9 MG/DL — SIGNIFICANT CHANGE UP (ref 1.6–2.6)
MCHC RBC-ENTMCNC: 18.5 PG — LOW (ref 27–34)
MCHC RBC-ENTMCNC: 28.8 % — LOW (ref 32–36)
MCV RBC AUTO: 64 FL — LOW (ref 80–100)
NRBC # FLD: 0.04 K/UL — SIGNIFICANT CHANGE UP (ref 0–0)
PHOSPHATE SERPL-MCNC: 3.6 MG/DL — SIGNIFICANT CHANGE UP (ref 2.5–4.5)
PLATELET # BLD AUTO: 307 K/UL — SIGNIFICANT CHANGE UP (ref 150–400)
PMV BLD: 9.3 FL — SIGNIFICANT CHANGE UP (ref 7–13)
POTASSIUM SERPL-MCNC: 5.3 MMOL/L — SIGNIFICANT CHANGE UP (ref 3.5–5.3)
POTASSIUM SERPL-SCNC: 5.3 MMOL/L — SIGNIFICANT CHANGE UP (ref 3.5–5.3)
RBC # BLD: 3.36 M/UL — LOW (ref 4.2–5.8)
RBC # FLD: 22.4 % — HIGH (ref 10.3–14.5)
RETICS #: 99 K/UL — SIGNIFICANT CHANGE UP (ref 25–125)
RETICS/RBC NFR: 3 % — HIGH (ref 0.5–2.5)
SODIUM SERPL-SCNC: 136 MMOL/L — SIGNIFICANT CHANGE UP (ref 135–145)
WBC # BLD: 12.4 K/UL — HIGH (ref 3.8–10.5)
WBC # FLD AUTO: 12.4 K/UL — HIGH (ref 3.8–10.5)

## 2020-11-15 PROCEDURE — 99233 SBSQ HOSP IP/OBS HIGH 50: CPT

## 2020-11-15 RX ORDER — HYDROMORPHONE HYDROCHLORIDE 2 MG/ML
30 INJECTION INTRAMUSCULAR; INTRAVENOUS; SUBCUTANEOUS
Refills: 0 | Status: DISCONTINUED | OUTPATIENT
Start: 2020-11-15 | End: 2020-11-16

## 2020-11-15 RX ORDER — NALOXONE HYDROCHLORIDE 4 MG/.1ML
0.1 SPRAY NASAL
Refills: 0 | Status: DISCONTINUED | OUTPATIENT
Start: 2020-11-15 | End: 2020-11-16

## 2020-11-15 RX ADMIN — GLUTAMINE 10 GRAM(S): 5 POWDER, FOR SOLUTION ORAL at 05:51

## 2020-11-15 RX ADMIN — SODIUM CHLORIDE 100 MILLILITER(S): 9 INJECTION, SOLUTION INTRAVENOUS at 12:39

## 2020-11-15 RX ADMIN — FINASTERIDE 5 MILLIGRAM(S): 5 TABLET, FILM COATED ORAL at 11:35

## 2020-11-15 RX ADMIN — Medication 1 MILLIGRAM(S): at 11:35

## 2020-11-15 RX ADMIN — HYDROMORPHONE HYDROCHLORIDE 30 MILLILITER(S): 2 INJECTION INTRAMUSCULAR; INTRAVENOUS; SUBCUTANEOUS at 20:03

## 2020-11-15 RX ADMIN — HYDROMORPHONE HYDROCHLORIDE 30 MILLILITER(S): 2 INJECTION INTRAMUSCULAR; INTRAVENOUS; SUBCUTANEOUS at 10:03

## 2020-11-15 RX ADMIN — Medication 12.5 MILLIGRAM(S): at 17:49

## 2020-11-15 RX ADMIN — OXYCODONE HYDROCHLORIDE 10 MILLIGRAM(S): 5 TABLET ORAL at 09:15

## 2020-11-15 RX ADMIN — OXYCODONE HYDROCHLORIDE 10 MILLIGRAM(S): 5 TABLET ORAL at 02:25

## 2020-11-15 RX ADMIN — HEPARIN SODIUM 5000 UNIT(S): 5000 INJECTION INTRAVENOUS; SUBCUTANEOUS at 05:51

## 2020-11-15 RX ADMIN — HEPARIN SODIUM 5000 UNIT(S): 5000 INJECTION INTRAVENOUS; SUBCUTANEOUS at 17:49

## 2020-11-15 RX ADMIN — OXYCODONE HYDROCHLORIDE 10 MILLIGRAM(S): 5 TABLET ORAL at 10:15

## 2020-11-15 RX ADMIN — GLUTAMINE 10 GRAM(S): 5 POWDER, FOR SOLUTION ORAL at 17:48

## 2020-11-15 RX ADMIN — OXYCODONE HYDROCHLORIDE 10 MILLIGRAM(S): 5 TABLET ORAL at 03:43

## 2020-11-15 RX ADMIN — SENNA PLUS 2 TABLET(S): 8.6 TABLET ORAL at 05:51

## 2020-11-15 RX ADMIN — Medication 12.5 MILLIGRAM(S): at 05:50

## 2020-11-15 NOTE — PROGRESS NOTE ADULT - PROBLEM SELECTOR PLAN 1
No s/s of infxn.  CXR clear, RVP negative.  CTH w/o old strokes.   - All PO regimen inadquate; will switch to low-dose dilaudid PCA (in past, pt had bolus of 0.2-0.4, with 4-hour max of ~4mg).  - c/w folic acid

## 2020-11-15 NOTE — PROGRESS NOTE ADULT - SUBJECTIVE AND OBJECTIVE BOX
Follow up visit on the lab work and weight gain.     45lbs weight gain since 2015.   Pt reports working out 1-1.5hrs 6/7 days a week.   Follows diet high in seeds, not processed. Around 2000kcal per day.   Reports no significant stress event in her life that would trigger weight gain.   Family members are all leaner than she is.     Her pertinent reproductive history is as follows:    Menarche at age 9.   She reports intermittently irregular menstruation.   No excessive sx of cramping.   Intermittent moodiness.   Now on OCPs for the past year since she has been on Accutane.   Patient's last menstrual period was: Patient's last menstrual period was 10/09/2018.    Metabolic History  Weight during puberty 158lbs.   Current weight 192lbs  History of acne? Yes, face, back, chest, lower back    History of terminal hair growth? Yes, on face and abdomen.   History of breast discharge? No  History of Hypertension? No  History of rapid weight gain or central weight gain? No  History of muscle loss or weakness? No  History of thyroid dysfunction? No  History of sleep disturbance, frequent sleep awakening, snoring? No      Pertinent Family History  Family h/o DM: father's side.   Family history of Diabetes, Coronary Artery Disease or Metabolic Syndrome? Yes, 2 uncles with stents in their 40's. Father's side with DM.   Family history of Infertility, Polycystic Ovarian Syndrome, or irregular periods? No  Family h/o hyperthyroidism in father, mom reports that she had abnormal TSH beginning of 2018 which resolved, MGM with thyroid disease.     Patient histories have been reviewed in the electronic medical record.   Allergies and medications reviewed.     REVIEW OF SYSTEMS  Eye: no visual blurring.  ENT: reports increased headaches at least 2 times per week, sister and mother with migraines, no lightheadedness.   No neck pain.  Cardiovascular:No palpitations, chest pain or chest pressure. No LE edema.   Respiratory: No shortness of      Patient is a 65y old  Male who presents with a chief complaint of Sickle cell pain crisis (14 Nov 2020 11:21)      SUBJECTIVE / OVERNIGHT EVENTS: Pt states oxy IR 10mg x 3 overnight inadequate analgesia.    MEDICATIONS  (STANDING):  finasteride 5 milliGRAM(s) Oral daily  folic acid 1 milliGRAM(s) Oral daily  glutamine Powder 10 Gram(s) Oral every 12 hours  heparin   Injectable 5000 Unit(s) SubCutaneous every 12 hours  HYDROmorphone PCA (1 mG/mL) 30 milliLiter(s) PCA Continuous PCA Continuous  influenza  Vaccine (HIGH DOSE) 0.7 milliLiter(s) IntraMuscular once  metoprolol tartrate 12.5 milliGRAM(s) Oral two times a day  senna 2 Tablet(s) Oral at bedtime  sodium chloride 0.45%. 1000 milliLiter(s) (100 mL/Hr) IV Continuous <Continuous>    MEDICATIONS  (PRN):  bisacodyl 5 milliGRAM(s) Oral daily PRN Constipation  naloxone Injectable 0.1 milliGRAM(s) IV Push every 3 minutes PRN For ANY of the following changes in patient status:  A. RR LESS THAN 10 breaths per minute, B. Oxygen saturation LESS THAN 90%, C. Sedation score of 6  ondansetron Injectable 4 milliGRAM(s) IV Push every 6 hours PRN Nausea      CAPILLARY BLOOD GLUCOSE        I&O's Summary    14 Nov 2020 07:01  -  15 Nov 2020 07:00  --------------------------------------------------------  IN: 0 mL / OUT: 300 mL / NET: -300 mL        PHYSICAL EXAM:  Vital Signs Last 24 Hrs  T(C): 37.3 (15 Nov 2020 10:02), Max: 37.3 (15 Nov 2020 10:02)  T(F): 99.1 (15 Nov 2020 10:02), Max: 99.1 (15 Nov 2020 10:02)  HR: 66 (15 Nov 2020 10:02) (66 - 84)  BP: 138/81 (15 Nov 2020 10:02) (121/67 - 138/81)  BP(mean): --  RR: 18 (15 Nov 2020 10:02) (16 - 18)  SpO2: 100% (15 Nov 2020 10:02) (100% - 100%)  CONSTITUTIONAL: NAD, well-developed, well-groomed  EYES: PERRLA; conjunctiva and sclera clear  ENMT: Moist oral mucosa, no pharyngeal injection or exudates; normal dentition  NECK: Supple, no palpable masses; no thyromegaly  RESPIRATORY: Normal respiratory effort; lungs are clear to auscultation bilaterally  CARDIOVASCULAR: Regular rate and rhythm, normal S1 and S2, no murmur/rub/gallop; No lower extremity edema; Peripheral pulses are 2+ bilaterally  ABDOMEN: Nontender to palpation, normoactive bowel sounds, no rebound/guarding; No hepatosplenomegaly      LABS:                        6.6    14.02 )-----------( 327      ( 14 Nov 2020 11:45 )             22.4     11-14    138  |  107  |  22  ----------------------------<  92  5.4<H>   |  22  |  1.37<H>    Ca    9.5      14 Nov 2020 11:45  Phos  2.9     11-14  Mg     2.3     11-14    TPro  8.0  /  Alb  4.4  /  TBili  1.1  /  DBili  x   /  AST  22  /  ALT  14  /  AlkPhos  81  11-14                RADIOLOGY & ADDITIONAL TESTS:  Results Reviewed:   Imaging Personally Reviewed:  Electrocardiogram Personally Reviewed:    COORDINATION OF CARE:  Care Discussed with Consultants/Other Providers [Y/N]:  Prior or Outpatient Records Reviewed [Y/N]:   breath.   Gastro-intestinal: Appetite good. Occasional nausea with oily foods, occasional diarrhea and constipation.   Genito-urinary: On OCPs.   Musculoskeletal:No back pain, arthritis, or muscular weakness.   Integumentary:No rashes or ulcerations, no itching, bruising. Had acne which now resolved with isotretinoin.   Neurological: No numbness or tingling in upper or lower extremities.   Psychiatric: No anxiety, depression, feels stressed, emotional due to the high cholesterol and elevated A1c levels.   Endocrine:No known thyroid problems. Dad with thyroid hyperthyroidism and nodule. No family h/o thyroid cancer. MGM with hypothyroidism.   Hematologic/Lymphatic: Steady weight gain of 43lbs in the past 4yrs. Weight slightly decreased since last apt.         LAB REVIEW  Blood work reviewed in EPIC.   Hemoglobin A1C (%)   Date Value   10/16/2018 5.8 (H)     No results found for: CALCLDL  TRIGLYCERIDE (mg/dL)   Date Value   09/04/2018 214 (H)       PHYSICAL EXAM  Visit Vitals  /56   Pulse 80   Ht 5' 8.94\" (1.751 m)   Wt 85.9 kg   LMP 10/09/2018   BMI 28.00 kg/m²   Constitutional:  resting, normal appearance, well nourished, here with her mom.   Psychiatric:  alert and oriented x3, very pleasant, emotional.   Eyes: clear conjunctiva  Neck: supple, no visible masses.   Resp:  nonlabored and no accessory muscle use.   CV: No LE edema.   GI:  nontender, nondistended, soft.   Skin:  warm and dry and normal texture and temperature, no rashes or ulcerations noted.   MSK:  normal gait, no clubbing or cyanosis and 5/5 strength in all extremities.   Neurologic:  CN 2-12 appear grossly intact, no tremors.       ASSESSMENT  Overweight with Body mass index is 28 kg/m².  Prediabetes  Dyslipidemia  Family history of diabetes mellitus   Family history of high cholesterol levels  Family history of thyroid disease    PLAN  Hormonal work up reviewed with pt and her mom.   All the hormonal studies are within reference range.   One  of the [place] salivary cortisol had been slightly elevated at 0.164, however cortisol suppressed with dexamethasone suppression test to 0.7.     A1c slightly elevated at 5.8% and suggestive of prediabetes.   There is a strong family h/o DM, CV disease, and elevated cholesterol levels.   TG levels increased. No full cholesterol panel done.   Fasting cholesterol panel to be done this am.   We have discussed the family history, diet and activity.   Pt's reported diet is already low in CHO.     Consider PCOS.   Likely insulin resistance.   Has pre-DM.   DHEAS, 17 hydroxyprogesterone not elevated.   Testosterone studies also within good range.       I have spent 30 min with pt and her mom and more than 50% had been spent on counseling on the above issues.

## 2020-11-15 NOTE — DISCHARGE NOTE PROVIDER - HOSPITAL COURSE
65 M with PMH of HbSS, HTN, sCHF, mild intellectual disabilities who presented with 1 wk of generalized pain, worse at the abdomen and b/l knees. . Sx consistent w/ sickle cell pain crisis.        Hospital Course  Sickle cell pain crisis.   -Pain improved.  -Continue w/ IV dilaudid 1.5mg q4h PRN for severe pain, PO oxycodone 10 mg q 4h for moderate pain.  -Titrate PRN  -Cont folic acid.  -Gentle hydration 1/2 NS 75cc/h.  -Trend labs.     Acute kidney injury.   -Likely 2' prerenal azotemia. Pt was dry on exam and reported poor appetite.  -Hydrate w/ IVF. Encourage PO as tolerated.  -Monitor fluid status.  -Reassess for urinary retention.  -Trend labs, avoid nephrotoxins. Advised to hold off ibuprofen (which he takes at home).     Sickle cell anemia.   -Hb runs in the 6's.   -trended lab  - Hold off on transfusion.       Hypertension.   -Stable. Continue metoprolol.  -Holding lisinopril due to EMEKA, potassium level.  -Resume if appropriate.     BPH (benign prostatic hyperplasia).   -Reports periodic difficulty w/ urination.  -CT showed fullness of bladder.  -bladder scan.       Cholelithiasis.   -Imaging w/o evidence of cholecystitis.   -US Abd: Gallstones without sonographic evidence of acute cholecystitis.  -CT Abd: Gallstones without sonographic evidence of acute cholecystitis.      Chronic systolic (congestive) heart failure.    -Volume status on drier side. Monitor.  -Continue metoprolol.  -Held lisinopril due to EMEKA.               On_________, discussed with __________, patient is medically cleared and optimized for discharge today. All medications were reviewed with attending, and sent to mutually agreed upon pharmacy. 65 M with PMH of HbSS, HTN, sCHF, mild intellectual disabilities who presented with 1 wk of generalized pain, worse at the abdomen and b/l knees. . Sx consistent w/ sickle cell pain crisis.        Hospital Course  Sickle cell pain crisis.   -Pain improved.  -Continue w/ IV dilaudid 1.5mg q4h PRN for severe pain, PO oxycodone 10 mg q 4h for moderate pain.  -Titrate PRN  -Cont folic acid.  -Gentle hydration 1/2 NS 75cc/h.  -Trend labs.     Acute kidney injury.   -Likely 2' prerenal azotemia. Pt was dry on exam and reported poor appetite.  -Hydrate w/ IVF. Encourage PO as tolerated.  -Monitor fluid status.  -Reassess for urinary retention.  -Trend labs, avoid nephrotoxins. Advised to hold off ibuprofen (which he takes at home).     Sickle cell anemia.   -Hb runs in the 6's.   -trended lab  - Hold off on transfusion.       Hypertension.   -Stable. Continue metoprolol.  -Holding lisinopril due to EMEKA, potassium level.  -Resume if appropriate.     BPH (benign prostatic hyperplasia).   -Reports periodic difficulty w/ urination.  -CT showed fullness of bladder.  -bladder scan.       Cholelithiasis.   -Imaging w/o evidence of cholecystitis.   -US Abd: Gallstones without sonographic evidence of acute cholecystitis.  -CT Abd: Gallstones without sonographic evidence of acute cholecystitis.      Chronic systolic (congestive) heart failure.    -Volume status on drier side. Monitor.  -Continue metoprolol.  -Held lisinopril due to EMEKA.               On 11/16/2020, discussed with Dr. Chávez, patient is medically cleared and optimized for discharge today. All medications were reviewed with attending, and sent to mutually agreed upon pharmacy.

## 2020-11-15 NOTE — DISCHARGE NOTE PROVIDER - CARE PROVIDERS DIRECT ADDRESSES
,[email or web address],DirectAddress_Unknown

## 2020-11-15 NOTE — DISCHARGE NOTE PROVIDER - PROVIDER TOKENS
PROVIDER:[TOKEN:[[id number]]] PROVIDER:[TOKEN:[[id number]]],FREE:[LAST:[[facility]],PHONE:[phone]],FAX:[(   )    -]]

## 2020-11-15 NOTE — DISCHARGE NOTE PROVIDER - NSDCCPCAREPLAN_GEN_ALL_CORE_FT
PRINCIPAL DISCHARGE DIAGNOSIS  Diagnosis: Sickle cell pain crisis  Assessment and Plan of Treatment: Call your doctor if any signs of infection, swelling, nausea or vomiting  Avoid dehydration by drinking adequate amount of fluids  Avoid stress  It is important to treat infetions right away even in the middle of the night - call your doctor with any temperature higher than 101.5  Keep your regular check up appointments with your doctor and follow his instructions carefully  Keep up with your vaccinations to prevent certain infections  Take medications as prescribed - you also will be on folic acid      SECONDARY DISCHARGE DIAGNOSES  Diagnosis: Cholelithiasis  Assessment and Plan of Treatment:     Diagnosis: Hypertension  Assessment and Plan of Treatment: Low salt diet  Activity as tolerated.  Take all medication as prescribed.  Follow up with your medical doctor for routine blood pressure monitoring at your next visit.  Notify your doctor if you have any of the following symptoms:   Dizziness, Lightheadedness, Blurry vision, Headache, Chest pain, Shortness of breath    Diagnosis: Acute kidney injury  Assessment and Plan of Treatment: Resolved    Diagnosis: BPH (benign prostatic hyperplasia)  Assessment and Plan of Treatment: Continue taking your Proscar     PRINCIPAL DISCHARGE DIAGNOSIS  Diagnosis: Sickle cell pain crisis  Assessment and Plan of Treatment: Call your doctor if any signs of infection, swelling, nausea or vomiting  Avoid dehydration by drinking adequate amount of fluids  Avoid stress  It is important to treat infetions right away even in the middle of the night - call your doctor with any temperature higher than 101.5  Keep your regular check up appointments with your doctor and follow his instructions carefully  Keep up with your vaccinations to prevent certain infections  Take medications as prescribed - you also will be on folic acid      SECONDARY DISCHARGE DIAGNOSES  Diagnosis: Cholelithiasis  Assessment and Plan of Treatment: Continue low fat diet  Continue to monitor for worsening signs of abdominal pain  Follow up with you PCP    Diagnosis: Hypertension  Assessment and Plan of Treatment: Low salt diet  Activity as tolerated.  Take all medication as prescribed.  Follow up with your medical doctor for routine blood pressure monitoring at your next visit.  Notify your doctor if you have any of the following symptoms:   Dizziness, Lightheadedness, Blurry vision, Headache, Chest pain, Shortness of breath    Diagnosis: Acute kidney injury  Assessment and Plan of Treatment: Resolved  Please avoid NSAIDS. NO Motrin, Advil, Aleve, or Naprosen.    Diagnosis: BPH (benign prostatic hyperplasia)  Assessment and Plan of Treatment: Continue taking your Proscar  Follow up with your out patient urologist or PCP in 1-2 weeks. To evaluate for urinary retention.

## 2020-11-15 NOTE — DISCHARGE NOTE PROVIDER - NSDCMRMEDTOKEN_GEN_ALL_CORE_FT
Endari oral powder for reconstitution: 10 gram(s) orally every 12 hours  finasteride 5 mg oral tablet: 1 tab(s) orally once a day  folic acid 1 mg oral tablet: 1 tab(s) orally once a day  ibuprofen 600 mg oral tablet: 1 tab(s) orally 3 times a day, As Needed pain  lisinopril 10 mg oral tablet: 1 tab(s) orally once a day  metoprolol tartrate 25 mg oral tablet: 0.5 tab(s) orally 2 times a day  Percocet 10/325 oral tablet: 1 tab(s) orally every 6 hours, As Needed pain  senna oral tablet: 2 tab(s) orally once a day (at bedtime), As Needed constipation   Endari oral powder for reconstitution: 10 gram(s) orally every 12 hours  finasteride 5 mg oral tablet: 1 tab(s) orally once a day  folic acid 1 mg oral tablet: 1 tab(s) orally once a day  lisinopril 10 mg oral tablet: 1 tab(s) orally once a day  metoprolol tartrate 25 mg oral tablet: 0.5 tab(s) orally 2 times a day  oxycodone-acetaminophen 5 mg-325 mg oral tablet: 2 tab(s) orally every 6 hours, As needed, Moderate Pain (4 - 6) andsevere pain  senna oral tablet: 2 tab(s) orally once a day (at bedtime), As Needed constipation

## 2020-11-15 NOTE — DISCHARGE NOTE PROVIDER - CARE PROVIDER_API CALL
Lluvia Hamm)  Internal Medicine  [address] [address] [place], [place] [address]  Phone: [phone]  Fax: [phone]  Follow Up Time:    Lluvia Hamm)  Internal Medicine  [address] [address] [place] [address]  Phone: [phone]  Fax: [phone]  Follow Up Time:     Urology [facility] at [facility],   Phone: [phone]  Fax: (   )    -  Follow Up Time:

## 2020-11-16 ENCOUNTER — TRANSCRIPTION ENCOUNTER (OUTPATIENT)
Age: 65
End: 2020-11-16

## 2020-11-16 VITALS
OXYGEN SATURATION: 100 % | TEMPERATURE: 98 F | DIASTOLIC BLOOD PRESSURE: 69 MMHG | SYSTOLIC BLOOD PRESSURE: 147 MMHG | HEART RATE: 75 BPM

## 2020-11-16 LAB
ANTIBODY ID 1_1: SIGNIFICANT CHANGE UP
HCT VFR BLD CALC: 25.9 % — LOW (ref 39–50)
HGB BLD-MCNC: 7.7 G/DL — LOW (ref 13–17)
MCHC RBC-ENTMCNC: 19.1 PG — LOW (ref 27–34)
MCHC RBC-ENTMCNC: 29.7 % — LOW (ref 32–36)
MCV RBC AUTO: 64.3 FL — LOW (ref 80–100)
NRBC # FLD: 0.05 K/UL — SIGNIFICANT CHANGE UP (ref 0–0)
PLATELET # BLD AUTO: 305 K/UL — SIGNIFICANT CHANGE UP (ref 150–400)
PMV BLD: 9.2 FL — SIGNIFICANT CHANGE UP (ref 7–13)
RBC # BLD: 4.03 M/UL — LOW (ref 4.2–5.8)
RBC # FLD: 22.9 % — HIGH (ref 10.3–14.5)
RETICS #: 122 K/UL — SIGNIFICANT CHANGE UP (ref 25–125)
RETICS/RBC NFR: 3 % — HIGH (ref 0.5–2.5)
WBC # BLD: 12.35 K/UL — HIGH (ref 3.8–10.5)
WBC # FLD AUTO: 12.35 K/UL — HIGH (ref 3.8–10.5)

## 2020-11-16 PROCEDURE — 99239 HOSP IP/OBS DSCHRG MGMT >30: CPT

## 2020-11-16 RX ORDER — OXYCODONE AND ACETAMINOPHEN 5; 325 MG/1; MG/1
2 TABLET ORAL EVERY 6 HOURS
Refills: 0 | Status: DISCONTINUED | OUTPATIENT
Start: 2020-11-16 | End: 2020-11-16

## 2020-11-16 RX ORDER — IBUPROFEN 200 MG
1 TABLET ORAL
Qty: 0 | Refills: 0 | DISCHARGE

## 2020-11-16 RX ADMIN — SENNA PLUS 2 TABLET(S): 8.6 TABLET ORAL at 06:35

## 2020-11-16 RX ADMIN — Medication 1 MILLIGRAM(S): at 11:31

## 2020-11-16 RX ADMIN — SODIUM CHLORIDE 100 MILLILITER(S): 9 INJECTION, SOLUTION INTRAVENOUS at 06:36

## 2020-11-16 RX ADMIN — HEPARIN SODIUM 5000 UNIT(S): 5000 INJECTION INTRAVENOUS; SUBCUTANEOUS at 06:35

## 2020-11-16 RX ADMIN — Medication 12.5 MILLIGRAM(S): at 06:36

## 2020-11-16 RX ADMIN — GLUTAMINE 10 GRAM(S): 5 POWDER, FOR SOLUTION ORAL at 06:35

## 2020-11-16 RX ADMIN — HYDROMORPHONE HYDROCHLORIDE 30 MILLILITER(S): 2 INJECTION INTRAMUSCULAR; INTRAVENOUS; SUBCUTANEOUS at 07:58

## 2020-11-16 RX ADMIN — FINASTERIDE 5 MILLIGRAM(S): 5 TABLET, FILM COATED ORAL at 11:31

## 2020-11-16 RX ADMIN — SODIUM CHLORIDE 100 MILLILITER(S): 9 INJECTION, SOLUTION INTRAVENOUS at 07:58

## 2020-11-16 NOTE — PROGRESS NOTE ADULT - PROBLEM SELECTOR PROBLEM 4
360 ml Jevity 1.5 x 3 meal-patterned feeds daily, and add Alonso twice daily for 4 weeks with 50cc pre and post sterile water flushes Chronic systolic (congestive) heart failure

## 2020-11-16 NOTE — DISCHARGE NOTE NURSING/CASE MANAGEMENT/SOCIAL WORK - PATIENT PORTAL LINK FT
You can access the FollowMyHealth Patient Portal offered by [facility] by registering at the following website: [email or web address]. By joining [facility]’s FollowMyHealth portal, you will also be able to view your health information using other applications (apps) compatible with our system.

## 2020-11-16 NOTE — PROGRESS NOTE ADULT - REASON FOR ADMISSION
Noe Luna is here for IV hydration due to dehydration and weakness.    ECOG:  3 - Capable of only limited self-care, confined to bed or chair more than 50% of waking hours.    Nursing Assessment:  Patient presents to infusion for IVFs and RN check after home wound nurse assessed patient at home, writer reviewed ROS and agrees with findings, patient and 2 sons answering questions, patient drank about 7 cups of fluid yesterday and 5 today, patient states the last time that he urinated was last night before going to bed, patient states he feels constipated and his last BM was on Monday, patient states he takes a stool softener but it is not helping, family states he was more confused last night but seems better today, writer spoke with Elisa Hanley NP about assessment and orders are to give patient 1L NS and send patient to the ER, no further concerns or complaints for writer at this time.     4/25/2018 implanted port accessed by lab using a 20 gauge non-coring needle primed with 0.9% sodium chloride.  Good blood return obtained.  Blood obtained and sent to lab. Flushed with 20ml 0.9% sodium chloride and dressed with clear dressing . Implanted port site is not sensitive to touch, not swollen, not warm and not reddened.  Patient tolerated procedure well.    Vitals:  See Flowsheet.     Weight:  Wt Readings from Last 1 Encounters:   04/19/18 80.2 kg     Labs:  Sodium (mmol/L)   Date Value   04/19/2018 134 (L)     Potassium (mmol/L)   Date Value   04/19/2018 4.1     Chloride (mmol/L)   Date Value   04/19/2018 101     Glucose (mg/dL)   Date Value   04/19/2018 156 (H)     CALCIUM (mg/dL)   Date Value   04/19/2018 8.0 (L)   04/17/2012 9.4     CO2 (mmol/L)   Date Value   04/17/2012 30     Carbon Dioxide (mmol/L)   Date Value   04/19/2018 26     BUN (mg/dL)   Date Value   04/19/2018 11     Creatinine (mg/dL)   Date Value   04/19/2018 0.69     MAGNESIUM (mg/dL)   Date Value   04/19/2018 1.7       Treatment:  Refer to LDA and 
MAR for line assessment and medication administration    Post Treatment: Treatment tolerated well; no adverse reaction    Patient Education: Patient and family instructed to take Miralax on top of the stool softener he is taking to help with his constipation, patient informed he would need to go the the ER to be further evaluated for urinary retention, patient agrees and has no further questions at this time.     Patient Discharged: Patient discharged per wheelchair to ER with two sons.     Next appointment scheduled:   Future Appointments  Date Time Provider Department Center   4/26/2018 2:00 PM Ivanna Tenorio RN [id number]   4/27/2018 10:00 AM Ivanna Tenorio RN [id number] [facility]   5/3/2018 8:45 AM MON LAB [facility]   5/3/2018 9:00 AM ALFIE Morse [facility]   5/4/2018 8:35 AM BEV RIVERS MD 2 [address]     Patient instructed to call the office with any questions or concerns.  Dr. Dawn Rosario is supervising provider today.      
Sickle cell pain crisis

## 2020-11-16 NOTE — PROGRESS NOTE ADULT - SUBJECTIVE AND OBJECTIVE BOX
Patient is a 65y old  Male who presents with a chief complaint of Sickle cell pain crisis (15 Nov 2020 15:15)      SUBJECTIVE / OVERNIGHT EVENTS:  resting in chair.  looks comfortable notes pain 7/10 and dec to 4/10 after pump      MEDICATIONS  (STANDING):  finasteride 5 milliGRAM(s) Oral daily  folic acid 1 milliGRAM(s) Oral daily  glutamine Powder 10 Gram(s) Oral every 12 hours  heparin   Injectable 5000 Unit(s) SubCutaneous every 12 hours  HYDROmorphone PCA (1 mG/mL) 30 milliLiter(s) PCA Continuous PCA Continuous  influenza  Vaccine (HIGH DOSE) 0.7 milliLiter(s) IntraMuscular once  metoprolol tartrate 12.5 milliGRAM(s) Oral two times a day  senna 2 Tablet(s) Oral at bedtime  sodium chloride 0.45%. 1000 milliLiter(s) (100 mL/Hr) IV Continuous <Continuous>    MEDICATIONS  (PRN):  bisacodyl 5 milliGRAM(s) Oral daily PRN Constipation  naloxone Injectable 0.1 milliGRAM(s) IV Push every 3 minutes PRN For ANY of the following changes in patient status:  A. RR LESS THAN 10 breaths per minute, B. Oxygen saturation LESS THAN 90%, C. Sedation score of 6  ondansetron Injectable 4 milliGRAM(s) IV Push every 6 hours PRN Nausea    Vital Signs Last 24 Hrs  T(C): 36.7 (16 Nov 2020 09:00), Max: 37.3 (15 Nov 2020 10:02)  T(F): 98 (16 Nov 2020 09:00), Max: 99.1 (15 Nov 2020 10:02)  HR: 68 (16 Nov 2020 09:00) (61 - 82)  BP: 158/74 (16 Nov 2020 09:00) (129/75 - 158/74)  BP(mean): --  RR: 18 (16 Nov 2020 09:00) (18 - 18)  SpO2: 94% (16 Nov 2020 09:00) (94% - 100%)      PHYSICAL EXAM:  GENERAL: NAD, well-developed  HEAD:  Atraumatic, Normocephalic  EYES: EOMI, PERRLA, conjunctiva and sclera clear  NECK: Supple, No JVD  CHEST/LUNG: Clear to auscultation bilaterally; No wheeze  HEART: Regular rate and rhythm; No murmurs, rubs, or gallops  ABDOMEN: Soft, Nontender, Nondistended; Bowel sounds present  EXTREMITIES:  2+ Peripheral Pulses, No clubbing, cyanosis, or edema  PSYCH: AAOx3  NEUROLOGY: non-focal  SKIN: No rashes or lesions    LABS:                        7.7    12.35 )-----------( 305      ( 16 Nov 2020 07:21 )             25.9     11-15    136  |  105  |  20  ----------------------------<  91  5.3   |  21<L>  |  1.12    Ca    9.3      15 Nov 2020 10:00  Phos  3.6     11-15  Mg     1.9     11-15    TPro  8.0  /  Alb  4.4  /  TBili  1.1  /  DBili  x   /  AST  22  /  ALT  14  /  AlkPhos  81  11-14                 Patient is a 65y old  Male who presents with a chief complaint of Sickle cell pain crisis (15 Nov 2020 15:15)      SUBJECTIVE / OVERNIGHT EVENTS:  resting in chair.  looks comfortable notes pain 7/10 and dec to 4/10 after pump- agrees to getting back hid percocet pills.  Notes he will f/u out patient with Dr Hamm and he was percocet pills at home      MEDICATIONS  (STANDING):  finasteride 5 milliGRAM(s) Oral daily  folic acid 1 milliGRAM(s) Oral daily  glutamine Powder 10 Gram(s) Oral every 12 hours  heparin   Injectable 5000 Unit(s) SubCutaneous every 12 hours  HYDROmorphone PCA (1 mG/mL) 30 milliLiter(s) PCA Continuous PCA Continuous  influenza  Vaccine (HIGH DOSE) 0.7 milliLiter(s) IntraMuscular once  metoprolol tartrate 12.5 milliGRAM(s) Oral two times a day  senna 2 Tablet(s) Oral at bedtime  sodium chloride 0.45%. 1000 milliLiter(s) (100 mL/Hr) IV Continuous <Continuous>    MEDICATIONS  (PRN):  bisacodyl 5 milliGRAM(s) Oral daily PRN Constipation  naloxone Injectable 0.1 milliGRAM(s) IV Push every 3 minutes PRN For ANY of the following changes in patient status:  A. RR LESS THAN 10 breaths per minute, B. Oxygen saturation LESS THAN 90%, C. Sedation score of 6  ondansetron Injectable 4 milliGRAM(s) IV Push every 6 hours PRN Nausea    Vital Signs Last 24 Hrs  T(C): 36.7 (16 Nov 2020 09:00), Max: 37.3 (15 Nov 2020 10:02)  T(F): 98 (16 Nov 2020 09:00), Max: 99.1 (15 Nov 2020 10:02)  HR: 68 (16 Nov 2020 09:00) (61 - 82)  BP: 158/74 (16 Nov 2020 09:00) (129/75 - 158/74)  BP(mean): --  RR: 18 (16 Nov 2020 09:00) (18 - 18)  SpO2: 94% (16 Nov 2020 09:00) (94% - 100%)      PHYSICAL EXAM:  GENERAL: NAD, well-developed  HEAD:  Atraumatic, Normocephalic  EYES: EOMI, PERRLA, conjunctiva and sclera clear  NECK: Supple, No JVD  CHEST/LUNG: Clear to auscultation bilaterally; No wheeze  HEART: Regular rate and rhythm; No murmurs, rubs, or gallops  ABDOMEN: Soft, Nontender, Nondistended; Bowel sounds present  EXTREMITIES:  2+ Peripheral Pulses, No clubbing, cyanosis, or edema  PSYCH: AAOx3  NEUROLOGY: non-focal  SKIN: No rashes or lesions    LABS:                        7.7    12.35 )-----------( 305      ( 16 Nov 2020 07:21 )             25.9     11-15    136  |  105  |  20  ----------------------------<  91  5.3   |  21<L>  |  1.12    Ca    9.3      15 Nov 2020 10:00  Phos  3.6     11-15  Mg     1.9     11-15    TPro  8.0  /  Alb  4.4  /  TBili  1.1  /  DBili  x   /  AST  22  /  ALT  14  /  AlkPhos  81  11-14

## 2020-11-16 NOTE — PROGRESS NOTE ADULT - PROBLEM SELECTOR PLAN 3
Resolved   US with B/L atrophic kidneys, mild central fullness of the right renal collecting system  - reports voiding improved since initiation of Flomax

## 2020-11-16 NOTE — PROGRESS NOTE ADULT - ASSESSMENT
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   pain all over is improving

## 2020-11-16 NOTE — PROGRESS NOTE ADULT - ATTENDING COMMENTS
Pain improving- ss crisis- c/w pCA pump Pain improving- ss crisis- on Dilaudid PCA pump at 0.2 mg  on demand.  Stop PCA pump today  Start Percocet 10/325 mg q6 prn    Out patient f/u with Dr Hamm    40 min to coordinate d/c Pain improving- ss crisis- on Dilaudid PCA pump at 0.2 mg  on demand.  Stop PCA pump today  Start Percocet 10/325 mg q6 prn    looks comfortable notes pain 7/10 and dec to 4/10 after pump- agrees to getting back hid percocet pills.  Notes he will f/u out patient with Dr Hamm and he was percocet pills at home    Out patient f/u with Dr Hamm    40 min to coordinate d/c Pain improving- ss crisis- on Dilaudid PCA pump at 0.2 mg  on demand.  Stop PCA pump today  Start Percocet 10/325 mg q6 prn   no nSAIDS at home - no advil/ alleve/ naprosy/ alleve/ moltrin or Ibuprofen    looks comfortable notes pain 7/10 and dec to 4/10 after pump- agrees to getting back hid percocet pills.  Notes he will f/u out patient with Dr Hamm and he was percocet pills at home    Out patient f/u with Dr Hamm    40 min to coordinate d/c

## 2020-11-17 ENCOUNTER — NON-APPOINTMENT (OUTPATIENT)
Age: 65
End: 2020-11-17

## 2020-11-23 ENCOUNTER — NON-APPOINTMENT (OUTPATIENT)
Age: 65
End: 2020-11-23

## 2020-12-03 ENCOUNTER — APPOINTMENT (OUTPATIENT)
Dept: HEMATOLOGY ONCOLOGY | Facility: CLINIC | Age: 65
End: 2020-12-03

## 2021-02-03 ENCOUNTER — INPATIENT (INPATIENT)
Facility: HOSPITAL | Age: 66
LOS: 0 days | Discharge: ANOTHER HOSPITAL | End: 2021-02-04
Attending: INTERNAL MEDICINE | Admitting: INTERNAL MEDICINE
Payer: MEDICARE

## 2021-02-03 VITALS
RESPIRATION RATE: 17 BRPM | HEIGHT: 70 IN | WEIGHT: 125 LBS | DIASTOLIC BLOOD PRESSURE: 82 MMHG | OXYGEN SATURATION: 100 % | SYSTOLIC BLOOD PRESSURE: 126 MMHG | TEMPERATURE: 97 F | HEART RATE: 85 BPM

## 2021-02-03 LAB
ALBUMIN SERPL ELPH-MCNC: 3.6 G/DL — SIGNIFICANT CHANGE UP (ref 3.3–5)
ALP SERPL-CCNC: 105 U/L — SIGNIFICANT CHANGE UP (ref 40–120)
ALT FLD-CCNC: 22 U/L — SIGNIFICANT CHANGE UP (ref 12–78)
ANION GAP SERPL CALC-SCNC: 8 MMOL/L — SIGNIFICANT CHANGE UP (ref 5–17)
APTT BLD: 28.7 SEC — SIGNIFICANT CHANGE UP (ref 27.5–35.5)
AST SERPL-CCNC: 25 U/L — SIGNIFICANT CHANGE UP (ref 15–37)
BILIRUB SERPL-MCNC: 1.7 MG/DL — HIGH (ref 0.2–1.2)
BUN SERPL-MCNC: 24 MG/DL — HIGH (ref 7–23)
CALCIUM SERPL-MCNC: 9.1 MG/DL — SIGNIFICANT CHANGE UP (ref 8.5–10.1)
CHLORIDE SERPL-SCNC: 107 MMOL/L — SIGNIFICANT CHANGE UP (ref 96–108)
CO2 SERPL-SCNC: 22 MMOL/L — SIGNIFICANT CHANGE UP (ref 22–31)
CREAT SERPL-MCNC: 1.57 MG/DL — HIGH (ref 0.5–1.3)
FLUAV AG NPH QL: SIGNIFICANT CHANGE UP
FLUBV AG NPH QL: SIGNIFICANT CHANGE UP
GLUCOSE SERPL-MCNC: 90 MG/DL — SIGNIFICANT CHANGE UP (ref 70–99)
INR BLD: 1.19 RATIO — HIGH (ref 0.88–1.16)
POTASSIUM SERPL-MCNC: 4.9 MMOL/L — SIGNIFICANT CHANGE UP (ref 3.5–5.3)
POTASSIUM SERPL-SCNC: 4.9 MMOL/L — SIGNIFICANT CHANGE UP (ref 3.5–5.3)
PROT SERPL-MCNC: 8.3 GM/DL — SIGNIFICANT CHANGE UP (ref 6–8.3)
PROTHROM AB SERPL-ACNC: 13.7 SEC — HIGH (ref 10.6–13.6)
SARS-COV-2 RNA SPEC QL NAA+PROBE: SIGNIFICANT CHANGE UP
SODIUM SERPL-SCNC: 137 MMOL/L — SIGNIFICANT CHANGE UP (ref 135–145)

## 2021-02-03 PROCEDURE — 71045 X-RAY EXAM CHEST 1 VIEW: CPT | Mod: 26

## 2021-02-03 PROCEDURE — 99291 CRITICAL CARE FIRST HOUR: CPT

## 2021-02-03 RX ORDER — SODIUM CHLORIDE 9 MG/ML
2000 INJECTION INTRAMUSCULAR; INTRAVENOUS; SUBCUTANEOUS ONCE
Refills: 0 | Status: COMPLETED | OUTPATIENT
Start: 2021-02-03 | End: 2021-02-03

## 2021-02-03 NOTE — ED ADULT NURSE NOTE - OBJECTIVE STATEMENT
hx of Sickle cell C/O epigastric pain x 2 weeks and BLE joint pain today. Pt additionally stated he passed out today denies LOC.

## 2021-02-04 ENCOUNTER — INPATIENT (INPATIENT)
Facility: HOSPITAL | Age: 66
LOS: 18 days | Discharge: HOME | DRG: 811 | End: 2021-02-23
Attending: INTERNAL MEDICINE | Admitting: INTERNAL MEDICINE
Payer: MEDICARE

## 2021-02-04 VITALS
DIASTOLIC BLOOD PRESSURE: 77 MMHG | TEMPERATURE: 99 F | HEART RATE: 98 BPM | SYSTOLIC BLOOD PRESSURE: 159 MMHG | RESPIRATION RATE: 16 BRPM | OXYGEN SATURATION: 98 %

## 2021-02-04 VITALS
SYSTOLIC BLOOD PRESSURE: 135 MMHG | HEART RATE: 83 BPM | TEMPERATURE: 98 F | OXYGEN SATURATION: 100 % | DIASTOLIC BLOOD PRESSURE: 73 MMHG | RESPIRATION RATE: 18 BRPM

## 2021-02-04 DIAGNOSIS — N40.0 BENIGN PROSTATIC HYPERPLASIA WITHOUT LOWER URINARY TRACT SYMPTOMS: ICD-10-CM

## 2021-02-04 DIAGNOSIS — I10 ESSENTIAL (PRIMARY) HYPERTENSION: ICD-10-CM

## 2021-02-04 DIAGNOSIS — D57.00 HB-SS DISEASE WITH CRISIS, UNSPECIFIED: ICD-10-CM

## 2021-02-04 DIAGNOSIS — Z29.9 ENCOUNTER FOR PROPHYLACTIC MEASURES, UNSPECIFIED: ICD-10-CM

## 2021-02-04 LAB
ALLERGY+IMMUNOLOGY DIAG STUDY NOTE: SIGNIFICANT CHANGE UP
ALLERGY+IMMUNOLOGY DIAG STUDY NOTE: SIGNIFICANT CHANGE UP
ANISOCYTOSIS BLD QL: SLIGHT — SIGNIFICANT CHANGE UP
ANTIBODY INTERPRETATION 2: SIGNIFICANT CHANGE UP
ANTIBODY INTERPRETATION 3: SIGNIFICANT CHANGE UP
ANTIBODY INTERPRETATION 4: SIGNIFICANT CHANGE UP
BASOPHILS # BLD AUTO: 0.06 K/UL — SIGNIFICANT CHANGE UP (ref 0–0.2)
BASOPHILS NFR BLD AUTO: 0.5 % — SIGNIFICANT CHANGE UP (ref 0–2)
BLD GP AB SCN SERPL QL: SIGNIFICANT CHANGE UP
BLD GP AB SCN SERPL QL: SIGNIFICANT CHANGE UP
DIR ANTIGLOB POLYSPECIFIC INTERPRETATION: SIGNIFICANT CHANGE UP
DIR ANTIGLOB POLYSPECIFIC INTERPRETATION: SIGNIFICANT CHANGE UP
ELLIPTOCYTES BLD QL SMEAR: SLIGHT — SIGNIFICANT CHANGE UP
EOSINOPHIL # BLD AUTO: 0.04 K/UL — SIGNIFICANT CHANGE UP (ref 0–0.5)
EOSINOPHIL NFR BLD AUTO: 0.3 % — SIGNIFICANT CHANGE UP (ref 0–6)
HCT VFR BLD CALC: 16.9 % — CRITICAL LOW (ref 39–50)
HGB BLD-MCNC: 5.5 G/DL — CRITICAL LOW (ref 13–17)
HYPOCHROMIA BLD QL: SIGNIFICANT CHANGE UP
IMM GRANULOCYTES NFR BLD AUTO: 0.6 % — SIGNIFICANT CHANGE UP (ref 0–1.5)
LDH SERPL L TO P-CCNC: 216 U/L — SIGNIFICANT CHANGE UP (ref 50–242)
LYMPHOCYTES # BLD AUTO: 1.26 K/UL — SIGNIFICANT CHANGE UP (ref 1–3.3)
LYMPHOCYTES # BLD AUTO: 9.6 % — LOW (ref 13–44)
MANUAL SMEAR VERIFICATION: SIGNIFICANT CHANGE UP
MCHC RBC-ENTMCNC: 22 PG — LOW (ref 27–34)
MCHC RBC-ENTMCNC: 32.5 GM/DL — SIGNIFICANT CHANGE UP (ref 32–36)
MCV RBC AUTO: 67.6 FL — LOW (ref 80–100)
MONOCYTES # BLD AUTO: 0.92 K/UL — HIGH (ref 0–0.9)
MONOCYTES NFR BLD AUTO: 7 % — SIGNIFICANT CHANGE UP (ref 2–14)
NEUTROPHILS # BLD AUTO: 10.72 K/UL — HIGH (ref 1.8–7.4)
NEUTROPHILS NFR BLD AUTO: 82 % — HIGH (ref 43–77)
NRBC # BLD: 0 /100 WBCS — SIGNIFICANT CHANGE UP (ref 0–0)
PLAT MORPH BLD: NORMAL — SIGNIFICANT CHANGE UP
PLATELET # BLD AUTO: 364 K/UL — SIGNIFICANT CHANGE UP (ref 150–400)
RBC # BLD: 2.5 M/UL — LOW (ref 4.2–5.8)
RBC # BLD: 2.5 M/UL — LOW (ref 4.2–5.8)
RBC # FLD: 21.6 % — HIGH (ref 10.3–14.5)
RBC BLD AUTO: ABNORMAL
RETICS #: 113.1 K/UL — SIGNIFICANT CHANGE UP (ref 25–125)
RETICS/RBC NFR: 4.5 % — HIGH (ref 0.5–2.5)
SARS-COV-2 IGG SERPL QL IA: NEGATIVE — SIGNIFICANT CHANGE UP
SARS-COV-2 IGM SERPL IA-ACNC: 0.15 INDEX — SIGNIFICANT CHANGE UP
SCHISTOCYTES BLD QL AUTO: SLIGHT — SIGNIFICANT CHANGE UP
STOMATOCYTES BLD QL SMEAR: SLIGHT — SIGNIFICANT CHANGE UP
TARGETS BLD QL SMEAR: SLIGHT — SIGNIFICANT CHANGE UP
WBC # BLD: 13.08 K/UL — HIGH (ref 3.8–10.5)
WBC # FLD AUTO: 13.08 K/UL — HIGH (ref 3.8–10.5)

## 2021-02-04 PROCEDURE — 93010 ELECTROCARDIOGRAM REPORT: CPT

## 2021-02-04 PROCEDURE — 99223 1ST HOSP IP/OBS HIGH 75: CPT | Mod: AI

## 2021-02-04 PROCEDURE — 76700 US EXAM ABDOM COMPLETE: CPT | Mod: 26

## 2021-02-04 PROCEDURE — 86077 PHYS BLOOD BANK SERV XMATCH: CPT

## 2021-02-04 RX ORDER — FINASTERIDE 5 MG/1
5 TABLET, FILM COATED ORAL DAILY
Refills: 0 | Status: DISCONTINUED | OUTPATIENT
Start: 2021-02-04 | End: 2021-02-04

## 2021-02-04 RX ORDER — HYDROMORPHONE HYDROCHLORIDE 2 MG/ML
2 INJECTION INTRAMUSCULAR; INTRAVENOUS; SUBCUTANEOUS ONCE
Refills: 0 | Status: DISCONTINUED | OUTPATIENT
Start: 2021-02-04 | End: 2021-02-04

## 2021-02-04 RX ORDER — METOPROLOL TARTRATE 50 MG
12.5 TABLET ORAL
Refills: 0 | Status: DISCONTINUED | OUTPATIENT
Start: 2021-02-04 | End: 2021-02-10

## 2021-02-04 RX ORDER — BENZOCAINE AND MENTHOL 5; 1 G/100ML; G/100ML
1 LIQUID ORAL
Refills: 0 | Status: DISCONTINUED | OUTPATIENT
Start: 2021-02-04 | End: 2021-02-23

## 2021-02-04 RX ORDER — DEXTROSE MONOHYDRATE, SODIUM CHLORIDE, AND POTASSIUM CHLORIDE 50; .745; 4.5 G/1000ML; G/1000ML; G/1000ML
1000 INJECTION, SOLUTION INTRAVENOUS
Refills: 0 | Status: DISCONTINUED | OUTPATIENT
Start: 2021-02-04 | End: 2021-02-05

## 2021-02-04 RX ORDER — LISINOPRIL 2.5 MG/1
10 TABLET ORAL DAILY
Refills: 0 | Status: DISCONTINUED | OUTPATIENT
Start: 2021-02-04 | End: 2021-02-04

## 2021-02-04 RX ORDER — ENOXAPARIN SODIUM 100 MG/ML
40 INJECTION SUBCUTANEOUS DAILY
Refills: 0 | Status: DISCONTINUED | OUTPATIENT
Start: 2021-02-04 | End: 2021-02-23

## 2021-02-04 RX ORDER — HYDROMORPHONE HYDROCHLORIDE 2 MG/ML
1 INJECTION INTRAMUSCULAR; INTRAVENOUS; SUBCUTANEOUS ONCE
Refills: 0 | Status: DISCONTINUED | OUTPATIENT
Start: 2021-02-04 | End: 2021-02-04

## 2021-02-04 RX ORDER — LISINOPRIL 2.5 MG/1
10 TABLET ORAL DAILY
Refills: 0 | Status: DISCONTINUED | OUTPATIENT
Start: 2021-02-04 | End: 2021-02-10

## 2021-02-04 RX ORDER — FOLIC ACID 0.8 MG
1 TABLET ORAL DAILY
Refills: 0 | Status: DISCONTINUED | OUTPATIENT
Start: 2021-02-04 | End: 2021-02-04

## 2021-02-04 RX ORDER — ENOXAPARIN SODIUM 100 MG/ML
40 INJECTION SUBCUTANEOUS DAILY
Refills: 0 | Status: DISCONTINUED | OUTPATIENT
Start: 2021-02-04 | End: 2021-02-04

## 2021-02-04 RX ORDER — DIPHENHYDRAMINE HCL 50 MG
25 CAPSULE ORAL EVERY 4 HOURS
Refills: 0 | Status: DISCONTINUED | OUTPATIENT
Start: 2021-02-04 | End: 2021-02-23

## 2021-02-04 RX ORDER — DIPHENHYDRAMINE HCL 50 MG
25 CAPSULE ORAL EVERY 4 HOURS
Refills: 0 | Status: DISCONTINUED | OUTPATIENT
Start: 2021-02-04 | End: 2021-02-04

## 2021-02-04 RX ORDER — METOPROLOL TARTRATE 50 MG
12.5 TABLET ORAL
Refills: 0 | Status: DISCONTINUED | OUTPATIENT
Start: 2021-02-04 | End: 2021-02-04

## 2021-02-04 RX ORDER — FOLIC ACID 0.8 MG
1 TABLET ORAL DAILY
Refills: 0 | Status: DISCONTINUED | OUTPATIENT
Start: 2021-02-04 | End: 2021-02-23

## 2021-02-04 RX ORDER — HYDROMORPHONE HYDROCHLORIDE 2 MG/ML
1 INJECTION INTRAMUSCULAR; INTRAVENOUS; SUBCUTANEOUS
Refills: 0 | Status: DISCONTINUED | OUTPATIENT
Start: 2021-02-04 | End: 2021-02-07

## 2021-02-04 RX ORDER — FINASTERIDE 5 MG/1
5 TABLET, FILM COATED ORAL DAILY
Refills: 0 | Status: DISCONTINUED | OUTPATIENT
Start: 2021-02-04 | End: 2021-02-23

## 2021-02-04 RX ORDER — HYDROMORPHONE HYDROCHLORIDE 2 MG/ML
1 INJECTION INTRAMUSCULAR; INTRAVENOUS; SUBCUTANEOUS
Refills: 0 | Status: DISCONTINUED | OUTPATIENT
Start: 2021-02-04 | End: 2021-02-04

## 2021-02-04 RX ADMIN — HYDROMORPHONE HYDROCHLORIDE 1 MILLIGRAM(S): 2 INJECTION INTRAMUSCULAR; INTRAVENOUS; SUBCUTANEOUS at 20:51

## 2021-02-04 RX ADMIN — Medication 25 MILLIGRAM(S): at 15:40

## 2021-02-04 RX ADMIN — ENOXAPARIN SODIUM 40 MILLIGRAM(S): 100 INJECTION SUBCUTANEOUS at 12:04

## 2021-02-04 RX ADMIN — LISINOPRIL 10 MILLIGRAM(S): 2.5 TABLET ORAL at 09:13

## 2021-02-04 RX ADMIN — Medication 1 MILLIGRAM(S): at 12:04

## 2021-02-04 RX ADMIN — Medication 12.5 MILLIGRAM(S): at 18:42

## 2021-02-04 RX ADMIN — HYDROMORPHONE HYDROCHLORIDE 1 MILLIGRAM(S): 2 INJECTION INTRAMUSCULAR; INTRAVENOUS; SUBCUTANEOUS at 00:20

## 2021-02-04 RX ADMIN — HYDROMORPHONE HYDROCHLORIDE 1 MILLIGRAM(S): 2 INJECTION INTRAMUSCULAR; INTRAVENOUS; SUBCUTANEOUS at 18:02

## 2021-02-04 RX ADMIN — FINASTERIDE 5 MILLIGRAM(S): 5 TABLET, FILM COATED ORAL at 12:04

## 2021-02-04 RX ADMIN — HYDROMORPHONE HYDROCHLORIDE 1 MILLIGRAM(S): 2 INJECTION INTRAMUSCULAR; INTRAVENOUS; SUBCUTANEOUS at 15:40

## 2021-02-04 RX ADMIN — HYDROMORPHONE HYDROCHLORIDE 2 MILLIGRAM(S): 2 INJECTION INTRAMUSCULAR; INTRAVENOUS; SUBCUTANEOUS at 02:23

## 2021-02-04 RX ADMIN — HYDROMORPHONE HYDROCHLORIDE 1 MILLIGRAM(S): 2 INJECTION INTRAMUSCULAR; INTRAVENOUS; SUBCUTANEOUS at 09:13

## 2021-02-04 RX ADMIN — DEXTROSE MONOHYDRATE, SODIUM CHLORIDE, AND POTASSIUM CHLORIDE 50 MILLILITER(S): 50; .745; 4.5 INJECTION, SOLUTION INTRAVENOUS at 18:42

## 2021-02-04 RX ADMIN — SODIUM CHLORIDE 2000 MILLILITER(S): 9 INJECTION INTRAMUSCULAR; INTRAVENOUS; SUBCUTANEOUS at 00:21

## 2021-02-04 RX ADMIN — HYDROMORPHONE HYDROCHLORIDE 2 MILLIGRAM(S): 2 INJECTION INTRAMUSCULAR; INTRAVENOUS; SUBCUTANEOUS at 04:17

## 2021-02-04 NOTE — H&P ADULT - HISTORY OF PRESENT ILLNESS
Pt is a 66 y/o male w/ HbSS disease multiple admissions in past including for acute chest has baseline 6gb 6-6.5, chronic CHF, intllectual developmental delay, and BPH who comes in w/generalized body aches was tried to be managed w/oxy at home but unable to control.  pt denies any fever, chills, sob, palpitations, n/v/d/c no travels or known sick contacts.

## 2021-02-04 NOTE — H&P ADULT - NSICDXFAMILYHX_GEN_ALL_CORE_FT
FAMILY HISTORY:  FH: hypertension, ~ mother    Mother  Still living? Unknown  Family history of sickle cell trait, Age at diagnosis: Age Unknown

## 2021-02-04 NOTE — ED PROVIDER NOTE - FAMILY HISTORY
FH: hypertension, ~ mother     Mother  Still living? Unknown  Family history of sickle cell trait, Age at diagnosis: Age Unknown

## 2021-02-04 NOTE — H&P ADULT - NSHPREVIEWOFSYSTEMS_GEN_ALL_CORE
REVIEW OF SYSTEMS:  CONSTITUTIONAL: No fever, weight loss, or fatigue  EYES: No eye pain, visual disturbances, or discharge  ENMT:  No difficulty hearing, tinnitus, vertigo; No sinus pain  NECK: No pain or stiffness  BREASTS: No pain, masses, or nipple discharge  RESPIRATORY: No cough, wheezing, chills or hemoptysis; No shortness of breath  CARDIOVASCULAR: No chest pain, palpitations, dizziness, or leg swelling  GASTROINTESTINAL:  No vomiting, or hematemesis; No diarrhea or constipation. No melena or hematochezia.  GENITOURINARY: No dysuria, frequency, hematuria, or incontinence  NEUROLOGICAL: No headaches, memory loss, loss of strength, numbness, or tremors  SKIN: No itching, burning, rashes, or lesions   LYMPH NODES: No enlarged glands  ENDOCRINE: No heat or cold intolerance; No hair loss; No polydipsia or polyuria  MUSCULOSKELETAL: see hpi  PSYCHIATRIC: No depression, anxiety, mood swings, or difficulty sleeping  HEME/LYMPH: No easy bruising, or bleeding gums  ALLERGY AND IMMUNOLOGIC: No hives or eczema

## 2021-02-04 NOTE — H&P ADULT - PROBLEM SELECTOR PLAN 1
admit to medicine  iv hydration w/ 1/2 ns  pain managment  cont folic acid  receiving prbc as hgb on presentation 5.5

## 2021-02-04 NOTE — H&P ADULT - PROBLEM SELECTOR PLAN 1
IV pain medication, gentle hydration, oxygen.   Anemia - PRBC ordered - unable to find match will need to resume search with  Blood bank.  Discussed case with Dr Wu - will see in AM.

## 2021-02-04 NOTE — H&P ADULT - NSHPPHYSICALEXAM_GEN_ALL_CORE
Vital Signs Last 24 Hrs  T(C): 36.8 (04 Feb 2021 07:23), Max: 36.8 (04 Feb 2021 07:23)  T(F): 98.3 (04 Feb 2021 07:23), Max: 98.3 (04 Feb 2021 07:23)  HR: 75 (04 Feb 2021 07:23) (74 - 85)  BP: 140/80 (04 Feb 2021 07:23) (126/82 - 144/68)  BP(mean): --  RR: 16 (04 Feb 2021 07:23) (16 - 17)  SpO2: 100% (04 Feb 2021 07:23) (98% - 100%)    PHYSICAL EXAM:    GENERAL: NAD, well-groomed, thin male  HEAD:  Atraumatic, Normocephalic  EYES: EOMI, PERRLA, conjunctiva and sclera clear  ENMT: No tonsillar erythema, exudates, or enlargement; Moist mucous membranes, No lesions  NECK: Supple, No JVD, Normal thyroid  NERVOUS SYSTEM:  Alert & Oriented X3, Motor Strength 5/5 B/L upper and lower extremities;   CHEST/LUNG: Clear to percussion bilaterally; No rales, rhonchi, wheezing, or rubs  HEART: Regular rate and rhythm; No rubs, or gallops, +S1,S2  ABDOMEN: Soft, Nontender, Nondistended; Bowel sounds present, +tenderness back  EXTREMITIES:  2+ Peripheral Pulses, No clubbing, cyanosis, or edema, +tenderness all over le  LYMPH: No cervical adenopathy  RECTAL: deferred  BREAST: No palpatble masses, skin no lesions   : deferred  SKIN: No rashes or lesions    IMPROVE VTE Individual Risk Assessment        RISK                                                          Points  [  ] Previous VTE                                                3  [  ] Thrombophilia                                             2  [  ] Lower limb paralysis                                    2        (unable to hold up >15 seconds)    [  ] Current Cancer                                             2         (within 6 months)  [ x ] Immobilization > 24 hrs                              1  [  ] ICU/CCU stay > 24 hours                            1  [ x ] Age > 60                                                    1  IMPROVE VTE Score __2_______

## 2021-02-04 NOTE — ED PROVIDER NOTE - PHYSICAL EXAMINATION
VITAL SIGNS: I have reviewed nursing notes and confirm.   GEN: Well-developed; well-nourished; in no acute distress. Speaking full sentences.  SKIN: Warm, pink, no rash, no diaphoresis, no cyanosis, well perfused.   HEAD: Normocephalic; atraumatic. No scalp lacerations, no abrasions.  NECK: Supple; non tender.   EYES: Pupils 3mm equal, round, reactive to light and accomodation, conjunctiva and sclera clear. Extra-ocular movements intact bilaterally.  ENT: No nasal discharge; airway clear. Trachea is midline. ORAL: No oropharyngeal exudates or erythema. Normal dentition.  CV: Regular rate and rhythm. S1, S2 normal; no murmurs, gallops, or rubs. No lower extremity pitting edema bilaterally. Capillary refill < 2 seconds throughout. Distal pulses intact 2+ throughout. (+) chest wall ttp mild  RESP: CTA bilaterally. No wheezes, rales, or rhonchi.   ABD: Normal bowel sounds, soft, non-distended, (+) ttp mild epigastric region but no rebound/no guarding/no rigidity., no hepatosplenomegaly. No CVA tenderness bilaterally.  MSK: Normal range of motion and movement of all 4 extremities. (+) diffuse TTP mild-moderate throughout, No clubbing.   BACK: No thoracolumbar midline or paravertebral tenderness. No step-offs or obvious deformities.  NEURO: Alert & oriented x 3, Grossly unremarkable. Sensory and motor intact throughout. No focal deficits. Gait: Fluid. Normal speech and coordination.   PSYCH: Cooperative, appropriate.

## 2021-02-04 NOTE — H&P ADULT - ASSESSMENT
65M HbSS disease with multiple admissions in past including for acute chest baseline HgB about 6.5, chronic CHF, Mild intellectual disability, BPH presented to [facility] with Anemia and pain due to Sickle Cell disease.

## 2021-02-04 NOTE — CHART NOTE - NSCHARTNOTEFT_GEN_A_CORE
H&P dated today reviewed in full. H&P dated today reviewed in full. Stable for transfer to [place]. Waiting for PRBC to be started. HGB lower than baseline at 5.5. Discussed with accepting physician at [place]. Follow HGB in AM after PRBC. H&P dated today reviewed in full. Stable for transfer to [place]. Waiting for one unit of PRBC to be started. HGB lower than baseline at 5.5. Discussed with accepting physician at [place]. Follow HGB in AM after PRBC.

## 2021-02-04 NOTE — ED PROVIDER NOTE - CLINICAL SUMMARY MEDICAL DECISION MAKING FREE TEXT BOX
Presentation consistent with typical sickle cell crisis. Given History and Exam at this time, I have low suspicion for Acute Chest Syndrome, Bacteremia, Pneumonia, CVA, Aplastic Crisis, Osteomyelitis, Renal Papillary Necrosis.  - CBC, CMP, LDH, Retic count, T/S, EKG, CXR.  - 1/2 NS IVF hydration, dilaudid prn, toradol prn, diphenhydramine prn.  - Re-evaluation for disposition. Presentation consistent with typical sickle cell crisis. Given History and Exam at this time, I have low suspicion for Acute Chest Syndrome, Bacteremia, Pneumonia, CVA, Aplastic Crisis, Osteomyelitis, Renal Papillary Necrosis.  - CBC, CMP, LDH, Retic count, T/S, EKG, CXR.  - 1/2 NS IVF hydration, dilaudid prn, toradol prn, diphenhydramine prn.  - Re-evaluation for disposition.    - Hb 5.5, SCC w/ chest pain, back pain. CXR neg. will transfuse 2 units prbcs   - admit

## 2021-02-04 NOTE — ED PROVIDER NOTE - PROGRESS NOTE DETAILS
s/p dilaudid x 3 rounds with some relief, but still requiring pain medications. Hb 5.5, however no lightheadedness, not tachycardic, not tachypneic, appears comfortable. Will defer prbc transfusion to inpatient team/heme as patient w/ hx SCD with high risk of serious transfusions. ordered for 2 u prbcs consent in chart. admitted.

## 2021-02-04 NOTE — ED PROVIDER NOTE - EYES NEGATIVE STATEMENT, MLM
no discharge, no irritation, no pain, no redness, and no visual changes.
decreased sandoval/increased time in double stance/decreased stride length/decreased weight-shifting ability/decreased step length

## 2021-02-04 NOTE — H&P ADULT - HISTORY OF PRESENT ILLNESS
65M HbSS disease with multiple admissions in past including for acute chest baseline HgB about 6.5, chronic CHF, Mild intellectual disability, BPH presented to [facility] with generalized body aches worst in abdomen and back.  He was unable to control with Oxycodone at home so came to ED.  His symptoms improved a little with IVF and Dilaudid.  Also found to have EMEKA and Acute on Chronic anemia.    Patient transferred to [place] for bed availability.   He is also complaining of throat discomfort and mild nausea.   Denies Fever, Chills, or other symptoms.

## 2021-02-04 NOTE — H&P ADULT - NSHPLABSRESULTS_GEN_ALL_CORE
LABS:                        5.5    13.08 )-----------( 364      ( 04 Feb 2021 04:28 )             16.9     02-03    137  |  107  |  24<H>  ----------------------------<  90  4.9   |  22  |  1.57<H>    Ca    9.1      03 Feb 2021 22:33    TPro  8.3  /  Alb  3.6  /  TBili  1.7<H>  /  DBili  x   /  AST  25  /  ALT  22  /  AlkPhos  105  02-03    PT/INR - ( 03 Feb 2021 22:33 )   PT: 13.7 sec;   INR: 1.19 ratio         PTT - ( 03 Feb 2021 22:33 )  PTT:28.7 sec    CAPILLARY BLOOD GLUCOSE            RADIOLOGY & ADDITIONAL TESTS:    Imaging Personally Reviewed:  [ X] YES  [ ] NO

## 2021-02-04 NOTE — H&P ADULT - NSHPLABSRESULTS_GEN_ALL_CORE
Labs:                        5.5    13.08 )-----------( 364      ( 04 Feb 2021 04:28 )             16.9     02-03    137  |  107  |  24<H>  ----------------------------<  90  4.9   |  22  |  1.57<H>    Ca    9.1      03 Feb 2021 22:33    TPro  8.3  /  Alb  3.6  /  TBili  1.7<H>  /  DBili  x   /  AST  25  /  ALT  22  /  AlkPhos  105  02-03          PT/INR - ( 03 Feb 2021 22:33 )   PT: 13.7 sec;   INR: 1.19 ratio    PTT - ( 03 Feb 2021 22:33 )  PTT:28.7 sec    Lactate Trend    CAPILLARY BLOOD GLUCOSE          EKG:  from Nov 2020  NSR with [facility]  Personally Reviewed:  [ ] YES     Imaging:  CXR: Clear lungs  Personally Reviewed:  [x ] YES

## 2021-02-04 NOTE — H&P ADULT - NSHPPHYSICALEXAM_GEN_ALL_CORE
PHYSICAL EXAM:  Vital Signs Last 24 Hrs  T(C): 37.3 (04 Feb 2021 17:14), Max: 37.3 (04 Feb 2021 17:14)  T(F): 99.1 (04 Feb 2021 17:14), Max: 99.1 (04 Feb 2021 17:14)  HR: 98 (04 Feb 2021 17:14) (72 - 98)  BP: 159/77 (04 Feb 2021 17:14) (123/52 - 169/81)  BP(mean): 94 (04 Feb 2021 15:51) (77 - 111)  RR: 16 (04 Feb 2021 17:14) (16 - 18)  SpO2: 98% (04 Feb 2021 17:14) (98% - 100%)    GENERAL: NAD, well-groomed, well-developed  HEAD:  Atraumatic, Normocephalic  EYES:  conjunctiva and sclera clear  ENMT: No tonsillar erythema, exudates, or enlargement; Moist mucous membranes, poor dentition with missing teeth  NECK: Supple, No JVD  NERVOUS SYSTEM:  Alert & Oriented X3, Good concentration; Motor Strength 5/5 B/L upper and lower extremities;  CHEST/LUNG: Clear to auscultation bilaterally; No rales, rhonchi, wheezing, or rubs  HEART: Regular rate and rhythm; No murmurs, rubs, or gallops  ABDOMEN: Soft, Nontender, Nondistended; Bowel sounds present  EXTREMITIES:  2+ Peripheral Pulses, No clubbing, cyanosis, or edema  LYMPH: No lymphadenopathy noted  SKIN: No rashes or lesions

## 2021-02-04 NOTE — H&P ADULT - ASSESSMENT
64 y/o male w/ HbSS disease multiple admissions in past including for acute chest has baseline 6gb 6-6.5, chronic CHF, intllectual developmental delay, and BPH w/sickle cell crisis

## 2021-02-05 LAB
ANION GAP SERPL CALC-SCNC: 8 MMOL/L — SIGNIFICANT CHANGE UP (ref 5–17)
BUN SERPL-MCNC: 16 MG/DL — SIGNIFICANT CHANGE UP (ref 7–23)
CALCIUM SERPL-MCNC: 8.8 MG/DL — SIGNIFICANT CHANGE UP (ref 8.4–10.5)
CHLORIDE SERPL-SCNC: 109 MMOL/L — HIGH (ref 96–108)
CO2 SERPL-SCNC: 23 MMOL/L — SIGNIFICANT CHANGE UP (ref 22–31)
CREAT SERPL-MCNC: 1 MG/DL — SIGNIFICANT CHANGE UP (ref 0.5–1.3)
DAT IGG-SP REAG RBC-IMP: ABNORMAL
DIR ANTIGLOB POLYSPECIFIC INTERPRETATION: ABNORMAL
ERYTHROCYTE [SEDIMENTATION RATE] IN BLOOD: 113 MM/HR — HIGH (ref 0–9)
GLUCOSE SERPL-MCNC: 90 MG/DL — SIGNIFICANT CHANGE UP (ref 70–99)
HCT VFR BLD CALC: 17.2 % — CRITICAL LOW (ref 39–50)
HGB BLD-MCNC: 5.5 G/DL — CRITICAL LOW (ref 13–17)
IAT COMP-SP REAG SERPL QL: SIGNIFICANT CHANGE UP
MCHC RBC-ENTMCNC: 21.7 PG — LOW (ref 27–34)
MCHC RBC-ENTMCNC: 32 GM/DL — SIGNIFICANT CHANGE UP (ref 32–36)
MCV RBC AUTO: 68 FL — LOW (ref 80–100)
NRBC # BLD: 0 /100 WBCS — SIGNIFICANT CHANGE UP (ref 0–0)
PLATELET # BLD AUTO: 353 K/UL — SIGNIFICANT CHANGE UP (ref 150–400)
POTASSIUM SERPL-MCNC: 5.3 MMOL/L — SIGNIFICANT CHANGE UP (ref 3.5–5.3)
POTASSIUM SERPL-SCNC: 5.3 MMOL/L — SIGNIFICANT CHANGE UP (ref 3.5–5.3)
RBC # BLD: 2.53 M/UL — LOW (ref 4.2–5.8)
RBC # FLD: 21.2 % — HIGH (ref 10.3–14.5)
SODIUM SERPL-SCNC: 140 MMOL/L — SIGNIFICANT CHANGE UP (ref 135–145)
WBC # BLD: 20.98 K/UL — HIGH (ref 3.8–10.5)
WBC # FLD AUTO: 20.98 K/UL — HIGH (ref 3.8–10.5)

## 2021-02-05 PROCEDURE — 99232 SBSQ HOSP IP/OBS MODERATE 35: CPT

## 2021-02-05 RX ORDER — SODIUM CHLORIDE 9 MG/ML
2000 INJECTION, SOLUTION INTRAVENOUS
Refills: 0 | Status: DISCONTINUED | OUTPATIENT
Start: 2021-02-05 | End: 2021-02-13

## 2021-02-05 RX ORDER — SODIUM CHLORIDE 9 MG/ML
1000 INJECTION INTRAMUSCULAR; INTRAVENOUS; SUBCUTANEOUS
Refills: 0 | Status: DISCONTINUED | OUTPATIENT
Start: 2021-02-05 | End: 2021-02-05

## 2021-02-05 RX ADMIN — SODIUM CHLORIDE 75 MILLILITER(S): 9 INJECTION, SOLUTION INTRAVENOUS at 13:22

## 2021-02-05 RX ADMIN — HYDROMORPHONE HYDROCHLORIDE 1 MILLIGRAM(S): 2 INJECTION INTRAMUSCULAR; INTRAVENOUS; SUBCUTANEOUS at 18:18

## 2021-02-05 RX ADMIN — HYDROMORPHONE HYDROCHLORIDE 1 MILLIGRAM(S): 2 INJECTION INTRAMUSCULAR; INTRAVENOUS; SUBCUTANEOUS at 00:29

## 2021-02-05 RX ADMIN — ENOXAPARIN SODIUM 40 MILLIGRAM(S): 100 INJECTION SUBCUTANEOUS at 11:42

## 2021-02-05 RX ADMIN — HYDROMORPHONE HYDROCHLORIDE 1 MILLIGRAM(S): 2 INJECTION INTRAMUSCULAR; INTRAVENOUS; SUBCUTANEOUS at 22:02

## 2021-02-05 RX ADMIN — Medication 1 MILLIGRAM(S): at 11:43

## 2021-02-05 RX ADMIN — HYDROMORPHONE HYDROCHLORIDE 1 MILLIGRAM(S): 2 INJECTION INTRAMUSCULAR; INTRAVENOUS; SUBCUTANEOUS at 14:53

## 2021-02-05 RX ADMIN — FINASTERIDE 5 MILLIGRAM(S): 5 TABLET, FILM COATED ORAL at 11:42

## 2021-02-05 RX ADMIN — HYDROMORPHONE HYDROCHLORIDE 1 MILLIGRAM(S): 2 INJECTION INTRAMUSCULAR; INTRAVENOUS; SUBCUTANEOUS at 09:05

## 2021-02-05 RX ADMIN — HYDROMORPHONE HYDROCHLORIDE 1 MILLIGRAM(S): 2 INJECTION INTRAMUSCULAR; INTRAVENOUS; SUBCUTANEOUS at 05:41

## 2021-02-05 NOTE — CONSULT NOTE ADULT - ASSESSMENT
[ASSESSMENT and  PLAN]  Sickle cell disease, baseline Hgb 6-7 on prior labs.   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     RECOMMENDATIONS  Transfuse PRBC as clinically indicated.   Transfuse PRBC if Hgb <6.5 or if symptomatic.   Follow CBC    Await typing/ [facility] from [facility] due to multiple antibodies.     Change IVF to 1/2NS at 75cc / hr.   DC NS.   Incentive spirometry    Check Anemia studies.   Check Ferritin, ESR, CRP  Check B12, Folate, RBC folate  Check LDH, ALLISON    DVT Prophylaxis    Check FOBT    Thank you for consulting us.     I have discussed the above plan of care with patient  in detail. They expressed understanding of the treatment plan . Risks, benefits and alternatives discussed in detail. I have asked if they have any questions or concerns and appropriately addressed them; all questions answered to their satisfactions and in lay terms.

## 2021-02-05 NOTE — PROGRESS NOTE ADULT - SUBJECTIVE AND OBJECTIVE BOX
Patient is a 65y old  Male who presents with a chief complaint of sickle cell crisis    INTERVAL HPI/OVERNIGHT EVENTS:  pain controlled, slight improvement.  feels weak but otherwise ok.     MEDICATIONS  (STANDING):  enoxaparin Injectable 40 milliGRAM(s) SubCutaneous daily  finasteride 5 milliGRAM(s) Oral daily  folic acid 1 milliGRAM(s) Oral daily  lisinopril 10 milliGRAM(s) Oral daily  metoprolol tartrate 12.5 milliGRAM(s) Oral two times a day  sodium chloride 0.9% with potassium chloride 20 mEq/L 1000 milliLiter(s) (50 mL/Hr) IV Continuous <Continuous>    MEDICATIONS  (PRN):  benzocaine 15 mG/menthol 3.6 mG (Sugar-Free) Lozenge 1 Lozenge Oral every 3 hours PRN Sore Throat  diphenhydrAMINE   Injectable 25 milliGRAM(s) IV Push every 4 hours PRN Rash and/or Itching  HYDROmorphone  Injectable 1 milliGRAM(s) IV Push every 2 hours PRN Severe Pain (7 - 10)      Allergies  No Known Drug Allergies  peanuts (Anaphylaxis)  peanuts (Angioedema)  pork (Unknown)  shellfish (Unknown)    Intolerances  lactose (Unknown)      REVIEW OF SYSTEMS:  see hpi    Vital Signs Last 24 Hrs  T(C): 36.8 (05 Feb 2021 09:00), Max: 37.9 (04 Feb 2021 21:49)  T(F): 98.2 (05 Feb 2021 09:00), Max: 100.2 (04 Feb 2021 21:49)  HR: 97 (05 Feb 2021 09:00) (73 - 98)  BP: 124/68 (05 Feb 2021 09:00) (114/62 - 159/77)  BP(mean): 94 (04 Feb 2021 15:51) (94 - 94)  RR: 22 (05 Feb 2021 09:00) (16 - 22)  SpO2: 97% (05 Feb 2021 09:00) (95% - 100%)    PHYSICAL EXAM:  GENERAL: NAD, well-groomed, well-developed  HEAD:  Atraumatic, Normocephalic  EYES:  conjunctiva and sclera clear  ENMT: Moist mucous membranes  NECK: Supple, No JVD  NERVOUS SYSTEM:  Alert & Oriented X3, Good concentration; All 4 extremities mobile, no gross sensory deficits.   CHEST/LUNG: Clear to auscultation bilaterally; No rales, rhonchi, wheezing, or rubs  HEART: Regular rate and rhythm; No murmurs, rubs, or gallops  ABDOMEN: Soft, Nontender, Nondistended; Bowel sounds present  EXTREMITIES:  2+ Peripheral Pulses, No clubbing, cyanosis, or edema      LABS:                        5.5    20.98 )-----------( 353      ( 05 Feb 2021 00:18 )             17.2     05 Feb 2021 00:18    140    |  109    |  16     ----------------------------<  90     5.3     |  23     |  1.00     Ca    8.8        05 Feb 2021 00:18      PT/INR - ( 03 Feb 2021 22:33 )   PT: 13.7 sec;   INR: 1.19 ratio    PTT - ( 03 Feb 2021 22:33 )  PTT:28.7 sec    CAPILLARY BLOOD GLUCOSE          RADIOLOGY & ADDITIONAL TESTS:    Imaging Personally Reviewed:  [ ] YES     Consultant(s) Notes Reviewed:      Care Discussed with Consultants/Other Providers:    Advanced Directives: [ ] DNR  [ ] No feeding tube  [ ] MOLST in chart  [ ] MOLST completed today  [ ] Unknown

## 2021-02-05 NOTE — PROGRESS NOTE ADULT - PROBLEM SELECTOR PLAN 1
IV pain medication, gentle hydration, oxygen.   Anemia - PRBC ordered - unable to find match so far due to antibodies.  Discussed case with Dr Wu - will see pt.  EMEKA improved with IVF hydration.

## 2021-02-06 LAB
BILIRUB DIRECT SERPL-MCNC: 0.3 MG/DL — HIGH (ref 0–0.2)
BILIRUB DIRECT SERPL-MCNC: 0.3 MG/DL — HIGH (ref 0–0.2)
BILIRUB INDIRECT FLD-MCNC: 0.7 MG/DL — SIGNIFICANT CHANGE UP (ref 0.2–1.2)
BILIRUB INDIRECT FLD-MCNC: 0.8 MG/DL — SIGNIFICANT CHANGE UP (ref 0.2–1.2)
BILIRUB SERPL-MCNC: 1 MG/DL — SIGNIFICANT CHANGE UP (ref 0.2–1.2)
BILIRUB SERPL-MCNC: 1.1 MG/DL — SIGNIFICANT CHANGE UP (ref 0.2–1.2)
CRP SERPL-MCNC: 7.34 MG/DL — HIGH (ref 0–0.4)
FERRITIN SERPL-MCNC: 36 NG/ML — SIGNIFICANT CHANGE UP (ref 30–400)
FOLATE RBC-MCNC: 3027 NG/ML — HIGH (ref 499–1504)
FOLATE SERPL-MCNC: >20 NG/ML — SIGNIFICANT CHANGE UP
HCT VFR BLD CALC: 14.7 % — CRITICAL LOW (ref 39–50)
IRON SATN MFR SERPL: 26 UG/DL — LOW (ref 45–165)
IRON SATN MFR SERPL: 9 % — LOW (ref 16–55)
LDH SERPL L TO P-CCNC: 191 U/L — SIGNIFICANT CHANGE UP (ref 50–242)
SARS-COV-2 IGG SERPL QL IA: NEGATIVE — SIGNIFICANT CHANGE UP
SARS-COV-2 IGM SERPL IA-ACNC: 0.15 INDEX — SIGNIFICANT CHANGE UP
TIBC SERPL-MCNC: 286 UG/DL — SIGNIFICANT CHANGE UP (ref 220–430)
UIBC SERPL-MCNC: 260 UG/DL — SIGNIFICANT CHANGE UP (ref 110–370)
VIT B12 SERPL-MCNC: 693 PG/ML — SIGNIFICANT CHANGE UP (ref 232–1245)

## 2021-02-06 PROCEDURE — 99233 SBSQ HOSP IP/OBS HIGH 50: CPT

## 2021-02-06 RX ORDER — SENNA PLUS 8.6 MG/1
2 TABLET ORAL ONCE
Refills: 0 | Status: COMPLETED | OUTPATIENT
Start: 2021-02-06 | End: 2021-02-06

## 2021-02-06 RX ORDER — DIPHENHYDRAMINE HCL 50 MG
25 CAPSULE ORAL ONCE
Refills: 0 | Status: COMPLETED | OUTPATIENT
Start: 2021-02-06 | End: 2021-02-06

## 2021-02-06 RX ADMIN — FINASTERIDE 5 MILLIGRAM(S): 5 TABLET, FILM COATED ORAL at 11:25

## 2021-02-06 RX ADMIN — HYDROMORPHONE HYDROCHLORIDE 1 MILLIGRAM(S): 2 INJECTION INTRAMUSCULAR; INTRAVENOUS; SUBCUTANEOUS at 21:02

## 2021-02-06 RX ADMIN — Medication 12.5 MILLIGRAM(S): at 20:33

## 2021-02-06 RX ADMIN — HYDROMORPHONE HYDROCHLORIDE 1 MILLIGRAM(S): 2 INJECTION INTRAMUSCULAR; INTRAVENOUS; SUBCUTANEOUS at 13:08

## 2021-02-06 RX ADMIN — Medication 12.5 MILLIGRAM(S): at 07:47

## 2021-02-06 RX ADMIN — Medication 1 MILLIGRAM(S): at 11:25

## 2021-02-06 RX ADMIN — SENNA PLUS 2 TABLET(S): 8.6 TABLET ORAL at 21:51

## 2021-02-06 RX ADMIN — Medication 25 MILLIGRAM(S): at 08:25

## 2021-02-06 RX ADMIN — SODIUM CHLORIDE 75 MILLILITER(S): 9 INJECTION, SOLUTION INTRAVENOUS at 19:19

## 2021-02-06 RX ADMIN — HYDROMORPHONE HYDROCHLORIDE 1 MILLIGRAM(S): 2 INJECTION INTRAMUSCULAR; INTRAVENOUS; SUBCUTANEOUS at 16:26

## 2021-02-06 RX ADMIN — HYDROMORPHONE HYDROCHLORIDE 1 MILLIGRAM(S): 2 INJECTION INTRAMUSCULAR; INTRAVENOUS; SUBCUTANEOUS at 00:46

## 2021-02-06 RX ADMIN — ENOXAPARIN SODIUM 40 MILLIGRAM(S): 100 INJECTION SUBCUTANEOUS at 11:25

## 2021-02-06 NOTE — PROGRESS NOTE ADULT - PROBLEM SELECTOR PLAN 1
IV pain medication, gentle hydration, oxygen.   Anemia - PRBC ordered - unable to find match so far due to antibodies.  receiving 1 unit right now  will try to dc patient afterwards.   EMEKA improved with IVF hydration.

## 2021-02-06 NOTE — PROGRESS NOTE ADULT - SUBJECTIVE AND OBJECTIVE BOX
Patient is a 65y old  Male who presents with a chief complaint of pain (05 Feb 2021 11:47)      INTERVAL HPI/OVERNIGHT EVENTS:  overnight they were unable to get IV line in.  This morning they were able to get one in.  Midline may still be needed if IV line gives out again.   pt says he's feeling fine.  Spoke with Dr. Torres, advised that he should be discharged after one unit because his Hb levels are usually 6 as outpatient.      MEDICATIONS  (STANDING):  enoxaparin Injectable 40 milliGRAM(s) SubCutaneous daily  finasteride 5 milliGRAM(s) Oral daily  folic acid 1 milliGRAM(s) Oral daily  lisinopril 10 milliGRAM(s) Oral daily  metoprolol tartrate 12.5 milliGRAM(s) Oral two times a day  sodium chloride 0.45%. 2000 milliLiter(s) (75 mL/Hr) IV Continuous <Continuous>    MEDICATIONS  (PRN):  benzocaine 15 mG/menthol 3.6 mG (Sugar-Free) Lozenge 1 Lozenge Oral every 3 hours PRN Sore Throat  diphenhydrAMINE   Injectable 25 milliGRAM(s) IV Push every 4 hours PRN Rash and/or Itching  HYDROmorphone  Injectable 1 milliGRAM(s) IV Push every 2 hours PRN Severe Pain (7 - 10)      Allergies    No Known Drug Allergies  peanuts (Anaphylaxis)  peanuts (Angioedema)  pork (Unknown)  shellfish (Unknown)    Intolerances    lactose (Unknown)      REVIEW OF SYSTEMS:  CONSTITUTIONAL: No fever, weight loss, or fatigue  EYES: No eye pain, visual disturbances, or discharge  ENMT:  No difficulty hearing, tinnitus, vertigo; No sinus or throat pain  NECK: No pain or stiffness  BREASTS: No pain, masses, or nipple discharge  RESPIRATORY: No cough, wheezing, chills or hemoptysis; No shortness of breath  CARDIOVASCULAR: No chest pain, palpitations, lightheadedness, or leg swelling  GASTROINTESTINAL: No abdominal or epigastric pain. No nausea, vomiting, or hematemesis; No diarrhea or constipation. No melena or hematochezia.  GENITOURINARY: No dysuria, frequency, hematuria, or incontinence  NEUROLOGICAL: No headaches, memory loss, vertigo, loss of strength, numbness, or tremors  MUSCULOSKELETAL: pain all over      Vital Signs Last 24 Hrs  T(C): 36.3 (06 Feb 2021 08:40), Max: 37.4 (05 Feb 2021 17:33)  T(F): 97.3 (06 Feb 2021 08:40), Max: 99.4 (05 Feb 2021 17:33)  HR: 97 (06 Feb 2021 08:40) (82 - 100)  BP: 122/81 (06 Feb 2021 08:40) (112/75 - 122/81)  BP(mean): --  RR: 18 (06 Feb 2021 08:40) (18 - 20)  SpO2: 97% (06 Feb 2021 08:40) (94% - 99%)    PHYSICAL EXAM:  GENERAL: NAD, well-groomed, well-developed  HEAD:  Atraumatic, Normocephalic  EYES:  conjunctiva and sclera clear  ENMT: Moist mucous membranes  NECK: Supple, No JVD  NERVOUS SYSTEM:  Alert & Oriented X3, Good concentration; All 4 extremities mobile, no gross sensory deficits.   CHEST/LUNG: Clear to auscultation bilaterally; No rales, rhonchi, wheezing, or rubs  HEART: Regular rate and rhythm; No murmurs, rubs, or gallops  ABDOMEN: Soft, Nontender, Nondistended; Bowel sounds present  EXTREMITIES:  2+ Peripheral Pulses, No clubbing, cyanosis, or edema    LABS:                        x      x     )-----------( x        ( 06 Feb 2021 03:44 )             14.7       Ca    8.8        05 Feb 2021 00:18    TPro  x      /  Alb  x      /  TBili  1.0    /  DBili  0.3    /  AST  x      /  ALT  x      /  AlkPhos  x      06 Feb 2021 04:45        CAPILLARY BLOOD GLUCOSE          RADIOLOGY & ADDITIONAL TESTS:    Imaging Personally Reviewed:  [ ] YES     Consultant(s) Notes Reviewed:      Care Discussed with Consultants/Other Providers:    Advanced Directives: [ ] DNR  [ ] No feeding tube  [ ] MOLST in chart  [ ] MOLST completed today  [ ] Unknown

## 2021-02-06 NOTE — PROGRESS NOTE ADULT - SUBJECTIVE AND OBJECTIVE BOX
All interim records and events noted.    up in bed having breakfast, PRBC tx in progress  reports pain under good control w pain med  no SOB  comfortable    MEDICATIONS  (STANDING):  enoxaparin Injectable 40 milliGRAM(s) SubCutaneous daily  finasteride 5 milliGRAM(s) Oral daily  folic acid 1 milliGRAM(s) Oral daily  lisinopril 10 milliGRAM(s) Oral daily  metoprolol tartrate 12.5 milliGRAM(s) Oral two times a day  sodium chloride 0.45%. 2000 milliLiter(s) (75 mL/Hr) IV Continuous <Continuous>    MEDICATIONS  (PRN):  benzocaine 15 mG/menthol 3.6 mG (Sugar-Free) Lozenge 1 Lozenge Oral every 3 hours PRN Sore Throat  diphenhydrAMINE   Injectable 25 milliGRAM(s) IV Push every 4 hours PRN Rash and/or Itching  diphenhydrAMINE   Injectable 25 milliGRAM(s) IntraMuscular once PRN premedication  HYDROmorphone  Injectable 1 milliGRAM(s) IV Push every 2 hours PRN Severe Pain (7 - 10)      Vital Signs Last 24 Hrs  T(C): 36.8 (06 Feb 2021 07:39), Max: 37.4 (05 Feb 2021 17:33)  T(F): 98.3 (06 Feb 2021 07:39), Max: 99.4 (05 Feb 2021 17:33)  HR: 100 (06 Feb 2021 07:39) (82 - 100)  BP: 112/75 (06 Feb 2021 07:39) (112/75 - 124/68)  BP(mean): --  RR: 18 (06 Feb 2021 07:39) (18 - 22)  SpO2: 94% (06 Feb 2021 07:39) (94% - 99%)    PHYSICAL EXAM  General: thin man in no acute distress  Head: atraumatic, normocephalic  ENT: sclera anicteric, buccal mucosa moist  Neck: supple, trachea midline  CV: S1 S2, regular rate and rhythm  Lungs: clear to auscultation, no wheezes/rhonchi  Abdomen: soft, nontender, bowel sounds present, no palpable masses  Extrem: no clubbing/cyanosis/edema  Skin: no significant increased ecchymosis/petechiae  Neuro: alert and responsive,  no focal deficits      LABS:             x      x     )-----------( x        ( 02-06 @ 03:44 )             14.7                5.5    20.98 )-----------( 353      ( 02-05 @ 00:18 )             17.2                5.5    13.08 )-----------( 364      ( 02-04 @ 04:28 )             16.9       02-05    140  |  109<H>  |  16  ----------------------------<  90  5.3   |  23  |  1.00    Ca    8.8      05 Feb 2021 00:18    TPro  x   /  Alb  x   /  TBili  1.0  /  DBili  0.3<H>  /  AST  x   /  ALT  x   /  AlkPhos  x   02-06 02-03 @ 22:33  PT13.7 INR1.19  PTT28.7    Lactate Dehydrogenase, Serum: 191 U/L (02.06.21 @ 04:45) Bilirubin - Total and Direct (02.06.21 @ 04:45)   Indirect Reacting Bilirubin: 0.7 mg/dL   Bilirubin Direct, Serum: 0.3 mg/dL   Bilirubin Total, Serum: 1.0 mg/dL Ferritin, Serum: 36 ng/mL (02.06.21 @ 04:45) Folate, Serum: >20.0 ng/mL (02.06.21 @ 04:45) Vitamin B12, Serum: 693 pg/mL Dir Antiglob Complement Interpretation: NEG (02.05.21 @ 15:16) Iron with Total Binding Capacity (02.06.21 @ 04:45)   Iron - Total Binding Capacity.: 286 ug/dL   % Saturation, Iron: 9 %   Iron Total, Serum: 26 ug/dL   Unsaturated Iron Binding Capacity: 260 ug/dL   RADIOLOGY & ADDITIONAL STUDIES:    IMPRESSION/RECOMMENDATIONS:

## 2021-02-06 NOTE — DIETITIAN NUTRITION RISK NOTIFICATION - TREATMENT: THE FOLLOWING DIET HAS BEEN RECOMMENDED
Diet, Regular:   Supplement Feeding Modality:  Oral  Ensure Enlive Cans or Servings Per Day:  1       Frequency:  Three Times a day (02-05-21 @ 11:28) [Active]        Pt is a 65 year old male admitted for sickle cell anemia crisis. Patient denies any nausea/vomiting/diarrhea/constipation or difficulty chewing and swallowing. Avoids Peanut, Pork, Shellfish and is lactose intolerant. Pt tolerating meals but only consuming around 25%. Pt does accept ensure enlive. As per RD note from February 2020, pt weight 106#. Current weight 110#. No edema noted. As per Nutrition Focused Physical Exam, pt found to have moderate/severe temporal, orbital and buccal wasting along with severe shoulder, chest and triceps wasting. Given that pt has only consumed around 25% meals x 3 days, it is reasonable to assess that pt has not been able to meet 50% of estimated needs x at least 5 days. Given the aforementioned, pt meets criteria for severe malnutrition in the context of acute illness. Continue ensure enlive TID. Also advised pt to consume nutrient dense foods.

## 2021-02-06 NOTE — DIETITIAN INITIAL EVALUATION ADULT. - OTHER INFO
Pt is a 65 year old male admitted for sickle cell anemia crisis. Patient denies any nausea/vomiting/diarrhea/constipation or difficulty chewing and swallowing. Avoids Peanut, Pork, Shellfish and is lactose intolerant. Pt tolerating meals but only consuming around 25%. Pt does accept ensure enlive. As per RD note from February 2020, pt weight 106#. Current weight 110#. No edema noted. As per Nutrition Focused Physical Exam, pt found to have severe temporal, orbital and buccal wasting along with severe shoulder, chest and triceps wasting. Given that pt has only consumed around 25% meals x 3 days, it is reasonable to assess that pt has not been able to meet 50% of estimated needs x at least 5 days. Given the aforementioned, pt meets criteria for severe malnutrition in the context of acute illness. Continue ensure enlive TID. Also advised pt to consume nutrient dense foods.

## 2021-02-07 LAB
ALBUMIN SERPL ELPH-MCNC: 2.6 G/DL — LOW (ref 3.3–5)
ALP SERPL-CCNC: 165 U/L — HIGH (ref 30–120)
ALT FLD-CCNC: 26 U/L DA — SIGNIFICANT CHANGE UP (ref 10–60)
ANION GAP SERPL CALC-SCNC: 11 MMOL/L — SIGNIFICANT CHANGE UP (ref 5–17)
AST SERPL-CCNC: 35 U/L — SIGNIFICANT CHANGE UP (ref 10–40)
BILIRUB SERPL-MCNC: 1.4 MG/DL — HIGH (ref 0.2–1.2)
BUN SERPL-MCNC: 44 MG/DL — HIGH (ref 7–23)
CALCIUM SERPL-MCNC: 9.3 MG/DL — SIGNIFICANT CHANGE UP (ref 8.4–10.5)
CHLORIDE SERPL-SCNC: 106 MMOL/L — SIGNIFICANT CHANGE UP (ref 96–108)
CO2 SERPL-SCNC: 21 MMOL/L — LOW (ref 22–31)
CREAT SERPL-MCNC: 1.28 MG/DL — SIGNIFICANT CHANGE UP (ref 0.5–1.3)
GLUCOSE SERPL-MCNC: 94 MG/DL — SIGNIFICANT CHANGE UP (ref 70–99)
HCT VFR BLD CALC: 19.2 % — CRITICAL LOW (ref 39–50)
HGB BLD-MCNC: 6.2 G/DL — CRITICAL LOW (ref 13–17)
MCHC RBC-ENTMCNC: 23.1 PG — LOW (ref 27–34)
MCHC RBC-ENTMCNC: 32.3 GM/DL — SIGNIFICANT CHANGE UP (ref 32–36)
MCV RBC AUTO: 71.6 FL — LOW (ref 80–100)
NRBC # BLD: 2 /100 WBCS — HIGH (ref 0–0)
PLATELET # BLD AUTO: 286 K/UL — SIGNIFICANT CHANGE UP (ref 150–400)
POTASSIUM SERPL-MCNC: 5.2 MMOL/L — SIGNIFICANT CHANGE UP (ref 3.5–5.3)
POTASSIUM SERPL-SCNC: 5.2 MMOL/L — SIGNIFICANT CHANGE UP (ref 3.5–5.3)
PROT SERPL-MCNC: 7 G/DL — SIGNIFICANT CHANGE UP (ref 6–8.3)
RBC # BLD: 2.68 M/UL — LOW (ref 4.2–5.8)
RBC # FLD: 22.2 % — HIGH (ref 10.3–14.5)
SODIUM SERPL-SCNC: 138 MMOL/L — SIGNIFICANT CHANGE UP (ref 135–145)
WBC # BLD: 13.85 K/UL — HIGH (ref 3.8–10.5)
WBC # FLD AUTO: 13.85 K/UL — HIGH (ref 3.8–10.5)

## 2021-02-07 PROCEDURE — 93010 ELECTROCARDIOGRAM REPORT: CPT

## 2021-02-07 PROCEDURE — 99232 SBSQ HOSP IP/OBS MODERATE 35: CPT

## 2021-02-07 RX ORDER — OXYCODONE AND ACETAMINOPHEN 5; 325 MG/1; MG/1
2 TABLET ORAL EVERY 6 HOURS
Refills: 0 | Status: DISCONTINUED | OUTPATIENT
Start: 2021-02-07 | End: 2021-02-08

## 2021-02-07 RX ORDER — TAMSULOSIN HYDROCHLORIDE 0.4 MG/1
0.4 CAPSULE ORAL ONCE
Refills: 0 | Status: COMPLETED | OUTPATIENT
Start: 2021-02-07 | End: 2021-02-07

## 2021-02-07 RX ORDER — OXYCODONE AND ACETAMINOPHEN 5; 325 MG/1; MG/1
1 TABLET ORAL EVERY 4 HOURS
Refills: 0 | Status: DISCONTINUED | OUTPATIENT
Start: 2021-02-07 | End: 2021-02-08

## 2021-02-07 RX ADMIN — Medication 1 MILLIGRAM(S): at 12:33

## 2021-02-07 RX ADMIN — HYDROMORPHONE HYDROCHLORIDE 1 MILLIGRAM(S): 2 INJECTION INTRAMUSCULAR; INTRAVENOUS; SUBCUTANEOUS at 07:00

## 2021-02-07 RX ADMIN — HYDROMORPHONE HYDROCHLORIDE 1 MILLIGRAM(S): 2 INJECTION INTRAMUSCULAR; INTRAVENOUS; SUBCUTANEOUS at 00:05

## 2021-02-07 RX ADMIN — Medication 12.5 MILLIGRAM(S): at 17:54

## 2021-02-07 RX ADMIN — FINASTERIDE 5 MILLIGRAM(S): 5 TABLET, FILM COATED ORAL at 12:33

## 2021-02-07 RX ADMIN — TAMSULOSIN HYDROCHLORIDE 0.4 MILLIGRAM(S): 0.4 CAPSULE ORAL at 07:00

## 2021-02-07 RX ADMIN — ENOXAPARIN SODIUM 40 MILLIGRAM(S): 100 INJECTION SUBCUTANEOUS at 12:33

## 2021-02-07 NOTE — PROGRESS NOTE ADULT - SUBJECTIVE AND OBJECTIVE BOX
Patient is a 65y old  Male who presents with a chief complaint of sickle cell (06 Feb 2021 16:03)      INTERVAL HPI/OVERNIGHT EVENTS:    Received unit yesterday, tolerated the unit.  H and H improved today, which is his baseline per Hem/onc.    Advised pt that we will begin discharge planning and we will stop IV pain medication and switching to PO.     MEDICATIONS  (STANDING):  enoxaparin Injectable 40 milliGRAM(s) SubCutaneous daily  finasteride 5 milliGRAM(s) Oral daily  folic acid 1 milliGRAM(s) Oral daily  lisinopril 10 milliGRAM(s) Oral daily  metoprolol tartrate 12.5 milliGRAM(s) Oral two times a day  sodium chloride 0.45%. 2000 milliLiter(s) (75 mL/Hr) IV Continuous <Continuous>    MEDICATIONS  (PRN):  benzocaine 15 mG/menthol 3.6 mG (Sugar-Free) Lozenge 1 Lozenge Oral every 3 hours PRN Sore Throat  diphenhydrAMINE   Injectable 25 milliGRAM(s) IV Push every 4 hours PRN Rash and/or Itching  oxycodone    5 mG/acetaminophen 325 mG 1 Tablet(s) Oral every 4 hours PRN Moderate Pain (4 - 6)  oxycodone    5 mG/acetaminophen 325 mG 2 Tablet(s) Oral every 6 hours PRN Severe Pain (7 - 10)      Allergies    No Known Drug Allergies  peanuts (Anaphylaxis)  peanuts (Angioedema)  pork (Unknown)  shellfish (Unknown)    Intolerances    lactose (Unknown)      REVIEW OF SYSTEMS:  CONSTITUTIONAL: No fever, weight loss, or fatigue  EYES: No eye pain, visual disturbances, or discharge  ENMT:  No difficulty hearing, tinnitus, vertigo; No sinus or throat pain  NECK: No pain or stiffness  BREASTS: No pain, masses, or nipple discharge  RESPIRATORY: No cough, wheezing, chills or hemoptysis; No shortness of breath  CARDIOVASCULAR: No chest pain, palpitations, lightheadedness, or leg swelling  GASTROINTESTINAL: No abdominal or epigastric pain. No nausea, vomiting, or hematemesis; No diarrhea or constipation. No melena or hematochezia.  GENITOURINARY: No dysuria, frequency, hematuria, or incontinence  NEUROLOGICAL: No headaches, memory loss, vertigo, loss of strength, numbness, or tremors  MUSCULOSKELETAL: pain all over      Vital Signs Last 24 Hrs  T(C): 37 (07 Feb 2021 11:47), Max: 37.7 (07 Feb 2021 05:29)  T(F): 98.6 (07 Feb 2021 11:47), Max: 99.8 (07 Feb 2021 05:29)  HR: 69 (07 Feb 2021 11:47) (69 - 82)  BP: 111/68 (07 Feb 2021 11:47) (104/67 - 117/67)  BP(mean): --  RR: 16 (07 Feb 2021 11:47) (16 - 18)  SpO2: 98% (07 Feb 2021 11:47) (94% - 98%)    PHYSICAL EXAM:  GENERAL: NAD, well-groomed, well-developed  HEAD:  Atraumatic, Normocephalic  EYES:  conjunctiva and sclera clear  ENMT: Moist mucous membranes  NECK: Supple, No JVD  NERVOUS SYSTEM:  Alert & Oriented X3, Good concentration; All 4 extremities mobile, no gross sensory deficits.   CHEST/LUNG: Clear to auscultation bilaterally; No rales, rhonchi, wheezing, or rubs  HEART: Regular rate and rhythm; No murmurs, rubs, or gallops  ABDOMEN: Soft, Nontender, Nondistended; Bowel sounds present  EXTREMITIES:  2+ Peripheral Pulses, No clubbing, cyanosis, or edema    LABS:                        6.2    13.85 )-----------( 286      ( 07 Feb 2021 06:51 )             19.2     07 Feb 2021 06:51    138    |  106    |  44     ----------------------------<  94     5.2     |  21     |  1.28     Ca    9.3        07 Feb 2021 06:51    TPro  7.0    /  Alb  2.6    /  TBili  1.4    /  DBili  x      /  AST  35     /  ALT  26     /  AlkPhos  165    07 Feb 2021 06:51        CAPILLARY BLOOD GLUCOSE          RADIOLOGY & ADDITIONAL TESTS:    Imaging Personally Reviewed:  [ ] YES     Consultant(s) Notes Reviewed:      Care Discussed with Consultants/Other Providers:    Advanced Directives: [ ] DNR  [ ] No feeding tube  [ ] MOLST in chart  [ ] MOLST completed today  [ ] Unknown

## 2021-02-07 NOTE — PROGRESS NOTE ADULT - SUBJECTIVE AND OBJECTIVE BOX
All interim records and events noted.    no adverse reaction to the one unit PRBC yesterday  pain under control but does not want to change IV to po as "takes too long to work"      MEDICATIONS  (STANDING):  enoxaparin Injectable 40 milliGRAM(s) SubCutaneous daily  finasteride 5 milliGRAM(s) Oral daily  folic acid 1 milliGRAM(s) Oral daily  lisinopril 10 milliGRAM(s) Oral daily  metoprolol tartrate 12.5 milliGRAM(s) Oral two times a day  sodium chloride 0.45%. 2000 milliLiter(s) (75 mL/Hr) IV Continuous <Continuous>    MEDICATIONS  (PRN):  benzocaine 15 mG/menthol 3.6 mG (Sugar-Free) Lozenge 1 Lozenge Oral every 3 hours PRN Sore Throat  diphenhydrAMINE   Injectable 25 milliGRAM(s) IV Push every 4 hours PRN Rash and/or Itching  HYDROmorphone  Injectable 1 milliGRAM(s) IV Push every 2 hours PRN Severe Pain (7 - 10)      Vital Signs Last 24 Hrs  T(C): 37.7 (07 Feb 2021 05:29), Max: 37.7 (07 Feb 2021 05:29)  T(F): 99.8 (07 Feb 2021 05:29), Max: 99.8 (07 Feb 2021 05:29)  HR: 70 (07 Feb 2021 05:29) (70 - 97)  BP: 104/67 (07 Feb 2021 05:29) (104/67 - 138/80)  BP(mean): --  RR: 18 (07 Feb 2021 05:29) (16 - 18)  SpO2: 98% (07 Feb 2021 05:29) (94% - 98%)    PHYSICAL EXAM  General: well developed  thin, sitting up in bed,, in no acute distress  Head: atraumatic, normocephalic  ENT: sclera anicteric, buccal mucosa moist  Neck: supple, trachea midline  CV: S1 S2, regular rate and rhythm  Lungs: clear to auscultation, no wheezes/rhonchi  Abdomen: soft, nontender, bowel sounds present, no palpable masses  Skin: no significant increased ecchymosis/petechiae  Neuro: alert and responsive,  no focal deficits      LABS:             6.2    13.85 )-----------( 286      ( 02-07 @ 06:51 )             19.2                x      x     )-----------( x        ( 02-06 @ 03:44 )             14.7                5.5    20.98 )-----------( 353      ( 02-05 @ 00:18 )             17.2       02-07    138  |  106  |  x   ----------------------------<  94  5.2   |  21<L>  |  1.28    Ca    9.3      07 Feb 2021 06:51    TPro  7.0  /  Alb  2.6<L>  /  TBili  1.4<H>  /  DBili  x   /  AST  35  /  ALT  26  /  AlkPhos  165<H>  02-07 02-03 @ 22:33  PT13.7 INR1.19  PTT28.7      RADIOLOGY & ADDITIONAL STUDIES:    IMPRESSION/RECOMMENDATIONS:

## 2021-02-07 NOTE — PROGRESS NOTE ADULT - PROBLEM SELECTOR PLAN 1
IV pain medication, gentle hydration, oxygen.   Anemia - PRBC ordered -   EMEKA improved with IVF hydration.  s/p 1 unit.   Dc IV dilaudid, switched to PO perc.

## 2021-02-08 LAB
ALLERGY+IMMUNOLOGY DIAG STUDY NOTE: SIGNIFICANT CHANGE UP
ANION GAP SERPL CALC-SCNC: 13 MMOL/L — SIGNIFICANT CHANGE UP (ref 5–17)
BUN SERPL-MCNC: 35 MG/DL — HIGH (ref 7–23)
CALCIUM SERPL-MCNC: 8.8 MG/DL — SIGNIFICANT CHANGE UP (ref 8.4–10.5)
CHLORIDE SERPL-SCNC: 104 MMOL/L — SIGNIFICANT CHANGE UP (ref 96–108)
CO2 SERPL-SCNC: 21 MMOL/L — LOW (ref 22–31)
CREAT SERPL-MCNC: 1.37 MG/DL — HIGH (ref 0.5–1.3)
GLUCOSE SERPL-MCNC: 113 MG/DL — HIGH (ref 70–99)
HCT VFR BLD CALC: 20 % — CRITICAL LOW (ref 39–50)
HGB BLD-MCNC: 6.4 G/DL — CRITICAL LOW (ref 13–17)
MAGNESIUM SERPL-MCNC: 1.9 MG/DL — SIGNIFICANT CHANGE UP (ref 1.6–2.6)
MCHC RBC-ENTMCNC: 23.1 PG — LOW (ref 27–34)
MCHC RBC-ENTMCNC: 32 GM/DL — SIGNIFICANT CHANGE UP (ref 32–36)
MCV RBC AUTO: 72.2 FL — LOW (ref 80–100)
NRBC # BLD: 3 /100 WBCS — HIGH (ref 0–0)
PLATELET # BLD AUTO: 329 K/UL — SIGNIFICANT CHANGE UP (ref 150–400)
POTASSIUM SERPL-MCNC: 4.7 MMOL/L — SIGNIFICANT CHANGE UP (ref 3.5–5.3)
POTASSIUM SERPL-SCNC: 4.7 MMOL/L — SIGNIFICANT CHANGE UP (ref 3.5–5.3)
RBC # BLD: 2.77 M/UL — LOW (ref 4.2–5.8)
RBC # BLD: 2.77 M/UL — LOW (ref 4.2–5.8)
RBC # FLD: 23.5 % — HIGH (ref 10.3–14.5)
RETICS #: 203.3 K/UL — HIGH (ref 25–125)
RETICS/RBC NFR: 7.3 % — HIGH (ref 0.5–2.5)
SARS-COV-2 RNA SPEC QL NAA+PROBE: SIGNIFICANT CHANGE UP
SODIUM SERPL-SCNC: 138 MMOL/L — SIGNIFICANT CHANGE UP (ref 135–145)
WBC # BLD: 11.73 K/UL — HIGH (ref 3.8–10.5)
WBC # FLD AUTO: 11.73 K/UL — HIGH (ref 3.8–10.5)

## 2021-02-08 PROCEDURE — 99232 SBSQ HOSP IP/OBS MODERATE 35: CPT

## 2021-02-08 RX ORDER — HYDROMORPHONE HYDROCHLORIDE 2 MG/ML
4 INJECTION INTRAMUSCULAR; INTRAVENOUS; SUBCUTANEOUS
Refills: 0 | Status: DISCONTINUED | OUTPATIENT
Start: 2021-02-08 | End: 2021-02-15

## 2021-02-08 RX ORDER — HYDROMORPHONE HYDROCHLORIDE 2 MG/ML
2 INJECTION INTRAMUSCULAR; INTRAVENOUS; SUBCUTANEOUS
Refills: 0 | Status: DISCONTINUED | OUTPATIENT
Start: 2021-02-08 | End: 2021-02-14

## 2021-02-08 RX ADMIN — ENOXAPARIN SODIUM 40 MILLIGRAM(S): 100 INJECTION SUBCUTANEOUS at 11:15

## 2021-02-08 RX ADMIN — Medication 1 MILLIGRAM(S): at 11:15

## 2021-02-08 RX ADMIN — FINASTERIDE 5 MILLIGRAM(S): 5 TABLET, FILM COATED ORAL at 11:15

## 2021-02-08 RX ADMIN — HYDROMORPHONE HYDROCHLORIDE 4 MILLIGRAM(S): 2 INJECTION INTRAMUSCULAR; INTRAVENOUS; SUBCUTANEOUS at 11:15

## 2021-02-08 RX ADMIN — OXYCODONE AND ACETAMINOPHEN 2 TABLET(S): 5; 325 TABLET ORAL at 03:43

## 2021-02-08 RX ADMIN — OXYCODONE AND ACETAMINOPHEN 1 TABLET(S): 5; 325 TABLET ORAL at 00:47

## 2021-02-08 RX ADMIN — HYDROMORPHONE HYDROCHLORIDE 4 MILLIGRAM(S): 2 INJECTION INTRAMUSCULAR; INTRAVENOUS; SUBCUTANEOUS at 20:29

## 2021-02-08 RX ADMIN — Medication 12.5 MILLIGRAM(S): at 18:04

## 2021-02-08 NOTE — PROGRESS NOTE ADULT - PROBLEM SELECTOR PLAN 1
PO pain medication, oral hydration until able to obtain midline. oxygen.   creat going up again - encourage oral fluid, will start IVF if able to get IV access.   Acute on Chronic Anemia s/p 1 unit.

## 2021-02-08 NOTE — PROGRESS NOTE ADULT - SUBJECTIVE AND OBJECTIVE BOX
Patient seen and examined;  Chart reviewed and events noted;   Sitting in bed; eating breakfast  Pain is poorly controlled per patient with oral medications; generalized      MEDICATIONS  (STANDING):  enoxaparin Injectable 40 milliGRAM(s) SubCutaneous daily  finasteride 5 milliGRAM(s) Oral daily  folic acid 1 milliGRAM(s) Oral daily  lisinopril 10 milliGRAM(s) Oral daily  metoprolol tartrate 12.5 milliGRAM(s) Oral two times a day  sodium chloride 0.45%. 2000 milliLiter(s) (75 mL/Hr) IV Continuous <Continuous>    MEDICATIONS  (PRN):  benzocaine 15 mG/menthol 3.6 mG (Sugar-Free) Lozenge 1 Lozenge Oral every 3 hours PRN Sore Throat  diphenhydrAMINE   Injectable 25 milliGRAM(s) IV Push every 4 hours PRN Rash and/or Itching  oxycodone    5 mG/acetaminophen 325 mG 1 Tablet(s) Oral every 4 hours PRN Moderate Pain (4 - 6)  oxycodone    5 mG/acetaminophen 325 mG 2 Tablet(s) Oral every 6 hours PRN Severe Pain (7 - 10)      Vital Signs Last 24 Hrs  T(C): 36.8 (08 Feb 2021 06:07), Max: 37.2 (07 Feb 2021 21:27)  T(F): 98.2 (08 Feb 2021 06:07), Max: 99 (07 Feb 2021 21:27)  HR: 76 (08 Feb 2021 06:07) (69 - 89)  BP: 103/65 (08 Feb 2021 06:07) (99/64 - 113/74)  BP(mean): --  RR: 18 (08 Feb 2021 06:07) (16 - 18)  SpO2: 97% (08 Feb 2021 06:07) (95% - 99%)    PHYSICAL EXAM  General: adult in NAD  HEENT: clear oropharynx, anicteric sclera, pink conjunctivae  Neck: supple  CV: normal S1S2 with no murmur rubs or gallops  Lungs: clear to auscultation, no wheezes, no rhales  Abdomen: soft non-tender non-distended, no hepato/splenomegaly  Ext: no clubbing cyanosis or edema  Skin: no rashes and no petichiae  Neuro: alert and oriented X3 no focal deficits      LABS:  No labs for 2/8/21               6.2    13.85 )-----------( 286      ( 07 Feb 2021 06:51 )             19.2     02-07    138  |  106  |  44<H>  ----------------------------<  94  5.2   |  21<L>  |  1.28    Ca    9.3      07 Feb 2021 06:51    TPro  7.0  /  Alb  2.6<L>  /  TBili  1.4<H>  /  DBili  x   /  AST  35  /  ALT  26  /  AlkPhos  165<H>  02-07

## 2021-02-08 NOTE — PROGRESS NOTE ADULT - SUBJECTIVE AND OBJECTIVE BOX
Patient is a 65y old  Male who presents with a chief complaint of Pain crisis (08 Feb 2021 08:45)    INTERVAL HPI/OVERNIGHT EVENTS:  still in pain, having NSVT on Echo, did have some palpitations.     MEDICATIONS  (STANDING):  enoxaparin Injectable 40 milliGRAM(s) SubCutaneous daily  finasteride 5 milliGRAM(s) Oral daily  folic acid 1 milliGRAM(s) Oral daily  lisinopril 10 milliGRAM(s) Oral daily  metoprolol tartrate 12.5 milliGRAM(s) Oral two times a day  sodium chloride 0.45%. 2000 milliLiter(s) (75 mL/Hr) IV Continuous <Continuous>    MEDICATIONS  (PRN):  benzocaine 15 mG/menthol 3.6 mG (Sugar-Free) Lozenge 1 Lozenge Oral every 3 hours PRN Sore Throat  diphenhydrAMINE   Injectable 25 milliGRAM(s) IV Push every 4 hours PRN Rash and/or Itching  HYDROmorphone   Tablet 2 milliGRAM(s) Oral every 3 hours PRN Moderate Pain (4 - 6)  HYDROmorphone   Tablet 4 milliGRAM(s) Oral every 3 hours PRN Severe Pain (7 - 10)    Allergies  No Known Drug Allergies  peanuts (Anaphylaxis)  peanuts (Angioedema)  pork (Unknown)  shellfish (Unknown)    Intolerances  lactose (Unknown)      REVIEW OF SYSTEMS:  CONSTITUTIONAL: No fever, weight loss, or fatigue  EYES: No eye pain, visual disturbances, or discharge  ENMT:  No difficulty hearing, tinnitus, vertigo; No sinus or throat pain  NECK: No pain or stiffness  RESPIRATORY: No cough, wheezing, chills or hemoptysis; No shortness of breath  CARDIOVASCULAR: No lightheadedness, or leg swelling  GASTROINTESTINAL: No nausea, vomiting, or hematemesis; No diarrhea or constipation. No melena or hematochezia.  GENITOURINARY: No dysuria, frequency, hematuria, or incontinence  NEUROLOGICAL: No headaches, memory loss, vertigo, loss of strength, numbness, or tremors  SKIN: No itching, burning, rashes, or lesions   LYMPH NODES: No enlarged glands  ENDOCRINE: No heat or cold intolerance; No hair loss; No polydipsia or polyuria  MUSCULOSKELETAL: Diffuse pain.   PSYCHIATRIC: No depression, anxiety, or mood swings  HEME/LYMPH: No easy bruising, or bleeding gums  ALLERGY AND IMMUNOLOGIC: No hives or eczema    Vital Signs Last 24 Hrs  T(C): 36.9 (08 Feb 2021 14:13), Max: 37.2 (07 Feb 2021 21:27)  T(F): 98.4 (08 Feb 2021 14:13), Max: 99 (07 Feb 2021 21:27)  HR: 94 (08 Feb 2021 14:30) (71 - 94)  BP: 97/69 (08 Feb 2021 14:30) (97/69 - 113/74)  BP(mean): --  RR: 18 (08 Feb 2021 14:30) (17 - 18)  SpO2: 98% (08 Feb 2021 14:30) (95% - 99%)    PHYSICAL EXAM:  GENERAL: NAD, well-groomed, well-developed  HEAD:  Atraumatic, Normocephalic  EYES: conjunctiva and sclera clear  ENMT: Moist mucous membranes  NECK: Supple, No JVD  NERVOUS SYSTEM:  Alert & Oriented X3, Good concentration; All 4 extremities mobile, no gross sensory deficits.   CHEST/LUNG: Clear to auscultation bilaterally; No rales, rhonchi, wheezing, or rubs  HEART: Regular rate and rhythm; No murmurs, rubs, or gallops  ABDOMEN: Soft, Nontender, Nondistended; Bowel sounds present  EXTREMITIES:  2+ Peripheral Pulses, No clubbing, cyanosis, or edema      LABS:                        6.4    11.73 )-----------( 329      ( 08 Feb 2021 12:16 )             20.0     08 Feb 2021 12:16    138    |  104    |  35     ----------------------------<  113    4.7     |  21     |  1.37     Ca    8.8        08 Feb 2021 12:16  Mg     1.9       08 Feb 2021 12:16          CAPILLARY BLOOD GLUCOSE          RADIOLOGY & ADDITIONAL TESTS:    Imaging Personally Reviewed:  [ ] YES     Consultant(s) Notes Reviewed:      Care Discussed with Consultants/Other Providers:    Advanced Directives: [ ] DNR  [ ] No feeding tube  [ ] MOLST in chart  [ ] MOLST completed today  [ ] Unknown

## 2021-02-09 DIAGNOSIS — I50.22 CHRONIC SYSTOLIC (CONGESTIVE) HEART FAILURE: ICD-10-CM

## 2021-02-09 DIAGNOSIS — I47.2 VENTRICULAR TACHYCARDIA: ICD-10-CM

## 2021-02-09 DIAGNOSIS — I10 ESSENTIAL (PRIMARY) HYPERTENSION: ICD-10-CM

## 2021-02-09 LAB
ANION GAP SERPL CALC-SCNC: 9 MMOL/L — SIGNIFICANT CHANGE UP (ref 5–17)
BUN SERPL-MCNC: 26 MG/DL — HIGH (ref 7–23)
CALCIUM SERPL-MCNC: 8.9 MG/DL — SIGNIFICANT CHANGE UP (ref 8.4–10.5)
CHLORIDE SERPL-SCNC: 103 MMOL/L — SIGNIFICANT CHANGE UP (ref 96–108)
CO2 SERPL-SCNC: 26 MMOL/L — SIGNIFICANT CHANGE UP (ref 22–31)
CREAT SERPL-MCNC: 1.12 MG/DL — SIGNIFICANT CHANGE UP (ref 0.5–1.3)
GLUCOSE SERPL-MCNC: 94 MG/DL — SIGNIFICANT CHANGE UP (ref 70–99)
HCT VFR BLD CALC: 20.5 % — CRITICAL LOW (ref 39–50)
HEMOGLOBIN INTERPRETATION: SIGNIFICANT CHANGE UP
HGB A MFR BLD: 0 % — LOW (ref 95.8–98)
HGB A2 MFR BLD: 3 % — SIGNIFICANT CHANGE UP (ref 2–3.2)
HGB BLD-MCNC: 6.4 G/DL — CRITICAL LOW (ref 13–17)
HGB F MFR BLD: 2.5 % — HIGH (ref 0–1)
HGB S MFR BLD: 94.5 % — HIGH
MAGNESIUM SERPL-MCNC: 2.2 MG/DL — SIGNIFICANT CHANGE UP (ref 1.6–2.6)
MCHC RBC-ENTMCNC: 22.9 PG — LOW (ref 27–34)
MCHC RBC-ENTMCNC: 31.2 GM/DL — LOW (ref 32–36)
MCV RBC AUTO: 73.2 FL — LOW (ref 80–100)
NRBC # BLD: 3 /100 WBCS — HIGH (ref 0–0)
PHOSPHATE SERPL-MCNC: 3.5 MG/DL — SIGNIFICANT CHANGE UP (ref 2.5–4.5)
PLATELET # BLD AUTO: 358 K/UL — SIGNIFICANT CHANGE UP (ref 150–400)
POTASSIUM SERPL-MCNC: 4.6 MMOL/L — SIGNIFICANT CHANGE UP (ref 3.5–5.3)
POTASSIUM SERPL-SCNC: 4.6 MMOL/L — SIGNIFICANT CHANGE UP (ref 3.5–5.3)
RBC # BLD: 2.8 M/UL — LOW (ref 4.2–5.8)
RBC # FLD: 23.4 % — HIGH (ref 10.3–14.5)
SODIUM SERPL-SCNC: 138 MMOL/L — SIGNIFICANT CHANGE UP (ref 135–145)
WBC # BLD: 11.57 K/UL — HIGH (ref 3.8–10.5)
WBC # FLD AUTO: 11.57 K/UL — HIGH (ref 3.8–10.5)

## 2021-02-09 PROCEDURE — 99223 1ST HOSP IP/OBS HIGH 75: CPT

## 2021-02-09 PROCEDURE — 99232 SBSQ HOSP IP/OBS MODERATE 35: CPT

## 2021-02-09 RX ADMIN — LISINOPRIL 10 MILLIGRAM(S): 2.5 TABLET ORAL at 06:07

## 2021-02-09 RX ADMIN — FINASTERIDE 5 MILLIGRAM(S): 5 TABLET, FILM COATED ORAL at 11:43

## 2021-02-09 RX ADMIN — ENOXAPARIN SODIUM 40 MILLIGRAM(S): 100 INJECTION SUBCUTANEOUS at 11:42

## 2021-02-09 RX ADMIN — Medication 1 MILLIGRAM(S): at 11:43

## 2021-02-09 RX ADMIN — HYDROMORPHONE HYDROCHLORIDE 2 MILLIGRAM(S): 2 INJECTION INTRAMUSCULAR; INTRAVENOUS; SUBCUTANEOUS at 09:29

## 2021-02-09 RX ADMIN — HYDROMORPHONE HYDROCHLORIDE 4 MILLIGRAM(S): 2 INJECTION INTRAMUSCULAR; INTRAVENOUS; SUBCUTANEOUS at 02:10

## 2021-02-09 RX ADMIN — Medication 12.5 MILLIGRAM(S): at 06:07

## 2021-02-09 RX ADMIN — HYDROMORPHONE HYDROCHLORIDE 2 MILLIGRAM(S): 2 INJECTION INTRAMUSCULAR; INTRAVENOUS; SUBCUTANEOUS at 20:04

## 2021-02-09 NOTE — CONSULT NOTE ADULT - PROBLEM SELECTOR RECOMMENDATION 9
Mild LV dysfunction on past imaging (echo in 2019); appears compensated; continue metoprolol; f/u echo this admission

## 2021-02-09 NOTE — PROGRESS NOTE ADULT - SUBJECTIVE AND OBJECTIVE BOX
Patient is a 65y old  Male who presents with a chief complaint of Pain crisis (08 Feb 2021 08:45)    INTERVAL HPI/OVERNIGHT EVENTS:  still having pain, but controlled by oral dilaudid.     MEDICATIONS  (STANDING):  enoxaparin Injectable 40 milliGRAM(s) SubCutaneous daily  finasteride 5 milliGRAM(s) Oral daily  folic acid 1 milliGRAM(s) Oral daily  lisinopril 10 milliGRAM(s) Oral daily  metoprolol tartrate 12.5 milliGRAM(s) Oral two times a day  sodium chloride 0.45%. 2000 milliLiter(s) (75 mL/Hr) IV Continuous <Continuous>    MEDICATIONS  (PRN):  benzocaine 15 mG/menthol 3.6 mG (Sugar-Free) Lozenge 1 Lozenge Oral every 3 hours PRN Sore Throat  diphenhydrAMINE   Injectable 25 milliGRAM(s) IV Push every 4 hours PRN Rash and/or Itching  HYDROmorphone   Tablet 2 milliGRAM(s) Oral every 3 hours PRN Moderate Pain (4 - 6)  HYDROmorphone   Tablet 4 milliGRAM(s) Oral every 3 hours PRN Severe Pain (7 - 10)      Allergies  No Known Drug Allergies  peanuts (Anaphylaxis)  peanuts (Angioedema)  pork (Unknown)  shellfish (Unknown)    Intolerances  lactose (Unknown)      REVIEW OF SYSTEMS:  CONSTITUTIONAL: No fever, weight loss, or fatigue  EYES: No eye pain, visual disturbances, or discharge  ENMT:  No difficulty hearing, tinnitus, vertigo; No sinus or throat pain  NECK: No pain or stiffness  BREASTS: No pain, masses, or nipple discharge  RESPIRATORY: No cough, wheezing, chills or hemoptysis; No shortness of breath  CARDIOVASCULAR: No  lightheadedness, or leg swelling  GASTROINTESTINAL: No  No nausea, vomiting, or hematemesis; No diarrhea or constipation. No melena or hematochezia.  GENITOURINARY: No dysuria, frequency, hematuria, or incontinence  NEUROLOGICAL: No headaches, memory loss, vertigo, loss of strength, numbness, or tremors  SKIN: No itching, burning, rashes, or lesions   LYMPH NODES: No enlarged glands  ENDOCRINE: No heat or cold intolerance; No hair loss; No polydipsia or polyuria  MUSCULOSKELETAL: No joint pain or swelling;   PSYCHIATRIC: No depression, anxiety, or mood swings  HEME/LYMPH: No easy bruising, or bleeding gums  ALLERGY AND IMMUNOLOGIC: No hives or eczema    Vital Signs Last 24 Hrs  T(C): 36.7 (09 Feb 2021 08:35), Max: 36.9 (08 Feb 2021 14:13)  T(F): 98.1 (09 Feb 2021 08:35), Max: 98.4 (08 Feb 2021 14:13)  HR: 66 (09 Feb 2021 08:35) (66 - 94)  BP: 109/58 (09 Feb 2021 09:27) (96/52 - 149/77)  BP(mean): --  RR: 22 (09 Feb 2021 08:35) (17 - 22)  SpO2: 99% (09 Feb 2021 08:35) (95% - 100%)    PHYSICAL EXAM:  GENERAL: NAD, well-groomed, well-developed  HEAD:  Atraumatic, Normocephalic  EYES: conjunctiva and sclera clear  ENMT: Moist mucous membranes  NECK: Supple, No JVD  NERVOUS SYSTEM:  Alert & Oriented X3, Good concentration; All 4 extremities mobile, no gross sensory deficits.   CHEST/LUNG: Clear to auscultation bilaterally; No rales, rhonchi, wheezing, or rubs  HEART: Regular rate and rhythm; No murmurs, rubs, or gallops  ABDOMEN: Soft, Nontender, Nondistended; Bowel sounds present  EXTREMITIES:  2+ Peripheral Pulses, No clubbing, cyanosis, or edema  LYMPH: No lymphadenopathy noted  SKIN: No rashes or lesions    LABS:                        6.4    11.57 )-----------( 358      ( 09 Feb 2021 07:46 )             20.5     09 Feb 2021 07:46    138    |  103    |  26     ----------------------------<  94     4.6     |  26     |  1.12     Ca    8.9        09 Feb 2021 07:46  Phos  3.5       09 Feb 2021 07:46  Mg     2.2       09 Feb 2021 07:46          CAPILLARY BLOOD GLUCOSE          RADIOLOGY & ADDITIONAL TESTS:    Imaging Personally Reviewed:  [ ] YES     Consultant(s) Notes Reviewed:      Care Discussed with Consultants/Other Providers:    Advanced Directives: [ ] DNR  [ ] No feeding tube  [ ] MOLST in chart  [ ] MOLST completed today  [ ] Unknown

## 2021-02-09 NOTE — CHART NOTE - NSCHARTNOTEFT_GEN_A_CORE
Assessment:       65 year old male admitted for sickle cell anemia crisis. Patient denies any nausea/vomiting/diarrhea/constipation or difficulty chewing and swallowing. Avoids Peanut, Pork, Shellfish and is lactose intolerant. Pt tolerating meals but only consuming around 25%. Pt does accept ensure enlive. As per RD note from February 2020, pt weight 106#. Current weight 110#. No edema noted. As per Nutrition Focused Physical Exam, pt found to have severe temporal, orbital and buccal wasting along with severe shoulder, chest and triceps wasting. Given that pt has only consumed around 25% meals x 3 days, it is reasonable to assess that pt has not been able to meet 50% of estimated needs x at least 5 days. Given the aforementioned, pt meets criteria for severe malnutrition in the context of acute illness. Continue ensure enlive TID. Also advised pt to consume nutrient dense foods.    F/U 2/9/21    Pt intake remains 25-50%. He is however, still receptive to ensure enlive supplement. Preferences obtained and honored. Food/Nutrition staff see pt daily to take meal orders/obtain any new preferences. Pt was receptive to mac amd cheese and this morning asked for turkey mcnamara. Given that pt has been consuming ensure enlive, will also send magic cup fortified ice cream.       Factors impacting intake: [ ] none [ ] nausea  [ ] vomiting [ ] diarrhea [ ] constipation  [ ]chewing problems [ ] swallowing issues  [ ] other:     Diet Presciption: Diet, Regular:   Supplement Feeding Modality:  Oral  Ensure Enlive Cans or Servings Per Day:  1       Frequency:  Three Times a day (02-05-21 @ 11:28)    Intake: 25-50%, mostly accepts supplements    Current Weight: Weight (kg): 50 (02-04 @ 22:52), 56.7 (02-03 @ 21:31)  % Weight Change no new    Pertinent Medications: MEDICATIONS  (STANDING):  enoxaparin Injectable 40 milliGRAM(s) SubCutaneous daily  finasteride 5 milliGRAM(s) Oral daily  folic acid 1 milliGRAM(s) Oral daily  lisinopril 10 milliGRAM(s) Oral daily  metoprolol tartrate 12.5 milliGRAM(s) Oral two times a day  sodium chloride 0.45%. 2000 milliLiter(s) (75 mL/Hr) IV Continuous <Continuous>    MEDICATIONS  (PRN):  benzocaine 15 mG/menthol 3.6 mG (Sugar-Free) Lozenge 1 Lozenge Oral every 3 hours PRN Sore Throat  diphenhydrAMINE   Injectable 25 milliGRAM(s) IV Push every 4 hours PRN Rash and/or Itching  HYDROmorphone   Tablet 2 milliGRAM(s) Oral every 3 hours PRN Moderate Pain (4 - 6)  HYDROmorphone   Tablet 4 milliGRAM(s) Oral every 3 hours PRN Severe Pain (7 - 10)    Pertinent Labs: 02-09 Na138 mmol/L Glu 94 mg/dL K+ 4.6 mmol/L Cr  1.12 mg/dL BUN 26 mg/dL<H> 02-09 Phos 3.5 mg/dL 02-07 Alb 2.6 g/dL<L>     CAPILLARY BLOOD GLUCOSE        Skin: no edema/breakdown    Estimated Needs:   [ x] no change since previous assessment  [ ] recalculated:     Previous Nutrition Diagnosis:   [ ] Inadequate Energy Intake [ ]Inadequate Oral Intake [ ] Excessive Energy Intake   [ ] Underweight [ ] Increased Nutrient Needs [ ] Overweight/Obesity   [ ] Altered GI Function [ ] Unintended Weight Loss [ ] Food & Nutrition Related Knowledge Deficit [x ] Malnutrition     Nutrition Diagnosis is [ ] ongoing  [ ] resolved [ ] not applicable     New Nutrition Diagnosis: [ ] not applicable       Interventions:   Recommend  [ ] Change Diet To:  [ ] Nutrition Supplement  [ ] Nutrition Support  [ x] Other:  con POC: add magic cup BID    Monitoring and Evaluation:   [x ] PO intake [ x ] Tolerance to diet prescription [ x ] weights [ x ] labs[ x ] follow up per protocol  [ ] other:

## 2021-02-09 NOTE — CHART NOTE - NSCHARTNOTEFT_GEN_A_CORE
Confidential Drug Utilization Report  Search Terms: Hira Marquez, 1955Search Date: 02/09/2021 15:31:24 PM  The Drug Utilization Report below displays all of the controlled substance prescriptions, if any, that your patient has filled in the last twelve months. The information displayed on this report is compiled from pharmacy submissions to the Department, and accurately reflects the information as submitted by the pharmacies.    This report was requested by: Yamile Rivas | Reference #: [id number]    You have not added a MELONY number. Keeping your MELONY number(s) up to date on the My MELONY Numbers page will enable the separation of your prescriptions from others in the search results.    Others' Prescriptions  Patient Name: Hira MarquezBirth Date: 04/30/1970  Address: [address] [place] [id number]: Male  Rx Written	Rx Dispensed	Drug	Quantity	Days Supply	Prescriber Name  01/31/2020	02/17/2020	hydromorphone 2 mg tablet	9	3	Nancie Dutton NP  01/31/2020	02/17/2020	morphine sulf er 30 mg tablet	8	4	Nancie Dutton NP  02/14/2020	02/17/2020	hydromorphone 2 mg tablet	12	3	Kylah Hampton (NP)    Patient Name: Hira MarquezBirth Date: 1955  Address: [address] [place] [id number]: Male  Rx Written	Rx Dispensed	Drug	Quantity	Days Supply	Prescriber Name  11/02/2020	11/09/2020	oxycodone-acetaminophen  mg tab	28	7	Lluvia Hamm  06/22/2020	07/01/2020	oxycodone-acetaminophen  mg tab	28	7	Lluvia Hamm  05/13/2020	05/14/2020	oxycodone-acetaminophen  mg tab	28	7	Lluvia Hamm  03/30/2020	03/31/2020	oxycodone-acetaminophen 5-325 mg tablet	35	6	Lluvia Hamm

## 2021-02-09 NOTE — CONSULT NOTE ADULT - PROBLEM SELECTOR RECOMMENDATION 2
Infrequent, brief episodes of NSVT -- also seen during prior admission for pain crisis in 2019; continue beta blockade -- uptitrate as BP allows; unclear if previously recommended stress test was ever performed -- appropriate when recovered from acute sickle cell crisis

## 2021-02-09 NOTE — PROGRESS NOTE ADULT - SUBJECTIVE AND OBJECTIVE BOX
All interim records and events noted.    up in bed, comfortable, reports oral pain meds effective      MEDICATIONS  (STANDING):  enoxaparin Injectable 40 milliGRAM(s) SubCutaneous daily  finasteride 5 milliGRAM(s) Oral daily  folic acid 1 milliGRAM(s) Oral daily  lisinopril 10 milliGRAM(s) Oral daily  metoprolol tartrate 12.5 milliGRAM(s) Oral two times a day  sodium chloride 0.45%. 2000 milliLiter(s) (75 mL/Hr) IV Continuous <Continuous>    MEDICATIONS  (PRN):  benzocaine 15 mG/menthol 3.6 mG (Sugar-Free) Lozenge 1 Lozenge Oral every 3 hours PRN Sore Throat  diphenhydrAMINE   Injectable 25 milliGRAM(s) IV Push every 4 hours PRN Rash and/or Itching  HYDROmorphone   Tablet 2 milliGRAM(s) Oral every 3 hours PRN Moderate Pain (4 - 6)  HYDROmorphone   Tablet 4 milliGRAM(s) Oral every 3 hours PRN Severe Pain (7 - 10)      Vital Signs Last 24 Hrs  T(C): 36.5 (09 Feb 2021 05:30), Max: 36.9 (08 Feb 2021 14:13)  T(F): 97.7 (09 Feb 2021 05:30), Max: 98.4 (08 Feb 2021 14:13)  HR: 84 (09 Feb 2021 05:30) (71 - 94)  BP: 126/74 (09 Feb 2021 05:30) (97/69 - 149/77)  BP(mean): --  RR: 18 (09 Feb 2021 05:30) (17 - 18)  SpO2: 97% (09 Feb 2021 05:30) (95% - 100%)    PHYSICAL EXAM  General: well developed thin man in no acute distress  Head: atraumatic, normocephalic  ENT: sclera anicteric  Neck: supple, trachea midline  CV: S1 S2, regular rate and rhythm  Lungs: clear to auscultation, no wheezes/rhonchi  Abdomen: soft, nontender, bowel sounds present, no palpable masses  Skin: no significant increased ecchymosis/petechiae  Neuro: alert and responsive,  no focal deficits      LABS:             6.4    11.73 )-----------( 329      ( 02-08 @ 12:16 )             20.0                6.2    13.85 )-----------( 286      ( 02-07 @ 06:51 )             19.2       02-09    138  |  103  |  26<H>  ----------------------------<  94  4.6   |  26  |  1.12    Ca    8.9      09 Feb 2021 07:46  Phos  3.5     02-09  Mg     2.2     02-09          RADIOLOGY & ADDITIONAL STUDIES:    IMPRESSION/RECOMMENDATIONS:

## 2021-02-10 LAB
MAGNESIUM SERPL-MCNC: 2.5 MG/DL — SIGNIFICANT CHANGE UP (ref 1.6–2.6)
SARS-COV-2 RNA SPEC QL NAA+PROBE: SIGNIFICANT CHANGE UP

## 2021-02-10 PROCEDURE — 99232 SBSQ HOSP IP/OBS MODERATE 35: CPT

## 2021-02-10 PROCEDURE — 99233 SBSQ HOSP IP/OBS HIGH 50: CPT

## 2021-02-10 PROCEDURE — 74220 X-RAY XM ESOPHAGUS 1CNTRST: CPT | Mod: 26

## 2021-02-10 RX ORDER — METOPROLOL TARTRATE 50 MG
25 TABLET ORAL EVERY 12 HOURS
Refills: 0 | Status: DISCONTINUED | OUTPATIENT
Start: 2021-02-10 | End: 2021-02-13

## 2021-02-10 RX ORDER — LISINOPRIL 2.5 MG/1
2.5 TABLET ORAL DAILY
Refills: 0 | Status: DISCONTINUED | OUTPATIENT
Start: 2021-02-10 | End: 2021-02-11

## 2021-02-10 RX ORDER — IRON SUCROSE 20 MG/ML
200 INJECTION, SOLUTION INTRAVENOUS EVERY 24 HOURS
Refills: 0 | Status: COMPLETED | OUTPATIENT
Start: 2021-02-10 | End: 2021-02-11

## 2021-02-10 RX ADMIN — Medication 25 MILLIGRAM(S): at 22:10

## 2021-02-10 RX ADMIN — HYDROMORPHONE HYDROCHLORIDE 2 MILLIGRAM(S): 2 INJECTION INTRAMUSCULAR; INTRAVENOUS; SUBCUTANEOUS at 11:53

## 2021-02-10 RX ADMIN — Medication 1 MILLIGRAM(S): at 11:53

## 2021-02-10 RX ADMIN — SODIUM CHLORIDE 75 MILLILITER(S): 9 INJECTION, SOLUTION INTRAVENOUS at 17:52

## 2021-02-10 RX ADMIN — ENOXAPARIN SODIUM 40 MILLIGRAM(S): 100 INJECTION SUBCUTANEOUS at 11:53

## 2021-02-10 RX ADMIN — IRON SUCROSE 110 MILLIGRAM(S): 20 INJECTION, SOLUTION INTRAVENOUS at 16:29

## 2021-02-10 RX ADMIN — HYDROMORPHONE HYDROCHLORIDE 2 MILLIGRAM(S): 2 INJECTION INTRAMUSCULAR; INTRAVENOUS; SUBCUTANEOUS at 23:50

## 2021-02-10 RX ADMIN — FINASTERIDE 5 MILLIGRAM(S): 5 TABLET, FILM COATED ORAL at 11:53

## 2021-02-10 NOTE — PROGRESS NOTE ADULT - PROBLEM SELECTOR PLAN 1
PO pain medication, oral hydration  unable to get peripheral IV access  Acute on Chronic Anemia s/p 1 unit.  Encourage oral fluids and food.  encourage ambulation.

## 2021-02-10 NOTE — PROGRESS NOTE ADULT - PROBLEM SELECTOR PLAN 1
Mild chronic systolic dysfunction possibly due to chronic sickle cell anemia hyperdynamic LV dysfunction with subsequent systolic dysfunction. will repeat echo Mild chronic systolic dysfunction possibly due to chronic sickle cell anemia hyperdynamic LV dysfunction with subsequent systolic dysfunction.

## 2021-02-10 NOTE — PROGRESS NOTE ADULT - SUBJECTIVE AND OBJECTIVE BOX
CHIEF COMPLAINT: Patient is a 65y old  Male who presents with a chief complaint of Pain crisis (08 Feb 2021 08:45)    HPI:  65 year old man with a history of sickle cell disease, acute chest syndrome, cardiomyopathy with mild chronic systolic HF (previous notes suggest a non-ischemia etiology related to high output state from SCD), NSVT during hospitalization in 2019 (treated with beta blockade), mild mental retardation, admitted on 2/4/21 with a pain crisis and Hgb ~ 5.5 (lower than previous baseline) requiring transfusion.  Cardiology consulted for NSVT.  He describes diffuse pain - modestly better over past few days; occasional palpitations but no syncope.  He describes a good baseline functional status ("I walk a lot") with no angina.      2/10/21  Patient heart rate is better after receiving PRBC , did have PSVT  /svt with abberrancy patient denies any chest pain  his sbp low normal range  his hemoglobin is 6.3      PAST MEDICAL & SURGICAL HISTORY:  Sickle cell disease  Hypertension  Constipation  Intellectual disability ~ mild  Acute chest syndrome  H/O transfusion ~ pRBC  Left ventricular dysfunction mild LVD on echo in 7/18, normal LVF in 12/18  Sickle Cell Disease  NSVT    No significant past surgical history    SOCIAL HISTORY:   Alcohol: Denied  Smoking: Nonsmoker      FAMILY HISTORY: * No known heart disease  Family history of sickle cell trait (Mother) ~ presumed in parents &amp; 7 siblings (none suffer w/ the disease, per patient)  FH: hypertension ~ mother    MEDICATIONS  (STANDING):  enoxaparin Injectable 40 milliGRAM(s) SubCutaneous daily  finasteride 5 milliGRAM(s) Oral daily  folic acid 1 milliGRAM(s) Oral daily  lisinopril 10 milliGRAM(s) Oral daily  metoprolol tartrate 12.5 milliGRAM(s) Oral two times a day  sodium chloride 0.45%. 2000 milliLiter(s) (75 mL/Hr) IV Continuous <Continuous>    MEDICATIONS  (PRN):  benzocaine 15 mG/menthol 3.6 mG (Sugar-Free) Lozenge 1 Lozenge Oral every 3 hours PRN Sore Throat  diphenhydrAMINE   Injectable 25 milliGRAM(s) IV Push every 4 hours PRN Rash and/or Itching  HYDROmorphone   Tablet 2 milliGRAM(s) Oral every 3 hours PRN Moderate Pain (4 - 6)  HYDROmorphone   Tablet 4 milliGRAM(s) Oral every 3 hours PRN Severe Pain (7 - 10)    Allergies: No Known Drug Allergies  peanuts (Anaphylaxis)  peanuts (Angioedema)  pork (Unknown)  shellfish (Unknown)    REVIEW OF SYSTEMS:   CONSTITUTIONAL: No fevers or chills; + diffuse pain  Eyes: No visual changes  NECK: No pain or stiffness  RESPIRATORY: No cough, wheezing, hemoptysis; No shortness of breath  CARDIOVASCULAR: No chest pain, + palpitations No syncope  GASTROINTESTINAL:No nausea, vomiting  GENITOURINARY: No dysuria, frequency or hematuria  NEUROLOGICAL: No numbness.  SKIN: No itching or rash  All other review of systems is negative unless indicated above    Vital Signs Last 24 Hrs  T(C): 36.9 (10 Feb 2021 10:10), Max: 36.9 (10 Feb 2021 05:13)  T(F): 98.4 (10 Feb 2021 10:10), Max: 98.5 (10 Feb 2021 05:13)  HR: 91 (10 Feb 2021 10:10) (77 - 94)  BP: 107/58 (10 Feb 2021 10:10) (84/46 - 109/64)  BP(mean): --  RR: 18 (10 Feb 2021 10:10) (18 - 18)  SpO2: 99% (10 Feb 2021 10:10) (93% - 100%)    I&O's Summary    09 Feb 2021 07:01  -  10 Feb 2021 07:00  --------------------------------------------------------  IN: 0 mL / OUT: 850 mL / NET: -850 mL    10 Feb 2021 07:01  -  10 Feb 2021 12:10  --------------------------------------------------------  IN: 0 mL / OUT: 350 mL / NET: -350 mL      PHYSICAL EXAM:  Constitutional: NAD, awake and alert, thin / slender appearance  HEENT:  Pupils round  Pulmonary: Non-labored, breath sounds are clear bilaterally, No wheezing, rales or rhonchi  Cardiovascular: S1 and S2, regular rate and rhythm  Gastrointestinal: Bowel Sounds present, soft, nontender.   Lymph: No peripheral edema. No cervical lymphadenopathy.  Neurological: Alert, no focal deficits  Skin: No rashes.  Psych:  Mood & affect appropriate    LABS:                                 6.4    11.57 )-----------( 358      ( 09 Feb 2021 07:46 )             20.5     02-09    138  |  103  |  26<H>  ----------------------------<  94  4.6   |  26  |  1.12    Ca    8.9      09 Feb 2021 07:46  Phos  3.5     02-09  Mg     2.2     02-09    Transthoracic Echocardiogram (08.30.19 @ 08:02) >  1. Mild global left ventricular systolic dysfunction.  2. Normal right ventricular size and function.  3. Normal tricuspid valve. Mild-moderate tricuspidregurgitation.  4. Estimated pulmonary artery systolic pressure equals 36mm Hg, assuming right atrial pressure equals 10  mm Hg,consistent with borderline pulmonary hypertension.    12 Lead ECG (02.04.21 @ 07:26): Normal sinus rhythm, Voltage criteria for left ventricular hypertrophy    Tele: NSVT (2/10 /21 10 beats; 4 beats); underlying SR  tachycardiac     Xray Chest 1 View- PORTABLE-Urgent (Xray Chest 1 View- PORTABLE-Urgent .) (02.03.21 @ 22:49): No active pulmonary disease. Sclerotic humeri as noted.         CHIEF COMPLAINT: Patient is a 65y old  Male who presents with a chief complaint of Pain crisis (08 Feb 2021 08:45)    HPI:  65 year old man with a history of sickle cell disease, acute chest syndrome, cardiomyopathy with mild chronic systolic HF (previous notes suggest a non-ischemia etiology related to high output state from SCD), NSVT during hospitalization in 2019 (treated with beta blockade), mild mental retardation, admitted on 2/4/21 with a pain crisis and Hgb ~ 5.5 (lower than previous baseline) requiring transfusion.  Cardiology consulted for NSVT.  He describes diffuse pain - modestly better over past few days; occasional palpitations but no syncope.  He describes a good baseline functional status ("I walk a lot") with no angina.      2/10/21  Patient heart rate is better after receiving PRBC , did have PSVT  /svt with abberrancy patient denies any chest pain  his sbp low normal range  his hemoglobin is 6.3      PAST MEDICAL & SURGICAL HISTORY:  Sickle cell disease  Hypertension  Constipation  Intellectual disability ~ mild  Acute chest syndrome  H/O transfusion ~ pRBC  Left ventricular dysfunction mild LVD on echo in 7/18, normal LVF in 12/18  Sickle Cell Disease  NSVT    No significant past surgical history    SOCIAL HISTORY:   Alcohol: Denied  Smoking: Nonsmoker      FAMILY HISTORY: * No known heart disease  Family history of sickle cell trait (Mother) ~ presumed in parents &amp; 7 siblings (none suffer w/ the disease, per patient)  FH: hypertension ~ mother    MEDICATIONS  (STANDING):  enoxaparin Injectable 40 milliGRAM(s) SubCutaneous daily  finasteride 5 milliGRAM(s) Oral daily  folic acid 1 milliGRAM(s) Oral daily  lisinopril 10 milliGRAM(s) Oral daily  metoprolol tartrate 12.5 milliGRAM(s) Oral two times a day  sodium chloride 0.45%. 2000 milliLiter(s) (75 mL/Hr) IV Continuous <Continuous>    MEDICATIONS  (PRN):  benzocaine 15 mG/menthol 3.6 mG (Sugar-Free) Lozenge 1 Lozenge Oral every 3 hours PRN Sore Throat  diphenhydrAMINE   Injectable 25 milliGRAM(s) IV Push every 4 hours PRN Rash and/or Itching  HYDROmorphone   Tablet 2 milliGRAM(s) Oral every 3 hours PRN Moderate Pain (4 - 6)  HYDROmorphone   Tablet 4 milliGRAM(s) Oral every 3 hours PRN Severe Pain (7 - 10)    Allergies: No Known Drug Allergies  peanuts (Anaphylaxis)  peanuts (Angioedema)  pork (Unknown)  shellfish (Unknown)    REVIEW OF SYSTEMS:   CONSTITUTIONAL: No fevers or chills; + diffuse pain  Eyes: No visual changes  NECK: No pain or stiffness  RESPIRATORY: No cough, wheezing, hemoptysis; No shortness of breath  CARDIOVASCULAR: No chest pain, + palpitations No syncope  GASTROINTESTINAL:No nausea, vomiting  GENITOURINARY: No dysuria, frequency or hematuria  NEUROLOGICAL: No numbness.  SKIN: No itching or rash  All other review of systems is negative unless indicated above    Vital Signs Last 24 Hrs  T(C): 36.9 (10 Feb 2021 10:10), Max: 36.9 (10 Feb 2021 05:13)  T(F): 98.4 (10 Feb 2021 10:10), Max: 98.5 (10 Feb 2021 05:13)  HR: 91 (10 Feb 2021 10:10) (77 - 94)  BP: 107/58 (10 Feb 2021 10:10) (84/46 - 109/64)  BP(mean): --  RR: 18 (10 Feb 2021 10:10) (18 - 18)  SpO2: 99% (10 Feb 2021 10:10) (93% - 100%)    I&O's Summary    09 Feb 2021 07:01  -  10 Feb 2021 07:00  --------------------------------------------------------  IN: 0 mL / OUT: 850 mL / NET: -850 mL    10 Feb 2021 07:01  -  10 Feb 2021 12:10  --------------------------------------------------------  IN: 0 mL / OUT: 350 mL / NET: -350 mL      PHYSICAL EXAM:  Constitutional: NAD, awake and alert, thin / slender appearance  HEENT:  Pupils round  Pulmonary: Non-labored, breath sounds are clear bilaterally, No wheezing, rales or rhonchi  Cardiovascular: S1 and S2, regular rate and rhythm  Gastrointestinal: Bowel Sounds present, soft, nontender.   Lymph: No peripheral edema. No cervical lymphadenopathy.  Neurological: Alert, no focal deficits  Skin: No rashes.  Psych:  Mood & affect appropriate    LABS:                                 6.4    11.57 )-----------( 358      ( 09 Feb 2021 07:46 )             20.5     02-09    138  |  103  |  26<H>  ----------------------------<  94  4.6   |  26  |  1.12    Ca    8.9      09 Feb 2021 07:46  Phos  3.5     02-09  Mg     2.2     02-09    Transthoracic Echocardiogram (08.30.19 @ 08:02) >  1. Mild global left ventricular systolic dysfunction.  2. Normal right ventricular size and function.  3. Normal tricuspid valve. Mild-moderate tricuspidregurgitation.  4. Estimated pulmonary artery systolic pressure equals 36mm Hg, assuming right atrial pressure equals 10  mm Hg,consistent with borderline pulmonary hypertension.    12 Lead ECG (02.04.21 @ 07:26): Normal sinus rhythm, Voltage criteria for left ventricular hypertrophy    Tele: NSVT (2/10 /21 10 beats; 4 beats); underlying SR  tachycardiac     Xray Chest 1 View- PORTABLE-Urgent (Xray Chest 1 View- PORTABLE-Urgent .) (02.03.21 @ 22:49): No active pulmonary disease. Sclerotic humeri as noted.    < from: US Transthoracic Echocardiogram w/Doppler Complete (02.08.21 @ 21:22) >  Left Ventricle: The left ventricle is normal in size. Increased left ventricular wall thickness. The left ventricular systolic function is low normal with an estimated EF of 50-55%.  Right Ventricle: The right ventricle is normal in size and function.  Left Atrium: The left atrium is dilated.  Right Atrium: The right atrium is dilated.  Mitral Valve: The mitral valve is structurally normal. Mild mitral regurgitation.  Aortic Valve: The aortic valve is structurally normal. Mild aortic regurgitation.  Tricuspid Valve: The tricuspid valve is structurally normal. Mild tricuspid regurgitation. Estimated pulmonary artery systolic pressure is 24 mmHg.  Pulmonic Valve: The pulmonic valve is not well visualized.  Diastolic Function: Impaired diastolic relaxation secondary to age.  Pericardium/Pleura: No pericardial effusion visualized.  Aorta: The aortic root is normal in size.    CONCLUSIONS:  1. Low normal left ventricular systolic function.  2. Increased left ventricular wall thickness.  3. Bi-atrial enlargement.    < end of copied text >

## 2021-02-10 NOTE — PROGRESS NOTE ADULT - SUBJECTIVE AND OBJECTIVE BOX
[INTERVAL HX: ]  Patient seen and examined;  Chart reviewed and events noted;   hospitalist /RN staff reports pt not eating much  Hgb stable, but ferritin low.   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  He was unable to control with Oxycodone at home so came to ED.  His symptoms improved a little with IVF and Dilaudid.  Also found to have EMEKA and Acute on Chronic anemia.    Patient transferred to [place] for bed availability.   He is also complaining of throat discomfort and mild nausea.   Denies Fever, Chills, or other symptoms.  (04 Feb 2021 17:52)        MEDICATIONS  (STANDING):  enoxaparin Injectable 40 milliGRAM(s) SubCutaneous daily  finasteride 5 milliGRAM(s) Oral daily  folic acid 1 milliGRAM(s) Oral daily  iron sucrose IVPB 200 milliGRAM(s) IV Intermittent every 24 hours  lisinopril 2.5 milliGRAM(s) Oral daily  metoprolol tartrate 25 milliGRAM(s) Oral every 12 hours  sodium chloride 0.45%. 2000 milliLiter(s) (75 mL/Hr) IV Continuous <Continuous>    MEDICATIONS  (PRN):  benzocaine 15 mG/menthol 3.6 mG (Sugar-Free) Lozenge 1 Lozenge Oral every 3 hours PRN Sore Throat  diphenhydrAMINE   Injectable 25 milliGRAM(s) IV Push every 4 hours PRN Rash and/or Itching  HYDROmorphone   Tablet 2 milliGRAM(s) Oral every 3 hours PRN Moderate Pain (4 - 6)  HYDROmorphone   Tablet 4 milliGRAM(s) Oral every 3 hours PRN Severe Pain (7 - 10)      Vital Signs Last 24 Hrs  T(C): 36.9 (10 Feb 2021 10:10), Max: 36.9 (10 Feb 2021 05:13)  T(F): 98.4 (10 Feb 2021 10:10), Max: 98.5 (10 Feb 2021 05:13)  HR: 91 (10 Feb 2021 10:10) (77 - 94)  BP: 107/58 (10 Feb 2021 10:10) (84/46 - 109/64)  BP(mean): --  RR: 18 (10 Feb 2021 10:10) (18 - 18)  SpO2: 99% (10 Feb 2021 10:10) (93% - 100%)      [PHYSICAL EXAM]  General: adult in NAD,  WN,  WD.  HEENT: clear oropharynx, anicteric sclera, pink conjunctivae.  Neck: supple, no masses.  CV: normal S1S2, no murmur, no rubs, no gallops.  Lungs: clear to auscultation, no wheezes, no rales, no rhonchi.  Abdomen: soft, non-tender, non-distended, no hepatosplenomegaly, normal BS, no guarding.  Ext: no clubbing, no cyanosis, no edema.  Skin: no rashes,  no petechiae, no venous stasis changes.  Neuro: alert and oriented X3  , no focal motor deficits.  LN: no SC CARLIE.      [LABS:]                        6.4    11.57 )-----------( 358      ( 09 Feb 2021 07:46 )             20.5     02-09    138  |  103  |  26<H>  ----------------------------<  94  4.6   |  26  |  1.12    Ca    8.9      09 Feb 2021 07:46  Phos  3.5     02-09  Mg     2.2     02-09                  [RADIOLOGY STUDIES:]

## 2021-02-10 NOTE — PROGRESS NOTE ADULT - SUBJECTIVE AND OBJECTIVE BOX
Patient is a 65y old  Male who presents with a chief complaint of Pain crisis (08 Feb 2021 08:45)    INTERVAL HPI/OVERNIGHT EVENTS:  reporting poor appetite. palpitations.  hasnt been walking today.     MEDICATIONS  (STANDING):  enoxaparin Injectable 40 milliGRAM(s) SubCutaneous daily  finasteride 5 milliGRAM(s) Oral daily  folic acid 1 milliGRAM(s) Oral daily  lisinopril 10 milliGRAM(s) Oral daily  metoprolol tartrate 12.5 milliGRAM(s) Oral two times a day  sodium chloride 0.45%. 2000 milliLiter(s) (75 mL/Hr) IV Continuous <Continuous>    MEDICATIONS  (PRN):  benzocaine 15 mG/menthol 3.6 mG (Sugar-Free) Lozenge 1 Lozenge Oral every 3 hours PRN Sore Throat  diphenhydrAMINE   Injectable 25 milliGRAM(s) IV Push every 4 hours PRN Rash and/or Itching  HYDROmorphone   Tablet 2 milliGRAM(s) Oral every 3 hours PRN Moderate Pain (4 - 6)  HYDROmorphone   Tablet 4 milliGRAM(s) Oral every 3 hours PRN Severe Pain (7 - 10)      Allergies  No Known Drug Allergies  peanuts (Anaphylaxis)  peanuts (Angioedema)  pork (Unknown)  shellfish (Unknown)    Intolerances  lactose (Unknown)      REVIEW OF SYSTEMS:  see HPI; otherwise negative    Vital Signs Last 24 Hrs  T(C): 36.9 (10 Feb 2021 10:10), Max: 36.9 (10 Feb 2021 05:13)  T(F): 98.4 (10 Feb 2021 10:10), Max: 98.5 (10 Feb 2021 05:13)  HR: 91 (10 Feb 2021 10:10) (77 - 94)  BP: 107/58 (10 Feb 2021 10:10) (84/46 - 109/64)  BP(mean): --  RR: 18 (10 Feb 2021 10:10) (18 - 18)  SpO2: 99% (10 Feb 2021 10:10) (93% - 100%)    PHYSICAL EXAM:  GENERAL: NAD, well-groomed, well-developed  HEAD:  Atraumatic, Normocephalic  EYES:  conjunctiva and sclera clear  ENMT: Moist mucous membranes  NECK: Supple, No JVD  NERVOUS SYSTEM:  Alert & Oriented X3, Good concentration; All 4 extremities mobile, no gross sensory deficits.   CHEST/LUNG: Clear to auscultation bilaterally; No rales, rhonchi, wheezing, or rubs  HEART: Regular rate and rhythm; No murmurs, rubs, or gallops  ABDOMEN: Soft, Nontender, Nondistended; Bowel sounds present  EXTREMITIES:  2+ Peripheral Pulses, No clubbing, cyanosis, or edema      LABS:      Ca    8.9        09 Feb 2021 07:46          CAPILLARY BLOOD GLUCOSE          RADIOLOGY & ADDITIONAL TESTS:    Imaging Personally Reviewed:  [ ] YES     Consultant(s) Notes Reviewed:      Care Discussed with Consultants/Other Providers:    Advanced Directives: [ ] DNR  [ ] No feeding tube  [ ] MOLST in chart  [ ] MOLST completed today  [ ] Unknown

## 2021-02-11 LAB
ANION GAP SERPL CALC-SCNC: 6 MMOL/L — SIGNIFICANT CHANGE UP (ref 5–17)
BLD GP AB SCN SERPL QL: SIGNIFICANT CHANGE UP
BUN SERPL-MCNC: 31 MG/DL — HIGH (ref 7–23)
CALCIUM SERPL-MCNC: 8.8 MG/DL — SIGNIFICANT CHANGE UP (ref 8.4–10.5)
CHLORIDE SERPL-SCNC: 106 MMOL/L — SIGNIFICANT CHANGE UP (ref 96–108)
CO2 SERPL-SCNC: 26 MMOL/L — SIGNIFICANT CHANGE UP (ref 22–31)
CREAT SERPL-MCNC: 1.12 MG/DL — SIGNIFICANT CHANGE UP (ref 0.5–1.3)
DIR ANTIGLOB POLYSPECIFIC INTERPRETATION: SIGNIFICANT CHANGE UP
GLUCOSE SERPL-MCNC: 89 MG/DL — SIGNIFICANT CHANGE UP (ref 70–99)
HCT VFR BLD CALC: 18.6 % — CRITICAL LOW (ref 39–50)
HGB BLD-MCNC: 5.8 G/DL — CRITICAL LOW (ref 13–17)
MAGNESIUM SERPL-MCNC: 2.4 MG/DL — SIGNIFICANT CHANGE UP (ref 1.6–2.6)
MCHC RBC-ENTMCNC: 22.9 PG — LOW (ref 27–34)
MCHC RBC-ENTMCNC: 31.2 GM/DL — LOW (ref 32–36)
MCV RBC AUTO: 73.5 FL — LOW (ref 80–100)
NRBC # BLD: 2 /100 WBCS — HIGH (ref 0–0)
PLATELET # BLD AUTO: 293 K/UL — SIGNIFICANT CHANGE UP (ref 150–400)
POTASSIUM SERPL-MCNC: 4.7 MMOL/L — SIGNIFICANT CHANGE UP (ref 3.5–5.3)
POTASSIUM SERPL-SCNC: 4.7 MMOL/L — SIGNIFICANT CHANGE UP (ref 3.5–5.3)
RBC # BLD: 2.53 M/UL — LOW (ref 4.2–5.8)
RBC # FLD: 23.6 % — HIGH (ref 10.3–14.5)
SODIUM SERPL-SCNC: 138 MMOL/L — SIGNIFICANT CHANGE UP (ref 135–145)
WBC # BLD: 9.3 K/UL — SIGNIFICANT CHANGE UP (ref 3.8–10.5)
WBC # FLD AUTO: 9.3 K/UL — SIGNIFICANT CHANGE UP (ref 3.8–10.5)

## 2021-02-11 PROCEDURE — 93010 ELECTROCARDIOGRAM REPORT: CPT

## 2021-02-11 PROCEDURE — 99233 SBSQ HOSP IP/OBS HIGH 50: CPT

## 2021-02-11 RX ADMIN — Medication 1 MILLIGRAM(S): at 12:56

## 2021-02-11 RX ADMIN — Medication 25 MILLIGRAM(S): at 18:35

## 2021-02-11 RX ADMIN — Medication 25 MILLIGRAM(S): at 12:56

## 2021-02-11 RX ADMIN — IRON SUCROSE 110 MILLIGRAM(S): 20 INJECTION, SOLUTION INTRAVENOUS at 15:40

## 2021-02-11 RX ADMIN — FINASTERIDE 5 MILLIGRAM(S): 5 TABLET, FILM COATED ORAL at 12:56

## 2021-02-11 RX ADMIN — ENOXAPARIN SODIUM 40 MILLIGRAM(S): 100 INJECTION SUBCUTANEOUS at 12:58

## 2021-02-11 RX ADMIN — HYDROMORPHONE HYDROCHLORIDE 4 MILLIGRAM(S): 2 INJECTION INTRAMUSCULAR; INTRAVENOUS; SUBCUTANEOUS at 18:34

## 2021-02-11 NOTE — PROGRESS NOTE ADULT - SUBJECTIVE AND OBJECTIVE BOX
Interval History:  resting comfortably without pain at the moment  Chart reviewed and events noted;   Overnight events:    MEDICATIONS  (STANDING):  enoxaparin Injectable 40 milliGRAM(s) SubCutaneous daily  finasteride 5 milliGRAM(s) Oral daily  folic acid 1 milliGRAM(s) Oral daily  iron sucrose IVPB 200 milliGRAM(s) IV Intermittent every 24 hours  metoprolol tartrate 25 milliGRAM(s) Oral every 12 hours  sodium chloride 0.45%. 2000 milliLiter(s) (75 mL/Hr) IV Continuous <Continuous>    MEDICATIONS  (PRN):  benzocaine 15 mG/menthol 3.6 mG (Sugar-Free) Lozenge 1 Lozenge Oral every 3 hours PRN Sore Throat  diphenhydrAMINE   Injectable 25 milliGRAM(s) IV Push every 4 hours PRN Rash and/or Itching  HYDROmorphone   Tablet 2 milliGRAM(s) Oral every 3 hours PRN Moderate Pain (4 - 6)  HYDROmorphone   Tablet 4 milliGRAM(s) Oral every 3 hours PRN Severe Pain (7 - 10)      Vital Signs Last 24 Hrs  T(C): 36.2 (11 Feb 2021 10:00), Max: 36.9 (11 Feb 2021 01:37)  T(F): 97.2 (11 Feb 2021 10:00), Max: 98.5 (11 Feb 2021 01:37)  HR: 59 (11 Feb 2021 10:00) (59 - 103)  BP: 117/60 (11 Feb 2021 10:00) (96/60 - 117/60)  BP(mean): --  RR: 18 (11 Feb 2021 10:00) (18 - 18)  SpO2: 96% (11 Feb 2021 10:00) (93% - 100%)    PHYSICAL EXAM  General: adult in NAD  HEENT: clear oropharynx, anicteric sclera, pink conjunctivae  Neck: supple  CV: normal S1S2 with no murmur rubs or gallops  Lungs: clear to auscultation, no wheezes, no rhales  Abdomen: soft non-tender non-distended, no hepato/splenomegaly  Ext: no clubbing cyanosis or edema  Skin: no rashes and no petichiae  Neuro: alert and oriented X3 no focal deficits      LABS:  CBC Full  -  ( 11 Feb 2021 08:08 )  WBC Count : 9.30 K/uL  RBC Count : 2.53 M/uL  Hemoglobin : 5.8 g/dL  Hematocrit : 18.6 %  Platelet Count - Automated : 293 K/uL  Mean Cell Volume : 73.5 fl  Mean Cell Hemoglobin : 22.9 pg  Mean Cell Hemoglobin Concentration : 31.2 gm/dL  Auto Neutrophil # : x  Auto Lymphocyte # : x  Auto Monocyte # : x  Auto Eosinophil # : x  Auto Basophil # : x  Auto Neutrophil % : x  Auto Lymphocyte % : x  Auto Monocyte % : x  Auto Eosinophil % : x  Auto Basophil % : x    02-11    138  |  106  |  31<H>  ----------------------------<  89  4.7   |  26  |  1.12    Ca    8.8      11 Feb 2021 08:08  Mg     2.4     02-11          fe studies      WBC trend  9.30 K/uL (02-11-21 @ 08:08)  11.57 K/uL (02-09-21 @ 07:46)      Hgb trend  5.8 g/dL (02-11-21 @ 08:08)  6.4 g/dL (02-09-21 @ 07:46)      plt trend  293 K/uL (02-11-21 @ 08:08)  358 K/uL (02-09-21 @ 07:46)        RADIOLOGY & ADDITIONAL STUDIES:    < from: Xray Upper GI + Small Bowel Series (04.04.11 @ 11:12) >  EXAM:  RAD GI SERIES W/SMALL BOWEL    PROCEDURE DATE:  Apr 4 2011   .    INTERPRETATION:  HISTORY: Sickle cell disease with anemia and fevers.    An upper GI and small bowel series is performed utilizing thin barium as   the contrast agent on 04/04/2011.    COMPARISON: No comparable studies.     FINDINGS:  Preliminary  radiograph of the abdomen demonstrates an unremarkable   bowel gas pattern. Rounded high density overlying the right upper   quadrant likely gallstones as noted on abdominal sonogram dated   03/28/2007.    The esophagus is structurally normal without intraluminal filling defects   or extrinsic compression.  The esophageal mucosa appears normal.    The stomach demonstrates normal distensibility. No mass or ulceration is   identified.  The duodenal bulb and sweep are unremarkable.  No duodenal   ulcers are demonstrated.  No hiatal hernia or gastroesophageal reflux was   demonstrated.    The mucosal pattern of the small bowel is normal.  No intrinsic or   extrinsic lesions of the mesenteric small bowel are demonstrated.   Small   bowel transit time is normal.        IMPRESSION:     Unremarkable upper GI and small bowel series.  Rounded high density structures overlying the right quadrant, likely   gallstones as demonstrated on abdominal ultrasound dated 03/28/2007.        TORIN BACON M.D., RESIDENT RADIOLOGIST  NATHALY AYALA M.D.; ATTENDING RADIOLOGIST  This examination was interpreted on:  Apr 4 2011  2:49P.  This document   has been electronically signed. Apr 5 2011  1:38P.            < end of copied text >

## 2021-02-11 NOTE — PROGRESS NOTE ADULT - SUBJECTIVE AND OBJECTIVE BOX
Patient is a 65y old  Male who presents with a chief complaint of sickle cell crisis tachycardia (11 Feb 2021 08:23)    INTERVAL HPI/OVERNIGHT EVENTS:  feeling better today,  but still not eating or walking enough.   no palpitations today.     MEDICATIONS  (STANDING):  enoxaparin Injectable 40 milliGRAM(s) SubCutaneous daily  finasteride 5 milliGRAM(s) Oral daily  folic acid 1 milliGRAM(s) Oral daily  metoprolol tartrate 25 milliGRAM(s) Oral every 12 hours  sodium chloride 0.45%. 2000 milliLiter(s) (75 mL/Hr) IV Continuous <Continuous>    MEDICATIONS  (PRN):  benzocaine 15 mG/menthol 3.6 mG (Sugar-Free) Lozenge 1 Lozenge Oral every 3 hours PRN Sore Throat  diphenhydrAMINE   Injectable 25 milliGRAM(s) IV Push every 4 hours PRN Rash and/or Itching  HYDROmorphone   Tablet 2 milliGRAM(s) Oral every 3 hours PRN Moderate Pain (4 - 6)  HYDROmorphone   Tablet 4 milliGRAM(s) Oral every 3 hours PRN Severe Pain (7 - 10)      Allergies  No Known Drug Allergies  peanuts (Anaphylaxis)  peanuts (Angioedema)  pork (Unknown)  shellfish (Unknown)    Intolerances  lactose (Unknown)      REVIEW OF SYSTEMS:  reviewed, negative aside from HPI    Vital Signs Last 24 Hrs  T(C): 37 (11 Feb 2021 13:30), Max: 37 (11 Feb 2021 13:30)  T(F): 98.6 (11 Feb 2021 13:30), Max: 98.6 (11 Feb 2021 13:30)  HR: 73 (11 Feb 2021 13:30) (59 - 76)  BP: 119/67 (11 Feb 2021 13:30) (99/62 - 119/67)  BP(mean): --  RR: 17 (11 Feb 2021 13:30) (17 - 18)  SpO2: 99% (11 Feb 2021 13:30) (93% - 100%)    PHYSICAL EXAM:  GENERAL: NAD, well-groomed, well-developed  HEAD:  Atraumatic, Normocephalic  EYES: conjunctiva and sclera clear  ENMT: Moist mucous membranes  NECK: Supple, No JVD  NERVOUS SYSTEM:  Alert & Oriented X3, Good concentration; All 4 extremities mobile, no gross sensory deficits.   CHEST/LUNG: Clear to auscultation bilaterally; No rales, rhonchi, wheezing, or rubs  HEART: Regular rate and rhythm; No murmurs, rubs, or gallops  ABDOMEN: Soft, Nontender, Nondistended; Bowel sounds present  EXTREMITIES:  2+ Peripheral Pulses, No clubbing, cyanosis, or edema  LYMPH: No lymphadenopathy noted  SKIN: No rashes or lesions    LABS:                        5.8    9.30  )-----------( 293      ( 11 Feb 2021 08:08 )             18.6     11 Feb 2021 08:08    138    |  106    |  31     ----------------------------<  89     4.7     |  26     |  1.12     Ca    8.8        11 Feb 2021 08:08  Mg     2.4       11 Feb 2021 08:08          CAPILLARY BLOOD GLUCOSE          RADIOLOGY & ADDITIONAL TESTS:    Imaging Personally Reviewed:  [ ] YES     Consultant(s) Notes Reviewed:      Care Discussed with Consultants/Other Providers:    Advanced Directives: [ ] DNR  [ ] No feeding tube  [ ] MOLST in chart  [ ] MOLST completed today  [ ] Unknown

## 2021-02-11 NOTE — PROGRESS NOTE ADULT - SUBJECTIVE AND OBJECTIVE BOX
CHIEF COMPLAINT: Patient is a 65y old  Male who presents with a chief complaint of Pain crisis (08 Feb 2021 08:45)    HPI:  65 year old man with a history of sickle cell disease, acute chest syndrome, cardiomyopathy with mild chronic systolic HF (previous notes suggest a non-ischemia etiology related to high output state from SCD), NSVT during hospitalization in 2019 (treated with beta blockade), mild mental retardation, admitted on 2/4/21 with a pain crisis and Hgb ~ 5.5 (lower than previous baseline) requiring transfusion.  Cardiology consulted for NSVT.  He describes diffuse pain - modestly better over past few days; occasional palpitations but no syncope.  He describes a good baseline functional status ("I walk a lot") with no angina.      2/10/21  Patient heart rate is better after receiving PRBC , did have PSVT  /svt with abberrancy patient denies any chest pain  his sbp low normal range  his hemoglobin is 6.3      PAST MEDICAL & SURGICAL HISTORY:  Sickle cell disease  Hypertension  Constipation  Intellectual disability ~ mild  Acute chest syndrome  H/O transfusion ~ pRBC  Left ventricular dysfunction mild LVD on echo in 7/18, normal LVF in 12/18  Sickle Cell Disease  NSVT    No significant past surgical history    SOCIAL HISTORY:   Alcohol: Denied  Smoking: Nonsmoker      FAMILY HISTORY: * No known heart disease  Family history of sickle cell trait (Mother) ~ presumed in parents &amp; 7 siblings (none suffer w/ the disease, per patient)  FH: hypertension ~ mother    MEDICATIONS  (STANDING):  enoxaparin Injectable 40 milliGRAM(s) SubCutaneous daily  finasteride 5 milliGRAM(s) Oral daily  folic acid 1 milliGRAM(s) Oral daily  lisinopril 10 milliGRAM(s) Oral daily  metoprolol tartrate 12.5 milliGRAM(s) Oral two times a day  sodium chloride 0.45%. 2000 milliLiter(s) (75 mL/Hr) IV Continuous <Continuous>    MEDICATIONS  (PRN):  benzocaine 15 mG/menthol 3.6 mG (Sugar-Free) Lozenge 1 Lozenge Oral every 3 hours PRN Sore Throat  diphenhydrAMINE   Injectable 25 milliGRAM(s) IV Push every 4 hours PRN Rash and/or Itching  HYDROmorphone   Tablet 2 milliGRAM(s) Oral every 3 hours PRN Moderate Pain (4 - 6)  HYDROmorphone   Tablet 4 milliGRAM(s) Oral every 3 hours PRN Severe Pain (7 - 10)    Allergies: No Known Drug Allergies  peanuts (Anaphylaxis)  peanuts (Angioedema)  pork (Unknown)  shellfish (Unknown)    REVIEW OF SYSTEMS:   CONSTITUTIONAL: No fevers or chills; + diffuse pain  Eyes: No visual changes  NECK: No pain or stiffness  RESPIRATORY: No cough, wheezing, hemoptysis; No shortness of breath  CARDIOVASCULAR: No chest pain, + palpitations No syncope  GASTROINTESTINAL:No nausea, vomiting  GENITOURINARY: No dysuria, frequency or hematuria  NEUROLOGICAL: No numbness.  SKIN: No itching or rash  All other review of systems is negative unless indicated above    Vital Signs Last 24 Hrs  T(C): 36.9 (10 Feb 2021 10:10), Max: 36.9 (10 Feb 2021 05:13)  T(F): 98.4 (10 Feb 2021 10:10), Max: 98.5 (10 Feb 2021 05:13)  HR: 91 (10 Feb 2021 10:10) (77 - 94)  BP: 107/58 (10 Feb 2021 10:10) (84/46 - 109/64)  BP(mean): --  RR: 18 (10 Feb 2021 10:10) (18 - 18)  SpO2: 99% (10 Feb 2021 10:10) (93% - 100%)    I&O's Summary    09 Feb 2021 07:01  -  10 Feb 2021 07:00  --------------------------------------------------------  IN: 0 mL / OUT: 850 mL / NET: -850 mL    10 Feb 2021 07:01  -  10 Feb 2021 12:10  --------------------------------------------------------  IN: 0 mL / OUT: 350 mL / NET: -350 mL      PHYSICAL EXAM:  Constitutional: NAD, awake and alert, thin / slender appearance  HEENT:  Pupils round  Pulmonary: Non-labored, breath sounds are clear bilaterally, No wheezing, rales or rhonchi  Cardiovascular: S1 and S2, regular rate and rhythm  Gastrointestinal: Bowel Sounds present, soft, nontender.   Lymph: No peripheral edema. No cervical lymphadenopathy.  Neurological: Alert, no focal deficits  Skin: No rashes.  Psych:  Mood & affect appropriate    LABS:                                 6.4    11.57 )-----------( 358      ( 09 Feb 2021 07:46 )             20.5     02-09    138  |  103  |  26<H>  ----------------------------<  94  4.6   |  26  |  1.12    Ca    8.9      09 Feb 2021 07:46  Phos  3.5     02-09  Mg     2.2     02-09    Transthoracic Echocardiogram (08.30.19 @ 08:02) >  1. Mild global left ventricular systolic dysfunction.  2. Normal right ventricular size and function.  3. Normal tricuspid valve. Mild-moderate tricuspidregurgitation.  4. Estimated pulmonary artery systolic pressure equals 36mm Hg, assuming right atrial pressure equals 10  mm Hg,consistent with borderline pulmonary hypertension.    12 Lead ECG (02.04.21 @ 07:26): Normal sinus rhythm, Voltage criteria for left ventricular hypertrophy    Tele: NSVT (2/10 /21 10 beats; 4 beats); underlying SR  tachycardiac     Xray Chest 1 View- PORTABLE-Urgent (Xray Chest 1 View- PORTABLE-Urgent .) (02.03.21 @ 22:49): No active pulmonary disease. Sclerotic humeri as noted.    < from: US Transthoracic Echocardiogram w/Doppler Complete (02.08.21 @ 21:22) >  Left Ventricle: The left ventricle is normal in size. Increased left ventricular wall thickness. The left ventricular systolic function is low normal with an estimated EF of 50-55%.  Right Ventricle: The right ventricle is normal in size and function.  Left Atrium: The left atrium is dilated.  Right Atrium: The right atrium is dilated.  Mitral Valve: The mitral valve is structurally normal. Mild mitral regurgitation.  Aortic Valve: The aortic valve is structurally normal. Mild aortic regurgitation.  Tricuspid Valve: The tricuspid valve is structurally normal. Mild tricuspid regurgitation. Estimated pulmonary artery systolic pressure is 24 mmHg.  Pulmonic Valve: The pulmonic valve is not well visualized.  Diastolic Function: Impaired diastolic relaxation secondary to age.  Pericardium/Pleura: No pericardial effusion visualized.  Aorta: The aortic root is normal in size.    CONCLUSIONS:  1. Low normal left ventricular systolic function.  2. Increased left ventricular wall thickness.  3. Bi-atrial enlargement.    < end of copied text >       CHIEF COMPLAINT: Patient is a 65y old  Male who presents with a chief complaint of Pain crisis (08 Feb 2021 08:45)    HPI:  65 year old man with a history of sickle cell disease, acute chest syndrome, cardiomyopathy with mild chronic systolic HF (previous notes suggest a non-ischemia etiology related to high output state from SCD), NSVT during hospitalization in 2019 (treated with beta blockade), mild mental retardation, admitted on 2/4/21 with a pain crisis and Hgb ~ 5.5 (lower than previous baseline) requiring transfusion.  Cardiology consulted for NSVT.  He describes diffuse pain - modestly better over past few days; occasional palpitations but no syncope.  He describes a good baseline functional status ("I walk a lot") with no angina.      2/10/21  Patient heart rate is better after receiving PRBC , did have PSVT  /svt with abberrancy patient denies any chest pain  his sbp low normal range  his hemoglobin is 6.3    2/11/21 Patient is feeling better rate is better ,  still severely anemic , low normal sbp     PAST MEDICAL & SURGICAL HISTORY:  Sickle cell disease  Hypertension  Constipation  Intellectual disability ~ mild  Acute chest syndrome  H/O transfusion ~ pRBC  Left ventricular dysfunction mild LVD on echo in 7/18, normal LVF in 12/18  Sickle Cell Disease  NSVT    No significant past surgical history    SOCIAL HISTORY:   Alcohol: Denied  Smoking: Nonsmoker      FAMILY HISTORY: * No known heart disease  Family history of sickle cell trait (Mother) ~ presumed in parents &amp; 7 siblings (none suffer w/ the disease, per patient)  FH: hypertension ~ mother  MEDICATIONS  (STANDING):  enoxaparin Injectable 40 milliGRAM(s) SubCutaneous daily  finasteride 5 milliGRAM(s) Oral daily  folic acid 1 milliGRAM(s) Oral daily  iron sucrose IVPB 200 milliGRAM(s) IV Intermittent every 24 hours  lisinopril 2.5 milliGRAM(s) Oral daily  metoprolol tartrate 25 milliGRAM(s) Oral every 12 hours  sodium chloride 0.45%. 2000 milliLiter(s) (75 mL/Hr) IV Continuous <Continuous>    MEDICATIONS  (PRN):  benzocaine 15 mG/menthol 3.6 mG (Sugar-Free) Lozenge 1 Lozenge Oral every 3 hours PRN Sore Throat  diphenhydrAMINE   Injectable 25 milliGRAM(s) IV Push every 4 hours PRN Rash and/or Itching  HYDROmorphone   Tablet 2 milliGRAM(s) Oral every 3 hours PRN Moderate Pain (4 - 6)  HYDROmorphone   Tablet 4 milliGRAM(s) Oral every 3 hours PRN Severe Pain (7 - 10)    Allergies: No Known Drug Allergies  peanuts (Anaphylaxis)  peanuts (Angioedema)  pork (Unknown)  shellfish (Unknown)    REVIEW OF SYSTEMS:   CONSTITUTIONAL: No fevers or chills; + diffuse pain  Eyes: No visual changes  NECK: No pain or stiffness  RESPIRATORY: No cough, wheezing, hemoptysis; No shortness of breath  CARDIOVASCULAR: No chest pain, + palpitations No syncope  GASTROINTESTINAL:No nausea, vomiting  GENITOURINARY: No dysuria, frequency or hematuria  NEUROLOGICAL: No numbness.  SKIN: No itching or rash  All other review of systems is negative unless indicated above    Vital Signs Last 24 Hrs  T(C): 36.7 (11 Feb 2021 05:31), Max: 36.9 (10 Feb 2021 10:10)  T(F): 98.1 (11 Feb 2021 05:31), Max: 98.5 (11 Feb 2021 01:37)  HR: 59 (11 Feb 2021 05:31) (59 - 103)  BP: 105/62 (11 Feb 2021 05:31) (96/60 - 116/64)  BP(mean): --  RR: 18 (11 Feb 2021 05:31) (18 - 18)  SpO2: 99% (11 Feb 2021 05:31) (93% - 100%)    I&O's Summary    10 Feb 2021 07:01  -  11 Feb 2021 07:00  --------------------------------------------------------  IN: 1100 mL / OUT: 1700 mL / NET: -600 mL        PHYSICAL EXAM:  Constitutional: NAD, awake and alert, thin / slender appearance  HEENT:  Pupils round  Pulmonary: Non-labored, breath sounds are clear bilaterally, No wheezing, rales or rhonchi  Cardiovascular: S1 and S2, regular rate and rhythm  Gastrointestinal: Bowel Sounds present, soft, nontender.   Lymph: No peripheral edema. No cervical lymphadenopathy.  Neurological: Alert, no focal deficits  Skin: No rashes.  Psych:  Mood & affect appropriate    LABS:                                              5.8    9.30  )-----------( 293      ( 11 Feb 2021 08:08 )             18.6     02-11    138  |  106  |  31<H>  ----------------------------<  89  4.7   |  26  |  1.12    Ca    8.8      11 Feb 2021 08:08  Mg     2.4     02-11                    Transthoracic Echocardiogram (08.30.19 @ 08:02) >  1. Mild global left ventricular systolic dysfunction.  2. Normal right ventricular size and function.  3. Normal tricuspid valve. Mild-moderate tricuspidregurgitation.  4. Estimated pulmonary artery systolic pressure equals 36mm Hg, assuming right atrial pressure equals 10  mm Hg,consistent with borderline pulmonary hypertension.    12 Lead ECG (02.04.21 @ 07:26): Normal sinus rhythm, Voltage criteria for left ventricular hypertrophy    Tele: NSVT (2/10 /21 10 beats; 4 beats); underlying SR  tachycardiac     Xray Chest 1 View- PORTABLE-Urgent (Xray Chest 1 View- PORTABLE-Urgent .) (02.03.21 @ 22:49): No active pulmonary disease. Sclerotic humeri as noted.    < from: US Transthoracic Echocardiogram w/Doppler Complete (02.08.21 @ 21:22) >  Left Ventricle: The left ventricle is normal in size. Increased left ventricular wall thickness. The left ventricular systolic function is low normal with an estimated EF of 50-55%.  Right Ventricle: The right ventricle is normal in size and function.  Left Atrium: The left atrium is dilated.  Right Atrium: The right atrium is dilated.  Mitral Valve: The mitral valve is structurally normal. Mild mitral regurgitation.  Aortic Valve: The aortic valve is structurally normal. Mild aortic regurgitation.  Tricuspid Valve: The tricuspid valve is structurally normal. Mild tricuspid regurgitation. Estimated pulmonary artery systolic pressure is 24 mmHg.  Pulmonic Valve: The pulmonic valve is not well visualized.  Diastolic Function: Impaired diastolic relaxation secondary to age.  Pericardium/Pleura: No pericardial effusion visualized.  Aorta: The aortic root is normal in size.    CONCLUSIONS:  1. Low normal left ventricular systolic function.  2. Increased left ventricular wall thickness.  3. Bi-atrial enlargement.    < end of copied text >

## 2021-02-11 NOTE — PROGRESS NOTE ADULT - PROBLEM SELECTOR PLAN 1
Mild chronic systolic dysfunction possibly due to chronic sickle cell anemia hyperdynamic LV dysfunction with subsequent systolic dysfunction.

## 2021-02-11 NOTE — PROGRESS NOTE ADULT - PROBLEM SELECTOR PLAN 1
PO pain medication, oral hydration  Acute on Chronic Anemia s/p 1 unit - considering todays h/h a second unit was ordered but not yet available.   Encourage oral fluids and food.  encourage ambulation.

## 2021-02-12 DIAGNOSIS — F70 MILD INTELLECTUAL DISABILITIES: ICD-10-CM

## 2021-02-12 DIAGNOSIS — I11.0 HYPERTENSIVE HEART DISEASE WITH HEART FAILURE: ICD-10-CM

## 2021-02-12 DIAGNOSIS — N17.9 ACUTE KIDNEY FAILURE, UNSPECIFIED: ICD-10-CM

## 2021-02-12 DIAGNOSIS — I50.22 CHRONIC SYSTOLIC (CONGESTIVE) HEART FAILURE: ICD-10-CM

## 2021-02-12 DIAGNOSIS — D57.00 HB-SS DISEASE WITH CRISIS, UNSPECIFIED: ICD-10-CM

## 2021-02-12 DIAGNOSIS — N40.0 BENIGN PROSTATIC HYPERPLASIA WITHOUT LOWER URINARY TRACT SYMPTOMS: ICD-10-CM

## 2021-02-12 DIAGNOSIS — Z91.010 ALLERGY TO PEANUTS: ICD-10-CM

## 2021-02-12 DIAGNOSIS — Z91.013 ALLERGY TO SEAFOOD: ICD-10-CM

## 2021-02-12 DIAGNOSIS — Z79.891 LONG TERM (CURRENT) USE OF OPIATE ANALGESIC: ICD-10-CM

## 2021-02-12 DIAGNOSIS — K59.00 CONSTIPATION, UNSPECIFIED: ICD-10-CM

## 2021-02-12 LAB
% ALBUMIN: 43.5 % — SIGNIFICANT CHANGE UP
% ALPHA 1: 6.2 % — SIGNIFICANT CHANGE UP
% ALPHA 2: 6.9 % — SIGNIFICANT CHANGE UP
% BETA: 24.2 % — SIGNIFICANT CHANGE UP
% GAMMA: 19.2 % — SIGNIFICANT CHANGE UP
ALBUMIN SERPL ELPH-MCNC: 3.1 G/DL — LOW (ref 3.6–5.5)
ALBUMIN/GLOB SERPL ELPH: 0.8 RATIO — SIGNIFICANT CHANGE UP
ALPHA1 GLOB SERPL ELPH-MCNC: 0.4 G/DL — SIGNIFICANT CHANGE UP (ref 0.1–0.4)
ALPHA2 GLOB SERPL ELPH-MCNC: 0.5 G/DL — SIGNIFICANT CHANGE UP (ref 0.5–1)
B-GLOBULIN SERPL ELPH-MCNC: 1.7 G/DL — HIGH (ref 0.5–1)
GAMMA GLOBULIN: 1.4 G/DL — SIGNIFICANT CHANGE UP (ref 0.6–1.6)
INTERPRETATION SERPL IFE-IMP: SIGNIFICANT CHANGE UP
PROT PATTERN SERPL ELPH-IMP: SIGNIFICANT CHANGE UP
PROT SERPL-MCNC: 7.1 G/DL — SIGNIFICANT CHANGE UP (ref 6–8.3)
PROT SERPL-MCNC: 7.1 G/DL — SIGNIFICANT CHANGE UP (ref 6–8.3)

## 2021-02-12 PROCEDURE — 99233 SBSQ HOSP IP/OBS HIGH 50: CPT

## 2021-02-12 PROCEDURE — 99232 SBSQ HOSP IP/OBS MODERATE 35: CPT

## 2021-02-12 PROCEDURE — 36573 INSJ PICC RS&I 5 YR+: CPT | Mod: 53

## 2021-02-12 PROCEDURE — ZZZZZ: CPT

## 2021-02-12 RX ORDER — HYDROMORPHONE HYDROCHLORIDE 2 MG/ML
1 INJECTION INTRAMUSCULAR; INTRAVENOUS; SUBCUTANEOUS
Refills: 0 | Status: DISCONTINUED | OUTPATIENT
Start: 2021-02-12 | End: 2021-02-19

## 2021-02-12 RX ORDER — SODIUM CHLORIDE 9 MG/ML
10 INJECTION INTRAMUSCULAR; INTRAVENOUS; SUBCUTANEOUS
Refills: 0 | Status: DISCONTINUED | OUTPATIENT
Start: 2021-02-12 | End: 2021-02-23

## 2021-02-12 RX ORDER — CHLORHEXIDINE GLUCONATE 213 G/1000ML
1 SOLUTION TOPICAL
Refills: 0 | Status: DISCONTINUED | OUTPATIENT
Start: 2021-02-12 | End: 2021-02-23

## 2021-02-12 RX ADMIN — Medication 25 MILLIGRAM(S): at 18:09

## 2021-02-12 RX ADMIN — FINASTERIDE 5 MILLIGRAM(S): 5 TABLET, FILM COATED ORAL at 11:21

## 2021-02-12 RX ADMIN — HYDROMORPHONE HYDROCHLORIDE 4 MILLIGRAM(S): 2 INJECTION INTRAMUSCULAR; INTRAVENOUS; SUBCUTANEOUS at 12:57

## 2021-02-12 RX ADMIN — HYDROMORPHONE HYDROCHLORIDE 4 MILLIGRAM(S): 2 INJECTION INTRAMUSCULAR; INTRAVENOUS; SUBCUTANEOUS at 09:35

## 2021-02-12 RX ADMIN — Medication 1 MILLIGRAM(S): at 11:21

## 2021-02-12 RX ADMIN — ENOXAPARIN SODIUM 40 MILLIGRAM(S): 100 INJECTION SUBCUTANEOUS at 11:21

## 2021-02-12 RX ADMIN — Medication 25 MILLIGRAM(S): at 06:06

## 2021-02-12 RX ADMIN — HYDROMORPHONE HYDROCHLORIDE 4 MILLIGRAM(S): 2 INJECTION INTRAMUSCULAR; INTRAVENOUS; SUBCUTANEOUS at 23:16

## 2021-02-12 RX ADMIN — HYDROMORPHONE HYDROCHLORIDE 4 MILLIGRAM(S): 2 INJECTION INTRAMUSCULAR; INTRAVENOUS; SUBCUTANEOUS at 00:04

## 2021-02-12 RX ADMIN — HYDROMORPHONE HYDROCHLORIDE 1 MILLIGRAM(S): 2 INJECTION INTRAMUSCULAR; INTRAVENOUS; SUBCUTANEOUS at 15:59

## 2021-02-12 RX ADMIN — HYDROMORPHONE HYDROCHLORIDE 4 MILLIGRAM(S): 2 INJECTION INTRAMUSCULAR; INTRAVENOUS; SUBCUTANEOUS at 20:13

## 2021-02-12 NOTE — PROGRESS NOTE ADULT - SUBJECTIVE AND OBJECTIVE BOX
[INTERVAL HX: ]  Patient seen and examined;  Chart reviewed and events noted;   c/o pain all over    does not feel better today.   Hgb slight lower.     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]    Sickle cell disease [D57.1]    Hypertension [I10]    Constipation [K59.00]    Intellectual disability [F79]    Acute chest syndrome [D57.01]    H/O transfusion [Z92.89]    Left ventricular dysfunction [I51.9]    Sickle Cell Disease [282.60]    No significant past surgical history [122731497]    No significant past surgical history [[id number]]      HPI:  65M HbSS disease with multiple admissions in past including for acute chest baseline HgB about 6.5, chronic CHF, Mild intellectual disability, BPH presented to [facility] Ed with generalized body aches worst in abdomen and back.  He was unable to control with Oxycodone at home so came to ED.  His symptoms improved a little with IVF and Dilaudid.  Also found to have EMEKA and Acute on Chronic anemia.    Patient transferred to [place] for bed availability.   He is also complaining of throat discomfort and mild nausea.   Denies Fever, Chills, or other symptoms.  (04 Feb 2021 17:52)        MEDICATIONS  (STANDING):  enoxaparin Injectable 40 milliGRAM(s) SubCutaneous daily  finasteride 5 milliGRAM(s) Oral daily  folic acid 1 milliGRAM(s) Oral daily  metoprolol tartrate 25 milliGRAM(s) Oral every 12 hours  sodium chloride 0.45%. 2000 milliLiter(s) (75 mL/Hr) IV Continuous <Continuous>    MEDICATIONS  (PRN):  benzocaine 15 mG/menthol 3.6 mG (Sugar-Free) Lozenge 1 Lozenge Oral every 3 hours PRN Sore Throat  diphenhydrAMINE   Injectable 25 milliGRAM(s) IV Push every 4 hours PRN Rash and/or Itching  HYDROmorphone   Tablet 2 milliGRAM(s) Oral every 3 hours PRN Moderate Pain (4 - 6)  HYDROmorphone   Tablet 4 milliGRAM(s) Oral every 3 hours PRN Severe Pain (7 - 10)      Vital Signs Last 24 Hrs  T(C): 36.4 (12 Feb 2021 05:39), Max: 37 (11 Feb 2021 13:30)  T(F): 97.6 (12 Feb 2021 05:39), Max: 98.6 (11 Feb 2021 13:30)  HR: 72 (12 Feb 2021 05:39) (59 - 73)  BP: 134/81 (12 Feb 2021 05:39) (103/60 - 134/81)  BP(mean): --  RR: 18 (12 Feb 2021 05:39) (17 - 18)  SpO2: 99% (12 Feb 2021 05:39) (96% - 100%)      [PHYSICAL EXAM]  General: adult in NAD,  WN,  WD.  HEENT: clear oropharynx, anicteric sclera, pink conjunctivae.  Neck: supple, no masses.  CV: normal S1S2, no murmur, no rubs, no gallops.  Lungs: clear to auscultation, no wheezes, no rales, no rhonchi.  Abdomen: soft, non-tender, non-distended, no hepatosplenomegaly, normal BS, no guarding.  Ext: no clubbing, no cyanosis, no edema.  Skin: no rashes,  no petechiae, no venous stasis changes.  Neuro: alert and oriented X3  , no focal motor deficits.  LN: no SC CARLIE.      [LABS:]                        5.8    9.30  )-----------( 293      ( 11 Feb 2021 08:08 )             18.6     02-11    138  |  106  |  31<H>  ----------------------------<  89  4.7   |  26  |  1.12    Ca    8.8      11 Feb 2021 08:08  Mg     2.4     02-11                  [RADIOLOGY STUDIES:]

## 2021-02-12 NOTE — PROGRESS NOTE ADULT - SUBJECTIVE AND OBJECTIVE BOX
CHIEF COMPLAINT: Patient is a 65y old  Male who presents with a chief complaint of Pain crisis (08 Feb 2021 08:45)    HPI:  65 year old man with a history of sickle cell disease, acute chest syndrome, cardiomyopathy with mild chronic systolic HF (previous notes suggest a non-ischemia etiology related to high output state from SCD), NSVT during hospitalization in 2019 (treated with beta blockade), mild mental retardation, admitted on 2/4/21 with a pain crisis and Hgb ~ 5.5 (lower than previous baseline) requiring transfusion.  Cardiology consulted for NSVT.  He describes diffuse pain - modestly better over past few days; occasional palpitations but no syncope.  He describes a good baseline functional status ("I walk a lot") with no angina.      2/10/21  Patient heart rate is better after receiving PRBC , did have PSVT  /svt with abberrancy patient denies any chest pain  his sbp low normal range  his hemoglobin is 6.3    2/11/21 Patient is feeling better rate is better ,  still severely anemic , low normal sbp   2/12/21 Patient is having pains all over the body , no sob or chest pain ,  monitor showing brief PSVT      one episode of NSWCT, severely anemic5.8 , reciveing IV iron     PAST MEDICAL & SURGICAL HISTORY:  Sickle cell disease  Hypertension  Constipation  Intellectual disability ~ mild  Acute chest syndrome  H/O transfusion ~ pRBC  Left ventricular dysfunction mild LVD on echo in 7/18, normal LVF in 12/18  Sickle Cell Disease  NSVT    No significant past surgical history    SOCIAL HISTORY:   Alcohol: Denied  Smoking: Nonsmoker      MEDICATIONS  (STANDING):  enoxaparin Injectable 40 milliGRAM(s) SubCutaneous daily  finasteride 5 milliGRAM(s) Oral daily  folic acid 1 milliGRAM(s) Oral daily  metoprolol tartrate 25 milliGRAM(s) Oral every 12 hours  sodium chloride 0.45%. 2000 milliLiter(s) (75 mL/Hr) IV Continuous <Continuous>    MEDICATIONS  (PRN):  benzocaine 15 mG/menthol 3.6 mG (Sugar-Free) Lozenge 1 Lozenge Oral every 3 hours PRN Sore Throat  diphenhydrAMINE   Injectable 25 milliGRAM(s) IV Push every 4 hours PRN Rash and/or Itching  HYDROmorphone   Tablet 2 milliGRAM(s) Oral every 3 hours PRN Moderate Pain (4 - 6)  HYDROmorphone   Tablet 4 milliGRAM(s) Oral every 3 hours PRN Severe Pain (7 - 10)    Allergies: No Known Drug Allergies  peanuts (Anaphylaxis)  peanuts (Angioedema)  pork (Unknown)  shellfish (Unknown)    REVIEW OF SYSTEMS:   body pains    All other review of systems is negative unless indicated above      Vital Signs Last 24 Hrs  T(C): 36.4 (12 Feb 2021 05:39), Max: 37 (11 Feb 2021 13:30)  T(F): 97.6 (12 Feb 2021 05:39), Max: 98.6 (11 Feb 2021 13:30)  HR: 72 (12 Feb 2021 05:39) (59 - 73)  BP: 134/81 (12 Feb 2021 05:39) (103/60 - 134/81)  BP(mean): --  RR: 18 (12 Feb 2021 05:39) (17 - 18)  SpO2: 99% (12 Feb 2021 05:39) (96% - 100%)    I&O's Summary    11 Feb 2021 07:01  -  12 Feb 2021 07:00  --------------------------------------------------------  IN: 0 mL / OUT: 300 mL / NET: -300 mL              PHYSICAL EXAM:  Constitutional: NAD, awake and alert, thin / slender appearance  HEENT:  Pupils round  Pulmonary: Non-labored, breath sounds are clear bilaterally, No wheezing, rales or rhonchi  Cardiovascular: S1 and S2, regular rate and rhythm  Gastrointestinal: Bowel Sounds present, soft, nontender.   Lymph: No peripheral edema. No cervical lymphadenopathy.  Neurological: Alert, no focal deficits  Skin: No rashes.  Psych:  Mood & affect appropriate    LABS:                                              5.8    9.30  )-----------( 293      ( 11 Feb 2021 08:08 )             18.6     02-11    138  |  106  |  31<H>  ----------------------------<  89  4.7   |  26  |  1.12    Ca    8.8      11 Feb 2021 08:08  Mg     2.4     02-11      12 Lead ECG (02.04.21 @ 07:26): Normal sinus rhythm, Voltage criteria for left ventricular hypertrophy    Tele: NSVT (2/11/21 5 beats ); PSVTunderlying SR  with sinus bradycardia    Xray Chest 1 View- PORTABLE-Urgent (Xray Chest 1 View- PORTABLE-Urgent .) (02.03.21 @ 22:49): No active pulmonary disease. Sclerotic humeri as noted.    < from: US Transthoracic Echocardiogram w/Doppler Complete (02.08.21 @ 21:22) >  Left Ventricle: The left ventricle is normal in size. Increased left ventricular wall thickness. The left ventricular systolic function is low normal with an estimated EF of 50-55%.  Right Ventricle: The right ventricle is normal in size and function.  Left Atrium: The left atrium is dilated.  Right Atrium: The right atrium is dilated.  Mitral Valve: The mitral valve is structurally normal. Mild mitral regurgitation.  Aortic Valve: The aortic valve is structurally normal. Mild aortic regurgitation.  Tricuspid Valve: The tricuspid valve is structurally normal. Mild tricuspid regurgitation. Estimated pulmonary artery systolic pressure is 24 mmHg.  Pulmonic Valve: The pulmonic valve is not well visualized.  Diastolic Function: Impaired diastolic relaxation secondary to age.  Pericardium/Pleura: No pericardial effusion visualized.  Aorta: The aortic root is normal in size.    CONCLUSIONS:  1. Low normal left ventricular systolic function.  2. Increased left ventricular wall thickness.  3. Bi-atrial enlargement.    < end of copied text >

## 2021-02-12 NOTE — PROGRESS NOTE ADULT - PROBLEM SELECTOR PLAN 1
PO pain medication, oral hydration  Acute on Chronic Anemia s/p 1 unit - needs another unit.  IV access is an issue - midline vs TLC today.   Encourage oral fluids and food.  encourage ambulation.

## 2021-02-12 NOTE — PROGRESS NOTE ADULT - SUBJECTIVE AND OBJECTIVE BOX
Patient is a 65y old  Male who presents with a chief complaint of Sickle cell pain crisis (12 Feb 2021 09:27)    INTERVAL HPI/OVERNIGHT EVENTS:  feeling ok today,  able to walk to the bathroom.  reports having increased pain today but was able to eat some food from breakfast.     MEDICATIONS  (STANDING):  enoxaparin Injectable 40 milliGRAM(s) SubCutaneous daily  finasteride 5 milliGRAM(s) Oral daily  folic acid 1 milliGRAM(s) Oral daily  metoprolol tartrate 25 milliGRAM(s) Oral every 12 hours  sodium chloride 0.45%. 2000 milliLiter(s) (75 mL/Hr) IV Continuous <Continuous>    MEDICATIONS  (PRN):  benzocaine 15 mG/menthol 3.6 mG (Sugar-Free) Lozenge 1 Lozenge Oral every 3 hours PRN Sore Throat  diphenhydrAMINE   Injectable 25 milliGRAM(s) IV Push every 4 hours PRN Rash and/or Itching  HYDROmorphone   Tablet 2 milliGRAM(s) Oral every 3 hours PRN Moderate Pain (4 - 6)  HYDROmorphone   Tablet 4 milliGRAM(s) Oral every 3 hours PRN Severe Pain (7 - 10)    Allergies  No Known Drug Allergies  peanuts (Anaphylaxis)  peanuts (Angioedema)  pork (Unknown)  shellfish (Unknown)    Intolerances  lactose (Unknown)      REVIEW OF SYSTEMS:    Vital Signs Last 24 Hrs  T(C): 36.7 (12 Feb 2021 10:30), Max: 37 (11 Feb 2021 13:30)  T(F): 98.1 (12 Feb 2021 10:30), Max: 98.6 (11 Feb 2021 13:30)  HR: 69 (12 Feb 2021 10:30) (60 - 73)  BP: 103/58 (12 Feb 2021 10:30) (103/58 - 134/81)  BP(mean): --  RR: 18 (12 Feb 2021 10:30) (17 - 18)  SpO2: 97% (12 Feb 2021 10:30) (97% - 100%)    PHYSICAL EXAM:  GENERAL: NAD, well-groomed, well-developed  HEAD:  Atraumatic, Normocephalic  EYES:  conjunctiva and sclera clear  ENMT: Moist mucous membranes,  NECK: Supple, No JVD  NERVOUS SYSTEM:  Alert & Oriented X3, Good concentration; All 4 extremities mobile, no gross sensory deficits.   CHEST/LUNG: Clear to auscultation bilaterally; No rales, rhonchi, wheezing, or rubs  HEART: Regular rate and rhythm; No murmurs, rubs, or gallops  ABDOMEN: Soft, Nontender, Nondistended; Bowel sounds present  EXTREMITIES:  2+ Peripheral Pulses, No clubbing, cyanosis, or edema  LYMPH: No lymphadenopathy noted  SKIN: No rashes or lesions    LABS:      Ca    8.8        11 Feb 2021 08:08          CAPILLARY BLOOD GLUCOSE          RADIOLOGY & ADDITIONAL TESTS:    Imaging Personally Reviewed:  [ ] YES     Consultant(s) Notes Reviewed:  heme    Care Discussed with Consultants/Other Providers:    Advanced Directives: [ ] DNR  [ ] No feeding tube  [ ] MOLST in chart  [ ] MOLST completed today  [ ] Unknown

## 2021-02-12 NOTE — PROCEDURE NOTE - NSICDXPROCEDURE_GEN_ALL_CORE_FT
PROCEDURES:  Placement of peripherally inserted central catheter (PICC) with fluoroscopic guidance 12-Feb-2021 14:17:54  Hemal Chandler

## 2021-02-12 NOTE — PROVIDER CONTACT NOTE (OTHER) - SITUATION
Informed Dr Shin, tele called stating pt had 6 beats of PVCs
Concern re: administering pending blood transfusion through 20G on patient's left hand due to previous IV infiltration x2 during the day.
increase in oral temperatur during 15 minute vital signs check after initaiation of transfusion
Patient is a hardstick, has poor visisble veins. Adnitted for sickle cell, currently on IVF.  He has a left wrist g 20 was working but he kept on bending and raising his arm  Refuses to cooperate

## 2021-02-12 NOTE — CHART NOTE - NSCHARTNOTEFT_GEN_A_CORE
Assessment:  Pt seen for nutrition follow-up.  Pt is a 65 year old male admitted for sickle cell anemia crisis.  Pt previously met clinical characteristics of severe malnutrition in context of acute illness (NFPE findings, inadequate oral intake PTA).  Pt continues on regular diet with ensure enlive tid.  Noted pt with food allergy to peanut, pork, shellfish, and intolerance to lactose.  Previously discussed lactose allergy with pt, reports he is able tolerate cheese and avoids liquid regular milk; diet office informed to allow pt to self select meals with dairy/cheese if request; noted md also documented (2/11) cheese is to be permitted.  Po intake variable throughout admission; meal preferences obtained regularly.  Po intake of liquids and foods encouraged; usual intake 25-75% of most meals with occasional refusal.  Pt endorses good acceptance of ensure supplement, vanilla flavored preferred.  Consumed 75% of breakfast this morning.  C/o pain all over body; also noted pt pending blood transfusion secondary <h/h.  Last BM 2/10 per documentation.    Factors impacting intake: [ ] none [ ] nausea  [ ] vomiting [ ] diarrhea [ ] constipation  [ ]chewing problems [ ] swallowing issues  [ x] other: personal food preferences     Diet Prescription: Diet, Regular:   Supplement Feeding Modality:  Oral  Ensure Enlive Cans or Servings Per Day:  1       Frequency:  Three Times a day (02-05-21 @ 11:28)    Intake: 25-75% of most meals     Current Weight:   % Weight change; admission wt 110#, ? accuracy of most 135#    Pertinent Medications: MEDICATIONS  (STANDING):  enoxaparin Injectable 40 milliGRAM(s) SubCutaneous daily  finasteride 5 milliGRAM(s) Oral daily  folic acid 1 milliGRAM(s) Oral daily  metoprolol tartrate 25 milliGRAM(s) Oral every 12 hours  sodium chloride 0.45%. 2000 milliLiter(s) (75 mL/Hr) IV Continuous <Continuous>    MEDICATIONS  (PRN):  benzocaine 15 mG/menthol 3.6 mG (Sugar-Free) Lozenge 1 Lozenge Oral every 3 hours PRN Sore Throat  diphenhydrAMINE   Injectable 25 milliGRAM(s) IV Push every 4 hours PRN Rash and/or Itching  HYDROmorphone   Tablet 2 milliGRAM(s) Oral every 3 hours PRN Moderate Pain (4 - 6)  HYDROmorphone   Tablet 4 milliGRAM(s) Oral every 3 hours PRN Severe Pain (7 - 10)    Pertinent Labs: 02-11 Na138 mmol/L Glu 89 mg/dL K+ 4.7 mmol/L Cr  1.12 mg/dL BUN 31 mg/dL<H> 02-09 Phos 3.5 mg/dL 02-07 Alb 2.6 g/dL<L>     CAPILLARY BLOOD GLUCOSE        Skin: no pressure injuries     Estimated Needs:   [ x] no change since previous assessment  [ ] recalculated:     Previous Nutrition Diagnosis:   [ ] Inadequate Energy Intake [ ]Inadequate Oral Intake [ ] Excessive Energy Intake   [ ] Underweight [ ] Increased Nutrient Needs [ ] Overweight/Obesity   [ ] Altered GI Function [ ] Unintended Weight Loss [ ] Food & Nutrition Related Knowledge Deficit [x ] Malnutrition     Nutrition Diagnosis is [x ] ongoing  [ ] resolved [ ] not applicable     New Nutrition Diagnosis: [ ] not applicable       Interventions: regular diet, honor food preferences, nutritional supplements  Recommend  [ ] Change Diet To:  [ ] Nutrition Supplement  [ ] Nutrition Support  [ ] Other:     Monitoring and Evaluation:   [ x] PO intake [ x ] Tolerance to diet prescription [ x ] weights [ x ] labs[ x ] follow up per protocol  [ ] other:

## 2021-02-12 NOTE — PROGRESS NOTE ADULT - PROBLEM SELECTOR PLAN 1
Mild chronic systolic dysfunction possibly due to chronic sickle cell anemia hyperdynamic LV dysfunction with subsequent systolic dysfunction.  would recommend OP nuclear stress test once his anemia corrected , continue BB  add ace if his sbp>100  , would prefer to be higher dose of BB as tolerated due to arrhythmia , currently his heart limits increase in dose ,

## 2021-02-12 NOTE — PROVIDER CONTACT NOTE (OTHER) - ACTION/TREATMENT ORDERED:
Continue to encourage pt. to talk, express self, to walk with PT, to eat when meals arrive, and to develop coping skills that can help him.
TORB may hold iv insertion for now until patient cooperates, instruct patient to drink lots of fluids since IVF is not possible, patient refused new IV insertiion and resumption of IVF on L wrist.
[facility]
As above. Continuing to monitor situation closely.

## 2021-02-13 LAB
ANION GAP SERPL CALC-SCNC: 10 MMOL/L — SIGNIFICANT CHANGE UP (ref 5–17)
BUN SERPL-MCNC: 17 MG/DL — SIGNIFICANT CHANGE UP (ref 7–23)
CALCIUM SERPL-MCNC: 9.3 MG/DL — SIGNIFICANT CHANGE UP (ref 8.4–10.5)
CHLORIDE SERPL-SCNC: 103 MMOL/L — SIGNIFICANT CHANGE UP (ref 96–108)
CO2 SERPL-SCNC: 23 MMOL/L — SIGNIFICANT CHANGE UP (ref 22–31)
CREAT SERPL-MCNC: 1.03 MG/DL — SIGNIFICANT CHANGE UP (ref 0.5–1.3)
GLUCOSE SERPL-MCNC: 109 MG/DL — HIGH (ref 70–99)
HCT VFR BLD CALC: 24.7 % — LOW (ref 39–50)
HGB BLD-MCNC: 7.8 G/DL — LOW (ref 13–17)
MAGNESIUM SERPL-MCNC: 1.9 MG/DL — SIGNIFICANT CHANGE UP (ref 1.6–2.6)
MCHC RBC-ENTMCNC: 23.8 PG — LOW (ref 27–34)
MCHC RBC-ENTMCNC: 31.6 GM/DL — LOW (ref 32–36)
MCV RBC AUTO: 75.3 FL — LOW (ref 80–100)
NRBC # BLD: 3 /100 WBCS — HIGH (ref 0–0)
PHOSPHATE SERPL-MCNC: 3.3 MG/DL — SIGNIFICANT CHANGE UP (ref 2.5–4.5)
PLATELET # BLD AUTO: 317 K/UL — SIGNIFICANT CHANGE UP (ref 150–400)
POTASSIUM SERPL-MCNC: 4.8 MMOL/L — SIGNIFICANT CHANGE UP (ref 3.5–5.3)
POTASSIUM SERPL-SCNC: 4.8 MMOL/L — SIGNIFICANT CHANGE UP (ref 3.5–5.3)
RBC # BLD: 3.28 M/UL — LOW (ref 4.2–5.8)
RBC # FLD: 22.5 % — HIGH (ref 10.3–14.5)
SODIUM SERPL-SCNC: 136 MMOL/L — SIGNIFICANT CHANGE UP (ref 135–145)
WBC # BLD: 16.03 K/UL — HIGH (ref 3.8–10.5)
WBC # FLD AUTO: 16.03 K/UL — HIGH (ref 3.8–10.5)

## 2021-02-13 PROCEDURE — 99233 SBSQ HOSP IP/OBS HIGH 50: CPT

## 2021-02-13 PROCEDURE — 99232 SBSQ HOSP IP/OBS MODERATE 35: CPT

## 2021-02-13 RX ORDER — METOPROLOL TARTRATE 50 MG
25 TABLET ORAL EVERY 8 HOURS
Refills: 0 | Status: DISCONTINUED | OUTPATIENT
Start: 2021-02-13 | End: 2021-02-15

## 2021-02-13 RX ADMIN — HYDROMORPHONE HYDROCHLORIDE 4 MILLIGRAM(S): 2 INJECTION INTRAMUSCULAR; INTRAVENOUS; SUBCUTANEOUS at 09:11

## 2021-02-13 RX ADMIN — Medication 25 MILLIGRAM(S): at 21:15

## 2021-02-13 RX ADMIN — HYDROMORPHONE HYDROCHLORIDE 4 MILLIGRAM(S): 2 INJECTION INTRAMUSCULAR; INTRAVENOUS; SUBCUTANEOUS at 02:55

## 2021-02-13 RX ADMIN — Medication 25 MILLIGRAM(S): at 13:10

## 2021-02-13 RX ADMIN — HYDROMORPHONE HYDROCHLORIDE 4 MILLIGRAM(S): 2 INJECTION INTRAMUSCULAR; INTRAVENOUS; SUBCUTANEOUS at 13:08

## 2021-02-13 RX ADMIN — Medication 25 MILLIGRAM(S): at 06:37

## 2021-02-13 RX ADMIN — HYDROMORPHONE HYDROCHLORIDE 1 MILLIGRAM(S): 2 INJECTION INTRAMUSCULAR; INTRAVENOUS; SUBCUTANEOUS at 06:37

## 2021-02-13 RX ADMIN — CHLORHEXIDINE GLUCONATE 1 APPLICATION(S): 213 SOLUTION TOPICAL at 05:58

## 2021-02-13 RX ADMIN — HYDROMORPHONE HYDROCHLORIDE 4 MILLIGRAM(S): 2 INJECTION INTRAMUSCULAR; INTRAVENOUS; SUBCUTANEOUS at 16:53

## 2021-02-13 RX ADMIN — ENOXAPARIN SODIUM 40 MILLIGRAM(S): 100 INJECTION SUBCUTANEOUS at 11:56

## 2021-02-13 RX ADMIN — HYDROMORPHONE HYDROCHLORIDE 4 MILLIGRAM(S): 2 INJECTION INTRAMUSCULAR; INTRAVENOUS; SUBCUTANEOUS at 21:15

## 2021-02-13 RX ADMIN — FINASTERIDE 5 MILLIGRAM(S): 5 TABLET, FILM COATED ORAL at 11:56

## 2021-02-13 RX ADMIN — Medication 1 MILLIGRAM(S): at 11:56

## 2021-02-13 NOTE — PROGRESS NOTE ADULT - PROBLEM SELECTOR PLAN 1
PO pain medication, oral hydration  s/p PICC line placement s/p 1 unit yesterday  Encourage oral fluids and food.  encourage ambulation.  Still in pain.   FOBT pending  unclear why pt continues to be anemic.  Hematology following.

## 2021-02-13 NOTE — PROGRESS NOTE ADULT - SUBJECTIVE AND OBJECTIVE BOX
Patient is a 65y old  Male who presents with a chief complaint of Sickle cell pain crisis (12 Feb 2021 09:27)      INTERVAL HPI/OVERNIGHT EVENTS:  s/p PICC line placement.   received 1 unit yesterday  feeling pain all over  no telemetry events    MEDICATIONS  (STANDING):  chlorhexidine 4% Liquid 1 Application(s) Topical <User Schedule>  enoxaparin Injectable 40 milliGRAM(s) SubCutaneous daily  finasteride 5 milliGRAM(s) Oral daily  folic acid 1 milliGRAM(s) Oral daily  metoprolol tartrate 25 milliGRAM(s) Oral every 12 hours  sodium chloride 0.45%. 2000 milliLiter(s) (75 mL/Hr) IV Continuous <Continuous>    MEDICATIONS  (PRN):  benzocaine 15 mG/menthol 3.6 mG (Sugar-Free) Lozenge 1 Lozenge Oral every 3 hours PRN Sore Throat  diphenhydrAMINE   Injectable 25 milliGRAM(s) IV Push every 4 hours PRN Rash and/or Itching  HYDROmorphone   Tablet 2 milliGRAM(s) Oral every 3 hours PRN Moderate Pain (4 - 6)  HYDROmorphone   Tablet 4 milliGRAM(s) Oral every 3 hours PRN Severe Pain (7 - 10)  HYDROmorphone  Injectable 1 milliGRAM(s) IV Push every 2 hours PRN breathrough or severe pain  sodium chloride 0.9% lock flush 10 milliLiter(s) IV Push every 1 hour PRN Pre/post blood products, medications, blood draw, and to maintain line patency      Allergies    No Known Drug Allergies  peanuts (Anaphylaxis)  peanuts (Angioedema)  pork (Unknown)  shellfish (Unknown)    Intolerances    lactose (Unknown)      REVIEW OF SYSTEMS:  CONSTITUTIONAL: No fever, weight loss, or fatigue  RESPIRATORY: No cough, wheezing, chills or hemoptysis; No shortness of breath  CARDIOVASCULAR: No chest pain, palpitations, lightheadedness, or leg swelling  GASTROINTESTINAL: No abdominal or epigastric pain. No nausea, vomiting, or hematemesis; No diarrhea or constipation. No melena or hematochezia.  GENITOURINARY: No dysuria, frequency, hematuria, or incontinence  NEUROLOGICAL: No headaches, memory loss, vertigo, loss of strength, numbness, or tremors  MUSCULOSKELETAL: general body aches.   ALLERGY AND IMMUNOLOGIC: No hives or eczema    Vital Signs Last 24 Hrs  T(C): 37.2 (13 Feb 2021 05:16), Max: 37.2 (12 Feb 2021 18:37)  T(F): 98.9 (13 Feb 2021 05:16), Max: 99 (12 Feb 2021 18:37)  HR: 58 (13 Feb 2021 05:16) (58 - 89)  BP: 158/73 (13 Feb 2021 05:16) (103/58 - 158/73)  BP(mean): --  RR: 18 (13 Feb 2021 05:16) (18 - 20)  SpO2: 92% (13 Feb 2021 05:16) (92% - 100%)    PHYSICAL EXAM:  GENERAL: NAD, well-groomed, well-developed  HEAD:  Atraumatic, Normocephalic  EYES:  conjunctiva and sclera clear  ENMT: Moist mucous membranes,  NECK: Supple, No JVD  NERVOUS SYSTEM:  Alert & Oriented X3, Good concentration; All 4 extremities mobile, no gross sensory deficits.   CHEST/LUNG: Clear to auscultation bilaterally; No rales, rhonchi, wheezing, or rubs  HEART: Regular rate and rhythm; No murmurs, rubs, or gallops  ABDOMEN: Soft, Nontender, Nondistended; Bowel sounds present  EXTREMITIES:  2+ Peripheral Pulses, No clubbing, cyanosis, or edema  LYMPH: No lymphadenopathy noted  SKIN: No rashes or lesions      LABS:                        7.8    16.03 )-----------( 317      ( 13 Feb 2021 06:41 )             24.7     13 Feb 2021 06:41    136    |  103    |  17     ----------------------------<  109    4.8     |  23     |  1.03     Ca    9.3        13 Feb 2021 06:41  Phos  3.3       13 Feb 2021 06:41  Mg     1.9       13 Feb 2021 06:41          CAPILLARY BLOOD GLUCOSE          RADIOLOGY & ADDITIONAL TESTS:    Imaging Personally Reviewed:  [ ] YES     Consultant(s) Notes Reviewed:      Care Discussed with Consultants/Other Providers:    Advanced Directives: [ ] DNR  [ ] No feeding tube  [ ] MOLST in chart  [ ] MOLST completed today  [ ] Unknown

## 2021-02-13 NOTE — PROGRESS NOTE ADULT - PROBLEM SELECTOR PLAN 4
DVT proph: lovenox.
DVT proph: lovenox.
Continue proscar
Continue proscar
DVT proph: lovenox.
Continue proscar
DVT proph: lovenox.
Continue proscar
Continue proscar

## 2021-02-13 NOTE — PROGRESS NOTE ADULT - SUBJECTIVE AND OBJECTIVE BOX
PCP:    REQUESTING PHYSICIAN:    REASON FOR CONSULT:    CHIEF COMPLAINT:    HPI:  65 year old man with a history of sickle cell disease, acute chest syndrome, cardiomyopathy with mild chronic systolic HF (previous notes suggest a non-ischemia etiology related to high output state from SCD), NSVT during hospitalization in 2019 (treated with beta blockade), mild mental retardation, admitted on 2/4/21 with a pain crisis and Hgb ~ 5.5 (lower than previous baseline) requiring transfusion.  Cardiology consulted for NSVT.  He describes diffuse pain - modestly better over past few days; occasional palpitations but no syncope.  He describes a good baseline functional status ("I walk a lot") with no angina.      2/10/21  Patient heart rate is better after receiving PRBC , did have PSVT  /svt with abberrancy patient denies any chest pain  his sbp low normal range  his hemoglobin is 6.3    2/11/21 Patient is feeling better rate is better ,  still severely anemic , low normal sbp   2/12/21 Patient is having pains all over the body , no sob or chest pain ,  monitor showing brief PSVT      one episode of NSWCT, severely anemic5.8 , reciveing IV iron   2/13/20 Pt complains of pain. No chest pain or shortness of breath. NSR        PAST MEDICAL & SURGICAL HISTORY:  Sickle cell disease    Hypertension    Constipation    Intellectual disability  ~ mild    Acute chest syndrome  Pt unaware    H/O transfusion  ~ pRBC    Left ventricular dysfunction  mild LVD on echo in 7/18, normal LVF in 12/18    Sickle Cell Disease    No significant past surgical history        SOCIAL HISTORY:    FAMILY HISTORY:  Family history of sickle cell trait (Mother)  ~ presumed in parents &amp; 7 siblings (none suffer w/ the disease, per patient)    FH: hypertension  ~ mother        ALLERGIES:  Allergies    No Known Drug Allergies  peanuts (Anaphylaxis)  peanuts (Angioedema)  pork (Unknown)  shellfish (Unknown)    Intolerances    lactose (Unknown)      MEDICATIONS:    MEDICATIONS  (STANDING):  chlorhexidine 4% Liquid 1 Application(s) Topical <User Schedule>  enoxaparin Injectable 40 milliGRAM(s) SubCutaneous daily  finasteride 5 milliGRAM(s) Oral daily  folic acid 1 milliGRAM(s) Oral daily  metoprolol tartrate 25 milliGRAM(s) Oral every 12 hours    MEDICATIONS  (PRN):  benzocaine 15 mG/menthol 3.6 mG (Sugar-Free) Lozenge 1 Lozenge Oral every 3 hours PRN Sore Throat  diphenhydrAMINE   Injectable 25 milliGRAM(s) IV Push every 4 hours PRN Rash and/or Itching  HYDROmorphone   Tablet 2 milliGRAM(s) Oral every 3 hours PRN Moderate Pain (4 - 6)  HYDROmorphone   Tablet 4 milliGRAM(s) Oral every 3 hours PRN Severe Pain (7 - 10)  HYDROmorphone  Injectable 1 milliGRAM(s) IV Push every 2 hours PRN breathrough or severe pain  sodium chloride 0.9% lock flush 10 milliLiter(s) IV Push every 1 hour PRN Pre/post blood products, medications, blood draw, and to maintain line patency        Vital Signs Last 24 Hrs  T(C): 36.7 (13 Feb 2021 10:00), Max: 37.2 (12 Feb 2021 18:37)  T(F): 98.1 (13 Feb 2021 10:00), Max: 99 (12 Feb 2021 18:37)  HR: 77 (13 Feb 2021 10:00) (58 - 89)  BP: 148/90 (13 Feb 2021 10:00) (114/72 - 158/73)  BP(mean): --  RR: 18 (13 Feb 2021 10:00) (18 - 20)  SpO2: 100% (13 Feb 2021 10:00) (92% - 100%)Daily     Daily I&O's Summary    12 Feb 2021 07:01  -  13 Feb 2021 07:00  --------------------------------------------------------  IN: 0 mL / OUT: 600 mL / NET: -600 mL        PHYSICAL EXAM:    Constitutional: NAD, awake and alert, thin  HEENT: PERR, EOMI,  No oral cyananosis.  Neck:  supple,  No JVD  Respiratory: Breath sounds are clear bilaterally, No wheezing, rales or rhonchi  Cardiovascular: S1 and S2, regular rate and rhythm, no Murmurs, gallops or rubs  Gastrointestinal: Bowel Sounds present, soft, nontender.   Extremities: No peripheral edema. No clubbing or cyanosis.  Vascular: 2+ peripheral pulses  Neurological: A/O x 3, no focal deficits  Musculoskeletal: no calf tenderness.  Skin: No rashes.      LABS: All Labs Reviewed:                        7.8    16.03 )-----------( 317      ( 13 Feb 2021 06:41 )             24.7                         5.8    9.30  )-----------( 293      ( 11 Feb 2021 08:08 )             18.6     13 Feb 2021 06:41    136    |  103    |  17     ----------------------------<  109    4.8     |  23     |  1.03   11 Feb 2021 08:08    138    |  106    |  31     ----------------------------<  89     4.7     |  26     |  1.12     Ca    9.3        13 Feb 2021 06:41  Ca    8.8        11 Feb 2021 08:08  Phos  3.3       13 Feb 2021 06:41  Mg     1.9       13 Feb 2021 06:41  Mg     2.4       11 Feb 2021 08:08  Mg     2.5       10 Feb 2021 17:40    TPro  7.1    /  Alb  3.1    /  TBili  x      /  DBili  x      /  AST  x      /  ALT  x      /  AlkPhos  x      12 Feb 2021 01:08          Blood Culture:         RADIOLOGY/EKG:  < from: 12 Lead ECG (02.11.21 @ 07:18) >  Diagnosis Line Sinus bradycardia  Voltage criteria for left ventricular hypertrophy  Tall R-wave V2  Abnormal ECG  When compared with ECG of 07-FEB-2021 17:34,  premature atrial complexes are no longer present  Vent. rate has decreased BY  45 BPM  ST elevation now present in Lateral leads  Confirmed by Ana Rosa Young MD ([id number]) on 2/11/2021 8:51:33 PM    < end of copied text >      ECHO/CARDIAC CATHTERIZATION/STRESS TEST:

## 2021-02-13 NOTE — PROGRESS NOTE ADULT - SUBJECTIVE AND OBJECTIVE BOX
Interval History:  has received venofer 400mg over 2 days and 1 unit PRBC  still complains of pain  awaiting stool guaiac    Chart reviewed and events noted;   Overnight events:    MEDICATIONS  (STANDING):  chlorhexidine 4% Liquid 1 Application(s) Topical <User Schedule>  enoxaparin Injectable 40 milliGRAM(s) SubCutaneous daily  finasteride 5 milliGRAM(s) Oral daily  folic acid 1 milliGRAM(s) Oral daily  metoprolol tartrate 25 milliGRAM(s) Oral every 12 hours  sodium chloride 0.45%. 2000 milliLiter(s) (75 mL/Hr) IV Continuous <Continuous>    MEDICATIONS  (PRN):  benzocaine 15 mG/menthol 3.6 mG (Sugar-Free) Lozenge 1 Lozenge Oral every 3 hours PRN Sore Throat  diphenhydrAMINE   Injectable 25 milliGRAM(s) IV Push every 4 hours PRN Rash and/or Itching  HYDROmorphone   Tablet 2 milliGRAM(s) Oral every 3 hours PRN Moderate Pain (4 - 6)  HYDROmorphone   Tablet 4 milliGRAM(s) Oral every 3 hours PRN Severe Pain (7 - 10)  HYDROmorphone  Injectable 1 milliGRAM(s) IV Push every 2 hours PRN breathrough or severe pain  sodium chloride 0.9% lock flush 10 milliLiter(s) IV Push every 1 hour PRN Pre/post blood products, medications, blood draw, and to maintain line patency      Vital Signs Last 24 Hrs  T(C): 37.2 (13 Feb 2021 05:16), Max: 37.2 (12 Feb 2021 18:37)  T(F): 98.9 (13 Feb 2021 05:16), Max: 99 (12 Feb 2021 18:37)  HR: 58 (13 Feb 2021 05:16) (58 - 89)  BP: 158/73 (13 Feb 2021 05:16) (103/58 - 158/73)  BP(mean): --  RR: 18 (13 Feb 2021 05:16) (18 - 20)  SpO2: 92% (13 Feb 2021 05:16) (92% - 100%)    PHYSICAL EXAM  General: adult in NAD  HEENT: clear oropharynx, anicteric sclera, pink conjunctivae  Neck: supple  CV: normal S1S2 with no murmur rubs or gallops  Lungs: clear to auscultation, no wheezes, no rhales  Abdomen: soft non-tender non-distended, no hepato/splenomegaly  Ext: no clubbing cyanosis or edema  Skin: no rashes and no petichiae  Neuro: alert and oriented X3 no focal deficits      LABS:  CBC Full  -  ( 13 Feb 2021 06:41 )  WBC Count : 16.03 K/uL  RBC Count : 3.28 M/uL  Hemoglobin : 7.8 g/dL  Hematocrit : 24.7 %  Platelet Count - Automated : 317 K/uL  Mean Cell Volume : 75.3 fl  Mean Cell Hemoglobin : 23.8 pg  Mean Cell Hemoglobin Concentration : 31.6 gm/dL  Auto Neutrophil # : x  Auto Lymphocyte # : x  Auto Monocyte # : x  Auto Eosinophil # : x  Auto Basophil # : x  Auto Neutrophil % : x  Auto Lymphocyte % : x  Auto Monocyte % : x  Auto Eosinophil % : x  Auto Basophil % : x    02-13    136  |  103  |  17  ----------------------------<  109<H>  4.8   |  23  |  1.03    Ca    9.3      13 Feb 2021 06:41  Phos  3.3     02-13  Mg     1.9     02-13    TPro  7.1  /  Alb  3.1<L>  /  TBili  x   /  DBili  x   /  AST  x   /  ALT  x   /  AlkPhos  x   02-12        fe studies      WBC trend  16.03 K/uL (02-13-21 @ 06:41)  9.30 K/uL (02-11-21 @ 08:08)      Hgb trend  7.8 g/dL (02-13-21 @ 06:41)  5.8 g/dL (02-11-21 @ 08:08)      plt trend  317 K/uL (02-13-21 @ 06:41)  293 K/uL (02-11-21 @ 08:08)        RADIOLOGY & ADDITIONAL STUDIES:

## 2021-02-13 NOTE — PROGRESS NOTE ADULT - PROBLEM SELECTOR PLAN 1
Mild chronic systolic dysfunction possibly due to chronic sickle cell anemia hyperdynamic LV dysfunction with subsequent systolic dysfunction.  would recommend OP nuclear stress test once his anemia corrected , continue BB  will increase dose as B/P allows

## 2021-02-14 LAB — SARS-COV-2 RNA SPEC QL NAA+PROBE: SIGNIFICANT CHANGE UP

## 2021-02-14 PROCEDURE — 99233 SBSQ HOSP IP/OBS HIGH 50: CPT

## 2021-02-14 PROCEDURE — 99232 SBSQ HOSP IP/OBS MODERATE 35: CPT

## 2021-02-14 RX ADMIN — HYDROMORPHONE HYDROCHLORIDE 4 MILLIGRAM(S): 2 INJECTION INTRAMUSCULAR; INTRAVENOUS; SUBCUTANEOUS at 14:18

## 2021-02-14 RX ADMIN — HYDROMORPHONE HYDROCHLORIDE 4 MILLIGRAM(S): 2 INJECTION INTRAMUSCULAR; INTRAVENOUS; SUBCUTANEOUS at 09:56

## 2021-02-14 RX ADMIN — Medication 25 MILLIGRAM(S): at 05:27

## 2021-02-14 RX ADMIN — Medication 25 MILLIGRAM(S): at 22:02

## 2021-02-14 RX ADMIN — HYDROMORPHONE HYDROCHLORIDE 4 MILLIGRAM(S): 2 INJECTION INTRAMUSCULAR; INTRAVENOUS; SUBCUTANEOUS at 19:02

## 2021-02-14 RX ADMIN — ENOXAPARIN SODIUM 40 MILLIGRAM(S): 100 INJECTION SUBCUTANEOUS at 11:51

## 2021-02-14 RX ADMIN — FINASTERIDE 5 MILLIGRAM(S): 5 TABLET, FILM COATED ORAL at 11:51

## 2021-02-14 RX ADMIN — Medication 25 MILLIGRAM(S): at 14:18

## 2021-02-14 RX ADMIN — Medication 1 MILLIGRAM(S): at 11:51

## 2021-02-14 RX ADMIN — HYDROMORPHONE HYDROCHLORIDE 2 MILLIGRAM(S): 2 INJECTION INTRAMUSCULAR; INTRAVENOUS; SUBCUTANEOUS at 04:54

## 2021-02-14 RX ADMIN — CHLORHEXIDINE GLUCONATE 1 APPLICATION(S): 213 SOLUTION TOPICAL at 05:29

## 2021-02-14 RX ADMIN — HYDROMORPHONE HYDROCHLORIDE 4 MILLIGRAM(S): 2 INJECTION INTRAMUSCULAR; INTRAVENOUS; SUBCUTANEOUS at 01:32

## 2021-02-14 NOTE — PROGRESS NOTE ADULT - PROBLEM SELECTOR PLAN 1
Mild chronic systolic dysfunction possibly due to chronic sickle cell anemia hyperdynamic LV dysfunction with subsequent systolic dysfunction.  would recommend OP nuclear stress test once his anemia corrected , continue BB. Will follow prn.

## 2021-02-14 NOTE — PROGRESS NOTE ADULT - SUBJECTIVE AND OBJECTIVE BOX
Interval History:  still with pain but improved    Chart reviewed and events noted;   Overnight events:    MEDICATIONS  (STANDING):  chlorhexidine 4% Liquid 1 Application(s) Topical <User Schedule>  enoxaparin Injectable 40 milliGRAM(s) SubCutaneous daily  finasteride 5 milliGRAM(s) Oral daily  folic acid 1 milliGRAM(s) Oral daily  metoprolol tartrate 25 milliGRAM(s) Oral every 8 hours    MEDICATIONS  (PRN):  benzocaine 15 mG/menthol 3.6 mG (Sugar-Free) Lozenge 1 Lozenge Oral every 3 hours PRN Sore Throat  diphenhydrAMINE   Injectable 25 milliGRAM(s) IV Push every 4 hours PRN Rash and/or Itching  HYDROmorphone   Tablet 2 milliGRAM(s) Oral every 3 hours PRN Moderate Pain (4 - 6)  HYDROmorphone   Tablet 4 milliGRAM(s) Oral every 3 hours PRN Severe Pain (7 - 10)  HYDROmorphone  Injectable 1 milliGRAM(s) IV Push every 2 hours PRN breathrough or severe pain  sodium chloride 0.9% lock flush 10 milliLiter(s) IV Push every 1 hour PRN Pre/post blood products, medications, blood draw, and to maintain line patency      Vital Signs Last 24 Hrs  T(C): 36.9 (14 Feb 2021 08:40), Max: 37.2 (13 Feb 2021 14:09)  T(F): 98.5 (14 Feb 2021 08:40), Max: 98.9 (13 Feb 2021 14:09)  HR: 77 (14 Feb 2021 08:40) (62 - 93)  BP: 119/67 (14 Feb 2021 08:40) (115/73 - 155/74)  BP(mean): --  RR: 22 (14 Feb 2021 08:40) (16 - 22)  SpO2: 100% (14 Feb 2021 08:40) (97% - 100%)    PHYSICAL EXAM  General: adult in NAD  HEENT: clear oropharynx, anicteric sclera, pink conjunctivae  Neck: supple  CV: normal S1S2 with no murmur rubs or gallops  Lungs: clear to auscultation, no wheezes, no rhales  Abdomen: soft non-tender non-distended, no hepato/splenomegaly  Ext: no clubbing cyanosis or edema  Skin: no rashes and no petichiae  Neuro: alert and oriented X3 no focal deficits      LABS:  CBC Full  -  ( 13 Feb 2021 06:41 )  WBC Count : 16.03 K/uL  RBC Count : 3.28 M/uL  Hemoglobin : 7.8 g/dL  Hematocrit : 24.7 %  Platelet Count - Automated : 317 K/uL  Mean Cell Volume : 75.3 fl  Mean Cell Hemoglobin : 23.8 pg  Mean Cell Hemoglobin Concentration : 31.6 gm/dL  Auto Neutrophil # : x  Auto Lymphocyte # : x  Auto Monocyte # : x  Auto Eosinophil # : x  Auto Basophil # : x  Auto Neutrophil % : x  Auto Lymphocyte % : x  Auto Monocyte % : x  Auto Eosinophil % : x  Auto Basophil % : x    02-13    136  |  103  |  17  ----------------------------<  109<H>  4.8   |  23  |  1.03    Ca    9.3      13 Feb 2021 06:41  Phos  3.3     02-13  Mg     1.9     02-13          fe studies      WBC trend  16.03 K/uL (02-13-21 @ 06:41)      Hgb trend  7.8 g/dL (02-13-21 @ 06:41)      plt trend  317 K/uL (02-13-21 @ 06:41)        RADIOLOGY & ADDITIONAL STUDIES:

## 2021-02-14 NOTE — PROGRESS NOTE ADULT - SUBJECTIVE AND OBJECTIVE BOX
Patient is a 65y old  Male who presents with a chief complaint of S cell (13 Feb 2021 11:57)    HPI:  65M HbSS disease with multiple admissions in past including for acute chest baseline HgB about 6.5, chronic CHF, Mild intellectual disability, BPH presented to [facility] Ed with generalized body aches worst in abdomen and back.  He was unable to control with Oxycodone at home so came to ED.  His symptoms improved a little with IVF and Dilaudid.  Also found to have EMEKA and Acute on Chronic anemia.    Patient transferred to [place] for bed availability.   He is also complaining of throat discomfort and mild nausea.   Denies Fever, Chills, or other symptoms.  (04 Feb 2021 17:52)    Today:   Patient states he has pain all over.     Refuses to continue with conversation. Pt states to come back later.     PAST MEDICAL & SURGICAL HISTORY:  Sickle cell disease    Hypertension    Constipation    Intellectual disability  ~ mild    Acute chest syndrome  Pt unaware    H/O transfusion  ~ pRBC    Left ventricular dysfunction  mild LVD on echo in 7/18, normal LVF in 12/18    Sickle Cell Disease    No significant past surgical history      MEDICATIONS  (STANDING):  chlorhexidine 4% Liquid 1 Application(s) Topical <User Schedule>  enoxaparin Injectable 40 milliGRAM(s) SubCutaneous daily  finasteride 5 milliGRAM(s) Oral daily  folic acid 1 milliGRAM(s) Oral daily  metoprolol tartrate 25 milliGRAM(s) Oral every 8 hours    MEDICATIONS  (PRN):  benzocaine 15 mG/menthol 3.6 mG (Sugar-Free) Lozenge 1 Lozenge Oral every 3 hours PRN Sore Throat  diphenhydrAMINE   Injectable 25 milliGRAM(s) IV Push every 4 hours PRN Rash and/or Itching  HYDROmorphone   Tablet 2 milliGRAM(s) Oral every 3 hours PRN Moderate Pain (4 - 6)  HYDROmorphone   Tablet 4 milliGRAM(s) Oral every 3 hours PRN Severe Pain (7 - 10)  HYDROmorphone  Injectable 1 milliGRAM(s) IV Push every 2 hours PRN breathrough or severe pain  sodium chloride 0.9% lock flush 10 milliLiter(s) IV Push every 1 hour PRN Pre/post blood products, medications, blood draw, and to maintain line patency      EXAM:  Vital Signs Last 24 Hrs  T(C): 36.9 (14 Feb 2021 08:40), Max: 37.2 (13 Feb 2021 14:09)  T(F): 98.5 (14 Feb 2021 08:40), Max: 98.9 (13 Feb 2021 14:09)  HR: 77 (14 Feb 2021 08:40) (62 - 93)  BP: 119/67 (14 Feb 2021 08:40) (115/73 - 155/74)  BP(mean): --  RR: 22 (14 Feb 2021 08:40) (16 - 22)  SpO2: 100% (14 Feb 2021 08:40) (97% - 100%)    02-13 @ 07:01  -  02-14 @ 07:00  --------------------------------------------------------  IN: 950 mL / OUT: 0 mL / NET: 950 mL        PHYSICAL EXAM:  Refused     LABS                          7.8    16.03 )-----------( 317      ( 13 Feb 2021 06:41 )             24.7     02-13    136  |  103  |  17  ----------------------------<  109<H>  4.8   |  23  |  1.03    Ca    9.3      13 Feb 2021 06:41  Phos  3.3     02-13  Mg     1.9     02-13

## 2021-02-14 NOTE — PROGRESS NOTE ADULT - SUBJECTIVE AND OBJECTIVE BOX
PCP:    REQUESTING PHYSICIAN:    REASON FOR CONSULT:    CHIEF COMPLAINT:    HPI:  65 year old man with a history of sickle cell disease, acute chest syndrome, cardiomyopathy with mild chronic systolic HF (previous notes suggest a non-ischemia etiology related to high output state from SCD), NSVT during hospitalization in 2019 (treated with beta blockade), mild mental retardation, admitted on 2/4/21 with a pain crisis and Hgb ~ 5.5 (lower than previous baseline) requiring transfusion.  Cardiology consulted for NSVT.  He describes diffuse pain - modestly better over past few days; occasional palpitations but no syncope.  He describes a good baseline functional status ("I walk a lot") with no angina.      2/10/21  Patient heart rate is better after receiving PRBC , did have PSVT  /svt with abberrancy patient denies any chest pain  his sbp low normal range  his hemoglobin is 6.3    2/11/21 Patient is feeling better rate is better ,  still severely anemic , low normal sbp   2/12/21 Patient is having pains all over the body , no sob or chest pain ,  monitor showing brief PSVT      one episode of NSWCT, severely anemic5.8 , reciveing IV iron   2/13/20 Pt complains of pain. No chest pain or shortness of breath. NSR  2/14/21 Pt complains of pain. No chest pain or shortness of breath. NSR      PAST MEDICAL & SURGICAL HISTORY:  Sickle cell disease    Hypertension    Constipation    Intellectual disability  ~ mild    Acute chest syndrome  Pt unaware    H/O transfusion  ~ pRBC    Left ventricular dysfunction  mild LVD on echo in 7/18, normal LVF in 12/18    Sickle Cell Disease    No significant past surgical history        SOCIAL HISTORY:    FAMILY HISTORY:  Family history of sickle cell trait (Mother)  ~ presumed in parents &amp; 7 siblings (none suffer w/ the disease, per patient)    FH: hypertension  ~ mother        ALLERGIES:  Allergies    No Known Drug Allergies  peanuts (Anaphylaxis)  peanuts (Angioedema)  pork (Unknown)  shellfish (Unknown)    Intolerances    lactose (Unknown)      MEDICATIONS:    MEDICATIONS  (STANDING):  chlorhexidine 4% Liquid 1 Application(s) Topical <User Schedule>  enoxaparin Injectable 40 milliGRAM(s) SubCutaneous daily  finasteride 5 milliGRAM(s) Oral daily  folic acid 1 milliGRAM(s) Oral daily  metoprolol tartrate 25 milliGRAM(s) Oral every 8 hours    MEDICATIONS  (PRN):  benzocaine 15 mG/menthol 3.6 mG (Sugar-Free) Lozenge 1 Lozenge Oral every 3 hours PRN Sore Throat  diphenhydrAMINE   Injectable 25 milliGRAM(s) IV Push every 4 hours PRN Rash and/or Itching  HYDROmorphone   Tablet 2 milliGRAM(s) Oral every 3 hours PRN Moderate Pain (4 - 6)  HYDROmorphone   Tablet 4 milliGRAM(s) Oral every 3 hours PRN Severe Pain (7 - 10)  HYDROmorphone  Injectable 1 milliGRAM(s) IV Push every 2 hours PRN breathrough or severe pain  sodium chloride 0.9% lock flush 10 milliLiter(s) IV Push every 1 hour PRN Pre/post blood products, medications, blood draw, and to maintain line patency        Vital Signs Last 24 Hrs  T(C): 36.9 (14 Feb 2021 08:40), Max: 37.2 (13 Feb 2021 14:09)  T(F): 98.5 (14 Feb 2021 08:40), Max: 98.9 (13 Feb 2021 14:09)  HR: 77 (14 Feb 2021 08:40) (62 - 93)  BP: 119/67 (14 Feb 2021 08:40) (115/73 - 155/74)  BP(mean): --  RR: 22 (14 Feb 2021 08:40) (16 - 22)  SpO2: 100% (14 Feb 2021 08:40) (97% - 100%)Daily     Daily I&O's Summary    13 Feb 2021 07:01  -  14 Feb 2021 07:00  --------------------------------------------------------  IN: 950 mL / OUT: 0 mL / NET: 950 mL        PHYSICAL EXAM:    Constitutional: NAD, awake and alert, thin  HEENT: PERR, EOMI,  No oral cyananosis.  Neck:  supple,  No JVD  Respiratory: Breath sounds are clear bilaterally, No wheezing, rales or rhonchi  Cardiovascular: S1 and S2, regular rate and rhythm, no Murmurs, gallops or rubs  Gastrointestinal: Bowel Sounds present, soft, nontender.   Extremities: No peripheral edema. No clubbing or cyanosis.  Vascular: 2+ peripheral pulses  Neurological: A/O x 3, no focal deficits  Musculoskeletal: no calf tenderness.  Skin: No rashes.      LABS: All Labs Reviewed:                        7.8    16.03 )-----------( 317      ( 13 Feb 2021 06:41 )             24.7     13 Feb 2021 06:41    136    |  103    |  17     ----------------------------<  109    4.8     |  23     |  1.03     Ca    9.3        13 Feb 2021 06:41  Phos  3.3       13 Feb 2021 06:41  Mg     1.9       13 Feb 2021 06:41    TPro  7.1    /  Alb  3.1    /  TBili  x      /  DBili  x      /  AST  x      /  ALT  x      /  AlkPhos  x      12 Feb 2021 01:08          Blood Culture:         RADIOLOGY/EKG:      ECHO/CARDIAC CATHTERIZATION/STRESS TEST:

## 2021-02-15 LAB
HCT VFR BLD CALC: 23 % — LOW (ref 39–50)
HGB BLD-MCNC: 7.2 G/DL — LOW (ref 13–17)
MCHC RBC-ENTMCNC: 23.8 PG — LOW (ref 27–34)
MCHC RBC-ENTMCNC: 31.3 GM/DL — LOW (ref 32–36)
MCV RBC AUTO: 76.2 FL — LOW (ref 80–100)
NRBC # BLD: 0 /100 WBCS — SIGNIFICANT CHANGE UP (ref 0–0)
PLATELET # BLD AUTO: 292 K/UL — SIGNIFICANT CHANGE UP (ref 150–400)
RBC # BLD: 3.02 M/UL — LOW (ref 4.2–5.8)
RBC # BLD: 3.02 M/UL — LOW (ref 4.2–5.8)
RBC # FLD: 23.4 % — HIGH (ref 10.3–14.5)
RETICS #: 167.6 K/UL — HIGH (ref 25–125)
RETICS/RBC NFR: 5.6 % — HIGH (ref 0.5–2.5)
WBC # BLD: 13.38 K/UL — HIGH (ref 3.8–10.5)
WBC # FLD AUTO: 13.38 K/UL — HIGH (ref 3.8–10.5)

## 2021-02-15 PROCEDURE — 99232 SBSQ HOSP IP/OBS MODERATE 35: CPT

## 2021-02-15 PROCEDURE — 99233 SBSQ HOSP IP/OBS HIGH 50: CPT

## 2021-02-15 RX ORDER — METOPROLOL TARTRATE 50 MG
50 TABLET ORAL EVERY 12 HOURS
Refills: 0 | Status: DISCONTINUED | OUTPATIENT
Start: 2021-02-15 | End: 2021-02-18

## 2021-02-15 RX ADMIN — CHLORHEXIDINE GLUCONATE 1 APPLICATION(S): 213 SOLUTION TOPICAL at 05:54

## 2021-02-15 RX ADMIN — HYDROMORPHONE HYDROCHLORIDE 4 MILLIGRAM(S): 2 INJECTION INTRAMUSCULAR; INTRAVENOUS; SUBCUTANEOUS at 21:08

## 2021-02-15 RX ADMIN — FINASTERIDE 5 MILLIGRAM(S): 5 TABLET, FILM COATED ORAL at 11:35

## 2021-02-15 RX ADMIN — Medication 50 MILLIGRAM(S): at 17:37

## 2021-02-15 RX ADMIN — ENOXAPARIN SODIUM 40 MILLIGRAM(S): 100 INJECTION SUBCUTANEOUS at 11:35

## 2021-02-15 RX ADMIN — HYDROMORPHONE HYDROCHLORIDE 4 MILLIGRAM(S): 2 INJECTION INTRAMUSCULAR; INTRAVENOUS; SUBCUTANEOUS at 13:37

## 2021-02-15 RX ADMIN — Medication 1 MILLIGRAM(S): at 11:35

## 2021-02-15 RX ADMIN — HYDROMORPHONE HYDROCHLORIDE 4 MILLIGRAM(S): 2 INJECTION INTRAMUSCULAR; INTRAVENOUS; SUBCUTANEOUS at 01:00

## 2021-02-15 RX ADMIN — HYDROMORPHONE HYDROCHLORIDE 4 MILLIGRAM(S): 2 INJECTION INTRAMUSCULAR; INTRAVENOUS; SUBCUTANEOUS at 08:01

## 2021-02-15 NOTE — PROGRESS NOTE ADULT - SUBJECTIVE AND OBJECTIVE BOX
Patient seen and examined;  Chart reviewed and events noted;   Continues to have severe pain and requiring IV pain medciations    MEDICATIONS  (STANDING):  chlorhexidine 4% Liquid 1 Application(s) Topical <User Schedule>  enoxaparin Injectable 40 milliGRAM(s) SubCutaneous daily  finasteride 5 milliGRAM(s) Oral daily  folic acid 1 milliGRAM(s) Oral daily  metoprolol tartrate 25 milliGRAM(s) Oral every 8 hours    MEDICATIONS  (PRN):  benzocaine 15 mG/menthol 3.6 mG (Sugar-Free) Lozenge 1 Lozenge Oral every 3 hours PRN Sore Throat  diphenhydrAMINE   Injectable 25 milliGRAM(s) IV Push every 4 hours PRN Rash and/or Itching  HYDROmorphone   Tablet 2 milliGRAM(s) Oral every 3 hours PRN Moderate Pain (4 - 6)  HYDROmorphone   Tablet 4 milliGRAM(s) Oral every 3 hours PRN Severe Pain (7 - 10)  HYDROmorphone  Injectable 1 milliGRAM(s) IV Push every 2 hours PRN breathrough or severe pain  sodium chloride 0.9% lock flush 10 milliLiter(s) IV Push every 1 hour PRN Pre/post blood products, medications, blood draw, and to maintain line patency      Vital Signs Last 24 Hrs  T(C): 37.4 (15 Feb 2021 05:52), Max: 37.4 (15 Feb 2021 05:52)  T(F): 99.3 (15 Feb 2021 05:52), Max: 99.3 (15 Feb 2021 05:52)  HR: 75 (15 Feb 2021 05:52) (75 - 93)  BP: 94/55 (15 Feb 2021 05:52) (94/55 - 123/69)  BP(mean): --  RR: 17 (15 Feb 2021 05:52) (14 - 22)  SpO2: 98% (15 Feb 2021 05:52) (96% - 100%)    PHYSICAL EXAM  General: adult in NAD  HEENT: clear oropharynx, anicteric sclera, pink conjunctivae  Neck: supple  CV: normal S1S2 with no murmur rubs or gallops  Lungs: clear to auscultation, no wheezes, no rhales  Abdomen: soft non-tender non-distended, no hepato/splenomegaly  Ext: no clubbing cyanosis or edema  Skin: no rashes and no petichiae  Neuro: alert and oriented X3 no focal deficits      LABS:                        7.2    13.38 )-----------( 292      ( 15 Feb 2021 07:40 )             23.0     Hemoglobin: 7.2 g/dL (02-15 @ 07:40)  Hemoglobin: 7.8 g/dL (02-13 @ 06:41)  Hemoglobin: 5.8 g/dL (02-11 @ 08:08)

## 2021-02-15 NOTE — PROGRESS NOTE ADULT - PROBLEM SELECTOR PLAN 1
Mild chronic systolic dysfunction possibly due to chronic sickle cell anemia hyperdynamic LV dysfunction with subsequent systolic dysfunction.  would recommend OP nuclear stress test once his anemia corrected , co

## 2021-02-15 NOTE — PROGRESS NOTE ADULT - SUBJECTIVE AND OBJECTIVE BOX
CHIEF COMPLAINT:    HPI:  65 year old man with a history of sickle cell disease, acute chest syndrome, cardiomyopathy with mild chronic systolic HF (previous notes suggest a non-ischemia etiology related to high output state from SCD), NSVT during hospitalization in 2019 (treated with beta blockade), mild mental retardation, admitted on 2/4/21 with a pain crisis and Hgb ~ 5.5 (lower than previous baseline) requiring transfusion.  Cardiology consulted for NSVT.  He describes diffuse pain - modestly better over past few days; occasional palpitations but no syncope.  He describes a good baseline functional status ("I walk a lot") with no angina.      2/10/21  Patient heart rate is better after receiving PRBC , did have PSVT  /svt with abberrancy patient denies any chest pain  his sbp low normal range  his hemoglobin is 6.3    2/11/21 Patient is feeling better rate is better ,  still severely anemic , low normal sbp   2/12/21 Patient is having pains all over the body , no sob or chest pain ,  monitor showing brief PSVT      one episode of NSWCT, severely anemic5.8 , reciveing IV iron   2/13/20 Pt complains of pain. No chest pain or shortness of breath. NSR  2/14/21 Pt complains of pain. No chest pain or shortness of breath. NSR  2/15/21 Patient complain pain all over the body , denies any sob ,  patient did have episodes of PSVT  and brief ventricular ectopy        PAST MEDICAL & SURGICAL HISTORY:  Sickle cell disease    Hypertension    Constipation    Intellectual disability  ~ mild    Acute chest syndrome  Pt unaware    H/O transfusion  ~ pRBC    Left ventricular dysfunction  mild LVD on echo in 7/18, normal LVF in 12/18    Sickle Cell Disease    MEDICATIONS  (STANDING):  chlorhexidine 4% Liquid 1 Application(s) Topical <User Schedule>  enoxaparin Injectable 40 milliGRAM(s) SubCutaneous daily  finasteride 5 milliGRAM(s) Oral daily  folic acid 1 milliGRAM(s) Oral daily  metoprolol tartrate 25 milliGRAM(s) Oral every 8 hours    MEDICATIONS  (PRN):  benzocaine 15 mG/menthol 3.6 mG (Sugar-Free) Lozenge 1 Lozenge Oral every 3 hours PRN Sore Throat  diphenhydrAMINE   Injectable 25 milliGRAM(s) IV Push every 4 hours PRN Rash and/or Itching  HYDROmorphone   Tablet 2 milliGRAM(s) Oral every 3 hours PRN Moderate Pain (4 - 6)  HYDROmorphone   Tablet 4 milliGRAM(s) Oral every 3 hours PRN Severe Pain (7 - 10)  HYDROmorphone  Injectable 1 milliGRAM(s) IV Push every 2 hours PRN breathrough or severe pain  sodium chloride 0.9% lock flush 10 milliLiter(s) IV Push every 1 hour PRN Pre/post blood products, medications, blood draw, and to maintain line patency      Vital Signs Last 24 Hrs  T(C): 36.9 (15 Feb 2021 10:29), Max: 37.4 (15 Feb 2021 05:52)  T(F): 98.4 (15 Feb 2021 10:29), Max: 99.3 (15 Feb 2021 05:52)  HR: 87 (15 Feb 2021 10:29) (75 - 93)  BP: 100/59 (15 Feb 2021 10:29) (94/55 - 123/69)  BP(mean): --  RR: 16 (15 Feb 2021 10:29) (14 - 22)  SpO2: 97% (15 Feb 2021 10:29) (96% - 100%)    I&O's Summary    14 Feb 2021 07:01  -  15 Feb 2021 07:00  --------------------------------------------------------  IN: 0 mL / OUT: 1275 mL / NET: -1275 mL      PHYSICAL EXAM:    Constitutional: NAD, awake and alert, thin  HEENT: PERR, EOMI,  No oral cyananosis.  Neck:  supple,  No JVD  Respiratory: Breath sounds are clear bilaterally, No wheezing, rales or rhonchi  Cardiovascular: S1 and S2, regular rate and rhythm, no Murmurs, gallops or rubs  Gastrointestinal: Bowel Sounds present, soft, nontender.   Extremities: No peripheral edema. No clubbing or cyanosis.  Vascular: 2+ peripheral pulses  Neurological: A/O x 3, no focal deficits  Musculoskeletal: no calf tenderness.  Skin: No rashes.      LABS: All Labs Reviewed:                         7.2    13.38 )-----------( 292      ( 15 Feb 2021 07:40 )             23.0         Monitor sinus rhythm episodes of sinus tachycardia , non sustained supre ventricular tachycardiac episodes , one episode of NSVT    < from: US Transthoracic Echocardiogram w/Doppler Complete (02.08.21 @ 21:22) >  CONCLUSIONS:  1. Low normal left ventricular systolic function.  2. Increased left ventricular wall thickness.  3. Bi-atrial enlargement.

## 2021-02-15 NOTE — PROGRESS NOTE ADULT - SUBJECTIVE AND OBJECTIVE BOX
Patient is a 65y old  Male who presents with a chief complaint of sickle cell pain crisis (15 Feb 2021 10:11)      INTERVAL HPI/OVERNIGHT EVENTS:  no overnight events  pt says he's feeling okay today, occasional pain.   no BMs yet.     MEDICATIONS  (STANDING):  chlorhexidine 4% Liquid 1 Application(s) Topical <User Schedule>  enoxaparin Injectable 40 milliGRAM(s) SubCutaneous daily  finasteride 5 milliGRAM(s) Oral daily  folic acid 1 milliGRAM(s) Oral daily  metoprolol tartrate 25 milliGRAM(s) Oral every 8 hours    MEDICATIONS  (PRN):  benzocaine 15 mG/menthol 3.6 mG (Sugar-Free) Lozenge 1 Lozenge Oral every 3 hours PRN Sore Throat  diphenhydrAMINE   Injectable 25 milliGRAM(s) IV Push every 4 hours PRN Rash and/or Itching  HYDROmorphone   Tablet 2 milliGRAM(s) Oral every 3 hours PRN Moderate Pain (4 - 6)  HYDROmorphone   Tablet 4 milliGRAM(s) Oral every 3 hours PRN Severe Pain (7 - 10)  HYDROmorphone  Injectable 1 milliGRAM(s) IV Push every 2 hours PRN breathrough or severe pain  sodium chloride 0.9% lock flush 10 milliLiter(s) IV Push every 1 hour PRN Pre/post blood products, medications, blood draw, and to maintain line patency      Allergies    No Known Drug Allergies  peanuts (Anaphylaxis)  peanuts (Angioedema)  pork (Unknown)  shellfish (Unknown)    Intolerances    lactose (Unknown)      REVIEW OF SYSTEMS:  CONSTITUTIONAL: No fever, weight loss, or fatigue  RESPIRATORY: No cough, wheezing, chills or hemoptysis; No shortness of breath  CARDIOVASCULAR: No chest pain, palpitations, lightheadedness, or leg swelling  GASTROINTESTINAL: No abdominal or epigastric pain. No nausea, vomiting, or hematemesis; No diarrhea or constipation. No melena or hematochezia.  GENITOURINARY: No dysuria, frequency, hematuria, or incontinence  NEUROLOGICAL: No headaches, memory loss, vertigo, loss of strength, numbness, or tremors  MUSCULOSKELETAL: general body aches.   ALLERGY AND IMMUNOLOGIC: No hives or eczema    Vital Signs Last 24 Hrs  T(C): 36.9 (15 Feb 2021 10:29), Max: 37.4 (15 Feb 2021 05:52)  T(F): 98.4 (15 Feb 2021 10:29), Max: 99.3 (15 Feb 2021 05:52)  HR: 87 (15 Feb 2021 10:29) (75 - 93)  BP: 100/59 (15 Feb 2021 10:29) (94/55 - 123/69)  BP(mean): --  RR: 16 (15 Feb 2021 10:29) (14 - 22)  SpO2: 97% (15 Feb 2021 10:29) (96% - 100%)    PHYSICAL EXAM:  GENERAL: NAD, cachectic   HEAD:  Atraumatic, Normocephalic  EYES:  conjunctiva and sclera clear  ENMT: Moist mucous membranes,  NECK: Supple, No JVD  NERVOUS SYSTEM:  Alert & Oriented X3, Good concentration; All 4 extremities mobile, no gross sensory deficits.   CHEST/LUNG: Clear to auscultation bilaterally; No rales, rhonchi, wheezing, or rubs  HEART: Regular rate and rhythm; No murmurs, rubs, or gallops  ABDOMEN: Soft, Nontender, Nondistended; Bowel sounds present  EXTREMITIES:  2+ Peripheral Pulses, No clubbing, cyanosis, or edema  LYMPH: No lymphadenopathy noted  SKIN: No rashes or lesions  PICC LINE in place.     LABS:                        7.2    13.38 )-----------( 292      ( 15 Feb 2021 07:40 )             23.0               CAPILLARY BLOOD GLUCOSE          RADIOLOGY & ADDITIONAL TESTS:    Imaging Personally Reviewed:  [ ] YES     Consultant(s) Notes Reviewed:      Care Discussed with Consultants/Other Providers:    Advanced Directives: [ ] DNR  [ ] No feeding tube  [ ] MOLST in chart  [ ] MOLST completed today  [ ] Unknown

## 2021-02-16 LAB
ALBUMIN SERPL ELPH-MCNC: 2.7 G/DL — LOW (ref 3.3–5)
ALP SERPL-CCNC: 169 U/L — HIGH (ref 30–120)
ALT FLD-CCNC: 33 U/L DA — SIGNIFICANT CHANGE UP (ref 10–60)
ANION GAP SERPL CALC-SCNC: 9 MMOL/L — SIGNIFICANT CHANGE UP (ref 5–17)
AST SERPL-CCNC: 44 U/L — HIGH (ref 10–40)
BILIRUB SERPL-MCNC: 2 MG/DL — HIGH (ref 0.2–1.2)
BUN SERPL-MCNC: 50 MG/DL — HIGH (ref 7–23)
CALCIUM SERPL-MCNC: 9 MG/DL — SIGNIFICANT CHANGE UP (ref 8.4–10.5)
CHLORIDE SERPL-SCNC: 102 MMOL/L — SIGNIFICANT CHANGE UP (ref 96–108)
CO2 SERPL-SCNC: 25 MMOL/L — SIGNIFICANT CHANGE UP (ref 22–31)
CREAT SERPL-MCNC: 1.41 MG/DL — HIGH (ref 0.5–1.3)
GLUCOSE SERPL-MCNC: 178 MG/DL — HIGH (ref 70–99)
HCT VFR BLD CALC: 21 % — CRITICAL LOW (ref 39–50)
HGB BLD-MCNC: 6.9 G/DL — CRITICAL LOW (ref 13–17)
MAGNESIUM SERPL-MCNC: 2.6 MG/DL — SIGNIFICANT CHANGE UP (ref 1.6–2.6)
MCHC RBC-ENTMCNC: 24.6 PG — LOW (ref 27–34)
MCHC RBC-ENTMCNC: 32.9 GM/DL — SIGNIFICANT CHANGE UP (ref 32–36)
MCV RBC AUTO: 74.7 FL — LOW (ref 80–100)
NRBC # BLD: 0 /100 WBCS — SIGNIFICANT CHANGE UP (ref 0–0)
PLATELET # BLD AUTO: 290 K/UL — SIGNIFICANT CHANGE UP (ref 150–400)
POTASSIUM SERPL-MCNC: 4.4 MMOL/L — SIGNIFICANT CHANGE UP (ref 3.5–5.3)
POTASSIUM SERPL-SCNC: 4.4 MMOL/L — SIGNIFICANT CHANGE UP (ref 3.5–5.3)
PROT SERPL-MCNC: 7.2 G/DL — SIGNIFICANT CHANGE UP (ref 6–8.3)
RBC # BLD: 2.81 M/UL — LOW (ref 4.2–5.8)
RBC # FLD: 23.2 % — HIGH (ref 10.3–14.5)
SODIUM SERPL-SCNC: 136 MMOL/L — SIGNIFICANT CHANGE UP (ref 135–145)
WBC # BLD: 11.11 K/UL — HIGH (ref 3.8–10.5)
WBC # FLD AUTO: 11.11 K/UL — HIGH (ref 3.8–10.5)

## 2021-02-16 PROCEDURE — 99233 SBSQ HOSP IP/OBS HIGH 50: CPT

## 2021-02-16 RX ORDER — SODIUM CHLORIDE 9 MG/ML
1000 INJECTION, SOLUTION INTRAVENOUS
Refills: 0 | Status: DISCONTINUED | OUTPATIENT
Start: 2021-02-16 | End: 2021-02-21

## 2021-02-16 RX ADMIN — ENOXAPARIN SODIUM 40 MILLIGRAM(S): 100 INJECTION SUBCUTANEOUS at 11:24

## 2021-02-16 RX ADMIN — Medication 50 MILLIGRAM(S): at 17:37

## 2021-02-16 RX ADMIN — SODIUM CHLORIDE 50 MILLILITER(S): 9 INJECTION, SOLUTION INTRAVENOUS at 11:30

## 2021-02-16 RX ADMIN — HYDROMORPHONE HYDROCHLORIDE 1 MILLIGRAM(S): 2 INJECTION INTRAMUSCULAR; INTRAVENOUS; SUBCUTANEOUS at 20:27

## 2021-02-16 RX ADMIN — SODIUM CHLORIDE 10 MILLILITER(S): 9 INJECTION INTRAMUSCULAR; INTRAVENOUS; SUBCUTANEOUS at 20:30

## 2021-02-16 RX ADMIN — SODIUM CHLORIDE 50 MILLILITER(S): 9 INJECTION, SOLUTION INTRAVENOUS at 19:46

## 2021-02-16 RX ADMIN — FINASTERIDE 5 MILLIGRAM(S): 5 TABLET, FILM COATED ORAL at 11:24

## 2021-02-16 RX ADMIN — Medication 1 MILLIGRAM(S): at 11:24

## 2021-02-16 RX ADMIN — CHLORHEXIDINE GLUCONATE 1 APPLICATION(S): 213 SOLUTION TOPICAL at 06:06

## 2021-02-16 RX ADMIN — HYDROMORPHONE HYDROCHLORIDE 1 MILLIGRAM(S): 2 INJECTION INTRAMUSCULAR; INTRAVENOUS; SUBCUTANEOUS at 17:38

## 2021-02-16 RX ADMIN — Medication 50 MILLIGRAM(S): at 06:06

## 2021-02-16 NOTE — PROGRESS NOTE ADULT - SUBJECTIVE AND OBJECTIVE BOX
Patient is a 55y old  Male who presents with a chief complaint of sickle cell crisis (15 Feb 2021 12:49)    INTERVAL HPI/OVERNIGHT EVENTS: had some palpitations when walking earlier.   has not eaten from breakfast tray yet.   pain overall present but controlled mostly with oral dilaudid.     MEDICATIONS  (STANDING):  chlorhexidine 4% Liquid 1 Application(s) Topical <User Schedule>  enoxaparin Injectable 40 milliGRAM(s) SubCutaneous daily  finasteride 5 milliGRAM(s) Oral daily  folic acid 1 milliGRAM(s) Oral daily  metoprolol tartrate 50 milliGRAM(s) Oral every 12 hours  sodium chloride 0.45%. 1000 milliLiter(s) (50 mL/Hr) IV Continuous <Continuous>    MEDICATIONS  (PRN):  benzocaine 15 mG/menthol 3.6 mG (Sugar-Free) Lozenge 1 Lozenge Oral every 3 hours PRN Sore Throat  diphenhydrAMINE   Injectable 25 milliGRAM(s) IV Push every 4 hours PRN Rash and/or Itching  HYDROmorphone  Injectable 1 milliGRAM(s) IV Push every 2 hours PRN breathrough or severe pain  sodium chloride 0.9% lock flush 10 milliLiter(s) IV Push every 1 hour PRN Pre/post blood products, medications, blood draw, and to maintain line patency    Allergies  No Known Drug Allergies  peanuts (Anaphylaxis)  peanuts (Angioedema)  pork (Unknown)  shellfish (Unknown)    Intolerances  lactose (Unknown)      REVIEW OF SYSTEMS:  see hpi    Vital Signs Last 24 Hrs  T(C): 37 (16 Feb 2021 10:38), Max: 37.6 (15 Feb 2021 22:00)  T(F): 98.6 (16 Feb 2021 10:38), Max: 99.6 (15 Feb 2021 22:00)  HR: 87 (16 Feb 2021 10:38) (82 - 99)  BP: 102/62 (16 Feb 2021 10:38) (98/50 - 117/64)  BP(mean): --  RR: 18 (16 Feb 2021 10:38) (16 - 19)  SpO2: 97% (16 Feb 2021 10:38) (94% - 100%)    PHYSICAL EXAM:  GENERAL: NAD, well-groomed, well-developed  HEAD:  Atraumatic, Normocephalic  EYES: conjunctiva and sclera clear  ENMT: Moist mucous membranes  NECK: Supple, No JVD  NERVOUS SYSTEM:  Alert & Oriented X3, Good concentration; All 4 extremities mobile, no gross sensory deficits.   CHEST/LUNG: Clear to auscultation bilaterally; No rales, rhonchi, wheezing, or rubs  HEART: Regular rate and rhythm; No murmurs, rubs, or gallops  ABDOMEN: Soft, Nontender, Nondistended; Bowel sounds present  EXTREMITIES:  2+ Peripheral Pulses, No clubbing, cyanosis, or edema      LABS:                        6.9    11.11 )-----------( 290      ( 16 Feb 2021 06:28 )             21.0     16 Feb 2021 06:28    136    |  102    |  50     ----------------------------<  178    4.4     |  25     |  1.41     Ca    9.0        16 Feb 2021 06:28    TPro  7.2    /  Alb  2.7    /  TBili  2.0    /  DBili  x      /  AST  44     /  ALT  33     /  AlkPhos  169    16 Feb 2021 06:28        CAPILLARY BLOOD GLUCOSE          RADIOLOGY & ADDITIONAL TESTS:    Imaging Personally Reviewed:  [ ] YES     Consultant(s) Notes Reviewed:      Care Discussed with Consultants/Other Providers:    Advanced Directives: [ ] DNR  [ ] No feeding tube  [ ] MOLST in chart  [ ] MOLST completed today  [ ] Unknown

## 2021-02-16 NOTE — PROGRESS NOTE ADULT - SUBJECTIVE AND OBJECTIVE BOX
All interim records and events noted.    resting in bed, comfortable      MEDICATIONS  (STANDING):  chlorhexidine 4% Liquid 1 Application(s) Topical <User Schedule>  enoxaparin Injectable 40 milliGRAM(s) SubCutaneous daily  finasteride 5 milliGRAM(s) Oral daily  folic acid 1 milliGRAM(s) Oral daily  metoprolol tartrate 50 milliGRAM(s) Oral every 12 hours    MEDICATIONS  (PRN):  benzocaine 15 mG/menthol 3.6 mG (Sugar-Free) Lozenge 1 Lozenge Oral every 3 hours PRN Sore Throat  diphenhydrAMINE   Injectable 25 milliGRAM(s) IV Push every 4 hours PRN Rash and/or Itching  HYDROmorphone  Injectable 1 milliGRAM(s) IV Push every 2 hours PRN breathrough or severe pain  sodium chloride 0.9% lock flush 10 milliLiter(s) IV Push every 1 hour PRN Pre/post blood products, medications, blood draw, and to maintain line patency      Vital Signs Last 24 Hrs  T(C): 36.8 (16 Feb 2021 05:35), Max: 37.6 (15 Feb 2021 22:00)  T(F): 98.3 (16 Feb 2021 05:35), Max: 99.6 (15 Feb 2021 22:00)  HR: 83 (16 Feb 2021 05:35) (82 - 99)  BP: 116/62 (16 Feb 2021 05:35) (98/50 - 117/64)  BP(mean): --  RR: 18 (16 Feb 2021 05:35) (16 - 19)  SpO2: 94% (16 Feb 2021 05:35) (94% - 100%)    PHYSICAL EXAM  General: well developed  well nourished, thin, in no acute distress  Head: atraumatic, normocephalic  Neck: supple, trachea midline  CV: S1 S2, regular rate and rhythm  Lungs: clear to auscultation, no wheezes/rhonchi  Abdomen: soft, nontender, bowel sounds present, no palpable masses  Skin: no significant increased ecchymosis/petechiae        LABS:             6.9    11.11 )-----------( 290      ( 02-16 @ 06:28 )             21.0                7.2    13.38 )-----------( 292      ( 02-15 @ 07:40 )             23.0       02-16    136  |  102  |  50<H>  ----------------------------<  178<H>  4.4   |  25  |  1.41<H>    Ca    9.0      16 Feb 2021 06:28    TPro  7.2  /  Alb  2.7<L>  /  TBili  2.0<H>  /  DBili  x   /  AST  44<H>  /  ALT  33  /  AlkPhos  169<H>  02-16        RADIOLOGY & ADDITIONAL STUDIES:    IMPRESSION/RECOMMENDATIONS:

## 2021-02-17 LAB
ANION GAP SERPL CALC-SCNC: 9 MMOL/L — SIGNIFICANT CHANGE UP (ref 5–17)
BLD GP AB SCN SERPL QL: SIGNIFICANT CHANGE UP
BUN SERPL-MCNC: 41 MG/DL — HIGH (ref 7–23)
CALCIUM SERPL-MCNC: 8.9 MG/DL — SIGNIFICANT CHANGE UP (ref 8.4–10.5)
CHLORIDE SERPL-SCNC: 103 MMOL/L — SIGNIFICANT CHANGE UP (ref 96–108)
CO2 SERPL-SCNC: 26 MMOL/L — SIGNIFICANT CHANGE UP (ref 22–31)
CREAT SERPL-MCNC: 1.04 MG/DL — SIGNIFICANT CHANGE UP (ref 0.5–1.3)
DIR ANTIGLOB POLYSPECIFIC INTERPRETATION: SIGNIFICANT CHANGE UP
GLUCOSE SERPL-MCNC: 104 MG/DL — HIGH (ref 70–99)
HCT VFR BLD CALC: 19.8 % — CRITICAL LOW (ref 39–50)
HGB BLD-MCNC: 6.3 G/DL — CRITICAL LOW (ref 13–17)
MAGNESIUM SERPL-MCNC: 2.4 MG/DL — SIGNIFICANT CHANGE UP (ref 1.6–2.6)
MCHC RBC-ENTMCNC: 23.8 PG — LOW (ref 27–34)
MCHC RBC-ENTMCNC: 31.8 GM/DL — LOW (ref 32–36)
MCV RBC AUTO: 74.7 FL — LOW (ref 80–100)
NRBC # BLD: 0 /100 WBCS — SIGNIFICANT CHANGE UP (ref 0–0)
OB PNL STL: NEGATIVE — SIGNIFICANT CHANGE UP
PLATELET # BLD AUTO: 306 K/UL — SIGNIFICANT CHANGE UP (ref 150–400)
POTASSIUM SERPL-MCNC: 4.4 MMOL/L — SIGNIFICANT CHANGE UP (ref 3.5–5.3)
POTASSIUM SERPL-SCNC: 4.4 MMOL/L — SIGNIFICANT CHANGE UP (ref 3.5–5.3)
RBC # BLD: 2.65 M/UL — LOW (ref 4.2–5.8)
RBC # FLD: 23.1 % — HIGH (ref 10.3–14.5)
SODIUM SERPL-SCNC: 138 MMOL/L — SIGNIFICANT CHANGE UP (ref 135–145)
WBC # BLD: 11.3 K/UL — HIGH (ref 3.8–10.5)
WBC # FLD AUTO: 11.3 K/UL — HIGH (ref 3.8–10.5)

## 2021-02-17 PROCEDURE — 99233 SBSQ HOSP IP/OBS HIGH 50: CPT

## 2021-02-17 RX ORDER — HYDROMORPHONE HYDROCHLORIDE 2 MG/ML
4 INJECTION INTRAMUSCULAR; INTRAVENOUS; SUBCUTANEOUS
Refills: 0 | Status: DISCONTINUED | OUTPATIENT
Start: 2021-02-17 | End: 2021-02-23

## 2021-02-17 RX ORDER — HYDROMORPHONE HYDROCHLORIDE 2 MG/ML
2 INJECTION INTRAMUSCULAR; INTRAVENOUS; SUBCUTANEOUS
Refills: 0 | Status: DISCONTINUED | OUTPATIENT
Start: 2021-02-17 | End: 2021-02-23

## 2021-02-17 RX ADMIN — HYDROMORPHONE HYDROCHLORIDE 2 MILLIGRAM(S): 2 INJECTION INTRAMUSCULAR; INTRAVENOUS; SUBCUTANEOUS at 15:57

## 2021-02-17 RX ADMIN — HYDROMORPHONE HYDROCHLORIDE 1 MILLIGRAM(S): 2 INJECTION INTRAMUSCULAR; INTRAVENOUS; SUBCUTANEOUS at 00:10

## 2021-02-17 RX ADMIN — Medication 1 MILLIGRAM(S): at 11:50

## 2021-02-17 RX ADMIN — HYDROMORPHONE HYDROCHLORIDE 2 MILLIGRAM(S): 2 INJECTION INTRAMUSCULAR; INTRAVENOUS; SUBCUTANEOUS at 09:40

## 2021-02-17 RX ADMIN — HYDROMORPHONE HYDROCHLORIDE 1 MILLIGRAM(S): 2 INJECTION INTRAMUSCULAR; INTRAVENOUS; SUBCUTANEOUS at 05:58

## 2021-02-17 RX ADMIN — HYDROMORPHONE HYDROCHLORIDE 1 MILLIGRAM(S): 2 INJECTION INTRAMUSCULAR; INTRAVENOUS; SUBCUTANEOUS at 02:37

## 2021-02-17 RX ADMIN — Medication 50 MILLIGRAM(S): at 17:55

## 2021-02-17 RX ADMIN — SODIUM CHLORIDE 75 MILLILITER(S): 9 INJECTION, SOLUTION INTRAVENOUS at 20:47

## 2021-02-17 RX ADMIN — HYDROMORPHONE HYDROCHLORIDE 4 MILLIGRAM(S): 2 INJECTION INTRAMUSCULAR; INTRAVENOUS; SUBCUTANEOUS at 20:47

## 2021-02-17 RX ADMIN — HYDROMORPHONE HYDROCHLORIDE 1 MILLIGRAM(S): 2 INJECTION INTRAMUSCULAR; INTRAVENOUS; SUBCUTANEOUS at 17:55

## 2021-02-17 RX ADMIN — ENOXAPARIN SODIUM 40 MILLIGRAM(S): 100 INJECTION SUBCUTANEOUS at 11:50

## 2021-02-17 RX ADMIN — CHLORHEXIDINE GLUCONATE 1 APPLICATION(S): 213 SOLUTION TOPICAL at 05:58

## 2021-02-17 RX ADMIN — Medication 50 MILLIGRAM(S): at 05:51

## 2021-02-17 RX ADMIN — FINASTERIDE 5 MILLIGRAM(S): 5 TABLET, FILM COATED ORAL at 11:50

## 2021-02-17 NOTE — PROGRESS NOTE ADULT - SUBJECTIVE AND OBJECTIVE BOX
Patient is a 55y old  Male who presents with a chief complaint of sickle cell crisis (15 Feb 2021 12:49)    INTERVAL HPI/OVERNIGHT EVENTS:  having total body pain today.  some NSVT and tachycardia on the monitor.   did eat some of breakfast on the monitor.    MEDICATIONS  (STANDING):  chlorhexidine 4% Liquid 1 Application(s) Topical <User Schedule>  enoxaparin Injectable 40 milliGRAM(s) SubCutaneous daily  finasteride 5 milliGRAM(s) Oral daily  folic acid 1 milliGRAM(s) Oral daily  metoprolol tartrate 50 milliGRAM(s) Oral every 12 hours  sodium chloride 0.45%. 1000 milliLiter(s) (75 mL/Hr) IV Continuous <Continuous>    MEDICATIONS  (PRN):  benzocaine 15 mG/menthol 3.6 mG (Sugar-Free) Lozenge 1 Lozenge Oral every 3 hours PRN Sore Throat  diphenhydrAMINE   Injectable 25 milliGRAM(s) IV Push every 4 hours PRN Rash and/or Itching  HYDROmorphone   Tablet 2 milliGRAM(s) Oral every 3 hours PRN Moderate Pain (4 - 6)  HYDROmorphone   Tablet 4 milliGRAM(s) Oral every 3 hours PRN Severe Pain (7 - 10)  HYDROmorphone  Injectable 1 milliGRAM(s) IV Push every 2 hours PRN breathrough or severe pain  sodium chloride 0.9% lock flush 10 milliLiter(s) IV Push every 1 hour PRN Pre/post blood products, medications, blood draw, and to maintain line patency    Allergies  No Known Drug Allergies  peanuts (Anaphylaxis)  peanuts (Angioedema)  pork (Unknown)  shellfish (Unknown)    Intolerances  lactose (Unknown)    REVIEW OF SYSTEMS:  see hpi    Vital Signs Last 24 Hrs  T(C): 36.7 (17 Feb 2021 10:04), Max: 37.2 (17 Feb 2021 01:04)  T(F): 98 (17 Feb 2021 10:04), Max: 99 (17 Feb 2021 01:04)  HR: 73 (17 Feb 2021 10:04) (69 - 87)  BP: 126/64 (17 Feb 2021 10:04) (98/58 - 126/64)  BP(mean): --  RR: 16 (17 Feb 2021 10:04) (16 - 18)  SpO2: 97% (17 Feb 2021 10:04) (95% - 98%)    PHYSICAL EXAM:  GENERAL: NAD, well-groomed, well-developed  HEAD:  Atraumatic, Normocephalic  EYES: conjunctiva and sclera clear  ENMT: Moist mucous membranes  NECK: Supple, No JVD  NERVOUS SYSTEM:  Alert & Oriented X3, Good concentration; All 4 extremities mobile, no gross sensory deficits.   CHEST/LUNG: Clear to auscultation bilaterally;   HEART: Regular rate and rhythm; No murmurs, rubs, or gallops  ABDOMEN: Soft, Nontender, Nondistended; Bowel sounds present  EXTREMITIES:  2+ Peripheral Pulses, No clubbing, cyanosis, or edema      LABS:                        6.3    11.30 )-----------( 306      ( 17 Feb 2021 06:49 )             19.8     17 Feb 2021 06:49    138    |  103    |  41     ----------------------------<  104    4.4     |  26     |  1.04     Ca    8.9        17 Feb 2021 06:49  Mg     2.4       17 Feb 2021 06:49          CAPILLARY BLOOD GLUCOSE          RADIOLOGY & ADDITIONAL TESTS:    Imaging Personally Reviewed:  [ ] YES     Consultant(s) Notes Reviewed:      Care Discussed with Consultants/Other Providers:    Advanced Directives: [ ] DNR  [ ] No feeding tube  [ ] MOLST in chart  [ ] MOLST completed today  [ ] Unknown

## 2021-02-17 NOTE — CONSULT NOTE ADULT - SUBJECTIVE AND OBJECTIVE BOX
CHIEF COMPLAINT: Patient is a 65y old  Male who presents with a chief complaint of Pain crisis (08 Feb 2021 08:45)    HPI:  65 year old man with a history of sickle cell disease, acute chest syndrome, cardiomyopathy with mild chronic systolic HF (previous notes suggest a non-ischemia etiology related to high output state from SCD), NSVT during hospitalization in 2019 (treated with beta blockade), mild mental retardation, admitted on 2/4/21 with a pain crisis and Hgb ~ 5.5 (lower than previous baseline) requiring transfusion.  Cardiology consulted for NSVT.  He describes diffuse pain - modestly better over past few days; occasional palpitations but no syncope.  He describes a good baseline functional status ("I walk a lot") with no angina.      PAST MEDICAL & SURGICAL HISTORY:  Sickle cell disease  Hypertension  Constipation  Intellectual disability ~ mild  Acute chest syndrome  H/O transfusion ~ pRBC  Left ventricular dysfunction mild LVD on echo in 7/18, normal LVF in 12/18  Sickle Cell Disease  NSVT    No significant past surgical history    SOCIAL HISTORY:   Alcohol: Denied  Smoking: Nonsmoker      FAMILY HISTORY: * No known heart disease  Family history of sickle cell trait (Mother) ~ presumed in parents &amp; 7 siblings (none suffer w/ the disease, per patient)  FH: hypertension ~ mother    MEDICATIONS  (STANDING):  enoxaparin Injectable 40 milliGRAM(s) SubCutaneous daily  finasteride 5 milliGRAM(s) Oral daily  folic acid 1 milliGRAM(s) Oral daily  lisinopril 10 milliGRAM(s) Oral daily  metoprolol tartrate 12.5 milliGRAM(s) Oral two times a day  sodium chloride 0.45%. 2000 milliLiter(s) (75 mL/Hr) IV Continuous <Continuous>    MEDICATIONS  (PRN):  benzocaine 15 mG/menthol 3.6 mG (Sugar-Free) Lozenge 1 Lozenge Oral every 3 hours PRN Sore Throat  diphenhydrAMINE   Injectable 25 milliGRAM(s) IV Push every 4 hours PRN Rash and/or Itching  HYDROmorphone   Tablet 2 milliGRAM(s) Oral every 3 hours PRN Moderate Pain (4 - 6)  HYDROmorphone   Tablet 4 milliGRAM(s) Oral every 3 hours PRN Severe Pain (7 - 10)    Allergies: No Known Drug Allergies  peanuts (Anaphylaxis)  peanuts (Angioedema)  pork (Unknown)  shellfish (Unknown)    REVIEW OF SYSTEMS:  CONSTITUTIONAL: No fevers or chills; + diffuse pain  Eyes: No visual changes  NECK: No pain or stiffness  RESPIRATORY: No cough, wheezing, hemoptysis; No shortness of breath  CARDIOVASCULAR: No chest pain, + palpitations No syncope  GASTROINTESTINAL:No nausea, vomiting  GENITOURINARY: No dysuria, frequency or hematuria  NEUROLOGICAL: No numbness.  SKIN: No itching or rash  All other review of systems is negative unless indicated above    VITAL SIGNS:   Vital Signs Last 24 Hrs  T(C): 36.5 (09 Feb 2021 05:30), Max: 36.9 (08 Feb 2021 14:13)  T(F): 97.7 (09 Feb 2021 05:30), Max: 98.4 (08 Feb 2021 14:13)  HR: 84 (09 Feb 2021 05:30) (71 - 94)  BP: 126/74 (09 Feb 2021 05:30) (97/69 - 149/77)  RR: 18 (09 Feb 2021 05:30) (17 - 18)  SpO2: 97% (09 Feb 2021 05:30) (95% - 100%)    PHYSICAL EXAM:  Constitutional: NAD, awake and alert, thin / slender appearance  HEENT:  Pupils round  Pulmonary: Non-labored, breath sounds are clear bilaterally, No wheezing, rales or rhonchi  Cardiovascular: S1 and S2, regular rate and rhythm  Gastrointestinal: Bowel Sounds present, soft, nontender.   Lymph: No peripheral edema. No cervical lymphadenopathy.  Neurological: Alert, no focal deficits  Skin: No rashes.  Psych:  Mood & affect appropriate    LABS:                     6.4    11.73 )-----------( 329      ( 08 Feb 2021 12:16 )             20.0              138    |  104    |  35     ----------------------------<  113    4.7     |  21     |  1.37     Transthoracic Echocardiogram (08.30.19 @ 08:02) >  1. Mild global left ventricular systolic dysfunction.  2. Normal right ventricular size and function.  3. Normal tricuspid valve. Mild-moderate tricuspidregurgitation.  4. Estimated pulmonary artery systolic pressure equals 36mm Hg, assuming right atrial pressure equals 10  mm Hg,consistent with borderline pulmonary hypertension.    12 Lead ECG (02.04.21 @ 07:26): Normal sinus rhythm, Voltage criteria for left ventricular hypertrophy    Tele: NSVT (2/8/21 10 beats; 4 beats); underlying SR    Xray Chest 1 View- PORTABLE-Urgent (Xray Chest 1 View- PORTABLE-Urgent .) (02.03.21 @ 22:49): No active pulmonary disease. Sclerotic humeri as noted.      
Patient is a 65y old  Male who presents with a chief complaint of     HPI:  65M HbSS disease with multiple admissions in past including for acute chest baseline HgB about 6.5, chronic CHF, Mild intellectual disability, BPH presented to [facility] Ed with generalized body aches worst in abdomen and back.  He was unable to control with Oxycodone at home so came to ED.  His symptoms improved a little with IVF and Dilaudid.  Also found to have EMEKA and Acute on Chronic anemia.    Patient transferred to [facility] for bed availability.   He is also complaining of throat discomfort and mild nausea.   Denies Fever, Chills, or other symptoms.  (04 Feb 2021 17:52)        PAST MEDICAL & SURGICAL HISTORY:  Sickle cell disease  Hypertension  Constipation  Intellectual disability. 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]  Sickle cell disease [D57.1]  Hypertension [I10]  Constipation [K59.00]  Intellectual disability [F79]  Acute chest syndrome [D57.01]  H/O transfusion [Z92.89]  Left ventricular dysfunction [I51.9]  Sickle Cell Disease [282.60]  No significant past surgical history [[id number]]    FAMILY HISTORY:  Family history of sickle cell trait (Mother)  ~ presumed in parents &amp; 7 siblings (none suffer w/ the disease, per patient)    FH: hypertension  ~ mother          [SOCIAL HISTORY: ]     smoking:  denies     EtOH:  denies     illicit drugs:  denies     occupation:  diabled     marital status:  single, no children     Other: LIYA Saucedo      [ALLERGIES/INTOLERANCES:]  Allergies     No Known Drug Allergies         peanuts (Anaphylaxis)         peanuts (Angioedema)         pork (Unknown)         shellfish (Unknown)  Intolerances        lactose (Unknown)      [MEDICATIONS]  MEDICATIONS  (STANDING):  enoxaparin Injectable 40 milliGRAM(s) SubCutaneous daily  finasteride 5 milliGRAM(s) Oral daily  folic acid 1 milliGRAM(s) Oral daily  lisinopril 10 milliGRAM(s) Oral daily  metoprolol tartrate 12.5 milliGRAM(s) Oral two times a day  sodium chloride 0.9%. 1000 milliLiter(s) (50 mL/Hr) IV Continuous <Continuous>      MEDICATIONS  (PRN):  benzocaine 15 mG/menthol 3.6 mG (Sugar-Free) Lozenge 1 Lozenge Oral every 3 hours PRN Sore Throat  diphenhydrAMINE   Injectable 25 milliGRAM(s) IV Push every 4 hours PRN Rash and/or Itching  HYDROmorphone  Injectable 1 milliGRAM(s) IV Push every 2 hours PRN Severe Pain (7 - 10)        [REVIEW OF SYSTEMS: ]  CONSTITUTIONAL: normal, no fever, no shakes, no chills   EYES: No eye pain, no visual disturbances, no discharge  ENMT:  no discharge  NECK: No pain, no stiffness  BREASTS: No pain, no masses, no nipple discharge  RESPIRATORY: No cough, no wheezing, no chills, no hemoptysis; No shortness of breath  CARDIOVASCULAR: No chest pain, no palpitations, no dizziness, no leg swelling  GASTROINTESTINAL: No abdominal, no epigastric pain. No nausea, no vomiting, no hematemesis; No diarrhea , no constipation. No melena, no hematochezia.  GENITOURINARY: No dysuria, no frequency, no hematuria, no incontinence  NEUROLOGICAL: No headaches, no memory loss, no loss of strength, no numbness, no tremors  SKIN: No itching, no burning, no rashes, no lesions   LYMPH NODES: No enlarged glands  ENDOCRINE: No heat or cold intolerance; No hair loss  MUSCULOSKELETAL: No joint pain or swelling; No muscle, no back, no extremity pain  PSYCHIATRIC: No depression, no anxiety, no mood swings, no difficulty sleeping  HEME/LYMPH: No easy bruising, no bleeding gums      [VITALS SIGNS 24hrs]  Vital Signs Last 24 Hrs  T(C): 36.8 (05 Feb 2021 09:00), Max: 37.9 (04 Feb 2021 21:49)  T(F): 98.2 (05 Feb 2021 09:00), Max: 100.2 (04 Feb 2021 21:49)  HR: 97 (05 Feb 2021 09:00) (73 - 98)  BP: 124/68 (05 Feb 2021 09:00) (114/62 - 159/77)  BP(mean): 94 (04 Feb 2021 15:51) (94 - 94)  RR: 22 (05 Feb 2021 09:00) (16 - 22)  SpO2: 97% (05 Feb 2021 09:00) (95% - 100%)      [PHYSICAL EXAM]  General: adult in NAD,  WN,  WD. thin frail looking M  HEENT: clear oropharynx, anicteric sclera, pink conjunctivae.  Neck: supple, no masses.  CV: normal S1S2, no murmur, no rubs, no gallops.  Lungs: clear to auscultation, no wheezes, no rales, no rhonchi.  Abdomen: soft, non-tender, non-distended, no hepatosplenomegaly, normal BS, no guarding.  Ext: no clubbing, no cyanosis, no edema.  Skin: no rashes,  no petechiae, no venous stasis changes.  Neuro: alert and oriented X3, no focal motor deficits.  LN: no SC CARLIE.      [LABS: ]                        5.5    20.98 )-----------( 353      ( 05 Feb 2021 00:18 )             17.2     CBC Full  -  ( 05 Feb 2021 00:18 )  WBC Count : 20.98 K/uL  RBC Count : 2.53 M/uL  Hemoglobin : 5.5 g/dL  Hematocrit : 17.2 %  Platelet Count - Automated : 353 K/uL  Mean Cell Volume : 68.0 fl  Mean Cell Hemoglobin : 21.7 pg  Mean Cell Hemoglobin Concentration : 32.0 gm/dL  Auto Neutrophil # : x  Auto Lymphocyte # : x  Auto Monocyte # : x  Auto Eosinophil # : x  Auto Basophil # : x  Auto Neutrophil % : x  Auto Lymphocyte % : x  Auto Monocyte % : x  Auto Eosinophil % : x  Auto Basophil % : x    02-05    140  |  109<H>  |  16  ----------------------------<  90  5.3   |  23  |  1.00    Ca    8.8      05 Feb 2021 00:18    TPro  8.3  /  Alb  3.6  /  TBili  1.7<H>  /  DBili  x   /  AST  25  /  ALT  22  /  AlkPhos  105  02-03    PT/INR - ( 03 Feb 2021 22:33 )   PT: 13.7 sec;   INR: 1.19 ratio         PTT - ( 03 Feb 2021 22:33 )  PTT:28.7 sec  LIVER FUNCTIONS - ( 03 Feb 2021 22:33 )  Alb: 3.6 g/dL / Pro: 8.3 gm/dL / ALK PHOS: 105 U/L / ALT: 22 U/L / AST: 25 U/L / GGT: x                   WBC  TREND (5 Days)  WBC Count: 20.98 K/uL (02-05 @ 00:18)  WBC Count: 13.08 K/uL (02-04 @ 04:28)    HGB  TREND (5 Days)  Hemoglobin: 5.5 g/dL (02-05 @ 00:18)  Hemoglobin: 5.5 g/dL (02-04 @ 04:28)    HCT  TREND (5 Days)  Hematocrit: 17.2 % (02-05 @ 00:18)  Hematocrit: 16.9 % (02-04 @ 04:28)    PLT  TREND (5 Days)  Platelet Count - Automated: 353 K/uL (02-05 @ 00:18)  Platelet Count - Automated: 364 K/uL (02-04 @ 04:28)      Anemia Studies  Reticulocyte Percent: 4.5 % (02-04 @ 04:28)  Reticulocyte Percent: 3.0 % (11-16 @ 07:21)  Reticulocyte Percent: 3.0 % (11-15 @ 10:00)  Reticulocyte Percent: 3.5 % (11-14 @ 11:45)  Reticulocyte Percent: 2.4 % (11-13 @ 05:25)          Hemoglobin Electrophoresis (01.22.20 @ 00:58)   Hemoglobin A%: 1.6 %   Hemoglobin A2%: 3.3 %   Hemoglobin F%: 2.7 %   Hemoglobin S%: 92.4 %   Solubility: Positive   Hemoglobin Interpretation: Transfusion (minimal hemoglobin A); known sickle cell disease. Microcytic   anemia raise possibility of S/Beta-Zero thalassemia Hemoglobin   migration on capillary hemoglobin electrophoresis and HPLC mostly to the   S position with positive solubility. ROQUE Mercado M.D.       Hemoglobin A%: 0 % (07.24.18 @ 05:30)     Historical Values  Hemoglobin A%: 1.6 % (01.22.20 @ 00:58)   Hemoglobin A%: 35.7 % (07.26.18 @ 00:19)   Hemoglobin A%: 14.0 % (07.25.18 @ 01:25)   Hemoglobin A%: 0 % (07.24.18 @ 05:30)   Hemoglobin A%: 0 % (06.19.15 @ 13:55)      [RADIOLOGY & ADDITIONAL STUDIES:]     < from: CT Abdomen and Pelvis w/ Oral Cont (11.13.20 @ 10:01) >    EXAM:  CT ABDOMEN AND PELVIS OC        PROCEDURE DATE:  Nov 13 2020         INTERPRETATION:  CLINICAL INFORMATION: Right upper quadrant pain    COMPARISON: Abdominal ultrasound 10/13/2020    PROCEDURE:  CT of the Abdomen and Pelvis was performed with intravenous contrast.  Intravenous contrast: 90 ml Omnipaque 350. 10 ml discarded.  Oral contrast: positive contrast was administered.  Sagittal and coronal reformats were performed.    FINDINGS:  ·	LOWER CHEST: Unchanged peripheral opacity in the right lower lobe (series 2, image 16). Left lower lobe atelectasis.  ·	LIVER: Within normal limits.  ·	BILE DUCTS: Normal caliber.  ·	GALLBLADDER: Cholelithiasis.No evidence of gallbladder wall thickening or surrounding inflammation.  ·	SPLEEN: Within normal limits.  ·	PANCREAS: Within normal limits.  ·	ADRENALS: Within normal limits.  ·	KIDNEYS/URETERS: Within normal limits.  ·	BLADDER: Within normal limits.  ·	REPRODUCTIVE ORGANS: Prostate within normal limits.  ·	BOWEL: No bowel obstruction. Appendix is normal.  ·	PERITONEUM: No ascites.  ·	VESSELS: Within normal limits.  ·	RETROPERITONEUM/LYMPH NODES: No lymphadenopathy.  ·	ABDOMINAL WALL: Within normal limits.  ·	BONES: Degenerative changes. Diffuse sclerosis of the visualized osseous structures and multiple endplate deformities, consistent with known sickle cell disease.  IMPRESSION:  ·	Cholelithiasis without cholecystitis.  JAME ALVARADO MD; Resident Radiology  This document has been electronically signed.  HANNAH GRAHAM MD; Attending Radiologist  This document has been electronically signed. Nov 13 2020 10:21AM  < end of copied text >          < from: CT Head No Cont (02.10.20 @ 17:29) >  EXAM:  CT BRAIN    PROCEDURE DATE:  Feb 10 2020   INTERPRETATION:  CLINICAL INDICATION: Weakness.  TECHNIQUE: Axial CT scanning of the brain was obtained from the skull base to the vertex without the administration of intravenous contrast. Reformatted coronal and sagittal images were subsequently obtained and reviewed.    COMPARISON: 5/4/2019  FINDINGS:  ·	There is no CT evidence of acute transcortical infarct.  ·	There is no hydrocephalus, mass effect, or acute intracranial hemorrhage. No extra-axial collection. Basal cisterns are patent.  ·	The visualized paranasal sinuses and mastoid air cells are clear.  ·	The calvarium is intact.  IMPRESSION:  ·	No evidence of acute transcortical infarct, acute intracranial hemorrhage, ormass effect.  ROCIO CEVALLOS M.D., ATTENDING RADIOLOGIST  This document has been electronically signed. Feb 10 2020  6:42PM  < end of copied text >        < from: CT Chest No Cont (01.20.20 @ 15:56) >  EXAM:  CT CHEST                        PROCEDURE DATE:  01/20/2020    INTERPRETATION:  CLINICAL INFORMATION: Sickle cell crisis. Evaluate for pneumonia  COMPARISON: None.  PROCEDURE:   CT of the Chest was performed without intravenouscontrast.  Sagittal and coronal reformats were performed.    FINDINGS:  ·	LUNGS AND AIRWAYS: Patent central airways.  Minimal bibasilar atelectasis/scarring. No pneumonia.  ·	PLEURA: No pleural effusion.  ·	MEDIASTINUM AND TIM: No lymphadenopathy.  ·	VESSELS: Within normal limits.  ·	HEART: Heart size is normal. No pericardial effusion.  ·	CHEST WALL AND LOWER NECK: Within normal limits.  ·	VISUALIZED UPPER ABDOMEN: Within normal limits.  ·	BONES: Diffuse osseous changes consistent with history of sickle cell disease.    IMPRESSION:   ·	Minimal bibasilar atelectasis/scarring. No pneumonia.  ·	Diffuse osseous changes consistent with a history of sickle cell disease.  DINORA GARCIA M.D., ATTENDING RADIOLOGIST  This document has been electronically signed. Jan 20 2020  4:08PM  < end of copied text >  
Patient is a 65y old  Male who presents with a chief complaint of total body pain    HPI:  65M HbSS disease with multiple admissions in past including for acute chest baseline HgB about 6.5, chronic CHF, Mild intellectual disability, BPH presented to [facility] Ed with generalized body aches worst in abdomen and back.  He was unable to control with Oxycodone at home so came to ED.  His symptoms improved a little with IVF and Dilaudid.  Also found to have EMEKA and Acute on Chronic anemia.  Pt has been receiving IVF hydration, hgb remains low with 2 units pRBC transfusion over the past week.  We are consulted for anemia.    On GI ROS, pt denies any melena, hematochezia, BRBPR. He has never had history of GI bleed  No history of EGD/colon in past  Pt denies family history of GI disease.    PMH:  Sickle cell disease  Hypertension  Constipation  Intellectual disability. 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]  Sickle cell disease [D57.1]  Hypertension [I10]  Constipation [K59.00]  Intellectual disability [F79]  Acute chest syndrome [D57.01]  H/O transfusion [Z92.89]  Left ventricular dysfunction [I51.9]  Sickle Cell Disease [282.60]  No significant past surgical history [[id number]]    FAMILY HISTORY:  Family history of sickle cell trait (Mother)  ~ presumed in parents &amp; 7 siblings (none suffer w/ the disease, per patient)    FH: hypertension  ~ mother          [SOCIAL HISTORY: ]     smoking:  denies     EtOH:  denies     illicit drugs:  denies     occupation:  diabled     marital status:  single, no children     Other: LIYA Saucedo      [ALLERGIES/INTOLERANCES:]  Allergies     No Known Drug Allergies         peanuts (Anaphylaxis)         peanuts (Angioedema)         pork (Unknown)         shellfish (Unknown)  Intolerances        lactose (Unknown)      [MEDICATIONS]  MEDICATIONS  (STANDING):  enoxaparin Injectable 40 milliGRAM(s) SubCutaneous daily  finasteride 5 milliGRAM(s) Oral daily  folic acid 1 milliGRAM(s) Oral daily  lisinopril 10 milliGRAM(s) Oral daily  metoprolol tartrate 12.5 milliGRAM(s) Oral two times a day  sodium chloride 0.9%. 1000 milliLiter(s) (50 mL/Hr) IV Continuous <Continuous>      MEDICATIONS  (PRN):  benzocaine 15 mG/menthol 3.6 mG (Sugar-Free) Lozenge 1 Lozenge Oral every 3 hours PRN Sore Throat  diphenhydrAMINE   Injectable 25 milliGRAM(s) IV Push every 4 hours PRN Rash and/or Itching  HYDROmorphone  Injectable 1 milliGRAM(s) IV Push every 2 hours PRN Severe Pain (7 - 10)        [REVIEW OF SYSTEMS: ]  CONSTITUTIONAL: normal, no fever, no shakes, no chills   EYES: No eye pain, no visual disturbances, no discharge  ENMT:  no discharge  NECK: No pain, no stiffness  BREASTS: No pain, no masses, no nipple discharge  RESPIRATORY: No cough, no wheezing, no chills, no hemoptysis; No shortness of breath  CARDIOVASCULAR: No chest pain, no palpitations, no dizziness, no leg swelling  GASTROINTESTINAL: No abdominal, no epigastric pain. No nausea, no vomiting, no hematemesis; No diarrhea , no constipation. No melena, no hematochezia.  GENITOURINARY: No dysuria, no frequency, no hematuria, no incontinence  NEUROLOGICAL: No headaches, no memory loss, no loss of strength, no numbness, no tremors  SKIN: No itching, no burning, no rashes, no lesions   LYMPH NODES: No enlarged glands  ENDOCRINE: No heat or cold intolerance; No hair loss  MUSCULOSKELETAL: No joint pain or swelling; No muscle, no back, no extremity pain  PSYCHIATRIC: No depression, no anxiety, no mood swings, no difficulty sleeping  HEME/LYMPH: No easy bruising, no bleeding gums      Vital Signs Last 24 Hrs  T(C): 36.7 (17 Feb 2021 10:04), Max: 37.2 (17 Feb 2021 01:04)  T(F): 98 (17 Feb 2021 10:04), Max: 99 (17 Feb 2021 01:04)  HR: 73 (17 Feb 2021 10:04) (69 - 77)  BP: 126/64 (17 Feb 2021 10:04) (98/58 - 126/64)  BP(mean): --  RR: 16 (17 Feb 2021 10:04) (16 - 17)  SpO2: 97% (17 Feb 2021 10:04) (95% - 98%)      [PHYSICAL EXAM]  General: adult in NAD,  WN,  WD. thin frail looking M  HEENT: clear oropharynx, anicteric sclera, pink conjunctivae.  Neck: supple, no masses.  CV: normal S1S2, no murmur, no rubs, no gallops.  Lungs: clear to auscultation, no wheezes, no rales, no rhonchi.  Abdomen: soft, non-tender, non-distended, no hepatosplenomegaly, normal BS, no guarding.  Ext: no clubbing, no cyanosis, no edema.  Skin: no rashes,  no petechiae, no venous stasis changes.  Neuro: alert and oriented X3, no focal motor deficits.  LN: no SC CARLIE.      [LABS: ]             LABS:                        6.3    11.30 )-----------( 306      ( 17 Feb 2021 06:49 )             19.8     02-17    138  |  103  |  41<H>  ----------------------------<  104<H>  4.4   |  26  |  1.04    Ca    8.9      17 Feb 2021 06:49  Mg     2.4     02-17    TPro  7.2  /  Alb  2.7<L>  /  TBili  2.0<H>  /  DBili  x   /  AST  44<H>  /  ALT  33  /  AlkPhos  169<H>  02-16    LIVER FUNCTIONS - ( 16 Feb 2021 06:28 )  Alb: 2.7 g/dL / Pro: 7.2 g/dL / ALK PHOS: 169 U/L / ALT: 33 U/L DA / AST: 44 U/L / GGT: x           ra< from: Xray Esophagram Single Contrast (02.10.21 @ 14:33) >  EXAM:  XR ESOPH SNGL CON STUDY                                  PROCEDURE DATE:  02/10/2021          INTERPRETATION:  ESOPHAGRAM:    CLINICAL INDICATION: Sickle cell crisis. Palpitations. Evaluate for esophageal disorder..    Evaluation of the esophagus with double and single contrast was performed with both fluoroscopic and spot images obtained.    Evaluation of the  film reveals dense bones with mild compression centrally in this patient with sickle cell disease. Mild scoliosis.    The esophagus demonstrates normal motility and distensibility. There is no evidence of esophageal ulceration, mass, stricture or obstruction.    There is no evidence of a hiatal hernia. Very minimal gastroesophageal reflux was noted when patient was in thesupine position..    IMPRESSION:    No gross evidence for mass, stricture or ulceration.    Very minimal gastroesophageal reflux when patient was in the supine position.    FLUOROSCOPY TIME: 63 seconds                  ODILON GOEL MD; Attending Radiologist  This document has been electronically signed. Feb 10 2021  4:06PM    < end of copied text >  < from: US Abdomen Complete (US Abdomen Complete .) (02.04.21 @ 01:23) >  EXAM:  US ABDOMINAL COMPLETE                            PROCEDURE DATE:  02/04/2021          INTERPRETATION:  CLINICAL INFORMATION: Epigastric pain.    COMPARISON: 11/13/2020    TECHNIQUE: Sonography of the abdomen.    FINDINGS:    Liver: Within normal limits.  Bile ducts: Normal caliber. Common bile duct measures 2 mm.  Gallbladder: Normally distended. Cholelithiasis. Gallbladder wall thickness upper limits of normal. No pericholecystic fluid. Negative sonographic Mcghee's sign.  Pancreas: Visualized portions are within normal limits.  Spleen: 11 cm. Within normal limits.  Right kidney: 7.5 cm. No hydronephrosis.  Left kidney: 7.6 cm.  No hydronephrosis.  Ascites: None.  Aorta and IVC: Visualized portions are within normal limits.    IMPRESSION:  Cholelithiasis without sonographic evidence of cholecystitis.              RAMESH SAMUELS DO; Attending Radiologist  This document has been electronically signed. Feb 4 2021  1:54AM    < end of copied text >

## 2021-02-17 NOTE — PROGRESS NOTE ADULT - SUBJECTIVE AND OBJECTIVE BOX
CHIEF COMPLAINT:    HPI:  65 year old man with a history of sickle cell disease, acute chest syndrome, cardiomyopathy with mild chronic systolic HF (previous notes suggest a non-ischemia etiology related to high output state from SCD), NSVT during hospitalization in 2019 (treated with beta blockade), mild mental retardation, admitted on 2/4/21 with a pain crisis and Hgb ~ 5.5 (lower than previous baseline) requiring transfusion.  Cardiology consulted for NSVT.  He describes diffuse pain - modestly better over past few days; occasional palpitations but no syncope.  He describes a good baseline functional status ("I walk a lot") with no angina.      2/10/21  Patient heart rate is better after receiving PRBC , did have PSVT  /svt with abberrancy patient denies any chest pain  his sbp low normal range  his hemoglobin is 6.3    2/11/21 Patient is feeling better rate is better ,  still severely anemic , low normal sbp   2/12/21 Patient is having pains all over the body , no sob or chest pain ,  monitor showing brief PSVT      one episode of NSWCT, severely anemic5.8 , reciveing IV iron   2/13/20 Pt complains of pain. No chest pain or shortness of breath. NSR  2/14/21 Pt complains of pain. No chest pain or shortness of breath. NSR  2/15/21 Patient complain pain all over the body , denies any sob ,  patient did have episodes of PSVT  and brief ventricular ectopy    2/17/21patient still have pain all over the body , episodes of PSVT no VT noted  did have brief palpitation last night       PAST MEDICAL & SURGICAL HISTORY:  Sickle cell disease    Hypertension    Constipation    Intellectual disability  ~ mild    Acute chest syndrome  Pt unaware    H/O transfusion  ~ pRBC    Left ventricular dysfunction  mild LVD on echo in 7/18, normal LVF in 12/18    Sickle Cell Disease      MEDICATIONS  (STANDING):  chlorhexidine 4% Liquid 1 Application(s) Topical <User Schedule>  enoxaparin Injectable 40 milliGRAM(s) SubCutaneous daily  finasteride 5 milliGRAM(s) Oral daily  folic acid 1 milliGRAM(s) Oral daily  metoprolol tartrate 50 milliGRAM(s) Oral every 12 hours  sodium chloride 0.45%. 1000 milliLiter(s) (75 mL/Hr) IV Continuous <Continuous>    MEDICATIONS  (PRN):  benzocaine 15 mG/menthol 3.6 mG (Sugar-Free) Lozenge 1 Lozenge Oral every 3 hours PRN Sore Throat  diphenhydrAMINE   Injectable 25 milliGRAM(s) IV Push every 4 hours PRN Rash and/or Itching  HYDROmorphone   Tablet 2 milliGRAM(s) Oral every 3 hours PRN Moderate Pain (4 - 6)  HYDROmorphone   Tablet 4 milliGRAM(s) Oral every 3 hours PRN Severe Pain (7 - 10)  HYDROmorphone  Injectable 1 milliGRAM(s) IV Push every 2 hours PRN breathrough or severe pain  sodium chloride 0.9% lock flush 10 milliLiter(s) IV Push every 1 hour PRN Pre/post blood products, medications, blood draw, and to maintain line patency      Vital Signs Last 24 Hrs  T(C): 36.8 (17 Feb 2021 14:05), Max: 37.2 (17 Feb 2021 01:04)  T(F): 98.2 (17 Feb 2021 14:05), Max: 99 (17 Feb 2021 01:04)  HR: 84 (17 Feb 2021 14:05) (69 - 84)  BP: 126/66 (17 Feb 2021 14:05) (98/58 - 126/66)  BP(mean): --  RR: 24 (17 Feb 2021 14:05) (16 - 24)  SpO2: 99% (17 Feb 2021 14:05) (95% - 99%)    PHYSICAL EXAM:    Constitutional: NAD, awake and alert, thin  HEENT: PERR, EOMI,  No oral cyananosis.  Neck:  supple,  No JVD  Respiratory: Breath sounds are clear bilaterally, No wheezing, rales or rhonchi  Cardiovascular: S1 and S2, regular rate and rhythm, no Murmurs, gallops or rubs  Gastrointestinal: Bowel Sounds present, soft, nontender.   Extremities: No peripheral edema. No clubbing or cyanosis.  Vascular: 2+ peripheral pulses  Neurological: A/O x 3, no focal deficits  Musculoskeletal: no calf tenderness.  Skin: No rashes.      LABS: All Labs Reviewed:                         6.3    11.30 )-----------( 306      ( 17 Feb 2021 06:49 )             19.8     02-17    138  |  103  |  41<H>  ----------------------------<  104<H>  4.4   |  26  |  1.04    Ca    8.9      17 Feb 2021 06:49  Mg     2.4     02-17    TPro  7.2  /  Alb  2.7<L>  /  TBili  2.0<H>  /  DBili  x   /  AST  44<H>  /  ALT  33  /  AlkPhos  169<H>  02-16        LIVER FUNCTIONS - ( 16 Feb 2021 06:28 )  Alb: 2.7 g/dL / Pro: 7.2 g/dL / ALK PHOS: 169 U/L / ALT: 33 U/L DA / AST: 44 U/L / GGT: x           Monitor sinus rhythm episodes of sinus tachycardia , non sustained supra ventricular tachycardiac episodes ,  no episodes of VTach     < from: US Transthoracic Echocardiogram w/Doppler Complete (02.08.21 @ 21:22) >  CONCLUSIONS:  1. Low normal left ventricular systolic function.  2. Increased left ventricular wall thickness.  3. Bi-atrial enlargement.

## 2021-02-17 NOTE — CONSULT NOTE ADULT - ASSESSMENT
65M HbSS disease with multiple admissions in past including for acute chest baseline HgB about 6.5, chronic CHF, Mild intellectual disability, BPH presented to [facility] with Anemia and pain due to Sickle Cell disease. Pt being treated for ACS, with anemia, refractory.  This is likely secondary to sickle crisis, no overt signs of GI bleed.      Rec:  1. Not medically optimized for any sedation/endoscopic procedures at this time  2. Care as per heme/onc for SCC.  3. Check haptoglobin/LDH  4. PPI daily

## 2021-02-17 NOTE — PROGRESS NOTE ADULT - PROBLEM SELECTOR PLAN 1
Mild chronic systolic dysfunction possibly due to chronic sickle cell anemia hyperdynamic LV dysfunction with subsequent systolic dysfunction.  would recommend OP nuclear stress test once his anemia corrected , continue BB , no ace as patient need higher dose of BB     patient is overall optimal and stable for low risk procedure  patient is intermediate risk ( endoscopy or colonoscopy ).

## 2021-02-17 NOTE — PROGRESS NOTE ADULT - SUBJECTIVE AND OBJECTIVE BOX
[INTERVAL HX: ]  Patient seen and examined;  Chart reviewed and events noted;     Refused GI eval yesterday.   I had discussion with pt concerning low blood counts.  Explained in detail pt has been high likelihood he has been bleeding, as has low iron.   Discussed that pt has rcvd IV iron and transfusions and Hgb has continued to decline.   Pt acknowledged that normal male Hgb is higher "11" which I advised pt that normal range for men is 13, and his is 6 and dropping from 7 post transfusion.   Discussed that his dismissal and refusal to be evaluated by GI may pose harm to him, including delay in dx of cancer, and potential harm and even evtual death.   Pt however in denial that his blood problem can be attributed to bleeding, to cancer.   States: "you should be in here for cancer, not me"   Discussed that pt's health is what we are discussing, not others. Tried to refocus patient.   Explained his labs showed now possible half unit blood loss.   Attempts to refocus pt without much success.   Advised pt to reconsider GI eval and if changes mind.           Patient is a 55y Male with a known history of :  Hb-SS disease with crisis [D57.00]  Sickle cell disease [D57.1]  Hypertension [I10]  Constipation [K59.00]  Intellectual disability [F79]  Acute chest syndrome [D57.01]  H/O transfusion [Z92.89]  Left ventricular dysfunction [I51.9]  Sickle Cell Disease [282.60]  No significant past surgical history [329099869]  No significant past surgical history [232136037]      HPI:  65M HbSS disease with multiple admissions in past including for acute chest baseline HgB about 6.5, chronic CHF, Mild intellectual disability, BPH presented to [facility] with generalized body aches worst in abdomen and back.  He was unable to control with Oxycodone at home so came to ED.  His symptoms improved a little with IVF and Dilaudid.  Also found to have EMEKA and Acute on Chronic anemia.    Patient transferred to [place] for bed availability.   He is also complaining of throat discomfort and mild nausea.   Denies Fever, Chills, or other symptoms.  (04 Feb 2021 17:52)        MEDICATIONS  (STANDING):  chlorhexidine 4% Liquid 1 Application(s) Topical <User Schedule>  enoxaparin Injectable 40 milliGRAM(s) SubCutaneous daily  finasteride 5 milliGRAM(s) Oral daily  folic acid 1 milliGRAM(s) Oral daily  metoprolol tartrate 50 milliGRAM(s) Oral every 12 hours  sodium chloride 0.45%. 1000 milliLiter(s) (75 mL/Hr) IV Continuous <Continuous>    MEDICATIONS  (PRN):  benzocaine 15 mG/menthol 3.6 mG (Sugar-Free) Lozenge 1 Lozenge Oral every 3 hours PRN Sore Throat  diphenhydrAMINE   Injectable 25 milliGRAM(s) IV Push every 4 hours PRN Rash and/or Itching  HYDROmorphone   Tablet 2 milliGRAM(s) Oral every 3 hours PRN Moderate Pain (4 - 6)  HYDROmorphone   Tablet 4 milliGRAM(s) Oral every 3 hours PRN Severe Pain (7 - 10)  HYDROmorphone  Injectable 1 milliGRAM(s) IV Push every 2 hours PRN breathrough or severe pain  sodium chloride 0.9% lock flush 10 milliLiter(s) IV Push every 1 hour PRN Pre/post blood products, medications, blood draw, and to maintain line patency      Vital Signs Last 24 Hrs  T(C): 36.7 (17 Feb 2021 10:04), Max: 37.2 (17 Feb 2021 01:04)  T(F): 98 (17 Feb 2021 10:04), Max: 99 (17 Feb 2021 01:04)  HR: 73 (17 Feb 2021 10:04) (69 - 77)  BP: 126/64 (17 Feb 2021 10:04) (98/58 - 126/64)  BP(mean): --  RR: 16 (17 Feb 2021 10:04) (16 - 17)  SpO2: 97% (17 Feb 2021 10:04) (95% - 98%)      [PHYSICAL EXAM]  General: adult in NAD,  WN,  WD.  Neuro: alert and oriented X3  , no focal motor deficits.      [LABS:]                        6.3    11.30 )-----------( 306      ( 17 Feb 2021 06:49 )             19.8     02-17  138  |  103  |  41<H>  ----------------------------<  104<H>  4.4   |  26  |  1.04  Ca    8.9      17 Feb 2021 06:49  Mg     2.4     02-17  TPro  7.2  /  Alb  2.7<L>  /  TBili  2.0<H>  /  DBili  x   /  AST  44<H>  /  ALT  33  /  AlkPhos  169<H>  02-16        [RADIOLOGY STUDIES:]

## 2021-02-18 LAB
ANION GAP SERPL CALC-SCNC: 7 MMOL/L — SIGNIFICANT CHANGE UP (ref 5–17)
BASOPHILS # BLD AUTO: 0.08 K/UL — SIGNIFICANT CHANGE UP (ref 0–0.2)
BASOPHILS NFR BLD AUTO: 0.7 % — SIGNIFICANT CHANGE UP (ref 0–2)
BUN SERPL-MCNC: 28 MG/DL — HIGH (ref 7–23)
CALCIUM SERPL-MCNC: 8.6 MG/DL — SIGNIFICANT CHANGE UP (ref 8.4–10.5)
CHLORIDE SERPL-SCNC: 104 MMOL/L — SIGNIFICANT CHANGE UP (ref 96–108)
CO2 SERPL-SCNC: 26 MMOL/L — SIGNIFICANT CHANGE UP (ref 22–31)
CREAT SERPL-MCNC: 1.05 MG/DL — SIGNIFICANT CHANGE UP (ref 0.5–1.3)
EOSINOPHIL # BLD AUTO: 0.54 K/UL — HIGH (ref 0–0.5)
EOSINOPHIL NFR BLD AUTO: 4.9 % — SIGNIFICANT CHANGE UP (ref 0–6)
GLUCOSE SERPL-MCNC: 113 MG/DL — HIGH (ref 70–99)
HCT VFR BLD CALC: 22.4 % — LOW (ref 39–50)
HGB BLD-MCNC: 7.3 G/DL — LOW (ref 13–17)
IMM GRANULOCYTES NFR BLD AUTO: 1.6 % — HIGH (ref 0–1.5)
LYMPHOCYTES # BLD AUTO: 1.26 K/UL — SIGNIFICANT CHANGE UP (ref 1–3.3)
LYMPHOCYTES # BLD AUTO: 11.4 % — LOW (ref 13–44)
MAGNESIUM SERPL-MCNC: 1.9 MG/DL — SIGNIFICANT CHANGE UP (ref 1.6–2.6)
MCHC RBC-ENTMCNC: 25.6 PG — LOW (ref 27–34)
MCHC RBC-ENTMCNC: 32.6 GM/DL — SIGNIFICANT CHANGE UP (ref 32–36)
MCV RBC AUTO: 78.6 FL — LOW (ref 80–100)
MONOCYTES # BLD AUTO: 1.18 K/UL — HIGH (ref 0–0.9)
MONOCYTES NFR BLD AUTO: 10.7 % — SIGNIFICANT CHANGE UP (ref 2–14)
NEUTROPHILS # BLD AUTO: 7.79 K/UL — HIGH (ref 1.8–7.4)
NEUTROPHILS NFR BLD AUTO: 70.7 % — SIGNIFICANT CHANGE UP (ref 43–77)
NRBC # BLD: 0 /100 WBCS — SIGNIFICANT CHANGE UP (ref 0–0)
PHOSPHATE SERPL-MCNC: 2.7 MG/DL — SIGNIFICANT CHANGE UP (ref 2.5–4.5)
PLATELET # BLD AUTO: 284 K/UL — SIGNIFICANT CHANGE UP (ref 150–400)
POTASSIUM SERPL-MCNC: 4.4 MMOL/L — SIGNIFICANT CHANGE UP (ref 3.5–5.3)
POTASSIUM SERPL-SCNC: 4.4 MMOL/L — SIGNIFICANT CHANGE UP (ref 3.5–5.3)
RBC # BLD: 2.85 M/UL — LOW (ref 4.2–5.8)
RBC # FLD: 22.5 % — HIGH (ref 10.3–14.5)
SARS-COV-2 RNA SPEC QL NAA+PROBE: SIGNIFICANT CHANGE UP
SODIUM SERPL-SCNC: 137 MMOL/L — SIGNIFICANT CHANGE UP (ref 135–145)
WBC # BLD: 11.03 K/UL — HIGH (ref 3.8–10.5)
WBC # FLD AUTO: 11.03 K/UL — HIGH (ref 3.8–10.5)

## 2021-02-18 PROCEDURE — 99232 SBSQ HOSP IP/OBS MODERATE 35: CPT

## 2021-02-18 PROCEDURE — 99233 SBSQ HOSP IP/OBS HIGH 50: CPT

## 2021-02-18 PROCEDURE — 93010 ELECTROCARDIOGRAM REPORT: CPT

## 2021-02-18 RX ORDER — METOPROLOL TARTRATE 50 MG
12.5 TABLET ORAL ONCE
Refills: 0 | Status: DISCONTINUED | OUTPATIENT
Start: 2021-02-18 | End: 2021-02-18

## 2021-02-18 RX ORDER — METOPROLOL TARTRATE 50 MG
25 TABLET ORAL ONCE
Refills: 0 | Status: COMPLETED | OUTPATIENT
Start: 2021-02-18 | End: 2021-02-18

## 2021-02-18 RX ORDER — METOPROLOL TARTRATE 50 MG
75 TABLET ORAL EVERY 12 HOURS
Refills: 0 | Status: DISCONTINUED | OUTPATIENT
Start: 2021-02-18 | End: 2021-02-23

## 2021-02-18 RX ADMIN — Medication 75 MILLIGRAM(S): at 17:22

## 2021-02-18 RX ADMIN — CHLORHEXIDINE GLUCONATE 1 APPLICATION(S): 213 SOLUTION TOPICAL at 05:18

## 2021-02-18 RX ADMIN — HYDROMORPHONE HYDROCHLORIDE 4 MILLIGRAM(S): 2 INJECTION INTRAMUSCULAR; INTRAVENOUS; SUBCUTANEOUS at 15:33

## 2021-02-18 RX ADMIN — SODIUM CHLORIDE 75 MILLILITER(S): 9 INJECTION, SOLUTION INTRAVENOUS at 17:22

## 2021-02-18 RX ADMIN — Medication 1 MILLIGRAM(S): at 12:11

## 2021-02-18 RX ADMIN — FINASTERIDE 5 MILLIGRAM(S): 5 TABLET, FILM COATED ORAL at 12:11

## 2021-02-18 RX ADMIN — Medication 50 MILLIGRAM(S): at 05:43

## 2021-02-18 RX ADMIN — HYDROMORPHONE HYDROCHLORIDE 1 MILLIGRAM(S): 2 INJECTION INTRAMUSCULAR; INTRAVENOUS; SUBCUTANEOUS at 17:22

## 2021-02-18 RX ADMIN — HYDROMORPHONE HYDROCHLORIDE 4 MILLIGRAM(S): 2 INJECTION INTRAMUSCULAR; INTRAVENOUS; SUBCUTANEOUS at 23:35

## 2021-02-18 RX ADMIN — HYDROMORPHONE HYDROCHLORIDE 4 MILLIGRAM(S): 2 INJECTION INTRAMUSCULAR; INTRAVENOUS; SUBCUTANEOUS at 01:28

## 2021-02-18 RX ADMIN — ENOXAPARIN SODIUM 40 MILLIGRAM(S): 100 INJECTION SUBCUTANEOUS at 12:11

## 2021-02-18 RX ADMIN — HYDROMORPHONE HYDROCHLORIDE 4 MILLIGRAM(S): 2 INJECTION INTRAMUSCULAR; INTRAVENOUS; SUBCUTANEOUS at 12:11

## 2021-02-18 RX ADMIN — HYDROMORPHONE HYDROCHLORIDE 4 MILLIGRAM(S): 2 INJECTION INTRAMUSCULAR; INTRAVENOUS; SUBCUTANEOUS at 04:40

## 2021-02-18 RX ADMIN — Medication 25 MILLIGRAM(S): at 09:04

## 2021-02-18 RX ADMIN — HYDROMORPHONE HYDROCHLORIDE 4 MILLIGRAM(S): 2 INJECTION INTRAMUSCULAR; INTRAVENOUS; SUBCUTANEOUS at 09:04

## 2021-02-18 NOTE — PROGRESS NOTE ADULT - PROBLEM SELECTOR PLAN 1
Mild chronic systolic dysfunction possibly due to chronic sickle cell anemia hyperdynamic LV dysfunction with subsequent systolic dysfunction.  would recommend OP nuclear stress test once his anemia corrected , continue BB , no ace as patient need higher dose of BB     patient is overall optimal and stable for low risk procedure  patient is intermediate risk ( endoscopy or colonoscopy ).    discussed with EP , will recommend follow up with  dr huff [phone]

## 2021-02-18 NOTE — PROGRESS NOTE ADULT - SUBJECTIVE AND OBJECTIVE BOX
CHIEF COMPLAINT:    HPI:  65 year old man with a history of sickle cell disease, acute chest syndrome, cardiomyopathy with mild chronic systolic HF (previous notes suggest a non-ischemia etiology related to high output state from SCD), NSVT during hospitalization in 2019 (treated with beta blockade), mild mental retardation, admitted on 2/4/21 with a pain crisis and Hgb ~ 5.5 (lower than previous baseline) requiring transfusion.  Cardiology consulted for NSVT.  He describes diffuse pain - modestly better over past few days; occasional palpitations but no syncope.  He describes a good baseline functional status ("I walk a lot") with no angina.      2/10/21  Patient heart rate is better after receiving PRBC , did have PSVT  /svt with abberrancy patient denies any chest pain  his sbp low normal range  his hemoglobin is 6.3    2/11/21 Patient is feeling better rate is better ,  still severely anemic , low normal sbp   2/12/21 Patient is having pains all over the body , no sob or chest pain ,  monitor showing brief PSVT      one episode of NSWCT, severely anemic5.8 , reciveing IV iron   2/13/20 Pt complains of pain. No chest pain or shortness of breath. NSR  2/14/21 Pt complains of pain. No chest pain or shortness of breath. NSR  2/15/21 Patient complain pain all over the body , denies any sob ,  patient did have episodes of PSVT  and brief ventricular ectopy    2/17/21patient still have pain all over the body , episodes of PSVT no VT noted  did have brief palpitation last night   2/18/21 Patient is feeling same , did have less PSVT , no sob       PAST MEDICAL & SURGICAL HISTORY:  Sickle cell disease    Hypertension    Constipation    Intellectual disability  ~ mild    Acute chest syndrome  Pt unaware    H/O transfusion  ~ pRBC    Left ventricular dysfunction  mild LVD on echo in 7/18, normal LVF in 12/18    Sickle Cell Disease    MEDICATIONS  (STANDING):  chlorhexidine 4% Liquid 1 Application(s) Topical <User Schedule>  enoxaparin Injectable 40 milliGRAM(s) SubCutaneous daily  finasteride 5 milliGRAM(s) Oral daily  folic acid 1 milliGRAM(s) Oral daily  metoprolol tartrate 75 milliGRAM(s) Oral every 12 hours  metoprolol tartrate 25 milliGRAM(s) Oral once  sodium chloride 0.45%. 1000 milliLiter(s) (75 mL/Hr) IV Continuous <Continuous>    MEDICATIONS  (PRN):  benzocaine 15 mG/menthol 3.6 mG (Sugar-Free) Lozenge 1 Lozenge Oral every 3 hours PRN Sore Throat  diphenhydrAMINE   Injectable 25 milliGRAM(s) IV Push every 4 hours PRN Rash and/or Itching  HYDROmorphone   Tablet 2 milliGRAM(s) Oral every 3 hours PRN Moderate Pain (4 - 6)  HYDROmorphone   Tablet 4 milliGRAM(s) Oral every 3 hours PRN Severe Pain (7 - 10)  HYDROmorphone  Injectable 1 milliGRAM(s) IV Push every 2 hours PRN breathrough or severe pain  sodium chloride 0.9% lock flush 10 milliLiter(s) IV Push every 1 hour PRN Pre/post blood products, medications, blood draw, and to maintain line patency      `Vital Signs Last 24 Hrs  T(C): 36.9 (18 Feb 2021 05:25), Max: 37.1 (17 Feb 2021 22:42)  T(F): 98.4 (18 Feb 2021 05:25), Max: 98.8 (17 Feb 2021 22:42)  HR: 85 (18 Feb 2021 05:25) (63 - 88)  BP: 117/71 (18 Feb 2021 05:25) (105/64 - 138/76)  BP(mean): --  RR: 16 (18 Feb 2021 05:25) (15 - 24)  SpO2: 98% (18 Feb 2021 05:25) (95% - 100%)    I&O's Summary    17 Feb 2021 07:01  -  18 Feb 2021 07:00  --------------------------------------------------------  IN: 1433 mL / OUT: 1100 mL / NET: 333 mL        PHYSICAL EXAM:    Constitutional: NAD, awake and alert, thin  HEENT: PERR, EOMI,  No oral cyananosis.  Neck:  supple,  No JVD  Respiratory: Breath sounds are clear bilaterally, No wheezing, rales or rhonchi  Cardiovascular: S1 and S2, regular rate and rhythm, no Murmurs, gallops or rubs  Gastrointestinal: Bowel Sounds present, soft, nontender.   Extremities: No peripheral edema. No clubbing or cyanosis.  Vascular: 2+ peripheral pulses  Neurological: A/O x 3, no focal deficits  Musculoskeletal: no calf tenderness.  Skin: No rashes.      LABS: All Labs Reviewed:                         6.3    11.30 )-----------( 306      ( 17 Feb 2021 06:49 )             19.8     02-17    138  |  103  |  41<H>  ----------------------------<  104<H>  4.4   |  26  |  1.04    Ca    8.9      17 Feb 2021 06:49  Mg     2.4     02-17    TPro  7.2  /  Alb  2.7<L>  /  TBili  2.0<H>  /  DBili  x   /  AST  44<H>  /  ALT  33  /  AlkPhos  169<H>  02-16        LIVER FUNCTIONS - ( 16 Feb 2021 06:28 )  Alb: 2.7 g/dL / Pro: 7.2 g/dL / ALK PHOS: 169 U/L / ALT: 33 U/L DA / AST: 44 U/L / GGT: x           Monitor sinus rhythm episodes of sinus tachycardia , non sustained supra ventricular tachycardiac episodes ,  no episodes of VTach     < from: US Transthoracic Echocardiogram w/Doppler Complete (02.08.21 @ 21:22) >  CONCLUSIONS:  1. Low normal left ventricular systolic function.  2. Increased left ventricular wall thickness.  3. Bi-atrial enlargement.                        monitor  sinus rhythm sinus tachycardia , brief PSVT

## 2021-02-18 NOTE — PROGRESS NOTE ADULT - SUBJECTIVE AND OBJECTIVE BOX
Patient is a 55y old  Male who presents with a chief complaint of sickle cell crisis (18 Feb 2021 08:14)    INTERVAL HPI/OVERNIGHT EVENTS:  complaining of pain, says pain in his legs is too much to walk in hallway.  doing a bit better with oral intake.     MEDICATIONS  (STANDING):  chlorhexidine 4% Liquid 1 Application(s) Topical <User Schedule>  enoxaparin Injectable 40 milliGRAM(s) SubCutaneous daily  finasteride 5 milliGRAM(s) Oral daily  folic acid 1 milliGRAM(s) Oral daily  metoprolol tartrate 75 milliGRAM(s) Oral every 12 hours  sodium chloride 0.45%. 1000 milliLiter(s) (75 mL/Hr) IV Continuous <Continuous>    MEDICATIONS  (PRN):  benzocaine 15 mG/menthol 3.6 mG (Sugar-Free) Lozenge 1 Lozenge Oral every 3 hours PRN Sore Throat  diphenhydrAMINE   Injectable 25 milliGRAM(s) IV Push every 4 hours PRN Rash and/or Itching  HYDROmorphone   Tablet 2 milliGRAM(s) Oral every 3 hours PRN Moderate Pain (4 - 6)  HYDROmorphone   Tablet 4 milliGRAM(s) Oral every 3 hours PRN Severe Pain (7 - 10)  HYDROmorphone  Injectable 1 milliGRAM(s) IV Push every 2 hours PRN breathrough or severe pain  sodium chloride 0.9% lock flush 10 milliLiter(s) IV Push every 1 hour PRN Pre/post blood products, medications, blood draw, and to maintain line patency    Allergies  No Known Drug Allergies  peanuts (Anaphylaxis)  peanuts (Angioedema)  pork (Unknown)  shellfish (Unknown)    Intolerances  lactose (Unknown)      REVIEW OF SYSTEMS:  denies other complaints.     Vital Signs Last 24 Hrs  T(C): 36.7 (18 Feb 2021 09:30), Max: 37.1 (17 Feb 2021 22:42)  T(F): 98 (18 Feb 2021 09:30), Max: 98.8 (17 Feb 2021 22:42)  HR: 69 (18 Feb 2021 09:30) (63 - 88)  BP: 123/57 (18 Feb 2021 09:00) (105/64 - 138/76)  BP(mean): --  RR: 18 (18 Feb 2021 09:30) (15 - 24)  SpO2: 98% (18 Feb 2021 09:30) (95% - 100%)    PHYSICAL EXAM:  GENERAL: NAD, well-groomed, well-developed  HEAD:  Atraumatic, Normocephalic  EYES: conjunctiva and sclera clear  ENMT: Moist mucous membranes  NECK: Supple, No JVD  NERVOUS SYSTEM:  Alert & Oriented X3, Good concentration; All 4 extremities mobile, no gross sensory deficits.   CHEST/LUNG: Clear to auscultation bilaterally;   HEART: Regular rate and rhythm;  ABDOMEN: Soft, Nontender, Nondistended; Bowel sounds present  EXTREMITIES:  2+ Peripheral Pulses, No clubbing, cyanosis, or edema      LABS:                        7.3    11.03 )-----------( 284      ( 18 Feb 2021 07:28 )             22.4     18 Feb 2021 07:28    137    |  104    |  28     ----------------------------<  113    4.4     |  26     |  1.05     Ca    8.6        18 Feb 2021 07:28  Phos  2.7       18 Feb 2021 07:28  Mg     1.9       18 Feb 2021 07:28          CAPILLARY BLOOD GLUCOSE          RADIOLOGY & ADDITIONAL TESTS:    Imaging Personally Reviewed:  [ ] YES     Consultant(s) Notes Reviewed:      Care Discussed with Consultants/Other Providers:  Dr Hamm updated.     Advanced Directives: [ ] DNR  [ ] No feeding tube  [ ] MOLST in chart  [ ] MOLST completed today  [ ] Unknown

## 2021-02-18 NOTE — PROGRESS NOTE ADULT - SUBJECTIVE AND OBJECTIVE BOX
Interval History:  still complains of pain    Chart reviewed and events noted;   Overnight events:    MEDICATIONS  (STANDING):  chlorhexidine 4% Liquid 1 Application(s) Topical <User Schedule>  enoxaparin Injectable 40 milliGRAM(s) SubCutaneous daily  finasteride 5 milliGRAM(s) Oral daily  folic acid 1 milliGRAM(s) Oral daily  metoprolol tartrate 75 milliGRAM(s) Oral every 12 hours  sodium chloride 0.45%. 1000 milliLiter(s) (75 mL/Hr) IV Continuous <Continuous>    MEDICATIONS  (PRN):  benzocaine 15 mG/menthol 3.6 mG (Sugar-Free) Lozenge 1 Lozenge Oral every 3 hours PRN Sore Throat  diphenhydrAMINE   Injectable 25 milliGRAM(s) IV Push every 4 hours PRN Rash and/or Itching  HYDROmorphone   Tablet 2 milliGRAM(s) Oral every 3 hours PRN Moderate Pain (4 - 6)  HYDROmorphone   Tablet 4 milliGRAM(s) Oral every 3 hours PRN Severe Pain (7 - 10)  HYDROmorphone  Injectable 1 milliGRAM(s) IV Push every 2 hours PRN breathrough or severe pain  sodium chloride 0.9% lock flush 10 milliLiter(s) IV Push every 1 hour PRN Pre/post blood products, medications, blood draw, and to maintain line patency      Vital Signs Last 24 Hrs  T(C): 36.7 (18 Feb 2021 09:30), Max: 37.1 (17 Feb 2021 22:42)  T(F): 98 (18 Feb 2021 09:30), Max: 98.8 (17 Feb 2021 22:42)  HR: 69 (18 Feb 2021 09:30) (63 - 88)  BP: 123/57 (18 Feb 2021 09:00) (105/64 - 138/76)  BP(mean): --  RR: 18 (18 Feb 2021 09:30) (15 - 24)  SpO2: 98% (18 Feb 2021 09:30) (95% - 100%)    PHYSICAL EXAM  General: adult in NAD  HEENT: clear oropharynx, anicteric sclera, pink conjunctivae  Neck: supple  CV: normal S1S2 with no murmur rubs or gallops  Lungs: clear to auscultation, no wheezes, no rhales  Abdomen: soft non-tender non-distended, no hepato/splenomegaly  Ext: no clubbing cyanosis or edema  Skin: no rashes and no petichiae  Neuro: alert and oriented X3 no focal deficits      LABS:  CBC Full  -  ( 18 Feb 2021 07:28 )  WBC Count : 11.03 K/uL  RBC Count : 2.85 M/uL  Hemoglobin : 7.3 g/dL  Hematocrit : 22.4 %  Platelet Count - Automated : 284 K/uL  Mean Cell Volume : 78.6 fl  Mean Cell Hemoglobin : 25.6 pg  Mean Cell Hemoglobin Concentration : 32.6 gm/dL  Auto Neutrophil # : 7.79 K/uL  Auto Lymphocyte # : 1.26 K/uL  Auto Monocyte # : 1.18 K/uL  Auto Eosinophil # : 0.54 K/uL  Auto Basophil # : 0.08 K/uL  Auto Neutrophil % : 70.7 %  Auto Lymphocyte % : 11.4 %  Auto Monocyte % : 10.7 %  Auto Eosinophil % : 4.9 %  Auto Basophil % : 0.7 %    02-18    137  |  104  |  28<H>  ----------------------------<  113<H>  4.4   |  26  |  1.05    Ca    8.6      18 Feb 2021 07:28  Phos  2.7     02-18  Mg     1.9     02-18          fe studies      WBC trend  11.03 K/uL (02-18-21 @ 07:28)  11.30 K/uL (02-17-21 @ 06:49)  11.11 K/uL (02-16-21 @ 06:28)      Hgb trend  7.3 g/dL (02-18-21 @ 07:28)  6.3 g/dL (02-17-21 @ 06:49)  6.9 g/dL (02-16-21 @ 06:28)      plt trend  284 K/uL (02-18-21 @ 07:28)  306 K/uL (02-17-21 @ 06:49)  290 K/uL (02-16-21 @ 06:28)        RADIOLOGY & ADDITIONAL STUDIES:

## 2021-02-18 NOTE — CHART NOTE - NSCHARTNOTEFT_GEN_A_CORE
Assessment:  Pt seen for nutrition follow-up.  Pt is a 65 year old male admitted for sickle cell anemia crisis.  Pt previously met clinical characteristics of severe malnutrition in context of acute illness (NFPE findings, inadequate oral intake PTA).  Pt continues on regular diet with ensure enlive tid.  Noted pt with food allergy to peanut, pork, shellfish, and intolerance to lactose.  Previously discussed lactose allergy with pt, reports he is able tolerate cheese and avoids liquid regular milk; diet office informed to allow pt to self select meals with dairy/cheese if request; noted md also documented (2/11) cheese is to be permitted.      Po intake continues to be variable throughout admission; meal preferences obtained regularly.  Pt minimally verbal at time of visit, states he just wants to rest.  Po intake of liquids and foods encouraged; noted pt consumed 40% of breakfast tray today and 60% of dinner last night per documentation; noted pt still occasionally refuses meals but generally endorses good acceptance of ensure supplement, vanilla flavored preferred.  Pt reports all over body pain improving; s/p 3u PRBC.  +BM 2/17.  RD to continue to follow-up.     Factors impacting intake: [ ] none [ ] nausea  [ ] vomiting [ ] diarrhea [ ] constipation  [ ]chewing problems [ ] swallowing issues  [x ] other: personal food preferences     Diet Prescription: Diet, Regular:   Supplement Feeding Modality:  Oral  Ensure Enlive Cans or Servings Per Day:  1       Frequency:  Three Times a day (02-05-21 @ 11:28)    Intake: variable 25-75%, occasional refusal; good po intake of fluids, supplement    Current Weight:   adm wt 110#, 2/18 108.2#; 2# wt loss (1.8% wt loss x 2 weeks)    Pertinent Medications: MEDICATIONS  (STANDING):  chlorhexidine 4% Liquid 1 Application(s) Topical <User Schedule>  enoxaparin Injectable 40 milliGRAM(s) SubCutaneous daily  finasteride 5 milliGRAM(s) Oral daily  folic acid 1 milliGRAM(s) Oral daily  metoprolol tartrate 75 milliGRAM(s) Oral every 12 hours  sodium chloride 0.45%. 1000 milliLiter(s) (75 mL/Hr) IV Continuous <Continuous>    MEDICATIONS  (PRN):  benzocaine 15 mG/menthol 3.6 mG (Sugar-Free) Lozenge 1 Lozenge Oral every 3 hours PRN Sore Throat  diphenhydrAMINE   Injectable 25 milliGRAM(s) IV Push every 4 hours PRN Rash and/or Itching  HYDROmorphone   Tablet 2 milliGRAM(s) Oral every 3 hours PRN Moderate Pain (4 - 6)  HYDROmorphone   Tablet 4 milliGRAM(s) Oral every 3 hours PRN Severe Pain (7 - 10)  HYDROmorphone  Injectable 1 milliGRAM(s) IV Push every 2 hours PRN breathrough or severe pain  sodium chloride 0.9% lock flush 10 milliLiter(s) IV Push every 1 hour PRN Pre/post blood products, medications, blood draw, and to maintain line patency    Pertinent Labs: 02-18 Na137 mmol/L Glu 113 mg/dL<H> K+ 4.4 mmol/L Cr  1.05 mg/dL BUN 28 mg/dL<H> 02-18 Phos 2.7 mg/dL 02-16 Alb 2.7 g/dL<L>     CAPILLARY BLOOD GLUCOSE        Skin: no pressure injuries     Estimated Needs:   [x ] no change since previous assessment  [ ] recalculated:     Previous Nutrition Diagnosis:   [ ] Inadequate Energy Intake [ ]Inadequate Oral Intake [ ] Excessive Energy Intake   [ ] Underweight [ ] Increased Nutrient Needs [ ] Overweight/Obesity   [ ] Altered GI Function [ ] Unintended Weight Loss [ ] Food & Nutrition Related Knowledge Deficit [x ] Malnutrition     Nutrition Diagnosis is [ x] ongoing  [ ] resolved [ ] not applicable     New Nutrition Diagnosis: [ ] not applicable       Interventions: regular diet, honor food preferences, nutritional supplements  Recommend  [ ] Change Diet To:  [ ] Nutrition Supplement  [ ] Nutrition Support  [ ] Other:     Monitoring and Evaluation:   [x ] PO intake [ x ] Tolerance to diet prescription [ x ] weights [ x ] labs[ x ] follow up per protocol  [ ] other:

## 2021-02-18 NOTE — PROGRESS NOTE ADULT - SUBJECTIVE AND OBJECTIVE BOX
Interval events:  still complaining of b/l leg pains  FOBT neg  s/p 1unit pRBC yesterday, feels "stronger" today    Patient is a 65y old  Male who presents with a chief complaint of total body pain    HPI:  65M HbSS disease with multiple admissions in past including for acute chest baseline HgB about 6.5, chronic CHF, Mild intellectual disability, BPH presented to [facility] with generalized body aches worst in abdomen and back.  He was unable to control with Oxycodone at home so came to ED.  His symptoms improved a little with IVF and Dilaudid.  Also found to have EMEKA and Acute on Chronic anemia.  Pt has been receiving IVF hydration, hgb remains low with 2 units pRBC transfusion over the past week.  We are consulted for anemia.    On GI ROS, pt denies any melena, hematochezia, BRBPR. He has never had history of GI bleed  No history of EGD/colon in past  Pt denies family history of GI disease.    PMH:  Sickle cell disease  Hypertension  Constipation  Intellectual disability. 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]  Sickle cell disease [D57.1]  Hypertension [I10]  Constipation [K59.00]  Intellectual disability [F79]  Acute chest syndrome [D57.01]  H/O transfusion [Z92.89]  Left ventricular dysfunction [I51.9]  Sickle Cell Disease [282.60]  No significant past surgical history [[id number]]    FAMILY HISTORY:  Family history of sickle cell trait (Mother)  ~ presumed in parents &amp; 7 siblings (none suffer w/ the disease, per patient)    FH: hypertension  ~ mother          [SOCIAL HISTORY: ]     smoking:  denies     EtOH:  denies     illicit drugs:  denies     occupation:  diabled     marital status:  single, no children     Other: LIYA Saucedo      [ALLERGIES/INTOLERANCES:]  Allergies     No Known Drug Allergies         peanuts (Anaphylaxis)         peanuts (Angioedema)         pork (Unknown)         shellfish (Unknown)  Intolerances        lactose (Unknown)      [MEDICATIONS]  MEDICATIONS  (STANDING):  enoxaparin Injectable 40 milliGRAM(s) SubCutaneous daily  finasteride 5 milliGRAM(s) Oral daily  folic acid 1 milliGRAM(s) Oral daily  lisinopril 10 milliGRAM(s) Oral daily  metoprolol tartrate 12.5 milliGRAM(s) Oral two times a day  sodium chloride 0.9%. 1000 milliLiter(s) (50 mL/Hr) IV Continuous <Continuous>      MEDICATIONS  (PRN):  benzocaine 15 mG/menthol 3.6 mG (Sugar-Free) Lozenge 1 Lozenge Oral every 3 hours PRN Sore Throat  diphenhydrAMINE   Injectable 25 milliGRAM(s) IV Push every 4 hours PRN Rash and/or Itching  HYDROmorphone  Injectable 1 milliGRAM(s) IV Push every 2 hours PRN Severe Pain (7 - 10)        [REVIEW OF SYSTEMS: ]  CONSTITUTIONAL: normal, no fever, no shakes, no chills   EYES: No eye pain, no visual disturbances, no discharge  ENMT:  no discharge  NECK: No pain, no stiffness  BREASTS: No pain, no masses, no nipple discharge  RESPIRATORY: No cough, no wheezing, no chills, no hemoptysis; No shortness of breath  CARDIOVASCULAR: No chest pain, no palpitations, no dizziness, no leg swelling  GASTROINTESTINAL: No abdominal, no epigastric pain. No nausea, no vomiting, no hematemesis; No diarrhea , no constipation. No melena, no hematochezia.  GENITOURINARY: No dysuria, no frequency, no hematuria, no incontinence  NEUROLOGICAL: No headaches, no memory loss, no loss of strength, no numbness, no tremors  SKIN: No itching, no burning, no rashes, no lesions   LYMPH NODES: No enlarged glands  ENDOCRINE: No heat or cold intolerance; No hair loss  MUSCULOSKELETAL: No joint pain or swelling; No muscle, no back, no extremity pain  PSYCHIATRIC: No depression, no anxiety, no mood swings, no difficulty sleeping  HEME/LYMPH: No easy bruising, no bleeding gums      Vital Signs Last 24 Hrs  T(C): 36.7 (17 Feb 2021 10:04), Max: 37.2 (17 Feb 2021 01:04)  T(F): 98 (17 Feb 2021 10:04), Max: 99 (17 Feb 2021 01:04)  HR: 73 (17 Feb 2021 10:04) (69 - 77)  BP: 126/64 (17 Feb 2021 10:04) (98/58 - 126/64)  BP(mean): --  RR: 16 (17 Feb 2021 10:04) (16 - 17)  SpO2: 97% (17 Feb 2021 10:04) (95% - 98%)      [PHYSICAL EXAM]  General: adult in NAD,  WN,  WD. thin frail looking M  HEENT: clear oropharynx, anicteric sclera, pink conjunctivae.  Neck: supple, no masses.  CV: normal S1S2, no murmur, no rubs, no gallops.  Lungs: clear to auscultation, no wheezes, no rales, no rhonchi.  Abdomen: soft, non-tender, non-distended, no hepatosplenomegaly, normal BS, no guarding.  Ext: no clubbing, no cyanosis, no edema.  Skin: no rashes,  no petechiae, no venous stasis changes.  Neuro: alert and oriented X3, no focal motor deficits.  LN: no SC CARLIE.      [LABS: ]             LABS:                        6.3    11.30 )-----------( 306      ( 17 Feb 2021 06:49 )             19.8     02-17    138  |  103  |  41<H>  ----------------------------<  104<H>  4.4   |  26  |  1.04    Ca    8.9      17 Feb 2021 06:49  Mg     2.4     02-17    TPro  7.2  /  Alb  2.7<L>  /  TBili  2.0<H>  /  DBili  x   /  AST  44<H>  /  ALT  33  /  AlkPhos  169<H>  02-16    LIVER FUNCTIONS - ( 16 Feb 2021 06:28 )  Alb: 2.7 g/dL / Pro: 7.2 g/dL / ALK PHOS: 169 U/L / ALT: 33 U/L DA / AST: 44 U/L / GGT: x           ra< from: Xray Esophagram Single Contrast (02.10.21 @ 14:33) >  EXAM:  XR ESOPH SNGL CON STUDY                                  PROCEDURE DATE:  02/10/2021          INTERPRETATION:  ESOPHAGRAM:    CLINICAL INDICATION: Sickle cell crisis. Palpitations. Evaluate for esophageal disorder..    Evaluation of the esophagus with double and single contrast was performed with both fluoroscopic and spot images obtained.    Evaluation of the  film reveals dense bones with mild compression centrally in this patient with sickle cell disease. Mild scoliosis.    The esophagus demonstrates normal motility and distensibility. There is no evidence of esophageal ulceration, mass, stricture or obstruction.    There is no evidence of a hiatal hernia. Very minimal gastroesophageal reflux was noted when patient was in thesupine position..    IMPRESSION:    No gross evidence for mass, stricture or ulceration.    Very minimal gastroesophageal reflux when patient was in the supine position.    FLUOROSCOPY TIME: 63 seconds                  ODILON GOEL MD; Attending Radiologist  This document has been electronically signed. Feb 10 2021  4:06PM    < end of copied text >  < from: US Abdomen Complete (US Abdomen Complete .) (02.04.21 @ 01:23) >  EXAM:  US ABDOMINAL COMPLETE                            PROCEDURE DATE:  02/04/2021          INTERPRETATION:  CLINICAL INFORMATION: Epigastric pain.    COMPARISON: 11/13/2020    TECHNIQUE: Sonography of the abdomen.    FINDINGS:    Liver: Within normal limits.  Bile ducts: Normal caliber. Common bile duct measures 2 mm.  Gallbladder: Normally distended. Cholelithiasis. Gallbladder wall thickness upper limits of normal. No pericholecystic fluid. Negative sonographic Mcghee's sign.  Pancreas: Visualized portions are within normal limits.  Spleen: 11 cm. Within normal limits.  Right kidney: 7.5 cm. No hydronephrosis.  Left kidney: 7.6 cm.  No hydronephrosis.  Ascites: None.  Aorta and IVC: Visualized portions are within normal limits.    IMPRESSION:  Cholelithiasis without sonographic evidence of cholecystitis.              RAMESH SAMUELS DO; Attending Radiologist  This document has been electronically signed. Feb 4 2021  1:54AM    < end of copied text >

## 2021-02-19 PROCEDURE — 99233 SBSQ HOSP IP/OBS HIGH 50: CPT

## 2021-02-19 PROCEDURE — 99232 SBSQ HOSP IP/OBS MODERATE 35: CPT

## 2021-02-19 RX ADMIN — HYDROMORPHONE HYDROCHLORIDE 1 MILLIGRAM(S): 2 INJECTION INTRAMUSCULAR; INTRAVENOUS; SUBCUTANEOUS at 01:55

## 2021-02-19 RX ADMIN — HYDROMORPHONE HYDROCHLORIDE 4 MILLIGRAM(S): 2 INJECTION INTRAMUSCULAR; INTRAVENOUS; SUBCUTANEOUS at 09:16

## 2021-02-19 RX ADMIN — Medication 75 MILLIGRAM(S): at 06:08

## 2021-02-19 RX ADMIN — FINASTERIDE 5 MILLIGRAM(S): 5 TABLET, FILM COATED ORAL at 12:27

## 2021-02-19 RX ADMIN — ENOXAPARIN SODIUM 40 MILLIGRAM(S): 100 INJECTION SUBCUTANEOUS at 12:27

## 2021-02-19 RX ADMIN — Medication 75 MILLIGRAM(S): at 17:38

## 2021-02-19 RX ADMIN — HYDROMORPHONE HYDROCHLORIDE 4 MILLIGRAM(S): 2 INJECTION INTRAMUSCULAR; INTRAVENOUS; SUBCUTANEOUS at 14:31

## 2021-02-19 RX ADMIN — HYDROMORPHONE HYDROCHLORIDE 4 MILLIGRAM(S): 2 INJECTION INTRAMUSCULAR; INTRAVENOUS; SUBCUTANEOUS at 06:04

## 2021-02-19 RX ADMIN — Medication 1 MILLIGRAM(S): at 12:27

## 2021-02-19 RX ADMIN — HYDROMORPHONE HYDROCHLORIDE 1 MILLIGRAM(S): 2 INJECTION INTRAMUSCULAR; INTRAVENOUS; SUBCUTANEOUS at 19:42

## 2021-02-19 RX ADMIN — HYDROMORPHONE HYDROCHLORIDE 2 MILLIGRAM(S): 2 INJECTION INTRAMUSCULAR; INTRAVENOUS; SUBCUTANEOUS at 17:41

## 2021-02-19 RX ADMIN — SODIUM CHLORIDE 75 MILLILITER(S): 9 INJECTION, SOLUTION INTRAVENOUS at 06:08

## 2021-02-19 RX ADMIN — CHLORHEXIDINE GLUCONATE 1 APPLICATION(S): 213 SOLUTION TOPICAL at 06:10

## 2021-02-19 NOTE — PROGRESS NOTE ADULT - PROBLEM SELECTOR PLAN 3
Continue proscar
now low normal range , would discontinue lisinopril , will increase BB dose  as patient is having arrhythmias as mentioned above
controlled
now  normal range ,
Continue proscar
Continue proscar
controlled
low normal range
continue Lisinopril and Metoprolol with hold parameters.
controlled
Continue proscar
now  normal range , will increase BB. Monitor B/P
now low normal range , would discontinue lisinopril , will increase BB dose  as patient is having arrhythmias as mentioned above
continue Lisinopril and Metoprolol with hold parameters.
continue Lisinopril and Metoprolol with hold parameters.
now low normal range , would add low dose  lisinopril  if his SBP>100 , will continue BB current dose  , his bradycardia will limit increase in dose ,
continue Lisinopril and Metoprolol with hold parameters.
continue Lisinopril and Metoprolol with hold parameters.

## 2021-02-19 NOTE — PROGRESS NOTE ADULT - SUBJECTIVE AND OBJECTIVE BOX
Patient is a 55y old  Male who presents with a chief complaint of Sickle cell disease (19 Feb 2021 11:15)    INTERVAL HPI/OVERNIGHT EVENTS:  reporting pain in his legs but has been able to walk to the bathroom.      MEDICATIONS  (STANDING):  chlorhexidine 4% Liquid 1 Application(s) Topical <User Schedule>  enoxaparin Injectable 40 milliGRAM(s) SubCutaneous daily  finasteride 5 milliGRAM(s) Oral daily  folic acid 1 milliGRAM(s) Oral daily  metoprolol tartrate 75 milliGRAM(s) Oral every 12 hours  sodium chloride 0.45%. 1000 milliLiter(s) (75 mL/Hr) IV Continuous <Continuous>    MEDICATIONS  (PRN):  benzocaine 15 mG/menthol 3.6 mG (Sugar-Free) Lozenge 1 Lozenge Oral every 3 hours PRN Sore Throat  diphenhydrAMINE   Injectable 25 milliGRAM(s) IV Push every 4 hours PRN Rash and/or Itching  HYDROmorphone   Tablet 2 milliGRAM(s) Oral every 3 hours PRN Moderate Pain (4 - 6)  HYDROmorphone   Tablet 4 milliGRAM(s) Oral every 3 hours PRN Severe Pain (7 - 10)  HYDROmorphone  Injectable 1 milliGRAM(s) IV Push every 2 hours PRN breathrough or severe pain  sodium chloride 0.9% lock flush 10 milliLiter(s) IV Push every 1 hour PRN Pre/post blood products, medications, blood draw, and to maintain line patency      Allergies  No Known Drug Allergies  peanuts (Anaphylaxis)  peanuts (Angioedema)  pork (Unknown)  shellfish (Unknown)    Intolerances  lactose (Unknown)      REVIEW OF SYSTEMS:  reviewed - patient denies new symptoms    Vital Signs Last 24 Hrs  T(C): 36.8 (19 Feb 2021 10:54), Max: 37.2 (18 Feb 2021 17:19)  T(F): 98.2 (19 Feb 2021 10:54), Max: 99 (18 Feb 2021 17:19)  HR: 67 (19 Feb 2021 10:54) (67 - 97)  BP: 141/77 (19 Feb 2021 10:54) (119/70 - 152/78)  BP(mean): --  RR: 18 (19 Feb 2021 10:54) (18 - 20)  SpO2: 98% (19 Feb 2021 10:54) (92% - 100%)    PHYSICAL EXAM:  GENERAL: NAD, well-groomed, well-developed  HEAD:  Atraumatic, Normocephalic  EYES:  conjunctiva and sclera clear  ENMT: Moist mucous membranes  NECK: Supple, No JVD  NERVOUS SYSTEM:  Alert & Oriented X3, Good concentration; All 4 extremities mobile, no gross sensory deficits.   CHEST/LUNG: Clear to auscultation bilaterally;  HEART: Regular rate and rhythm;   ABDOMEN: Soft, Nontender, Nondistended; Bowel sounds present  EXTREMITIES:  2+ Peripheral Pulses, No clubbing, cyanosis, or edema      LABS:      Ca    8.6        18 Feb 2021 07:28          CAPILLARY BLOOD GLUCOSE          RADIOLOGY & ADDITIONAL TESTS:    Imaging Personally Reviewed:  [ ] YES     Consultant(s) Notes Reviewed:  gi/cardio    Care Discussed with Consultants/Other Providers:  Dr Young, Dr Hamm - updated on plan.  will follow up on dc recommendations.     Advanced Directives: [ ] DNR  [ ] No feeding tube  [ ] MOLST in chart  [ ] MOLST completed today  [ ] Unknown

## 2021-02-19 NOTE — PROGRESS NOTE ADULT - PROBLEM SELECTOR PLAN 2
Infrequent, brief episodes of NSVT -- also seen during prior admission for pain crisis in 2019; continue beta blockade -
cardiology eval appreciated  echo reviewed.  outpatient stress test.
cardiology eval appreciated  f/u echo.  outpatient stress test.
Infrequent, brief episodes of NSVT -- also seen during prior admission for pain crisis in 2019; continue beta blockade -- uptitrate as BP allows; discontinue lisinopril to maximize bb dose for his arrhythmia , unclear if previously recommended stress test was ever performed -- appropriate when recovered from acute sickle cell crisis.
continue Lisinopril and Metoprolol with hold parameters.
continue Lisinopril and Metoprolol with hold parameters.
episodes of supraventricular tachycardia  ,Infrequent, brief episodes of NSVT -- also seen during prior admission for pain crisis in 2019; continue beta blockade -  increase the dose if he can tolerate to 50 mg po BID
episodes of supraventricular tachycardia  improved much on increasing BB dose  (also seen during prior admission for pain crisis in 2019);  continue metoprolol  75 mg po BID
episodes of supraventricular tachycardia  ,Infrequent, brief episodes of NSVT -- also seen during prior admission for pain crisis in 2019;  increase BB dose to 50 mg po Q 8 hours ,
cardiology eval appreciated  echo reviewed.  outpatient stress test.
continue Lisinopril and Metoprolol with hold parameters.
episodes of supraventricular tachycardia  ,Infrequent, brief episodes of NSVT -- also seen during prior admission for pain crisis in 2019;  increase BB dose to 75 mg po BID
cardiology eval appreciated  echo reviewed.  outpatient stress test.
Infrequent, brief episodes of NSVT -- also seen during prior admission for pain crisis in 2019; continue beta blockade -- uptitrate as BP allows; discontinue lisinopril to maximize bb dose for his arrhythmia , unclear if previously recommended stress test was ever performed -- appropriate when recovered from acute sickle cell crisis.
cardiology eval appreciated  echo reviewed.  outpatient stress test.
continue Lisinopril and Metoprolol with hold parameters.

## 2021-02-19 NOTE — PROGRESS NOTE ADULT - SUBJECTIVE AND OBJECTIVE BOX
CHIEF COMPLAINT:    HPI:  65 year old man with a history of sickle cell disease, acute chest syndrome, cardiomyopathy with mild chronic systolic HF (previous notes suggest a non-ischemia etiology related to high output state from SCD), NSVT during hospitalization in 2019 (treated with beta blockade), mild mental retardation, admitted on 2/4/21 with a pain crisis and Hgb ~ 5.5 (lower than previous baseline) requiring transfusion.  Cardiology consulted for NSVT.  He describes diffuse pain - modestly better over past few days; occasional palpitations but no syncope.  He describes a good baseline functional status ("I walk a lot") with no angina.      2/10/21  Patient heart rate is better after receiving PRBC , did have PSVT  /svt with abberrancy patient denies any chest pain  his sbp low normal range  his hemoglobin is 6.3    2/11/21 Patient is feeling better rate is better ,  still severely anemic , low normal sbp   2/12/21 Patient is having pains all over the body , no sob or chest pain ,  monitor showing brief PSVT      one episode of NSWCT, severely anemic5.8 , reciveing IV iron   2/13/20 Pt complains of pain. No chest pain or shortness of breath. NSR  2/14/21 Pt complains of pain. No chest pain or shortness of breath. NSR  2/15/21 Patient complain pain all over the body , denies any sob ,  patient did have episodes of PSVT  and brief ventricular ectopy    2/17/21patient still have pain all over the body , episodes of PSVT no VT noted  did have brief palpitation last night   2/18/21 Patient is feeling same , did have less PSVT , no sob   2/19/21 patient having mild pains all over body , less palpitation , one episode of Brief SVT , episodes of sinus tachycardia on monitor , better than before on increasing BB dose       PAST MEDICAL & SURGICAL HISTORY:  Sickle cell disease    Hypertension    Constipation    Intellectual disability  ~ mild    Acute chest syndrome  Pt unaware    H/O transfusion  ~ pRBC    Left ventricular dysfunction  mild LVD on echo in 7/18, normal LVF in 12/18    Sickle Cell Disease    MEDICATIONS  (STANDING):  chlorhexidine 4% Liquid 1 Application(s) Topical <User Schedule>  enoxaparin Injectable 40 milliGRAM(s) SubCutaneous daily  finasteride 5 milliGRAM(s) Oral daily  folic acid 1 milliGRAM(s) Oral daily  metoprolol tartrate 75 milliGRAM(s) Oral every 12 hours  sodium chloride 0.45%. 1000 milliLiter(s) (75 mL/Hr) IV Continuous <Continuous>    MEDICATIONS  (PRN):  benzocaine 15 mG/menthol 3.6 mG (Sugar-Free) Lozenge 1 Lozenge Oral every 3 hours PRN Sore Throat  diphenhydrAMINE   Injectable 25 milliGRAM(s) IV Push every 4 hours PRN Rash and/or Itching  HYDROmorphone   Tablet 2 milliGRAM(s) Oral every 3 hours PRN Moderate Pain (4 - 6)  HYDROmorphone   Tablet 4 milliGRAM(s) Oral every 3 hours PRN Severe Pain (7 - 10)  HYDROmorphone  Injectable 1 milliGRAM(s) IV Push every 2 hours PRN breathrough or severe pain  sodium chloride 0.9% lock flush 10 milliLiter(s) IV Push every 1 hour PRN Pre/post blood products, medications, blood draw, and to maintain line patency      Vital Signs Last 24 Hrs  T(C): 37 (19 Feb 2021 05:15), Max: 37.2 (18 Feb 2021 17:19)  T(F): 98.6 (19 Feb 2021 05:15), Max: 99 (18 Feb 2021 17:19)  HR: 82 (19 Feb 2021 05:15) (59 - 97)  BP: 135/85 (19 Feb 2021 05:15) (119/70 - 153/79)  BP(mean): --  RR: 19 (19 Feb 2021 05:15) (18 - 20)  SpO2: 100% (19 Feb 2021 05:15) (92% - 100%)    I&O's Summary    18 Feb 2021 07:01  -  19 Feb 2021 07:00  --------------------------------------------------------  IN: 900 mL / OUT: 500 mL / NET: 400 mL    19 Feb 2021 07:01  -  19 Feb 2021 10:51  --------------------------------------------------------  IN: 0 mL / OUT: 580 mL / NET: -580 mL          PHYSICAL EXAM:    Constitutional: NAD, awake and alert, thin  HEENT: PERR, EOMI,  No oral cyananosis.  Neck:  supple,  No JVD  Respiratory: Breath sounds are clear bilaterally, No wheezing, rales or rhonchi  Cardiovascular: S1 and S2, regular rate and rhythm,   Gastrointestinal: Bowel Sounds present, soft, nontender.   Extremities: No peripheral edema. No clubbing or cyanosis.  Vascular: 2+ peripheral pulses  Neurological: A/O x 3, no focal deficits  Musculoskeletal: no calf tenderness.  Skin: No rashes.      LABS: All Labs Reviewed:                                      7.3    11.03 )-----------( 284      ( 18 Feb 2021 07:28 )             22.4     02-18    137  |  104  |  28<H>  ----------------------------<  113<H>  4.4   |  26  |  1.05    Ca    8.6      18 Feb 2021 07:28  Phos  2.7     02-18  Mg     1.9     02-18          Monitor sinus rhythm episodes of sinus tachycardia , non sustained supra ventricular tachycardiac episode ,  no episodes of VTach     < from: US Transthoracic Echocardiogram w/Doppler Complete (02.08.21 @ 21:22) >  CONCLUSIONS:  1. Low normal left ventricular systolic function.  2. Increased left ventricular wall thickness.  3. Bi-atrial enlargement.      e< from: 12 Lead ECG (02.18.21 @ 08:58) >  Normal sinus rhythm  Voltage criteria for left ventricular hypertrophy  Abnormal ECG  When compared with ECG of 11-FEB-2021 07:18,  No significant change was found  Confirmed by Palla MD, Jagdish ([id number]) on 2/18/2021 5:52:15 PM    < end of copied text >

## 2021-02-19 NOTE — PROGRESS NOTE ADULT - PROBLEM SELECTOR PLAN 1
Mild chronic systolic dysfunction possibly due to chronic sickle cell anemia hyperdynamic LV dysfunction with subsequent systolic dysfunction.  would recommend OP nuclear stress test once his anemia corrected , continue BB , no ace as patient need higher dose of BB  patient is overall optimal and stable for low risk procedure  patient is intermediate risk ( endoscopy or colonoscopy ).    discussed with EP , will recommend follow up with  dr huff [phone]

## 2021-02-19 NOTE — PROGRESS NOTE ADULT - SUBJECTIVE AND OBJECTIVE BOX
[INTERVAL HX: ]  Patient seen and examined;  Chart reviewed and events noted;   no CP, no SOB  +abd pain, better per pt.   State hurts all over, but better    Not drinking as much fluids, point to IVF.   Counseled to hydrate PO.   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  He was unable to control with Oxycodone at home so came to ED.  His symptoms improved a little with IVF and Dilaudid.  Also found to have EMEKA and Acute on Chronic anemia.    Patient transferred to [place] for bed availability.   He is also complaining of throat discomfort and mild nausea.   Denies Fever, Chills, or other symptoms.  (04 Feb 2021 17:52)        MEDICATIONS  (STANDING):  chlorhexidine 4% Liquid 1 Application(s) Topical <User Schedule>  enoxaparin Injectable 40 milliGRAM(s) SubCutaneous daily  finasteride 5 milliGRAM(s) Oral daily  folic acid 1 milliGRAM(s) Oral daily  metoprolol tartrate 75 milliGRAM(s) Oral every 12 hours  sodium chloride 0.45%. 1000 milliLiter(s) (75 mL/Hr) IV Continuous <Continuous>    MEDICATIONS  (PRN):  benzocaine 15 mG/menthol 3.6 mG (Sugar-Free) Lozenge 1 Lozenge Oral every 3 hours PRN Sore Throat  diphenhydrAMINE   Injectable 25 milliGRAM(s) IV Push every 4 hours PRN Rash and/or Itching  HYDROmorphone   Tablet 2 milliGRAM(s) Oral every 3 hours PRN Moderate Pain (4 - 6)  HYDROmorphone   Tablet 4 milliGRAM(s) Oral every 3 hours PRN Severe Pain (7 - 10)  HYDROmorphone  Injectable 1 milliGRAM(s) IV Push every 2 hours PRN breathrough or severe pain  sodium chloride 0.9% lock flush 10 milliLiter(s) IV Push every 1 hour PRN Pre/post blood products, medications, blood draw, and to maintain line patency      Vital Signs Last 24 Hrs  T(C): 36.8 (19 Feb 2021 10:54), Max: 37.2 (18 Feb 2021 17:19)  T(F): 98.2 (19 Feb 2021 10:54), Max: 99 (18 Feb 2021 17:19)  HR: 67 (19 Feb 2021 10:54) (59 - 97)  BP: 141/77 (19 Feb 2021 10:54) (119/70 - 153/79)  BP(mean): --  RR: 18 (19 Feb 2021 10:54) (18 - 20)  SpO2: 98% (19 Feb 2021 10:54) (92% - 100%)      [PHYSICAL EXAM]  General: adult in NAD,  WN,  WD.  HEENT: clear oropharynx, anicteric sclera, pink conjunctivae.  Neck: supple, no masses.  CV: normal S1S2, no murmur, no rubs, no gallops.  Lungs: clear to auscultation, no wheezes, no rales, no rhonchi.  Abdomen: soft, non-tender, non-distended, no hepatosplenomegaly, normal BS, no guarding.  Ext: no clubbing, no cyanosis, no edema.  Skin: no rashes,  no petechiae, no venous stasis changes.  Neuro: alert and oriented X3  , no focal motor deficits.  LN: no SC CARLIE.      [LABS:]                        7.3    11.03 )-----------( 284      ( 18 Feb 2021 07:28 )             22.4     02-18    137  |  104  |  28<H>  ----------------------------<  113<H>  4.4   |  26  |  1.05    Ca    8.6      18 Feb 2021 07:28  Phos  2.7     02-18  Mg     1.9     02-18                  [RADIOLOGY STUDIES:]

## 2021-02-19 NOTE — PROGRESS NOTE ADULT - SUBJECTIVE AND OBJECTIVE BOX
Interval events:  still complaining of b/l leg pains  FOBT neg x1  s/p 1unit pRBC 2/17, feels "stronger" today    Patient is a 65y old  Male who presents with a chief complaint of total body pain    HPI:  65M HbSS disease with multiple admissions in past including for acute chest baseline HgB about 6.5, chronic CHF, Mild intellectual disability, BPH presented to [facility] Ed with generalized body aches worst in abdomen and back.  He was unable to control with Oxycodone at home so came to ED.  His symptoms improved a little with IVF and Dilaudid.  Also found to have EMEKA and Acute on Chronic anemia.  Pt has been receiving IVF hydration, hgb remains low with 2 units pRBC transfusion over the past week.  We are consulted for anemia.    On GI ROS, pt denies any melena, hematochezia, BRBPR. He has never had history of GI bleed  No history of EGD/colon in past  Pt denies family history of GI disease.    PMH:  Sickle cell disease  Hypertension  Constipation  Intellectual disability. 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]  Sickle cell disease [D57.1]  Hypertension [I10]  Constipation [K59.00]  Intellectual disability [F79]  Acute chest syndrome [D57.01]  H/O transfusion [Z92.89]  Left ventricular dysfunction [I51.9]  Sickle Cell Disease [282.60]  No significant past surgical history [[id number]]    FAMILY HISTORY:  Family history of sickle cell trait (Mother)  ~ presumed in parents &amp; 7 siblings (none suffer w/ the disease, per patient)    FH: hypertension  ~ mother          [SOCIAL HISTORY: ]     smoking:  denies     EtOH:  denies     illicit drugs:  denies     occupation:  diabled     marital status:  single, no children     Other: LIYA Saucedo      [ALLERGIES/INTOLERANCES:]  Allergies     No Known Drug Allergies         peanuts (Anaphylaxis)         peanuts (Angioedema)         pork (Unknown)         shellfish (Unknown)  Intolerances        lactose (Unknown)      [MEDICATIONS]  MEDICATIONS  (STANDING):  enoxaparin Injectable 40 milliGRAM(s) SubCutaneous daily  finasteride 5 milliGRAM(s) Oral daily  folic acid 1 milliGRAM(s) Oral daily  lisinopril 10 milliGRAM(s) Oral daily  metoprolol tartrate 12.5 milliGRAM(s) Oral two times a day  sodium chloride 0.9%. 1000 milliLiter(s) (50 mL/Hr) IV Continuous <Continuous>      MEDICATIONS  (PRN):  benzocaine 15 mG/menthol 3.6 mG (Sugar-Free) Lozenge 1 Lozenge Oral every 3 hours PRN Sore Throat  diphenhydrAMINE   Injectable 25 milliGRAM(s) IV Push every 4 hours PRN Rash and/or Itching  HYDROmorphone  Injectable 1 milliGRAM(s) IV Push every 2 hours PRN Severe Pain (7 - 10)        [REVIEW OF SYSTEMS: ]  CONSTITUTIONAL: normal, no fever, no shakes, no chills   EYES: No eye pain, no visual disturbances, no discharge  ENMT:  no discharge  NECK: No pain, no stiffness  BREASTS: No pain, no masses, no nipple discharge  RESPIRATORY: No cough, no wheezing, no chills, no hemoptysis; No shortness of breath  CARDIOVASCULAR: No chest pain, no palpitations, no dizziness, no leg swelling  GASTROINTESTINAL: No abdominal, no epigastric pain. No nausea, no vomiting, no hematemesis; No diarrhea , no constipation. No melena, no hematochezia.  GENITOURINARY: No dysuria, no frequency, no hematuria, no incontinence  NEUROLOGICAL: No headaches, no memory loss, no loss of strength, no numbness, no tremors  SKIN: No itching, no burning, no rashes, no lesions   LYMPH NODES: No enlarged glands  ENDOCRINE: No heat or cold intolerance; No hair loss  MUSCULOSKELETAL: +leg pains  PSYCHIATRIC: No depression, no anxiety, no mood swings, no difficulty sleeping  HEME/LYMPH: No easy bruising, no bleeding gums      Vital Signs Last 24 Hrs  Vital Signs Last 24 Hrs  T(C): 37 (19 Feb 2021 05:15), Max: 37.2 (18 Feb 2021 17:19)  T(F): 98.6 (19 Feb 2021 05:15), Max: 99 (18 Feb 2021 17:19)  HR: 82 (19 Feb 2021 05:15) (59 - 97)  BP: 135/85 (19 Feb 2021 05:15) (119/70 - 153/79)  BP(mean): --  RR: 19 (19 Feb 2021 05:15) (18 - 20)  SpO2: 100% (19 Feb 2021 05:15) (92% - 100%)    [PHYSICAL EXAM]  General: adult in NAD,  WN,  WD. thin frail looking M  HEENT: clear oropharynx, anicteric sclera, pink conjunctivae.  Neck: supple, no masses.  CV: normal S1S2, no murmur, no rubs, no gallops.  Lungs: clear to auscultation, no wheezes, no rales, no rhonchi.  Abdomen: soft, non-tender, non-distended, no hepatosplenomegaly, normal BS, no guarding.  Ext: no clubbing, no cyanosis, no edema.  Skin: no rashes,  no petechiae, no venous stasis changes.  Neuro: alert and oriented X3, no focal motor deficits.  LN: no SC CARLIE.      [LABS: ]           LABS:                        7.3    11.03 )-----------( 284      ( 18 Feb 2021 07:28 )             22.4     02-18    137  |  104  |  28<H>  ----------------------------<  113<H>  4.4   |  26  |  1.05    Ca    8.6      18 Feb 2021 07:28  Phos  2.7     02-18  Mg     1.9     02-18    ra< from: Xray Esophagram Single Contrast (02.10.21 @ 14:33) >  EXAM:  XR ESOPH SNGL CON STUDY                                  PROCEDURE DATE:  02/10/2021          INTERPRETATION:  ESOPHAGRAM:    CLINICAL INDICATION: Sickle cell crisis. Palpitations. Evaluate for esophageal disorder..    Evaluation of the esophagus with double and single contrast was performed with both fluoroscopic and spot images obtained.    Evaluation of the  film reveals dense bones with mild compression centrally in this patient with sickle cell disease. Mild scoliosis.    The esophagus demonstrates normal motility and distensibility. There is no evidence of esophageal ulceration, mass, stricture or obstruction.    There is no evidence of a hiatal hernia. Very minimal gastroesophageal reflux was noted when patient was in thesupine position..    IMPRESSION:    No gross evidence for mass, stricture or ulceration.    Very minimal gastroesophageal reflux when patient was in the supine position.    FLUOROSCOPY TIME: 63 seconds          ODILON GOEL MD; Attending Radiologist  This document has been electronically signed. Feb 10 2021  4:06PM    < end of copied text >  < from: US Abdomen Complete (US Abdomen Complete .) (02.04.21 @ 01:23) >  EXAM:  US ABDOMINAL COMPLETE                            PROCEDURE DATE:  02/04/2021          INTERPRETATION:  CLINICAL INFORMATION: Epigastric pain.    COMPARISON: 11/13/2020    TECHNIQUE: Sonography of the abdomen.    FINDINGS:    Liver: Within normal limits.  Bile ducts: Normal caliber. Common bile duct measures 2 mm.  Gallbladder: Normally distended. Cholelithiasis. Gallbladder wall thickness upper limits of normal. No pericholecystic fluid. Negative sonographic Mcghee's sign.  Pancreas: Visualized portions are within normal limits.  Spleen: 11 cm. Within normal limits.  Right kidney: 7.5 cm. No hydronephrosis.  Left kidney: 7.6 cm.  No hydronephrosis.  Ascites: None.  Aorta and IVC: Visualized portions are within normal limits.    IMPRESSION:  Cholelithiasis without sonographic evidence of cholecystitis.              RAMESH SAMUELS DO; Attending Radiologist  This document has been electronically signed. Feb 4 2021  1:54AM    < end of copied text >

## 2021-02-20 LAB
ALBUMIN SERPL ELPH-MCNC: 2.5 G/DL — LOW (ref 3.3–5)
ALP SERPL-CCNC: 161 U/L — HIGH (ref 30–120)
ALT FLD-CCNC: 43 U/L DA — SIGNIFICANT CHANGE UP (ref 10–60)
ANION GAP SERPL CALC-SCNC: 8 MMOL/L — SIGNIFICANT CHANGE UP (ref 5–17)
AST SERPL-CCNC: 51 U/L — HIGH (ref 10–40)
BILIRUB SERPL-MCNC: 2.4 MG/DL — HIGH (ref 0.2–1.2)
BLD GP AB SCN SERPL QL: SIGNIFICANT CHANGE UP
BUN SERPL-MCNC: 21 MG/DL — SIGNIFICANT CHANGE UP (ref 7–23)
CALCIUM SERPL-MCNC: 8.9 MG/DL — SIGNIFICANT CHANGE UP (ref 8.4–10.5)
CHLORIDE SERPL-SCNC: 102 MMOL/L — SIGNIFICANT CHANGE UP (ref 96–108)
CO2 SERPL-SCNC: 25 MMOL/L — SIGNIFICANT CHANGE UP (ref 22–31)
CREAT SERPL-MCNC: 1.04 MG/DL — SIGNIFICANT CHANGE UP (ref 0.5–1.3)
DIR ANTIGLOB POLYSPECIFIC INTERPRETATION: SIGNIFICANT CHANGE UP
GLUCOSE SERPL-MCNC: 160 MG/DL — HIGH (ref 70–99)
HCT VFR BLD CALC: 21.1 % — LOW (ref 39–50)
HGB BLD-MCNC: 6.7 G/DL — CRITICAL LOW (ref 13–17)
MCHC RBC-ENTMCNC: 25 PG — LOW (ref 27–34)
MCHC RBC-ENTMCNC: 31.8 GM/DL — LOW (ref 32–36)
MCV RBC AUTO: 78.7 FL — LOW (ref 80–100)
PLATELET # BLD AUTO: 284 K/UL — SIGNIFICANT CHANGE UP (ref 150–400)
POTASSIUM SERPL-MCNC: 4.7 MMOL/L — SIGNIFICANT CHANGE UP (ref 3.5–5.3)
POTASSIUM SERPL-SCNC: 4.7 MMOL/L — SIGNIFICANT CHANGE UP (ref 3.5–5.3)
PROT SERPL-MCNC: 7 G/DL — SIGNIFICANT CHANGE UP (ref 6–8.3)
RBC # BLD: 2.68 M/UL — LOW (ref 4.2–5.8)
RBC # FLD: 23.9 % — HIGH (ref 10.3–14.5)
SODIUM SERPL-SCNC: 135 MMOL/L — SIGNIFICANT CHANGE UP (ref 135–145)
WBC # BLD: 10.13 K/UL — SIGNIFICANT CHANGE UP (ref 3.8–10.5)
WBC # FLD AUTO: 10.13 K/UL — SIGNIFICANT CHANGE UP (ref 3.8–10.5)

## 2021-02-20 PROCEDURE — 99232 SBSQ HOSP IP/OBS MODERATE 35: CPT

## 2021-02-20 RX ORDER — POLYETHYLENE GLYCOL 3350 17 G/17G
17 POWDER, FOR SOLUTION ORAL DAILY
Refills: 0 | Status: DISCONTINUED | OUTPATIENT
Start: 2021-02-20 | End: 2021-02-23

## 2021-02-20 RX ORDER — HYDROMORPHONE HYDROCHLORIDE 2 MG/ML
1 INJECTION INTRAMUSCULAR; INTRAVENOUS; SUBCUTANEOUS ONCE
Refills: 0 | Status: DISCONTINUED | OUTPATIENT
Start: 2021-02-20 | End: 2021-02-20

## 2021-02-20 RX ADMIN — ENOXAPARIN SODIUM 40 MILLIGRAM(S): 100 INJECTION SUBCUTANEOUS at 11:32

## 2021-02-20 RX ADMIN — HYDROMORPHONE HYDROCHLORIDE 4 MILLIGRAM(S): 2 INJECTION INTRAMUSCULAR; INTRAVENOUS; SUBCUTANEOUS at 06:43

## 2021-02-20 RX ADMIN — Medication 1 MILLIGRAM(S): at 11:32

## 2021-02-20 RX ADMIN — HYDROMORPHONE HYDROCHLORIDE 4 MILLIGRAM(S): 2 INJECTION INTRAMUSCULAR; INTRAVENOUS; SUBCUTANEOUS at 00:44

## 2021-02-20 RX ADMIN — HYDROMORPHONE HYDROCHLORIDE 4 MILLIGRAM(S): 2 INJECTION INTRAMUSCULAR; INTRAVENOUS; SUBCUTANEOUS at 20:57

## 2021-02-20 RX ADMIN — CHLORHEXIDINE GLUCONATE 1 APPLICATION(S): 213 SOLUTION TOPICAL at 06:02

## 2021-02-20 RX ADMIN — FINASTERIDE 5 MILLIGRAM(S): 5 TABLET, FILM COATED ORAL at 11:32

## 2021-02-20 RX ADMIN — Medication 75 MILLIGRAM(S): at 06:01

## 2021-02-20 RX ADMIN — HYDROMORPHONE HYDROCHLORIDE 2 MILLIGRAM(S): 2 INJECTION INTRAMUSCULAR; INTRAVENOUS; SUBCUTANEOUS at 12:48

## 2021-02-20 RX ADMIN — HYDROMORPHONE HYDROCHLORIDE 1 MILLIGRAM(S): 2 INJECTION INTRAMUSCULAR; INTRAVENOUS; SUBCUTANEOUS at 03:16

## 2021-02-20 RX ADMIN — HYDROMORPHONE HYDROCHLORIDE 2 MILLIGRAM(S): 2 INJECTION INTRAMUSCULAR; INTRAVENOUS; SUBCUTANEOUS at 16:58

## 2021-02-20 RX ADMIN — SODIUM CHLORIDE 75 MILLILITER(S): 9 INJECTION, SOLUTION INTRAVENOUS at 16:59

## 2021-02-20 RX ADMIN — Medication 75 MILLIGRAM(S): at 16:59

## 2021-02-20 RX ADMIN — SODIUM CHLORIDE 75 MILLILITER(S): 9 INJECTION, SOLUTION INTRAVENOUS at 03:22

## 2021-02-20 NOTE — PROGRESS NOTE ADULT - SUBJECTIVE AND OBJECTIVE BOX
Patient is a 55y old  Male who presents with a chief complaint of Sickle cell disease (19 Feb 2021 11:15)      INTERVAL HPI/OVERNIGHT EVENTS:  no overnight events.  no complaints of pain.    MEDICATIONS  (STANDING):  chlorhexidine 4% Liquid 1 Application(s) Topical <User Schedule>  enoxaparin Injectable 40 milliGRAM(s) SubCutaneous daily  finasteride 5 milliGRAM(s) Oral daily  folic acid 1 milliGRAM(s) Oral daily  metoprolol tartrate 75 milliGRAM(s) Oral every 12 hours  sodium chloride 0.45%. 1000 milliLiter(s) (75 mL/Hr) IV Continuous <Continuous>    MEDICATIONS  (PRN):  benzocaine 15 mG/menthol 3.6 mG (Sugar-Free) Lozenge 1 Lozenge Oral every 3 hours PRN Sore Throat  diphenhydrAMINE   Injectable 25 milliGRAM(s) IV Push every 4 hours PRN Rash and/or Itching  HYDROmorphone   Tablet 2 milliGRAM(s) Oral every 3 hours PRN Moderate Pain (4 - 6)  HYDROmorphone   Tablet 4 milliGRAM(s) Oral every 3 hours PRN Severe Pain (7 - 10)  sodium chloride 0.9% lock flush 10 milliLiter(s) IV Push every 1 hour PRN Pre/post blood products, medications, blood draw, and to maintain line patency      Allergies    No Known Drug Allergies  peanuts (Anaphylaxis)  peanuts (Angioedema)  pork (Unknown)  shellfish (Unknown)    Intolerances    lactose (Unknown)      REVIEW OF SYSTEMS:  ROS negative    Vital Signs Last 24 Hrs  T(C): 36.7 (20 Feb 2021 09:30), Max: 36.9 (20 Feb 2021 01:35)  T(F): 98 (20 Feb 2021 09:30), Max: 98.4 (20 Feb 2021 01:35)  HR: 70 (20 Feb 2021 09:30) (60 - 86)  BP: 130/71 (20 Feb 2021 09:30) (103/58 - 137/69)  BP(mean): --  RR: 18 (20 Feb 2021 09:30) (17 - 18)  SpO2: 98% (20 Feb 2021 09:30) (97% - 100%)    PHYSICAL EXAM:  GENERAL: NAD, well-groomed, well-developed  HEAD:  Atraumatic, Normocephalic  EYES:  conjunctiva and sclera clear  ENMT: Moist mucous membranes  NECK: Supple, No JVD  NERVOUS SYSTEM:  Alert & Oriented X3, Good concentration; All 4 extremities mobile, no gross sensory deficits.   CHEST/LUNG: Clear to auscultation bilaterally;  HEART: Regular rate and rhythm;   ABDOMEN: Soft, Nontender, Nondistended; Bowel sounds present  EXTREMITIES:  2+ Peripheral Pulses, No clubbing, cyanosis, or edema    LABS:                        6.7    10.13 )-----------( 284      ( 20 Feb 2021 08:39 )             21.1     20 Feb 2021 08:39    135    |  102    |  21     ----------------------------<  160    4.7     |  25     |  1.04     Ca    8.9        20 Feb 2021 08:39    TPro  7.0    /  Alb  2.5    /  TBili  2.4    /  DBili  x      /  AST  51     /  ALT  43     /  AlkPhos  161    20 Feb 2021 08:39        CAPILLARY BLOOD GLUCOSE          RADIOLOGY & ADDITIONAL TESTS:    Imaging Personally Reviewed:  [ ] YES     Consultant(s) Notes Reviewed:      Care Discussed with Consultants/Other Providers:    Advanced Directives: [ ] DNR  [ ] No feeding tube  [ ] MOLST in chart  [ ] MOLST completed today  [ ] Unknown

## 2021-02-21 ENCOUNTER — TRANSCRIPTION ENCOUNTER (OUTPATIENT)
Age: 66
End: 2021-02-21

## 2021-02-21 PROCEDURE — 99232 SBSQ HOSP IP/OBS MODERATE 35: CPT

## 2021-02-21 RX ORDER — HYDROMORPHONE HYDROCHLORIDE 2 MG/ML
2 INJECTION INTRAMUSCULAR; INTRAVENOUS; SUBCUTANEOUS ONCE
Refills: 0 | Status: DISCONTINUED | OUTPATIENT
Start: 2021-02-21 | End: 2021-02-21

## 2021-02-21 RX ORDER — POLYETHYLENE GLYCOL 3350 17 G/17G
17 POWDER, FOR SOLUTION ORAL ONCE
Refills: 0 | Status: DISCONTINUED | OUTPATIENT
Start: 2021-02-21 | End: 2021-02-21

## 2021-02-21 RX ADMIN — HYDROMORPHONE HYDROCHLORIDE 4 MILLIGRAM(S): 2 INJECTION INTRAMUSCULAR; INTRAVENOUS; SUBCUTANEOUS at 22:29

## 2021-02-21 RX ADMIN — Medication 75 MILLIGRAM(S): at 16:26

## 2021-02-21 RX ADMIN — Medication 1 MILLIGRAM(S): at 11:03

## 2021-02-21 RX ADMIN — Medication 75 MILLIGRAM(S): at 05:50

## 2021-02-21 RX ADMIN — HYDROMORPHONE HYDROCHLORIDE 2 MILLIGRAM(S): 2 INJECTION INTRAMUSCULAR; INTRAVENOUS; SUBCUTANEOUS at 11:03

## 2021-02-21 RX ADMIN — CHLORHEXIDINE GLUCONATE 1 APPLICATION(S): 213 SOLUTION TOPICAL at 05:59

## 2021-02-21 RX ADMIN — HYDROMORPHONE HYDROCHLORIDE 2 MILLIGRAM(S): 2 INJECTION INTRAMUSCULAR; INTRAVENOUS; SUBCUTANEOUS at 13:13

## 2021-02-21 RX ADMIN — HYDROMORPHONE HYDROCHLORIDE 4 MILLIGRAM(S): 2 INJECTION INTRAMUSCULAR; INTRAVENOUS; SUBCUTANEOUS at 00:41

## 2021-02-21 RX ADMIN — ENOXAPARIN SODIUM 40 MILLIGRAM(S): 100 INJECTION SUBCUTANEOUS at 11:03

## 2021-02-21 RX ADMIN — SODIUM CHLORIDE 75 MILLILITER(S): 9 INJECTION, SOLUTION INTRAVENOUS at 05:52

## 2021-02-21 RX ADMIN — HYDROMORPHONE HYDROCHLORIDE 4 MILLIGRAM(S): 2 INJECTION INTRAMUSCULAR; INTRAVENOUS; SUBCUTANEOUS at 05:49

## 2021-02-21 RX ADMIN — FINASTERIDE 5 MILLIGRAM(S): 5 TABLET, FILM COATED ORAL at 11:03

## 2021-02-21 RX ADMIN — HYDROMORPHONE HYDROCHLORIDE 4 MILLIGRAM(S): 2 INJECTION INTRAMUSCULAR; INTRAVENOUS; SUBCUTANEOUS at 19:34

## 2021-02-21 RX ADMIN — POLYETHYLENE GLYCOL 3350 17 GRAM(S): 17 POWDER, FOR SOLUTION ORAL at 11:03

## 2021-02-21 RX ADMIN — HYDROMORPHONE HYDROCHLORIDE 4 MILLIGRAM(S): 2 INJECTION INTRAMUSCULAR; INTRAVENOUS; SUBCUTANEOUS at 16:23

## 2021-02-21 NOTE — DISCHARGE NOTE PROVIDER - CARE PROVIDER_API CALL
Lluvia Hamm)  Internal Medicine  [phone] [place] [address]  Phone: [phone]  Fax: [phone]  Follow Up Time:     Camron Riojas)  Cardiac Electrophysiology; Cardiology  [address] [place] [address]  Phone: [phone]  Fax: [phone]  Follow Up Time:

## 2021-02-21 NOTE — DISCHARGE NOTE PROVIDER - DETAILS OF MALNUTRITION DIAGNOSIS/DIAGNOSES
This patient has been assessed with a concern for Malnutrition and was treated during this hospitalization for the following Nutrition diagnosis/diagnoses:     -  02/06/2021: Severe protein-calorie malnutrition   -  02/06/2021: Underweight (BMI < 19)   This patient has been assessed with a concern for Malnutrition and was treated during this hospitalization for the following Nutrition diagnosis/diagnoses:     -  02/06/2021: Severe protein-calorie malnutrition   -  02/06/2021: Underweight (BMI < 19)    This patient has been assessed with a concern for Malnutrition and was treated during this hospitalization for the following Nutrition diagnosis/diagnoses:     -  02/06/2021: Severe protein-calorie malnutrition   -  02/06/2021: Underweight (BMI < 19)

## 2021-02-21 NOTE — DISCHARGE NOTE PROVIDER - HOSPITAL COURSE
HPI:  65M HbSS disease with multiple admissions in past including for acute chest baseline HgB about 6.5, chronic CHF, Mild intellectual disability, BPH presented to [facility] Ed with generalized body aches worst in abdomen and back.  He was unable to control with Oxycodone at home so came to ED.  His symptoms improved a little with IVF and Dilaudid.  Also found to have EMEKA and Acute on Chronic anemia.    Patient transferred to [place] for bed availability.   He is also complaining of throat discomfort and mild nausea.   Denies Fever, Chills, or other symptoms.  (04 Feb 2021 17:52)   65M HbSS disease with multiple admissions in past including for acute chest baseline HgB about 6.5, chronic CHF, Mild intellectual disability, BPH presented to [facility] Ed with generalized body aches worst in abdomen and back.  He was unable to control with Oxycodone at home so came to ED.  His symptoms improved a little with IVF and Dilaudid.  Also found to have EMEKA and Acute on Chronic anemia.    Patient transferred to [place] for bed availability.   Patient admitted to medical floor.     During admission given a total of 4 units prbc for acute on chronic anemia.  Given iron for iron deficiency.  considering this he was informed that colonoscopy is indicated for eval of iron deficiency but patient refused saying he doesn't need it.   given Pain medication for sickle cell crisis.     Had ectopy on monitor.  Seen by Dr Palla from cardiology.  Metoprolol dose increased with improvement in events.  Lisinopril dcd to leave room in his BP.  recommended outpatient EP eval.     Dr Hamm (pts primary heme) notified of admission and dc plan.

## 2021-02-21 NOTE — DISCHARGE NOTE PROVIDER - NSDCCPCAREPLAN_GEN_ALL_CORE_FT
PRINCIPAL DISCHARGE DIAGNOSIS  Diagnosis: Sickle cell disease with crisis  Assessment and Plan of Treatment: keep hydrated, follow up with Dr Hamm next week.      SECONDARY DISCHARGE DIAGNOSES  Diagnosis: Iron deficiency  Assessment and Plan of Treatment: You should have a colonoscopy to evaluate low iron levels as this is unusual for a sickle cell patient.   Dr Hamm can refer you to an appropriate provider.    Diagnosis: NSVT (nonsustained ventricular tachycardia)  Assessment and Plan of Treatment: continue metoprolol.  Cardiology EP evaluation as outpatient.

## 2021-02-21 NOTE — PHYSICAL THERAPY INITIAL EVALUATION ADULT - ADDITIONAL COMMENTS
Lives in [facility] alone. No stairs.
Lives alone in apt.  No stairs.  Pt. very vague when answering questions and seems reluctant to give any information regarding living situation.

## 2021-02-21 NOTE — PROGRESS NOTE ADULT - SUBJECTIVE AND OBJECTIVE BOX
Patient is a 55y old  Male who presents with a chief complaint of sickle cell crisis (20 Feb 2021 12:10)      INTERVAL HPI/OVERNIGHT EVENTS:  no overnight events  pt says he can't go home today, wants to go home tuesday    MEDICATIONS  (STANDING):  chlorhexidine 4% Liquid 1 Application(s) Topical <User Schedule>  enoxaparin Injectable 40 milliGRAM(s) SubCutaneous daily  finasteride 5 milliGRAM(s) Oral daily  folic acid 1 milliGRAM(s) Oral daily  metoprolol tartrate 75 milliGRAM(s) Oral every 12 hours  polyethylene glycol 3350 17 Gram(s) Oral daily    MEDICATIONS  (PRN):  benzocaine 15 mG/menthol 3.6 mG (Sugar-Free) Lozenge 1 Lozenge Oral every 3 hours PRN Sore Throat  diphenhydrAMINE   Injectable 25 milliGRAM(s) IV Push every 4 hours PRN Rash and/or Itching  HYDROmorphone   Tablet 2 milliGRAM(s) Oral every 3 hours PRN Moderate Pain (4 - 6)  HYDROmorphone   Tablet 4 milliGRAM(s) Oral every 3 hours PRN Severe Pain (7 - 10)  sodium chloride 0.9% lock flush 10 milliLiter(s) IV Push every 1 hour PRN Pre/post blood products, medications, blood draw, and to maintain line patency      Allergies    No Known Drug Allergies  peanuts (Anaphylaxis)  peanuts (Angioedema)  pork (Unknown)  shellfish (Unknown)    Intolerances    lactose (Unknown)      REVIEW OF SYSTEMS:    Vital Signs Last 24 Hrs  T(C): 36.8 (21 Feb 2021 05:30), Max: 37.4 (20 Feb 2021 21:14)  T(F): 98.3 (21 Feb 2021 05:30), Max: 99.4 (20 Feb 2021 21:14)  HR: 85 (21 Feb 2021 05:30) (68 - 85)  BP: 130/77 (21 Feb 2021 05:30) (109/66 - 135/70)  BP(mean): --  RR: 18 (21 Feb 2021 05:30) (17 - 18)  SpO2: 99% (21 Feb 2021 05:30) (97% - 100%)    PHYSICAL EXAM:  GENERAL: NAD, well-groomed, well-developed  HEAD:  Atraumatic, Normocephalic  EYES:  conjunctiva and sclera clear  ENMT: Moist mucous membranes  NECK: Supple, No JVD  NERVOUS SYSTEM:  Alert & Oriented X3, Good concentration; All 4 extremities mobile, no gross sensory deficits.   CHEST/LUNG: Clear to auscultation bilaterally;  HEART: Regular rate and rhythm;   ABDOMEN: Soft, Nontender, Nondistended; Bowel sounds present  EXTREMITIES:  2+ Peripheral Pulses, No clubbing, cyanosis, or edema    LABS:      Ca    8.9        20 Feb 2021 08:39          CAPILLARY BLOOD GLUCOSE          RADIOLOGY & ADDITIONAL TESTS:    Imaging Personally Reviewed:  [ ] YES     Consultant(s) Notes Reviewed:      Care Discussed with Consultants/Other Providers:    Advanced Directives: [ ] DNR  [ ] No feeding tube  [ ] MOLST in chart  [ ] MOLST completed today  [ ] Unknown

## 2021-02-21 NOTE — PHYSICAL THERAPY INITIAL EVALUATION ADULT - PERTINENT HX OF CURRENT PROBLEM, REHAB EVAL
Pt with h/o Sickle cell anemia and low Hgb admitted for generalized body aches worse in the abdomen and back.  Unable to be controlled with Oxycodone.  Also found to have EMEKA and acute or chronic anemia..
65M HbSS disease with multiple admissions in past including for acute chest baseline HgB about 6.5, chronic CHF, Mild intellectual disability, BPH presented to [facility] Ed with generalized body aches worst in abdomen and back.  He was unable to control with Oxycodone at home so came to ED.  His symptoms improved a little with IVF and Dilaudid.  Also found to have EMEKA and Acute on Chronic anemia

## 2021-02-21 NOTE — DISCHARGE NOTE PROVIDER - NSDCMRMEDTOKEN_GEN_ALL_CORE_FT
Endari oral powder for reconstitution: 10 gram(s) orally every 12 hours  finasteride 5 mg oral tablet: 1 tab(s) orally once a day  folic acid 1 mg oral tablet: 1 tab(s) orally once a day  lisinopril 10 mg oral tablet: 1 tab(s) orally once a day  metoprolol tartrate 25 mg oral tablet: 0.5 tab(s) orally 2 times a day  senna oral tablet: 2 tab(s) orally once a day (at bedtime), As Needed constipation   Endari oral powder for reconstitution: 10 gram(s) orally every 12 hours  finasteride 5 mg oral tablet: 1 tab(s) orally once a day  folic acid 1 mg oral tablet: 1 tab(s) orally once a day  HYDROmorphone 4 mg oral tablet: 1 tab(s) orally every 4 hours, As Needed -Severe Pain (7 - 10) MDD:5 tabs  metoprolol tartrate 75 mg oral tablet: 1 tab(s) orally every 12 hours  senna oral tablet: 2 tab(s) orally once a day (at bedtime), As Needed constipation

## 2021-02-21 NOTE — DISCHARGE NOTE PROVIDER - CARE PROVIDERS DIRECT ADDRESSES
[email or web address]

## 2021-02-22 LAB
ALBUMIN SERPL ELPH-MCNC: 2.9 G/DL — LOW (ref 3.3–5)
ALP SERPL-CCNC: 158 U/L — HIGH (ref 30–120)
ALT FLD-CCNC: 33 U/L DA — SIGNIFICANT CHANGE UP (ref 10–60)
ANION GAP SERPL CALC-SCNC: 9 MMOL/L — SIGNIFICANT CHANGE UP (ref 5–17)
AST SERPL-CCNC: 60 U/L — HIGH (ref 10–40)
BILIRUB SERPL-MCNC: 2.5 MG/DL — HIGH (ref 0.2–1.2)
BLD GP AB SCN SERPL QL: SIGNIFICANT CHANGE UP
BUN SERPL-MCNC: 25 MG/DL — HIGH (ref 7–23)
CALCIUM SERPL-MCNC: 8.8 MG/DL — SIGNIFICANT CHANGE UP (ref 8.4–10.5)
CHLORIDE SERPL-SCNC: 102 MMOL/L — SIGNIFICANT CHANGE UP (ref 96–108)
CO2 SERPL-SCNC: 24 MMOL/L — SIGNIFICANT CHANGE UP (ref 22–31)
CREAT SERPL-MCNC: 1.12 MG/DL — SIGNIFICANT CHANGE UP (ref 0.5–1.3)
DAT IGG-SP REAG RBC-IMP: SIGNIFICANT CHANGE UP
DIR ANTIGLOB POLYSPECIFIC INTERPRETATION: ABNORMAL
GLUCOSE BLDC GLUCOMTR-MCNC: 140 MG/DL — HIGH (ref 70–99)
GLUCOSE SERPL-MCNC: 106 MG/DL — HIGH (ref 70–99)
HCT VFR BLD CALC: 18.6 % — CRITICAL LOW (ref 39–50)
HGB BLD-MCNC: 5.9 G/DL — CRITICAL LOW (ref 13–17)
IAT COMP-SP REAG SERPL QL: ABNORMAL
MCHC RBC-ENTMCNC: 25.2 PG — LOW (ref 27–34)
MCHC RBC-ENTMCNC: 31.7 GM/DL — LOW (ref 32–36)
MCV RBC AUTO: 79.5 FL — LOW (ref 80–100)
NRBC # BLD: 10 /100 WBCS — HIGH (ref 0–0)
PLATELET # BLD AUTO: 309 K/UL — SIGNIFICANT CHANGE UP (ref 150–400)
POTASSIUM SERPL-MCNC: 4.2 MMOL/L — SIGNIFICANT CHANGE UP (ref 3.5–5.3)
POTASSIUM SERPL-SCNC: 4.2 MMOL/L — SIGNIFICANT CHANGE UP (ref 3.5–5.3)
PROT SERPL-MCNC: 7.3 G/DL — SIGNIFICANT CHANGE UP (ref 6–8.3)
RBC # BLD: 2.34 M/UL — LOW (ref 4.2–5.8)
RBC # FLD: 24.7 % — HIGH (ref 10.3–14.5)
SARS-COV-2 RNA SPEC QL NAA+PROBE: SIGNIFICANT CHANGE UP
SODIUM SERPL-SCNC: 135 MMOL/L — SIGNIFICANT CHANGE UP (ref 135–145)
WBC # BLD: 12.99 K/UL — HIGH (ref 3.8–10.5)
WBC # FLD AUTO: 12.99 K/UL — HIGH (ref 3.8–10.5)

## 2021-02-22 PROCEDURE — 99233 SBSQ HOSP IP/OBS HIGH 50: CPT

## 2021-02-22 PROCEDURE — 99291 CRITICAL CARE FIRST HOUR: CPT

## 2021-02-22 RX ORDER — ACETAMINOPHEN 500 MG
650 TABLET ORAL ONCE
Refills: 0 | Status: COMPLETED | OUTPATIENT
Start: 2021-02-22 | End: 2021-02-22

## 2021-02-22 RX ORDER — DIPHENHYDRAMINE HCL 50 MG
25 CAPSULE ORAL ONCE
Refills: 0 | Status: COMPLETED | OUTPATIENT
Start: 2021-02-22 | End: 2021-02-22

## 2021-02-22 RX ORDER — SODIUM CHLORIDE 9 MG/ML
1000 INJECTION, SOLUTION INTRAVENOUS ONCE
Refills: 0 | Status: COMPLETED | OUTPATIENT
Start: 2021-02-22 | End: 2021-02-22

## 2021-02-22 RX ORDER — HYDROCORTISONE 20 MG
100 TABLET ORAL ONCE
Refills: 0 | Status: COMPLETED | OUTPATIENT
Start: 2021-02-22 | End: 2021-02-22

## 2021-02-22 RX ADMIN — HYDROMORPHONE HYDROCHLORIDE 4 MILLIGRAM(S): 2 INJECTION INTRAMUSCULAR; INTRAVENOUS; SUBCUTANEOUS at 05:46

## 2021-02-22 RX ADMIN — CHLORHEXIDINE GLUCONATE 1 APPLICATION(S): 213 SOLUTION TOPICAL at 05:43

## 2021-02-22 RX ADMIN — HYDROMORPHONE HYDROCHLORIDE 4 MILLIGRAM(S): 2 INJECTION INTRAMUSCULAR; INTRAVENOUS; SUBCUTANEOUS at 10:39

## 2021-02-22 RX ADMIN — Medication 1 MILLIGRAM(S): at 11:56

## 2021-02-22 RX ADMIN — ENOXAPARIN SODIUM 40 MILLIGRAM(S): 100 INJECTION SUBCUTANEOUS at 11:57

## 2021-02-22 RX ADMIN — POLYETHYLENE GLYCOL 3350 17 GRAM(S): 17 POWDER, FOR SOLUTION ORAL at 11:57

## 2021-02-22 RX ADMIN — Medication 75 MILLIGRAM(S): at 05:42

## 2021-02-22 RX ADMIN — HYDROMORPHONE HYDROCHLORIDE 4 MILLIGRAM(S): 2 INJECTION INTRAMUSCULAR; INTRAVENOUS; SUBCUTANEOUS at 22:48

## 2021-02-22 RX ADMIN — HYDROMORPHONE HYDROCHLORIDE 4 MILLIGRAM(S): 2 INJECTION INTRAMUSCULAR; INTRAVENOUS; SUBCUTANEOUS at 01:28

## 2021-02-22 RX ADMIN — SODIUM CHLORIDE 1000 MILLILITER(S): 9 INJECTION, SOLUTION INTRAVENOUS at 22:31

## 2021-02-22 RX ADMIN — Medication 25 MILLIGRAM(S): at 22:22

## 2021-02-22 RX ADMIN — Medication 75 MILLIGRAM(S): at 17:11

## 2021-02-22 RX ADMIN — FINASTERIDE 5 MILLIGRAM(S): 5 TABLET, FILM COATED ORAL at 11:57

## 2021-02-22 RX ADMIN — Medication 100 MILLIGRAM(S): at 22:25

## 2021-02-22 RX ADMIN — Medication 650 MILLIGRAM(S): at 22:21

## 2021-02-22 RX ADMIN — HYDROMORPHONE HYDROCHLORIDE 4 MILLIGRAM(S): 2 INJECTION INTRAMUSCULAR; INTRAVENOUS; SUBCUTANEOUS at 15:20

## 2021-02-22 NOTE — PROVIDER CONTACT NOTE (CHANGE IN STATUS NOTIFICATION) - ASSESSMENT
RR called, PRBC completed 2115, VS: 112/75, hr 156, Pt on toilet, rr 19, 102.5F oral, 100% ra, fs 140, A & O x4. Pt denies pain, no nausea, no chills, no chest pain, no sob.
126/74, hr 90, 100.7F oral, 17 hr, 96% RA, pt denies sob, chills, itching, chest pain, nausea, no acute distress noted.

## 2021-02-22 NOTE — PROVIDER CONTACT NOTE (CHANGE IN STATUS NOTIFICATION) - SITUATION
Rapid response called, Pt tachycardic, hr 120s to 150s. Rapid response called, 102.5F oral temp, Pt tachycardic, hr 120s to 150s. approx 45 mins after PRBC transfusion completed.

## 2021-02-22 NOTE — PROGRESS NOTE ADULT - SUBJECTIVE AND OBJECTIVE BOX
Chief Complaint:  Patient is a 55y old  Male who presents with a chief complaint of Sickle Cell crisis (22 Feb 2021 09:08)      Interval Events:   No complaints  continues to deny melena, hematochezia  Hgb decreased to 5.9    Hospital Medications:  benzocaine 15 mG/menthol 3.6 mG (Sugar-Free) Lozenge 1 Lozenge Oral every 3 hours PRN  chlorhexidine 4% Liquid 1 Application(s) Topical <User Schedule>  diphenhydrAMINE   Injectable 25 milliGRAM(s) IV Push every 4 hours PRN  enoxaparin Injectable 40 milliGRAM(s) SubCutaneous daily  finasteride 5 milliGRAM(s) Oral daily  folic acid 1 milliGRAM(s) Oral daily  HYDROmorphone   Tablet 2 milliGRAM(s) Oral every 3 hours PRN  HYDROmorphone   Tablet 4 milliGRAM(s) Oral every 3 hours PRN  metoprolol tartrate 75 milliGRAM(s) Oral every 12 hours  polyethylene glycol 3350 17 Gram(s) Oral daily  sodium chloride 0.9% lock flush 10 milliLiter(s) IV Push every 1 hour PRN        PHYSICAL EXAM:   Vital Signs:  Vital Signs Last 24 Hrs  T(C): 37.7 (22 Feb 2021 05:35), Max: 37.7 (22 Feb 2021 05:35)  T(F): 99.8 (22 Feb 2021 05:35), Max: 99.8 (22 Feb 2021 05:35)  HR: 99 (22 Feb 2021 05:35) (76 - 99)  BP: 109/79 (22 Feb 2021 05:35) (109/79 - 133/75)  BP(mean): --  RR: 18 (22 Feb 2021 05:35) (18 - 18)  SpO2: 96% (22 Feb 2021 05:35) (96% - 99%)  Daily     Daily     GENERAL:  Appears stated age,  no distress, thin appearing  HEENT:   sclera -anicteric  CHEST:   no increased effort, breath sounds clear  HEART:  Regular rhythm, S1, S2,   ABDOMEN:  Soft, non-tender, non-distended, normoactive bowel sounds,    EXTEREMITIES:  no cyanosis,clubbing or edema  SKIN:  No rash/no jaundice   NEURO:  Alert, oriented    LABS:                        5.9    12.99 )-----------( 309      ( 22 Feb 2021 07:13 )             18.6     Mean Cell Volume: 79.5 fl (02-22-21 @ 07:13)    02-22    135  |  102  |  25<H>  ----------------------------<  106<H>  4.2   |  24  |  1.12    Ca    8.8      22 Feb 2021 07:13    TPro  7.3  /  Alb  2.9<L>  /  TBili  2.5<H>  /  DBili  x   /  AST  60<H>  /  ALT  33  /  AlkPhos  158<H>  02-22    LIVER FUNCTIONS - ( 22 Feb 2021 07:13 )  Alb: 2.9 g/dL / Pro: 7.3 g/dL / ALK PHOS: 158 U/L / ALT: 33 U/L DA / AST: 60 U/L / GGT: x                                       5.9    12.99 )-----------( 309      ( 22 Feb 2021 07:13 )             18.6                         6.7    10.13 )-----------( 284      ( 20 Feb 2021 08:39 )             21.1     Imaging:

## 2021-02-22 NOTE — CHART NOTE - NSCHARTNOTEFT_GEN_A_CORE
RRT was activated as patient was found with temp of 102.5 about 40 minute after the end of transfusion of one unit PRBCs, with HR in the 140's. Seen & examined at the bedside (was in the bathroom on arrival of RRT), telemetry reviewed, showed ST at 140's with occasional uniform PVCs. O/E: AAO X3, calm, denies any complaints, not in any respiratory distress, no chills observed, no skin rash, no JVD, Heart: normal SI & S2, grade II/VI JACK max on cardiac base with no propagation, no extra sounds, Lungs: good air entry allover, equal B/L, no wheezes or rhonchi, and no rales, no carotid bruits, no pedal edema.    Vitals on RRT activation:  /min, RR 19/min, SPO2 100% on RA, /75, Temp 102.5 F,  mg/dl.    Vital Signs Last 24 Hrs  T(C): 38.2 (22 Feb 2021 21:56), Max: 38.2 (22 Feb 2021 21:56)  T(F): 100.7 (22 Feb 2021 21:56), Max: 100.7 (22 Feb 2021 21:56)  HR: 90 (22 Feb 2021 21:56) (76 - 99)  BP: 126/74 (22 Feb 2021 21:56) (104/55 - 132/74)  BP(mean): --  RR: 17 (22 Feb 2021 21:56) (17 - 18)  SpO2: 96% (22 Feb 2021 21:56) (96% - 98%).       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 Recorded temp before starting transfusion was 99.9 F, at the end of transfusion was 100.7.   Received Benadryl 25 mg im X1 stat dose, Hydrocortisone succinate 100 mg IVP X1 stat dose, Tylenol 650 mg PO X1, LR 1000 ml stat IVF bolus over 60 minutes, PRBCs empty bag was sent to blood bank with a post transfusion blood specimen, stat LDH, CBC with auto diff, total & direct bilirubin, and AST were ordered, monitor vitals. Discussed with patient. RRT was activated as patient was found with temp of 102.5 about 40 minute after the end of transfusion of one unit PRBCs, with HR in the 140's. Seen & examined at the bedside (was in the bathroom on arrival of RRT), telemetry reviewed, showed ST at 140's with occasional uniform PVCs. O/E: AAO X3, calm, denies any complaints, not in any respiratory distress, no chills observed, no skin rash, no JVD, Heart: normal SI & S2, grade II/VI JACK max on cardiac base with no propagation, no extra sounds, Lungs: good air entry allover, equal B/L, no wheezes or rhonchi, and no rales, no carotid bruits, no pedal edema.    Vitals on RRT activation:  /min, RR 19/min, SPO2 100% on RA, /75, Temp 102.5 F,  mg/dl.    Vital Signs Last 24 Hrs  T(C): 38.2 (22 Feb 2021 21:56), Max: 38.2 (22 Feb 2021 21:56)  T(F): 100.7 (22 Feb 2021 21:56), Max: 100.7 (22 Feb 2021 21:56)  HR: 90 (22 Feb 2021 21:56) (76 - 99)  BP: 126/74 (22 Feb 2021 21:56) (104/55 - 132/74)  BP(mean): --  RR: 17 (22 Feb 2021 21:56) (17 - 18)  SpO2: 96% (22 Feb 2021 21:56) (96% - 98%).       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 Recorded temp before starting transfusion was 99.9 F, at the end of transfusion was 100.7.   Received Benadryl 25 mg im X1 stat dose, Hydrocortisone succinate 100 mg IVP X1 stat dose, Tylenol 650 mg PO X1, LR 1000 ml stat IVF bolus over 60 minutes, PRBCs empty bag was sent to blood bank with a post transfusion blood specimen, stat LDH, CBC with auto diff, total & direct bilirubin, and AST were ordered, monitor vitals. Discussed with patient.  CC: 30 min.

## 2021-02-22 NOTE — PROVIDER CONTACT NOTE (CHANGE IN STATUS NOTIFICATION) - ACTION/TREATMENT ORDERED:
Dr Rose aware, no orders, retake Vitals within hour. Dr Rose aware, no new orders, retake Vitals within hour.

## 2021-02-22 NOTE — PROVIDER CONTACT NOTE (CHANGE IN STATUS NOTIFICATION) - BACKGROUND
Sickle cell crisis pt, PRBC transfusion completed, Sickle cell crisis pt, PRBC transfusion completed.

## 2021-02-22 NOTE — PROVIDER CONTACT NOTE (CHANGE IN STATUS NOTIFICATION) - ACTION/TREATMENT ORDERED:
Benadryl 25 mg im X1 stat dose, Hydrocortisone succinate 100 mg IVP X1 stat dose, Tylenol 650 mg PO X1, LR 1000 ml stat IVF bolus over 60 minutes, PRBCs empty bag was sent to blood bank with a post transfusion blood specimen, stat LDH, CBC with auto diff, total & direct bilirubin, AST.  VS q 30mins x2 ordered also.  pt comfortable. Benadryl 25 mg im X1 stat dose, Hydrocortisone succinate 100 mg IVP X1 stat dose, Tylenol 650 mg PO X1, LR 1000 ml stat IVF bolus over 60 minutes, PRBCs empty bag was sent to blood bank with a post transfusion blood specimen, stat LDH, CBC with auto diff, total & direct bilirubin, AST.  UA collected, VS q 30mins x2 ordered also.  pt comfortable.

## 2021-02-22 NOTE — PROGRESS NOTE ADULT - SUBJECTIVE AND OBJECTIVE BOX
Patient seen and examined;  Chart reviewed and events noted;   Asked by primary tem to re-evaluate    MEDICATIONS  (STANDING):  chlorhexidine 4% Liquid 1 Application(s) Topical <User Schedule>  enoxaparin Injectable 40 milliGRAM(s) SubCutaneous daily  finasteride 5 milliGRAM(s) Oral daily  folic acid 1 milliGRAM(s) Oral daily  metoprolol tartrate 75 milliGRAM(s) Oral every 12 hours  polyethylene glycol 3350 17 Gram(s) Oral daily    MEDICATIONS  (PRN):  benzocaine 15 mG/menthol 3.6 mG (Sugar-Free) Lozenge 1 Lozenge Oral every 3 hours PRN Sore Throat  diphenhydrAMINE   Injectable 25 milliGRAM(s) IV Push every 4 hours PRN Rash and/or Itching  HYDROmorphone   Tablet 2 milliGRAM(s) Oral every 3 hours PRN Moderate Pain (4 - 6)  HYDROmorphone   Tablet 4 milliGRAM(s) Oral every 3 hours PRN Severe Pain (7 - 10)  sodium chloride 0.9% lock flush 10 milliLiter(s) IV Push every 1 hour PRN Pre/post blood products, medications, blood draw, and to maintain line patency      Vital Signs Last 24 Hrs  T(C): 37.7 (22 Feb 2021 05:35), Max: 37.7 (22 Feb 2021 05:35)  T(F): 99.8 (22 Feb 2021 05:35), Max: 99.8 (22 Feb 2021 05:35)  HR: 99 (22 Feb 2021 05:35) (73 - 99)  BP: 109/79 (22 Feb 2021 05:35) (109/79 - 133/75)  RR: 18 (22 Feb 2021 05:35) (18 - 19)  SpO2: 96% (22 Feb 2021 05:35) (96% - 99%)    PHYSICAL EXAM  General: adult thin AA male in NAD  HEENT: clear oropharynx, anicteric sclera, pink conjunctivae  Neck: supple  CV: normal S1S2 with no murmur rubs or gallops  Lungs: clear to auscultation, no wheezes, no rhales  Abdomen: soft non-tender non-distended, no hepato/splenomegaly  Ext: no clubbing cyanosis or edema  Skin: no rashes and no petichiae  Neuro: alert and oriented X3 no focal deficits      LABS:                        5.9    12.99 )-----------( 309      ( 22 Feb 2021 07:13 )             18.6     Hemoglobin: 5.9 g/dL (02-22 @ 07:13)  Hemoglobin: 6.7 g/dL (02-20 @ 08:39)  Hemoglobin: 7.3 g/dL (02-18 @ 07:28)    02-22    135  |  102  |  25<H>  ----------------------------<  106<H>  4.2   |  24  |  1.12    Ca    8.8      22 Feb 2021 07:13    TPro  7.3  /  Alb  2.9<L>  /  TBili  2.5<H>  /  DBili  x   /  AST  60<H>  /  ALT  33  /  AlkPhos  158<H>  02-22

## 2021-02-22 NOTE — PROGRESS NOTE ADULT - SUBJECTIVE AND OBJECTIVE BOX
Patient is a 55y old  Male who presents with a chief complaint of SCD (22 Feb 2021 10:14)    INTERVAL HPI/OVERNIGHT EVENTS:  feeling well, no complaints today.  pain is the same and controlled by oral meds.  ate some of his food for breakfast and is already in the chair.     MEDICATIONS  (STANDING):  chlorhexidine 4% Liquid 1 Application(s) Topical <User Schedule>  enoxaparin Injectable 40 milliGRAM(s) SubCutaneous daily  finasteride 5 milliGRAM(s) Oral daily  folic acid 1 milliGRAM(s) Oral daily  metoprolol tartrate 75 milliGRAM(s) Oral every 12 hours  polyethylene glycol 3350 17 Gram(s) Oral daily    MEDICATIONS  (PRN):  benzocaine 15 mG/menthol 3.6 mG (Sugar-Free) Lozenge 1 Lozenge Oral every 3 hours PRN Sore Throat  diphenhydrAMINE   Injectable 25 milliGRAM(s) IV Push every 4 hours PRN Rash and/or Itching  HYDROmorphone   Tablet 2 milliGRAM(s) Oral every 3 hours PRN Moderate Pain (4 - 6)  HYDROmorphone   Tablet 4 milliGRAM(s) Oral every 3 hours PRN Severe Pain (7 - 10)  sodium chloride 0.9% lock flush 10 milliLiter(s) IV Push every 1 hour PRN Pre/post blood products, medications, blood draw, and to maintain line patency      Allergies  No Known Drug Allergies  peanuts (Anaphylaxis)  peanuts (Angioedema)  pork (Unknown)  shellfish (Unknown)    Intolerances  lactose (Unknown)      REVIEW OF SYSTEMS:  reviewed otherwise negative.     Vital Signs Last 24 Hrs  T(C): 37.7 (22 Feb 2021 05:35), Max: 37.7 (22 Feb 2021 05:35)  T(F): 99.8 (22 Feb 2021 05:35), Max: 99.8 (22 Feb 2021 05:35)  HR: 99 (22 Feb 2021 05:35) (76 - 99)  BP: 109/79 (22 Feb 2021 05:35) (109/79 - 133/75)  BP(mean): --  RR: 18 (22 Feb 2021 05:35) (18 - 18)  SpO2: 96% (22 Feb 2021 05:35) (96% - 99%)    PHYSICAL EXAM:  GENERAL: NAD, well-groomed, well-developed  HEAD:  Atraumatic, Normocephalic  EYES: conjunctiva and sclera clear  ENMT: Moist mucous membranes  NECK: Supple, No JVD,   NERVOUS SYSTEM:  Alert & Oriented X3, Good concentration; All 4 extremities mobile, no gross sensory deficits.   CHEST/LUNG: Clear to auscultation bilaterally;   HEART: Regular rate and rhythm;  ABDOMEN: Soft, Nontender, Nondistended; Bowel sounds present  EXTREMITIES:  2+ Peripheral Pulses, No clubbing, cyanosis, or edema      LABS:                        5.9    12.99 )-----------( 309      ( 22 Feb 2021 07:13 )             18.6     22 Feb 2021 07:13    135    |  102    |  25     ----------------------------<  106    4.2     |  24     |  1.12     Ca    8.8        22 Feb 2021 07:13    TPro  7.3    /  Alb  2.9    /  TBili  2.5    /  DBili  x      /  AST  60     /  ALT  33     /  AlkPhos  158    22 Feb 2021 07:13        CAPILLARY BLOOD GLUCOSE          RADIOLOGY & ADDITIONAL TESTS:    Imaging Personally Reviewed:  [ ] YES     Consultant(s) Notes Reviewed:      Care Discussed with Consultants/Other Providers:    Advanced Directives: [ ] DNR  [ ] No feeding tube  [ ] MOLST in chart  [ ] MOLST completed today  [ ] Unknown

## 2021-02-23 ENCOUNTER — TRANSCRIPTION ENCOUNTER (OUTPATIENT)
Age: 66
End: 2021-02-23

## 2021-02-23 VITALS
TEMPERATURE: 99 F | SYSTOLIC BLOOD PRESSURE: 112 MMHG | DIASTOLIC BLOOD PRESSURE: 66 MMHG | RESPIRATION RATE: 19 BRPM | OXYGEN SATURATION: 100 % | HEART RATE: 84 BPM

## 2021-02-23 LAB
ALBUMIN SERPL ELPH-MCNC: 3 G/DL — LOW (ref 3.3–5)
ALP SERPL-CCNC: 184 U/L — HIGH (ref 30–120)
ALT FLD-CCNC: 46 U/L DA — SIGNIFICANT CHANGE UP (ref 10–60)
APPEARANCE UR: CLEAR — SIGNIFICANT CHANGE UP
AST SERPL-CCNC: 67 U/L — HIGH (ref 10–40)
AST SERPL-CCNC: 67 U/L — HIGH (ref 10–40)
BASOPHILS # BLD AUTO: 0.05 K/UL — SIGNIFICANT CHANGE UP (ref 0–0.2)
BASOPHILS # BLD AUTO: 0.11 K/UL — SIGNIFICANT CHANGE UP (ref 0–0.2)
BASOPHILS NFR BLD AUTO: 0.4 % — SIGNIFICANT CHANGE UP (ref 0–2)
BASOPHILS NFR BLD AUTO: 0.7 % — SIGNIFICANT CHANGE UP (ref 0–2)
BILIRUB DIRECT SERPL-MCNC: 0.5 MG/DL — HIGH (ref 0–0.2)
BILIRUB DIRECT SERPL-MCNC: 0.6 MG/DL — HIGH (ref 0–0.2)
BILIRUB INDIRECT FLD-MCNC: 2.6 MG/DL — HIGH (ref 0.2–1.2)
BILIRUB INDIRECT FLD-MCNC: 2.9 MG/DL — HIGH (ref 0.2–1)
BILIRUB SERPL-MCNC: 3.2 MG/DL — HIGH (ref 0.2–1.2)
BILIRUB SERPL-MCNC: 3.4 MG/DL — HIGH (ref 0.2–1.2)
BILIRUB UR-MCNC: NEGATIVE — SIGNIFICANT CHANGE UP
COLOR SPEC: YELLOW — SIGNIFICANT CHANGE UP
DIFF PNL FLD: ABNORMAL
EOSINOPHIL # BLD AUTO: 0.01 K/UL — SIGNIFICANT CHANGE UP (ref 0–0.5)
EOSINOPHIL # BLD AUTO: 0.01 K/UL — SIGNIFICANT CHANGE UP (ref 0–0.5)
EOSINOPHIL NFR BLD AUTO: 0.1 % — SIGNIFICANT CHANGE UP (ref 0–6)
EOSINOPHIL NFR BLD AUTO: 0.1 % — SIGNIFICANT CHANGE UP (ref 0–6)
GLUCOSE UR QL: NEGATIVE MG/DL — SIGNIFICANT CHANGE UP
HCT VFR BLD CALC: 21.1 % — LOW (ref 39–50)
HCT VFR BLD CALC: 22.8 % — LOW (ref 39–50)
HGB BLD-MCNC: 6.9 G/DL — CRITICAL LOW (ref 13–17)
HGB BLD-MCNC: 7.6 G/DL — LOW (ref 13–17)
IMM GRANULOCYTES NFR BLD AUTO: 1.1 % — SIGNIFICANT CHANGE UP (ref 0–1.5)
IMM GRANULOCYTES NFR BLD AUTO: 2 % — HIGH (ref 0–1.5)
KETONES UR-MCNC: NEGATIVE — SIGNIFICANT CHANGE UP
LDH SERPL L TO P-CCNC: 762 U/L — HIGH (ref 50–242)
LEUKOCYTE ESTERASE UR-ACNC: NEGATIVE — SIGNIFICANT CHANGE UP
LYMPHOCYTES # BLD AUTO: 0.81 K/UL — LOW (ref 1–3.3)
LYMPHOCYTES # BLD AUTO: 1.82 K/UL — SIGNIFICANT CHANGE UP (ref 1–3.3)
LYMPHOCYTES # BLD AUTO: 12.1 % — LOW (ref 13–44)
LYMPHOCYTES # BLD AUTO: 6.1 % — LOW (ref 13–44)
MCHC RBC-ENTMCNC: 25.7 PG — LOW (ref 27–34)
MCHC RBC-ENTMCNC: 26 PG — LOW (ref 27–34)
MCHC RBC-ENTMCNC: 32.7 GM/DL — SIGNIFICANT CHANGE UP (ref 32–36)
MCHC RBC-ENTMCNC: 33.3 GM/DL — SIGNIFICANT CHANGE UP (ref 32–36)
MCV RBC AUTO: 78.1 FL — LOW (ref 80–100)
MCV RBC AUTO: 78.4 FL — LOW (ref 80–100)
MONOCYTES # BLD AUTO: 0.37 K/UL — SIGNIFICANT CHANGE UP (ref 0–0.9)
MONOCYTES # BLD AUTO: 2 K/UL — HIGH (ref 0–0.9)
MONOCYTES NFR BLD AUTO: 13.3 % — SIGNIFICANT CHANGE UP (ref 2–14)
MONOCYTES NFR BLD AUTO: 2.8 % — SIGNIFICANT CHANGE UP (ref 2–14)
NEUTROPHILS # BLD AUTO: 10.78 K/UL — HIGH (ref 1.8–7.4)
NEUTROPHILS # BLD AUTO: 11.89 K/UL — HIGH (ref 1.8–7.4)
NEUTROPHILS NFR BLD AUTO: 71.8 % — SIGNIFICANT CHANGE UP (ref 43–77)
NEUTROPHILS NFR BLD AUTO: 89.5 % — HIGH (ref 43–77)
NITRITE UR-MCNC: NEGATIVE — SIGNIFICANT CHANGE UP
NRBC # BLD: 10 /100 WBCS — HIGH (ref 0–0)
NRBC # BLD: 7 /100 WBCS — HIGH (ref 0–0)
PH UR: 6 — SIGNIFICANT CHANGE UP (ref 5–8)
PLATELET # BLD AUTO: 293 K/UL — SIGNIFICANT CHANGE UP (ref 150–400)
PLATELET # BLD AUTO: 329 K/UL — SIGNIFICANT CHANGE UP (ref 150–400)
POST UNIT NUMBER: SIGNIFICANT CHANGE UP
PROT SERPL-MCNC: 8.1 G/DL — SIGNIFICANT CHANGE UP (ref 6–8.3)
PROT UR-MCNC: 30 MG/DL
RBC # BLD: 2.69 M/UL — LOW (ref 4.2–5.8)
RBC # BLD: 2.92 M/UL — LOW (ref 4.2–5.8)
RBC # FLD: 23 % — HIGH (ref 10.3–14.5)
RBC # FLD: 23.5 % — HIGH (ref 10.3–14.5)
SP GR SPEC: 1.01 — SIGNIFICANT CHANGE UP (ref 1.01–1.02)
UROBILINOGEN FLD QL: NEGATIVE MG/DL — SIGNIFICANT CHANGE UP
WBC # BLD: 13.28 K/UL — HIGH (ref 3.8–10.5)
WBC # BLD: 15.02 K/UL — HIGH (ref 3.8–10.5)
WBC # FLD AUTO: 13.28 K/UL — HIGH (ref 3.8–10.5)
WBC # FLD AUTO: 15.02 K/UL — HIGH (ref 3.8–10.5)

## 2021-02-23 PROCEDURE — 99233 SBSQ HOSP IP/OBS HIGH 50: CPT

## 2021-02-23 PROCEDURE — 86078 PHYS BLOOD BANK SERV REACTJ: CPT

## 2021-02-23 RX ORDER — METOPROLOL TARTRATE 50 MG
3 TABLET ORAL
Qty: 180 | Refills: 0
Start: 2021-02-23 | End: 2021-03-24

## 2021-02-23 RX ORDER — CHLORHEXIDINE GLUCONATE 213 G/1000ML
1 SOLUTION TOPICAL DAILY
Refills: 0 | Status: DISCONTINUED | OUTPATIENT
Start: 2021-02-23 | End: 2021-02-23

## 2021-02-23 RX ORDER — HYDROMORPHONE HYDROCHLORIDE 2 MG/ML
1 INJECTION INTRAMUSCULAR; INTRAVENOUS; SUBCUTANEOUS
Qty: 70 | Refills: 0
Start: 2021-02-23

## 2021-02-23 RX ORDER — METOPROLOL TARTRATE 50 MG
1 TABLET ORAL
Qty: 0 | Refills: 0 | DISCHARGE
Start: 2021-02-23 | End: 2021-03-24

## 2021-02-23 RX ORDER — ACETAMINOPHEN 500 MG
650 TABLET ORAL ONCE
Refills: 0 | Status: COMPLETED | OUTPATIENT
Start: 2021-02-23 | End: 2021-02-23

## 2021-02-23 RX ORDER — METOPROLOL TARTRATE 50 MG
1 TABLET ORAL
Qty: 60 | Refills: 0
Start: 2021-02-23 | End: 2021-03-24

## 2021-02-23 RX ORDER — ACETAMINOPHEN 500 MG
1000 TABLET ORAL ONCE
Refills: 0 | Status: COMPLETED | OUTPATIENT
Start: 2021-02-23 | End: 2021-02-23

## 2021-02-23 RX ADMIN — CHLORHEXIDINE GLUCONATE 1 APPLICATION(S): 213 SOLUTION TOPICAL at 05:52

## 2021-02-23 RX ADMIN — HYDROMORPHONE HYDROCHLORIDE 4 MILLIGRAM(S): 2 INJECTION INTRAMUSCULAR; INTRAVENOUS; SUBCUTANEOUS at 06:14

## 2021-02-23 RX ADMIN — Medication 75 MILLIGRAM(S): at 05:52

## 2021-02-23 RX ADMIN — Medication 1000 MILLIGRAM(S): at 15:16

## 2021-02-23 RX ADMIN — Medication 650 MILLIGRAM(S): at 06:13

## 2021-02-23 NOTE — PROGRESS NOTE ADULT - ASSESSMENT
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   Blood Bank able to find one unit of PRBC and post tx Sat, tolerated well, Hgb incr to 6.2, latest 6.4    -iron studies once again w the known low ferritin but w also olow TIBC, not c/w sig iron def  -B12/folate normal  -multiple antibodies including warm auto, but w normal LDH/bilirubin/[place]omb, therefore no sig hemolysis  -pain under control w present oral Dilaudid  -clinically asx from heme standpoint   -
64 y/o man w Sickle Cell disease, hx acute chest, mild MR, baseline Hgb 6-7 on prior labs, admitted on 2/4/21  for painful crisis, Hgb 5.5 requiring transfusion  Pain crisis  Prior hx of lower than expected Ferritin.  Iron deficiency. 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   s/p 2U PRBC so far.     Initial Hgb improved post transfusion to 7.8 g/dL now with decline  BUN increased likely due to bleeding and dehydration.   s/p IVF with improved Cr but BUN remains elevated, worrisome for bleeding.     RECOMMEND  Transfuse additional unit PRBC as with PVC, cardiology evaluating.   CBC remains stable since initial transfusion.   Continue PO Dilaudid 4mg PRN, and IV Dilaudid 1mg q2hour PRN.   continue present management.    Continue PO folic acid.     Refuses FOBT samples. Encourage pt compliance.   Recommend GI eval if pt agrees.     Pt with insight into health issues.     Consider transfer to [facility] if cardiac indication.     Supportive care from heme standpoint.     d/w Dr Rivas. 
64 y/o man w Sickle Cell disease, hx acute chest, mild MR, baseline Hgb 6-7 on prior labs, admitted on 2/4/21  for painful crisis, Hgb 5.5 requiring transfusion  Pain crisis  Prior hx of lower than expected Ferritin.  Iron deficiency. 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   s/p 3U PRBC this admission  1U PRBC 02/04/21  1U PRBC so far. 02/11/21   1U PRBC 02/17/21     Hgb 7.5 improved post txfusion.   BUN increased likely due to bleeding and dehydration.   s/p IVF with improved Cr but BUN remains elevated, worrisome for bleeding.   FOBT sample 02/17/21 negative      RECOMMEND  hold transfusion and observe  CBC remains stable since initial transfusion.   Continue PO Dilaudid 4mg PRN, and PO Dilaudid 2mg q2hour PRN.   continue present management.    Continue PO folic acid.     s/p GI eval post my last discussion with pt 2d ago.   Pt with insight into health issues. Has capacity to refuse.   Would tansfuse to maintain Hgb ~ 7g/dL if pt with pain, at minimium 6g/dL if stable.     Recommend outpt eval with GI for Fe def.   Sickle cell pt usually with tendency for iron overload.   Ferritin 36 inappropriately low in setting of pain crisis, misael with .   Occult bleeding or blood loss highly suspected. FOBT 02/17/21 negative, however, remains with Fe def. With marked microcytosis.   Pt Sickle cell phenotype not consistent with microcytosis. Pt does not appear to have concurrent Beta+ or Beta-0 thal.   Microcytosis best explained by Fe def.   Pt has not had age appropriate GI cancer screening.     As pt exhibits tendency to not trust advice from providers, recommend pt follow with usual hematologist post DC Dr Holley and have her direct pt care for Fe def.     Consider transfer to [facility] if cardiac indication.     Supportive care from heme standpoint.     Thank you for consulting us.   No additional recommendations at current time.   Will sign off on case for now.   Please call, or re-consult if needed.   
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     -B12/folate normal  , CRP7  Suspect long standing Fe Def anemia, and possible [place] Dhruv syndrome as cause of dysphagia.  In setting of pain crisis, ferritin should be much higher  In addition Hgb electrophoresis with Hgb SS genotype, and less likely with HgbS-beta-Thal.     -multiple antibodies including warm auto, but w normal LDH/bilirubin/[place], therefore no sig hemolysis  LDH normal, Bilirubin unimpressive for hemolysis    -pain was under control w IV Dilaudid but was changed to oral due to non-functioning IV per patient; would resume IV Dilaudid and consider pain service consultation    barium swallow is unremarkable  patient is clinically probably close to his baseline  hgb improved after 1 unit PRBC and IV fe    RECOMMEND:   Refuses colonoscopy  Never had EGD nor Colonoscopy    check CBC and retic daily  continue folic acid supplement  s/p  IV Venfoer 200mg x2 doses.   Consider GI eval.   Check FOBT    
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      Problem/Plan - 1:  Problem: Sickle cell disease with crisis.  Plan: PO pain medication, oral/IV hydration  Now s/p 4 units prbc total.   transfusion reaction noted.   considering this and iron def needs GI eval- still refusing colonoscopy. FOBT negative  Encourage oral fluids and food.  strongly encourage ambulation in hallway.  pt said will be ready to go 3-4pm.     Problem/Plan - 2:  ·  Problem: NSVT (nonsustained ventricular tachycardia).  Plan: cardiology eval appreciated  echo reviewed.  medication changes noted.  outpatient EP eval.      Problem/Plan - 3:  ·  Problem: Hypertension.  Plan: lisinopril dcd by cardio to give more room in BP for metoprolol as above.     Problem/Plan - 4:  ·  Problem: BPH (benign prostatic hyperplasia).  Plan: Continue proscar.      Problem/Plan - 5:  ·  Problem: Prophylactic measure.  Plan: DVT proph: lovenox.     
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      Problem/Plan - 1:  Problem: Sickle cell disease with crisis.  Plan: PO pain medication, oral hydration  Now s/p 3 units prbc total.   considering this and iron def needs GI eval- still refusing colonoscopy.   Encourage oral fluids and food.  encourage ambulation.  FOBT negative     Problem/Plan - 2:  ·  Problem: NSVT (nonsustained ventricular tachycardia).  Plan: cardiology eval appreciated  echo reviewed.  medication changes noted.  outpatient EP eval.      Problem/Plan - 3:  ·  Problem: Hypertension.  Plan: lisinopril dcd by cardio to give more room in BP for metoprolol as above.     Problem/Plan - 4:  ·  Problem: BPH (benign prostatic hyperplasia).  Plan: Continue proscar.      Problem/Plan - 5:  ·  Problem: Prophylactic measure.  Plan: DVT proph: lovenox.     
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      Problem/Plan - 1:  Problem: Sickle cell disease with crisis.  Plan: PO pain medication, oral/IV hydration  Now s/p 3 units prbc total.   H/H discussed with heme - will get 1 more unit of blood.   considering this and iron def needs GI eval- still refusing colonoscopy. FOBT negative  Encourage oral fluids and food.  strongly encourage ambulation in hallway.  pt said will be ready to go tomorrow.      Problem/Plan - 2:  ·  Problem: NSVT (nonsustained ventricular tachycardia).  Plan: cardiology eval appreciated  echo reviewed.  medication changes noted.  outpatient EP eval.      Problem/Plan - 3:  ·  Problem: Hypertension.  Plan: lisinopril dcd by cardio to give more room in BP for metoprolol as above.     Problem/Plan - 4:  ·  Problem: BPH (benign prostatic hyperplasia).  Plan: Continue proscar.      Problem/Plan - 5:  ·  Problem: Prophylactic measure.  Plan: DVT proph: lovenox.     
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      Problem/Plan - 1:  ·  Problem: Sickle cell disease with crisis.  Plan: PO pain medication, oral hydration  s/p PICC line placement s/p 1 unit. Will repeat cbc in am.   Encourage oral fluids and food.  encourage ambulation.  Still in pain.   FOBT pending  unclear why pt continues to be anemic.  Hematology following. Needs gi eval     Problem/Plan - 2:  ·  Problem: NSVT (nonsustained ventricular tachycardia).  Plan: cardiology eval appreciated  echo reviewed.  outpatient stress test.      Problem/Plan - 3:  ·  Problem: Hypertension.  Plan: continue Lisinopril and Metoprolol with hold parameters.      Problem/Plan - 4:  ·  Problem: BPH (benign prostatic hyperplasia).  Plan: Continue proscar.      Problem/Plan - 5:  ·  Problem: Prophylactic measure.  Plan: DVT proph: lovenox.     
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 Pt being treated for ACS, with anemia, refractory.  This is likely secondary to sickle crisis, no overt signs of GI bleed with FOBT neg.  Iron depletion likely secondary to RBC destruction while in sickle crisis      Rec:  1. Not medically optimized for any sedation/endoscopic procedures at this time  2. Care as per heme/onc for SCC.  3. Check haptoglobin/LDH  4. PPI daily 
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      Problem/Plan - 1:  Problem: Sickle cell disease with crisis.  Plan: PO pain medication, oral/IV hydration  Now s/p 3 units prbc total.   considering this and iron def needs GI eval- still refusing colonoscopy. FOBT negative  Encourage oral fluids and food.  strongly encourage ambulation in hallway.     Problem/Plan - 2:  ·  Problem: NSVT (nonsustained ventricular tachycardia).  Plan: cardiology eval appreciated  echo reviewed.  medication changes noted.  outpatient EP eval.      Problem/Plan - 3:  ·  Problem: Hypertension.  Plan: lisinopril dcd by cardio to give more room in BP for metoprolol as above.     Problem/Plan - 4:  ·  Problem: BPH (benign prostatic hyperplasia).  Plan: Continue proscar.      Problem/Plan - 5:  ·  Problem: Prophylactic measure.  Plan: DVT proph: lovenox.     
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 Pt being treated for ACS, with anemia, refractory.  This is likely secondary to sickle crisis, no overt signs of GI bleed with FOBT neg.  Iron depletion likely secondary to RBC destruction while in sickle crisis.      Rec:  1. will need outpatient follow up as no overt GI bleeding seen (no melena/hematochezia)  2. Care as per heme/onc for SCC.  3. PPI daily     
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      Problem/Plan - 1:  Problem: Sickle cell disease with crisis.  Plan: PO pain medication, oral hydration  s/p 2 units prbc with h/h low again and BUN elevated despite IVF.  Discussed with heme - will give 1 more unit prbc.  considering this and iron def needs GI eval- refused to see today.   Encourage oral fluids and food.  encourage ambulation.  FOBT pending       Problem/Plan - 2:  ·  Problem: NSVT (nonsustained ventricular tachycardia).  Plan: cardiology eval appreciated  echo reviewed.  medication changes noted.  less ectopy today.  will f/u with cardio.      Problem/Plan - 3:  ·  Problem: Hypertension.  Plan: continue Lisinopril and Metoprolol with hold parameters.      Problem/Plan - 4:  ·  Problem: BPH (benign prostatic hyperplasia).  Plan: Continue proscar.      Problem/Plan - 5:  ·  Problem: Prophylactic measure.  Plan: DVT proph: lovenox.     
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      Problem/Plan - 1:  Problem: Sickle cell disease with crisis.  Plan: PO pain medication, oral hydration  s/p 2 units prbc with stable h/h  Encourage oral fluids and food.  encourage ambulation.  FOBT pending       Problem/Plan - 2:  ·  Problem: NSVT (nonsustained ventricular tachycardia).  Plan: cardiology eval appreciated  echo reviewed.  medication changes noted.  ?need for EP eval?  will d/w cardio     Problem/Plan - 3:  ·  Problem: Hypertension.  Plan: continue Lisinopril and Metoprolol with hold parameters.      Problem/Plan - 4:  ·  Problem: BPH (benign prostatic hyperplasia).  Plan: Continue proscar.      Problem/Plan - 5:  ·  Problem: Prophylactic measure.  Plan: DVT proph: lovenox.     
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 Pt being treated for ACS, with anemia, refractory.  This is likely secondary to sickle crisis, no overt signs of GI bleed with FOBT neg.  Iron depletion likely secondary to RBC destruction while in sickle crisis.      Rec:  1. Not medically optimized for any sedation/endoscopic procedures at this time, will need outpatient follow up as no overt GI bleeding seen (no melena/hematochezia)  2. Care as per heme/onc for SCC.  3. Check haptoglobin/LDH  4. PPI daily     ACP (advance care planning). Plan: Advanced care planning was discussed with patient and family.  Advanced care planning forms were reviewed and discussed.  Risks, benefits and alternatives of gastroenterologic procedures were discussed in detail and all questions were answered.    30 minutes spent.  
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      Problem/Plan - 1:  ·  Problem: Sickle cell disease with crisis.  Plan: PO pain medication, oral hydration  s/p PICC line placement s/p 1 unit. Will repeat cbc in am.   Encourage oral fluids and food.  encourage ambulation.  Still in pain.   FOBT pending  unclear why pt continues to be anemic.  Hematology following. Needs gi eval     Problem/Plan - 2:  ·  Problem: NSVT (nonsustained ventricular tachycardia).  Plan: cardiology eval appreciated  echo reviewed.  outpatient stress test.      Problem/Plan - 3:  ·  Problem: Hypertension.  Plan: continue Lisinopril and Metoprolol with hold parameters.      Problem/Plan - 4:  ·  Problem: BPH (benign prostatic hyperplasia).  Plan: Continue proscar.      Problem/Plan - 5:  ·  Problem: Prophylactic measure.  Plan: DVT proph: lovenox.     
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 Pt being treated for ACS, with anemia, refractory.  This is likely secondary to sickle crisis, no overt signs of GI bleed with FOBT neg.  Iron depletion likely secondary to RBC destruction while in sickle crisis.      Rec:  1. Not medically optimized for any sedation/endoscopic procedures at this time, will need outpatient follow up as no overt GI bleeding seen (no melena/hematochezia)  2. Care as per heme/onc for SCC.  3. Check haptoglobin/LDH  4. PPI daily 
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
66 y/o man w Sickle Cell disease, hx acute chest, mild MR, baseline Hgb 6-7 on prior labs, admitted on 2/4/21  for painful crisis, Hgb 5.5 requiring transfusion  Pain crisis  Prior hx of lower than expected Ferritin.  Iron deficiency. 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   s/p 2U PRBC so far.     hgb stable 7.5  BUN increased likely due to bleeding and dehydration.   s/p IVF with improved Cr but BUN remains elevated, worrisome for bleeding.     RECOMMEND  hold transfusion and observe  CBC remains stable since initial transfusion.   Continue PO Dilaudid 4mg PRN, and IV Dilaudid 1mg q2hour PRN.   continue present management.    Continue PO folic acid.     Refuses FOBT samples. Encourage pt compliance.   Recommend GI eval if pt agrees.     Pt with insight into health issues.     Consider transfer to [facility] if cardiac indication.     Supportive care from heme standpoint.     
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 Shin (hospitalist - covering)  
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  In setting of pain crisis, ferritin should be much higher  In addition Hgb electrophoresis with Hgb SS genotype, and less likely with HgbS-beta-Thal.     -multiple antibodies including warm auto, but w normal LDH/bilirubin/[place], therefore no sig hemolysis  LDH normal, Bilirubin unimpressive for hemolysis    -pain was under control w IV Dilaudid but was changed to oral due to non-functioning IV per patient; would resume IV Dilaudid and consider pain service consultation    barium swallow is unremarkable  patient is clinically probably close to his baseline    RECOMMEND:   Refuses colonoscopy  Never had EGD nor Colonoscopy    check CBC and retic daily  continue folic acid supplement  s/p  IV Venfoer 200mg x2 doses.   Consider GI eval.   Check FOBT    Transfuse 1 unit PRBC  d/w Dr Rivas
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  In setting of pain crisis, ferritin should be much higher  In addition Hgb electrophoresis with Hgb SS genotype, and less likely with HgbS-beta-Thal.     -multiple antibodies including warm auto, but w normal LDH/bilirubin/[place]omb, therefore no sig hemolysis  LDH normal, Bilirubin unimpressive for hemolysis    -pain was under control w IV Dilaudid but was changed to oral due to non-functioning IV per patient; would resume IV Dilaudid and consider pain service consultation    barium swallow is unremarkable  patient is clinically probably close to his baseline  hgb improved after 1 unit PRBC and IV fe  appears stable today    RECOMMEND:   Refuses colonoscopy  Never had EGD nor Colonoscopy    check CBC and retic in am  continue folic acid supplement  s/p  IV Venfoer 200mg x2 doses.   Consider GI eval.   Check FOBT    
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     -iron studies once again w the known low ferritin but w also low TIBC, not c/w sig iron def  -B12/folate normal  -multiple antibodies including warm auto, but w normal LDH/bilirubin/Coomb, therefore no sig hemolysis  -pain was under control w IV Dilaudid but was changed to oral due to non-functioning IV per patient; would resume IV Dilaudid and consider pain service consultation  - check CBC and retic daily  - continue folic acid supplement
[ASSESSMENT and  PLAN]  Sickle cell disease, baseline Hgb 6-7 on prior labs.   Prior hx of lower than expected Ferritin    Admitted with pain crisis, worse anemia Hgb 5.5  Symptoamtic    hx of allo-immunization with anti-Fya and warm auto-antibodies  Hx of GB stones.     -iron studies once again w the known low ferritin but w also olow TIBC, not c/w sig iron def  -B12/folate normal  -multiple antibodies including warm auto, but w normal LDH/bilirubin/Coomb, no sig hemolysis  -Blood Bank able to obtain one unit PRBC, in midst of tx , tolerating well  -pain under control w present regimen, continue
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      Problem/Plan - 1:  Problem: Sickle cell disease with crisis.  Plan: PO pain medication, oral/IV hydration  Now s/p 3 units prbc total.   considering this and iron def needs GI eval- still refusing colonoscopy. FOBT negative  Encourage oral fluids and food.  strongly encourage ambulation in hallway.  pt says he's ready to go tomorrow     Problem/Plan - 2:  ·  Problem: NSVT (nonsustained ventricular tachycardia).  Plan: cardiology eval appreciated  echo reviewed.  medication changes noted.  outpatient EP eval.      Problem/Plan - 3:  ·  Problem: Hypertension.  Plan: lisinopril dcd by cardio to give more room in BP for metoprolol as above.     Problem/Plan - 4:  ·  Problem: BPH (benign prostatic hyperplasia).  Plan: Continue proscar.      Problem/Plan - 5:  ·  Problem: Prophylactic measure.  Plan: DVT proph: lovenox.     
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  In setting of pain crisis, ferritin should be much higher  In addition Hgb electrophoresis with Hgb SS genotype, and less likely with HgbS-beta-Thal.     -multiple antibodies including warm auto, but w normal LDH/bilirubin/Coomb, therefore no sig hemolysis  LDH normal, Bilirubin unimpressive for hemolysis    -pain was under control w IV Dilaudid but was changed to oral due to non-functioning IV per patient; would resume IV Dilaudid and consider pain service consultation    barium swallow is unremarkable  patient is clinically probably close to his baseline    RECOMMEND:   Refuses colonoscopy  Never had EGD nor Colonoscopy    check CBC and retic daily  continue folic acid supplement  Start IV Venfoer 200mg x2 doses.   GI eval.   to transfuse 1 unit PRBC  d/w Dr Rivas
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   Blood Bank able to find one unit of PRBC and post tx yesterday, tolerated well, Hgb incr to 6.2    -iron studies once again w the known low ferritin but w also olow TIBC, not c/w sig iron def  -B12/folate normal  -multiple antibodies including warm auto, but w normal LDH/bilirubin/Coomb, therefore no sig hemolysis  -pain under control w IV Dilaudid, change to oral when pt is able  -clinically asx from this level of Hgb at this time  -
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   - case discussed with Dr. Rivas
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  In setting of pain crisis, ferritin should be much higher  In addition Hgb electrophoresis with Hgb SS genotype, and less likely with HgbS-beta-Thal.     -multiple antibodies including warm auto, but w normal LDH/bilirubin/[place], therefore no sig hemolysis  LDH normal, Bilirubin unimpressive for hemolysis    -pain was under control w IV Dilaudid but was changed to oral due to non-functioning IV per patient; would resume IV Dilaudid and consider pain service consultation    RECOMMEND:   Barium swallow to eval for esophageal webs or strictures  Refuses colonoscopy  Never had EGD nor Colonoscopy    check CBC and retic daily  continue folic acid supplement  Start IV Venfoer 200mg x2 doses.   GI eval.   d/w Dr Rivas
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      Problem/Plan - 1:  Problem: Sickle cell disease with crisis.  Plan: PO pain medication, oral/IV hydration  Now s/p 3 units prbc total.   considering this and iron def needs GI eval- still refusing colonoscopy. FOBT negative  Encourage oral fluids and food.  strongly encourage ambulation in hallway.  pt says he's ready to go on tuesday     Problem/Plan - 2:  ·  Problem: NSVT (nonsustained ventricular tachycardia).  Plan: cardiology eval appreciated  echo reviewed.  medication changes noted.  outpatient EP eval.      Problem/Plan - 3:  ·  Problem: Hypertension.  Plan: lisinopril dcd by cardio to give more room in BP for metoprolol as above.     Problem/Plan - 4:  ·  Problem: BPH (benign prostatic hyperplasia).  Plan: Continue proscar.      Problem/Plan - 5:  ·  Problem: Prophylactic measure.  Plan: DVT proph: lovenox.     
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 
65M HbSS disease with multiple admissions in past including for acute chest baseline HgB about 6.5, chronic CHF, Mild intellectual disability, BPH presented to [facility] with Anemia and pain due to Sickle Cell disease/ crisis. 
65M HbSS disease with multiple admissions in past including for acute chest baseline HgB about 6.5, chronic CHF, Mild intellectual disability, BPH presented to [facility] Ed with Anemia and pain due to Sickle Cell disease/ crisis. 
65M HbSS disease with multiple admissions in past including for acute chest baseline HgB about 6.5, chronic CHF, Mild intellectual disability, BPH presented to [facility] with Anemia and pain due to Sickle Cell disease/ crisis. 
65M HbSS disease with multiple admissions in past including for acute chest baseline HgB about 6.5, chronic CHF, Mild intellectual disability, BPH presented to [facility] with Anemia and pain due to Sickle Cell disease/ crisis.

## 2021-02-23 NOTE — PROVIDER CONTACT NOTE (OTHER) - REASON
Concern re: poor venous access, 2 previous peripheral IVs lost during day
Health and Wellness Patient Engagement Note
6 beats of PVCs
increase in oral temperatur during 15 minute vital signs check after initaiation of transfusion
Patient refuses IV peripheral line insertion

## 2021-02-23 NOTE — PROGRESS NOTE ADULT - NUTRITIONAL ASSESSMENT
This patient has been assessed with a concern for Malnutrition and has been determined to have a diagnosis/diagnoses of Severe protein-calorie malnutrition and Underweight (BMI < 19).    This patient is being managed with:   Diet Regular-  Supplement Feeding Modality:  Oral  Ensure Enlive Cans or Servings Per Day:  1       Frequency:  Three Times a day  Entered: Feb 5 2021 11:28AM    

## 2021-02-23 NOTE — PROVIDER CONTACT NOTE (OTHER) - RECOMMENDATIONS
Dr. Rose was called to evaluate patient at [facility], for possible IV insertion but patient continues to refused.
Pt seen by MD.  EKG completed per MD
Pt. was informed writer is available should he want to talk further or need any additional support.
Tico, Nursing Supervisor and Dr. Rose notified of situation. Dr. Rose states that he has contacted the ED Physician to attempt central access placement; if unable, may have to defer transfusion
Pt. was informed writer is available should he want to talk further or need any additional support.

## 2021-02-23 NOTE — PROGRESS NOTE ADULT - PROBLEM SELECTOR PROBLEM 3
Hypertension, unspecified type
BPH (benign prostatic hyperplasia)
Hypertension
BPH (benign prostatic hyperplasia)
Hypertension, unspecified type
BPH (benign prostatic hyperplasia)
Hypertension, unspecified type
Hypertension, unspecified type
BPH (benign prostatic hyperplasia)
BPH (benign prostatic hyperplasia)
Hypertension, unspecified type
Hypertension
Hypertension, unspecified type
Hypertension, unspecified type
BPH (benign prostatic hyperplasia)
BPH (benign prostatic hyperplasia)
Hypertension, unspecified type
BPH (benign prostatic hyperplasia)
Hypertension, unspecified type
Hypertension

## 2021-02-23 NOTE — PROGRESS NOTE ADULT - SUBJECTIVE AND OBJECTIVE BOX
Chief Complaint:  Patient is a 55y old  Male who presents with a chief complaint of SCD (2021 10:14)      Interval Events:   reports significant improvement in pain  still denies melena/hematochezia/BRB    Hospital Medications:  benzocaine 15 mG/menthol 3.6 mG (Sugar-Free) Lozenge 1 Lozenge Oral every 3 hours PRN  chlorhexidine 2% Cloths 1 Application(s) Topical daily  diphenhydrAMINE   Injectable 25 milliGRAM(s) IV Push every 4 hours PRN  enoxaparin Injectable 40 milliGRAM(s) SubCutaneous daily  finasteride 5 milliGRAM(s) Oral daily  folic acid 1 milliGRAM(s) Oral daily  HYDROmorphone   Tablet 2 milliGRAM(s) Oral every 3 hours PRN  HYDROmorphone   Tablet 4 milliGRAM(s) Oral every 3 hours PRN  metoprolol tartrate 75 milliGRAM(s) Oral every 12 hours  polyethylene glycol 3350 17 Gram(s) Oral daily  sodium chloride 0.9% lock flush 10 milliLiter(s) IV Push every 1 hour PRN        PHYSICAL EXAM:   Vital Signs:  Vital Signs Last 24 Hrs  T(C): 36.9 (2021 05:39), Max: 39.2 (2021 22:00)  T(F): 98.5 (2021 05:39), Max: 102.5 (2021 22:00)  HR: 71 (2021 05:39) (71 - 112)  BP: 118/61 (2021 05:39) (104/55 - 131/62)  BP(mean): --  RR: 18 (2021 05:39) (17 - 19)  SpO2: 94% (2021 05:39) (94% - 98%)  Daily     Daily     GENERAL:  Appears stated age,  no distress  HEENT:   sclera -anicteric  CHEST:   no increased effort, breath sounds clear  HEART:  Regular rhythm, S1, S2,   ABDOMEN:  Soft, non-tender, non-distended, normoactive bowel sounds,    EXTEREMITIES:  no cyanosis,clubbing or edema  SKIN:  No rash/no jaundice   NEURO:  Alert, oriented    LABS:                        6.9    13.28 )-----------( 293      ( 2021 07:38 )             21.1     Mean Cell Volume: 78.4 fl (- @ 07:38)        135  |  102  |  25<H>  ----------------------------<  106<H>  4.2   |  24  |  1.12    Ca    8.8      2021 07:13    TPro  8.1  /  Alb  3.0<L>  /  TBili  3.4<H>  /  DBili  0.5<H>  /  AST  67<H>  /  ALT  46  /  AlkPhos  184<H>      LIVER FUNCTIONS - ( 2021 00:30 )  Alb: 3.0 g/dL / Pro: 8.1 g/dL / ALK PHOS: 184 U/L / ALT: 46 U/L DA / AST: 67 U/L / GGT: x             Urinalysis Basic - ( 2021 00:57 )    Color: Yellow / Appearance: Clear / S.010 / pH: x  Gluc: x / Ketone: Negative  / Bili: Negative / Urobili: Negative mg/dL   Blood: x / Protein: 30 mg/dL / Nitrite: Negative   Leuk Esterase: Negative / RBC: 0-2 /HPF / WBC 0-2   Sq Epi: x / Non Sq Epi: Occasional / Bacteria: Occasional                              6.9    13.28 )-----------( 293      ( 2021 07:38 )             21.1                         7.6    15.02 )-----------( 329      ( 2021 23:53 )             22.8                         5.9    12.99 )-----------( 309      ( 2021 07:13 )             18.6     Imaging:

## 2021-02-23 NOTE — PROVIDER CONTACT NOTE (CRITICAL VALUE NOTIFICATION) - SITUATION
hemoglobin 6.7
Received critical lab of H/H 5.8/18.6
H/H 5.5/17.2
lab called for patient h&h=5.9 and 18.6
h/h 6.9/21.1
pt hemoglobin is 6.9 and hematocrit is 21
Informed of pt's H/H 6.2/19.2

## 2021-02-23 NOTE — PROVIDER CONTACT NOTE (OTHER) - ASSESSMENT
Pt. was sitting up in bed, talking to Dr. Rivas, when writer approached.  Pt. was smiling, visibly brighter, and reported his pain in the hip joints/legs has diminished since our last meeting.  Pt. said he is trying to eat more, and agreed to eat at least 2 bites of every meal.  Pt. said he is sleeping better and that eventually when he is discharged he  will be returning to where he lives, in a house with a landlord and other members from the [employer] he attends. He also stated he has family across [facility].
Writer met with patient to do a health and wellness check and to offer emotional support. Pt. said he got a transfusion and that he is feeling, "Okay."  Pt. stated, "I am leaving today after lunch." When asked how he felt about leaving he said, "I'm okay."  Pt. stated future goals for when he gets home which include: "taking a shower, eating real food, and getting some rest." Pt. asked writer about a housing voucher, which FRANKIE was informed of. Pt. says the home he lives in is under foreclosure and by the summer he may have to find a new place to live. FRANKIE Benjamin was to give pt. resources about it.
Writer met with patient to do a health and wellness check and to offer emotional support. Pt. was in bed, alert, smiling, and eager to engage.  Pt. reported he is doing "OK" but that he still has pain in his L hip and L leg. He rated the pain on a Likert scale of 1-10 as a 6/10.  Pt had his breakfast tray by his side and it appeared he had eaten some of the scrambled eggs, a change from the last few days where his food trays were seen in his room with food untouched.  Pt. spoke about going home when he's better saying he has lived in his home for 5 years, along with the landlord and other [employer] folk.
Writer met with patient to do a health and wellness check and to offer emotional support. Pt. was in bed, awake, unshaven, and looking around the room.  Pt. reported "the pain meds are working."  He said he ate some oatmeal for breakfast but the rest of the breakfast tray was untouched.  He asked about the weather and said "It's never good being out in the snow." Discussed the importance of getting up and walking and pt. stated he would try to.
Writer met with patient to do a health and wellness check and to offer emotional support. Pt. was in bed, smiling, and in good spirits joking with writer, "Where's my million dollars? You are definitely rich and can give it to me."  Pt. reported he is doing well, eating a little, and promised to walk in the paiz today. Pt. is goal orientated and looking forward to being discharged, stating, "I will eat better once home and I pick my foods. They have weird foods here." Pt. reports he is going on you-tube on his cell phone and watching tv to pass the time while here at the hospital. He is waiting for a blood transfusion and possible d/c tomorrow.
Writer met with patient to do a health and wellness check and to offer emotional support. Pt. was seated in a chair, with O2 via nasal canula, and a sullen expression on his face. He said they just took him for a test and he needs to rest. When asked how he felt about being discharged, he said "I want to go home." When asked if there is any reason he would not want to be discharged, he said, "No. Nobody asked me to go or I would."  He denied any pain only c/o of fatigue. When asked about a RW, he said he never used one at home, but uses one here. He stated he does not want a RW to go home with. Pt. stated, "I don't want PT either. Tell them to stop asking. I just need to rest."  Pt. is limited in cognitive abilities, presents with poor dental hygiene, and  poor ADL skills. He said he has family nearby and can count on them if  needed, but would not give specifics as in relation or a name.
Writer met with patient to do a health and wellness check and to offer emotional support. Pt. was was in bed and smiling, a total contrast from yesterday.  Pt. said, "I am so happy you came today."  He reports he slept good and didn't feel as tired as he has been feeling.  "I know I'm getting sick when I lose all my energy. But now I am getting my energy back."  When asked why pt. isn't eating his food, he states, "I don't eat salad.  I eat chicken wings, rice, potatoes, new jayesh clam chowder, and some Tomas soups."  Writer informed his nurse that he is not accustomed to eating salads and food that has been served to him. Writer discussed the importance of walking while in the hospital and pt. agreed to walk with PT tomorrow. As maiar was ending the session, the pt. wanted writer to have his phone number to check up on him after discharge.
receiving 1 unit prbc. increase in temperature from 98 to 99 F after 15 minutes of initation of blood
AxO x4, NSR on tele, weak but conversing appropriately. No acute resp distress.    Difficult stick for blood draws, required femoral stick last PM. Peripheral IVs 20G x2 infiltrated during the day. 20G to left hand currently, patient also endorses frequent IV infiltration with past transfusions.
Pt denies any chest pain at this time
Alertm oriented, started to be uncooperative, refuses further IV insertion.  g20 on left wrist flushes but patient refuses to useit for his IVF,

## 2021-02-23 NOTE — PROGRESS NOTE ADULT - PROBLEM SELECTOR PROBLEM 2
Hypertension
Hypertension
NSVT (nonsustained ventricular tachycardia)
Hypertension
NSVT (nonsustained ventricular tachycardia)
NSVT (nonsustained ventricular tachycardia)
Hypertension
Hypertension
NSVT (nonsustained ventricular tachycardia)
NSVT (nonsustained ventricular tachycardia)
Hypertension
Hypertension
NSVT (nonsustained ventricular tachycardia)
Hypertension
NSVT (nonsustained ventricular tachycardia)

## 2021-02-23 NOTE — CHART NOTE - NSCHARTNOTESELECT_GEN_ALL_CORE
- istop report/Event Note
Event Note
Nutrition Services
Rapid Response

## 2021-02-23 NOTE — PROGRESS NOTE ADULT - SUBJECTIVE AND OBJECTIVE BOX
All interim records and events noted.    awake and comfortable in bed      MEDICATIONS  (STANDING):  chlorhexidine 2% Cloths 1 Application(s) Topical daily  enoxaparin Injectable 40 milliGRAM(s) SubCutaneous daily  finasteride 5 milliGRAM(s) Oral daily  folic acid 1 milliGRAM(s) Oral daily  metoprolol tartrate 75 milliGRAM(s) Oral every 12 hours  polyethylene glycol 3350 17 Gram(s) Oral daily    MEDICATIONS  (PRN):  benzocaine 15 mG/menthol 3.6 mG (Sugar-Free) Lozenge 1 Lozenge Oral every 3 hours PRN Sore Throat  diphenhydrAMINE   Injectable 25 milliGRAM(s) IV Push every 4 hours PRN Rash and/or Itching  HYDROmorphone   Tablet 2 milliGRAM(s) Oral every 3 hours PRN Moderate Pain (4 - 6)  HYDROmorphone   Tablet 4 milliGRAM(s) Oral every 3 hours PRN Severe Pain (7 - 10)  sodium chloride 0.9% lock flush 10 milliLiter(s) IV Push every 1 hour PRN Pre/post blood products, medications, blood draw, and to maintain line patency      Vital Signs Last 24 Hrs  T(C): 36.9 (23 Feb 2021 05:39), Max: 39.2 (22 Feb 2021 22:00)  T(F): 98.5 (23 Feb 2021 05:39), Max: 102.5 (22 Feb 2021 22:00)  HR: 71 (23 Feb 2021 05:39) (71 - 112)  BP: 118/61 (23 Feb 2021 05:39) (104/55 - 131/62)  BP(mean): --  RR: 18 (23 Feb 2021 05:39) (17 - 19)  SpO2: 94% (23 Feb 2021 05:39) (94% - 98%)    PHYSICAL EXAM  General: well developed  well nourished, in no acute distress  Head: atraumatic, normocephalic  ENT: sclera anicteric, buccal mucosa moist  Neck: supple, trachea midline  CV: S1 S2, regular rate and rhythm  Lungs: clear to auscultation, no wheezes/rhonchi  Abdomen: soft, nontender, bowel sounds present, no palpable masses  Skin: no significant increased ecchymosis/petechiae  Neuro: alert and oriented X3,  no focal deficits      LABS:             6.9    13.28 )-----------( 293      ( 02-23 @ 07:38 )             21.1                7.6    15.02 )-----------( 329      ( 02-22 @ 23:53 )             22.8                5.9    12.99 )-----------( 309      ( 02-22 @ 07:13 )             18.6       02-22    135  |  102  |  25<H>  ----------------------------<  106<H>  4.2   |  24  |  1.12    Ca    8.8      22 Feb 2021 07:13    TPro  8.1  /  Alb  3.0<L>  /  TBili  3.4<H>  /  DBili  0.5<H>  /  AST  67<H>  /  ALT  46  /  AlkPhos  184<H>  02-23        RADIOLOGY & ADDITIONAL STUDIES:    IMPRESSION/RECOMMENDATIONS:

## 2021-02-23 NOTE — DISCHARGE NOTE NURSING/CASE MANAGEMENT/SOCIAL WORK - PATIENT PORTAL LINK FT
You can access the FollowMyHealth Patient Portal offered by [facility] by registering at the following website: [email or web address]. By joining [facility]’s FollowMyHealth portal, you will also be able to view your health information using other applications (apps) compatible with our system.

## 2021-02-23 NOTE — PROGRESS NOTE ADULT - PROBLEM SELECTOR PROBLEM 4
BPH (benign prostatic hyperplasia)
Prophylactic measure
BPH (benign prostatic hyperplasia)
Prophylactic measure
BPH (benign prostatic hyperplasia)

## 2021-02-23 NOTE — PROGRESS NOTE ADULT - PROBLEM SELECTOR PROBLEM 1
Chronic systolic (congestive) heart failure
Sickle cell disease with crisis
Chronic systolic (congestive) heart failure
Sickle cell disease with crisis
Chronic systolic (congestive) heart failure
Sickle cell disease with crisis
Sickle cell disease with crisis
Chronic systolic (congestive) heart failure
Sickle cell disease with crisis

## 2021-02-23 NOTE — PROVIDER CONTACT NOTE (CRITICAL VALUE NOTIFICATION) - BACKGROUND
h/o Sickle cell anemia
55 year old male admitted with ssc
Admitted for sickle cell anemia crisis on 2/4/21
55 year old male admitted with sickle cell crisis
pt admitted with sickle cell crisis

## 2021-02-23 NOTE — PROGRESS NOTE ADULT - REASON FOR ADMISSION
pain crisi
sickle cell crisis
Pain
S cell
SCD
SCD
Sickle cell disease
Sickle cell pain crisis
sick cell crisis
sickle cell
sickle cell disease
sickle cell pain crisis
sickle cell crisis
Pain crisis
SCD
Sickle Cell crisis
sickle cell crisis
SCD
sickle cell crisis tachycardia
sickle cell crisis
sickle cell crisis tachycardia
sickle cell crisis tachycardia
anemia, sickle cell crisis

## 2021-02-23 NOTE — PROVIDER CONTACT NOTE (CRITICAL VALUE NOTIFICATION) - ACTION/TREATMENT ORDERED:
Per Dr Shin, he will round on patient and he may be discharge home today.  Will continue to monitor.
remote tele continuing,DR Rivas made aware
No action to be done. Dr. Rose states this is Patients baseline.
Continue to monitor patient closely
MD to follow up with patient
md aware. no further action at this time
md aware. no order for transfusion at this time. will continue to monitor.

## 2021-02-23 NOTE — PROVIDER CONTACT NOTE (CRITICAL VALUE NOTIFICATION) - ASSESSMENT
AxO x4, follows commands. NSR on tele with 1 non-sustained episode of paroxysmal atrial tachycardia to 140s, self-terminating, returned to NSR spontaneously. Frequent c/o generalized pain, medicated with Dilaudid 1 mg IVP for relief. No active s/s bleeding observed per urine or per rectum, pt does not appear pale, no diaphoresis, no nausea/vomitting, eating sandwich.  Awaiting special order blood transfusion due to formation of multiple antibodies. Dr. Rose, nursing supervisor Smita loo.
asymtpomatic
afebrile
asymptomatic
pt vitals stable. pt is resting in bed. no dizziness or discomfort noted.

## 2021-02-23 NOTE — PROGRESS NOTE ADULT - PROVIDER SPECIALTY LIST ADULT
Cardiology
Heme/Onc
Heme/Onc
Hospitalist
Heme/Onc
Hospitalist
Gastroenterology
Heme/Onc
Hospitalist
Heme/Onc
Hospitalist
Heme/Onc
Hospitalist
Cardiology
Cardiology
Hospitalist
Gastroenterology
Hospitalist
Cardiology
Hospitalist
Cardiology
Hospitalist

## 2021-02-23 NOTE — PROVIDER CONTACT NOTE (OTHER) - BACKGROUND
3 RNs tried to insert another IV but was unsuccessful. Patient refuses another insertion.
65 year old male admitted with ssc.
This is a 55 y.o. male who came in with sickle cell anemia crisis.
This is a 65 y.o. male who came in with a sickle cell anemia crisis.
This is an A+Ox2 66 y/o disheveled male who presented with sickle cell anemia crisis and tachycardia.  Pt. stated: "I was in the street and I passed out. I called an ambulance for myself."
This is a 55 y.o. male who came in with a sickle cell anemia crisis.
Admitted for sickle cell anemia acute crisis.  H/H 5.5/17.2.  Multiple antibody resistance to blood transfusions, requires specialty transfusions from [facility] blood bank.

## 2021-02-23 NOTE — PROGRESS NOTE ADULT - SUBJECTIVE AND OBJECTIVE BOX
Patient is a 55y old  Male who presents with a chief complaint of SCD (2021 10:14)    INTERVAL HPI/OVERNIGHT EVENTS: feeling well today, pain controlled. tolerating breakfast.     MEDICATIONS  (STANDING):  chlorhexidine 2% Cloths 1 Application(s) Topical daily  enoxaparin Injectable 40 milliGRAM(s) SubCutaneous daily  finasteride 5 milliGRAM(s) Oral daily  folic acid 1 milliGRAM(s) Oral daily  metoprolol tartrate 75 milliGRAM(s) Oral every 12 hours  polyethylene glycol 3350 17 Gram(s) Oral daily    MEDICATIONS  (PRN):  benzocaine 15 mG/menthol 3.6 mG (Sugar-Free) Lozenge 1 Lozenge Oral every 3 hours PRN Sore Throat  diphenhydrAMINE   Injectable 25 milliGRAM(s) IV Push every 4 hours PRN Rash and/or Itching  HYDROmorphone   Tablet 2 milliGRAM(s) Oral every 3 hours PRN Moderate Pain (4 - 6)  HYDROmorphone   Tablet 4 milliGRAM(s) Oral every 3 hours PRN Severe Pain (7 - 10)  sodium chloride 0.9% lock flush 10 milliLiter(s) IV Push every 1 hour PRN Pre/post blood products, medications, blood draw, and to maintain line patency      Allergies  No Known Drug Allergies  peanuts (Anaphylaxis)  peanuts (Angioedema)  pork (Unknown)  shellfish (Unknown)    Intolerances  lactose (Unknown)      REVIEW OF SYSTEMS:  reviewed - no new symptoms     Vital Signs Last 24 Hrs  T(C): 36.9 (2021 05:39), Max: 39.2 (2021 22:00)  T(F): 98.5 (2021 05:39), Max: 102.5 (2021 22:00)  HR: 71 (2021 05:39) (71 - 112)  BP: 118/61 (2021 05:39) (104/55 - 131/62)  BP(mean): --  RR: 18 (2021 05:39) (17 - 19)  SpO2: 94% (2021 05:39) (94% - 98%)    PHYSICAL EXAM:  GENERAL: NAD, well-groomed, well-developed  HEAD:  Atraumatic, Normocephalic  EYES:  conjunctiva and sclera clear  ENMT: Moist mucous membranes  NECK: Supple, No JVD  NERVOUS SYSTEM:  Alert & Oriented X3, Good concentration; All 4 extremities mobile, no gross sensory deficits.   CHEST/LUNG: Clear to auscultation bilaterally  HEART: Regular rate and rhythm;   ABDOMEN: Soft, Nontender, Nondistended; Bowel sounds present  EXTREMITIES:  2+ Peripheral Pulses, No clubbing, cyanosis, or edema    LABS:                        6.9    13.28 )-----------( 293      ( 2021 07:38 )             21.1       Ca    8.8        2021 07:13    TPro  8.1    /  Alb  3.0    /  TBili  3.4    /  DBili  0.5    /  AST  67     /  ALT  46     /  AlkPhos  184    2021 00:30      Urinalysis Basic - ( 2021 00:57 )    Color: Yellow / Appearance: Clear / S.010 / pH: x  Gluc: x / Ketone: Negative  / Bili: Negative / Urobili: Negative mg/dL   Blood: x / Protein: 30 mg/dL / Nitrite: Negative   Leuk Esterase: Negative / RBC: 0-2 /HPF / WBC 0-2   Sq Epi: x / Non Sq Epi: Occasional / Bacteria: Occasional      CAPILLARY BLOOD GLUCOSE      POCT Blood Glucose.: 140 mg/dL (2021 22:16)      RADIOLOGY & ADDITIONAL TESTS:    Imaging Personally Reviewed:  [ ] YES     Consultant(s) Notes Reviewed:      Care Discussed with Consultants/Other Providers:  Dr Torres - transfusion reaction, plan of care.     Advanced Directives: [ ] DNR  [ ] No feeding tube  [ ] MOLST in chart  [ ] MOLST completed today  [ ] Unknown

## 2021-02-24 ENCOUNTER — NON-APPOINTMENT (OUTPATIENT)
Age: 66
End: 2021-02-24

## 2021-02-25 NOTE — DISCUSSION/SUMMARY
[FreeTextEntry1] : Pt admitted on 2/4/21 and discharged on 2/23/21 @ [facility]. No medical records available. Pt was called and left message on machine to call back. Pt does have hope with Dr. Hamm on 3/1/21. Note sent to Dr. Hamm.

## 2021-03-01 ENCOUNTER — APPOINTMENT (OUTPATIENT)
Dept: INTERNAL MEDICINE | Facility: CLINIC | Age: 66
End: 2021-03-01

## 2021-03-03 LAB
TRANSFUSION REACTION PATHOLOGIST COMMENTS: SIGNIFICANT CHANGE UP
TRANSFUSION REACTION PATHOLOGIST INTERPRETATION: SIGNIFICANT CHANGE UP

## 2021-03-09 ENCOUNTER — APPOINTMENT (OUTPATIENT)
Dept: OPHTHALMOLOGY | Facility: CLINIC | Age: 66
End: 2021-03-09

## 2021-03-11 ENCOUNTER — APPOINTMENT (OUTPATIENT)
Dept: INTERNAL MEDICINE | Facility: CLINIC | Age: 66
End: 2021-03-11
Payer: MEDICARE

## 2021-03-11 ENCOUNTER — OUTPATIENT (OUTPATIENT)
Dept: OUTPATIENT SERVICES | Facility: HOSPITAL | Age: 66
LOS: 1 days | End: 2021-03-11

## 2021-03-11 PROCEDURE — 99442: CPT | Mod: 95

## 2021-03-11 NOTE — HISTORY OF PRESENT ILLNESS
[Home] : at home, [unfilled] , at the time of the visit. [Other Location: e.g. Home (Enter Location, City,State)___] : at [unfilled] [Verbal consent obtained from patient] : the patient, [unfilled] [FreeTextEntry1] : 65 (?) yo male with hgb ss for f/u. [de-identified] : (65) yo male with hgb ss, for f/u. This interview is being conducted via telephone.\par The patient had a prolonged hospitalization mid-Feb> He was found to have iron deficiency anemia, as well as short runs of Vtach, during his stay. He refused colonoscopy at the time.\par The patient was given metoprolol, 25 mg BID during his stay. No cardiac studies ( besides EKG) were performed.\par He is having a lot of pain today. He is having body aches, no fever. \par He has not been taking Endari regularly. Has been taking metoprolol for tachycardia. 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 encourage compliance with Endari\par 2. iron deficiency- refer for colonoscopy\par 3. patient given info re: covid vaccine\par 4.refusing cardio f/u at this time- continue metoprolol 25 bid\par 5. f/u 6/10 @ 2pm\par 6. ophtho referral\par Lluvia Hamm MD

## 2021-03-15 DIAGNOSIS — E61.1 IRON DEFICIENCY: ICD-10-CM

## 2021-03-15 DIAGNOSIS — I47.2 VENTRICULAR TACHYCARDIA: ICD-10-CM

## 2021-03-15 DIAGNOSIS — D57.1 SICKLE-CELL DISEASE WITHOUT CRISIS: ICD-10-CM

## 2021-03-16 PROCEDURE — 36430 TRANSFUSION BLD/BLD COMPNT: CPT

## 2021-03-16 PROCEDURE — 86078 PHYS BLOOD BANK SERV REACTJ: CPT

## 2021-03-16 PROCEDURE — 84100 ASSAY OF PHOSPHORUS: CPT

## 2021-03-16 PROCEDURE — 86900 BLOOD TYPING SEROLOGIC ABO: CPT

## 2021-03-16 PROCEDURE — 83020 HEMOGLOBIN ELECTROPHORESIS: CPT

## 2021-03-16 PROCEDURE — 85652 RBC SED RATE AUTOMATED: CPT

## 2021-03-16 PROCEDURE — 86880 COOMBS TEST DIRECT: CPT

## 2021-03-16 PROCEDURE — U0005: CPT

## 2021-03-16 PROCEDURE — 82607 VITAMIN B-12: CPT

## 2021-03-16 PROCEDURE — 86140 C-REACTIVE PROTEIN: CPT

## 2021-03-16 PROCEDURE — 82962 GLUCOSE BLOOD TEST: CPT

## 2021-03-16 PROCEDURE — 84165 PROTEIN E-PHORESIS SERUM: CPT

## 2021-03-16 PROCEDURE — 93005 ELECTROCARDIOGRAM TRACING: CPT

## 2021-03-16 PROCEDURE — 83615 LACTATE (LD) (LDH) ENZYME: CPT

## 2021-03-16 PROCEDURE — 36573 INSJ PICC RS&I 5 YR+: CPT

## 2021-03-16 PROCEDURE — 94664 DEMO&/EVAL PT USE INHALER: CPT

## 2021-03-16 PROCEDURE — 85025 COMPLETE CBC W/AUTO DIFF WBC: CPT

## 2021-03-16 PROCEDURE — 83550 IRON BINDING TEST: CPT

## 2021-03-16 PROCEDURE — 80048 BASIC METABOLIC PNL TOTAL CA: CPT

## 2021-03-16 PROCEDURE — P9040: CPT

## 2021-03-16 PROCEDURE — 86850 RBC ANTIBODY SCREEN: CPT

## 2021-03-16 PROCEDURE — 86334 IMMUNOFIX E-PHORESIS SERUM: CPT

## 2021-03-16 PROCEDURE — 86769 SARS-COV-2 COVID-19 ANTIBODY: CPT

## 2021-03-16 PROCEDURE — 83540 ASSAY OF IRON: CPT

## 2021-03-16 PROCEDURE — 82247 BILIRUBIN TOTAL: CPT

## 2021-03-16 PROCEDURE — 82272 OCCULT BLD FECES 1-3 TESTS: CPT

## 2021-03-16 PROCEDURE — 76000 FLUOROSCOPY <1 HR PHYS/QHP: CPT

## 2021-03-16 PROCEDURE — 81001 URINALYSIS AUTO W/SCOPE: CPT

## 2021-03-16 PROCEDURE — 84450 TRANSFERASE (AST) (SGOT): CPT

## 2021-03-16 PROCEDURE — 74220 X-RAY XM ESOPHAGUS 1CNTRST: CPT

## 2021-03-16 PROCEDURE — 82747 ASSAY OF FOLIC ACID RBC: CPT

## 2021-03-16 PROCEDURE — 85027 COMPLETE CBC AUTOMATED: CPT

## 2021-03-16 PROCEDURE — 82728 ASSAY OF FERRITIN: CPT

## 2021-03-16 PROCEDURE — 36415 COLL VENOUS BLD VENIPUNCTURE: CPT

## 2021-03-16 PROCEDURE — 82746 ASSAY OF FOLIC ACID SERUM: CPT

## 2021-03-16 PROCEDURE — 85045 AUTOMATED RETICULOCYTE COUNT: CPT

## 2021-03-16 PROCEDURE — C1751: CPT

## 2021-03-16 PROCEDURE — 80053 COMPREHEN METABOLIC PANEL: CPT

## 2021-03-16 PROCEDURE — 93306 TTE W/DOPPLER COMPLETE: CPT

## 2021-03-16 PROCEDURE — 76937 US GUIDE VASCULAR ACCESS: CPT

## 2021-03-16 PROCEDURE — 83735 ASSAY OF MAGNESIUM: CPT

## 2021-03-16 PROCEDURE — 80076 HEPATIC FUNCTION PANEL: CPT

## 2021-03-16 PROCEDURE — 97161 PT EVAL LOW COMPLEX 20 MIN: CPT

## 2021-03-16 PROCEDURE — 82248 BILIRUBIN DIRECT: CPT

## 2021-03-16 PROCEDURE — 86870 RBC ANTIBODY IDENTIFICATION: CPT

## 2021-03-16 PROCEDURE — U0003: CPT

## 2021-03-16 PROCEDURE — 86901 BLOOD TYPING SEROLOGIC RH(D): CPT

## 2021-03-16 PROCEDURE — 84155 ASSAY OF PROTEIN SERUM: CPT

## 2021-03-16 PROCEDURE — 86922 COMPATIBILITY TEST ANTIGLOB: CPT

## 2021-05-28 ENCOUNTER — APPOINTMENT (OUTPATIENT)
Dept: OPHTHALMOLOGY | Facility: CLINIC | Age: 66
End: 2021-05-28

## 2021-05-30 ENCOUNTER — INPATIENT (INPATIENT)
Facility: HOSPITAL | Age: 66
LOS: 11 days | Discharge: HOME | End: 2021-06-11
Attending: HOSPITALIST | Admitting: HOSPITALIST
Payer: MEDICARE

## 2021-05-30 VITALS
HEART RATE: 104 BPM | RESPIRATION RATE: 17 BRPM | DIASTOLIC BLOOD PRESSURE: 89 MMHG | OXYGEN SATURATION: 95 % | SYSTOLIC BLOOD PRESSURE: 149 MMHG | TEMPERATURE: 98 F | HEIGHT: 70 IN

## 2021-05-30 PROCEDURE — 71045 X-RAY EXAM CHEST 1 VIEW: CPT | Mod: 26

## 2021-05-30 PROCEDURE — 99285 EMERGENCY DEPT VISIT HI MDM: CPT | Mod: GC

## 2021-05-30 RX ORDER — KETOROLAC TROMETHAMINE 30 MG/ML
15 SYRINGE (ML) INJECTION ONCE
Refills: 0 | Status: DISCONTINUED | OUTPATIENT
Start: 2021-05-30 | End: 2021-05-30

## 2021-05-30 RX ORDER — HYDROMORPHONE HYDROCHLORIDE 2 MG/ML
1 INJECTION INTRAMUSCULAR; INTRAVENOUS; SUBCUTANEOUS ONCE
Refills: 0 | Status: DISCONTINUED | OUTPATIENT
Start: 2021-05-30 | End: 2021-05-30

## 2021-05-30 RX ORDER — HYDROMORPHONE HYDROCHLORIDE 2 MG/ML
1.5 INJECTION INTRAMUSCULAR; INTRAVENOUS; SUBCUTANEOUS ONCE
Refills: 0 | Status: DISCONTINUED | OUTPATIENT
Start: 2021-05-30 | End: 2021-05-30

## 2021-05-30 RX ORDER — ONDANSETRON 8 MG/1
4 TABLET, FILM COATED ORAL ONCE
Refills: 0 | Status: COMPLETED | OUTPATIENT
Start: 2021-05-30 | End: 2021-05-30

## 2021-05-30 RX ORDER — ACETAMINOPHEN 500 MG
975 TABLET ORAL ONCE
Refills: 0 | Status: COMPLETED | OUTPATIENT
Start: 2021-05-30 | End: 2021-05-30

## 2021-05-30 RX ADMIN — Medication 15 MILLIGRAM(S): at 22:19

## 2021-05-30 RX ADMIN — Medication 975 MILLIGRAM(S): at 22:16

## 2021-05-30 RX ADMIN — HYDROMORPHONE HYDROCHLORIDE 1 MILLIGRAM(S): 2 INJECTION INTRAMUSCULAR; INTRAVENOUS; SUBCUTANEOUS at 22:19

## 2021-05-30 NOTE — ED ADULT NURSE NOTE - DATE OF LAST VACCINATION
[FreeTextEntry1] : Kavita Magallon is a 51 year old woman with cerebral palsy presenting for a follow up appointment for worsening headaches. \par \par She endorses she has had worsening anxiety and depression since her last visit. Her psychiatrist discontinued Paxil (stopped one month ago) and she is now taking Trazodone. She has migraines most of the day and Rizatriptan did help but she feels it has worsened her anxiety and depression. \par She tried Topamax, Butabitol,  and Imitrex in the past. \par \par Ms. Magallon had physical therapy and has a right leg brace. \par \par She had her COVID-19 vaccine 1st dose of Moderna two weeks ago and second dose due on 5/7. \par \par The remaining neurological review of systems is negative. \par \par 1/27/21\par Kavita Magallon is a 51 year old woman with cerebral palsy presenting for a follow up appointment for worsening balance, headaches, and dizziness. \par \par She endorses she feels dizzy and off balance with changing positions and this has worsened over the past three to four days. She feels an increase in lightheadedness when getting out of bed in the morning. She has not had a syncopal episode but feels as if she "may pass out."  Ms. Magallon has not had any recent falls. \par \par Her last seizure was in 2013 and she was unable to get the words out then 3.5 hours later she is back to baseline.\par \par She endorses her headaches are under control with Rizatriptan.\par \par The remaining neurological review of systems is negative. \par \par 10/27/20\par Kavita Magallon is a 51 year old woman with cerebral palsy presenting for a follow up for worsening balance and headaches. \par \par She has had headaches five times per week that last all day. She reports the Imitrex helps but it makes her feel fatigued and sleepy.   Ms. Magallon endorses that the pain starts in the occipital and neck area and radiating to the frontal and bitemporal. It is 10/10 on a pain scale and feels as if  a "knife is through her temple."\par \par She also reports her balance is worse. Physical therapy was ordered for her previously and she starts it on September 9th.  She feels dizzy and lightheaded at times. There have been no falls or injuries. She has 36 stairs in her apartment and plans on seeing rehab medicine on September 10th.  \par She does not go outside of the house much for a fear of falling.  \par \par Ms. Magallon has had nausea for 4-5 days constant with no change in medication. She has not followed up with her PCP yet for this. \par \par Ms. Magallon has not followed up with the behavioral health center yet for increased stress and anxiety.\par \par Currently, she is on baclofen 15mg three times per day. Her spasms have improved on the right side. She denies any side effects with the baclofen. \par \par She reports ongoing difficulty with her memory and has a planned neuropsychological evaluation. She did not get her B12 level done yet. \par \par The remaining neurological review of systems is negative. \par \par 7/27/20\par Kavita Magallon is a 51 year old woman with cerebral palsy. She presents today for worsening balance and stiffness in the right leg, as well as headaches.\par \par She currently lives on the third floor with no elevator.  She does not use a cane or assistive device to help her walk. Over the past four months, Ms. Magallon reports a change in balance, increase in difficulty walking and stiffness to her right leg.  She is currently taking Baclofen 10mg three times per day with no side effects. Baclofen 15mg three times per day was recommended on her last visit.  There is pain in her right leg from the hip throughout the whole leg. She reports the pain is difficult to describe but it feels as "if somebody is hitting her leg."  \par \par Ms. Magallon also reports a worsening of her preexisting headaches. She was on Topamax 100mg nightly that was tapered down by her primary doctor - currently, she reports taking 12.5mg of Topamax nightly.  She is not certain of the increased in the amount of frequency but is aware that the headaches increased overall.  The pain starts in the occipital area and travels to the bilateral frontal area of her head.  She has sound sensitivity and describes it as a "device" on her head.  She denies photophobia, nausea, vomiting.  Ms. Magallon has tried Ibuprofen and Naproxen in the past with no relief. She was given butalbital by another physician.  This has not provided her with any relief.  \par \par She reports intermittent urinary and fecal incontinence for at least one year. \par \par Ms. Magallon also reports worsening memory over 4-5 years.  She was unable to do neuropsychological testing due to insurance issues.  \par \par The remaining neurological review of systems is negative. \par \par 3/6/20: This is a followup visit for Ms. Magallon. She continues to have lightheadedness. She is worsening stiffness in her right side with multiple falls. She used to have a power chair. Now she is not using a cane or walker. No change in any of her other symptoms\par \par Initial consultation:\par Ms. Magallon is a 50 year old woman with a history of cerebral palsy with baseline mild cognitive impairment and spastic right hemiparesis. She has noticed gradually worsening cognitive and memory problems for the past 4-5 years. At the current time she forgets things that she was told 5 minutes before. She works as a  at “[employer]" and is able to perform her duties there although her manager has noticed the memory difficulty.  She has always had right-sided weakness and stiffness but there has been an increased amount of stiffness in the right side more recently. She takes baclofen 10 mg t.i.d. Her right foot is dragging and she sometimes trips on her toes on the right. She has had multiple falls. She has no history of epilepsy. She has a history of headache which was very well controlled on Topamax 75 mg at night. More recently the headaches have increased in frequency again.  Her mother has a history of cerebral aneurysm. 28-May-2021

## 2021-05-30 NOTE — ED ADULT NURSE NOTE - NSFALLRSKASSESSDT_ED_ALL_ED
Complex Repair And Rhombic Flap Text: The defect edges were debeveled with a #15 scalpel blade.  The primary defect was closed partially with a complex linear closure.  Given the location of the remaining defect, shape of the defect and the proximity to free margins a rhombic flap was deemed most appropriate for complete closure of the defect.  Using a sterile surgical marker, an appropriate advancement flap was drawn incorporating the defect and placing the expected incisions within the relaxed skin tension lines where possible.    The area thus outlined was incised deep to adipose tissue with a #15 scalpel blade.  The skin margins were undermined to an appropriate distance in all directions utilizing iris scissors. 30-May-2021 21:59

## 2021-05-30 NOTE — ED ADULT TRIAGE NOTE - CHIEF COMPLAINT QUOTE
Pt arrives to ED via EMS c/o total body pain related to SCC.  Pt last took pain meds on 5/29/21.  Pt last crisis was last year.  Pt arrives on nasal cannula at 4L from EMS to maintain oxygenation over 90%.

## 2021-05-30 NOTE — ED PROVIDER NOTE - ATTENDING CONTRIBUTION TO CARE
I performed a face-to-face evaluation of the patient and performed a history and physical examination. I agree with the history and physical examination.    SS dz. w/ acute pain crisis. Check labs, retic count. Give IVF and pain meds.

## 2021-05-30 NOTE — ED ADULT NURSE NOTE - OBJECTIVE STATEMENT
patient complaining of pain to entire body feels like he has a sickle cell crisis. patient was sick earlier in the month and feels like has not fully recovered since then.

## 2021-05-30 NOTE — ED PROVIDER NOTE - PHYSICAL EXAMINATION
Well appearing, well nourished, awake, alert, oriented to person, place, time/situation and in no apparent distress.    Airway patent    Eyes without scleral injection. No jaundice.    Strong pulse.    Respirations unlabored. Lungs clear.    Abdomen soft, non-tender, no guarding.    Spine appears normal, range of motion is not limited, no muscle or joint tenderness. No LE edema.     Alert and oriented, no gross motor or sensory deficits.    Skin normal color for race, warm, dry and intact. No evidence of rash.    No SI/HI.

## 2021-05-30 NOTE — ED PROVIDER NOTE - OBJECTIVE STATEMENT
Catarina: H/o HbSS, last [facility] admission 2/21 in [place], p/w diffuse body pain, particularly L arm, for a few days. H/o acute chest syndrome, but no F/SOB. Catarina: H/o HbSS, last [facility] admission 2/21 in [place], p/w diffuse body pain, particularly L arm, for a few days. H/o acute chest syndrome, but no F/SOB.    Resident addition: PMH HbSS, mild intellectual disability, hx acute chest, baseline Hgb 6.5, chronic CHF, BPH who presents with diffuse body pain in the setting of recent viral illness 1 week ago.  Says no SOB or fevers, but diffuse body aches worst L arm.  He says only rarely gets pain crises, every 6m to 1y.  + nausea no vomiting.

## 2021-05-31 DIAGNOSIS — I10 ESSENTIAL (PRIMARY) HYPERTENSION: ICD-10-CM

## 2021-05-31 DIAGNOSIS — K59.01 SLOW TRANSIT CONSTIPATION: ICD-10-CM

## 2021-05-31 DIAGNOSIS — D57.00 HB-SS DISEASE WITH CRISIS, UNSPECIFIED: ICD-10-CM

## 2021-05-31 LAB
ALBUMIN SERPL ELPH-MCNC: 4.3 G/DL — SIGNIFICANT CHANGE UP (ref 3.3–5)
ALP SERPL-CCNC: 110 U/L — SIGNIFICANT CHANGE UP (ref 40–120)
ALT FLD-CCNC: 18 U/L — SIGNIFICANT CHANGE UP (ref 4–41)
ANION GAP SERPL CALC-SCNC: 12 MMOL/L — SIGNIFICANT CHANGE UP (ref 7–14)
APPEARANCE UR: CLEAR — SIGNIFICANT CHANGE UP
AST SERPL-CCNC: 38 U/L — SIGNIFICANT CHANGE UP (ref 4–40)
B PERT DNA SPEC QL NAA+PROBE: SIGNIFICANT CHANGE UP
BASOPHILS # BLD AUTO: 0.04 K/UL — SIGNIFICANT CHANGE UP (ref 0–0.2)
BASOPHILS # BLD AUTO: 0.33 K/UL — HIGH (ref 0–0.2)
BASOPHILS NFR BLD AUTO: 0.4 % — SIGNIFICANT CHANGE UP (ref 0–2)
BASOPHILS NFR BLD AUTO: 2.6 % — HIGH (ref 0–2)
BILIRUB SERPL-MCNC: 2.7 MG/DL — HIGH (ref 0.2–1.2)
BILIRUB UR-MCNC: NEGATIVE — SIGNIFICANT CHANGE UP
BLD GP AB SCN SERPL QL: POSITIVE — SIGNIFICANT CHANGE UP
BUN SERPL-MCNC: 19 MG/DL — SIGNIFICANT CHANGE UP (ref 7–23)
C PNEUM DNA SPEC QL NAA+PROBE: SIGNIFICANT CHANGE UP
CALCIUM SERPL-MCNC: 9.2 MG/DL — SIGNIFICANT CHANGE UP (ref 8.4–10.5)
CHLORIDE SERPL-SCNC: 106 MMOL/L — SIGNIFICANT CHANGE UP (ref 98–107)
CO2 SERPL-SCNC: 19 MMOL/L — LOW (ref 22–31)
COLOR SPEC: YELLOW — SIGNIFICANT CHANGE UP
CREAT SERPL-MCNC: 1.21 MG/DL — SIGNIFICANT CHANGE UP (ref 0.5–1.3)
DIFF PNL FLD: NEGATIVE — SIGNIFICANT CHANGE UP
EOSINOPHIL # BLD AUTO: 0.98 K/UL — HIGH (ref 0–0.5)
EOSINOPHIL # BLD AUTO: 1.01 K/UL — HIGH (ref 0–0.5)
EOSINOPHIL NFR BLD AUTO: 8 % — HIGH (ref 0–6)
EOSINOPHIL NFR BLD AUTO: 9.1 % — HIGH (ref 0–6)
FLUAV SUBTYP SPEC NAA+PROBE: SIGNIFICANT CHANGE UP
FLUBV RNA SPEC QL NAA+PROBE: SIGNIFICANT CHANGE UP
GLUCOSE SERPL-MCNC: 88 MG/DL — SIGNIFICANT CHANGE UP (ref 70–99)
GLUCOSE UR QL: NEGATIVE — SIGNIFICANT CHANGE UP
HADV DNA SPEC QL NAA+PROBE: SIGNIFICANT CHANGE UP
HCOV 229E RNA SPEC QL NAA+PROBE: SIGNIFICANT CHANGE UP
HCOV HKU1 RNA SPEC QL NAA+PROBE: SIGNIFICANT CHANGE UP
HCOV NL63 RNA SPEC QL NAA+PROBE: SIGNIFICANT CHANGE UP
HCOV OC43 RNA SPEC QL NAA+PROBE: SIGNIFICANT CHANGE UP
HCT VFR BLD CALC: 14 % — CRITICAL LOW (ref 39–50)
HCT VFR BLD CALC: 16 % — CRITICAL LOW (ref 39–50)
HGB BLD-MCNC: 4.6 G/DL — CRITICAL LOW (ref 13–17)
HGB BLD-MCNC: 5.3 G/DL — CRITICAL LOW (ref 13–17)
HMPV RNA SPEC QL NAA+PROBE: SIGNIFICANT CHANGE UP
HPIV1 RNA SPEC QL NAA+PROBE: SIGNIFICANT CHANGE UP
HPIV2 RNA SPEC QL NAA+PROBE: SIGNIFICANT CHANGE UP
HPIV3 RNA SPEC QL NAA+PROBE: SIGNIFICANT CHANGE UP
HPIV4 RNA SPEC QL NAA+PROBE: SIGNIFICANT CHANGE UP
IANC: 7.04 K/UL — SIGNIFICANT CHANGE UP (ref 1.5–8.5)
IANC: 9.04 K/UL — HIGH (ref 1.5–8.5)
IMM GRANULOCYTES NFR BLD AUTO: 1.2 % — SIGNIFICANT CHANGE UP (ref 0–1.5)
KETONES UR-MCNC: NEGATIVE — SIGNIFICANT CHANGE UP
LEUKOCYTE ESTERASE UR-ACNC: NEGATIVE — SIGNIFICANT CHANGE UP
LYMPHOCYTES # BLD AUTO: 0.33 K/UL — LOW (ref 1–3.3)
LYMPHOCYTES # BLD AUTO: 1.68 K/UL — SIGNIFICANT CHANGE UP (ref 1–3.3)
LYMPHOCYTES # BLD AUTO: 15.7 % — SIGNIFICANT CHANGE UP (ref 13–44)
LYMPHOCYTES # BLD AUTO: 2.6 % — LOW (ref 13–44)
MAGNESIUM SERPL-MCNC: 2.3 MG/DL — SIGNIFICANT CHANGE UP (ref 1.6–2.6)
MCHC RBC-ENTMCNC: 28.6 PG — SIGNIFICANT CHANGE UP (ref 27–34)
MCHC RBC-ENTMCNC: 29.4 PG — SIGNIFICANT CHANGE UP (ref 27–34)
MCHC RBC-ENTMCNC: 32.9 GM/DL — SIGNIFICANT CHANGE UP (ref 32–36)
MCHC RBC-ENTMCNC: 33.1 GM/DL — SIGNIFICANT CHANGE UP (ref 32–36)
MCV RBC AUTO: 87 FL — SIGNIFICANT CHANGE UP (ref 80–100)
MCV RBC AUTO: 88.9 FL — SIGNIFICANT CHANGE UP (ref 80–100)
MONOCYTES # BLD AUTO: 0.67 K/UL — SIGNIFICANT CHANGE UP (ref 0–0.9)
MONOCYTES # BLD AUTO: 0.85 K/UL — SIGNIFICANT CHANGE UP (ref 0–0.9)
MONOCYTES NFR BLD AUTO: 5.3 % — SIGNIFICANT CHANGE UP (ref 2–14)
MONOCYTES NFR BLD AUTO: 7.9 % — SIGNIFICANT CHANGE UP (ref 2–14)
NEUTROPHILS # BLD AUTO: 7.04 K/UL — SIGNIFICANT CHANGE UP (ref 1.8–7.4)
NEUTROPHILS # BLD AUTO: 9.99 K/UL — HIGH (ref 1.8–7.4)
NEUTROPHILS NFR BLD AUTO: 65.7 % — SIGNIFICANT CHANGE UP (ref 43–77)
NEUTROPHILS NFR BLD AUTO: 78.8 % — HIGH (ref 43–77)
NITRITE UR-MCNC: NEGATIVE — SIGNIFICANT CHANGE UP
NRBC # BLD: 4 /100 WBCS — SIGNIFICANT CHANGE UP
NRBC # FLD: 0.38 K/UL — HIGH
PH UR: 6 — SIGNIFICANT CHANGE UP (ref 5–8)
PLATELET # BLD AUTO: 206 K/UL — SIGNIFICANT CHANGE UP (ref 150–400)
PLATELET # BLD AUTO: 251 K/UL — SIGNIFICANT CHANGE UP (ref 150–400)
POTASSIUM SERPL-MCNC: 5.2 MMOL/L — SIGNIFICANT CHANGE UP (ref 3.5–5.3)
POTASSIUM SERPL-SCNC: 5.2 MMOL/L — SIGNIFICANT CHANGE UP (ref 3.5–5.3)
PROT SERPL-MCNC: 7.1 G/DL — SIGNIFICANT CHANGE UP (ref 6–8.3)
PROT UR-MCNC: ABNORMAL
RAPID RVP RESULT: SIGNIFICANT CHANGE UP
RBC # BLD: 1.61 M/UL — LOW (ref 4.2–5.8)
RBC # BLD: 1.61 M/UL — LOW (ref 4.2–5.8)
RBC # BLD: 1.8 M/UL — LOW (ref 4.2–5.8)
RBC # FLD: 21.8 % — HIGH (ref 10.3–14.5)
RBC # FLD: 22.1 % — HIGH (ref 10.3–14.5)
RETICS #: 192.2 K/UL — HIGH (ref 25–125)
RETICS/RBC NFR: 11.9 % — HIGH (ref 0.5–2.5)
RH IG SCN BLD-IMP: POSITIVE — SIGNIFICANT CHANGE UP
RSV RNA SPEC QL NAA+PROBE: SIGNIFICANT CHANGE UP
RV+EV RNA SPEC QL NAA+PROBE: SIGNIFICANT CHANGE UP
SARS-COV-2 RNA SPEC QL NAA+PROBE: SIGNIFICANT CHANGE UP
SODIUM SERPL-SCNC: 137 MMOL/L — SIGNIFICANT CHANGE UP (ref 135–145)
SP GR SPEC: 1.02 — SIGNIFICANT CHANGE UP (ref 1.01–1.02)
UROBILINOGEN FLD QL: SIGNIFICANT CHANGE UP
WBC # BLD: 10.72 K/UL — HIGH (ref 3.8–10.5)
WBC # BLD: 12.68 K/UL — HIGH (ref 3.8–10.5)
WBC # FLD AUTO: 10.72 K/UL — HIGH (ref 3.8–10.5)
WBC # FLD AUTO: 12.68 K/UL — HIGH (ref 3.8–10.5)

## 2021-05-31 PROCEDURE — 12345: CPT | Mod: NC

## 2021-05-31 PROCEDURE — 99223 1ST HOSP IP/OBS HIGH 75: CPT

## 2021-05-31 PROCEDURE — 86077 PHYS BLOOD BANK SERV XMATCH: CPT

## 2021-05-31 PROCEDURE — 99223 1ST HOSP IP/OBS HIGH 75: CPT | Mod: GC

## 2021-05-31 RX ORDER — DIPHENHYDRAMINE HCL 50 MG
25 CAPSULE ORAL EVERY 4 HOURS
Refills: 0 | Status: DISCONTINUED | OUTPATIENT
Start: 2021-05-31 | End: 2021-06-11

## 2021-05-31 RX ORDER — GLUTAMINE 5 G/1
10 POWDER, FOR SOLUTION ORAL
Qty: 0 | Refills: 0 | DISCHARGE

## 2021-05-31 RX ORDER — FINASTERIDE 5 MG/1
5 TABLET, FILM COATED ORAL DAILY
Refills: 0 | Status: DISCONTINUED | OUTPATIENT
Start: 2021-05-31 | End: 2021-06-11

## 2021-05-31 RX ORDER — METOPROLOL TARTRATE 50 MG
25 TABLET ORAL
Refills: 0 | Status: DISCONTINUED | OUTPATIENT
Start: 2021-05-31 | End: 2021-06-01

## 2021-05-31 RX ORDER — HYDROMORPHONE HYDROCHLORIDE 2 MG/ML
30 INJECTION INTRAMUSCULAR; INTRAVENOUS; SUBCUTANEOUS
Refills: 0 | Status: DISCONTINUED | OUTPATIENT
Start: 2021-05-31 | End: 2021-06-06

## 2021-05-31 RX ORDER — SODIUM CHLORIDE 9 MG/ML
1000 INJECTION, SOLUTION INTRAVENOUS
Refills: 0 | Status: DISCONTINUED | OUTPATIENT
Start: 2021-05-31 | End: 2021-06-01

## 2021-05-31 RX ORDER — SENNA PLUS 8.6 MG/1
2 TABLET ORAL AT BEDTIME
Refills: 0 | Status: DISCONTINUED | OUTPATIENT
Start: 2021-05-31 | End: 2021-06-11

## 2021-05-31 RX ORDER — FOLIC ACID 0.8 MG
1 TABLET ORAL DAILY
Refills: 0 | Status: DISCONTINUED | OUTPATIENT
Start: 2021-05-31 | End: 2021-06-11

## 2021-05-31 RX ORDER — HYDROMORPHONE HYDROCHLORIDE 2 MG/ML
30 INJECTION INTRAMUSCULAR; INTRAVENOUS; SUBCUTANEOUS
Refills: 0 | Status: DISCONTINUED | OUTPATIENT
Start: 2021-05-31 | End: 2021-05-31

## 2021-05-31 RX ORDER — ONDANSETRON 8 MG/1
4 TABLET, FILM COATED ORAL EVERY 6 HOURS
Refills: 0 | Status: DISCONTINUED | OUTPATIENT
Start: 2021-05-31 | End: 2021-06-11

## 2021-05-31 RX ORDER — NALOXONE HYDROCHLORIDE 4 MG/.1ML
0.1 SPRAY NASAL
Refills: 0 | Status: DISCONTINUED | OUTPATIENT
Start: 2021-05-31 | End: 2021-06-11

## 2021-05-31 RX ORDER — HYDROMORPHONE HYDROCHLORIDE 2 MG/ML
1 INJECTION INTRAMUSCULAR; INTRAVENOUS; SUBCUTANEOUS
Refills: 0 | Status: DISCONTINUED | OUTPATIENT
Start: 2021-05-31 | End: 2021-05-31

## 2021-05-31 RX ADMIN — SENNA PLUS 2 TABLET(S): 8.6 TABLET ORAL at 21:25

## 2021-05-31 RX ADMIN — SODIUM CHLORIDE 100 MILLILITER(S): 9 INJECTION, SOLUTION INTRAVENOUS at 03:51

## 2021-05-31 RX ADMIN — HYDROMORPHONE HYDROCHLORIDE 30 MILLILITER(S): 2 INJECTION INTRAMUSCULAR; INTRAVENOUS; SUBCUTANEOUS at 04:48

## 2021-05-31 RX ADMIN — HYDROMORPHONE HYDROCHLORIDE 30 MILLILITER(S): 2 INJECTION INTRAMUSCULAR; INTRAVENOUS; SUBCUTANEOUS at 10:34

## 2021-05-31 RX ADMIN — HYDROMORPHONE HYDROCHLORIDE 30 MILLILITER(S): 2 INJECTION INTRAMUSCULAR; INTRAVENOUS; SUBCUTANEOUS at 19:14

## 2021-05-31 RX ADMIN — FINASTERIDE 5 MILLIGRAM(S): 5 TABLET, FILM COATED ORAL at 12:47

## 2021-05-31 RX ADMIN — ONDANSETRON 4 MILLIGRAM(S): 8 TABLET, FILM COATED ORAL at 00:15

## 2021-05-31 RX ADMIN — Medication 1 MILLIGRAM(S): at 12:47

## 2021-05-31 RX ADMIN — HYDROMORPHONE HYDROCHLORIDE 1.5 MILLIGRAM(S): 2 INJECTION INTRAMUSCULAR; INTRAVENOUS; SUBCUTANEOUS at 00:14

## 2021-05-31 RX ADMIN — HYDROMORPHONE HYDROCHLORIDE 1 MILLIGRAM(S): 2 INJECTION INTRAMUSCULAR; INTRAVENOUS; SUBCUTANEOUS at 03:55

## 2021-05-31 RX ADMIN — Medication 25 MILLIGRAM(S): at 04:52

## 2021-05-31 RX ADMIN — Medication 25 MILLIGRAM(S): at 18:28

## 2021-05-31 RX ADMIN — HYDROMORPHONE HYDROCHLORIDE 30 MILLILITER(S): 2 INJECTION INTRAMUSCULAR; INTRAVENOUS; SUBCUTANEOUS at 07:37

## 2021-05-31 NOTE — H&P ADULT - HISTORY OF PRESENT ILLNESS
Patient is a 67 y/o M PMH HbSS disease w/ ACS, HFrE (EF 46% on 8/19), mild intellectual disability, BPH p/w diffuse body pain, particularly L arm, for a few days in the setting of recent viral illness 1 week ago. Dilaudid 4 mg PO has been ineffective at home. Reports not taking Endari as he did not find it effective.  Is not able to localise pain specifically or describe it, however, is severe.

## 2021-05-31 NOTE — CONSULT NOTE ADULT - ATTENDING COMMENTS
Pt seen examined and d/w fellow. Agree with above A/P. Pt with prior history as above revd at bedside. Reports antecedent likely viral syndrome followed by current SS pain crisis - viral sxs (congestion and cough) have resolved in the interim. Pt admitted with diffuse bone / body pain. Feels the same to perhaps min better today - using PCA in our presence. Denies SOB. PE remarkable for min scleral icterus, lungs clear, heart RRR S1S2 ab with mild diffuse tenderness / min guarding but no rebound, ext w/o CCE, neuro grossly nonfocal. A/P agree as above . Unlikely to have ACS at this time. COnt analgesic regimen. See above note re transfusion threshold in light of have multiple Abs and per blood bank

## 2021-05-31 NOTE — ED ADULT NURSE REASSESSMENT NOTE - CONDITION
pt aa&ox4. still reports generalized antonella misael left arm.  no sob. given meds as ordered. verbal report given to rn.

## 2021-05-31 NOTE — PATIENT PROFILE ADULT - NSPROMEDSBROUGHTTOHOSP_GEN_A_NUR
Quality 431: Preventive Care And Screening: Unhealthy Alcohol Use - Screening: Patient screened for unhealthy alcohol use using a single question and scores less than 2 times per year
Quality 110: Preventive Care And Screening: Influenza Immunization: Influenza immunization was not ordered or administered, reason not given
Detail Level: Detailed
Quality 402: Tobacco Use And Help With Quitting Among Adolescents: Patient screened for tobacco and never smoked
Quality 130: Documentation Of Current Medications In The Medical Record: Current Medications Documented
no

## 2021-05-31 NOTE — PROGRESS NOTE ADULT - SUBJECTIVE AND OBJECTIVE BOX
Patient is a 66y old  Male who presents with a chief complaint of diffuse body pain (31 May 2021 03:39)    SUBJECTIVE / OVERNIGHT EVENTS: No acute events since admission. Continue to reported generalized aching in the am. Denied sob.    MEDICATIONS  (STANDING):  finasteride 5 milliGRAM(s) Oral daily  folic acid 1 milliGRAM(s) Oral daily  HYDROmorphone PCA (1 mG/mL) 30 milliLiter(s) PCA Continuous PCA Continuous  metoprolol tartrate 25 milliGRAM(s) Oral two times a day  senna 2 Tablet(s) Oral at bedtime  sodium chloride 0.45%. 1000 milliLiter(s) (100 mL/Hr) IV Continuous <Continuous>    MEDICATIONS  (PRN):  diphenhydrAMINE 25 milliGRAM(s) Oral every 4 hours PRN Rash and/or Itching  naloxone Injectable 0.1 milliGRAM(s) IV Push every 3 minutes PRN For ANY of the following changes in patient status:  A. RR LESS THAN 10 breaths per minute, B. Oxygen saturation LESS THAN 90%, C. Sedation score of 6  ondansetron Injectable 4 milliGRAM(s) IV Push every 6 hours PRN Nausea      CAPILLARY BLOOD GLUCOSE        I&O's Summary      T(C): 36.8 (21 @ 04:48), Max: 36.9 (21 @ 02:16)  HR: 71 (21 @ 04:48) (64 - 104)  BP: 147/72 (21 @ 04:48) (133/45 - 149/89)  RR: 17 (21 @ 04:48) (17 - 18)  SpO2: 96% (21 @ 04:48) (95% - 100%)  PHYSICAL EXAM:  GENERAL: NAD, well-developed  HEAD:  Atraumatic, Normocephalic  EYES: EOMI, PERRLA, conjunctiva and sclera clear  NECK: Supple, No JVD  CHEST/LUNG: Clear to auscultation bilaterally; No wheeze  HEART: Regular rate and rhythm; No murmurs, rubs, or gallops  ABDOMEN: Soft, Nontender, Nondistended; Bowel sounds present  EXTREMITIES:  2+ Peripheral Pulses, No clubbing, cyanosis, or edema  PSYCH: AAOx3  NEUROLOGY: non-focal  SKIN: No rashes or lesions    LABS:                        4.6    10.72 )-----------( 206      ( 31 May 2021 07:27 )             14.0     WBC Trend: 10.72<--, 12.68<--      137  |  106  |  19  ----------------------------<  88  5.2   |  19<L>  |  1.21    Ca    9.2      31 May 2021 00:29  Mg     2.3         TPro  7.1  /  Alb  4.3  /  TBili  2.7<H>  /  DBili  x   /  AST  38  /  ALT  18  /  AlkPhos  110      Creatinine Trend: 1.21<--        Urinalysis Basic - ( 31 May 2021 02:24 )    Color: Yellow / Appearance: Clear / S.017 / pH: x  Gluc: x / Ketone: Negative  / Bili: Negative / Urobili: <2 mg/dL   Blood: x / Protein: 30 mg/dL / Nitrite: Negative   Leuk Esterase: Negative / RBC: 1 /HPF / WBC 1 /HPF   Sq Epi: x / Non Sq Epi: 0 /HPF / Bacteria: Negative

## 2021-05-31 NOTE — H&P ADULT - NSHPLABSRESULTS_GEN_ALL_CORE
137  |  106  |  19  ----------------------------<  88  5.2   |  19<L>  |  1.21    Ca    9.2      31 May 2021 00:29  Mg     2.3         TPro  7.1  /  Alb  4.3  /  TBili  2.7<H>  /  DBili  x   /  AST  38  /  ALT  18  /  AlkPhos  110                          5.3    12.68 )-----------( 251      ( 31 May 2021 00:29 )             16.0     LIVER FUNCTIONS - ( 31 May 2021 00:29 )  Alb: 4.3 g/dL / Pro: 7.1 g/dL / ALK PHOS: 110 U/L / ALT: 18 U/L / AST: 38 U/L / GGT: x           Urinalysis Basic - ( 31 May 2021 02:24 )    Color: Yellow / Appearance: Clear / S.017 / pH: x  Gluc: x / Ketone: Negative  / Bili: Negative / Urobili: <2 mg/dL   Blood: x / Protein: 30 mg/dL / Nitrite: Negative   Leuk Esterase: Negative / RBC: 1 /HPF / WBC 1 /HPF   Sq Epi: x / Non Sq Epi: 0 /HPF / Bacteria: Negative

## 2021-05-31 NOTE — CONSULT NOTE ADULT - ASSESSMENT
65 y/o M PMH HbSS disease w/ ACS, HFrE (EF 46% on 8/19), mild intellectual disability, BPH, p/w diffuse body pain, particularly L arm, for a few days in the setting of recent viral illness 1 week ago. Currently being treated for pain crisis.     # HbSS disease  - Patient is on 'do not transfuse list' at blood bank   - D/w Dr. 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;   - Baseline Hb 6-7   - continue oral folic acid  - Until more is found about h/o hyperhemolysis, hold off on transfusion   - CXR clear; on room air- low suspicion for ACS  - Encourage incentive spiromtery  - Pain control and IVF (0.5 NS) per primary team  - upon discharge to follow with usual hematologist  (Dr Holley)       # Iron deficiency anemia  - Noted on labs in Feb 2021- ferritin was ~30  - GI kirsten as outpatient      Roman Shah, PGY-4  Hematology-Oncology Fellow  [phone] ([place]) [phone] ([facility])  I can also be reached on [employer] Teams  Please page fellow on call after 5pm and on weekends   67 y/o M PMH HbSS disease w/ ACS, HFrE (EF 46% on 8/19), mild intellectual disability, BPH, p/w diffuse body pain, particularly L arm, for a few days in the setting of recent viral illness 1 week ago. Currently being treated for pain crisis.     # HbSS disease  - Patient is on 'do not transfuse list' at blood bank   - D/w Dr. 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;   - Baseline Hb 6-7   - continue oral folic acid  - Hold off on transfusion for now: if Hb continues to drop tomorrow OR if patient becomes symptomatic, consider transfusing 1 unit (informed blood bank)  - CXR clear; on room air- low suspicion for ACS  - Encourage incentive spirometry  - Pain control and IVF (0.5 NS) per primary team  - upon discharge to follow with usual hematologist  (Dr Holley)       # Iron deficiency anemia  - Noted on labs in Feb 2021- ferritin was ~30  - DWAYNE curtis as outpatient      Roman Shah, PGY-4  Hematology-Oncology Fellow  [phone] ([place]) [phone] ([facility])  I can also be reached on [employer] Teams  Please page fellow on call after 5pm and on weekends

## 2021-05-31 NOTE — H&P ADULT - NSHPREVIEWOFSYSTEMS_GEN_ALL_CORE
Constitutional Symptoms: No weakness, fevers, chills, weight loss  Eyes: No visual changes, headache, eye pain, double vision  Ears, Nose, Mouth, Throat: No runny nose, sinus pain, ear pain, tinnitus, sore throat, dysphagia, odynophagia  Cardiovascular: No chest pain, palpitations, edema  Respiratory: No cough, wheezing, hemoptysis, shortness of breath  Gastrointestinal: No abdominal pain, dysphagia, anorexia, nausea/vomiting, diarrhea/constipation, hematemesis, BRBPR, melena  Genitourinary: No dysuria, frequency, hematuria  Musculoskeletal: +joint pain, no joint swelling, decreased ROM  Skin: No pruritus, rashes, lesions, wounds  Neurologic:  No seizures, headache, paraesthesia, numbness, limb weakness    Positives and pertinent negatives noted and all other systems negative.

## 2021-05-31 NOTE — PROGRESS NOTE ADULT - PROBLEM SELECTOR PLAN 1
c/w 1/2 NS,   baseline Hg ~6.5, now 5.3, will hold off on transfusion for now, d/w blood bank per admission provider, patient on a "do not transfuse list" due to amount of antibodies and inability to tolerate transfusion in the past  consulted heme  Folate   Incentive spirometry  Pain control with PCA c/w 1/2 NS,   baseline Hg ~6.5, now 5.3, will hold off on transfusion for now, d/w blood bank per admission provider, patient on a "do not transfuse list" due to amount of antibodies and inability to tolerate transfusion in the past  consulted heme  Folate   Incentive spirometry  Pain control with PCA  Hold DVT chemo ppx for now as patient hb is critically low and downtrending. Patient ambulatory at baseline.

## 2021-05-31 NOTE — CONSULT NOTE ADULT - SUBJECTIVE AND OBJECTIVE BOX
HPI:  Patient is a 67 y/o M PMH HbSS disease w/ ACS, HFrE (EF 46% on 8/19), mild intellectual disability, BPH p/w diffuse body pain, particularly L arm, for a few days in the setting of recent viral illness 1 week ago. Dilaudid 4 mg PO has been ineffective at home. Reports not taking Endari as he did not find it effective.  Is not able to localise pain specifically or describe it, however, is severe. (31 May 2021 03:39)      14 point ROS otherwise negative    PAST MEDICAL & SURGICAL HISTORY:  Sickle Cell Disease    Left ventricular dysfunction  mild LVD on echo in 7/18, normal LVF in 12/18    H/O transfusion  ~ pRBC    Acute chest syndrome  Pt unaware    Intellectual disability  ~ mild    Constipation    Hypertension    Sickle cell disease    No significant past surgical history        Allergies    No Known Drug Allergies  peanuts (Anaphylaxis)  peanuts (Angioedema)  pork (Unknown)  shellfish (Unknown)    Intolerances    lactose (Unknown)      MEDICATIONS  (STANDING):  finasteride 5 milliGRAM(s) Oral daily  folic acid 1 milliGRAM(s) Oral daily  HYDROmorphone PCA (1 mG/mL) 30 milliLiter(s) PCA Continuous PCA Continuous  metoprolol tartrate 25 milliGRAM(s) Oral two times a day  senna 2 Tablet(s) Oral at bedtime  sodium chloride 0.45%. 1000 milliLiter(s) (100 mL/Hr) IV Continuous <Continuous>    MEDICATIONS  (PRN):  diphenhydrAMINE 25 milliGRAM(s) Oral every 4 hours PRN Rash and/or Itching  naloxone Injectable 0.1 milliGRAM(s) IV Push every 3 minutes PRN For ANY of the following changes in patient status:  A. RR LESS THAN 10 breaths per minute, B. Oxygen saturation LESS THAN 90%, C. Sedation score of 6  ondansetron Injectable 4 milliGRAM(s) IV Push every 6 hours PRN Nausea      FAMILY HISTORY:  FH: hypertension  ~ mother    Family history of sickle cell trait (Mother)  ~ presumed in parents &amp; 7 siblings (none suffer w/ the disease, per patient)        SOCIAL HISTORY: No EtOH, no tobacco    Height (cm): 177.8 (05-31 @ 04:23)  Weight (kg): 56 (05-31 @ 04:23)  BMI (kg/m2): 17.7 (05-31 @ 04:23)  BSA (m2): 1.7 (05-31 @ 04:23)    VITALS:   T(F): 98.2 (05-31-21 @ 04:48), Max: 98.5 (05-31-21 @ 02:16)  HR: 71 (05-31-21 @ 04:48)  BP: 147/72 (05-31-21 @ 04:48)  RR: 17 (05-31-21 @ 04:48)  SpO2: 96% (05-31-21 @ 04:48)  Wt(kg): --    PHYSICAL EXAM    GENERAL: NAD, well-developed  HEAD:  Atraumatic, Normocephalic  EYES: EOMI, PERRLA, conjunctiva and sclera clear  NECK: Supple, No JVD  CHEST/LUNG: Clear to auscultation bilaterally; No wheeze  HEART: Regular rate and rhythm; No murmurs, rubs, or gallops  ABDOMEN: Soft, Nontender, Nondistended; Bowel sounds present  EXTREMITIES:  2+ Peripheral Pulses, No clubbing, cyanosis, or edema  NEUROLOGY: non-focal  SKIN: No rashes or lesions    LABS:                         4.6    10.72 )-----------( 206      ( 31 May 2021 07:27 )             14.0     05-31    137  |  106  |  19  ----------------------------<  88  5.2   |  19<L>  |  1.21    Ca    9.2      31 May 2021 00:29  Mg     2.3     05-31    TPro  7.1  /  Alb  4.3  /  TBili  2.7<H>  /  DBili  x   /  AST  38  /  ALT  18  /  AlkPhos  110  05-31    Magnesium, Serum: 2.3 mg/dL (05-31 @ 00:29)          IMAGING:

## 2021-05-31 NOTE — H&P ADULT - PROBLEM SELECTOR PLAN 1
c/w 1/2 NS, dilaudid 1 mg IV Q3H PRN until PCA pump arrives (0.2 Q6min), baseline Hg ~6.5, now 5.3, will hold off on transfusion for now, d/w blood bank, patient on a "do not transfuse list" due to amount of antibodies and inability to tolerate transfusion in the past, will consult heme

## 2021-05-31 NOTE — H&P ADULT - NSHPPHYSICALEXAM_GEN_ALL_CORE
T(C): 36.9 (05-31-21 @ 02:16), Max: 36.9 (05-31-21 @ 02:16)  HR: 64 (05-31-21 @ 02:16) (64 - 104)  BP: 133/45 (05-31-21 @ 02:16) (133/45 - 149/89)  RR: 18 (05-31-21 @ 02:16) (17 - 18)  SpO2: 98% (05-31-21 @ 02:16) (95% - 98%)    Constitutional: NAD, well-developed, well-nourished  Ears, Nose, Mouth, and Throat: normal external ears and nose, normal hearing, moist oral mucosa  Eyes: normal conjunctiva, EOMI, PERRL  Neck: supple, no JVD  Respiratory: Clear to auscultation bilaterally. No wheezes, rales or rhonchi. Normal respiratory effort  Cardiovascular: RRR, no M/R/G, no edema, 2+ Peripheral Pulses  Gastrointestinal: soft, nontender, nondistended, +BS, no hernia  Skin: warm, dry, no rash, +5 cm lipoma midback  Neurologic: sensation grossly intact, CN grossly intact, non-focal exam  Musculoskeletal: no clubbing, no cyanosis, no joint swelling  Psychiatric: AOX3, appropriate mood, affect

## 2021-05-31 NOTE — H&P ADULT - ASSESSMENT
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

## 2021-06-01 LAB
ALBUMIN SERPL ELPH-MCNC: 3.8 G/DL — SIGNIFICANT CHANGE UP (ref 3.3–5)
ALP SERPL-CCNC: 117 U/L — SIGNIFICANT CHANGE UP (ref 40–120)
ALT FLD-CCNC: 17 U/L — SIGNIFICANT CHANGE UP (ref 4–41)
ANION GAP SERPL CALC-SCNC: 11 MMOL/L — SIGNIFICANT CHANGE UP (ref 7–14)
APPEARANCE UR: CLEAR — SIGNIFICANT CHANGE UP
AST SERPL-CCNC: 38 U/L — SIGNIFICANT CHANGE UP (ref 4–40)
BASOPHILS # BLD AUTO: 0.06 K/UL — SIGNIFICANT CHANGE UP (ref 0–0.2)
BASOPHILS NFR BLD AUTO: 0.3 % — SIGNIFICANT CHANGE UP (ref 0–2)
BILIRUB DIRECT SERPL-MCNC: 0.4 MG/DL — HIGH (ref 0–0.2)
BILIRUB SERPL-MCNC: 3.4 MG/DL — HIGH (ref 0.2–1.2)
BILIRUB UR-MCNC: NEGATIVE — SIGNIFICANT CHANGE UP
BUN SERPL-MCNC: 16 MG/DL — SIGNIFICANT CHANGE UP (ref 7–23)
CALCIUM SERPL-MCNC: 8.6 MG/DL — SIGNIFICANT CHANGE UP (ref 8.4–10.5)
CHLORIDE SERPL-SCNC: 103 MMOL/L — SIGNIFICANT CHANGE UP (ref 98–107)
CO2 SERPL-SCNC: 18 MMOL/L — LOW (ref 22–31)
COLOR SPEC: YELLOW — SIGNIFICANT CHANGE UP
COVID-19 SPIKE DOMAIN AB INTERP: POSITIVE
COVID-19 SPIKE DOMAIN ANTIBODY RESULT: >250 U/ML — HIGH
CREAT SERPL-MCNC: 1.16 MG/DL — SIGNIFICANT CHANGE UP (ref 0.5–1.3)
DIFF PNL FLD: NEGATIVE — SIGNIFICANT CHANGE UP
EOSINOPHIL # BLD AUTO: 0.17 K/UL — SIGNIFICANT CHANGE UP (ref 0–0.5)
EOSINOPHIL NFR BLD AUTO: 0.8 % — SIGNIFICANT CHANGE UP (ref 0–6)
GLUCOSE SERPL-MCNC: 104 MG/DL — HIGH (ref 70–99)
GLUCOSE UR QL: NEGATIVE — SIGNIFICANT CHANGE UP
HAPTOGLOB SERPL-MCNC: <20 MG/DL — LOW (ref 34–200)
HCT VFR BLD CALC: 14.3 % — CRITICAL LOW (ref 39–50)
HGB BLD-MCNC: 4.9 G/DL — CRITICAL LOW (ref 13–17)
IANC: 17.69 K/UL — HIGH (ref 1.5–8.5)
IMM GRANULOCYTES NFR BLD AUTO: 1.5 % — SIGNIFICANT CHANGE UP (ref 0–1.5)
KETONES UR-MCNC: NEGATIVE — SIGNIFICANT CHANGE UP
LACTATE SERPL-SCNC: 1.5 MMOL/L — SIGNIFICANT CHANGE UP (ref 0.5–2)
LDH SERPL L TO P-CCNC: 397 U/L — HIGH (ref 135–225)
LEUKOCYTE ESTERASE UR-ACNC: NEGATIVE — SIGNIFICANT CHANGE UP
LYMPHOCYTES # BLD AUTO: 1.11 K/UL — SIGNIFICANT CHANGE UP (ref 1–3.3)
LYMPHOCYTES # BLD AUTO: 5.4 % — LOW (ref 13–44)
MAGNESIUM SERPL-MCNC: 1.8 MG/DL — SIGNIFICANT CHANGE UP (ref 1.6–2.6)
MCHC RBC-ENTMCNC: 30.1 PG — SIGNIFICANT CHANGE UP (ref 27–34)
MCHC RBC-ENTMCNC: 34.3 GM/DL — SIGNIFICANT CHANGE UP (ref 32–36)
MCV RBC AUTO: 87.7 FL — SIGNIFICANT CHANGE UP (ref 80–100)
MONOCYTES # BLD AUTO: 1.34 K/UL — HIGH (ref 0–0.9)
MONOCYTES NFR BLD AUTO: 6.5 % — SIGNIFICANT CHANGE UP (ref 2–14)
NEUTROPHILS # BLD AUTO: 17.69 K/UL — HIGH (ref 1.8–7.4)
NEUTROPHILS NFR BLD AUTO: 85.5 % — HIGH (ref 43–77)
NITRITE UR-MCNC: NEGATIVE — SIGNIFICANT CHANGE UP
NRBC # BLD: 3 /100 WBCS — SIGNIFICANT CHANGE UP
NRBC # FLD: 0.64 K/UL — HIGH
PH UR: 6.5 — SIGNIFICANT CHANGE UP (ref 5–8)
PHOSPHATE SERPL-MCNC: 3.1 MG/DL — SIGNIFICANT CHANGE UP (ref 2.5–4.5)
PLATELET # BLD AUTO: 224 K/UL — SIGNIFICANT CHANGE UP (ref 150–400)
POTASSIUM SERPL-MCNC: 5.2 MMOL/L — SIGNIFICANT CHANGE UP (ref 3.5–5.3)
POTASSIUM SERPL-SCNC: 5.2 MMOL/L — SIGNIFICANT CHANGE UP (ref 3.5–5.3)
PROT SERPL-MCNC: 6.6 G/DL — SIGNIFICANT CHANGE UP (ref 6–8.3)
PROT UR-MCNC: ABNORMAL
RBC # BLD: 1.63 M/UL — LOW (ref 4.2–5.8)
RBC # BLD: 1.63 M/UL — LOW (ref 4.2–5.8)
RBC # BLD: 1.66 M/UL — LOW (ref 4.2–5.8)
RBC # FLD: 23.4 % — HIGH (ref 10.3–14.5)
RETICS #: 213 K/UL — HIGH (ref 25–125)
RETICS #: 232.8 K/UL — HIGH (ref 25–125)
RETICS/RBC NFR: 12.8 % — HIGH (ref 0.5–2.5)
RETICS/RBC NFR: 14.3 % — HIGH (ref 0.5–2.5)
SARS-COV-2 IGG+IGM SERPL QL IA: >250 U/ML — HIGH
SARS-COV-2 IGG+IGM SERPL QL IA: POSITIVE
SODIUM SERPL-SCNC: 132 MMOL/L — LOW (ref 135–145)
SP GR SPEC: 1.01 — SIGNIFICANT CHANGE UP (ref 1.01–1.02)
UROBILINOGEN FLD QL: SIGNIFICANT CHANGE UP
WBC # BLD: 20.67 K/UL — HIGH (ref 3.8–10.5)
WBC # FLD AUTO: 20.67 K/UL — HIGH (ref 3.8–10.5)

## 2021-06-01 PROCEDURE — 93010 ELECTROCARDIOGRAM REPORT: CPT

## 2021-06-01 PROCEDURE — 99233 SBSQ HOSP IP/OBS HIGH 50: CPT

## 2021-06-01 PROCEDURE — 99233 SBSQ HOSP IP/OBS HIGH 50: CPT | Mod: GC

## 2021-06-01 PROCEDURE — 71045 X-RAY EXAM CHEST 1 VIEW: CPT | Mod: 26

## 2021-06-01 RX ORDER — SODIUM CHLORIDE 9 MG/ML
1000 INJECTION, SOLUTION INTRAVENOUS
Refills: 0 | Status: DISCONTINUED | OUTPATIENT
Start: 2021-06-01 | End: 2021-06-01

## 2021-06-01 RX ORDER — METOPROLOL TARTRATE 50 MG
25 TABLET ORAL
Refills: 0 | Status: DISCONTINUED | OUTPATIENT
Start: 2021-06-01 | End: 2021-06-11

## 2021-06-01 RX ORDER — SODIUM CHLORIDE 9 MG/ML
1000 INJECTION, SOLUTION INTRAVENOUS
Refills: 0 | Status: DISCONTINUED | OUTPATIENT
Start: 2021-06-01 | End: 2021-06-10

## 2021-06-01 RX ORDER — ACETAMINOPHEN 500 MG
650 TABLET ORAL ONCE
Refills: 0 | Status: COMPLETED | OUTPATIENT
Start: 2021-06-01 | End: 2021-06-01

## 2021-06-01 RX ADMIN — Medication 1 MILLIGRAM(S): at 13:44

## 2021-06-01 RX ADMIN — HYDROMORPHONE HYDROCHLORIDE 30 MILLILITER(S): 2 INJECTION INTRAMUSCULAR; INTRAVENOUS; SUBCUTANEOUS at 19:26

## 2021-06-01 RX ADMIN — SODIUM CHLORIDE 30 MILLILITER(S): 9 INJECTION, SOLUTION INTRAVENOUS at 09:09

## 2021-06-01 RX ADMIN — HYDROMORPHONE HYDROCHLORIDE 30 MILLILITER(S): 2 INJECTION INTRAMUSCULAR; INTRAVENOUS; SUBCUTANEOUS at 07:18

## 2021-06-01 RX ADMIN — SENNA PLUS 2 TABLET(S): 8.6 TABLET ORAL at 21:13

## 2021-06-01 RX ADMIN — Medication 25 MILLIGRAM(S): at 13:45

## 2021-06-01 RX ADMIN — Medication 650 MILLIGRAM(S): at 03:46

## 2021-06-01 RX ADMIN — FINASTERIDE 5 MILLIGRAM(S): 5 TABLET, FILM COATED ORAL at 13:44

## 2021-06-01 RX ADMIN — SODIUM CHLORIDE 75 MILLILITER(S): 9 INJECTION, SOLUTION INTRAVENOUS at 13:45

## 2021-06-01 RX ADMIN — Medication 650 MILLIGRAM(S): at 04:41

## 2021-06-01 NOTE — PROGRESS NOTE ADULT - PROBLEM SELECTOR PLAN 1
c/w 1/2 NS,   baseline Hg ~6.5, now 5.3, will hold off on transfusion for now, d/w blood bank per admission provider, patient on a "do not transfuse list" due to amount of antibodies and inability to tolerate transfusion in the past  consulted heme  Folate   Incentive spirometry  Pain control with PCA  Hold DVT chemo ppx for now as patient hb is critically low and downtrending. Patient ambulatory at baseline. c/w 1/2 NS,   baseline Hg ~6.5, a/w  5.3 now 4.9 , will hold off on transfusion for now,  As per  blood bank per admission provider, patient on a "do not transfuse list" due to amount of antibodies and inability to tolerate transfusion in the past   heme following      Folate   Incentive spirometry  Pain control with PCA  Hold DVT chemo ppx for now as patient hb is critically low and downtrending. Patient ambulatory at baseline.  CXR with New hazy right perihilar opacity which can be due to asymmetric pulmonary edema or pneumonia., pt with low grade fever , leucocytosis and mild elevated procalcitonin, pt started on Levaquin , f/u blood cx

## 2021-06-01 NOTE — PROGRESS NOTE ADULT - SUBJECTIVE AND OBJECTIVE BOX
Hematology Oncology Follow-up    INTERVAL HPI/OVERNIGHT EVENTS:  Denies SOB or chest pain. Pain is better controlled.     VITAL SIGNS:  T(F): 99 (21 @ 13:00)  HR: 99 (21 @ 13:00)  BP: 128/69 (21 @ 13:00)  RR: 18 (21 @ 13:00)  SpO2: 98% (21 @ 13:00)  Wt(kg): --        Review of Systems:  General: denies fevers/chills  Respiratory: denies cough, shortness of breath  Cardiovascular: denies chest pain, palpitations  Gastrointestinal: denies nausea, vomiting, abdominal pain, constipation, diarrhea, melena, hematochezia  MSK: occasional joint pain  Neuro: denies headache, weakness, or parasthesias  Skin: denies rash, petichiae, echymoses  Psych: denies anxiety or sleep disturbances    PHYSICAL EXAM:  Constitutional: NAD  Respiratory: symmetric chest rise, with normal respiratory effort  Cardiovascular: RRR  Gastrointestinal: soft, NTND  Extremities:  no edema  MSK: no obvious abnormalities  Neurological: Grossly intact  Skin: no rash, no echymoses, no petichiae  Psych: normal affect    MEDICATIONS  (STANDING):  finasteride 5 milliGRAM(s) Oral daily  folic acid 1 milliGRAM(s) Oral daily  HYDROmorphone PCA (1 mG/mL) 30 milliLiter(s) PCA Continuous PCA Continuous  levoFLOXacin IVPB 500 milliGRAM(s) IV Intermittent every 24 hours  metoprolol tartrate 25 milliGRAM(s) Oral two times a day  senna 2 Tablet(s) Oral at bedtime  sodium chloride 0.45%. 1000 milliLiter(s) (75 mL/Hr) IV Continuous <Continuous>    MEDICATIONS  (PRN):  diphenhydrAMINE 25 milliGRAM(s) Oral every 4 hours PRN Rash and/or Itching  naloxone Injectable 0.1 milliGRAM(s) IV Push every 3 minutes PRN For ANY of the following changes in patient status:  A. RR LESS THAN 10 breaths per minute, B. Oxygen saturation LESS THAN 90%, C. Sedation score of 6  ondansetron Injectable 4 milliGRAM(s) IV Push every 6 hours PRN Nausea      No Known Drug Allergies  peanuts (Anaphylaxis)  peanuts (Angioedema)  pork (Unknown)  shellfish (Unknown)      LABS:                        4.9    20.67 )-----------( 224      ( 2021 03:46 )             14.3         132<L>  |  103  |  16  ----------------------------<  104<H>  5.2   |  18<L>  |  1.16    Ca    8.6      2021 03:46  Phos  3.1       Mg     1.8         TPro  x   /  Alb  x   /  TBili  x   /  DBili  0.4<H>  /  AST  x   /  ALT  x   /  AlkPhos  x        Lactate Dehydrogenase, Serum: 397 U/L ( @ 07:01)  Haptoglobin, Serum: <20 mg/dL ( @ 07:01)    Urinalysis Basic - ( 2021 04:20 )    Color: Yellow / Appearance: Clear / S.011 / pH: x  Gluc: x / Ketone: Negative  / Bili: Negative / Urobili: <2 mg/dL   Blood: x / Protein: Trace / Nitrite: Negative   Leuk Esterase: Negative / RBC: x / WBC x   Sq Epi: x / Non Sq Epi: x / Bacteria: x              Bilirubin Direct, Serum: 0.4 *H* (21 @ 07:01)  Bilirubin: Negative (21 @ 04:20)      RADIOLOGY & ADDITIONAL TESTS:  Studies reviewed.

## 2021-06-01 NOTE — PROGRESS NOTE ADULT - SUBJECTIVE AND OBJECTIVE BOX
Patient is a 66y old  Male who presents with a chief complaint of diffuse body pain (31 May 2021 10:22)      SUBJECTIVE / OVERNIGHT EVENTS:    MEDICATIONS  (STANDING):  finasteride 5 milliGRAM(s) Oral daily  folic acid 1 milliGRAM(s) Oral daily  HYDROmorphone PCA (1 mG/mL) 30 milliLiter(s) PCA Continuous PCA Continuous  levoFLOXacin IVPB 500 milliGRAM(s) IV Intermittent every 24 hours  metoprolol tartrate 25 milliGRAM(s) Oral two times a day  senna 2 Tablet(s) Oral at bedtime  sodium chloride 0.45%. 1000 milliLiter(s) (30 mL/Hr) IV Continuous <Continuous>    MEDICATIONS  (PRN):  diphenhydrAMINE 25 milliGRAM(s) Oral every 4 hours PRN Rash and/or Itching  naloxone Injectable 0.1 milliGRAM(s) IV Push every 3 minutes PRN For ANY of the following changes in patient status:  A. RR LESS THAN 10 breaths per minute, B. Oxygen saturation LESS THAN 90%, C. Sedation score of 6  ondansetron Injectable 4 milliGRAM(s) IV Push every 6 hours PRN Nausea      Vital Signs Last 24 Hrs  T(C): 37.2 (2021 09:00), Max: 37.8 (2021 01:30)  T(F): 99 (2021 09:00), Max: 100.1 (2021 01:30)  HR: 95 (2021 09:00) (74 - 95)  BP: 109/56 (2021 09:00) (105/59 - 125/62)  BP(mean): --  RR: 18 (2021 09:00) (17 - 18)  SpO2: 98% (2021 09:00) (94% - 98%)  CAPILLARY BLOOD GLUCOSE        I&O's Summary      PHYSICAL EXAM:  GENERAL: NAD, well-developed  HEAD:  Atraumatic, Normocephalic  EYES: EOMI, PERRLA, conjunctiva and sclera clear  NECK: Supple, No JVD  CHEST/LUNG: Clear to auscultation bilaterally; No wheeze  HEART: Regular rate and rhythm; No murmurs, rubs, or gallops  ABDOMEN: Soft, Nontender, Nondistended; Bowel sounds present  EXTREMITIES:  2+ Peripheral Pulses, No clubbing, cyanosis, or edema  PSYCH: AAOx3  NEUROLOGY: non-focal  SKIN: No rashes or lesions    LABS:                        4.9    20.67 )-----------( 224      ( 2021 03:46 )             14.3     06-    132<L>  |  103  |  16  ----------------------------<  104<H>  5.2   |  18<L>  |  1.16    Ca    8.6      2021 03:46  Phos  3.1     06-  Mg     1.8     06-    TPro  x   /  Alb  x   /  TBili  x   /  DBili  0.4<H>  /  AST  x   /  ALT  x   /  AlkPhos  x   -          Urinalysis Basic - ( 2021 04:20 )    Color: Yellow / Appearance: Clear / S.011 / pH: x  Gluc: x / Ketone: Negative  / Bili: Negative / Urobili: <2 mg/dL   Blood: x / Protein: Trace / Nitrite: Negative   Leuk Esterase: Negative / RBC: x / WBC x   Sq Epi: x / Non Sq Epi: x / Bacteria: x        RADIOLOGY & ADDITIONAL TESTS:    Imaging Personally Reviewed:    Consultant(s) Notes Reviewed:      Care Discussed with Consultants/Other Providers:   Patient is a 66y old  Male who presents with a chief complaint of diffuse body pain (31 May 2021 10:22)      SUBJECTIVE / OVERNIGHT EVENTS: patient seen and examined by bedside, pt feeling better than before, pt had low grade fever overnight , pt has mild SOB , denies headache, dizziness,, CP, Palpitations , N/V/D, abdominal pain        MEDICATIONS  (STANDING):  finasteride 5 milliGRAM(s) Oral daily  folic acid 1 milliGRAM(s) Oral daily  HYDROmorphone PCA (1 mG/mL) 30 milliLiter(s) PCA Continuous PCA Continuous  levoFLOXacin IVPB 500 milliGRAM(s) IV Intermittent every 24 hours  metoprolol tartrate 25 milliGRAM(s) Oral two times a day  senna 2 Tablet(s) Oral at bedtime  sodium chloride 0.45%. 1000 milliLiter(s) (30 mL/Hr) IV Continuous <Continuous>    MEDICATIONS  (PRN):  diphenhydrAMINE 25 milliGRAM(s) Oral every 4 hours PRN Rash and/or Itching  naloxone Injectable 0.1 milliGRAM(s) IV Push every 3 minutes PRN For ANY of the following changes in patient status:  A. RR LESS THAN 10 breaths per minute, B. Oxygen saturation LESS THAN 90%, C. Sedation score of 6  ondansetron Injectable 4 milliGRAM(s) IV Push every 6 hours PRN Nausea      Vital Signs Last 24 Hrs  T(C): 37.2 (2021 09:00), Max: 37.8 (2021 01:30)  T(F): 99 (2021 09:00), Max: 100.1 (2021 01:30)  HR: 95 (2021 09:00) (74 - 95)  BP: 109/56 (2021 09:00) (105/59 - 125/62)  BP(mean): --  RR: 18 (2021 09:00) (17 - 18)  SpO2: 98% (2021 09:00) (94% - 98%)  CAPILLARY BLOOD GLUCOSE        I&O's Summary        PHYSICAL EXAM:  GENERAL: NAD, thin built   HEAD:  Atraumatic, Normocephalic  EYES: EOMI, PERRLA, conjunctiva and sclera clear  CHEST/LUNG: Clear to auscultation bilaterally; No wheeze  HEART: Regular rate and rhythm;   ABDOMEN: Soft, Nontender, Nondistended; Bowel sounds present  EXTREMITIES:  2+ Peripheral Pulses, No clubbing, cyanosis, or edema  PSYCH: AAOx3  NEUROLOGY: non-focal  SKIN: No rashes or lesions    LABS:                        4.9    20.67 )-----------( 224      ( 2021 03:46 )             14.3     06-    132<L>  |  103  |  16  ----------------------------<  104<H>  5.2   |  18<L>  |  1.16    Ca    8.6      2021 03:46  Phos  3.1     -  Mg     1.8     -    TPro  x   /  Alb  x   /  TBili  x   /  DBili  0.4<H>  /  AST  x   /  ALT  x   /  AlkPhos  x             Urinalysis Basic - ( 2021 04:20 )    Color: Yellow / Appearance: Clear / S.011 / pH: x  Gluc: x / Ketone: Negative  / Bili: Negative / Urobili: <2 mg/dL   Blood: x / Protein: Trace / Nitrite: Negative   Leuk Esterase: Negative / RBC: x / WBC x   Sq Epi: x / Non Sq Epi: x / Bacteria: x        RADIOLOGY & ADDITIONAL TESTS:  < from: Xray Chest 1 View- PORTABLE-Urgent (Xray Chest 1 View- PORTABLE-Urgent .) (21 @ 04:10) >    Heart size and the mediastinum cannot be accurately evaluated on this projection.  There is new pulmonary vascular redistribution/congestion.  There is new hazy right perihilar opacity.  No pleural effusion or pneumothorax is seen.  Osseous stigmata of sickle cell disease including avascular necrosis of the humeral heads and H-shaped vertebral bodies again seen.      IMPRESSION:  New pulmonary vascular redistribution/congestion.    New hazy right perihilar opacity which can be due toasymmetric pulmonary edema or pneumonia.    < end of copied text >    Imaging Personally Reviewed:    Consultant(s) Notes Reviewed:      Care Discussed with Consultants/Other Providers:

## 2021-06-01 NOTE — PROGRESS NOTE ADULT - ATTENDING COMMENTS
HbSS disease with VOC, hold off blood transfusion -it is more important to see the patient clinically than the number as SS patients can live with much lower levels of Hgb without causing cardiovascular instability.

## 2021-06-01 NOTE — PROVIDER CONTACT NOTE (OTHER) - RECOMMENDATIONS
meds? cold packs? labs? r/a and continue to monitor
Place on 2L, place on ? Reassess and continue to monitor.

## 2021-06-01 NOTE — CHART NOTE - NSCHARTNOTEFT_GEN_A_CORE
RN notified provider that on vitals patient hypoxic; O2 sat in 80's on RA. Patient placed on 3L NC and actively weaning off 02. Patient is afebrile with temp at 100.1 orally. Hematology currently following for anemia and sickle cell crisis. Hgb is currently downtrending (5.3-->4.6); baseline hgb is noted to be from 6-7. Per heme recs will hold off on transfusion unless if drops on next cbc or becomes symptomatic.   Denies SOB, chest pain, abdominal pain, dysuria, dizziness.     Vital Signs Last 24 Hrs  T(C): 36.8 (01 Jun 2021 04:47), Max: 37.8 (01 Jun 2021 01:30)  T(F): 98.2 (01 Jun 2021 04:47), Max: 100.1 (01 Jun 2021 01:30)  HR: 88 (01 Jun 2021 04:47) (70 - 94)  BP: 105/59 (01 Jun 2021 04:47) (105/59 - 133/68)  BP(mean): --  RR: 18 (01 Jun 2021 04:47) (17 - 18)  SpO2: 94% (01 Jun 2021 04:47) (94% - 98%)    PHYSICAL EXAM:  GENERAL: No acute distress, well-developed  HEAD:  Atraumatic, Normocephalic  CHEST/LUNG: CTAB; No wheezes, rales, or rhonchi  HEART: Regular rate and rhythm; No murmurs, rubs, or gallops  ABDOMEN: Soft, non-tender, non-distended; normal bowel sounds, no organomegaly    Plan:   Hypoxia   -Concern for symptomatic anemia vs infectious cause   -VSS - patient temp trending up at 100.1 orally. Tylenol administered. O2 >95% on 2L NC.   -Patient will be monitored on continuous pulse oximetry.   -Blood cultures x 2, CBC, CMP w/ Mg and Phos, procalcitonin, lactate, UA and CXR performed.   -CBC drawn showing Hgb increased from 4.6-->4.9. Will hold off on transfusion. WBC doubled this am (10K --> 20K)   -CXR showing new consolidation of L middle lobe. Concern for Acute Chest Syndrome.  -Additional Hemolysis labs to be drawn. Haptoglobin, direct bilirubin, retic count, LDH  -Levaquin 500 IVPB started to cover for Acute chest syndrome   -Spoke with Hospitalist, Dr. Lee and agrees with plan above   -Low threshold for MICU consult   -Will continue to monitor.     SCC pain:  - c/w 1/2 NS, IS, Dilaudid PCA pump   -Will continue to monitor     SHIRA Antunez  Department of Medicine   In House # [id number] RN notified provider that on vitals patient hypoxic; O2 sat in 80's on RA. Patient placed on 3L NC and actively weaning off 02. Patient is afebrile with temp at 100.1 orally. Seen at bedside, patient without any active complaints; however admits "feeling hot and sweating a lot."  Hematology currently following for anemia and sickle cell crisis. Hgb is currently downtrending (5.3-->4.6); baseline hgb is noted to be from 6-7. Per heme recs will hold off on transfusion unless if drops on next cbc or becomes symptomatic.   Denies SOB, chest pain, abdominal pain, dysuria, dizziness.     Vital Signs Last 24 Hrs  T(C): 36.8 (01 Jun 2021 04:47), Max: 37.8 (01 Jun 2021 01:30)  T(F): 98.2 (01 Jun 2021 04:47), Max: 100.1 (01 Jun 2021 01:30)  HR: 88 (01 Jun 2021 04:47) (70 - 94)  BP: 105/59 (01 Jun 2021 04:47) (105/59 - 133/68)  BP(mean): --  RR: 18 (01 Jun 2021 04:47) (17 - 18)  SpO2: 94% (01 Jun 2021 04:47) (94% - 98%)    PHYSICAL EXAM:  GENERAL: No acute distress, well-developed  HEAD:  Atraumatic, Normocephalic  CHEST/LUNG: CTAB; No wheezes, rales, or rhonchi  HEART: Regular rate and rhythm; No murmurs, rubs, or gallops  ABDOMEN: Soft, non-tender, non-distended; normal bowel sounds, no organomegaly    Plan:   Hypoxia   -Concern for symptomatic anemia vs infectious cause   -VSS - patient temp trending up at 100.1 orally. Tylenol administered. O2 >95% on 2L NC.   -Patient will be monitored on continuous pulse oximetry.   -Blood cultures x 2, CBC, CMP w/ Mg and Phos, procalcitonin, lactate, UA and CXR performed.   -CBC drawn showing Hgb increased from 4.6-->4.9. Will hold off on transfusion. WBC doubled this am (10K --> 20K)   -CXR showing new consolidation of L middle lobe. Concern for Acute Chest Syndrome.  -Additional Hemolysis labs to be drawn. Haptoglobin, direct bilirubin, retic count, LDH  -Levaquin 500 IVPB started to cover for Acute chest syndrome   -Spoke with Hospitalist, Dr. Lee and agrees with plan above   -Low threshold for MICU consult   -Will continue to monitor.     SCC pain:  - c/w 1/2 NS, IS, Dilaudid PCA pump   -Will continue to monitor     SHIRA Antunez  Department of Medicine   In House # [id number]

## 2021-06-02 LAB
ALBUMIN SERPL ELPH-MCNC: 3.2 G/DL — LOW (ref 3.3–5)
ALP SERPL-CCNC: 134 U/L — HIGH (ref 40–120)
ALT FLD-CCNC: 17 U/L — SIGNIFICANT CHANGE UP (ref 4–41)
ANION GAP SERPL CALC-SCNC: 9 MMOL/L — SIGNIFICANT CHANGE UP (ref 7–14)
AST SERPL-CCNC: 35 U/L — SIGNIFICANT CHANGE UP (ref 4–40)
BASOPHILS # BLD AUTO: 0.04 K/UL — SIGNIFICANT CHANGE UP (ref 0–0.2)
BASOPHILS NFR BLD AUTO: 0.3 % — SIGNIFICANT CHANGE UP (ref 0–2)
BILIRUB SERPL-MCNC: 4 MG/DL — HIGH (ref 0.2–1.2)
BUN SERPL-MCNC: 18 MG/DL — SIGNIFICANT CHANGE UP (ref 7–23)
CALCIUM SERPL-MCNC: 8.5 MG/DL — SIGNIFICANT CHANGE UP (ref 8.4–10.5)
CHLORIDE SERPL-SCNC: 107 MMOL/L — SIGNIFICANT CHANGE UP (ref 98–107)
CO2 SERPL-SCNC: 20 MMOL/L — LOW (ref 22–31)
CREAT SERPL-MCNC: 1.22 MG/DL — SIGNIFICANT CHANGE UP (ref 0.5–1.3)
EOSINOPHIL # BLD AUTO: 0.2 K/UL — SIGNIFICANT CHANGE UP (ref 0–0.5)
EOSINOPHIL NFR BLD AUTO: 1.4 % — SIGNIFICANT CHANGE UP (ref 0–6)
GLUCOSE SERPL-MCNC: 83 MG/DL — SIGNIFICANT CHANGE UP (ref 70–99)
HCT VFR BLD CALC: 12.5 % — CRITICAL LOW (ref 39–50)
HGB BLD-MCNC: 4.2 G/DL — CRITICAL LOW (ref 13–17)
IANC: 11.18 K/UL — HIGH (ref 1.5–8.5)
IMM GRANULOCYTES NFR BLD AUTO: 1.3 % — SIGNIFICANT CHANGE UP (ref 0–1.5)
LDH SERPL L TO P-CCNC: 347 U/L — HIGH (ref 135–225)
LYMPHOCYTES # BLD AUTO: 1 K/UL — SIGNIFICANT CHANGE UP (ref 1–3.3)
LYMPHOCYTES # BLD AUTO: 7.2 % — LOW (ref 13–44)
MAGNESIUM SERPL-MCNC: 1.8 MG/DL — SIGNIFICANT CHANGE UP (ref 1.6–2.6)
MCHC RBC-ENTMCNC: 29 PG — SIGNIFICANT CHANGE UP (ref 27–34)
MCHC RBC-ENTMCNC: 33.6 GM/DL — SIGNIFICANT CHANGE UP (ref 32–36)
MCV RBC AUTO: 86.2 FL — SIGNIFICANT CHANGE UP (ref 80–100)
MONOCYTES # BLD AUTO: 1.36 K/UL — HIGH (ref 0–0.9)
MONOCYTES NFR BLD AUTO: 9.7 % — SIGNIFICANT CHANGE UP (ref 2–14)
NEUTROPHILS # BLD AUTO: 11.18 K/UL — HIGH (ref 1.8–7.4)
NEUTROPHILS NFR BLD AUTO: 80.1 % — HIGH (ref 43–77)
NRBC # BLD: 5 /100 WBCS — SIGNIFICANT CHANGE UP
NRBC # FLD: 0.66 K/UL — HIGH
PHOSPHATE SERPL-MCNC: 2.9 MG/DL — SIGNIFICANT CHANGE UP (ref 2.5–4.5)
PLATELET # BLD AUTO: 194 K/UL — SIGNIFICANT CHANGE UP (ref 150–400)
POTASSIUM SERPL-MCNC: 4.9 MMOL/L — SIGNIFICANT CHANGE UP (ref 3.5–5.3)
POTASSIUM SERPL-SCNC: 4.9 MMOL/L — SIGNIFICANT CHANGE UP (ref 3.5–5.3)
PROT SERPL-MCNC: 6.1 G/DL — SIGNIFICANT CHANGE UP (ref 6–8.3)
RBC # BLD: 1.45 M/UL — LOW (ref 4.2–5.8)
RBC # BLD: 1.45 M/UL — LOW (ref 4.2–5.8)
RBC # FLD: 22.6 % — HIGH (ref 10.3–14.5)
RETICS #: 220.4 K/UL — HIGH (ref 25–125)
RETICS/RBC NFR: 15.2 % — HIGH (ref 0.5–2.5)
SODIUM SERPL-SCNC: 136 MMOL/L — SIGNIFICANT CHANGE UP (ref 135–145)
WBC # BLD: 13.96 K/UL — HIGH (ref 3.8–10.5)
WBC # FLD AUTO: 13.96 K/UL — HIGH (ref 3.8–10.5)

## 2021-06-02 PROCEDURE — 99233 SBSQ HOSP IP/OBS HIGH 50: CPT

## 2021-06-02 RX ADMIN — HYDROMORPHONE HYDROCHLORIDE 30 MILLILITER(S): 2 INJECTION INTRAMUSCULAR; INTRAVENOUS; SUBCUTANEOUS at 19:08

## 2021-06-02 RX ADMIN — HYDROMORPHONE HYDROCHLORIDE 30 MILLILITER(S): 2 INJECTION INTRAMUSCULAR; INTRAVENOUS; SUBCUTANEOUS at 07:06

## 2021-06-02 RX ADMIN — SODIUM CHLORIDE 75 MILLILITER(S): 9 INJECTION, SOLUTION INTRAVENOUS at 01:02

## 2021-06-02 RX ADMIN — Medication 25 MILLIGRAM(S): at 05:01

## 2021-06-02 RX ADMIN — SENNA PLUS 2 TABLET(S): 8.6 TABLET ORAL at 21:15

## 2021-06-02 RX ADMIN — SODIUM CHLORIDE 75 MILLILITER(S): 9 INJECTION, SOLUTION INTRAVENOUS at 13:11

## 2021-06-02 RX ADMIN — Medication 25 MILLIGRAM(S): at 17:14

## 2021-06-02 RX ADMIN — FINASTERIDE 5 MILLIGRAM(S): 5 TABLET, FILM COATED ORAL at 13:11

## 2021-06-02 RX ADMIN — Medication 1 MILLIGRAM(S): at 13:11

## 2021-06-02 NOTE — PROGRESS NOTE ADULT - SUBJECTIVE AND OBJECTIVE BOX
Patient is a 66y old  Male who presents with a chief complaint of diffuse body pain (2021 14:28)      SUBJECTIVE / OVERNIGHT EVENTS:    MEDICATIONS  (STANDING):  finasteride 5 milliGRAM(s) Oral daily  folic acid 1 milliGRAM(s) Oral daily  HYDROmorphone PCA (1 mG/mL) 30 milliLiter(s) PCA Continuous PCA Continuous  levoFLOXacin IVPB 500 milliGRAM(s) IV Intermittent every 24 hours  metoprolol tartrate 25 milliGRAM(s) Oral two times a day  senna 2 Tablet(s) Oral at bedtime  sodium chloride 0.45%. 1000 milliLiter(s) (75 mL/Hr) IV Continuous <Continuous>    MEDICATIONS  (PRN):  diphenhydrAMINE 25 milliGRAM(s) Oral every 4 hours PRN Rash and/or Itching  naloxone Injectable 0.1 milliGRAM(s) IV Push every 3 minutes PRN For ANY of the following changes in patient status:  A. RR LESS THAN 10 breaths per minute, B. Oxygen saturation LESS THAN 90%, C. Sedation score of 6  ondansetron Injectable 4 milliGRAM(s) IV Push every 6 hours PRN Nausea      Vital Signs Last 24 Hrs  T(C): 37.1 (2021 09:00), Max: 37.3 (2021 05:00)  T(F): 98.7 (2021 09:00), Max: 99.2 (2021 05:00)  HR: 78 (2021 09:00) (78 - 99)  BP: 105/58 (2021 09:00) (105/58 - 128/69)  BP(mean): --  RR: 18 (2021 09:00) (18 - 18)  SpO2: 97% (2021 09:00) (96% - 98%)  CAPILLARY BLOOD GLUCOSE        I&O's Summary      PHYSICAL EXAM:  GENERAL: NAD, well-developed  HEAD:  Atraumatic, Normocephalic  EYES: EOMI, PERRLA, conjunctiva and sclera clear  NECK: Supple, No JVD  CHEST/LUNG: Clear to auscultation bilaterally; No wheeze  HEART: Regular rate and rhythm; No murmurs, rubs, or gallops  ABDOMEN: Soft, Nontender, Nondistended; Bowel sounds present  EXTREMITIES:  2+ Peripheral Pulses, No clubbing, cyanosis, or edema  PSYCH: AAOx3  NEUROLOGY: non-focal  SKIN: No rashes or lesions    LABS:                        4.2    13.96 )-----------( 194      ( 2021 07:26 )             12.5     06-02    136  |  107  |  18  ----------------------------<  83  4.9   |  20<L>  |  1.22    Ca    8.5      2021 07:26  Phos  2.9     06-  Mg     1.8     06-02    TPro  6.1  /  Alb  3.2<L>  /  TBili  4.0<H>  /  DBili  x   /  AST  35  /  ALT  17  /  AlkPhos  134<H>  06-02          Urinalysis Basic - ( 2021 04:20 )    Color: Yellow / Appearance: Clear / S.011 / pH: x  Gluc: x / Ketone: Negative  / Bili: Negative / Urobili: <2 mg/dL   Blood: x / Protein: Trace / Nitrite: Negative   Leuk Esterase: Negative / RBC: x / WBC x   Sq Epi: x / Non Sq Epi: x / Bacteria: x        RADIOLOGY & ADDITIONAL TESTS:    Imaging Personally Reviewed:    Consultant(s) Notes Reviewed:      Care Discussed with Consultants/Other Providers:   Patient is a 66y old  Male who presents with a chief complaint of diffuse body pain (2021 14:28)      SUBJECTIVE / OVERNIGHT EVENTS: patient seen and examined by bedside, pt says pain in his joints are about the same, denies fever, chills , headache, dizziness, SOB, CP, Palpitations , N/V/D, abdominal pain        MEDICATIONS  (STANDING):  finasteride 5 milliGRAM(s) Oral daily  folic acid 1 milliGRAM(s) Oral daily  HYDROmorphone PCA (1 mG/mL) 30 milliLiter(s) PCA Continuous PCA Continuous  levoFLOXacin IVPB 500 milliGRAM(s) IV Intermittent every 24 hours  metoprolol tartrate 25 milliGRAM(s) Oral two times a day  senna 2 Tablet(s) Oral at bedtime  sodium chloride 0.45%. 1000 milliLiter(s) (75 mL/Hr) IV Continuous <Continuous>    MEDICATIONS  (PRN):  diphenhydrAMINE 25 milliGRAM(s) Oral every 4 hours PRN Rash and/or Itching  naloxone Injectable 0.1 milliGRAM(s) IV Push every 3 minutes PRN For ANY of the following changes in patient status:  A. RR LESS THAN 10 breaths per minute, B. Oxygen saturation LESS THAN 90%, C. Sedation score of 6  ondansetron Injectable 4 milliGRAM(s) IV Push every 6 hours PRN Nausea      Vital Signs Last 24 Hrs  T(C): 37.1 (2021 09:00), Max: 37.3 (2021 05:00)  T(F): 98.7 (2021 09:00), Max: 99.2 (2021 05:00)  HR: 78 (2021 09:00) (78 - 99)  BP: 105/58 (2021 09:00) (105/58 - 128/69)  BP(mean): --  RR: 18 (2021 09:00) (18 - 18)  SpO2: 97% (2021 09:00) (96% - 98%)  CAPILLARY BLOOD GLUCOSE        I&O's Summary    PHYSICAL EXAM:  GENERAL: NAD, thin built   HEAD:  Atraumatic, Normocephalic  EYES: EOMI, PERRLA, conjunctiva and sclera clear  CHEST/LUNG: Clear to auscultation bilaterally; No wheeze  HEART: Regular rate and rhythm;   ABDOMEN: Soft, Nontender, Nondistended; Bowel sounds present  EXTREMITIES:  2+ Peripheral Pulses, No clubbing, cyanosis, or edema  PSYCH: AAOx3  NEUROLOGY: non-focal  SKIN: No rashes or lesions    LABS:                        4.2    13.96 )-----------( 194      ( 2021 07:26 )             12.5     06-02    136  |  107  |  18  ----------------------------<  83  4.9   |  20<L>  |  1.22    Ca    8.5      2021 07:26  Phos  2.9     06  Mg     1.8         TPro  6.1  /  Alb  3.2<L>  /  TBili  4.0<H>  /  DBili  x   /  AST  35  /  ALT  17  /  AlkPhos  134<H>  06-02          Urinalysis Basic - ( 2021 04:20 )    Color: Yellow / Appearance: Clear / S.011 / pH: x  Gluc: x / Ketone: Negative  / Bili: Negative / Urobili: <2 mg/dL   Blood: x / Protein: Trace / Nitrite: Negative   Leuk Esterase: Negative / RBC: x / WBC x   Sq Epi: x / Non Sq Epi: x / Bacteria: x        RADIOLOGY & ADDITIONAL TESTS:    Imaging Personally Reviewed:    Consultant(s) Notes Reviewed:  heme/onc    Care Discussed with Consultants/Other Providers:

## 2021-06-02 NOTE — CHART NOTE - NSCHARTNOTEFT_GEN_A_CORE
AM labs with hemoglobin of 4.2. Went to see patient at bedside. Patient seen laying in bed comfortably with nasal cannula on and in no acute distress. Patient states he continues to have "all over body pain." Patient denies worsening of the pain from yesterday. Patient denies increased shortness of breath, increased chest pain, or heart palpitations. Vital signs stable. On exam:     CHEST/LUNG: Clear to auscultation bilaterally; No wheeze  HEART: Regular rate and rhythm  ABDOMEN: Soft, Nontender, Nondistended; Bowel sounds present  EXTREMITIES:  No edema  PSYCH: AAOx3    Vital Signs Last 24 Hrs  T(C): 37.3 (02 Jun 2021 05:00), Max: 37.3 (02 Jun 2021 05:00)  T(F): 99.2 (02 Jun 2021 05:00), Max: 99.2 (02 Jun 2021 05:00)  HR: 97 (02 Jun 2021 05:00) (90 - 99)  BP: 118/64 (02 Jun 2021 05:00) (108/62 - 128/69)  BP(mean): --  RR: 18 (02 Jun 2021 05:00) (18 - 18)  SpO2: 97% (02 Jun 2021 05:00) (96% - 98%)    Discussed with hematology team. Patient requires very rare frozen blood due to multiple antibodies. Will hold off on ordering transfusion at this time per hematology recommendations as patient does not report worsening of symptoms. However, if hemoglobin continues to drop or patient with new/worsening symptoms then team to reassess the need for a transfusion. Will follow up further hematology recommendations. Medical attending made aware. Team to monitor closely.

## 2021-06-02 NOTE — ED PROVIDER NOTE - RATE
87 Otezla Counseling: The side effects of Otezla were discussed with the patient, including but not limited to worsening or new depression, weight loss, diarrhea, nausea, upper respiratory tract infection, and headache. Patient instructed to call the office should any adverse effect occur.  The patient verbalized understanding of the proper use and possible adverse effects of Otezla.  All the patient's questions and concerns were addressed.

## 2021-06-02 NOTE — PROGRESS NOTE ADULT - PROBLEM SELECTOR PLAN 1
c/w 1/2 NS,   baseline Hg ~6.5, a/w  5.3 now 4.9 , will hold off on transfusion for now,  As per  blood bank per admission provider, patient on a "do not transfuse list" due to amount of antibodies and inability to tolerate transfusion in the past   heme following      Folate   Incentive spirometry  Pain control with PCA  Hold DVT chemo ppx for now as patient hb is critically low and downtrending. Patient ambulatory at baseline.  CXR with New hazy right perihilar opacity which can be due to asymmetric pulmonary edema or pneumonia., pt with low grade fever , leucocytosis and mild elevated procalcitonin, pt started on Levaquin , f/u blood cx c/w 1/2 NS,   baseline Hg ~6.5, a/w  5.3 now 4.2 , downtrended  , will hold off on transfusion for now,  As per  blood bank per admission provider, patient on a "do not transfuse list" due to amount of antibodies and inability to tolerate transfusion in the past   heme following , pt can get one unit if PRBC, if H/H continues to downtrend or  pt symptomatic , blood bank [facility], has one unit ready for him   Folate   Incentive spirometry  Pain control with PCA  Hold DVT chemo ppx for now as patient hb is critically low and downtrending. Patient ambulatory at baseline.  CXR with New hazy right perihilar opacity which can be due to asymmetric pulmonary edema or pneumonia., pt with low grade fever , leucocytosis and mild elevated procalcitonin, pt started on Levaquin , f/u blood cx, currently NGTD

## 2021-06-03 LAB
ALBUMIN SERPL ELPH-MCNC: 3.3 G/DL — SIGNIFICANT CHANGE UP (ref 3.3–5)
ALP SERPL-CCNC: 198 U/L — HIGH (ref 40–120)
ALT FLD-CCNC: 18 U/L — SIGNIFICANT CHANGE UP (ref 4–41)
ANION GAP SERPL CALC-SCNC: 13 MMOL/L — SIGNIFICANT CHANGE UP (ref 7–14)
AST SERPL-CCNC: 35 U/L — SIGNIFICANT CHANGE UP (ref 4–40)
BASOPHILS # BLD AUTO: 0.04 K/UL — SIGNIFICANT CHANGE UP (ref 0–0.2)
BASOPHILS NFR BLD AUTO: 0.3 % — SIGNIFICANT CHANGE UP (ref 0–2)
BILIRUB SERPL-MCNC: 4.2 MG/DL — HIGH (ref 0.2–1.2)
BUN SERPL-MCNC: 19 MG/DL — SIGNIFICANT CHANGE UP (ref 7–23)
CALCIUM SERPL-MCNC: 9.2 MG/DL — SIGNIFICANT CHANGE UP (ref 8.4–10.5)
CHLORIDE SERPL-SCNC: 108 MMOL/L — HIGH (ref 98–107)
CO2 SERPL-SCNC: 17 MMOL/L — LOW (ref 22–31)
CREAT SERPL-MCNC: 1.25 MG/DL — SIGNIFICANT CHANGE UP (ref 0.5–1.3)
EOSINOPHIL # BLD AUTO: 0.64 K/UL — HIGH (ref 0–0.5)
EOSINOPHIL NFR BLD AUTO: 4.5 % — SIGNIFICANT CHANGE UP (ref 0–6)
GLUCOSE SERPL-MCNC: 84 MG/DL — SIGNIFICANT CHANGE UP (ref 70–99)
HCT VFR BLD CALC: 14.1 % — CRITICAL LOW (ref 39–50)
HGB BLD-MCNC: 4.6 G/DL — CRITICAL LOW (ref 13–17)
IANC: 11.13 K/UL — HIGH (ref 1.5–8.5)
IMM GRANULOCYTES NFR BLD AUTO: 1.6 % — HIGH (ref 0–1.5)
LDH SERPL L TO P-CCNC: 371 U/L — HIGH (ref 135–225)
LYMPHOCYTES # BLD AUTO: 1.02 K/UL — SIGNIFICANT CHANGE UP (ref 1–3.3)
LYMPHOCYTES # BLD AUTO: 7.2 % — LOW (ref 13–44)
MAGNESIUM SERPL-MCNC: 1.9 MG/DL — SIGNIFICANT CHANGE UP (ref 1.6–2.6)
MCHC RBC-ENTMCNC: 28.4 PG — SIGNIFICANT CHANGE UP (ref 27–34)
MCHC RBC-ENTMCNC: 32.6 GM/DL — SIGNIFICANT CHANGE UP (ref 32–36)
MCV RBC AUTO: 87 FL — SIGNIFICANT CHANGE UP (ref 80–100)
MONOCYTES # BLD AUTO: 1.03 K/UL — HIGH (ref 0–0.9)
MONOCYTES NFR BLD AUTO: 7.3 % — SIGNIFICANT CHANGE UP (ref 2–14)
NEUTROPHILS # BLD AUTO: 11.13 K/UL — HIGH (ref 1.8–7.4)
NEUTROPHILS NFR BLD AUTO: 79.1 % — HIGH (ref 43–77)
NRBC # BLD: 8 /100 WBCS — SIGNIFICANT CHANGE UP
NRBC # FLD: 1.09 K/UL — HIGH
PHOSPHATE SERPL-MCNC: 3.5 MG/DL — SIGNIFICANT CHANGE UP (ref 2.5–4.5)
PLATELET # BLD AUTO: 234 K/UL — SIGNIFICANT CHANGE UP (ref 150–400)
POTASSIUM SERPL-MCNC: 4.5 MMOL/L — SIGNIFICANT CHANGE UP (ref 3.5–5.3)
POTASSIUM SERPL-SCNC: 4.5 MMOL/L — SIGNIFICANT CHANGE UP (ref 3.5–5.3)
PROT SERPL-MCNC: 6.6 G/DL — SIGNIFICANT CHANGE UP (ref 6–8.3)
RBC # BLD: 1.62 M/UL — LOW (ref 4.2–5.8)
RBC # BLD: 1.62 M/UL — LOW (ref 4.2–5.8)
RBC # FLD: 22.3 % — HIGH (ref 10.3–14.5)
RETICS #: 255.3 K/UL — HIGH (ref 25–125)
RETICS/RBC NFR: 15.8 % — HIGH (ref 0.5–2.5)
SODIUM SERPL-SCNC: 138 MMOL/L — SIGNIFICANT CHANGE UP (ref 135–145)
WBC # BLD: 14.09 K/UL — HIGH (ref 3.8–10.5)
WBC # FLD AUTO: 14.09 K/UL — HIGH (ref 3.8–10.5)

## 2021-06-03 PROCEDURE — 99233 SBSQ HOSP IP/OBS HIGH 50: CPT

## 2021-06-03 RX ORDER — ACETAMINOPHEN 500 MG
650 TABLET ORAL ONCE
Refills: 0 | Status: COMPLETED | OUTPATIENT
Start: 2021-06-03 | End: 2021-06-03

## 2021-06-03 RX ADMIN — Medication 1 MILLIGRAM(S): at 13:05

## 2021-06-03 RX ADMIN — Medication 25 MILLIGRAM(S): at 13:05

## 2021-06-03 RX ADMIN — SODIUM CHLORIDE 75 MILLILITER(S): 9 INJECTION, SOLUTION INTRAVENOUS at 04:58

## 2021-06-03 RX ADMIN — Medication 650 MILLIGRAM(S): at 09:22

## 2021-06-03 RX ADMIN — Medication 650 MILLIGRAM(S): at 09:52

## 2021-06-03 RX ADMIN — HYDROMORPHONE HYDROCHLORIDE 30 MILLILITER(S): 2 INJECTION INTRAMUSCULAR; INTRAVENOUS; SUBCUTANEOUS at 09:36

## 2021-06-03 RX ADMIN — SODIUM CHLORIDE 75 MILLILITER(S): 9 INJECTION, SOLUTION INTRAVENOUS at 17:04

## 2021-06-03 RX ADMIN — HYDROMORPHONE HYDROCHLORIDE 30 MILLILITER(S): 2 INJECTION INTRAMUSCULAR; INTRAVENOUS; SUBCUTANEOUS at 18:54

## 2021-06-03 RX ADMIN — HYDROMORPHONE HYDROCHLORIDE 30 MILLILITER(S): 2 INJECTION INTRAMUSCULAR; INTRAVENOUS; SUBCUTANEOUS at 07:14

## 2021-06-03 RX ADMIN — SENNA PLUS 2 TABLET(S): 8.6 TABLET ORAL at 21:43

## 2021-06-03 RX ADMIN — FINASTERIDE 5 MILLIGRAM(S): 5 TABLET, FILM COATED ORAL at 13:05

## 2021-06-03 NOTE — PROGRESS NOTE ADULT - PROBLEM SELECTOR PLAN 1
c/w 1/2 NS,   baseline Hg ~6.5, a/w  5.3 now 4.2 , downtrended  , will hold off on transfusion for now,  As per  blood bank per admission provider, patient on a "do not transfuse list" due to amount of antibodies and inability to tolerate transfusion in the past   heme following , pt can get one unit if PRBC, if H/H continues to downtrend or  pt symptomatic , blood bank [facility], has one unit ready for him   Folate   Incentive spirometry  Pain control with PCA  Hold DVT chemo ppx for now as patient hb is critically low and downtrending. Patient ambulatory at baseline.  CXR with New hazy right perihilar opacity which can be due to asymmetric pulmonary edema or pneumonia., pt with low grade fever , leucocytosis and mild elevated procalcitonin, pt started on Levaquin , f/u blood cx, currently NGTD c/w 1/2 NS,   baseline Hg ~6.5, a/w  5.3 --> 4.2-->4.6  , , will hold off on transfusion for now,  As per  blood bank per admission provider, patient on a "do not transfuse list" due to amount of antibodies and inability to tolerate transfusion in the past   heme following , pt can get one unit if PRBC, if H/H continues to downtrend or  pt symptomatic , blood bank [facility], has one unit ready for him   Folate   Incentive spirometry  Pain control with PCA  Hold DVT chemo ppx for now as patient hb is critically low and downtrending. Patient ambulatory at baseline.  CXR with New hazy right perihilar opacity which can be due to asymmetric pulmonary edema or pneumonia., pt with low grade fever , leucocytosis and mild elevated procalcitonin, pt started on Levaquin , f/u blood cx, currently NGTD

## 2021-06-03 NOTE — PROGRESS NOTE ADULT - SUBJECTIVE AND OBJECTIVE BOX
Patient is a 66y old  Male who presents with a chief complaint of diffuse body pain (02 Jun 2021 10:28)      SUBJECTIVE / OVERNIGHT EVENTS:    MEDICATIONS  (STANDING):  finasteride 5 milliGRAM(s) Oral daily  folic acid 1 milliGRAM(s) Oral daily  HYDROmorphone PCA (1 mG/mL) 30 milliLiter(s) PCA Continuous PCA Continuous  levoFLOXacin IVPB 500 milliGRAM(s) IV Intermittent every 24 hours  metoprolol tartrate 25 milliGRAM(s) Oral two times a day  senna 2 Tablet(s) Oral at bedtime  sodium chloride 0.45%. 1000 milliLiter(s) (75 mL/Hr) IV Continuous <Continuous>    MEDICATIONS  (PRN):  diphenhydrAMINE 25 milliGRAM(s) Oral every 4 hours PRN Rash and/or Itching  naloxone Injectable 0.1 milliGRAM(s) IV Push every 3 minutes PRN For ANY of the following changes in patient status:  A. RR LESS THAN 10 breaths per minute, B. Oxygen saturation LESS THAN 90%, C. Sedation score of 6  ondansetron Injectable 4 milliGRAM(s) IV Push every 6 hours PRN Nausea      Vital Signs Last 24 Hrs  T(C): 36.8 (03 Jun 2021 09:00), Max: 37.4 (02 Jun 2021 13:00)  T(F): 98.2 (03 Jun 2021 09:00), Max: 99.3 (02 Jun 2021 13:00)  HR: 92 (03 Jun 2021 09:00) (70 - 95)  BP: 123/68 (03 Jun 2021 09:00) (104/62 - 123/68)  BP(mean): --  RR: 18 (03 Jun 2021 09:00) (18 - 18)  SpO2: 98% (03 Jun 2021 09:00) (95% - 98%)  CAPILLARY BLOOD GLUCOSE        I&O's Summary      PHYSICAL EXAM:  GENERAL: NAD, well-developed  HEAD:  Atraumatic, Normocephalic  EYES: EOMI, PERRLA, conjunctiva and sclera clear  NECK: Supple, No JVD  CHEST/LUNG: Clear to auscultation bilaterally; No wheeze  HEART: Regular rate and rhythm; No murmurs, rubs, or gallops  ABDOMEN: Soft, Nontender, Nondistended; Bowel sounds present  EXTREMITIES:  2+ Peripheral Pulses, No clubbing, cyanosis, or edema  PSYCH: AAOx3  NEUROLOGY: non-focal  SKIN: No rashes or lesions    LABS:                        4.2    13.96 )-----------( 194      ( 02 Jun 2021 07:26 )             12.5     06-03    138  |  108<H>  |  19  ----------------------------<  84  4.5   |  17<L>  |  1.25    Ca    9.2      03 Jun 2021 06:40  Phos  3.5     06-03  Mg     1.9     06-03    TPro  6.6  /  Alb  3.3  /  TBili  4.2<H>  /  DBili  x   /  AST  35  /  ALT  18  /  AlkPhos  198<H>  06-03              RADIOLOGY & ADDITIONAL TESTS:    Imaging Personally Reviewed:    Consultant(s) Notes Reviewed:      Care Discussed with Consultants/Other Providers:   Patient is a 66y old  Male who presents with a chief complaint of diffuse body pain (02 Jun 2021 10:28)      SUBJECTIVE / OVERNIGHT EVENTS: patient seen and examined by bedside, pt says pain is slightly better, today, reports mild palpitation , Pt did not get his lopressor, in the morning, denies headache, dizziness, SOB, CP, , N/V/D, abdominal pain        MEDICATIONS  (STANDING):  finasteride 5 milliGRAM(s) Oral daily  folic acid 1 milliGRAM(s) Oral daily  HYDROmorphone PCA (1 mG/mL) 30 milliLiter(s) PCA Continuous PCA Continuous  levoFLOXacin IVPB 500 milliGRAM(s) IV Intermittent every 24 hours  metoprolol tartrate 25 milliGRAM(s) Oral two times a day  senna 2 Tablet(s) Oral at bedtime  sodium chloride 0.45%. 1000 milliLiter(s) (75 mL/Hr) IV Continuous <Continuous>    MEDICATIONS  (PRN):  diphenhydrAMINE 25 milliGRAM(s) Oral every 4 hours PRN Rash and/or Itching  naloxone Injectable 0.1 milliGRAM(s) IV Push every 3 minutes PRN For ANY of the following changes in patient status:  A. RR LESS THAN 10 breaths per minute, B. Oxygen saturation LESS THAN 90%, C. Sedation score of 6  ondansetron Injectable 4 milliGRAM(s) IV Push every 6 hours PRN Nausea      Vital Signs Last 24 Hrs  T(C): 36.8 (03 Jun 2021 09:00), Max: 37.4 (02 Jun 2021 13:00)  T(F): 98.2 (03 Jun 2021 09:00), Max: 99.3 (02 Jun 2021 13:00)  HR: 92 (03 Jun 2021 09:00) (70 - 95)  BP: 123/68 (03 Jun 2021 09:00) (104/62 - 123/68)  BP(mean): --  RR: 18 (03 Jun 2021 09:00) (18 - 18)  SpO2: 98% (03 Jun 2021 09:00) (95% - 98%)  CAPILLARY BLOOD GLUCOSE        I&O's Summary    PHYSICAL EXAM:  GENERAL: NAD, thin built   HEAD:  Atraumatic, Normocephalic  EYES: EOMI, PERRLA, conjunctiva and sclera clear  CHEST/LUNG: Clear to auscultation bilaterally; No wheeze  HEART: Regular rate and rhythm;   ABDOMEN: Soft, Nontender, Nondistended; Bowel sounds present  EXTREMITIES:  2+ Peripheral Pulses, No clubbing, cyanosis, or edema  PSYCH: AAOx3  NEUROLOGY: non-focal  SKIN: No rashes or lesions        LABS:                           4.6    14.09 )-----------( 234      ( 03 Jun 2021 06:40 )             14.1             06-03    138  |  108<H>  |  19  ----------------------------<  84  4.5   |  17<L>  |  1.25    Ca    9.2      03 Jun 2021 06:40  Phos  3.5     06-03  Mg     1.9     06-03    TPro  6.6  /  Alb  3.3  /  TBili  4.2<H>  /  DBili  x   /  AST  35  /  ALT  18  /  AlkPhos  198<H>  06-03                RADIOLOGY & ADDITIONAL TESTS:    Imaging Personally Reviewed:    Consultant(s) Notes Reviewed:      Care Discussed with Consultants/Other Providers:

## 2021-06-04 LAB
ALBUMIN SERPL ELPH-MCNC: 3.3 G/DL — SIGNIFICANT CHANGE UP (ref 3.3–5)
ALP SERPL-CCNC: 212 U/L — HIGH (ref 40–120)
ALT FLD-CCNC: 25 U/L — SIGNIFICANT CHANGE UP (ref 4–41)
ANION GAP SERPL CALC-SCNC: 12 MMOL/L — SIGNIFICANT CHANGE UP (ref 7–14)
AST SERPL-CCNC: 44 U/L — HIGH (ref 4–40)
BASE EXCESS BLDV CALC-SCNC: -2.8 MMOL/L — SIGNIFICANT CHANGE UP (ref -3–2)
BILIRUB SERPL-MCNC: 3.3 MG/DL — HIGH (ref 0.2–1.2)
BUN SERPL-MCNC: 19 MG/DL — SIGNIFICANT CHANGE UP (ref 7–23)
CALCIUM SERPL-MCNC: 8.7 MG/DL — SIGNIFICANT CHANGE UP (ref 8.4–10.5)
CHLORIDE SERPL-SCNC: 108 MMOL/L — HIGH (ref 98–107)
CO2 SERPL-SCNC: 19 MMOL/L — LOW (ref 22–31)
CREAT SERPL-MCNC: 1.13 MG/DL — SIGNIFICANT CHANGE UP (ref 0.5–1.3)
GLUCOSE SERPL-MCNC: 84 MG/DL — SIGNIFICANT CHANGE UP (ref 70–99)
HCO3 BLDV-SCNC: 22 MMOL/L — SIGNIFICANT CHANGE UP (ref 20–27)
LDH SERPL L TO P-CCNC: 402 U/L — HIGH (ref 135–225)
MAGNESIUM SERPL-MCNC: 1.9 MG/DL — SIGNIFICANT CHANGE UP (ref 1.6–2.6)
PCO2 BLDV: 41 MMHG — SIGNIFICANT CHANGE UP (ref 41–51)
PH BLDV: 7.35 — SIGNIFICANT CHANGE UP (ref 7.32–7.43)
PHOSPHATE SERPL-MCNC: 3.7 MG/DL — SIGNIFICANT CHANGE UP (ref 2.5–4.5)
PO2 BLDV: 51 MMHG — HIGH (ref 35–40)
POTASSIUM SERPL-MCNC: 4.5 MMOL/L — SIGNIFICANT CHANGE UP (ref 3.5–5.3)
POTASSIUM SERPL-SCNC: 4.5 MMOL/L — SIGNIFICANT CHANGE UP (ref 3.5–5.3)
PROT SERPL-MCNC: 6.7 G/DL — SIGNIFICANT CHANGE UP (ref 6–8.3)
SAO2 % BLDV: 72.1 % — SIGNIFICANT CHANGE UP (ref 60–85)
SODIUM SERPL-SCNC: 139 MMOL/L — SIGNIFICANT CHANGE UP (ref 135–145)

## 2021-06-04 PROCEDURE — 99233 SBSQ HOSP IP/OBS HIGH 50: CPT

## 2021-06-04 RX ADMIN — HYDROMORPHONE HYDROCHLORIDE 30 MILLILITER(S): 2 INJECTION INTRAMUSCULAR; INTRAVENOUS; SUBCUTANEOUS at 07:14

## 2021-06-04 RX ADMIN — Medication 1 MILLIGRAM(S): at 12:51

## 2021-06-04 RX ADMIN — HYDROMORPHONE HYDROCHLORIDE 30 MILLILITER(S): 2 INJECTION INTRAMUSCULAR; INTRAVENOUS; SUBCUTANEOUS at 19:23

## 2021-06-04 RX ADMIN — SENNA PLUS 2 TABLET(S): 8.6 TABLET ORAL at 21:07

## 2021-06-04 RX ADMIN — Medication 25 MILLIGRAM(S): at 16:57

## 2021-06-04 RX ADMIN — SODIUM CHLORIDE 75 MILLILITER(S): 9 INJECTION, SOLUTION INTRAVENOUS at 04:59

## 2021-06-04 RX ADMIN — Medication 25 MILLIGRAM(S): at 04:58

## 2021-06-04 RX ADMIN — FINASTERIDE 5 MILLIGRAM(S): 5 TABLET, FILM COATED ORAL at 12:51

## 2021-06-04 NOTE — PROGRESS NOTE ADULT - PROBLEM SELECTOR PLAN 1
c/w 1/2 NS,   baseline Hg ~6.5, a/w  5.3 --> 4.2-->4.6  , , will hold off on transfusion for now,  As per  blood bank per admission provider, patient on a "do not transfuse list" due to amount of antibodies and inability to tolerate transfusion in the past   heme following , pt can get one unit if PRBC, if H/H continues to downtrend or  pt symptomatic , blood bank [facility], has one unit ready for him   Folate   Incentive spirometry  Pain control with PCA  Hold DVT chemo ppx for now as patient hb is critically low and downtrending. Patient ambulatory at baseline.  CXR with New hazy right perihilar opacity which can be due to asymmetric pulmonary edema or pneumonia., pt with low grade fever , leucocytosis and mild elevated procalcitonin, pt started on Levaquin , f/u blood cx, currently NGTD c/w 1/2 NS,   baseline Hg ~6.5, a/w  5.3 --> 4.2-->4.6  , , will hold off on transfusion for now,  As per  blood bank per admission provider, patient on a "do not transfuse list" due to amount of antibodies and inability to tolerate transfusion in the past   heme following , pt can get one unit if PRBC, if H/H continues to downtrend or  pt symptomatic , blood bank [facility], has one unit ready for him   Folate   Incentive spirometry  Pain control with PCA  Hold DVT chemo ppx for now as patient hb is critically low and downtrending. Patient ambulatory at baseline.  CXR with New hazy right perihilar opacity which can be due to asymmetric pulmonary edema or pneumonia., pt with low grade fever , leucocytosis and mild elevated procalcitonin, pt started on Levaquin , f/u blood cx, currently NGTD  will treat for 5 days   plan of care was d/w PMD Dr Holley , recommend checking FOBT , as pt was positive before and did not want workup

## 2021-06-04 NOTE — PROGRESS NOTE ADULT - SUBJECTIVE AND OBJECTIVE BOX
Patient is a 66y old  Male who presents with a chief complaint of diffuse body pain (03 Jun 2021 10:48)      SUBJECTIVE / OVERNIGHT EVENTS:    MEDICATIONS  (STANDING):  finasteride 5 milliGRAM(s) Oral daily  folic acid 1 milliGRAM(s) Oral daily  HYDROmorphone PCA (1 mG/mL) 30 milliLiter(s) PCA Continuous PCA Continuous  levoFLOXacin IVPB 500 milliGRAM(s) IV Intermittent every 24 hours  metoprolol tartrate 25 milliGRAM(s) Oral two times a day  senna 2 Tablet(s) Oral at bedtime  sodium chloride 0.45%. 1000 milliLiter(s) (75 mL/Hr) IV Continuous <Continuous>    MEDICATIONS  (PRN):  diphenhydrAMINE 25 milliGRAM(s) Oral every 4 hours PRN Rash and/or Itching  naloxone Injectable 0.1 milliGRAM(s) IV Push every 3 minutes PRN For ANY of the following changes in patient status:  A. RR LESS THAN 10 breaths per minute, B. Oxygen saturation LESS THAN 90%, C. Sedation score of 6  ondansetron Injectable 4 milliGRAM(s) IV Push every 6 hours PRN Nausea      Vital Signs Last 24 Hrs  T(C): 36.9 (04 Jun 2021 04:56), Max: 36.9 (04 Jun 2021 01:00)  T(F): 98.4 (04 Jun 2021 04:56), Max: 98.4 (04 Jun 2021 01:00)  HR: 71 (04 Jun 2021 04:56) (69 - 95)  BP: 124/65 (04 Jun 2021 04:56) (112/59 - 124/65)  BP(mean): --  RR: 18 (04 Jun 2021 04:56) (16 - 18)  SpO2: 96% (04 Jun 2021 04:56) (96% - 99%)  CAPILLARY BLOOD GLUCOSE        I&O's Summary      PHYSICAL EXAM:  GENERAL: NAD, well-developed  HEAD:  Atraumatic, Normocephalic  EYES: EOMI, PERRLA, conjunctiva and sclera clear  NECK: Supple, No JVD  CHEST/LUNG: Clear to auscultation bilaterally; No wheeze  HEART: Regular rate and rhythm; No murmurs, rubs, or gallops  ABDOMEN: Soft, Nontender, Nondistended; Bowel sounds present  EXTREMITIES:  2+ Peripheral Pulses, No clubbing, cyanosis, or edema  PSYCH: AAOx3  NEUROLOGY: non-focal  SKIN: No rashes or lesions    LABS:                        4.6    14.09 )-----------( 234      ( 03 Jun 2021 06:40 )             14.1     06-04    139  |  108<H>  |  19  ----------------------------<  84  4.5   |  19<L>  |  1.13    Ca    8.7      04 Jun 2021 07:05  Phos  3.7     06-04  Mg     1.9     06-04    TPro  6.7  /  Alb  3.3  /  TBili  3.3<H>  /  DBili  x   /  AST  44<H>  /  ALT  25  /  AlkPhos  212<H>  06-04              RADIOLOGY & ADDITIONAL TESTS:    Imaging Personally Reviewed:    Consultant(s) Notes Reviewed:      Care Discussed with Consultants/Other Providers:   Patient is a 66y old  Male who presents with a chief complaint of diffuse body pain (03 Jun 2021 10:48)      SUBJECTIVE / OVERNIGHT EVENTS: patient seen and examined by bedside, pt says pain is a little worse today , pt denies headache, dizziness, SOB, CP, Palpitations , N/V/D, abdominal pain        MEDICATIONS  (STANDING):  finasteride 5 milliGRAM(s) Oral daily  folic acid 1 milliGRAM(s) Oral daily  HYDROmorphone PCA (1 mG/mL) 30 milliLiter(s) PCA Continuous PCA Continuous  levoFLOXacin IVPB 500 milliGRAM(s) IV Intermittent every 24 hours  metoprolol tartrate 25 milliGRAM(s) Oral two times a day  senna 2 Tablet(s) Oral at bedtime  sodium chloride 0.45%. 1000 milliLiter(s) (75 mL/Hr) IV Continuous <Continuous>    MEDICATIONS  (PRN):  diphenhydrAMINE 25 milliGRAM(s) Oral every 4 hours PRN Rash and/or Itching  naloxone Injectable 0.1 milliGRAM(s) IV Push every 3 minutes PRN For ANY of the following changes in patient status:  A. RR LESS THAN 10 breaths per minute, B. Oxygen saturation LESS THAN 90%, C. Sedation score of 6  ondansetron Injectable 4 milliGRAM(s) IV Push every 6 hours PRN Nausea      Vital Signs Last 24 Hrs  T(C): 36.9 (04 Jun 2021 04:56), Max: 36.9 (04 Jun 2021 01:00)  T(F): 98.4 (04 Jun 2021 04:56), Max: 98.4 (04 Jun 2021 01:00)  HR: 71 (04 Jun 2021 04:56) (69 - 95)  BP: 124/65 (04 Jun 2021 04:56) (112/59 - 124/65)  BP(mean): --  RR: 18 (04 Jun 2021 04:56) (16 - 18)  SpO2: 96% (04 Jun 2021 04:56) (96% - 99%)      PHYSICAL EXAM:  GENERAL: NAD, thin built   HEAD:  Atraumatic, Normocephalic  EYES: EOMI, PERRLA, conjunctiva and sclera clear  CHEST/LUNG: Clear to auscultation bilaterally; No wheeze  HEART: Regular rate and rhythm;   ABDOMEN: Soft, Nontender, Nondistended; Bowel sounds present  EXTREMITIES:  2+ Peripheral Pulses, No clubbing, cyanosis, or edema  PSYCH: AAOx3  NEUROLOGY: non-focal      LABS:                        4.6    14.09 )-----------( 234      ( 03 Jun 2021 06:40 )             14.1     06-04    139  |  108<H>  |  19  ----------------------------<  84  4.5   |  19<L>  |  1.13    Ca    8.7      04 Jun 2021 07:05  Phos  3.7     06-04  Mg     1.9     06-04    TPro  6.7  /  Alb  3.3  /  TBili  3.3<H>  /  DBili  x   /  AST  44<H>  /  ALT  25  /  AlkPhos  212<H>  06-04              RADIOLOGY & ADDITIONAL TESTS:    Imaging Personally Reviewed:    Consultant(s) Notes Reviewed:      Care Discussed with Consultants/Other Providers: PMD Dr Holley

## 2021-06-05 PROCEDURE — 99233 SBSQ HOSP IP/OBS HIGH 50: CPT

## 2021-06-05 RX ADMIN — FINASTERIDE 5 MILLIGRAM(S): 5 TABLET, FILM COATED ORAL at 11:23

## 2021-06-05 RX ADMIN — Medication 1 MILLIGRAM(S): at 11:23

## 2021-06-05 RX ADMIN — Medication 25 MILLIGRAM(S): at 17:14

## 2021-06-05 RX ADMIN — SENNA PLUS 2 TABLET(S): 8.6 TABLET ORAL at 21:18

## 2021-06-05 RX ADMIN — HYDROMORPHONE HYDROCHLORIDE 30 MILLILITER(S): 2 INJECTION INTRAMUSCULAR; INTRAVENOUS; SUBCUTANEOUS at 07:07

## 2021-06-05 RX ADMIN — HYDROMORPHONE HYDROCHLORIDE 30 MILLILITER(S): 2 INJECTION INTRAMUSCULAR; INTRAVENOUS; SUBCUTANEOUS at 19:06

## 2021-06-05 RX ADMIN — SODIUM CHLORIDE 75 MILLILITER(S): 9 INJECTION, SOLUTION INTRAVENOUS at 11:23

## 2021-06-05 RX ADMIN — Medication 25 MILLIGRAM(S): at 05:18

## 2021-06-05 RX ADMIN — HYDROMORPHONE HYDROCHLORIDE 30 MILLILITER(S): 2 INJECTION INTRAMUSCULAR; INTRAVENOUS; SUBCUTANEOUS at 11:07

## 2021-06-05 NOTE — PROGRESS NOTE ADULT - PROBLEM SELECTOR PLAN 1
c/w 1/2 NS,   baseline Hg ~6.5, a/w  5.3 --> 4.2-->4.6  , , will hold off on transfusion for now,  As per  blood bank per admission provider, patient on a "do not transfuse list" due to amount of antibodies and inability to tolerate transfusion in the past   heme following , pt can get one unit if PRBC, if H/H continues to downtrend or  pt symptomatic , blood [facility], has one unit ready for him   Folate   Incentive spirometry  Pain control with PCA  Hold DVT chemo ppx for now as patient hb is critically low and downtrending. Patient ambulatory at baseline.  CXR with New hazy right perihilar opacity which can be due to asymmetric pulmonary edema or pneumonia., pt with low grade fever , leucocytosis and mild elevated procalcitonin, pt started on Levaquin , f/u blood cx, currently NGTD  will treat for 5 days   plan of care was d/w PMD Dr Holley , recommend checking FOBT , as pt was positive before and did not want workup c/w 1/2 NS,   baseline Hg ~6.5, a/w  5.3 --> 4.2-->4.6  , , will hold off on transfusion for now,  As per  blood bank per admission provider, patient on a "do not transfuse list" due to amount of antibodies and inability to tolerate transfusion in the past   heme following , pt can get one unit if PRBC, if H/H continues to downtrend or  pt symptomatic , blood bank [facility], has one unit ready for him   Folate   Incentive spirometry  Pain control with PCA  Hold DVT chemo ppx for now as patient hb is critically low and downtrending. Patient ambulatory at baseline.  CXR with New hazy right perihilar opacity which can be due to asymmetric pulmonary edema or pneumonia., pt with low grade fever , leucocytosis and mild elevated procalcitonin, pt started on Levaquin day #5 today ,, f/u blood cx, currently NGTD  will treat for 5 days   plan of care was d/w PMD Dr Holley , recommend checking FOBT , as pt was positive before and did not want workup

## 2021-06-05 NOTE — PROGRESS NOTE ADULT - SUBJECTIVE AND OBJECTIVE BOX
Patient is a 66y old  Male who presents with a chief complaint of diffuse body pain (04 Jun 2021 10:23)      SUBJECTIVE / OVERNIGHT EVENTS:    MEDICATIONS  (STANDING):  finasteride 5 milliGRAM(s) Oral daily  folic acid 1 milliGRAM(s) Oral daily  HYDROmorphone PCA (1 mG/mL) 30 milliLiter(s) PCA Continuous PCA Continuous  levoFLOXacin IVPB 500 milliGRAM(s) IV Intermittent every 24 hours  metoprolol tartrate 25 milliGRAM(s) Oral two times a day  senna 2 Tablet(s) Oral at bedtime  sodium chloride 0.45%. 1000 milliLiter(s) (75 mL/Hr) IV Continuous <Continuous>    MEDICATIONS  (PRN):  diphenhydrAMINE 25 milliGRAM(s) Oral every 4 hours PRN Rash and/or Itching  naloxone Injectable 0.1 milliGRAM(s) IV Push every 3 minutes PRN For ANY of the following changes in patient status:  A. RR LESS THAN 10 breaths per minute, B. Oxygen saturation LESS THAN 90%, C. Sedation score of 6  ondansetron Injectable 4 milliGRAM(s) IV Push every 6 hours PRN Nausea      Vital Signs Last 24 Hrs  T(C): 37 (05 Jun 2021 09:00), Max: 37 (05 Jun 2021 09:00)  T(F): 98.6 (05 Jun 2021 09:00), Max: 98.6 (05 Jun 2021 09:00)  HR: 76 (05 Jun 2021 09:00) (76 - 96)  BP: 125/57 (05 Jun 2021 09:00) (125/57 - 132/60)  BP(mean): --  RR: 18 (05 Jun 2021 09:00) (16 - 18)  SpO2: 96% (05 Jun 2021 09:00) (94% - 98%)  CAPILLARY BLOOD GLUCOSE        I&O's Summary      PHYSICAL EXAM:  GENERAL: NAD, well-developed  HEAD:  Atraumatic, Normocephalic  EYES: EOMI, PERRLA, conjunctiva and sclera clear  NECK: Supple, No JVD  CHEST/LUNG: Clear to auscultation bilaterally; No wheeze  HEART: Regular rate and rhythm; No murmurs, rubs, or gallops  ABDOMEN: Soft, Nontender, Nondistended; Bowel sounds present  EXTREMITIES:  2+ Peripheral Pulses, No clubbing, cyanosis, or edema  PSYCH: AAOx3  NEUROLOGY: non-focal  SKIN: No rashes or lesions    LABS:    06-04    139  |  108<H>  |  19  ----------------------------<  84  4.5   |  19<L>  |  1.13    Ca    8.7      04 Jun 2021 07:05  Phos  3.7     06-04  Mg     1.9     06-04    TPro  6.7  /  Alb  3.3  /  TBili  3.3<H>  /  DBili  x   /  AST  44<H>  /  ALT  25  /  AlkPhos  212<H>  06-04              RADIOLOGY & ADDITIONAL TESTS:    Imaging Personally Reviewed:    Consultant(s) Notes Reviewed:      Care Discussed with Consultants/Other Providers:   Patient is a 66y old  Male who presents with a chief complaint of diffuse body pain (04 Jun 2021 10:23)      SUBJECTIVE / OVERNIGHT EVENTS: patient seen and examined by bedside, pt says pain is about the same , no acute events over night        MEDICATIONS  (STANDING):  finasteride 5 milliGRAM(s) Oral daily  folic acid 1 milliGRAM(s) Oral daily  HYDROmorphone PCA (1 mG/mL) 30 milliLiter(s) PCA Continuous PCA Continuous  levoFLOXacin IVPB 500 milliGRAM(s) IV Intermittent every 24 hours  metoprolol tartrate 25 milliGRAM(s) Oral two times a day  senna 2 Tablet(s) Oral at bedtime  sodium chloride 0.45%. 1000 milliLiter(s) (75 mL/Hr) IV Continuous <Continuous>    MEDICATIONS  (PRN):  diphenhydrAMINE 25 milliGRAM(s) Oral every 4 hours PRN Rash and/or Itching  naloxone Injectable 0.1 milliGRAM(s) IV Push every 3 minutes PRN For ANY of the following changes in patient status:  A. RR LESS THAN 10 breaths per minute, B. Oxygen saturation LESS THAN 90%, C. Sedation score of 6  ondansetron Injectable 4 milliGRAM(s) IV Push every 6 hours PRN Nausea      Vital Signs Last 24 Hrs  T(C): 37 (05 Jun 2021 09:00), Max: 37 (05 Jun 2021 09:00)  T(F): 98.6 (05 Jun 2021 09:00), Max: 98.6 (05 Jun 2021 09:00)  HR: 76 (05 Jun 2021 09:00) (76 - 96)  BP: 125/57 (05 Jun 2021 09:00) (125/57 - 132/60)  BP(mean): --  RR: 18 (05 Jun 2021 09:00) (16 - 18)  SpO2: 96% (05 Jun 2021 09:00) (94% - 98%)      PHYSICAL EXAM:  GENERAL: NAD, thin built   HEAD:  Atraumatic, Normocephalic  EYES: EOMI, PERRLA, conjunctiva and sclera clear  CHEST/LUNG: Clear to auscultation bilaterally; No wheeze  HEART: Regular rate and rhythm;   ABDOMEN: Soft, Nontender, Nondistended; Bowel sounds present  EXTREMITIES:  2+ Peripheral Pulses, No clubbing, cyanosis, or edema  PSYCH: AAOx3  NEUROLOGY: non-focal      LABS:    06-04    139  |  108<H>  |  19  ----------------------------<  84  4.5   |  19<L>  |  1.13    Ca    8.7      04 Jun 2021 07:05  Phos  3.7     06-04  Mg     1.9     06-04    TPro  6.7  /  Alb  3.3  /  TBili  3.3<H>  /  DBili  x   /  AST  44<H>  /  ALT  25  /  AlkPhos  212<H>  06-04              RADIOLOGY & ADDITIONAL TESTS:    Imaging Personally Reviewed:    Consultant(s) Notes Reviewed:      Care Discussed with Consultants/Other Providers:

## 2021-06-06 LAB
ANION GAP SERPL CALC-SCNC: 10 MMOL/L — SIGNIFICANT CHANGE UP (ref 7–14)
BUN SERPL-MCNC: 11 MG/DL — SIGNIFICANT CHANGE UP (ref 7–23)
CALCIUM SERPL-MCNC: 9 MG/DL — SIGNIFICANT CHANGE UP (ref 8.4–10.5)
CHLORIDE SERPL-SCNC: 106 MMOL/L — SIGNIFICANT CHANGE UP (ref 98–107)
CO2 SERPL-SCNC: 20 MMOL/L — LOW (ref 22–31)
CREAT SERPL-MCNC: 1.05 MG/DL — SIGNIFICANT CHANGE UP (ref 0.5–1.3)
CULTURE RESULTS: SIGNIFICANT CHANGE UP
GLUCOSE SERPL-MCNC: 94 MG/DL — SIGNIFICANT CHANGE UP (ref 70–99)
HCT VFR BLD CALC: 14.3 % — CRITICAL LOW (ref 39–50)
HGB BLD-MCNC: 4.5 G/DL — CRITICAL LOW (ref 13–17)
LDH SERPL L TO P-CCNC: 345 U/L — HIGH (ref 135–225)
MAGNESIUM SERPL-MCNC: 1.8 MG/DL — SIGNIFICANT CHANGE UP (ref 1.6–2.6)
MCHC RBC-ENTMCNC: 27.8 PG — SIGNIFICANT CHANGE UP (ref 27–34)
MCHC RBC-ENTMCNC: 31.5 GM/DL — LOW (ref 32–36)
MCV RBC AUTO: 88.3 FL — SIGNIFICANT CHANGE UP (ref 80–100)
NRBC # BLD: 42 /100 WBCS — SIGNIFICANT CHANGE UP
NRBC # FLD: 4.38 K/UL — HIGH
OB PNL STL: POSITIVE
PHOSPHATE SERPL-MCNC: 3.5 MG/DL — SIGNIFICANT CHANGE UP (ref 2.5–4.5)
PLATELET # BLD AUTO: 268 K/UL — SIGNIFICANT CHANGE UP (ref 150–400)
POTASSIUM SERPL-MCNC: 4.2 MMOL/L — SIGNIFICANT CHANGE UP (ref 3.5–5.3)
POTASSIUM SERPL-SCNC: 4.2 MMOL/L — SIGNIFICANT CHANGE UP (ref 3.5–5.3)
RBC # BLD: 1.62 M/UL — LOW (ref 4.2–5.8)
RBC # BLD: 1.62 M/UL — LOW (ref 4.2–5.8)
RBC # FLD: 26.1 % — HIGH (ref 10.3–14.5)
RETICS #: 292.9 K/UL — HIGH (ref 25–125)
RETICS/RBC NFR: 17.9 % — HIGH (ref 0.5–2.5)
SODIUM SERPL-SCNC: 136 MMOL/L — SIGNIFICANT CHANGE UP (ref 135–145)
SPECIMEN SOURCE: SIGNIFICANT CHANGE UP
WBC # BLD: 10.4 K/UL — SIGNIFICANT CHANGE UP (ref 3.8–10.5)
WBC # FLD AUTO: 10.4 K/UL — SIGNIFICANT CHANGE UP (ref 3.8–10.5)

## 2021-06-06 PROCEDURE — 99232 SBSQ HOSP IP/OBS MODERATE 35: CPT

## 2021-06-06 RX ORDER — HYDROMORPHONE HYDROCHLORIDE 2 MG/ML
30 INJECTION INTRAMUSCULAR; INTRAVENOUS; SUBCUTANEOUS
Refills: 0 | Status: DISCONTINUED | OUTPATIENT
Start: 2021-06-06 | End: 2021-06-08

## 2021-06-06 RX ADMIN — SENNA PLUS 2 TABLET(S): 8.6 TABLET ORAL at 21:01

## 2021-06-06 RX ADMIN — SODIUM CHLORIDE 75 MILLILITER(S): 9 INJECTION, SOLUTION INTRAVENOUS at 05:13

## 2021-06-06 RX ADMIN — HYDROMORPHONE HYDROCHLORIDE 30 MILLILITER(S): 2 INJECTION INTRAMUSCULAR; INTRAVENOUS; SUBCUTANEOUS at 07:12

## 2021-06-06 RX ADMIN — HYDROMORPHONE HYDROCHLORIDE 30 MILLILITER(S): 2 INJECTION INTRAMUSCULAR; INTRAVENOUS; SUBCUTANEOUS at 19:11

## 2021-06-06 RX ADMIN — HYDROMORPHONE HYDROCHLORIDE 30 MILLILITER(S): 2 INJECTION INTRAMUSCULAR; INTRAVENOUS; SUBCUTANEOUS at 17:06

## 2021-06-06 RX ADMIN — SODIUM CHLORIDE 75 MILLILITER(S): 9 INJECTION, SOLUTION INTRAVENOUS at 13:25

## 2021-06-06 RX ADMIN — FINASTERIDE 5 MILLIGRAM(S): 5 TABLET, FILM COATED ORAL at 13:21

## 2021-06-06 RX ADMIN — Medication 25 MILLIGRAM(S): at 17:11

## 2021-06-06 RX ADMIN — Medication 1 MILLIGRAM(S): at 13:21

## 2021-06-06 RX ADMIN — Medication 25 MILLIGRAM(S): at 05:11

## 2021-06-06 NOTE — PROGRESS NOTE ADULT - SUBJECTIVE AND OBJECTIVE BOX
Patient is a 66y old  Male who presents with a chief complaint of diffuse body pain (05 Jun 2021 10:55)      SUBJECTIVE / OVERNIGHT EVENTS:    MEDICATIONS  (STANDING):  finasteride 5 milliGRAM(s) Oral daily  folic acid 1 milliGRAM(s) Oral daily  HYDROmorphone PCA (1 mG/mL) 30 milliLiter(s) PCA Continuous PCA Continuous  levoFLOXacin IVPB 500 milliGRAM(s) IV Intermittent every 24 hours  metoprolol tartrate 25 milliGRAM(s) Oral two times a day  senna 2 Tablet(s) Oral at bedtime  sodium chloride 0.45%. 1000 milliLiter(s) (75 mL/Hr) IV Continuous <Continuous>    MEDICATIONS  (PRN):  diphenhydrAMINE 25 milliGRAM(s) Oral every 4 hours PRN Rash and/or Itching  naloxone Injectable 0.1 milliGRAM(s) IV Push every 3 minutes PRN For ANY of the following changes in patient status:  A. RR LESS THAN 10 breaths per minute, B. Oxygen saturation LESS THAN 90%, C. Sedation score of 6  ondansetron Injectable 4 milliGRAM(s) IV Push every 6 hours PRN Nausea      Vital Signs Last 24 Hrs  T(C): 36.8 (06 Jun 2021 05:00), Max: 37.2 (05 Jun 2021 13:00)  T(F): 98.3 (06 Jun 2021 05:00), Max: 98.9 (05 Jun 2021 13:00)  HR: 75 (06 Jun 2021 09:00) (63 - 87)  BP: 130/60 (06 Jun 2021 09:00) (113/54 - 141/64)  BP(mean): --  RR: 16 (06 Jun 2021 09:00) (16 - 18)  SpO2: 99% (06 Jun 2021 09:00) (95% - 99%)  CAPILLARY BLOOD GLUCOSE        I&O's Summary      PHYSICAL EXAM:  GENERAL: NAD, well-developed  HEAD:  Atraumatic, Normocephalic  EYES: EOMI, PERRLA, conjunctiva and sclera clear  NECK: Supple, No JVD  CHEST/LUNG: Clear to auscultation bilaterally; No wheeze  HEART: Regular rate and rhythm; No murmurs, rubs, or gallops  ABDOMEN: Soft, Nontender, Nondistended; Bowel sounds present  EXTREMITIES:  2+ Peripheral Pulses, No clubbing, cyanosis, or edema  PSYCH: AAOx3  NEUROLOGY: non-focal  SKIN: No rashes or lesions    LABS:                        4.5    10.40 )-----------( 268      ( 06 Jun 2021 04:02 )             14.3     06-06    136  |  106  |  11  ----------------------------<  94  4.2   |  20<L>  |  1.05    Ca    9.0      06 Jun 2021 04:02  Phos  3.5     06-06  Mg     1.8     06-06                RADIOLOGY & ADDITIONAL TESTS:    Imaging Personally Reviewed:    Consultant(s) Notes Reviewed:      Care Discussed with Consultants/Other Providers:   Patient is a 66y old  Male who presents with a chief complaint of diffuse body pain (05 Jun 2021 10:55)      SUBJECTIVE / OVERNIGHT EVENTS: patient seen and examined by bedside, pt said  pain was worse in the morning but now improved , denies headache, dizziness, SOB, CP, Palpitations , N/V/D, abdominal pain        MEDICATIONS  (STANDING):  finasteride 5 milliGRAM(s) Oral daily  folic acid 1 milliGRAM(s) Oral daily  HYDROmorphone PCA (1 mG/mL) 30 milliLiter(s) PCA Continuous PCA Continuous  levoFLOXacin IVPB 500 milliGRAM(s) IV Intermittent every 24 hours  metoprolol tartrate 25 milliGRAM(s) Oral two times a day  senna 2 Tablet(s) Oral at bedtime  sodium chloride 0.45%. 1000 milliLiter(s) (75 mL/Hr) IV Continuous <Continuous>    MEDICATIONS  (PRN):  diphenhydrAMINE 25 milliGRAM(s) Oral every 4 hours PRN Rash and/or Itching  naloxone Injectable 0.1 milliGRAM(s) IV Push every 3 minutes PRN For ANY of the following changes in patient status:  A. RR LESS THAN 10 breaths per minute, B. Oxygen saturation LESS THAN 90%, C. Sedation score of 6  ondansetron Injectable 4 milliGRAM(s) IV Push every 6 hours PRN Nausea      Vital Signs Last 24 Hrs  T(C): 36.8 (06 Jun 2021 05:00), Max: 37.2 (05 Jun 2021 13:00)  T(F): 98.3 (06 Jun 2021 05:00), Max: 98.9 (05 Jun 2021 13:00)  HR: 75 (06 Jun 2021 09:00) (63 - 87)  BP: 130/60 (06 Jun 2021 09:00) (113/54 - 141/64)  BP(mean): --  RR: 16 (06 Jun 2021 09:00) (16 - 18)  SpO2: 99% (06 Jun 2021 09:00) (95% - 99%)  CAPILLARY BLOOD GLUCOSE        I&O's Summary      PHYSICAL EXAM:  GENERAL: NAD, thin built   HEAD:  Atraumatic, Normocephalic  EYES: EOMI, PERRLA, conjunctiva and sclera clear  CHEST/LUNG: Clear to auscultation bilaterally; No wheeze  HEART: Regular rate and rhythm;   ABDOMEN: Soft, Nontender, Nondistended; Bowel sounds present  EXTREMITIES:  2+ Peripheral Pulses, No clubbing, cyanosis, or edema  PSYCH: AAOx3  NEUROLOGY: non-focal    LABS:                        4.5    10.40 )-----------( 268      ( 06 Jun 2021 04:02 )             14.3     06-06    136  |  106  |  11  ----------------------------<  94  4.2   |  20<L>  |  1.05    Ca    9.0      06 Jun 2021 04:02  Phos  3.5     06-06  Mg     1.8     06-06      Occult Blood, Feces (06.06.21 @ 02:49)    Occult Blood, Feces: Positive              RADIOLOGY & ADDITIONAL TESTS:    Imaging Personally Reviewed:    Consultant(s) Notes Reviewed:      Care Discussed with Consultants/Other Providers:

## 2021-06-06 NOTE — PROGRESS NOTE ADULT - PROBLEM SELECTOR PLAN 3
c/w metoprolol    plan of care was d/w pt and ACP c/w metoprolol    pt refused PT eval     plan of care was d/w pt and ACP  DC planning if stable

## 2021-06-06 NOTE — PROGRESS NOTE ADULT - PROBLEM SELECTOR PLAN 1
c/w 1/2 NS,   baseline Hg ~6.5, a/w  5.3 --> 4.2-->4.6  , , will hold off on transfusion for now,  As per  blood bank per admission provider, patient on a "do not transfuse list" due to amount of antibodies and inability to tolerate transfusion in the past   heme following , pt can get one unit if PRBC, if H/H continues to downtrend or  pt symptomatic , blood [facility], has one unit ready for him   Folate   Incentive spirometry  Pain control with PCA  Hold DVT chemo ppx for now as patient hb is critically low and downtrending. Patient ambulatory at baseline.  CXR with New hazy right perihilar opacity which can be due to asymmetric pulmonary edema or pneumonia., pt with low grade fever , leucocytosis and mild elevated procalcitonin, pt started on Levaquin day #5 today ,, f/u blood cx, currently NGTD  will treat for 5 days   plan of care was d/w PMD Dr Holley , recommend checking FOBT , as pt was positive before and did not want workup c/w 1/2 NS,   baseline Hg ~6.5, a/w  5.3 --> 4.2-->4.6-->4.5  , , will hold off on transfusion ,  As per  blood bank per admission provider, patient on a "do not transfuse list" due to amount of antibodies and inability to tolerate transfusion in the past   heme following , pt can get one unit if PRBC, if H/H continues to downtrend or  pt symptomatic , blood bank [facility], has one unit ready for him   Folate   Incentive spirometry  Pain control with PCA  Hold DVT chemo ppx for now as patient hb is critically low and downtrending. Patient ambulatory at baseline.  CXR with New hazy right perihilar opacity which can be due to asymmetric pulmonary edema or pneumonia., pt with low grade fever , leucocytosis and mild elevated procalcitonin, pt started on Levaquin day , will DC as pt completed  5 days today , blood cx, currently NGTD  plan of care was d/w PMD Dr Holley , recommend checking FOBT , as pt was positive before and did not want workup  Occult blood positive again, case d/w pt , does not want GI eval for possible endoscopic intervention at this time

## 2021-06-07 PROCEDURE — 99232 SBSQ HOSP IP/OBS MODERATE 35: CPT

## 2021-06-07 RX ADMIN — HYDROMORPHONE HYDROCHLORIDE 30 MILLILITER(S): 2 INJECTION INTRAMUSCULAR; INTRAVENOUS; SUBCUTANEOUS at 19:06

## 2021-06-07 RX ADMIN — SENNA PLUS 2 TABLET(S): 8.6 TABLET ORAL at 21:08

## 2021-06-07 RX ADMIN — FINASTERIDE 5 MILLIGRAM(S): 5 TABLET, FILM COATED ORAL at 12:55

## 2021-06-07 RX ADMIN — Medication 25 MILLIGRAM(S): at 05:10

## 2021-06-07 RX ADMIN — Medication 1 MILLIGRAM(S): at 12:55

## 2021-06-07 RX ADMIN — Medication 25 MILLIGRAM(S): at 17:10

## 2021-06-07 RX ADMIN — SODIUM CHLORIDE 75 MILLILITER(S): 9 INJECTION, SOLUTION INTRAVENOUS at 05:11

## 2021-06-07 RX ADMIN — HYDROMORPHONE HYDROCHLORIDE 30 MILLILITER(S): 2 INJECTION INTRAMUSCULAR; INTRAVENOUS; SUBCUTANEOUS at 07:42

## 2021-06-07 RX ADMIN — SODIUM CHLORIDE 75 MILLILITER(S): 9 INJECTION, SOLUTION INTRAVENOUS at 12:56

## 2021-06-07 NOTE — PROVIDER CONTACT NOTE (OTHER) - SITUATION
Pt refused am labs, became verbally aggressive with staff when attempted to explain why blood is needed.
Patients temperature elevated 100.1
Patient o2 sat remaining between 85% and 88%, placed on 2L o2 and increased to 94%

## 2021-06-07 NOTE — PROVIDER CONTACT NOTE (OTHER) - ASSESSMENT
A&Ox4
Patient is awake, alert and oriented. Additional VSS. No acute distress noted. Reports shortness of breath at times. Remains comfortable on 2L o2.
Patient is alert and oriented. Additional Vital signs stable. Denies shortness of breath or complaints at this time. Remains free from acute distress

## 2021-06-07 NOTE — PROVIDER CONTACT NOTE (OTHER) - BACKGROUND
Patient admitted with sickle cell crisis
Admitted with sickle cell crisis, on PCA pump
Patient admitted with sickle cell crisis

## 2021-06-07 NOTE — PROGRESS NOTE ADULT - SUBJECTIVE AND OBJECTIVE BOX
Patient is a 66y old  Male who presents with a chief complaint of diffuse body pain (06 Jun 2021 11:09)        SUBJECTIVE / OVERNIGHT EVENTS:      MEDICATIONS  (STANDING):  finasteride 5 milliGRAM(s) Oral daily  folic acid 1 milliGRAM(s) Oral daily  HYDROmorphone PCA (1 mG/mL) 30 milliLiter(s) PCA Continuous PCA Continuous  metoprolol tartrate 25 milliGRAM(s) Oral two times a day  senna 2 Tablet(s) Oral at bedtime  sodium chloride 0.45%. 1000 milliLiter(s) (75 mL/Hr) IV Continuous <Continuous>    MEDICATIONS  (PRN):  diphenhydrAMINE 25 milliGRAM(s) Oral every 4 hours PRN Rash and/or Itching  naloxone Injectable 0.1 milliGRAM(s) IV Push every 3 minutes PRN For ANY of the following changes in patient status:  A. RR LESS THAN 10 breaths per minute, B. Oxygen saturation LESS THAN 90%, C. Sedation score of 6  ondansetron Injectable 4 milliGRAM(s) IV Push every 6 hours PRN Nausea      Vital Signs Last 24 Hrs  T(C): 36.9 (07 Jun 2021 12:49), Max: 37.4 (07 Jun 2021 05:00)  T(F): 98.4 (07 Jun 2021 12:49), Max: 99.3 (07 Jun 2021 05:00)  HR: 84 (07 Jun 2021 12:49) (64 - 84)  BP: 122/62 (07 Jun 2021 12:49) (122/62 - 133/60)  BP(mean): --  RR: 16 (07 Jun 2021 12:49) (16 - 18)  SpO2: 98% (07 Jun 2021 12:49) (97% - 100%)  CAPILLARY BLOOD GLUCOSE        I&O's Summary        PHYSICAL EXAM  GENERAL: NAD, well-developed  HEAD:  Atraumatic, Normocephalic  EYES: EOMI, PERRLA, conjunctiva and sclera clear  NECK: Supple, No JVD  CHEST/LUNG: Clear to auscultation bilaterally; No wheeze  HEART: Regular rate and rhythm; No murmurs, rubs, or gallops  ABDOMEN: Soft, Nontender, Nondistended; Bowel sounds present  EXTREMITIES:  2+ Peripheral Pulses, No clubbing, cyanosis, or edema  PSYCH: AAOx3  SKIN: No rashes or lesions    LABS:                        4.5    10.40 )-----------( 268      ( 06 Jun 2021 04:02 )             14.3     06-06    136  |  106  |  11  ----------------------------<  94  4.2   |  20<L>  |  1.05    Ca    9.0      06 Jun 2021 04:02  Phos  3.5     06-06  Mg     1.8     06-06                RADIOLOGY & ADDITIONAL TESTS:    Imaging Personally Reviewed:  Consultant(s) Notes Reviewed:    Care Discussed with Consultants/Other Providers:   Patient is a 66y old  Male who presents with a chief complaint of diffuse body pain (06 Jun 2021 11:09)      SUBJECTIVE / OVERNIGHT EVENTS:  Patient with diffuse body pain but it is controlled on current settings of PCA pump. He denied fever, chest pain, SOB, n/v    MEDICATIONS  (STANDING):  finasteride 5 milliGRAM(s) Oral daily  folic acid 1 milliGRAM(s) Oral daily  HYDROmorphone PCA (1 mG/mL) 30 milliLiter(s) PCA Continuous PCA Continuous  metoprolol tartrate 25 milliGRAM(s) Oral two times a day  senna 2 Tablet(s) Oral at bedtime  sodium chloride 0.45%. 1000 milliLiter(s) (75 mL/Hr) IV Continuous <Continuous>    MEDICATIONS  (PRN):  diphenhydrAMINE 25 milliGRAM(s) Oral every 4 hours PRN Rash and/or Itching  naloxone Injectable 0.1 milliGRAM(s) IV Push every 3 minutes PRN For ANY of the following changes in patient status:  A. RR LESS THAN 10 breaths per minute, B. Oxygen saturation LESS THAN 90%, C. Sedation score of 6  ondansetron Injectable 4 milliGRAM(s) IV Push every 6 hours PRN Nausea      Vital Signs Last 24 Hrs  T(C): 36.9 (07 Jun 2021 12:49), Max: 37.4 (07 Jun 2021 05:00)  T(F): 98.4 (07 Jun 2021 12:49), Max: 99.3 (07 Jun 2021 05:00)  HR: 84 (07 Jun 2021 12:49) (64 - 84)  BP: 122/62 (07 Jun 2021 12:49) (122/62 - 133/60)  RR: 16 (07 Jun 2021 12:49) (16 - 18)  SpO2: 98% (07 Jun 2021 12:49) (97% - 100%)        PHYSICAL EXAM  GENERAL: NAD, thin framed   CHEST/LUNG: Clear to auscultation bilaterally; No wheeze  HEART: Regular rate and rhythm; No murmurs, rubs, or gallops  ABDOMEN: Soft, Nontender, Nondistended; Bowel sounds present  EXTREMITIES:  No clubbing, cyanosis, or edema  PSYCH: AAOx3        NO LABS

## 2021-06-07 NOTE — PROVIDER CONTACT NOTE (OTHER) - ACTION/TREATMENT ORDERED:
AM labs early, cold packs, may put orders to early medicate with tylenol. R/a and continue to monitor
No order given
Place on o2, place on , and continue to monitor patient.

## 2021-06-07 NOTE — PROGRESS NOTE ADULT - PROBLEM SELECTOR PLAN 1
As per  blood bank per admission provider, patient on a "do not transfuse list" due to amount of antibodies and inability to tolerate transfusion in the past. No signs or symptoms of acute chest syndrome. Hb/Hct relatively stable. Patient completed 6-day course of Levaquin (6/1-6/6)  - c/w 1/2 NS,   - heme consulted --> pt can get one unit of PRBC, if H/H continues to downtrend or  pt symptomatic  - Folate   - Incentive spirometry  - c/w Dilaudid PCA 0.4mg q6 min with 4 hr max 8mg    Occult blood positive again, case d/w pt , does not want GI eval for possible endoscopic intervention at this time

## 2021-06-08 DIAGNOSIS — D64.9 ANEMIA, UNSPECIFIED: ICD-10-CM

## 2021-06-08 PROCEDURE — 99232 SBSQ HOSP IP/OBS MODERATE 35: CPT

## 2021-06-08 RX ORDER — HYDROMORPHONE HYDROCHLORIDE 2 MG/ML
4 INJECTION INTRAMUSCULAR; INTRAVENOUS; SUBCUTANEOUS EVERY 4 HOURS
Refills: 0 | Status: DISCONTINUED | OUTPATIENT
Start: 2021-06-08 | End: 2021-06-11

## 2021-06-08 RX ADMIN — Medication 25 MILLIGRAM(S): at 05:11

## 2021-06-08 RX ADMIN — HYDROMORPHONE HYDROCHLORIDE 30 MILLILITER(S): 2 INJECTION INTRAMUSCULAR; INTRAVENOUS; SUBCUTANEOUS at 01:49

## 2021-06-08 RX ADMIN — Medication 25 MILLIGRAM(S): at 17:40

## 2021-06-08 RX ADMIN — SODIUM CHLORIDE 75 MILLILITER(S): 9 INJECTION, SOLUTION INTRAVENOUS at 13:21

## 2021-06-08 RX ADMIN — SENNA PLUS 2 TABLET(S): 8.6 TABLET ORAL at 21:24

## 2021-06-08 RX ADMIN — Medication 1 MILLIGRAM(S): at 13:03

## 2021-06-08 RX ADMIN — FINASTERIDE 5 MILLIGRAM(S): 5 TABLET, FILM COATED ORAL at 13:03

## 2021-06-08 RX ADMIN — SODIUM CHLORIDE 75 MILLILITER(S): 9 INJECTION, SOLUTION INTRAVENOUS at 01:49

## 2021-06-08 RX ADMIN — HYDROMORPHONE HYDROCHLORIDE 30 MILLILITER(S): 2 INJECTION INTRAMUSCULAR; INTRAVENOUS; SUBCUTANEOUS at 07:08

## 2021-06-08 NOTE — PROGRESS NOTE ADULT - PROBLEM SELECTOR PLAN 1
Improved. As per  blood bank per admission provider, patient on a "do not transfuse list" due to amount of antibodies and inability to tolerate transfusion in the past. No signs or symptoms of acute chest syndrome. Hb/Hct relatively stable. Patient completed 6-day course of Levaquin (6/1-6/6)  - c/w 1/2 NS,   - heme consulted --> pt can get one unit of PRBC, if H/H continues to downtrend or  pt symptomatic  - Folate   - Incentive spirometry  - d/c Dilaudid PCA 0.4mg q6 min with 4 hr max 8mg --> transition to oral Dilaudid 4mg q4h PRN

## 2021-06-08 NOTE — DIETITIAN INITIAL EVALUATION ADULT. - PERTINENT MEDS FT
MEDICATIONS  (STANDING):  finasteride 5 milliGRAM(s) Oral daily  folic acid 1 milliGRAM(s) Oral daily  HYDROmorphone PCA (1 mG/mL) 30 milliLiter(s) PCA Continuous PCA Continuous  metoprolol tartrate 25 milliGRAM(s) Oral two times a day  senna 2 Tablet(s) Oral at bedtime  sodium chloride 0.45%. 1000 milliLiter(s) (75 mL/Hr) IV Continuous <Continuous>

## 2021-06-08 NOTE — DIETITIAN INITIAL EVALUATION ADULT. - OTHER INFO
PO intake 75-80% of meals per nursing flow sheets and drinking Ensure Enlive 2x daily (700 fran and 40 gm protein). Denies any GI issues (nausea/vomiting/diarrhea/constipation.) Last BM 6/7. Confirms allergies to peanuts, pork, shellfish. Lactose intolerance.  pounds; pt states he tends to lose weight when he gets his sickle cell crises; Feb. 2021 wt was 110 pounds on previous admission.

## 2021-06-08 NOTE — PROGRESS NOTE ADULT - SUBJECTIVE AND OBJECTIVE BOX
Patient is a 66y old  Male who presents with a chief complaint of Sickle cell crisis (08 Jun 2021 12:22)    SUBJECTIVE / OVERNIGHT EVENTS:  Pain improved and pt barely using PCA pump. Pt agreeable to transition to oral pain medications as needed. He has no fever, chills, chest pain, SOb, n/v or abdominal pain.    MEDICATIONS  (STANDING):  finasteride 5 milliGRAM(s) Oral daily  folic acid 1 milliGRAM(s) Oral daily  metoprolol tartrate 25 milliGRAM(s) Oral two times a day  senna 2 Tablet(s) Oral at bedtime  sodium chloride 0.45%. 1000 milliLiter(s) (75 mL/Hr) IV Continuous <Continuous>    MEDICATIONS  (PRN):  diphenhydrAMINE 25 milliGRAM(s) Oral every 4 hours PRN Rash and/or Itching  HYDROmorphone   Tablet 4 milliGRAM(s) Oral every 4 hours PRN Moderate & severe Pain  naloxone Injectable 0.1 milliGRAM(s) IV Push every 3 minutes PRN For ANY of the following changes in patient status:  A. RR LESS THAN 10 breaths per minute, B. Oxygen saturation LESS THAN 90%, C. Sedation score of 6  ondansetron Injectable 4 milliGRAM(s) IV Push every 6 hours PRN Nausea      Vital Signs Last 24 Hrs  T(C): 37 (08 Jun 2021 13:00), Max: 37.2 (08 Jun 2021 09:00)  T(F): 98.6 (08 Jun 2021 13:00), Max: 98.9 (08 Jun 2021 09:00)  HR: 60 (08 Jun 2021 13:00) (60 - 83)  BP: 115/56 (08 Jun 2021 13:00) (112/52 - 129/61)  RR: 16 (08 Jun 2021 13:00) (16 - 18)  SpO2: 97% (08 Jun 2021 13:00) (97% - 100%)        PHYSICAL EXAM  GENERAL: NAD, thin framed   CHEST/LUNG: Clear to auscultation bilaterally; No wheeze  HEART: Regular rate and rhythm; No murmurs, rubs, or gallops  ABDOMEN: Soft, Nontender, Nondistended; Bowel sounds present  EXTREMITIES:  No clubbing, cyanosis, or edema  PSYCH: AAOx3          NO LABS

## 2021-06-08 NOTE — CHART NOTE - NSCHARTNOTEFT_GEN_A_CORE
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 Currently being treated for pain crisis.     Last set of labs were drawn on 6/6/21 and show a Hgb of 4.5 which has been stable. Patient is not symptomatic from his anemia and does not require a simple transfusion or exchange transfusion at this time.    Pain control per primary team.     Hematology will sign off at this time. Please page or call with questions.     Lenore High MD  Hematology/Oncology Fellow, PGY-4  Pager: [phone]  After 5pm and on weekends please page on-call fellow

## 2021-06-09 LAB
APPEARANCE UR: CLEAR — SIGNIFICANT CHANGE UP
BILIRUB UR-MCNC: NEGATIVE — SIGNIFICANT CHANGE UP
COLOR SPEC: SIGNIFICANT CHANGE UP
DIFF PNL FLD: NEGATIVE — SIGNIFICANT CHANGE UP
GLUCOSE UR QL: NEGATIVE — SIGNIFICANT CHANGE UP
KETONES UR-MCNC: NEGATIVE — SIGNIFICANT CHANGE UP
LEUKOCYTE ESTERASE UR-ACNC: NEGATIVE — SIGNIFICANT CHANGE UP
NITRITE UR-MCNC: NEGATIVE — SIGNIFICANT CHANGE UP
PH UR: 6.5 — SIGNIFICANT CHANGE UP (ref 5–8)
PROT UR-MCNC: NEGATIVE — SIGNIFICANT CHANGE UP
SP GR SPEC: 1.01 — LOW (ref 1.01–1.02)
UROBILINOGEN FLD QL: SIGNIFICANT CHANGE UP

## 2021-06-09 PROCEDURE — 99232 SBSQ HOSP IP/OBS MODERATE 35: CPT

## 2021-06-09 RX ORDER — PHENAZOPYRIDINE HCL 100 MG
100 TABLET ORAL ONCE
Refills: 0 | Status: COMPLETED | OUTPATIENT
Start: 2021-06-09 | End: 2021-06-09

## 2021-06-09 RX ADMIN — FINASTERIDE 5 MILLIGRAM(S): 5 TABLET, FILM COATED ORAL at 12:21

## 2021-06-09 RX ADMIN — HYDROMORPHONE HYDROCHLORIDE 4 MILLIGRAM(S): 2 INJECTION INTRAMUSCULAR; INTRAVENOUS; SUBCUTANEOUS at 22:00

## 2021-06-09 RX ADMIN — HYDROMORPHONE HYDROCHLORIDE 4 MILLIGRAM(S): 2 INJECTION INTRAMUSCULAR; INTRAVENOUS; SUBCUTANEOUS at 16:38

## 2021-06-09 RX ADMIN — HYDROMORPHONE HYDROCHLORIDE 4 MILLIGRAM(S): 2 INJECTION INTRAMUSCULAR; INTRAVENOUS; SUBCUTANEOUS at 13:00

## 2021-06-09 RX ADMIN — SODIUM CHLORIDE 75 MILLILITER(S): 9 INJECTION, SOLUTION INTRAVENOUS at 03:25

## 2021-06-09 RX ADMIN — SODIUM CHLORIDE 75 MILLILITER(S): 9 INJECTION, SOLUTION INTRAVENOUS at 12:22

## 2021-06-09 RX ADMIN — HYDROMORPHONE HYDROCHLORIDE 4 MILLIGRAM(S): 2 INJECTION INTRAMUSCULAR; INTRAVENOUS; SUBCUTANEOUS at 03:54

## 2021-06-09 RX ADMIN — Medication 25 MILLIGRAM(S): at 05:07

## 2021-06-09 RX ADMIN — HYDROMORPHONE HYDROCHLORIDE 4 MILLIGRAM(S): 2 INJECTION INTRAMUSCULAR; INTRAVENOUS; SUBCUTANEOUS at 12:26

## 2021-06-09 RX ADMIN — Medication 25 MILLIGRAM(S): at 16:38

## 2021-06-09 RX ADMIN — Medication 100 MILLIGRAM(S): at 01:47

## 2021-06-09 RX ADMIN — HYDROMORPHONE HYDROCHLORIDE 4 MILLIGRAM(S): 2 INJECTION INTRAMUSCULAR; INTRAVENOUS; SUBCUTANEOUS at 21:00

## 2021-06-09 RX ADMIN — Medication 100 MILLIGRAM(S): at 02:57

## 2021-06-09 RX ADMIN — Medication 1 MILLIGRAM(S): at 12:21

## 2021-06-09 RX ADMIN — HYDROMORPHONE HYDROCHLORIDE 4 MILLIGRAM(S): 2 INJECTION INTRAMUSCULAR; INTRAVENOUS; SUBCUTANEOUS at 03:24

## 2021-06-09 NOTE — PROGRESS NOTE ADULT - SUBJECTIVE AND OBJECTIVE BOX
Patient is a 66y old  Male who presents with a chief complaint of diffuse body pain (2021 14:26)      SUBJECTIVE / OVERNIGHT EVENTS:  Patient stated pain improved but stated he is not ready to go home today. Patient only took 1 pill of oral Dilaudid for now after PCA pump d/c'd yesterday. He has no fever, chills, chest pain, SOB, n/v or abdominal pain.     MEDICATIONS  (STANDING):  finasteride 5 milliGRAM(s) Oral daily  folic acid 1 milliGRAM(s) Oral daily  metoprolol tartrate 25 milliGRAM(s) Oral two times a day  senna 2 Tablet(s) Oral at bedtime  sodium chloride 0.45%. 1000 milliLiter(s) (75 mL/Hr) IV Continuous <Continuous>    MEDICATIONS  (PRN):  diphenhydrAMINE 25 milliGRAM(s) Oral every 4 hours PRN Rash and/or Itching  guaiFENesin Oral Liquid (Sugar-Free) 100 milliGRAM(s) Oral every 6 hours PRN Cough  HYDROmorphone   Tablet 4 milliGRAM(s) Oral every 4 hours PRN Moderate & severe Pain  naloxone Injectable 0.1 milliGRAM(s) IV Push every 3 minutes PRN For ANY of the following changes in patient status:  A. RR LESS THAN 10 breaths per minute, B. Oxygen saturation LESS THAN 90%, C. Sedation score of 6  ondansetron Injectable 4 milliGRAM(s) IV Push every 6 hours PRN Nausea      Vital Signs Last 24 Hrs  T(C): 36.8 (2021 05:04), Max: 37 (2021 13:00)  T(F): 98.2 (2021 05:04), Max: 98.6 (2021 13:00)  HR: 70 (2021 05:04) (60 - 72)  BP: 134/87 (2021 05:04) (115/56 - 134/87)  RR: 18 (2021 05:04) (16 - 18)  SpO2: 99% (2021 05:04) (97% - 100%)          PHYSICAL EXAM  GENERAL: NAD, thin framed   CHEST/LUNG: Clear to auscultation bilaterally; No wheeze  HEART: Regular rate and rhythm; +systolic murmur  ABDOMEN: Soft, Nontender, Nondistended  EXTREMITIES:  No clubbing, cyanosis, or edema. Minimal subcutaneous fat  PSYCH: AAOx3              Urinalysis Basic - ( 2021 03:19 )    Color: Light Yellow / Appearance: Clear / S.008 / pH: x  Gluc: x / Ketone: Negative  / Bili: Negative / Urobili: <2 mg/dL   Blood: x / Protein: Negative / Nitrite: Negative   Leuk Esterase: Negative / RBC: x / WBC x   Sq Epi: x / Non Sq Epi: x / Bacteria: x

## 2021-06-09 NOTE — PROGRESS NOTE ADULT - PROBLEM SELECTOR PLAN 1
Improved. As per  blood bank per admission provider, patient on a "do not transfuse list" due to amount of antibodies and inability to tolerate transfusion in the past. No signs or symptoms of acute chest syndrome. Hb/Hct relatively stable. Patient completed 6-day course of Levaquin (6/1-6/6)  - c/w 1/2 NS,   - heme consulted --> pt can get one unit of PRBC,  pt symptomatic  - Folate   - Incentive spirometry  - c/w oral Dilaudid 4mg q4h PRN

## 2021-06-10 ENCOUNTER — APPOINTMENT (OUTPATIENT)
Dept: INTERNAL MEDICINE | Facility: CLINIC | Age: 66
End: 2021-06-10

## 2021-06-10 ENCOUNTER — TRANSCRIPTION ENCOUNTER (OUTPATIENT)
Age: 66
End: 2021-06-10

## 2021-06-10 DIAGNOSIS — Z87.438 PERSONAL HISTORY OF OTHER DISEASES OF MALE GENITAL ORGANS: ICD-10-CM

## 2021-06-10 DIAGNOSIS — R33.9 RETENTION OF URINE, UNSPECIFIED: ICD-10-CM

## 2021-06-10 LAB
ALBUMIN SERPL ELPH-MCNC: 3.4 G/DL — SIGNIFICANT CHANGE UP (ref 3.3–5)
ALP SERPL-CCNC: 130 U/L — HIGH (ref 40–120)
ALT FLD-CCNC: 15 U/L — SIGNIFICANT CHANGE UP (ref 4–41)
ANION GAP SERPL CALC-SCNC: 12 MMOL/L — SIGNIFICANT CHANGE UP (ref 7–14)
AST SERPL-CCNC: 23 U/L — SIGNIFICANT CHANGE UP (ref 4–40)
BASOPHILS # BLD AUTO: 0.05 K/UL — SIGNIFICANT CHANGE UP (ref 0–0.2)
BASOPHILS NFR BLD AUTO: 0.5 % — SIGNIFICANT CHANGE UP (ref 0–2)
BILIRUB SERPL-MCNC: 2.1 MG/DL — HIGH (ref 0.2–1.2)
BUN SERPL-MCNC: 10 MG/DL — SIGNIFICANT CHANGE UP (ref 7–23)
CALCIUM SERPL-MCNC: 8.6 MG/DL — SIGNIFICANT CHANGE UP (ref 8.4–10.5)
CHLORIDE SERPL-SCNC: 107 MMOL/L — SIGNIFICANT CHANGE UP (ref 98–107)
CO2 SERPL-SCNC: 19 MMOL/L — LOW (ref 22–31)
CREAT SERPL-MCNC: 0.93 MG/DL — SIGNIFICANT CHANGE UP (ref 0.5–1.3)
EOSINOPHIL # BLD AUTO: 0.56 K/UL — HIGH (ref 0–0.5)
EOSINOPHIL NFR BLD AUTO: 5.7 % — SIGNIFICANT CHANGE UP (ref 0–6)
GLUCOSE SERPL-MCNC: 185 MG/DL — HIGH (ref 70–99)
HCT VFR BLD CALC: 17.9 % — CRITICAL LOW (ref 39–50)
HGB BLD-MCNC: 5.5 G/DL — CRITICAL LOW (ref 13–17)
IANC: 7.63 K/UL — SIGNIFICANT CHANGE UP (ref 1.5–8.5)
IMM GRANULOCYTES NFR BLD AUTO: 0.7 % — SIGNIFICANT CHANGE UP (ref 0–1.5)
LACTATE SERPL-SCNC: 1.8 MMOL/L — SIGNIFICANT CHANGE UP (ref 0.5–2)
LYMPHOCYTES # BLD AUTO: 0.77 K/UL — LOW (ref 1–3.3)
LYMPHOCYTES # BLD AUTO: 7.9 % — LOW (ref 13–44)
MAGNESIUM SERPL-MCNC: 1.9 MG/DL — SIGNIFICANT CHANGE UP (ref 1.6–2.6)
MCHC RBC-ENTMCNC: 26.7 PG — LOW (ref 27–34)
MCHC RBC-ENTMCNC: 30.7 GM/DL — LOW (ref 32–36)
MCV RBC AUTO: 86.9 FL — SIGNIFICANT CHANGE UP (ref 80–100)
MONOCYTES # BLD AUTO: 0.69 K/UL — SIGNIFICANT CHANGE UP (ref 0–0.9)
MONOCYTES NFR BLD AUTO: 7.1 % — SIGNIFICANT CHANGE UP (ref 2–14)
NEUTROPHILS # BLD AUTO: 7.63 K/UL — HIGH (ref 1.8–7.4)
NEUTROPHILS NFR BLD AUTO: 78.1 % — HIGH (ref 43–77)
NRBC # BLD: 4 /100 WBCS — SIGNIFICANT CHANGE UP
NRBC # FLD: 0.39 K/UL — HIGH
PLATELET # BLD AUTO: 306 K/UL — SIGNIFICANT CHANGE UP (ref 150–400)
POTASSIUM SERPL-MCNC: 4.5 MMOL/L — SIGNIFICANT CHANGE UP (ref 3.5–5.3)
POTASSIUM SERPL-SCNC: 4.5 MMOL/L — SIGNIFICANT CHANGE UP (ref 3.5–5.3)
PROT SERPL-MCNC: 6.5 G/DL — SIGNIFICANT CHANGE UP (ref 6–8.3)
RBC # BLD: 2.06 M/UL — LOW (ref 4.2–5.8)
RBC # FLD: 22 % — HIGH (ref 10.3–14.5)
SODIUM SERPL-SCNC: 138 MMOL/L — SIGNIFICANT CHANGE UP (ref 135–145)
WBC # BLD: 9.77 K/UL — SIGNIFICANT CHANGE UP (ref 3.8–10.5)
WBC # FLD AUTO: 9.77 K/UL — SIGNIFICANT CHANGE UP (ref 3.8–10.5)

## 2021-06-10 PROCEDURE — 99232 SBSQ HOSP IP/OBS MODERATE 35: CPT

## 2021-06-10 RX ORDER — FINASTERIDE 5 MG/1
1 TABLET, FILM COATED ORAL
Qty: 30 | Refills: 0
Start: 2021-06-10 | End: 2021-07-09

## 2021-06-10 RX ORDER — FINASTERIDE 5 MG/1
1 TABLET, FILM COATED ORAL
Qty: 0 | Refills: 0 | DISCHARGE

## 2021-06-10 RX ORDER — FOLIC ACID 0.8 MG
1 TABLET ORAL
Qty: 30 | Refills: 0
Start: 2021-06-10 | End: 2021-07-09

## 2021-06-10 RX ORDER — TAMSULOSIN HYDROCHLORIDE 0.4 MG/1
1 CAPSULE ORAL
Qty: 30 | Refills: 0
Start: 2021-06-10 | End: 2021-07-09

## 2021-06-10 RX ORDER — SENNA PLUS 8.6 MG/1
2 TABLET ORAL
Qty: 60 | Refills: 0
Start: 2021-06-10 | End: 2021-07-09

## 2021-06-10 RX ORDER — HYDROMORPHONE HYDROCHLORIDE 2 MG/ML
1 INJECTION INTRAMUSCULAR; INTRAVENOUS; SUBCUTANEOUS
Qty: 42 | Refills: 0
Start: 2021-06-10 | End: 2021-06-16

## 2021-06-10 RX ORDER — METOPROLOL TARTRATE 50 MG
1 TABLET ORAL
Qty: 60 | Refills: 0
Start: 2021-06-10 | End: 2021-07-09

## 2021-06-10 RX ORDER — TAMSULOSIN HYDROCHLORIDE 0.4 MG/1
0.4 CAPSULE ORAL AT BEDTIME
Refills: 0 | Status: DISCONTINUED | OUTPATIENT
Start: 2021-06-10 | End: 2021-06-11

## 2021-06-10 RX ADMIN — TAMSULOSIN HYDROCHLORIDE 0.4 MILLIGRAM(S): 0.4 CAPSULE ORAL at 21:14

## 2021-06-10 RX ADMIN — Medication 25 MILLIGRAM(S): at 05:29

## 2021-06-10 RX ADMIN — HYDROMORPHONE HYDROCHLORIDE 4 MILLIGRAM(S): 2 INJECTION INTRAMUSCULAR; INTRAVENOUS; SUBCUTANEOUS at 12:01

## 2021-06-10 RX ADMIN — HYDROMORPHONE HYDROCHLORIDE 4 MILLIGRAM(S): 2 INJECTION INTRAMUSCULAR; INTRAVENOUS; SUBCUTANEOUS at 23:52

## 2021-06-10 RX ADMIN — HYDROMORPHONE HYDROCHLORIDE 4 MILLIGRAM(S): 2 INJECTION INTRAMUSCULAR; INTRAVENOUS; SUBCUTANEOUS at 17:49

## 2021-06-10 RX ADMIN — HYDROMORPHONE HYDROCHLORIDE 4 MILLIGRAM(S): 2 INJECTION INTRAMUSCULAR; INTRAVENOUS; SUBCUTANEOUS at 17:19

## 2021-06-10 RX ADMIN — SODIUM CHLORIDE 75 MILLILITER(S): 9 INJECTION, SOLUTION INTRAVENOUS at 05:30

## 2021-06-10 RX ADMIN — Medication 25 MILLIGRAM(S): at 17:19

## 2021-06-10 RX ADMIN — Medication 1 MILLIGRAM(S): at 11:31

## 2021-06-10 RX ADMIN — HYDROMORPHONE HYDROCHLORIDE 4 MILLIGRAM(S): 2 INJECTION INTRAMUSCULAR; INTRAVENOUS; SUBCUTANEOUS at 11:31

## 2021-06-10 RX ADMIN — FINASTERIDE 5 MILLIGRAM(S): 5 TABLET, FILM COATED ORAL at 11:31

## 2021-06-10 NOTE — DISCHARGE NOTE PROVIDER - CARE PROVIDER_API CALL
Lluvia Hamm)  Internal Medicine  [address] [place] [address]  Phone: [phone]  Fax: [phone]  Follow Up Time:     Saad Torres)  Gastroenterology; Internal Medicine  [address] [place] [address]  Phone: [phone]  Fax: [phone]  Follow Up Time:    Lluvia Hamm)  Internal Medicine  [address] [place] [address]  Phone: [phone]  Fax: [phone]  Follow Up Time:     Saad Torres)  Gastroenterology; Internal Medicine  [address] [place] [address], [address] [address]  Phone: [phone]  Fax: [phone]  Follow Up Time:     Hoenig, David M (MD)  Urology  [facility]- Dept. of Urology, [address] [place] [address]  Phone: [phone]  Fax: [phone]  Follow Up Time:

## 2021-06-10 NOTE — PROVIDER CONTACT NOTE (CRITICAL VALUE NOTIFICATION) - SITUATION
H 4.6/ h 14.0
Critical Lab Value - Hemogloin 5.5
Patient had blood drawn this morning. Hemoglobin came back 4.6 and hemocrit is 14.1.
Hgb 4.5/ hct 14.3
Patient admitted for sickle cell crisis. Patient is on oxygen nasal canula 1.5 L and on .

## 2021-06-10 NOTE — PROGRESS NOTE ADULT - SUBJECTIVE AND OBJECTIVE BOX
Patient is a 66y old  Male who presents with a chief complaint of diffuse body pain (10 Navi 2021 08:03)    SUBJECTIVE / OVERNIGHT EVENTS:  Pt with suprapubic pain this morning and with difficulty urinating. Pt stated he usually takes a "blue pill" that helps him urinate. Patient with post void residual ~340mL on bedside bladder scan. Pt otherwise without fever, chills, chest pain, SOb, n/v.     MEDICATIONS  (STANDING):  finasteride 5 milliGRAM(s) Oral daily  folic acid 1 milliGRAM(s) Oral daily  metoprolol tartrate 25 milliGRAM(s) Oral two times a day  senna 2 Tablet(s) Oral at bedtime  sodium chloride 0.45%. 1000 milliLiter(s) (75 mL/Hr) IV Continuous <Continuous>  tamsulosin 0.4 milliGRAM(s) Oral at bedtime    MEDICATIONS  (PRN):  diphenhydrAMINE 25 milliGRAM(s) Oral every 4 hours PRN Rash and/or Itching  guaiFENesin Oral Liquid (Sugar-Free) 100 milliGRAM(s) Oral every 6 hours PRN Cough  HYDROmorphone   Tablet 4 milliGRAM(s) Oral every 4 hours PRN Moderate & severe Pain  naloxone Injectable 0.1 milliGRAM(s) IV Push every 3 minutes PRN For ANY of the following changes in patient status:  A. RR LESS THAN 10 breaths per minute, B. Oxygen saturation LESS THAN 90%, C. Sedation score of 6  ondansetron Injectable 4 milliGRAM(s) IV Push every 6 hours PRN Nausea      Vital Signs Last 24 Hrs  T(C): 37 (10 Navi 2021 11:25), Max: 37 (10 Navi 2021 11:25)  T(F): 98.6 (10 Navi 2021 11:25), Max: 98.6 (10 Navi 2021 11:25)  HR: 64 (10 Navi 2021 11:25) (62 - 66)  BP: 135/63 (10 Navi 2021 11:25) (116/65 - 135/63)  RR: 18 (10 Navi 2021 11:25) (16 - 18)  SpO2: 100% (10 Navi 2021 11:25) (100% - 100%)          PHYSICAL EXAM  GENERAL: NAD, thin framed   CHEST/LUNG: Clear to auscultation bilaterally; No wheeze  HEART: Regular rate and rhythm; +systolic murmur  ABDOMEN: Soft, suprapubic tenderness, Nondistended  EXTREMITIES:  No clubbing, cyanosis, or edema. Minimal subcutaneous fat  PSYCH: AAOx3

## 2021-06-10 NOTE — DISCHARGE NOTE PROVIDER - NSDCCPCAREPLAN_GEN_ALL_CORE_FT
PRINCIPAL DISCHARGE DIAGNOSIS  Diagnosis: Sickle cell crisis  Assessment and Plan of Treatment: - continue taking pain medications as prescribed  - stay well hydrated, continue with bowel regimen, avoid crowded places  - you had low grade fever, increased white count and mild elevated procalcitonin, thus completed Levaquin 7days  - follow up with PCP Dr Hamm within as week for further management      SECONDARY DISCHARGE DIAGNOSES  Diagnosis: Anemia  Assessment and Plan of Treatment: - low blood count 4.5/14.3 likely due to underlying sickle cell disease but could be multifactorial. Your occult blood was positive again  - you need endoscopic evaluation to check for abnormal results / pathology, such as malignancy. You have declined GI work up in the hospital, follow up as outpatient for EGD / Colonoscopy    Diagnosis: Essential hypertension  Assessment and Plan of Treatment: - Continue current blood pressure medication regimen as directed. Monitor for any visual changes, headaches or dizziness.  Monitor blood pressure regularly.  Follow up with your PCP for further management for high blood pressure, please call to make appointment within 1 week of discharge    Diagnosis: Slow transit constipation  Assessment and Plan of Treatment: - on opioids, started Senna     PRINCIPAL DISCHARGE DIAGNOSIS  Diagnosis: Sickle cell crisis  Assessment and Plan of Treatment: - continue taking pain medications as prescribed  - stay well hydrated, continue with bowel regimen, avoid crowded places  - you had low grade fever, increased white count and mild elevated procalcitonin, thus completed Levaquin 7days  - follow up with PCP Dr Hamm within as week for further management      SECONDARY DISCHARGE DIAGNOSES  Diagnosis: Anemia  Assessment and Plan of Treatment: - low blood count 4.5/14.3 likely due to underlying sickle cell disease but could be multifactorial. Your occult blood was positive again  - you need endoscopic evaluation to check for abnormal results / pathology, such as malignancy. You have declined GI work up in the hospital, follow up as outpatient for EGD / Colonoscopy    Diagnosis: Essential hypertension  Assessment and Plan of Treatment: - Continue current blood pressure medication regimen as directed. Monitor for any visual changes, headaches or dizziness.  Monitor blood pressure regularly.  Follow up with your PCP for further management for high blood pressure, please call to make appointment within 1 week of discharge    Diagnosis: History of BPH  Assessment and Plan of Treatment: - continue taking Proscar as prescribed  - you were c/o difficulty urinating, bladder scan was checked and showed 340cc. Flomax 0.4mg 1 cap at bedtime was added. Continue taking as prescribed    Diagnosis: Slow transit constipation  Assessment and Plan of Treatment: - on opioids, started Senna

## 2021-06-10 NOTE — DISCHARGE NOTE PROVIDER - NSDCMRMEDTOKEN_GEN_ALL_CORE_FT
finasteride 5 mg oral tablet: 1 tab(s) orally once a day  folic acid 1 mg oral tablet: 1 tab(s) orally once a day  HYDROmorphone 4 mg oral tablet: 1 tab(s) orally every 4 hours, As Needed -Severe Pain (7 - 10) MDD:5 tabs /day  metoprolol tartrate 25 mg oral tablet: 1 tab(s) orally 2 times a day  senna oral tablet: 2 tab(s) orally once a day (at bedtime), As Needed constipation   finasteride 5 mg oral tablet: 1 tab(s) orally once a day  folic acid 1 mg oral tablet: 1 tab(s) orally once a day  HYDROmorphone 4 mg oral tablet: 1 tab(s) orally every 4 hours, As Needed -Severe Pain (7 - 10) MDD:5 tabs /day  metoprolol tartrate 25 mg oral tablet: 1 tab(s) orally 2 times a day  senna oral tablet: 2 tab(s) orally once a day (at bedtime), As Needed constipation  tamsulosin 0.4 mg oral capsule: 1 cap(s) orally once a day (at bedtime)

## 2021-06-10 NOTE — PROVIDER CONTACT NOTE (CRITICAL VALUE NOTIFICATION) - RECOMMENDATIONS
Will hold off on blood transfusion since patient has many antibodies and is feeling better than yesterday.
Patient may need blood transfusion despite having many antibodies. Will follow up with heme team.
waiting on orders
Continue to monitor.

## 2021-06-10 NOTE — DISCHARGE NOTE PROVIDER - PROVIDER TOKENS
PROVIDER:[TOKEN:[[id number]]],PROVIDER:[TOKEN:[[id number]]] PROVIDER:[TOKEN:[[id number]]],PROVIDER:[TOKEN:[[id number]]],PROVIDER:[TOKEN:[[id number]]]

## 2021-06-10 NOTE — DISCHARGE NOTE PROVIDER - HOSPITAL COURSE
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% on 8/19), mild intellectual disability, BPH p/w sickle cell crisis    Sickle cell crisis- treated with 1/2 NS, IS, folate  - held off on transfusion,  As per blood bank, patient on a "do not transfuse list" due to amount of antibodies and inability to tolerate transfusion in the past  - heme following , pt can get one unit if PRBC, if H/H continues to downtrend or  pt symptomatic, blood bank aware  - Pain control with PCA  - Hold DVT chemo ppx for now as patient hb is critically low and downtrending  - CXR with new hazy right perihilar opacity which can be due to asymmetric pulmonary edema or pneumonia.  - pt with low grade fever , leucocytosis and mild elevated procalcitonin, pt completed Levaquin 7days  - blood cx- NGTD    Anemia- Likely due to underlying sickle cell disease but could be multifactorial. - Pt with noted occult blood positive again  - warrants endoscopic evaluation to r/o underlying pathology, such as malignancy. However, at this time, pt reportedly declined GI w/u    Constipation- on opioids, started Senna.     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% on 8/19), mild intellectual disability, BPH p/w sickle cell crisis    Sickle cell crisis- treated with 1/2 NS, IS, folate  - held off on transfusion,  As per blood bank, patient on a "do not transfuse list" due to amount of antibodies and inability to tolerate transfusion in the past  - heme following , pt can get one unit if PRBC, if H/H continues to downtrend or  pt symptomatic, blood bank aware  - Pain control with PCA  - Hold DVT chemo ppx for now as patient hb is critically low and downtrending  - CXR with new hazy right perihilar opacity which can be due to asymmetric pulmonary edema or pneumonia.  - pt with low grade fever , leucocytosis and mild elevated procalcitonin, pt completed Levaquin 7days  - blood cx- NGTD    Anemia- Likely due to underlying sickle cell disease but could be multifactorial. - Pt with noted occult blood positive again  - warrants endoscopic evaluation to r/o underlying pathology, such as malignancy. However, at this time, pt reportedly declined GI w/u    Constipation- on opioids, started Senna.     Essential hypertension- c/w Metoprolol    BPH- on Proscar, pt c/o hesitancy and unable to fully void. Bladder scan PVR was done showed 340cc, added Flomax    Dispo -home with outpatient follow up

## 2021-06-10 NOTE — DISCHARGE NOTE NURSING/CASE MANAGEMENT/SOCIAL WORK - PATIENT PORTAL LINK FT
You can access the FollowMyHealth Patient Portal offered by [facility] by registering at the following website: [email or web address]. By joining [employer]’s FollowMyHealth portal, you will also be able to view your health information using other applications (apps) compatible with our system.

## 2021-06-10 NOTE — PROVIDER CONTACT NOTE (CRITICAL VALUE NOTIFICATION) - BACKGROUND
Patient admitted for sickle cell crisis.
Pt aedmitted for Hb-SS disease with crisis
Sickle Cell crisis
admitted with SSC, Hx blood transfusions
Patient admitted for sickle cell crisis.

## 2021-06-10 NOTE — PROGRESS NOTE ADULT - PROBLEM SELECTOR PLAN 1
Pt with reported hx of BPH. HOwever, CT abd/pelvis done on 11/13/20 revealed normal prostate. 6/9 UA negative for acute infection.   - continue home regimen of finasteride 5mg daily  - start Flomax 0.4mg daily at bedtime  - recommend outpatient referral to urology for further evaluation. Rotation Flap Text: The defect edges were debeveled with a #15 scalpel blade.  Given the location of the defect, shape of the defect and the proximity to free margins a rotation flap was deemed most appropriate.  Using a sterile surgical marker, an appropriate rotation flap was drawn incorporating the defect and placing the expected incisions within the relaxed skin tension lines where possible.    The area thus outlined was incised deep to adipose tissue with a #15 scalpel blade.  The skin margins were undermined to an appropriate distance in all directions utilizing iris scissors.

## 2021-06-10 NOTE — PROVIDER CONTACT NOTE (CRITICAL VALUE NOTIFICATION) - ACTION/TREATMENT ORDERED:
Will continue to monitor patient.
Provider aware. Continue to monitor
continue to monitor
Will continue to monitor patient.
ACP made aware. continue to monitor.

## 2021-06-10 NOTE — PROVIDER CONTACT NOTE (CRITICAL VALUE NOTIFICATION) - TEST AND RESULT REPORTED:
Hemoglobin 5.5
Hemoglobin- 4.2 hemocrit- 12.5
Hgb 4.5/ hct 14.3
hemaglobin and hematocirt
Hemoglobin- 4.5, hemocrit- 14.1

## 2021-06-10 NOTE — PROGRESS NOTE ADULT - PROBLEM SELECTOR PLAN 2
Resolved. As per  blood bank per admission provider, patient on a "do not transfuse list" due to amount of antibodies and inability to tolerate transfusion in the past. No signs or symptoms of acute chest syndrome. Hb/Hct relatively stable. Patient completed 6-day course of Levaquin (6/1-6/6)  - d/c 1/2 NS,   - Folate   - Incentive spirometry  - c/w oral Dilaudid 4mg q4h PRN

## 2021-06-10 NOTE — DISCHARGE NOTE PROVIDER - CARE PROVIDERS DIRECT ADDRESSES
[email or web address]

## 2021-06-10 NOTE — PROVIDER CONTACT NOTE (CRITICAL VALUE NOTIFICATION) - ASSESSMENT
No signs or symptoms. Pt asymptomatic resting comfortably in bed
Patient a&ox4. on PCA pump. In no acute distress. Occult blood positive, No signs of bleeding noted in stool. Patient states still in pain, patient encouraged to utilize PCA pump.
pt awake alert times 4 denies any signs or symptoms of bleeding
Patient admitted for sickle cell crisis. Patient is on oxygen nasal canula 1.5 L and on . Hemoglobin is 4.2 and hemocrit is 12.5. Patient denies feeling any worse.
Patient had blood drawn this morning. Hemoglobin came back 4.6 and hemocrit is 14.1. Patient denies symptoms and states he "is feeling better than yesterday".

## 2021-06-11 VITALS
TEMPERATURE: 98 F | SYSTOLIC BLOOD PRESSURE: 125 MMHG | HEART RATE: 61 BPM | RESPIRATION RATE: 18 BRPM | OXYGEN SATURATION: 100 % | DIASTOLIC BLOOD PRESSURE: 90 MMHG

## 2021-06-11 PROCEDURE — 99239 HOSP IP/OBS DSCHRG MGMT >30: CPT

## 2021-06-11 RX ADMIN — HYDROMORPHONE HYDROCHLORIDE 4 MILLIGRAM(S): 2 INJECTION INTRAMUSCULAR; INTRAVENOUS; SUBCUTANEOUS at 00:30

## 2021-06-11 RX ADMIN — Medication 1 MILLIGRAM(S): at 11:01

## 2021-06-11 RX ADMIN — HYDROMORPHONE HYDROCHLORIDE 4 MILLIGRAM(S): 2 INJECTION INTRAMUSCULAR; INTRAVENOUS; SUBCUTANEOUS at 05:55

## 2021-06-11 RX ADMIN — FINASTERIDE 5 MILLIGRAM(S): 5 TABLET, FILM COATED ORAL at 11:01

## 2021-06-11 RX ADMIN — HYDROMORPHONE HYDROCHLORIDE 4 MILLIGRAM(S): 2 INJECTION INTRAMUSCULAR; INTRAVENOUS; SUBCUTANEOUS at 05:05

## 2021-06-11 RX ADMIN — Medication 25 MILLIGRAM(S): at 05:05

## 2021-06-11 NOTE — PROGRESS NOTE ADULT - REASON FOR ADMISSION
Sickle cell crisis
diffuse body pain

## 2021-06-11 NOTE — PROGRESS NOTE ADULT - PROBLEM SELECTOR PLAN 1
Pt with reported hx of BPH. HOwever, CT abd/pelvis done on 11/13/20 revealed normal prostate. 6/9 UA negative for acute infection.   - continue home regimen of finasteride 5mg daily  - c/w Flomax 0.4mg daily at bedtime  - recommend outpatient referral to urology for further evaluation.

## 2021-06-11 NOTE — PROGRESS NOTE ADULT - PROBLEM SELECTOR PLAN 4
Likely due to underlying sickle cell disease but could be multifactorial. Pt with noted occult blood positive again. Given pt age and that finding, pt warrants endoscopic evaluation to r/o underlying pathology, such as malignancy. However, at this time, pt reportedly declined GI w/u.   - recommend outpatient f/u      DISPO: Anticipate d/c home today if pain remains controlled even on oral pain medications.
c/w metoprolol
Likely due to underlying sickle cell disease but could be multifactorial. Pt with noted occult blood positive again. Given pt age and that finding, pt warrants endoscopic evaluation to r/o underlying pathology, such as malignancy. However, at this time, pt reportedly declined GI w/u.   - recommend outpatient f/u      DISPO: Patient medically stable to be discharged home today. However, pt is adamant that it is better he leaves tomorrow. Pt stated will need a ride scheduled. Will f/u with FRANKIE.
c/w metoprolol

## 2021-06-11 NOTE — PROGRESS NOTE ADULT - PROBLEM SELECTOR PROBLEM 1
Sickle cell crisis
Urinary retention with incomplete bladder emptying
Urinary retention with incomplete bladder emptying
Sickle cell crisis

## 2021-06-11 NOTE — PROGRESS NOTE ADULT - PROBLEM SELECTOR PROBLEM 3
Essential hypertension
Slow transit constipation
Essential hypertension
Slow transit constipation

## 2021-06-11 NOTE — PROGRESS NOTE ADULT - PROVIDER SPECIALTY LIST ADULT
Hospitalist
Heme/Onc
Hospitalist

## 2021-06-11 NOTE — PROGRESS NOTE ADULT - PROBLEM SELECTOR PLAN 5
Likely due to underlying sickle cell disease but could be multifactorial. Pt with noted occult blood positive again. Given pt age and that finding, pt warrants endoscopic evaluation to r/o underlying pathology, such as malignancy. However, at this time, pt reportedly declined GI w/u.   - recommend outpatient f/u      DISPO: Patient medically stable to be discharged home. Ride scheduled by FRANKIE.
Likely due to underlying sickle cell disease but could be multifactorial. Pt with noted occult blood positive again. Given pt age and that finding, pt warrants endoscopic evaluation to r/o underlying pathology, such as malignancy. However, at this time, pt reportedly declined GI w/u.   - recommend outpatient f/u      DISPO: Patient medically stable to be discharged home today. However, pt is adamant that it is better he leaves tomorrow. Pt stated will need a ride scheduled. Will f/u with FRANKIE.

## 2021-06-11 NOTE — PROGRESS NOTE ADULT - PROBLEM SELECTOR PROBLEM 2
Slow transit constipation
Sickle cell crisis
Slow transit constipation
Sickle cell crisis
Slow transit constipation

## 2021-06-11 NOTE — PROGRESS NOTE ADULT - PROBLEM SELECTOR PLAN 2
Resolved. As per  blood bank per admission provider, patient on a "do not transfuse list" due to amount of antibodies and inability to tolerate transfusion in the past. No signs or symptoms of acute chest syndrome. Hb/Hct relatively stable. Patient completed 6-day course of Levaquin (6/1-6/6)  - Folate   - Incentive spirometry  - c/w oral Dilaudid 4mg q4h PRN

## 2021-06-11 NOTE — PROGRESS NOTE ADULT - ASSESSMENT
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
67 y/o M PMH HbSS disease w/ ACS, HFrE (EF 46% on 8/19), mild intellectual disability, BPH, p/w diffuse body pain, particularly L arm, for a few days in the setting of recent viral illness 1 week ago. Currently being treated for pain crisis.     # HbSS disease  - Patient is on 'do not transfuse list' at blood bank   - D/w Dr. 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 Would hold off on transfusion for now given patient given no SOB with stable/improving HB. If Hb drops or patient becomes symptomatic can consider transfusing 1 unit (1U blood available in blood bank)   - Encourage incentive spirometry  - Pain control and IVF (0.5 NS) per primary team  - upon discharge to follow with usual hematologist  (Dr Holley)     # Iron deficiency anemia  - Noted on labs in Feb 2021- ferritin was ~30  - GI eval as outpatient      Omkar Delgado MD  Hematology/Oncology Fellow
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% on 8/19), mild intellectual disability, BPH p/w sickle cell crisis. Clinically improved but with noted to have urinary retention/incomplete bladder emptying. Pt symptomatically improved today
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% on 8/19), mild intellectual disability, BPH p/w sickle cell crisis. Clinically improved but now noted to have urinary retention/incomplete bladder emptying 
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% on 8/19), mild intellectual disability, BPH p/w sickle cell crisis. Clinically improving
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

## 2021-06-11 NOTE — PROGRESS NOTE ADULT - NSICDXPILOT_GEN_ALL_CORE
[place]

## 2021-06-11 NOTE — PROGRESS NOTE ADULT - SUBJECTIVE AND OBJECTIVE BOX
Patient is a 66y old  Male who presents with a chief complaint of diffuse body pain (10 Navi 2021 12:29)    SUBJECTIVE / OVERNIGHT EVENTS:  Patient feels well today and ready to go home. No abdominal or suprapubic pain. No fever, chills, chest pain, SOB.     MEDICATIONS  (STANDING):  finasteride 5 milliGRAM(s) Oral daily  folic acid 1 milliGRAM(s) Oral daily  metoprolol tartrate 25 milliGRAM(s) Oral two times a day  senna 2 Tablet(s) Oral at bedtime  tamsulosin 0.4 milliGRAM(s) Oral at bedtime    MEDICATIONS  (PRN):  diphenhydrAMINE 25 milliGRAM(s) Oral every 4 hours PRN Rash and/or Itching  guaiFENesin Oral Liquid (Sugar-Free) 100 milliGRAM(s) Oral every 6 hours PRN Cough  HYDROmorphone   Tablet 4 milliGRAM(s) Oral every 4 hours PRN Moderate & severe Pain  naloxone Injectable 0.1 milliGRAM(s) IV Push every 3 minutes PRN For ANY of the following changes in patient status:  A. RR LESS THAN 10 breaths per minute, B. Oxygen saturation LESS THAN 90%, C. Sedation score of 6  ondansetron Injectable 4 milliGRAM(s) IV Push every 6 hours PRN Nausea      Vital Signs Last 24 Hrs  T(C): 36.8 (11 Jun 2021 05:00), Max: 36.8 (10 Navi 2021 21:17)  T(F): 98.3 (11 Jun 2021 05:00), Max: 98.3 (10 Navi 2021 21:17)  HR: 61 (11 Jun 2021 05:00) (60 - 76)  BP: 125/90 (11 Jun 2021 05:00) (118/72 - 129/50)  RR: 18 (11 Jun 2021 05:00) (18 - 18)  SpO2: 100% (11 Jun 2021 05:00) (100% - 100%)          PHYSICAL EXAM  GENERAL: NAD, thin framed, dressed in his personal clothes, ambulates independently  CHEST/LUNG: Normal respiratory effort  HEART: Regular rate and rhythm; +systolic murmur  ABDOMEN: Soft, Nondistended  EXTREMITIES:  No clubbing, cyanosis, or edema. Minimal subcutaneous fat  PSYCH: AAOx3, pleasant and conversant        LABS:                        5.5    9.77  )-----------( 306      ( 10 Navi 2021 15:29 )             17.9     06-10    138  |  107  |  10  ----------------------------<  185<H>  4.5   |  19<L>  |  0.93    Ca    8.6      10 Navi 2021 15:29  Mg     1.9     06-10    TPro  6.5  /  Alb  3.4  /  TBili  2.1<H>  /  DBili  x   /  AST  23  /  ALT  15  /  AlkPhos  130<H>  06-10

## 2021-06-16 ENCOUNTER — NON-APPOINTMENT (OUTPATIENT)
Age: 66
End: 2021-06-16

## 2021-06-17 ENCOUNTER — APPOINTMENT (OUTPATIENT)
Dept: HEMATOLOGY ONCOLOGY | Facility: CLINIC | Age: 66
End: 2021-06-17

## 2021-06-17 NOTE — DISCUSSION/SUMMARY
[FreeTextEntry1] : Pt admitted on 5/31/21 and discharged on 6/11/21 @ [facility]. Pt came in with sickle cell crisis and diffuse body pain as per chart. CXR showed right perihilar opacity due to pulmonary edema or pneumonia as per note. Pt has leukocytosis and pt completed levaquin for 7 days as per joi. Occult blood in stool pos and pt refused GI consult as per note. Pt also has urinary retention and incomplete bladder emptying as per note. Pt has PMH of anemia, SCD, ACS, mild intelluctual disabiility ,

## 2021-07-14 ENCOUNTER — NON-APPOINTMENT (OUTPATIENT)
Age: 66
End: 2021-07-14

## 2021-07-15 ENCOUNTER — APPOINTMENT (OUTPATIENT)
Dept: INTERNAL MEDICINE | Facility: CLINIC | Age: 66
End: 2021-07-15

## 2021-07-15 VITALS — TEMPERATURE: 96.5 F

## 2021-07-15 VITALS — TEMPERATURE: 97.6 F

## 2021-09-02 ENCOUNTER — APPOINTMENT (OUTPATIENT)
Dept: OPHTHALMOLOGY | Facility: CLINIC | Age: 66
End: 2021-09-02

## 2021-09-03 NOTE — H&P ADULT. - PROBLEM SELECTOR PROBLEM 1
Patient notified of BP/P results and Dr. Elvin Ribera recommendation. Med list amended. Hb-SS disease with crisis

## 2021-09-08 NOTE — ED PROVIDER NOTE - GASTROINTESTINAL NEGATIVE STATEMENT, MLM
Pt called - having chronic back pain and asking to use a TENS unit on back  She is ppm dependent  Called [employer] services - they advise against using TENS unit on back  Pt notified   no abdominal pain, no bloating, no constipation, no diarrhea, no nausea and no vomiting.

## 2021-09-13 NOTE — ED ADULT NURSE NOTE - TEMPLATE LIST FOR HEAD TO TOE ASSESSMENT
Problem: Violent Restraints  Goal: No harm/injury to patient while restraints in use  Outcome: Progressing Towards Goal General

## 2021-10-04 ENCOUNTER — APPOINTMENT (OUTPATIENT)
Dept: INTERNAL MEDICINE | Facility: CLINIC | Age: 66
End: 2021-10-04

## 2021-10-04 VITALS — TEMPERATURE: 97.9 F

## 2021-11-02 NOTE — PROGRESS NOTE ADULT - PROBLEM SELECTOR PLAN 4
IMPROVE VTE Individual Risk Assessment    RISK                                                          Points  [] Previous VTE                                           3  [] Thrombophilia                                        2  [] Lower limb paralysis                              2   [] Current Cancer                                       2   [x] Immobilization > 24 hrs                        1  [] ICU/CCU stay > 24 hours                       1  [] Age > 60                                                   1    IMPROVE VTE Score: 2 lovenox sq Full

## 2021-11-05 VITALS
HEIGHT: 70 IN | RESPIRATION RATE: 18 BRPM | DIASTOLIC BLOOD PRESSURE: 110 MMHG | HEART RATE: 111 BPM | SYSTOLIC BLOOD PRESSURE: 144 MMHG | OXYGEN SATURATION: 100 % | TEMPERATURE: 98 F

## 2021-11-05 LAB
HCT VFR BLD CALC: 23.7 % — LOW (ref 39–50)
HGB BLD-MCNC: 7.6 G/DL — LOW (ref 13–17)
IANC: 10.41 K/UL — HIGH (ref 1.5–8.5)
MCHC RBC-ENTMCNC: 29.5 PG — SIGNIFICANT CHANGE UP (ref 27–34)
MCHC RBC-ENTMCNC: 32.1 GM/DL — SIGNIFICANT CHANGE UP (ref 32–36)
MCV RBC AUTO: 91.9 FL — SIGNIFICANT CHANGE UP (ref 80–100)
PLATELET # BLD AUTO: 336 K/UL — SIGNIFICANT CHANGE UP (ref 150–400)
RBC # BLD: 2.58 M/UL — LOW (ref 4.2–5.8)
RBC # BLD: 2.58 M/UL — LOW (ref 4.2–5.8)
RBC # FLD: 20.1 % — HIGH (ref 10.3–14.5)
RETICS #: 249.2 K/UL — HIGH (ref 25–125)
RETICS/RBC NFR: 9.7 % — HIGH (ref 0.5–2.5)
WBC # BLD: 12.08 K/UL — HIGH (ref 3.8–10.5)
WBC # FLD AUTO: 12.08 K/UL — HIGH (ref 3.8–10.5)

## 2021-11-05 PROCEDURE — 99285 EMERGENCY DEPT VISIT HI MDM: CPT | Mod: GC

## 2021-11-05 PROCEDURE — 71045 X-RAY EXAM CHEST 1 VIEW: CPT | Mod: 26

## 2021-11-05 RX ORDER — SODIUM CHLORIDE 9 MG/ML
1000 INJECTION INTRAMUSCULAR; INTRAVENOUS; SUBCUTANEOUS ONCE
Refills: 0 | Status: COMPLETED | OUTPATIENT
Start: 2021-11-05 | End: 2021-11-05

## 2021-11-05 RX ORDER — HYDROMORPHONE HYDROCHLORIDE 2 MG/ML
1 INJECTION INTRAMUSCULAR; INTRAVENOUS; SUBCUTANEOUS ONCE
Refills: 0 | Status: DISCONTINUED | OUTPATIENT
Start: 2021-11-05 | End: 2021-11-05

## 2021-11-05 RX ADMIN — HYDROMORPHONE HYDROCHLORIDE 1 MILLIGRAM(S): 2 INJECTION INTRAMUSCULAR; INTRAVENOUS; SUBCUTANEOUS at 22:52

## 2021-11-05 RX ADMIN — SODIUM CHLORIDE 1000 MILLILITER(S): 9 INJECTION INTRAMUSCULAR; INTRAVENOUS; SUBCUTANEOUS at 22:52

## 2021-11-05 NOTE — ED ADULT NURSE NOTE - OBJECTIVE STATEMENT
pt. received in room 26, a/ox4, ambulatory, c/o generalized body pain. PMH of sickle cell. Patient states he has had generalized body and joint pain since earlier today, similar to past sickle cell crisis. denies fever, chills, chest pain, sob and n/v. md at bedside placing US IV. respirations even and unlabored. will continue to monitor.

## 2021-11-05 NOTE — ED PROVIDER NOTE - CLINICAL SUMMARY MEDICAL DECISION MAKING FREE TEXT BOX
67 y/o M with PMH HbSS disease w/ hx of  ACS, CHFrE , mild intellectual disability, anemia, BPH p/w sickle cell crisis pain. Pt reports having generalized joint and body pain similar to his prior sc crisis. pt states he has pain  is his shoulder , legs, arms and back.  pt w/ sc pain similar to prior episodes. clear lungs, sat 100%. will treat for pain and obtain labs, ivf, reassessment

## 2021-11-05 NOTE — ED PROVIDER NOTE - ATTENDING CONTRIBUTION TO CARE
attending wrote note  Dr. Fischer: I have personally seen and examined this patient at the bedside. I have fully participated in the care of this patient. I have reviewed all pertinent clinical information, including history, physical exam, plan and the Resident's note and agree except as noted. HPI above as by me. PE above as by me. DDX PLAN

## 2021-11-05 NOTE — ED PROVIDER NOTE - OBJECTIVE STATEMENT
67 y/o M with PMH HbSS disease w/ hx of  ACS, CHFrE , mild intellectual disability, anemia, BPH p/w sickle cell crisis pain. Pt reports having generalized joint and body pain similar to his prior sc crisis. pt states he has pain  is his shoulder , legs, arms and back. he denies fever, chills, chest pain, SOB. he states pain is 9/10 worse with movement and palpation. he took 3 motrin w/o improvement. he denies recent travel or cough. he states his last transfusion was earlier in the year . he is not sure of his hgb at baseline. he lives alone. denies falls or trauma

## 2021-11-05 NOTE — ED PROVIDER NOTE - NS ED ROS FT
denies fever, chills, chest pain, SOB, abdominal pain, diarrhea, dysuria, syncope, bleeding, new rash,weakness, numbness, + joint pain   ROS  otherwise negative as per HPI

## 2021-11-05 NOTE — ED ADULT TRIAGE NOTE - CHIEF COMPLAINT QUOTE
atraumatic body pain starting today without associated symptoms - HX HTN, sickle cell disease - took 3 ibuprofen tabs today without relief - moaning in triage

## 2021-11-05 NOTE — ED PROVIDER NOTE - PHYSICAL EXAMINATION
Gen: Awake, Alert, WD, WN, NAD  Head:  NC/AT  Eyes:  PERRL, EOMI, Conjunctiva pink, lids normal, + scleral icterus  ENT: OP clear, no exudates, dry mucus membranes  Neck: supple, nontender, no meningismus, no JVD, trachea midline  Cardiac/CV:  S1 S2, tachycardic , no M/G/R  Respiratory/Pulm:  CTAB, good air movement, normal resp effort, no wheezes/stridor/retractions/rales/rhonchi  Gastrointestinal/Abdomen:  Soft, nontender, nondistended, +BS, no rebound/guarding  Back:  no CVAT, no MLT  Ext:  warm, well perfused, moving all extremities spontaneously, no peripheral edema, distal pulses intact + shoulder and back pain   Skin: intact, no rash  Neuro:  AAOx3, sensation intact, motor 5/5 x 4 extremities, , speech clear

## 2021-11-05 NOTE — ED ADULT NURSE NOTE - TEMPLATE
----- Message from Alex Canales MD sent at 9/10/2019 10:09 AM EDT -----  Increase magnesium oxide 200 mg twice daily  Check magnesium in 2 weeks  Quit drinking alcohol   General

## 2021-11-05 NOTE — ED ADULT NURSE NOTE - NSICDXFAMILYHX_GEN_ALL_CORE_FT
FAMILY HISTORY:  FH: hypertension, ~ mother    Mother  Still living? Unknown  Family history of sickle cell trait, Age at diagnosis: Age Unknown     No significant past surgical history

## 2021-11-05 NOTE — ED PROVIDER NOTE - PROGRESS NOTE DETAILS
ED Attending: Caio Bella signed out to me from Dr. Fischer to f/u and reassess. Pt c uncontrolled pain, "barely touched" by high dose dilaudid + tylenol and Toradol.  No CP. H/H higher than baseline, possibly hemoconcentrated.  Will provide chadwick hydration.   Admitting for further E&M.

## 2021-11-06 ENCOUNTER — INPATIENT (INPATIENT)
Facility: HOSPITAL | Age: 66
LOS: 5 days | Discharge: HOME | End: 2021-11-12
Attending: HOSPITALIST | Admitting: HOSPITALIST
Payer: MEDICARE

## 2021-11-06 DIAGNOSIS — R11.2 NAUSEA WITH VOMITING, UNSPECIFIED: ICD-10-CM

## 2021-11-06 DIAGNOSIS — N40.0 BENIGN PROSTATIC HYPERPLASIA WITHOUT LOWER URINARY TRACT SYMPTOMS: ICD-10-CM

## 2021-11-06 DIAGNOSIS — D57.00 HB-SS DISEASE WITH CRISIS, UNSPECIFIED: ICD-10-CM

## 2021-11-06 DIAGNOSIS — I50.22 CHRONIC SYSTOLIC (CONGESTIVE) HEART FAILURE: ICD-10-CM

## 2021-11-06 DIAGNOSIS — E87.5 HYPERKALEMIA: ICD-10-CM

## 2021-11-06 DIAGNOSIS — R52 PAIN, UNSPECIFIED: ICD-10-CM

## 2021-11-06 DIAGNOSIS — D72.829 ELEVATED WHITE BLOOD CELL COUNT, UNSPECIFIED: ICD-10-CM

## 2021-11-06 DIAGNOSIS — I10 ESSENTIAL (PRIMARY) HYPERTENSION: ICD-10-CM

## 2021-11-06 DIAGNOSIS — Z29.9 ENCOUNTER FOR PROPHYLACTIC MEASURES, UNSPECIFIED: ICD-10-CM

## 2021-11-06 LAB
ALBUMIN SERPL ELPH-MCNC: 4.5 G/DL — SIGNIFICANT CHANGE UP (ref 3.3–5)
ALP SERPL-CCNC: 171 U/L — HIGH (ref 40–120)
ALT FLD-CCNC: 20 U/L — SIGNIFICANT CHANGE UP (ref 4–41)
ANION GAP SERPL CALC-SCNC: 9 MMOL/L — SIGNIFICANT CHANGE UP (ref 7–14)
ANISOCYTOSIS BLD QL: SLIGHT — SIGNIFICANT CHANGE UP
APPEARANCE UR: CLEAR — SIGNIFICANT CHANGE UP
AST SERPL-CCNC: 41 U/L — HIGH (ref 4–40)
B PERT DNA SPEC QL NAA+PROBE: SIGNIFICANT CHANGE UP
B PERT+PARAPERT DNA PNL SPEC NAA+PROBE: SIGNIFICANT CHANGE UP
BACTERIA # UR AUTO: NEGATIVE — SIGNIFICANT CHANGE UP
BASOPHILS # BLD AUTO: 0.04 K/UL — SIGNIFICANT CHANGE UP (ref 0–0.2)
BASOPHILS # BLD AUTO: 0.11 K/UL — SIGNIFICANT CHANGE UP (ref 0–0.2)
BASOPHILS NFR BLD AUTO: 0.3 % — SIGNIFICANT CHANGE UP (ref 0–2)
BASOPHILS NFR BLD AUTO: 0.9 % — SIGNIFICANT CHANGE UP (ref 0–2)
BILIRUB SERPL-MCNC: 1.9 MG/DL — HIGH (ref 0.2–1.2)
BILIRUB UR-MCNC: NEGATIVE — SIGNIFICANT CHANGE UP
BORDETELLA PARAPERTUSSIS (RAPRVP): SIGNIFICANT CHANGE UP
BUN SERPL-MCNC: 10 MG/DL — SIGNIFICANT CHANGE UP (ref 7–23)
BUN SERPL-MCNC: 11 MG/DL — SIGNIFICANT CHANGE UP (ref 7–23)
BUN SERPL-MCNC: 12 MG/DL — SIGNIFICANT CHANGE UP (ref 7–23)
C PNEUM DNA SPEC QL NAA+PROBE: SIGNIFICANT CHANGE UP
CALCIUM SERPL-MCNC: 8.9 MG/DL — SIGNIFICANT CHANGE UP (ref 8.4–10.5)
CALCIUM SERPL-MCNC: 9 MG/DL — SIGNIFICANT CHANGE UP (ref 8.4–10.5)
CALCIUM SERPL-MCNC: 9.8 MG/DL — SIGNIFICANT CHANGE UP (ref 8.4–10.5)
CHLORIDE SERPL-SCNC: 103 MMOL/L — SIGNIFICANT CHANGE UP (ref 98–107)
CHLORIDE SERPL-SCNC: 109 MMOL/L — HIGH (ref 98–107)
CHLORIDE SERPL-SCNC: 109 MMOL/L — HIGH (ref 98–107)
CO2 SERPL-SCNC: 20 MMOL/L — LOW (ref 22–31)
CO2 SERPL-SCNC: 21 MMOL/L — LOW (ref 22–31)
CO2 SERPL-SCNC: 23 MMOL/L — SIGNIFICANT CHANGE UP (ref 22–31)
COLOR SPEC: SIGNIFICANT CHANGE UP
CREAT SERPL-MCNC: 1.04 MG/DL — SIGNIFICANT CHANGE UP (ref 0.5–1.3)
CREAT SERPL-MCNC: 1.08 MG/DL — SIGNIFICANT CHANGE UP (ref 0.5–1.3)
CREAT SERPL-MCNC: 1.12 MG/DL — SIGNIFICANT CHANGE UP (ref 0.5–1.3)
DIFF PNL FLD: NEGATIVE — SIGNIFICANT CHANGE UP
EOSINOPHIL # BLD AUTO: 0 K/UL — SIGNIFICANT CHANGE UP (ref 0–0.5)
EOSINOPHIL # BLD AUTO: 0.08 K/UL — SIGNIFICANT CHANGE UP (ref 0–0.5)
EOSINOPHIL NFR BLD AUTO: 0 % — SIGNIFICANT CHANGE UP (ref 0–6)
EOSINOPHIL NFR BLD AUTO: 0.7 % — SIGNIFICANT CHANGE UP (ref 0–6)
EPI CELLS # UR: 1 /HPF — SIGNIFICANT CHANGE UP (ref 0–5)
FLUAV SUBTYP SPEC NAA+PROBE: SIGNIFICANT CHANGE UP
FLUBV RNA SPEC QL NAA+PROBE: SIGNIFICANT CHANGE UP
GIANT PLATELETS BLD QL SMEAR: PRESENT — SIGNIFICANT CHANGE UP
GLUCOSE SERPL-MCNC: 102 MG/DL — HIGH (ref 70–99)
GLUCOSE SERPL-MCNC: 117 MG/DL — HIGH (ref 70–99)
GLUCOSE SERPL-MCNC: 89 MG/DL — SIGNIFICANT CHANGE UP (ref 70–99)
GLUCOSE UR QL: NEGATIVE — SIGNIFICANT CHANGE UP
HADV DNA SPEC QL NAA+PROBE: SIGNIFICANT CHANGE UP
HAPTOGLOB SERPL-MCNC: <20 MG/DL — LOW (ref 34–200)
HCOV 229E RNA SPEC QL NAA+PROBE: SIGNIFICANT CHANGE UP
HCOV HKU1 RNA SPEC QL NAA+PROBE: SIGNIFICANT CHANGE UP
HCOV NL63 RNA SPEC QL NAA+PROBE: SIGNIFICANT CHANGE UP
HCOV OC43 RNA SPEC QL NAA+PROBE: SIGNIFICANT CHANGE UP
HCT VFR BLD CALC: 17.5 % — CRITICAL LOW (ref 39–50)
HCT VFR BLD CALC: 17.7 % — CRITICAL LOW (ref 39–50)
HGB BLD-MCNC: 5.9 G/DL — CRITICAL LOW (ref 13–17)
HGB BLD-MCNC: 5.9 G/DL — CRITICAL LOW (ref 13–17)
HMPV RNA SPEC QL NAA+PROBE: SIGNIFICANT CHANGE UP
HPIV1 RNA SPEC QL NAA+PROBE: SIGNIFICANT CHANGE UP
HPIV2 RNA SPEC QL NAA+PROBE: SIGNIFICANT CHANGE UP
HPIV3 RNA SPEC QL NAA+PROBE: SIGNIFICANT CHANGE UP
HPIV4 RNA SPEC QL NAA+PROBE: SIGNIFICANT CHANGE UP
HYALINE CASTS # UR AUTO: 1 /LPF — SIGNIFICANT CHANGE UP (ref 0–7)
IANC: 9 K/UL — HIGH (ref 1.5–8.5)
IMM GRANULOCYTES NFR BLD AUTO: 3.8 % — HIGH (ref 0–1.5)
KETONES UR-MCNC: NEGATIVE — SIGNIFICANT CHANGE UP
LDH SERPL L TO P-CCNC: 475 U/L — HIGH (ref 135–225)
LDH SERPL L TO P-CCNC: 482 U/L — HIGH (ref 135–225)
LEUKOCYTE ESTERASE UR-ACNC: NEGATIVE — SIGNIFICANT CHANGE UP
LIDOCAIN IGE QN: 33 U/L — SIGNIFICANT CHANGE UP (ref 7–60)
LYMPHOCYTES # BLD AUTO: 0.76 K/UL — LOW (ref 1–3.3)
LYMPHOCYTES # BLD AUTO: 1.2 K/UL — SIGNIFICANT CHANGE UP (ref 1–3.3)
LYMPHOCYTES # BLD AUTO: 10.3 % — LOW (ref 13–44)
LYMPHOCYTES # BLD AUTO: 6.3 % — LOW (ref 13–44)
M PNEUMO DNA SPEC QL NAA+PROBE: SIGNIFICANT CHANGE UP
MACROCYTES BLD QL: SIGNIFICANT CHANGE UP
MAGNESIUM SERPL-MCNC: 1.9 MG/DL — SIGNIFICANT CHANGE UP (ref 1.6–2.6)
MAGNESIUM SERPL-MCNC: 2.1 MG/DL — SIGNIFICANT CHANGE UP (ref 1.6–2.6)
MCHC RBC-ENTMCNC: 30.4 PG — SIGNIFICANT CHANGE UP (ref 27–34)
MCHC RBC-ENTMCNC: 30.6 PG — SIGNIFICANT CHANGE UP (ref 27–34)
MCHC RBC-ENTMCNC: 33.3 GM/DL — SIGNIFICANT CHANGE UP (ref 32–36)
MCHC RBC-ENTMCNC: 33.7 GM/DL — SIGNIFICANT CHANGE UP (ref 32–36)
MCV RBC AUTO: 90.2 FL — SIGNIFICANT CHANGE UP (ref 80–100)
MCV RBC AUTO: 91.7 FL — SIGNIFICANT CHANGE UP (ref 80–100)
MONOCYTES # BLD AUTO: 0.76 K/UL — SIGNIFICANT CHANGE UP (ref 0–0.9)
MONOCYTES # BLD AUTO: 0.92 K/UL — HIGH (ref 0–0.9)
MONOCYTES NFR BLD AUTO: 6.3 % — SIGNIFICANT CHANGE UP (ref 2–14)
MONOCYTES NFR BLD AUTO: 7.9 % — SIGNIFICANT CHANGE UP (ref 2–14)
MYELOCYTES NFR BLD: 3.6 % — HIGH (ref 0–0)
NEUTROPHILS # BLD AUTO: 10.01 K/UL — HIGH (ref 1.8–7.4)
NEUTROPHILS # BLD AUTO: 9 K/UL — HIGH (ref 1.8–7.4)
NEUTROPHILS NFR BLD AUTO: 77 % — SIGNIFICANT CHANGE UP (ref 43–77)
NEUTROPHILS NFR BLD AUTO: 81.1 % — HIGH (ref 43–77)
NEUTS BAND # BLD: 1.8 % — SIGNIFICANT CHANGE UP (ref 0–6)
NITRITE UR-MCNC: NEGATIVE — SIGNIFICANT CHANGE UP
NRBC # BLD: 10 /100 WBCS — SIGNIFICANT CHANGE UP
NRBC # BLD: 8 /100 — HIGH (ref 0–0)
NRBC # BLD: 9 /100 WBCS — SIGNIFICANT CHANGE UP
NRBC # FLD: 1.08 K/UL — HIGH
NRBC # FLD: 1.34 K/UL — HIGH
NT-PROBNP SERPL-SCNC: 5204 PG/ML — HIGH
OVALOCYTES BLD QL SMEAR: SIGNIFICANT CHANGE UP
PH UR: 7 — SIGNIFICANT CHANGE UP (ref 5–8)
PHOSPHATE SERPL-MCNC: 2.6 MG/DL — SIGNIFICANT CHANGE UP (ref 2.5–4.5)
PHOSPHATE SERPL-MCNC: 3.4 MG/DL — SIGNIFICANT CHANGE UP (ref 2.5–4.5)
PLAT MORPH BLD: ABNORMAL
PLATELET # BLD AUTO: 206 K/UL — SIGNIFICANT CHANGE UP (ref 150–400)
PLATELET # BLD AUTO: 222 K/UL — SIGNIFICANT CHANGE UP (ref 150–400)
PLATELET COUNT - ESTIMATE: NORMAL — SIGNIFICANT CHANGE UP
POIKILOCYTOSIS BLD QL AUTO: SLIGHT — SIGNIFICANT CHANGE UP
POLYCHROMASIA BLD QL SMEAR: SLIGHT — SIGNIFICANT CHANGE UP
POTASSIUM SERPL-MCNC: 4.9 MMOL/L — SIGNIFICANT CHANGE UP (ref 3.5–5.3)
POTASSIUM SERPL-MCNC: 5 MMOL/L — SIGNIFICANT CHANGE UP (ref 3.5–5.3)
POTASSIUM SERPL-MCNC: 5.4 MMOL/L — HIGH (ref 3.5–5.3)
POTASSIUM SERPL-SCNC: 4.9 MMOL/L — SIGNIFICANT CHANGE UP (ref 3.5–5.3)
POTASSIUM SERPL-SCNC: 5 MMOL/L — SIGNIFICANT CHANGE UP (ref 3.5–5.3)
POTASSIUM SERPL-SCNC: 5.4 MMOL/L — HIGH (ref 3.5–5.3)
PROT SERPL-MCNC: 8.2 G/DL — SIGNIFICANT CHANGE UP (ref 6–8.3)
PROT UR-MCNC: ABNORMAL
RAPID RVP RESULT: SIGNIFICANT CHANGE UP
RBC # BLD: 1.93 M/UL — LOW (ref 4.2–5.8)
RBC # BLD: 1.94 M/UL — LOW (ref 4.2–5.8)
RBC # BLD: 1.94 M/UL — LOW (ref 4.2–5.8)
RBC # FLD: 20.6 % — HIGH (ref 10.3–14.5)
RBC # FLD: 20.9 % — HIGH (ref 10.3–14.5)
RBC BLD AUTO: ABNORMAL
RBC CASTS # UR COMP ASSIST: 0 /HPF — SIGNIFICANT CHANGE UP (ref 0–4)
RETICS #: 175.8 K/UL — HIGH (ref 25–125)
RETICS/RBC NFR: 9.1 % — HIGH (ref 0.5–2.5)
RSV RNA SPEC QL NAA+PROBE: SIGNIFICANT CHANGE UP
RV+EV RNA SPEC QL NAA+PROBE: SIGNIFICANT CHANGE UP
SARS-COV-2 RNA SPEC QL NAA+PROBE: SIGNIFICANT CHANGE UP
SICKLE CELLS BLD QL SMEAR: SLIGHT — SIGNIFICANT CHANGE UP
SODIUM SERPL-SCNC: 135 MMOL/L — SIGNIFICANT CHANGE UP (ref 135–145)
SODIUM SERPL-SCNC: 138 MMOL/L — SIGNIFICANT CHANGE UP (ref 135–145)
SODIUM SERPL-SCNC: 139 MMOL/L — SIGNIFICANT CHANGE UP (ref 135–145)
SP GR SPEC: 1.01 — SIGNIFICANT CHANGE UP (ref 1–1.05)
TARGETS BLD QL SMEAR: SLIGHT — SIGNIFICANT CHANGE UP
TROPONIN T, HIGH SENSITIVITY RESULT: 19 NG/L — SIGNIFICANT CHANGE UP
TROPONIN T, HIGH SENSITIVITY RESULT: 20 NG/L — SIGNIFICANT CHANGE UP
UROBILINOGEN FLD QL: SIGNIFICANT CHANGE UP
WBC # BLD: 11.69 K/UL — HIGH (ref 3.8–10.5)
WBC # BLD: 13.69 K/UL — HIGH (ref 3.8–10.5)
WBC # FLD AUTO: 11.69 K/UL — HIGH (ref 3.8–10.5)
WBC # FLD AUTO: 13.69 K/UL — HIGH (ref 3.8–10.5)
WBC UR QL: 0 /HPF — SIGNIFICANT CHANGE UP (ref 0–5)

## 2021-11-06 PROCEDURE — 12345: CPT | Mod: NC

## 2021-11-06 PROCEDURE — 86077 PHYS BLOOD BANK SERV XMATCH: CPT

## 2021-11-06 PROCEDURE — 99223 1ST HOSP IP/OBS HIGH 75: CPT

## 2021-11-06 RX ORDER — HYDROMORPHONE HYDROCHLORIDE 2 MG/ML
30 INJECTION INTRAMUSCULAR; INTRAVENOUS; SUBCUTANEOUS
Refills: 0 | Status: DISCONTINUED | OUTPATIENT
Start: 2021-11-06 | End: 2021-11-12

## 2021-11-06 RX ORDER — SENNA PLUS 8.6 MG/1
2 TABLET ORAL AT BEDTIME
Refills: 0 | Status: DISCONTINUED | OUTPATIENT
Start: 2021-11-06 | End: 2021-11-12

## 2021-11-06 RX ORDER — KETOROLAC TROMETHAMINE 30 MG/ML
30 SYRINGE (ML) INJECTION ONCE
Refills: 0 | Status: DISCONTINUED | OUTPATIENT
Start: 2021-11-06 | End: 2021-11-06

## 2021-11-06 RX ORDER — NALOXONE HYDROCHLORIDE 4 MG/.1ML
0.1 SPRAY NASAL
Refills: 0 | Status: DISCONTINUED | OUTPATIENT
Start: 2021-11-06 | End: 2021-11-12

## 2021-11-06 RX ORDER — FINASTERIDE 5 MG/1
5 TABLET, FILM COATED ORAL DAILY
Refills: 0 | Status: DISCONTINUED | OUTPATIENT
Start: 2021-11-06 | End: 2021-11-12

## 2021-11-06 RX ORDER — HYDROMORPHONE HYDROCHLORIDE 2 MG/ML
2 INJECTION INTRAMUSCULAR; INTRAVENOUS; SUBCUTANEOUS ONCE
Refills: 0 | Status: DISCONTINUED | OUTPATIENT
Start: 2021-11-06 | End: 2021-11-06

## 2021-11-06 RX ORDER — SODIUM CHLORIDE 9 MG/ML
1000 INJECTION, SOLUTION INTRAVENOUS
Refills: 0 | Status: DISCONTINUED | OUTPATIENT
Start: 2021-11-06 | End: 2021-11-06

## 2021-11-06 RX ORDER — ACETAMINOPHEN 500 MG
650 TABLET ORAL ONCE
Refills: 0 | Status: COMPLETED | OUTPATIENT
Start: 2021-11-06 | End: 2021-11-06

## 2021-11-06 RX ORDER — HYDROMORPHONE HYDROCHLORIDE 2 MG/ML
0.5 INJECTION INTRAMUSCULAR; INTRAVENOUS; SUBCUTANEOUS ONCE
Refills: 0 | Status: DISCONTINUED | OUTPATIENT
Start: 2021-11-06 | End: 2021-11-06

## 2021-11-06 RX ORDER — SODIUM CHLORIDE 9 MG/ML
1000 INJECTION INTRAMUSCULAR; INTRAVENOUS; SUBCUTANEOUS ONCE
Refills: 0 | Status: COMPLETED | OUTPATIENT
Start: 2021-11-06 | End: 2021-11-06

## 2021-11-06 RX ORDER — METOPROLOL TARTRATE 50 MG
25 TABLET ORAL
Refills: 0 | Status: DISCONTINUED | OUTPATIENT
Start: 2021-11-06 | End: 2021-11-12

## 2021-11-06 RX ORDER — HYDROMORPHONE HYDROCHLORIDE 2 MG/ML
30 INJECTION INTRAMUSCULAR; INTRAVENOUS; SUBCUTANEOUS
Refills: 0 | Status: DISCONTINUED | OUTPATIENT
Start: 2021-11-06 | End: 2021-11-06

## 2021-11-06 RX ORDER — SODIUM CHLORIDE 9 MG/ML
1000 INJECTION, SOLUTION INTRAVENOUS
Refills: 0 | Status: DISCONTINUED | OUTPATIENT
Start: 2021-11-06 | End: 2021-11-12

## 2021-11-06 RX ORDER — ONDANSETRON 8 MG/1
4 TABLET, FILM COATED ORAL EVERY 6 HOURS
Refills: 0 | Status: DISCONTINUED | OUTPATIENT
Start: 2021-11-06 | End: 2021-11-12

## 2021-11-06 RX ORDER — HYDROMORPHONE HYDROCHLORIDE 2 MG/ML
4 INJECTION INTRAMUSCULAR; INTRAVENOUS; SUBCUTANEOUS ONCE
Refills: 0 | Status: DISCONTINUED | OUTPATIENT
Start: 2021-11-06 | End: 2021-11-06

## 2021-11-06 RX ORDER — FOLIC ACID 0.8 MG
1 TABLET ORAL DAILY
Refills: 0 | Status: DISCONTINUED | OUTPATIENT
Start: 2021-11-06 | End: 2021-11-12

## 2021-11-06 RX ORDER — TAMSULOSIN HYDROCHLORIDE 0.4 MG/1
0.4 CAPSULE ORAL AT BEDTIME
Refills: 0 | Status: DISCONTINUED | OUTPATIENT
Start: 2021-11-06 | End: 2021-11-12

## 2021-11-06 RX ORDER — INFLUENZA VIRUS VACCINE 15; 15; 15; 15 UG/.5ML; UG/.5ML; UG/.5ML; UG/.5ML
0.7 SUSPENSION INTRAMUSCULAR ONCE
Refills: 0 | Status: DISCONTINUED | OUTPATIENT
Start: 2021-11-06 | End: 2021-11-12

## 2021-11-06 RX ADMIN — HYDROMORPHONE HYDROCHLORIDE 2 MILLIGRAM(S): 2 INJECTION INTRAMUSCULAR; INTRAVENOUS; SUBCUTANEOUS at 01:52

## 2021-11-06 RX ADMIN — HYDROMORPHONE HYDROCHLORIDE 30 MILLILITER(S): 2 INJECTION INTRAMUSCULAR; INTRAVENOUS; SUBCUTANEOUS at 12:59

## 2021-11-06 RX ADMIN — Medication 650 MILLIGRAM(S): at 01:53

## 2021-11-06 RX ADMIN — SODIUM CHLORIDE 1000 MILLILITER(S): 9 INJECTION INTRAMUSCULAR; INTRAVENOUS; SUBCUTANEOUS at 01:52

## 2021-11-06 RX ADMIN — SODIUM CHLORIDE 20 MILLILITER(S): 9 INJECTION, SOLUTION INTRAVENOUS at 05:21

## 2021-11-06 RX ADMIN — Medication 30 MILLIGRAM(S): at 01:52

## 2021-11-06 RX ADMIN — HYDROMORPHONE HYDROCHLORIDE 30 MILLILITER(S): 2 INJECTION INTRAMUSCULAR; INTRAVENOUS; SUBCUTANEOUS at 20:18

## 2021-11-06 RX ADMIN — FINASTERIDE 5 MILLIGRAM(S): 5 TABLET, FILM COATED ORAL at 11:47

## 2021-11-06 RX ADMIN — HYDROMORPHONE HYDROCHLORIDE 30 MILLILITER(S): 2 INJECTION INTRAMUSCULAR; INTRAVENOUS; SUBCUTANEOUS at 05:25

## 2021-11-06 RX ADMIN — SODIUM CHLORIDE 20 MILLILITER(S): 9 INJECTION, SOLUTION INTRAVENOUS at 08:49

## 2021-11-06 RX ADMIN — Medication 25 MILLIGRAM(S): at 17:27

## 2021-11-06 RX ADMIN — HYDROMORPHONE HYDROCHLORIDE 0.5 MILLIGRAM(S): 2 INJECTION INTRAMUSCULAR; INTRAVENOUS; SUBCUTANEOUS at 04:11

## 2021-11-06 RX ADMIN — HYDROMORPHONE HYDROCHLORIDE 30 MILLILITER(S): 2 INJECTION INTRAMUSCULAR; INTRAVENOUS; SUBCUTANEOUS at 08:05

## 2021-11-06 RX ADMIN — Medication 25 MILLIGRAM(S): at 05:38

## 2021-11-06 RX ADMIN — HYDROMORPHONE HYDROCHLORIDE 2 MILLIGRAM(S): 2 INJECTION INTRAMUSCULAR; INTRAVENOUS; SUBCUTANEOUS at 00:24

## 2021-11-06 RX ADMIN — Medication 1 MILLIGRAM(S): at 11:47

## 2021-11-06 RX ADMIN — HYDROMORPHONE HYDROCHLORIDE 30 MILLILITER(S): 2 INJECTION INTRAMUSCULAR; INTRAVENOUS; SUBCUTANEOUS at 11:11

## 2021-11-06 RX ADMIN — TAMSULOSIN HYDROCHLORIDE 0.4 MILLIGRAM(S): 0.4 CAPSULE ORAL at 22:05

## 2021-11-06 NOTE — H&P ADULT - PROBLEM SELECTOR PLAN 1
hydromorphone PCA for pain  s/p 2L IVF in ED, given low normal EF and elevated pro-BNP will hold off on 1/2 normal saline for now  senna PRN  monitor/trend H/H, LDH, retic%    patient with a history of (+)FOBT 6/2021 and refused GI eval at that time, patient reported to me darker stools that he related to drinking chocolate ensure of recent, will repeat FOBT, holding off on a/c for now pending result    Per prior heme notes, patient with history of DANICA, will repeat iron studies with next blood draw  needs outpatient GI eval for colonoscopy screening hydromorphone PCA for pain  s/p 2L IVF in ED, given low normal EF and elevated pro-BNP will hold off on maintenance 1/2 normal saline for now, on KVO rate while on PCA  senna PRN  monitor/trend H/H, LDH, retic%    patient with a history of (+)FOBT 6/2021 and refused GI eval at that time, patient reported to me darker stools that he related to drinking chocolate ensure of recent, will repeat FOBT, holding off on a/c for now pending result    Per prior heme notes, patient with history of DANICA, will repeat iron studies with next blood draw  needs outpatient GI eval for colonoscopy screening hydromorphone PCA for pain  s/p 2L IVF in ED, given low normal EF and elevated pro-BNP will hold off on maintenance 1/2 normal saline for now, on KVO rate while on PCA  senna PRN  monitor/trend H/H, LDH, retic%    patient with a history of (+)FOBT 6/2021 and refused GI eval at that time, patient reported to me darker stools that he related to drinking chocolate ensure of recent, will repeat FOBT, holding off on a/c for now pending result    Per prior heme notes, patient with history of DANICA, will repeat iron studies with next blood draw  needs outpatient GI eval for colonoscopy screening    patient reporting chest wall pain in setting of crisis pain, reproducible on exam, initial trop in indeterminate range, f/u repeat with AM labs; EKG ordered/pending, f/u hydromorphone PCA for pain  s/p 2L IVF in ED, given low normal EF and elevated pro-BNP will hold off on maintenance 1/2 normal saline for now, on KVO rate while on PCA  senna PRN  monitor/trend H/H, LDH, retic%    patient with a history of (+)FOBT 6/2021 and refused GI eval at that time, patient reported to me darker stools that he related to drinking chocolate ensure of recent, will repeat FOBT, holding off on a/c for now pending result    Per prior heme notes, patient with history of DANICA, will repeat iron studies with next blood draw  needs outpatient GI eval for colonoscopy screening if FOBT negative (never had screening colonoscopy)    patient reporting chest wall pain in setting of crisis pain, reproducible on exam, initial trop in indeterminate range, f/u repeat with AM labs; EKG ordered/pending, f/u

## 2021-11-06 NOTE — H&P ADULT - PROBLEM SELECTOR PLAN 5
BP elevated in ED however in setting of continued crisis pain  c/w metoprolol w/hold parameters (patient reports on metoprolol for history of palpitations) and pain control  may need additional agent for BP control

## 2021-11-06 NOTE — H&P ADULT - NSHPSOCIALHISTORY_GEN_ALL_CORE
Lives alone in a house  Never smoker  No alcohol or illicit drug use  Not presently working - previously worked at [employer] prior to COVID    COVID Pf[employer] vaccine 4/24/2021 and 5/28/21

## 2021-11-06 NOTE — H&P ADULT - NSHPLABSRESULTS_GEN_ALL_CORE
7.6    12.08 )-----------( 336      ( 05 Nov 2021 23:38 )             23.7     Reticulocyte Count (11.05.21 @ 23:38)    RBC Count: 2.58 M/uL    Reticulocyte Percent: 9.7 %    Absolute Reticulocytes: 249.2 K/uL    11-05    135  |  103  |  10  ----------------------------<  102<H>  5.0   |  23  |  1.12    Ca    9.8      05 Nov 2021 23:38    TPro  8.2  /  Alb  4.5  /  TBili  1.9<H>  /  DBili  x   /  AST  41<H>  /  ALT  20  /  AlkPhos  171<H>  11-05    Lipase, Serum: 33 U/L (11.05.21 @ 23:38)    Troponin T, High Sensitivity Result: 19 ng/L (11.05.21 @ 23:38)    Serum Pro-Brain Natriuretic Peptide: 5204 pg/mL (11.05.21 @ 23:38)    Haptoglobin, Serum: <20 mg/dL (11.05.21 @ 23:38)  Lactate Dehydrogenase, Serum: 475 U/L (11.05.21 @ 23:38)    Rapid RVP Result: NotDetec (11.06.21 @ 00:10)  SARS-CoV-2: NotDetec (11.06.21 @ 00:10)    CXR personally reviewed - no focal consolidations    EKG ordered/pending    < from: US Transthoracic Echocardiogram w/Doppler Complete (02.08.21 @ 21:22) >  CONCLUSIONS:  1. Low normal left ventricular systolic function.  2. Increased left ventricular wall thickness.  3. Bi-atrial enlargement.  < end of copied text >

## 2021-11-06 NOTE — H&P ADULT - NSHPREVIEWOFSYSTEMS_GEN_ALL_CORE
REVIEW OF SYSTEMS:    CONSTITUTIONAL: No fevers or chills; decreased PO intake, anorexia  EYES/ENT: No visual changes; No dysphagia; No sore throat; No rhinorrhea; No sinus pain/pressure  NECK: (+) pain; No stiffness  RESPIRATORY: No cough, wheezing, hemoptysis; No shortness of breath  CARDIOVASCULAR: No chest pain; (+) palpitations - intermittent x1 year; No lower extremity edema  GASTROINTESTINAL: (+) abdominal pain, left sided. (+) nausea, vomiting, last prior to arrival; No hematemesis; (+) diarrhea after taking stool softener yesterday. No melena or hematochezia.  GENITOURINARY: No dysuria, frequency or hematuria; difficulty initiating urination xfew days  NEUROLOGICAL: No numbness, paresthesias or weakness  MSK: ambulates without aid; No falls/trauma  SKIN: No itching, burning, rashes, or lesions   All other review of systems is negative unless indicated above.

## 2021-11-06 NOTE — H&P ADULT - PROBLEM SELECTOR PLAN 4
elevated pro-BNP here without signs of fluid overload  holding further IVF at present  monitor I/Os, daily weights elevated pro-BNP however without signs of fluid overload  holding further IVF at present  monitor I/Os, daily weights

## 2021-11-06 NOTE — H&P ADULT - PROBLEM SELECTOR PLAN 6
c/w finasteride  previously prescribed tamsulosin however ran out, now reporting difficulty initiating urination without other urinary symptoms  check UA, bladder scan for PVR  would restart tamsulosin pending bladder scan

## 2021-11-06 NOTE — H&P ADULT - ASSESSMENT
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

## 2021-11-06 NOTE — H&P ADULT - PROBLEM SELECTOR PROBLEM 6
Problem: Patient Care Overview  Goal: Plan of Care Review  Flowsheets (Taken 5/2/2020 9517)  Outcome Summary: pt presents with significant weakness, shayna B LEs, impaired motor planning, contributing to impaired functional mobility and activity tolerance, she will beneift from PT to address, Today pt required mod/max a to sit edge of bed, and unable to come to full stand, delayed motor planning, with dec ability to initiate movement.  Pt is at a SNF currently with complex medical issues including ESRD , HD dependent, and currently undergoing chemo for uterine/peritoneal cancer, her goal is to return home with daughter, PT recommends that pt return to SNF at discharge      BPH (benign prostatic hyperplasia)

## 2021-11-06 NOTE — H&P ADULT - HISTORY OF PRESENT ILLNESS
66-year-old male with HbSS, HFrEF, BPH, mild intellectual disability, presenting from home with 2 days of pain. Pain is in stomach, knees, back, head, described as a burning-like in quality, typical of his sickle cell crisis pain. Last crisis in 5/2021. Patient was taking Advil at home with some relief of symptoms. Pain is reportedly 10/10. Patient attributes current crisis to the weather. Patient was taking Nyquil and Vicki [place] for aches. Patient notes left sided abdominal pain with non-bloody, non-bilious vomiting, and intermittent diarrhea.  No sick contacts or recent travel.    In the ED VS: 98.2  111  144-152/110  18  100%, received 2L NS IVF, acetaminophen 650mg PO x1, hydromorphone 1mg IV x1 and 2mg IV x2, ketorolac 30mg IV x1 66-year-old male with HbSS, HF (EF 50-55%), BPH, mild intellectual disability, presenting from home with 2 days of sickle cell crisis pain. Pain is in stomach, knees, back, head, described as a burning-like in quality, typical of his sickle cell crisis pain. Last crisis in 5/2021. Patient was taking Advil at home with some relief of symptoms. Pain is reportedly 10/10. Patient attributes current crisis to the weather. Patient was taking Nyquil and Vicki [place] for aches. Patient notes left sided abdominal pain with non-bloody, non-bilious vomiting, last episode prior to arrival. Reports constipation with subsequent diarrhea yesterday after taking a laxative. No sick contacts or recent travel.    In the ED VS: 98.2  111  144-152/110  18  100%, received 2L NS IVF, acetaminophen 650mg PO x1, hydromorphone 1mg IV x1 and 2mg IV x2, ketorolac 30mg IV x1

## 2021-11-06 NOTE — H&P ADULT - PROBLEM SELECTOR PLAN 2
likely reactive secondary to SSC  monitor/trend  check UA  RVP/COVID negative likely reactive secondary to SSC  monitor/trend  check UA  RVP/COVID negative    myelocytes noted on diff, monitor/trend

## 2021-11-06 NOTE — H&P ADULT - NSHPPHYSICALEXAM_GEN_ALL_CORE
PHYSICAL EXAM:  GENERAL: NAD, well-developed, well-nourished  HEAD:  Atraumatic, Normocephalic  EYES: EOMI, PERRL, conjunctiva and sclera clear  NECK: Supple, No JVD  CHEST/LUNG: Clear to auscultation bilaterally; No wheezes, rales or rhonchi; normal work of breathing, speaking in full sentences  HEART: Regular rate and rhythm; No murmurs, rubs, or gallops, (+)S1, S2  ABDOMEN: Soft, Nontender, Nondistended; Normal Bowel sounds   EXTREMITIES:  2+ Peripheral Pulses, No clubbing, cyanosis, or edema  PSYCH: normal mood and affect, A&Ox3  NEUROLOGY: no focal neuro deficits  SKIN: No rashes or lesions PHYSICAL EXAM:  GENERAL: NAD, well-developed, thin  HEAD:  Atraumatic, Normocephalic  EYES: PERRL, conjunctiva and sclera clear  ENT: MMM; mild pharyngeal erythema, no tonsillar enlargement or exudates  NECK: Supple, No LAD  CHEST/LUNG: Clear to auscultation bilaterally; No wheezes, rales or rhonchi; normal work of breathing, speaking in full sentences  HEART: Regular rate and rhythm; No murmurs, rubs, or gallops, (+)S1, S2  ABDOMEN: Soft, Nondistended; Normal Bowel sounds; minimal diffuse TTP, no guarding or rigidity  EXTREMITIES:  2+ Peripheral Pulses, No clubbing, cyanosis, or edema  MSK: pain in chest and head/neck reproducible on exam  PSYCH: normal mood and affect, A&Ox3  NEUROLOGY: no focal neuro deficits  SKIN: No rashes or lesions

## 2021-11-07 PROCEDURE — 93010 ELECTROCARDIOGRAM REPORT: CPT

## 2021-11-07 PROCEDURE — 99232 SBSQ HOSP IP/OBS MODERATE 35: CPT

## 2021-11-07 RX ORDER — CALCIUM CARBONATE 500(1250)
1 TABLET ORAL ONCE
Refills: 0 | Status: COMPLETED | OUTPATIENT
Start: 2021-11-07 | End: 2021-11-07

## 2021-11-07 RX ADMIN — Medication 25 MILLIGRAM(S): at 05:20

## 2021-11-07 RX ADMIN — FINASTERIDE 5 MILLIGRAM(S): 5 TABLET, FILM COATED ORAL at 11:38

## 2021-11-07 RX ADMIN — HYDROMORPHONE HYDROCHLORIDE 30 MILLILITER(S): 2 INJECTION INTRAMUSCULAR; INTRAVENOUS; SUBCUTANEOUS at 20:17

## 2021-11-07 RX ADMIN — HYDROMORPHONE HYDROCHLORIDE 30 MILLILITER(S): 2 INJECTION INTRAMUSCULAR; INTRAVENOUS; SUBCUTANEOUS at 08:09

## 2021-11-07 RX ADMIN — Medication 25 MILLIGRAM(S): at 17:24

## 2021-11-07 RX ADMIN — TAMSULOSIN HYDROCHLORIDE 0.4 MILLIGRAM(S): 0.4 CAPSULE ORAL at 22:24

## 2021-11-07 RX ADMIN — Medication 1 TABLET(S): at 01:55

## 2021-11-07 RX ADMIN — Medication 1 MILLIGRAM(S): at 11:38

## 2021-11-08 LAB
ANION GAP SERPL CALC-SCNC: 12 MMOL/L — SIGNIFICANT CHANGE UP (ref 7–14)
BASOPHILS # BLD AUTO: 0.06 K/UL — SIGNIFICANT CHANGE UP (ref 0–0.2)
BASOPHILS NFR BLD AUTO: 0.4 % — SIGNIFICANT CHANGE UP (ref 0–2)
BUN SERPL-MCNC: 23 MG/DL — SIGNIFICANT CHANGE UP (ref 7–23)
CALCIUM SERPL-MCNC: 9.4 MG/DL — SIGNIFICANT CHANGE UP (ref 8.4–10.5)
CHLORIDE SERPL-SCNC: 106 MMOL/L — SIGNIFICANT CHANGE UP (ref 98–107)
CO2 SERPL-SCNC: 20 MMOL/L — LOW (ref 22–31)
CREAT SERPL-MCNC: 1.18 MG/DL — SIGNIFICANT CHANGE UP (ref 0.5–1.3)
EOSINOPHIL # BLD AUTO: 0.04 K/UL — SIGNIFICANT CHANGE UP (ref 0–0.5)
EOSINOPHIL NFR BLD AUTO: 0.3 % — SIGNIFICANT CHANGE UP (ref 0–6)
GLUCOSE SERPL-MCNC: 134 MG/DL — HIGH (ref 70–99)
HCT VFR BLD CALC: 18.6 % — CRITICAL LOW (ref 39–50)
HEMOGLOBIN INTERPRETATION: SIGNIFICANT CHANGE UP
HGB A MFR BLD: 0 % — LOW
HGB A2 MFR BLD: 4.6 % — HIGH (ref 2.4–3.5)
HGB BLD-MCNC: 6.1 G/DL — CRITICAL LOW (ref 13–17)
HGB F MFR BLD: 3.9 % — HIGH (ref 0–1.5)
HGB S MFR BLD: 91.5 % — HIGH
IANC: 12.82 K/UL — HIGH (ref 1.5–8.5)
IMM GRANULOCYTES NFR BLD AUTO: 0.8 % — SIGNIFICANT CHANGE UP (ref 0–1.5)
LDH SERPL L TO P-CCNC: 489 U/L — HIGH (ref 135–225)
LYMPHOCYTES # BLD AUTO: 1.17 K/UL — SIGNIFICANT CHANGE UP (ref 1–3.3)
LYMPHOCYTES # BLD AUTO: 7.4 % — LOW (ref 13–44)
MAGNESIUM SERPL-MCNC: 2 MG/DL — SIGNIFICANT CHANGE UP (ref 1.6–2.6)
MCHC RBC-ENTMCNC: 29.2 PG — SIGNIFICANT CHANGE UP (ref 27–34)
MCHC RBC-ENTMCNC: 32.8 GM/DL — SIGNIFICANT CHANGE UP (ref 32–36)
MCV RBC AUTO: 89 FL — SIGNIFICANT CHANGE UP (ref 80–100)
MONOCYTES # BLD AUTO: 1.69 K/UL — HIGH (ref 0–0.9)
MONOCYTES NFR BLD AUTO: 10.6 % — SIGNIFICANT CHANGE UP (ref 2–14)
NEUTROPHILS # BLD AUTO: 12.82 K/UL — HIGH (ref 1.8–7.4)
NEUTROPHILS NFR BLD AUTO: 80.5 % — HIGH (ref 43–77)
NRBC # BLD: 5 /100 WBCS — SIGNIFICANT CHANGE UP
NRBC # FLD: 0.82 K/UL — HIGH
PHOSPHATE SERPL-MCNC: 3.9 MG/DL — SIGNIFICANT CHANGE UP (ref 2.5–4.5)
PLATELET # BLD AUTO: 248 K/UL — SIGNIFICANT CHANGE UP (ref 150–400)
POTASSIUM SERPL-MCNC: 4.9 MMOL/L — SIGNIFICANT CHANGE UP (ref 3.5–5.3)
POTASSIUM SERPL-SCNC: 4.9 MMOL/L — SIGNIFICANT CHANGE UP (ref 3.5–5.3)
RBC # BLD: 2.09 M/UL — LOW (ref 4.2–5.8)
RBC # BLD: 2.09 M/UL — LOW (ref 4.2–5.8)
RBC # FLD: 21.6 % — HIGH (ref 10.3–14.5)
RETICS #: 212.6 K/UL — HIGH (ref 25–125)
RETICS/RBC NFR: 10.2 % — HIGH (ref 0.5–2.5)
SODIUM SERPL-SCNC: 138 MMOL/L — SIGNIFICANT CHANGE UP (ref 135–145)
WBC # BLD: 15.9 K/UL — HIGH (ref 3.8–10.5)
WBC # FLD AUTO: 15.9 K/UL — HIGH (ref 3.8–10.5)

## 2021-11-08 PROCEDURE — 99232 SBSQ HOSP IP/OBS MODERATE 35: CPT

## 2021-11-08 RX ADMIN — FINASTERIDE 5 MILLIGRAM(S): 5 TABLET, FILM COATED ORAL at 11:35

## 2021-11-08 RX ADMIN — Medication 1 MILLIGRAM(S): at 11:34

## 2021-11-08 RX ADMIN — Medication 25 MILLIGRAM(S): at 05:02

## 2021-11-08 RX ADMIN — HYDROMORPHONE HYDROCHLORIDE 30 MILLILITER(S): 2 INJECTION INTRAMUSCULAR; INTRAVENOUS; SUBCUTANEOUS at 08:18

## 2021-11-08 RX ADMIN — HYDROMORPHONE HYDROCHLORIDE 30 MILLILITER(S): 2 INJECTION INTRAMUSCULAR; INTRAVENOUS; SUBCUTANEOUS at 20:01

## 2021-11-08 RX ADMIN — TAMSULOSIN HYDROCHLORIDE 0.4 MILLIGRAM(S): 0.4 CAPSULE ORAL at 22:03

## 2021-11-08 RX ADMIN — Medication 25 MILLIGRAM(S): at 17:04

## 2021-11-08 NOTE — PROVIDER CONTACT NOTE (OTHER) - SITUATION
Pt c/o heart palpitations
Pt voided ~800cc but continues to c/o pain in bladder region, bladder scan done showing >333cc, pt refused to be straight cath
Pt has SSCrisis, c/o bladder fulness, bladder scan done same >496

## 2021-11-08 NOTE — PROGRESS NOTE ADULT - PROBLEM SELECTOR PLAN 7
holding pharmacologic DVT ppx pending FOBT  venodynes for now
holding pharmacologic DVT ppx pending FOBT
holding pharmacologic DVT ppx pending FOBT  venodynes for now

## 2021-11-08 NOTE — PROGRESS NOTE ADULT - PROBLEM SELECTOR PLAN 8
mild hyperkalemia- rpt bmp if persists will treat
resolved  f/u today's labs, f/u ekg  Plan discussed with ACP
resolved  f/u today's labs, f/u ekg  Plan discussed with ACP
NAUSEA/VOMITING

## 2021-11-09 LAB
ALBUMIN SERPL ELPH-MCNC: 3.8 G/DL — SIGNIFICANT CHANGE UP (ref 3.3–5)
ALP SERPL-CCNC: 168 U/L — HIGH (ref 40–120)
ALT FLD-CCNC: 17 U/L — SIGNIFICANT CHANGE UP (ref 4–41)
ANION GAP SERPL CALC-SCNC: 12 MMOL/L — SIGNIFICANT CHANGE UP (ref 7–14)
AST SERPL-CCNC: 32 U/L — SIGNIFICANT CHANGE UP (ref 4–40)
BASOPHILS # BLD AUTO: 0.04 K/UL — SIGNIFICANT CHANGE UP (ref 0–0.2)
BASOPHILS NFR BLD AUTO: 0.3 % — SIGNIFICANT CHANGE UP (ref 0–2)
BILIRUB SERPL-MCNC: 3 MG/DL — HIGH (ref 0.2–1.2)
BUN SERPL-MCNC: 24 MG/DL — HIGH (ref 7–23)
CALCIUM SERPL-MCNC: 9.5 MG/DL — SIGNIFICANT CHANGE UP (ref 8.4–10.5)
CHLORIDE SERPL-SCNC: 105 MMOL/L — SIGNIFICANT CHANGE UP (ref 98–107)
CO2 SERPL-SCNC: 21 MMOL/L — LOW (ref 22–31)
CREAT SERPL-MCNC: 1.12 MG/DL — SIGNIFICANT CHANGE UP (ref 0.5–1.3)
EOSINOPHIL # BLD AUTO: 0.31 K/UL — SIGNIFICANT CHANGE UP (ref 0–0.5)
EOSINOPHIL NFR BLD AUTO: 2 % — SIGNIFICANT CHANGE UP (ref 0–6)
GLUCOSE SERPL-MCNC: 91 MG/DL — SIGNIFICANT CHANGE UP (ref 70–99)
HCT VFR BLD CALC: 18.1 % — CRITICAL LOW (ref 39–50)
HGB BLD-MCNC: 5.8 G/DL — CRITICAL LOW (ref 13–17)
IANC: 12.15 K/UL — HIGH (ref 1.5–8.5)
IMM GRANULOCYTES NFR BLD AUTO: 0.7 % — SIGNIFICANT CHANGE UP (ref 0–1.5)
LDH SERPL L TO P-CCNC: 428 U/L — HIGH (ref 135–225)
LYMPHOCYTES # BLD AUTO: 1.15 K/UL — SIGNIFICANT CHANGE UP (ref 1–3.3)
LYMPHOCYTES # BLD AUTO: 7.6 % — LOW (ref 13–44)
MCHC RBC-ENTMCNC: 28.9 PG — SIGNIFICANT CHANGE UP (ref 27–34)
MCHC RBC-ENTMCNC: 32 GM/DL — SIGNIFICANT CHANGE UP (ref 32–36)
MCV RBC AUTO: 90 FL — SIGNIFICANT CHANGE UP (ref 80–100)
MONOCYTES # BLD AUTO: 1.42 K/UL — HIGH (ref 0–0.9)
MONOCYTES NFR BLD AUTO: 9.4 % — SIGNIFICANT CHANGE UP (ref 2–14)
NEUTROPHILS # BLD AUTO: 12.15 K/UL — HIGH (ref 1.8–7.4)
NEUTROPHILS NFR BLD AUTO: 80 % — HIGH (ref 43–77)
NRBC # BLD: 2 /100 WBCS — SIGNIFICANT CHANGE UP
NRBC # FLD: 0.36 K/UL — HIGH
PLATELET # BLD AUTO: 265 K/UL — SIGNIFICANT CHANGE UP (ref 150–400)
POTASSIUM SERPL-MCNC: 4.7 MMOL/L — SIGNIFICANT CHANGE UP (ref 3.5–5.3)
POTASSIUM SERPL-SCNC: 4.7 MMOL/L — SIGNIFICANT CHANGE UP (ref 3.5–5.3)
PROT SERPL-MCNC: 7.6 G/DL — SIGNIFICANT CHANGE UP (ref 6–8.3)
RBC # BLD: 2.01 M/UL — LOW (ref 4.2–5.8)
RBC # BLD: 2.01 M/UL — LOW (ref 4.2–5.8)
RBC # FLD: 21.1 % — HIGH (ref 10.3–14.5)
RETICS #: 184.3 K/UL — HIGH (ref 25–125)
RETICS/RBC NFR: 9.2 % — HIGH (ref 0.5–2.5)
SODIUM SERPL-SCNC: 138 MMOL/L — SIGNIFICANT CHANGE UP (ref 135–145)
WBC # BLD: 15.17 K/UL — HIGH (ref 3.8–10.5)
WBC # FLD AUTO: 15.17 K/UL — HIGH (ref 3.8–10.5)

## 2021-11-09 PROCEDURE — 99232 SBSQ HOSP IP/OBS MODERATE 35: CPT

## 2021-11-09 RX ADMIN — Medication 25 MILLIGRAM(S): at 18:32

## 2021-11-09 RX ADMIN — Medication 1 MILLIGRAM(S): at 11:54

## 2021-11-09 RX ADMIN — TAMSULOSIN HYDROCHLORIDE 0.4 MILLIGRAM(S): 0.4 CAPSULE ORAL at 22:03

## 2021-11-09 RX ADMIN — FINASTERIDE 5 MILLIGRAM(S): 5 TABLET, FILM COATED ORAL at 11:54

## 2021-11-09 RX ADMIN — HYDROMORPHONE HYDROCHLORIDE 30 MILLILITER(S): 2 INJECTION INTRAMUSCULAR; INTRAVENOUS; SUBCUTANEOUS at 20:03

## 2021-11-09 RX ADMIN — SODIUM CHLORIDE 20 MILLILITER(S): 9 INJECTION, SOLUTION INTRAVENOUS at 20:03

## 2021-11-09 RX ADMIN — Medication 25 MILLIGRAM(S): at 06:21

## 2021-11-09 RX ADMIN — HYDROMORPHONE HYDROCHLORIDE 30 MILLILITER(S): 2 INJECTION INTRAMUSCULAR; INTRAVENOUS; SUBCUTANEOUS at 08:21

## 2021-11-10 LAB
FERRITIN SERPL-MCNC: 354 NG/ML — SIGNIFICANT CHANGE UP (ref 30–400)
FOLATE SERPL-MCNC: 20 NG/ML — HIGH (ref 3.1–17.5)
IRON SATN MFR SERPL: 13 % — LOW (ref 14–50)
IRON SATN MFR SERPL: 29 UG/DL — LOW (ref 45–165)
OB PNL STL: NEGATIVE — SIGNIFICANT CHANGE UP
TIBC SERPL-MCNC: 216 UG/DL — LOW (ref 220–430)
UIBC SERPL-MCNC: 187 UG/DL — SIGNIFICANT CHANGE UP (ref 110–370)
VIT B12 SERPL-MCNC: 1192 PG/ML — HIGH (ref 200–900)

## 2021-11-10 PROCEDURE — 99232 SBSQ HOSP IP/OBS MODERATE 35: CPT

## 2021-11-10 PROCEDURE — 93306 TTE W/DOPPLER COMPLETE: CPT | Mod: 26

## 2021-11-10 RX ADMIN — TAMSULOSIN HYDROCHLORIDE 0.4 MILLIGRAM(S): 0.4 CAPSULE ORAL at 21:14

## 2021-11-10 RX ADMIN — SODIUM CHLORIDE 20 MILLILITER(S): 9 INJECTION, SOLUTION INTRAVENOUS at 04:57

## 2021-11-10 RX ADMIN — Medication 25 MILLIGRAM(S): at 04:58

## 2021-11-10 RX ADMIN — HYDROMORPHONE HYDROCHLORIDE 30 MILLILITER(S): 2 INJECTION INTRAMUSCULAR; INTRAVENOUS; SUBCUTANEOUS at 01:16

## 2021-11-10 RX ADMIN — HYDROMORPHONE HYDROCHLORIDE 30 MILLILITER(S): 2 INJECTION INTRAMUSCULAR; INTRAVENOUS; SUBCUTANEOUS at 08:22

## 2021-11-10 RX ADMIN — FINASTERIDE 5 MILLIGRAM(S): 5 TABLET, FILM COATED ORAL at 11:54

## 2021-11-10 RX ADMIN — Medication 25 MILLIGRAM(S): at 17:52

## 2021-11-10 RX ADMIN — Medication 1 MILLIGRAM(S): at 11:54

## 2021-11-11 PROCEDURE — 99232 SBSQ HOSP IP/OBS MODERATE 35: CPT

## 2021-11-11 RX ORDER — ENOXAPARIN SODIUM 100 MG/ML
40 INJECTION SUBCUTANEOUS DAILY
Refills: 0 | Status: DISCONTINUED | OUTPATIENT
Start: 2021-11-11 | End: 2021-11-12

## 2021-11-11 RX ADMIN — FINASTERIDE 5 MILLIGRAM(S): 5 TABLET, FILM COATED ORAL at 12:30

## 2021-11-11 RX ADMIN — Medication 25 MILLIGRAM(S): at 06:09

## 2021-11-11 RX ADMIN — TAMSULOSIN HYDROCHLORIDE 0.4 MILLIGRAM(S): 0.4 CAPSULE ORAL at 21:52

## 2021-11-11 RX ADMIN — Medication 1 MILLIGRAM(S): at 12:30

## 2021-11-11 RX ADMIN — Medication 25 MILLIGRAM(S): at 17:44

## 2021-11-11 RX ADMIN — ENOXAPARIN SODIUM 40 MILLIGRAM(S): 100 INJECTION SUBCUTANEOUS at 17:43

## 2021-11-11 RX ADMIN — HYDROMORPHONE HYDROCHLORIDE 30 MILLILITER(S): 2 INJECTION INTRAMUSCULAR; INTRAVENOUS; SUBCUTANEOUS at 20:07

## 2021-11-11 RX ADMIN — HYDROMORPHONE HYDROCHLORIDE 30 MILLILITER(S): 2 INJECTION INTRAMUSCULAR; INTRAVENOUS; SUBCUTANEOUS at 08:26

## 2021-11-12 ENCOUNTER — TRANSCRIPTION ENCOUNTER (OUTPATIENT)
Age: 66
End: 2021-11-12

## 2021-11-12 VITALS
OXYGEN SATURATION: 100 % | DIASTOLIC BLOOD PRESSURE: 76 MMHG | TEMPERATURE: 98 F | HEART RATE: 65 BPM | SYSTOLIC BLOOD PRESSURE: 128 MMHG | RESPIRATION RATE: 17 BRPM

## 2021-11-12 PROCEDURE — 99239 HOSP IP/OBS DSCHRG MGMT >30: CPT

## 2021-11-12 RX ORDER — TAMSULOSIN HYDROCHLORIDE 0.4 MG/1
1 CAPSULE ORAL
Qty: 14 | Refills: 0
Start: 2021-11-12 | End: 2021-11-25

## 2021-11-12 RX ORDER — SENNA PLUS 8.6 MG/1
2 TABLET ORAL
Qty: 0 | Refills: 0 | DISCHARGE
Start: 2021-11-12

## 2021-11-12 RX ORDER — HYDROMORPHONE HYDROCHLORIDE 2 MG/ML
1 INJECTION INTRAMUSCULAR; INTRAVENOUS; SUBCUTANEOUS
Qty: 20 | Refills: 0
Start: 2021-11-12 | End: 2021-11-16

## 2021-11-12 RX ORDER — FINASTERIDE 5 MG/1
1 TABLET, FILM COATED ORAL
Qty: 30 | Refills: 0
Start: 2021-11-12 | End: 2021-12-11

## 2021-11-12 RX ORDER — METOPROLOL TARTRATE 50 MG
1 TABLET ORAL
Qty: 60 | Refills: 0
Start: 2021-11-12 | End: 2021-12-11

## 2021-11-12 RX ORDER — TAMSULOSIN HYDROCHLORIDE 0.4 MG/1
1 CAPSULE ORAL
Qty: 30 | Refills: 0
Start: 2021-11-12 | End: 2021-12-11

## 2021-11-12 RX ADMIN — FINASTERIDE 5 MILLIGRAM(S): 5 TABLET, FILM COATED ORAL at 11:50

## 2021-11-12 RX ADMIN — ENOXAPARIN SODIUM 40 MILLIGRAM(S): 100 INJECTION SUBCUTANEOUS at 11:51

## 2021-11-12 RX ADMIN — Medication 1 MILLIGRAM(S): at 11:50

## 2021-11-12 RX ADMIN — HYDROMORPHONE HYDROCHLORIDE 30 MILLILITER(S): 2 INJECTION INTRAMUSCULAR; INTRAVENOUS; SUBCUTANEOUS at 08:16

## 2021-11-12 RX ADMIN — Medication 25 MILLIGRAM(S): at 17:55

## 2021-11-12 RX ADMIN — Medication 25 MILLIGRAM(S): at 08:27

## 2021-11-12 NOTE — PROGRESS NOTE ADULT - PROBLEM SELECTOR PLAN 3
elevated pro-BNP however without signs of fluid overload  holding further IVF at present  monitor I/Os, daily weights  f/u rpt echo
elevated pro-BNP however without signs of fluid overload  holding further IVF at present  monitor I/Os, daily weights  f/u rpt echo
diet as tolerated  lipase wnl  pending QT, ondansetron PRN  serial abdominal exams  no further nausea or vomiting
elevated pro-BNP however without signs of fluid overload  holding further IVF at present  monitor I/Os, daily weights  echo showed Low normal left ventricular systolic function. No segmental wall motion abnormalities. Normal right ventricular size and function.
diet as tolerated  lipase wnl  pending QT, ondansetron PRN  serial abdominal exams
diet as tolerated  lipase wnl  pending QT, ondansetron PRN  serial abdominal exams  no further nausea or vomiting
elevated pro-BNP however without signs of fluid overload  holding further IVF at present  monitor I/Os, daily weights  echo showed Low normal left ventricular systolic function. No segmental wall motion abnormalities. Normal right ventricular size and function.

## 2021-11-12 NOTE — PROGRESS NOTE ADULT - PROBLEM SELECTOR PLAN 1
hydromorphone PCA for pain, cont DD at 0.3mg and 4 hr limit to 6mg   s/p 2L IVF in ED, given low normal EF and elevated pro-BNP will hold off on maintenance 1/2 normal saline for now, on KVO rate while on PCA  senna PRN  monitor/trend H/H, LDH, retic%    patient with a history of (+)FOBT 6/2021 and refused GI eval at that time, patient reported to me darker stools that he related to drinking chocolate ensure of recent, will repeat FOBT, holding off on a/c for now pending result    Per prior heme notes, patient with history of DANICA,   needs outpatient GI eval for colonoscopy screening if FOBT negative (never had screening colonoscopy)    patient reporting chest wall pain in setting of crisis pain, reproducible on exam, initial trop in indeterminate range, EKG showed sinus tachycardia, LVH and t wave inversion which is new compared to prior ekg, will check echo  no further chest pain reported  f/u stool guaiac  Plan discussed with ACP
hydromorphone PCA for pain, increased DD to 0.3mg and 4 hr limit to 6mg on 11/6  s/p 2L IVF in ED, given low normal EF and elevated pro-BNP will hold off on maintenance 1/2 normal saline for now, on KVO rate while on PCA  senna PRN  monitor/trend H/H, LDH, retic%    patient with a history of (+)FOBT 6/2021 and refused GI eval at that time, patient reported to me darker stools that he related to drinking chocolate ensure of recent, will repeat FOBT, holding off on a/c for now pending result    Per prior heme notes, patient with history of DANICA, will repeat iron studies with next blood draw  needs outpatient GI eval for colonoscopy screening if FOBT negative (never had screening colonoscopy)    patient reporting chest wall pain in setting of crisis pain, reproducible on exam, initial trop in indeterminate range, EKG ordered/pending, f/u  no further chest pain reported  f/u stool guaiac  Plan discussed with ACP
hydromorphone PCA for pain, cont DD at 0.3mg and 4 hr limit to 6mg   s/p 2L IVF in ED, given low normal EF and elevated pro-BNP will hold off on maintenance 1/2 normal saline for now, on KVO rate while on PCA  senna PRN  monitor/trend H/H, LDH, retic%    patient with a history of (+)FOBT 6/2021 and refused GI eval at that time, patient reported to me darker stools that he related to drinking chocolate ensure  -repeat FOBT neg  -can start on lovenox    Per prior heme notes, patient with history of DANICA, however ferritin now is elevated  needs outpatient GI eval for colonoscopy screening if FOBT negative (never had screening colonoscopy)    patient reporting chest wall pain in setting of crisis pain, reproducible on exam, initial trop in indeterminate range, EKG showed sinus tachycardia, LVH and t wave inversion which is new compared to prior ekg, will check echo  no further chest pain reported  stool guaiac neg  Plan discussed with ACP
hydromorphone PCA for pain, cont DD at 0.3mg and 4 hr limit to 6mg   s/p 2L IVF in ED, given low normal EF and elevated pro-BNP will hold off on maintenance 1/2 normal saline for now, on KVO rate while on PCA  senna PRN  monitor/trend H/H, LDH, retic%    patient with a history of (+)FOBT 6/2021 and refused GI eval at that time, patient reported to me darker stools that he related to drinking chocolate ensure  -repeat FOBT neg  -can start on lovenox    Per prior heme notes, patient with history of DANICA, however ferritin now is elevated  needs outpatient GI eval for colonoscopy screening if FOBT negative (never had screening colonoscopy)    patient reporting chest wall pain in setting of crisis pain, reproducible on exam, initial trop in indeterminate range, EKG showed sinus tachycardia, LVH and t wave inversion which is new compared to prior ekg, will check echo  no further chest pain reported  stool guaiac neg  symptoms improved  dc today, dc planning time spent in coordination 40mts, ( preparing dc summary and plan, discussion with pt's pcp Dr. Hamm, discussion with ACP)  strongly advised pt to f/u with pcp as outpt, tel number given to pt for making an appt.  Plan discussed with ACP
hydromorphone PCA for pain, cont DD at 0.3mg and 4 hr limit to 6mg   s/p 2L IVF in ED, given low normal EF and elevated pro-BNP will hold off on maintenance 1/2 normal saline for now, on KVO rate while on PCA  senna PRN  monitor/trend H/H, LDH, retic%    patient with a history of (+)FOBT 6/2021 and refused GI eval at that time, patient reported to me darker stools that he related to drinking chocolate ensure of recent, will repeat FOBT, holding off on a/c for now pending result, fob not sent yet    Per prior heme notes, patient with history of DANICA,   needs outpatient GI eval for colonoscopy screening if FOBT negative (never had screening colonoscopy)    patient reporting chest wall pain in setting of crisis pain, reproducible on exam, initial trop in indeterminate range, EKG showed sinus tachycardia, LVH and t wave inversion which is new compared to prior ekg, will check echo  no further chest pain reported  f/u stool guaiac  Plan discussed with ACP
hydromorphone PCA for pain, increased DD to 0.3mg and 4 hr limit to 6mg on 11/6  s/p 2L IVF in ED, given low normal EF and elevated pro-BNP will hold off on maintenance 1/2 normal saline for now, on KVO rate while on PCA  senna PRN  monitor/trend H/H, LDH, retic%    patient with a history of (+)FOBT 6/2021 and refused GI eval at that time, patient reported to me darker stools that he related to drinking chocolate ensure of recent, will repeat FOBT, holding off on a/c for now pending result    Per prior heme notes, patient with history of DANICA, will repeat iron studies with next blood draw  needs outpatient GI eval for colonoscopy screening if FOBT negative (never had screening colonoscopy)    patient reporting chest wall pain in setting of crisis pain, reproducible on exam, initial trop in indeterminate range, EKG showed sinus tachycardia, LVH and t wave inversion which is new compared to prior ekg, will check echo  no further chest pain reported  f/u hb electrophoresis  f/u stool guaiac  Plan discussed with ACP
hydromorphone PCA for pain, increaesed DD to 0.3mg and 4 hr limit to 6mg  s/p 2L IVF in ED, given low normal EF and elevated pro-BNP will hold off on maintenance 1/2 normal saline for now, on KVO rate while on PCA  senna PRN  monitor/trend H/H, LDH, retic%    patient with a history of (+)FOBT 6/2021 and refused GI eval at that time, patient reported to me darker stools that he related to drinking chocolate ensure of recent, will repeat FOBT, holding off on a/c for now pending result    Per prior heme notes, patient with history of DANICA, will repeat iron studies with next blood draw  needs outpatient GI eval for colonoscopy screening if FOBT negative (never had screening colonoscopy)    patient reporting chest wall pain in setting of crisis pain, reproducible on exam, initial trop in indeterminate range, EKG ordered/pending, f/u  rpt cbc  Plan discussed with ACP

## 2021-11-12 NOTE — PROGRESS NOTE ADULT - PROBLEM SELECTOR PLAN 5
c/w finasteride  previously prescribed tamsulosin however ran out, now reporting difficulty initiating urination without other urinary symptoms   UA neg, voiding well  cont tamsulosin
c/w finasteride  previously prescribed tamsulosin however ran out, now reporting difficulty initiating urination without other urinary symptoms   UA neg, voiding well  cont tamsulosin
BP elevated in ED however in setting of continued crisis pain  c/w metoprolol w/hold parameters (patient reports on metoprolol for history of palpitations) and pain control  may need additional agent for BP control
c/w finasteride  previously prescribed tamsulosin however ran out, now reporting difficulty initiating urination without other urinary symptoms   UA neg, voiding well  cont tamsulosin
BP elevated in ED however in setting of continued crisis pain  c/w metoprolol w/hold parameters (patient reports on metoprolol for history of palpitations) and pain control  may need additional agent for BP control
c/w finasteride  previously prescribed tamsulosin however ran out, now reporting difficulty initiating urination without other urinary symptoms   UA neg, voiding well  cont tamsulosin
BP elevated in ED however in setting of continued crisis pain  c/w metoprolol w/hold parameters (patient reports on metoprolol for history of palpitations) and pain control  may need additional agent for BP control

## 2021-11-12 NOTE — PROVIDER CONTACT NOTE (OTHER) - ASSESSMENT
Linda ramsey [phone]. P/U 6:30. Pt will call when he is ready. provided information to RN. GRAHAM Auth# [id number].
Will monitor for changes.Urinary output monitored
T 98.8  /55 SpO2 95%  Pt is s/p 0600 metoprolol

## 2021-11-12 NOTE — DISCHARGE NOTE PROVIDER - HOSPITAL COURSE
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    Anemia, sickle cell with crisis.   Hydromorphone PCA for pain, cont DD at 0.3mg and 4 hr limit to 6mg   s/p 2L IVF in ED, given low normal EF and elevated pro-BNP will hold off on maintenance 1/2 normal saline for now, on KVO rate while on PCA  senna PRN  monitor/trend H/H, LDH, retic%    patient with a history of (+)FOBT 6/2021 and refused GI eval at that time, patient reported to me darker stools that he related to drinking chocolate ensure  -repeat FOBT neg  -can start on lovenox    Per prior heme notes, patient with history of DANICA, however ferritin now is elevated  needs outpatient GI eval for colonoscopy screening if FOBT negative (never had screening colonoscopy)    patient reporting chest wall pain in setting of crisis pain, reproducible on exam, initial trop in indeterminate range, EKG showed sinus tachycardia, LVH and t wave inversion which is new compared to prior ekg, will check echo  no further chest pain reported  stool guaiac neg  Plan discussed with ACP.    Leukocytosis.   likely reactive secondary to SSC  monitor/trend   UA neg  RVP/COVID negative    f/u today's labs.    Heart failure, chronic systolic.   elevated pro-BNP however without signs of fluid overload  holding further IVF at present  monitor I/Os, daily weights  echo showed Low normal left ventricular systolic function. No segmental wall motion abnormalities. Normal right ventricular size and function.    Essential hypertension.   BP elevated in ED however in setting of continued crisis pain  c/w metoprolol w/hold parameters (patient reports on metoprolol for history of palpitations) and pain control.    BPH (benign prostatic hyperplasia).   c/w finasteride  previously prescribed tamsulosin however ran out, now reporting difficulty initiating urination without other urinary symptoms   UA neg, voiding well  cont tamsulosin.    Need for prophylactic measure.   start on lovenox  venodynes for now.    On 11/12/21, case was discussed with , patient is medically cleared and optimized for discharge today. All medications were reviewed with attending, and sent to mutually agreed upon pharmacy.    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    Anemia, sickle cell with crisis.   Hydromorphone PCA for pain, cont DD at 0.3mg and 4 hr limit to 6mg   s/p 2L IVF in ED, given low normal EF and elevated pro-BNP will hold off on maintenance 1/2 normal saline for now, on KVO rate while on PCA  senna PRN  monitor/trend H/H, LDH, retic%    patient with a history of (+)FOBT 6/2021 and refused GI eval at that time, patient reported to me darker stools that he related to drinking chocolate ensure  -repeat FOBT neg  - on lovenox    Per prior heme notes, patient with history of DANICA, however ferritin now is elevated  needs outpatient GI eval for colonoscopy screening if FOBT negative (never had screening colonoscopy)    patient reporting chest wall pain in setting of crisis pain, reproducible on exam, initial trop in indeterminate range, EKG showed sinus tachycardia, LVH and t wave inversion which is new compared to prior ekg, echo done  no further chest pain reported  stool guaiac neg  Plan discussed with ACP.    Leukocytosis.   likely reactive secondary to SSC  monitor/trend   UA neg  RVP/COVID negative      Heart failure, chronic systolic.   elevated pro-BNP however without signs of fluid overload  holding further IVF at present  monitor I/Os, daily weights  echo showed Low normal left ventricular systolic function. No segmental wall motion abnormalities. Normal right ventricular size and function.    Essential hypertension.   BP elevated in ED however in setting of continued crisis pain  c/w metoprolol w/hold parameters (patient reports on metoprolol for history of palpitations) and pain control.    BPH (benign prostatic hyperplasia).   c/w finasteride  previously prescribed tamsulosin however ran out, now reporting difficulty initiating urination without other urinary symptoms   UA neg, voiding well  cont tamsulosin.    symptoms improved, stable for dc. Strongly advised to f/u with pcp, tel number given to make appointment, discussed with pcp about hospital course    On 11/12/21, case was discussed with , patient is medically cleared and optimized for discharge today. All medications were reviewed with attending, and sent to mutually agreed upon pharmacy.

## 2021-11-12 NOTE — DISCHARGE NOTE PROVIDER - NSFOLLOWUPCLINICS_GEN_ALL_ED_FT
[facility] General Internal Medicine  General Internal Medicine  [address] [place] [address]  Phone: [phone]  Fax:

## 2021-11-12 NOTE — PROGRESS NOTE ADULT - SUBJECTIVE AND OBJECTIVE BOX
Patient is a 66y old  Male who presents with a chief complaint of pain crisis (10 Nov 2021 12:38)      SUBJECTIVE / OVERNIGHT EVENTS: Pt seen and examined at 11am, no overnight events, pt reports still in pain, ( although with minimal pca usage), no sob or chest pain. No other complaints. No other new issues reported.      MEDICATIONS  (STANDING):  enoxaparin Injectable 40 milliGRAM(s) SubCutaneous daily  finasteride 5 milliGRAM(s) Oral daily  folic acid 1 milliGRAM(s) Oral daily  HYDROmorphone PCA (1 mG/mL) 30 milliLiter(s) PCA Continuous PCA Continuous  influenza  Vaccine (HIGH DOSE) 0.7 milliLiter(s) IntraMuscular once  metoprolol tartrate 25 milliGRAM(s) Oral two times a day  sodium chloride 0.45%. 1000 milliLiter(s) (20 mL/Hr) IV Continuous <Continuous>  tamsulosin 0.4 milliGRAM(s) Oral at bedtime    MEDICATIONS  (PRN):  naloxone Injectable 0.1 milliGRAM(s) IV Push every 3 minutes PRN For ANY of the following changes in patient status:  A. RR LESS THAN 10 breaths per minute, B. Oxygen saturation LESS THAN 90%, C. Sedation score of 6  ondansetron Injectable 4 milliGRAM(s) IV Push every 6 hours PRN Nausea  senna 2 Tablet(s) Oral at bedtime PRN Constipation      Vital Signs Last 24 Hrs  T(C): 36.8 (11 Nov 2021 06:12), Max: 37.1 (10 Nov 2021 21:38)  T(F): 98.3 (11 Nov 2021 06:12), Max: 98.7 (10 Nov 2021 21:38)  HR: 94 (11 Nov 2021 06:12) (84 - 98)  BP: 136/67 (11 Nov 2021 06:12) (124/79 - 136/67)  BP(mean): 90 (10 Nov 2021 16:00) (90 - 90)  RR: 17 (11 Nov 2021 06:12) (17 - 18)  SpO2: 98% (11 Nov 2021 06:12) (97% - 100%)  CAPILLARY BLOOD GLUCOSE        I&O's Summary    10 Nov 2021 07:01  -  11 Nov 2021 07:00  --------------------------------------------------------  IN: 594 mL / OUT: 600 mL / NET: -6 mL        PHYSICAL EXAM:  GENERAL: NAD, thiny built male  CHEST/LUNG: Clear to auscultation bilaterally; No wheeze  HEART: Regular rate and rhythm  ABDOMEN: Soft, nontender, Nondistended;   EXTREMITIES: no LE edema  PSYCH: Calm  NEUROLOGY: AAOx3  SKIN: No rashes or lesions    LABS:                    RADIOLOGY & ADDITIONAL TESTS:    < from: Transthoracic Echocardiogram (11.10.21 @ 19:39) >  1. Mitral annular calcification, otherwise normal mitral  valve. Mild mitral regurgitation.  2. Severely dilated left atrium.  LA volume index = 50  cc/m2.  3. Normal left ventricular internal dimensions and wall  thicknesses.  4. Low normal left ventricular systolic function. No  segmental wall motion abnormalities.  5. Normal right ventricular size and function.    < end of copied text >  Imaging Personally Reviewed:    Consultant(s) Notes Reviewed:      Care Discussed with Consultants/Other Providers:  
Patient is a 66y old  Male who presents with a chief complaint of pain crisis (08 Nov 2021 14:03)      SUBJECTIVE / OVERNIGHT EVENTS: Pt seen and examined at 10:05am, no overnight events, pt reports pain is improving, voiding, no sob or chest pain,, per nsg has had no bm yet. No other complaints. No other new issues reported.        MEDICATIONS  (STANDING):  finasteride 5 milliGRAM(s) Oral daily  folic acid 1 milliGRAM(s) Oral daily  HYDROmorphone PCA (1 mG/mL) 30 milliLiter(s) PCA Continuous PCA Continuous  influenza  Vaccine (HIGH DOSE) 0.7 milliLiter(s) IntraMuscular once  metoprolol tartrate 25 milliGRAM(s) Oral two times a day  sodium chloride 0.45%. 1000 milliLiter(s) (20 mL/Hr) IV Continuous <Continuous>  tamsulosin 0.4 milliGRAM(s) Oral at bedtime    MEDICATIONS  (PRN):  naloxone Injectable 0.1 milliGRAM(s) IV Push every 3 minutes PRN For ANY of the following changes in patient status:  A. RR LESS THAN 10 breaths per minute, B. Oxygen saturation LESS THAN 90%, C. Sedation score of 6  ondansetron Injectable 4 milliGRAM(s) IV Push every 6 hours PRN Nausea  senna 2 Tablet(s) Oral at bedtime PRN Constipation      Vital Signs Last 24 Hrs  T(C): 37.1 (09 Nov 2021 10:00), Max: 37.3 (09 Nov 2021 04:38)  T(F): 98.7 (09 Nov 2021 10:00), Max: 99.1 (09 Nov 2021 04:38)  HR: 97 (09 Nov 2021 10:00) (95 - 109)  BP: 114/55 (09 Nov 2021 10:00) (109/77 - 123/81)  BP(mean): --  RR: 18 (09 Nov 2021 10:00) (15 - 18)  SpO2: 100% (09 Nov 2021 04:38) (94% - 100%)  CAPILLARY BLOOD GLUCOSE        I&O's Summary    08 Nov 2021 07:01  -  09 Nov 2021 07:00  --------------------------------------------------------  IN: 120 mL / OUT: 700 mL / NET: -580 mL        PHYSICAL EXAM:  GENERAL: NAD, thiny built male  CHEST/LUNG: Clear to auscultation bilaterally; No wheeze  HEART: Tachycardia at 102/mt  ABDOMEN: Soft, nontender, Nondistended;   EXTREMITIES: no LE edema  PSYCH: Calm  NEUROLOGY: AAOx3  SKIN: No rashes or lesions    LABS:                        5.8    15.17 )-----------( 265      ( 09 Nov 2021 06:57 )             18.1     11-09    138  |  105  |  24<H>  ----------------------------<  91  4.7   |  21<L>  |  1.12    Ca    9.5      09 Nov 2021 06:57  Phos  3.9     11-08  Mg     2.00     11-08    TPro  7.6  /  Alb  3.8  /  TBili  3.0<H>  /  DBili  x   /  AST  32  /  ALT  17  /  AlkPhos  168<H>  11-09              RADIOLOGY & ADDITIONAL TESTS:    Imaging Personally Reviewed:    Consultant(s) Notes Reviewed:      Care Discussed with Consultants/Other Providers:  
Patient is a 66y old  Male who presents with a chief complaint of pain crisis (12 Nov 2021 12:54)      SUBJECTIVE / OVERNIGHT EVENTS:  Pt seen and examined at 11:20am, no overnight events, pt reports improvement in pain, no sob or chest pain. No other complaints. Pt reports that he has not seen his pcp for a while, asking for pain meds upon dc. No other new issues reported.        MEDICATIONS  (STANDING):  enoxaparin Injectable 40 milliGRAM(s) SubCutaneous daily  finasteride 5 milliGRAM(s) Oral daily  folic acid 1 milliGRAM(s) Oral daily  HYDROmorphone PCA (1 mG/mL) 30 milliLiter(s) PCA Continuous PCA Continuous  influenza  Vaccine (HIGH DOSE) 0.7 milliLiter(s) IntraMuscular once  metoprolol tartrate 25 milliGRAM(s) Oral two times a day  sodium chloride 0.45%. 1000 milliLiter(s) (20 mL/Hr) IV Continuous <Continuous>  tamsulosin 0.4 milliGRAM(s) Oral at bedtime    MEDICATIONS  (PRN):  naloxone Injectable 0.1 milliGRAM(s) IV Push every 3 minutes PRN For ANY of the following changes in patient status:  A. RR LESS THAN 10 breaths per minute, B. Oxygen saturation LESS THAN 90%, C. Sedation score of 6  ondansetron Injectable 4 milliGRAM(s) IV Push every 6 hours PRN Nausea  senna 2 Tablet(s) Oral at bedtime PRN Constipation      Vital Signs Last 24 Hrs  T(C): 36.7 (12 Nov 2021 13:14), Max: 36.9 (11 Nov 2021 21:56)  T(F): 98 (12 Nov 2021 13:14), Max: 98.4 (11 Nov 2021 21:56)  HR: 65 (12 Nov 2021 13:14) (65 - 100)  BP: 128/76 (12 Nov 2021 13:14) (114/80 - 135/65)  BP(mean): --  RR: 17 (12 Nov 2021 13:14) (17 - 18)  SpO2: 100% (12 Nov 2021 13:14) (95% - 100%)  CAPILLARY BLOOD GLUCOSE        I&O's Summary    11 Nov 2021 07:01  -  12 Nov 2021 07:00  --------------------------------------------------------  IN: 118 mL / OUT: 260 mL / NET: -142 mL        PHYSICAL EXAM:  GENERAL: NAD, thiny built male  CHEST/LUNG: Clear to auscultation bilaterally; No wheeze  HEART: Regular rate and rhythm  ABDOMEN: Soft, nontender, Nondistended;   EXTREMITIES: no LE edema  PSYCH: Calm  NEUROLOGY: AAOx3  SKIN: No rashes or lesions    LABS:                    RADIOLOGY & ADDITIONAL TESTS:    Imaging Personally Reviewed:    Consultant(s) Notes Reviewed:      Care Discussed with Consultants/Other Providers:  
Patient is a 66y old  Male who presents with a chief complaint of pain crisis (07 Nov 2021 14:05)      SUBJECTIVE / OVERNIGHT EVENTS: Pt seen and examined at 11:30am, no overnight events, pt reports pain is improving, asking for metoprolol, per nsg is voiding, no sob, has had no bm yet. No other complaints. No other new issues reported.        MEDICATIONS  (STANDING):  finasteride 5 milliGRAM(s) Oral daily  folic acid 1 milliGRAM(s) Oral daily  HYDROmorphone PCA (1 mG/mL) 30 milliLiter(s) PCA Continuous PCA Continuous  influenza  Vaccine (HIGH DOSE) 0.7 milliLiter(s) IntraMuscular once  metoprolol tartrate 25 milliGRAM(s) Oral two times a day  sodium chloride 0.45%. 1000 milliLiter(s) (20 mL/Hr) IV Continuous <Continuous>  tamsulosin 0.4 milliGRAM(s) Oral at bedtime    MEDICATIONS  (PRN):  naloxone Injectable 0.1 milliGRAM(s) IV Push every 3 minutes PRN For ANY of the following changes in patient status:  A. RR LESS THAN 10 breaths per minute, B. Oxygen saturation LESS THAN 90%, C. Sedation score of 6  ondansetron Injectable 4 milliGRAM(s) IV Push every 6 hours PRN Nausea  senna 2 Tablet(s) Oral at bedtime PRN Constipation      Vital Signs Last 24 Hrs  T(C): 36.9 (08 Nov 2021 13:53), Max: 37.1 (07 Nov 2021 21:40)  T(F): 98.5 (08 Nov 2021 13:53), Max: 98.8 (07 Nov 2021 21:40)  HR: 97 (08 Nov 2021 13:53) (96 - 111)  BP: 112/63 (08 Nov 2021 13:53) (112/63 - 155/84)  BP(mean): --  RR: 15 (08 Nov 2021 13:53) (15 - 19)  SpO2: 94% (08 Nov 2021 13:53) (94% - 100%)  CAPILLARY BLOOD GLUCOSE        I&O's Summary    07 Nov 2021 07:01  -  08 Nov 2021 07:00  --------------------------------------------------------  IN: 440 mL / OUT: 1480 mL / NET: -1040 mL    08 Nov 2021 07:01  -  08 Nov 2021 14:03  --------------------------------------------------------  IN: 120 mL / OUT: 200 mL / NET: -80 mL        PHYSICAL EXAM:  GENERAL: NAD, well-developed  CHEST/LUNG: Clear to auscultation bilaterally; No wheeze  HEART: Tachycardia at 102/mt  ABDOMEN: Soft, nontender, Nondistended;   EXTREMITIES: no LE edema  PSYCH: Calm  NEUROLOGY: AAOx3  SKIN: No rashes or lesions      LABS:                        6.1    15.90 )-----------( 248      ( 08 Nov 2021 11:43 )             18.6     11-08    138  |  106  |  23  ----------------------------<  134<H>  4.9   |  20<L>  |  1.18    Ca    9.4      08 Nov 2021 11:43  Phos  3.9     11-08  Mg     2.00     11-08                RADIOLOGY & ADDITIONAL TESTS:    Imaging Personally Reviewed:    Consultant(s) Notes Reviewed:      Care Discussed with Consultants/Other Providers:  
Patient is a 66y old  Male who presents with a chief complaint of pain crisis (09 Nov 2021 12:17)      SUBJECTIVE / OVERNIGHT EVENTS: Pt seen and examined at 10:30am, no overnight events, pt reports still in pain, 7/10, lost iv access later established, voiding, no sob or chest pain. Says he had bm day before yesterday ( but no stool for guaiac was sent) No other complaints. No other new issues reported.        MEDICATIONS  (STANDING):  finasteride 5 milliGRAM(s) Oral daily  folic acid 1 milliGRAM(s) Oral daily  HYDROmorphone PCA (1 mG/mL) 30 milliLiter(s) PCA Continuous PCA Continuous  influenza  Vaccine (HIGH DOSE) 0.7 milliLiter(s) IntraMuscular once  metoprolol tartrate 25 milliGRAM(s) Oral two times a day  sodium chloride 0.45%. 1000 milliLiter(s) (20 mL/Hr) IV Continuous <Continuous>  tamsulosin 0.4 milliGRAM(s) Oral at bedtime    MEDICATIONS  (PRN):  naloxone Injectable 0.1 milliGRAM(s) IV Push every 3 minutes PRN For ANY of the following changes in patient status:  A. RR LESS THAN 10 breaths per minute, B. Oxygen saturation LESS THAN 90%, C. Sedation score of 6  ondansetron Injectable 4 milliGRAM(s) IV Push every 6 hours PRN Nausea  senna 2 Tablet(s) Oral at bedtime PRN Constipation      Vital Signs Last 24 Hrs  T(C): 36.9 (10 Nov 2021 12:00), Max: 36.9 (09 Nov 2021 16:00)  T(F): 98.5 (10 Nov 2021 12:00), Max: 98.5 (10 Nov 2021 12:00)  HR: 92 (10 Nov 2021 12:00) (81 - 103)  BP: 123/68 (10 Nov 2021 12:00) (98/55 - 129/90)  BP(mean): 75 (10 Nov 2021 09:09) (75 - 75)  RR: 18 (10 Nov 2021 12:00) (16 - 18)  SpO2: 97% (10 Nov 2021 12:00) (97% - 100%)  CAPILLARY BLOOD GLUCOSE        I&O's Summary    09 Nov 2021 07:01  -  10 Nov 2021 07:00  --------------------------------------------------------  IN: 0 mL / OUT: 425 mL / NET: -425 mL        PHYSICAL EXAM:  GENERAL: NAD, thiny built male  CHEST/LUNG: Clear to auscultation bilaterally; No wheeze  HEART: Regular rate and rhythm  ABDOMEN: Soft, nontender, Nondistended;   EXTREMITIES: no LE edema  PSYCH: Calm  NEUROLOGY: AAOx3  SKIN: No rashes or lesions    LABS:                        5.8    15.17 )-----------( 265      ( 09 Nov 2021 06:57 )             18.1     11-09    138  |  105  |  24<H>  ----------------------------<  91  4.7   |  21<L>  |  1.12    Ca    9.5      09 Nov 2021 06:57    TPro  7.6  /  Alb  3.8  /  TBili  3.0<H>  /  DBili  x   /  AST  32  /  ALT  17  /  AlkPhos  168<H>  11-09              RADIOLOGY & ADDITIONAL TESTS:    Imaging Personally Reviewed:    Consultant(s) Notes Reviewed:      Care Discussed with Consultants/Other Providers:  
Patient is a 66y old  Male who presents with a chief complaint of pain crisis (2021 03:03)      SUBJECTIVE / OVERNIGHT EVENTS: Pt seen and examined at 12:25pm, no overnight events, pt reports pain all over, and also reports dysuria and dark stools, he attributes drinking chocolate ensure for dark stools, asking for ensure, no sob, or any other complaints. Last bm yesterday. No other new issues reported.    MEDICATIONS  (STANDING):  finasteride 5 milliGRAM(s) Oral daily  folic acid 1 milliGRAM(s) Oral daily  HYDROmorphone PCA (1 mG/mL) 30 milliLiter(s) PCA Continuous PCA Continuous  influenza  Vaccine (HIGH DOSE) 0.7 milliLiter(s) IntraMuscular once  metoprolol tartrate 25 milliGRAM(s) Oral two times a day  sodium chloride 0.45%. 1000 milliLiter(s) (20 mL/Hr) IV Continuous <Continuous>    MEDICATIONS  (PRN):  naloxone Injectable 0.1 milliGRAM(s) IV Push every 3 minutes PRN For ANY of the following changes in patient status:  A. RR LESS THAN 10 breaths per minute, B. Oxygen saturation LESS THAN 90%, C. Sedation score of 6  ondansetron Injectable 4 milliGRAM(s) IV Push every 6 hours PRN Nausea  senna 2 Tablet(s) Oral at bedtime PRN Constipation      Vital Signs Last 24 Hrs  T(C): 36.6 (2021 10:28), Max: 36.8 (2021 21:27)  T(F): 97.8 (2021 10:28), Max: 98.2 (2021 21:27)  HR: 68 (2021 10:28) (68 - 111)  BP: 151/93 (2021 10:28) (116/73 - 165/92)  BP(mean): --  RR: 18 (2021 10:28) (18 - 18)  SpO2: 100% (2021 10:28) (97% - 100%)  CAPILLARY BLOOD GLUCOSE        I&O's Summary      PHYSICAL EXAM:  GENERAL: NAD, well-developed  CHEST/LUNG: Clear to auscultation bilaterally; No wheeze  HEART: Regular rate and rhythm  ABDOMEN: Soft, mild suprapubic tenderness, Nondistended;   EXTREMITIES: no LE edema  PSYCH: Calm  NEUROLOGY: AAOx3  SKIN: No rashes or lesions    LABS:                        5.9    11.69 )-----------( 206      ( 2021 09:31 )             17.5     11-06    138  |  109<H>  |  11  ----------------------------<  89  5.4<H>   |  20<L>  |  1.08    Ca    8.9      2021 09:31  Phos  3.4     11-  Mg     2.10     -    TPro  8.2  /  Alb  4.5  /  TBili  1.9<H>  /  DBili  x   /  AST  41<H>  /  ALT  20  /  AlkPhos  171<H>  11-05          Urinalysis Basic - ( 2021 11:52 )    Color: Light Yellow / Appearance: Clear / S.014 / pH: x  Gluc: x / Ketone: Negative  / Bili: Negative / Urobili: <2 mg/dL   Blood: x / Protein: 30 mg/dL / Nitrite: Negative   Leuk Esterase: Negative / RBC: 0 /HPF / WBC 0 /HPF   Sq Epi: x / Non Sq Epi: 1 /HPF / Bacteria: Negative        RADIOLOGY & ADDITIONAL TESTS:    Imaging Personally Reviewed:    Consultant(s) Notes Reviewed:      Care Discussed with Consultants/Other Providers:  
Patient is a 66y old  Male who presents with a chief complaint of pain crisis (2021 15:01)      SUBJECTIVE / OVERNIGHT EVENTS: Pt seen and examined at 10:55am, no overnight events, pt reports back pain, per nsg pt was having urinary retention and had to have straight cath, next bladder scan pending, no sob, or any other complaints. No other new issues reported.      MEDICATIONS  (STANDING):  finasteride 5 milliGRAM(s) Oral daily  folic acid 1 milliGRAM(s) Oral daily  HYDROmorphone PCA (1 mG/mL) 30 milliLiter(s) PCA Continuous PCA Continuous  influenza  Vaccine (HIGH DOSE) 0.7 milliLiter(s) IntraMuscular once  metoprolol tartrate 25 milliGRAM(s) Oral two times a day  sodium chloride 0.45%. 1000 milliLiter(s) (20 mL/Hr) IV Continuous <Continuous>  tamsulosin 0.4 milliGRAM(s) Oral at bedtime    MEDICATIONS  (PRN):  naloxone Injectable 0.1 milliGRAM(s) IV Push every 3 minutes PRN For ANY of the following changes in patient status:  A. RR LESS THAN 10 breaths per minute, B. Oxygen saturation LESS THAN 90%, C. Sedation score of 6  ondansetron Injectable 4 milliGRAM(s) IV Push every 6 hours PRN Nausea  senna 2 Tablet(s) Oral at bedtime PRN Constipation      Vital Signs Last 24 Hrs  T(C): 36.9 (2021 09:15), Max: 37.1 (2021 22:10)  T(F): 98.4 (2021 09:15), Max: 98.7 (2021 22:10)  HR: 104 (2021 09:15) (91 - 110)  BP: 146/82 (2021 09:15) (122/78 - 150/73)  BP(mean): --  RR: 18 (2021 09:15) (17 - 18)  SpO2: 98% (2021 09:15) (96% - 100%)  CAPILLARY BLOOD GLUCOSE        I&O's Summary    2021 08:01  -  2021 07:00  --------------------------------------------------------  IN: 120 mL / OUT: 2180 mL / NET: -0 mL    2021 07:01  -  2021 14:05  --------------------------------------------------------  IN: 200 mL / OUT: 200 mL / NET: 0 mL        PHYSICAL EXAM:  GENERAL: NAD, well-developed  CHEST/LUNG: Clear to auscultation bilaterally; No wheeze  HEART: Regular rate and rhythm  ABDOMEN: Soft, nontender, Nondistended;   EXTREMITIES: no LE edema  PSYCH: Calm  NEUROLOGY: AAOx3  SKIN: No rashes or lesions    LABS:                        5.9    13.69 )-----------( 222      ( 2021 15:29 )             17.7     11-    139  |  109<H>  |  12  ----------------------------<  117<H>  4.9   |  21<L>  |  1.04    Ca    9.0      2021 15:29  Phos  2.6     11-06  Mg     1.90     11-    TPro  8.2  /  Alb  4.5  /  TBili  1.9<H>  /  DBili  x   /  AST  41<H>  /  ALT  20  /  AlkPhos  171<H>  11-05          Urinalysis Basic - ( 2021 11:52 )    Color: Light Yellow / Appearance: Clear / S.014 / pH: x  Gluc: x / Ketone: Negative  / Bili: Negative / Urobili: <2 mg/dL   Blood: x / Protein: 30 mg/dL / Nitrite: Negative   Leuk Esterase: Negative / RBC: 0 /HPF / WBC 0 /HPF   Sq Epi: x / Non Sq Epi: 1 /HPF / Bacteria: Negative        RADIOLOGY & ADDITIONAL TESTS:    Imaging Personally Reviewed:    Consultant(s) Notes Reviewed:      Care Discussed with Consultants/Other Providers:

## 2021-11-12 NOTE — DISCHARGE NOTE NURSING/CASE MANAGEMENT/SOCIAL WORK - NSDCVIVACCINE_GEN_ALL_CORE_FT
influenza, injectable, quadrivalent, preservative free; 12-Oct-2018 14:38; Juana Bruce (RN); [employer]; [id number] (Exp. Date: 30-Jun-2019); IntraMuscular; Deltoid Left.; 0.5 milliLiter(s); VIS (VIS Published: 07-Aug-2015, VIS Presented: 12-Oct-2018);

## 2021-11-12 NOTE — DISCHARGE NOTE PROVIDER - NSDCMRMEDTOKEN_GEN_ALL_CORE_FT
finasteride 5 mg oral tablet: 1 tab(s) orally once a day  folic acid 1 mg oral tablet: 1 tab(s) orally once a day  HYDROmorphone 4 mg oral tablet: 1 tab(s) orally every 6 hours MDD:16mg  metoprolol tartrate 25 mg oral tablet: 1 tab(s) orally 2 times a day  senna oral tablet: 2 tab(s) orally once a day (at bedtime), As needed, Constipation  tamsulosin 0.4 mg oral capsule: 1 cap(s) orally once a day (at bedtime)

## 2021-11-12 NOTE — PROGRESS NOTE ADULT - PROBLEM SELECTOR PROBLEM 3
Nausea and vomiting
Heart failure, chronic systolic
Heart failure, chronic systolic
Nausea and vomiting
Heart failure, chronic systolic
Heart failure, chronic systolic
Nausea and vomiting

## 2021-11-12 NOTE — PROGRESS NOTE ADULT - PROBLEM SELECTOR PLAN 6
c/w finasteride  previously prescribed tamsulosin however ran out, now reporting difficulty initiating urination without other urinary symptoms   UA neg, f/u bladder scan   cont tamsulosin
c/w finasteride  previously prescribed tamsulosin however ran out, now reporting difficulty initiating urination without other urinary symptoms   UA neg, bladder scan for PVR  would restart tamsulosin pending bladder scan
c/w finasteride  previously prescribed tamsulosin however ran out, now reporting difficulty initiating urination without other urinary symptoms   UA neg, f/u bladder scan   cont tamsulosin
start on lovenox  venodynes for now
start on lovenox  venodynes for now
holding pharmacologic DVT ppx pending FOBT  venodynes for now
holding pharmacologic DVT ppx pending FOBT  venodynes for now

## 2021-11-12 NOTE — PROGRESS NOTE ADULT - PROBLEM SELECTOR PROBLEM 1
Anemia, sickle cell with crisis

## 2021-11-12 NOTE — PROGRESS NOTE ADULT - PROBLEM SELECTOR PROBLEM 6
Need for prophylactic measure
BPH (benign prostatic hyperplasia)
Need for prophylactic measure
BPH (benign prostatic hyperplasia)
BPH (benign prostatic hyperplasia)

## 2021-11-12 NOTE — PROGRESS NOTE ADULT - PROBLEM SELECTOR PROBLEM 5
BPH (benign prostatic hyperplasia)
Essential hypertension
BPH (benign prostatic hyperplasia)
Essential hypertension
Essential hypertension

## 2021-11-12 NOTE — PROGRESS NOTE ADULT - PROBLEM SELECTOR PROBLEM 4
Heart failure, chronic systolic
Essential hypertension
Heart failure, chronic systolic
Heart failure, chronic systolic

## 2021-11-12 NOTE — PROGRESS NOTE ADULT - PROBLEM SELECTOR PLAN 2
likely reactive secondary to SSC  monitor/trend   UA neg  RVP/COVID negative    f/u today's labs
likely reactive secondary to SSC  monitor/trend   UA neg  RVP/COVID negative    myelocytes noted on diff, monitor/trend
likely reactive secondary to SSC  monitor/trend   UA neg  RVP/COVID negative    myelocytes noted on diff, monitor/trend
likely reactive secondary to SSC  monitor/trend   UA neg  RVP/COVID negative    Pt has refused labs for the past 2 days.
likely reactive secondary to SSC  monitor/trend   UA neg  RVP/COVID negative    f/u today's labs
likely reactive secondary to SSC  monitor/trend   UA neg  RVP/COVID negative    myelocytes noted on diff, monitor/trend  f/u today's labs
likely reactive secondary to SSC  monitor/trend   UA neg  RVP/COVID negative    myelocytes noted on diff, monitor/trend  f/u today's labs

## 2021-11-12 NOTE — PROGRESS NOTE ADULT - PROBLEM SELECTOR PLAN 4
BP elevated in ED however in setting of continued crisis pain  c/w metoprolol w/hold parameters (patient reports on metoprolol for history of palpitations) and pain control
elevated pro-BNP however without signs of fluid overload  holding further IVF at present  monitor I/Os, daily weights
BP elevated in ED however in setting of continued crisis pain  c/w metoprolol w/hold parameters (patient reports on metoprolol for history of palpitations) and pain control
elevated pro-BNP however without signs of fluid overload  holding further IVF at present  monitor I/Os, daily weights
BP elevated in ED however in setting of continued crisis pain  c/w metoprolol w/hold parameters (patient reports on metoprolol for history of palpitations) and pain control  may need additional agent for BP control
elevated pro-BNP however without signs of fluid overload  holding further IVF at present  monitor I/Os, daily weights
BP elevated in ED however in setting of continued crisis pain  c/w metoprolol w/hold parameters (patient reports on metoprolol for history of palpitations) and pain control

## 2021-11-12 NOTE — DISCHARGE NOTE NURSING/CASE MANAGEMENT/SOCIAL WORK - PATIENT PORTAL LINK FT
You can access the FollowMyHealth Patient Portal offered by [facility] by registering at the following website: [email or web address]. By joining [facility]’s FollowMyHealth portal, you will also be able to view your health information using other applications (apps) compatible with our system.

## 2021-11-12 NOTE — DISCHARGE NOTE PROVIDER - NSDCFUSCHEDAPPT_GEN_ALL_CORE_FT
DINORA SHOEMAKER ; 11/29/2021 ; [facility] DINORA SHOEMAKER ; 11/29/2021 ; NPP Ophthal [facility]

## 2021-11-12 NOTE — CHART NOTE - NSCHARTNOTEFT_GEN_A_CORE
I- STOP This report was requested by: Nancie Travis | Reference #: [id number]     Hydromorphone 4mg Dispensed 8/18/21, 40 tablets

## 2021-11-12 NOTE — DISCHARGE NOTE PROVIDER - NSDCCPCAREPLAN_GEN_ALL_CORE_FT
PRINCIPAL DISCHARGE DIAGNOSIS  Diagnosis: Sickle cell disease, type SS  Assessment and Plan of Treatment: Stay hydrated with water. Continue medications as prescribed. Follow up with your PCP for further evaluation and refills of pain medication. Please call to make an appointment      SECONDARY DISCHARGE DIAGNOSES  Diagnosis: Essential hypertension  Assessment and Plan of Treatment: Continue current blood pressure medication regimen as directed. Monitor for any visual changes, headaches or dizziness.  Monitor blood pressure regularly.  Follow up with your PCP for further management for high blood pressure, please call to make appointment within 1 week of discharge    Diagnosis: BPH (benign prostatic hyperplasia)  Assessment and Plan of Treatment: Please continue to take your medications as prescribed.    Diagnosis: Sickle cell disease, type SS  Assessment and Plan of Treatment:

## 2021-11-24 ENCOUNTER — NON-APPOINTMENT (OUTPATIENT)
Age: 66
End: 2021-11-24

## 2021-11-29 ENCOUNTER — APPOINTMENT (OUTPATIENT)
Dept: OPHTHALMOLOGY | Facility: CLINIC | Age: 66
End: 2021-11-29

## 2021-12-01 PROCEDURE — G9005: CPT

## 2021-12-09 RX ORDER — METOPROLOL TARTRATE 25 MG/1
25 TABLET, FILM COATED ORAL
Qty: 30 | Refills: 6 | Status: DISCONTINUED | COMMUNITY
Start: 2019-09-20 | End: 2021-12-09

## 2022-01-13 ENCOUNTER — APPOINTMENT (OUTPATIENT)
Dept: OPHTHALMOLOGY | Facility: CLINIC | Age: 67
End: 2022-01-13
Payer: MEDICARE

## 2022-01-13 ENCOUNTER — NON-APPOINTMENT (OUTPATIENT)
Age: 67
End: 2022-01-13

## 2022-01-13 PROCEDURE — 92136 OPHTHALMIC BIOMETRY: CPT

## 2022-01-13 PROCEDURE — 92004 COMPRE OPH EXAM NEW PT 1/>: CPT

## 2022-01-13 PROCEDURE — 92250 FUNDUS PHOTOGRAPHY W/I&R: CPT

## 2022-01-27 ENCOUNTER — APPOINTMENT (OUTPATIENT)
Dept: INTERNAL MEDICINE | Facility: CLINIC | Age: 67
End: 2022-01-27

## 2022-01-27 ENCOUNTER — NON-APPOINTMENT (OUTPATIENT)
Age: 67
End: 2022-01-27

## 2022-01-27 ENCOUNTER — APPOINTMENT (OUTPATIENT)
Dept: OPHTHALMOLOGY | Facility: CLINIC | Age: 67
End: 2022-01-27
Payer: MEDICARE

## 2022-01-27 PROCEDURE — 92012 INTRM OPH EXAM EST PATIENT: CPT

## 2022-01-27 PROCEDURE — 92020 GONIOSCOPY: CPT

## 2022-01-31 ENCOUNTER — APPOINTMENT (OUTPATIENT)
Dept: INTERNAL MEDICINE | Facility: CLINIC | Age: 67
End: 2022-01-31

## 2022-01-31 ENCOUNTER — OUTPATIENT (OUTPATIENT)
Dept: OUTPATIENT SERVICES | Facility: HOSPITAL | Age: 67
LOS: 1 days | End: 2022-01-31

## 2022-01-31 ENCOUNTER — APPOINTMENT (OUTPATIENT)
Dept: INTERNAL MEDICINE | Facility: CLINIC | Age: 67
End: 2022-01-31
Payer: MEDICARE

## 2022-01-31 PROCEDURE — 99443: CPT | Mod: 95

## 2022-02-01 DIAGNOSIS — D57.1 SICKLE-CELL DISEASE WITHOUT CRISIS: ICD-10-CM

## 2022-02-01 DIAGNOSIS — Z86.79 PERSONAL HISTORY OF OTHER DISEASES OF THE CIRCULATORY SYSTEM: ICD-10-CM

## 2022-02-01 NOTE — HISTORY OF PRESENT ILLNESS
[Home] : at home, [unfilled] , at the time of the visit. [Other Location: e.g. Home (Enter Location, City,State)___] : at [unfilled] [Verbal consent obtained from patient] : the patient, [unfilled] [FreeTextEntry1] : 67 yo male with hgb ss for f/u. [de-identified] : (65 yo male with hgb ss, for f/u. This interview is telephonic. Last hospitalization 11/12/21. He has hospitalizations at least 2 times per year. He is interested in HU.\par C/o urinary difficulty for the last week. Has been taking finasteride for \par \par The patient had a prolonged hospitalization last Feb> He was found to have iron deficiency anemia, as well as short runs of Vtach, during his stay. He refused colonoscopy at the time.he still refuses work-up for iron deficient anemia\par The patient was given metoprolol, 25 mg BID during his stay. No cardiac studies ( besides EKG) were performed.\par He is feeling well today, would like refills of pain medication and metoprolol. he takes metoprolol only when he has palpitations. \par He has not been taking Endari regularly. Has been taking metoprolol for tachycardia. \par ophtho UTD- no retinopathy noted\par See ROS\par \par A/P- 65 yo male with HGB SS\par 1. SCD- patient is interested in taking HU. However, he has a record of missing appts frequently ( more often then not).\par As Hu requires monitoring, will have patient come in to the office for baseline bloodwork, then will start.\par 2. . patient given info re: covid vaccine\par 3.h/o vtach- continue metoprolol 25 bid\par 4. f/u 2 months\par 5. dilauudid-4 mg tab-#40\par ISTOP checked\par \par Lluvia Hamm MD
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
## 2022-03-24 ENCOUNTER — NON-APPOINTMENT (OUTPATIENT)
Age: 67
End: 2022-03-24

## 2022-03-24 ENCOUNTER — APPOINTMENT (OUTPATIENT)
Dept: OPHTHALMOLOGY | Facility: CLINIC | Age: 67
End: 2022-03-24
Payer: MEDICARE

## 2022-03-24 PROCEDURE — 92012 INTRM OPH EXAM EST PATIENT: CPT

## 2022-04-01 NOTE — PROVIDER CONTACT NOTE (CRITICAL VALUE NOTIFICATION) - RECOMMENDATIONS
provider notification and blood transfusion
No new orders rendered at this time.
Tumor Depth: Less than 6mm from granular layer and no invasion beyond the subcutaneous fat

## 2022-04-05 NOTE — DIETITIAN INITIAL EVALUATION ADULT. - NS AS NUTRI INTERV MEALS SNACK
General/healthful diet/Continue current diet order, which remains appropriate at this time. Wartpeel Counseling:  I discussed with the patient the risks of Wartpeel including but not limited to erythema, scaling, itching, weeping, crusting, and pain.

## 2022-04-09 NOTE — PROCEDURAL SAFETY CHECKLIST WITH OR WITHOUT SEDATION - NSPREIMAGEREVIEW_GEN_ALL_CORE
INTERNAL MEDICINE RESIDENCY PROGRESS NOTE     Name: Richar Becker   Age & Sex: 77 y o  female   MRN: [id number]  Unit/Bed#: CW2 210-02   Encounter: [id number]  Team: SOD Team A    PATIENT INFORMATION     Name: Richar Becker   Age & Sex: 77 y o  female   MRN: [id number]  Hospital Stay Days: 2    ASSESSMENT/PLAN     Principal Problem:    Syncope  Active Problems:    MDD (major depressive disorder), recurrent, severe, with psychosis ([facility] [address] )    Positive QuantiFERON-TB Gold test    Rheumatoid arthritis ([facility] )    Volume overload      Volume overload  Assessment & Plan  Patient presenting with evidence of volume overload  Bibasilar crackles with b/l LE +2 pitting edema  Labs demonstrating NT proBNP of 564  CXR with pulmonary vascular congestion, central edema  No prior history of CHF or previous echocardiograms on file  Concern for possible rheumatoid involvement of the heart  Will order echocardiogram and continue to monitor on telemetry for further allow for further workup  Echocardiogram showing normal systolic function with EF 55% and grade I diastolic dysfunction  EKG with PVCs in bigeminy pattern  Plan:   S/P Lasix 40 mg IV x1 now   Holding on diuresis 2/2 low BP   Intake and output monitoring   Daily standing weights   Will consult cardiology for further evaluation  Rheumatoid arthritis ([facility] )  Assessment & Plan  Prior history of rheumatoid arthritis and does cm follow-up with Rheumatology  Seen by Dr Genaro Jones on 2/22/2022 - Unsuccessfully trailed on hydroxychloroquine in the past for a suspected inflammatory arthropathy but unfortunately experienced minimal to no relief in symptoms and therefore stopped the medication  RF 9000 IU and RF positive in February  During last visit on 2/22/2022, patient started on prednisone 5 mg BID, folic acid 1 mg daily, and methotrexate 2 5 mg once per week for treatment of rapidly progressive rheumatoid disease    Unfortunately, patient stopped taking these medications or last 2 weeks after running out of them  Now complaining of increasing joint pain and swelling  Inflammatory markers elevated including CRP of 98 and ESR 68  At this time, concern for potential rheumatoid flare up  Discussed with rheumatology, patient to be continued on current outpatient regimen  CCP elevated at >340 0, C3 and C4 WNL  Questionable lung vs heart involvement of the disease  Further work up including echocardiogram pending official reading  Plan:   · Methotrexate 10 mg weekly - Not available in the inpatient setting  Will discuss with pharmacy further options  · Folic acid 1 mg daily  · Switched to IV Solumedrol 40mg daily  Positive QuantiFERON-TB Gold test  Assessment & Plan  Of note and per chart review: Patient from [place] - as part of immigration labs patient was found to have a positive quantiferon gold  The patient's grandmother was diagnosed with TB years ago and the whole family received treatment with Isoniazid x9 motnths  Scanned documentation from [facility] is completed and available in her chart in the media tab from 10/2019  MDD (major depressive disorder), recurrent, severe, with psychosis ([facility] [address] )  Assessment & Plan  History of major depressive disorder  Well controlled on current home regimen  Will continue at this time  Plan:  · Risperidone 3 mg daily   · Benztropine 1 mg daily   · Trazodone 50 mg qHS    * Syncope  Assessment & Plan  Presenting S/P syncopal episode at home while attempting to ambulate  Dizziness and lightheadedness preceded the episode and patient admited to feeling off balance  Denies head strike  VSS on arrival  Troponin negative but NTproBNP elevated and patient hypervolemic on examination  EKG and telemetry both displaying NSR with frequent PVCs  At this time, will admit for syncope workup - possible arrhythmia vs CHF vs RF flare up     Echocardiogram with normal systolic function, EF 09%, grade 1 diastolic dysfunction  Plan:  · Orthostatic vital signs  · Monitor and replete electrolytes   · Telemetry monitoring - showing constant PVCs in bigeminy pattern  · PT/OT recommended post acute rehab  · Will contact Cardiology for further evaluation/recommendations      Disposition: Medically stable  Plan for post acute rehab after discharge  SUBJECTIVE     Patient seen and examined  No acute events overnight  Patient complains of hands and leg pain this mornings which is not improved since restarting treatment  Denies chest pain, SOB, abdominal pain  Patient sitting in bed having breakfast  Discussed with patient plan for post acute rehab, she is agreeable  OBJECTIVE     Vitals:    22 2110 22 2357 22 0549 22 0655   BP:  118/68  115/56   BP Location:       Pulse:       Resp:  18  18   Temp:  (!) 97 3 °F (36 3 °C)  97 7 °F (36 5 °C)   TempSrc:       SpO2: 96%      Weight:   67 3 kg (148 lb 6 oz)    Height:          Temperature:   Temp (24hrs), Av 1 °F (36 7 °C), Min:97 3 °F (36 3 °C), Max:99 2 °F (37 3 °C)    Temperature: 97 7 °F (36 5 °C)  Intake & Output:  I/O        07 07 0701   07 0701  04/10 07    P  O  600 160     I V  (mL/kg) 20 (0 3)      Total Intake(mL/kg) 620 (9 2) 160 (2 4)     Net +620 +160            Unmeasured Urine Occurrence 1 x 2 x         Weights:        Body mass index is 31 01 kg/m²  Weight (last 2 days)     Date/Time Weight    22 0549 67 3 (148 38)    22 1320 67 1 (148)    22 0600 67 4 (148 5)        Physical Exam  Vitals and nursing note reviewed  Constitutional:       General: She is not in acute distress  Appearance: She is well-developed  HENT:      Head: Normocephalic and atraumatic  Eyes:      Conjunctiva/sclera: Conjunctivae normal    Cardiovascular:      Rate and Rhythm: Normal rate and regular rhythm  Heart sounds: No murmur heard        Pulmonary:      Effort: Pulmonary effort is normal  No respiratory distress  Breath sounds: Rales present  Abdominal:      Palpations: Abdomen is soft  Tenderness: There is no abdominal tenderness  Musculoskeletal:         General: Swelling (BL wrist and ankles) and tenderness (BL wrist and ankles) present  Cervical back: Neck supple  Skin:     General: Skin is warm and dry  Neurological:      Mental Status: She is alert  LABORATORY DATA     Labs: I have personally reviewed pertinent reports  Results from last 7 days   Lab Units 04/09/22  0445 04/08/22  0434 04/07/22  1053   WBC Thousand/uL 7 63 8 74 9 03   HEMOGLOBIN g/dL 11 5 11 7 11 4*   HEMATOCRIT % 35 0 35 5 34 7*   PLATELETS Thousands/uL 372 373 348   NEUTROS PCT % 70 81* 82*   MONOS PCT % 6 4 8      Results from last 7 days   Lab Units 04/09/22  0445 04/08/22  0434 04/07/22  1623 04/07/22  1053 04/07/22  1053   POTASSIUM mmol/L 4 2 4 4 3 8   < > 3 1*   CHLORIDE mmol/L 107 107 106   < > 106   CO2 mmol/L 25 25 27   < > 27   BUN mg/dL 13 16 12   < > 9   CREATININE mg/dL 0 47* 0 50* 0 61   < > 0 65   CALCIUM mg/dL 8 8 8 8 9 0   < > 8 9   ALK PHOS U/L  --  89  --   --  83   ALT U/L  --  15  --   --  16   AST U/L  --  16  --   --  13    < > = values in this interval not displayed  Results from last 7 days   Lab Units 04/08/22  0434   MAGNESIUM mg/dL 1 9                        IMAGING & DIAGNOSTIC TESTING     Radiology Results: I have personally reviewed pertinent reports  XR chest 2 views    Result Date: 4/7/2022  Impression: Low lung volumes with central opacities most compatible with edema  Correlate clinically for infection  Similar to prior study from 2020  Workstation performed: [id number]     Other Diagnostic Testing: I have personally reviewed pertinent reports      ACTIVE MEDICATIONS     Current Facility-Administered Medications   Medication Dose Route Frequency    acetaminophen (TYLENOL) tablet 650 mg  650 mg Oral Q6H PRN    benztropine (COGENTIN) tablet 1 mg  1 mg Oral HS  enoxaparin (LOVENOX) subcutaneous injection 40 mg  40 mg Subcutaneous Daily    folic acid (FOLVITE) tablet 1 mg  1 mg Oral Daily    gabapentin (NEURONTIN) capsule 300 mg  300 mg Oral BID    methotrexate tablet 10 mg  10 mg Oral Weekly    methylPREDNISolone sodium succinate (Solu-MEDROL) injection 40 mg  40 mg Intravenous Daily    risperiDONE (RisperDAL) tablet 3 mg  3 mg Oral HS    traZODone (DESYREL) tablet 50 mg  50 mg Oral HS       VTE Pharmacologic Prophylaxis: Enoxaparin (Lovenox)  VTE Mechanical Prophylaxis: sequential compression device    Portions of the record may have been created with voice recognition software  Occasional wrong word or "sound a like" substitutions may have occurred due to the inherent limitations of voice recognition software    Read the chart carefully and recognize, using context, where substitutions have occurred   ==  Hal Yoon MD  [facility]  Internal Medicine Residency PGY-1 done

## 2022-04-14 ENCOUNTER — APPOINTMENT (OUTPATIENT)
Dept: INTERNAL MEDICINE | Facility: CLINIC | Age: 67
End: 2022-04-14
Payer: MEDICARE

## 2022-04-14 ENCOUNTER — RESULT REVIEW (OUTPATIENT)
Age: 67
End: 2022-04-14

## 2022-04-14 ENCOUNTER — OUTPATIENT (OUTPATIENT)
Dept: OUTPATIENT SERVICES | Facility: HOSPITAL | Age: 67
LOS: 1 days | End: 2022-04-14

## 2022-04-14 VITALS
DIASTOLIC BLOOD PRESSURE: 92 MMHG | TEMPERATURE: 97.7 F | HEART RATE: 74 BPM | WEIGHT: 121 LBS | OXYGEN SATURATION: 98 % | BODY MASS INDEX: 17.32 KG/M2 | HEIGHT: 70 IN | SYSTOLIC BLOOD PRESSURE: 142 MMHG

## 2022-04-14 LAB
ALBUMIN SERPL ELPH-MCNC: 4.4 G/DL — SIGNIFICANT CHANGE UP (ref 3.3–5)
ALP SERPL-CCNC: 103 U/L — SIGNIFICANT CHANGE UP (ref 40–120)
ALT FLD-CCNC: 15 U/L — SIGNIFICANT CHANGE UP (ref 4–41)
ANION GAP SERPL CALC-SCNC: 10 MMOL/L — SIGNIFICANT CHANGE UP (ref 7–14)
APPEARANCE UR: CLEAR — SIGNIFICANT CHANGE UP
AST SERPL-CCNC: 35 U/L — SIGNIFICANT CHANGE UP (ref 4–40)
BACTERIA # UR AUTO: NEGATIVE — SIGNIFICANT CHANGE UP
BASOPHILS # BLD AUTO: 0.09 K/UL — SIGNIFICANT CHANGE UP (ref 0–0.2)
BASOPHILS NFR BLD AUTO: 0.7 % — SIGNIFICANT CHANGE UP (ref 0–2)
BILIRUB SERPL-MCNC: 2.6 MG/DL — HIGH (ref 0.2–1.2)
BILIRUB UR-MCNC: NEGATIVE — SIGNIFICANT CHANGE UP
BUN SERPL-MCNC: 16 MG/DL — SIGNIFICANT CHANGE UP (ref 7–23)
CALCIUM SERPL-MCNC: 9.5 MG/DL — SIGNIFICANT CHANGE UP (ref 8.4–10.5)
CHLORIDE SERPL-SCNC: 106 MMOL/L — SIGNIFICANT CHANGE UP (ref 98–107)
CO2 SERPL-SCNC: 26 MMOL/L — SIGNIFICANT CHANGE UP (ref 22–31)
COLOR SPEC: YELLOW — SIGNIFICANT CHANGE UP
CREAT SERPL-MCNC: 1.19 MG/DL — SIGNIFICANT CHANGE UP (ref 0.5–1.3)
DIFF PNL FLD: NEGATIVE — SIGNIFICANT CHANGE UP
EGFR: 67 ML/MIN/1.73M2 — SIGNIFICANT CHANGE UP
EOSINOPHIL # BLD AUTO: 1.19 K/UL — HIGH (ref 0–0.5)
EOSINOPHIL NFR BLD AUTO: 8.9 % — HIGH (ref 0–6)
EPI CELLS # UR: 1 /HPF — SIGNIFICANT CHANGE UP (ref 0–5)
FERRITIN SERPL-MCNC: 87 NG/ML — SIGNIFICANT CHANGE UP (ref 30–400)
GLUCOSE SERPL-MCNC: 105 MG/DL — HIGH (ref 70–99)
GLUCOSE UR QL: NEGATIVE — SIGNIFICANT CHANGE UP
HCT VFR BLD CALC: 19.8 % — CRITICAL LOW (ref 39–50)
HGB BLD-MCNC: 6.5 G/DL — CRITICAL LOW (ref 13–17)
IANC: 9.56 K/UL — HIGH (ref 1.8–7.4)
IMM GRANULOCYTES NFR BLD AUTO: 0.5 % — SIGNIFICANT CHANGE UP (ref 0–1.5)
KETONES UR-MCNC: NEGATIVE — SIGNIFICANT CHANGE UP
LEUKOCYTE ESTERASE UR-ACNC: NEGATIVE — SIGNIFICANT CHANGE UP
LYMPHOCYTES # BLD AUTO: 1.37 K/UL — SIGNIFICANT CHANGE UP (ref 1–3.3)
LYMPHOCYTES # BLD AUTO: 10.2 % — LOW (ref 13–44)
MCHC RBC-ENTMCNC: 28 PG — SIGNIFICANT CHANGE UP (ref 27–34)
MCHC RBC-ENTMCNC: 32.8 GM/DL — SIGNIFICANT CHANGE UP (ref 32–36)
MCV RBC AUTO: 85.3 FL — SIGNIFICANT CHANGE UP (ref 80–100)
MONOCYTES # BLD AUTO: 1.15 K/UL — HIGH (ref 0–0.9)
MONOCYTES NFR BLD AUTO: 8.6 % — SIGNIFICANT CHANGE UP (ref 2–14)
NEUTROPHILS # BLD AUTO: 9.56 K/UL — HIGH (ref 1.8–7.4)
NEUTROPHILS NFR BLD AUTO: 71.1 % — SIGNIFICANT CHANGE UP (ref 43–77)
NITRITE UR-MCNC: NEGATIVE — SIGNIFICANT CHANGE UP
NRBC # BLD: 1 /100 WBCS — SIGNIFICANT CHANGE UP
NRBC # FLD: 0.16 K/UL — HIGH
PH UR: 8 — SIGNIFICANT CHANGE UP (ref 5–8)
PLATELET # BLD AUTO: 267 K/UL — SIGNIFICANT CHANGE UP (ref 150–400)
POTASSIUM SERPL-MCNC: 5.3 MMOL/L — SIGNIFICANT CHANGE UP (ref 3.5–5.3)
POTASSIUM SERPL-SCNC: 5.3 MMOL/L — SIGNIFICANT CHANGE UP (ref 3.5–5.3)
PROT SERPL-MCNC: 7.1 G/DL — SIGNIFICANT CHANGE UP (ref 6–8.3)
PROT UR-MCNC: ABNORMAL
RBC # BLD: 2.32 M/UL — LOW (ref 4.2–5.8)
RBC # BLD: 2.32 M/UL — LOW (ref 4.2–5.8)
RBC # FLD: 21.6 % — HIGH (ref 10.3–14.5)
RBC CASTS # UR COMP ASSIST: 1 /HPF — SIGNIFICANT CHANGE UP (ref 0–4)
RETICS #: 212.7 K/UL — HIGH (ref 25–125)
RETICS/RBC NFR: 9.2 % — HIGH (ref 0.5–2.5)
SODIUM SERPL-SCNC: 142 MMOL/L — SIGNIFICANT CHANGE UP (ref 135–145)
SP GR SPEC: 1.01 — SIGNIFICANT CHANGE UP (ref 1–1.05)
UROBILINOGEN FLD QL: SIGNIFICANT CHANGE UP
WBC # BLD: 13.43 K/UL — HIGH (ref 3.8–10.5)
WBC # FLD AUTO: 13.43 K/UL — HIGH (ref 3.8–10.5)
WBC UR QL: 1 /HPF — SIGNIFICANT CHANGE UP (ref 0–5)

## 2022-04-14 PROCEDURE — 99214 OFFICE O/P EST MOD 30 MIN: CPT

## 2022-04-15 DIAGNOSIS — D57.1 SICKLE-CELL DISEASE WITHOUT CRISIS: ICD-10-CM

## 2022-04-15 DIAGNOSIS — H40.9 UNSPECIFIED GLAUCOMA: ICD-10-CM

## 2022-04-15 LAB
24R-OH-CALCIDIOL SERPL-MCNC: 51.6 NG/ML — SIGNIFICANT CHANGE UP (ref 30–80)
HEMOGLOBIN INTERPRETATION: SIGNIFICANT CHANGE UP
HGB A MFR BLD: 0 % — LOW
HGB A2 MFR BLD: 3.4 % — SIGNIFICANT CHANGE UP (ref 2.4–3.5)
HGB F MFR BLD: 3 % — HIGH (ref 0–1.5)
HGB S MFR BLD: 93.6 % — HIGH

## 2022-04-15 NOTE — HISTORY OF PRESENT ILLNESS
[FreeTextEntry1] : 49 yo male with HGB SS, for f/u [de-identified] : 67 yo male with HGB SS  here for f/u.Says that HU caused him more pain than not- Would like to re- start Endari, and start Oxbryta.\par The patient has overall been well, last hospitalization 10/21. Had guaiac+, refused further w/u.\par Took Hydroxyurea in the past- It made him nauseated. \par UTD with ophtho- Has glaucoma, no SCD retinopathy. Will be going for laser surgery.\par fully covid vaccinated, had booster as well\par \par PE: thin male in nad\par vs-/92- when hospitalized was WNL\par icteric\par Heent- poor dentition\par lungs- cta\par cor:rrr-m\par abd- soft, nt\par ext: -c/c/e\par neuro- affect generally simple, otherwise non-focal\par \par \par A/P- 67 yo male with HGB SS\par 1. DC'd HU-patient is not tolerating it, re-start Endari\par Also, start Oxbryta 1500\par 2. ophtho- continue f/u\par 3. f/u three months\par 4 . routine labs\par 5. [id number] HTN- will follow\par UA for protein today\par \par Lluvia Hamm MD\par \par

## 2022-04-25 ENCOUNTER — NON-APPOINTMENT (OUTPATIENT)
Age: 67
End: 2022-04-25

## 2022-05-19 NOTE — DIETITIAN INITIAL EVALUATION ADULT. - ORAL INTAKE PTA
poor
You can access the FollowMyHealth Patient Portal offered by [facility] by registering at the following website: [email or web address]. By joining [employer]’s FollowMyHealth portal, you will also be able to view your health information using other applications (apps) compatible with our system.
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
## 2022-06-08 ENCOUNTER — NON-APPOINTMENT (OUTPATIENT)
Age: 67
End: 2022-06-08

## 2022-06-16 ENCOUNTER — APPOINTMENT (OUTPATIENT)
Dept: OPHTHALMOLOGY | Facility: CLINIC | Age: 67
End: 2022-06-16
Payer: MEDICARE

## 2022-06-16 ENCOUNTER — NON-APPOINTMENT (OUTPATIENT)
Age: 67
End: 2022-06-16

## 2022-06-16 PROCEDURE — 66761 REVISION OF IRIS: CPT | Mod: RT

## 2022-06-22 ENCOUNTER — NON-APPOINTMENT (OUTPATIENT)
Age: 67
End: 2022-06-22

## 2022-06-22 ENCOUNTER — APPOINTMENT (OUTPATIENT)
Dept: OPHTHALMOLOGY | Facility: CLINIC | Age: 67
End: 2022-06-22

## 2022-06-22 PROCEDURE — 66761 REVISION OF IRIS: CPT | Mod: LT,78

## 2022-07-07 ENCOUNTER — APPOINTMENT (OUTPATIENT)
Dept: OPHTHALMOLOGY | Facility: CLINIC | Age: 67
End: 2022-07-07

## 2022-07-07 ENCOUNTER — INPATIENT (INPATIENT)
Facility: HOSPITAL | Age: 67
LOS: 11 days | Discharge: HOME HEALTH | End: 2022-07-19
Attending: HOSPITALIST | Admitting: HOSPITALIST

## 2022-07-07 VITALS
SYSTOLIC BLOOD PRESSURE: 115 MMHG | RESPIRATION RATE: 18 BRPM | HEIGHT: 70 IN | OXYGEN SATURATION: 100 % | HEART RATE: 74 BPM | TEMPERATURE: 99 F | DIASTOLIC BLOOD PRESSURE: 65 MMHG

## 2022-07-07 DIAGNOSIS — Z29.9 ENCOUNTER FOR PROPHYLACTIC MEASURES, UNSPECIFIED: ICD-10-CM

## 2022-07-07 DIAGNOSIS — I10 ESSENTIAL (PRIMARY) HYPERTENSION: ICD-10-CM

## 2022-07-07 DIAGNOSIS — N17.9 ACUTE KIDNEY FAILURE, UNSPECIFIED: ICD-10-CM

## 2022-07-07 DIAGNOSIS — D57.00 HB-SS DISEASE WITH CRISIS, UNSPECIFIED: ICD-10-CM

## 2022-07-07 DIAGNOSIS — N40.0 BENIGN PROSTATIC HYPERPLASIA WITHOUT LOWER URINARY TRACT SYMPTOMS: ICD-10-CM

## 2022-07-07 LAB
ALBUMIN SERPL ELPH-MCNC: 4.4 G/DL — SIGNIFICANT CHANGE UP (ref 3.3–5)
ALP SERPL-CCNC: 79 U/L — SIGNIFICANT CHANGE UP (ref 40–120)
ALT FLD-CCNC: 14 U/L — SIGNIFICANT CHANGE UP (ref 4–41)
ANION GAP SERPL CALC-SCNC: 13 MMOL/L — SIGNIFICANT CHANGE UP (ref 7–14)
ANION GAP SERPL CALC-SCNC: 8 MMOL/L — SIGNIFICANT CHANGE UP (ref 7–14)
APPEARANCE UR: CLEAR — SIGNIFICANT CHANGE UP
AST SERPL-CCNC: 40 U/L — SIGNIFICANT CHANGE UP (ref 4–40)
B PERT DNA SPEC QL NAA+PROBE: SIGNIFICANT CHANGE UP
B PERT+PARAPERT DNA PNL SPEC NAA+PROBE: SIGNIFICANT CHANGE UP
BACTERIA # UR AUTO: ABNORMAL
BASOPHILS # BLD AUTO: 0.11 K/UL — SIGNIFICANT CHANGE UP (ref 0–0.2)
BASOPHILS NFR BLD AUTO: 0.9 % — SIGNIFICANT CHANGE UP (ref 0–2)
BILIRUB SERPL-MCNC: 3.4 MG/DL — HIGH (ref 0.2–1.2)
BILIRUB UR-MCNC: NEGATIVE — SIGNIFICANT CHANGE UP
BORDETELLA PARAPERTUSSIS (RAPRVP): SIGNIFICANT CHANGE UP
BUN SERPL-MCNC: 25 MG/DL — HIGH (ref 7–23)
BUN SERPL-MCNC: 25 MG/DL — HIGH (ref 7–23)
C PNEUM DNA SPEC QL NAA+PROBE: SIGNIFICANT CHANGE UP
CALCIUM SERPL-MCNC: 8.7 MG/DL — SIGNIFICANT CHANGE UP (ref 8.4–10.5)
CALCIUM SERPL-MCNC: 8.9 MG/DL — SIGNIFICANT CHANGE UP (ref 8.4–10.5)
CHLORIDE SERPL-SCNC: 106 MMOL/L — SIGNIFICANT CHANGE UP (ref 98–107)
CHLORIDE SERPL-SCNC: 110 MMOL/L — HIGH (ref 98–107)
CO2 SERPL-SCNC: 19 MMOL/L — LOW (ref 22–31)
CO2 SERPL-SCNC: 21 MMOL/L — LOW (ref 22–31)
COLOR SPEC: YELLOW — SIGNIFICANT CHANGE UP
CREAT SERPL-MCNC: 1.56 MG/DL — HIGH (ref 0.5–1.3)
CREAT SERPL-MCNC: 1.72 MG/DL — HIGH (ref 0.5–1.3)
DIFF PNL FLD: NEGATIVE — SIGNIFICANT CHANGE UP
EGFR: 42 ML/MIN/1.73M2 — LOW
EGFR: 48 ML/MIN/1.73M2 — LOW
EOSINOPHIL # BLD AUTO: 0.77 K/UL — HIGH (ref 0–0.5)
EOSINOPHIL NFR BLD AUTO: 6.1 % — HIGH (ref 0–6)
EPI CELLS # UR: 1 /HPF — SIGNIFICANT CHANGE UP (ref 0–5)
FLUAV SUBTYP SPEC NAA+PROBE: SIGNIFICANT CHANGE UP
FLUBV RNA SPEC QL NAA+PROBE: SIGNIFICANT CHANGE UP
GLUCOSE SERPL-MCNC: 82 MG/DL — SIGNIFICANT CHANGE UP (ref 70–99)
GLUCOSE SERPL-MCNC: 85 MG/DL — SIGNIFICANT CHANGE UP (ref 70–99)
GLUCOSE UR QL: NEGATIVE — SIGNIFICANT CHANGE UP
HADV DNA SPEC QL NAA+PROBE: SIGNIFICANT CHANGE UP
HCOV 229E RNA SPEC QL NAA+PROBE: SIGNIFICANT CHANGE UP
HCOV HKU1 RNA SPEC QL NAA+PROBE: SIGNIFICANT CHANGE UP
HCOV NL63 RNA SPEC QL NAA+PROBE: SIGNIFICANT CHANGE UP
HCOV OC43 RNA SPEC QL NAA+PROBE: SIGNIFICANT CHANGE UP
HCT VFR BLD CALC: 17.9 % — CRITICAL LOW (ref 39–50)
HGB BLD-MCNC: 6 G/DL — CRITICAL LOW (ref 13–17)
HMPV RNA SPEC QL NAA+PROBE: SIGNIFICANT CHANGE UP
HPIV1 RNA SPEC QL NAA+PROBE: SIGNIFICANT CHANGE UP
HPIV2 RNA SPEC QL NAA+PROBE: SIGNIFICANT CHANGE UP
HPIV3 RNA SPEC QL NAA+PROBE: SIGNIFICANT CHANGE UP
HPIV4 RNA SPEC QL NAA+PROBE: SIGNIFICANT CHANGE UP
HYALINE CASTS # UR AUTO: 2 /LPF — SIGNIFICANT CHANGE UP (ref 0–7)
IANC: 9.13 K/UL — HIGH (ref 1.8–7.4)
IMM GRANULOCYTES NFR BLD AUTO: 0.6 % — SIGNIFICANT CHANGE UP (ref 0–1.5)
KETONES UR-MCNC: NEGATIVE — SIGNIFICANT CHANGE UP
LEUKOCYTE ESTERASE UR-ACNC: ABNORMAL
LIDOCAIN IGE QN: 51 U/L — SIGNIFICANT CHANGE UP (ref 7–60)
LYMPHOCYTES # BLD AUTO: 1.4 K/UL — SIGNIFICANT CHANGE UP (ref 1–3.3)
LYMPHOCYTES # BLD AUTO: 11 % — LOW (ref 13–44)
M PNEUMO DNA SPEC QL NAA+PROBE: SIGNIFICANT CHANGE UP
MCHC RBC-ENTMCNC: 27 PG — SIGNIFICANT CHANGE UP (ref 27–34)
MCHC RBC-ENTMCNC: 33.5 GM/DL — SIGNIFICANT CHANGE UP (ref 32–36)
MCV RBC AUTO: 80.6 FL — SIGNIFICANT CHANGE UP (ref 80–100)
MONOCYTES # BLD AUTO: 1.23 K/UL — HIGH (ref 0–0.9)
MONOCYTES NFR BLD AUTO: 9.7 % — SIGNIFICANT CHANGE UP (ref 2–14)
NEUTROPHILS # BLD AUTO: 9.13 K/UL — HIGH (ref 1.8–7.4)
NEUTROPHILS NFR BLD AUTO: 71.7 % — SIGNIFICANT CHANGE UP (ref 43–77)
NITRITE UR-MCNC: NEGATIVE — SIGNIFICANT CHANGE UP
NRBC # BLD: 0 /100 WBCS — SIGNIFICANT CHANGE UP
NRBC # FLD: 0.12 K/UL — HIGH
PH UR: 6.5 — SIGNIFICANT CHANGE UP (ref 5–8)
PLATELET # BLD AUTO: 311 K/UL — SIGNIFICANT CHANGE UP (ref 150–400)
POTASSIUM SERPL-MCNC: 5.3 MMOL/L — SIGNIFICANT CHANGE UP (ref 3.5–5.3)
POTASSIUM SERPL-MCNC: 5.4 MMOL/L — HIGH (ref 3.5–5.3)
POTASSIUM SERPL-SCNC: 5.3 MMOL/L — SIGNIFICANT CHANGE UP (ref 3.5–5.3)
POTASSIUM SERPL-SCNC: 5.4 MMOL/L — HIGH (ref 3.5–5.3)
PROT SERPL-MCNC: 7.9 G/DL — SIGNIFICANT CHANGE UP (ref 6–8.3)
PROT UR-MCNC: ABNORMAL
RAPID RVP RESULT: SIGNIFICANT CHANGE UP
RBC # BLD: 2.22 M/UL — LOW (ref 4.2–5.8)
RBC # BLD: 2.22 M/UL — LOW (ref 4.2–5.8)
RBC # FLD: 23.8 % — HIGH (ref 10.3–14.5)
RBC CASTS # UR COMP ASSIST: 7 /HPF — HIGH (ref 0–4)
RETICS #: 174.5 K/UL — HIGH (ref 25–125)
RETICS/RBC NFR: 7.9 % — HIGH (ref 0.5–2.5)
RSV RNA SPEC QL NAA+PROBE: SIGNIFICANT CHANGE UP
RV+EV RNA SPEC QL NAA+PROBE: SIGNIFICANT CHANGE UP
SARS-COV-2 RNA SPEC QL NAA+PROBE: SIGNIFICANT CHANGE UP
SODIUM SERPL-SCNC: 138 MMOL/L — SIGNIFICANT CHANGE UP (ref 135–145)
SODIUM SERPL-SCNC: 139 MMOL/L — SIGNIFICANT CHANGE UP (ref 135–145)
SP GR SPEC: 1.01 — SIGNIFICANT CHANGE UP (ref 1–1.05)
UROBILINOGEN FLD QL: SIGNIFICANT CHANGE UP
WBC # BLD: 12.72 K/UL — HIGH (ref 3.8–10.5)
WBC # FLD AUTO: 12.72 K/UL — HIGH (ref 3.8–10.5)
WBC UR QL: 4 /HPF — SIGNIFICANT CHANGE UP (ref 0–5)

## 2022-07-07 PROCEDURE — 99285 EMERGENCY DEPT VISIT HI MDM: CPT | Mod: 25,GC

## 2022-07-07 PROCEDURE — 99223 1ST HOSP IP/OBS HIGH 75: CPT | Mod: FS

## 2022-07-07 PROCEDURE — 93010 ELECTROCARDIOGRAM REPORT: CPT | Mod: GC

## 2022-07-07 PROCEDURE — 71046 X-RAY EXAM CHEST 2 VIEWS: CPT | Mod: 26

## 2022-07-07 RX ORDER — KETOROLAC TROMETHAMINE 30 MG/ML
15 SYRINGE (ML) INJECTION ONCE
Refills: 0 | Status: DISCONTINUED | OUTPATIENT
Start: 2022-07-07 | End: 2022-07-07

## 2022-07-07 RX ORDER — SODIUM CHLORIDE 9 MG/ML
1000 INJECTION, SOLUTION INTRAVENOUS
Refills: 0 | Status: DISCONTINUED | OUTPATIENT
Start: 2022-07-07 | End: 2022-07-19

## 2022-07-07 RX ORDER — ACETAMINOPHEN 500 MG
650 TABLET ORAL EVERY 6 HOURS
Refills: 0 | Status: DISCONTINUED | OUTPATIENT
Start: 2022-07-07 | End: 2022-07-19

## 2022-07-07 RX ORDER — FINASTERIDE 5 MG/1
5 TABLET, FILM COATED ORAL DAILY
Refills: 0 | Status: DISCONTINUED | OUTPATIENT
Start: 2022-07-07 | End: 2022-07-19

## 2022-07-07 RX ORDER — ONDANSETRON 8 MG/1
4 TABLET, FILM COATED ORAL ONCE
Refills: 0 | Status: COMPLETED | OUTPATIENT
Start: 2022-07-07 | End: 2022-07-07

## 2022-07-07 RX ORDER — HYDROMORPHONE HYDROCHLORIDE 2 MG/ML
2 INJECTION INTRAMUSCULAR; INTRAVENOUS; SUBCUTANEOUS ONCE
Refills: 0 | Status: DISCONTINUED | OUTPATIENT
Start: 2022-07-07 | End: 2022-07-07

## 2022-07-07 RX ORDER — HYDROMORPHONE HYDROCHLORIDE 2 MG/ML
30 INJECTION INTRAMUSCULAR; INTRAVENOUS; SUBCUTANEOUS
Refills: 0 | Status: DISCONTINUED | OUTPATIENT
Start: 2022-07-07 | End: 2022-07-13

## 2022-07-07 RX ORDER — METOPROLOL TARTRATE 50 MG
25 TABLET ORAL
Refills: 0 | Status: DISCONTINUED | OUTPATIENT
Start: 2022-07-07 | End: 2022-07-19

## 2022-07-07 RX ORDER — FOLIC ACID 0.8 MG
1 TABLET ORAL DAILY
Refills: 0 | Status: DISCONTINUED | OUTPATIENT
Start: 2022-07-07 | End: 2022-07-19

## 2022-07-07 RX ORDER — ALBUTEROL 90 UG/1
2 AEROSOL, METERED ORAL EVERY 6 HOURS
Refills: 0 | Status: DISCONTINUED | OUTPATIENT
Start: 2022-07-07 | End: 2022-07-19

## 2022-07-07 RX ORDER — SODIUM CHLORIDE 9 MG/ML
1000 INJECTION, SOLUTION INTRAVENOUS ONCE
Refills: 0 | Status: COMPLETED | OUTPATIENT
Start: 2022-07-07 | End: 2022-07-07

## 2022-07-07 RX ORDER — ENOXAPARIN SODIUM 100 MG/ML
40 INJECTION SUBCUTANEOUS EVERY 24 HOURS
Refills: 0 | Status: DISCONTINUED | OUTPATIENT
Start: 2022-07-07 | End: 2022-07-07

## 2022-07-07 RX ORDER — HEPARIN SODIUM 5000 [USP'U]/ML
5000 INJECTION INTRAVENOUS; SUBCUTANEOUS EVERY 12 HOURS
Refills: 0 | Status: DISCONTINUED | OUTPATIENT
Start: 2022-07-07 | End: 2022-07-19

## 2022-07-07 RX ADMIN — HYDROMORPHONE HYDROCHLORIDE 30 MILLILITER(S): 2 INJECTION INTRAMUSCULAR; INTRAVENOUS; SUBCUTANEOUS at 11:59

## 2022-07-07 RX ADMIN — Medication 100 MILLIGRAM(S): at 18:03

## 2022-07-07 RX ADMIN — HYDROMORPHONE HYDROCHLORIDE 2 MILLIGRAM(S): 2 INJECTION INTRAMUSCULAR; INTRAVENOUS; SUBCUTANEOUS at 04:52

## 2022-07-07 RX ADMIN — SODIUM CHLORIDE 1000 MILLILITER(S): 9 INJECTION, SOLUTION INTRAVENOUS at 04:51

## 2022-07-07 RX ADMIN — HYDROMORPHONE HYDROCHLORIDE 30 MILLILITER(S): 2 INJECTION INTRAMUSCULAR; INTRAVENOUS; SUBCUTANEOUS at 20:51

## 2022-07-07 RX ADMIN — HYDROMORPHONE HYDROCHLORIDE 2 MILLIGRAM(S): 2 INJECTION INTRAMUSCULAR; INTRAVENOUS; SUBCUTANEOUS at 06:51

## 2022-07-07 RX ADMIN — Medication 15 MILLIGRAM(S): at 04:53

## 2022-07-07 RX ADMIN — ONDANSETRON 4 MILLIGRAM(S): 8 TABLET, FILM COATED ORAL at 04:52

## 2022-07-07 RX ADMIN — SODIUM CHLORIDE 75 MILLILITER(S): 9 INJECTION, SOLUTION INTRAVENOUS at 11:59

## 2022-07-07 RX ADMIN — FINASTERIDE 5 MILLIGRAM(S): 5 TABLET, FILM COATED ORAL at 18:03

## 2022-07-07 RX ADMIN — Medication 25 MILLIGRAM(S): at 18:03

## 2022-07-07 RX ADMIN — Medication 1 MILLIGRAM(S): at 18:03

## 2022-07-07 NOTE — H&P ADULT - ASSESSMENT
66 yo male PMHx SCD, BPH, and HTN p/w generalized body pain, admitted to medicine for Sickle Cell Crisis.

## 2022-07-07 NOTE — ED PROVIDER NOTE - PHYSICAL EXAMINATION
G: chronically ill appearing male in NAD, cooperative with exam   H: NCAT  E: EOMI   M: Mucous membranes moist   R: CTABL, nWOB  C: RRR  A: Soft, NT/ND, no rebound/guarding   MSK: no calf tenderness, no LE edema

## 2022-07-07 NOTE — ED PROVIDER NOTE - ATTENDING CONTRIBUTION TO CARE
HPI: 67-year-old male with HbSS, HF (EF 50-55%), BPH, mild intellectual disability, presenting from home with generalized body ache. Denies Fever, chills, V/D but has some nausea but has been able to tolerate PO. No sick contacts. Ran out of dilaudid yesterday. Last dose yesterday morning. Pain generalized to entire body 9/10 intensity. Denies chest pain, no history of ACS and AVN per pt report. No changes to meds.  EXAM: NAD, speaking full sentences, heart rRR, lungs ctab, abd soft nontender, moving all 4 ext, no point tenderness to palpation in any joint, skin intact. no pitting edema BLE.   MDM: pt with SCDz that presents with his typical bodyaches/pain from SCDz. Pt has no fever, chills, chest pain, SOB, no history of ACSyndrome or MI, no history of AVN. Likely typical crisis without infectious cause. Possible dehydration. pt is not able to tell what causes his crisis. Will require labs, CXR, IVF and pain meds and reassess. EKG shows no ETHAN/STD.

## 2022-07-07 NOTE — ED PROVIDER NOTE - CLINICAL SUMMARY MEDICAL DECISION MAKING FREE TEXT BOX
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 Concerned re sickle cell crisis. Plan to obtain labs, check lipase, RVP, give analgesics, zofran, reassess

## 2022-07-07 NOTE — H&P ADULT - NSHPLABSRESULTS_GEN_ALL_CORE
EKG: NSR @ 68bpm QTC 412ms  CXR: clear lungs                          6.0    12.72 )-----------( 311      ( 2022 04:15 )             17.9         139  |  110<H>  |  25<H>  ----------------------------<  85  5.3   |  21<L>  |  1.56<H>    Ca    8.7      2022 12:20    TPro  7.9  /  Alb  4.4  /  TBili  3.4<H>  /  DBili  x   /  AST  40  /  ALT  14  /  AlkPhos  79  07-      Urinalysis Basic - ( 2022 14:32 )    Color: Yellow / Appearance: Clear / S.014 / pH: x  Gluc: x / Ketone: Negative  / Bili: Negative / Urobili: <2 mg/dL   Blood: x / Protein: Trace / Nitrite: Negative   Leuk Esterase: Small / RBC: 7 /HPF / WBC 4 /HPF   Sq Epi: x / Non Sq Epi: 1 /HPF / Bacteria: Moderate        LIVER FUNCTIONS - ( 2022 04:15 )  Alb: 4.4 g/dL / Pro: 7.9 g/dL / ALK PHOS: 79 U/L / ALT: 14 U/L / AST: 40 U/L / GGT: x

## 2022-07-07 NOTE — H&P ADULT - PROBLEM SELECTOR PLAN 2
Monitor electrolytes  Trend BUN/Cr  -responded well with IV fluids  -avoid nephrotoxic meds  -encouraged po meds

## 2022-07-07 NOTE — ED ADULT TRIAGE NOTE - CHIEF COMPLAINT QUOTE
sickle cell crisis    pt c/o generalized pain.  pt awake and alert.  pmhx sickle cell. recently started cardiac meds but unsure for what reason.

## 2022-07-07 NOTE — H&P ADULT - HISTORY OF PRESENT ILLNESS
66 yo male PMHx SCD, BPH, and HTN p/w generalized body pain x 2 days. Pt in general poor historian. Pain mostly in thighs and legs, 8/10, similar to the pain has when he is in sickle cell crisis. Pt reports he has no PCP or hematologist, usually goes to [facility] medicine clinic. However, he ran out of all of his meds. He denies fever, chills, chest pain, sob, nausea, vomiting, diarrhea or abdominal pain.   68 yo male PMHx SCD, BPH, and HTN p/w generalized body pain x 2 days. Pt in general poor historian. Pain mostly in thighs and legs, 8/10, similar to the pain has when he is in sickle cell crisis. Pt reports he has no PCP or hematologist, usually goes to [facility] medicine clinic. However, he ran out of all of his meds. He says that he usually has pain medications that he takes but they were no longer helping. He denies fever, chills, chest pain, sob, nausea, vomiting, diarrhea or abdominal pain. He also is reporting a cough that has been occurring for ~1 month. He says it started after he drank moldy orange juice. He denies fever/chills. No sick contacts.

## 2022-07-07 NOTE — ED ADULT TRIAGE NOTE - BANDS:
MRI unremarkable  Lipids elevated--started on Lipitor  Awaiting EEG  Cont ASA and Lipitor  If EEG unremarkable, follow in office with FREDRICK Ludwig MD Name band;

## 2022-07-07 NOTE — H&P ADULT - PROBLEM SELECTOR PLAN 1
H&H: 6.0/17.9  Abs Retic: 174  CXR: clear lungs  -continue with pain management via IV Dilaudid PCA  -IV 1/2NS @ 75cc/hr  -monitor H&H with retic count

## 2022-07-07 NOTE — ED PROVIDER NOTE - OBJECTIVE STATEMENT
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 Denies F/C/NS/N/V/D. No sick contacts. Ran out of dilaudid yesterday. Last dose yesterday morning. Pain generalized to entire body 9/10 intensity. Normal PO intake. No changes to meds.

## 2022-07-07 NOTE — H&P ADULT - NS ATTEND AMEND GEN_ALL_CORE FT
66 yo male PMHx SCD, BPH, and HTN p/w generalized body pain x 2 days admitted for sickle cell pain crisis.     #SCC, pt with worsening pain. Reports generalized body pain that is consistent with his SCC. Hgb 6 (baseline from prior admissions). CXR clear. C/w dilaudid PCA pump. 1/2 NS at 75cc/hr. Folic acid.     #Cough, pt with cough for 1 month. CXR clear. RVP negative. Possibly post-nasal drip cough. Tessalon Perles. Albuterol PRN. Flonase.     Remainder of plan as stated above. Plan discussed with ACP.

## 2022-07-07 NOTE — ED ADULT NURSE REASSESSMENT NOTE - NS ED NURSE REASSESS COMMENT FT1
Report handed off to [facility] RN, pt transported to [facility].

## 2022-07-08 DIAGNOSIS — E87.5 HYPERKALEMIA: ICD-10-CM

## 2022-07-08 LAB
ANION GAP SERPL CALC-SCNC: 10 MMOL/L — SIGNIFICANT CHANGE UP (ref 7–14)
ANION GAP SERPL CALC-SCNC: 9 MMOL/L — SIGNIFICANT CHANGE UP (ref 7–14)
BLD GP AB SCN SERPL QL: POSITIVE — SIGNIFICANT CHANGE UP
BUN SERPL-MCNC: 19 MG/DL — SIGNIFICANT CHANGE UP (ref 7–23)
BUN SERPL-MCNC: 22 MG/DL — SIGNIFICANT CHANGE UP (ref 7–23)
CALCIUM SERPL-MCNC: 8.3 MG/DL — LOW (ref 8.4–10.5)
CALCIUM SERPL-MCNC: 8.5 MG/DL — SIGNIFICANT CHANGE UP (ref 8.4–10.5)
CHLORIDE SERPL-SCNC: 105 MMOL/L — SIGNIFICANT CHANGE UP (ref 98–107)
CHLORIDE SERPL-SCNC: 106 MMOL/L — SIGNIFICANT CHANGE UP (ref 98–107)
CO2 SERPL-SCNC: 19 MMOL/L — LOW (ref 22–31)
CO2 SERPL-SCNC: 21 MMOL/L — LOW (ref 22–31)
CREAT SERPL-MCNC: 1.28 MG/DL — SIGNIFICANT CHANGE UP (ref 0.5–1.3)
CREAT SERPL-MCNC: 1.36 MG/DL — HIGH (ref 0.5–1.3)
EGFR: 56 ML/MIN/1.73M2 — LOW
EGFR: 61 ML/MIN/1.73M2 — SIGNIFICANT CHANGE UP
GLUCOSE SERPL-MCNC: 110 MG/DL — HIGH (ref 70–99)
GLUCOSE SERPL-MCNC: 95 MG/DL — SIGNIFICANT CHANGE UP (ref 70–99)
HCT VFR BLD CALC: 14 % — CRITICAL LOW (ref 39–50)
HCT VFR BLD CALC: 15.2 % — CRITICAL LOW (ref 39–50)
HGB BLD-MCNC: 4.7 G/DL — CRITICAL LOW (ref 13–17)
HGB BLD-MCNC: 5 G/DL — CRITICAL LOW (ref 13–17)
LDH SERPL L TO P-CCNC: 440 U/L — HIGH (ref 135–225)
MAGNESIUM SERPL-MCNC: 2.1 MG/DL — SIGNIFICANT CHANGE UP (ref 1.6–2.6)
MAGNESIUM SERPL-MCNC: 2.1 MG/DL — SIGNIFICANT CHANGE UP (ref 1.6–2.6)
MCHC RBC-ENTMCNC: 26 PG — LOW (ref 27–34)
MCHC RBC-ENTMCNC: 26.5 PG — LOW (ref 27–34)
MCHC RBC-ENTMCNC: 32.9 GM/DL — SIGNIFICANT CHANGE UP (ref 32–36)
MCHC RBC-ENTMCNC: 33.6 GM/DL — SIGNIFICANT CHANGE UP (ref 32–36)
MCV RBC AUTO: 77.3 FL — LOW (ref 80–100)
MCV RBC AUTO: 80.4 FL — SIGNIFICANT CHANGE UP (ref 80–100)
NRBC # BLD: 2 /100 WBCS — SIGNIFICANT CHANGE UP
NRBC # BLD: 3 /100 WBCS — SIGNIFICANT CHANGE UP
NRBC # FLD: 0.24 K/UL — HIGH
NRBC # FLD: 0.44 K/UL — HIGH
PHOSPHATE SERPL-MCNC: 3.4 MG/DL — SIGNIFICANT CHANGE UP (ref 2.5–4.5)
PHOSPHATE SERPL-MCNC: 3.4 MG/DL — SIGNIFICANT CHANGE UP (ref 2.5–4.5)
PLATELET # BLD AUTO: 255 K/UL — SIGNIFICANT CHANGE UP (ref 150–400)
PLATELET # BLD AUTO: 256 K/UL — SIGNIFICANT CHANGE UP (ref 150–400)
POTASSIUM SERPL-MCNC: 5.1 MMOL/L — SIGNIFICANT CHANGE UP (ref 3.5–5.3)
POTASSIUM SERPL-MCNC: 5.5 MMOL/L — HIGH (ref 3.5–5.3)
POTASSIUM SERPL-SCNC: 5.1 MMOL/L — SIGNIFICANT CHANGE UP (ref 3.5–5.3)
POTASSIUM SERPL-SCNC: 5.5 MMOL/L — HIGH (ref 3.5–5.3)
RBC # BLD: 1.81 M/UL — LOW (ref 4.2–5.8)
RBC # BLD: 1.81 M/UL — LOW (ref 4.2–5.8)
RBC # BLD: 1.89 M/UL — LOW (ref 4.2–5.8)
RBC # FLD: 26 % — HIGH (ref 10.3–14.5)
RBC # FLD: 26.5 % — HIGH (ref 10.3–14.5)
RETICS #: 180.8 K/UL — HIGH (ref 25–125)
RETICS/RBC NFR: 10 % — HIGH (ref 0.5–2.5)
RH IG SCN BLD-IMP: POSITIVE — SIGNIFICANT CHANGE UP
SODIUM SERPL-SCNC: 135 MMOL/L — SIGNIFICANT CHANGE UP (ref 135–145)
SODIUM SERPL-SCNC: 135 MMOL/L — SIGNIFICANT CHANGE UP (ref 135–145)
WBC # BLD: 13.7 K/UL — HIGH (ref 3.8–10.5)
WBC # BLD: 13.87 K/UL — HIGH (ref 3.8–10.5)
WBC # FLD AUTO: 13.7 K/UL — HIGH (ref 3.8–10.5)
WBC # FLD AUTO: 13.87 K/UL — HIGH (ref 3.8–10.5)

## 2022-07-08 PROCEDURE — 99233 SBSQ HOSP IP/OBS HIGH 50: CPT

## 2022-07-08 PROCEDURE — 86077 PHYS BLOOD BANK SERV XMATCH: CPT

## 2022-07-08 RX ORDER — SODIUM ZIRCONIUM CYCLOSILICATE 10 G/10G
10 POWDER, FOR SUSPENSION ORAL ONCE
Refills: 0 | Status: COMPLETED | OUTPATIENT
Start: 2022-07-08 | End: 2022-07-08

## 2022-07-08 RX ORDER — SENNA PLUS 8.6 MG/1
2 TABLET ORAL AT BEDTIME
Refills: 0 | Status: DISCONTINUED | OUTPATIENT
Start: 2022-07-08 | End: 2022-07-19

## 2022-07-08 RX ADMIN — SENNA PLUS 2 TABLET(S): 8.6 TABLET ORAL at 21:58

## 2022-07-08 RX ADMIN — HYDROMORPHONE HYDROCHLORIDE 30 MILLILITER(S): 2 INJECTION INTRAMUSCULAR; INTRAVENOUS; SUBCUTANEOUS at 11:52

## 2022-07-08 RX ADMIN — FINASTERIDE 5 MILLIGRAM(S): 5 TABLET, FILM COATED ORAL at 12:10

## 2022-07-08 RX ADMIN — Medication 25 MILLIGRAM(S): at 18:12

## 2022-07-08 RX ADMIN — Medication 25 MILLIGRAM(S): at 05:13

## 2022-07-08 RX ADMIN — Medication 1 MILLIGRAM(S): at 12:11

## 2022-07-08 RX ADMIN — HYDROMORPHONE HYDROCHLORIDE 30 MILLILITER(S): 2 INJECTION INTRAMUSCULAR; INTRAVENOUS; SUBCUTANEOUS at 19:51

## 2022-07-08 RX ADMIN — SODIUM ZIRCONIUM CYCLOSILICATE 10 GRAM(S): 10 POWDER, FOR SUSPENSION ORAL at 13:55

## 2022-07-08 RX ADMIN — HEPARIN SODIUM 5000 UNIT(S): 5000 INJECTION INTRAVENOUS; SUBCUTANEOUS at 05:13

## 2022-07-08 RX ADMIN — Medication 650 MILLIGRAM(S): at 21:58

## 2022-07-08 RX ADMIN — Medication 650 MILLIGRAM(S): at 23:49

## 2022-07-08 RX ADMIN — HEPARIN SODIUM 5000 UNIT(S): 5000 INJECTION INTRAVENOUS; SUBCUTANEOUS at 18:12

## 2022-07-08 RX ADMIN — HYDROMORPHONE HYDROCHLORIDE 30 MILLILITER(S): 2 INJECTION INTRAMUSCULAR; INTRAVENOUS; SUBCUTANEOUS at 09:29

## 2022-07-08 NOTE — PATIENT PROFILE ADULT - FUNCTIONAL ASSESSMENT - BASIC MOBILITY 6.
3-calculated by average/Not able to assess (calculate score using [facility] averaging method)

## 2022-07-08 NOTE — PATIENT PROFILE ADULT - FALL HARM RISK - HARM RISK INTERVENTIONS

## 2022-07-08 NOTE — PROGRESS NOTE ADULT - SUBJECTIVE AND OBJECTIVE BOX
Division of Hospital Medicine  Dr. Emma Garza  Pager [phone]    Patient is a 67y old  Male who presents with a chief complaint of Sickle cell crisis and EMEKA (2022 14:43)      SUBJECTIVE / OVERNIGHT EVENTS: patient seen and examined. Still reports diffuse body pain. No SOB. PCA usage reviewed Patient max use 2.5mg in 4 hrs. Educated to use more PCA. Last BM yesterday    MEDICATIONS  (STANDING):  finasteride 5 milliGRAM(s) Oral daily  folic acid 1 milliGRAM(s) Oral daily  heparin   Injectable 5000 Unit(s) SubCutaneous every 12 hours  HYDROmorphone PCA (1 mG/mL) 30 milliLiter(s) PCA Continuous PCA Continuous  metoprolol tartrate 25 milliGRAM(s) Oral two times a day  sodium chloride 0.45%. 1000 milliLiter(s) (75 mL/Hr) IV Continuous <Continuous>    MEDICATIONS  (PRN):  acetaminophen     Tablet .. 650 milliGRAM(s) Oral every 6 hours PRN Temp greater or equal to 38C (100.4F), Mild Pain (1 - 3), Moderate Pain (4 - 6)  ALBUTerol    90 MICROgram(s) HFA Inhaler 2 Puff(s) Inhalation every 6 hours PRN Shortness of Breath and/or Wheezing  benzonatate 100 milliGRAM(s) Oral every 8 hours PRN Cough        Vital Signs Last 24 Hrs  T(C): 36.4 (2022 14:00), Max: 36.7 (2022 05:10)  T(F): 97.6 (2022 14:00), Max: 98.1 (2022 05:10)  HR: 70 (2022 14:00) (62 - 84)  BP: 112/58 (2022 14:00) (112/58 - 128/70)  BP(mean): --  RR: 16 (2022 14:00) (16 - 18)  SpO2: 97% (2022 14:00) (94% - 100%)    Parameters below as of 2022 14:00  Patient On (Oxygen Delivery Method): nasal cannula,2L  O2 Flow (L/min): 2          I&O's Summary    2022 07:01  -  2022 07:00  --------------------------------------------------------  IN: 0 mL / OUT: 800 mL / NET: -800 mL        PHYSICAL EXAM:  GENERAL: NAD, well-developed  HEAD:  Atraumatic, Normocephalic  EYES: EOMI, PERRLA, conjunctiva and sclera clear  NECK: Supple, No JVD  CHEST/LUNG: Clear to auscultation bilaterally; No wheeze  HEART: Regular rate and rhythm; No murmurs, rubs, or gallops  ABDOMEN: Soft, Nontender, Nondistended; Bowel sounds present  EXTREMITIES:  2+ Peripheral Pulses, No clubbing, cyanosis, or edema  PSYCH: AAOx3  NEUROLOGY: non-focal  SKIN: No rashes or lesions    LABS:                                   4.7    13.70 )-----------( 256      ( 2022 11:02 )             14.0   07-08    135  |  105  |  22  ----------------------------<  110<H>  5.5<H>   |  21<L>  |  1.36<H>    Ca    8.3<L>      2022 11:02  Phos  3.4     07-08  Mg     2.10     07-08    TPro  7.9  /  Alb  4.4  /  TBili  3.4<H>  /  DBili  x   /  AST  40  /  ALT  14  /  AlkPhos  79  07-07        Urinalysis Basic - ( 2022 14:32 )    Color: Yellow / Appearance: Clear / S.014 / pH: x  Gluc: x / Ketone: Negative  / Bili: Negative / Urobili: <2 mg/dL   Blood: x / Protein: Trace / Nitrite: Negative   Leuk Esterase: Small / RBC: 7 /HPF / WBC 4 /HPF   Sq Epi: x / Non Sq Epi: 1 /HPF / Bacteria: Moderate        RADIOLOGY & ADDITIONAL TESTS:    Imaging Personally Reviewed:    Consultant(s) Notes Reviewed:      Care Discussed with Consultants/Other Providers:    Assessment and Plan:

## 2022-07-09 DIAGNOSIS — R50.9 FEVER, UNSPECIFIED: ICD-10-CM

## 2022-07-09 LAB
-  AMIKACIN: SIGNIFICANT CHANGE UP
-  AMOXICILLIN/CLAVULANIC ACID: SIGNIFICANT CHANGE UP
-  AMPICILLIN/SULBACTAM: SIGNIFICANT CHANGE UP
-  AMPICILLIN: SIGNIFICANT CHANGE UP
-  AZTREONAM: SIGNIFICANT CHANGE UP
-  CEFAZOLIN: SIGNIFICANT CHANGE UP
-  CEFEPIME: SIGNIFICANT CHANGE UP
-  CEFOXITIN: SIGNIFICANT CHANGE UP
-  CEFTRIAXONE: SIGNIFICANT CHANGE UP
-  CIPROFLOXACIN: SIGNIFICANT CHANGE UP
-  ERTAPENEM: SIGNIFICANT CHANGE UP
-  GENTAMICIN: SIGNIFICANT CHANGE UP
-  IMIPENEM: SIGNIFICANT CHANGE UP
-  LEVOFLOXACIN: SIGNIFICANT CHANGE UP
-  MEROPENEM: SIGNIFICANT CHANGE UP
-  NITROFURANTOIN: SIGNIFICANT CHANGE UP
-  PIPERACILLIN/TAZOBACTAM: SIGNIFICANT CHANGE UP
-  TIGECYCLINE: SIGNIFICANT CHANGE UP
-  TOBRAMYCIN: SIGNIFICANT CHANGE UP
-  TRIMETHOPRIM/SULFAMETHOXAZOLE: SIGNIFICANT CHANGE UP
ALBUMIN SERPL ELPH-MCNC: 3.7 G/DL — SIGNIFICANT CHANGE UP (ref 3.3–5)
ALP SERPL-CCNC: 96 U/L — SIGNIFICANT CHANGE UP (ref 40–120)
ALT FLD-CCNC: 23 U/L — SIGNIFICANT CHANGE UP (ref 4–41)
ANION GAP SERPL CALC-SCNC: 12 MMOL/L — SIGNIFICANT CHANGE UP (ref 7–14)
ANION GAP SERPL CALC-SCNC: 13 MMOL/L — SIGNIFICANT CHANGE UP (ref 7–14)
APPEARANCE UR: CLEAR — SIGNIFICANT CHANGE UP
AST SERPL-CCNC: 48 U/L — HIGH (ref 4–40)
B PERT DNA SPEC QL NAA+PROBE: SIGNIFICANT CHANGE UP
B PERT+PARAPERT DNA PNL SPEC NAA+PROBE: SIGNIFICANT CHANGE UP
BACTERIA # UR AUTO: NEGATIVE — SIGNIFICANT CHANGE UP
BASOPHILS # BLD AUTO: 0.06 K/UL — SIGNIFICANT CHANGE UP (ref 0–0.2)
BASOPHILS # BLD AUTO: 0.07 K/UL — SIGNIFICANT CHANGE UP (ref 0–0.2)
BASOPHILS NFR BLD AUTO: 0.4 % — SIGNIFICANT CHANGE UP (ref 0–2)
BASOPHILS NFR BLD AUTO: 0.4 % — SIGNIFICANT CHANGE UP (ref 0–2)
BILIRUB SERPL-MCNC: 6.6 MG/DL — HIGH (ref 0.2–1.2)
BILIRUB UR-MCNC: NEGATIVE — SIGNIFICANT CHANGE UP
BORDETELLA PARAPERTUSSIS (RAPRVP): SIGNIFICANT CHANGE UP
BUN SERPL-MCNC: 21 MG/DL — SIGNIFICANT CHANGE UP (ref 7–23)
BUN SERPL-MCNC: 23 MG/DL — SIGNIFICANT CHANGE UP (ref 7–23)
C PNEUM DNA SPEC QL NAA+PROBE: SIGNIFICANT CHANGE UP
CALCIUM SERPL-MCNC: 8.2 MG/DL — LOW (ref 8.4–10.5)
CALCIUM SERPL-MCNC: 8.4 MG/DL — SIGNIFICANT CHANGE UP (ref 8.4–10.5)
CHLORIDE SERPL-SCNC: 105 MMOL/L — SIGNIFICANT CHANGE UP (ref 98–107)
CHLORIDE SERPL-SCNC: 106 MMOL/L — SIGNIFICANT CHANGE UP (ref 98–107)
CO2 SERPL-SCNC: 18 MMOL/L — LOW (ref 22–31)
CO2 SERPL-SCNC: 19 MMOL/L — LOW (ref 22–31)
COLOR SPEC: YELLOW — SIGNIFICANT CHANGE UP
CREAT SERPL-MCNC: 1.35 MG/DL — HIGH (ref 0.5–1.3)
CREAT SERPL-MCNC: 1.36 MG/DL — HIGH (ref 0.5–1.3)
CULTURE RESULTS: SIGNIFICANT CHANGE UP
DIFF PNL FLD: NEGATIVE — SIGNIFICANT CHANGE UP
EGFR: 56 ML/MIN/1.73M2 — LOW
EGFR: 57 ML/MIN/1.73M2 — LOW
EOSINOPHIL # BLD AUTO: 0.13 K/UL — SIGNIFICANT CHANGE UP (ref 0–0.5)
EOSINOPHIL # BLD AUTO: 0.33 K/UL — SIGNIFICANT CHANGE UP (ref 0–0.5)
EOSINOPHIL NFR BLD AUTO: 0.8 % — SIGNIFICANT CHANGE UP (ref 0–6)
EOSINOPHIL NFR BLD AUTO: 2 % — SIGNIFICANT CHANGE UP (ref 0–6)
EPI CELLS # UR: 1 /HPF — SIGNIFICANT CHANGE UP (ref 0–5)
FLUAV SUBTYP SPEC NAA+PROBE: SIGNIFICANT CHANGE UP
FLUBV RNA SPEC QL NAA+PROBE: SIGNIFICANT CHANGE UP
GLUCOSE SERPL-MCNC: 129 MG/DL — HIGH (ref 70–99)
GLUCOSE SERPL-MCNC: 88 MG/DL — SIGNIFICANT CHANGE UP (ref 70–99)
GLUCOSE UR QL: NEGATIVE — SIGNIFICANT CHANGE UP
HADV DNA SPEC QL NAA+PROBE: SIGNIFICANT CHANGE UP
HAPTOGLOB SERPL-MCNC: <20 MG/DL — LOW (ref 34–200)
HCOV 229E RNA SPEC QL NAA+PROBE: SIGNIFICANT CHANGE UP
HCOV HKU1 RNA SPEC QL NAA+PROBE: SIGNIFICANT CHANGE UP
HCOV NL63 RNA SPEC QL NAA+PROBE: SIGNIFICANT CHANGE UP
HCOV OC43 RNA SPEC QL NAA+PROBE: SIGNIFICANT CHANGE UP
HCT VFR BLD CALC: 14.5 % — CRITICAL LOW (ref 39–50)
HCT VFR BLD CALC: 15.3 % — CRITICAL LOW (ref 39–50)
HGB BLD-MCNC: 5 G/DL — CRITICAL LOW (ref 13–17)
HGB BLD-MCNC: 5.2 G/DL — CRITICAL LOW (ref 13–17)
HMPV RNA SPEC QL NAA+PROBE: SIGNIFICANT CHANGE UP
HPIV1 RNA SPEC QL NAA+PROBE: SIGNIFICANT CHANGE UP
HPIV2 RNA SPEC QL NAA+PROBE: SIGNIFICANT CHANGE UP
HPIV3 RNA SPEC QL NAA+PROBE: SIGNIFICANT CHANGE UP
HPIV4 RNA SPEC QL NAA+PROBE: SIGNIFICANT CHANGE UP
HYALINE CASTS # UR AUTO: 0 /LPF — SIGNIFICANT CHANGE UP (ref 0–7)
IANC: 12.36 K/UL — HIGH (ref 1.8–7.4)
IANC: 13.32 K/UL — HIGH (ref 1.8–7.4)
IMM GRANULOCYTES NFR BLD AUTO: 1.6 % — HIGH (ref 0–1.5)
IMM GRANULOCYTES NFR BLD AUTO: 2 % — HIGH (ref 0–1.5)
KETONES UR-MCNC: NEGATIVE — SIGNIFICANT CHANGE UP
LDH SERPL L TO P-CCNC: 388 U/L — HIGH (ref 135–225)
LDH SERPL L TO P-CCNC: 432 U/L — HIGH (ref 135–225)
LEUKOCYTE ESTERASE UR-ACNC: NEGATIVE — SIGNIFICANT CHANGE UP
LYMPHOCYTES # BLD AUTO: 1.05 K/UL — SIGNIFICANT CHANGE UP (ref 1–3.3)
LYMPHOCYTES # BLD AUTO: 1.23 K/UL — SIGNIFICANT CHANGE UP (ref 1–3.3)
LYMPHOCYTES # BLD AUTO: 6.8 % — LOW (ref 13–44)
LYMPHOCYTES # BLD AUTO: 7.4 % — LOW (ref 13–44)
M PNEUMO DNA SPEC QL NAA+PROBE: SIGNIFICANT CHANGE UP
MAGNESIUM SERPL-MCNC: 2.1 MG/DL — SIGNIFICANT CHANGE UP (ref 1.6–2.6)
MCHC RBC-ENTMCNC: 26.9 PG — LOW (ref 27–34)
MCHC RBC-ENTMCNC: 27.4 PG — SIGNIFICANT CHANGE UP (ref 27–34)
MCHC RBC-ENTMCNC: 34 GM/DL — SIGNIFICANT CHANGE UP (ref 32–36)
MCHC RBC-ENTMCNC: 34.5 GM/DL — SIGNIFICANT CHANGE UP (ref 32–36)
MCV RBC AUTO: 78 FL — LOW (ref 80–100)
MCV RBC AUTO: 80.5 FL — SIGNIFICANT CHANGE UP (ref 80–100)
METHOD TYPE: SIGNIFICANT CHANGE UP
MONOCYTES # BLD AUTO: 1.34 K/UL — HIGH (ref 0–0.9)
MONOCYTES # BLD AUTO: 1.5 K/UL — HIGH (ref 0–0.9)
MONOCYTES NFR BLD AUTO: 8.1 % — SIGNIFICANT CHANGE UP (ref 2–14)
MONOCYTES NFR BLD AUTO: 9.8 % — SIGNIFICANT CHANGE UP (ref 2–14)
NEUTROPHILS # BLD AUTO: 12.36 K/UL — HIGH (ref 1.8–7.4)
NEUTROPHILS # BLD AUTO: 13.32 K/UL — HIGH (ref 1.8–7.4)
NEUTROPHILS NFR BLD AUTO: 80.1 % — HIGH (ref 43–77)
NEUTROPHILS NFR BLD AUTO: 80.6 % — HIGH (ref 43–77)
NITRITE UR-MCNC: NEGATIVE — SIGNIFICANT CHANGE UP
NRBC # BLD: 9 /100 WBCS — SIGNIFICANT CHANGE UP
NRBC # BLD: 9 /100 WBCS — SIGNIFICANT CHANGE UP
NRBC # FLD: 1.35 K/UL — HIGH
NRBC # FLD: 1.43 K/UL — HIGH
ORGANISM # SPEC MICROSCOPIC CNT: SIGNIFICANT CHANGE UP
ORGANISM # SPEC MICROSCOPIC CNT: SIGNIFICANT CHANGE UP
PH UR: 6 — SIGNIFICANT CHANGE UP (ref 5–8)
PHOSPHATE SERPL-MCNC: 3.9 MG/DL — SIGNIFICANT CHANGE UP (ref 2.5–4.5)
PLATELET # BLD AUTO: 244 K/UL — SIGNIFICANT CHANGE UP (ref 150–400)
PLATELET # BLD AUTO: 263 K/UL — SIGNIFICANT CHANGE UP (ref 150–400)
POTASSIUM SERPL-MCNC: 4.8 MMOL/L — SIGNIFICANT CHANGE UP (ref 3.5–5.3)
POTASSIUM SERPL-MCNC: 5.1 MMOL/L — SIGNIFICANT CHANGE UP (ref 3.5–5.3)
POTASSIUM SERPL-SCNC: 4.8 MMOL/L — SIGNIFICANT CHANGE UP (ref 3.5–5.3)
POTASSIUM SERPL-SCNC: 5.1 MMOL/L — SIGNIFICANT CHANGE UP (ref 3.5–5.3)
PROT SERPL-MCNC: 6.9 G/DL — SIGNIFICANT CHANGE UP (ref 6–8.3)
PROT UR-MCNC: ABNORMAL
RAPID RVP RESULT: SIGNIFICANT CHANGE UP
RBC # BLD: 1.86 M/UL — LOW (ref 4.2–5.8)
RBC # BLD: 1.86 M/UL — LOW (ref 4.2–5.8)
RBC # BLD: 1.9 M/UL — LOW (ref 4.2–5.8)
RBC # BLD: 1.9 M/UL — LOW (ref 4.2–5.8)
RBC # FLD: 27 % — HIGH (ref 10.3–14.5)
RBC # FLD: 27.5 % — HIGH (ref 10.3–14.5)
RBC CASTS # UR COMP ASSIST: 0 /HPF — SIGNIFICANT CHANGE UP (ref 0–4)
RETICS #: 276.5 K/UL — HIGH (ref 25–125)
RETICS #: 300.2 K/UL — HIGH (ref 25–125)
RETICS/RBC NFR: 14.6 % — HIGH (ref 0.5–2.5)
RETICS/RBC NFR: 16.1 % — HIGH (ref 0.5–2.5)
RSV RNA SPEC QL NAA+PROBE: SIGNIFICANT CHANGE UP
RV+EV RNA SPEC QL NAA+PROBE: SIGNIFICANT CHANGE UP
SARS-COV-2 RNA SPEC QL NAA+PROBE: SIGNIFICANT CHANGE UP
SODIUM SERPL-SCNC: 136 MMOL/L — SIGNIFICANT CHANGE UP (ref 135–145)
SODIUM SERPL-SCNC: 137 MMOL/L — SIGNIFICANT CHANGE UP (ref 135–145)
SP GR SPEC: 1.01 — SIGNIFICANT CHANGE UP (ref 1–1.05)
SPECIMEN SOURCE: SIGNIFICANT CHANGE UP
UROBILINOGEN FLD QL: SIGNIFICANT CHANGE UP
WBC # BLD: 15.35 K/UL — HIGH (ref 3.8–10.5)
WBC # BLD: 16.62 K/UL — HIGH (ref 3.8–10.5)
WBC # FLD AUTO: 15.35 K/UL — HIGH (ref 3.8–10.5)
WBC # FLD AUTO: 16.62 K/UL — HIGH (ref 3.8–10.5)
WBC UR QL: 1 /HPF — SIGNIFICANT CHANGE UP (ref 0–5)

## 2022-07-09 PROCEDURE — 86079 PHYS BLOOD BANK SERV AUTHRJ: CPT

## 2022-07-09 PROCEDURE — 71045 X-RAY EXAM CHEST 1 VIEW: CPT | Mod: 26

## 2022-07-09 PROCEDURE — 99233 SBSQ HOSP IP/OBS HIGH 50: CPT

## 2022-07-09 PROCEDURE — 86078 PHYS BLOOD BANK SERV REACTJ: CPT

## 2022-07-09 RX ADMIN — HEPARIN SODIUM 5000 UNIT(S): 5000 INJECTION INTRAVENOUS; SUBCUTANEOUS at 17:43

## 2022-07-09 RX ADMIN — SODIUM CHLORIDE 75 MILLILITER(S): 9 INJECTION, SOLUTION INTRAVENOUS at 02:41

## 2022-07-09 RX ADMIN — Medication 25 MILLIGRAM(S): at 06:26

## 2022-07-09 RX ADMIN — FINASTERIDE 5 MILLIGRAM(S): 5 TABLET, FILM COATED ORAL at 11:40

## 2022-07-09 RX ADMIN — Medication 1 MILLIGRAM(S): at 11:40

## 2022-07-09 RX ADMIN — Medication 25 MILLIGRAM(S): at 17:43

## 2022-07-09 RX ADMIN — HYDROMORPHONE HYDROCHLORIDE 30 MILLILITER(S): 2 INJECTION INTRAMUSCULAR; INTRAVENOUS; SUBCUTANEOUS at 21:52

## 2022-07-09 RX ADMIN — SENNA PLUS 2 TABLET(S): 8.6 TABLET ORAL at 23:43

## 2022-07-09 RX ADMIN — HEPARIN SODIUM 5000 UNIT(S): 5000 INJECTION INTRAVENOUS; SUBCUTANEOUS at 06:26

## 2022-07-09 RX ADMIN — HYDROMORPHONE HYDROCHLORIDE 30 MILLILITER(S): 2 INJECTION INTRAMUSCULAR; INTRAVENOUS; SUBCUTANEOUS at 07:49

## 2022-07-09 RX ADMIN — SODIUM CHLORIDE 75 MILLILITER(S): 9 INJECTION, SOLUTION INTRAVENOUS at 07:55

## 2022-07-09 RX ADMIN — HYDROMORPHONE HYDROCHLORIDE 30 MILLILITER(S): 2 INJECTION INTRAMUSCULAR; INTRAVENOUS; SUBCUTANEOUS at 19:12

## 2022-07-09 NOTE — PROGRESS NOTE ADULT - SUBJECTIVE AND OBJECTIVE BOX
Division of Hospital Medicine  Dr. Emma Garza  Pager [phone]    Patient is a 67y old  Male who presents with a chief complaint of Sickle cell crisis and EMEKA (2022 14:43)      SUBJECTIVE / OVERNIGHT EVENTS: patient seen and examined. Febrile overnight. Reports he is feeling better today. Says he has had a mild cough since last few weeks. Denies any SOB. Pain better controlled today. No chest pain, abdominal pain, diarrhea     MEDICATIONS  (STANDING):  finasteride 5 milliGRAM(s) Oral daily  folic acid 1 milliGRAM(s) Oral daily  heparin   Injectable 5000 Unit(s) SubCutaneous every 12 hours  HYDROmorphone PCA (1 mG/mL) 30 milliLiter(s) PCA Continuous PCA Continuous  metoprolol tartrate 25 milliGRAM(s) Oral two times a day  sodium chloride 0.45%. 1000 milliLiter(s) (75 mL/Hr) IV Continuous <Continuous>    MEDICATIONS  (PRN):  acetaminophen     Tablet .. 650 milliGRAM(s) Oral every 6 hours PRN Temp greater or equal to 38C (100.4F), Mild Pain (1 - 3), Moderate Pain (4 - 6)  ALBUTerol    90 MICROgram(s) HFA Inhaler 2 Puff(s) Inhalation every 6 hours PRN Shortness of Breath and/or Wheezing  benzonatate 100 milliGRAM(s) Oral every 8 hours PRN Cough      Vital Signs Last 24 Hrs  T(C): 37.2 (2022 11:57), Max: 38.1 (2022 21:53)  T(F): 99 (2022 11:57), Max: 100.6 (2022 21:53)  HR: 84 (2022 11:20) (70 - 95)  BP: 131/54 (2022 11:20) (110/64 - 139/88)  BP(mean): --  RR: 18 (2022 11:20) (16 - 18)  SpO2: 90% (2022 11:50) (90% - 100%)    Parameters below as of 2022 11:50  Patient On (Oxygen Delivery Method): room air          Parameters below as of 2022 14:00  Patient On (Oxygen Delivery Method): nasal cannula,2L  O2 Flow (L/min): 2      I&O's Summary    2022 07:01  -  2022 12:39  --------------------------------------------------------  IN: 0 mL / OUT: 200 mL / NET: -200 mL        PHYSICAL EXAM:  GENERAL: NAD, well-developed  HEAD:  Atraumatic, Normocephalic  EYES: EOMI, PERRLA, conjunctiva and sclera clear  NECK: Supple, No JVD  CHEST/LUNG: Clear to auscultation bilaterally; No wheeze  HEART: Regular rate and rhythm; No murmurs, rubs, or gallops  ABDOMEN: Soft, Nontender, Nondistended; Bowel sounds present  EXTREMITIES:  2+ Peripheral Pulses, No clubbing, cyanosis, or edema  PSYCH: AAOx3  NEUROLOGY: non-focal  SKIN: No rashes or lesions    LABS:                                   5.2    16.62 )-----------( 263      ( 2022 07:35 )             15.3   07-09    137  |  106  |  21  ----------------------------<  88  5.1   |  19<L>  |  1.35<H>    Ca    8.4      2022 07:35  Phos  3.9     07-09  Mg     2.10     07-09          Urinalysis Basic - ( 2022 14:32 )    Color: Yellow / Appearance: Clear / S.014 / pH: x  Gluc: x / Ketone: Negative  / Bili: Negative / Urobili: <2 mg/dL   Blood: x / Protein: Trace / Nitrite: Negative   Leuk Esterase: Small / RBC: 7 /HPF / WBC 4 /HPF   Sq Epi: x / Non Sq Epi: 1 /HPF / Bacteria: Moderate        RADIOLOGY & ADDITIONAL TESTS:    Imaging Personally Reviewed:    Consultant(s) Notes Reviewed:      Care Discussed with Consultants/Other Providers:    Assessment and Plan:

## 2022-07-09 NOTE — PROVIDER CONTACT NOTE (OTHER) - ASSESSMENT
after many attempts, unable to draw blood cx and type and screen after many attempts, unable to draw blood cx and type and screen, only vein available is blown

## 2022-07-10 LAB
ANION GAP SERPL CALC-SCNC: 11 MMOL/L — SIGNIFICANT CHANGE UP (ref 7–14)
ANISOCYTOSIS BLD QL: SIGNIFICANT CHANGE UP
BASOPHILS # BLD AUTO: 0.11 K/UL — SIGNIFICANT CHANGE UP (ref 0–0.2)
BASOPHILS NFR BLD AUTO: 0.9 % — SIGNIFICANT CHANGE UP (ref 0–2)
BLD GP AB SCN SERPL QL: POSITIVE — SIGNIFICANT CHANGE UP
BUN SERPL-MCNC: 24 MG/DL — HIGH (ref 7–23)
CALCIUM SERPL-MCNC: 8.4 MG/DL — SIGNIFICANT CHANGE UP (ref 8.4–10.5)
CHLORIDE SERPL-SCNC: 107 MMOL/L — SIGNIFICANT CHANGE UP (ref 98–107)
CO2 SERPL-SCNC: 18 MMOL/L — LOW (ref 22–31)
CREAT SERPL-MCNC: 1.23 MG/DL — SIGNIFICANT CHANGE UP (ref 0.5–1.3)
DACRYOCYTES BLD QL SMEAR: SLIGHT — SIGNIFICANT CHANGE UP
EGFR: 64 ML/MIN/1.73M2 — SIGNIFICANT CHANGE UP
EOSINOPHIL # BLD AUTO: 0.11 K/UL — SIGNIFICANT CHANGE UP (ref 0–0.5)
EOSINOPHIL NFR BLD AUTO: 0.9 % — SIGNIFICANT CHANGE UP (ref 0–6)
GIANT PLATELETS BLD QL SMEAR: PRESENT — SIGNIFICANT CHANGE UP
GLUCOSE SERPL-MCNC: 80 MG/DL — SIGNIFICANT CHANGE UP (ref 70–99)
HCT VFR BLD CALC: 12.7 % — CRITICAL LOW (ref 39–50)
HGB BLD-MCNC: 4.4 G/DL — CRITICAL LOW (ref 13–17)
HYPOCHROMIA BLD QL: SLIGHT — SIGNIFICANT CHANGE UP
IANC: 9.62 K/UL — HIGH (ref 1.8–7.4)
LDH SERPL L TO P-CCNC: 396 U/L — HIGH (ref 135–225)
LYMPHOCYTES # BLD AUTO: 1.19 K/UL — SIGNIFICANT CHANGE UP (ref 1–3.3)
LYMPHOCYTES # BLD AUTO: 9.9 % — LOW (ref 13–44)
MAGNESIUM SERPL-MCNC: 2.1 MG/DL — SIGNIFICANT CHANGE UP (ref 1.6–2.6)
MANUAL SMEAR VERIFICATION: SIGNIFICANT CHANGE UP
MCHC RBC-ENTMCNC: 27 PG — SIGNIFICANT CHANGE UP (ref 27–34)
MCHC RBC-ENTMCNC: 34.6 GM/DL — SIGNIFICANT CHANGE UP (ref 32–36)
MCV RBC AUTO: 77.9 FL — LOW (ref 80–100)
MICROCYTES BLD QL: SLIGHT — SIGNIFICANT CHANGE UP
MONOCYTES # BLD AUTO: 0.54 K/UL — SIGNIFICANT CHANGE UP (ref 0–0.9)
MONOCYTES NFR BLD AUTO: 4.5 % — SIGNIFICANT CHANGE UP (ref 2–14)
NEUTROPHILS # BLD AUTO: 9.68 K/UL — HIGH (ref 1.8–7.4)
NEUTROPHILS NFR BLD AUTO: 80.2 % — HIGH (ref 43–77)
NRBC # BLD: 23 /100 — HIGH (ref 0–0)
OVALOCYTES BLD QL SMEAR: SLIGHT — SIGNIFICANT CHANGE UP
PHOSPHATE SERPL-MCNC: 2.8 MG/DL — SIGNIFICANT CHANGE UP (ref 2.5–4.5)
PLAT MORPH BLD: NORMAL — SIGNIFICANT CHANGE UP
PLATELET # BLD AUTO: 205 K/UL — SIGNIFICANT CHANGE UP (ref 150–400)
PLATELET COUNT - ESTIMATE: NORMAL — SIGNIFICANT CHANGE UP
POIKILOCYTOSIS BLD QL AUTO: SIGNIFICANT CHANGE UP
POLYCHROMASIA BLD QL SMEAR: SIGNIFICANT CHANGE UP
POTASSIUM SERPL-MCNC: 4.5 MMOL/L — SIGNIFICANT CHANGE UP (ref 3.5–5.3)
POTASSIUM SERPL-SCNC: 4.5 MMOL/L — SIGNIFICANT CHANGE UP (ref 3.5–5.3)
RBC # BLD: 1.63 M/UL — LOW (ref 4.2–5.8)
RBC # BLD: 1.63 M/UL — LOW (ref 4.2–5.8)
RBC # FLD: 28 % — HIGH (ref 10.3–14.5)
RBC BLD AUTO: ABNORMAL
RETICS #: 248.2 K/UL — HIGH (ref 25–125)
RETICS/RBC NFR: 15.2 % — HIGH (ref 0.5–2.5)
RH IG SCN BLD-IMP: POSITIVE — SIGNIFICANT CHANGE UP
SICKLE CELLS BLD QL SMEAR: SIGNIFICANT CHANGE UP
SODIUM SERPL-SCNC: 136 MMOL/L — SIGNIFICANT CHANGE UP (ref 135–145)
TARGETS BLD QL SMEAR: SLIGHT — SIGNIFICANT CHANGE UP
VARIANT LYMPHS # BLD: 3.6 % — SIGNIFICANT CHANGE UP (ref 0–6)
WBC # BLD: 12.07 K/UL — HIGH (ref 3.8–10.5)
WBC # FLD AUTO: 12.07 K/UL — HIGH (ref 3.8–10.5)

## 2022-07-10 PROCEDURE — 99233 SBSQ HOSP IP/OBS HIGH 50: CPT

## 2022-07-10 RX ORDER — LANOLIN ALCOHOL/MO/W.PET/CERES
3 CREAM (GRAM) TOPICAL AT BEDTIME
Refills: 0 | Status: DISCONTINUED | OUTPATIENT
Start: 2022-07-10 | End: 2022-07-19

## 2022-07-10 RX ADMIN — HYDROMORPHONE HYDROCHLORIDE 30 MILLILITER(S): 2 INJECTION INTRAMUSCULAR; INTRAVENOUS; SUBCUTANEOUS at 02:37

## 2022-07-10 RX ADMIN — Medication 3 MILLIGRAM(S): at 23:48

## 2022-07-10 RX ADMIN — SODIUM CHLORIDE 75 MILLILITER(S): 9 INJECTION, SOLUTION INTRAVENOUS at 20:11

## 2022-07-10 RX ADMIN — Medication 25 MILLIGRAM(S): at 17:15

## 2022-07-10 RX ADMIN — HYDROMORPHONE HYDROCHLORIDE 30 MILLILITER(S): 2 INJECTION INTRAMUSCULAR; INTRAVENOUS; SUBCUTANEOUS at 20:09

## 2022-07-10 RX ADMIN — FINASTERIDE 5 MILLIGRAM(S): 5 TABLET, FILM COATED ORAL at 17:15

## 2022-07-10 RX ADMIN — Medication 1 MILLIGRAM(S): at 17:15

## 2022-07-10 RX ADMIN — SODIUM CHLORIDE 75 MILLILITER(S): 9 INJECTION, SOLUTION INTRAVENOUS at 08:52

## 2022-07-10 RX ADMIN — HYDROMORPHONE HYDROCHLORIDE 30 MILLILITER(S): 2 INJECTION INTRAMUSCULAR; INTRAVENOUS; SUBCUTANEOUS at 08:31

## 2022-07-10 RX ADMIN — HEPARIN SODIUM 5000 UNIT(S): 5000 INJECTION INTRAVENOUS; SUBCUTANEOUS at 06:47

## 2022-07-10 RX ADMIN — Medication 25 MILLIGRAM(S): at 06:47

## 2022-07-10 RX ADMIN — HEPARIN SODIUM 5000 UNIT(S): 5000 INJECTION INTRAVENOUS; SUBCUTANEOUS at 17:15

## 2022-07-10 NOTE — CONSULT NOTE ADULT - ASSESSMENT
66 yo M with a PMH of sickle cell (HbSS) disease, BPH, and HTN who p/w generalized body pain x 2 days likely due to sickle cell crisis. Hematology consulted due to fevers, increased oxygen requirements, and transfusion complications including possible reaction.    #Sickle Cell Disease/Transfusion Complication/Fevers  - Patient has HbSS disease, has never followed up with a Hematologist  - Presented with symptoms consistent with a pain crisis  - Hospital course c/b fevers and worsening O2 status  - Pain crisis being managed by Hospitalist, on PCA pump  - Agree with CTA to r/o PE, particularly as CXR and exam show clear lungs  - F/u infectious workup including results from blood and urine cxs. RVP negative  - Attempted transfusion on 7/9, but stopped after ~ 40 min (after 60 ml transfusion) and was found to have antibodies  - Discussed with Blood Bank, awaiting post-reaction type and screen to compare antibodies from pre-transfusion to determine if patient had true transfusion reaction. Please obtain repeat type and screen  - Last Hb electrophoresis showed HbS 93.6% in April 2022. Please obtain repeat Hemoglobin Electrophoresis  - F/u with Blood Bank, awaiting matched unit for repeat transfusion (also need repeat type and screen as noted above)  - Discussed with patient that he should f/u with a Hematologist outpatient. Upon discharge will make a referral to f/u at [facility] (likely Dr. Hilario Cortes)      Aryles Hedjar, MD, PGY-5  Hematology/Oncology Fellow  [facility]  Pager: [phone]  After 5PM and on weekends and holidays, please call the inpatient fellow on call. 66 yo M with a PMH of sickle cell (HbSS) disease, BPH, and HTN who p/w generalized body pain x 2 days likely due to sickle cell crisis. Hematology consulted due to fevers, increased oxygen requirements, and transfusion complications including possible reaction.    #Sickle Cell Disease/Transfusion Complication/Fevers  - Patient has HbSS disease, has never followed up with a Hematologist  - Presented with symptoms consistent with a pain crisis  - Hospital course c/b fevers and worsening O2 status  - Pain crisis being managed by Hospitalist, on PCA pump  - Unlikely ACS given CXR/exam show clear lung. However, agree with CTA to r/o PE  - F/u infectious workup including results from blood and urine cxs. RVP negative  - Attempted transfusion on 7/9, but stopped after ~ 40 min (after 60 ml transfusion) and was found to have antibodies  - Discussed with Blood Bank, awaiting post-reaction type and screen to compare antibodies from pre-transfusion to determine if patient had true transfusion reaction. Please obtain repeat type and screen  - Last Hb electrophoresis showed HbS 93.6% in April 2022. Please obtain repeat Hemoglobin Electrophoresis  - Would recommend 1U simple transfusion. However, first need to f/u with Blood Bank; they are awaiting matched unit for repeat transfusion (also need repeat type and screen as noted above)  - Discussed with patient that he should f/u with a Hematologist outpatient, which may decrease his hospital admissions. Upon discharge will make a referral to f/u at [facility] (likely Dr. Hilario Cortes)      Aryles Hedjar, MD, PGY-5  Hematology/Oncology Fellow  [facility]  Pager: [phone]  After 5PM and on weekends and holidays, please call the inpatient fellow on call. 68 yo M with a PMH of sickle cell (HbSS) disease, BPH, and HTN who p/w generalized body pain x 2 days likely due to sickle cell crisis. Hematology consulted due to fevers, increased oxygen requirements, and transfusion complications including possible reaction.    #Sickle Cell Disease/Transfusion Complication/Fevers  - Patient has HbSS disease, has never followed up with a Hematologist  - Presented with symptoms consistent with a pain crisis  - Hospital course c/b fevers and worsening O2 status  - Pain crisis being managed by Hospitalist, on PCA pump  - Unlikely ACS given CXR/exam show clear lung. However, agree with CTA to r/o PE  - F/u infectious workup including results from blood and urine cxs. RVP negative  - Attempted transfusion on 7/9, but stopped after ~ 40 min (after 60 ml transfusion) and was found to have antibodies  - Discussed with Blood Bank, awaiting post-reaction type and screen to compare antibodies from pre-transfusion to determine if patient had true transfusion reaction. Patient may not necessarily have had one, since he had fever the day prior to transfusion. Please obtain repeat type and screen  - Last Hb electrophoresis showed HbS 93.6% in April 2022. Please obtain repeat Hemoglobin Electrophoresis  - Would recommend 1U simple transfusion. However, first need to f/u with Blood Bank; they are awaiting matched unit for repeat transfusion (also need repeat type and screen as noted above)  - Discussed with patient that he should f/u with a Hematologist outpatient, which may decrease his hospital admissions. Upon discharge will make a referral to f/u at [facility] (likely Dr. Hilario Cortes)      Aryles Hedjar, MD, PGY-5  Hematology/Oncology Fellow  [facility]  Pager: [phone]  After 5PM and on weekends and holidays, please call the inpatient fellow on call.

## 2022-07-10 NOTE — PROGRESS NOTE ADULT - SUBJECTIVE AND OBJECTIVE BOX
Division of Hospital Medicine  Dr. Emma Garza  Pager [phone]    Patient is a 67y old  Male who presents with a chief complaint of Sickle cell crisis and EMEKA (07 Jul 2022 14:43)    SUBJECTIVE / OVERNIGHT EVENTS: patient seen and examined. Agitated this morning. During my evaluation - less engaged and wants to sleep. Febrile overnight to 100.2. On RA hypoxic to 90 but improves with 2L NC to 99%     MEDICATIONS  (STANDING):  finasteride 5 milliGRAM(s) Oral daily  folic acid 1 milliGRAM(s) Oral daily  heparin   Injectable 5000 Unit(s) SubCutaneous every 12 hours  HYDROmorphone PCA (1 mG/mL) 30 milliLiter(s) PCA Continuous PCA Continuous  metoprolol tartrate 25 milliGRAM(s) Oral two times a day  sodium chloride 0.45%. 1000 milliLiter(s) (75 mL/Hr) IV Continuous <Continuous>    MEDICATIONS  (PRN):  acetaminophen     Tablet .. 650 milliGRAM(s) Oral every 6 hours PRN Temp greater or equal to 38C (100.4F), Mild Pain (1 - 3), Moderate Pain (4 - 6)  ALBUTerol    90 MICROgram(s) HFA Inhaler 2 Puff(s) Inhalation every 6 hours PRN Shortness of Breath and/or Wheezing  benzonatate 100 milliGRAM(s) Oral every 8 hours PRN Cough      Vital Signs Last 24 Hrs  T(C): 36.7 (10 Jul 2022 06:13), Max: 37.9 (09 Jul 2022 18:32)  T(F): 98.1 (10 Jul 2022 06:13), Max: 100.2 (09 Jul 2022 18:32)  HR: 90 (10 Jul 2022 06:13) (80 - 105)  BP: 128/60 (10 Jul 2022 06:13) (128/60 - 136/77)  BP(mean): --  RR: 18 (10 Jul 2022 06:13) (18 - 18)  SpO2: 92% (10 Jul 2022 06:13) (92% - 92%)    Parameters below as of 10 Jul 2022 06:13    O2 Flow (L/min): 2      I&O's Summary    09 Jul 2022 07:01  -  10 Jul 2022 07:00  --------------------------------------------------------  IN: 100 mL / OUT: 380 mL / NET: -280 mL        PHYSICAL EXAM:  GENERAL: NAD, well-developed  HEAD:  Atraumatic, Normocephalic  EYES: EOMI, PERRLA, conjunctiva and sclera clear  NECK: Supple, No JVD  CHEST/LUNG: Clear to auscultation bilaterally; No wheeze  HEART: Regular rate and rhythm; No murmurs, rubs, or gallops  ABDOMEN: Soft, Nontender, Nondistended; Bowel sounds present  EXTREMITIES:  2+ Peripheral Pulses, No clubbing, cyanosis, or edema  PSYCH: AAOx3  NEUROLOGY: non-focal  SKIN: No rashes or lesions    LABS:                                   4.4    12.07 )-----------( 205      ( 10 Jul 2022 11:28 )             12.7   07-10    136  |  107  |  24<H>  ----------------------------<  80  4.5   |  18<L>  |  1.23    Ca    8.4      10 Jul 2022 11:28  Phos  2.8     07-10  Mg     2.10     07-10    TPro  6.9  /  Alb  3.7  /  TBili  6.6<H>  /  DBili  x   /  AST  48<H>  /  ALT  23  /  AlkPhos  96  07-09      Respiratory Viral Panel with COVID-19 by JONATHAN (07.09.22 @ 12:00)    Rapid RVP Result: [facility]    SARS-CoV-2: [facility]: This Respiratory Panel uses polymerase chain reaction (PCR) to detect for  adenovirus; coronavirus (HKU1, NL63, 229E, OC43); human metapneumovirus  (hMPV); human enterovirus/rhinovirus (Entero/RV); influenza A; influenza  A/H1; influenza A/H3; influenza A/H1-2009; influenza B; parainfluenza  viruses 1, 2, 3, 4; respiratory syncytial virus; Mycoplasma pneumoniae;  Chlamydophila pneumoniae; and SARS-CoV-2.    Adenovirus (RapRVP): [facility]    Influenza A (RapRVP): NotDetec    Influenza B (RapRVP): NotDetec    Parainfluenza 1 (RapRVP): NotDetec    Parainfluenza 2 (RapRVP): NotDetec    Parainfluenza 3 (RapRVP): NotDetec    Parainfluenza 4 (RapRVP): NotDetec    Resp Syncytial Virus (RapRVP): NotDetec    Bordetella pertussis (RapRVP): NotDetec    Bordetella parapertussis (RapRVP): NotDetec    Chlamydia pneumoniae (RapRVP): NotDetec    Mycoplasma pneumoniae (RapRVP): NotDetec    Entero/Rhinovirus (RapRVP): NotDetec    HKU1 Coronavirus (RapRVP): NotDetec    NL63 Coronavirus (RapRVP): NotDetec    229E Coronavirus (RapRVP): NotDetec    OC43 Coronavirus (RapRVP): NotDetec    hMPV (RapRVP): NotDetec          RADIOLOGY & ADDITIONAL TESTS: < from: Xray Chest 1 View- PORTABLE-Urgent (Xray Chest 1 View- PORTABLE-Urgent .) (07.09.22 @ 13:16) >  HEART: normal in size  LUNGS: free of consolidation,effusion, or pneumothorax.  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    < end of copied text >      Imaging Personally Reviewed:    Consultant(s) Notes Reviewed:      Care Discussed with Consultants/Other Providers: hematology     Assessment and Plan:

## 2022-07-10 NOTE — CONSULT NOTE ADULT - SUBJECTIVE AND OBJECTIVE BOX
66 yo M with a PMH of sickle cell (HbSS) disease, BPH, and HTN who p/w generalized body pain x 2 days. Pain mostly in thighs and legs, 8/10, similar to the pain has when he is in sickle cell crisis. Pt reports he has no PCP or hematologist, usually goes to [facility] medicine clinic. However, he ran out of all of his meds. He says that he usually has pain medications that he takes but they were no longer helping. He denies fever, chills, sick contacts, chest pain, SOB, nausea, vomiting, diarrhea or abdominal pain. He also is reporting a cough that has been occurring for ~1 month. He says it started after he drank moldy orange juice.    Hematology was consulted because of persistent fevers, increased oxygen requirements, r/o ACS, and difficulty transfusing patient. He had 1U PRBCs attempted to be transfused yesterday for Hb 4.5-5 (below his baseline ~ 6), but he had a fever during the transfusion and it was stopped after ~ 40 min (after 60 ml transfusion) and he was found to have antibodies.      Allergies    No Known Drug Allergies  peanuts (Anaphylaxis)  peanuts (Angioedema)  pork (Unknown)  shellfish (Unknown)    Intolerances    lactose (Unknown)      MEDICATIONS  (STANDING):  finasteride 5 milliGRAM(s) Oral daily  folic acid 1 milliGRAM(s) Oral daily  heparin   Injectable 5000 Unit(s) SubCutaneous every 12 hours  HYDROmorphone PCA (1 mG/mL) 30 milliLiter(s) PCA Continuous PCA Continuous  metoprolol tartrate 25 milliGRAM(s) Oral two times a day  senna 2 Tablet(s) Oral at bedtime  sodium chloride 0.45%. 1000 milliLiter(s) (75 mL/Hr) IV Continuous <Continuous>    MEDICATIONS  (PRN):  acetaminophen     Tablet .. 650 milliGRAM(s) Oral every 6 hours PRN Temp greater or equal to 38C (100.4F), Mild Pain (1 - 3), Moderate Pain (4 - 6)  ALBUTerol    90 MICROgram(s) HFA Inhaler 2 Puff(s) Inhalation every 6 hours PRN Shortness of Breath and/or Wheezing  benzonatate 100 milliGRAM(s) Oral every 8 hours PRN Cough      PAST MEDICAL & SURGICAL HISTORY:  Sickle Cell Disease      Left ventricular dysfunction  mild LVD on echo in 7/18, normal LVF in 12/18      H/O transfusion  ~ pRBC      Acute chest syndrome  Pt unaware      Intellectual disability  ~ mild      Constipation      Hypertension      Sickle cell disease      BPH (benign prostatic hyperplasia)      No significant past surgical history          FAMILY HISTORY:  FH: hypertension  ~ mother    Family history of sickle cell trait (Mother)  ~ presumed in parents &amp; 7 siblings (none suffer w/ the disease, per patient)        SOCIAL HISTORY: No alcohol, tobacco, or drug use. Lives alone.    REVIEW OF SYSTEMS:  CONSTITUTIONAL: no fever  EYES/ENT: No visual changes; no throat pain  NECK: No pain or stiffness  RESPIRATORY: + cough. No SOB  CARDIOVASCULAR: No chest pain or palpitations  GASTROINTESTINAL: No abdominal pain. No N/V/D/C  GENITOURINARY: No dysuria or hematuria  NEUROLOGICAL: No numbness or focal weakness  SKIN: No itching, burning, rashes, or lesions  Psych: No depression  MSK: + generalized pains  Allergy: no urticaria        T(F): 98.1 (07-10-22 @ 06:13), Max: 100.2 (07-09-22 @ 18:32)  HR: 90 (07-10-22 @ 06:13)  BP: 128/60 (07-10-22 @ 06:13)  RR: 18 (07-10-22 @ 06:13)  SpO2: 92% (07-10-22 @ 06:13)  Wt(kg): --    GENERAL: NAD, lethargic (had received Dilaudid from PCA pump not long prior)  HEENT: EOMI, MMM, no oropharyngeal lesions or erythema appreciated  Pulm: no increased WOB, CTAB/L  CV: RRR, S1, S2, no m/g/r  ABDOMEN: soft, NT, ND, no masses felt, no HSM  MSK: nl ROM  EXTREMITIES: no appreciable edema in b/l LE  Neuro: A&Ox3, no focal deficits  SKIN: warm and dry, no visible rash                          4.4    12.07 )-----------( 205      ( 10 Jul 2022 11:28 )             12.7       07-10    136  |  107  |  24<H>  ----------------------------<  80  4.5   |  18<L>  |  1.23    Ca    8.4      10 Jul 2022 11:28  Phos  2.8     07-10  Mg     2.10     07-10    TPro  6.9  /  Alb  3.7  /  TBili  6.6<H>  /  DBili  x   /  AST  48<H>  /  ALT  23  /  AlkPhos  96  07-09      Lactate Dehydrogenase, Serum: 396 U/L (07-10 @ 11:28)  Magnesium, Serum: 2.10 mg/dL (07-10 @ 11:28)  Phosphorus Level, Serum: 2.8 mg/dL (07-10 @ 11:28)

## 2022-07-10 NOTE — CONSULT NOTE ADULT - ATTENDING COMMENTS
This is a 67 year old man with a history of sickle cell SS disease, baseline Hg 6-7g/dl, Hg S 90-93% consistent with SS disease. Patient presented to [facility] with generalized boy pain, ischemic pain crisis, severely anemic, Hg as low as 4.4g/dl.  Received simple blood transfusion but had a febrile reaction, on post reaction workup, new antibodies were found. Sample sent to [place] [facility] [place] for identification.      Recommend transfusion of a 2nd matched unit at some point after the antibody identified.  This may take some time.  Transfuse to keep Hg > 5.  This may help with his pain crisis as well.  Continue opiod pain relief.     Patient does not have an outpatient doctor, can see Kishor Holley if she is still taking new patient,  or myself at [facility] for the follow up of the Sickle Cell disease.  Patient may benefit from prophylactic medications to prevent repeated ischemic pain crisis such as hydroxyurea, Endari ,or the new Adakveo therapies which have been useful in preventing pain crisis. Unfortunately these medications do not change the current scenario very much in the short term as they are prophylactic and not therapeutic.

## 2022-07-10 NOTE — PROVIDER CONTACT NOTE (OTHER) - ASSESSMENT
Patient A&Ox3, uncooperative with oxygen. On RA, patient's oxygen level would be between 86%-90%. When asked to keep oxygen in, patient would get agitated and state "leave me alone"

## 2022-07-10 NOTE — PROVIDER CONTACT NOTE (OTHER) - ACTION/TREATMENT ORDERED:
ACP Amanda made aware. Okay to leave patient off in the mean time; attempt to convince patient again; notify if lower than 86%. PO melatonin ordered and given

## 2022-07-11 DIAGNOSIS — J96.01 ACUTE RESPIRATORY FAILURE WITH HYPOXIA: ICD-10-CM

## 2022-07-11 LAB
ANION GAP SERPL CALC-SCNC: 15 MMOL/L — HIGH (ref 7–14)
BASOPHILS # BLD AUTO: 0.05 K/UL — SIGNIFICANT CHANGE UP (ref 0–0.2)
BASOPHILS NFR BLD AUTO: 0.4 % — SIGNIFICANT CHANGE UP (ref 0–2)
BUN SERPL-MCNC: 27 MG/DL — HIGH (ref 7–23)
CALCIUM SERPL-MCNC: 8.6 MG/DL — SIGNIFICANT CHANGE UP (ref 8.4–10.5)
CHLORIDE SERPL-SCNC: 106 MMOL/L — SIGNIFICANT CHANGE UP (ref 98–107)
CO2 SERPL-SCNC: 17 MMOL/L — LOW (ref 22–31)
CREAT SERPL-MCNC: 1.24 MG/DL — SIGNIFICANT CHANGE UP (ref 0.5–1.3)
EGFR: 63 ML/MIN/1.73M2 — SIGNIFICANT CHANGE UP
EOSINOPHIL # BLD AUTO: 0.17 K/UL — SIGNIFICANT CHANGE UP (ref 0–0.5)
EOSINOPHIL NFR BLD AUTO: 1.5 % — SIGNIFICANT CHANGE UP (ref 0–6)
GLUCOSE SERPL-MCNC: 73 MG/DL — SIGNIFICANT CHANGE UP (ref 70–99)
HCT VFR BLD CALC: 14 % — CRITICAL LOW (ref 39–50)
HEMOGLOBIN INTERPRETATION: SIGNIFICANT CHANGE UP
HGB A MFR BLD: 5 % — LOW (ref 95–97.6)
HGB A2 MFR BLD: 2.9 % — SIGNIFICANT CHANGE UP (ref 2.4–3.5)
HGB BLD-MCNC: 4.8 G/DL — CRITICAL LOW (ref 13–17)
HGB F MFR BLD: 3.4 % — HIGH (ref 0–1.5)
HGB S MFR BLD: 88.7 % — HIGH
IANC: 8.68 K/UL — HIGH (ref 1.8–7.4)
IMM GRANULOCYTES NFR BLD AUTO: 2 % — HIGH (ref 0–1.5)
LDH SERPL L TO P-CCNC: 454 U/L — HIGH (ref 135–225)
LYMPHOCYTES # BLD AUTO: 1.32 K/UL — SIGNIFICANT CHANGE UP (ref 1–3.3)
LYMPHOCYTES # BLD AUTO: 11.5 % — LOW (ref 13–44)
MAGNESIUM SERPL-MCNC: 2.2 MG/DL — SIGNIFICANT CHANGE UP (ref 1.6–2.6)
MCHC RBC-ENTMCNC: 27.4 PG — SIGNIFICANT CHANGE UP (ref 27–34)
MCHC RBC-ENTMCNC: 34.3 GM/DL — SIGNIFICANT CHANGE UP (ref 32–36)
MCV RBC AUTO: 80 FL — SIGNIFICANT CHANGE UP (ref 80–100)
MONOCYTES # BLD AUTO: 1.07 K/UL — HIGH (ref 0–0.9)
MONOCYTES NFR BLD AUTO: 9.3 % — SIGNIFICANT CHANGE UP (ref 2–14)
NEUTROPHILS # BLD AUTO: 8.68 K/UL — HIGH (ref 1.8–7.4)
NEUTROPHILS NFR BLD AUTO: 75.3 % — SIGNIFICANT CHANGE UP (ref 43–77)
NRBC # BLD: 20 /100 WBCS — SIGNIFICANT CHANGE UP
NRBC # FLD: 2.3 K/UL — HIGH
PHOSPHATE SERPL-MCNC: 3.3 MG/DL — SIGNIFICANT CHANGE UP (ref 2.5–4.5)
PLATELET # BLD AUTO: 217 K/UL — SIGNIFICANT CHANGE UP (ref 150–400)
POTASSIUM SERPL-MCNC: 4.6 MMOL/L — SIGNIFICANT CHANGE UP (ref 3.5–5.3)
POTASSIUM SERPL-SCNC: 4.6 MMOL/L — SIGNIFICANT CHANGE UP (ref 3.5–5.3)
RBC # BLD: 1.75 M/UL — LOW (ref 4.2–5.8)
RBC # BLD: 1.75 M/UL — LOW (ref 4.2–5.8)
RBC # FLD: 28.4 % — HIGH (ref 10.3–14.5)
RETICS #: 282.8 K/UL — HIGH (ref 25–125)
RETICS/RBC NFR: 16.2 % — HIGH (ref 0.5–2.5)
SODIUM SERPL-SCNC: 138 MMOL/L — SIGNIFICANT CHANGE UP (ref 135–145)
WBC # BLD: 11.52 K/UL — HIGH (ref 3.8–10.5)
WBC # FLD AUTO: 11.52 K/UL — HIGH (ref 3.8–10.5)

## 2022-07-11 PROCEDURE — 71275 CT ANGIOGRAPHY CHEST: CPT | Mod: 26

## 2022-07-11 PROCEDURE — 70450 CT HEAD/BRAIN W/O DYE: CPT | Mod: 26

## 2022-07-11 PROCEDURE — 99233 SBSQ HOSP IP/OBS HIGH 50: CPT

## 2022-07-11 PROCEDURE — 90792 PSYCH DIAG EVAL W/MED SRVCS: CPT

## 2022-07-11 RX ORDER — FLUTICASONE PROPIONATE 50 MCG
1 SPRAY, SUSPENSION NASAL
Refills: 0 | Status: DISCONTINUED | OUTPATIENT
Start: 2022-07-11 | End: 2022-07-19

## 2022-07-11 RX ORDER — HYDROXYZINE HCL 10 MG
25 TABLET ORAL EVERY 6 HOURS
Refills: 0 | Status: DISCONTINUED | OUTPATIENT
Start: 2022-07-11 | End: 2022-07-19

## 2022-07-11 RX ORDER — CEFTRIAXONE 500 MG/1
1000 INJECTION, POWDER, FOR SOLUTION INTRAMUSCULAR; INTRAVENOUS EVERY 24 HOURS
Refills: 0 | Status: DISCONTINUED | OUTPATIENT
Start: 2022-07-11 | End: 2022-07-11

## 2022-07-11 RX ORDER — PIPERACILLIN AND TAZOBACTAM 4; .5 G/20ML; G/20ML
3.38 INJECTION, POWDER, LYOPHILIZED, FOR SOLUTION INTRAVENOUS EVERY 8 HOURS
Refills: 0 | Status: COMPLETED | OUTPATIENT
Start: 2022-07-11 | End: 2022-07-18

## 2022-07-11 RX ORDER — HALOPERIDOL DECANOATE 100 MG/ML
1 INJECTION INTRAMUSCULAR EVERY 6 HOURS
Refills: 0 | Status: DISCONTINUED | OUTPATIENT
Start: 2022-07-11 | End: 2022-07-19

## 2022-07-11 RX ORDER — DIPHENHYDRAMINE HCL 50 MG
25 CAPSULE ORAL EVERY 4 HOURS
Refills: 0 | Status: DISCONTINUED | OUTPATIENT
Start: 2022-07-11 | End: 2022-07-19

## 2022-07-11 RX ADMIN — HYDROMORPHONE HYDROCHLORIDE 30 MILLILITER(S): 2 INJECTION INTRAMUSCULAR; INTRAVENOUS; SUBCUTANEOUS at 22:31

## 2022-07-11 RX ADMIN — PIPERACILLIN AND TAZOBACTAM 25 GRAM(S): 4; .5 INJECTION, POWDER, LYOPHILIZED, FOR SOLUTION INTRAVENOUS at 22:08

## 2022-07-11 RX ADMIN — CEFTRIAXONE 100 MILLIGRAM(S): 500 INJECTION, POWDER, FOR SOLUTION INTRAMUSCULAR; INTRAVENOUS at 09:15

## 2022-07-11 RX ADMIN — Medication 1 SPRAY(S): at 18:03

## 2022-07-11 RX ADMIN — HYDROMORPHONE HYDROCHLORIDE 30 MILLILITER(S): 2 INJECTION INTRAMUSCULAR; INTRAVENOUS; SUBCUTANEOUS at 07:42

## 2022-07-11 RX ADMIN — HEPARIN SODIUM 5000 UNIT(S): 5000 INJECTION INTRAVENOUS; SUBCUTANEOUS at 05:00

## 2022-07-11 RX ADMIN — SODIUM CHLORIDE 75 MILLILITER(S): 9 INJECTION, SOLUTION INTRAVENOUS at 09:16

## 2022-07-11 RX ADMIN — SENNA PLUS 2 TABLET(S): 8.6 TABLET ORAL at 22:08

## 2022-07-11 RX ADMIN — Medication 25 MILLIGRAM(S): at 18:25

## 2022-07-11 RX ADMIN — HYDROMORPHONE HYDROCHLORIDE 30 MILLILITER(S): 2 INJECTION INTRAMUSCULAR; INTRAVENOUS; SUBCUTANEOUS at 20:14

## 2022-07-11 RX ADMIN — HEPARIN SODIUM 5000 UNIT(S): 5000 INJECTION INTRAVENOUS; SUBCUTANEOUS at 18:04

## 2022-07-11 RX ADMIN — Medication 25 MILLIGRAM(S): at 18:14

## 2022-07-11 RX ADMIN — Medication 25 MILLIGRAM(S): at 05:00

## 2022-07-11 NOTE — BH CONSULTATION LIAISON ASSESSMENT NOTE - NSBHCHARTREVIEWLAB_PSY_A_CORE FT
- glucose 219 on arrival   - diabetic diet  - HgA1C pending   - ISS                         4.8    11.52 )-----------( 217      ( 11 Jul 2022 05:30 )             14.0   07-11    138  |  106  |  27<H>  ----------------------------<  73  4.6   |  17<L>  |  1.24    Ca    8.6      11 Jul 2022 05:30  Phos  3.3     07-11  Mg     2.20     07-11    < from: CT Angio Chest PE Protocol w/ IV Cont (07.11.22 @ 13:42) >    IMPRESSION:    No pulmonary embolus.    New bilateral lower lobe groundglass opacities, some of which are in a   centrilobular distribution. The differential for this includes infection   or possibly aspiration (in the right clinical setting) given the   posterior and dependent location. Follow-up is recommended in 4-6 weeks   to ensure resolution.    < end of copied text >    < from: CT Head No Cont (07.11.22 @ 13:41) >    IMPRESSION:  No mass effect, hemorrhage or evidence of acute intracranial pathology.    < end of copied text >

## 2022-07-11 NOTE — PROVIDER CONTACT NOTE (OTHER) - ACTION/TREATMENT ORDERED:
ACP Amanda made aware. States that as long as the patient is A&Ox3, aware of his decisions and risks, there is nothing we can do. Patient to remain on PCA pump.

## 2022-07-11 NOTE — PROGRESS NOTE ADULT - ASSESSMENT
67M HbSS SCD, BPH, and HTN p/w generalized body pain, admitted to medicine for Sickle Cell Crisis.  Course c/b hypoxic respiratory failure likely 2/2 increase demand from acute vasoocclusive crisis - pt refusing to cooperate with care (takes oxygen off repeatedly)

## 2022-07-11 NOTE — BH CONSULTATION LIAISON ASSESSMENT NOTE - RISK ASSESSMENT
Risk factors:   Modifiable: none known  Nonmodifiable: none known    Protective factors: no current SIIP/HIIP, no h/o SA/SIB, no h/o psych admissions, no access to weapons, no active substance abuse, spirituality, domiciled, social supports, positive therapeutic relationship, engaged in treatment, compliant with treatment, help-seeking behaviors    Overall, pt is at low risk of harm and does not meet criteria for psychiatric admission.

## 2022-07-11 NOTE — PROVIDER CONTACT NOTE (OTHER) - ACTION/TREATMENT ORDERED:
ACP Amanda made aware. States that since patient is A&Ox3, we cannot force anything on the patient ACP Amanda made aware. States that since patient is A&Ox3, we cannot force anything on the patient  ACP came at bedside and assessed patient and discussed risks with patient ACP Amanda made aware, states that since patient is A&Ox3, we cannot force anything on the patient. ACP came at bedside and assessed patient. ACP and RN educated risks to patient and still uncooperative

## 2022-07-11 NOTE — BH CONSULTATION LIAISON ASSESSMENT NOTE - NSBHCHARTREVIEWVS_PSY_A_CORE FT
Vital Signs Last 24 Hrs  T(C): 36.7 (11 Jul 2022 12:50), Max: 37.2 (11 Jul 2022 08:50)  T(F): 98 (11 Jul 2022 12:50), Max: 98.9 (11 Jul 2022 08:50)  HR: 100 (11 Jul 2022 12:50) (73 - 100)  BP: 121/64 (11 Jul 2022 12:50) (121/64 - 142/88)  BP(mean): --  RR: 18 (11 Jul 2022 12:50) (18 - 18)  SpO2: 85% (11 Jul 2022 12:50) (83% - 100%)    Parameters below as of 11 Jul 2022 12:50  Patient On (Oxygen Delivery Method): room air

## 2022-07-11 NOTE — PROGRESS NOTE ADULT - SUBJECTIVE AND OBJECTIVE BOX
Ellen Page DO  Division of Hospital medicine     SUBJECTIVE / OVERNIGHT EVENTS: Yesterday's events reviewed.  Pt remains severely hypoxic to low 80s and consistently taking his NC off.  He refuses to make eye contact with me to discuss why he refuses oxygen.  He demands, "I went to be left alone!  I want to leave."  Pt will not state his name, where he is, and why he is in the hospital.  He does state he is in pain everywhere and has been sparingly pressing his PCA pump.  When I and ACP attempted to place NC in his nose, he pushed us away and refused.  Security was called to bedside and upon his presence, he reluctantly put the NC back in his nose.  I also had brother communicate with him via phone and brother was unable to convince patient to cooperate with us.  Per brother, Mr. Marquez is usually not like this and usually very cooperative with hospital care.       MEDICATIONS  (STANDING):  cefTRIAXone   IVPB 1000 milliGRAM(s) IV Intermittent every 24 hours  finasteride 5 milliGRAM(s) Oral daily  fluticasone propionate 50 MICROgram(s)/spray Nasal Spray 1 Spray(s) Both Nostrils two times a day  folic acid 1 milliGRAM(s) Oral daily  heparin   Injectable 5000 Unit(s) SubCutaneous every 12 hours  HYDROmorphone PCA (1 mG/mL) 30 milliLiter(s) PCA Continuous PCA Continuous  metoprolol tartrate 25 milliGRAM(s) Oral two times a day  senna 2 Tablet(s) Oral at bedtime  sodium chloride 0.45%. 1000 milliLiter(s) (75 mL/Hr) IV Continuous <Continuous>    MEDICATIONS  (PRN):  acetaminophen     Tablet .. 650 milliGRAM(s) Oral every 6 hours PRN Temp greater or equal to 38C (100.4F), Mild Pain (1 - 3), Moderate Pain (4 - 6)  ALBUTerol    90 MICROgram(s) HFA Inhaler 2 Puff(s) Inhalation every 6 hours PRN Shortness of Breath and/or Wheezing  benzonatate 100 milliGRAM(s) Oral every 8 hours PRN Cough  diphenhydrAMINE 25 milliGRAM(s) Oral every 4 hours PRN Rash and/or Itching  melatonin 3 milliGRAM(s) Oral at bedtime PRN Insomnia      I&O's Summary      PHYSICAL EXAM:  Vital Signs Last 24 Hrs  T(C): 37.2 (2022 08:50), Max: 37.2 (2022 08:50)  T(F): 98.9 (2022 08:50), Max: 98.9 (2022 08:50)  HR: 98 (2022 08:50) (73 - 98)  BP: 136/75 (2022 08:50) (126/60 - 142/88)  BP(mean): --  RR: 18 (2022 08:50) (18 - 18)  SpO2: 85% (2022 08:50) (83% - 100%)    Parameters below as of 2022 08:50  Patient On (Oxygen Delivery Method): room air, pt constantly removes NC tubing     CONSTITUTIONAL: NAD, well-developed, well-groomed, refuses to participate in conversation   EYES: PERRLA; conjunctiva and sclera clear  NECK: Supple, no palpable masses; no thyromegaly  RESPIRATORY: Normal respiratory effort; lungs are clear to auscultation bilaterally  CARDIOVASCULAR: Regular rate and rhythm, normal S1 and S2, no murmur/rub/gallop; No lower extremity edema  ABDOMEN: Nontender to palpation, normoactive bowel sounds, no rebound/guarding  PSYCH: Unable to fully assess as pt refuses to communicate with me; noncooperative, poor eye contact  NEUROLOGY: CN 2-12 are intact and symmetric; no gross sensory deficits, spontaneously moving all 4 extremities   SKIN: No rashes; no palpable lesions    LABS:                        4.8    11.52 )-----------( 217      ( 2022 05:30 )             14.0     07-11    138  |  106  |  27<H>  ----------------------------<  73  4.6   |  17<L>  |  1.24    Ca    8.6      2022 05:30  Phos  3.3     07-11  Mg     2.20     07-11      Urinalysis Basic - ( 2022 16:39 )    Color: Yellow / Appearance: Clear / S.012 / pH: x  Gluc: x / Ketone: Negative  / Bili: Negative / Urobili: <2 mg/dL   Blood: x / Protein: 30 mg/dL / Nitrite: Negative   Leuk Esterase: Negative / RBC: 0 /HPF / WBC 1 /HPF   Sq Epi: x / Non Sq Epi: 1 /HPF / Bacteria: Negative    Culture - Urine (collected 2022 16:25)  Source: Clean Catch Clean Catch (Midstream)  Preliminary Report (2022 01:16):    >100,000 CFU/ml Escherichia coli      SARS-CoV-2: NotDetec (2022 12:00)  SARS-CoV-2: NotDetec (2022 05:39)      RADIOLOGY & ADDITIONAL TESTS:  New Results Reviewed Today:   < from: Xray Chest 1 View- PORTABLE-Urgent (Xray Chest 1 View- PORTABLE-Urgent .) (22 @ 13:16) >  INTERPRETATION:  HISTORY: Admitting Dxs: D57.00 D57.00; ; hypoxia;  TECHNIQUE: Portable frontal view of the chest, 1 view.  COMPARISON: 2021, 2022.  FINDINGS/  IMPRESSION:    HEART: normal in size  LUNGS: free of consolidation,effusion, or pneumothorax.  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    < end of copied text >    COMMUNICATION:  Discussions with Patient/Family: Spoke with brother Saturnino Rose, updated on hospital course.  Lewis tried to convince brother of cooperating with care, but pt did not respond.

## 2022-07-11 NOTE — BH CONSULTATION LIAISON ASSESSMENT NOTE - NSBHATTESTCOMMENTATTENDFT_PSY_A_CORE
67M possible PPH of mild intellectual disability per records, PMH of sickle cell disease, BPH, and HTN admitted for SC vasoocclusive crisis, psych c/s as pt refusing to cooperate with NC. Unclear why patient is more impulsive/agitated than during prior hospitalziation. Is having clonic jerks and itching sensation, may be from opiates, would monitor this and treat as patient appears to take of NC because it is itching his nose and he cannot tolerate the sensation, though superficially he says he needs it (despite this he lacks capacity to refuse it as above, agree with primary team).     Plan as above: PRN hydroxyzine for anxiety/agitation where possible as this may help with pruritis+anxiety, for severe agitation IV haldol PRN as above, can use 2mg if 1mg not effective, watch for QTc prolongation. Unlikely to cause resp. depression but would monitor still. Would keep low threshold for using soft restraints. Would consider nasal spray, perhaps lidocaine spray for the skin of his nares to help him tolerate NC if possible. Case discussed with Dr. Page.

## 2022-07-11 NOTE — BH CONSULTATION LIAISON ASSESSMENT NOTE - NSICDXPASTMEDICALHX_GEN_ALL_CORE_FT
PAST MEDICAL HISTORY:  Acute chest syndrome Pt unaware    BPH (benign prostatic hyperplasia)     Constipation     H/O transfusion ~ pRBC    Hypertension     Intellectual disability ~ mild    Left ventricular dysfunction mild LVD on echo in 7/18, normal LVF in 12/18    Sickle Cell Disease     Sickle cell disease

## 2022-07-11 NOTE — BH CONSULTATION LIAISON ASSESSMENT NOTE - SUMMARY
Patient is a 68 yo M, lives with family, no PPHx, no inpatient psychiatric hospitalizations, no substance use, PMH of sickle cell disease, BPH, HTN, who presented with generalized body pain for 2 days, admitted for sickle cell crisis and EMEKA. Psychiatry consulted for agitation, as patient frequently pulling out nasal cannula.     Upon approach, patient AAOx3 though not oriented to severity of situation. Patient perseverative on his nose being itchy, does not grasp severity of keeping nasal cannula off. Patient lacks capacity to refuse nasal cannula, seemingly unable to control impulse in removing it. Patient may benefit from soft restraints in order to keep nasal cannula on, as patient desats to 80s when taking if off.     Recommendations:  -C/w current treatment of sickle cell crisis  -Patient lacks capacity to refuse nasal cannula  -Utilize reorientation, lowest amount of restraints possible in order to keep nasal cannula on  -Recommend using nasal spray to decrease nasal irritation, consider alternative oxygen supplementation that would not worsen irritation  -Can use Atarax 25mg PO q6hrs PRN to decrease anxiety/agitation/skin irritation  -Use Haldol 1mg PO/IM/IV q6hrs PRN for severe agitation (physically aggressive)  -Observation status per primary medical team, can consider 1:1 if continually removes oxygen support Patient is a 68 yo M, lives with family, no PPHx, no inpatient psychiatric hospitalizations, no substance use, PMH of sickle cell disease, BPH, HTN, who presented with generalized body pain for 2 days, admitted for sickle cell crisis and EMEKA. Psychiatry consulted for agitation, as patient frequently pulling out nasal cannula.     Upon approach, patient AAOx3 though not oriented to severity of situation. Patient perseverative on his nose being itchy, does not grasp severity of keeping nasal cannula off. Patient lacks capacity to refuse nasal cannula, seemingly unable to control impulse in removing it. Patient may benefit from soft restraints in order to keep nasal cannula on, as patient desats to 80s when taking if off.     Recommendations:  -C/w current treatment of sickle cell crisis  -Patient lacks capacity to refuse nasal cannula  -Utilize reorientation, lowest amount of restraints possible in order to keep nasal cannula on  -Recommend using nasal spray to decrease nasal irritation, consider alternative oxygen supplementation via tent that would not worsen irritation  -Can use Atarax 25mg PO q6hrs PRN to decrease anxiety/agitation/skin irritation  -Use Haldol 1mg PO/IM/IV q6hrs PRN for severe agitation (physically aggressive, pulling off lines/NC with force)  -Observation status per primary medical team, can consider 1:1 if continually removes oxygen support

## 2022-07-11 NOTE — BH CONSULTATION LIAISON ASSESSMENT NOTE - NS ED BHA [employer] RACE
[facility]  Internal Medicine  [address] [facility], [address] [place] [address]  (n) [phone] (f) [phone]  21      RE:  Denver Fey 50 y o  male  : 1973    To whom it may concern,     Denver Fey was hospitalized under my care from 2021 to 21  Please excuse from all appointments and/or work related activities during this interim  Patient may return to work at previous activity level once cleared by Orthopedic Surgeon  Feel free to contact me with any questions if necessary  Sincerely,               ALEXUS Newman         [facility] Internal Medicine       Diplomate, [employer] Board of Internal Medicine  / Black

## 2022-07-11 NOTE — PROVIDER CONTACT NOTE (OTHER) - ASSESSMENT
Patient A&Ox3, uncooperative with oxygen. On RA, patient's oxygen level now in the low 80s and lowest was 78%. When asked to keep oxygen in, patient would get agitated and state "leave me alone"

## 2022-07-11 NOTE — BH CONSULTATION LIAISON ASSESSMENT NOTE - HPI (INCLUDE ILLNESS QUALITY, SEVERITY, DURATION, TIMING, CONTEXT, MODIFYING FACTORS, ASSOCIATED SIGNS AND SYMPTOMS)
Patient is a 66 yo M, lives with family, no PPHx, no inpatient psychiatric hospitalizations, no substance use, PMH of sickle cell disease, BPH, HTN, who presented with generalized body pain for 2 days, admitted for sickle cell crisis and EMEKA. Psychiatry consulted for agitation, as patient frequently pulling out nasal cannula.     Patient AAOx3 upon interview. Patient states that he is in the hospital because of his sickle cell disease. He states that IV medications and blood transfusions help when he is in a crisis, but states he typically stays in hospital for 2-3 weeks. When asked about his nasal cannula, patient states that he has allergies and itches a lot. When asked if he can keep nasal cannula in, he repeats that he has allergies. When discussed possibility of low oxygen without nasal cannula, he again says that he has allergies. Patient states that he attends to ADLs, denies depression or anxiety, AVH, paranoia, SI/HI, drug use. When asked about body jerking, he says his body moves like that because his clothes don't fit well.

## 2022-07-11 NOTE — BH CONSULTATION LIAISON ASSESSMENT NOTE - CURRENT MEDICATION
MEDICATIONS  (STANDING):  finasteride 5 milliGRAM(s) Oral daily  fluticasone propionate 50 MICROgram(s)/spray Nasal Spray 1 Spray(s) Both Nostrils two times a day  folic acid 1 milliGRAM(s) Oral daily  heparin   Injectable 5000 Unit(s) SubCutaneous every 12 hours  HYDROmorphone PCA (1 mG/mL) 30 milliLiter(s) PCA Continuous PCA Continuous  metoprolol tartrate 25 milliGRAM(s) Oral two times a day  piperacillin/tazobactam IVPB.. 3.375 Gram(s) IV Intermittent every 8 hours  senna 2 Tablet(s) Oral at bedtime  sodium chloride 0.45%. 1000 milliLiter(s) (75 mL/Hr) IV Continuous <Continuous>    MEDICATIONS  (PRN):  acetaminophen     Tablet .. 650 milliGRAM(s) Oral every 6 hours PRN Temp greater or equal to 38C (100.4F), Mild Pain (1 - 3), Moderate Pain (4 - 6)  ALBUTerol    90 MICROgram(s) HFA Inhaler 2 Puff(s) Inhalation every 6 hours PRN Shortness of Breath and/or Wheezing  benzonatate 100 milliGRAM(s) Oral every 8 hours PRN Cough  diphenhydrAMINE 25 milliGRAM(s) Oral every 4 hours PRN Rash and/or Itching  melatonin 3 milliGRAM(s) Oral at bedtime PRN Insomnia

## 2022-07-12 DIAGNOSIS — A41.50 GRAM-NEGATIVE SEPSIS, UNSPECIFIED: ICD-10-CM

## 2022-07-12 LAB
ANION GAP SERPL CALC-SCNC: 11 MMOL/L — SIGNIFICANT CHANGE UP (ref 7–14)
BASOPHILS # BLD AUTO: 0.07 K/UL — SIGNIFICANT CHANGE UP (ref 0–0.2)
BASOPHILS NFR BLD AUTO: 0.5 % — SIGNIFICANT CHANGE UP (ref 0–2)
BUN SERPL-MCNC: 23 MG/DL — SIGNIFICANT CHANGE UP (ref 7–23)
CALCIUM SERPL-MCNC: 8.5 MG/DL — SIGNIFICANT CHANGE UP (ref 8.4–10.5)
CHLORIDE SERPL-SCNC: 110 MMOL/L — HIGH (ref 98–107)
CO2 SERPL-SCNC: 18 MMOL/L — LOW (ref 22–31)
CREAT SERPL-MCNC: 1.31 MG/DL — HIGH (ref 0.5–1.3)
EGFR: 59 ML/MIN/1.73M2 — LOW
EOSINOPHIL # BLD AUTO: 0.97 K/UL — HIGH (ref 0–0.5)
EOSINOPHIL NFR BLD AUTO: 7.4 % — HIGH (ref 0–6)
GLUCOSE SERPL-MCNC: 91 MG/DL — SIGNIFICANT CHANGE UP (ref 70–99)
HCT VFR BLD CALC: 16.8 % — CRITICAL LOW (ref 39–50)
HGB BLD-MCNC: 5.6 G/DL — CRITICAL LOW (ref 13–17)
IANC: 8.81 K/UL — HIGH (ref 1.8–7.4)
IMM GRANULOCYTES NFR BLD AUTO: 4.2 % — HIGH (ref 0–1.5)
LDH SERPL L TO P-CCNC: 488 U/L — HIGH (ref 135–225)
LYMPHOCYTES # BLD AUTO: 1.34 K/UL — SIGNIFICANT CHANGE UP (ref 1–3.3)
LYMPHOCYTES # BLD AUTO: 10.2 % — LOW (ref 13–44)
MAGNESIUM SERPL-MCNC: 2.1 MG/DL — SIGNIFICANT CHANGE UP (ref 1.6–2.6)
MCHC RBC-ENTMCNC: 26.4 PG — LOW (ref 27–34)
MCHC RBC-ENTMCNC: 33.3 GM/DL — SIGNIFICANT CHANGE UP (ref 32–36)
MCV RBC AUTO: 79.2 FL — LOW (ref 80–100)
MONOCYTES # BLD AUTO: 1.37 K/UL — HIGH (ref 0–0.9)
MONOCYTES NFR BLD AUTO: 10.5 % — SIGNIFICANT CHANGE UP (ref 2–14)
NEUTROPHILS # BLD AUTO: 8.81 K/UL — HIGH (ref 1.8–7.4)
NEUTROPHILS NFR BLD AUTO: 67.2 % — SIGNIFICANT CHANGE UP (ref 43–77)
NRBC # BLD: 49 /100 WBCS — SIGNIFICANT CHANGE UP
NRBC # FLD: 6.45 K/UL — HIGH
PHOSPHATE SERPL-MCNC: 2.7 MG/DL — SIGNIFICANT CHANGE UP (ref 2.5–4.5)
PLATELET # BLD AUTO: 240 K/UL — SIGNIFICANT CHANGE UP (ref 150–400)
POTASSIUM SERPL-MCNC: 4.4 MMOL/L — SIGNIFICANT CHANGE UP (ref 3.5–5.3)
POTASSIUM SERPL-SCNC: 4.4 MMOL/L — SIGNIFICANT CHANGE UP (ref 3.5–5.3)
RBC # BLD: 2.1 M/UL — LOW (ref 4.2–5.8)
RBC # BLD: 2.12 M/UL — LOW (ref 4.2–5.8)
RBC # FLD: 24.5 % — HIGH (ref 10.3–14.5)
RETICS #: 316.7 K/UL — HIGH (ref 25–125)
RETICS/RBC NFR: 15.1 % — HIGH (ref 0.5–2.5)
SODIUM SERPL-SCNC: 139 MMOL/L — SIGNIFICANT CHANGE UP (ref 135–145)
WBC # BLD: 13.11 K/UL — HIGH (ref 3.8–10.5)
WBC # FLD AUTO: 13.11 K/UL — HIGH (ref 3.8–10.5)

## 2022-07-12 PROCEDURE — 99233 SBSQ HOSP IP/OBS HIGH 50: CPT

## 2022-07-12 PROCEDURE — 99232 SBSQ HOSP IP/OBS MODERATE 35: CPT

## 2022-07-12 RX ADMIN — Medication 1 SPRAY(S): at 16:16

## 2022-07-12 RX ADMIN — HYDROMORPHONE HYDROCHLORIDE 30 MILLILITER(S): 2 INJECTION INTRAMUSCULAR; INTRAVENOUS; SUBCUTANEOUS at 19:21

## 2022-07-12 RX ADMIN — PIPERACILLIN AND TAZOBACTAM 25 GRAM(S): 4; .5 INJECTION, POWDER, LYOPHILIZED, FOR SOLUTION INTRAVENOUS at 21:07

## 2022-07-12 RX ADMIN — Medication 25 MILLIGRAM(S): at 16:16

## 2022-07-12 RX ADMIN — Medication 25 MILLIGRAM(S): at 05:35

## 2022-07-12 RX ADMIN — SODIUM CHLORIDE 75 MILLILITER(S): 9 INJECTION, SOLUTION INTRAVENOUS at 03:00

## 2022-07-12 RX ADMIN — Medication 1 MILLIGRAM(S): at 12:13

## 2022-07-12 RX ADMIN — PIPERACILLIN AND TAZOBACTAM 25 GRAM(S): 4; .5 INJECTION, POWDER, LYOPHILIZED, FOR SOLUTION INTRAVENOUS at 05:35

## 2022-07-12 RX ADMIN — HEPARIN SODIUM 5000 UNIT(S): 5000 INJECTION INTRAVENOUS; SUBCUTANEOUS at 05:32

## 2022-07-12 RX ADMIN — Medication 1 SPRAY(S): at 05:31

## 2022-07-12 RX ADMIN — HYDROMORPHONE HYDROCHLORIDE 30 MILLILITER(S): 2 INJECTION INTRAMUSCULAR; INTRAVENOUS; SUBCUTANEOUS at 07:19

## 2022-07-12 RX ADMIN — HYDROMORPHONE HYDROCHLORIDE 30 MILLILITER(S): 2 INJECTION INTRAMUSCULAR; INTRAVENOUS; SUBCUTANEOUS at 04:47

## 2022-07-12 RX ADMIN — HALOPERIDOL DECANOATE 1 MILLIGRAM(S): 100 INJECTION INTRAMUSCULAR at 09:34

## 2022-07-12 RX ADMIN — HEPARIN SODIUM 5000 UNIT(S): 5000 INJECTION INTRAVENOUS; SUBCUTANEOUS at 16:16

## 2022-07-12 RX ADMIN — PIPERACILLIN AND TAZOBACTAM 25 GRAM(S): 4; .5 INJECTION, POWDER, LYOPHILIZED, FOR SOLUTION INTRAVENOUS at 12:12

## 2022-07-12 RX ADMIN — FINASTERIDE 5 MILLIGRAM(S): 5 TABLET, FILM COATED ORAL at 12:13

## 2022-07-12 NOTE — BH CONSULTATION LIAISON PROGRESS NOTE - NSBHCHARTREVIEWVS_PSY_A_CORE FT
Vital Signs Last 24 Hrs  T(C): 36.7 (12 Jul 2022 08:00), Max: 37.1 (11 Jul 2022 22:15)  T(F): 98.1 (12 Jul 2022 08:00), Max: 98.7 (11 Jul 2022 22:15)  HR: 95 (12 Jul 2022 08:00) (67 - 100)  BP: 142/75 (12 Jul 2022 08:00) (121/64 - 148/85)  BP(mean): --  RR: 18 (12 Jul 2022 08:00) (17 - 18)  SpO2: 95% (12 Jul 2022 08:00) (85% - 99%)    Parameters below as of 12 Jul 2022 08:00  Patient On (Oxygen Delivery Method): nasal cannula  O2 Flow (L/min): 2

## 2022-07-12 NOTE — BH CONSULTATION LIAISON PROGRESS NOTE - NSBHFUPINTERVALHXFT_PSY_A_CORE
Patient received Haldol 1mg IV this AM PRN for agitation, refusing to keep on nasal cannula, as patient was desatting to high 70s.    Upon approach, patient was wiping pretzel bag with his shirt. When asked if he can put on nasal cannula, patient repeatedly states that he is "doing something" and says he has to eat breakfast. When telling patient about possibility of poor oxygen supply, he became irritated, saying that he is fine and needs to eat breakfast. When asked questions about orientation, patient would not directly answer, stating that he is not an idiot, brought himself here. Patient refused to answer other questions.

## 2022-07-12 NOTE — BH CONSULTATION LIAISON PROGRESS NOTE - NSBHASSESSMENTFT_PSY_ALL_CORE
Patient is a 66 yo M, lives with family, no PPHx, no inpatient psychiatric hospitalizations, no substance use, PMH of sickle cell disease, BPH, HTN, who presented with generalized body pain for 2 days, admitted for sickle cell crisis and EMEKA. Psychiatry consulted for agitation, as patient frequently pulling out nasal cannula.     Upon initial approach, patient AAOx3 though not oriented to severity of situation. Patient perseverative on his nose being itchy, does not grasp severity of keeping nasal cannula off. Patient lacks capacity to refuse nasal cannula, seemingly unable to control impulse in removing it. Patient may benefit from soft restraints in order to keep nasal cannula on, as patient desats to 80s when taking if off.     7/12: Patient irritable, continues to make attempts to remove nasal cannula during the day, however kept it on at night. Patient required PRN Haldol 1mg IV for agitation removing NC. He continues to be perseverative, disorganized, was noted to be wiping pretzel bag with his hospital gown. Will continue to monitor, patient may require standing psychotropic to prevent repeated attempts to remove NC.     Recommendations:  -C/w current treatment of sickle cell crisis  -Patient lacks capacity to refuse nasal cannula  -Utilize reorientation, lowest amount of restraints possible in order to keep nasal cannula on  -Recommend using nasal spray to decrease nasal irritation, consider alternative oxygen supplementation via tent that would not worsen irritation  -Can use Atarax 25mg PO q6hrs PRN to decrease anxiety/agitation/skin irritation  -Use Haldol 1mg PO/IM/IV q6hrs PRN for severe agitation (physically aggressive, pulling off lines/NC with force)  -Observation status per primary medical team, can consider 1:1 if continually removes oxygen support  -Will consider standing psychotropics for agitation Patient is a 66 yo M, lives with family, no PPHx, no inpatient psychiatric hospitalizations, no substance use, PMH of sickle cell disease, BPH, HTN, who presented with generalized body pain for 2 days, admitted for sickle cell crisis and EMEKA. Psychiatry consulted for agitation, as patient frequently pulling out nasal cannula.     Upon initial approach, patient AAOx3 though not oriented to severity of situation. Patient perseverative on his nose being itchy, does not grasp severity of keeping nasal cannula off. Patient lacks capacity to refuse nasal cannula, seemingly unable to control impulse in removing it. Patient may benefit from soft restraints in order to keep nasal cannula on, as patient desats to 80s when taking if off.     7/12: Patient irritable, continues to make attempts to remove nasal cannula during the day, however kept it on at night. Patient required PRN Haldol 1mg IV for agitation removing NC. He continues to be perseverative, disorganized, was noted to be wiping pretzel bag with his hospital gown. Will continue to monitor, patient may require standing psychotropic to prevent repeated attempts to remove NC.     Recommendations:  -C/w current treatment of sickle cell crisis  -Patient lacks capacity to refuse nasal cannula  -Utilize reorientation, lowest amount of restraints possible in order to keep nasal cannula on  -Recommend using nasal spray to decrease nasal irritation, consider alternative oxygen supplementation via tent that would not worsen irritation  -Can use Atarax 25mg PO q6hrs PRN to decrease anxiety/agitation/skin irritation  -Use Haldol 1mg PO/IM/IV q6hrs PRN for severe agitation (physically aggressive, pulling off lines/NC with force)  -Observation status per primary medical team, can consider 1:1 if continually removes oxygen support  -Will consider standing psychotropics for agitation if patient getting multiple PRNs

## 2022-07-12 NOTE — PROGRESS NOTE ADULT - SUBJECTIVE AND OBJECTIVE BOX
[facility] Division of Hospital Medicine  Ellen Page DO  Available via MS Teams  In house pager [phone]     SUBJECTIVE / OVERNIGHT EVENTS: Per RN report, pt continues to take off oxygen and needs constant reminders to put nasal cannula back on.  Pt again is not speaking or engaging with me when asked questions, keeps repeating that he's hungry.  I assisted with his meal and even then would not answer my questions about why he's resistant to wearing oxygen.  Tolerated cross-matched unit of blood last night with no reaction.  Remains afebrile, has been pressing PCA pump.  RN gave 1mg Haldol this morning as pt not cooperative.     MEDICATIONS  (STANDING):  finasteride 5 milliGRAM(s) Oral daily  fluticasone propionate 50 MICROgram(s)/spray Nasal Spray 1 Spray(s) Both Nostrils two times a day  folic acid 1 milliGRAM(s) Oral daily  heparin   Injectable 5000 Unit(s) SubCutaneous every 12 hours  HYDROmorphone PCA (1 mG/mL) 30 milliLiter(s) PCA Continuous PCA Continuous  metoprolol tartrate 25 milliGRAM(s) Oral two times a day  piperacillin/tazobactam IVPB.. 3.375 Gram(s) IV Intermittent every 8 hours  senna 2 Tablet(s) Oral at bedtime  sodium chloride 0.45%. 1000 milliLiter(s) (75 mL/Hr) IV Continuous <Continuous>    MEDICATIONS  (PRN):  acetaminophen     Tablet .. 650 milliGRAM(s) Oral every 6 hours PRN Temp greater or equal to 38C (100.4F), Mild Pain (1 - 3), Moderate Pain (4 - 6)  ALBUTerol    90 MICROgram(s) HFA Inhaler 2 Puff(s) Inhalation every 6 hours PRN Shortness of Breath and/or Wheezing  benzonatate 100 milliGRAM(s) Oral every 8 hours PRN Cough  diphenhydrAMINE 25 milliGRAM(s) Oral every 4 hours PRN Rash and/or Itching  haloperidol    Injectable 1 milliGRAM(s) IV Push every 6 hours PRN Severe Agitation  hydrOXYzine hydrochloride 25 milliGRAM(s) Oral every 6 hours PRN Agitation  melatonin 3 milliGRAM(s) Oral at bedtime PRN Insomnia      I&O's Summary      PHYSICAL EXAM:  Vital Signs Last 24 Hrs  T(C): 36.7 (12 Jul 2022 08:00), Max: 37.1 (11 Jul 2022 22:15)  T(F): 98.1 (12 Jul 2022 08:00), Max: 98.7 (11 Jul 2022 22:15)  HR: 95 (12 Jul 2022 08:00) (67 - 100)  BP: 142/75 (12 Jul 2022 08:00) (121/64 - 148/85)  BP(mean): --  RR: 18 (12 Jul 2022 08:00) (17 - 18)  SpO2: 95% (12 Jul 2022 08:00) (85% - 99%)    Parameters below as of 12 Jul 2022 08:00  Patient On (Oxygen Delivery Method): nasal cannula  O2 Flow (L/min): 2    CONSTITUTIONAL: NAD, well-developed, well-groomed, cachectic  EYES: PERRLA; conjunctiva and sclera clear  RESPIRATORY: Normal respiratory effort; lungs are clear to auscultation bilaterally  CARDIOVASCULAR: Regular rate and rhythm, normal S1 and S2, no murmur/rub/gallop; No lower extremity edema  ABDOMEN: Nontender to palpation, normoactive bowel sounds, no rebound/guarding  MUSCULOSKELETAL:  No clubbing or cyanosis of digits; no joint swelling   PSYCH: Unable to fully assess, not engaging in conversation, calm however not cooperative   NEUROLOGY: CN 2-12 are intact and symmetric; no gross sensory deficits, no motor deficits   SKIN: No rashes; no palpable lesions    LABS:                        5.6    13.11 )-----------( 240      ( 12 Jul 2022 08:00 )             16.8     07-12    139  |  110<H>  |  23  ----------------------------<  91  4.4   |  18<L>  |  1.31<H>    Ca    8.5      12 Jul 2022 08:00  Phos  2.7     07-12  Mg     2.10     07-12    Culture - Blood (collected 10 Jul 2022 05:15)  Source: .Blood Blood-Peripheral  Preliminary Report (11 Jul 2022 13:01):    No growth to date.    Culture - Urine (collected 09 Jul 2022 16:25)  Source: Clean Catch Clean Catch (Midstream)  Preliminary Report (11 Jul 2022 01:16):    >100,000 CFU/ml Escherichia coli      SARS-CoV-2: NotDetec (09 Jul 2022 12:00)  SARS-CoV-2: NotDetec (07 Jul 2022 05:39)    Reticulocyte Count in AM (07.12.22 @ 08:00)    RBC Count: 2.10 M/uL    Reticulocyte Percent: 15.1 %    Absolute Reticulocytes: 316.7 K/uL    Lactate Dehydrogenase, Serum in AM (07.12.22 @ 08:00)    Lactate Dehydrogenase, Serum: 488 U/L      RADIOLOGY & ADDITIONAL TESTS:  New Results Reviewed Today:   < from: CT Angio Chest PE Protocol w/ IV Cont (07.11.22 @ 13:42) >    Mediastinum and Heart: Pulmonary arteries are enlarged. There is   multichamber cardiac enlargement. Thyroid gland is unremarkable. No   lymphadenopathy. No pericardial effusion.    Lungs, Pleura, and Airways: There is no pulmonary embolus. No   consolidations, edema, effusion, or pneumothorax. Bilateral lower lobe   groundglass opacities are noted, some of which are in a centrilobular   distribution, new since previous exam. Additionally there is small areas   of fluid within the major fissures bilaterally.    Unchanged right lower lobe peripheral 1.1 cm opacity on image 368 of   series 4, stable since 2020. This is likely sequela of prior infarct.    Visualized Abdomen: Unremarkable.    Bones and soft tissues: Vertebral body changes in keeping with known   sickle cell disease.    IMPRESSION:    No pulmonary embolus.    New bilateral lower lobe groundglass opacities, some of which are in a   centrilobular distribution. The differential for this includes infection   or possibly aspiration (in the right clinical setting) given the   posterior and dependent location. Follow-up is recommended in 4-6 weeks   to ensure resolution.    < end of copied text >    New Imaging Personally Reviewed Today:  < from: CT Head No Cont (07.11.22 @ 13:41) >  FINDINGS:  VENTRICLES AND SULCI:  Normal.  INTRA-AXIAL:  No mass, blood or abnormal attenuation is seen.  EXTRA-AXIAL:  No mass or collection is seen.  VISUALIZED SINUSES:  Mild ethmoid mucosal thickening  VISUALIZED MASTOIDS:  Clear.  CALVARIUM:  Normal.  MISCELLANEOUS:  None.    IMPRESSION:  No mass effect, hemorrhage or evidence of acute intracranial pathology.    < end of copied text >      COMMUNICATION:  Discussions with Patient/Family: Brother Saturnino Rose   PCP Communication: Discussed care with PMD Dr. Hamm.  Pt is known to be aggressive to staff, has been resistant to care options, lives alone and doesn't have much local support

## 2022-07-12 NOTE — BH CONSULTATION LIAISON PROGRESS NOTE - NSBHCHARTREVIEWLAB_PSY_A_CORE FT
5.6    13.11 )-----------( 240      ( 12 Jul 2022 08:00 )             16.8       07-12    139  |  110<H>  |  23  ----------------------------<  91  4.4   |  18<L>  |  1.31<H>    Ca    8.5      12 Jul 2022 08:00  Phos  2.7     07-12  Mg     2.10     07-12                        Lactate Trend            CAPILLARY BLOOD GLUCOSE            Culture Results:   No growth to date. (07-10 @ 05:15)  Culture Results:   >100,000 CFU/ml Escherichia coli (07-09 @ 16:25)  Culture Results:   50,000 - 99,000 CFU/mL Escherichia coli  <10,000 CFU/ml Normal Urogenital susanne present (07-07 @ 04:15)

## 2022-07-12 NOTE — BH CONSULTATION LIAISON PROGRESS NOTE - CURRENT MEDICATION
MEDICATIONS  (STANDING):  finasteride 5 milliGRAM(s) Oral daily  fluticasone propionate 50 MICROgram(s)/spray Nasal Spray 1 Spray(s) Both Nostrils two times a day  folic acid 1 milliGRAM(s) Oral daily  heparin   Injectable 5000 Unit(s) SubCutaneous every 12 hours  HYDROmorphone PCA (1 mG/mL) 30 milliLiter(s) PCA Continuous PCA Continuous  metoprolol tartrate 25 milliGRAM(s) Oral two times a day  piperacillin/tazobactam IVPB.. 3.375 Gram(s) IV Intermittent every 8 hours  senna 2 Tablet(s) Oral at bedtime  sodium chloride 0.45%. 1000 milliLiter(s) (75 mL/Hr) IV Continuous <Continuous>    MEDICATIONS  (PRN):  acetaminophen     Tablet .. 650 milliGRAM(s) Oral every 6 hours PRN Temp greater or equal to 38C (100.4F), Mild Pain (1 - 3), Moderate Pain (4 - 6)  ALBUTerol    90 MICROgram(s) HFA Inhaler 2 Puff(s) Inhalation every 6 hours PRN Shortness of Breath and/or Wheezing  benzonatate 100 milliGRAM(s) Oral every 8 hours PRN Cough  diphenhydrAMINE 25 milliGRAM(s) Oral every 4 hours PRN Rash and/or Itching  haloperidol    Injectable 1 milliGRAM(s) IV Push every 6 hours PRN Severe Agitation  hydrOXYzine hydrochloride 25 milliGRAM(s) Oral every 6 hours PRN Agitation  melatonin 3 milliGRAM(s) Oral at bedtime PRN Insomnia

## 2022-07-12 NOTE — BH CONSULTATION LIAISON PROGRESS NOTE - NSBHATTESTCOMMENTATTENDFT_PSY_A_CORE
Patient seen with resident and MS3, patient still confused and with clear impulse control and executive function deficits, answers sometimes in tangents and non-sequiturs despite being tenuously oriented, poor understanding as to why he has NC - PRN meds as above. Would keep low threshold for using restraints or 1:1 if patient continually taking off NC.

## 2022-07-13 PROCEDURE — 99233 SBSQ HOSP IP/OBS HIGH 50: CPT

## 2022-07-13 PROCEDURE — 99232 SBSQ HOSP IP/OBS MODERATE 35: CPT | Mod: GC

## 2022-07-13 RX ORDER — HYDROMORPHONE HYDROCHLORIDE 2 MG/ML
4 INJECTION INTRAMUSCULAR; INTRAVENOUS; SUBCUTANEOUS EVERY 6 HOURS
Refills: 0 | Status: DISCONTINUED | OUTPATIENT
Start: 2022-07-13 | End: 2022-07-14

## 2022-07-13 RX ADMIN — HEPARIN SODIUM 5000 UNIT(S): 5000 INJECTION INTRAVENOUS; SUBCUTANEOUS at 17:42

## 2022-07-13 RX ADMIN — PIPERACILLIN AND TAZOBACTAM 25 GRAM(S): 4; .5 INJECTION, POWDER, LYOPHILIZED, FOR SOLUTION INTRAVENOUS at 22:15

## 2022-07-13 RX ADMIN — Medication 1 MILLIGRAM(S): at 12:21

## 2022-07-13 RX ADMIN — HYDROMORPHONE HYDROCHLORIDE 4 MILLIGRAM(S): 2 INJECTION INTRAMUSCULAR; INTRAVENOUS; SUBCUTANEOUS at 19:47

## 2022-07-13 RX ADMIN — HYDROMORPHONE HYDROCHLORIDE 4 MILLIGRAM(S): 2 INJECTION INTRAMUSCULAR; INTRAVENOUS; SUBCUTANEOUS at 20:17

## 2022-07-13 RX ADMIN — SODIUM CHLORIDE 75 MILLILITER(S): 9 INJECTION, SOLUTION INTRAVENOUS at 05:54

## 2022-07-13 RX ADMIN — PIPERACILLIN AND TAZOBACTAM 25 GRAM(S): 4; .5 INJECTION, POWDER, LYOPHILIZED, FOR SOLUTION INTRAVENOUS at 05:01

## 2022-07-13 RX ADMIN — Medication 1 SPRAY(S): at 05:03

## 2022-07-13 RX ADMIN — PIPERACILLIN AND TAZOBACTAM 25 GRAM(S): 4; .5 INJECTION, POWDER, LYOPHILIZED, FOR SOLUTION INTRAVENOUS at 12:24

## 2022-07-13 RX ADMIN — Medication 25 MILLIGRAM(S): at 17:39

## 2022-07-13 RX ADMIN — HYDROMORPHONE HYDROCHLORIDE 30 MILLILITER(S): 2 INJECTION INTRAMUSCULAR; INTRAVENOUS; SUBCUTANEOUS at 07:31

## 2022-07-13 RX ADMIN — SODIUM CHLORIDE 75 MILLILITER(S): 9 INJECTION, SOLUTION INTRAVENOUS at 12:20

## 2022-07-13 RX ADMIN — FINASTERIDE 5 MILLIGRAM(S): 5 TABLET, FILM COATED ORAL at 12:21

## 2022-07-13 RX ADMIN — HEPARIN SODIUM 5000 UNIT(S): 5000 INJECTION INTRAVENOUS; SUBCUTANEOUS at 05:01

## 2022-07-13 RX ADMIN — Medication 25 MILLIGRAM(S): at 05:01

## 2022-07-13 NOTE — PROGRESS NOTE ADULT - ATTENDING COMMENTS
This is a 67 year old man with a history of sickle cell SS disease, baseline Hg 6-7g/dl, Hg S 90-93% consistent with SS disease. Patient presented to [facility] with generalized boy pain, ischemic pain crisis, severely anemic, Hg as low as 4.4g/dl.  Received simple blood transfusion but had a febrile reaction, on post reaction workup, new antibodies were found. Sample sent to [place] [facility] for identification.  Received transfusion of a 2nd matched unit without incident. Continue IVF, PCA, folic acid.  Incentive spirometry.

## 2022-07-13 NOTE — PROGRESS NOTE ADULT - SUBJECTIVE AND OBJECTIVE BOX
Gabe Hale MD PGY-4 Hematology Oncology  Reachable on TEAMS    INTERVAL HPI/OVERNIGHT EVENTS:  Patient S&E at bedside. No acute events, no active complaints    VITAL SIGNS:  T(F): 98.4 (07-13-22 @ 08:00)  HR: 78 (07-13-22 @ 08:00)  BP: 126/69 (07-13-22 @ 08:00)  RR: 18 (07-13-22 @ 10:37)  SpO2: 90% (07-13-22 @ 10:37)  Wt(kg): --    PHYSICAL EXAM:    Constitutional: NAD  Eyes: EOMI, sclera non-icteric  Neck: supple, no masses, no JVD  Respiratory: CTA b/l, good air entry b/l  Cardiovascular: RRR, no M/R/G  Gastrointestinal: soft, NTND, no masses palpable, + BS, no hepatosplenomegaly  Extremities: no c/c/e  Neurological: AAOx3      MEDICATIONS  (STANDING):  finasteride 5 milliGRAM(s) Oral daily  fluticasone propionate 50 MICROgram(s)/spray Nasal Spray 1 Spray(s) Both Nostrils two times a day  folic acid 1 milliGRAM(s) Oral daily  heparin   Injectable 5000 Unit(s) SubCutaneous every 12 hours  HYDROmorphone PCA (1 mG/mL) 30 milliLiter(s) PCA Continuous PCA Continuous  metoprolol tartrate 25 milliGRAM(s) Oral two times a day  piperacillin/tazobactam IVPB.. 3.375 Gram(s) IV Intermittent every 8 hours  senna 2 Tablet(s) Oral at bedtime  sodium chloride 0.45%. 1000 milliLiter(s) (75 mL/Hr) IV Continuous <Continuous>    MEDICATIONS  (PRN):  acetaminophen     Tablet .. 650 milliGRAM(s) Oral every 6 hours PRN Temp greater or equal to 38C (100.4F), Mild Pain (1 - 3), Moderate Pain (4 - 6)  ALBUTerol    90 MICROgram(s) HFA Inhaler 2 Puff(s) Inhalation every 6 hours PRN Shortness of Breath and/or Wheezing  benzonatate 100 milliGRAM(s) Oral every 8 hours PRN Cough  diphenhydrAMINE 25 milliGRAM(s) Oral every 4 hours PRN Rash and/or Itching  haloperidol    Injectable 1 milliGRAM(s) IV Push every 6 hours PRN Severe Agitation  hydrOXYzine hydrochloride 25 milliGRAM(s) Oral every 6 hours PRN Agitation  melatonin 3 milliGRAM(s) Oral at bedtime PRN Insomnia      Allergies    No Known Drug Allergies  peanuts (Anaphylaxis)  peanuts (Angioedema)  pork (Unknown)  shellfish (Unknown)    Intolerances    lactose (Unknown)      LABS:                        5.6    13.11 )-----------( 240      ( 12 Jul 2022 08:00 )             16.8     07-12    139  |  110<H>  |  23  ----------------------------<  91  4.4   |  18<L>  |  1.31<H>    Ca    8.5      12 Jul 2022 08:00  Phos  2.7     07-12  Mg     2.10     07-12            RADIOLOGY & ADDITIONAL TESTS:  Studies reviewed.

## 2022-07-13 NOTE — PROGRESS NOTE ADULT - ASSESSMENT
68 yo M with a PMH of sickle cell (HbSS) disease, BPH, and HTN who p/w generalized body pain x 2 days likely due to sickle cell crisis. Hematology consulted due to fevers, increased oxygen requirements, and transfusion complications including possible reaction.    #Sickle Cell Disease/Transfusion Complication/Fevers  - Patient has HbSS disease, follows w/ Dr. Hamm  - Presented with symptoms consistent with a pain crisis  - Hospital course c/b fevers and worsening O2 status  - Pain crisis being managed by Hospitalist, on PCA pump  - CTA w/ groundglass opacity possibly infxn, Ucx grew pan-sensitive E.coli, continue abx  - Attempted transfusion on 7/9, but stopped after ~ 40 min (after 60 ml transfusion) and was found to have antibodies  - appears to be a non-hemolytic febrile transfusion reaction on review  - Last Hb electrophoresis showed HbS 93.6% in April 2022, repeat Hg electrophoresis HbS 88%  - consider restarting home medications oxbryta, endari 68 yo M with a PMH of sickle cell (HbSS) disease, BPH, and HTN who p/w generalized body pain x 2 days likely due to sickle cell crisis. Hematology consulted due to fevers, increased oxygen requirements, and transfusion complications including possible reaction.    #Sickle Cell Disease/Transfusion Complication/Fevers  - Patient has HbSS disease, follows w/ Dr. Hamm but would like a new hematologist  - Presented with symptoms consistent with a pain crisis  - Hospital course c/b fevers and worsening O2 status  - Pain crisis being managed by Hospitalist, on PCA pump  - CTA w/ groundglass opacity possibly infxn, Ucx grew pan-sensitive E.coli, continue abx  - Attempted transfusion on 7/9, but stopped after ~ 40 min (after 60 ml transfusion) and was found to have antibodies  - appears to be a non-hemolytic febrile transfusion reaction on review  - Last Hb electrophoresis showed HbS 93.6% in April 2022, repeat Hg electrophoresis HbS 88%  - Upon discharge will make a referral to f/u at [facility] (likely Dr. Hilario Cortes)

## 2022-07-13 NOTE — PROGRESS NOTE ADULT - SUBJECTIVE AND OBJECTIVE BOX
[facility] Division of Hospital Medicine  Ellen Page   Available via MS Teams  In house pager [phone]     SUBJECTIVE / OVERNIGHT EVENTS:  Pt again intermittently taking oxygen off and requiring constant reminders from sitter or RN to reinsert nasal cannula prongs.  Brother was supposed to meet me at bedside today at 11AM however he was not present when I checked back several times.  Pt not providing ROS, won't answer questions when asked if he has pain/SOB or CP. Remains afebrile, nontoxic appearing. Still scratching himself all over    MEDICATIONS  (STANDING):  finasteride 5 milliGRAM(s) Oral daily  fluticasone propionate 50 MICROgram(s)/spray Nasal Spray 1 Spray(s) Both Nostrils two times a day  folic acid 1 milliGRAM(s) Oral daily  heparin   Injectable 5000 Unit(s) SubCutaneous every 12 hours  HYDROmorphone PCA (1 mG/mL) 30 milliLiter(s) PCA Continuous PCA Continuous  metoprolol tartrate 25 milliGRAM(s) Oral two times a day  piperacillin/tazobactam IVPB.. 3.375 Gram(s) IV Intermittent every 8 hours  senna 2 Tablet(s) Oral at bedtime  sodium chloride 0.45%. 1000 milliLiter(s) (75 mL/Hr) IV Continuous <Continuous>    MEDICATIONS  (PRN):  acetaminophen     Tablet .. 650 milliGRAM(s) Oral every 6 hours PRN Temp greater or equal to 38C (100.4F), Mild Pain (1 - 3), Moderate Pain (4 - 6)  ALBUTerol    90 MICROgram(s) HFA Inhaler 2 Puff(s) Inhalation every 6 hours PRN Shortness of Breath and/or Wheezing  benzonatate 100 milliGRAM(s) Oral every 8 hours PRN Cough  diphenhydrAMINE 25 milliGRAM(s) Oral every 4 hours PRN Rash and/or Itching  haloperidol    Injectable 1 milliGRAM(s) IV Push every 6 hours PRN Severe Agitation  hydrOXYzine hydrochloride 25 milliGRAM(s) Oral every 6 hours PRN Agitation  melatonin 3 milliGRAM(s) Oral at bedtime PRN Insomnia    PHYSICAL EXAM:  Vital Signs Last 24 Hrs  T(C): 36.7 (13 Jul 2022 12:29), Max: 36.9 (12 Jul 2022 16:00)  T(F): 98.1 (13 Jul 2022 12:29), Max: 98.4 (12 Jul 2022 16:00)  HR: 67 (13 Jul 2022 12:29) (67 - 90)  BP: 152/90 (13 Jul 2022 12:29) (117/55 - 152/90)  BP(mean): --  RR: 18 (13 Jul 2022 12:29) (17 - 18)  SpO2: 96% (13 Jul 2022 12:29) (90% - 100%)    Parameters below as of 13 Jul 2022 12:29  Patient On (Oxygen Delivery Method): nasal cannula      CONSTITUTIONAL: NAD, well-developed, well-groomed  EYES: PERRLA; conjunctiva and sclera clear  RESPIRATORY: Normal respiratory effort; lungs are clear to auscultation bilaterally  CARDIOVASCULAR: Regular rate and rhythm, normal S1 and S2, no murmur/rub/gallop; No lower extremity edema  ABDOMEN: Nontender to palpation, normoactive bowel sounds, no rebound/guarding  MUSCULOSKELETAL:  No clubbing or cyanosis of digits; no joint swelling   PSYCH: Awake, however no engaging in conversation, calm not agitated   NEUROLOGY: CN 2-12 are intact and symmetric; no gross sensory deficits, spontaneously moving all extremities   SKIN: No rashes; no palpable lesions    LABS:                        5.6    13.11 )-----------( 240      ( 12 Jul 2022 08:00 )             16.8     07-12    139  |  110<H>  |  23  ----------------------------<  91  4.4   |  18<L>  |  1.31<H>    Ca    8.5      12 Jul 2022 08:00  Phos  2.7     07-12  Mg     2.10     07-12    SARS-CoV-2: NotDetec (09 Jul 2022 12:00)  SARS-CoV-2: NotDetec (07 Jul 2022 05:39)    COMMUNICATION:  Care Discussed with Consultants/Other Providers and Details of Discussion: Hematology Dr. Mcghee   Discussions with Patient/Family: Left message with brother Lewis Rose

## 2022-07-14 ENCOUNTER — APPOINTMENT (OUTPATIENT)
Dept: INTERNAL MEDICINE | Facility: CLINIC | Age: 67
End: 2022-07-14

## 2022-07-14 ENCOUNTER — APPOINTMENT (OUTPATIENT)
Dept: OPHTHALMOLOGY | Facility: CLINIC | Age: 67
End: 2022-07-14

## 2022-07-14 LAB
ANION GAP SERPL CALC-SCNC: 11 MMOL/L — SIGNIFICANT CHANGE UP (ref 7–14)
BUN SERPL-MCNC: 9 MG/DL — SIGNIFICANT CHANGE UP (ref 7–23)
CALCIUM SERPL-MCNC: 8.9 MG/DL — SIGNIFICANT CHANGE UP (ref 8.4–10.5)
CHLORIDE SERPL-SCNC: 109 MMOL/L — HIGH (ref 98–107)
CO2 SERPL-SCNC: 18 MMOL/L — LOW (ref 22–31)
CREAT SERPL-MCNC: 1.1 MG/DL — SIGNIFICANT CHANGE UP (ref 0.5–1.3)
EGFR: 73 ML/MIN/1.73M2 — SIGNIFICANT CHANGE UP
GLUCOSE SERPL-MCNC: 89 MG/DL — SIGNIFICANT CHANGE UP (ref 70–99)
HCT VFR BLD CALC: 17.9 % — CRITICAL LOW (ref 39–50)
HGB BLD-MCNC: 5.9 G/DL — CRITICAL LOW (ref 13–17)
MAGNESIUM SERPL-MCNC: 1.9 MG/DL — SIGNIFICANT CHANGE UP (ref 1.6–2.6)
MCHC RBC-ENTMCNC: 27.1 PG — SIGNIFICANT CHANGE UP (ref 27–34)
MCHC RBC-ENTMCNC: 33 GM/DL — SIGNIFICANT CHANGE UP (ref 32–36)
MCV RBC AUTO: 82.1 FL — SIGNIFICANT CHANGE UP (ref 80–100)
NRBC # BLD: 30 /100 WBCS — SIGNIFICANT CHANGE UP
NRBC # FLD: 3.14 K/UL — HIGH
PHOSPHATE SERPL-MCNC: 3.2 MG/DL — SIGNIFICANT CHANGE UP (ref 2.5–4.5)
PLATELET # BLD AUTO: 225 K/UL — SIGNIFICANT CHANGE UP (ref 150–400)
POTASSIUM SERPL-MCNC: 4.3 MMOL/L — SIGNIFICANT CHANGE UP (ref 3.5–5.3)
POTASSIUM SERPL-SCNC: 4.3 MMOL/L — SIGNIFICANT CHANGE UP (ref 3.5–5.3)
RBC # BLD: 2.18 M/UL — LOW (ref 4.2–5.8)
RBC # FLD: 24.1 % — HIGH (ref 10.3–14.5)
SARS-COV-2 RNA SPEC QL NAA+PROBE: SIGNIFICANT CHANGE UP
SODIUM SERPL-SCNC: 138 MMOL/L — SIGNIFICANT CHANGE UP (ref 135–145)
WBC # BLD: 10.46 K/UL — SIGNIFICANT CHANGE UP (ref 3.8–10.5)
WBC # FLD AUTO: 10.46 K/UL — SIGNIFICANT CHANGE UP (ref 3.8–10.5)

## 2022-07-14 PROCEDURE — 99233 SBSQ HOSP IP/OBS HIGH 50: CPT

## 2022-07-14 RX ORDER — HYDROMORPHONE HYDROCHLORIDE 2 MG/ML
4 INJECTION INTRAMUSCULAR; INTRAVENOUS; SUBCUTANEOUS EVERY 6 HOURS
Refills: 0 | Status: DISCONTINUED | OUTPATIENT
Start: 2022-07-14 | End: 2022-07-17

## 2022-07-14 RX ADMIN — SENNA PLUS 2 TABLET(S): 8.6 TABLET ORAL at 21:37

## 2022-07-14 RX ADMIN — Medication 1 MILLIGRAM(S): at 13:52

## 2022-07-14 RX ADMIN — PIPERACILLIN AND TAZOBACTAM 25 GRAM(S): 4; .5 INJECTION, POWDER, LYOPHILIZED, FOR SOLUTION INTRAVENOUS at 04:51

## 2022-07-14 RX ADMIN — HYDROMORPHONE HYDROCHLORIDE 4 MILLIGRAM(S): 2 INJECTION INTRAMUSCULAR; INTRAVENOUS; SUBCUTANEOUS at 13:45

## 2022-07-14 RX ADMIN — HYDROMORPHONE HYDROCHLORIDE 4 MILLIGRAM(S): 2 INJECTION INTRAMUSCULAR; INTRAVENOUS; SUBCUTANEOUS at 14:15

## 2022-07-14 RX ADMIN — HEPARIN SODIUM 5000 UNIT(S): 5000 INJECTION INTRAVENOUS; SUBCUTANEOUS at 17:37

## 2022-07-14 RX ADMIN — HEPARIN SODIUM 5000 UNIT(S): 5000 INJECTION INTRAVENOUS; SUBCUTANEOUS at 04:50

## 2022-07-14 RX ADMIN — Medication 25 MILLIGRAM(S): at 17:35

## 2022-07-14 RX ADMIN — PIPERACILLIN AND TAZOBACTAM 25 GRAM(S): 4; .5 INJECTION, POWDER, LYOPHILIZED, FOR SOLUTION INTRAVENOUS at 13:45

## 2022-07-14 RX ADMIN — SODIUM CHLORIDE 75 MILLILITER(S): 9 INJECTION, SOLUTION INTRAVENOUS at 08:34

## 2022-07-14 RX ADMIN — PIPERACILLIN AND TAZOBACTAM 25 GRAM(S): 4; .5 INJECTION, POWDER, LYOPHILIZED, FOR SOLUTION INTRAVENOUS at 21:37

## 2022-07-14 RX ADMIN — SODIUM CHLORIDE 75 MILLILITER(S): 9 INJECTION, SOLUTION INTRAVENOUS at 20:49

## 2022-07-14 RX ADMIN — HYDROMORPHONE HYDROCHLORIDE 4 MILLIGRAM(S): 2 INJECTION INTRAMUSCULAR; INTRAVENOUS; SUBCUTANEOUS at 02:02

## 2022-07-14 RX ADMIN — Medication 25 MILLIGRAM(S): at 04:50

## 2022-07-14 RX ADMIN — FINASTERIDE 5 MILLIGRAM(S): 5 TABLET, FILM COATED ORAL at 13:53

## 2022-07-14 RX ADMIN — HYDROMORPHONE HYDROCHLORIDE 4 MILLIGRAM(S): 2 INJECTION INTRAMUSCULAR; INTRAVENOUS; SUBCUTANEOUS at 22:07

## 2022-07-14 RX ADMIN — HYDROMORPHONE HYDROCHLORIDE 4 MILLIGRAM(S): 2 INJECTION INTRAMUSCULAR; INTRAVENOUS; SUBCUTANEOUS at 01:32

## 2022-07-14 RX ADMIN — HYDROMORPHONE HYDROCHLORIDE 4 MILLIGRAM(S): 2 INJECTION INTRAMUSCULAR; INTRAVENOUS; SUBCUTANEOUS at 21:37

## 2022-07-14 NOTE — PROGRESS NOTE ADULT - SUBJECTIVE AND OBJECTIVE BOX
[facility] Division of Hospital Medicine  Ellen Page DO  Available via MS Teams  In house pager [phone]     SUBJECTIVE / OVERNIGHT EVENTS: Pt much more engaged today, states that he feels better, but still with pain in his b/l LE limiting ability to walk.  He is however in agreement with trying to ambulate today.  No further fevers, O2 sat much improved off oxygen (saturating 93-95% on RA while I was examining patient).  No CP/SOB.     MEDICATIONS  (STANDING):  finasteride 5 milliGRAM(s) Oral daily  fluticasone propionate 50 MICROgram(s)/spray Nasal Spray 1 Spray(s) Both Nostrils two times a day  folic acid 1 milliGRAM(s) Oral daily  heparin   Injectable 5000 Unit(s) SubCutaneous every 12 hours  metoprolol tartrate 25 milliGRAM(s) Oral two times a day  piperacillin/tazobactam IVPB.. 3.375 Gram(s) IV Intermittent every 8 hours  senna 2 Tablet(s) Oral at bedtime  sodium chloride 0.45%. 1000 milliLiter(s) (75 mL/Hr) IV Continuous <Continuous>    MEDICATIONS  (PRN):  acetaminophen     Tablet .. 650 milliGRAM(s) Oral every 6 hours PRN Temp greater or equal to 38C (100.4F), Mild Pain (1 - 3), Moderate Pain (4 - 6)  ALBUTerol    90 MICROgram(s) HFA Inhaler 2 Puff(s) Inhalation every 6 hours PRN Shortness of Breath and/or Wheezing  benzonatate 100 milliGRAM(s) Oral every 8 hours PRN Cough  diphenhydrAMINE 25 milliGRAM(s) Oral every 4 hours PRN Rash and/or Itching  haloperidol    Injectable 1 milliGRAM(s) IV Push every 6 hours PRN Severe Agitation  HYDROmorphone   Tablet 4 milliGRAM(s) Oral every 6 hours PRN Moderate to severe pain  hydrOXYzine hydrochloride 25 milliGRAM(s) Oral every 6 hours PRN Agitation  melatonin 3 milliGRAM(s) Oral at bedtime PRN Insomnia      I&O's Summary      PHYSICAL EXAM:  Vital Signs Last 24 Hrs  T(C): 36.6 (14 Jul 2022 04:48), Max: 36.6 (13 Jul 2022 21:36)  T(F): 97.9 (14 Jul 2022 04:48), Max: 97.9 (13 Jul 2022 21:36)  HR: 78 (14 Jul 2022 04:48) (66 - 91)  BP: 154/82 (14 Jul 2022 04:48) (154/82 - 162/86)  BP(mean): --  RR: 18 (14 Jul 2022 04:48) (17 - 18)  SpO2: 100% (14 Jul 2022 04:48) (94% - 100%)    Parameters below as of 14 Jul 2022 04:48  Patient On (Oxygen Delivery Method): nasal cannula  O2 Flow (L/min): 2    CONSTITUTIONAL: NAD, well-developed, well-groomed  EYES: PERRLA; conjunctiva and sclera clear  RESPIRATORY: Normal respiratory effort; lungs are clear to auscultation bilaterally  CARDIOVASCULAR: Regular rate and rhythm, normal S1 and S2, no murmur/rub/gallop; No lower extremity edema  ABDOMEN: Nontender to palpation, normoactive bowel sounds, no rebound/guarding  MUSCULOSKELETAL:  No clubbing or cyanosis of digits; no joint swelling  PSYCH: A+O to person, place; much more engaged today and willing to communicate   NEUROLOGY: CN 2-12 are intact and symmetric; no gross sensory deficits, no motor deficits  SKIN: No rashes; no palpable lesions    LABS:                        5.9    10.46 )-----------( 225      ( 14 Jul 2022 11:30 )             17.9     07-14    138  |  109<H>  |  9   ----------------------------<  89  4.3   |  18<L>  |  1.10    Ca    8.9      14 Jul 2022 11:30  Phos  3.2     07-14  Mg     1.90     07-14      COMMUNICATION:  Care Discussed with Consultants/Other Providers and Details of Discussion: Hematology Dr. Mcghee - no plans for further transfusion at this time

## 2022-07-14 NOTE — DIETITIAN INITIAL EVALUATION ADULT - OTHER INFO
Pt seen and reports good po intake at present with No GI distress (nausea/vomiting/diarrhea/constipation.) at present. Current wt- 118.3# (7/11/22).  Continue with supplement Ensure Enlive 3x daily (1050 fran and 60 gm protein) as ordered to promote gradual wt. gain.

## 2022-07-14 NOTE — PROGRESS NOTE ADULT - ASSESSMENT
67M HbSS SCD, BPH, and HTN p/w generalized body pain, admitted to medicine for Sickle Cell Crisis.  Course c/b hypoxemic respiratory failure likely 2/2 increase demand from acute vasoocclusive crisis - hypoxemia now improving.

## 2022-07-14 NOTE — DIETITIAN INITIAL EVALUATION ADULT - NS FNS DIET ORDER
Diet, Regular:   Low Sodium  No Caffeine  Supplement Feeding Modality:  Oral  Ensure Enlive Cans or Servings Per Day:  1       Frequency:  Three Times a day (07-08-22 @ 15:15)

## 2022-07-14 NOTE — DIETITIAN INITIAL EVALUATION ADULT - NSPROEDAABILITYLEARN_GEN_A_NUR
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

## 2022-07-14 NOTE — DIETITIAN INITIAL EVALUATION ADULT - ADD RECOMMEND
Honor food and beverage preferences within diet restriction of patient in an effort to maximize level of nutrient intake   Monitor PO intake, tolerance to nutrition supplement, weight trends, nutrition related lab values, BMs/GI distress, hydration status, skin integrity.

## 2022-07-14 NOTE — DIETITIAN INITIAL EVALUATION ADULT - ORAL INTAKE PTA/DIET HISTORY
67M HbSS SCD, BPH, and HTN p/w generalized body pain, admitted to medicine for Sickle Cell Crisis.  Course c/b hypoxic respiratory failure likely 2/2 increase demand from acute vasoocclusive crisis.    Pt sleepy and did not want to interview at this time.  Only reported good po intake at home and currently.     As per previous chart review (June/2021)-  pounds; pt states he tends to lose weight when he gets his sickle cell crises; Feb. 2021 wt was 110 pounds on previous admission.

## 2022-07-14 NOTE — DIETITIAN NUTRITION RISK NOTIFICATION - TREATMENT: THE FOLLOWING DIET HAS BEEN RECOMMENDED
Diet, Regular:   Low Sodium  No Caffeine  Supplement Feeding Modality:  Oral  Ensure Enlive Cans or Servings Per Day:  1       Frequency:  Three Times a day (07-08-22 @ 15:15) [Active]

## 2022-07-14 NOTE — DIETITIAN INITIAL EVALUATION ADULT - PERTINENT MEDS FT
MEDICATIONS  (STANDING):  finasteride 5 milliGRAM(s) Oral daily  fluticasone propionate 50 MICROgram(s)/spray Nasal Spray 1 Spray(s) Both Nostrils two times a day  folic acid 1 milliGRAM(s) Oral daily  heparin   Injectable 5000 Unit(s) SubCutaneous every 12 hours  metoprolol tartrate 25 milliGRAM(s) Oral two times a day  piperacillin/tazobactam IVPB.. 3.375 Gram(s) IV Intermittent every 8 hours  senna 2 Tablet(s) Oral at bedtime  sodium chloride 0.45%. 1000 milliLiter(s) (75 mL/Hr) IV Continuous <Continuous>    MEDICATIONS  (PRN):  acetaminophen     Tablet .. 650 milliGRAM(s) Oral every 6 hours PRN Temp greater or equal to 38C (100.4F), Mild Pain (1 - 3), Moderate Pain (4 - 6)  ALBUTerol    90 MICROgram(s) HFA Inhaler 2 Puff(s) Inhalation every 6 hours PRN Shortness of Breath and/or Wheezing  benzonatate 100 milliGRAM(s) Oral every 8 hours PRN Cough  diphenhydrAMINE 25 milliGRAM(s) Oral every 4 hours PRN Rash and/or Itching  haloperidol    Injectable 1 milliGRAM(s) IV Push every 6 hours PRN Severe Agitation  HYDROmorphone   Tablet 4 milliGRAM(s) Oral every 6 hours PRN Moderate to severe pain  hydrOXYzine hydrochloride 25 milliGRAM(s) Oral every 6 hours PRN Agitation  melatonin 3 milliGRAM(s) Oral at bedtime PRN Insomnia

## 2022-07-14 NOTE — DIETITIAN INITIAL EVALUATION ADULT - PERTINENT LABORATORY DATA
07-14    138  |  109<H>  |  9   ----------------------------<  89  4.3   |  18<L>  |  1.10    Ca    8.9      14 Jul 2022 11:30  Phos  3.2     07-14  Mg     1.90     07-14

## 2022-07-15 ENCOUNTER — TRANSCRIPTION ENCOUNTER (OUTPATIENT)
Age: 67
End: 2022-07-15

## 2022-07-15 LAB
CULTURE RESULTS: SIGNIFICANT CHANGE UP
SPECIMEN SOURCE: SIGNIFICANT CHANGE UP

## 2022-07-15 PROCEDURE — 99232 SBSQ HOSP IP/OBS MODERATE 35: CPT

## 2022-07-15 RX ADMIN — Medication 25 MILLIGRAM(S): at 18:10

## 2022-07-15 RX ADMIN — HYDROMORPHONE HYDROCHLORIDE 4 MILLIGRAM(S): 2 INJECTION INTRAMUSCULAR; INTRAVENOUS; SUBCUTANEOUS at 05:58

## 2022-07-15 RX ADMIN — Medication 25 MILLIGRAM(S): at 05:28

## 2022-07-15 RX ADMIN — PIPERACILLIN AND TAZOBACTAM 25 GRAM(S): 4; .5 INJECTION, POWDER, LYOPHILIZED, FOR SOLUTION INTRAVENOUS at 05:29

## 2022-07-15 RX ADMIN — PIPERACILLIN AND TAZOBACTAM 25 GRAM(S): 4; .5 INJECTION, POWDER, LYOPHILIZED, FOR SOLUTION INTRAVENOUS at 15:00

## 2022-07-15 RX ADMIN — HEPARIN SODIUM 5000 UNIT(S): 5000 INJECTION INTRAVENOUS; SUBCUTANEOUS at 18:12

## 2022-07-15 RX ADMIN — HYDROMORPHONE HYDROCHLORIDE 4 MILLIGRAM(S): 2 INJECTION INTRAMUSCULAR; INTRAVENOUS; SUBCUTANEOUS at 21:17

## 2022-07-15 RX ADMIN — Medication 1 MILLIGRAM(S): at 15:01

## 2022-07-15 RX ADMIN — SODIUM CHLORIDE 75 MILLILITER(S): 9 INJECTION, SOLUTION INTRAVENOUS at 09:37

## 2022-07-15 RX ADMIN — HYDROMORPHONE HYDROCHLORIDE 4 MILLIGRAM(S): 2 INJECTION INTRAMUSCULAR; INTRAVENOUS; SUBCUTANEOUS at 21:47

## 2022-07-15 RX ADMIN — PIPERACILLIN AND TAZOBACTAM 25 GRAM(S): 4; .5 INJECTION, POWDER, LYOPHILIZED, FOR SOLUTION INTRAVENOUS at 21:18

## 2022-07-15 RX ADMIN — HEPARIN SODIUM 5000 UNIT(S): 5000 INJECTION INTRAVENOUS; SUBCUTANEOUS at 05:27

## 2022-07-15 RX ADMIN — HYDROMORPHONE HYDROCHLORIDE 4 MILLIGRAM(S): 2 INJECTION INTRAMUSCULAR; INTRAVENOUS; SUBCUTANEOUS at 05:28

## 2022-07-15 RX ADMIN — FINASTERIDE 5 MILLIGRAM(S): 5 TABLET, FILM COATED ORAL at 15:01

## 2022-07-15 NOTE — PROGRESS NOTE ADULT - SUBJECTIVE AND OBJECTIVE BOX
[facility] Division of Hospital Medicine  Emile Li MD  Pager [phone]    Patient is a 67y old  Male who presents with a chief complaint of Sickle cell crisis and EMEKA (15 Jul 2022 08:36)      SUBJECTIVE / OVERNIGHT EVENTS: Feeling better      MEDICATIONS  (STANDING):  finasteride 5 milliGRAM(s) Oral daily  fluticasone propionate 50 MICROgram(s)/spray Nasal Spray 1 Spray(s) Both Nostrils two times a day  folic acid 1 milliGRAM(s) Oral daily  heparin   Injectable 5000 Unit(s) SubCutaneous every 12 hours  metoprolol tartrate 25 milliGRAM(s) Oral two times a day  piperacillin/tazobactam IVPB.. 3.375 Gram(s) IV Intermittent every 8 hours  senna 2 Tablet(s) Oral at bedtime  sodium chloride 0.45%. 1000 milliLiter(s) (75 mL/Hr) IV Continuous <Continuous>    MEDICATIONS  (PRN):  acetaminophen     Tablet .. 650 milliGRAM(s) Oral every 6 hours PRN Temp greater or equal to 38C (100.4F), Mild Pain (1 - 3), Moderate Pain (4 - 6)  ALBUTerol    90 MICROgram(s) HFA Inhaler 2 Puff(s) Inhalation every 6 hours PRN Shortness of Breath and/or Wheezing  benzonatate 100 milliGRAM(s) Oral every 8 hours PRN Cough  diphenhydrAMINE 25 milliGRAM(s) Oral every 4 hours PRN Rash and/or Itching  haloperidol    Injectable 1 milliGRAM(s) IV Push every 6 hours PRN Severe Agitation  HYDROmorphone   Tablet 4 milliGRAM(s) Oral every 6 hours PRN Moderate to severe pain  hydrOXYzine hydrochloride 25 milliGRAM(s) Oral every 6 hours PRN Agitation  melatonin 3 milliGRAM(s) Oral at bedtime PRN Insomnia      CAPILLARY BLOOD GLUCOSE        I&O's Summary      PHYSICAL EXAM:  Vital Signs Last 24 Hrs  T(C): 36.7 (15 Jul 2022 05:20), Max: 36.7 (14 Jul 2022 21:30)  T(F): 98 (15 Jul 2022 05:20), Max: 98.1 (14 Jul 2022 21:30)  HR: 68 (15 Jul 2022 05:20) (65 - 74)  BP: 150/78 (15 Jul 2022 05:20) (131/66 - 150/78)  BP(mean): --  RR: 18 (15 Jul 2022 05:20) (16 - 18)  SpO2: 97% (15 Jul 2022 05:20) (97% - 98%)    Parameters below as of 15 Jul 2022 05:20  Patient On (Oxygen Delivery Method): room air      CONSTITUTIONAL: NAD  EYES: EOMI, conjunctiva and sclera clear  ENMT: Moist oral mucosa  NECK: Supple  RESPIRATORY: Breathing unlabored, CTAB  CARDIOVASCULAR: S1S2 no MRG  ABDOMEN: Nontender to palpation, normoactive bowel sounds, no rebound/guarding  MUSCULOSKELETAL: no clubbing or cyanosis of digits  NEUROLOGY: No focal deficits   SKIN: No rashes or lesions    LABS:                        5.9    10.46 )-----------( 225      ( 14 Jul 2022 11:30 )             17.9     07-14    138  |  109<H>  |  9   ----------------------------<  89  4.3   |  18<L>  |  1.10    Ca    8.9      14 Jul 2022 11:30  Phos  3.2     07-14  Mg     1.90     07-14      CODE: FULL

## 2022-07-15 NOTE — DISCHARGE NOTE PROVIDER - CARE PROVIDER_API CALL
Hilario Cortes)  Hematology; Internal Medicine; Oncology  [address] [place], [place] [id number]  Phone: [phone]  Fax: [phone]  Follow Up Time:     Lluvia Hamm)  Internal Medicine  [address] [place] [address]  Phone: [phone]  Fax: [phone]  Follow Up Time:

## 2022-07-15 NOTE — DISCHARGE NOTE PROVIDER - NSFOLLOWUPCLINICS_GEN_ALL_ED_FT
[facility]  Internal Medicine  [address] [place] [address]  Phone: [phone]  Fax: [phone]

## 2022-07-15 NOTE — DISCHARGE NOTE PROVIDER - CARE PROVIDERS DIRECT ADDRESSES
[email or web address]

## 2022-07-15 NOTE — DISCHARGE NOTE PROVIDER - HOSPITAL COURSE
67M HbSS SCD, BPH, and HTN p/w generalized body pain, admitted to medicine for Sickle Cell Crisis.  Course c/b hypoxemic respiratory failure likely 2/2 increase demand from acute vasoocclusive crisis - hypoxemia now improving.     Acute hypoxemic respiratory failure.   - Closely monitored pt at bedside today, saturated 93-95% on RA while he was speaking.    - Continue to monitor off oxygen and check ambulatory O2 sats   - Trial of flonase as pt complaining of "itchy nose", Atarax to also assist with itching   - CT Angio reviewed, no PE however showing b/l basilar ground glass opacities.    - Abx switched to IV Zosyn (7/11-  )    Gram negative sepsis.   - Afebrile since starting abx, meets SIRS with tachy >90 and leukocytosis.  Source possibly PNA vs. UTI.  Tachycardia improving   - Repeat RVP negative  - CT Angio showing ground glass opacities, IV Ceftriaxone broadened to IV Zosyn (7/11 -), abx as above   - UA negative however UCx positive for E.coli >100K.    Sickle cell crisis.    -Does not appear to be in severe pain, PCA pump discontinued and transitioned to PO Dilaudid   -c/w IVF  -Baseline hgb is 6  -Developed fever with 1st transfusion.  Transfusion stopped and hemolysis labs sent c/w febrile non-hemolytic reaction  -7/11 received 1unit of cross matched blood and tolerated well; no further indication to transfuse at this time   -Incentive Spirometry  -folic acid  -monitor ldh, hapto, retic count  -Heme following    EMEKA (acute kidney injury).   -stabilizing   -responded well with IV fluids  -nephrotoxic meds avoided  -encouraged po meds.  - Pt to follow up with PCP as outpatient for further monitoring of labs.     Hypertension.   - Monitor BP daily  - DASH diet  - Metoprolol continued     BPH (benign prostatic hyperplasia).   - finasteride continued      Need for prophylactic measure.    -HSQ for DVT ppx   Care discussed with brother Saturnino Rose  Care d/w PMD Dr. Hamm on 7/12.    On_________, discussed with __________, patient is medically cleared and optimized for discharge today. All medications were reviewed with attending, and sent to mutually agreed upon pharmacy.     67M HbSS SCD, BPH, and HTN p/w generalized body pain, admitted to medicine for Sickle Cell Crisis.  Course c/b hypoxemic respiratory failure likely 2/2 increase demand from acute vasoocclusive crisis - hypoxemia now improving.     Acute hypoxemic respiratory failure.   - Closely monitored pt at bedside today, saturated 93-95% on RA while he was speaking.    - Continue to monitor off oxygen and check ambulatory O2 sats   - Trial of flonase as pt complaining of "itchy nose", Atarax to also assist with itching   - CT Angio reviewed, no PE however showing b/l basilar ground glass opacities.    - Abx switched to IV Zosyn (7/11-  )  - Pt to complete course with 3 days of Augmentin on discharge.     Gram negative sepsis.   - Afebrile since starting abx, meets SIRS with tachy >90 and leukocytosis.  Source possibly PNA vs. UTI.  Tachycardia improving   - Repeat RVP negative  - CT Angio showing ground glass opacities, IV Ceftriaxone broadened to IV Zosyn (7/11 -), abx as above   - UA negative however UCx positive for E.coli >100K.    Sickle cell crisis.    -Does not appear to be in severe pain, PCA pump discontinued and transitioned to PO Dilaudid   -c/w IVF  -Baseline hgb is 6  -Developed fever with 1st transfusion.  Transfusion stopped and hemolysis labs sent c/w febrile non-hemolytic reaction  -7/11 received 1unit of cross matched blood and tolerated well; no further indication to transfuse at this time   -Incentive Spirometry  -folic acid  -monitor ldh, hapto, retic count  -Heme following  - Pt to follow up with heme as outpt  - Pt sent with 5 days po dilaudid as outpatient     EMEKA (acute kidney injury).   -stabilizing   -responded well with IV fluids  -nephrotoxic meds avoided  -encouraged po meds.  - Pt to follow up with PCP as outpatient for further monitoring of labs.     Hypertension.   - Monitor BP daily  - DASH diet  - Metoprolol continued     BPH (benign prostatic hyperplasia).   - finasteride continued      Need for prophylactic measure.    -HSQ for DVT ppx   Care discussed with brother Saturnino Rose  Lio d/w PMD Dr. Hamm on 7/12.    On 7/16/2022, discussed with Dr. Li, patient is medically cleared and optimized for discharge today. All medications were reviewed with attending, and sent to mutually agreed upon pharmacy.     67M HbSS SCD, BPH, and HTN p/w generalized body pain, admitted to medicine for Sickle Cell Crisis.  Course c/b hypoxemic respiratory failure likely 2/2 increase demand from acute vasoocclusive crisis - hypoxemia now improving.     Acute hypoxemic respiratory failure.   - Closely monitored pt at bedside today, saturated 93-95% on RA while he was speaking.    - Continue to monitor off oxygen and check ambulatory O2 sats   - Trial of flonase as pt complaining of "itchy nose", Atarax to also assist with itching   - CT Angio reviewed, no PE however showing b/l basilar ground glass opacities.    - Abx switched to IV Zosyn (7/11-  )  - Pt to complete course with 3 days of Augmentin on discharge.     Gram negative sepsis.   - Afebrile since starting abx, meets SIRS with tachy >90 and leukocytosis.  Source possibly PNA vs. UTI.  Tachycardia improving   - Repeat RVP negative  - CT Angio showing ground glass opacities, IV Ceftriaxone broadened to IV Zosyn (7/11 -), abx as above   - UA negative however UCx positive for E.coli >100K.    Sickle cell crisis.    -Does not appear to be in severe pain, PCA pump discontinued and transitioned to PO Dilaudid   -c/w IVF  -Baseline hgb is 6  -Developed fever with 1st transfusion.  Transfusion stopped and hemolysis labs sent c/w febrile non-hemolytic reaction  -7/11 received 1unit of cross matched blood and tolerated well; no further indication to transfuse at this time   -Incentive Spirometry  -folic acid  -monitor ldh, hapto, retic count  -Heme following  - Pt to follow up with heme as outpt  - Pt sent with 5 days po dilaudid as outpatient     EMEKA (acute kidney injury).   -stabilizing   -responded well with IV fluids  -nephrotoxic meds avoided  -encouraged po meds.  - Pt to follow up with PCP as outpatient for further monitoring of labs.     Hypertension.   - Monitor BP daily  - DASH diet  - Metoprolol continued     BPH (benign prostatic hyperplasia).   - finasteride continued      Need for prophylactic measure.    -HSQ for DVT ppx   Care discussed with brother Saturnino Rose  Lio d/w PMD Dr. Hamm on 7/12.    On 7/17/2022, discussed with Dr. Li, patient is medically cleared and optimized for discharge today. All medications were reviewed with attending, and sent to mutually agreed upon pharmacy.     67M HbSS SCD, BPH, and HTN p/w generalized body pain, admitted to medicine for Sickle Cell Crisis.  Course c/b hypoxemic respiratory failure likely 2/2 increase demand from acute vasoocclusive crisis - hypoxemia now resolved.     Acute hypoxemic respiratory failure.   - Closely monitored pt at bedside today, saturated 93-95% on RA while he was speaking.    - Continue to monitor off oxygen and check ambulatory O2 sats   - Trial of flonase as pt complaining of "itchy nose", Atarax to also assist with itching   - CT Angio reviewed, no PE however showing b/l basilar ground glass opacities.    - Abx switched to IV Zosyn (7/11-  )  - Pt to complete course with 3 days of Augmentin on discharge.     Gram negative sepsis.   - Afebrile since starting abx, meets SIRS with tachy >90 and leukocytosis.  Source possibly PNA vs. UTI.  Tachycardia improving   - Repeat RVP negative  - CT Angio showing ground glass opacities, IV Ceftriaxone broadened to IV Zosyn (7/11 -), abx as above   - UA negative however UCx positive for E.coli >100K.    Sickle cell crisis.   - Does not appear to be in severe pain, PCA pump discontinued and transitioned to PO Dilaudid   - c/w IVF  - Baseline hgb is 6  - Developed fever with 1st transfusion.  Transfusion stopped and hemolysis labs sent c/w febrile non-hemolytic reaction  - 7/11 received 1unit of cross matched blood and tolerated well; no further indication to transfuse at this time   - Incentive Spirometry  - folic acid  - monitor ldh, hapto, retic count  - Heme following  - Pt to follow up with heme as outpt  - Pt sent with 5 days po dilaudid as outpatient     EMEKA (acute kidney injury).   - stabilizing   - responded well with IV fluids  - nephrotoxic meds avoided  - encouraged po meds.  - Pt to follow up with PCP as outpatient for further monitoring of labs.     Hypertension.   - Monitor BP daily  - DASH diet  - Metoprolol continued     BPH (benign prostatic hyperplasia).   - finasteride continued      Need for prophylactic measure.   - HSQ for DVT ppx   Care discussed with brother Saturnino Rose  Lio d/w PMD Dr. Hamm on 7/12.    On 7/18/2022, discussed with Dr. Li, patient is medically cleared and optimized for discharge today. All medications were reviewed with attending, and sent to mutually agreed upon pharmacy.     67M HbSS SCD, BPH, and HTN p/w generalized body pain, admitted to medicine for Sickle Cell Crisis.  Course c/b hypoxemic respiratory failure likely 2/2 increase demand from acute vasoocclusive crisis - hypoxemia now resolved.     Acute hypoxemic respiratory failure.   - Closely monitored pt at bedside today, saturated 93-95% on RA while he was speaking.    - Trial of flonase as pt complaining of "itchy nose", Atarax to also assist with itching   - CT Angio reviewed, no PE however showing b/l basilar ground glass opacities.    - S/p IV Zosyn   - Pt to complete course with 1 day of Augmentin on discharge.     Gram negative sepsis.   - Afebrile since starting abx, meets SIRS with tachy >90 and leukocytosis.  Source possibly PNA vs. UTI.  Tachycardia improved   - Repeat RVP negative  - CT Angio showing ground glass opacities, IV Ceftriaxone broadened to IV Zosyn (7/11 -), abx as above   - UA negative however UCx positive for E.coli >100K.    Sickle cell crisis.   - Does not appear to be in severe pain, PCA pump discontinued and transitioned to PO Dilaudid   - S/p IVF  - Baseline hgb is 6  - Developed fever with 1st transfusion.  Transfusion stopped and hemolysis labs sent c/w febrile non-hemolytic reaction  - 7/11 received 1unit of cross matched blood and tolerated well; no further indication to transfuse at this time   - Incentive Spirometry  - folic acid  - monitored ldh, hapto, retic count  - Heme following  - Pt to follow up with heme as outpt  - Pt sent with 5 days po dilaudid as outpatient     EMEKA (acute kidney injury).   - stabilized  - responded well with IV fluids  - nephrotoxic meds avoided  - encouraged po meds.  - Pt to follow up with PCP as outpatient for further monitoring of labs.     Hypertension.   - Monitor BP daily  - DASH diet  - Metoprolol continued     BPH (benign prostatic hyperplasia).   - finasteride continued       On 7/18/2022, discussed with Dr. Jerez, patient is medically cleared and optimized for discharge today. All medications were reviewed with attending, and sent to mutually agreed upon pharmacy.     67M HbSS SCD, BPH, and HTN p/w generalized body pain, admitted to medicine for Sickle Cell Crisis.  Course c/b hypoxemic respiratory failure likely 2/2 increase demand from acute vasoocclusive crisis - hypoxemia now resolved.     Acute hypoxemic respiratory failure.   - Closely monitored pt at bedside today, saturated 93-95% on RA while he was speaking.    - Trial of flonase as pt complaining of "itchy nose", Atarax to also assist with itching   - CT Angio reviewed, no PE however showing b/l basilar ground glass opacities.    - S/p IV Zosyn     Gram negative sepsis.   - Afebrile since starting abx, meets SIRS with tachy >90 and leukocytosis.  Source possibly PNA vs. UTI.  Tachycardia improved   - Repeat RVP negative  - CT Angio showing ground glass opacities, IV Ceftriaxone broadened to IV Zosyn (7/11 -), abx as above   - UA negative however UCx positive for E.coli >100K.    Sickle cell crisis.   - Does not appear to be in severe pain, PCA pump discontinued and transitioned to PO Dilaudid   - S/p IVF  - Baseline hgb is 6  - Developed fever with 1st transfusion.  Transfusion stopped and hemolysis labs sent c/w febrile non-hemolytic reaction  - 7/11 received 1unit of cross matched blood and tolerated well; no further indication to transfuse at this time   - Incentive Spirometry  - folic acid  - monitored ldh, hapto, retic count  - Heme following  - Pt to follow up with heme as outpt  - Pt sent with 5 days po dilaudid as outpatient     EMEKA (acute kidney injury).   - stabilized  - responded well with IV fluids  - nephrotoxic meds avoided  - encouraged po meds.  - Pt to follow up with PCP as outpatient for further monitoring of labs.     Hypertension.   - Monitor BP daily  - DASH diet  - Metoprolol continued     BPH (benign prostatic hyperplasia).   - finasteride continued       On 7/19/2022, discussed with Dr. Jerez, patient is medically cleared and optimized for discharge today. All medications were reviewed with attending, and sent to mutually agreed upon pharmacy.     67M HbSS SCD, BPH, and HTN p/w generalized body pain, admitted to medicine for Sickle Cell Crisis.  Course c/b hypoxemic respiratory failure (hypoxia likely 2/2 increased demand from vasoocclusive crisis vs PNA), sepsis 2/2 PNA vs E. coli UTI, and EMEKA. S/p course of antibiotics, pain management, IV fluids, clinically improved, stable for discharge.      Acute hypoxemic respiratory failure.   - potentially 2/2 vascoocclusive crisis vs PNA  - CT Angio reviewed, no PE however showing b/l basilar ground glass opacities concerning for PNA.    - S/p IV Zosyn   - respiratory status improved, currently stable on RA    Sepsis 2/2 PNA vs E. coli UTI  - Was febrile, tachy >90 and with leukocytosis.Source possibly PNA vs. e. coli UTI, afebrile after initiation of antibiotics  - Repeat RVP negative  - CT Angio showing ground glass opacities concerning for PNA, UCx positive for E.coli >100K.  - patient treated w/ IV Zosyn (sepsis resolved), transitioned to PO augmentin upon discharge to complete course (E.coli sensitive to Augmentin)    Sickle cell crisis.   - s/p PCA pump with improvement in pain control, transitioned to PO Dilaudid   - S/p IVF  - Baseline hgb is 6  - Developed fever with 1st transfusion.  Transfusion stopped and hemolysis labs sent c/w febrile non-hemolytic reaction  - 7/11 received 1unit of cross matched blood and tolerated well; no further indication to transfuse at this time   - Incentive Spirometry  - folic acid  - monitored ldh, hapto, retic count  - Heme following  - Pt to follow up with heme as outpt  - Pt sent with 5 days po dilaudid as outpatient     EMEKA (acute kidney injury).   - stabilized, 1.72 on admission, downtrending to 1.07  - responded well to IV fluids  - nephrotoxic meds avoided  - encouraged po hydration  - Pt to follow up with PCP as outpatient for further monitoring of labs.     Hypertension.   - Monitor BP daily  - DASH diet  - Metoprolol continued     BPH (benign prostatic hyperplasia).   - finasteride continued       On 7/19/2022, discussed with Dr. Jerez, patient is medically cleared and optimized for discharge today. All medications were reviewed with attending, and sent to mutually agreed upon pharmacy. Has fiollowup appointment w/ Dr. Lluvia Hamm on 7/21 330PM.

## 2022-07-15 NOTE — DISCHARGE NOTE PROVIDER - NSDCFUSCHEDAPPT_GEN_ALL_CORE_FT
[facility]  [facility] [id number]  Scheduled Appointment: 07/21/2022

## 2022-07-15 NOTE — DISCHARGE NOTE PROVIDER - NSDCCPCAREPLAN_GEN_ALL_CORE_FT
PRINCIPAL DISCHARGE DIAGNOSIS  Diagnosis: Pneumonia  Assessment and Plan of Treatment: You were found to have an infection in your lungs and urine. You were treated with antibiotics. Please complete medications as prescribed and follow up with PCP.      SECONDARY DISCHARGE DIAGNOSES  Diagnosis: Hypertension  Assessment and Plan of Treatment: Low sodium and fat diet, continue anti-hypertensive medications, and follow up with primary care physician.      Diagnosis: Sickle cell crisis  Assessment and Plan of Treatment: Follow up with hematology as outpatient for further management.    Diagnosis: EMEKA (acute kidney injury)  Assessment and Plan of Treatment: You were found to have abnormal kidney functions, which improved during your hospitalization. You are to follow up with PCP as outpatient for further monitoring of kidney functions.    Diagnosis: BPH (benign prostatic hyperplasia)  Assessment and Plan of Treatment: Continue with medications as prescribed and follow up with PCP.     PRINCIPAL DISCHARGE DIAGNOSIS  Diagnosis: Pneumonia  Assessment and Plan of Treatment: You were found to have an infection in your lungs and urine. You were treated with antibiotics. Please complete medications as prescribed and follow up with PCP.      SECONDARY DISCHARGE DIAGNOSES  Diagnosis: BPH (benign prostatic hyperplasia)  Assessment and Plan of Treatment: Continue with medications as prescribed and follow up with PCP.    Diagnosis: Hypertension  Assessment and Plan of Treatment: Low sodium and fat diet, continue anti-hypertensive medications, and follow up with primary care physician.      Diagnosis: EMEKA (acute kidney injury)  Assessment and Plan of Treatment: You were found to have abnormal kidney functions, which improved during your hospitalization. You are to follow up with PCP as outpatient for further monitoring of kidney functions.    Diagnosis: Sickle cell crisis  Assessment and Plan of Treatment: Stay hydrated with water. Continue medications as prescribed. Follow up with your PCP for further evaluation and refills of pain medication. Please call to make an appointment Follow up with hematology as outpatient for further management.     PRINCIPAL DISCHARGE DIAGNOSIS  Diagnosis: Pneumonia  Assessment and Plan of Treatment: You were found to have an infection in your lungs and urine. You completed a course of antibiotics. Please follow up with PCP.      SECONDARY DISCHARGE DIAGNOSES  Diagnosis: BPH (benign prostatic hyperplasia)  Assessment and Plan of Treatment: Continue with medications as prescribed and follow up with PCP.    Diagnosis: Hypertension  Assessment and Plan of Treatment: Low sodium and fat diet, continue anti-hypertensive medications, and follow up with primary care physician.      Diagnosis: EMEKA (acute kidney injury)  Assessment and Plan of Treatment: You were found to have abnormal kidney functions, which improved during your hospitalization. You are to follow up with PCP as outpatient for further monitoring of kidney functions.    Diagnosis: Sickle cell crisis  Assessment and Plan of Treatment: Stay hydrated with water. Continue medications as prescribed. Follow up with your PCP for further evaluation and refills of pain medication. Please call to make an appointment Follow up with hematology as outpatient for further management.     PRINCIPAL DISCHARGE DIAGNOSIS  Diagnosis: Sickle cell crisis  Assessment and Plan of Treatment: Stay hydrated with water. Continue medications as prescribed. Follow up with your PCP for further evaluation and refills of pain medication. Please call to make an appointment Follow up with hematology as outpatient for further management.      SECONDARY DISCHARGE DIAGNOSES  Diagnosis: Acute respiratory failure with hypoxia  Assessment and Plan of Treatment: You had trouble breathing likely due to pneumonia and sickle cell vasoocclusive crisis. You improved with treatment.    Diagnosis: Sepsis  Assessment and Plan of Treatment: You were found to have an infection in your lungs and urine. You completed a course of antibiotics. Please follow up with PCP.    Diagnosis: Pneumonia  Assessment and Plan of Treatment: You were found to have an infection in your lungs. You completed a course of antibiotics. Please follow up with PCP.    Diagnosis: EMEKA (acute kidney injury)  Assessment and Plan of Treatment: You were found to have abnormal kidney functions, which improved during your hospitalization. You are to follow up with PCP as outpatient for further monitoring of kidney functions.    Diagnosis: BPH (benign prostatic hyperplasia)  Assessment and Plan of Treatment: Continue with medications as prescribed and follow up with PCP.    Diagnosis: Hypertension  Assessment and Plan of Treatment: Low sodium and fat diet, continue anti-hypertensive medications, and follow up with primary care physician.

## 2022-07-15 NOTE — DISCHARGE NOTE PROVIDER - NSDCCAREPROVSEEN_GEN_ALL_CORE_FT
Ellen Page Soft, non-tender, no hepatosplenomegaly, normal bowel sounds Ellen Page Kenneth A Camilo, Ellen Li, Emile Jerez, Edward

## 2022-07-15 NOTE — DISCHARGE NOTE PROVIDER - NSDCFUADDAPPT_GEN_ALL_CORE_FT
Follow up with your primary care physician for further monitoring in 1-2 weeks. Please call to arrange appointment.   An appointment with your primary care provider Dr Lluvia Hamm has been scheduled for Thursday 7/21 at 3:30 pm. This is a tele visit (a visit through the computer). Please check email to join appointment. Do not go to office for appointment as the visit is virtual as we discussed. Please call office at  for any questions.

## 2022-07-15 NOTE — DISCHARGE NOTE PROVIDER - NSDCMRMEDTOKEN_GEN_ALL_CORE_FT
finasteride 5 mg oral tablet: 1 tab(s) orally once a day  folic acid 1 mg oral tablet: 1 tab(s) orally once a day  HYDROmorphone 4 mg oral tablet: 1 tab(s) orally every 6 hours MDD:16mg  metoprolol tartrate 25 mg oral tablet: 1 tab(s) orally 2 times a day   albuterol 90 mcg/inh inhalation aerosol: 2 puff(s) inhaled every 6 hours, As needed, Shortness of Breath and/or Wheezing  finasteride 5 mg oral tablet: 1 tab(s) orally once a day  fluticasone 50 mcg/inh nasal spray: 1 spray(s) nasal 2 times a day  folic acid 1 mg oral tablet: 1 tab(s) orally once a day  HYDROmorphone 4 mg oral tablet: 1 tab(s) orally every 6 hours, As needed, Moderate to severe pain MDD:4 tabs daily  metoprolol tartrate 25 mg oral tablet: 1 tab(s) orally 2 times a day  senna leaf extract oral tablet: 2 tab(s) orally once a day (at bedtime)   albuterol 90 mcg/inh inhalation aerosol: 2 puff(s) inhaled every 6 hours, As needed, Shortness of Breath and/or Wheezing  amoxicillin-clavulanate 875 mg-125 mg oral tablet: 1 tab(s) orally 2 times a day   finasteride 5 mg oral tablet: 1 tab(s) orally once a day  fluticasone 50 mcg/inh nasal spray: 1 spray(s) nasal 2 times a day  folic acid 1 mg oral tablet: 1 tab(s) orally once a day  HYDROmorphone 4 mg oral tablet: 1 tab(s) orally every 6 hours, As needed, Moderate to severe pain MDD:4 tabs daily  metoprolol tartrate 25 mg oral tablet: 1 tab(s) orally 2 times a day  senna leaf extract oral tablet: 2 tab(s) orally once a day (at bedtime)

## 2022-07-15 NOTE — DISCHARGE NOTE PROVIDER - DETAILS OF MALNUTRITION DIAGNOSIS/DIAGNOSES
This patient has been assessed with a concern for Malnutrition and was treated during this hospitalization for the following Nutrition diagnosis/diagnoses:     -  07/14/2022: Underweight (BMI < 19)

## 2022-07-16 ENCOUNTER — TRANSCRIPTION ENCOUNTER (OUTPATIENT)
Age: 67
End: 2022-07-16

## 2022-07-16 LAB
ANION GAP SERPL CALC-SCNC: 12 MMOL/L — SIGNIFICANT CHANGE UP (ref 7–14)
BUN SERPL-MCNC: 6 MG/DL — LOW (ref 7–23)
CALCIUM SERPL-MCNC: 8.8 MG/DL — SIGNIFICANT CHANGE UP (ref 8.4–10.5)
CHLORIDE SERPL-SCNC: 109 MMOL/L — HIGH (ref 98–107)
CO2 SERPL-SCNC: 17 MMOL/L — LOW (ref 22–31)
CREAT SERPL-MCNC: 1.07 MG/DL — SIGNIFICANT CHANGE UP (ref 0.5–1.3)
EGFR: 75 ML/MIN/1.73M2 — SIGNIFICANT CHANGE UP
GLUCOSE SERPL-MCNC: 114 MG/DL — HIGH (ref 70–99)
HCT VFR BLD CALC: 18.7 % — CRITICAL LOW (ref 39–50)
HGB BLD-MCNC: 5.9 G/DL — CRITICAL LOW (ref 13–17)
MAGNESIUM SERPL-MCNC: 1.8 MG/DL — SIGNIFICANT CHANGE UP (ref 1.6–2.6)
MCHC RBC-ENTMCNC: 26.8 PG — LOW (ref 27–34)
MCHC RBC-ENTMCNC: 31.6 GM/DL — LOW (ref 32–36)
MCV RBC AUTO: 85 FL — SIGNIFICANT CHANGE UP (ref 80–100)
NRBC # BLD: 9 /100 WBCS — SIGNIFICANT CHANGE UP
NRBC # FLD: 0.72 K/UL — HIGH
PHOSPHATE SERPL-MCNC: 2.8 MG/DL — SIGNIFICANT CHANGE UP (ref 2.5–4.5)
PLATELET # BLD AUTO: 187 K/UL — SIGNIFICANT CHANGE UP (ref 150–400)
POTASSIUM SERPL-MCNC: 3.9 MMOL/L — SIGNIFICANT CHANGE UP (ref 3.5–5.3)
POTASSIUM SERPL-SCNC: 3.9 MMOL/L — SIGNIFICANT CHANGE UP (ref 3.5–5.3)
RBC # BLD: 2.2 M/UL — LOW (ref 4.2–5.8)
RBC # FLD: 22.4 % — HIGH (ref 10.3–14.5)
SODIUM SERPL-SCNC: 138 MMOL/L — SIGNIFICANT CHANGE UP (ref 135–145)
WBC # BLD: 7.9 K/UL — SIGNIFICANT CHANGE UP (ref 3.8–10.5)
WBC # FLD AUTO: 7.9 K/UL — SIGNIFICANT CHANGE UP (ref 3.8–10.5)

## 2022-07-16 PROCEDURE — 99232 SBSQ HOSP IP/OBS MODERATE 35: CPT

## 2022-07-16 RX ORDER — SENNA PLUS 8.6 MG/1
2 TABLET ORAL
Qty: 60 | Refills: 0
Start: 2022-07-16 | End: 2022-08-14

## 2022-07-16 RX ORDER — ALBUTEROL 90 UG/1
2 AEROSOL, METERED ORAL
Qty: 1 | Refills: 0
Start: 2022-07-16 | End: 2022-08-14

## 2022-07-16 RX ORDER — HYDROMORPHONE HYDROCHLORIDE 2 MG/ML
1 INJECTION INTRAMUSCULAR; INTRAVENOUS; SUBCUTANEOUS
Qty: 20 | Refills: 0
Start: 2022-07-16 | End: 2022-07-20

## 2022-07-16 RX ORDER — FLUTICASONE PROPIONATE 50 MCG
1 SPRAY, SUSPENSION NASAL
Qty: 0 | Refills: 0 | DISCHARGE
Start: 2022-07-16

## 2022-07-16 RX ORDER — FOLIC ACID 0.8 MG
1 TABLET ORAL
Qty: 30 | Refills: 0
Start: 2022-07-16 | End: 2022-08-14

## 2022-07-16 RX ORDER — METOPROLOL TARTRATE 50 MG
1 TABLET ORAL
Qty: 60 | Refills: 0
Start: 2022-07-16 | End: 2022-08-14

## 2022-07-16 RX ADMIN — FINASTERIDE 5 MILLIGRAM(S): 5 TABLET, FILM COATED ORAL at 13:24

## 2022-07-16 RX ADMIN — HYDROMORPHONE HYDROCHLORIDE 4 MILLIGRAM(S): 2 INJECTION INTRAMUSCULAR; INTRAVENOUS; SUBCUTANEOUS at 06:29

## 2022-07-16 RX ADMIN — PIPERACILLIN AND TAZOBACTAM 25 GRAM(S): 4; .5 INJECTION, POWDER, LYOPHILIZED, FOR SOLUTION INTRAVENOUS at 13:23

## 2022-07-16 RX ADMIN — HYDROMORPHONE HYDROCHLORIDE 4 MILLIGRAM(S): 2 INJECTION INTRAMUSCULAR; INTRAVENOUS; SUBCUTANEOUS at 22:18

## 2022-07-16 RX ADMIN — Medication 1 MILLIGRAM(S): at 13:23

## 2022-07-16 RX ADMIN — Medication 25 MILLIGRAM(S): at 05:48

## 2022-07-16 RX ADMIN — PIPERACILLIN AND TAZOBACTAM 25 GRAM(S): 4; .5 INJECTION, POWDER, LYOPHILIZED, FOR SOLUTION INTRAVENOUS at 05:48

## 2022-07-16 RX ADMIN — SODIUM CHLORIDE 75 MILLILITER(S): 9 INJECTION, SOLUTION INTRAVENOUS at 13:32

## 2022-07-16 RX ADMIN — HEPARIN SODIUM 5000 UNIT(S): 5000 INJECTION INTRAVENOUS; SUBCUTANEOUS at 05:49

## 2022-07-16 RX ADMIN — HYDROMORPHONE HYDROCHLORIDE 4 MILLIGRAM(S): 2 INJECTION INTRAMUSCULAR; INTRAVENOUS; SUBCUTANEOUS at 22:48

## 2022-07-16 RX ADMIN — PIPERACILLIN AND TAZOBACTAM 25 GRAM(S): 4; .5 INJECTION, POWDER, LYOPHILIZED, FOR SOLUTION INTRAVENOUS at 21:16

## 2022-07-16 RX ADMIN — HYDROMORPHONE HYDROCHLORIDE 4 MILLIGRAM(S): 2 INJECTION INTRAMUSCULAR; INTRAVENOUS; SUBCUTANEOUS at 05:49

## 2022-07-16 NOTE — DISCHARGE NOTE NURSING/CASE MANAGEMENT/SOCIAL WORK - NSDCVIVACCINE_GEN_ALL_CORE_FT
influenza, injectable, quadrivalent, preservative free; 12-Oct-2018 14:38; Juana Bruce (RN); [employer]; [id number] (Exp. Date: 30-Jun-2019); IntraMuscular; Deltoid Left.; 0.5 milliLiter(s); VIS (VIS Published: 07-Aug-2015, VIS Presented: 12-Oct-2018);

## 2022-07-16 NOTE — PROVIDER CONTACT NOTE (CRITICAL VALUE NOTIFICATION) - SITUATION
hemoglobin and hematocrit-4.4/12.7
Hgb 5,6, Hct 16.8%
Patients morning lab work came back with critical of hgb of 4.8 and hct of 14.0
Patient has Hgb 5.9; Hct 18.7
Routine labs from 7/14 H/H 5.9/17.9

## 2022-07-16 NOTE — DISCHARGE NOTE NURSING/CASE MANAGEMENT/SOCIAL WORK - PATIENT PORTAL LINK FT
You can access the FollowMyHealth Patient Portal offered by [facility] by registering at the following website: [email or web address]. By joining [employer]’s FollowMyHealth portal, you will also be able to view your health information using other applications (apps) compatible with our system.

## 2022-07-16 NOTE — PROGRESS NOTE ADULT - ASSESSMENT
67M HbSS SCD, BPH, and HTN p/w generalized body pain, admitted to medicine for Sickle Cell Crisis.  Course c/b hypoxemic respiratory failure likely 2/2 increase demand from acute vasoocclusive crisis - hypoxemia now improved.

## 2022-07-16 NOTE — PROVIDER CONTACT NOTE (CRITICAL VALUE NOTIFICATION) - BACKGROUND
Patient was admitted for sickle cell crisis.
pt adm with SCC/PNA. S/P 1unit PRBC this admission.  Hx of SCD, BPH, left ventricular dysfunction, HTN, ACS intellectual disability
Patient admitted for hb-ss disease with crisis
Admit Dx: Sickle cell disease crisis, PMH: HTN, BPH, Intellectual disability
Pt came in with sickle cell crisis

## 2022-07-16 NOTE — PROVIDER CONTACT NOTE (CRITICAL VALUE NOTIFICATION) - ASSESSMENT
hemoglobin and hematocrit-4.4/12.7
Patient A&Ox3. No s/s of distress noted. No s/s of bleeding noted. Patient still refusing oxygen
pt asymptomatic
Patient is AO4; no s/s of acute distress noted.
Hgb 5,6, Hct 16.8%. Patient is asymptomatic

## 2022-07-16 NOTE — PROVIDER CONTACT NOTE (CRITICAL VALUE NOTIFICATION) - RECOMMENDATIONS
Notify ACP
No recommendations
Continue to monitor labs. Monitor for s/s of bleeding
Gabriel Caldwell NP #[id number] aware

## 2022-07-16 NOTE — DISCHARGE NOTE NURSING/CASE MANAGEMENT/SOCIAL WORK - NSDCPEFALRISK_GEN_ALL_CORE
For information on Fall & Injury Prevention, visit: [email or web address] OR  [email or web address] OR  https://www.cdc.gov/steadi/patient.html

## 2022-07-16 NOTE — PROVIDER CONTACT NOTE (CRITICAL VALUE NOTIFICATION) - NAME OF MD/NP/PA/DO NOTIFIED:
Bhumi Chambers
KEVIN Hirsch
BELÉN Caldwell #[id number]
Liss Zamarripa ([facility])
Dallin SILVA

## 2022-07-16 NOTE — CHART NOTE - NSCHARTNOTESELECT_GEN_ALL_CORE
Event Note
Event Note
Hematology/Event Note
I-STOP/Event Note
Event Note

## 2022-07-16 NOTE — CHART NOTE - NSCHARTNOTEFT_GEN_A_CORE
4.7    13.70 )-----------( 256      ( 08 Jul 2022 11:02 )             14.0   07-08    135  |  105  |  22  ----------------------------<  110<H>  5.5<H>   |  21<L>  |  1.36<H>    Ca    8.3<L>      08 Jul 2022 11:02  Phos  3.4     07-08  Mg     2.10     07-08    TPro  7.9  /  Alb  4.4  /  TBili  3.4<H>  /  DBili  x   /  AST  40  /  ALT  14  /  AlkPhos  79  07-07    Pts labs reviewed with Dr. Garza, baseline Hgb 6.0, currently 4.7 as noted above. Pt is resting comfortably in bed, currently not reporting chest pain, weakness, SOB. Also, with mild hyperkalemia 5.5 (not hemolyzed), Dr. Garza recommend Lokelma 10g x 1 and repeat labs this afternoon with T+S, if H/H persistently low will need 1 unit PRBC, follow up repeat BMP. Will continue to monitor closely and follow up repeat labs, assess need for transfusion.
DC planning for today  Will send pt with 5 days of dilaudid po and 3 ore days of augmentin per discussion with Dr. Li.       Liss Zamarripa | Reference #: [id number]    You have not added a BREN number. Keeping your BREN number(s) up to date on the My BREN # page will enable the separation of your prescriptions from others in the search results.    Others' Prescriptions  Patient Name: Hira Plummer Date: 1955  Address: [address] [place] [id number]: Male  Rx Written	Rx Dispensed	Drug	Quantity	Days Supply	Prescriber Name	Prescriber Bren #	Payment Method  05/10/2022	05/10/2022	hydromorphone 4 mg tablet	40	7	Lluvia Hamm	[id number]	Medicare Dispenser [facility]  04/14/2022	04/14/2022	hydromorphone 4 mg tablet	40	7	Lluvia Hamm	[id number]	Medicare Dispenser [facility]  01/31/2022	02/01/2022	hydromorphone 4 mg tablet	40	7	Lluvia Hamm	[id number]	Medicare Dispenser [facility]  11/12/2021	11/12/2021	hydromorphone 4 mg tablet	20	5	Armaan Bustos	[id number]	Medicare  Dispenser [facility]  08/12/2021	08/18/2021	hydromorphone 4 mg tablet	40	7	Good Lyman MD	[id number]	Medicare  Dispenser [facility]  * - Drugs marked with an asterisk are compound drugs. If the compound drug is made up of more than one controlled substance, then each controlled substance will be a separate row in the table.    Liss Zamarripa PA-C  Department of Medicine  Pager [phone]
Informed by RN patient's temperature is 100.4 about 45 minutes into the blood transfusion. Patient is asymptomatic. Of note, patient with multiple antibodies. Called blood bank attending Dr. Deleon ([phone]) who advised stopping the blood transfusion and sending hemolysis labs (ordered). Dr. Garza made aware and at patient's bedside.     Of note, patient had a temperature overnight of 100.6. Currently O2 saturation is 84% on room air (patient without labored breathing). Overnight patient was placed on 2L nasal cannula (unclear flowsheets). Will send infectious work up, including RVP and repeat CXR. Continuous pulse oximetry ordered.
Patient admitted with sickle cell pain crisis currently refusing to wear NC while on dilauded PCA. Patient ordered for 2LNC with  while on PCA and patient is known to desaturate off O2 down to the 70s. Patient assessed at bedside found to be asleep but easily arousable. Patient unwilling to participate in interview stating "go away and leave me alone". Explained to patient importance of oxygen while on PCA dilauded with potential of respiratory failure/death. Pt. stated "I'm fine" and continued to refuse nasal canula. Patient refusing to verbalize why he does not want to wear it. Explained to patient that PCA pump may be discontinued if he continues to refuse to wear NC and he is desaturating. Attending Dr. Page made aware.     Javi Hirsch, MABELDERICK-BC
67M h/o sickle cell SS disease, baseline Hg 6-7g/dl, Hg S 90-93% consistent with SS disease. Patient presented to [facility] with generalized boy pain, ischemic pain crisis, severely anemic, Hg as low as 4.4g/dl.  Received simple blood transfusion but had a febrile reaction, on post reaction workup, no new clinically signficant ab found, has multiple allo antibodies and reported to have antigen neg blood that will require full cross match per July 11th [facility] report. Received transfusion of a 2nd matched unit without incident. Continue with supportive care: IVF, PCA, folic acid, incentive spirometry. Now on RA. Patient can f/u with Dr. Hamm as outpt as he is already an established patient. Hematology to sign off. Please call back with any questions.    Kuldeep Mcghee Heme/onc PGY6 [phone]
Notified by RN pt is refusing to wear nasal cannula. Pt's pulse ox is 86-91 ORA. Pt is A&Ox3. Pt exhibits understanding of the risks of refusing oxygen at this time. Will continue to monitor.
Others' Prescriptions  Patient Name: Hira Plummer Date: 1955  Address: [address] [place] [id number]: Male  Rx Written	Rx Dispensed	Drug	Quantity	Days Supply	Prescriber Name  05/10/2022	05/10/2022	hydromorphone 4 mg tablet	40	7	Lluvia Hamm  Prescriber Bren # [id number]  Payment Method Medicare Dispenser [facility]  04/14/2022	04/14/2022	hydromorphone 4 mg tablet	40	7	Lluvia Hamm  Prescriber Bren # [id number]  Payment Method Medicare Dispenser [facility]  01/31/2022	02/01/2022	hydromorphone 4 mg tablet	40	7	Lluvia Hamm  Prescriber Bren # [id number]  Payment Method Medicare Dispenser [facility]  11/12/2021	11/12/2021	hydromorphone 4 mg tablet	20	5	Armaan Bustos  Prescriber Bren # [id number]  Payment Method Medicare Dispenser [facility]  08/12/2021	08/18/2021	hydromorphone 4 mg tablet	40	7	Good Lyman MD  Prescriber Bren # [id number]  Payment Method Medicare Dispenser [facility]
Pt with SCC with Hgb of 5 , PRBC ordered however Pt with multiple Antbs. D/W with Blood banc attending Request was made to [facility], still awaiting PRBC

## 2022-07-16 NOTE — PROVIDER CONTACT NOTE (CRITICAL VALUE NOTIFICATION) - ACTION/TREATMENT ORDERED:
no new orders at this time
Sandy Caldwell NP #[id number] aware
Continue to monitor Labs
No treatment/action ordered
KEVIN Hirsch made aware. No new orders at this time

## 2022-07-16 NOTE — PROVIDER CONTACT NOTE (CRITICAL VALUE NOTIFICATION) - TEST AND RESULT REPORTED:
Hgb 5,6, Hct 16.8%
hemoglobin and hematocrit-4.4/12.7
H/H 5.9/17.9
Hgb 4.8 and Hct 14.0
Hgb 5.9, Hct 18.7

## 2022-07-16 NOTE — PROGRESS NOTE ADULT - SUBJECTIVE AND OBJECTIVE BOX
[facility] Division of Hospital Medicine  Emile Li MD  Pager [phone]    Patient is a 67y old  Male who presents with a chief complaint of Sickle cell crisis and EMEKA (15 Jul 2022 12:14)      SUBJECTIVE / OVERNIGHT EVENTS: Feels better      MEDICATIONS  (STANDING):  finasteride 5 milliGRAM(s) Oral daily  fluticasone propionate 50 MICROgram(s)/spray Nasal Spray 1 Spray(s) Both Nostrils two times a day  folic acid 1 milliGRAM(s) Oral daily  heparin   Injectable 5000 Unit(s) SubCutaneous every 12 hours  metoprolol tartrate 25 milliGRAM(s) Oral two times a day  piperacillin/tazobactam IVPB.. 3.375 Gram(s) IV Intermittent every 8 hours  senna 2 Tablet(s) Oral at bedtime  sodium chloride 0.45%. 1000 milliLiter(s) (75 mL/Hr) IV Continuous <Continuous>    MEDICATIONS  (PRN):  acetaminophen     Tablet .. 650 milliGRAM(s) Oral every 6 hours PRN Temp greater or equal to 38C (100.4F), Mild Pain (1 - 3), Moderate Pain (4 - 6)  ALBUTerol    90 MICROgram(s) HFA Inhaler 2 Puff(s) Inhalation every 6 hours PRN Shortness of Breath and/or Wheezing  benzonatate 100 milliGRAM(s) Oral every 8 hours PRN Cough  diphenhydrAMINE 25 milliGRAM(s) Oral every 4 hours PRN Rash and/or Itching  haloperidol    Injectable 1 milliGRAM(s) IV Push every 6 hours PRN Severe Agitation  HYDROmorphone   Tablet 4 milliGRAM(s) Oral every 6 hours PRN Moderate to severe pain  hydrOXYzine hydrochloride 25 milliGRAM(s) Oral every 6 hours PRN Agitation  melatonin 3 milliGRAM(s) Oral at bedtime PRN Insomnia      CAPILLARY BLOOD GLUCOSE        I&O's Summary      PHYSICAL EXAM:  Vital Signs Last 24 Hrs  T(C): 36.8 (16 Jul 2022 12:24), Max: 36.8 (16 Jul 2022 12:24)  T(F): 98.2 (16 Jul 2022 12:24), Max: 98.2 (16 Jul 2022 12:24)  HR: 65 (16 Jul 2022 12:24) (65 - 83)  BP: 147/70 (16 Jul 2022 12:24) (145/64 - 150/71)  BP(mean): --  RR: 18 (16 Jul 2022 12:24) (17 - 18)  SpO2: 100% (16 Jul 2022 12:24) (98% - 100%)    Parameters below as of 16 Jul 2022 12:24  Patient On (Oxygen Delivery Method): room air      CONSTITUTIONAL: NAD  EYES: EOMI, conjunctiva and sclera clear  ENMT: Moist oral mucosa  NECK: Supple  RESPIRATORY: Breathing unlabored, CTAB  CARDIOVASCULAR: S1S2 no MRG  ABDOMEN: Nontender to palpation, normoactive bowel sounds, no rebound/guarding  MUSCULOSKELETAL: no clubbing or cyanosis of digits  NEUROLOGY: No focal deficits   SKIN: No rashes or lesions    LABS:                        5.9    7.90  )-----------( 187      ( 16 Jul 2022 07:07 )             18.7     07-16    138  |  109<H>  |  6<L>  ----------------------------<  114<H>  3.9   |  17<L>  |  1.07    Ca    8.8      16 Jul 2022 07:07  Phos  2.8     07-16  Mg     1.80     07-16                CODE: FULL

## 2022-07-17 PROCEDURE — 99232 SBSQ HOSP IP/OBS MODERATE 35: CPT

## 2022-07-17 RX ORDER — HYDROMORPHONE HYDROCHLORIDE 2 MG/ML
4 INJECTION INTRAMUSCULAR; INTRAVENOUS; SUBCUTANEOUS EVERY 4 HOURS
Refills: 0 | Status: DISCONTINUED | OUTPATIENT
Start: 2022-07-17 | End: 2022-07-19

## 2022-07-17 RX ADMIN — HYDROMORPHONE HYDROCHLORIDE 4 MILLIGRAM(S): 2 INJECTION INTRAMUSCULAR; INTRAVENOUS; SUBCUTANEOUS at 22:05

## 2022-07-17 RX ADMIN — PIPERACILLIN AND TAZOBACTAM 25 GRAM(S): 4; .5 INJECTION, POWDER, LYOPHILIZED, FOR SOLUTION INTRAVENOUS at 13:27

## 2022-07-17 RX ADMIN — PIPERACILLIN AND TAZOBACTAM 25 GRAM(S): 4; .5 INJECTION, POWDER, LYOPHILIZED, FOR SOLUTION INTRAVENOUS at 22:07

## 2022-07-17 RX ADMIN — Medication 1 MILLIGRAM(S): at 13:27

## 2022-07-17 RX ADMIN — HEPARIN SODIUM 5000 UNIT(S): 5000 INJECTION INTRAVENOUS; SUBCUTANEOUS at 05:14

## 2022-07-17 RX ADMIN — PIPERACILLIN AND TAZOBACTAM 25 GRAM(S): 4; .5 INJECTION, POWDER, LYOPHILIZED, FOR SOLUTION INTRAVENOUS at 05:14

## 2022-07-17 RX ADMIN — HYDROMORPHONE HYDROCHLORIDE 4 MILLIGRAM(S): 2 INJECTION INTRAMUSCULAR; INTRAVENOUS; SUBCUTANEOUS at 12:15

## 2022-07-17 RX ADMIN — HYDROMORPHONE HYDROCHLORIDE 4 MILLIGRAM(S): 2 INJECTION INTRAMUSCULAR; INTRAVENOUS; SUBCUTANEOUS at 17:31

## 2022-07-17 RX ADMIN — HYDROMORPHONE HYDROCHLORIDE 4 MILLIGRAM(S): 2 INJECTION INTRAMUSCULAR; INTRAVENOUS; SUBCUTANEOUS at 05:06

## 2022-07-17 RX ADMIN — HYDROMORPHONE HYDROCHLORIDE 4 MILLIGRAM(S): 2 INJECTION INTRAMUSCULAR; INTRAVENOUS; SUBCUTANEOUS at 18:31

## 2022-07-17 RX ADMIN — SENNA PLUS 2 TABLET(S): 8.6 TABLET ORAL at 22:05

## 2022-07-17 RX ADMIN — FINASTERIDE 5 MILLIGRAM(S): 5 TABLET, FILM COATED ORAL at 13:27

## 2022-07-17 RX ADMIN — HYDROMORPHONE HYDROCHLORIDE 4 MILLIGRAM(S): 2 INJECTION INTRAMUSCULAR; INTRAVENOUS; SUBCUTANEOUS at 11:15

## 2022-07-17 RX ADMIN — SODIUM CHLORIDE 75 MILLILITER(S): 9 INJECTION, SOLUTION INTRAVENOUS at 05:11

## 2022-07-17 RX ADMIN — Medication 25 MILLIGRAM(S): at 05:15

## 2022-07-17 RX ADMIN — HYDROMORPHONE HYDROCHLORIDE 4 MILLIGRAM(S): 2 INJECTION INTRAMUSCULAR; INTRAVENOUS; SUBCUTANEOUS at 23:05

## 2022-07-17 NOTE — PROGRESS NOTE ADULT - SUBJECTIVE AND OBJECTIVE BOX
[facility] Division of Hospital Medicine  Emile Li MD  Pager [phone]    Patient is a 67y old  Male who presents with a chief complaint of Sickle cell crisis and EMEKA (16 Jul 2022 14:24)      SUBJECTIVE / OVERNIGHT EVENTS: Transportation di dnot show up so patient remains in hospital. Now refuses to bailey coto because no one is home this afternoon.      MEDICATIONS  (STANDING):  finasteride 5 milliGRAM(s) Oral daily  fluticasone propionate 50 MICROgram(s)/spray Nasal Spray 1 Spray(s) Both Nostrils two times a day  folic acid 1 milliGRAM(s) Oral daily  heparin   Injectable 5000 Unit(s) SubCutaneous every 12 hours  metoprolol tartrate 25 milliGRAM(s) Oral two times a day  piperacillin/tazobactam IVPB.. 3.375 Gram(s) IV Intermittent every 8 hours  senna 2 Tablet(s) Oral at bedtime  sodium chloride 0.45%. 1000 milliLiter(s) (75 mL/Hr) IV Continuous <Continuous>    MEDICATIONS  (PRN):  acetaminophen     Tablet .. 650 milliGRAM(s) Oral every 6 hours PRN Temp greater or equal to 38C (100.4F), Mild Pain (1 - 3), Moderate Pain (4 - 6)  ALBUTerol    90 MICROgram(s) HFA Inhaler 2 Puff(s) Inhalation every 6 hours PRN Shortness of Breath and/or Wheezing  benzonatate 100 milliGRAM(s) Oral every 8 hours PRN Cough  diphenhydrAMINE 25 milliGRAM(s) Oral every 4 hours PRN Rash and/or Itching  haloperidol    Injectable 1 milliGRAM(s) IV Push every 6 hours PRN Severe Agitation  HYDROmorphone   Tablet 4 milliGRAM(s) Oral every 4 hours PRN Moderate to severe pain  hydrOXYzine hydrochloride 25 milliGRAM(s) Oral every 6 hours PRN Agitation  melatonin 3 milliGRAM(s) Oral at bedtime PRN Insomnia      CAPILLARY BLOOD GLUCOSE        I&O's Summary      PHYSICAL EXAM:  Vital Signs Last 24 Hrs  T(C): 36.8 (17 Jul 2022 05:06), Max: 36.8 (16 Jul 2022 22:18)  T(F): 98.2 (17 Jul 2022 05:06), Max: 98.2 (16 Jul 2022 22:18)  HR: 60 (17 Jul 2022 05:06) (60 - 60)  BP: 144/71 (17 Jul 2022 05:06) (144/61 - 144/71)  BP(mean): --  RR: 18 (17 Jul 2022 05:06) (18 - 18)  SpO2: 100% (17 Jul 2022 05:06) (100% - 100%)    Parameters below as of 17 Jul 2022 05:06  Patient On (Oxygen Delivery Method): room air      CONSTITUTIONAL: NAD  EYES: EOMI, conjunctiva and sclera clear  ENMT: Moist oral mucosa  NECK: Supple  RESPIRATORY: Breathing unlabored, CTAB  CARDIOVASCULAR: S1S2 no MRG  ABDOMEN: Nontender to palpation, normoactive bowel sounds, no rebound/guarding  MUSCULOSKELETAL: no clubbing or cyanosis of digits  NEUROLOGY: No focal deficits   SKIN: No rashes or lesions    LABS:                        5.9    7.90  )-----------( 187      ( 16 Jul 2022 07:07 )             18.7     07-16    138  |  109<H>  |  6<L>  ----------------------------<  114<H>  3.9   |  17<L>  |  1.07    Ca    8.8      16 Jul 2022 07:07  Phos  2.8     07-16  Mg     1.80     07-16                  CODE: FULL

## 2022-07-18 PROBLEM — N40.0 BENIGN PROSTATIC HYPERPLASIA WITHOUT LOWER URINARY TRACT SYMPTOMS: Chronic | Status: ACTIVE | Noted: 2022-07-07

## 2022-07-18 PROCEDURE — 99239 HOSP IP/OBS DSCHRG MGMT >30: CPT

## 2022-07-18 RX ADMIN — Medication 1 MILLIGRAM(S): at 11:01

## 2022-07-18 RX ADMIN — HYDROMORPHONE HYDROCHLORIDE 4 MILLIGRAM(S): 2 INJECTION INTRAMUSCULAR; INTRAVENOUS; SUBCUTANEOUS at 10:49

## 2022-07-18 RX ADMIN — SODIUM CHLORIDE 75 MILLILITER(S): 9 INJECTION, SOLUTION INTRAVENOUS at 04:53

## 2022-07-18 RX ADMIN — HYDROMORPHONE HYDROCHLORIDE 4 MILLIGRAM(S): 2 INJECTION INTRAMUSCULAR; INTRAVENOUS; SUBCUTANEOUS at 04:51

## 2022-07-18 RX ADMIN — PIPERACILLIN AND TAZOBACTAM 25 GRAM(S): 4; .5 INJECTION, POWDER, LYOPHILIZED, FOR SOLUTION INTRAVENOUS at 14:09

## 2022-07-18 RX ADMIN — Medication 25 MILLIGRAM(S): at 04:52

## 2022-07-18 RX ADMIN — FINASTERIDE 5 MILLIGRAM(S): 5 TABLET, FILM COATED ORAL at 11:01

## 2022-07-18 RX ADMIN — HYDROMORPHONE HYDROCHLORIDE 4 MILLIGRAM(S): 2 INJECTION INTRAMUSCULAR; INTRAVENOUS; SUBCUTANEOUS at 05:51

## 2022-07-18 RX ADMIN — HYDROMORPHONE HYDROCHLORIDE 4 MILLIGRAM(S): 2 INJECTION INTRAMUSCULAR; INTRAVENOUS; SUBCUTANEOUS at 11:36

## 2022-07-18 RX ADMIN — PIPERACILLIN AND TAZOBACTAM 25 GRAM(S): 4; .5 INJECTION, POWDER, LYOPHILIZED, FOR SOLUTION INTRAVENOUS at 04:53

## 2022-07-18 NOTE — PROGRESS NOTE ADULT - SUBJECTIVE AND OBJECTIVE BOX
[facility] Medicine  Pager [phone]    Patient is a 67y old  Male who presents with a chief complaint of Sickle cell crisis and EMEKA (17 Jul 2022 13:58)      INTERVAL HPI/OVERNIGHT EVENTS:  Reports pain controlled, denies CP/SOB/F/C/N/V, had 2-3 BMs recently (denies constipation), agreeable to going home today, has several family members available to help out. Requests letter for school (is worried that they are charging him tuition, has missed time due to medical ailments), inquiring about booster shot for COVID (to be determined if he is eligible depending on timing of last dose), anticipatory guidance provided, has scheduled followup with Dr. Hamm, will also provide information for [employer].     MEDICATIONS  (STANDING):  finasteride 5 milliGRAM(s) Oral daily  fluticasone propionate 50 MICROgram(s)/spray Nasal Spray 1 Spray(s) Both Nostrils two times a day  folic acid 1 milliGRAM(s) Oral daily  heparin   Injectable 5000 Unit(s) SubCutaneous every 12 hours  metoprolol tartrate 25 milliGRAM(s) Oral two times a day  piperacillin/tazobactam IVPB.. 3.375 Gram(s) IV Intermittent every 8 hours  senna 2 Tablet(s) Oral at bedtime  sodium chloride 0.45%. 1000 milliLiter(s) (75 mL/Hr) IV Continuous <Continuous>    MEDICATIONS  (PRN):  acetaminophen     Tablet .. 650 milliGRAM(s) Oral every 6 hours PRN Temp greater or equal to 38C (100.4F), Mild Pain (1 - 3), Moderate Pain (4 - 6)  ALBUTerol    90 MICROgram(s) HFA Inhaler 2 Puff(s) Inhalation every 6 hours PRN Shortness of Breath and/or Wheezing  benzonatate 100 milliGRAM(s) Oral every 8 hours PRN Cough  diphenhydrAMINE 25 milliGRAM(s) Oral every 4 hours PRN Rash and/or Itching  haloperidol    Injectable 1 milliGRAM(s) IV Push every 6 hours PRN Severe Agitation  HYDROmorphone   Tablet 4 milliGRAM(s) Oral every 4 hours PRN Moderate to severe pain  hydrOXYzine hydrochloride 25 milliGRAM(s) Oral every 6 hours PRN Agitation  melatonin 3 milliGRAM(s) Oral at bedtime PRN Insomnia      Allergies    No Known Drug Allergies  peanuts (Anaphylaxis)  peanuts (Angioedema)  pork (Unknown)  shellfish (Unknown)    Intolerances    lactose (Unknown)      REVIEW OF SYSTEMS:  Please see interval HPI:    Vital Signs Last 24 Hrs  T(C): 36.9 (18 Jul 2022 04:50), Max: 36.9 (17 Jul 2022 15:29)  T(F): 98.5 (18 Jul 2022 04:50), Max: 98.5 (18 Jul 2022 04:50)  HR: 74 (18 Jul 2022 10:49) (56 - 77)  BP: 150/70 (18 Jul 2022 10:49) (135/72 - 169/87)  BP(mean): --  RR: 18 (18 Jul 2022 10:49) (18 - 18)  SpO2: 97% (18 Jul 2022 10:49) (97% - 97%)    Parameters below as of 18 Jul 2022 10:49  Patient On (Oxygen Delivery Method): room air      I&O's Detail      PHYSICAL EXAM:  GENERAL: NAD, sitting in bed  HEAD:  NC/AT  EYES: EOM, clear sclera/conjunctiva  ENMT: MMM, hearing intact to voice  NECK: supple, no JVD  NERVOUS SYSTEM:  moving all extremities, non-focal  CHEST/LUNG: CTAB, comfortable on RA, speaking in full sentences  HEART: S1S2 RRR  ABDOMEN: soft, non-tender +BS  EXTREMITIES:  no c/c/e       LABS:      CAPILLARY BLOOD GLUCOSE    BLOOD CULTURE    RADIOLOGY & ADDITIONAL TESTS:    Imaging Personally Reviewed:  [ ] YES     Consultant(s) Notes Reviewed:      Care Discussed with Consultants/Other Providers: KEVIN Mcgraw re: d/c planning (can transition to PO augmentin to complete course, patient requesting letter for school, can give contact info for [employer]).

## 2022-07-18 NOTE — PROGRESS NOTE ADULT - ASSESSMENT
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 coli UTI, and EMEKA, now clinically improved, stable for discharge.

## 2022-07-19 VITALS
HEART RATE: 74 BPM | SYSTOLIC BLOOD PRESSURE: 143 MMHG | OXYGEN SATURATION: 99 % | DIASTOLIC BLOOD PRESSURE: 75 MMHG | TEMPERATURE: 98 F | RESPIRATION RATE: 18 BRPM

## 2022-07-19 PROCEDURE — 99232 SBSQ HOSP IP/OBS MODERATE 35: CPT

## 2022-07-19 RX ADMIN — HYDROMORPHONE HYDROCHLORIDE 4 MILLIGRAM(S): 2 INJECTION INTRAMUSCULAR; INTRAVENOUS; SUBCUTANEOUS at 06:29

## 2022-07-19 RX ADMIN — HYDROMORPHONE HYDROCHLORIDE 4 MILLIGRAM(S): 2 INJECTION INTRAMUSCULAR; INTRAVENOUS; SUBCUTANEOUS at 05:29

## 2022-07-19 RX ADMIN — HYDROMORPHONE HYDROCHLORIDE 4 MILLIGRAM(S): 2 INJECTION INTRAMUSCULAR; INTRAVENOUS; SUBCUTANEOUS at 14:39

## 2022-07-19 RX ADMIN — HEPARIN SODIUM 5000 UNIT(S): 5000 INJECTION INTRAVENOUS; SUBCUTANEOUS at 05:30

## 2022-07-19 RX ADMIN — HYDROMORPHONE HYDROCHLORIDE 4 MILLIGRAM(S): 2 INJECTION INTRAMUSCULAR; INTRAVENOUS; SUBCUTANEOUS at 01:33

## 2022-07-19 RX ADMIN — HYDROMORPHONE HYDROCHLORIDE 4 MILLIGRAM(S): 2 INJECTION INTRAMUSCULAR; INTRAVENOUS; SUBCUTANEOUS at 14:32

## 2022-07-19 RX ADMIN — FINASTERIDE 5 MILLIGRAM(S): 5 TABLET, FILM COATED ORAL at 14:03

## 2022-07-19 RX ADMIN — Medication 25 MILLIGRAM(S): at 05:30

## 2022-07-19 RX ADMIN — HYDROMORPHONE HYDROCHLORIDE 4 MILLIGRAM(S): 2 INJECTION INTRAMUSCULAR; INTRAVENOUS; SUBCUTANEOUS at 00:33

## 2022-07-19 NOTE — PROVIDER CONTACT NOTE (OTHER) - SITUATION
Patient still refusing to keep oxygen on
Pt ordered for blood cx and type and screen, these labs were placed after blood was already drawn
pt receiving blood transfusion, after 40 mins, temp went up from 98.3- 100.4  After 15 mins was at 99.1, so I checked again 30 mins later, pt asymptomic, no sob, no cp, no chills
Patient refusing to keep oxygen on
Patient still refusing to keep oxygen on
Patient awaiting transportation since start of shift, transportation has not arrived and now patient is stating he does not want to leave tonight.

## 2022-07-19 NOTE — PROGRESS NOTE ADULT - PROBLEM SELECTOR PLAN 6
-HSQ
c/w finasteride
- c/w proscar
- c/w proscar
c/w finasteride

## 2022-07-19 NOTE — PROGRESS NOTE ADULT - REASON FOR ADMISSION
Sickle cell crisis and EMEKA

## 2022-07-19 NOTE — PROVIDER CONTACT NOTE (OTHER) - BACKGROUND
Patient diagnosed with sickle cell crisis
pt HBG 5.0, transfusion rxn, temp up to 100.4
pt hgb 5.2, receiving 1u PRBC
Patient admitted for generalized body pain, hb-ss disease with crisis

## 2022-07-19 NOTE — PROVIDER CONTACT NOTE (OTHER) - REASON
Patient refusing oxygen
transfusion reactions
Patient awaiting transportation since start of shift, transportation has not arrived and now patient is stating he does not want to leave [facility]
Patient refusing oxygen
unable to draw bld cx
Patient refusing oxygen

## 2022-07-19 NOTE — PROVIDER CONTACT NOTE (OTHER) - RECOMMENDATIONS
Notify ACP. Clarify if patient is allowed to stay on PCA with O2 sats in the 70s-80s
Notify ACP
transfusions stopped
Notify ACP
ACP notified
allow pt to rehydrate and attempt to draw this afternoon

## 2022-07-19 NOTE — PROGRESS NOTE ADULT - PROBLEM SELECTOR PROBLEM 6
BPH (benign prostatic hyperplasia)
Need for prophylactic measure
BPH (benign prostatic hyperplasia)

## 2022-07-19 NOTE — PROGRESS NOTE ADULT - PROBLEM SELECTOR PROBLEM 3
EMEKA (acute kidney injury)
Sickle cell crisis
Sickle cell crisis
EMEKA (acute kidney injury)
Hyperkalemia
Sickle cell crisis
Gram negative sepsis
Gram negative sepsis
Sickle cell crisis

## 2022-07-19 NOTE — PROGRESS NOTE ADULT - ASSESSMENT
66 yo M w/ HbSS, HTN, BPH, admitted w/ sickle cell pain crisis, c/b hypoxic respiratory failure, sepsis 2/2 PNA vs E. coli UTI, and EMEKA, now clinically improved, stable for discharge. Appreciate CM/SW assistance with arranging transportation home.

## 2022-07-19 NOTE — PROGRESS NOTE ADULT - PROBLEM SELECTOR PLAN 2
-reports improvement in pain today  -c/w with pain control with PCA pump.   -c/w IVF  -baseline hgb is 6 however currently 4.4  -developed fever with transfusion. Per blood bank - stop transfusion and send hemolysis labs  -bowel regimen  -incentive spirometry  -folic acid  -monitor ldh, hapto, retic count  -hematology consulted for further recs given declining clinical status and unable to transfuse due to multiple antibodies.
- Afebrile since starting abx, meets SIRS with tachy >90 and leukocytosis.  Source possibly PNA vs. UTI.  Tachycardia improving   - Repeat RVP negative  - CT Angio showing ground glass opacities, IV Ceftriaxone broadened to IV Zosyn (7/11 -)   - UA negative however UCx positive for E.coli >100K, resume IV Zosyn
- Afebrile since starting abx, met SIRS with tachy >90 and leukocytosis.  Source PNA vs. UTI.     - Repeat RVP negative  - CT Angio showing ground glass opacities, IV Ceftriaxone broadened to IV Zosyn (7/11 -), abx as above   - UA negative however UCx positive for E.coli >100K
- hypoxia likely 2/2 increased demand from vasoocclusive crisis vs PNA  - CT showing b/l bibasilar ground glass opacities  - completing course of antibiotics (can transition from IV Zosyn to PO augmentin upon discharge to complete course)  - hypoxia improved, patient comfortable on RA
-reports improvement in pain today  -c/w with pain control with PCA pump.   -c/w IVF  -baseline hgb is 6 however currently 5  -developed fever with transfusion. Per blood bank - stop transfusion and send hemolysis labs  -bowel regimen  -incentive spirometry  -folic acid  -monitor ldh, hapto, retic count
- Afebrile since starting abx, meets SIRS with tachy >90 and leukocytosis.  Source possibly PNA vs. UTI   - Repeat RVP negative  - CT Angio showing ground glass opacities, IV Ceftriaxone broadened to IV Zosyn (7/11 -)   - UA negative however UCx positive for E.coli >100K, resume IV Zosyn
-improving  -responded well with IV fluids  -avoid nephrotoxic meds  -encouraged po meds  -c/w IVF
- hypoxia likely 2/2 increased demand from vasoocclusive crisis vs PNA  - CT showing b/l bibasilar ground glass opacities  - completing course of antibiotics (transitioned from IV Zosyn to PO augmentin upon discharge to complete course)  - hypoxia improved, patient comfortable on RA
- Afebrile since starting abx, meets SIRS with tachy >90 and leukocytosis.  Source possibly PNA vs. UTI.  Tachycardia improving   - Repeat RVP negative  - CT Angio showing ground glass opacities, IV Ceftriaxone broadened to IV Zosyn (7/11 -), abx as above   - UA negative however UCx positive for E.coli >100K
- Fever curve improving, afebrile for last 24 hrs   - Repeat RVP and CXR unremarkable  - UA negative however UCx positive for E.coli >100K, Ceftriaxone started 7/11, plan for 3 day course for uncomplicated UTI
- Afebrile since starting abx, met SIRS with tachy >90 and leukocytosis.  Source PNA vs. UTI.     - Repeat RVP negative  - CT Angio showing ground glass opacities, IV Ceftriaxone broadened to IV Zosyn (7/11 -), abx as above   - UA negative however UCx positive for E.coli >100K
- Afebrile since starting abx, met SIRS with tachy >90 and leukocytosis.  Source PNA vs. UTI.     - Repeat RVP negative  - CT Angio showing ground glass opacities, IV Ceftriaxone broadened to IV Zosyn (7/11 -), abx as above   - UA negative however UCx positive for E.coli >100K

## 2022-07-19 NOTE — PROGRESS NOTE ADULT - PROBLEM SELECTOR PLAN 3
- was febrile, tachycardic, with leukocytosis earlier during hospitalization  - sepsis 2/2 PNA vs E. coli UTI, now resolved  - on course of IV Zosyn with plan to transition to PO augmentin upon discharge to complete course  - Ucx sensitivities noted, E. coli is also sensitive to Augmentin
-improving  -responded well with IV fluids  -avoid nephrotoxic meds  -encouraged po meds  -c/w IVF
-c/w with pain control with PCA pump.   -c/w IVF  -Baseline hgb is 6 however currently 4.8  -Developed fever with 1st transfusion. Per blood bank - transfusion stopped and hemolysis labs sent  -Discussed with blood bank today, cross matched unit to be available this afternoon, will transfuse as soon as it's available   -Bowel Regimen  -Incentive Spirometry  -folic acid  -monitor ldh, hapto, retic count  -Heme following, currently not meeting criteria for ACS, however will f/u CT Angio to check for consolidation
-PCA pump discontinued and transitioned to PO Dilaudid   -c/w IVF  -Baseline hgb is 6  -Developed fever with 1st transfusion.  Transfusion stopped and hemolysis labs sent c/w febrile non-hemolytic reaction  -7/11 received 1unit of cross matched blood and tolerated well; no further indication to transfuse at this time   -Incentive Spirometry  -folic acid  -monitor ldh, hapto, retic count  -Heme following
-c/w with pain control with PCA pump, pt is pressing pump sparingly.  Does not appear to be in severe pain, refuses to answer questions regarding his pain level. At this point will transfusion to PO Dilaudid given limited use of PCA pump  -c/w IVF  -Baseline hgb is 6  -Developed fever with 1st transfusion.  Transfusion stopped and hemolysis labs sent c/w febrile non-hemolytic reaction  -7/11 received 1unit of cross matched blood and tolerated well; discussed with blood bank to have another unit on reserve in event he needs another simple transfusion.  Pt would not be a candidate for exchange  -Incentive Spirometry  -folic acid  -monitor ldh, hapto, retic count  -Heme following
-c/w with pain control with PCA pump.   -c/w IVF  -Baseline hgb is 6  -Developed fever with 1st transfusion.  Transfusion stopped and hemolysis labs sent  -7/11 received 1unit of cross matched blood and tolerated well; discussed with blood bank to have another unit on reserve in event he needs another simple transfusion.  Pt would not be a candidate for exchange   -Incentive Spirometry  -folic acid  -monitor ldh, hapto, retic count  -Heme following
-PCA pump discontinued and transitioned to PO Dilaudid   -c/w IVF  -Baseline hgb is 6  -Developed fever with 1st transfusion.  Transfusion stopped and hemolysis labs sent c/w febrile non-hemolytic reaction  -7/11 received 1unit of cross matched blood and tolerated well; no further indication to transfuse at this time   -Incentive Spirometry  -folic acid  -monitor ldh, hapto, retic count  -Heme following
-improving  -responded well with IV fluids  -avoid nephrotoxic meds  -encouraged po meds  -c/w IVF
-PCA pump discontinued and transitioned to PO Dilaudid   -c/w IVF  -Baseline hgb is 6  -Developed fever with 1st transfusion.  Transfusion stopped and hemolysis labs sent c/w febrile non-hemolytic reaction  -7/11 received 1unit of cross matched blood and tolerated well; no further indication to transfuse at this time   -Incentive Spirometry  -folic acid  -monitor ldh, hapto, retic count  -Heme following
- was febrile, tachycardic, with leukocytosis earlier during hospitalization  - sepsis 2/2 PNA vs E. coli UTI, now resolved  - s/p course of IV Zosyn, transitioned to PO augmentin upon discharge to complete course  - Ucx sensitivities noted, E. coli is also sensitive to Augmentin
-K 5.5  -likely due to underlying EMEKA  -lokelma given  -repeat BMP this evening  -monitor
-Does not appear to be in severe pain, PCA pump discontinued and transitioned to PO Dilaudid   -c/w IVF  -Baseline hgb is 6  -Developed fever with 1st transfusion.  Transfusion stopped and hemolysis labs sent c/w febrile non-hemolytic reaction  -7/11 received 1unit of cross matched blood and tolerated well; no further indication to transfuse at this time   -Incentive Spirometry  -folic acid  -monitor ldh, hapto, retic count  -Heme following

## 2022-07-19 NOTE — PROGRESS NOTE ADULT - PROBLEM SELECTOR PROBLEM 5
Hypertension
Hypertension
BPH (benign prostatic hyperplasia)
Hypertension

## 2022-07-19 NOTE — PROGRESS NOTE ADULT - PROBLEM SELECTOR PROBLEM 4
EMEKA (acute kidney injury)
Hyperkalemia
Hyperkalemia
EMEKA (acute kidney injury)
Hypertension
EMEKA (acute kidney injury)

## 2022-07-19 NOTE — PROGRESS NOTE ADULT - PROBLEM SELECTOR PLAN 1
- Saturating in low to mid 80s on RA likely from acute vasoocclusive crisis   - Pt refusing to wear nasal cannula; when security was present, pt then agreed to wear NC and saturation improved to 98%  - Respiratory to try face tent as pt seems to find NC uncomfortable  - Trial of flonase as pt complaining of "itchy nose"   - CT Angio pending to r/o PE   - If pt remains uncooperative with oxygen, may require soft restraints to maintain supplemental oxygen (possible that he's becoming agitated/encephalopathic from hypoxia).  At this time pt has no capacity to refuse treatment   -  also consulted for recommendations for agitation (pt reportedly swung at RN yesterday)
- Saturating in low to mid 80s on RA likely from acute vasoocclusive crisis  - Pt frequently takes oxygen off, now more agreeable to using oxygen but needs constant check-ins.  He is now less resistant when asked to place oxygen on. Pt moved to 4 bedded room with sitter   - Trial of flonase as pt complaining of "itchy nose", Atarax to also assist with itching   - CT Angio reviewed, no PE however showing b/l basilar ground glass opacities.  Abx switched to IV Zosyn (7/11-  ), plan for total 5 day course   - If pt is persistently uncooperative with oxygen, he may require soft restraints to maintain supplemental oxygen. At this time pt has no capacity to refuse treatment
now 100% on RA ambulating  -  flonase for "itchy nose", Atarax to also assist with itching   - CT Angio reviewed, no PE however showing b/l basilar ground glass opacities.  Abx switched to IV Zosyn (7/11-  ), can switch to augmentin on d/c to complete course
-Still with pain  -c/w with pain control with PCA pump. Max 4 hr use of 2.5mg dilaudid. Educated to use pump more  -c/w IVF  -baseline hgb is 6 however currently 4.7. Will transfuse 1 unit prbc if repeat hgb still <6  -bowel regimen  -incentive spirometry  -folic acid  -monitor ldh, hapto, retic count
-febrile to 100.6 overnight  -now also hypoxic  -check urgent CXR  -send RVP, blood cx, ua  -low threshold for starting antibiotics  -monitor clinically - currently comfortable appearing
- pain better controlled  - was transitioned off PCA to PO dilaudid  - c/w folic acid, incentive spirometry, s/p IV fluids (hypotonic)  - c/w bowel regimen to prevent opioid induced constipation  - appears at baseline hgb  - has appointment for outpatient followup w/ Dr. Lluvia Hamm
now 100% on RA ambulating  -  flonase for "itchy nose", Atarax to also assist with itching   - CT Angio reviewed, no PE however showing b/l basilar ground glass opacities.  Abx switched to IV Zosyn (7/11-  ), can switch to augmentin on d/c to complete course  Stable for d/c home  plan for d/c early tomorrow
- Saturating in low to mid 80s on RA likely from acute vasoocclusive crisis   - Pt frequently takes oxygen off, now more agreeable to using oxygen but needs constant check-ins.  Will place in enhanced observation room with sitter   - Trial of flonase as pt complaining of "itchy nose", Atarax to also assist with itching   - CT Angio reviewed, no PE however showing b/l basilar ground glass opacities.  Abx switched to IV Zosyn (7/11-  )  - If pt is persistently uncooperative with oxygen, he may require soft restraints to maintain supplemental oxygen (possible that he's becoming agitated/encephalopathic from hypoxia).  At this time pt has no capacity to refuse treatment
-febrile for last 2 days - low grade fevers  -now also hypoxic requiring 02  -repeat RVP and CXR unremarkable  -given fever and hypoxia - concern for PE. CTPA ordered/  -low threshold for starting antibiotics - f/u blood and urine cultures  -monitor clinically - currently comfortable appearing
now 100% on RA ambulating  -  flonase for "itchy nose", Atarax to also assist with itching   - CT Angio reviewed, no PE however showing b/l basilar ground glass opacities.  Abx switched to IV Zosyn (7/11-  ), can switch to augmentin on d/c to complete course  Stable for d/c home  d/c time 40 min coordinating care
- pain better controlled  - was transitioned off PCA to PO dilaudid  - c/w folic acid, incentive spirometry, s/p IV fluids (hypotonic)  - c/w bowel regimen to prevent opioid induced constipation  - appears at baseline hgb  - has appointment for outpatient followup w/ Dr. Lluvia Hamm
- Closely monitored pt at bedside today, saturated 93-95% on RA while he was speaking.    - Continue to monitor off oxygen and check ambulatory O2 sats   - Trial of flonase as pt complaining of "itchy nose", Atarax to also assist with itching   - CT Angio reviewed, no PE however showing b/l basilar ground glass opacities.  Abx switched to IV Zosyn (7/11-  ), if pt saturating well with ambulation can likely transition to PO Augmentin to complete course

## 2022-07-19 NOTE — PROGRESS NOTE ADULT - PROBLEM SELECTOR PLAN 7
-HSQ
-HSQ for DVT ppx   Care discussed with brother Saturnino Milton and PMD Dr. Hamm   Brother to come to bedside tomorrow @ 11AM
-HSQ for DVT ppx   Care discussed with brother Saturnino Milton  Care d/w PMD Dr. Hamm on 7/12
Care discussed with brother Saturnino Milton  Care d/w PMD Dr. Hamm on 7/12
VTE ppx: HSQ  BMI 17 -> underweight, c/w ensure supplementation    Dispo: home today (appreciate CM/SW assistance w/ fixing transportation issue), patient in agreement w/ discharge plan, anticipatory guidance provided, has followup appt w/ Dr. Lluvia Hamm scheduled for 7/21 at 3:30 pm.   Discharge time 31 minutes
-HSQ for DVT ppx   Care discussed with brother Saturnino Milton  Care d/w PMD Dr. Hamm on 7/12
-HSQ for DVT ppx   Care discussed with brother Saturnino Rose and PMD Dr. Hamm on 7/12  Attempted to meet brother at bedside today however was not there.  Called brother, no answer
-HSQ for DVT ppx   Care discussed with brother Saturnino Rose
-HSQ
Care discussed with brother Saturnino Milton  Care d/w PMD Dr. Hamm on 7/12
VTE ppx: HSQ  BMI 17 -> underweight, c/w ensure supplementation    Dispo: home today, patient in agreement w/ discharge plan, anticipatory guidance provided, has followup appt w/ Dr. Lluvia Hamm scheduled for 7/21 at 3:30 pm.   Discharge time 31 minutes

## 2022-07-19 NOTE — PROGRESS NOTE ADULT - PROBLEM SELECTOR PROBLEM 2
EMEKA (acute kidney injury)
Fever
Gram negative sepsis
Gram negative sepsis
Sickle cell crisis
Gram negative sepsis
Sickle cell crisis
Acute hypoxemic respiratory failure
Gram negative sepsis
Acute hypoxemic respiratory failure

## 2022-07-19 NOTE — PROGRESS NOTE ADULT - NUTRITIONAL ASSESSMENT
This patient has been assessed with a concern for Malnutrition and has been determined to have a diagnosis/diagnoses of Underweight (BMI < 19).    This patient is being managed with:   Diet Regular-  Low Sodium  No Caffeine  Supplement Feeding Modality:  Oral  Ensure Enlive Cans or Servings Per Day:  1       Frequency:  Three Times a day  Entered: Jul 8 2022  3:15PM    
This patient has been assessed with a concern for Malnutrition and has been determined to have a diagnosis/diagnoses of Underweight (BMI < 19).    This patient is being managed with:   Diet Regular-  Low Sodium  No Caffeine  Supplement Feeding Modality:  Oral  Ensure Enlive Cans or Servings Per Day:  1       Frequency:  Three Times a day  Entered: Jul 8 2022  3:15PM      This patient has been assessed with a concern for Malnutrition and has been determined to have a diagnosis/diagnoses of Underweight (BMI < 19).    This patient is being managed with:   Diet Regular-  Low Sodium  No Caffeine  Supplement Feeding Modality:  Oral  Ensure Enlive Cans or Servings Per Day:  1       Frequency:  Three Times a day  Entered: Jul 8 2022  3:15PM    
This patient has been assessed with a concern for Malnutrition and has been determined to have a diagnosis/diagnoses of Underweight (BMI < 19).    This patient is being managed with:   Diet Regular-  Low Sodium  No Caffeine  Supplement Feeding Modality:  Oral  Ensure Enlive Cans or Servings Per Day:  1       Frequency:  Three Times a day  Entered: Jul 8 2022  3:15PM    

## 2022-07-19 NOTE — PROVIDER CONTACT NOTE (OTHER) - NAME OF MD/NP/PA/DO NOTIFIED:
ACP Amanda Avalos
KEVIN Rose #[id number]
ACP Amanda Avalos
SHIRA Figueroa
ACP Amanda Avalos
True Figueroa

## 2022-07-19 NOTE — PROVIDER CONTACT NOTE (OTHER) - DATE AND TIME:
09-Jul-2022 14:23
11-Jul-2022 01:00
10-Jul-2022 23:45
11-Jul-2022 00:30
19-Jul-2022 00:42
09-Jul-2022 11:53

## 2022-07-19 NOTE — PROGRESS NOTE ADULT - PROVIDER SPECIALTY LIST ADULT
Heme/Onc
Hospitalist

## 2022-07-19 NOTE — PROGRESS NOTE ADULT - PROBLEM SELECTOR PROBLEM 1
Acute hypoxemic respiratory failure
Sickle cell crisis
Acute hypoxemic respiratory failure
Sickle cell crisis
Acute hypoxemic respiratory failure
Fever
Acute hypoxemic respiratory failure
Sickle cell crisis
Acute hypoxemic respiratory failure
Fever

## 2022-07-19 NOTE — PROGRESS NOTE ADULT - SUBJECTIVE AND OBJECTIVE BOX
[facility] Medicine  Pager [phone]    Patient is a 67y old  Male who presents with a chief complaint of Sickle cell crisis and EMEKA (18 Jul 2022 13:54)      INTERVAL HPI/OVERNIGHT EVENTS:    MEDICATIONS  (STANDING):  finasteride 5 milliGRAM(s) Oral daily  fluticasone propionate 50 MICROgram(s)/spray Nasal Spray 1 Spray(s) Both Nostrils two times a day  folic acid 1 milliGRAM(s) Oral daily  heparin   Injectable 5000 Unit(s) SubCutaneous every 12 hours  metoprolol tartrate 25 milliGRAM(s) Oral two times a day  senna 2 Tablet(s) Oral at bedtime    MEDICATIONS  (PRN):  acetaminophen     Tablet .. 650 milliGRAM(s) Oral every 6 hours PRN Temp greater or equal to 38C (100.4F), Mild Pain (1 - 3), Moderate Pain (4 - 6)  ALBUTerol    90 MICROgram(s) HFA Inhaler 2 Puff(s) Inhalation every 6 hours PRN Shortness of Breath and/or Wheezing  benzonatate 100 milliGRAM(s) Oral every 8 hours PRN Cough  diphenhydrAMINE 25 milliGRAM(s) Oral every 4 hours PRN Rash and/or Itching  haloperidol    Injectable 1 milliGRAM(s) IV Push every 6 hours PRN Severe Agitation  HYDROmorphone   Tablet 4 milliGRAM(s) Oral every 4 hours PRN Moderate to severe pain  hydrOXYzine hydrochloride 25 milliGRAM(s) Oral every 6 hours PRN Agitation  melatonin 3 milliGRAM(s) Oral at bedtime PRN Insomnia      Allergies    No Known Drug Allergies  peanuts (Anaphylaxis)  peanuts (Angioedema)  pork (Unknown)  shellfish (Unknown)    Intolerances    lactose (Unknown)      REVIEW OF SYSTEMS:  Please see interval HPI:    Vital Signs Last 24 Hrs  T(C): 36.8 (19 Jul 2022 11:58), Max: 36.9 (18 Jul 2022 21:23)  T(F): 98.3 (19 Jul 2022 11:58), Max: 98.5 (18 Jul 2022 21:23)  HR: 73 (19 Jul 2022 11:58) (56 - 73)  BP: 140/73 (19 Jul 2022 11:58) (137/66 - 156/68)  BP(mean): --  RR: 18 (19 Jul 2022 11:58) (17 - 18)  SpO2: 100% (19 Jul 2022 11:58) (97% - 100%)    Parameters below as of 19 Jul 2022 11:58  Patient On (Oxygen Delivery Method): room air      I&O's Detail        PHYSICAL EXAM:  GENERAL:   HEAD:    EYES:   ENMT:   NECK:   NERVOUS SYSTEM:    CHEST/LUNG:   HEART:   ABDOMEN:   EXTREMITIES:    LYMPH:   SKIN:     LABS:            CAPILLARY BLOOD GLUCOSE        BLOOD CULTURE    RADIOLOGY & ADDITIONAL TESTS:    Imaging Personally Reviewed:  [ ] YES     Consultant(s) Notes Reviewed:      Care Discussed with Consultants/Other Providers: [facility] Medicine  Pager [phone]    Patient is a 67y old  Male who presents with a chief complaint of Sickle cell crisis and EMEKA (18 Jul 2022 13:54)      INTERVAL HPI/OVERNIGHT EVENTS:  Was supposed to leave yesterday but there were issues with the arranged transportation services. Confirmed that patient did receive requested letter for school as well as contact info for [employer]. No acute complaints (aside from some frustration re: delay), he is eager to leave the hospital.     MEDICATIONS  (STANDING):  finasteride 5 milliGRAM(s) Oral daily  fluticasone propionate 50 MICROgram(s)/spray Nasal Spray 1 Spray(s) Both Nostrils two times a day  folic acid 1 milliGRAM(s) Oral daily  heparin   Injectable 5000 Unit(s) SubCutaneous every 12 hours  metoprolol tartrate 25 milliGRAM(s) Oral two times a day  senna 2 Tablet(s) Oral at bedtime    MEDICATIONS  (PRN):  acetaminophen     Tablet .. 650 milliGRAM(s) Oral every 6 hours PRN Temp greater or equal to 38C (100.4F), Mild Pain (1 - 3), Moderate Pain (4 - 6)  ALBUTerol    90 MICROgram(s) HFA Inhaler 2 Puff(s) Inhalation every 6 hours PRN Shortness of Breath and/or Wheezing  benzonatate 100 milliGRAM(s) Oral every 8 hours PRN Cough  diphenhydrAMINE 25 milliGRAM(s) Oral every 4 hours PRN Rash and/or Itching  haloperidol    Injectable 1 milliGRAM(s) IV Push every 6 hours PRN Severe Agitation  HYDROmorphone   Tablet 4 milliGRAM(s) Oral every 4 hours PRN Moderate to severe pain  hydrOXYzine hydrochloride 25 milliGRAM(s) Oral every 6 hours PRN Agitation  melatonin 3 milliGRAM(s) Oral at bedtime PRN Insomnia      Allergies    No Known Drug Allergies  peanuts (Anaphylaxis)  peanuts (Angioedema)  pork (Unknown)  shellfish (Unknown)    Intolerances    lactose (Unknown)      REVIEW OF SYSTEMS:  Please see interval HPI:    Vital Signs Last 24 Hrs  T(C): 36.8 (19 Jul 2022 11:58), Max: 36.9 (18 Jul 2022 21:23)  T(F): 98.3 (19 Jul 2022 11:58), Max: 98.5 (18 Jul 2022 21:23)  HR: 73 (19 Jul 2022 11:58) (56 - 73)  BP: 140/73 (19 Jul 2022 11:58) (137/66 - 156/68)  BP(mean): --  RR: 18 (19 Jul 2022 11:58) (17 - 18)  SpO2: 100% (19 Jul 2022 11:58) (97% - 100%)    Parameters below as of 19 Jul 2022 11:58  Patient On (Oxygen Delivery Method): room air      I&O's Detail    PHYSICAL EXAM:  GENERAL: NAD, sitting in bed  HEAD:  NC/AT  EYES: EOM, clear sclera/conjunctiva  ENMT: MMM, hearing intact to voice  NECK: supple, no JVD  NERVOUS SYSTEM:  moving all extremities, non-focal  CHEST/LUNG:  comfortable on RA, speaking in full sentences  ABDOMEN: soft, non-tender   EXTREMITIES:  no c/c/e, thin limbs    LABS:    CAPILLARY BLOOD GLUCOSE    BLOOD CULTURE    RADIOLOGY & ADDITIONAL TESTS:    Imaging Personally Reviewed:  [ ] YES     Consultant(s) Notes Reviewed:      Care Discussed with Consultants/Other Providers: KEVIN Mcgraw re: GINA/FRANKIE arranging transportation for discharge, d/c home today

## 2022-07-19 NOTE — PROGRESS NOTE ADULT - PROBLEM SELECTOR PLAN 5
Monitor BP daily  DASH diet  Continue metoprolol
- on metoprolol  - monitor BP and adjust regimen as needed, currently acceptable for age/wish to avoid hypotension/falls
Monitor BP daily  DASH diet  Continue metoprolol
Monitor BP daily  DASH diet  Continue metoprolol
c/w finasteride
Monitor BP daily  DASH diet  Continue metoprolol
- on metoprolol  - monitor BP and adjust regimen as needed, currently acceptable for age/wish to avoid hypotension/falls

## 2022-07-19 NOTE — PROVIDER CONTACT NOTE (OTHER) - ASSESSMENT
Patient awaiting transportation since start of shift, transportation has not arrived (attempted to call several times) and now patient is refusing to leave [facility]

## 2022-07-19 NOTE — PROGRESS NOTE ADULT - PROBLEM SELECTOR PLAN 4
-stabilizing   -responded well with IV fluids  -avoid nephrotoxic meds  -encouraged po meds
-resolved  -monitor
Monitor BP daily  DASH diet  Continue metoprolol
-resolved  -responded well with IV fluids  -avoid nephrotoxic meds  -encouraged po meds
-stabilizing   -responded well with IV fluids  -avoid nephrotoxic meds  -encouraged po meds
-resolved  -responded well with IV fluids  -avoid nephrotoxic meds  -encouraged po meds
- Cr 1.72 on admission, now downtrending to 1.07  - EMEKA resolved s/p IV fluids  - continue PO hydration, avoid nephrotoxins
-improving  -responded well with IV fluids  -avoid nephrotoxic meds  -encouraged po meds
-stabilizing   -responded well with IV fluids  -avoid nephrotoxic meds  -encouraged po meds
-resolved  -monitor
- Cr 1.72 on admission, now downtrending to 1.07  - EMEKA resolved s/p IV fluids  - continue PO hydration, avoid nephrotoxins
-resolved  -responded well with IV fluids  -avoid nephrotoxic meds  -encouraged po meds

## 2022-07-21 ENCOUNTER — APPOINTMENT (OUTPATIENT)
Dept: HEMATOLOGY ONCOLOGY | Facility: CLINIC | Age: 67
End: 2022-07-21

## 2022-07-21 DIAGNOSIS — J18.9 PNEUMONIA, UNSPECIFIED ORGANISM: ICD-10-CM

## 2022-07-21 PROCEDURE — 99212 OFFICE O/P EST SF 10 MIN: CPT | Mod: 95

## 2022-07-21 NOTE — HISTORY OF PRESENT ILLNESS
[Home] : at home, [unfilled] , at the time of the visit. [Other Location: e.g. 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 [de-identified] : Recently hospitalized for pain crisis all over precipitated by unknown factors.\par Is not taking Endari or Oxbryta, and did not give clear reasons for not taking Endari and Oxbrtyta.  \par Said he was not feeling well, and did not want to talk any further. \par Fe deficiency history, but ferritim level in April normal at 87. \par \par

## 2022-07-21 NOTE — ASSESSMENT
[FreeTextEntry1] : 1.  SS disease.    Not taking any sickle meds it seems.  He did not want to discuss further.  \par \par 2.  Fe deficiency.  Ferritin level normal now. \par \par I spent 10 minutes with Mr. Marquez.

## 2022-08-29 ENCOUNTER — TRANSCRIPTION ENCOUNTER (OUTPATIENT)
Age: 67
End: 2022-08-29

## 2022-08-29 ENCOUNTER — INPATIENT (INPATIENT)
Facility: HOSPITAL | Age: 67
LOS: 10 days | Discharge: HOME | End: 2022-09-09
Attending: INTERNAL MEDICINE | Admitting: INTERNAL MEDICINE

## 2022-08-29 VITALS
OXYGEN SATURATION: 100 % | SYSTOLIC BLOOD PRESSURE: 143 MMHG | HEIGHT: 64 IN | DIASTOLIC BLOOD PRESSURE: 66 MMHG | HEART RATE: 79 BPM | RESPIRATION RATE: 15 BRPM | TEMPERATURE: 98 F

## 2022-08-29 DIAGNOSIS — K80.20 CALCULUS OF GALLBLADDER WITHOUT CHOLECYSTITIS WITHOUT OBSTRUCTION: ICD-10-CM

## 2022-08-29 LAB
ALBUMIN SERPL ELPH-MCNC: 4.1 G/DL — SIGNIFICANT CHANGE UP (ref 3.3–5)
ALP SERPL-CCNC: 111 U/L — SIGNIFICANT CHANGE UP (ref 40–120)
ALT FLD-CCNC: 15 U/L — SIGNIFICANT CHANGE UP (ref 4–41)
ANION GAP SERPL CALC-SCNC: 12 MMOL/L — SIGNIFICANT CHANGE UP (ref 7–14)
AST SERPL-CCNC: 27 U/L — SIGNIFICANT CHANGE UP (ref 4–40)
BASE EXCESS BLDV CALC-SCNC: -7.3 MMOL/L — LOW (ref -2–3)
BASOPHILS # BLD AUTO: 0.04 K/UL — SIGNIFICANT CHANGE UP (ref 0–0.2)
BASOPHILS NFR BLD AUTO: 0.2 % — SIGNIFICANT CHANGE UP (ref 0–2)
BILIRUB SERPL-MCNC: 2.1 MG/DL — HIGH (ref 0.2–1.2)
BLD GP AB SCN SERPL QL: POSITIVE — SIGNIFICANT CHANGE UP
BLOOD GAS VENOUS COMPREHENSIVE RESULT: SIGNIFICANT CHANGE UP
BUN SERPL-MCNC: 28 MG/DL — HIGH (ref 7–23)
CALCIUM SERPL-MCNC: 9 MG/DL — SIGNIFICANT CHANGE UP (ref 8.4–10.5)
CHLORIDE BLDV-SCNC: 112 MMOL/L — HIGH (ref 96–108)
CHLORIDE SERPL-SCNC: 110 MMOL/L — HIGH (ref 98–107)
CO2 BLDV-SCNC: 20 MMOL/L — LOW (ref 22–26)
CO2 SERPL-SCNC: 17 MMOL/L — LOW (ref 22–31)
CREAT SERPL-MCNC: 1.45 MG/DL — HIGH (ref 0.5–1.3)
EGFR: 52 ML/MIN/1.73M2 — LOW
EOSINOPHIL # BLD AUTO: 0.07 K/UL — SIGNIFICANT CHANGE UP (ref 0–0.5)
EOSINOPHIL NFR BLD AUTO: 0.4 % — SIGNIFICANT CHANGE UP (ref 0–6)
GAS PNL BLDV: 140 MMOL/L — SIGNIFICANT CHANGE UP (ref 136–145)
GLUCOSE BLDV-MCNC: 102 MG/DL — HIGH (ref 70–99)
GLUCOSE SERPL-MCNC: 110 MG/DL — HIGH (ref 70–99)
HCO3 BLDV-SCNC: 19 MMOL/L — LOW (ref 22–29)
HCT VFR BLD CALC: 15.7 % — CRITICAL LOW (ref 39–50)
HCT VFR BLDA CALC: 16 % — CRITICAL LOW (ref 39–51)
HGB BLD CALC-MCNC: 5.4 G/DL — CRITICAL LOW (ref 13–17)
HGB BLD-MCNC: 5.3 G/DL — CRITICAL LOW (ref 13–17)
IANC: 16.58 K/UL — HIGH (ref 1.8–7.4)
IMM GRANULOCYTES NFR BLD AUTO: 1 % — SIGNIFICANT CHANGE UP (ref 0–1.5)
LACTATE BLDV-MCNC: 1.1 MMOL/L — SIGNIFICANT CHANGE UP (ref 0.5–2)
LIDOCAIN IGE QN: 47 U/L — SIGNIFICANT CHANGE UP (ref 7–60)
LYMPHOCYTES # BLD AUTO: 0.67 K/UL — LOW (ref 1–3.3)
LYMPHOCYTES # BLD AUTO: 3.6 % — LOW (ref 13–44)
MCHC RBC-ENTMCNC: 28.6 PG — SIGNIFICANT CHANGE UP (ref 27–34)
MCHC RBC-ENTMCNC: 33.8 GM/DL — SIGNIFICANT CHANGE UP (ref 32–36)
MCV RBC AUTO: 84.9 FL — SIGNIFICANT CHANGE UP (ref 80–100)
MONOCYTES # BLD AUTO: 1.03 K/UL — HIGH (ref 0–0.9)
MONOCYTES NFR BLD AUTO: 5.5 % — SIGNIFICANT CHANGE UP (ref 2–14)
NEUTROPHILS # BLD AUTO: 16.58 K/UL — HIGH (ref 1.8–7.4)
NEUTROPHILS NFR BLD AUTO: 89.3 % — HIGH (ref 43–77)
NRBC # BLD: 5 /100 WBCS — HIGH (ref 0–0)
NRBC # FLD: 0.94 K/UL — HIGH (ref 0–0)
OB PNL STL: POSITIVE
PCO2 BLDV: 40 MMHG — LOW (ref 42–55)
PH BLDV: 7.28 — LOW (ref 7.32–7.43)
PLATELET # BLD AUTO: 359 K/UL — SIGNIFICANT CHANGE UP (ref 150–400)
PO2 BLDV: 38 MMHG — SIGNIFICANT CHANGE UP
POTASSIUM BLDV-SCNC: 4.9 MMOL/L — SIGNIFICANT CHANGE UP (ref 3.5–5.1)
POTASSIUM SERPL-MCNC: 4.9 MMOL/L — SIGNIFICANT CHANGE UP (ref 3.5–5.3)
POTASSIUM SERPL-SCNC: 4.9 MMOL/L — SIGNIFICANT CHANGE UP (ref 3.5–5.3)
PROT SERPL-MCNC: 7.6 G/DL — SIGNIFICANT CHANGE UP (ref 6–8.3)
RBC # BLD: 1.85 M/UL — LOW (ref 4.2–5.8)
RBC # BLD: 1.85 M/UL — LOW (ref 4.2–5.8)
RBC # FLD: 22.7 % — HIGH (ref 10.3–14.5)
RETICS #: 237.5 K/UL — HIGH (ref 25–125)
RETICS/RBC NFR: 12.8 % — HIGH (ref 0.5–2.5)
RH IG SCN BLD-IMP: POSITIVE — SIGNIFICANT CHANGE UP
SAO2 % BLDV: 44.4 % — SIGNIFICANT CHANGE UP
SARS-COV-2 RNA SPEC QL NAA+PROBE: SIGNIFICANT CHANGE UP
SODIUM SERPL-SCNC: 139 MMOL/L — SIGNIFICANT CHANGE UP (ref 135–145)
TROPONIN T, HIGH SENSITIVITY RESULT: 11 NG/L — SIGNIFICANT CHANGE UP
TROPONIN T, HIGH SENSITIVITY RESULT: 13 NG/L — SIGNIFICANT CHANGE UP
WBC # BLD: 18.57 K/UL — HIGH (ref 3.8–10.5)
WBC # FLD AUTO: 18.57 K/UL — HIGH (ref 3.8–10.5)

## 2022-08-29 PROCEDURE — 76705 ECHO EXAM OF ABDOMEN: CPT | Mod: 26

## 2022-08-29 PROCEDURE — 74177 CT ABD & PELVIS W/CONTRAST: CPT | Mod: 26,MA

## 2022-08-29 PROCEDURE — 99285 EMERGENCY DEPT VISIT HI MDM: CPT | Mod: 25,GC

## 2022-08-29 PROCEDURE — 86077 PHYS BLOOD BANK SERV XMATCH: CPT

## 2022-08-29 PROCEDURE — 71045 X-RAY EXAM CHEST 1 VIEW: CPT | Mod: 26

## 2022-08-29 PROCEDURE — 74018 RADEX ABDOMEN 1 VIEW: CPT | Mod: 26

## 2022-08-29 PROCEDURE — 99222 1ST HOSP IP/OBS MODERATE 55: CPT | Mod: GC,57

## 2022-08-29 PROCEDURE — 93010 ELECTROCARDIOGRAM REPORT: CPT | Mod: GC

## 2022-08-29 RX ORDER — METOPROLOL TARTRATE 50 MG
5 TABLET ORAL EVERY 6 HOURS
Refills: 0 | Status: DISCONTINUED | OUTPATIENT
Start: 2022-08-29 | End: 2022-09-09

## 2022-08-29 RX ORDER — CEFOTETAN DISODIUM 1 G
2 VIAL (EA) INJECTION ONCE
Refills: 0 | Status: COMPLETED | OUTPATIENT
Start: 2022-08-29 | End: 2022-08-29

## 2022-08-29 RX ORDER — SODIUM CHLORIDE 9 MG/ML
1000 INJECTION INTRAMUSCULAR; INTRAVENOUS; SUBCUTANEOUS ONCE
Refills: 0 | Status: COMPLETED | OUTPATIENT
Start: 2022-08-29 | End: 2022-08-29

## 2022-08-29 RX ORDER — ONDANSETRON 8 MG/1
4 TABLET, FILM COATED ORAL ONCE
Refills: 0 | Status: COMPLETED | OUTPATIENT
Start: 2022-08-29 | End: 2022-08-29

## 2022-08-29 RX ORDER — HYDROMORPHONE HYDROCHLORIDE 2 MG/ML
2 INJECTION INTRAMUSCULAR; INTRAVENOUS; SUBCUTANEOUS ONCE
Refills: 0 | Status: DISCONTINUED | OUTPATIENT
Start: 2022-08-29 | End: 2022-08-29

## 2022-08-29 RX ORDER — CEFOTETAN DISODIUM 1 G
VIAL (EA) INJECTION
Refills: 0 | Status: DISCONTINUED | OUTPATIENT
Start: 2022-08-29 | End: 2022-09-08

## 2022-08-29 RX ORDER — FAMOTIDINE 10 MG/ML
20 INJECTION INTRAVENOUS ONCE
Refills: 0 | Status: COMPLETED | OUTPATIENT
Start: 2022-08-29 | End: 2022-08-29

## 2022-08-29 RX ORDER — CEFOTETAN DISODIUM 1 G
2 VIAL (EA) INJECTION EVERY 24 HOURS
Refills: 0 | Status: DISCONTINUED | OUTPATIENT
Start: 2022-08-30 | End: 2022-09-08

## 2022-08-29 RX ADMIN — HYDROMORPHONE HYDROCHLORIDE 2 MILLIGRAM(S): 2 INJECTION INTRAMUSCULAR; INTRAVENOUS; SUBCUTANEOUS at 15:41

## 2022-08-29 RX ADMIN — ONDANSETRON 4 MILLIGRAM(S): 8 TABLET, FILM COATED ORAL at 03:24

## 2022-08-29 RX ADMIN — Medication 2 GRAM(S): at 18:50

## 2022-08-29 RX ADMIN — Medication 30 MILLILITER(S): at 03:27

## 2022-08-29 RX ADMIN — HYDROMORPHONE HYDROCHLORIDE 2 MILLIGRAM(S): 2 INJECTION INTRAMUSCULAR; INTRAVENOUS; SUBCUTANEOUS at 18:50

## 2022-08-29 RX ADMIN — HYDROMORPHONE HYDROCHLORIDE 2 MILLIGRAM(S): 2 INJECTION INTRAMUSCULAR; INTRAVENOUS; SUBCUTANEOUS at 08:18

## 2022-08-29 RX ADMIN — HYDROMORPHONE HYDROCHLORIDE 2 MILLIGRAM(S): 2 INJECTION INTRAMUSCULAR; INTRAVENOUS; SUBCUTANEOUS at 14:45

## 2022-08-29 RX ADMIN — Medication 5 MILLIGRAM(S): at 19:18

## 2022-08-29 RX ADMIN — SODIUM CHLORIDE 1000 MILLILITER(S): 9 INJECTION INTRAMUSCULAR; INTRAVENOUS; SUBCUTANEOUS at 05:08

## 2022-08-29 RX ADMIN — FAMOTIDINE 20 MILLIGRAM(S): 10 INJECTION INTRAVENOUS at 03:24

## 2022-08-29 RX ADMIN — HYDROMORPHONE HYDROCHLORIDE 2 MILLIGRAM(S): 2 INJECTION INTRAMUSCULAR; INTRAVENOUS; SUBCUTANEOUS at 03:26

## 2022-08-29 RX ADMIN — HYDROMORPHONE HYDROCHLORIDE 2 MILLIGRAM(S): 2 INJECTION INTRAMUSCULAR; INTRAVENOUS; SUBCUTANEOUS at 05:08

## 2022-08-29 NOTE — DISCHARGE NOTE PROVIDER - CARE PROVIDER_API CALL
Samm Cortes)  Surgery; Surgical Critical Care  [address] [place] [address]  Phone: [phone]  Fax: [phone]  Follow Up Time: 2 weeks   Samm Cortes)  Surgery; Surgical Critical Care  [address] [place] [address]  Phone: [phone]  Fax: [phone]  Follow Up Time: 2 weeks    Lluvia Hamm)  Internal Medicine  [address] [place] [address]  Phone: [phone]  Fax: [phone]  Follow Up Time:

## 2022-08-29 NOTE — ED PROVIDER NOTE - OBJECTIVE STATEMENT
67 y M, hx of SCD, BPH, HTN, presenting with 5-days onset of abdominal pain, generalized pain all over body. Reports associated symptoms of epigastric pain, nausea, vomiting. Reports constipation, last BM ?2 days ago. Denies PSH. Been taking motrin with minimal relief. Denies fever, cough, shortness of breath, presence of urinary symptoms. PMD is Dr. Santana from [facility]. 67 y M, hx of SCD, BPH, HTN, presenting with 5-days onset of abdominal pain, generalized pain all over body. Reports associated symptoms of epigastric pain, nausea, vomiting. Reports constipation, last BM ?2 days ago. Denies PSH. Been taking motrin with minimal relief. Denies fever, cough, shortness of breath, presence of urinary symptoms.

## 2022-08-29 NOTE — ED ADULT NURSE NOTE - NSIMPLEMENTINTERV_GEN_ALL_ED
Implemented All Universal Safety Interventions:  [place] to call system. Call bell, personal items and telephone within reach. Instruct patient to call for assistance. Room bathroom lighting operational. Non-slip footwear when patient is off stretcher. Physically safe environment: no spills, clutter or unnecessary equipment. Stretcher in lowest position, wheels locked, appropriate side rails in place.

## 2022-08-29 NOTE — DISCHARGE NOTE PROVIDER - CARE PROVIDERS DIRECT ADDRESSES
,[email or web address]

## 2022-08-29 NOTE — ED PROVIDER NOTE - ATTENDING CONTRIBUTION TO CARE
Kevin Riddle DO:  patient seen and evaluated with the resident.  I was present for key portions of the History & Physical, and I agree with the Impression & Plan. 66 yo m pmh HbSS SCD, BPH, and HTN, pw chest/abd pain. reports sharp pain, worse in epigastrum, started today. diffuse. reports non-bloody, non-bilious emesis. no bm x 2 days, unk flatus. reports mild dysuria. denies sob, cough, f/c. arrives hds, well appearing. epigastric ttp. will eval for acs, acute surg abd, treat sx, reassess.

## 2022-08-29 NOTE — ED ADULT NURSE REASSESSMENT NOTE - NS ED NURSE REASSESS COMMENT FT1
Received patient in bed AOX4 in the Hallway 26B, complains of sickle cells crisis pain. Patient is comfortable and pain has been controlled. Scheduled to be transfused due to chronically low Hbg of 5.3. Blood bank is aware.  awaiting for blood back to send the blood. Due to multiple blood transfusion, patient have lots of antibodies. Blood is not coming from in house hospital blood bank.  Denied any chest pain and SOB.  CT study shows gall bladder stones.  Surgery has been placed on hold because the team wants to do further testing of hepatobiliary imaging.
Spoke with Catalina in blood bank who advised blood required speciality order and they would to advise when blood arrives.
Patient endorsing body aches, requesting pain medication, MD at bedside. Vitals stable. Patient covid swapped and sent to lab. Stretcher in lowest position, wheels locked, appropriate side rails in place, call bell in reach.

## 2022-08-29 NOTE — DISCHARGE NOTE PROVIDER - PROVIDER TOKENS
PROVIDER:[TOKEN:[[id number]],FOLLOWUP:[2 weeks]] PROVIDER:[TOKEN:[[id number]],FOLLOWUP:[2 weeks]],PROVIDER:[TOKEN:[[id number]]]

## 2022-08-29 NOTE — H&P ADULT - HISTORY OF PRESENT ILLNESS
HPI: Patient is a 68 yo Male with PMH of SCD, BPH, HTN, presenting with 5-days onset of generalized body aches and sharp RUQ pain. Patient reports that he is familiar with body aches given his PMH of SCD and multiple previous crisis but the RUQ is new and never happened in the past. He reports taking Motrin without any resolution of his pain. Poor PO intake due to consistent nausea and had multiple episodes of vomiting.    In the ED, patient is uncomfortable in the bed. Reports RUQ pain. On physical exam, abdomen is soft, nondistended, tender in the epigastic and RUQ area. WBC 18. Hb 5.3 (baseline 6). US demonstrated cholelithiasis without cholecystitis. CT confirmed US findings. Spleen is normal. Denies fever, cough, shortness of breath, presence of urinary symptoms.

## 2022-08-29 NOTE — ED ADULT TRIAGE NOTE - ARRIVAL FROM
Eye Problem(s):glasses or contacts  ENT Problem(s):negative  Cardiovascular problem(s):negative  Respiratory problem(s):negative  Gastro-intestinal problem(s):negative GI  Genito-urinary problem(s):negative  Musculoskeletal problem(s):arthritis  Integumentary problem(s):negative  Neurological problem(s):negative  Psychiatric problem(s):anxiety  Endocrine problem(s):negative  Hematologic and/or Lymphatic problem(s):negative     Home

## 2022-08-29 NOTE — DISCHARGE NOTE PROVIDER - NSDCFUADDAPPT_GEN_ALL_CORE_FT
Please follow up with your outpatient provider for cataract evaluation including IOL measurements.     A follow-up MRI in 3 months is advised.   Please follow up with your outpatient provider for cataract evaluation including IOL measurements.     A follow-up MRI (MRCP) in 3 months is advised, follow up with your primary care doctor.

## 2022-08-29 NOTE — ED ADULT TRIAGE NOTE - CHIEF COMPLAINT QUOTE
presents C/O generalized abdominal pain. as per Pt pain similar to sickle cell pain. also endorsing nausea/vomiting. No complaints of chest pain, headache, dizziness, SOB, fever, chills verbalized. PMhx BPH HTN sickle cell disease.

## 2022-08-29 NOTE — ED ADULT NURSE NOTE - OBJECTIVE STATEMENT
Patient A&Ox4 ambulatory c/o sickle cell crisis. Patient states "the pain is everywhere and I don't want to talk right now". Respirations even and unlabored. On assessment patient holding abdomen, no tenderness on palpation but patient states having discomfort. Endorsing nausea. IV line unsuccessful. 18G US IV placed by MD, labs collected and sent. Patient medicated per orders. Stretcher in lowest position, wheels locked, appropriate side rails in place, call bell in reach.

## 2022-08-29 NOTE — DISCHARGE NOTE PROVIDER - NSDCMRMEDTOKEN_GEN_ALL_CORE_FT
albuterol 90 mcg/inh inhalation aerosol: 2 puff(s) inhaled every 6 hours, As needed, Shortness of Breath and/or Wheezing  finasteride 5 mg oral tablet: 1 tab(s) orally once a day  fluticasone 50 mcg/inh nasal spray: 1 spray(s) nasal 2 times a day  folic acid 1 mg oral tablet: 1 tab(s) orally once a day  HYDROmorphone 4 mg oral tablet: 1 tab(s) orally every 6 hours, As needed, Moderate to severe pain MDD:4 tabs daily  metoprolol tartrate 25 mg oral tablet: 1 tab(s) orally 2 times a day  senna leaf extract oral tablet: 2 tab(s) orally once a day (at bedtime)   albuterol 90 mcg/inh inhalation aerosol: 2 puff(s) inhaled every 6 hours, As needed, Shortness of Breath and/or Wheezing  finasteride 5 mg oral tablet: 1 tab(s) orally once a day  fluticasone 50 mcg/inh nasal spray: 1 spray(s) nasal 2 times a day  folic acid 1 mg oral tablet: 1 tab(s) orally once a day  HYDROmorphone 2 mg oral tablet: 1 tab(s) orally every 6 hours, As Needed -Severe Pain; MDD:4 TAB   metoprolol tartrate 25 mg oral tablet: 1 tab(s) orally 2 times a day  ocular lubricant ophthalmic solution: 1 drop(s) to each affected eye 4 times a day  polyethylene glycol 3350 oral powder for reconstitution: 17 gram(s) orally once a day  senna leaf extract oral tablet: 2 tab(s) orally once a day (at bedtime)

## 2022-08-29 NOTE — ED PROVIDER NOTE - NSICDXPASTMEDICALHX_GEN_ALL_CORE_FT
PAST MEDICAL HISTORY:  Acute chest syndrome Pt unaware    BPH (benign prostatic hyperplasia)     Constipation     H/O transfusion ~ pRBC    Hypertension     Intellectual disability ~ mild    Left ventricular dysfunction mild LVD on echo in 7/18, normal LVF in 12/18    Sickle Cell Disease     Sickle cell disease      17

## 2022-08-29 NOTE — ED PROVIDER NOTE - PROGRESS NOTE DETAILS
Keisha SUÁREZ: 67 Yr male SCD HTN BPH Chronic Anemia now with generalized body aches, chest/abdominal pain. Patient reports running out of Dialudid last week. VS wnl. PE RUQ tenderness. Hb 5.3 WBC 18. US/CT relevant for chloelithiasis w/o cholecystitis. Gen Surgery consulted and will review. Patient requiring analgesia x 3 and will be admitted for further w/u.

## 2022-08-29 NOTE — H&P ADULT - NSHPLABSRESULTS_GEN_ALL_CORE
WBC Count: 18.57 K/uL (08-29-22 @ 03:30)   Hematocrit: 15.7 % (08-29-22 @ 03:30)   Creatinine, Serum: 1.45 mg/dL (08-29-22 @ 03:30)

## 2022-08-29 NOTE — H&P ADULT - ATTENDING COMMENTS
Patient with sickle cell disease, complained of epigastric and ruq abdominal pain. Was tender in all quadrants of abdomen. CT scan with large stones in gallbladder however no signs of acute cholecystitis.  Plan  admit  hida scan  medicine evaluation  heme onc eval for ssd    I have personally interviewed and examined this patient, reviewed pertinent labs and imaging, and discussed the case with colleagues, residents, and physician assistants on B Team rounds.    The active care issues are:  1. r/o acute cholecystitis    The Acute Care Surgery (B Team) Attending Group Practice:  Dr. Jewels Leroy, Dr. Miguel Perez, Dr. Steve Katz, Dr. Samm Cortes,     urgent issues - spectra [id number]  nonurgent issues - [phone]  patient appointments or afterhours - [phone]

## 2022-08-29 NOTE — ED PROVIDER NOTE - CLINICAL SUMMARY MEDICAL DECISION MAKING FREE TEXT BOX
Suspect sickle cell crisis. Will get labs, CBC, reticulocyte count, CXR, CTAP, pain control with dilaudid, zofran.

## 2022-08-29 NOTE — DISCHARGE NOTE PROVIDER - NSDCACTIVITY_GEN_ALL_CORE
Follow Instructions Provided by your Surgical Team As tolerated/Follow Instructions Provided by your Surgical Team As tolerated/No restrictions

## 2022-08-29 NOTE — DISCHARGE NOTE PROVIDER - NSDCCPCAREPLAN_GEN_ALL_CORE_FT
PRINCIPAL DISCHARGE DIAGNOSIS  Diagnosis: Cholelithiases  Assessment and Plan of Treatment: WOUND CARE:  Please keep incisions clean and dry. Please do not Scrub or rub incisions. Do not use lotion or powder on incisions.   BATHING: You may shower and/or sponge bathe. You may use warm soapy water in the shower and rinse, pat dry.  ACTIVITY: No heavy lifting or straining. Otherwise, you may return to your usual level of physical activity. If you are taking narcotic pain medication DO NOT drive a car, operate machinery or make important decisions.  DIET: Low fat diet  NOTIFY YOUR SURGEON IF YOU HAVE: any bleeding that does not stop, any pus draining from your wound(s), any fever (over 100.4 F) persistent nausea/vomiting, or if your pain is not controlled on your discharge pain medications, unable to urinate.  Please follow up with your primary care physician in one week regarding your hospitalization, bring copies of your discharge paperwork.  Please follow up with your surgeon, Dr. Cortes within 1-2 weeks of discharge. Please call to schedule an appointment.       PRINCIPAL DISCHARGE DIAGNOSIS  Diagnosis: Cholelithiases  Assessment and Plan of Treatment: WOUND CARE:  Please keep incisions clean and dry. Please do not Scrub or rub incisions. Do not use lotion or powder on incisions.   BATHING: You may shower and/or sponge bathe. You may use warm soapy water in the shower and rinse, pat dry.  ACTIVITY: No heavy lifting or straining. Otherwise, you may return to your usual level of physical activity. If you are taking narcotic pain medication DO NOT drive a car, operate machinery or make important decisions.  DIET: Low fat diet  NOTIFY YOUR SURGEON IF YOU HAVE: any bleeding that does not stop, any pus draining from your wound(s), any fever (over 100.4 F) persistent nausea/vomiting, or if your pain is not controlled on your discharge pain medications, unable to urinate.  Please follow up with your primary care physician in one week regarding your hospitalization, bring copies of your discharge paperwork.  Please follow up with your surgeon, Dr. Cortes within 1-2 weeks of discharge. Please call to schedule an appointment.      SECONDARY DISCHARGE DIAGNOSES  Diagnosis: Lung nodule  Assessment and Plan of Treatment: On CT, an incidental finding of  a persistent Unchanged 1.1 cm right lower lobe peripheral opacity. Recommend follow-up noncontrast chest CT in 3 months.  Please follow up your PCP regarding these findings.     PRINCIPAL DISCHARGE DIAGNOSIS  Diagnosis: Abdominal pain, acute, epigastric  Assessment and Plan of Treatment: You were admitted into the hospital with abdominal pain. You had a HIDA scan that was negative for cholecystitis. There is no plan for surgery at this time. You had an MRCP and it is recommended that you follow up with a repeat MRCP in 3 months. Please follow up with your outpatient provider for further medical management.      SECONDARY DISCHARGE DIAGNOSES  Diagnosis: Sickle cell crisis  Assessment and Plan of Treatment: Stay hydrated with water. Continue medications as prescribed. Follow up with your PCP for further evaluation and refills of pain medication. Please call to make an appointment  Continue with your pain medications as needed, bowel regimen as needed for constipation, and vitamin/mineral supplementation. Follow-up with your hematologist as outpatient for further care/recommendations. Monitor for signs/symptoms indicating worsening of disease, such as, easy bleeding/bruising, pale skin, fatigue, dizziness, increased heart rate, or chest pain.    Diagnosis: Cataract  Assessment and Plan of Treatment: Cataract.   Pt states he was planned for surgery this month but it was postponed till October. Reports that his vision has been worsening and requesting ophtho eval during this admission. Ophtho recs reviewed - Started artificial tears. Rec out pt follow up. Please follow up with your outpatient provider for cataract evaluation including IOL measurements. You have been made aware of how cataracts affect vision. You have been instructed to inform your physician if you experience any new worsening of vision, or new floaters/flashes/curtains.    Diagnosis: Benign essential HTN  Assessment and Plan of Treatment: Continue blood pressure medication regimen as directed. Monitor for any visual changes, headaches or dizziness.  Monitor blood pressure regularly.  Follow up with your primary care provider for further management for high blood pressure.     PRINCIPAL DISCHARGE DIAGNOSIS  Diagnosis: Abdominal pain, acute, epigastric  Assessment and Plan of Treatment: You were admitted into the hospital with abdominal pain. You had a HIDA scan that was negative for acute gallbladder infection )(cholecystitis). You were seen by the surgery team and there was no indication for surgery at this time. You had an MRCP and it is recommended that you follow up with a repeat MRCP in 3 months. Please follow up with your outpatient provider for further medical management.      SECONDARY DISCHARGE DIAGNOSES  Diagnosis: Sickle cell crisis  Assessment and Plan of Treatment: Stay hydrated with water. Continue medications as prescribed. Follow up with your PCP for further evaluation and refills of pain medication. Please call to make an appointment. Continue with your pain medications as needed, bowel regimen as needed for constipation, and vitamin/mineral supplementation. Follow-up with your hematologist as outpatient for further care/recommendations. Monitor for signs/symptoms indicating worsening of disease, such as, easy bleeding/bruising, pale skin, fatigue, dizziness, increased heart rate, or chest pain.    Diagnosis: Cataract  Assessment and Plan of Treatment: You have a history of cataracts and are reportedly scheduled for surgery for this.  You requested inpatient opthamology consultation for worsening vision.  You were seen by the ophthomology team who recommends artificial tears and outpatient follow up. Please follow up with your outpatient provider for cataract evaluation including IOL measurements. You have been made aware of how cataracts affect vision. You have been instructed to inform your physician if you experience any new worsening of vision, or new floaters/flashes/curtains.    Diagnosis: Benign essential HTN  Assessment and Plan of Treatment: Continue blood pressure medication regimen as directed.  Monitor for any visual changes, headaches or dizziness.  Monitor blood pressure regularly.  Follow up with your primary care provider for further management for high blood pressure.    Diagnosis: History of erectile dysfunction  Assessment and Plan of Treatment: Follow up with Urology, contact [facility] ([phone]) to schedule appointment.

## 2022-08-29 NOTE — H&P ADULT - ASSESSMENT
Patient is a 66yo Male with PMH of SCD, BPH, HTN presents with generalized body aches and RUQ for 5 days. Diagnosed with cholelithiasis.     Plan:    - Admit to surgery under Dr. Samm Cortes  - NPO/IVF  - Cefotetan 2gm stat and then Q12h  - Transfuse 2 units of pRBC for Hb of 5.3  - Added on for laparoscopic cholecystectomy  - Consented  - Family informed      Patient seen and examined, plan discussed with Dr. Samm Cortes.    Jaguar Altamirano MD, PGY2

## 2022-08-29 NOTE — DISCHARGE NOTE PROVIDER - HOSPITAL COURSE
Patient is a 68yo Male with PMH of SCD, BPH, HTN presents with generalized body aches and RUQ for 5 days. CT abdomen and RUQ sono performed consistent with cholelithiasis. Patient with leukocytosis, pre-renal EMEKA therefore he was admitted to the surgery team for IV abx and hydration while awaiting lap alberto.     ***** PENDING  *** The patient tolerated the procedure well. Post-operatively the patient was sent to the PACU. The patient was hemodynamically stable and was transferred to a surgical floor. Patient tolerated operation well and there were no post operative complications identified. The patient's pain was controlled by IV pain medications and then by PO pain medications. The patient was advanced to a regular diet and tolerated it well. The patient was placed on home medications. At the time of discharge, the patient was hemodynamically stable, was tolerating PO diet, was voiding urine and passing stool, was ambulating, and was comfortable with adequate pain control. The patient was instructed to follow up with Dr. Cortes within 2 weeks after discharge from the hospital. The patient/family felt comfortable with discharge. The patient had no other issues.       CT CAP  IMPRESSION:  No acute findings.    Scattered groundglass opacities noted on the previous exam have resolved.   There is a persistent Unchanged 1.1 cm right lower lobe peripheral   opacity. Recommend follow-up noncontrast chest CT in 3 months.    Large gallstones without ultrasound evidence for acute cholecystitis.          --- End of Report ---       67 y.o. M with h/o Sickle cell anemia, frequent admission for OVOC, h/o acute chest, admitted with RUQ pain and pain at Limbs, back. (+) Gallstones on US and CT, r/o acute cholecystitis. (+) elevated retic count and LDH, c/w VOC. No signs of ACS.    Hospital Course:    Abdominal pain, acute, epigastric.   HIDA neg for cholecystitis. Surgery following - no surgical intervention at this time. MRCP reviewed -  will need outpt followup in 3 months.    Sickle cell crisis.   Pt has signs of VOC - now resolved. PCA with Dilaudid for pain control - transitioned to oral meds. ISTOP from 9/7/22 in chart. Incentive spirometry.    Cataract.   Pt states he was planned for surgery this month but it was postponed till October. Reports that his vision has been worsening and requesting ophtho eval during this admission. Ophtho recs reviewed - Started artificial tears. Rec out pt follow up.    Benign essential HTN.   BP adequately controlled. Cont Metoprolol.      On ___ this case was reviewed with  ____, the patient is medically stable and optimized for discharge. All medications were reviewed and prescriptions were sent to mutually agreed upon pharmacy. 67 y.o. M with h/o Sickle cell anemia, frequent admission for OVOC, h/o acute chest, admitted with RUQ pain and pain at Limbs, back. (+) Gallstones on US and CT, r/o acute cholecystitis. (+) elevated retic count and LDH, c/w VOC. No signs of ACS.    Hospital Course:    Abdominal pain, acute, epigastric.   HIDA neg for cholecystitis. Surgery following - no surgical intervention at this time. MRCP reviewed -  will need outpt followup in 3 months.    Sickle cell crisis.   Pt has signs of VOC - now resolved. PCA with Dilaudid for pain control - transitioned to oral meds. ISTOP from 9/7/22 in chart. Incentive spirometry.    Cataract.   Pt states he was planned for surgery this month but it was postponed till October. Reports that his vision has been worsening and requesting ophtho eval during this admission. Ophtho recs reviewed - Started artificial tears. Rec out pt follow up.    Benign essential HTN.   BP adequately controlled. Cont Metoprolol.    Dispo: Seen by PT and recommended for rehab but pt. declined.      On ___ this case was reviewed with  ____, the patient is medically stable and optimized for discharge. All medications were reviewed and prescriptions were sent to mutually agreed upon pharmacy. 67 M with h/o Sickle cell anemia, frequent admission for OVOC, acute chest, admitted with RUQ pain and pain at Limbs, back. (+) elevated retic count and LDH, c/w VOC, no signs of ACS.  VOC now resolved, s/p PCA with Dilaudid for pain control, oral dilaudid prn resumed.  Acute epigastric abdominal pain on admission, (+) Gallstones on US and CT, HIDA neg for cholecystitis, Surgery following with no surgical intervention at this time, MRCP reviewed - will need outpt followup in 3 months.  Pt with history of cataract, states he was planned for surgery this month but it was postponed till October, reports vision has been worsening and requesting ophtho eval during this admission. Ophtho recs reviewed, started artificial tears, recommends outpt follow up.    Patient seen and evaluated. Reviewed discharge medications with patient and attending. All new medications requiring new prescriptions were sent to the pharmacy of patient's choice. Reviewed need for prescription for previous home medications and new prescriptions sent if requested. Medically cleared/stable for discharge as per Dr. Stephens on 9/9/2022 with appropriate follow up. Patient understands and agrees with plan of care.

## 2022-08-30 DIAGNOSIS — R10.13 EPIGASTRIC PAIN: ICD-10-CM

## 2022-08-30 DIAGNOSIS — I10 ESSENTIAL (PRIMARY) HYPERTENSION: ICD-10-CM

## 2022-08-30 DIAGNOSIS — D57.00 HB-SS DISEASE WITH CRISIS, UNSPECIFIED: ICD-10-CM

## 2022-08-30 DIAGNOSIS — Z29.9 ENCOUNTER FOR PROPHYLACTIC MEASURES, UNSPECIFIED: ICD-10-CM

## 2022-08-30 LAB
ALBUMIN SERPL ELPH-MCNC: 3.5 G/DL — SIGNIFICANT CHANGE UP (ref 3.3–5)
ALP SERPL-CCNC: 101 U/L — SIGNIFICANT CHANGE UP (ref 40–120)
ALT FLD-CCNC: 13 U/L — SIGNIFICANT CHANGE UP (ref 4–41)
ANION GAP SERPL CALC-SCNC: 9 MMOL/L — SIGNIFICANT CHANGE UP (ref 7–14)
APTT BLD: 29.1 SEC — SIGNIFICANT CHANGE UP (ref 27–36.3)
AST SERPL-CCNC: 31 U/L — SIGNIFICANT CHANGE UP (ref 4–40)
BILIRUB SERPL-MCNC: 2.6 MG/DL — HIGH (ref 0.2–1.2)
BUN SERPL-MCNC: 20 MG/DL — SIGNIFICANT CHANGE UP (ref 7–23)
CALCIUM SERPL-MCNC: 8.8 MG/DL — SIGNIFICANT CHANGE UP (ref 8.4–10.5)
CHLORIDE SERPL-SCNC: 114 MMOL/L — HIGH (ref 98–107)
CO2 SERPL-SCNC: 19 MMOL/L — LOW (ref 22–31)
CREAT SERPL-MCNC: 1.19 MG/DL — SIGNIFICANT CHANGE UP (ref 0.5–1.3)
EGFR: 66 ML/MIN/1.73M2 — SIGNIFICANT CHANGE UP
GLUCOSE SERPL-MCNC: 82 MG/DL — SIGNIFICANT CHANGE UP (ref 70–99)
HCT VFR BLD CALC: 21.2 % — LOW (ref 39–50)
HGB BLD-MCNC: 6.9 G/DL — CRITICAL LOW (ref 13–17)
INR BLD: 1.14 RATIO — SIGNIFICANT CHANGE UP (ref 0.88–1.16)
MAGNESIUM SERPL-MCNC: 2.4 MG/DL — SIGNIFICANT CHANGE UP (ref 1.6–2.6)
MCHC RBC-ENTMCNC: 27 PG — SIGNIFICANT CHANGE UP (ref 27–34)
MCHC RBC-ENTMCNC: 32.5 GM/DL — SIGNIFICANT CHANGE UP (ref 32–36)
MCV RBC AUTO: 82.8 FL — SIGNIFICANT CHANGE UP (ref 80–100)
NRBC # BLD: 6 /100 WBCS — HIGH (ref 0–0)
NRBC # FLD: 0.87 K/UL — HIGH (ref 0–0)
PHOSPHATE SERPL-MCNC: 3 MG/DL — SIGNIFICANT CHANGE UP (ref 2.5–4.5)
PLATELET # BLD AUTO: 339 K/UL — SIGNIFICANT CHANGE UP (ref 150–400)
POTASSIUM SERPL-MCNC: 5.2 MMOL/L — SIGNIFICANT CHANGE UP (ref 3.5–5.3)
POTASSIUM SERPL-SCNC: 5.2 MMOL/L — SIGNIFICANT CHANGE UP (ref 3.5–5.3)
PROT SERPL-MCNC: 6.9 G/DL — SIGNIFICANT CHANGE UP (ref 6–8.3)
PROTHROM AB SERPL-ACNC: 13.3 SEC — SIGNIFICANT CHANGE UP (ref 10.5–13.4)
RBC # BLD: 2.56 M/UL — LOW (ref 4.2–5.8)
RBC # FLD: 20.4 % — HIGH (ref 10.3–14.5)
SODIUM SERPL-SCNC: 142 MMOL/L — SIGNIFICANT CHANGE UP (ref 135–145)
WBC # BLD: 15.34 K/UL — HIGH (ref 3.8–10.5)
WBC # FLD AUTO: 15.34 K/UL — HIGH (ref 3.8–10.5)

## 2022-08-30 PROCEDURE — 99223 1ST HOSP IP/OBS HIGH 75: CPT

## 2022-08-30 PROCEDURE — 78226 HEPATOBILIARY SYSTEM IMAGING: CPT | Mod: 26,GC

## 2022-08-30 RX ORDER — HYDROMORPHONE HYDROCHLORIDE 2 MG/ML
4 INJECTION INTRAMUSCULAR; INTRAVENOUS; SUBCUTANEOUS DAILY
Refills: 0 | Status: DISCONTINUED | OUTPATIENT
Start: 2022-08-30 | End: 2022-08-30

## 2022-08-30 RX ORDER — HYDROMORPHONE HYDROCHLORIDE 2 MG/ML
0.5 INJECTION INTRAMUSCULAR; INTRAVENOUS; SUBCUTANEOUS ONCE
Refills: 0 | Status: DISCONTINUED | OUTPATIENT
Start: 2022-08-30 | End: 2022-08-30

## 2022-08-30 RX ORDER — HYDROMORPHONE HYDROCHLORIDE 2 MG/ML
30 INJECTION INTRAMUSCULAR; INTRAVENOUS; SUBCUTANEOUS
Refills: 0 | Status: DISCONTINUED | OUTPATIENT
Start: 2022-08-30 | End: 2022-09-02

## 2022-08-30 RX ORDER — POLYETHYLENE GLYCOL 3350 17 G/17G
17 POWDER, FOR SOLUTION ORAL DAILY
Refills: 0 | Status: DISCONTINUED | OUTPATIENT
Start: 2022-08-30 | End: 2022-09-09

## 2022-08-30 RX ORDER — ALBUTEROL 90 UG/1
2 AEROSOL, METERED ORAL EVERY 6 HOURS
Refills: 0 | Status: DISCONTINUED | OUTPATIENT
Start: 2022-08-30 | End: 2022-09-09

## 2022-08-30 RX ORDER — ONDANSETRON 8 MG/1
4 TABLET, FILM COATED ORAL EVERY 6 HOURS
Refills: 0 | Status: DISCONTINUED | OUTPATIENT
Start: 2022-08-30 | End: 2022-09-09

## 2022-08-30 RX ORDER — NALOXONE HYDROCHLORIDE 4 MG/.1ML
0.1 SPRAY NASAL
Refills: 0 | Status: DISCONTINUED | OUTPATIENT
Start: 2022-08-30 | End: 2022-09-09

## 2022-08-30 RX ORDER — HYDROMORPHONE HYDROCHLORIDE 2 MG/ML
0.5 INJECTION INTRAMUSCULAR; INTRAVENOUS; SUBCUTANEOUS EVERY 6 HOURS
Refills: 0 | Status: DISCONTINUED | OUTPATIENT
Start: 2022-08-30 | End: 2022-08-30

## 2022-08-30 RX ORDER — FINASTERIDE 5 MG/1
5 TABLET, FILM COATED ORAL DAILY
Refills: 0 | Status: DISCONTINUED | OUTPATIENT
Start: 2022-08-30 | End: 2022-09-09

## 2022-08-30 RX ORDER — SENNA PLUS 8.6 MG/1
2 TABLET ORAL AT BEDTIME
Refills: 0 | Status: DISCONTINUED | OUTPATIENT
Start: 2022-08-30 | End: 2022-09-09

## 2022-08-30 RX ORDER — SODIUM CHLORIDE 9 MG/ML
1000 INJECTION, SOLUTION INTRAVENOUS
Refills: 0 | Status: DISCONTINUED | OUTPATIENT
Start: 2022-08-30 | End: 2022-08-31

## 2022-08-30 RX ADMIN — Medication 5 MILLIGRAM(S): at 15:12

## 2022-08-30 RX ADMIN — POLYETHYLENE GLYCOL 3350 17 GRAM(S): 17 POWDER, FOR SOLUTION ORAL at 22:03

## 2022-08-30 RX ADMIN — FINASTERIDE 5 MILLIGRAM(S): 5 TABLET, FILM COATED ORAL at 18:25

## 2022-08-30 RX ADMIN — HYDROMORPHONE HYDROCHLORIDE 0.5 MILLIGRAM(S): 2 INJECTION INTRAMUSCULAR; INTRAVENOUS; SUBCUTANEOUS at 11:47

## 2022-08-30 RX ADMIN — HYDROMORPHONE HYDROCHLORIDE 0.5 MILLIGRAM(S): 2 INJECTION INTRAMUSCULAR; INTRAVENOUS; SUBCUTANEOUS at 12:15

## 2022-08-30 RX ADMIN — Medication 100 GRAM(S): at 12:36

## 2022-08-30 RX ADMIN — HYDROMORPHONE HYDROCHLORIDE 30 MILLILITER(S): 2 INJECTION INTRAMUSCULAR; INTRAVENOUS; SUBCUTANEOUS at 13:49

## 2022-08-30 RX ADMIN — HYDROMORPHONE HYDROCHLORIDE 0.5 MILLIGRAM(S): 2 INJECTION INTRAMUSCULAR; INTRAVENOUS; SUBCUTANEOUS at 04:26

## 2022-08-30 RX ADMIN — Medication 5 MILLIGRAM(S): at 05:07

## 2022-08-30 RX ADMIN — HYDROMORPHONE HYDROCHLORIDE 30 MILLILITER(S): 2 INJECTION INTRAMUSCULAR; INTRAVENOUS; SUBCUTANEOUS at 20:49

## 2022-08-30 RX ADMIN — HYDROMORPHONE HYDROCHLORIDE 0.5 MILLIGRAM(S): 2 INJECTION INTRAMUSCULAR; INTRAVENOUS; SUBCUTANEOUS at 03:56

## 2022-08-30 RX ADMIN — Medication 5 MILLIGRAM(S): at 22:03

## 2022-08-30 NOTE — PATIENT PROFILE ADULT - FALL HARM RISK - HARM RISK INTERVENTIONS

## 2022-08-30 NOTE — PROGRESS NOTE ADULT - ASSESSMENT
Patient is a 68yo Male with PMH of SCD, BPH, HTN presents with generalized body aches and RUQ for 5 days. Diagnosed with cholelithiasis.     Plan:    -OR today for lap alberto  - NPO/IVF  - Cefotetan 2gm stat and then Q12h  - Transfuse 2 units of pRBC for Hb of 5.3, second unit to finish at 5 am f/u post-transfusion cbc   - Pain control    Patient is a 66yo Male with PMH of SCD, BPH, HTN presents with generalized body aches and RUQ for 5 days. Diagnosed with cholelithiasis.     Plan:    - Diet: CLD, IVF - 75   - Cefotetan q24 - renal dosing.   - HIDA scan completed with delayed scan : Delayed bowel visualization may be secondary to partial CBD obstruction. Please correlate clinically  - Pain control PRN   - MRCP     Team B Surgery   x [phone]   Patient is a 66yo Male with PMH of SCD, BPH, HTN presents with generalized body aches and RUQ for 5 days. Diagnosed with cholelithiasis.     Plan:    - Diet: CLD, IVF - 75   - Cefotetan q24 - renal dosing.   - HIDA scan completed with delayed scan : Delayed bowel visualization may be secondary to partial CBD obstruction. Please correlate clinically  - Pain control PRN   - MRCP   - No surgical interventions indicated at the moment. Surgery will follow.   - Transfer to medicine. Plan discussed with hospitalist Darrell Wilson MD.    Plan discussed with senior     Team B Surgery   x [phone]

## 2022-08-30 NOTE — PROGRESS NOTE ADULT - SUBJECTIVE AND OBJECTIVE BOX
SURGERY DAILY PROGRESS NOTE:     SUBJECTIVE/ROS: Patient seen at bedside this AM.    24h Events:   - Overnight, no acute events    OBJECTIVE:  Vital Signs Last 24 Hrs  T(C): 36.8 (30 Aug 2022 01:55), Max: 36.9 (29 Aug 2022 21:35)  T(F): 98.2 (30 Aug 2022 01:55), Max: 98.5 (29 Aug 2022 21:35)  HR: 80 (30 Aug 2022 01:55) (77 - 99)  BP: 129/54 (30 Aug 2022 01:55) (100/52 - 144/84)  BP(mean): --  RR: 18 (30 Aug 2022 01:55) (16 - 18)  SpO2: 98% (30 Aug 2022 01:55) (95% - 100%)    Parameters below as of 30 Aug 2022 01:55  Patient On (Oxygen Delivery Method): room air      I&O's Detail    29 Aug 2022 07:01  -  30 Aug 2022 03:09  --------------------------------------------------------  IN:  Total IN: 0 mL    OUT:    Voided (mL): 200 mL  Total OUT: 200 mL    Total NET: -200 mL        Daily     Daily   MEDICATIONS  (STANDING):  cefoTEtan  IVPB      cefoTEtan  IVPB 2 Gram(s) IV Intermittent every 24 hours  metoprolol tartrate Injectable 5 milliGRAM(s) IV Push every 6 hours    MEDICATIONS  (PRN):  HYDROmorphone   Tablet 4 milliGRAM(s) Oral daily PRN Severe Pain (7 - 10)      LABS:                        5.3    18.57 )-----------( 359      ( 29 Aug 2022 03:30 )             15.7     08-29    139  |  110<H>  |  28<H>  ----------------------------<  110<H>  4.9   |  17<L>  |  1.45<H>    Ca    9.0      29 Aug 2022 03:30    TPro  7.6  /  Alb  4.1  /  TBili  2.1<H>  /  DBili  x   /  AST  27  /  ALT  15  /  AlkPhos  111  08-29      PHYSICAL EXAM:  Gen: AAOx3, non-toxic  Head: NCAT  HEENT: EOMI, oral mucosa moist, normal conjunctiva  Lung: Breathing on RA, unlabored   Abd: soft, epigastric tenderness, no guarding.   MSK: no visible deformities  Neuro: No focal sensory or motor deficits   SURGERY DAILY PROGRESS NOTE:     SUBJECTIVE/ROS: Patient seen at bedside this AM. Patient states his pain is improving.     24h Events:   - Overnight, no acute events    OBJECTIVE:  Vital Signs Last 24 Hrs  T(C): 36.8 (30 Aug 2022 01:55), Max: 36.9 (29 Aug 2022 21:35)  T(F): 98.2 (30 Aug 2022 01:55), Max: 98.5 (29 Aug 2022 21:35)  HR: 80 (30 Aug 2022 01:55) (77 - 99)  BP: 129/54 (30 Aug 2022 01:55) (100/52 - 144/84)  RR: 18 (30 Aug 2022 01:55) (16 - 18)  SpO2: 98% (30 Aug 2022 01:55) (95% - 100%)    Parameters below as of 30 Aug 2022 01:55  Patient On (Oxygen Delivery Method): room air      I&O's Detail    29 Aug 2022 07:01  -  30 Aug 2022 03:09  --------------------------------------------------------  IN:  Total IN: 0 mL    OUT:    Voided (mL): 200 mL  Total OUT: 200 mL    Total NET: -200 mL        Daily     Daily   MEDICATIONS  (STANDING):  cefoTEtan  IVPB      cefoTEtan  IVPB 2 Gram(s) IV Intermittent every 24 hours  metoprolol tartrate Injectable 5 milliGRAM(s) IV Push every 6 hours    MEDICATIONS  (PRN):  HYDROmorphone   Tablet 4 milliGRAM(s) Oral daily PRN Severe Pain (7 - 10)      LABS:                        5.3    18.57 )-----------( 359      ( 29 Aug 2022 03:30 )             15.7     08-29    139  |  110<H>  |  28<H>  ----------------------------<  110<H>  4.9   |  17<L>  |  1.45<H>    Ca    9.0      29 Aug 2022 03:30    TPro  7.6  /  Alb  4.1  /  TBili  2.1<H>  /  DBili  x   /  AST  27  /  ALT  15  /  AlkPhos  111  08-29      PHYSICAL EXAM:  Gen: AAOx3, non-toxic  Lung: nonlabored breathing   Abd: mild TTP, pain improving    Neuro: No focal sensory or motor deficits

## 2022-08-30 NOTE — CONSULT NOTE ADULT - PROBLEM SELECTOR RECOMMENDATION 2
Pt has signs of VOC. No signs of ACS  -gentle hydration with 1/2NS at 75cc/H  -PCA with Dilaudid for pain control  -Bowel regimen  -Monitor CBC, retic CMP, LDH daily   -O2 as needed Pt has signs of VOC. No signs of ACS  -gentle hydration with 1/2NS at 75cc/H  -PCA with Dilaudid for pain control  -Transfuse 1 unit of PRBC to keep Hb >7 in anticipation for ? OR   -Bowel regimen  -Monitor CBC, retic CMP, LDH daily   -O2 2L, N/C

## 2022-08-31 DIAGNOSIS — H26.9 UNSPECIFIED CATARACT: ICD-10-CM

## 2022-08-31 LAB
ALBUMIN SERPL ELPH-MCNC: 3.5 G/DL — SIGNIFICANT CHANGE UP (ref 3.3–5)
ALP SERPL-CCNC: 103 U/L — SIGNIFICANT CHANGE UP (ref 40–120)
ALT FLD-CCNC: 11 U/L — SIGNIFICANT CHANGE UP (ref 4–41)
ANION GAP SERPL CALC-SCNC: 10 MMOL/L — SIGNIFICANT CHANGE UP (ref 7–14)
AST SERPL-CCNC: 31 U/L — SIGNIFICANT CHANGE UP (ref 4–40)
BILIRUB SERPL-MCNC: 2.9 MG/DL — HIGH (ref 0.2–1.2)
BUN SERPL-MCNC: 14 MG/DL — SIGNIFICANT CHANGE UP (ref 7–23)
CALCIUM SERPL-MCNC: 8.8 MG/DL — SIGNIFICANT CHANGE UP (ref 8.4–10.5)
CHLORIDE SERPL-SCNC: 108 MMOL/L — HIGH (ref 98–107)
CO2 SERPL-SCNC: 19 MMOL/L — LOW (ref 22–31)
CREAT SERPL-MCNC: 1.03 MG/DL — SIGNIFICANT CHANGE UP (ref 0.5–1.3)
EGFR: 79 ML/MIN/1.73M2 — SIGNIFICANT CHANGE UP
GLUCOSE SERPL-MCNC: 78 MG/DL — SIGNIFICANT CHANGE UP (ref 70–99)
HCT VFR BLD CALC: 21.7 % — LOW (ref 39–50)
HGB BLD-MCNC: 7.4 G/DL — LOW (ref 13–17)
LDH SERPL L TO P-CCNC: 339 U/L — HIGH (ref 135–225)
MAGNESIUM SERPL-MCNC: 2 MG/DL — SIGNIFICANT CHANGE UP (ref 1.6–2.6)
MCHC RBC-ENTMCNC: 28.4 PG — SIGNIFICANT CHANGE UP (ref 27–34)
MCHC RBC-ENTMCNC: 34.1 GM/DL — SIGNIFICANT CHANGE UP (ref 32–36)
MCV RBC AUTO: 83.1 FL — SIGNIFICANT CHANGE UP (ref 80–100)
NRBC # BLD: 9 /100 WBCS — HIGH (ref 0–0)
NRBC # FLD: 0.97 K/UL — HIGH (ref 0–0)
PHOSPHATE SERPL-MCNC: 2.8 MG/DL — SIGNIFICANT CHANGE UP (ref 2.5–4.5)
PLATELET # BLD AUTO: 353 K/UL — SIGNIFICANT CHANGE UP (ref 150–400)
POTASSIUM SERPL-MCNC: 4.7 MMOL/L — SIGNIFICANT CHANGE UP (ref 3.5–5.3)
POTASSIUM SERPL-SCNC: 4.7 MMOL/L — SIGNIFICANT CHANGE UP (ref 3.5–5.3)
PROT SERPL-MCNC: 7.1 G/DL — SIGNIFICANT CHANGE UP (ref 6–8.3)
RBC # BLD: 2.61 M/UL — LOW (ref 4.2–5.8)
RBC # BLD: 2.61 M/UL — LOW (ref 4.2–5.8)
RBC # FLD: 21.2 % — HIGH (ref 10.3–14.5)
RETICS #: 380.8 K/UL — HIGH (ref 25–125)
RETICS/RBC NFR: 14.6 % — HIGH (ref 0.5–2.5)
SODIUM SERPL-SCNC: 137 MMOL/L — SIGNIFICANT CHANGE UP (ref 135–145)
WBC # BLD: 10.65 K/UL — HIGH (ref 3.8–10.5)
WBC # FLD AUTO: 10.65 K/UL — HIGH (ref 3.8–10.5)

## 2022-08-31 PROCEDURE — 99231 SBSQ HOSP IP/OBS SF/LOW 25: CPT | Mod: GC

## 2022-08-31 PROCEDURE — 99233 SBSQ HOSP IP/OBS HIGH 50: CPT

## 2022-08-31 RX ORDER — ENOXAPARIN SODIUM 100 MG/ML
40 INJECTION SUBCUTANEOUS EVERY 24 HOURS
Refills: 0 | Status: DISCONTINUED | OUTPATIENT
Start: 2022-08-31 | End: 2022-09-09

## 2022-08-31 RX ADMIN — Medication 5 MILLIGRAM(S): at 21:37

## 2022-08-31 RX ADMIN — Medication 5 MILLIGRAM(S): at 05:48

## 2022-08-31 RX ADMIN — Medication 100 GRAM(S): at 15:50

## 2022-08-31 RX ADMIN — POLYETHYLENE GLYCOL 3350 17 GRAM(S): 17 POWDER, FOR SOLUTION ORAL at 14:19

## 2022-08-31 RX ADMIN — Medication 5 MILLIGRAM(S): at 14:21

## 2022-08-31 RX ADMIN — ENOXAPARIN SODIUM 40 MILLIGRAM(S): 100 INJECTION SUBCUTANEOUS at 14:20

## 2022-08-31 RX ADMIN — HYDROMORPHONE HYDROCHLORIDE 30 MILLILITER(S): 2 INJECTION INTRAMUSCULAR; INTRAVENOUS; SUBCUTANEOUS at 20:25

## 2022-08-31 RX ADMIN — Medication 5 MILLIGRAM(S): at 00:13

## 2022-08-31 RX ADMIN — FINASTERIDE 5 MILLIGRAM(S): 5 TABLET, FILM COATED ORAL at 14:15

## 2022-08-31 RX ADMIN — SODIUM CHLORIDE 75 MILLILITER(S): 9 INJECTION, SOLUTION INTRAVENOUS at 03:12

## 2022-08-31 RX ADMIN — HYDROMORPHONE HYDROCHLORIDE 30 MILLILITER(S): 2 INJECTION INTRAMUSCULAR; INTRAVENOUS; SUBCUTANEOUS at 08:33

## 2022-08-31 NOTE — PROGRESS NOTE ADULT - PROBLEM SELECTOR PLAN 1
- HIDA neg for cholecystitis  - Surgery following- no surgical intervention at this time   - MRCP pending  - Cont pain control. Pt on Dilaudid PCA

## 2022-08-31 NOTE — PROGRESS NOTE ADULT - ASSESSMENT
67 y.o. M with h/o Sickle cell anemia, frequent admission for OVOC, h/o acute chest, admitted with RUQ pain and pain at Limbs, back. (+) Gallstones on US and CT, r/o acute cholecystitis. (+) elevated retic count and LDH, c/w VOC. No signs of ACS.

## 2022-08-31 NOTE — PROGRESS NOTE ADULT - PROBLEM SELECTOR PLAN 2
Pt has signs of VOC. No signs of ACS  -gentle hydration with 1/2NS at 75cc/H  -PCA with Dilaudid for pain control  -s/p 2u PRBCs in anticipation for OR but surgery no longer being pursued   -Bowel regimen  -Monitor CBC, retic CMP, LDH daily

## 2022-08-31 NOTE — PROGRESS NOTE ADULT - PROBLEM SELECTOR PLAN 3
- Pt states he was planned for surgery next month out pt but this was postponed till October  - Reports that his vision has been worsening and requesting optho eval during this admission  - Will consult optho for eval but likely will be deferred to out pt

## 2022-08-31 NOTE — PROGRESS NOTE ADULT - SUBJECTIVE AND OBJECTIVE BOX
Patient is a 67y old  Male who presents with a chief complaint of abdominal pain (31 Aug 2022 03:22)      SUBJECTIVE / OVERNIGHT EVENTS: Pt transferred to medicine from surgery service. FUENTES mays for cholecystitis     Pt is adamant about advancing to a regular diet. Understands that this may worsen his abdominal pain.     ADDITIONAL REVIEW OF SYSTEMS:    MEDICATIONS  (STANDING):  cefoTEtan  IVPB      cefoTEtan  IVPB 2 Gram(s) IV Intermittent every 24 hours  enoxaparin Injectable 40 milliGRAM(s) SubCutaneous every 24 hours  finasteride 5 milliGRAM(s) Oral daily  HYDROmorphone PCA (1 mG/mL) 30 milliLiter(s) PCA Continuous PCA Continuous  metoprolol tartrate Injectable 5 milliGRAM(s) IV Push every 6 hours  polyethylene glycol 3350 17 Gram(s) Oral daily  senna 2 Tablet(s) Oral at bedtime    MEDICATIONS  (PRN):  ALBUTerol    90 MICROgram(s) HFA Inhaler 2 Puff(s) Inhalation every 6 hours PRN Shortness of Breath and/or Wheezing  naloxone Injectable 0.1 milliGRAM(s) IV Push every 3 minutes PRN For ANY of the following changes in patient status:  A. RR LESS THAN 10 breaths per minute, B. Oxygen saturation LESS THAN 90%, C. Sedation score of 6  ondansetron Injectable 4 milliGRAM(s) IV Push every 6 hours PRN Nausea      CAPILLARY BLOOD GLUCOSE        I&O's Summary    30 Aug 2022 07:01  -  31 Aug 2022 07:00  --------------------------------------------------------  IN: 900 mL / OUT: 1700 mL / NET: -800 mL    31 Aug 2022 07:01  -  31 Aug 2022 15:21  --------------------------------------------------------  IN: 240 mL / OUT: 400 mL / NET: -160 mL        PHYSICAL EXAM:  Vital Signs Last 24 Hrs  T(C): 36.7 (31 Aug 2022 10:12), Max: 37.1 (30 Aug 2022 17:44)  T(F): 98 (31 Aug 2022 10:12), Max: 98.7 (30 Aug 2022 17:44)  HR: 87 (31 Aug 2022 10:12) (68 - 92)  BP: 148/78 (31 Aug 2022 10:12) (113/85 - 148/78)  BP(mean): --  RR: 18 (31 Aug 2022 10:12) (17 - 18)  SpO2: 96% (31 Aug 2022 10:12) (94% - 100%)    Parameters below as of 31 Aug 2022 10:12  Patient On (Oxygen Delivery Method): room air      CONSTITUTIONAL: NAD  EYES: PERRLA; conjunctiva and sclera clear  ENMT: Moist oral mucosa, no pharyngeal injection or exudates; normal dentition  NECK: Supple, no palpable masses; no thyromegaly  RESPIRATORY: Normal respiratory effort; lungs are clear to auscultation bilaterally  CARDIOVASCULAR: Regular rate and rhythm, normal S1 and S2, no murmur/rub/gallop; No lower extremity edema; Peripheral pulses are 2+ bilaterally  ABDOMEN: Nontender to palpation, normoactive bowel sounds, no rebound/guarding; No hepatosplenomegaly  MUSCULOSKELETAL:  Normal gait; no clubbing or cyanosis of digits; no joint swelling or tenderness to palpation  PSYCH: A+O to person, place, and time; affect appropriate  NEUROLOGY: CN 2-12 are intact and symmetric; no gross sensory deficits   SKIN: No rashes; no palpable lesions    LABS:                        7.4    10.65 )-----------( 353      ( 31 Aug 2022 06:20 )             21.7     08-31    137  |  108<H>  |  14  ----------------------------<  78  4.7   |  19<L>  |  1.03    Ca    8.8      31 Aug 2022 06:20  Phos  2.8     08-31  Mg     2.00     08-31    TPro  7.1  /  Alb  3.5  /  TBili  2.9<H>  /  DBili  x   /  AST  31  /  ALT  11  /  AlkPhos  103  08-31    PT/INR - ( 30 Aug 2022 05:16 )   PT: 13.3 sec;   INR: 1.14 ratio         PTT - ( 30 Aug 2022 05:16 )  PTT:29.1 sec            RADIOLOGY & ADDITIONAL TESTS:  Results Reviewed:   Imaging Personally Reviewed:  Electrocardiogram Personally Reviewed:    COORDINATION OF CARE:  Care Discussed with Consultants/Other Providers [Y/N]:  Prior or Outpatient Records Reviewed [Y/N]:

## 2022-08-31 NOTE — PROGRESS NOTE ADULT - SUBJECTIVE AND OBJECTIVE BOX
SURGERY DAILY PROGRESS NOTE:     SUBJECTIVE/ROS: Patient seen at bedside this AM.    24h Events:   - Overnight, no acute events    OBJECTIVE:  Vital Signs Last 24 Hrs  T(C): 36.7 (31 Aug 2022 02:04), Max: 37.2 (30 Aug 2022 14:00)  T(F): 98.1 (31 Aug 2022 02:04), Max: 98.9 (30 Aug 2022 14:00)  HR: 73 (31 Aug 2022 02:04) (68 - 96)  BP: 134/77 (31 Aug 2022 02:04) (105/73 - 146/64)  BP(mean): --  RR: 17 (31 Aug 2022 02:04) (17 - 18)  SpO2: 97% (31 Aug 2022 02:04) (94% - 97%)    Parameters below as of 31 Aug 2022 02:04  Patient On (Oxygen Delivery Method): room air      I&O's Detail    29 Aug 2022 07:01  -  30 Aug 2022 07:00  --------------------------------------------------------  IN:  Total IN: 0 mL    OUT:    Voided (mL): 500 mL  Total OUT: 500 mL    Total NET: -500 mL      30 Aug 2022 07:01  -  31 Aug 2022 03:23  --------------------------------------------------------  IN:    Oral Fluid: 300 mL    sodium chloride 0.45%: 300 mL  Total IN: 600 mL    OUT:    Voided (mL): 1350 mL  Total OUT: 1350 mL    Total NET: -750 mL        Daily     Daily   MEDICATIONS  (STANDING):  cefoTEtan  IVPB      cefoTEtan  IVPB 2 Gram(s) IV Intermittent every 24 hours  finasteride 5 milliGRAM(s) Oral daily  HYDROmorphone PCA (1 mG/mL) 30 milliLiter(s) PCA Continuous PCA Continuous  metoprolol tartrate Injectable 5 milliGRAM(s) IV Push every 6 hours  polyethylene glycol 3350 17 Gram(s) Oral daily  senna 2 Tablet(s) Oral at bedtime  sodium chloride 0.45%. 1000 milliLiter(s) (75 mL/Hr) IV Continuous <Continuous>    MEDICATIONS  (PRN):  ALBUTerol    90 MICROgram(s) HFA Inhaler 2 Puff(s) Inhalation every 6 hours PRN Shortness of Breath and/or Wheezing  naloxone Injectable 0.1 milliGRAM(s) IV Push every 3 minutes PRN For ANY of the following changes in patient status:  A. RR LESS THAN 10 breaths per minute, B. Oxygen saturation LESS THAN 90%, C. Sedation score of 6  ondansetron Injectable 4 milliGRAM(s) IV Push every 6 hours PRN Nausea      LABS:                        6.9    15.34 )-----------( 339      ( 30 Aug 2022 05:16 )             21.2     08-30    142  |  114<H>  |  20  ----------------------------<  82  5.2   |  19<L>  |  1.19    Ca    8.8      30 Aug 2022 05:16  Phos  3.0     08-30  Mg     2.40     08-30    TPro  6.9  /  Alb  3.5  /  TBili  2.6<H>  /  DBili  x   /  AST  31  /  ALT  13  /  AlkPhos  101  08-30    PT/INR - ( 30 Aug 2022 05:16 )   PT: 13.3 sec;   INR: 1.14 ratio         PTT - ( 30 Aug 2022 05:16 )  PTT:29.1 sec      PHYSICAL EXAM:  Gen: AAOx3, non-toxic  Lung: nonlabored breathing   Abd: mild TTP, pain improving    Neuro: No focal sensory or motor deficits

## 2022-08-31 NOTE — PROGRESS NOTE ADULT - ASSESSMENT
Patient is a 66yo Male with PMH of SCD, BPH, HTN presents with generalized body aches and RUQ for 5 days. Diagnosed with cholelithiasis.     Plan:    - Diet: CLD, IVF - 75   - Cefotetan q24 - renal dosing.   - HIDA scan completed with delayed scan : Delayed bowel visualization may be secondary to partial CBD obstruction. Please correlate clinically  - Pain control PRN   - No surgical interventions indicated at the moment. Surgery will follow.   - Transfer to medicine. Plan discussed with hospitalist Darrell Wilson MD.    Team B Surgery   x [phone]

## 2022-08-31 NOTE — PROGRESS NOTE ADULT - ATTENDING COMMENTS
Pt seen and examined.  Agree with assessment and plan.  Imp: Sickle cell crisis.  No evidence of acute cholecystitis on HIDA.  Medical management.  Reconsult surgery as needed.

## 2022-09-01 PROCEDURE — 99233 SBSQ HOSP IP/OBS HIGH 50: CPT

## 2022-09-01 RX ADMIN — Medication 5 MILLIGRAM(S): at 22:27

## 2022-09-01 RX ADMIN — SENNA PLUS 2 TABLET(S): 8.6 TABLET ORAL at 22:24

## 2022-09-01 RX ADMIN — HYDROMORPHONE HYDROCHLORIDE 30 MILLILITER(S): 2 INJECTION INTRAMUSCULAR; INTRAVENOUS; SUBCUTANEOUS at 07:55

## 2022-09-01 RX ADMIN — ENOXAPARIN SODIUM 40 MILLIGRAM(S): 100 INJECTION SUBCUTANEOUS at 14:19

## 2022-09-01 RX ADMIN — Medication 100 GRAM(S): at 14:19

## 2022-09-01 RX ADMIN — FINASTERIDE 5 MILLIGRAM(S): 5 TABLET, FILM COATED ORAL at 14:19

## 2022-09-01 RX ADMIN — HYDROMORPHONE HYDROCHLORIDE 30 MILLILITER(S): 2 INJECTION INTRAMUSCULAR; INTRAVENOUS; SUBCUTANEOUS at 20:05

## 2022-09-01 RX ADMIN — Medication 5 MILLIGRAM(S): at 14:12

## 2022-09-01 RX ADMIN — HYDROMORPHONE HYDROCHLORIDE 30 MILLILITER(S): 2 INJECTION INTRAMUSCULAR; INTRAVENOUS; SUBCUTANEOUS at 21:04

## 2022-09-01 RX ADMIN — POLYETHYLENE GLYCOL 3350 17 GRAM(S): 17 POWDER, FOR SOLUTION ORAL at 14:18

## 2022-09-01 NOTE — DIETITIAN INITIAL EVALUATION ADULT - OTHER INFO
67 year old male with a PMH of sickle cell anemia admitted with RUQ pain and pain at Limbs, back. (+) Gallstones on US and CT per chart.    Patient reports good appetite and did well with breakfast this morning. Requesting for an ensure supplement. No GI distress reported at this time. No chewing/swallowing difficulties reported. Has food allergy to peanuts, pork, shellfish and lactose. Reports UBW is in the 120s. Per previous RD note (7/14/22) noted with weight 125 lbs. No weight documented in chart, recommend obtain current body weight to assess trend. No edema or pressure injuries noted per RN flow sheet.    Patient declined nutrition education at this time. 67 year old male with a PMH of sickle cell anemia admitted with RUQ pain and pain at Limbs, back. (+) Gallstones on US and CT per chart.    Patient reports good appetite and did well with breakfast this morning. Requesting for an ensure supplement. No GI distress reported at this time. No chewing/swallowing difficulties reported. Has food allergy to peanuts, pork, shellfish and lactose. Reports UBW is in the 120s and denies any significant weight changes PTA. Per previous RD note (7/14/22) noted with weight 125 lbs. No weight documented in chart, recommend obtain current body weight to assess trend. No edema or pressure injuries noted per RN flow sheet.    Patient declined nutrition education at this time.

## 2022-09-01 NOTE — DIETITIAN INITIAL EVALUATION ADULT - PERTINENT LABORATORY DATA
08-31    137  |  108<H>  |  14  ----------------------------<  78  4.7   |  19<L>  |  1.03    Ca    8.8      31 Aug 2022 06:20  Phos  2.8     08-31  Mg     2.00     08-31    TPro  7.1  /  Alb  3.5  /  TBili  2.9<H>  /  DBili  x   /  AST  31  /  ALT  11  /  AlkPhos  103  08-31

## 2022-09-01 NOTE — PROGRESS NOTE ADULT - SUBJECTIVE AND OBJECTIVE BOX
Patient is a 67y old  Male who presents with a chief complaint of abdominal pain (31 Aug 2022 15:21)      SUBJECTIVE / OVERNIGHT EVENTS: No acute events overnight. Pt has no new complaints. Pain improving. Tolerating diet     ADDITIONAL REVIEW OF SYSTEMS:    MEDICATIONS  (STANDING):  cefoTEtan  IVPB      cefoTEtan  IVPB 2 Gram(s) IV Intermittent every 24 hours  enoxaparin Injectable 40 milliGRAM(s) SubCutaneous every 24 hours  finasteride 5 milliGRAM(s) Oral daily  HYDROmorphone PCA (1 mG/mL) 30 milliLiter(s) PCA Continuous PCA Continuous  metoprolol tartrate Injectable 5 milliGRAM(s) IV Push every 6 hours  polyethylene glycol 3350 17 Gram(s) Oral daily  senna 2 Tablet(s) Oral at bedtime    MEDICATIONS  (PRN):  ALBUTerol    90 MICROgram(s) HFA Inhaler 2 Puff(s) Inhalation every 6 hours PRN Shortness of Breath and/or Wheezing  naloxone Injectable 0.1 milliGRAM(s) IV Push every 3 minutes PRN For ANY of the following changes in patient status:  A. RR LESS THAN 10 breaths per minute, B. Oxygen saturation LESS THAN 90%, C. Sedation score of 6  ondansetron Injectable 4 milliGRAM(s) IV Push every 6 hours PRN Nausea      CAPILLARY BLOOD GLUCOSE        I&O's Summary    31 Aug 2022 07:01  -  01 Sep 2022 07:00  --------------------------------------------------------  IN: 720 mL / OUT: 400 mL / NET: 320 mL    01 Sep 2022 07:01  -  01 Sep 2022 15:02  --------------------------------------------------------  IN: 240 mL / OUT: 0 mL / NET: 240 mL        PHYSICAL EXAM:  Vital Signs Last 24 Hrs  T(C): 36.7 (01 Sep 2022 09:09), Max: 37.3 (31 Aug 2022 17:30)  T(F): 98.1 (01 Sep 2022 09:09), Max: 99.2 (31 Aug 2022 17:30)  HR: 93 (01 Sep 2022 09:09) (62 - 93)  BP: 144/84 (01 Sep 2022 09:09) (125/79 - 146/79)  BP(mean): --  RR: 17 (01 Sep 2022 09:09) (17 - 19)  SpO2: 100% (01 Sep 2022 09:09) (99% - 100%)    Parameters below as of 01 Sep 2022 09:09  Patient On (Oxygen Delivery Method): room air      CONSTITUTIONAL: NAD  EYES: PERRLA; conjunctiva and sclera clear  ENMT: Moist oral mucosa, no pharyngeal injection or exudates; normal dentition  NECK: Supple, no palpable masses; no thyromegaly  RESPIRATORY: Normal respiratory effort; lungs are clear to auscultation bilaterally  CARDIOVASCULAR: Regular rate and rhythm, normal S1 and S2, no murmur/rub/gallop; No lower extremity edema; Peripheral pulses are 2+ bilaterally  ABDOMEN: Nontender to palpation, normoactive bowel sounds, no rebound/guarding; No hepatosplenomegaly  MUSCULOSKELETAL:  Normal gait; no clubbing or cyanosis of digits; no joint swelling or tenderness to palpation  PSYCH: A+O to person, place, and time; affect appropriate  NEUROLOGY: CN 2-12 are intact and symmetric; no gross sensory deficits   SKIN: No rashes; no palpable lesions    LABS:                        7.4    10.65 )-----------( 353      ( 31 Aug 2022 06:20 )             21.7     08-31    137  |  108<H>  |  14  ----------------------------<  78  4.7   |  19<L>  |  1.03    Ca    8.8      31 Aug 2022 06:20  Phos  2.8     08-31  Mg     2.00     08-31    TPro  7.1  /  Alb  3.5  /  TBili  2.9<H>  /  DBili  x   /  AST  31  /  ALT  11  /  AlkPhos  103  08-31                RADIOLOGY & ADDITIONAL TESTS:  Results Reviewed:   Imaging Personally Reviewed:  Electrocardiogram Personally Reviewed:    COORDINATION OF CARE:  Care Discussed with Consultants/Other Providers [Y/N]:  Prior or Outpatient Records Reviewed [Y/N]:

## 2022-09-01 NOTE — DIETITIAN INITIAL EVALUATION ADULT - PERTINENT MEDS FT
MEDICATIONS  (STANDING):  cefoTEtan  IVPB      cefoTEtan  IVPB 2 Gram(s) IV Intermittent every 24 hours  enoxaparin Injectable 40 milliGRAM(s) SubCutaneous every 24 hours  finasteride 5 milliGRAM(s) Oral daily  HYDROmorphone PCA (1 mG/mL) 30 milliLiter(s) PCA Continuous PCA Continuous  metoprolol tartrate Injectable 5 milliGRAM(s) IV Push every 6 hours  polyethylene glycol 3350 17 Gram(s) Oral daily  senna 2 Tablet(s) Oral at bedtime    MEDICATIONS  (PRN):  ALBUTerol    90 MICROgram(s) HFA Inhaler 2 Puff(s) Inhalation every 6 hours PRN Shortness of Breath and/or Wheezing  naloxone Injectable 0.1 milliGRAM(s) IV Push every 3 minutes PRN For ANY of the following changes in patient status:  A. RR LESS THAN 10 breaths per minute, B. Oxygen saturation LESS THAN 90%, C. Sedation score of 6  ondansetron Injectable 4 milliGRAM(s) IV Push every 6 hours PRN Nausea

## 2022-09-01 NOTE — DIETITIAN INITIAL EVALUATION ADULT - NS FNS DIET ORDER
Diet, Regular:   Supplement Feeding Modality:  Oral  Ensure Enlive Cans or Servings Per Day:  1       Frequency:  Three Times a day (09-01-22 @ 15:01)

## 2022-09-01 NOTE — DIETITIAN INITIAL EVALUATION ADULT - ORAL INTAKE PTA/DIET HISTORY
Patient seen for assessment. Patient providing limited information at this time. Reports no issues with appetite PTA.

## 2022-09-01 NOTE — PROGRESS NOTE ADULT - PROBLEM SELECTOR PLAN 1
- HIDA neg for cholecystitis  - Surgery following- no surgical intervention at this time   - MRCP pending  - Cont pain control. Pt on IV Dilaudid PCA, will adjust as needed

## 2022-09-02 LAB
ALBUMIN SERPL ELPH-MCNC: 3.6 G/DL — SIGNIFICANT CHANGE UP (ref 3.3–5)
ALP SERPL-CCNC: 130 U/L — HIGH (ref 40–120)
ALT FLD-CCNC: 14 U/L — SIGNIFICANT CHANGE UP (ref 4–41)
ANION GAP SERPL CALC-SCNC: 7 MMOL/L — SIGNIFICANT CHANGE UP (ref 7–14)
AST SERPL-CCNC: 36 U/L — SIGNIFICANT CHANGE UP (ref 4–40)
BASOPHILS # BLD AUTO: 0.05 K/UL — SIGNIFICANT CHANGE UP (ref 0–0.2)
BASOPHILS NFR BLD AUTO: 0.5 % — SIGNIFICANT CHANGE UP (ref 0–2)
BILIRUB SERPL-MCNC: 2.3 MG/DL — HIGH (ref 0.2–1.2)
BUN SERPL-MCNC: 12 MG/DL — SIGNIFICANT CHANGE UP (ref 7–23)
CALCIUM SERPL-MCNC: 9.3 MG/DL — SIGNIFICANT CHANGE UP (ref 8.4–10.5)
CHLORIDE SERPL-SCNC: 108 MMOL/L — HIGH (ref 98–107)
CO2 SERPL-SCNC: 22 MMOL/L — SIGNIFICANT CHANGE UP (ref 22–31)
CREAT SERPL-MCNC: 0.89 MG/DL — SIGNIFICANT CHANGE UP (ref 0.5–1.3)
EGFR: 93 ML/MIN/1.73M2 — SIGNIFICANT CHANGE UP
EOSINOPHIL # BLD AUTO: 0.76 K/UL — HIGH (ref 0–0.5)
EOSINOPHIL NFR BLD AUTO: 7.3 % — HIGH (ref 0–6)
GLUCOSE SERPL-MCNC: 103 MG/DL — HIGH (ref 70–99)
HCT VFR BLD CALC: 21.9 % — LOW (ref 39–50)
HGB BLD-MCNC: 7 G/DL — CRITICAL LOW (ref 13–17)
IANC: 7.27 K/UL — SIGNIFICANT CHANGE UP (ref 1.8–7.4)
IMM GRANULOCYTES NFR BLD AUTO: 0.5 % — SIGNIFICANT CHANGE UP (ref 0–1.5)
LDH SERPL L TO P-CCNC: 361 U/L — HIGH (ref 135–225)
LYMPHOCYTES # BLD AUTO: 1.05 K/UL — SIGNIFICANT CHANGE UP (ref 1–3.3)
LYMPHOCYTES # BLD AUTO: 10 % — LOW (ref 13–44)
MAGNESIUM SERPL-MCNC: 2 MG/DL — SIGNIFICANT CHANGE UP (ref 1.6–2.6)
MCHC RBC-ENTMCNC: 27.1 PG — SIGNIFICANT CHANGE UP (ref 27–34)
MCHC RBC-ENTMCNC: 32 GM/DL — SIGNIFICANT CHANGE UP (ref 32–36)
MCV RBC AUTO: 84.9 FL — SIGNIFICANT CHANGE UP (ref 80–100)
MONOCYTES # BLD AUTO: 1.3 K/UL — HIGH (ref 0–0.9)
MONOCYTES NFR BLD AUTO: 12.4 % — SIGNIFICANT CHANGE UP (ref 2–14)
NEUTROPHILS # BLD AUTO: 7.27 K/UL — SIGNIFICANT CHANGE UP (ref 1.8–7.4)
NEUTROPHILS NFR BLD AUTO: 69.3 % — SIGNIFICANT CHANGE UP (ref 43–77)
NRBC # BLD: 0 /100 WBCS — SIGNIFICANT CHANGE UP (ref 0–0)
NRBC # FLD: 0.08 K/UL — HIGH (ref 0–0)
PHOSPHATE SERPL-MCNC: 3.1 MG/DL — SIGNIFICANT CHANGE UP (ref 2.5–4.5)
PLATELET # BLD AUTO: 365 K/UL — SIGNIFICANT CHANGE UP (ref 150–400)
POTASSIUM SERPL-MCNC: 5.6 MMOL/L — HIGH (ref 3.5–5.3)
POTASSIUM SERPL-SCNC: 5.6 MMOL/L — HIGH (ref 3.5–5.3)
PROT SERPL-MCNC: 7.2 G/DL — SIGNIFICANT CHANGE UP (ref 6–8.3)
RBC # BLD: 2.58 M/UL — LOW (ref 4.2–5.8)
RBC # FLD: 20.1 % — HIGH (ref 10.3–14.5)
SODIUM SERPL-SCNC: 137 MMOL/L — SIGNIFICANT CHANGE UP (ref 135–145)
URATE SERPL-MCNC: 6.8 MG/DL — SIGNIFICANT CHANGE UP (ref 3.4–8.8)
WBC # BLD: 10.48 K/UL — SIGNIFICANT CHANGE UP (ref 3.8–10.5)
WBC # FLD AUTO: 10.48 K/UL — SIGNIFICANT CHANGE UP (ref 3.8–10.5)

## 2022-09-02 PROCEDURE — 99233 SBSQ HOSP IP/OBS HIGH 50: CPT

## 2022-09-02 RX ORDER — SODIUM ZIRCONIUM CYCLOSILICATE 10 G/10G
10 POWDER, FOR SUSPENSION ORAL ONCE
Refills: 0 | Status: COMPLETED | OUTPATIENT
Start: 2022-09-02 | End: 2022-09-03

## 2022-09-02 RX ORDER — HYDROMORPHONE HYDROCHLORIDE 2 MG/ML
30 INJECTION INTRAMUSCULAR; INTRAVENOUS; SUBCUTANEOUS
Refills: 0 | Status: DISCONTINUED | OUTPATIENT
Start: 2022-09-02 | End: 2022-09-08

## 2022-09-02 RX ADMIN — Medication 5 MILLIGRAM(S): at 04:37

## 2022-09-02 RX ADMIN — HYDROMORPHONE HYDROCHLORIDE 30 MILLILITER(S): 2 INJECTION INTRAMUSCULAR; INTRAVENOUS; SUBCUTANEOUS at 20:10

## 2022-09-02 RX ADMIN — ENOXAPARIN SODIUM 40 MILLIGRAM(S): 100 INJECTION SUBCUTANEOUS at 13:00

## 2022-09-02 RX ADMIN — POLYETHYLENE GLYCOL 3350 17 GRAM(S): 17 POWDER, FOR SOLUTION ORAL at 11:34

## 2022-09-02 RX ADMIN — HYDROMORPHONE HYDROCHLORIDE 30 MILLILITER(S): 2 INJECTION INTRAMUSCULAR; INTRAVENOUS; SUBCUTANEOUS at 08:33

## 2022-09-02 RX ADMIN — Medication 1 DROP(S): at 13:05

## 2022-09-02 RX ADMIN — HYDROMORPHONE HYDROCHLORIDE 30 MILLILITER(S): 2 INJECTION INTRAMUSCULAR; INTRAVENOUS; SUBCUTANEOUS at 17:07

## 2022-09-02 RX ADMIN — FINASTERIDE 5 MILLIGRAM(S): 5 TABLET, FILM COATED ORAL at 11:34

## 2022-09-02 RX ADMIN — Medication 1 DROP(S): at 18:01

## 2022-09-02 RX ADMIN — Medication 5 MILLIGRAM(S): at 16:59

## 2022-09-02 RX ADMIN — Medication 100 GRAM(S): at 12:25

## 2022-09-02 RX ADMIN — Medication 5 MILLIGRAM(S): at 10:33

## 2022-09-02 RX ADMIN — Medication 1 DROP(S): at 06:54

## 2022-09-02 NOTE — PROGRESS NOTE ADULT - SUBJECTIVE AND OBJECTIVE BOX
Patient is a 67y old  Male who presents with a chief complaint of abdominal pain (02 Sep 2022 03:14)      SUBJECTIVE / OVERNIGHT EVENTS: No acute events overnight. Pt has no new complaints     ADDITIONAL REVIEW OF SYSTEMS:    MEDICATIONS  (STANDING):  artificial tears (preservative free) Ophthalmic Solution 1 Drop(s) Both EYES four times a day  cefoTEtan  IVPB      cefoTEtan  IVPB 2 Gram(s) IV Intermittent every 24 hours  enoxaparin Injectable 40 milliGRAM(s) SubCutaneous every 24 hours  finasteride 5 milliGRAM(s) Oral daily  HYDROmorphone PCA (1 mG/mL) 30 milliLiter(s) PCA Continuous PCA Continuous  metoprolol tartrate Injectable 5 milliGRAM(s) IV Push every 6 hours  polyethylene glycol 3350 17 Gram(s) Oral daily  senna 2 Tablet(s) Oral at bedtime    MEDICATIONS  (PRN):  ALBUTerol    90 MICROgram(s) HFA Inhaler 2 Puff(s) Inhalation every 6 hours PRN Shortness of Breath and/or Wheezing  naloxone Injectable 0.1 milliGRAM(s) IV Push every 3 minutes PRN For ANY of the following changes in patient status:  A. RR LESS THAN 10 breaths per minute, B. Oxygen saturation LESS THAN 90%, C. Sedation score of 6  ondansetron Injectable 4 milliGRAM(s) IV Push every 6 hours PRN Nausea      CAPILLARY BLOOD GLUCOSE        I&O's Summary    01 Sep 2022 07:01  -  02 Sep 2022 07:00  --------------------------------------------------------  IN: 1320 mL / OUT: 1700 mL / NET: -380 mL    02 Sep 2022 07:01  -  02 Sep 2022 13:52  --------------------------------------------------------  IN: 0 mL / OUT: 400 mL / NET: -400 mL        PHYSICAL EXAM:  Vital Signs Last 24 Hrs  T(C): 36.9 (02 Sep 2022 13:20), Max: 36.9 (02 Sep 2022 13:20)  T(F): 98.4 (02 Sep 2022 13:20), Max: 98.4 (02 Sep 2022 13:20)  HR: 77 (02 Sep 2022 13:20) (66 - 96)  BP: 154/78 (02 Sep 2022 13:20) (138/71 - 159/87)  BP(mean): --  RR: 16 (02 Sep 2022 13:20) (16 - 18)  SpO2: 99% (02 Sep 2022 13:20) (96% - 99%)    Parameters below as of 02 Sep 2022 13:20  Patient On (Oxygen Delivery Method): room air      CONSTITUTIONAL: NAD  EYES: PERRLA  RESPIRATORY: Normal respiratory effort; lungs are clear to auscultation bilaterally  CARDIOVASCULAR: Regular rate and rhythm, normal S1 and S2, no murmur/rub/gallop; No lower extremity edema; Peripheral pulses are 2+ bilaterally  ABDOMEN: Nontender to palpation, normoactive bowel sounds, no rebound/guarding; No hepatosplenomegaly  MUSCULOSKELETAL:  Normal gait; no clubbing or cyanosis of digits; no joint swelling or tenderness to palpation  PSYCH: A+O to person, place, and time; affect appropriate  NEUROLOGY: CN 2-12 are intact and symmetric; no gross sensory deficits   SKIN: No rashes; no palpable lesions    LABS:                      RADIOLOGY & ADDITIONAL TESTS:  Results Reviewed:   Imaging Personally Reviewed:  Electrocardiogram Personally Reviewed:    COORDINATION OF CARE:  Care Discussed with Consultants/Other Providers [Y/N]:  Prior or Outpatient Records Reviewed [Y/N]:

## 2022-09-02 NOTE — CONSULT NOTE ADULT - ASSESSMENT
Assessment and Recommendations:  67y male with a past medical history/ocular history of sickle cell disease with history of vasoocclusive crises, BPH, HTN, consulted for evaluation of vision loss occurring over the course of several months.     Visual acuity 20/200 PH 20/60 OD, 20/70/ PH 20/30 OS. PERRL no APD (minimally reactive), IOP 9,10. CVF full, EOM full. Color plates 12/12. DFE with sharp disc margins OU, mac flat, periphery flat with stable temporal chorioretinal scarring.     #Cataract OU  -Patient with months long duration of vision loss worsening over the past few weeks. Denies any blacking/greying out of vision, double vision, eye pain, pain with EOM, flashes, floaters, or curtains.   -4+ nuclear sclerosis OD, 3-4+ nuclear sclerosis OS  -Trace surface dryness OU  -No vitreous opacities noted. Macula and periphery are flat.   -Patient is known to ophthalmology service. Exam today is stable from last outpatient ophthalmology exam.   -Per outpatient records, patient was scheduled for cataract evaluation with [facility] on 07/22/22 but did not make this appointment. Per patient, he had a surgery date in September which was postponed.   -Recommend artificial tears qid OU for ocular surface dryness. (order placed by opthalmology)  -Patient will need outpatient follow-up for cataract evaluation including IOL measurements. Patient made aware of how cataracts affect vision. Patient instructed to let primary team know if he experiences any new worsening of vision, or new floaters/flashes/curtains.     Dw Dr. Glenroy Barclay MD PGY-4      Outpatient Follow-up: Patient should follow-up with his/her ophthalmologist or with [facility] Department of Ophthalmology within 1 week of after discharge at:    [address]  [place] [address]  [phone]    Vicenta Reyes MD PGY 2  Available on Microsoft Teams
Patient is a 66yo Male with PMH of SCD, BPH, HTN presents with generalized body aches and RUQ for 5 days. Diagnosed with cholelithiasis.     Plan:    - Admit to surgery under Dr. Samm Cortes  - NPO/IVF  - Cefotetan 2gm stat and then Q12h  - Transfuse 2 units of pRBC for Hb of 5.3  - Added on for laparoscopic cholecystectomy  - Consented      Patient seen and examined, plan discussed with Dr. Samm Cortes.    Jaguar Altamirano MD, PGY2  
67 y.o. M with h/o Sickle cell anemia, frequent admission for OVOC, h/o acute chest, admitted with RUQ pain and pain at Limbs, back. (+) Gallstones on US and CT, r/o acute cholecystitis. (+) elevated retic count and LDH, c/w VOC. No signs of ACS.

## 2022-09-02 NOTE — PROGRESS NOTE ADULT - PROBLEM SELECTOR PLAN 3
- Pt states he was planned for surgery next month out pt but this was postponed till October  - Reports that his vision has been worsening and requesting optho eval during this admission  -Optho consulted. Started artificial tears. Rec out pt follow up

## 2022-09-02 NOTE — CONSULT NOTE ADULT - SUBJECTIVE AND OBJECTIVE BOX
General Surgery Consult  Consulting surgical team: [facility]  Consulting attending: Samm Cortes    HPI: Patient is a 66 yo Male with PMH of SCD, BPH, HTN, presenting with 5-days onset of generalized body aches and sharp RUQ pain. Patient reports that he is familiar with body aches given his PMH of SCD and multiple previous crisis but the RUQ is new and never happened in the past. He reports taking Motrin without any resolution of his pain. Poor PO intake due to consistent nausea and had multiple episodes of vomiting.    In the ED, patient is uncomfortable in the bed. Reports RUQ pain. On physical exam, abdomen is soft, nondistended, tender in the epigastic and RUQ area. WBC 18. Hb 5.3 (baseline 6). US demonstrated cholelithiasis without cholecystitis. CT confirmed US findings. Spleen is normal. Denies fever, cough, shortness of breath, presence of urinary symptoms.      PAST MEDICAL HISTORY:  Sickle Cell Disease    Left ventricular dysfunction    H/O transfusion    Acute chest syndrome    Intellectual disability    Constipation    Hypertension    Sickle cell disease    BPH (benign prostatic hyperplasia)        PAST SURGICAL HISTORY:  No significant past surgical history    No significant past surgical history        MEDICATIONS:  cefoTEtan  IVPB          ALLERGIES:  lactose (Unknown)  No Known Drug Allergies  peanuts (Anaphylaxis)  peanuts (Angioedema)  pork (Unknown)  shellfish (Unknown)      VITALS & I/Os:  Vital Signs Last 24 Hrs  T(C): 36.5 (29 Aug 2022 08:42), Max: 36.7 (29 Aug 2022 03:41)  T(F): 97.7 (29 Aug 2022 08:42), Max: 98.1 (29 Aug 2022 06:01)  HR: 80 (29 Aug 2022 08:42) (79 - 92)  BP: 137/65 (29 Aug 2022 08:42) (130/70 - 143/66)  BP(mean): --  RR: 18 (29 Aug 2022 08:42) (15 - 18)  SpO2: 97% (29 Aug 2022 08:42) (97% - 100%)    Parameters below as of 29 Aug 2022 08:42  Patient On (Oxygen Delivery Method): nasal cannula  O2 Flow (L/min): 2      I&O's Summary      PHYSICAL EXAM:  General: generalized body aches  Respiratory: Nonlabored  Cardiovascular: RRR  Abdominal: Soft, nondistended. No rebound or guarding. No organomegaly, no palpable mass. Tender in epigastric and RUQ areas.  Extremities: Warm    LABS:                        5.3    18.57 )-----------( 359      ( 29 Aug 2022 03:30 )             15.7     08-29    139  |  110<H>  |  28<H>  ----------------------------<  110<H>  4.9   |  17<L>  |  1.45<H>    Ca    9.0      29 Aug 2022 03:30    TPro  7.6  /  Alb  4.1  /  TBili  2.1<H>  /  DBili  x   /  AST  27  /  ALT  15  /  AlkPhos  111  08-29    Lactate:  08-29 @ 03:30  1.1                  IMAGING:    CT abdomen:  Cholelithiasis without sonographic evidence of an acute cholecystitis.    
[facility] DEPARTMENT OF OPHTHALMOLOGY - INITIAL ADULT CONSULT  -----------------------------------------------------------------------------------------------------------------  Vicenta Reyes MD PGY 2  Contact via [employer] Teams  -----------------------------------------------------------------------------------------------------------------    HPI:  HPI: Patient is a 68 yo Male with PMH of SCD, BPH, HTN, presenting with 5-days onset of generalized body aches and sharp RUQ pain. Patient reports that he is familiar with body aches given his PMH of SCD and multiple previous crisis but the RUQ is new and never happened in the past. He reports taking Motrin without any resolution of his pain. Poor PO intake due to consistent nausea and had multiple episodes of vomiting.    In the ED, patient is uncomfortable in the bed. Reports RUQ pain. On physical exam, abdomen is soft, nondistended, tender in the epigastic and RUQ area. WBC 18. Hb 5.3 (baseline 6). US demonstrated cholelithiasis without cholecystitis. CT confirmed US findings. Spleen is normal. Denies fever, cough, shortness of breath, presence of urinary symptoms.   (29 Aug 2022 11:55)    Interval History: At bedside patient states that his vision has been worsening over the course of months. He states that he feels his vision has gotten worse over the last week but states that he is seeing more clearly today than yesterday. Denies any blacking out of vision, denies any new floaters, shadows, curtains over his vision. Describes his vision loss as a "cloudiness". Denies any double vision, eye pain, pain with eye movements. Patient states he was scheduled for cataract surgery on Sep 14, 2022, but his surgery got postponed. Patient states he would like to have cataract surgery done so that he can see better.     PAST MEDICAL & SURGICAL HISTORY:  Sickle Cell Disease      Left ventricular dysfunction  mild LVD on echo in 7/18, normal LVF in 12/18      H/O transfusion  ~ pRBC      Acute chest syndrome  Pt unaware      Intellectual disability  ~ mild      Constipation      Hypertension      Sickle cell disease      BPH (benign prostatic hyperplasia)      No significant past surgical history        Past Ocular History: KIAN s/p LPI OU, 3-4+ cat OU scheduled for cat surgery in September.     Family History:  FAMILY HISTORY:  FH: hypertension  ~ mother    Family history of sickle cell trait (Mother)  ~ presumed in parents &amp; 7 siblings (none suffer w/ the disease, per patient)        Ophthalmic Medications: None    MEDICATIONS  (STANDING):  cefoTEtan  IVPB      cefoTEtan  IVPB 2 Gram(s) IV Intermittent every 24 hours  enoxaparin Injectable 40 milliGRAM(s) SubCutaneous every 24 hours  finasteride 5 milliGRAM(s) Oral daily  HYDROmorphone PCA (1 mG/mL) 30 milliLiter(s) PCA Continuous PCA Continuous  metoprolol tartrate Injectable 5 milliGRAM(s) IV Push every 6 hours  polyethylene glycol 3350 17 Gram(s) Oral daily  senna 2 Tablet(s) Oral at bedtime    MEDICATIONS  (PRN):  ALBUTerol    90 MICROgram(s) HFA Inhaler 2 Puff(s) Inhalation every 6 hours PRN Shortness of Breath and/or Wheezing  naloxone Injectable 0.1 milliGRAM(s) IV Push every 3 minutes PRN For ANY of the following changes in patient status:  A. RR LESS THAN 10 breaths per minute, B. Oxygen saturation LESS THAN 90%, C. Sedation score of 6  ondansetron Injectable 4 milliGRAM(s) IV Push every 6 hours PRN Nausea    Allergies & Intolerances:   lactose (Unknown)  peanuts (Anaphylaxis)  peanuts (Angioedema)  pork (Unknown)  shellfish (Unknown)    Review of Systems:  Constitutional: No fever, chills  Eyes: + cloudy vision. Denies, flashes, floaters, FBS, erythema, discharge, double vision, OU  Neuro: No tremors  Cardiovascular: No chest pain, palpitations  Respiratory: No SOB, no cough  GI: No nausea, vomiting, abdominal pain  : No dysuria  Skin: no rash  Psych: no depression  Endocrine: no polyuria, polydipsia  Heme/lymph: no swelling    VITALS: T(C): 36.8 (09-02-22 @ 02:46)  T(F): 98.3 (09-02-22 @ 02:46), Max: 98.7 (09-01-22 @ 05:54)  HR: 66 (09-02-22 @ 02:46) (66 - 93)  BP: 150/79 (09-02-22 @ 02:46) (125/79 - 150/79)  RR:  (17 - 17)  SpO2:  (96% - 100%)  Wt(kg): --  General: AAO x 3, appropriate mood and affect    Ophthalmology Exam:  Visual acuity (sc): 20/200 PH 20/60 OD, 20/70 PH 20/30 OS.  Pupils: PERRL OU, no APD. Minimally reactive OU.   Tonometry:  9,10  Extraocular movements (EOMs): Full OU, no pain, no diplopia.  Confrontational Visual Field (CVF): Full OU.  Color Plates: 12/12 OU.    Pen Light Exam (PLE)  External: Normal OU.  Lids/Lashes/Lacrimal Ducts: Flat OU.  Sclera/Conjunctiva: White and quiet OU.  Cornea: Trace diffuse SPK OU.  Anterior Chamber: Deep and formed OU.    Iris: Flat OU.  Lens: 4+ NS OD. 3-4+ NS OS.    Fundus Exam: dilated with 1% tropicamide and 2.5% phenylephrine  Approval obtained from primary team for dilation  Patient aware that pupils can remained dilated for at least 4-6 hours.  Exam performed with 20 D lens    Slightly hazy view OD secondary to cataract.   Vitreous: PVD OD. Clear OS.   Disc, cup/disc: sharp and pink, 0.4 OU  Macula: Flat OU  Vessels: wnl OU  Periphery: OD: Flat, attached. OS: Flat, Temporal chorioretinal scarring OS.     
Patient is a 67y old  Male who presents with a chief complaint of abdominal pain (30 Aug 2022 03:08)      HPI:  HPI: Patient is a 68 yo Male with PMH of SCD, BPH, HTN, presenting with 5-days onset of generalized body aches and sharp RUQ pain. Patient reports that he is familiar with body aches given his PMH of SCD and multiple previous crisis but the RUQ is new and never happened in the past. He reports taking Motrin without any resolution of his pain. Poor PO intake due to consistent nausea and had multiple episodes of vomiting.  In the ED, patient is uncomfortable in the bed. Reports RUQ pain. On physical exam, abdomen is soft, nondistended, tender in the epigastic and RUQ area. WBC 18. Hb 5.3 (baseline 6). US demonstrated cholelithiasis without cholecystitis. CT confirmed US findings. Spleen is normal. Denies fever, cough, shortness of breath, presence of urinary symptoms.  Pt s/p 2 units of PRBC since admission, currently still c/o RUQ pain, but no N/V. also c/o body aches c/w his prior crisis. Denies an CP or SOB.       History limited due to: [ ] Dementia  [ ] Delirium  [ ] Condition    Pain Symptoms if applicable:             	                                  moderate          Pain:	                           	     4-6	         Location: RUQ, body ache 	  Modifying factors:	  Associated symptoms:	    Function: [x ] Independent  [ ] Assistance  [ ] Total care  [ ] Non-ambulatory    Allergies    No Known Drug Allergies  peanuts (Anaphylaxis)  peanuts (Angioedema)  pork (Unknown)  shellfish (Unknown)    Intolerances    lactose (Unknown)      HOME MEDICATIONS: [x ] Reviewed    MEDICATIONS  (STANDING):  cefoTEtan  IVPB      cefoTEtan  IVPB 2 Gram(s) IV Intermittent every 24 hours  metoprolol tartrate Injectable 5 milliGRAM(s) IV Push every 6 hours    MEDICATIONS  (PRN):  HYDROmorphone  Injectable 0.5 milliGRAM(s) IV Push every 6 hours PRN Severe Pain (7 - 10)      PAST MEDICAL & SURGICAL HISTORY:  Sickle Cell Disease      Left ventricular dysfunction  mild LVD on echo in 7/18, normal LVF in 12/18      H/O transfusion  ~ pRBC      Acute chest syndrome  Pt unaware      Intellectual disability  ~ mild      Constipation      Hypertension      Sickle cell disease      BPH (benign prostatic hyperplasia)      No significant past surgical history      [x ] Reviewed     SOCIAL HISTORY:  Residence: [ ] half-way  [ ] SNF  [ x] Community  [ ] Substance abuse: denies   [ ] Tobacco: denies  [ ] Alcohol use: denies    FAMILY HISTORY:  FH: hypertension  ~ mother    Family history of sickle cell trait (Mother)  ~ presumed in parents &amp; 7 siblings (none suffer w/ the disease, per patient)    [ ] No pertinent family history in first degree relatives     REVIEW OF SYSTEMS:    CONSTITUTIONAL: No fever, weight loss, or fatigue  EYES: No eye pain, visual disturbances, or discharge  ENMT:  No difficulty hearing, tinnitus, vertigo; No sinus or throat pain  NECK: No pain or stiffness  BREASTS: No pain, masses, or nipple discharge  RESPIRATORY: No cough, wheezing, chills or hemoptysis; No shortness of breath  CARDIOVASCULAR: No chest pain, palpitations, dizziness, or leg swelling  GASTROINTESTINAL: (+) abdominal or epigastric pain. No nausea, vomiting, or hematemesis; No diarrhea or constipation. No melena or hematochezia.  GENITOURINARY: No dysuria, frequency, hematuria, or incontinence  NEUROLOGICAL: No headaches, memory loss, loss of strength, numbness, or tremors  SKIN: No itching, burning, rashes, or lesions   LYMPH NODES: No enlarged glands  ENDOCRINE: No heat or cold intolerance; No hair loss  MUSCULOSKELETAL: (+) pain at limbs, back.   PSYCHIATRIC: No depression, anxiety, mood swings, or difficulty sleeping  HEME/LYMPH: No easy bruising, or bleeding gums  ALLERGY AND IMMUNOLOGIC: No hives or eczema    [  ] All other ROS negative  [  ] Unable to obtain due to poor mental status    Vital Signs Last 24 Hrs  T(C): 36.9 (30 Aug 2022 10:30), Max: 36.9 (29 Aug 2022 21:35)  T(F): 98.4 (30 Aug 2022 10:30), Max: 98.5 (29 Aug 2022 21:35)  HR: 82 (30 Aug 2022 10:30) (77 - 99)  BP: 105/73 (30 Aug 2022 10:30) (100/52 - 144/84)  BP(mean): --  RR: 18 (30 Aug 2022 10:30) (16 - 18)  SpO2: 97% (30 Aug 2022 10:30) (95% - 98%)    Parameters below as of 30 Aug 2022 10:30  Patient On (Oxygen Delivery Method): room air        PHYSICAL EXAM:    GENERAL: NAD, well-groomed, well-developed, thin   HEAD:  Atraumatic, Normocephalic  EYES: EOMI, PERRLA, conjunctiva and sclera clear  ENMT: Moist mucous membranes  NECK: Supple, No JVD  RESPIRATORY: Clear to auscultation bilaterally; No rales, rhonchi, wheezing, or rubs  CARDIOVASCULAR: Regular rate and rhythm; No murmurs, rubs, or gallops  GASTROINTESTINAL: Soft, (+) tender at RUQ, ? rebound, (+) ? Mcghee's.  Nondistended; Bowel sounds present  GENITOURINARY: Not examined  EXTREMITIES:  2+ Peripheral Pulses, No clubbing, cyanosis, or edema  NERVOUS SYSTEM:  Alert & Oriented X3; Moving all 4 extremities; No gross sensory deficits  HEME/LYMPH: No lymphadenopathy noted  SKIN: No rashes or lesions; Incisions C/D/I    LABS:                        6.9    15.34 )-----------( 339      ( 30 Aug 2022 05:16 )             21.2     08-30    142  |  114<H>  |  20  ----------------------------<  82  5.2   |  19<L>  |  1.19    Ca    8.8      30 Aug 2022 05:16  Phos  3.0     08-30  Mg     2.40     08-30    TPro  6.9  /  Alb  3.5  /  TBili  2.6<H>  /  DBili  x   /  AST  31  /  ALT  13  /  AlkPhos  101  08-30    PT/INR - ( 30 Aug 2022 05:16 )   PT: 13.3 sec;   INR: 1.14 ratio         PTT - ( 30 Aug 2022 05:16 )  PTT:29.1 sec    CAPILLARY BLOOD GLUCOSE          RADIOLOGY & ADDITIONAL STUDIES:    EKG:   Personally Reviewed:  [x ] YES NSR, (+) non-specific TW changes.     Imaging:   Personally Reviewed:  [ ] YES          < from: US Abdomen Limited (08.29.22 @ 06:57) >  IMPRESSION:    Cholelithiasis without sonographic evidence of an acute cholecystitis.    --- End of Report ---          ZOYA BENAVIDEZ MD; Resident Radiology  This document has been electronically signed.  GIA SHAIKH MD; Attending Radiologist  This document has been electronically signed. Aug 29 2022  7:05AM    < end of copied text >  < from: CT Abdomen and Pelvis w/ IV Cont (08.29.22 @ 04:44) >    IMPRESSION:  No acute findings.    Scattered groundglass opacities noted on the previous exam have resolved.   There is a persistent Unchanged 1.1 cm right lower lobe peripheral   opacity. Recommend follow-up noncontrast chest CT in 3 months.    Large gallstones without ultrasound evidence for acute cholecystitis.          --- End of Report ---          ZOYA BENAVIDEZ MD; Resident Radiology  This document has been electronically signed.  DINORA GARCIA MD; Attending Radiologist  This document has been electronically signed. Aug 29 2022  5:06AM    < end of copied text >       Consultant(s) notes reviewed:    Care Discussed with Consultant(s)/Other Providers:    Advanced Directives: [ ] DNR  [ ] No feeding tube  [ ] MOLST in chart  [ ] MOLST completed today  [x ] Unknown

## 2022-09-03 LAB
ALBUMIN SERPL ELPH-MCNC: 4.1 G/DL — SIGNIFICANT CHANGE UP (ref 3.3–5)
ALP SERPL-CCNC: 164 U/L — HIGH (ref 40–120)
ALT FLD-CCNC: 18 U/L — SIGNIFICANT CHANGE UP (ref 4–41)
ANION GAP SERPL CALC-SCNC: 12 MMOL/L — SIGNIFICANT CHANGE UP (ref 7–14)
AST SERPL-CCNC: 32 U/L — SIGNIFICANT CHANGE UP (ref 4–40)
BASOPHILS # BLD AUTO: 0.04 K/UL — SIGNIFICANT CHANGE UP (ref 0–0.2)
BASOPHILS NFR BLD AUTO: 0.4 % — SIGNIFICANT CHANGE UP (ref 0–2)
BILIRUB SERPL-MCNC: 2.3 MG/DL — HIGH (ref 0.2–1.2)
BUN SERPL-MCNC: 14 MG/DL — SIGNIFICANT CHANGE UP (ref 7–23)
CALCIUM SERPL-MCNC: 9.4 MG/DL — SIGNIFICANT CHANGE UP (ref 8.4–10.5)
CHLORIDE SERPL-SCNC: 101 MMOL/L — SIGNIFICANT CHANGE UP (ref 98–107)
CO2 SERPL-SCNC: 21 MMOL/L — LOW (ref 22–31)
CREAT SERPL-MCNC: 1.03 MG/DL — SIGNIFICANT CHANGE UP (ref 0.5–1.3)
EGFR: 79 ML/MIN/1.73M2 — SIGNIFICANT CHANGE UP
EOSINOPHIL # BLD AUTO: 0.67 K/UL — HIGH (ref 0–0.5)
EOSINOPHIL NFR BLD AUTO: 6.4 % — HIGH (ref 0–6)
GLUCOSE SERPL-MCNC: 76 MG/DL — SIGNIFICANT CHANGE UP (ref 70–99)
HCT VFR BLD CALC: 24.8 % — LOW (ref 39–50)
HGB BLD-MCNC: 8.1 G/DL — LOW (ref 13–17)
IANC: 7.65 K/UL — HIGH (ref 1.8–7.4)
IMM GRANULOCYTES NFR BLD AUTO: 0.5 % — SIGNIFICANT CHANGE UP (ref 0–1.5)
LDH SERPL L TO P-CCNC: 380 U/L — HIGH (ref 135–225)
LYMPHOCYTES # BLD AUTO: 1.09 K/UL — SIGNIFICANT CHANGE UP (ref 1–3.3)
LYMPHOCYTES # BLD AUTO: 10.4 % — LOW (ref 13–44)
MAGNESIUM SERPL-MCNC: 1.9 MG/DL — SIGNIFICANT CHANGE UP (ref 1.6–2.6)
MCHC RBC-ENTMCNC: 27.6 PG — SIGNIFICANT CHANGE UP (ref 27–34)
MCHC RBC-ENTMCNC: 32.7 GM/DL — SIGNIFICANT CHANGE UP (ref 32–36)
MCV RBC AUTO: 84.6 FL — SIGNIFICANT CHANGE UP (ref 80–100)
MONOCYTES # BLD AUTO: 1.01 K/UL — HIGH (ref 0–0.9)
MONOCYTES NFR BLD AUTO: 9.6 % — SIGNIFICANT CHANGE UP (ref 2–14)
NEUTROPHILS # BLD AUTO: 7.65 K/UL — HIGH (ref 1.8–7.4)
NEUTROPHILS NFR BLD AUTO: 72.7 % — SIGNIFICANT CHANGE UP (ref 43–77)
NRBC # BLD: 0 /100 WBCS — SIGNIFICANT CHANGE UP (ref 0–0)
NRBC # FLD: 0.06 K/UL — HIGH (ref 0–0)
PHOSPHATE SERPL-MCNC: 2.7 MG/DL — SIGNIFICANT CHANGE UP (ref 2.5–4.5)
PLATELET # BLD AUTO: 358 K/UL — SIGNIFICANT CHANGE UP (ref 150–400)
POTASSIUM SERPL-MCNC: 4.8 MMOL/L — SIGNIFICANT CHANGE UP (ref 3.5–5.3)
POTASSIUM SERPL-SCNC: 4.8 MMOL/L — SIGNIFICANT CHANGE UP (ref 3.5–5.3)
PROT SERPL-MCNC: 7.7 G/DL — SIGNIFICANT CHANGE UP (ref 6–8.3)
RBC # BLD: 2.93 M/UL — LOW (ref 4.2–5.8)
RBC # BLD: 2.93 M/UL — LOW (ref 4.2–5.8)
RBC # FLD: 20 % — HIGH (ref 10.3–14.5)
RETICS #: 170.8 K/UL — HIGH (ref 25–125)
RETICS/RBC NFR: 5.8 % — HIGH (ref 0.5–2.5)
SODIUM SERPL-SCNC: 134 MMOL/L — LOW (ref 135–145)
WBC # BLD: 10.51 K/UL — HIGH (ref 3.8–10.5)
WBC # FLD AUTO: 10.51 K/UL — HIGH (ref 3.8–10.5)

## 2022-09-03 PROCEDURE — 99233 SBSQ HOSP IP/OBS HIGH 50: CPT

## 2022-09-03 RX ADMIN — Medication 1 DROP(S): at 00:09

## 2022-09-03 RX ADMIN — Medication 1 DROP(S): at 12:16

## 2022-09-03 RX ADMIN — SODIUM ZIRCONIUM CYCLOSILICATE 10 GRAM(S): 10 POWDER, FOR SUSPENSION ORAL at 02:01

## 2022-09-03 RX ADMIN — Medication 1 DROP(S): at 17:43

## 2022-09-03 RX ADMIN — Medication 5 MILLIGRAM(S): at 18:17

## 2022-09-03 RX ADMIN — Medication 5 MILLIGRAM(S): at 12:07

## 2022-09-03 RX ADMIN — Medication 1 DROP(S): at 06:14

## 2022-09-03 RX ADMIN — HYDROMORPHONE HYDROCHLORIDE 30 MILLILITER(S): 2 INJECTION INTRAMUSCULAR; INTRAVENOUS; SUBCUTANEOUS at 20:06

## 2022-09-03 RX ADMIN — Medication 1 DROP(S): at 12:07

## 2022-09-03 RX ADMIN — Medication 1 DROP(S): at 17:41

## 2022-09-03 RX ADMIN — ENOXAPARIN SODIUM 40 MILLIGRAM(S): 100 INJECTION SUBCUTANEOUS at 13:56

## 2022-09-03 RX ADMIN — FINASTERIDE 5 MILLIGRAM(S): 5 TABLET, FILM COATED ORAL at 12:07

## 2022-09-03 RX ADMIN — Medication 5 MILLIGRAM(S): at 06:13

## 2022-09-03 RX ADMIN — Medication 5 MILLIGRAM(S): at 00:05

## 2022-09-03 RX ADMIN — Medication 100 GRAM(S): at 12:07

## 2022-09-03 RX ADMIN — HYDROMORPHONE HYDROCHLORIDE 30 MILLILITER(S): 2 INJECTION INTRAMUSCULAR; INTRAVENOUS; SUBCUTANEOUS at 08:00

## 2022-09-03 NOTE — PROGRESS NOTE ADULT - SUBJECTIVE AND OBJECTIVE BOX
Patient is a 67y old  Male who presents with a chief complaint of abdominal pain (02 Sep 2022 13:49)      SUBJECTIVE / OVERNIGHT EVENTS: No acute events overnight. Pt has no new complaints. Still having RUQ pain but adequately controlled on PCA     ADDITIONAL REVIEW OF SYSTEMS:    MEDICATIONS  (STANDING):  artificial  tears Solution 1 Drop(s) Both EYES four times a day  artificial tears (preservative free) Ophthalmic Solution 1 Drop(s) Both EYES four times a day  cefoTEtan  IVPB      cefoTEtan  IVPB 2 Gram(s) IV Intermittent every 24 hours  enoxaparin Injectable 40 milliGRAM(s) SubCutaneous every 24 hours  finasteride 5 milliGRAM(s) Oral daily  HYDROmorphone PCA (1 mG/mL) 30 milliLiter(s) PCA Continuous PCA Continuous  metoprolol tartrate Injectable 5 milliGRAM(s) IV Push every 6 hours  polyethylene glycol 3350 17 Gram(s) Oral daily  senna 2 Tablet(s) Oral at bedtime    MEDICATIONS  (PRN):  ALBUTerol    90 MICROgram(s) HFA Inhaler 2 Puff(s) Inhalation every 6 hours PRN Shortness of Breath and/or Wheezing  naloxone Injectable 0.1 milliGRAM(s) IV Push every 3 minutes PRN For ANY of the following changes in patient status:  A. RR LESS THAN 10 breaths per minute, B. Oxygen saturation LESS THAN 90%, C. Sedation score of 6  ondansetron Injectable 4 milliGRAM(s) IV Push every 6 hours PRN Nausea      CAPILLARY BLOOD GLUCOSE        I&O's Summary    02 Sep 2022 07:01  -  03 Sep 2022 07:00  --------------------------------------------------------  IN: 700 mL / OUT: 2775 mL / NET: -2075 mL    03 Sep 2022 07:01  -  03 Sep 2022 15:28  --------------------------------------------------------  IN: 0 mL / OUT: 300 mL / NET: -300 mL        PHYSICAL EXAM:  Vital Signs Last 24 Hrs  T(C): 36.7 (03 Sep 2022 14:05), Max: 37.1 (03 Sep 2022 02:11)  T(F): 98 (03 Sep 2022 14:05), Max: 98.7 (03 Sep 2022 02:11)  HR: 77 (03 Sep 2022 14:05) (66 - 82)  BP: 131/61 (03 Sep 2022 14:05) (112/67 - 157/88)  BP(mean): --  RR: 18 (03 Sep 2022 14:05) (17 - 18)  SpO2: 99% (03 Sep 2022 14:05) (98% - 100%)    Parameters below as of 03 Sep 2022 14:05  Patient On (Oxygen Delivery Method): room air      CONSTITUTIONAL: NAD  EYES: PERRLA  RESPIRATORY: Normal respiratory effort; lungs are clear to auscultation bilaterally  CARDIOVASCULAR: Regular rate and rhythm, normal S1 and S2, no murmur/rub/gallop; No lower extremity edema; Peripheral pulses are 2+ bilaterally  ABDOMEN: Nontender to palpation, normoactive bowel sounds, no rebound/guarding; No hepatosplenomegaly  MUSCULOSKELETAL:  Normal gait; no clubbing or cyanosis of digits; no joint swelling or tenderness to palpation  PSYCH: A+O to person, place, and time; affect appropriate  NEUROLOGY: CN 2-12 are intact and symmetric; no gross sensory deficits   SKIN: No rashes; no palpable lesions    LABS:                        8.1    10.51 )-----------( 358      ( 03 Sep 2022 05:00 )             24.8     09-03    134<L>  |  101  |  14  ----------------------------<  76  4.8   |  21<L>  |  1.03    Ca    9.4      03 Sep 2022 05:00  Phos  2.7     09-03  Mg     1.90     09-03    TPro  7.7  /  Alb  4.1  /  TBili  2.3<H>  /  DBili  x   /  AST  32  /  ALT  18  /  AlkPhos  164<H>  09-03                RADIOLOGY & ADDITIONAL TESTS:  Results Reviewed:   Imaging Personally Reviewed:  Electrocardiogram Personally Reviewed:    COORDINATION OF CARE:  Care Discussed with Consultants/Other Providers [Y/N]:  Prior or Outpatient Records Reviewed [Y/N]:

## 2022-09-03 NOTE — PROGRESS NOTE ADULT - PROBLEM SELECTOR PLAN 1
- HIDA neg for cholecystitis  - Surgery following- no surgical intervention at this time   - MRCP pending  - Cont pain control. Pt on IV Dilaudid PCA, reduced to 0.4/6/8 on 9/2

## 2022-09-03 NOTE — PROGRESS NOTE ADULT - PROBLEM SELECTOR PLAN 3
- Pt states he was planned for surgery this month but it was postponed till October  - Reports that his vision has been worsening and requesting optho eval during this admission  -Optho consulted. Started artificial tears. Rec out pt follow up

## 2022-09-04 LAB
BASOPHILS # BLD AUTO: 0.05 K/UL — SIGNIFICANT CHANGE UP (ref 0–0.2)
BASOPHILS NFR BLD AUTO: 0.5 % — SIGNIFICANT CHANGE UP (ref 0–2)
EOSINOPHIL # BLD AUTO: 0.51 K/UL — HIGH (ref 0–0.5)
EOSINOPHIL NFR BLD AUTO: 5.4 % — SIGNIFICANT CHANGE UP (ref 0–6)
HCT VFR BLD CALC: 21 % — CRITICAL LOW (ref 39–50)
HGB BLD-MCNC: 6.9 G/DL — CRITICAL LOW (ref 13–17)
IANC: 6.18 K/UL — SIGNIFICANT CHANGE UP (ref 1.8–7.4)
IMM GRANULOCYTES NFR BLD AUTO: 0.6 % — SIGNIFICANT CHANGE UP (ref 0–1.5)
LDH SERPL L TO P-CCNC: 342 U/L — HIGH (ref 135–225)
LYMPHOCYTES # BLD AUTO: 1.32 K/UL — SIGNIFICANT CHANGE UP (ref 1–3.3)
LYMPHOCYTES # BLD AUTO: 14.1 % — SIGNIFICANT CHANGE UP (ref 13–44)
MCHC RBC-ENTMCNC: 27.5 PG — SIGNIFICANT CHANGE UP (ref 27–34)
MCHC RBC-ENTMCNC: 32.9 GM/DL — SIGNIFICANT CHANGE UP (ref 32–36)
MCV RBC AUTO: 83.7 FL — SIGNIFICANT CHANGE UP (ref 80–100)
MONOCYTES # BLD AUTO: 1.27 K/UL — HIGH (ref 0–0.9)
MONOCYTES NFR BLD AUTO: 13.5 % — SIGNIFICANT CHANGE UP (ref 2–14)
NEUTROPHILS # BLD AUTO: 6.18 K/UL — SIGNIFICANT CHANGE UP (ref 1.8–7.4)
NEUTROPHILS NFR BLD AUTO: 65.9 % — SIGNIFICANT CHANGE UP (ref 43–77)
NRBC # BLD: 0 /100 WBCS — SIGNIFICANT CHANGE UP (ref 0–0)
NRBC # FLD: 0.04 K/UL — HIGH (ref 0–0)
PLATELET # BLD AUTO: 297 K/UL — SIGNIFICANT CHANGE UP (ref 150–400)
RBC # BLD: 2.51 M/UL — LOW (ref 4.2–5.8)
RBC # BLD: 2.51 M/UL — LOW (ref 4.2–5.8)
RBC # FLD: 19.4 % — HIGH (ref 10.3–14.5)
RETICS #: 97.1 K/UL — SIGNIFICANT CHANGE UP (ref 25–125)
RETICS/RBC NFR: 3.9 % — HIGH (ref 0.5–2.5)
WBC # BLD: 9.39 K/UL — SIGNIFICANT CHANGE UP (ref 3.8–10.5)
WBC # FLD AUTO: 9.39 K/UL — SIGNIFICANT CHANGE UP (ref 3.8–10.5)

## 2022-09-04 PROCEDURE — 99233 SBSQ HOSP IP/OBS HIGH 50: CPT

## 2022-09-04 RX ORDER — SODIUM CHLORIDE 9 MG/ML
1000 INJECTION, SOLUTION INTRAVENOUS
Refills: 0 | Status: DISCONTINUED | OUTPATIENT
Start: 2022-09-04 | End: 2022-09-09

## 2022-09-04 RX ADMIN — Medication 1 DROP(S): at 06:41

## 2022-09-04 RX ADMIN — Medication 1 DROP(S): at 00:28

## 2022-09-04 RX ADMIN — Medication 100 GRAM(S): at 12:28

## 2022-09-04 RX ADMIN — Medication 1 DROP(S): at 18:21

## 2022-09-04 RX ADMIN — Medication 1 DROP(S): at 23:41

## 2022-09-04 RX ADMIN — Medication 5 MILLIGRAM(S): at 06:39

## 2022-09-04 RX ADMIN — Medication 5 MILLIGRAM(S): at 23:41

## 2022-09-04 RX ADMIN — Medication 1 DROP(S): at 06:34

## 2022-09-04 RX ADMIN — HYDROMORPHONE HYDROCHLORIDE 30 MILLILITER(S): 2 INJECTION INTRAMUSCULAR; INTRAVENOUS; SUBCUTANEOUS at 20:55

## 2022-09-04 RX ADMIN — HYDROMORPHONE HYDROCHLORIDE 30 MILLILITER(S): 2 INJECTION INTRAMUSCULAR; INTRAVENOUS; SUBCUTANEOUS at 08:21

## 2022-09-04 RX ADMIN — POLYETHYLENE GLYCOL 3350 17 GRAM(S): 17 POWDER, FOR SOLUTION ORAL at 12:27

## 2022-09-04 RX ADMIN — Medication 5 MILLIGRAM(S): at 00:27

## 2022-09-04 RX ADMIN — ENOXAPARIN SODIUM 40 MILLIGRAM(S): 100 INJECTION SUBCUTANEOUS at 14:07

## 2022-09-04 RX ADMIN — Medication 1 DROP(S): at 18:27

## 2022-09-04 RX ADMIN — FINASTERIDE 5 MILLIGRAM(S): 5 TABLET, FILM COATED ORAL at 12:27

## 2022-09-04 RX ADMIN — Medication 1 DROP(S): at 12:27

## 2022-09-04 RX ADMIN — Medication 5 MILLIGRAM(S): at 12:27

## 2022-09-04 NOTE — PHYSICAL THERAPY INITIAL EVALUATION ADULT - ADDITIONAL COMMENTS
Pt is an unreliable historian, states he lives in a private house, unclear if there are steps to negotiate and if patient lives alone.

## 2022-09-04 NOTE — PROGRESS NOTE ADULT - SUBJECTIVE AND OBJECTIVE BOX
Patient is a 67y old  Male who presents with a chief complaint of abdominal pain (03 Sep 2022 15:28)      SUBJECTIVE / OVERNIGHT EVENTS: No acute events overnight. Pt has no new complaints     ADDITIONAL REVIEW OF SYSTEMS:    MEDICATIONS  (STANDING):  artificial  tears Solution 1 Drop(s) Both EYES four times a day  artificial tears (preservative free) Ophthalmic Solution 1 Drop(s) Both EYES four times a day  cefoTEtan  IVPB      cefoTEtan  IVPB 2 Gram(s) IV Intermittent every 24 hours  enoxaparin Injectable 40 milliGRAM(s) SubCutaneous every 24 hours  finasteride 5 milliGRAM(s) Oral daily  HYDROmorphone PCA (1 mG/mL) 30 milliLiter(s) PCA Continuous PCA Continuous  metoprolol tartrate Injectable 5 milliGRAM(s) IV Push every 6 hours  polyethylene glycol 3350 17 Gram(s) Oral daily  senna 2 Tablet(s) Oral at bedtime    MEDICATIONS  (PRN):  ALBUTerol    90 MICROgram(s) HFA Inhaler 2 Puff(s) Inhalation every 6 hours PRN Shortness of Breath and/or Wheezing  naloxone Injectable 0.1 milliGRAM(s) IV Push every 3 minutes PRN For ANY of the following changes in patient status:  A. RR LESS THAN 10 breaths per minute, B. Oxygen saturation LESS THAN 90%, C. Sedation score of 6  ondansetron Injectable 4 milliGRAM(s) IV Push every 6 hours PRN Nausea      CAPILLARY BLOOD GLUCOSE        I&O's Summary    03 Sep 2022 07:01  -  04 Sep 2022 07:00  --------------------------------------------------------  IN: 600 mL / OUT: 1000 mL / NET: -400 mL        PHYSICAL EXAM:  Vital Signs Last 24 Hrs  T(C): 37.2 (04 Sep 2022 05:43), Max: 37.3 (04 Sep 2022 01:53)  T(F): 98.9 (04 Sep 2022 05:43), Max: 99.2 (04 Sep 2022 01:53)  HR: 73 (04 Sep 2022 05:43) (63 - 73)  BP: 124/74 (04 Sep 2022 05:43) (101/61 - 128/70)  BP(mean): --  RR: 18 (04 Sep 2022 05:43) (18 - 18)  SpO2: 99% (04 Sep 2022 05:43) (99% - 100%)    Parameters below as of 04 Sep 2022 05:43  Patient On (Oxygen Delivery Method): room air      CONSTITUTIONAL: NAD  EYES: PERRLA  RESPIRATORY: Normal respiratory effort; lungs are clear to auscultation bilaterally  CARDIOVASCULAR: Regular rate and rhythm, normal S1 and S2, no murmur/rub/gallop; No lower extremity edema; Peripheral pulses are 2+ bilaterally  ABDOMEN: Nontender to palpation, normoactive bowel sounds, no rebound/guarding; No hepatosplenomegaly  MUSCULOSKELETAL:  Normal gait; no clubbing or cyanosis of digits; no joint swelling or tenderness to palpation  PSYCH: A+O to person, place, and time; affect appropriate  NEUROLOGY: CN 2-12 are intact and symmetric; no gross sensory deficits   SKIN: No rashes; no palpable lesions    LABS:                        8.1    10.51 )-----------( 358      ( 03 Sep 2022 05:00 )             24.8     09-03    134<L>  |  101  |  14  ----------------------------<  76  4.8   |  21<L>  |  1.03    Ca    9.4      03 Sep 2022 05:00  Phos  2.7     09-03  Mg     1.90     09-03    TPro  7.7  /  Alb  4.1  /  TBili  2.3<H>  /  DBili  x   /  AST  32  /  ALT  18  /  AlkPhos  164<H>  09-03                RADIOLOGY & ADDITIONAL TESTS:  Results Reviewed:   Imaging Personally Reviewed:  Electrocardiogram Personally Reviewed:    COORDINATION OF CARE:  Care Discussed with Consultants/Other Providers [Y/N]:  Prior or Outpatient Records Reviewed [Y/N]:

## 2022-09-04 NOTE — PHYSICAL THERAPY INITIAL EVALUATION ADULT - PERTINENT HX OF CURRENT PROBLEM, REHAB EVAL
67 year old Male with h/o Sickle cell anemia, frequent admission for OVOC, h/o acute chest, admitted with RUQ pain and pain at Limbs, back. (+) Gallstones on US and CT, r/o acute cholecystitis. (+) elevated retic count and LDH, c/w VOC. No signs of ACS.

## 2022-09-04 NOTE — PROVIDER CONTACT NOTE (CRITICAL VALUE NOTIFICATION) - ACTION/TREATMENT ORDERED:
Gopi Galvan of [facility] notified. No orders received at this time.
will continue to monitor, awaiting further orders

## 2022-09-05 LAB — SARS-COV-2 RNA SPEC QL NAA+PROBE: SIGNIFICANT CHANGE UP

## 2022-09-05 PROCEDURE — 99233 SBSQ HOSP IP/OBS HIGH 50: CPT

## 2022-09-05 RX ADMIN — HYDROMORPHONE HYDROCHLORIDE 30 MILLILITER(S): 2 INJECTION INTRAMUSCULAR; INTRAVENOUS; SUBCUTANEOUS at 08:30

## 2022-09-05 RX ADMIN — Medication 1 DROP(S): at 18:44

## 2022-09-05 RX ADMIN — Medication 1 DROP(S): at 06:24

## 2022-09-05 RX ADMIN — FINASTERIDE 5 MILLIGRAM(S): 5 TABLET, FILM COATED ORAL at 11:26

## 2022-09-05 RX ADMIN — HYDROMORPHONE HYDROCHLORIDE 30 MILLILITER(S): 2 INJECTION INTRAMUSCULAR; INTRAVENOUS; SUBCUTANEOUS at 06:44

## 2022-09-05 RX ADMIN — Medication 1 DROP(S): at 11:25

## 2022-09-05 RX ADMIN — Medication 5 MILLIGRAM(S): at 18:44

## 2022-09-05 RX ADMIN — Medication 1 DROP(S): at 23:36

## 2022-09-05 RX ADMIN — ENOXAPARIN SODIUM 40 MILLIGRAM(S): 100 INJECTION SUBCUTANEOUS at 11:30

## 2022-09-05 RX ADMIN — Medication 5 MILLIGRAM(S): at 23:37

## 2022-09-05 RX ADMIN — Medication 5 MILLIGRAM(S): at 06:24

## 2022-09-05 RX ADMIN — Medication 1 DROP(S): at 23:37

## 2022-09-05 RX ADMIN — Medication 100 GRAM(S): at 11:30

## 2022-09-05 RX ADMIN — HYDROMORPHONE HYDROCHLORIDE 30 MILLILITER(S): 2 INJECTION INTRAMUSCULAR; INTRAVENOUS; SUBCUTANEOUS at 20:12

## 2022-09-05 RX ADMIN — Medication 5 MILLIGRAM(S): at 12:40

## 2022-09-05 RX ADMIN — Medication 1 DROP(S): at 12:48

## 2022-09-05 NOTE — PROGRESS NOTE ADULT - PROBLEM SELECTOR PLAN 1
- HIDA neg for cholecystitis  - Surgery following- no surgical intervention at this time   - MRCP pending, tried calling to expedite   - Cont pain control. Pt on IV Dilaudid PCA, reduced to 0.4/6/8 on 9/2

## 2022-09-05 NOTE — PROGRESS NOTE ADULT - SUBJECTIVE AND OBJECTIVE BOX
Patient is a 67y old  Male who presents with a chief complaint of abdominal pain (04 Sep 2022 14:09)      SUBJECTIVE / OVERNIGHT EVENTS: No acute events overnight. Pt has no new complaints. Pain adequately controlled on PCA     ADDITIONAL REVIEW OF SYSTEMS:    MEDICATIONS  (STANDING):  artificial  tears Solution 1 Drop(s) Both EYES four times a day  artificial tears (preservative free) Ophthalmic Solution 1 Drop(s) Both EYES four times a day  cefoTEtan  IVPB      cefoTEtan  IVPB 2 Gram(s) IV Intermittent every 24 hours  enoxaparin Injectable 40 milliGRAM(s) SubCutaneous every 24 hours  finasteride 5 milliGRAM(s) Oral daily  HYDROmorphone PCA (1 mG/mL) 30 milliLiter(s) PCA Continuous PCA Continuous  metoprolol tartrate Injectable 5 milliGRAM(s) IV Push every 6 hours  polyethylene glycol 3350 17 Gram(s) Oral daily  senna 2 Tablet(s) Oral at bedtime  sodium chloride 0.45%. 1000 milliLiter(s) (30 mL/Hr) IV Continuous <Continuous>    MEDICATIONS  (PRN):  ALBUTerol    90 MICROgram(s) HFA Inhaler 2 Puff(s) Inhalation every 6 hours PRN Shortness of Breath and/or Wheezing  naloxone Injectable 0.1 milliGRAM(s) IV Push every 3 minutes PRN For ANY of the following changes in patient status:  A. RR LESS THAN 10 breaths per minute, B. Oxygen saturation LESS THAN 90%, C. Sedation score of 6  ondansetron Injectable 4 milliGRAM(s) IV Push every 6 hours PRN Nausea      CAPILLARY BLOOD GLUCOSE        I&O's Summary    04 Sep 2022 07:01  -  05 Sep 2022 07:00  --------------------------------------------------------  IN: 0 mL / OUT: 1200 mL / NET: -1200 mL    05 Sep 2022 07:01  -  05 Sep 2022 13:19  --------------------------------------------------------  IN: 0 mL / OUT: 300 mL / NET: -300 mL        PHYSICAL EXAM:  Vital Signs Last 24 Hrs  T(C): 37 (05 Sep 2022 12:40), Max: 37 (05 Sep 2022 12:40)  T(F): 98.6 (05 Sep 2022 12:40), Max: 98.6 (05 Sep 2022 12:40)  HR: 69 (05 Sep 2022 12:40) (62 - 77)  BP: 111/67 (05 Sep 2022 12:40) (111/67 - 133/76)  BP(mean): --  RR: 18 (05 Sep 2022 12:40) (18 - 18)  SpO2: 96% (05 Sep 2022 12:40) (96% - 100%)    Parameters below as of 05 Sep 2022 12:40  Patient On (Oxygen Delivery Method): room air      CONSTITUTIONAL: NAD  EYES: PERRLA  RESPIRATORY: Normal respiratory effort; lungs are clear to auscultation bilaterally  CARDIOVASCULAR: Regular rate and rhythm, normal S1 and S2, no murmur/rub/gallop; No lower extremity edema; Peripheral pulses are 2+ bilaterally  ABDOMEN: Nontender to palpation, normoactive bowel sounds, no rebound/guarding; No hepatosplenomegaly  MUSCULOSKELETAL:  Normal gait; no clubbing or cyanosis of digits; no joint swelling or tenderness to palpation  PSYCH: A+O to person, place, and time; affect appropriate  NEUROLOGY: CN 2-12 are intact and symmetric; no gross sensory deficits   SKIN: No rashes; no palpable lesions    LABS:                        6.9    9.39  )-----------( 297      ( 04 Sep 2022 14:33 )             21.0                       RADIOLOGY & ADDITIONAL TESTS:  Results Reviewed:   Imaging Personally Reviewed:  Electrocardiogram Personally Reviewed:    COORDINATION OF CARE:  Care Discussed with Consultants/Other Providers [Y/N]:  Prior or Outpatient Records Reviewed [Y/N]:

## 2022-09-06 PROCEDURE — 99233 SBSQ HOSP IP/OBS HIGH 50: CPT

## 2022-09-06 RX ADMIN — ENOXAPARIN SODIUM 40 MILLIGRAM(S): 100 INJECTION SUBCUTANEOUS at 13:12

## 2022-09-06 RX ADMIN — FINASTERIDE 5 MILLIGRAM(S): 5 TABLET, FILM COATED ORAL at 11:06

## 2022-09-06 RX ADMIN — Medication 5 MILLIGRAM(S): at 06:44

## 2022-09-06 RX ADMIN — Medication 1 DROP(S): at 06:44

## 2022-09-06 RX ADMIN — Medication 5 MILLIGRAM(S): at 11:05

## 2022-09-06 RX ADMIN — Medication 100 GRAM(S): at 11:06

## 2022-09-06 RX ADMIN — Medication 5 MILLIGRAM(S): at 17:18

## 2022-09-06 RX ADMIN — Medication 1 DROP(S): at 23:34

## 2022-09-06 RX ADMIN — HYDROMORPHONE HYDROCHLORIDE 30 MILLILITER(S): 2 INJECTION INTRAMUSCULAR; INTRAVENOUS; SUBCUTANEOUS at 08:39

## 2022-09-06 RX ADMIN — HYDROMORPHONE HYDROCHLORIDE 30 MILLILITER(S): 2 INJECTION INTRAMUSCULAR; INTRAVENOUS; SUBCUTANEOUS at 19:54

## 2022-09-06 RX ADMIN — Medication 5 MILLIGRAM(S): at 23:34

## 2022-09-06 NOTE — PROGRESS NOTE ADULT - PROBLEM SELECTOR PLAN 2
Pt has signs of VOC. No signs of ACS  -gentle hydration with 1/2NS at 75cc/H  -PCA with Dilaudid for pain control  -s/p 2u PRBCs in anticipation for OR but surgery no longer being pursued   -Bowel regimen  -Monitor CBC, retic CMP, LDH daily Pt has signs of VOC. No signs of ACS  -gentle hydration with 1/2NS at 75cc/H  -PCA with Dilaudid for pain control  -s/p 2u PRBCs in anticipation for OR but surgery no longer being pursued   -Bowel regimen  -Monitor CBC, retic CMP, LDH- pt is difficult stick - will obtain labs in am   - incentive spirometry

## 2022-09-06 NOTE — PROGRESS NOTE ADULT - PROBLEM SELECTOR PLAN 1
- HIDA neg for cholecystitis  - Surgery following- no surgical intervention at this time   - MRCP pending, tried calling to expedite   - Cont pain control. Pt on IV Dilaudid PCA, reduced to 0.4/6/8 on 9/2 - HIDA neg for cholecystitis  - Surgery following- no surgical intervention at this time   - MRCP pending  - Cont pain control. Pt on IV Dilaudid PCA - consider d/c in am

## 2022-09-06 NOTE — PROGRESS NOTE ADULT - PROBLEM SELECTOR PLAN 3
- Pt states he was planned for surgery this month but it was postponed till October  - Reports that his vision has been worsening and requesting optho eval during this admission  -Optho consulted. Started artificial tears. Rec out pt follow up - Pt states he was planned for surgery this month but it was postponed till October  - Reports that his vision has been worsening and requesting ophtho eval during this admission  -Optho recs reviewed - Started artificial tears. Rec out pt follow up

## 2022-09-06 NOTE — PROGRESS NOTE ADULT - SUBJECTIVE AND OBJECTIVE BOX
[facility] of Hospital Medicine  Yael Stephens MD  Pager (M-F, 8A-5P): [phone]  Other Times:  [id number]    Patient is a 67y old  Male who presents with a chief complaint of abdominal pain (05 Sep 2022 13:19)      SUBJECTIVE / OVERNIGHT EVENTS:      MEDICATIONS  (STANDING):  artificial  tears Solution 1 Drop(s) Both EYES four times a day  artificial tears (preservative free) Ophthalmic Solution 1 Drop(s) Both EYES four times a day  cefoTEtan  IVPB      cefoTEtan  IVPB 2 Gram(s) IV Intermittent every 24 hours  enoxaparin Injectable 40 milliGRAM(s) SubCutaneous every 24 hours  finasteride 5 milliGRAM(s) Oral daily  HYDROmorphone PCA (1 mG/mL) 30 milliLiter(s) PCA Continuous PCA Continuous  metoprolol tartrate Injectable 5 milliGRAM(s) IV Push every 6 hours  polyethylene glycol 3350 17 Gram(s) Oral daily  senna 2 Tablet(s) Oral at bedtime  sodium chloride 0.45%. 1000 milliLiter(s) (30 mL/Hr) IV Continuous <Continuous>    MEDICATIONS  (PRN):  ALBUTerol    90 MICROgram(s) HFA Inhaler 2 Puff(s) Inhalation every 6 hours PRN Shortness of Breath and/or Wheezing  naloxone Injectable 0.1 milliGRAM(s) IV Push every 3 minutes PRN For ANY of the following changes in patient status:  A. RR LESS THAN 10 breaths per minute, B. Oxygen saturation LESS THAN 90%, C. Sedation score of 6  ondansetron Injectable 4 milliGRAM(s) IV Push every 6 hours PRN Nausea      CAPILLARY BLOOD GLUCOSE        I&O's Summary    05 Sep 2022 07:01  -  06 Sep 2022 07:00  --------------------------------------------------------  IN: 0 mL / OUT: 950 mL / NET: -950 mL    06 Sep 2022 07:01  -  06 Sep 2022 09:56  --------------------------------------------------------  IN: 60 mL / OUT: 0 mL / NET: 60 mL        PHYSICAL EXAM:  Vital Signs Last 24 Hrs  T(C): 36.6 (06 Sep 2022 09:27), Max: 37 (05 Sep 2022 12:40)  T(F): 97.9 (06 Sep 2022 09:27), Max: 98.6 (05 Sep 2022 12:40)  HR: 76 (06 Sep 2022 09:27) (64 - 85)  BP: 139/84 (06 Sep 2022 09:27) (103/54 - 139/84)  BP(mean): --  RR: 18 (06 Sep 2022 09:27) (17 - 18)  SpO2: 100% (06 Sep 2022 09:27) (96% - 100%)    Parameters below as of 06 Sep 2022 09:27  Patient On (Oxygen Delivery Method): room air        CONSTITUTIONAL: NAD, well-developed, well-groomed  EYES: EOMI; conjunctiva and sclera clear  ENMT: Moist oral mucosa  RESPIRATORY: Normal respiratory effort; lungs are clear to auscultation bilaterally  CARDIOVASCULAR: Regular rate and rhythm, normal S1 and S2, no murmur; No lower extremity edema  ABDOMEN: Nontender to palpation, normoactive bowel sounds, no rebound/guarding  MUSCULOSKELETAL:   no clubbing or cyanosis of digits; no joint swelling or tenderness to palpation  PSYCH: A+O to person, place, and time; affect appropriate  NEUROLOGY: CN 2-12 are intact and symmetric; no gross sensory deficits   SKIN: No rashes; no palpable lesions    LABS:                        6.9    9.39  )-----------( 297      ( 04 Sep 2022 14:33 )             21.0                     RADIOLOGY & ADDITIONAL TESTS:  Results Reviewed:   Imaging Personally Reviewed:  Electrocardiogram Personally Reviewed:    COORDINATION OF CARE:  Care Discussed with Consultants/Other Providers [Y/N]: Y  Prior or Outpatient Records Reviewed [Y/N]: Y   [facility]  Yael Stephens MD  Pager (M-F, 8A-5P): [phone]  Other Times:  [id number]    Patient is a 67y old  Male who presents with a chief complaint of abdominal pain (05 Sep 2022 13:19)      SUBJECTIVE / OVERNIGHT EVENTS:  Pt resting - states he continues to have some mild pain in abd - denies n/v.  Using PCA intermittently.      MEDICATIONS  (STANDING):  artificial  tears Solution 1 Drop(s) Both EYES four times a day  artificial tears (preservative free) Ophthalmic Solution 1 Drop(s) Both EYES four times a day  cefoTEtan  IVPB      cefoTEtan  IVPB 2 Gram(s) IV Intermittent every 24 hours  enoxaparin Injectable 40 milliGRAM(s) SubCutaneous every 24 hours  finasteride 5 milliGRAM(s) Oral daily  HYDROmorphone PCA (1 mG/mL) 30 milliLiter(s) PCA Continuous PCA Continuous  metoprolol tartrate Injectable 5 milliGRAM(s) IV Push every 6 hours  polyethylene glycol 3350 17 Gram(s) Oral daily  senna 2 Tablet(s) Oral at bedtime  sodium chloride 0.45%. 1000 milliLiter(s) (30 mL/Hr) IV Continuous <Continuous>    MEDICATIONS  (PRN):  ALBUTerol    90 MICROgram(s) HFA Inhaler 2 Puff(s) Inhalation every 6 hours PRN Shortness of Breath and/or Wheezing  naloxone Injectable 0.1 milliGRAM(s) IV Push every 3 minutes PRN For ANY of the following changes in patient status:  A. RR LESS THAN 10 breaths per minute, B. Oxygen saturation LESS THAN 90%, C. Sedation score of 6  ondansetron Injectable 4 milliGRAM(s) IV Push every 6 hours PRN Nausea      CAPILLARY BLOOD GLUCOSE        I&O's Summary    05 Sep 2022 07:01  -  06 Sep 2022 07:00  --------------------------------------------------------  IN: 0 mL / OUT: 950 mL / NET: -950 mL    06 Sep 2022 07:01  -  06 Sep 2022 09:56  --------------------------------------------------------  IN: 60 mL / OUT: 0 mL / NET: 60 mL        PHYSICAL EXAM:  Vital Signs Last 24 Hrs  T(C): 36.6 (06 Sep 2022 09:27), Max: 37 (05 Sep 2022 12:40)  T(F): 97.9 (06 Sep 2022 09:27), Max: 98.6 (05 Sep 2022 12:40)  HR: 76 (06 Sep 2022 09:27) (64 - 85)  BP: 139/84 (06 Sep 2022 09:27) (103/54 - 139/84)  BP(mean): --  RR: 18 (06 Sep 2022 09:27) (17 - 18)  SpO2: 100% (06 Sep 2022 09:27) (96% - 100%)    Parameters below as of 06 Sep 2022 09:27  Patient On (Oxygen Delivery Method): room air        CONSTITUTIONAL: NAD, well-developed, well-groomed  EYES: EOMI; conjunctiva and sclera clear  ENMT: Moist oral mucosa  RESPIRATORY: Normal respiratory effort; lungs are clear to auscultation bilaterally  CARDIOVASCULAR: Regular rate and rhythm, normal S1 and S2, no murmur; No lower extremity edema  ABDOMEN: mildly tender to palpation, normoactive bowel sounds, no rebound/guarding  MUSCULOSKELETAL:   no clubbing or cyanosis of digits; no joint swelling or tenderness to palpation  PSYCH: A+O to person, place, and time; affect appropriate  NEUROLOGY: CN 2-12 are intact and symmetric; no gross sensory deficits   SKIN: No rashes; no palpable lesions    LABS:                        6.9    9.39  )-----------( 297      ( 04 Sep 2022 14:33 )             21.0         RADIOLOGY & ADDITIONAL TESTS:  Results Reviewed:   Imaging Personally Reviewed:  Electrocardiogram Personally Reviewed:    COORDINATION OF CARE:  Care Discussed with Consultants/Other Providers [Y/N]: Y  Prior or Outpatient Records Reviewed [Y/N]: Y

## 2022-09-07 PROCEDURE — 74183 MRI ABD W/O CNTR FLWD CNTR: CPT | Mod: 26

## 2022-09-07 PROCEDURE — 99232 SBSQ HOSP IP/OBS MODERATE 35: CPT

## 2022-09-07 RX ADMIN — FINASTERIDE 5 MILLIGRAM(S): 5 TABLET, FILM COATED ORAL at 15:27

## 2022-09-07 RX ADMIN — HYDROMORPHONE HYDROCHLORIDE 30 MILLILITER(S): 2 INJECTION INTRAMUSCULAR; INTRAVENOUS; SUBCUTANEOUS at 08:45

## 2022-09-07 RX ADMIN — Medication 100 GRAM(S): at 15:22

## 2022-09-07 RX ADMIN — Medication 1 DROP(S): at 06:29

## 2022-09-07 RX ADMIN — Medication 5 MILLIGRAM(S): at 06:29

## 2022-09-07 RX ADMIN — ENOXAPARIN SODIUM 40 MILLIGRAM(S): 100 INJECTION SUBCUTANEOUS at 15:25

## 2022-09-07 RX ADMIN — HYDROMORPHONE HYDROCHLORIDE 30 MILLILITER(S): 2 INJECTION INTRAMUSCULAR; INTRAVENOUS; SUBCUTANEOUS at 20:24

## 2022-09-07 RX ADMIN — Medication 1 DROP(S): at 15:23

## 2022-09-07 RX ADMIN — Medication 1 DROP(S): at 18:00

## 2022-09-07 RX ADMIN — POLYETHYLENE GLYCOL 3350 17 GRAM(S): 17 POWDER, FOR SOLUTION ORAL at 15:23

## 2022-09-07 RX ADMIN — Medication 5 MILLIGRAM(S): at 22:10

## 2022-09-07 RX ADMIN — Medication 5 MILLIGRAM(S): at 15:24

## 2022-09-07 RX ADMIN — Medication 1 DROP(S): at 15:28

## 2022-09-07 NOTE — PROGRESS NOTE ADULT - PROBLEM SELECTOR PLAN 3
- Pt states he was planned for surgery this month but it was postponed till October  - Reports that his vision has been worsening and requesting ophtho eval during this admission  -Optho recs reviewed - Started artificial tears. Rec out pt follow up - Pt states he was planned for surgery this month but it was postponed till October  - Reports that his vision has been worsening and requesting ophtho eval during this admission  -Ophtho recs reviewed - Started artificial tears. Rec out pt follow up

## 2022-09-07 NOTE — PROGRESS NOTE ADULT - PROBLEM SELECTOR PLAN 2
Pt has signs of VOC. No signs of ACS  -gentle hydration with 1/2NS at 75cc/H  -PCA with Dilaudid for pain control  -s/p 2u PRBCs in anticipation for OR but surgery no longer being pursued   -Bowel regimen  -Monitor CBC, retic CMP, LDH- pt is difficult stick - will obtain labs in am   - incentive spirometry Pt has signs of VOC - now resolved   -gentle hydration with 1/2NS at 75cc/H  -PCA with Dilaudid for pain control - will transition to oral meds- need to check ISTOP and verify.  -s/p 2u PRBCs in anticipation for OR but surgery no longer being pursued   -Bowel regimen  - incentive spirometry

## 2022-09-07 NOTE — PROGRESS NOTE ADULT - SUBJECTIVE AND OBJECTIVE BOX
[facility] of Hospital Medicine  Yael Stephnes MD  Pager (M-F, 8A-5P): [phone]  Other Times:  [id number]    Patient is a 67y old  Male who presents with a chief complaint of abdominal pain (06 Sep 2022 09:56)      SUBJECTIVE / OVERNIGHT EVENTS:      MEDICATIONS  (STANDING):  artificial  tears Solution 1 Drop(s) Both EYES four times a day  artificial tears (preservative free) Ophthalmic Solution 1 Drop(s) Both EYES four times a day  cefoTEtan  IVPB      cefoTEtan  IVPB 2 Gram(s) IV Intermittent every 24 hours  enoxaparin Injectable 40 milliGRAM(s) SubCutaneous every 24 hours  finasteride 5 milliGRAM(s) Oral daily  HYDROmorphone PCA (1 mG/mL) 30 milliLiter(s) PCA Continuous PCA Continuous  metoprolol tartrate Injectable 5 milliGRAM(s) IV Push every 6 hours  polyethylene glycol 3350 17 Gram(s) Oral daily  senna 2 Tablet(s) Oral at bedtime  sodium chloride 0.45%. 1000 milliLiter(s) (30 mL/Hr) IV Continuous <Continuous>    MEDICATIONS  (PRN):  ALBUTerol    90 MICROgram(s) HFA Inhaler 2 Puff(s) Inhalation every 6 hours PRN Shortness of Breath and/or Wheezing  naloxone Injectable 0.1 milliGRAM(s) IV Push every 3 minutes PRN For ANY of the following changes in patient status:  A. RR LESS THAN 10 breaths per minute, B. Oxygen saturation LESS THAN 90%, C. Sedation score of 6  ondansetron Injectable 4 milliGRAM(s) IV Push every 6 hours PRN Nausea      CAPILLARY BLOOD GLUCOSE        I&O's Summary    06 Sep 2022 07:01  -  07 Sep 2022 07:00  --------------------------------------------------------  IN: 350 mL / OUT: 1550 mL / NET: -1200 mL        PHYSICAL EXAM:  Vital Signs Last 24 Hrs  T(C): 36.7 (07 Sep 2022 06:25), Max: 37.1 (06 Sep 2022 17:31)  T(F): 98 (07 Sep 2022 06:25), Max: 98.8 (06 Sep 2022 17:31)  HR: 62 (07 Sep 2022 06:25) (62 - 82)  BP: 150/79 (07 Sep 2022 06:25) (123/70 - 150/79)  BP(mean): --  RR: 18 (07 Sep 2022 06:25) (16 - 18)  SpO2: 100% (07 Sep 2022 06:25) (93% - 100%)    Parameters below as of 07 Sep 2022 06:25  Patient On (Oxygen Delivery Method): room air        CONSTITUTIONAL: NAD, well-developed, well-groomed  EYES: EOMI; conjunctiva and sclera clear  ENMT: Moist oral mucosa  RESPIRATORY: Normal respiratory effort; lungs are clear to auscultation bilaterally  CARDIOVASCULAR: Regular rate and rhythm, normal S1 and S2, no murmur; No lower extremity edema  ABDOMEN: Nontender to palpation, normoactive bowel sounds, no rebound/guarding  MUSCULOSKELETAL:   no clubbing or cyanosis of digits; no joint swelling or tenderness to palpation  PSYCH: A+O to person, place, and time; affect appropriate  NEUROLOGY: CN 2-12 are intact and symmetric; no gross sensory deficits   SKIN: No rashes; no palpable lesions    LABS:                    RADIOLOGY & ADDITIONAL TESTS:  Results Reviewed:   Imaging Personally Reviewed:  Electrocardiogram Personally Reviewed:    COORDINATION OF CARE:  Care Discussed with Consultants/Other Providers [Y/N]: Y  Prior or Outpatient Records Reviewed [Y/N]: Y   [facility]  Yael Stephens MD  Pager (M-F, 8A-5P): [phone]  Other Times:  [id number]    Patient is a 67y old  Male who presents with a chief complaint of abdominal pain (06 Sep 2022 09:56)    SUBJECTIVE / OVERNIGHT EVENTS:  tolerating po - denies n/v.  He is not sure what he takes for pain meds at home but specifically states that he does not want dilaudid because it makes him dizzy.      MEDICATIONS  (STANDING):  artificial  tears Solution 1 Drop(s) Both EYES four times a day  artificial tears (preservative free) Ophthalmic Solution 1 Drop(s) Both EYES four times a day  cefoTEtan  IVPB      cefoTEtan  IVPB 2 Gram(s) IV Intermittent every 24 hours  enoxaparin Injectable 40 milliGRAM(s) SubCutaneous every 24 hours  finasteride 5 milliGRAM(s) Oral daily  HYDROmorphone PCA (1 mG/mL) 30 milliLiter(s) PCA Continuous PCA Continuous  metoprolol tartrate Injectable 5 milliGRAM(s) IV Push every 6 hours  polyethylene glycol 3350 17 Gram(s) Oral daily  senna 2 Tablet(s) Oral at bedtime  sodium chloride 0.45%. 1000 milliLiter(s) (30 mL/Hr) IV Continuous <Continuous>    MEDICATIONS  (PRN):  ALBUTerol    90 MICROgram(s) HFA Inhaler 2 Puff(s) Inhalation every 6 hours PRN Shortness of Breath and/or Wheezing  naloxone Injectable 0.1 milliGRAM(s) IV Push every 3 minutes PRN For ANY of the following changes in patient status:  A. RR LESS THAN 10 breaths per minute, B. Oxygen saturation LESS THAN 90%, C. Sedation score of 6  ondansetron Injectable 4 milliGRAM(s) IV Push every 6 hours PRN Nausea      CAPILLARY BLOOD GLUCOSE        I&O's Summary    06 Sep 2022 07:01  -  07 Sep 2022 07:00  --------------------------------------------------------  IN: 350 mL / OUT: 1550 mL / NET: -1200 mL        PHYSICAL EXAM:  Vital Signs Last 24 Hrs  T(C): 36.7 (07 Sep 2022 06:25), Max: 37.1 (06 Sep 2022 17:31)  T(F): 98 (07 Sep 2022 06:25), Max: 98.8 (06 Sep 2022 17:31)  HR: 62 (07 Sep 2022 06:25) (62 - 82)  BP: 150/79 (07 Sep 2022 06:25) (123/70 - 150/79)  BP(mean): --  RR: 18 (07 Sep 2022 06:25) (16 - 18)  SpO2: 100% (07 Sep 2022 06:25) (93% - 100%)    Parameters below as of 07 Sep 2022 06:25  Patient On (Oxygen Delivery Method): room air        CONSTITUTIONAL: NAD, well-developed, well-groomed  EYES: EOMI; conjunctiva and sclera clear  ENMT: Moist oral mucosa  RESPIRATORY: Normal respiratory effort; lungs are clear to auscultation bilaterally  CARDIOVASCULAR: Regular rate and rhythm, normal S1 and S2, no murmur; No lower extremity edema  ABDOMEN: Nontender to palpation, normoactive bowel sounds, no rebound/guarding  MUSCULOSKELETAL:   no clubbing or cyanosis of digits; no joint swelling or tenderness to palpation  PSYCH: A+O to person, place, and time; affect appropriate  NEUROLOGY: CN 2-12 are intact and symmetric; no gross sensory deficits   SKIN: No rashes; no palpable lesions    LABS:                    RADIOLOGY & ADDITIONAL TESTS:  Results Reviewed:   Imaging Personally Reviewed:  Electrocardiogram Personally Reviewed:    COORDINATION OF CARE:  Care Discussed with Consultants/Other Providers [Y/N]: Y  Prior or Outpatient Records Reviewed [Y/N]: Y

## 2022-09-07 NOTE — PROGRESS NOTE ADULT - PROBLEM SELECTOR PLAN 1
- HIDA neg for cholecystitis  - Surgery following- no surgical intervention at this time   - MRCP pending  - Cont pain control. Pt on IV Dilaudid PCA - consider d/c in am - HIDA neg for cholecystitis  - Surgery following- no surgical intervention at this time   - MRCP pending - if pt does not agree to undergo exam, he can do as outpt

## 2022-09-08 PROBLEM — Z00.00 ENCOUNTER FOR PREVENTIVE HEALTH EXAMINATION: Status: ACTIVE | Noted: 2022-09-08

## 2022-09-08 LAB
ANION GAP SERPL CALC-SCNC: 11 MMOL/L — SIGNIFICANT CHANGE UP (ref 7–14)
BUN SERPL-MCNC: 12 MG/DL — SIGNIFICANT CHANGE UP (ref 7–23)
CALCIUM SERPL-MCNC: 9.5 MG/DL — SIGNIFICANT CHANGE UP (ref 8.4–10.5)
CHLORIDE SERPL-SCNC: 107 MMOL/L — SIGNIFICANT CHANGE UP (ref 98–107)
CO2 SERPL-SCNC: 21 MMOL/L — LOW (ref 22–31)
CREAT SERPL-MCNC: 1.02 MG/DL — SIGNIFICANT CHANGE UP (ref 0.5–1.3)
EGFR: 80 ML/MIN/1.73M2 — SIGNIFICANT CHANGE UP
GLUCOSE SERPL-MCNC: 77 MG/DL — SIGNIFICANT CHANGE UP (ref 70–99)
HCT VFR BLD CALC: 21.3 % — LOW (ref 39–50)
HGB BLD-MCNC: 6.9 G/DL — CRITICAL LOW (ref 13–17)
MCHC RBC-ENTMCNC: 27.4 PG — SIGNIFICANT CHANGE UP (ref 27–34)
MCHC RBC-ENTMCNC: 32.4 GM/DL — SIGNIFICANT CHANGE UP (ref 32–36)
MCV RBC AUTO: 84.5 FL — SIGNIFICANT CHANGE UP (ref 80–100)
NRBC # BLD: 0 /100 WBCS — SIGNIFICANT CHANGE UP (ref 0–0)
NRBC # FLD: 0.04 K/UL — HIGH (ref 0–0)
PLATELET # BLD AUTO: 304 K/UL — SIGNIFICANT CHANGE UP (ref 150–400)
POTASSIUM SERPL-MCNC: 4.7 MMOL/L — SIGNIFICANT CHANGE UP (ref 3.5–5.3)
POTASSIUM SERPL-SCNC: 4.7 MMOL/L — SIGNIFICANT CHANGE UP (ref 3.5–5.3)
RBC # BLD: 2.52 M/UL — LOW (ref 4.2–5.8)
RBC # FLD: 19.5 % — HIGH (ref 10.3–14.5)
SODIUM SERPL-SCNC: 139 MMOL/L — SIGNIFICANT CHANGE UP (ref 135–145)
WBC # BLD: 12.3 K/UL — HIGH (ref 3.8–10.5)
WBC # FLD AUTO: 12.3 K/UL — HIGH (ref 3.8–10.5)

## 2022-09-08 PROCEDURE — 99232 SBSQ HOSP IP/OBS MODERATE 35: CPT

## 2022-09-08 RX ORDER — HYDROMORPHONE HYDROCHLORIDE 2 MG/ML
2 INJECTION INTRAMUSCULAR; INTRAVENOUS; SUBCUTANEOUS EVERY 6 HOURS
Refills: 0 | Status: DISCONTINUED | OUTPATIENT
Start: 2022-09-08 | End: 2022-09-09

## 2022-09-08 RX ADMIN — Medication 1 DROP(S): at 05:29

## 2022-09-08 RX ADMIN — Medication 5 MILLIGRAM(S): at 13:46

## 2022-09-08 RX ADMIN — Medication 5 MILLIGRAM(S): at 04:38

## 2022-09-08 RX ADMIN — Medication 1 DROP(S): at 19:47

## 2022-09-08 RX ADMIN — Medication 1 DROP(S): at 00:03

## 2022-09-08 RX ADMIN — Medication 1 DROP(S): at 13:49

## 2022-09-08 RX ADMIN — ENOXAPARIN SODIUM 40 MILLIGRAM(S): 100 INJECTION SUBCUTANEOUS at 13:48

## 2022-09-08 RX ADMIN — FINASTERIDE 5 MILLIGRAM(S): 5 TABLET, FILM COATED ORAL at 13:47

## 2022-09-08 RX ADMIN — Medication 1 DROP(S): at 13:47

## 2022-09-08 RX ADMIN — Medication 5 MILLIGRAM(S): at 19:48

## 2022-09-08 RX ADMIN — Medication 1 DROP(S): at 19:46

## 2022-09-08 NOTE — PROGRESS NOTE ADULT - PROBLEM SELECTOR PLAN 3
- Pt states he was planned for surgery this month but it was postponed till October  - Reports that his vision has been worsening and requesting ophtho eval during this admission  -Ophtho recs reviewed - Started artificial tears. Rec out pt follow up

## 2022-09-08 NOTE — PROGRESS NOTE ADULT - SUBJECTIVE AND OBJECTIVE BOX
[facility] Division of Hospital Medicine  Yael Stephens MD  Pager (M-F, 8A-5P): [phone]  Other Times:  [phone]    Patient is a 67y old  Male who presents with a chief complaint of abdominal pain (07 Sep 2022 10:02)      SUBJECTIVE / OVERNIGHT EVENTS:      MEDICATIONS  (STANDING):  artificial  tears Solution 1 Drop(s) Both EYES four times a day  artificial tears (preservative free) Ophthalmic Solution 1 Drop(s) Both EYES four times a day  cefoTEtan  IVPB      cefoTEtan  IVPB 2 Gram(s) IV Intermittent every 24 hours  enoxaparin Injectable 40 milliGRAM(s) SubCutaneous every 24 hours  finasteride 5 milliGRAM(s) Oral daily  HYDROmorphone PCA (1 mG/mL) 30 milliLiter(s) PCA Continuous PCA Continuous  metoprolol tartrate Injectable 5 milliGRAM(s) IV Push every 6 hours  polyethylene glycol 3350 17 Gram(s) Oral daily  senna 2 Tablet(s) Oral at bedtime  sodium chloride 0.45%. 1000 milliLiter(s) (30 mL/Hr) IV Continuous <Continuous>    MEDICATIONS  (PRN):  ALBUTerol    90 MICROgram(s) HFA Inhaler 2 Puff(s) Inhalation every 6 hours PRN Shortness of Breath and/or Wheezing  naloxone Injectable 0.1 milliGRAM(s) IV Push every 3 minutes PRN For ANY of the following changes in patient status:  A. RR LESS THAN 10 breaths per minute, B. Oxygen saturation LESS THAN 90%, C. Sedation score of 6  ondansetron Injectable 4 milliGRAM(s) IV Push every 6 hours PRN Nausea      CAPILLARY BLOOD GLUCOSE        I&O's Summary    07 Sep 2022 07:01  -  08 Sep 2022 07:00  --------------------------------------------------------  IN: 2170 mL / OUT: 1451 mL / NET: 719 mL    08 Sep 2022 07:01  -  08 Sep 2022 10:12  --------------------------------------------------------  IN: 360 mL / OUT: 0 mL / NET: 360 mL        PHYSICAL EXAM:  Vital Signs Last 24 Hrs  T(C): 36.6 (08 Sep 2022 04:30), Max: 36.8 (07 Sep 2022 17:20)  T(F): 97.9 (08 Sep 2022 04:30), Max: 98.2 (07 Sep 2022 17:20)  HR: 74 (08 Sep 2022 04:30) (65 - 85)  BP: 138/79 (08 Sep 2022 04:30) (129/69 - 141/95)  BP(mean): --  RR: 18 (08 Sep 2022 04:30) (18 - 18)  SpO2: 100% (08 Sep 2022 04:30) (92% - 100%)    Parameters below as of 08 Sep 2022 04:30  Patient On (Oxygen Delivery Method): room air        CONSTITUTIONAL: NAD, well-developed, well-groomed  EYES: EOMI; conjunctiva and sclera clear  ENMT: Moist oral mucosa  RESPIRATORY: Normal respiratory effort; lungs are clear to auscultation bilaterally  CARDIOVASCULAR: Regular rate and rhythm, normal S1 and S2, no murmur; No lower extremity edema  ABDOMEN: Nontender to palpation, normoactive bowel sounds, no rebound/guarding  MUSCULOSKELETAL:   no clubbing or cyanosis of digits; no joint swelling or tenderness to palpation  PSYCH: A+O to person, place, and time; affect appropriate  NEUROLOGY: CN 2-12 are intact and symmetric; no gross sensory deficits   SKIN: No rashes; no palpable lesions    LABS:                        6.9    12.30 )-----------( 304      ( 08 Sep 2022 07:04 )             21.3     09-08    139  |  107  |  12  ----------------------------<  77  4.7   |  21<L>  |  1.02    Ca    9.5      08 Sep 2022 07:04                RADIOLOGY & ADDITIONAL TESTS:  Results Reviewed:   Imaging Personally Reviewed:  Electrocardiogram Personally Reviewed:    COORDINATION OF CARE:  Care Discussed with Consultants/Other Providers [Y/N]: Y  Prior or Outpatient Records Reviewed [Y/N]: Y   [facility] Medicine  Yael Stephens MD  Pager (M-F, 8A-5P): [phone]  Other Times:  [phone]    Patient is a 67y old  Male who presents with a chief complaint of abdominal pain (07 Sep 2022 10:02)    SUBJECTIVE / OVERNIGHT EVENTS:  pt tolerating po - denies n/v.  states pain all over his body has improved - pt does not know what he takes for chronic pain at home.      MEDICATIONS  (STANDING):  artificial  tears Solution 1 Drop(s) Both EYES four times a day  artificial tears (preservative free) Ophthalmic Solution 1 Drop(s) Both EYES four times a day  cefoTEtan  IVPB      cefoTEtan  IVPB 2 Gram(s) IV Intermittent every 24 hours  enoxaparin Injectable 40 milliGRAM(s) SubCutaneous every 24 hours  finasteride 5 milliGRAM(s) Oral daily  HYDROmorphone PCA (1 mG/mL) 30 milliLiter(s) PCA Continuous PCA Continuous  metoprolol tartrate Injectable 5 milliGRAM(s) IV Push every 6 hours  polyethylene glycol 3350 17 Gram(s) Oral daily  senna 2 Tablet(s) Oral at bedtime  sodium chloride 0.45%. 1000 milliLiter(s) (30 mL/Hr) IV Continuous <Continuous>    MEDICATIONS  (PRN):  ALBUTerol    90 MICROgram(s) HFA Inhaler 2 Puff(s) Inhalation every 6 hours PRN Shortness of Breath and/or Wheezing  naloxone Injectable 0.1 milliGRAM(s) IV Push every 3 minutes PRN For ANY of the following changes in patient status:  A. RR LESS THAN 10 breaths per minute, B. Oxygen saturation LESS THAN 90%, C. Sedation score of 6  ondansetron Injectable 4 milliGRAM(s) IV Push every 6 hours PRN Nausea      CAPILLARY BLOOD GLUCOSE        I&O's Summary    07 Sep 2022 07:01  -  08 Sep 2022 07:00  --------------------------------------------------------  IN: 2170 mL / OUT: 1451 mL / NET: 719 mL    08 Sep 2022 07:01  -  08 Sep 2022 10:12  --------------------------------------------------------  IN: 360 mL / OUT: 0 mL / NET: 360 mL        PHYSICAL EXAM:  Vital Signs Last 24 Hrs  T(C): 36.6 (08 Sep 2022 04:30), Max: 36.8 (07 Sep 2022 17:20)  T(F): 97.9 (08 Sep 2022 04:30), Max: 98.2 (07 Sep 2022 17:20)  HR: 74 (08 Sep 2022 04:30) (65 - 85)  BP: 138/79 (08 Sep 2022 04:30) (129/69 - 141/95)  BP(mean): --  RR: 18 (08 Sep 2022 04:30) (18 - 18)  SpO2: 100% (08 Sep 2022 04:30) (92% - 100%)    Parameters below as of 08 Sep 2022 04:30  Patient On (Oxygen Delivery Method): room air        CONSTITUTIONAL: NAD, well-developed, well-groomed  EYES: EOMI; conjunctiva and sclera clear  ENMT: Moist oral mucosa  RESPIRATORY: Normal respiratory effort; lungs are clear to auscultation bilaterally  CARDIOVASCULAR: Regular rate and rhythm, normal S1 and S2, no murmur; No lower extremity edema  ABDOMEN: Nontender to palpation, normoactive bowel sounds, no rebound/guarding  MUSCULOSKELETAL:   no clubbing or cyanosis of digits; no joint swelling or tenderness to palpation  PSYCH: A+O to person, place, and time; affect appropriate  NEUROLOGY: CN 2-12 are intact and symmetric; no gross sensory deficits   SKIN: No rashes; no palpable lesions    LABS:                        6.9    12.30 )-----------( 304      ( 08 Sep 2022 07:04 )             21.3     09-08    139  |  107  |  12  ----------------------------<  77  4.7   |  21<L>  |  1.02    Ca    9.5      08 Sep 2022 07:04                RADIOLOGY & ADDITIONAL TESTS:  Results Reviewed:   Imaging Personally Reviewed:  Electrocardiogram Personally Reviewed:    COORDINATION OF CARE:  Care Discussed with Consultants/Other Providers [Y/N]: Y  Prior or Outpatient Records Reviewed [Y/N]: Y

## 2022-09-08 NOTE — PROGRESS NOTE ADULT - PROBLEM SELECTOR PLAN 2
Pt has signs of VOC - now resolved   -gentle hydration with 1/2NS at 75cc/H  -PCA with Dilaudid for pain control - will transition to oral meds- need to check ISTOP and verify.  -s/p 2u PRBCs in anticipation for OR but surgery no longer being pursued   -Bowel regimen  - incentive spirometry Pt has signs of VOC - now resolved   -PCA with Dilaudid for pain control - will transition to oral meds  - ISTOP from 9/7/22 in chart  - will resume oral dilaudid prn and d/c pca   - incentive spirometry

## 2022-09-08 NOTE — PROGRESS NOTE ADULT - PROBLEM SELECTOR PLAN 1
- HIDA neg for cholecystitis  - Surgery following- no surgical intervention at this time   - MRCP pending - if pt does not agree to undergo exam, he can do as outpt - HIDA neg for cholecystitis  - Surgery following- no surgical intervention at this time   - MRCP reviewed -  will need outpt followup in 3 months

## 2022-09-09 ENCOUNTER — TRANSCRIPTION ENCOUNTER (OUTPATIENT)
Age: 67
End: 2022-09-09

## 2022-09-09 VITALS
SYSTOLIC BLOOD PRESSURE: 122 MMHG | OXYGEN SATURATION: 100 % | HEART RATE: 84 BPM | RESPIRATION RATE: 18 BRPM | TEMPERATURE: 98 F | DIASTOLIC BLOOD PRESSURE: 75 MMHG

## 2022-09-09 PROCEDURE — 99239 HOSP IP/OBS DSCHRG MGMT >30: CPT

## 2022-09-09 RX ORDER — POLYETHYLENE GLYCOL 3350 17 G/17G
17 POWDER, FOR SOLUTION ORAL
Qty: 30 | Refills: 0
Start: 2022-09-09 | End: 2022-10-08

## 2022-09-09 RX ORDER — METOPROLOL TARTRATE 50 MG
1 TABLET ORAL
Qty: 60 | Refills: 0
Start: 2022-09-09 | End: 2022-10-08

## 2022-09-09 RX ORDER — FINASTERIDE 5 MG/1
1 TABLET, FILM COATED ORAL
Qty: 30 | Refills: 0
Start: 2022-09-09 | End: 2022-10-08

## 2022-09-09 RX ORDER — FOLIC ACID 0.8 MG
1 TABLET ORAL
Qty: 30 | Refills: 0
Start: 2022-09-09 | End: 2022-10-08

## 2022-09-09 RX ORDER — HYDROMORPHONE HYDROCHLORIDE 2 MG/ML
1 INJECTION INTRAMUSCULAR; INTRAVENOUS; SUBCUTANEOUS
Qty: 12 | Refills: 0
Start: 2022-09-09 | End: 2022-09-11

## 2022-09-09 RX ORDER — ALBUTEROL 90 UG/1
2 AEROSOL, METERED ORAL
Qty: 1 | Refills: 0
Start: 2022-09-09 | End: 2022-10-08

## 2022-09-09 RX ADMIN — Medication 5 MILLIGRAM(S): at 13:39

## 2022-09-09 RX ADMIN — HYDROMORPHONE HYDROCHLORIDE 2 MILLIGRAM(S): 2 INJECTION INTRAMUSCULAR; INTRAVENOUS; SUBCUTANEOUS at 13:37

## 2022-09-09 RX ADMIN — Medication 5 MILLIGRAM(S): at 02:32

## 2022-09-09 RX ADMIN — Medication 1 DROP(S): at 02:31

## 2022-09-09 RX ADMIN — Medication 1 DROP(S): at 06:09

## 2022-09-09 NOTE — PROGRESS NOTE ADULT - PROBLEM SELECTOR PLAN 2
Pt has signs of VOC - now resolved   -PCA with Dilaudid for pain control - will transition to oral meds  - ISTOP from 9/7/22 in chart  - will resume oral dilaudid prn and d/c pca   - incentive spirometry

## 2022-09-09 NOTE — PROGRESS NOTE ADULT - PROBLEM SELECTOR PLAN 1
- HIDA neg for cholecystitis  - Surgery following- no surgical intervention at this time   - MRCP reviewed -  will need outpt followup in 3 months

## 2022-09-09 NOTE — CHART NOTE - NSCHARTNOTEFT_GEN_A_CORE
Ref # [id number]    Hira MarquezBirth Date: 1955  Address: [address] [place] [id number]: Male  Rx Written	Rx Dispensed	Drug	Quantity	Days Supply	Prescriber Name	Prescriber Bren #	Payment Method	Dispenser  07/16/2022	07/18/2022	hydromorphone 4 mg tablet	20	5	Liss Zamarripa	[id number]	Medicare	[facility]  05/10/2022	05/10/2022	hydromorphone 4 mg tablet	40	7	Lluvia Hamm	[id number]	Medicare	[facility]  04/14/2022	04/14/2022	hydromorphone 4 mg tablet	40	7	Lluvia Hamm	[id number]	Medicare	[facility]  01/31/2022	02/01/2022	hydromorphone 4 mg tablet	40	7	Lluvia Hamm	[id number]	Medicare	[facility]  11/12/2021	11/12/2021	hydromorphone 4 mg tablet	20	5	Armaan Bustos	[id number]	Medicare	[facility]      Irina Preciado NP-BC  Department of Medicine  In House Pager #[phone]
Others' Prescriptions  Patient Name: Hira Plummer Date: 1955  Address: [address] [place] [id number]: Male  Rx Written	Rx Dispensed	Drug	Quantity	Days Supply	Prescriber Name	Prescriber Bren #	Payment Method	Dispenser  07/16/2022	07/18/2022	hydromorphone 4 mg tablet	20	5	Liss Zamarripa	[id number]	Medicare	[facility]  05/10/2022	05/10/2022	hydromorphone 4 mg tablet	40	7	Lluvia Hamm	[id number]	Medicare	[facility]  04/14/2022	04/14/2022	hydromorphone 4 mg tablet	40	7	Lluvia Hamm	[id number]	Medicare	[facility]  01/31/2022	02/01/2022	hydromorphone 4 mg tablet	40	7	Lluvia Hamm	[id number]	Medicare	[facility]  11/12/2021	11/12/2021	hydromorphone 4 mg tablet	20	5	Armaan Bustos	[id number]	Medicare	[facility]
Patient Name: Hira Plummer Date: 1955  Address: [address] [place] [id number]: Male  Rx Written	Rx Dispensed	Drug	Quantity	Days Supply	Prescriber Name	Prescriber Bren #	Payment Method	Dispenser  07/16/2022	07/18/2022	hydromorphone 4 mg tablet	20	5	Liss Zamarripa	[id number]	Medicare	[facility]  05/10/2022	05/10/2022	hydromorphone 4 mg tablet	40	7	Lluvia Hamm	[id number]	Medicare	[facility]  04/14/2022	04/14/2022	hydromorphone 4 mg tablet	40	7	Lluvia Hamm	[id number]	Medicare	[facility]  01/31/2022	02/01/2022	hydromorphone 4 mg tablet	40	7	Lluvia Hamm	[id number]	Medicare	[facility]  11/12/2021	11/12/2021	hydromorphone 4 mg tablet	20	5	Armaan Bustos	[id number]	Medicare	[facility]
Patient seen for length of stay nutrition f/u. 67 year old male with a PMH of sickle cell anemia admitted with RUQ pain and pain at limbs, back. (+) Gallstones on US and CT per chart.    Patient with good appetite, noted with intakes % per RN flow sheet. No GI distress or chewing/swallowing difficulties reported. No edema or pressure injuries noted per RN flow sheet.    Diet : Diet, Regular:   Supplement Feeding Modality:  Oral  Ensure Enlive Cans or Servings Per Day:  1       Frequency:  Three Times a day (09-05-22 @ 10:45)    Current Weight: no weight to assess    Pertinent Medications: MEDICATIONS  (STANDING):  artificial  tears Solution 1 Drop(s) Both EYES four times a day  artificial tears (preservative free) Ophthalmic Solution 1 Drop(s) Both EYES four times a day  enoxaparin Injectable 40 milliGRAM(s) SubCutaneous every 24 hours  finasteride 5 milliGRAM(s) Oral daily  metoprolol tartrate Injectable 5 milliGRAM(s) IV Push every 6 hours  polyethylene glycol 3350 17 Gram(s) Oral daily  senna 2 Tablet(s) Oral at bedtime  sodium chloride 0.45%. 1000 milliLiter(s) (30 mL/Hr) IV Continuous <Continuous>    MEDICATIONS  (PRN):  ALBUTerol    90 MICROgram(s) HFA Inhaler 2 Puff(s) Inhalation every 6 hours PRN Shortness of Breath and/or Wheezing  HYDROmorphone   Tablet 2 milliGRAM(s) Oral every 6 hours PRN Severe Pain (7 - 10)  naloxone Injectable 0.1 milliGRAM(s) IV Push every 3 minutes PRN For ANY of the following changes in patient status:  A. RR LESS THAN 10 breaths per minute, B. Oxygen saturation LESS THAN 90%, C. Sedation score of 6  ondansetron Injectable 4 milliGRAM(s) IV Push every 6 hours PRN Nausea    Pertinent Labs:  09-08 Na139 mmol/L Glu 77 mg/dL K+ 4.7 mmol/L Cr  1.02 mg/dL BUN 12 mg/dL 09-03 Phos 2.7 mg/dL 09-03 Alb 4.1 g/dL    Estimated Needs:   [x ] no change since previous assessment    Previous Nutrition Diagnosis: no active nutrition diagnosis     Nutrition Diagnosis is [ x] ongoing  [ ] resolved [ ] not applicable     Recommend  - addition of low fat to diet  - adjusting supplement to ensure max 1x daily (150 kcal, 30 g pro)  - obtain current weight and continue to document PO intake to monitor trend     Monitoring and Evaluation:   [x ] PO intake [x ] Tolerance to diet prescription [x ] weights [ x] follow up per protocol

## 2022-09-09 NOTE — DISCHARGE NOTE NURSING/CASE MANAGEMENT/SOCIAL WORK - NSDCCRNAME_GEN_ALL_CORE_FT
Taxi transportation arranged with [employer] [phone] for 3PM on 09/09/22 to transport patient home.

## 2022-09-09 NOTE — PROGRESS NOTE ADULT - PROBLEM SELECTOR PROBLEM 2
Sickle cell crisis

## 2022-09-09 NOTE — DISCHARGE NOTE NURSING/CASE MANAGEMENT/SOCIAL WORK - NSDCVIVACCINE_GEN_ALL_CORE_FT
influenza, injectable, quadrivalent, preservative free; 12-Oct-2018 14:38; Juana Bruce (RN); [employer]; [id number] (Exp. Date: 30-Jun-2019); IntraMuscular; Deltoid Left.; 0.5 milliLiter(s); VIS (VIS Published: 07-Aug-2015, VIS Presented: 12-Oct-2018);

## 2022-09-09 NOTE — DISCHARGE NOTE NURSING/CASE MANAGEMENT/SOCIAL WORK - NSDCFUADDAPPT_GEN_ALL_CORE_FT
Please follow up with your outpatient provider for cataract evaluation including IOL measurements.     A follow-up MRI in 3 months is advised.

## 2022-09-09 NOTE — DISCHARGE NOTE NURSING/CASE MANAGEMENT/SOCIAL WORK - PATIENT PORTAL LINK FT
You can access the FollowMyHealth Patient Portal offered by [facility] by registering at the following website: [email or web address]. By joining [facility]’s FollowMyHealth portal, you will also be able to view your health information using other applications (apps) compatible with our system.

## 2022-09-09 NOTE — PROVIDER CONTACT NOTE (OTHER) - ASSESSMENT
Arranged [employer] [phone]. MAS  Inv# [id number]. Provided information to Yulia DOMINGUEZ. Pt should call when ready.
HEMOGLOBIN 7.O
Asymptomatic.

## 2022-09-09 NOTE — PROGRESS NOTE ADULT - PROBLEM SELECTOR PLAN 4
- BP adequately controlled.  -Cont Metoprolol.

## 2022-09-09 NOTE — DISCHARGE NOTE NURSING/CASE MANAGEMENT/SOCIAL WORK - NSDCPEFALRISK_GEN_ALL_CORE
For information on Fall & Injury Prevention, visit: [email or web address] OR  [email or web address] OR  https://www.cdc.gov/steadi/patient.html

## 2022-09-09 NOTE — PROGRESS NOTE ADULT - PROBLEM SELECTOR PROBLEM 4
Benign essential HTN

## 2022-09-09 NOTE — PROGRESS NOTE ADULT - PROBLEM SELECTOR PROBLEM 1
Abdominal pain, acute, epigastric

## 2022-09-09 NOTE — PROGRESS NOTE ADULT - PROVIDER SPECIALTY LIST ADULT
Hospitalist
Surgery
Hospitalist
Surgery
Hospitalist

## 2022-09-09 NOTE — PROGRESS NOTE ADULT - SUBJECTIVE AND OBJECTIVE BOX
[facility] Division of Hospital Medicine  Yael Stephens MD  Pager (M-F, 8A-5P): [phone]  Other Times:  [phone]    Patient is a 67y old  Male who presents with a chief complaint of abdominal pain (08 Sep 2022 10:12)      SUBJECTIVE / OVERNIGHT EVENTS:      MEDICATIONS  (STANDING):  artificial  tears Solution 1 Drop(s) Both EYES four times a day  artificial tears (preservative free) Ophthalmic Solution 1 Drop(s) Both EYES four times a day  enoxaparin Injectable 40 milliGRAM(s) SubCutaneous every 24 hours  finasteride 5 milliGRAM(s) Oral daily  metoprolol tartrate Injectable 5 milliGRAM(s) IV Push every 6 hours  polyethylene glycol 3350 17 Gram(s) Oral daily  senna 2 Tablet(s) Oral at bedtime  sodium chloride 0.45%. 1000 milliLiter(s) (30 mL/Hr) IV Continuous <Continuous>    MEDICATIONS  (PRN):  ALBUTerol    90 MICROgram(s) HFA Inhaler 2 Puff(s) Inhalation every 6 hours PRN Shortness of Breath and/or Wheezing  HYDROmorphone   Tablet 2 milliGRAM(s) Oral every 6 hours PRN Severe Pain (7 - 10)  naloxone Injectable 0.1 milliGRAM(s) IV Push every 3 minutes PRN For ANY of the following changes in patient status:  A. RR LESS THAN 10 breaths per minute, B. Oxygen saturation LESS THAN 90%, C. Sedation score of 6  ondansetron Injectable 4 milliGRAM(s) IV Push every 6 hours PRN Nausea      CAPILLARY BLOOD GLUCOSE        I&O's Summary    08 Sep 2022 07:01  -  09 Sep 2022 07:00  --------------------------------------------------------  IN: 1860 mL / OUT: 850 mL / NET: 1010 mL        PHYSICAL EXAM:  Vital Signs Last 24 Hrs  T(C): 36.8 (09 Sep 2022 06:15), Max: 36.8 (08 Sep 2022 21:02)  T(F): 98.2 (09 Sep 2022 06:15), Max: 98.2 (08 Sep 2022 21:02)  HR: 84 (09 Sep 2022 06:15) (62 - 88)  BP: 122/75 (09 Sep 2022 06:15) (122/75 - 151/61)  BP(mean): --  RR: 18 (09 Sep 2022 06:15) (17 - 18)  SpO2: 100% (09 Sep 2022 06:15) (100% - 100%)    Parameters below as of 09 Sep 2022 06:15  Patient On (Oxygen Delivery Method): room air        CONSTITUTIONAL: NAD, well-developed, well-groomed  EYES: EOMI; conjunctiva and sclera clear  ENMT: Moist oral mucosa  RESPIRATORY: Normal respiratory effort; lungs are clear to auscultation bilaterally  CARDIOVASCULAR: Regular rate and rhythm, normal S1 and S2, no murmur; No lower extremity edema  ABDOMEN: Nontender to palpation, normoactive bowel sounds, no rebound/guarding  MUSCULOSKELETAL:   no clubbing or cyanosis of digits; no joint swelling or tenderness to palpation  PSYCH: A+O to person, place, and time; affect appropriate  NEUROLOGY: CN 2-12 are intact and symmetric; no gross sensory deficits   SKIN: No rashes; no palpable lesions    LABS:                        6.9    12.30 )-----------( 304      ( 08 Sep 2022 07:04 )             21.3     09-08    139  |  107  |  12  ----------------------------<  77  4.7   |  21<L>  |  1.02    Ca    9.5      08 Sep 2022 07:04                RADIOLOGY & ADDITIONAL TESTS:  Results Reviewed:   Imaging Personally Reviewed:  Electrocardiogram Personally Reviewed:    COORDINATION OF CARE:  Care Discussed with Consultants/Other Providers [Y/N]: Y  Prior or Outpatient Records Reviewed [Y/N]: Y   [facility] of [facility] Medicine  Yael Stephens MD  Pager (M-F, 8A-5P): [phone]  Other Times:  [phone]    Patient is a 67y old  Male who presents with a chief complaint of abdominal pain (08 Sep 2022 10:12)      SUBJECTIVE / OVERNIGHT EVENTS:  pt without complaints.     MEDICATIONS  (STANDING):  artificial  tears Solution 1 Drop(s) Both EYES four times a day  artificial tears (preservative free) Ophthalmic Solution 1 Drop(s) Both EYES four times a day  enoxaparin Injectable 40 milliGRAM(s) SubCutaneous every 24 hours  finasteride 5 milliGRAM(s) Oral daily  metoprolol tartrate Injectable 5 milliGRAM(s) IV Push every 6 hours  polyethylene glycol 3350 17 Gram(s) Oral daily  senna 2 Tablet(s) Oral at bedtime  sodium chloride 0.45%. 1000 milliLiter(s) (30 mL/Hr) IV Continuous <Continuous>    MEDICATIONS  (PRN):  ALBUTerol    90 MICROgram(s) HFA Inhaler 2 Puff(s) Inhalation every 6 hours PRN Shortness of Breath and/or Wheezing  HYDROmorphone   Tablet 2 milliGRAM(s) Oral every 6 hours PRN Severe Pain (7 - 10)  naloxone Injectable 0.1 milliGRAM(s) IV Push every 3 minutes PRN For ANY of the following changes in patient status:  A. RR LESS THAN 10 breaths per minute, B. Oxygen saturation LESS THAN 90%, C. Sedation score of 6  ondansetron Injectable 4 milliGRAM(s) IV Push every 6 hours PRN Nausea      CAPILLARY BLOOD GLUCOSE        I&O's Summary    08 Sep 2022 07:01  -  09 Sep 2022 07:00  --------------------------------------------------------  IN: 1860 mL / OUT: 850 mL / NET: 1010 mL        PHYSICAL EXAM:  Vital Signs Last 24 Hrs  T(C): 36.8 (09 Sep 2022 06:15), Max: 36.8 (08 Sep 2022 21:02)  T(F): 98.2 (09 Sep 2022 06:15), Max: 98.2 (08 Sep 2022 21:02)  HR: 84 (09 Sep 2022 06:15) (62 - 88)  BP: 122/75 (09 Sep 2022 06:15) (122/75 - 151/61)  BP(mean): --  RR: 18 (09 Sep 2022 06:15) (17 - 18)  SpO2: 100% (09 Sep 2022 06:15) (100% - 100%)    Parameters below as of 09 Sep 2022 06:15  Patient On (Oxygen Delivery Method): room air        CONSTITUTIONAL: NAD, well-developed, well-groomed  EYES: EOMI; conjunctiva and sclera clear  ENMT: Moist oral mucosa  RESPIRATORY: Normal respiratory effort; lungs are clear to auscultation bilaterally  CARDIOVASCULAR: Regular rate and rhythm, normal S1 and S2, no murmur; No lower extremity edema  ABDOMEN: Nontender to palpation, normoactive bowel sounds, no rebound/guarding  MUSCULOSKELETAL:   no clubbing or cyanosis of digits; no joint swelling or tenderness to palpation  PSYCH: A+O to person, place, and time; affect appropriate  NEUROLOGY: CN 2-12 are intact and symmetric; no gross sensory deficits   SKIN: No rashes; no palpable lesions    LABS:                        6.9    12.30 )-----------( 304      ( 08 Sep 2022 07:04 )             21.3     09-08    139  |  107  |  12  ----------------------------<  77  4.7   |  21<L>  |  1.02    Ca    9.5      08 Sep 2022 07:04                RADIOLOGY & ADDITIONAL TESTS:  Results Reviewed:   Imaging Personally Reviewed:  Electrocardiogram Personally Reviewed:    COORDINATION OF CARE:  Care Discussed with Consultants/Other Providers [Y/N]: Y  Prior or Outpatient Records Reviewed [Y/N]: Y

## 2022-09-15 ENCOUNTER — APPOINTMENT (OUTPATIENT)
Dept: OPHTHALMOLOGY | Facility: CLINIC | Age: 67
End: 2022-09-15

## 2022-09-15 ENCOUNTER — NON-APPOINTMENT (OUTPATIENT)
Age: 67
End: 2022-09-15

## 2022-09-15 PROCEDURE — 92012 INTRM OPH EXAM EST PATIENT: CPT

## 2022-09-19 ENCOUNTER — APPOINTMENT (OUTPATIENT)
Dept: OPHTHALMOLOGY | Facility: CLINIC | Age: 67
End: 2022-09-19

## 2022-09-29 ENCOUNTER — APPOINTMENT (OUTPATIENT)
Dept: OPHTHALMOLOGY | Facility: CLINIC | Age: 67
End: 2022-09-29

## 2022-09-29 ENCOUNTER — NON-APPOINTMENT (OUTPATIENT)
Age: 67
End: 2022-09-29

## 2022-09-29 PROCEDURE — 92136 OPHTHALMIC BIOMETRY: CPT | Mod: 26,RT

## 2022-09-29 PROCEDURE — 92014 COMPRE OPH EXAM EST PT 1/>: CPT

## 2022-09-29 PROCEDURE — 76512 OPH US DX B-SCAN: CPT | Mod: RT

## 2022-10-12 ENCOUNTER — APPOINTMENT (OUTPATIENT)
Dept: INTERNAL MEDICINE | Facility: CLINIC | Age: 67
End: 2022-10-12

## 2022-10-13 ENCOUNTER — INPATIENT (INPATIENT)
Facility: HOSPITAL | Age: 67
LOS: 11 days | Discharge: HOME | End: 2022-10-25
Attending: INTERNAL MEDICINE | Admitting: INTERNAL MEDICINE

## 2022-10-13 VITALS
OXYGEN SATURATION: 100 % | RESPIRATION RATE: 18 BRPM | DIASTOLIC BLOOD PRESSURE: 74 MMHG | SYSTOLIC BLOOD PRESSURE: 166 MMHG | HEART RATE: 84 BPM | TEMPERATURE: 98 F

## 2022-10-13 PROCEDURE — 99285 EMERGENCY DEPT VISIT HI MDM: CPT

## 2022-10-13 PROCEDURE — 71046 X-RAY EXAM CHEST 2 VIEWS: CPT | Mod: 26

## 2022-10-13 RX ORDER — ONDANSETRON 8 MG/1
4 TABLET, FILM COATED ORAL ONCE
Refills: 0 | Status: COMPLETED | OUTPATIENT
Start: 2022-10-13 | End: 2022-10-13

## 2022-10-13 RX ORDER — ACETAMINOPHEN 500 MG
1000 TABLET ORAL ONCE
Refills: 0 | Status: COMPLETED | OUTPATIENT
Start: 2022-10-13 | End: 2022-10-14

## 2022-10-13 RX ORDER — MORPHINE SULFATE 50 MG/1
4 CAPSULE, EXTENDED RELEASE ORAL ONCE
Refills: 0 | Status: DISCONTINUED | OUTPATIENT
Start: 2022-10-13 | End: 2022-10-13

## 2022-10-13 NOTE — ED PROVIDER NOTE - ATTENDING CONTRIBUTION TO CARE
I have personally performed a history and physical examination of the patient and discussed management with the resident as well as the patient.  I reviewed the resident's note and agree with the documented findings and plan of care.  I have authored and modified critical sections of the Provider Note, including but not limited to HPI, Physical Exam and MDM.    58 yo Male PMhx sickle cell anemia, presenting with diffuse joint and abdominal pain, consistent with sickle cell crisis. Patient states pain started a week ago on Monday, but became acutely worse today. Pain similar to prior episodes. Does not follow with hematologist. Endorses generalized CP without SOB. Possible sickle cell pain vs acute chest syndrome. EKG no stemi. Obtain CBC, cmp, retic, CXR, symptomatic control prn.

## 2022-10-13 NOTE — ED ADULT TRIAGE NOTE - CHIEF COMPLAINT QUOTE
pt with sickle cell anemia  . pt with co abdominal and all over upper torso . Pt reports mild sob does not look tachypneic, no co dizziness. pt ambulatory at scene fis 100.

## 2022-10-13 NOTE — ED PROVIDER NOTE - OBJECTIVE STATEMENT
58 yo Male PMhx sickle cell anemia, presenting with diffuse joint and abdominal pain, consistent with sickle cell crisis. Patient states pain started a week ago on Monday, but became acutely worse today. Patient states he does not have crises often, but the pain is consistent with his prior pain. Patient is unable to say which pain medication he typically takes for crises. Patient denies taking medications at home for his crises. Patient endorses associated nausea/nbnb vomiting. Patient denies fever, SOB, diarrhea, and dysuria. 56 yo Male PMhx sickle cell anemia, presenting with diffuse joint and abdominal pain, consistent with sickle cell crisis. Patient states pain started a week ago on Monday, but became acutely worse today. Patient states he does not have crises often, but the pain is consistent with his prior pain. Patient is unable to say which pain medication he typically takes for crises. Patient denies taking medications at home for his crises. Patient endorses associated nausea/nbnb vomiting. Patient denies fever, SOB, diarrhea, and dysuria. No palliative or provocative factors. No other current complaints at this time.

## 2022-10-13 NOTE — ED PROVIDER NOTE - CLINICAL SUMMARY MEDICAL DECISION MAKING FREE TEXT BOX
56 yo Male PMhx sickle cell anemia, presenting with diffuse joint and abdominal pain, consistent with sickle cell crisis. Pain most likely 2/2 sickle cell crisis. Will get CBC with retic, CMP, CXR (assess for acute chest), and give pain medication and reassess abdomen.

## 2022-10-13 NOTE — ED PROVIDER NOTE - CARE PLAN
Principal Discharge DX:	Sickle cell pain crisis   1 Principal Discharge DX:	Hemoglobin low  Secondary Diagnosis:	Sickle cell pain crisis

## 2022-10-13 NOTE — ED PROVIDER NOTE - NS ED ROS FT
CONST: no fevers, no chills, no trauma  EYES: no blurry vision   ENT: no sore throat  CV: no chest pain, no extremity swelling  RESP: no shortness of breath  ABD: (+) abdominal pain, (+) (+) no vomiting, no diarrhea, no melena  : no dysuria, no hematuria, no frequency  MSK: no back pain, no neck pain, (+) extremity pain (diffuse joint pain)  NEURO: no headache, no focal weakness, no dizziness  HEME: no bruising  SKIN: no rash CONST: no fevers, no chills, no trauma  EYES: no blurry vision   ENT: no sore throat  CV: (+) chest pain, no extremity swelling  RESP: no shortness of breath  ABD: (+) abdominal pain, (+) (+) no vomiting, no diarrhea, no melena  : no dysuria, no hematuria, no frequency  MSK: no back pain, no neck pain, (+) extremity pain (diffuse joint pain)  NEURO: no headache, no focal weakness, no dizziness  HEME: no bruising  SKIN: no rash

## 2022-10-13 NOTE — ED PROVIDER NOTE - PHYSICAL EXAMINATION
Const: not in acute distress  Eyes: no conjunctival injection  HEENT: Head NCAT, Moist MM.  Neck: Trachea midline.   CVS: +S1/S2, Peripheral pulses 2+ and equal in all extremities.  RESP: Unlabored respiratory effort. Clear to auscultation bilaterally.  GI: Diffusely tender and intermittently guarding/Nondistended, No CVA tenderness b/l.   MSK: Normocephalic/Atraumatic, No Lower Extremities edema b/l.   Skin: Intact.   Neuro: CNs II-XII grossly intact. Motor & Sensation grossly intact.  Psych: Awake, Alert, & Oriented (AAO) x3.

## 2022-10-13 NOTE — ED PROVIDER NOTE - PROGRESS NOTE DETAILS
MD Gayle (PGY-1) Patient reassessed and still endorsing pain. Will give additional pain medication. MD Gayle (PGY-1) Paged [facility]. [place] [facility] states patient seen by hematologist at [facility]. Paged private hematologist, but have not received response. Hgb 5.9. Concern for giving blood transfusion given history of reaction per HIE, which notes "antibodies". Deferring transfusion at this time. Patient denies prior reaction. Patient currently vitally stable. Endorses improvement with pain medication. Will admit. MD Gayle (PGY-1) Paged [place] [facility]. [place] [facility] states patient seen by hematologist at [facility]. Paged private hematologist, but have not received response. Hgb 5.9. Concern for giving blood transfusion given history of febrile transfusion reaction per HIE, which notes "antibodies" as the cause. Deferring transfusion at this time. Patient denies prior reaction. Patient currently vitally stable. Endorses improvement with pain medication. Will admit. Dr. West: Discussed case with blood bank, unclear etiology of positive antibody screen in our lab. Pt states has been to [facility] in the past for transfusion. Blood bank contacting [facility] to attempt to find out proper blood product to transfuse the patient with. Dr. West: Blood bank confirmed pts prior reaction was a nonhemolytic febrile reaction and can be transfused with regular PRBC if necessary.

## 2022-10-14 DIAGNOSIS — R10.13 EPIGASTRIC PAIN: ICD-10-CM

## 2022-10-14 DIAGNOSIS — R77.8 OTHER SPECIFIED ABNORMALITIES OF PLASMA PROTEINS: ICD-10-CM

## 2022-10-14 DIAGNOSIS — D57.00 HB-SS DISEASE WITH CRISIS, UNSPECIFIED: ICD-10-CM

## 2022-10-14 DIAGNOSIS — N17.9 ACUTE KIDNEY FAILURE, UNSPECIFIED: ICD-10-CM

## 2022-10-14 DIAGNOSIS — I10 ESSENTIAL (PRIMARY) HYPERTENSION: ICD-10-CM

## 2022-10-14 DIAGNOSIS — Z29.9 ENCOUNTER FOR PROPHYLACTIC MEASURES, UNSPECIFIED: ICD-10-CM

## 2022-10-14 DIAGNOSIS — D64.9 ANEMIA, UNSPECIFIED: ICD-10-CM

## 2022-10-14 DIAGNOSIS — N40.0 BENIGN PROSTATIC HYPERPLASIA WITHOUT LOWER URINARY TRACT SYMPTOMS: ICD-10-CM

## 2022-10-14 LAB
ALBUMIN SERPL ELPH-MCNC: 4 G/DL — SIGNIFICANT CHANGE UP (ref 3.3–5)
ALP SERPL-CCNC: 136 U/L — HIGH (ref 40–120)
ALT FLD-CCNC: 12 U/L — SIGNIFICANT CHANGE UP (ref 4–41)
ANION GAP SERPL CALC-SCNC: 13 MMOL/L — SIGNIFICANT CHANGE UP (ref 7–14)
ANION GAP SERPL CALC-SCNC: 13 MMOL/L — SIGNIFICANT CHANGE UP (ref 7–14)
ANISOCYTOSIS BLD QL: SIGNIFICANT CHANGE UP
APPEARANCE UR: CLEAR — SIGNIFICANT CHANGE UP
AST SERPL-CCNC: 34 U/L — SIGNIFICANT CHANGE UP (ref 4–40)
BASE EXCESS BLDV CALC-SCNC: -5.8 MMOL/L — LOW (ref -2–3)
BASOPHILS # BLD AUTO: 0.12 K/UL — SIGNIFICANT CHANGE UP (ref 0–0.2)
BASOPHILS NFR BLD AUTO: 0.9 % — SIGNIFICANT CHANGE UP (ref 0–2)
BILIRUB SERPL-MCNC: 2.2 MG/DL — HIGH (ref 0.2–1.2)
BILIRUB UR-MCNC: NEGATIVE — SIGNIFICANT CHANGE UP
BLD GP AB SCN SERPL QL: POSITIVE — SIGNIFICANT CHANGE UP
BLOOD GAS VENOUS COMPREHENSIVE RESULT: SIGNIFICANT CHANGE UP
BUN SERPL-MCNC: 15 MG/DL — SIGNIFICANT CHANGE UP (ref 7–23)
BUN SERPL-MCNC: 20 MG/DL — SIGNIFICANT CHANGE UP (ref 7–23)
BURR CELLS BLD QL SMEAR: PRESENT — SIGNIFICANT CHANGE UP
CALCIUM SERPL-MCNC: 9 MG/DL — SIGNIFICANT CHANGE UP (ref 8.4–10.5)
CALCIUM SERPL-MCNC: 9.4 MG/DL — SIGNIFICANT CHANGE UP (ref 8.4–10.5)
CHLORIDE BLDV-SCNC: 112 MMOL/L — HIGH (ref 96–108)
CHLORIDE SERPL-SCNC: 106 MMOL/L — SIGNIFICANT CHANGE UP (ref 98–107)
CHLORIDE SERPL-SCNC: 107 MMOL/L — SIGNIFICANT CHANGE UP (ref 98–107)
CO2 BLDV-SCNC: 20.5 MMOL/L — LOW (ref 22–26)
CO2 SERPL-SCNC: 18 MMOL/L — LOW (ref 22–31)
CO2 SERPL-SCNC: 19 MMOL/L — LOW (ref 22–31)
COLOR SPEC: YELLOW — SIGNIFICANT CHANGE UP
CREAT SERPL-MCNC: 1.19 MG/DL — SIGNIFICANT CHANGE UP (ref 0.5–1.3)
CREAT SERPL-MCNC: 1.31 MG/DL — HIGH (ref 0.5–1.3)
DACRYOCYTES BLD QL SMEAR: SLIGHT — SIGNIFICANT CHANGE UP
DIFF PNL FLD: NEGATIVE — SIGNIFICANT CHANGE UP
EGFR: 63 ML/MIN/1.73M2 — SIGNIFICANT CHANGE UP
EGFR: 70 ML/MIN/1.73M2 — SIGNIFICANT CHANGE UP
ELLIPTOCYTES BLD QL SMEAR: SIGNIFICANT CHANGE UP
EOSINOPHIL # BLD AUTO: 0 K/UL — SIGNIFICANT CHANGE UP (ref 0–0.5)
EOSINOPHIL NFR BLD AUTO: 0 % — SIGNIFICANT CHANGE UP (ref 0–6)
EPI CELLS # UR: SIGNIFICANT CHANGE UP
FLUAV AG NPH QL: SIGNIFICANT CHANGE UP
FLUBV AG NPH QL: SIGNIFICANT CHANGE UP
GAS PNL BLDV: 139 MMOL/L — SIGNIFICANT CHANGE UP (ref 136–145)
GIANT PLATELETS BLD QL SMEAR: PRESENT — SIGNIFICANT CHANGE UP
GLUCOSE BLDV-MCNC: 80 MG/DL — SIGNIFICANT CHANGE UP (ref 70–99)
GLUCOSE SERPL-MCNC: 103 MG/DL — HIGH (ref 70–99)
GLUCOSE SERPL-MCNC: 79 MG/DL — SIGNIFICANT CHANGE UP (ref 70–99)
GLUCOSE UR QL: NEGATIVE — SIGNIFICANT CHANGE UP
HCO3 BLDV-SCNC: 19 MMOL/L — LOW (ref 22–29)
HCT VFR BLD CALC: 17.7 % — CRITICAL LOW (ref 39–50)
HCT VFR BLDA CALC: 18 % — CRITICAL LOW (ref 39–51)
HGB BLD CALC-MCNC: 5.9 G/DL — CRITICAL LOW (ref 13–17)
HGB BLD-MCNC: 5.9 G/DL — CRITICAL LOW (ref 13–17)
IANC: 11.24 K/UL — HIGH (ref 1.8–7.4)
KETONES UR-MCNC: NEGATIVE — SIGNIFICANT CHANGE UP
LACTATE BLDV-MCNC: 1 MMOL/L — SIGNIFICANT CHANGE UP (ref 0.5–2)
LEUKOCYTE ESTERASE UR-ACNC: NEGATIVE — SIGNIFICANT CHANGE UP
LIDOCAIN IGE QN: 46 U/L — SIGNIFICANT CHANGE UP (ref 7–60)
LYMPHOCYTES # BLD AUTO: 0.93 K/UL — LOW (ref 1–3.3)
LYMPHOCYTES # BLD AUTO: 6.9 % — LOW (ref 13–44)
MACROCYTES BLD QL: SLIGHT — SIGNIFICANT CHANGE UP
MAGNESIUM SERPL-MCNC: 2.2 MG/DL — SIGNIFICANT CHANGE UP (ref 1.6–2.6)
MANUAL SMEAR VERIFICATION: SIGNIFICANT CHANGE UP
MCHC RBC-ENTMCNC: 29.1 PG — SIGNIFICANT CHANGE UP (ref 27–34)
MCHC RBC-ENTMCNC: 33.3 GM/DL — SIGNIFICANT CHANGE UP (ref 32–36)
MCV RBC AUTO: 87.2 FL — SIGNIFICANT CHANGE UP (ref 80–100)
MICROCYTES BLD QL: SLIGHT — SIGNIFICANT CHANGE UP
MONOCYTES # BLD AUTO: 0.23 K/UL — SIGNIFICANT CHANGE UP (ref 0–0.9)
MONOCYTES NFR BLD AUTO: 1.7 % — LOW (ref 2–14)
NEUTROPHILS # BLD AUTO: 12.13 K/UL — HIGH (ref 1.8–7.4)
NEUTROPHILS NFR BLD AUTO: 89.6 % — HIGH (ref 43–77)
NITRITE UR-MCNC: NEGATIVE — SIGNIFICANT CHANGE UP
NRBC # BLD: 11 /100 — HIGH (ref 0–0)
PCO2 BLDV: 36 MMHG — LOW (ref 42–55)
PH BLDV: 7.34 — SIGNIFICANT CHANGE UP (ref 7.32–7.43)
PH UR: 6.5 — SIGNIFICANT CHANGE UP (ref 5–8)
PLAT MORPH BLD: ABNORMAL
PLATELET # BLD AUTO: 421 K/UL — HIGH (ref 150–400)
PLATELET COUNT - ESTIMATE: NORMAL — SIGNIFICANT CHANGE UP
PO2 BLDV: 30 MMHG — SIGNIFICANT CHANGE UP
POIKILOCYTOSIS BLD QL AUTO: SIGNIFICANT CHANGE UP
POLYCHROMASIA BLD QL SMEAR: SIGNIFICANT CHANGE UP
POTASSIUM BLDV-SCNC: 4.5 MMOL/L — SIGNIFICANT CHANGE UP (ref 3.5–5.1)
POTASSIUM SERPL-MCNC: 4.6 MMOL/L — SIGNIFICANT CHANGE UP (ref 3.5–5.3)
POTASSIUM SERPL-MCNC: 5.1 MMOL/L — SIGNIFICANT CHANGE UP (ref 3.5–5.3)
POTASSIUM SERPL-SCNC: 4.6 MMOL/L — SIGNIFICANT CHANGE UP (ref 3.5–5.3)
POTASSIUM SERPL-SCNC: 5.1 MMOL/L — SIGNIFICANT CHANGE UP (ref 3.5–5.3)
PROT SERPL-MCNC: 7.8 G/DL — SIGNIFICANT CHANGE UP (ref 6–8.3)
PROT UR-MCNC: ABNORMAL
RBC # BLD: 2.03 M/UL — LOW (ref 4.2–5.8)
RBC # BLD: 2.03 M/UL — LOW (ref 4.2–5.8)
RBC # FLD: 24 % — HIGH (ref 10.3–14.5)
RBC BLD AUTO: ABNORMAL
RBC CASTS # UR COMP ASSIST: SIGNIFICANT CHANGE UP /HPF (ref 0–4)
RETICS #: 236.9 K/UL — HIGH (ref 25–125)
RETICS/RBC NFR: 11.7 % — HIGH (ref 0.5–2.5)
RH IG SCN BLD-IMP: POSITIVE — SIGNIFICANT CHANGE UP
RH IG SCN BLD-IMP: POSITIVE — SIGNIFICANT CHANGE UP
RSV RNA NPH QL NAA+NON-PROBE: SIGNIFICANT CHANGE UP
SAO2 % BLDV: 34.4 % — SIGNIFICANT CHANGE UP
SARS-COV-2 RNA SPEC QL NAA+PROBE: SIGNIFICANT CHANGE UP
SCHISTOCYTES BLD QL AUTO: SLIGHT — SIGNIFICANT CHANGE UP
SICKLE CELLS BLD QL SMEAR: SIGNIFICANT CHANGE UP
SODIUM SERPL-SCNC: 138 MMOL/L — SIGNIFICANT CHANGE UP (ref 135–145)
SODIUM SERPL-SCNC: 138 MMOL/L — SIGNIFICANT CHANGE UP (ref 135–145)
SP GR SPEC: 1.02 — SIGNIFICANT CHANGE UP (ref 1.01–1.05)
TARGETS BLD QL SMEAR: SLIGHT — SIGNIFICANT CHANGE UP
TROPONIN T, HIGH SENSITIVITY RESULT: 14 NG/L — SIGNIFICANT CHANGE UP
UROBILINOGEN FLD QL: SIGNIFICANT CHANGE UP
VARIANT LYMPHS # BLD: 0.9 % — SIGNIFICANT CHANGE UP (ref 0–6)
WBC # BLD: 13.54 K/UL — HIGH (ref 3.8–10.5)
WBC # FLD AUTO: 13.54 K/UL — HIGH (ref 3.8–10.5)
WBC UR QL: SIGNIFICANT CHANGE UP /HPF (ref 0–5)

## 2022-10-14 PROCEDURE — 99223 1ST HOSP IP/OBS HIGH 75: CPT

## 2022-10-14 RX ORDER — ACETAMINOPHEN 500 MG
650 TABLET ORAL EVERY 6 HOURS
Refills: 0 | Status: DISCONTINUED | OUTPATIENT
Start: 2022-10-14 | End: 2022-10-25

## 2022-10-14 RX ORDER — SENNA PLUS 8.6 MG/1
2 TABLET ORAL AT BEDTIME
Refills: 0 | Status: DISCONTINUED | OUTPATIENT
Start: 2022-10-14 | End: 2022-10-25

## 2022-10-14 RX ORDER — ONDANSETRON 8 MG/1
4 TABLET, FILM COATED ORAL EVERY 6 HOURS
Refills: 0 | Status: DISCONTINUED | OUTPATIENT
Start: 2022-10-14 | End: 2022-10-25

## 2022-10-14 RX ORDER — FOLIC ACID 0.8 MG
1 TABLET ORAL DAILY
Refills: 0 | Status: DISCONTINUED | OUTPATIENT
Start: 2022-10-14 | End: 2022-10-25

## 2022-10-14 RX ORDER — HYDROMORPHONE HYDROCHLORIDE 2 MG/ML
1 INJECTION INTRAMUSCULAR; INTRAVENOUS; SUBCUTANEOUS ONCE
Refills: 0 | Status: DISCONTINUED | OUTPATIENT
Start: 2022-10-14 | End: 2022-10-14

## 2022-10-14 RX ORDER — HYDROMORPHONE HYDROCHLORIDE 2 MG/ML
30 INJECTION INTRAMUSCULAR; INTRAVENOUS; SUBCUTANEOUS
Refills: 0 | Status: DISCONTINUED | OUTPATIENT
Start: 2022-10-14 | End: 2022-10-15

## 2022-10-14 RX ORDER — METOPROLOL TARTRATE 50 MG
25 TABLET ORAL
Refills: 0 | Status: DISCONTINUED | OUTPATIENT
Start: 2022-10-14 | End: 2022-10-25

## 2022-10-14 RX ORDER — FINASTERIDE 5 MG/1
5 TABLET, FILM COATED ORAL DAILY
Refills: 0 | Status: DISCONTINUED | OUTPATIENT
Start: 2022-10-14 | End: 2022-10-25

## 2022-10-14 RX ORDER — FAMOTIDINE 10 MG/ML
20 INJECTION INTRAVENOUS ONCE
Refills: 0 | Status: DISCONTINUED | OUTPATIENT
Start: 2022-10-14 | End: 2022-10-17

## 2022-10-14 RX ORDER — DIPHENHYDRAMINE HCL 50 MG
25 CAPSULE ORAL EVERY 4 HOURS
Refills: 0 | Status: DISCONTINUED | OUTPATIENT
Start: 2022-10-14 | End: 2022-10-25

## 2022-10-14 RX ORDER — POLYETHYLENE GLYCOL 3350 17 G/17G
17 POWDER, FOR SOLUTION ORAL DAILY
Refills: 0 | Status: DISCONTINUED | OUTPATIENT
Start: 2022-10-14 | End: 2022-10-25

## 2022-10-14 RX ORDER — ALBUTEROL 90 UG/1
2 AEROSOL, METERED ORAL EVERY 6 HOURS
Refills: 0 | Status: DISCONTINUED | OUTPATIENT
Start: 2022-10-14 | End: 2022-10-25

## 2022-10-14 RX ORDER — NALOXONE HYDROCHLORIDE 4 MG/.1ML
0.1 SPRAY NASAL
Refills: 0 | Status: DISCONTINUED | OUTPATIENT
Start: 2022-10-14 | End: 2022-10-25

## 2022-10-14 RX ORDER — FLUTICASONE PROPIONATE 50 MCG
1 SPRAY, SUSPENSION NASAL
Refills: 0 | Status: DISCONTINUED | OUTPATIENT
Start: 2022-10-14 | End: 2022-10-25

## 2022-10-14 RX ORDER — SODIUM CHLORIDE 9 MG/ML
1000 INJECTION, SOLUTION INTRAVENOUS
Refills: 0 | Status: DISCONTINUED | OUTPATIENT
Start: 2022-10-14 | End: 2022-10-24

## 2022-10-14 RX ADMIN — Medication 400 MILLIGRAM(S): at 01:20

## 2022-10-14 RX ADMIN — HYDROMORPHONE HYDROCHLORIDE 1 MILLIGRAM(S): 2 INJECTION INTRAMUSCULAR; INTRAVENOUS; SUBCUTANEOUS at 10:58

## 2022-10-14 RX ADMIN — Medication 1000 MILLIGRAM(S): at 02:08

## 2022-10-14 RX ADMIN — HYDROMORPHONE HYDROCHLORIDE 30 MILLILITER(S): 2 INJECTION INTRAMUSCULAR; INTRAVENOUS; SUBCUTANEOUS at 13:51

## 2022-10-14 RX ADMIN — Medication 1 MILLIGRAM(S): at 13:00

## 2022-10-14 RX ADMIN — ONDANSETRON 4 MILLIGRAM(S): 8 TABLET, FILM COATED ORAL at 00:44

## 2022-10-14 RX ADMIN — SODIUM CHLORIDE 75 MILLILITER(S): 9 INJECTION, SOLUTION INTRAVENOUS at 10:24

## 2022-10-14 RX ADMIN — Medication 650 MILLIGRAM(S): at 10:23

## 2022-10-14 RX ADMIN — MORPHINE SULFATE 4 MILLIGRAM(S): 50 CAPSULE, EXTENDED RELEASE ORAL at 00:43

## 2022-10-14 RX ADMIN — FINASTERIDE 5 MILLIGRAM(S): 5 TABLET, FILM COATED ORAL at 13:00

## 2022-10-14 RX ADMIN — HYDROMORPHONE HYDROCHLORIDE 30 MILLILITER(S): 2 INJECTION INTRAMUSCULAR; INTRAVENOUS; SUBCUTANEOUS at 19:28

## 2022-10-14 RX ADMIN — HYDROMORPHONE HYDROCHLORIDE 1 MILLIGRAM(S): 2 INJECTION INTRAMUSCULAR; INTRAVENOUS; SUBCUTANEOUS at 02:08

## 2022-10-14 RX ADMIN — Medication 25 MILLIGRAM(S): at 18:36

## 2022-10-14 RX ADMIN — SODIUM CHLORIDE 75 MILLILITER(S): 9 INJECTION, SOLUTION INTRAVENOUS at 18:35

## 2022-10-14 RX ADMIN — SENNA PLUS 2 TABLET(S): 8.6 TABLET ORAL at 21:20

## 2022-10-14 RX ADMIN — HYDROMORPHONE HYDROCHLORIDE 1 MILLIGRAM(S): 2 INJECTION INTRAMUSCULAR; INTRAVENOUS; SUBCUTANEOUS at 02:22

## 2022-10-14 RX ADMIN — MORPHINE SULFATE 4 MILLIGRAM(S): 50 CAPSULE, EXTENDED RELEASE ORAL at 02:08

## 2022-10-14 NOTE — ED PROCEDURE NOTE - CPROC ED INFORMED CONSENT1
Patient discharged by provider. Discharge paperwork reviewed and patient denies questions.   Leaves ambulatory and in no apparent distress Benefits, risks, and possible complications of procedure explained to patient/caregiver who verbalized understanding and gave verbal consent.

## 2022-10-14 NOTE — H&P ADULT - PROBLEM SELECTOR PLAN 1
diffuse joint pains typical of sickle crisis  - Hb 5.9 close to baseline of 6, reticulocyte count elevated  - no s/s of acute chest syndrome  - 1/2NS hydration + folic acid  - Dilaudid PCA Pump + PRN Zofran + PRN PO Benadryl   - trend CBC w/ diff, LDH, Reticulocyte count   - bowel regimen, monitor vital signs diffuse joint pains typical of sickle crisis, reticulocyte count elevated  - Hb 5.9 close to baseline of 6 (was higher last month as patient received transfusion in anticipation for surgery), no s/s of acute chest, CVA, not a significant decline from baseline, no indication to transfuse at this time  - monitor Hb, should patient require transfusion would consult heme/blood bank as patient with hx of transfusion reaction in the past   - Dilaudid PCA Pump + PRN Zofran + PRN PO Benadryl   - 1/2NS hydration + folic acid  - trend CBC w/ diff, LDH, Reticulocyte count   - bowel regimen, monitor vital signs

## 2022-10-14 NOTE — H&P ADULT - NSHPLABSRESULTS_GEN_ALL_CORE
5.9    13.54 )-----------( 421      ( 13 Oct 2022 23:30 )             17.7     10-13    138  |  107  |  20  ----------------------------<  79  4.6   |  18<L>  |  1.31<H>    Ca    9.4      13 Oct 2022 23:30  Mg     2.20     10-13    TPro  7.8  /  Alb  4.0  /  TBili  2.2<H>  /  DBili  x   /  AST  34  /  ALT  12  /  AlkPhos  136<H>  10-13    CXR - clear lungs

## 2022-10-14 NOTE — H&P ADULT - NSHPPHYSICALEXAM_GEN_ALL_CORE
Vital Signs Last 24 Hrs  T(C): 36.7 (14 Oct 2022 08:47), Max: 37.3 (13 Oct 2022 19:16)  T(F): 98.1 (14 Oct 2022 08:47), Max: 99.2 (13 Oct 2022 19:16)  HR: 87 (14 Oct 2022 08:47) (80 - 94)  BP: 127/85 (14 Oct 2022 08:47) (127/85 - 166/74)  RR: 18 (14 Oct 2022 08:47) (17 - 18)  SpO2: 100% (14 Oct 2022 08:47) (98% - 100%)    CONSTITUTIONAL: well-developed, well-groomed, no apparent distress  EYES: no conjunctival or scleral injection, non-icteric, PERRLA  ENMT: no external nasal lesions, oral mucosa with moist membranes  NECK: trachea midline, no palpable neck mass   RESPIRATORY: breathing comfortably, lungs CTA without wheeze/rales/rhonchi  CARDIOVASCULAR: regular rate and rhythm; +S1S2, no murmurs, rubs, or gallops, no lower extremity edema, 2+ peripheral pulses  GASTROINTESTINAL: soft, nontender, nondistended; +BS throughout, no rebound/guarding  MUSCULOSKELETAL: no joint effusions, normal strength and tone of extremities   NEUROLOGIC: non-focal, sensation intact to light touch in b/l upper and lower extremities   PSYCHIATRIC: AAOx3, appropriate mood and affect  SKIN: no rashes or lesions, warm

## 2022-10-14 NOTE — ED ADULT NURSE REASSESSMENT NOTE - NS ED NURSE REASSESS COMMENT FT1
pt refusing RN attempt at PIV, "vein machine only"- MD LINH loo
patient AOX4 came in c/o abdominal and CP. breathing even and unlabored,. skin warm and dry. NAD. patient admitted. awaiting on bed. connected to CM shows NSR.

## 2022-10-14 NOTE — H&P ADULT - HISTORY OF PRESENT ILLNESS
67M with hx of sickle cell disease c/b acute chest, transfusion reaction, HTN, BPH, recent admission (August 2022) for r/o cholecystitis, who presents with diffuse joint pains and epigastric pain over the past week. Pain is typical of usual sickle cell crisis. Patient ran out of Dilaudid at home and was taking Aleve and Tylenol with minimal relief. Of note, patient was admitted to surgical service in August for r/o alberto - HIDA negative at the time, no surgical intervention. Patient also endorses epigastric pain that is burning in nature. He denies fevers, chills, cp, sob, n/v/d, dysuria, sick contacts, recent travel.

## 2022-10-14 NOTE — H&P ADULT - PROBLEM SELECTOR PLAN 2
epigastric pain, non-radiating  - possibly gastritis iso NSAID use  - recent admission for r/o alberto - HIDA negative at that time  - check lipase, trial famotidine

## 2022-10-14 NOTE — H&P ADULT - NSHPREVIEWOFSYSTEMS_GEN_ALL_CORE
Review of Systems:   CONSTITUTIONAL: No fever, no fatigue  EYES: No eye pain or discharge  ENMT:  No sinus or throat pain  NECK: No pain or stiffness  RESPIRATORY: No cough, wheezing, chills or hemoptysis; No shortness of breath  CARDIOVASCULAR: No chest pain, palpitations, dizziness, or leg swelling  GASTROINTESTINAL: +epigastric pain, no vomiting, no diarrhea   GENITOURINARY: No dysuria or incontinence  MUSCULOSKELETAL: diffuse joint pains, no joint swelling   NEUROLOGICAL: No headaches, memory loss, loss of strength, numbness, or tremors  PSYCHIATRIC: No depression, anxiety, mood swings, or difficulty sleeping  SKIN: No rashes, no skin changes

## 2022-10-14 NOTE — ED ADULT NURSE NOTE - OBJECTIVE STATEMENT
56 yo M AAOx4 received to room 18 c/o CP since Monday, "it's gotten a lot better," states he just wants to sleep for right now, refusing RN to attempt PIVMD Araujo placing US line as per pt request, pending blood work results and CXR, NAD on exam

## 2022-10-14 NOTE — CHART NOTE - NSCHARTNOTEFT_GEN_A_CORE
Hematology Chart Note:    66yo gentleman with PMH of sickle cell disease c/b acute chest, transfusion reaction, htn, BPH, p/w sickle cell crisis.  We were notified by [facility] that the patient was here.  Patient is currently hemodynamically WNL and satting well on room air.   He is on IV 1/2 NS, folic acid and dilaudid PCA.  Hgb was 5.9 on admission, which is close to previous values from last hospital stay (see patient's other MRN).  We discussed his care with the primary team.    Hematology team is available for any questions or concerns.      Estephanie Galvan MD PGY4   [phone]  Hematology-Oncology Fellow  WEEKENDS- Please call  to page on-call fellow

## 2022-10-14 NOTE — ED PROCEDURE NOTE - ATTENDING CONTRIBUTION TO CARE
I have personally performed a history and physical examination of the patient and discussed management with the resident as well as the patient.  I reviewed the resident's note and agree with the documented findings and plan of care.  I have authored and modified critical sections of the Provider Note, including but not limited to HPI, Physical Exam and MDM.    Uncomplicated vascular access.

## 2022-10-14 NOTE — H&P ADULT - ASSESSMENT
67M with hx of sickle cell disease c/b acute chest, transfusion reaction, HTN, BPH, recent admission (August 2022) for r/o cholecystitis, who presents with diffuse joint pains and epigastric pain over the past week a/w sickle cell crisis.

## 2022-10-14 NOTE — PATIENT PROFILE ADULT - FALL HARM RISK - HARM RISK INTERVENTIONS

## 2022-10-15 LAB
ALBUMIN SERPL ELPH-MCNC: 3.5 G/DL — SIGNIFICANT CHANGE UP (ref 3.3–5)
ALP SERPL-CCNC: 119 U/L — SIGNIFICANT CHANGE UP (ref 40–120)
ALT FLD-CCNC: 13 U/L — SIGNIFICANT CHANGE UP (ref 4–41)
ANION GAP SERPL CALC-SCNC: 10 MMOL/L — SIGNIFICANT CHANGE UP (ref 7–14)
AST SERPL-CCNC: 26 U/L — SIGNIFICANT CHANGE UP (ref 4–40)
BASOPHILS # BLD AUTO: 0.07 K/UL — SIGNIFICANT CHANGE UP (ref 0–0.2)
BASOPHILS NFR BLD AUTO: 0.6 % — SIGNIFICANT CHANGE UP (ref 0–2)
BILIRUB SERPL-MCNC: 2.1 MG/DL — HIGH (ref 0.2–1.2)
BUN SERPL-MCNC: 14 MG/DL — SIGNIFICANT CHANGE UP (ref 7–23)
CALCIUM SERPL-MCNC: 8.8 MG/DL — SIGNIFICANT CHANGE UP (ref 8.4–10.5)
CHLORIDE SERPL-SCNC: 109 MMOL/L — HIGH (ref 98–107)
CO2 SERPL-SCNC: 20 MMOL/L — LOW (ref 22–31)
CREAT SERPL-MCNC: 1.07 MG/DL — SIGNIFICANT CHANGE UP (ref 0.5–1.3)
CULTURE RESULTS: SIGNIFICANT CHANGE UP
EGFR: 80 ML/MIN/1.73M2 — SIGNIFICANT CHANGE UP
EOSINOPHIL # BLD AUTO: 0.52 K/UL — HIGH (ref 0–0.5)
EOSINOPHIL NFR BLD AUTO: 4.4 % — SIGNIFICANT CHANGE UP (ref 0–6)
GLUCOSE SERPL-MCNC: 83 MG/DL — SIGNIFICANT CHANGE UP (ref 70–99)
HCT VFR BLD CALC: 15.7 % — CRITICAL LOW (ref 39–50)
HCV AB S/CO SERPL IA: 0.36 S/CO — SIGNIFICANT CHANGE UP (ref 0–0.99)
HCV AB SERPL-IMP: SIGNIFICANT CHANGE UP
HGB BLD-MCNC: 5.2 G/DL — CRITICAL LOW (ref 13–17)
IANC: 8.7 K/UL — HIGH (ref 1.8–7.4)
IMM GRANULOCYTES NFR BLD AUTO: 0.6 % — SIGNIFICANT CHANGE UP (ref 0–0.9)
LDH SERPL L TO P-CCNC: 347 U/L — HIGH (ref 135–225)
LYMPHOCYTES # BLD AUTO: 1.29 K/UL — SIGNIFICANT CHANGE UP (ref 1–3.3)
LYMPHOCYTES # BLD AUTO: 10.9 % — LOW (ref 13–44)
MCHC RBC-ENTMCNC: 29.1 PG — SIGNIFICANT CHANGE UP (ref 27–34)
MCHC RBC-ENTMCNC: 33.1 GM/DL — SIGNIFICANT CHANGE UP (ref 32–36)
MCV RBC AUTO: 87.7 FL — SIGNIFICANT CHANGE UP (ref 80–100)
MONOCYTES # BLD AUTO: 1.17 K/UL — HIGH (ref 0–0.9)
MONOCYTES NFR BLD AUTO: 9.9 % — SIGNIFICANT CHANGE UP (ref 2–14)
NEUTROPHILS # BLD AUTO: 8.7 K/UL — HIGH (ref 1.8–7.4)
NEUTROPHILS NFR BLD AUTO: 73.6 % — SIGNIFICANT CHANGE UP (ref 43–77)
NRBC # BLD: 2 /100 WBCS — HIGH (ref 0–0)
NRBC # FLD: 0.28 K/UL — HIGH (ref 0–0)
PLATELET # BLD AUTO: 374 K/UL — SIGNIFICANT CHANGE UP (ref 150–400)
POTASSIUM SERPL-MCNC: 4.3 MMOL/L — SIGNIFICANT CHANGE UP (ref 3.5–5.3)
POTASSIUM SERPL-SCNC: 4.3 MMOL/L — SIGNIFICANT CHANGE UP (ref 3.5–5.3)
PROT SERPL-MCNC: 6.9 G/DL — SIGNIFICANT CHANGE UP (ref 6–8.3)
RBC # BLD: 1.79 M/UL — LOW (ref 4.2–5.8)
RBC # BLD: 1.79 M/UL — LOW (ref 4.2–5.8)
RBC # FLD: 23.7 % — HIGH (ref 10.3–14.5)
RETICS #: 220 K/UL — HIGH (ref 25–125)
RETICS/RBC NFR: 12.2 % — HIGH (ref 0.5–2.5)
SODIUM SERPL-SCNC: 139 MMOL/L — SIGNIFICANT CHANGE UP (ref 135–145)
SPECIMEN SOURCE: SIGNIFICANT CHANGE UP
WBC # BLD: 11.82 K/UL — HIGH (ref 3.8–10.5)
WBC # FLD AUTO: 11.82 K/UL — HIGH (ref 3.8–10.5)

## 2022-10-15 PROCEDURE — 99233 SBSQ HOSP IP/OBS HIGH 50: CPT

## 2022-10-15 RX ORDER — HYDROMORPHONE HYDROCHLORIDE 2 MG/ML
30 INJECTION INTRAMUSCULAR; INTRAVENOUS; SUBCUTANEOUS
Refills: 0 | Status: DISCONTINUED | OUTPATIENT
Start: 2022-10-15 | End: 2022-10-20

## 2022-10-15 RX ORDER — ENOXAPARIN SODIUM 100 MG/ML
40 INJECTION SUBCUTANEOUS EVERY 24 HOURS
Refills: 0 | Status: DISCONTINUED | OUTPATIENT
Start: 2022-10-15 | End: 2022-10-25

## 2022-10-15 RX ADMIN — ENOXAPARIN SODIUM 40 MILLIGRAM(S): 100 INJECTION SUBCUTANEOUS at 12:58

## 2022-10-15 RX ADMIN — Medication 25 MILLIGRAM(S): at 17:39

## 2022-10-15 RX ADMIN — HYDROMORPHONE HYDROCHLORIDE 30 MILLILITER(S): 2 INJECTION INTRAMUSCULAR; INTRAVENOUS; SUBCUTANEOUS at 10:50

## 2022-10-15 RX ADMIN — Medication 1 SPRAY(S): at 17:41

## 2022-10-15 RX ADMIN — HYDROMORPHONE HYDROCHLORIDE 30 MILLILITER(S): 2 INJECTION INTRAMUSCULAR; INTRAVENOUS; SUBCUTANEOUS at 20:07

## 2022-10-15 RX ADMIN — SENNA PLUS 2 TABLET(S): 8.6 TABLET ORAL at 21:17

## 2022-10-15 RX ADMIN — Medication 25 MILLIGRAM(S): at 05:25

## 2022-10-15 RX ADMIN — HYDROMORPHONE HYDROCHLORIDE 30 MILLILITER(S): 2 INJECTION INTRAMUSCULAR; INTRAVENOUS; SUBCUTANEOUS at 07:28

## 2022-10-15 RX ADMIN — FINASTERIDE 5 MILLIGRAM(S): 5 TABLET, FILM COATED ORAL at 12:58

## 2022-10-15 RX ADMIN — Medication 1 SPRAY(S): at 05:25

## 2022-10-15 RX ADMIN — Medication 1 MILLIGRAM(S): at 12:58

## 2022-10-15 RX ADMIN — SODIUM CHLORIDE 75 MILLILITER(S): 9 INJECTION, SOLUTION INTRAVENOUS at 10:52

## 2022-10-16 LAB
ALBUMIN SERPL ELPH-MCNC: 3.5 G/DL — SIGNIFICANT CHANGE UP (ref 3.3–5)
ALP SERPL-CCNC: 141 U/L — HIGH (ref 40–120)
ALT FLD-CCNC: 12 U/L — SIGNIFICANT CHANGE UP (ref 4–41)
ANION GAP SERPL CALC-SCNC: 10 MMOL/L — SIGNIFICANT CHANGE UP (ref 7–14)
AST SERPL-CCNC: 32 U/L — SIGNIFICANT CHANGE UP (ref 4–40)
BILIRUB SERPL-MCNC: 2.3 MG/DL — HIGH (ref 0.2–1.2)
BUN SERPL-MCNC: 12 MG/DL — SIGNIFICANT CHANGE UP (ref 7–23)
CALCIUM SERPL-MCNC: 8.9 MG/DL — SIGNIFICANT CHANGE UP (ref 8.4–10.5)
CHLORIDE SERPL-SCNC: 106 MMOL/L — SIGNIFICANT CHANGE UP (ref 98–107)
CO2 SERPL-SCNC: 21 MMOL/L — LOW (ref 22–31)
CREAT SERPL-MCNC: 0.94 MG/DL — SIGNIFICANT CHANGE UP (ref 0.5–1.3)
EGFR: 93 ML/MIN/1.73M2 — SIGNIFICANT CHANGE UP
GLUCOSE SERPL-MCNC: 72 MG/DL — SIGNIFICANT CHANGE UP (ref 70–99)
HCT VFR BLD CALC: 16.3 % — CRITICAL LOW (ref 39–50)
HGB BLD-MCNC: 5.4 G/DL — CRITICAL LOW (ref 13–17)
LDH SERPL L TO P-CCNC: 372 U/L — HIGH (ref 135–225)
MCHC RBC-ENTMCNC: 29.2 PG — SIGNIFICANT CHANGE UP (ref 27–34)
MCHC RBC-ENTMCNC: 33.1 GM/DL — SIGNIFICANT CHANGE UP (ref 32–36)
MCV RBC AUTO: 88.1 FL — SIGNIFICANT CHANGE UP (ref 80–100)
NRBC # BLD: 2 /100 WBCS — HIGH (ref 0–0)
NRBC # FLD: 0.26 K/UL — HIGH (ref 0–0)
PLATELET # BLD AUTO: 362 K/UL — SIGNIFICANT CHANGE UP (ref 150–400)
POTASSIUM SERPL-MCNC: 4.8 MMOL/L — SIGNIFICANT CHANGE UP (ref 3.5–5.3)
POTASSIUM SERPL-SCNC: 4.8 MMOL/L — SIGNIFICANT CHANGE UP (ref 3.5–5.3)
PROT SERPL-MCNC: 7.2 G/DL — SIGNIFICANT CHANGE UP (ref 6–8.3)
RBC # BLD: 1.85 M/UL — LOW (ref 4.2–5.8)
RBC # BLD: 1.85 M/UL — LOW (ref 4.2–5.8)
RBC # FLD: 24.3 % — HIGH (ref 10.3–14.5)
RETICS #: 245.1 K/UL — HIGH (ref 25–125)
RETICS/RBC NFR: 13.3 % — HIGH (ref 0.5–2.5)
SODIUM SERPL-SCNC: 137 MMOL/L — SIGNIFICANT CHANGE UP (ref 135–145)
WBC # BLD: 11.07 K/UL — HIGH (ref 3.8–10.5)
WBC # FLD AUTO: 11.07 K/UL — HIGH (ref 3.8–10.5)

## 2022-10-16 PROCEDURE — 99232 SBSQ HOSP IP/OBS MODERATE 35: CPT

## 2022-10-16 RX ORDER — HYDROMORPHONE HYDROCHLORIDE 2 MG/ML
1 INJECTION INTRAMUSCULAR; INTRAVENOUS; SUBCUTANEOUS
Refills: 0 | Status: DISCONTINUED | OUTPATIENT
Start: 2022-10-16 | End: 2022-10-16

## 2022-10-16 RX ORDER — HYDROMORPHONE HYDROCHLORIDE 2 MG/ML
1 INJECTION INTRAMUSCULAR; INTRAVENOUS; SUBCUTANEOUS ONCE
Refills: 0 | Status: DISCONTINUED | OUTPATIENT
Start: 2022-10-16 | End: 2022-10-16

## 2022-10-16 RX ADMIN — HYDROMORPHONE HYDROCHLORIDE 1 MILLIGRAM(S): 2 INJECTION INTRAMUSCULAR; INTRAVENOUS; SUBCUTANEOUS at 14:04

## 2022-10-16 RX ADMIN — ENOXAPARIN SODIUM 40 MILLIGRAM(S): 100 INJECTION SUBCUTANEOUS at 11:52

## 2022-10-16 RX ADMIN — HYDROMORPHONE HYDROCHLORIDE 30 MILLILITER(S): 2 INJECTION INTRAMUSCULAR; INTRAVENOUS; SUBCUTANEOUS at 07:05

## 2022-10-16 RX ADMIN — HYDROMORPHONE HYDROCHLORIDE 1 MILLIGRAM(S): 2 INJECTION INTRAMUSCULAR; INTRAVENOUS; SUBCUTANEOUS at 18:29

## 2022-10-16 RX ADMIN — Medication 1 SPRAY(S): at 17:37

## 2022-10-16 RX ADMIN — Medication 1 DROP(S): at 17:37

## 2022-10-16 RX ADMIN — Medication 1 MILLIGRAM(S): at 11:52

## 2022-10-16 RX ADMIN — HYDROMORPHONE HYDROCHLORIDE 30 MILLILITER(S): 2 INJECTION INTRAMUSCULAR; INTRAVENOUS; SUBCUTANEOUS at 19:01

## 2022-10-16 RX ADMIN — Medication 25 MILLIGRAM(S): at 05:16

## 2022-10-16 RX ADMIN — HYDROMORPHONE HYDROCHLORIDE 1 MILLIGRAM(S): 2 INJECTION INTRAMUSCULAR; INTRAVENOUS; SUBCUTANEOUS at 15:43

## 2022-10-16 RX ADMIN — HYDROMORPHONE HYDROCHLORIDE 30 MILLILITER(S): 2 INJECTION INTRAMUSCULAR; INTRAVENOUS; SUBCUTANEOUS at 20:08

## 2022-10-16 RX ADMIN — Medication 25 MILLIGRAM(S): at 17:36

## 2022-10-16 RX ADMIN — Medication 1 DROP(S): at 23:46

## 2022-10-16 RX ADMIN — FINASTERIDE 5 MILLIGRAM(S): 5 TABLET, FILM COATED ORAL at 11:52

## 2022-10-16 RX ADMIN — HYDROMORPHONE HYDROCHLORIDE 1 MILLIGRAM(S): 2 INJECTION INTRAMUSCULAR; INTRAVENOUS; SUBCUTANEOUS at 15:47

## 2022-10-16 RX ADMIN — Medication 25 MILLIGRAM(S): at 05:15

## 2022-10-16 NOTE — PROGRESS NOTE ADULT - PROBLEM SELECTOR PLAN 7
DVT ppx: start enoxaparin 40mg q24h for VTE ppx, no gross e/o hemorrhage
DVT ppx: start enoxaparin 40mg q24h for VTE ppx, no gross e/o hemorrhage

## 2022-10-17 PROCEDURE — 99233 SBSQ HOSP IP/OBS HIGH 50: CPT

## 2022-10-17 RX ORDER — FAMOTIDINE 10 MG/ML
20 INJECTION INTRAVENOUS DAILY
Refills: 0 | Status: DISCONTINUED | OUTPATIENT
Start: 2022-10-17 | End: 2022-10-25

## 2022-10-17 RX ADMIN — HYDROMORPHONE HYDROCHLORIDE 30 MILLILITER(S): 2 INJECTION INTRAMUSCULAR; INTRAVENOUS; SUBCUTANEOUS at 20:06

## 2022-10-17 RX ADMIN — Medication 1 DROP(S): at 17:58

## 2022-10-17 RX ADMIN — Medication 1 DROP(S): at 23:38

## 2022-10-17 RX ADMIN — ENOXAPARIN SODIUM 40 MILLIGRAM(S): 100 INJECTION SUBCUTANEOUS at 15:01

## 2022-10-17 RX ADMIN — FAMOTIDINE 20 MILLIGRAM(S): 10 INJECTION INTRAVENOUS at 15:02

## 2022-10-17 RX ADMIN — FINASTERIDE 5 MILLIGRAM(S): 5 TABLET, FILM COATED ORAL at 15:01

## 2022-10-17 RX ADMIN — Medication 25 MILLIGRAM(S): at 05:40

## 2022-10-17 RX ADMIN — Medication 1 SPRAY(S): at 17:59

## 2022-10-17 RX ADMIN — Medication 1 MILLIGRAM(S): at 15:01

## 2022-10-17 RX ADMIN — Medication 25 MILLIGRAM(S): at 17:59

## 2022-10-17 NOTE — CHART NOTE - NSCHARTNOTEFT_GEN_A_CORE
Patient Name: Hira MarquezBirth Date: 1955  Address: [address] [place], [place] [address]Fsn: Male  Rx Written	Rx Dispensed	Drug	Quantity	Days Supply	Prescriber Name	Prescriber Bren #	Payment Method	Dispenser  09/19/2022	09/23/2022	hydromorphone 2 mg tablet	10	3	Obed Momin MD	[id number]	Insurance	[facility] [id number]    Patient Name: Hira MarquezBirth Date: 1955  Address: [address] [place], [place] [address]Nts: Male  Rx Written	Rx Dispensed	Drug	Quantity	Days Supply	Prescriber Name	Prescriber Bren #	Payment Method	Dispenser  11/12/2021	11/12/2021	hydromorphone 4 mg tablet	20	5	Armaan Bustos	[id number]	Medicare	[facility]  01/31/2022	02/01/2022	hydromorphone 4 mg tablet	40	7	Lluvia Hamm	[id number]	Medicare	[facility]  04/14/2022	04/14/2022	hydromorphone 4 mg tablet	40	7	Lluvia Hamm	[id number]	Medicare	[facility]  05/10/2022	05/10/2022	hydromorphone 4 mg tablet	40	7	Lluvia Hamm	[id number]	Medicare	[facility]  07/16/2022	07/18/2022	hydromorphone 4 mg tablet	20	5	Liss Zamarripa	[id number]	Medicare	[facility]  09/09/2022	09/09/2022	hydromorphone 2 mg tablet	12	3	Irina Preciado	[id number]	Medicare	[facility]

## 2022-10-18 LAB
ALBUMIN SERPL ELPH-MCNC: 3.4 G/DL — SIGNIFICANT CHANGE UP (ref 3.3–5)
ALP SERPL-CCNC: 147 U/L — HIGH (ref 40–120)
ALT FLD-CCNC: 10 U/L — SIGNIFICANT CHANGE UP (ref 4–41)
ANION GAP SERPL CALC-SCNC: 8 MMOL/L — SIGNIFICANT CHANGE UP (ref 7–14)
AST SERPL-CCNC: 26 U/L — SIGNIFICANT CHANGE UP (ref 4–40)
BILIRUB SERPL-MCNC: 2.5 MG/DL — HIGH (ref 0.2–1.2)
BUN SERPL-MCNC: 14 MG/DL — SIGNIFICANT CHANGE UP (ref 7–23)
CALCIUM SERPL-MCNC: 8.9 MG/DL — SIGNIFICANT CHANGE UP (ref 8.4–10.5)
CHLORIDE SERPL-SCNC: 103 MMOL/L — SIGNIFICANT CHANGE UP (ref 98–107)
CO2 SERPL-SCNC: 22 MMOL/L — SIGNIFICANT CHANGE UP (ref 22–31)
CREAT SERPL-MCNC: 0.94 MG/DL — SIGNIFICANT CHANGE UP (ref 0.5–1.3)
EGFR: 93 ML/MIN/1.73M2 — SIGNIFICANT CHANGE UP
GLUCOSE SERPL-MCNC: 111 MG/DL — HIGH (ref 70–99)
HCT VFR BLD CALC: 14.1 % — CRITICAL LOW (ref 39–50)
HGB BLD-MCNC: 4.7 G/DL — CRITICAL LOW (ref 13–17)
LDH SERPL L TO P-CCNC: 349 U/L — HIGH (ref 135–225)
MAGNESIUM SERPL-MCNC: 1.8 MG/DL — SIGNIFICANT CHANGE UP (ref 1.6–2.6)
MCHC RBC-ENTMCNC: 28.9 PG — SIGNIFICANT CHANGE UP (ref 27–34)
MCHC RBC-ENTMCNC: 32.9 GM/DL — SIGNIFICANT CHANGE UP (ref 32–36)
MCV RBC AUTO: 88.1 FL — SIGNIFICANT CHANGE UP (ref 80–100)
NRBC # BLD: 3 /100 WBCS — HIGH (ref 0–0)
NRBC # FLD: 0.31 K/UL — HIGH (ref 0–0)
PLATELET # BLD AUTO: 311 K/UL — SIGNIFICANT CHANGE UP (ref 150–400)
POTASSIUM SERPL-MCNC: 4.7 MMOL/L — SIGNIFICANT CHANGE UP (ref 3.5–5.3)
POTASSIUM SERPL-SCNC: 4.7 MMOL/L — SIGNIFICANT CHANGE UP (ref 3.5–5.3)
PROT SERPL-MCNC: 6.8 G/DL — SIGNIFICANT CHANGE UP (ref 6–8.3)
RBC # BLD: 1.59 M/UL — LOW (ref 4.2–5.8)
RBC # BLD: 1.59 M/UL — LOW (ref 4.2–5.8)
RBC # FLD: 23.5 % — HIGH (ref 10.3–14.5)
RETICS #: 243.3 K/UL — HIGH (ref 25–125)
RETICS/RBC NFR: 15.3 % — HIGH (ref 0.5–2.5)
SODIUM SERPL-SCNC: 133 MMOL/L — LOW (ref 135–145)
WBC # BLD: 9.76 K/UL — SIGNIFICANT CHANGE UP (ref 3.8–10.5)
WBC # FLD AUTO: 9.76 K/UL — SIGNIFICANT CHANGE UP (ref 3.8–10.5)

## 2022-10-18 PROCEDURE — 99233 SBSQ HOSP IP/OBS HIGH 50: CPT

## 2022-10-18 RX ADMIN — Medication 1 DROP(S): at 12:29

## 2022-10-18 RX ADMIN — FINASTERIDE 5 MILLIGRAM(S): 5 TABLET, FILM COATED ORAL at 12:28

## 2022-10-18 RX ADMIN — Medication 1 SPRAY(S): at 18:21

## 2022-10-18 RX ADMIN — SODIUM CHLORIDE 75 MILLILITER(S): 9 INJECTION, SOLUTION INTRAVENOUS at 12:27

## 2022-10-18 RX ADMIN — Medication 1 DROP(S): at 18:21

## 2022-10-18 RX ADMIN — Medication 1 MILLIGRAM(S): at 12:28

## 2022-10-18 RX ADMIN — Medication 25 MILLIGRAM(S): at 18:25

## 2022-10-18 RX ADMIN — POLYETHYLENE GLYCOL 3350 17 GRAM(S): 17 POWDER, FOR SOLUTION ORAL at 12:29

## 2022-10-18 RX ADMIN — ENOXAPARIN SODIUM 40 MILLIGRAM(S): 100 INJECTION SUBCUTANEOUS at 10:23

## 2022-10-18 RX ADMIN — HYDROMORPHONE HYDROCHLORIDE 30 MILLILITER(S): 2 INJECTION INTRAMUSCULAR; INTRAVENOUS; SUBCUTANEOUS at 19:26

## 2022-10-18 RX ADMIN — Medication 1 SPRAY(S): at 05:00

## 2022-10-18 RX ADMIN — Medication 25 MILLIGRAM(S): at 05:00

## 2022-10-18 RX ADMIN — HYDROMORPHONE HYDROCHLORIDE 30 MILLILITER(S): 2 INJECTION INTRAMUSCULAR; INTRAVENOUS; SUBCUTANEOUS at 08:21

## 2022-10-18 RX ADMIN — FAMOTIDINE 20 MILLIGRAM(S): 10 INJECTION INTRAVENOUS at 12:28

## 2022-10-18 RX ADMIN — Medication 1 DROP(S): at 05:00

## 2022-10-19 PROCEDURE — 99232 SBSQ HOSP IP/OBS MODERATE 35: CPT

## 2022-10-19 RX ORDER — IBUPROFEN 200 MG
400 TABLET ORAL ONCE
Refills: 0 | Status: COMPLETED | OUTPATIENT
Start: 2022-10-19 | End: 2022-10-19

## 2022-10-19 RX ADMIN — Medication 1 MILLIGRAM(S): at 12:01

## 2022-10-19 RX ADMIN — HYDROMORPHONE HYDROCHLORIDE 30 MILLILITER(S): 2 INJECTION INTRAMUSCULAR; INTRAVENOUS; SUBCUTANEOUS at 15:23

## 2022-10-19 RX ADMIN — Medication 25 MILLIGRAM(S): at 05:52

## 2022-10-19 RX ADMIN — Medication 25 MILLIGRAM(S): at 17:48

## 2022-10-19 RX ADMIN — Medication 400 MILLIGRAM(S): at 14:30

## 2022-10-19 RX ADMIN — Medication 1 SPRAY(S): at 17:49

## 2022-10-19 RX ADMIN — Medication 1 DROP(S): at 05:53

## 2022-10-19 RX ADMIN — Medication 1 DROP(S): at 17:49

## 2022-10-19 RX ADMIN — ENOXAPARIN SODIUM 40 MILLIGRAM(S): 100 INJECTION SUBCUTANEOUS at 13:16

## 2022-10-19 RX ADMIN — FINASTERIDE 5 MILLIGRAM(S): 5 TABLET, FILM COATED ORAL at 12:01

## 2022-10-19 RX ADMIN — FAMOTIDINE 20 MILLIGRAM(S): 10 INJECTION INTRAVENOUS at 12:01

## 2022-10-19 RX ADMIN — Medication 400 MILLIGRAM(S): at 13:16

## 2022-10-19 RX ADMIN — HYDROMORPHONE HYDROCHLORIDE 30 MILLILITER(S): 2 INJECTION INTRAMUSCULAR; INTRAVENOUS; SUBCUTANEOUS at 07:17

## 2022-10-19 RX ADMIN — HYDROMORPHONE HYDROCHLORIDE 30 MILLILITER(S): 2 INJECTION INTRAMUSCULAR; INTRAVENOUS; SUBCUTANEOUS at 19:06

## 2022-10-20 LAB
ALBUMIN SERPL ELPH-MCNC: 3.8 G/DL — SIGNIFICANT CHANGE UP (ref 3.3–5)
ALP SERPL-CCNC: 203 U/L — HIGH (ref 40–120)
ALT FLD-CCNC: 18 U/L — SIGNIFICANT CHANGE UP (ref 4–41)
ANION GAP SERPL CALC-SCNC: 11 MMOL/L — SIGNIFICANT CHANGE UP (ref 7–14)
AST SERPL-CCNC: 41 U/L — HIGH (ref 4–40)
BILIRUB SERPL-MCNC: 2.7 MG/DL — HIGH (ref 0.2–1.2)
BUN SERPL-MCNC: 10 MG/DL — SIGNIFICANT CHANGE UP (ref 7–23)
CALCIUM SERPL-MCNC: 9.1 MG/DL — SIGNIFICANT CHANGE UP (ref 8.4–10.5)
CHLORIDE SERPL-SCNC: 103 MMOL/L — SIGNIFICANT CHANGE UP (ref 98–107)
CO2 SERPL-SCNC: 22 MMOL/L — SIGNIFICANT CHANGE UP (ref 22–31)
CREAT SERPL-MCNC: 0.93 MG/DL — SIGNIFICANT CHANGE UP (ref 0.5–1.3)
EGFR: 95 ML/MIN/1.73M2 — SIGNIFICANT CHANGE UP
GLUCOSE SERPL-MCNC: 97 MG/DL — SIGNIFICANT CHANGE UP (ref 70–99)
HCT VFR BLD CALC: 16.4 % — CRITICAL LOW (ref 39–50)
HGB BLD-MCNC: 5.5 G/DL — CRITICAL LOW (ref 13–17)
LDH SERPL L TO P-CCNC: 390 U/L — HIGH (ref 135–225)
MAGNESIUM SERPL-MCNC: 2.1 MG/DL — SIGNIFICANT CHANGE UP (ref 1.6–2.6)
MCHC RBC-ENTMCNC: 28.9 PG — SIGNIFICANT CHANGE UP (ref 27–34)
MCHC RBC-ENTMCNC: 33.5 GM/DL — SIGNIFICANT CHANGE UP (ref 32–36)
MCV RBC AUTO: 86.3 FL — SIGNIFICANT CHANGE UP (ref 80–100)
NRBC # BLD: 6 /100 WBCS — HIGH (ref 0–0)
NRBC # FLD: 0.61 K/UL — HIGH (ref 0–0)
PHOSPHATE SERPL-MCNC: 3.3 MG/DL — SIGNIFICANT CHANGE UP (ref 2.5–4.5)
PLATELET # BLD AUTO: 334 K/UL — SIGNIFICANT CHANGE UP (ref 150–400)
POTASSIUM SERPL-MCNC: 4.6 MMOL/L — SIGNIFICANT CHANGE UP (ref 3.5–5.3)
POTASSIUM SERPL-SCNC: 4.6 MMOL/L — SIGNIFICANT CHANGE UP (ref 3.5–5.3)
PROT SERPL-MCNC: 7.7 G/DL — SIGNIFICANT CHANGE UP (ref 6–8.3)
RBC # BLD: 1.9 M/UL — LOW (ref 4.2–5.8)
RBC # BLD: 1.9 M/UL — LOW (ref 4.2–5.8)
RBC # FLD: 23.5 % — HIGH (ref 10.3–14.5)
RETICS #: 296.2 K/UL — HIGH (ref 25–125)
RETICS/RBC NFR: 15.6 % — HIGH (ref 0.5–2.5)
SODIUM SERPL-SCNC: 136 MMOL/L — SIGNIFICANT CHANGE UP (ref 135–145)
WBC # BLD: 9.95 K/UL — SIGNIFICANT CHANGE UP (ref 3.8–10.5)
WBC # FLD AUTO: 9.95 K/UL — SIGNIFICANT CHANGE UP (ref 3.8–10.5)

## 2022-10-20 PROCEDURE — 99232 SBSQ HOSP IP/OBS MODERATE 35: CPT

## 2022-10-20 RX ORDER — IBUPROFEN 200 MG
400 TABLET ORAL ONCE
Refills: 0 | Status: COMPLETED | OUTPATIENT
Start: 2022-10-20 | End: 2022-10-20

## 2022-10-20 RX ORDER — FENTANYL CITRATE 50 UG/ML
30 INJECTION INTRAVENOUS
Refills: 0 | Status: DISCONTINUED | OUTPATIENT
Start: 2022-10-20 | End: 2022-10-20

## 2022-10-20 RX ORDER — HYDROMORPHONE HYDROCHLORIDE 2 MG/ML
30 INJECTION INTRAMUSCULAR; INTRAVENOUS; SUBCUTANEOUS
Refills: 0 | Status: DISCONTINUED | OUTPATIENT
Start: 2022-10-20 | End: 2022-10-23

## 2022-10-20 RX ADMIN — FAMOTIDINE 20 MILLIGRAM(S): 10 INJECTION INTRAVENOUS at 11:16

## 2022-10-20 RX ADMIN — Medication 1 DROP(S): at 05:32

## 2022-10-20 RX ADMIN — HYDROMORPHONE HYDROCHLORIDE 30 MILLILITER(S): 2 INJECTION INTRAMUSCULAR; INTRAVENOUS; SUBCUTANEOUS at 16:26

## 2022-10-20 RX ADMIN — Medication 1 DROP(S): at 11:17

## 2022-10-20 RX ADMIN — HYDROMORPHONE HYDROCHLORIDE 30 MILLILITER(S): 2 INJECTION INTRAMUSCULAR; INTRAVENOUS; SUBCUTANEOUS at 16:06

## 2022-10-20 RX ADMIN — Medication 400 MILLIGRAM(S): at 19:15

## 2022-10-20 RX ADMIN — Medication 1 SPRAY(S): at 17:36

## 2022-10-20 RX ADMIN — HYDROMORPHONE HYDROCHLORIDE 30 MILLILITER(S): 2 INJECTION INTRAMUSCULAR; INTRAVENOUS; SUBCUTANEOUS at 19:11

## 2022-10-20 RX ADMIN — HYDROMORPHONE HYDROCHLORIDE 30 MILLILITER(S): 2 INJECTION INTRAMUSCULAR; INTRAVENOUS; SUBCUTANEOUS at 08:21

## 2022-10-20 RX ADMIN — Medication 1 MILLIGRAM(S): at 11:20

## 2022-10-20 RX ADMIN — Medication 25 MILLIGRAM(S): at 17:36

## 2022-10-20 RX ADMIN — Medication 650 MILLIGRAM(S): at 22:42

## 2022-10-20 RX ADMIN — Medication 650 MILLIGRAM(S): at 21:59

## 2022-10-20 RX ADMIN — Medication 400 MILLIGRAM(S): at 18:20

## 2022-10-20 RX ADMIN — Medication 25 MILLIGRAM(S): at 05:37

## 2022-10-20 RX ADMIN — Medication 1 DROP(S): at 17:36

## 2022-10-20 RX ADMIN — ENOXAPARIN SODIUM 40 MILLIGRAM(S): 100 INJECTION SUBCUTANEOUS at 10:57

## 2022-10-20 RX ADMIN — FINASTERIDE 5 MILLIGRAM(S): 5 TABLET, FILM COATED ORAL at 11:17

## 2022-10-21 LAB — SARS-COV-2 RNA SPEC QL NAA+PROBE: SIGNIFICANT CHANGE UP

## 2022-10-21 PROCEDURE — 99233 SBSQ HOSP IP/OBS HIGH 50: CPT

## 2022-10-21 RX ORDER — IBUPROFEN 200 MG
400 TABLET ORAL ONCE
Refills: 0 | Status: COMPLETED | OUTPATIENT
Start: 2022-10-21 | End: 2022-10-21

## 2022-10-21 RX ADMIN — Medication 25 MILLIGRAM(S): at 05:19

## 2022-10-21 RX ADMIN — FAMOTIDINE 20 MILLIGRAM(S): 10 INJECTION INTRAVENOUS at 12:31

## 2022-10-21 RX ADMIN — Medication 1 SPRAY(S): at 18:10

## 2022-10-21 RX ADMIN — Medication 1 DROP(S): at 12:30

## 2022-10-21 RX ADMIN — Medication 1 DROP(S): at 05:19

## 2022-10-21 RX ADMIN — Medication 400 MILLIGRAM(S): at 13:31

## 2022-10-21 RX ADMIN — Medication 650 MILLIGRAM(S): at 21:35

## 2022-10-21 RX ADMIN — HYDROMORPHONE HYDROCHLORIDE 30 MILLILITER(S): 2 INJECTION INTRAMUSCULAR; INTRAVENOUS; SUBCUTANEOUS at 07:15

## 2022-10-21 RX ADMIN — HYDROMORPHONE HYDROCHLORIDE 30 MILLILITER(S): 2 INJECTION INTRAMUSCULAR; INTRAVENOUS; SUBCUTANEOUS at 20:19

## 2022-10-21 RX ADMIN — FINASTERIDE 5 MILLIGRAM(S): 5 TABLET, FILM COATED ORAL at 13:05

## 2022-10-21 RX ADMIN — Medication 1 MILLIGRAM(S): at 12:31

## 2022-10-21 RX ADMIN — Medication 400 MILLIGRAM(S): at 12:31

## 2022-10-21 RX ADMIN — Medication 650 MILLIGRAM(S): at 22:35

## 2022-10-21 RX ADMIN — ENOXAPARIN SODIUM 40 MILLIGRAM(S): 100 INJECTION SUBCUTANEOUS at 12:30

## 2022-10-21 RX ADMIN — Medication 1 DROP(S): at 18:10

## 2022-10-21 RX ADMIN — HYDROMORPHONE HYDROCHLORIDE 30 MILLILITER(S): 2 INJECTION INTRAMUSCULAR; INTRAVENOUS; SUBCUTANEOUS at 19:43

## 2022-10-21 RX ADMIN — Medication 25 MILLIGRAM(S): at 18:09

## 2022-10-21 RX ADMIN — Medication 1 SPRAY(S): at 05:19

## 2022-10-22 PROCEDURE — 99233 SBSQ HOSP IP/OBS HIGH 50: CPT

## 2022-10-22 RX ORDER — IBUPROFEN 200 MG
400 TABLET ORAL ONCE
Refills: 0 | Status: COMPLETED | OUTPATIENT
Start: 2022-10-22 | End: 2022-10-22

## 2022-10-22 RX ADMIN — Medication 650 MILLIGRAM(S): at 21:54

## 2022-10-22 RX ADMIN — Medication 400 MILLIGRAM(S): at 14:00

## 2022-10-22 RX ADMIN — Medication 1 MILLIGRAM(S): at 11:55

## 2022-10-22 RX ADMIN — Medication 1 DROP(S): at 17:42

## 2022-10-22 RX ADMIN — HYDROMORPHONE HYDROCHLORIDE 30 MILLILITER(S): 2 INJECTION INTRAMUSCULAR; INTRAVENOUS; SUBCUTANEOUS at 08:19

## 2022-10-22 RX ADMIN — ENOXAPARIN SODIUM 40 MILLIGRAM(S): 100 INJECTION SUBCUTANEOUS at 11:54

## 2022-10-22 RX ADMIN — Medication 1 SPRAY(S): at 07:51

## 2022-10-22 RX ADMIN — FAMOTIDINE 20 MILLIGRAM(S): 10 INJECTION INTRAVENOUS at 11:55

## 2022-10-22 RX ADMIN — Medication 650 MILLIGRAM(S): at 20:54

## 2022-10-22 RX ADMIN — HYDROMORPHONE HYDROCHLORIDE 30 MILLILITER(S): 2 INJECTION INTRAMUSCULAR; INTRAVENOUS; SUBCUTANEOUS at 20:17

## 2022-10-22 RX ADMIN — FINASTERIDE 5 MILLIGRAM(S): 5 TABLET, FILM COATED ORAL at 11:55

## 2022-10-22 RX ADMIN — Medication 1 SPRAY(S): at 17:42

## 2022-10-22 RX ADMIN — Medication 1 DROP(S): at 11:56

## 2022-10-22 RX ADMIN — Medication 25 MILLIGRAM(S): at 06:40

## 2022-10-22 RX ADMIN — Medication 25 MILLIGRAM(S): at 17:41

## 2022-10-22 RX ADMIN — Medication 1 DROP(S): at 06:40

## 2022-10-22 RX ADMIN — SODIUM CHLORIDE 75 MILLILITER(S): 9 INJECTION, SOLUTION INTRAVENOUS at 09:49

## 2022-10-22 RX ADMIN — Medication 400 MILLIGRAM(S): at 13:00

## 2022-10-23 PROCEDURE — 99233 SBSQ HOSP IP/OBS HIGH 50: CPT

## 2022-10-23 RX ORDER — IBUPROFEN 200 MG
400 TABLET ORAL EVERY 8 HOURS
Refills: 0 | Status: DISCONTINUED | OUTPATIENT
Start: 2022-10-23 | End: 2022-10-25

## 2022-10-23 RX ORDER — ACETAMINOPHEN 500 MG
650 TABLET ORAL EVERY 6 HOURS
Refills: 0 | Status: DISCONTINUED | OUTPATIENT
Start: 2022-10-23 | End: 2022-10-25

## 2022-10-23 RX ORDER — HYDROMORPHONE HYDROCHLORIDE 2 MG/ML
2 INJECTION INTRAMUSCULAR; INTRAVENOUS; SUBCUTANEOUS EVERY 6 HOURS
Refills: 0 | Status: DISCONTINUED | OUTPATIENT
Start: 2022-10-23 | End: 2022-10-25

## 2022-10-23 RX ADMIN — HYDROMORPHONE HYDROCHLORIDE 30 MILLILITER(S): 2 INJECTION INTRAMUSCULAR; INTRAVENOUS; SUBCUTANEOUS at 08:23

## 2022-10-23 RX ADMIN — Medication 1 DROP(S): at 11:30

## 2022-10-23 RX ADMIN — Medication 25 MILLIGRAM(S): at 05:10

## 2022-10-23 RX ADMIN — Medication 650 MILLIGRAM(S): at 03:20

## 2022-10-23 RX ADMIN — Medication 650 MILLIGRAM(S): at 04:20

## 2022-10-23 RX ADMIN — Medication 1 DROP(S): at 05:11

## 2022-10-23 RX ADMIN — Medication 400 MILLIGRAM(S): at 16:49

## 2022-10-23 RX ADMIN — Medication 25 MILLIGRAM(S): at 17:37

## 2022-10-23 RX ADMIN — Medication 650 MILLIGRAM(S): at 16:49

## 2022-10-23 RX ADMIN — Medication 1 SPRAY(S): at 05:11

## 2022-10-23 RX ADMIN — FINASTERIDE 5 MILLIGRAM(S): 5 TABLET, FILM COATED ORAL at 11:30

## 2022-10-23 RX ADMIN — FAMOTIDINE 20 MILLIGRAM(S): 10 INJECTION INTRAVENOUS at 11:30

## 2022-10-23 RX ADMIN — ENOXAPARIN SODIUM 40 MILLIGRAM(S): 100 INJECTION SUBCUTANEOUS at 11:29

## 2022-10-23 RX ADMIN — Medication 1 MILLIGRAM(S): at 11:30

## 2022-10-23 RX ADMIN — Medication 1 SPRAY(S): at 17:54

## 2022-10-23 RX ADMIN — HYDROMORPHONE HYDROCHLORIDE 2 MILLIGRAM(S): 2 INJECTION INTRAMUSCULAR; INTRAVENOUS; SUBCUTANEOUS at 15:52

## 2022-10-23 RX ADMIN — Medication 1 DROP(S): at 17:54

## 2022-10-24 ENCOUNTER — TRANSCRIPTION ENCOUNTER (OUTPATIENT)
Age: 67
End: 2022-10-24

## 2022-10-24 PROCEDURE — 99232 SBSQ HOSP IP/OBS MODERATE 35: CPT

## 2022-10-24 RX ORDER — IBUPROFEN 200 MG
1 TABLET ORAL
Qty: 0 | Refills: 0 | DISCHARGE
Start: 2022-10-24

## 2022-10-24 RX ORDER — HYDROMORPHONE HYDROCHLORIDE 2 MG/ML
1 INJECTION INTRAMUSCULAR; INTRAVENOUS; SUBCUTANEOUS
Qty: 20 | Refills: 0
Start: 2022-10-24 | End: 2022-10-28

## 2022-10-24 RX ORDER — ACETAMINOPHEN 500 MG
2 TABLET ORAL
Qty: 0 | Refills: 0 | DISCHARGE
Start: 2022-10-24

## 2022-10-24 RX ORDER — HYDROMORPHONE HYDROCHLORIDE 2 MG/ML
1 INJECTION INTRAMUSCULAR; INTRAVENOUS; SUBCUTANEOUS ONCE
Refills: 0 | Status: DISCONTINUED | OUTPATIENT
Start: 2022-10-24 | End: 2022-10-24

## 2022-10-24 RX ORDER — FAMOTIDINE 10 MG/ML
1 INJECTION INTRAVENOUS
Qty: 30 | Refills: 0
Start: 2022-10-24 | End: 2022-11-22

## 2022-10-24 RX ORDER — NALOXONE HYDROCHLORIDE 4 MG/.1ML
1 SPRAY NASAL
Qty: 1 | Refills: 0
Start: 2022-10-24 | End: 2022-11-22

## 2022-10-24 RX ADMIN — ENOXAPARIN SODIUM 40 MILLIGRAM(S): 100 INJECTION SUBCUTANEOUS at 11:42

## 2022-10-24 RX ADMIN — Medication 1 MILLIGRAM(S): at 11:43

## 2022-10-24 RX ADMIN — FINASTERIDE 5 MILLIGRAM(S): 5 TABLET, FILM COATED ORAL at 11:42

## 2022-10-24 RX ADMIN — Medication 650 MILLIGRAM(S): at 22:27

## 2022-10-24 RX ADMIN — Medication 650 MILLIGRAM(S): at 23:25

## 2022-10-24 RX ADMIN — Medication 1 DROP(S): at 01:07

## 2022-10-24 RX ADMIN — HYDROMORPHONE HYDROCHLORIDE 1 MILLIGRAM(S): 2 INJECTION INTRAMUSCULAR; INTRAVENOUS; SUBCUTANEOUS at 22:25

## 2022-10-24 RX ADMIN — Medication 1 DROP(S): at 17:51

## 2022-10-24 RX ADMIN — Medication 400 MILLIGRAM(S): at 01:43

## 2022-10-24 RX ADMIN — Medication 1 DROP(S): at 11:42

## 2022-10-24 RX ADMIN — Medication 400 MILLIGRAM(S): at 22:27

## 2022-10-24 RX ADMIN — Medication 25 MILLIGRAM(S): at 05:48

## 2022-10-24 RX ADMIN — HYDROMORPHONE HYDROCHLORIDE 2 MILLIGRAM(S): 2 INJECTION INTRAMUSCULAR; INTRAVENOUS; SUBCUTANEOUS at 01:07

## 2022-10-24 RX ADMIN — Medication 1 SPRAY(S): at 05:48

## 2022-10-24 RX ADMIN — Medication 25 MILLIGRAM(S): at 17:51

## 2022-10-24 RX ADMIN — Medication 650 MILLIGRAM(S): at 05:48

## 2022-10-24 RX ADMIN — Medication 1 DROP(S): at 05:49

## 2022-10-24 RX ADMIN — Medication 1 DROP(S): at 22:27

## 2022-10-24 RX ADMIN — Medication 1 SPRAY(S): at 17:50

## 2022-10-24 RX ADMIN — FAMOTIDINE 20 MILLIGRAM(S): 10 INJECTION INTRAVENOUS at 11:43

## 2022-10-24 NOTE — PROVIDER CONTACT NOTE (OTHER) - BACKGROUND
(Admit Diagnosis) Anemia  (PMH) Sickle cell anemia  (Principal DC/DX) Hemoglobin low  (Problem/DX) Prophylactic measure  (Problem/DX) Hypertension
Pt admitted with sickle cell crisis
Patient admitted for sickle cell crisis
Pt admitted with sickle cell crisis
Pt admitted for SCC

## 2022-10-24 NOTE — PROVIDER CONTACT NOTE (OTHER) - ACTION/TREATMENT ORDERED:
MD made aware- continue to monitor as low hemoglobin is apart of medical condition and its patients baseline.
ACP notified and aware. Will keep me posted on when she is available to come down. Will continue to monitor.
KEVIN Wright made aware. Re-attempt blood work later in the morning
No9 action ordered
Fluids not being administered. As per provider increase patients PO intake.
Dilaudid 1mg IM 4 times a day x 1day
Dilaudid 1mg IM x1dose

## 2022-10-24 NOTE — DISCHARGE NOTE PROVIDER - DETAILS OF MALNUTRITION DIAGNOSIS/DIAGNOSES
This patient has been assessed with a concern for Malnutrition and was treated during this hospitalization for the following Nutrition diagnosis/diagnoses:     -  10/25/2022: Severe protein-calorie malnutrition   -  10/25/2022: Underweight (BMI < 19)

## 2022-10-24 NOTE — DISCHARGE NOTE PROVIDER - HOSPITAL COURSE
Hira Florez is a 66 y/o M with hx of sickle cell disease c/b acute chest, transfusion reaction, HTN, BPH, recent admission (August 2022) for r/o cholecystitis, who presents with diffuse joint pains and epigastric pain over the past week a/w sickle cell crisis.     # Sickle cell crisis.   - Diffuse joint pains typical of sickle crisis, reticulocyte count elevated  - Hb baseline of 6, no indication to transfuse at this time especially with history of transfusion reaction  - Monitor Hb, should patient require transfusion will consult heme/blood bank as patient with hx of transfusion reaction in the past.    - Transitioned Dilaudid PCA Pump to oral home pain regimen Dilaudid po 2mg q6hr prn  - PRN Zofran + PRN PO Benadryl.    - c/w 1/2NS hydration + folic acid  - trend CBC w/ diff, LDH, Reticulocyte count   - bowel regimen  - Per outpt chart review, pt frequently misses appts (last in July 2022) where he was limited in history and didn't elaborate why he stopped taking Endari/Hydrea.  Is usually on PO dilaudid 4mg q4-6 hrs PRN, pt frequently goes to ER when he runs out of pain medications.  Will have SW evaluate for access to transportation prior to dc.    # Epigastric pain.   - Epigastric pain, non-radiating, history changes daily.  Certain days pt states he has dark stools, other days reports normal stools. Today reports normal clear stool.   - possibly gastritis iso NSAID use  - recent admission for r/o alberto - HIDA negative at that time  - lipase wnl.    # EMEKA (acute kidney injury).   - SCr 1.3 (baseline ~1) on admission, now improved   - likely pre-renal vs. d/t Aleve use.    # BPH without urinary obstruction.   - c/w finasteride.    # Hypertension.   - BP stable  - c/w metoprolol tartrate 25mg BID  - monitor vital signs.      Case discussed in details with Medicine covering Attg. Patient is hemodynamically cleared to be discharged home Today. He reports feeling much better, pain is well controlled with oral agent.    Hira Florez is a 66 y/o M with hx of sickle cell disease c/b acute chest, transfusion reaction, HTN, BPH, recent admission (August 2022) for r/o cholecystitis, who presents with diffuse joint pains and epigastric pain over the past week a/w sickle cell crisis.     # Sickle cell crisis.   - Diffuse joint pains typical of sickle crisis, reticulocyte count elevated  - Hb baseline of 6, no indication to transfuse at this time especially with history of transfusion reaction  - Monitor Hb, should patient require transfusion will consult heme/blood bank as patient with hx of transfusion reaction in the past.    - Transitioned Dilaudid PCA Pump to oral home pain regimen Dilaudid po 2mg q6hr prn  - PRN Zofran + PRN PO Benadryl.    - c/w 1/2NS hydration + folic acid  - trend CBC w/ diff, LDH, Reticulocyte count   - bowel regimen  - Per outpt chart review, pt frequently misses appts (last in July 2022) where he was limited in history and didn't elaborate why he stopped taking Endari/Hydrea.  Is usually on PO dilaudid 4mg q4-6 hrs PRN, pt frequently goes to ER when he runs out of pain medications.  Will have SW evaluate for access to transportation prior to dc.    # Epigastric pain.   - Epigastric pain, non-radiating, history changes daily.  Certain days pt states he has dark stools, other days reports normal stools. Today reports normal clear stool.   - possibly gastritis iso NSAID use  - recent admission for r/o alberto - HIDA negative at that time  - lipase wnl.    # EMEKA (acute kidney injury).   - SCr 1.3 (baseline ~1) on admission, now improved   - likely pre-renal vs. d/t Aleve use.    # BPH without urinary obstruction.   - c/w finasteride.    # Hypertension.   - BP stable  - c/w metoprolol tartrate 25mg BID  - monitor vital signs.      Case discussed in details with Medicine covering Attg, Dr. Perez. Patient is hemodynamically cleared to be discharged home on October 25, 2022. He reports feeling much better, pain is well controlled with oral agent.    Hira Florez is a 67 M with hx of sickle cell disease c/b acute chest, transfusion reaction, HTN, BPH, recent admission (August 2022) for r/o cholecystitis, who presents with diffuse joint pains and epigastric pain over the past week a/w sickle cell crisis.     # Sickle cell crisis.   - Diffuse joint pains typical of sickle crisis, reticulocyte count elevated  - Hb baseline of 6, no indication to transfuse at this time especially with history of transfusion reaction  - Monitor Hb, should patient require transfusion will consult heme/blood bank as patient with hx of transfusion reaction in the past.    - Transitioned Dilaudid PCA Pump to oral home pain regimen Dilaudid po 2mg q6hr prn  - PRN Zofran + PRN PO Benadryl.    - c/w 1/2NS hydration + folic acid  - trend CBC w/ diff, LDH, Reticulocyte count   - bowel regimen  - Per outpt chart review, pt frequently misses appts (last in July 2022) where he was limited in history and didn't elaborate why he stopped taking Endari/Hydrea.  Is usually on PO dilaudid 4mg q4-6 hrs PRN, pt frequently goes to ER when he runs out of pain medications.  Will have SW evaluate for access to transportation prior to dc.    # Epigastric pain.   - Epigastric pain, non-radiating, history changes daily.  Certain days pt states he has dark stools, other days reports normal stools. Today reports normal clear stool.   - possibly gastritis iso NSAID use  - recent admission for r/o alberto - HIDA negative at that time  - lipase wnl.    # EMEKA (acute kidney injury).   - SCr 1.3 (baseline ~1) on admission, now improved   - likely pre-renal vs. d/t Aleve use.    # BPH without urinary obstruction.   - c/w finasteride.    # Hypertension.   - BP stable  - c/w metoprolol tartrate 25mg BID  - monitor vital signs.    Case discussed in details with Medicine covering Attg, Dr. Perez. Patient is hemodynamically cleared to be discharged home on October 25, 2022. He reports feeling much better, pain is well controlled with oral agent.

## 2022-10-24 NOTE — DISCHARGE NOTE PROVIDER - NSDCCPCAREPLAN_GEN_ALL_CORE_FT
PRINCIPAL DISCHARGE DIAGNOSIS  Diagnosis: Sickle cell pain crisis  Assessment and Plan of Treatment: You were admitted at [facility] for sickle cell crisis. You were managed with PCA dilaudid pump, and eventually transitioned to oral regimen. You were also treated with intravenous fluid. Your hemoglobin remains stable, and did not require blood transfusion. You report feeling much better, and your sickle cell crisis pain has been well controlled on oral agent.  - Please continue with your medication as indiated on your discharge paper  - Please follow up with [facility] specialist upon discharge        SECONDARY DISCHARGE DIAGNOSES  Diagnosis: Hypertension  Assessment and Plan of Treatment: You have history of high blood pressure  - Please continue taking your medication as indicated on your discharge paper  - Low salt/DASH diet recommended  - Please check your blood pressure routinely at least 3 times daily   - Exercise as tolerated    Diagnosis: BPH without urinary obstruction  Assessment and Plan of Treatment: You have history of enlarged prostate  - Please continue with your medication as indicated on your discharge paper.    Diagnosis: EMEKA (acute kidney injury)  Assessment and Plan of Treatment: You were noted with mild kidney injury during this admission, it looks like it was all in the settings of your current illness, The kidney functions did improve.  Please follow up with your Primary Care Doctor within 1-2 weeks upon discharge

## 2022-10-24 NOTE — DISCHARGE NOTE PROVIDER - PROVIDER TOKENS
FREE:[LAST:[PRIMARY CARE DOCTOR],PHONE:[(   )    -],FAX:[(   )    -],ADDRESS:[PRIMARY CARE CLINIC],FOLLOWUP:[1 week]]

## 2022-10-24 NOTE — DISCHARGE NOTE PROVIDER - NSDCMRMEDTOKEN_GEN_ALL_CORE_FT
acetaminophen 325 mg oral tablet: 2 tab(s) orally every 6 hours, As needed, Temp greater or equal to 38C (100.4F), Mild Pain (1 - 3)  albuterol 90 mcg/inh inhalation aerosol: 2 puff(s) inhaled every 6 hours, As Needed  famotidine 20 mg oral tablet: 1 tab(s) orally once a day    Price check and fill please    Thanks  finasteride 5 mg oral tablet: 1 tab(s) orally once a day  fluticasone 50 mcg/inh inhalation powder: 1 puff(s) inhaled 2 times a day  folic acid 1 mg oral tablet: 1 tab(s) orally once a day  HYDROmorphone 2 mg oral tablet: 1 tab(s) orally every 6 hours, As Needed MDD:4 tab    Price check, and fill please.     Thanks  ibuprofen 400 mg oral tablet: 1 tab(s) orally every 8 hours, As needed, Moderate Pain (4 - 6)  Metoprolol Tartrate 25 mg oral tablet: 1 tab(s) orally 2 times a day  naloxone 4 mg/0.1 mL nasal spray: 1 spray(s) intranasally once a day as needed for possible opiod overdose    Price check and fill please. Thanks   ocular lubricant ophthalmic solution: 1 drop(s) to each affected eye 4 times a day  polyethylene glycol 3350 oral powder for reconstitution: 17 gram(s) orally once a day  senna leaf extract oral tablet: 2 tab(s) orally once a day (at bedtime)   acetaminophen 325 mg oral tablet: 2 tab(s) orally every 6 hours, As needed, Temp greater or equal to 38C (100.4F), Mild Pain (1 - 3)  albuterol 90 mcg/inh inhalation aerosol: 2 puff(s) inhaled every 6 hours, As Needed  famotidine 20 mg oral tablet: 1 tab(s) orally once a day    Price check and fill please    Thanks  finasteride 5 mg oral tablet: 1 tab(s) orally once a day  fluticasone 50 mcg/inh inhalation powder: 1 puff(s) inhaled 2 times a day  folic acid 1 mg oral tablet: 1 tab(s) orally once a day  ibuprofen 400 mg oral tablet: 1 tab(s) orally every 8 hours, As needed, Moderate Pain (4 - 6)  Metoprolol Tartrate 25 mg oral tablet: 1 tab(s) orally 2 times a day  naloxone 4 mg/0.1 mL nasal spray: 1 spray(s) intranasally once a day as needed for possible opiod overdose    Price check and fill please. Thanks   ocular lubricant ophthalmic solution: 1 drop(s) to each affected eye 4 times a day  polyethylene glycol 3350 oral powder for reconstitution: 17 gram(s) orally once a day  senna leaf extract oral tablet: 2 tab(s) orally once a day (at bedtime)   acetaminophen 325 mg oral tablet: 2 tab(s) orally every 6 hours, As needed, Temp greater or equal to 38C (100.4F), Mild Pain (1 - 3)  albuterol 90 mcg/inh inhalation aerosol: 2 puff(s) inhaled every 6 hours, As Needed  Dilaudid 2 mg oral tablet: 0.5 tab(s) orally every 6 hours    price check [id number] please call thanks MDD:4 mg per day  famotidine 20 mg oral tablet: 1 tab(s) orally once a day    Price check and fill please    Thanks  finasteride 5 mg oral tablet: 1 tab(s) orally once a day  fluticasone 50 mcg/inh inhalation powder: 1 puff(s) inhaled 2 times a day  folic acid 1 mg oral tablet: 1 tab(s) orally once a day  ibuprofen 400 mg oral tablet: 1 tab(s) orally every 8 hours, As needed, Moderate Pain (4 - 6)  Metoprolol Tartrate 25 mg oral tablet: 1 tab(s) orally 2 times a day  naloxone 4 mg/0.1 mL nasal spray: 1 spray(s) intranasally once a day as needed for possible opiod overdose    Price check and fill please. Thanks   ocular lubricant ophthalmic solution: 1 drop(s) to each affected eye 4 times a day  polyethylene glycol 3350 oral powder for reconstitution: 17 gram(s) orally once a day  senna leaf extract oral tablet: 2 tab(s) orally once a day (at bedtime)

## 2022-10-24 NOTE — DISCHARGE NOTE PROVIDER - CARE PROVIDER_API CALL
PRIMARY CARE DOCTOR,   [facility]  Phone: (   )    -  Fax: (   )    -  Follow Up Time: 1 week

## 2022-10-24 NOTE — PROVIDER CONTACT NOTE (OTHER) - RECOMMENDATIONS
MD made aware- continue to monitor as low hemoglobin is apart of medical condition and its patients baseline.
Breakthrough pain med to be ordered
PA to order pain medications
No recommendations
Notify ACP
Will not continue IV fluids due to pending discharge.

## 2022-10-24 NOTE — PROVIDER CONTACT NOTE (OTHER) - SITUATION
IV arrow not working, fluids not being administered.
Pt c/o pain generalized 9/10
Patient is AOx4, asymptomatic and on a PCA pump. Morning labs were attempted by RN and Nurse Aid assigned to the patient and could not be retrieved. Patient requested for labs to be drawn by Provider.
Pt without IV access c/o pain
Patient refusing further attempts for morning blood work.
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 per Provider continue to monitor as low hemoglobin is apart of medical condition and its patients baseline.
Pt without IV access. IV access attempted numerous times by staff.

## 2022-10-24 NOTE — PROVIDER CONTACT NOTE (OTHER) - DATE AND TIME:
23-Oct-2022 05:15
24-Oct-2022 00:31
16-Oct-2022 12:54
16-Oct-2022 13:52
16-Oct-2022 15:40
18-Oct-2022 06:05
15-Oct-2022 09:45

## 2022-10-24 NOTE — DISCHARGE NOTE PROVIDER - NSDCFUADDAPPT_GEN_ALL_CORE_FT
Please follow up with your Primary Care Physician and  Please follow up with your Primary Care Physician and your doctor from [facility].  Please follow up with your Primary Care Physician and your doctor from [facility].     You have an appointment with [facility] on November 4, 2022 with Dr. Nikolay Sauer. Address is [address] [place] [address], [facility]. Phone number: [phone].

## 2022-10-25 ENCOUNTER — TRANSCRIPTION ENCOUNTER (OUTPATIENT)
Age: 67
End: 2022-10-25

## 2022-10-25 VITALS
SYSTOLIC BLOOD PRESSURE: 139 MMHG | OXYGEN SATURATION: 100 % | DIASTOLIC BLOOD PRESSURE: 83 MMHG | TEMPERATURE: 98 F | RESPIRATION RATE: 17 BRPM | HEART RATE: 69 BPM

## 2022-10-25 PROCEDURE — 99232 SBSQ HOSP IP/OBS MODERATE 35: CPT

## 2022-10-25 RX ORDER — HYDROMORPHONE HYDROCHLORIDE 2 MG/ML
0.5 INJECTION INTRAMUSCULAR; INTRAVENOUS; SUBCUTANEOUS
Qty: 20 | Refills: 0
Start: 2022-10-25 | End: 2022-11-03

## 2022-10-25 RX ORDER — NALOXONE HYDROCHLORIDE 4 MG/.1ML
1 SPRAY NASAL
Qty: 1 | Refills: 0
Start: 2022-10-25 | End: 2022-11-23

## 2022-10-25 RX ORDER — FAMOTIDINE 10 MG/ML
1 INJECTION INTRAVENOUS
Qty: 30 | Refills: 0
Start: 2022-10-25 | End: 2022-11-23

## 2022-10-25 RX ORDER — HYDROMORPHONE HYDROCHLORIDE 2 MG/ML
1 INJECTION INTRAMUSCULAR; INTRAVENOUS; SUBCUTANEOUS
Qty: 40 | Refills: 0
Start: 2022-10-25 | End: 2022-11-03

## 2022-10-25 RX ORDER — HYDROMORPHONE HYDROCHLORIDE 2 MG/ML
1 INJECTION INTRAMUSCULAR; INTRAVENOUS; SUBCUTANEOUS EVERY 6 HOURS
Refills: 0 | Status: DISCONTINUED | OUTPATIENT
Start: 2022-10-25 | End: 2022-10-25

## 2022-10-25 RX ADMIN — Medication 1 DROP(S): at 05:34

## 2022-10-25 RX ADMIN — POLYETHYLENE GLYCOL 3350 17 GRAM(S): 17 POWDER, FOR SOLUTION ORAL at 13:03

## 2022-10-25 RX ADMIN — FINASTERIDE 5 MILLIGRAM(S): 5 TABLET, FILM COATED ORAL at 13:02

## 2022-10-25 RX ADMIN — HYDROMORPHONE HYDROCHLORIDE 1 MILLIGRAM(S): 2 INJECTION INTRAMUSCULAR; INTRAVENOUS; SUBCUTANEOUS at 13:02

## 2022-10-25 RX ADMIN — ENOXAPARIN SODIUM 40 MILLIGRAM(S): 100 INJECTION SUBCUTANEOUS at 13:03

## 2022-10-25 RX ADMIN — Medication 1 MILLIGRAM(S): at 13:02

## 2022-10-25 RX ADMIN — FAMOTIDINE 20 MILLIGRAM(S): 10 INJECTION INTRAVENOUS at 13:01

## 2022-10-25 RX ADMIN — Medication 1 DROP(S): at 13:03

## 2022-10-25 RX ADMIN — Medication 25 MILLIGRAM(S): at 05:34

## 2022-10-25 RX ADMIN — HYDROMORPHONE HYDROCHLORIDE 1 MILLIGRAM(S): 2 INJECTION INTRAMUSCULAR; INTRAVENOUS; SUBCUTANEOUS at 14:48

## 2022-10-25 RX ADMIN — Medication 1 SPRAY(S): at 05:34

## 2022-10-25 NOTE — PROGRESS NOTE ADULT - SUBJECTIVE AND OBJECTIVE BOX
Patient is a 57y old  Male who presents with a chief complaint of sickle cell crisis (14 Oct 2022 09:47)      SUBJECTIVE / OVERNIGHT EVENTS:    No events overnight. This AM, patient with continued joint pains; states PCA is not helping very much. Reports no n/v/d/cp/sob.      MEDICATIONS  (STANDING):  artificial  tears Solution 1 Drop(s) Both EYES four times a day  enoxaparin Injectable 40 milliGRAM(s) SubCutaneous every 24 hours  famotidine Injectable 20 milliGRAM(s) IV Push once  finasteride 5 milliGRAM(s) Oral daily  fluticasone propionate 50 MICROgram(s)/spray Nasal Spray 1 Spray(s) Both Nostrils two times a day  folic acid 1 milliGRAM(s) Oral daily  HYDROmorphone PCA (1 mG/mL) 30 milliLiter(s) PCA Continuous PCA Continuous  metoprolol tartrate 25 milliGRAM(s) Oral two times a day  polyethylene glycol 3350 17 Gram(s) Oral daily  senna 2 Tablet(s) Oral at bedtime  sodium chloride 0.45%. 1000 milliLiter(s) (75 mL/Hr) IV Continuous <Continuous>    MEDICATIONS  (PRN):  acetaminophen     Tablet .. 650 milliGRAM(s) Oral every 6 hours PRN Temp greater or equal to 38C (100.4F), Mild Pain (1 - 3)  ALBUTerol    90 MICROgram(s) HFA Inhaler 2 Puff(s) Inhalation every 6 hours PRN Shortness of Breath and/or Wheezing  diphenhydrAMINE 25 milliGRAM(s) Oral every 4 hours PRN Pruritus  naloxone Injectable 0.1 milliGRAM(s) IV Push every 3 minutes PRN For ANY of the following changes in patient status:  A. RR LESS THAN 10 breaths per minute, B. Oxygen saturation LESS THAN 90%, C. Sedation score of 6  ondansetron Injectable 4 milliGRAM(s) IV Push every 6 hours PRN Nausea      PHYSICAL EXAM:  T(C): 36.7 (10-15-22 @ 05:25), Max: 36.8 (10-14-22 @ 13:56)  HR: 76 (10-15-22 @ 05:25) (76 - 98)  BP: 133/71 (10-15-22 @ 05:25) (129/74 - 150/78)  RR: 16 (10-15-22 @ 05:25) (16 - 16)  SpO2: 98% (10-15-22 @ 05:25) (98% - 99%)  I&O's Summary    GENERAL: NAD, well-developed, lying in bed  HEAD:  Atraumatic, Normocephalic, MMM  CHEST/LUNG: No use of accessory muscles, CTAB, breathing non-labored  COR: RR, no mrcg  ABD: Soft, ND/NT, +BS  PSYCH: AAOx3  NEUROLOGY: CN II-XII grossly intact, moving all extremities  SKIN: No rashes or lesions  EXT: wwp, no cce    LABS:  CAPILLARY BLOOD GLUCOSE                              5.2    11.82 )-----------( 374      ( 15 Oct 2022 07:34 )             15.7     10-15    139  |  109<H>  |  14  ----------------------------<  83  4.3   |  20<L>  |  1.07    Ca    8.8      15 Oct 2022 07:34  Mg     2.20     10-13    TPro  6.9  /  Alb  3.5  /  TBili  2.1<H>  /  DBili  x   /  AST  26  /  ALT  13  /  AlkPhos  119  10-15          Urinalysis Basic - ( 14 Oct 2022 03:20 )    Color: Yellow / Appearance: Clear / S.019 / pH: x  Gluc: x / Ketone: Negative  / Bili: Negative / Urobili: <2 mg/dL   Blood: x / Protein: 30 mg/dL / Nitrite: Negative   Leuk Esterase: Negative / RBC: 0-2 /HPF / WBC 0-2 /HPF   Sq Epi: x / Non Sq Epi: Occasional / Bacteria: x        Culture - Urine (collected 14 Oct 2022 03:20)  Source: Clean Catch Clean Catch (Midstream)  Final Report (15 Oct 2022 06:34):    <10,000 CFU/mL Normal Urogenital Shira        RADIOLOGY & ADDITIONAL TESTS:    Telemetry Personally Reviewed -     Imaging Personally Reviewed -     Imaging Reviewed -     Consultant(s) Notes Reviewed -       Care Discussed with Consultants/Other Providers - 
Patient is a 57y old  Male who presents with a chief complaint of sickle cell crisis (24 Oct 2022 15:48)      SUBJECTIVE / OVERNIGHT EVENTS:    No events overnight. This AM, patient without n/v/d/cp/sob.      MEDICATIONS  (STANDING):  artificial  tears Solution 1 Drop(s) Both EYES four times a day  enoxaparin Injectable 40 milliGRAM(s) SubCutaneous every 24 hours  famotidine    Tablet 20 milliGRAM(s) Oral daily  finasteride 5 milliGRAM(s) Oral daily  fluticasone propionate 50 MICROgram(s)/spray Nasal Spray 1 Spray(s) Both Nostrils two times a day  folic acid 1 milliGRAM(s) Oral daily  metoprolol tartrate 25 milliGRAM(s) Oral two times a day  polyethylene glycol 3350 17 Gram(s) Oral daily  senna 2 Tablet(s) Oral at bedtime    MEDICATIONS  (PRN):  acetaminophen     Tablet .. 650 milliGRAM(s) Oral every 6 hours PRN Moderate Pain (4 - 6)  acetaminophen     Tablet .. 650 milliGRAM(s) Oral every 6 hours PRN Temp greater or equal to 38C (100.4F), Mild Pain (1 - 3)  ALBUTerol    90 MICROgram(s) HFA Inhaler 2 Puff(s) Inhalation every 6 hours PRN Shortness of Breath and/or Wheezing  diphenhydrAMINE 25 milliGRAM(s) Oral every 4 hours PRN Pruritus  HYDROmorphone   Tablet 1 milliGRAM(s) Oral every 6 hours PRN Moderate Pain (4 - 6)  ibuprofen  Tablet. 400 milliGRAM(s) Oral every 8 hours PRN Moderate Pain (4 - 6)  naloxone Injectable 0.1 milliGRAM(s) IV Push every 3 minutes PRN For ANY of the following changes in patient status:  A. RR LESS THAN 10 breaths per minute, B. Oxygen saturation LESS THAN 90%, C. Sedation score of 6  ondansetron Injectable 4 milliGRAM(s) IV Push every 6 hours PRN Nausea      PHYSICAL EXAM:  T(C): 36.9 (10-25-22 @ 05:30), Max: 37 (10-24-22 @ 22:20)  HR: 63 (10-25-22 @ 05:30) (63 - 69)  BP: 144/74 (10-25-22 @ 05:30) (128/66 - 145/77)  RR: 17 (10-25-22 @ 05:30) (16 - 17)  SpO2: 100% (10-25-22 @ 05:30) (98% - 100%)  I&O's Summary    GENERAL: NAD, well-developed  HEAD:  Atraumatic, Normocephalic, MMM  CHEST/LUNG: No use of accessory muscles, CTAB, breathing non-labored  COR: RR, no mrcg  ABD: Soft, ND/NT, +BS  PSYCH: AAOx3  NEUROLOGY: CN II-XII grossly intact, moving all extremities  SKIN: No rashes or lesions  EXT: wwp, no cce    LABS:  CAPILLARY BLOOD GLUCOSE                            RADIOLOGY & ADDITIONAL TESTS:    Telemetry Personally Reviewed -     Imaging Personally Reviewed -     Imaging Reviewed -     Consultant(s) Notes Reviewed -       Care Discussed with Consultants/Other Providers - 
[facility] Division of Hospital Medicine  Ellen Page DO  Available via MS Teams  In house pager [phone]     SUBJECTIVE / OVERNIGHT EVENTS: Pt still reports severe lower back pain, upset that he's still in stretcher. No fevers/chills, denies melena/hematochezia, had BM last night.  Denies CP/SOB, not hypoxic. Does not feel ready to transition to PO meds, states that he would return right back to ER if he transitioned to PO meds today.     MEDICATIONS  (STANDING):  artificial  tears Solution 1 Drop(s) Both EYES four times a day  enoxaparin Injectable 40 milliGRAM(s) SubCutaneous every 24 hours  famotidine    Tablet 20 milliGRAM(s) Oral daily  finasteride 5 milliGRAM(s) Oral daily  fluticasone propionate 50 MICROgram(s)/spray Nasal Spray 1 Spray(s) Both Nostrils two times a day  folic acid 1 milliGRAM(s) Oral daily  HYDROmorphone PCA (1 mG/mL) 30 milliLiter(s) PCA Continuous PCA Continuous  metoprolol tartrate 25 milliGRAM(s) Oral two times a day  polyethylene glycol 3350 17 Gram(s) Oral daily  senna 2 Tablet(s) Oral at bedtime  sodium chloride 0.45%. 1000 milliLiter(s) (75 mL/Hr) IV Continuous <Continuous>    MEDICATIONS  (PRN):  acetaminophen     Tablet .. 650 milliGRAM(s) Oral every 6 hours PRN Temp greater or equal to 38C (100.4F), Mild Pain (1 - 3)  ALBUTerol    90 MICROgram(s) HFA Inhaler 2 Puff(s) Inhalation every 6 hours PRN Shortness of Breath and/or Wheezing  diphenhydrAMINE 25 milliGRAM(s) Oral every 4 hours PRN Pruritus  naloxone Injectable 0.1 milliGRAM(s) IV Push every 3 minutes PRN For ANY of the following changes in patient status:  A. RR LESS THAN 10 breaths per minute, B. Oxygen saturation LESS THAN 90%, C. Sedation score of 6  ondansetron Injectable 4 milliGRAM(s) IV Push every 6 hours PRN Nausea      I&O's Summary    17 Oct 2022 07:01  -  18 Oct 2022 07:00  --------------------------------------------------------  IN: 1275 mL / OUT: 700 mL / NET: 575 mL        PHYSICAL EXAM:  Vital Signs Last 24 Hrs  T(C): 36.9 (18 Oct 2022 08:00), Max: 36.9 (18 Oct 2022 08:00)  T(F): 98.5 (18 Oct 2022 08:00), Max: 98.5 (18 Oct 2022 08:00)  HR: 77 (18 Oct 2022 08:00) (77 - 95)  BP: 129/80 (18 Oct 2022 08:00) (129/80 - 160/88)  BP(mean): --  RR: 18 (18 Oct 2022 08:00) (17 - 18)  SpO2: 96% (18 Oct 2022 08:00) (91% - 96%)    Parameters below as of 18 Oct 2022 08:00  Patient On (Oxygen Delivery Method): room air      CONSTITUTIONAL: NAD, well-developed, well-groomed  EYES: PERRLA; conjunctiva and sclera clear  RESPIRATORY: Normal respiratory effort; lungs are clear to auscultation bilaterally  CARDIOVASCULAR: Regular rate and rhythm, normal S1 and S2, no murmur/rub/gallop; No lower extremity edema  ABDOMEN: Nontender to palpation, normoactive bowel sounds, no rebound/guarding  MUSCULOSKELETAL:  No clubbing or cyanosis of digits; no joint swelling +TTP in lower back paraspinal muscles  PSYCH: A+O to person, place, and time; odd affect, digresses topics frequently during conversation   NEUROLOGY: CN 2-12 are intact and symmetric; no gross sensory deficits   SKIN: No rashes; no palpable lesions    LABS:                        4.7    9.76  )-----------( 311      ( 18 Oct 2022 11:19 )             14.1     10-18    133<L>  |  103  |  14  ----------------------------<  111<H>  4.7   |  22  |  0.94    Ca    8.9      18 Oct 2022 11:19  Mg     1.80     10-18    TPro  6.8  /  Alb  3.4  /  TBili  2.5<H>  /  DBili  x   /  AST  26  /  ALT  10  /  AlkPhos  147<H>  10-18          
[facility] Division of Hospital Medicine  Abhijeetraysa PageDO  Available via MS Teams  In house pager [phone]     SUBJECTIVE / OVERNIGHT EVENTS: Continues to complain of severe pain, did have BM this morning now reporting it as "dark" however would not further elaborate.  Refused to allow me to do rectal exam to further assess for bleeding.  Denies CP, states he's short of breath on ambulation.  No fevers/chills     MEDICATIONS  (STANDING):  artificial  tears Solution 1 Drop(s) Both EYES four times a day  enoxaparin Injectable 40 milliGRAM(s) SubCutaneous every 24 hours  famotidine    Tablet 20 milliGRAM(s) Oral daily  finasteride 5 milliGRAM(s) Oral daily  fluticasone propionate 50 MICROgram(s)/spray Nasal Spray 1 Spray(s) Both Nostrils two times a day  folic acid 1 milliGRAM(s) Oral daily  HYDROmorphone PCA (1 mG/mL) 30 milliLiter(s) PCA Continuous PCA Continuous  metoprolol tartrate 25 milliGRAM(s) Oral two times a day  polyethylene glycol 3350 17 Gram(s) Oral daily  senna 2 Tablet(s) Oral at bedtime  sodium chloride 0.45%. 1000 milliLiter(s) (75 mL/Hr) IV Continuous <Continuous>    MEDICATIONS  (PRN):  acetaminophen     Tablet .. 650 milliGRAM(s) Oral every 6 hours PRN Temp greater or equal to 38C (100.4F), Mild Pain (1 - 3)  ALBUTerol    90 MICROgram(s) HFA Inhaler 2 Puff(s) Inhalation every 6 hours PRN Shortness of Breath and/or Wheezing  diphenhydrAMINE 25 milliGRAM(s) Oral every 4 hours PRN Pruritus  naloxone Injectable 0.1 milliGRAM(s) IV Push every 3 minutes PRN For ANY of the following changes in patient status:  A. RR LESS THAN 10 breaths per minute, B. Oxygen saturation LESS THAN 90%, C. Sedation score of 6  ondansetron Injectable 4 milliGRAM(s) IV Push every 6 hours PRN Nausea    PHYSICAL EXAM:  Vital Signs Last 24 Hrs  T(C): 36.9 (20 Oct 2022 05:29), Max: 37.1 (19 Oct 2022 14:16)  T(F): 98.4 (20 Oct 2022 05:29), Max: 98.7 (19 Oct 2022 14:16)  HR: 81 (20 Oct 2022 05:29) (62 - 82)  BP: 179/81 (20 Oct 2022 05:29) (129/54 - 179/81)  BP(mean): --  RR: 15 (20 Oct 2022 05:29) (15 - 18)  SpO2: 100% (20 Oct 2022 05:29) (94% - 100%)    Parameters below as of 20 Oct 2022 05:29  Patient On (Oxygen Delivery Method): room air    CONSTITUTIONAL: NAD, well-developed, well-groomed  EYES: PERRLA; conjunctiva and sclera clear  RESPIRATORY: Normal respiratory effort; lungs are clear to auscultation bilaterally  CARDIOVASCULAR: Regular rate and rhythm, normal S1 and S2, no murmur/rub/gallop; No lower extremity edema  ABDOMEN: Nontender to palpation, normoactive bowel sounds, no rebound/guarding; pt refusing rectal exam   MUSCULOSKELETAL:  No clubbing or cyanosis of digits; no joint swelling or tenderness to palpation  PSYCH: A+O to person, place, and time; affect appropriate  NEUROLOGY: CN 2-12 are intact and symmetric; no gross sensory deficits   SKIN: No rashes; no palpable lesions    LABS:                        5.5    9.95  )-----------( 334      ( 20 Oct 2022 06:00 )             16.4     10-20    136  |  103  |  10  ----------------------------<  97  4.6   |  22  |  0.93    Ca    9.1      20 Oct 2022 06:00  Phos  3.3     10-20  Mg     2.10     10-20    TPro  7.7  /  Alb  3.8  /  TBili  2.7<H>  /  DBili  x   /  AST  41<H>  /  ALT  18  /  AlkPhos  203<H>  10-20    
67M with hx of sickle cell disease c/b acute chest, transfusion reaction, HTN, BPH, recent admission (August 2022) for r/o cholecystitis, who presents with diffuse joint pains and epigastric pain over the past week a/w sickle cell crisis.     SUBJECTIVE / OVERNIGHT EVENTS: NAEO. denies any complaints. when asked about possibly transitioning to po pain control pt started moaning from severe body pain, stated he doesn't feel comfortable to transition as he is still in alot of pain everywhere. Denies fever/chills. no n/v. tolerating bm. reports his last bm was this morning with brown stool, no melena and hematochezia.     MEDICATIONS  (STANDING):  artificial  tears Solution 1 Drop(s) Both EYES four times a day  enoxaparin Injectable 40 milliGRAM(s) SubCutaneous every 24 hours  famotidine    Tablet 20 milliGRAM(s) Oral daily  finasteride 5 milliGRAM(s) Oral daily  fluticasone propionate 50 MICROgram(s)/spray Nasal Spray 1 Spray(s) Both Nostrils two times a day  folic acid 1 milliGRAM(s) Oral daily  HYDROmorphone PCA (1 mG/mL) 30 milliLiter(s) PCA Continuous PCA Continuous  metoprolol tartrate 25 milliGRAM(s) Oral two times a day  polyethylene glycol 3350 17 Gram(s) Oral daily  senna 2 Tablet(s) Oral at bedtime  sodium chloride 0.45%. 1000 milliLiter(s) (75 mL/Hr) IV Continuous <Continuous>        MEDICATIONS  (PRN):  acetaminophen     Tablet .. 650 milliGRAM(s) Oral every 6 hours PRN Temp greater or equal to 38C (100.4F), Mild Pain (1 - 3)  ALBUTerol    90 MICROgram(s) HFA Inhaler 2 Puff(s) Inhalation every 6 hours PRN Shortness of Breath and/or Wheezing  diphenhydrAMINE 25 milliGRAM(s) Oral every 4 hours PRN Pruritus  naloxone Injectable 0.1 milliGRAM(s) IV Push every 3 minutes PRN For ANY of the following changes in patient status:  A. RR LESS THAN 10 breaths per minute, B. Oxygen saturation LESS THAN 90%, C. Sedation score of 6  ondansetron Injectable 4 milliGRAM(s) IV Push every 6 hours PRN Nausea    PHYSICAL EXAM:  Vital Signs Last 24 Hrs  T(C): 36.6 (21 Oct 2022 10:50), Max: 36.9 (20 Oct 2022 18:41)  T(F): 97.8 (21 Oct 2022 10:50), Max: 98.4 (20 Oct 2022 18:41)  HR: 69 (21 Oct 2022 10:50) (64 - 81)  BP: 130/74 (21 Oct 2022 10:50) (130/74 - 150/69)  BP(mean): --  RR: 18 (21 Oct 2022 10:50) (17 - 18)  SpO2: 100% (21 Oct 2022 10:50) (97% - 100%)    Parameters below as of 20 Oct 2022 05:29  Patient On (Oxygen Delivery Method): room air    CONSTITUTIONAL: NAD, well-developed, well-groomed  EYES: PERRLA; conjunctiva and sclera clear  RESPIRATORY: Normal respiratory effort; lungs are clear to auscultation bilaterally  CARDIOVASCULAR: Regular rate and rhythm, normal S1 and S2, no murmur/rub/gallop; No lower extremity edema  ABDOMEN: Nontender to palpation, normoactive bowel sounds, no rebound/guarding; pt refusing rectal exam   MUSCULOSKELETAL:  No clubbing or cyanosis of digits; no joint swelling or tenderness to palpation  PSYCH: A+O to person, place, and time; affect appropriate  NEUROLOGY: CN 2-12 are intact and symmetric; no gross sensory deficits   SKIN: No rashes; no palpable lesions    LABS:          no new labs.    CAPILLARY BLOOD GLUCOSE          RADIOLOGY & ADDITIONAL TESTS: Reviewed.
67M with hx of sickle cell disease c/b acute chest, transfusion reaction, HTN, BPH, recent admission (August 2022) for r/o cholecystitis, who presents with diffuse joint pains and epigastric pain over the past week a/w sickle cell crisis.     SUBJECTIVE / OVERNIGHT EVENTS: NAEO. states the pain still hasn't improved. it is all over the body. not worsening but the pain is controlled with the dilaudid pca and doesn't feel comfortable to transition to po today. reports last BM was last night which was dark and soft. patient continues to refuse for rectal exam despite being advised the importance of it. states he feels tired and doesn't want any test or exam.   otherwise denies SOB, Fever/chills, no CP. is willing to eat his lunch that was at his bedside.     MEDICATIONS  (STANDING):  artificial  tears Solution 1 Drop(s) Both EYES four times a day  enoxaparin Injectable 40 milliGRAM(s) SubCutaneous every 24 hours  famotidine    Tablet 20 milliGRAM(s) Oral daily  finasteride 5 milliGRAM(s) Oral daily  fluticasone propionate 50 MICROgram(s)/spray Nasal Spray 1 Spray(s) Both Nostrils two times a day  folic acid 1 milliGRAM(s) Oral daily  HYDROmorphone PCA (1 mG/mL) 30 milliLiter(s) PCA Continuous PCA Continuous  metoprolol tartrate 25 milliGRAM(s) Oral two times a day  polyethylene glycol 3350 17 Gram(s) Oral daily  senna 2 Tablet(s) Oral at bedtime  sodium chloride 0.45%. 1000 milliLiter(s) (75 mL/Hr) IV Continuous <Continuous>    MEDICATIONS  (PRN):  acetaminophen     Tablet .. 650 milliGRAM(s) Oral every 6 hours PRN Temp greater or equal to 38C (100.4F), Mild Pain (1 - 3)  ALBUTerol    90 MICROgram(s) HFA Inhaler 2 Puff(s) Inhalation every 6 hours PRN Shortness of Breath and/or Wheezing  diphenhydrAMINE 25 milliGRAM(s) Oral every 4 hours PRN Pruritus  naloxone Injectable 0.1 milliGRAM(s) IV Push every 3 minutes PRN For ANY of the following changes in patient status:  A. RR LESS THAN 10 breaths per minute, B. Oxygen saturation LESS THAN 90%, C. Sedation score of 6  ondansetron Injectable 4 milliGRAM(s) IV Push every 6 hours PRN Nausea    PHYSICAL EXAM:  Vital Signs Last 24 Hrs  T(C): 36.6 (21 Oct 2022 10:50), Max: 36.9 (20 Oct 2022 18:41)  T(F): 97.8 (21 Oct 2022 10:50), Max: 98.4 (20 Oct 2022 18:41)  HR: 69 (21 Oct 2022 10:50) (64 - 81)  BP: 130/74 (21 Oct 2022 10:50) (130/74 - 150/69)  BP(mean): --  RR: 18 (21 Oct 2022 10:50) (17 - 18)  SpO2: 100% (21 Oct 2022 10:50) (97% - 100%)    Parameters below as of 20 Oct 2022 05:29  Patient On (Oxygen Delivery Method): room air    CONSTITUTIONAL: NAD, well-developed, well-groomed  EYES: PERRLA; conjunctiva and sclera clear  RESPIRATORY: Normal respiratory effort; lungs are clear to auscultation bilaterally  CARDIOVASCULAR: Regular rate and rhythm, normal S1 and S2, no murmur/rub/gallop; No lower extremity edema  ABDOMEN: Nontender to palpation, normoactive bowel sounds, no rebound/guarding; pt refusing rectal exam   MUSCULOSKELETAL:  No clubbing or cyanosis of digits; no joint swelling or tenderness to palpation  PSYCH: A+O to person, place, and time; affect appropriate  NEUROLOGY: CN 2-12 are intact and symmetric; no gross sensory deficits   SKIN: No rashes; no palpable lesions    LABS:                        5.5    9.95  )-----------( 334      ( 20 Oct 2022 06:00 )             16.4     10-20    136  |  103  |  10  ----------------------------<  97  4.6   |  22  |  0.93    Ca    9.1      20 Oct 2022 06:00  Phos  3.3     10-20  Mg     2.10     10-20    TPro  7.7  /  Alb  3.8  /  TBili  2.7<H>  /  DBili  x   /  AST  41<H>  /  ALT  18  /  AlkPhos  203<H>  10-20        CAPILLARY BLOOD GLUCOSE          RADIOLOGY & ADDITIONAL TESTS: Reviewed.
[facility] Division of Hospital Medicine  Ellen Page DO  Available via MS Teams  In house pager [phone]     SUBJECTIVE / OVERNIGHT EVENTS: Pt feels like he's getting a little better, anticipates he can transition to PO meds tomorrow.  No fevers/chills, no CP/SOB, denies melena/hematochezia.      MEDICATIONS  (STANDING):  artificial  tears Solution 1 Drop(s) Both EYES four times a day  enoxaparin Injectable 40 milliGRAM(s) SubCutaneous every 24 hours  famotidine    Tablet 20 milliGRAM(s) Oral daily  finasteride 5 milliGRAM(s) Oral daily  fluticasone propionate 50 MICROgram(s)/spray Nasal Spray 1 Spray(s) Both Nostrils two times a day  folic acid 1 milliGRAM(s) Oral daily  HYDROmorphone PCA (1 mG/mL) 30 milliLiter(s) PCA Continuous PCA Continuous  metoprolol tartrate 25 milliGRAM(s) Oral two times a day  polyethylene glycol 3350 17 Gram(s) Oral daily  senna 2 Tablet(s) Oral at bedtime  sodium chloride 0.45%. 1000 milliLiter(s) (75 mL/Hr) IV Continuous <Continuous>    MEDICATIONS  (PRN):  acetaminophen     Tablet .. 650 milliGRAM(s) Oral every 6 hours PRN Temp greater or equal to 38C (100.4F), Mild Pain (1 - 3)  ALBUTerol    90 MICROgram(s) HFA Inhaler 2 Puff(s) Inhalation every 6 hours PRN Shortness of Breath and/or Wheezing  diphenhydrAMINE 25 milliGRAM(s) Oral every 4 hours PRN Pruritus  naloxone Injectable 0.1 milliGRAM(s) IV Push every 3 minutes PRN For ANY of the following changes in patient status:  A. RR LESS THAN 10 breaths per minute, B. Oxygen saturation LESS THAN 90%, C. Sedation score of 6  ondansetron Injectable 4 milliGRAM(s) IV Push every 6 hours PRN Nausea    PHYSICAL EXAM:  Vital Signs Last 24 Hrs  T(C): 37.1 (19 Oct 2022 14:16), Max: 37.2 (19 Oct 2022 06:35)  T(F): 98.7 (19 Oct 2022 14:16), Max: 98.9 (19 Oct 2022 06:35)  HR: 81 (19 Oct 2022 14:16) (77 - 91)  BP: 132/58 (19 Oct 2022 14:16) (132/58 - 161/76)  BP(mean): --  RR: 16 (19 Oct 2022 14:16) (16 - 18)  SpO2: 94% (19 Oct 2022 14:16) (94% - 99%)    Parameters below as of 19 Oct 2022 14:16  Patient On (Oxygen Delivery Method): room air      CONSTITUTIONAL: NAD, well-developed, well-groomed  EYES: PERRLA; conjunctiva and sclera clear  RESPIRATORY: Normal respiratory effort; lungs are clear to auscultation bilaterally  CARDIOVASCULAR: Regular rate and rhythm, normal S1 and S2, no murmur/rub/gallop; No lower extremity edema  ABDOMEN: Nontender to palpation, normoactive bowel sounds, no rebound/guarding  MUSCULOSKELETAL:  No clubbing or cyanosis of digits; no joint swelling or tenderness to palpation  PSYCH: A+O to person, place, and time; affect appropriate  NEUROLOGY: CN 2-12 are intact and symmetric; no gross sensory deficits   SKIN: No rashes; no palpable lesions    LABS:                        4.7    9.76  )-----------( 311      ( 18 Oct 2022 11:19 )             14.1     10-18    133<L>  |  103  |  14  ----------------------------<  111<H>  4.7   |  22  |  0.94    Ca    8.9      18 Oct 2022 11:19  Mg     1.80     10-18    TPro  6.8  /  Alb  3.4  /  TBili  2.5<H>  /  DBili  x   /  AST  26  /  ALT  10  /  AlkPhos  147<H>  10-18          
67M with hx of sickle cell disease c/b acute chest, transfusion reaction, HTN, BPH, recent admission (August 2022) for r/o cholecystitis, who presents with diffuse joint pains and epigastric pain over the past week a/w sickle cell crisis.     SUBJECTIVE / OVERNIGHT EVENTS:   NAEO  reports pain is overall better. is agreeable to transition to po home pain regimen. average dilaudid dose requirement was iv 2-3mg q4hr.    denies any fever/chills, no sob, or cough/cp.   reports BM have improved and now is clear. Blood draw was attempted but not successful.     MEDICATIONS  (STANDING):  artificial  tears Solution 1 Drop(s) Both EYES four times a day  enoxaparin Injectable 40 milliGRAM(s) SubCutaneous every 24 hours  famotidine    Tablet 20 milliGRAM(s) Oral daily  finasteride 5 milliGRAM(s) Oral daily  fluticasone propionate 50 MICROgram(s)/spray Nasal Spray 1 Spray(s) Both Nostrils two times a day  folic acid 1 milliGRAM(s) Oral daily  metoprolol tartrate 25 milliGRAM(s) Oral two times a day  polyethylene glycol 3350 17 Gram(s) Oral daily  senna 2 Tablet(s) Oral at bedtime  sodium chloride 0.45%. 1000 milliLiter(s) (75 mL/Hr) IV Continuous <Continuous>    MEDICATIONS  (PRN):  acetaminophen     Tablet .. 650 milliGRAM(s) Oral every 6 hours PRN Temp greater or equal to 38C (100.4F), Mild Pain (1 - 3)  ALBUTerol    90 MICROgram(s) HFA Inhaler 2 Puff(s) Inhalation every 6 hours PRN Shortness of Breath and/or Wheezing  diphenhydrAMINE 25 milliGRAM(s) Oral every 4 hours PRN Pruritus  HYDROmorphone   Tablet 2 milliGRAM(s) Oral every 6 hours PRN Severe Pain (7 - 10)  naloxone Injectable 0.1 milliGRAM(s) IV Push every 3 minutes PRN For ANY of the following changes in patient status:  A. RR LESS THAN 10 breaths per minute, B. Oxygen saturation LESS THAN 90%, C. Sedation score of 6  ondansetron Injectable 4 milliGRAM(s) IV Push every 6 hours PRN Nausea      PHYSICAL EXAM:  VITAL SIGNS:  T(C): 36.7 (10-23-22 @ 08:00), Max: 36.8 (10-22-22 @ 17:30)  T(F): 98 (10-23-22 @ 08:00), Max: 98.2 (10-22-22 @ 17:30)  HR: 62 (10-23-22 @ 08:00) (62 - 81)  BP: 159/82 (10-23-22 @ 08:00) (139/61 - 159/82)  BP(mean): 3 (10-23-22 @ 08:00) (3 - 3)  RR: 18 (10-23-22 @ 08:00) (17 - 18)  SpO2: 99% (10-23-22 @ 08:00) (97% - 100%)  Wt(kg): --    CONSTITUTIONAL: NAD  EYES: PERRLA; conjunctiva and sclera clear  RESPIRATORY: Normal respiratory effort; lungs are clear to auscultation bilaterally  CARDIOVASCULAR: Regular rate and rhythm, normal S1 and S2, no murmur/rub/gallop; No lower extremity edema  ABDOMEN: Nontender to palpation, normoactive bowel sounds, no rebound/guarding; pt refusing rectal exam   MUSCULOSKELETAL:  No clubbing or cyanosis of digits; no joint swelling or tenderness to palpation  PSYCH: A+O to person, place, and time; affect appropriate  NEUROLOGY: CN 2-12 are intact and symmetric; no gross sensory deficits   SKIN: No rashes; no palpable lesions    LABS:          no new labs.    CAPILLARY BLOOD GLUCOSE          RADIOLOGY & ADDITIONAL TESTS: Reviewed.
[facility] Division of Hospital Medicine  Ellen Page DO  Available via MS Teams  In house pager [phone]     SUBJECTIVE / OVERNIGHT EVENTS: Pt complaining of being in a stretcher bed in holding area, states that bed he's in is making his joint pain worse.  Asking for a bed upstairs where he's more comfortable and there's "a dresser."  Pt reports severe pains all over, no fevers/chills, having good BMs.  When I mentioned I remember him from a prior admission his demeanor changed, immediately propped upright in bed and said "have you gotten prettier since the last time I saw you?? I am a , let me take a photo"      MEDICATIONS  (STANDING):  artificial  tears Solution 1 Drop(s) Both EYES four times a day  enoxaparin Injectable 40 milliGRAM(s) SubCutaneous every 24 hours  famotidine Injectable 20 milliGRAM(s) IV Push once  finasteride 5 milliGRAM(s) Oral daily  fluticasone propionate 50 MICROgram(s)/spray Nasal Spray 1 Spray(s) Both Nostrils two times a day  folic acid 1 milliGRAM(s) Oral daily  HYDROmorphone PCA (1 mG/mL) 30 milliLiter(s) PCA Continuous PCA Continuous  metoprolol tartrate 25 milliGRAM(s) Oral two times a day  polyethylene glycol 3350 17 Gram(s) Oral daily  senna 2 Tablet(s) Oral at bedtime  sodium chloride 0.45%. 1000 milliLiter(s) (75 mL/Hr) IV Continuous <Continuous>    MEDICATIONS  (PRN):  acetaminophen     Tablet .. 650 milliGRAM(s) Oral every 6 hours PRN Temp greater or equal to 38C (100.4F), Mild Pain (1 - 3)  ALBUTerol    90 MICROgram(s) HFA Inhaler 2 Puff(s) Inhalation every 6 hours PRN Shortness of Breath and/or Wheezing  diphenhydrAMINE 25 milliGRAM(s) Oral every 4 hours PRN Pruritus  naloxone Injectable 0.1 milliGRAM(s) IV Push every 3 minutes PRN For ANY of the following changes in patient status:  A. RR LESS THAN 10 breaths per minute, B. Oxygen saturation LESS THAN 90%, C. Sedation score of 6  ondansetron Injectable 4 milliGRAM(s) IV Push every 6 hours PRN Nausea      I&O's Summary    16 Oct 2022 07:01  -  17 Oct 2022 07:00  --------------------------------------------------------  IN: 600 mL / OUT: 0 mL / NET: 600 mL    17 Oct 2022 07:01  -  17 Oct 2022 13:58  --------------------------------------------------------  IN: 540 mL / OUT: 0 mL / NET: 540 mL        PHYSICAL EXAM:  Vital Signs Last 24 Hrs  T(C): 36.9 (17 Oct 2022 10:37), Max: 37.1 (16 Oct 2022 17:25)  T(F): 98.4 (17 Oct 2022 10:37), Max: 98.7 (16 Oct 2022 17:25)  HR: 70 (17 Oct 2022 10:37) (70 - 101)  BP: 134/53 (17 Oct 2022 10:37) (123/51 - 141/76)  BP(mean): --  RR: 18 (17 Oct 2022 10:37) (17 - 18)  SpO2: 98% (17 Oct 2022 10:37) (94% - 100%)    Parameters below as of 17 Oct 2022 10:37  Patient On (Oxygen Delivery Method): room air      CONSTITUTIONAL: NAD, well-developed, well-groomed, itching during entire visit   EYES: PERRLA; conjunctiva and sclera clear  RESPIRATORY: Normal respiratory effort; lungs are clear to auscultation bilaterally  CARDIOVASCULAR: Regular rate and rhythm, normal S1 and S2, no murmur/rub/gallop; No lower extremity edema  ABDOMEN: Nontender to palpation, normoactive bowel sounds, no rebound/guarding  MUSCULOSKELETAL:  No clubbing or cyanosis of digits; no joint swelling  PSYCH: A+O to person, place; labile affect   NEUROLOGY: CN 2-12 are intact and symmetric; no gross sensory deficits   SKIN: No rashes; no palpable lesions    LABS:                        5.4    11.07 )-----------( 362      ( 16 Oct 2022 07:04 )             16.3     10-16    137  |  106  |  12  ----------------------------<  72  4.8   |  21<L>  |  0.94    Ca    8.9      16 Oct 2022 07:04    TPro  7.2  /  Alb  3.5  /  TBili  2.3<H>  /  DBili  x   /  AST  32  /  ALT  12  /  AlkPhos  141<H>  10-16    Lactate Dehydrogenase, Serum in AM (10.16.22 @ 07:04)    Lactate Dehydrogenase, Serum: 372 U/L    Reticulocyte Count in AM (10.16.22 @ 07:04)    RBC Count: 1.85 M/uL    Reticulocyte Percent: 13.3 %    Absolute Reticulocytes: 245.1 K/uL      RADIOLOGY & ADDITIONAL TESTS:  New Imaging Personally Reviewed Today:  < from: Xray Chest 2 Views PA/Lat (10.13.22 @ 23:31) >  FINDINGS:  Heart/Vascular: Heart is top normal in size.  Pulmonary: The lungs are clear. No pleural effusion. No pneumothorax.  Bones: Sclerotic changes in visualized left humeral head and H-shaped   appearance of multiple vertebral bodies related to known, sickle cell   disease    IMPRESSION:  Clear lungs.    < end of copied text >    Prior or Outpatient Records Reviewed Today: Outpt records reviewed        
Patient is a 57y old  Male who presents with a chief complaint of sickle cell crisis (23 Oct 2022 12:48)      SUBJECTIVE / OVERNIGHT EVENTS:    No events overnight. This AM, patient without n/v/d/cp/sob.      MEDICATIONS  (STANDING):  artificial  tears Solution 1 Drop(s) Both EYES four times a day  enoxaparin Injectable 40 milliGRAM(s) SubCutaneous every 24 hours  famotidine    Tablet 20 milliGRAM(s) Oral daily  finasteride 5 milliGRAM(s) Oral daily  fluticasone propionate 50 MICROgram(s)/spray Nasal Spray 1 Spray(s) Both Nostrils two times a day  folic acid 1 milliGRAM(s) Oral daily  metoprolol tartrate 25 milliGRAM(s) Oral two times a day  polyethylene glycol 3350 17 Gram(s) Oral daily  senna 2 Tablet(s) Oral at bedtime  sodium chloride 0.45%. 1000 milliLiter(s) (75 mL/Hr) IV Continuous <Continuous>    MEDICATIONS  (PRN):  acetaminophen     Tablet .. 650 milliGRAM(s) Oral every 6 hours PRN Moderate Pain (4 - 6)  acetaminophen     Tablet .. 650 milliGRAM(s) Oral every 6 hours PRN Temp greater or equal to 38C (100.4F), Mild Pain (1 - 3)  ALBUTerol    90 MICROgram(s) HFA Inhaler 2 Puff(s) Inhalation every 6 hours PRN Shortness of Breath and/or Wheezing  diphenhydrAMINE 25 milliGRAM(s) Oral every 4 hours PRN Pruritus  HYDROmorphone   Tablet 2 milliGRAM(s) Oral every 6 hours PRN Severe Pain (7 - 10)  ibuprofen  Tablet. 400 milliGRAM(s) Oral every 8 hours PRN Moderate Pain (4 - 6)  naloxone Injectable 0.1 milliGRAM(s) IV Push every 3 minutes PRN For ANY of the following changes in patient status:  A. RR LESS THAN 10 breaths per minute, B. Oxygen saturation LESS THAN 90%, C. Sedation score of 6  ondansetron Injectable 4 milliGRAM(s) IV Push every 6 hours PRN Nausea      PHYSICAL EXAM:  T(C): 36.7 (10-24-22 @ 13:49), Max: 36.7 (10-23-22 @ 16:14)  HR: 69 (10-24-22 @ 13:49) (69 - 82)  BP: 128/66 (10-24-22 @ 13:49) (126/72 - 174/90)  RR: 16 (10-24-22 @ 13:49) (16 - 18)  SpO2: 100% (10-24-22 @ 13:49) (97% - 100%)  I&O's Summary    GENERAL: NAD, well-developed, lying in stretcher  HEAD:  Atraumatic, Normocephalic, MMM  CHEST/LUNG: No use of accessory muscles, CTAB, breathing non-labored  COR: RR, no mrcg  ABD: Soft, ND/NT, +BS  PSYCH: AAOx3  NEUROLOGY: CN II-XII grossly intact, moving all extremities  SKIN: No rashes or lesions  EXT: wwp, no cce    LABS:  CAPILLARY BLOOD GLUCOSE                            RADIOLOGY & ADDITIONAL TESTS:    Telemetry Personally Reviewed -     Imaging Personally Reviewed -     Imaging Reviewed -     Consultant(s) Notes Reviewed -       Care Discussed with Consultants/Other Providers - 
Patient is a 57y old  Male who presents with a chief complaint of sickle cell crisis (15 Oct 2022 11:30)      SUBJECTIVE / OVERNIGHT EVENTS:    No events overnight. This AM, patient without n/v/d/cp/sob. Complaining of pain today but IV has dislodged (unable to receive pain medication as he is on PCA).    MEDICATIONS  (STANDING):  artificial  tears Solution 1 Drop(s) Both EYES four times a day  enoxaparin Injectable 40 milliGRAM(s) SubCutaneous every 24 hours  famotidine Injectable 20 milliGRAM(s) IV Push once  finasteride 5 milliGRAM(s) Oral daily  fluticasone propionate 50 MICROgram(s)/spray Nasal Spray 1 Spray(s) Both Nostrils two times a day  folic acid 1 milliGRAM(s) Oral daily  HYDROmorphone PCA (1 mG/mL) 30 milliLiter(s) PCA Continuous PCA Continuous  metoprolol tartrate 25 milliGRAM(s) Oral two times a day  polyethylene glycol 3350 17 Gram(s) Oral daily  senna 2 Tablet(s) Oral at bedtime  sodium chloride 0.45%. 1000 milliLiter(s) (75 mL/Hr) IV Continuous <Continuous>    MEDICATIONS  (PRN):  acetaminophen     Tablet .. 650 milliGRAM(s) Oral every 6 hours PRN Temp greater or equal to 38C (100.4F), Mild Pain (1 - 3)  ALBUTerol    90 MICROgram(s) HFA Inhaler 2 Puff(s) Inhalation every 6 hours PRN Shortness of Breath and/or Wheezing  diphenhydrAMINE 25 milliGRAM(s) Oral every 4 hours PRN Pruritus  naloxone Injectable 0.1 milliGRAM(s) IV Push every 3 minutes PRN For ANY of the following changes in patient status:  A. RR LESS THAN 10 breaths per minute, B. Oxygen saturation LESS THAN 90%, C. Sedation score of 6  ondansetron Injectable 4 milliGRAM(s) IV Push every 6 hours PRN Nausea      PHYSICAL EXAM:  T(C): 36.6 (10-16-22 @ 05:15), Max: 36.8 (10-16-22 @ 02:00)  HR: 84 (10-16-22 @ 05:15) (70 - 84)  BP: 131/72 (10-16-22 @ 05:15) (115/63 - 142/68)  RR: 16 (10-16-22 @ 05:15) (16 - 16)  SpO2: 100% (10-16-22 @ 05:15) (97% - 100%)  I&O's Summary    15 Oct 2022 07:01  -  16 Oct 2022 07:00  --------------------------------------------------------  IN: 240 mL / OUT: 500 mL / NET: -260 mL      GENERAL: NAD, well-developed, seated in stretcher  HEAD:  Atraumatic, Normocephalic, MMM  CHEST/LUNG: No use of accessory muscles, CTAB, breathing non-labored  COR: RR, no mrcg  ABD: Soft, ND/NT, +BS  PSYCH: AAOx3  NEUROLOGY: CN II-XII grossly intact, moving all extremities  SKIN: No rashes or lesions  EXT: wwp, no cce    LABS:  CAPILLARY BLOOD GLUCOSE                              5.2    11.82 )-----------( 374      ( 15 Oct 2022 07:34 )             15.7     10-16    137  |  106  |  12  ----------------------------<  72  4.8   |  21<L>  |  0.94    Ca    8.9      16 Oct 2022 07:04    TPro  7.2  /  Alb  3.5  /  TBili  2.3<H>  /  DBili  x   /  AST  32  /  ALT  12  /  AlkPhos  141<H>  10-16              Culture - Urine (collected 14 Oct 2022 03:20)  Source: Clean Catch Clean Catch (Midstream)  Final Report (15 Oct 2022 06:34):    <10,000 CFU/mL Normal Urogenital Shira        RADIOLOGY & ADDITIONAL TESTS:    Telemetry Personally Reviewed -     Imaging Personally Reviewed -     Imaging Reviewed -     Consultant(s) Notes Reviewed -       Care Discussed with Consultants/Other Providers -

## 2022-10-25 NOTE — PROGRESS NOTE ADULT - PROBLEM SELECTOR PLAN 2
epigastric pain, non-radiating  - possibly gastritis iso NSAID use  - recent admission for r/o alberto - HIDA negative at that time  - lipase wnl
epigastric pain, non-radiating  - possibly gastritis iso NSAID use  - recent admission for r/o alberto - HIDA negative at that time  - lipase wnl  - Pt not reporting further epigastric pain at this time, normal BMs per pt
epigastric pain, non-radiating, history changes daily.  Certain days pt states he has dark stools, other days reports normal stools. Pt refused rectal exam again today.   - possibly gastritis iso NSAID use  - recent admission for r/o alberto - HIDA negative at that time  - lipase wnl  - fu FOBT today
epigastric pain, non-radiating, history changes daily.  Certain days pt states he has dark stools, other days reports normal stools. Today reports normal clear stool.   - possibly gastritis iso NSAID use  - recent admission for r/o alberto - HIDA negative at that time  - lipase wnl
epigastric pain, non-radiating  - possibly gastritis iso NSAID use  - recent admission for r/o alberto - HIDA negative at that time  - lipase wnl  - Pt not reporting further epigastric pain at this time, normal BMs per pt
epigastric pain, non-radiating, history changes daily.  Certain days pt states he has dark stools, other days reports normal stools. Pt refused rectal exam today   - possibly gastritis iso NSAID use  - recent admission for r/o alberto - HIDA negative at that time  - lipase wnl
epigastric pain, non-radiating, history changes daily.  Certain days pt states he has dark stools, other days reports normal stools. Today reports normal clear stool.   - possibly gastritis iso NSAID use  - recent admission for r/o alberto - HIDA negative at that time  - lipase wnl
epigastric pain, non-radiating, history changes daily.  Certain days pt states he has dark stools, other days reports normal stools. Pt refused rectal exam again today.   - possibly gastritis iso NSAID use  - recent admission for r/o alberto - HIDA negative at that time  - lipase wnl  - fu FOBT today
epigastric pain, non-radiating, history changes daily.  Certain days pt states he has dark stools, other days reports normal stools. Today reports normal clear stool.   - possibly gastritis iso NSAID use  - recent admission for r/o alberto - HIDA negative at that time  - lipase wnl
epigastric pain, non-radiating  - possibly gastritis iso NSAID use  - recent admission for r/o alberto - HIDA negative at that time  - lipase wnl  - Pt not reporting further epigastric pain at this time
epigastric pain, non-radiating  - possibly gastritis iso NSAID use  - recent admission for r/o alberto - HIDA negative at that time  - lipase wnl

## 2022-10-25 NOTE — PROVIDER CONTACT NOTE (OTHER) - ASSESSMENT
Patient A&Ox4. Patient stable and no s/s of distress noted. Patient states he is a hardstick. RN able to convince patient to allow PCA one attempt for labs, patient agreed. Attempt unsuccessful and patient refusing further sticks.
Pt alert and oriented x3
Pt alert and oriented x4 c/o pain 9/10
Pt alert and oriented x4, pt c/o generalized pain 9/10
Tere ramsey never came to take pt. So I arranged [employer] [phone]. MAS call Inv# [id number]. P/U at 6:30 PM.
Patient is AOx4, asymptomatic and on a PCA pump. Morning labs were attempted by RN and Nurse Aid assigned to the patient and could not be retrieved. Patient requested for labs to be drawn by Provider.
Pt denies any pain. Pt is a hard stick and usually ultrasound guided. Pt claims he does not want to be touched.
Pt remains on PCA pump for pain management. Remains A&Ox4, stable at this time. denies dizziness, sob or chest pain

## 2022-10-25 NOTE — PROGRESS NOTE ADULT - PROBLEM SELECTOR PROBLEM 3
EMEKA (acute kidney injury)

## 2022-10-25 NOTE — PROGRESS NOTE ADULT - PROBLEM SELECTOR PROBLEM 5
BPH without urinary obstruction
Hypertension
BPH without urinary obstruction
Hypertension

## 2022-10-25 NOTE — PROGRESS NOTE ADULT - PROBLEM SELECTOR PLAN 4
Troponin 18  - no chest pain, VSS  - likely elevated iso of EMEKA  - repeat troponin without significant delta
- c/w finasteride
Troponin 18  - no chest pain, VSS  - likely elevated iso of EMEKA  - repeat troponin without significant delta
- c/w finasteride

## 2022-10-25 NOTE — PROGRESS NOTE ADULT - REASON FOR ADMISSION
sickle cell crisis

## 2022-10-25 NOTE — PROGRESS NOTE ADULT - PROBLEM SELECTOR PROBLEM 4
BPH without urinary obstruction
Elevated troponin
BPH without urinary obstruction
Elevated troponin
BPH without urinary obstruction

## 2022-10-25 NOTE — PROGRESS NOTE ADULT - PROBLEM SELECTOR PLAN 3
SCr 1.3 (baseline ~1) on admission  - likely pre-renal vs. d/t Aleve use  - started 1/2NS hydration   - trend creatinine
SCr 1.3 (baseline ~1) on admission, now improved   - likely pre-renal vs. d/t Aleve use  - started 1/2NS hydration
SCr 1.3 (baseline ~1) on admission, now improved   - likely pre-renal vs. d/t Aleve use  - started 1/2NS hydration
SCr 1.3 (baseline ~1) on admission, now improved   - likely pre-renal vs. d/t Aleve use  - on 1/2NS hydration
SCr 1.3 (baseline ~1) on admission, now improved   - likely pre-renal vs. d/t Aleve use  - started 1/2NS hydration
SCr 1.3 (baseline ~1) on admission, now improved   - likely pre-renal vs. d/t Aleve use  - on 1/2NS hydration
SCr 1.3 (baseline ~1) on admission, now improved   - likely pre-renal vs. d/t Aleve use
SCr 1.3 (baseline ~1) on admission, now improved   - likely pre-renal vs. d/t Aleve use  - started 1/2NS hydration
SCr 1.3 (baseline ~1) on admission, now improved   - likely pre-renal vs. d/t Aleve use  - started 1/2NS hydration
SCr 1.3 (baseline ~1) on admission  - likely pre-renal vs. d/t Aleve use  - start 1/2NS hydration   - trend creatinine; improving
SCr 1.3 (baseline ~1) on admission, now improved   - likely pre-renal vs. d/t Aleve use

## 2022-10-25 NOTE — PROVIDER CONTACT NOTE (OTHER) - REASON
Taxi
H/H 5.2/15.7
Pt c/o pain, pt without IV access
Patient refused 3rd attempt of Lab work.
Patient refusing morning blood work
Pt without IV access
IV arrow not working, fluids not being administered.
Pt c/o pain

## 2022-10-25 NOTE — DIETITIAN INITIAL EVALUATION ADULT - PHYSCIAL ASSESSMENT
Limited NFPE performed, patient refused NFPE, overt signs of temporal muscle wasting, severe orbital and buccal fat loss observed./underweight

## 2022-10-25 NOTE — PROGRESS NOTE ADULT - PROBLEM SELECTOR PLAN 5
BP stable  - c/w metoprolol tartrate 25mg BID  - monitor vital signs
- c/w finasteride
BP stable  - c/w metoprolol tartrate 25mg BID  - monitor vital signs
- c/w finasteride
BP stable  - c/w metoprolol tartrate 25mg BID  - monitor vital signs

## 2022-10-25 NOTE — DIETITIAN INITIAL EVALUATION ADULT - ADD RECOMMEND
1. Continue with regular diet, 2/2 poor intake, diet liberalization. Diet consistency defer to team.  2. Continue to provide Ensure Plus High Protein/ Enlive 3x/day (1050kcal, 60gm protein) for nutrient support.  3. Encourage PO intake and honor food preferences as able.   4. RD to follow-up per protocol.  1. Continue with regular diet, 2/2 poor intake, diet liberalization. Diet consistency defer to team.  2. Continue to provide Ensure Plus High Protein/ Enlive 3x/day (1050kcal, 60gm protein) for nutrient support.  3. Please document bowel movement.  4. Encourage PO intake and honor food preferences as able.   5. RD to follow-up per protocol.

## 2022-10-25 NOTE — DISCHARGE NOTE NURSING/CASE MANAGEMENT/SOCIAL WORK - NSDCPEFALRISK_GEN_ALL_CORE
For information on Fall & Injury Prevention, visit: [email or web address] OR  [email or web address] OR  https://www.cdc.gov/steadi/patient.html

## 2022-10-25 NOTE — DISCHARGE NOTE NURSING/CASE MANAGEMENT/SOCIAL WORK - WILL THE PATIENT ACCEPT THE PFIZER COVID-19 VACCINE IF ELIGIBLE AND IT IS AVAILABLE?
"No

## 2022-10-25 NOTE — DIETITIAN INITIAL EVALUATION ADULT - PROBLEM SELECTOR PROBLEM 5
Triage Assessment & Note:    /75   Pulse 68   Temp 98.7  F (37.1  C) (Oral)   Resp 16   Ht 1.829 m (6')   Wt 79.8 kg (176 lb)   SpO2 99%   BMI 23.87 kg/m        Patient presents with: Report taken from family and pt. Pt comes to triage with reports of SOB, chest wall pain more on the right and hemoptysis. Pt currently being worked up for TB screening. No reports of fever, CP, or travel.     Home Treatments/Remedies: Home medications    Febrile / Afebrile: afebrile    Duration of C/o: 4 days and getting worse    Rupal Arriaga RN  March 1, 2022        
BPH without urinary obstruction

## 2022-10-25 NOTE — DISCHARGE NOTE NURSING/CASE MANAGEMENT/SOCIAL WORK - NSDCCRNAME_GEN_ALL_CORE_FT
[facility]- T: ; 5pm  time.  Mahi Parra- Confirmation # [id number]

## 2022-10-25 NOTE — PROGRESS NOTE ADULT - PROBLEM SELECTOR PLAN 6
DVT ppx: resume enoxaparin 40mg q24h for VTE ppx, no gross e/o hemorrhage
DVT ppx: start enoxaparin 40mg q24h for VTE ppx, no gross e/o hemorrhage
DVT ppx: resume enoxaparin 40mg q24h for VTE ppx, no gross e/o hemorrhage
BP stable  - c/w metoprolol tartrate 25mg BID  - monitor vital signs
DVT ppx: resumed enoxaparin 40mg q24h for VTE ppx, no gross e/o hemorrhage    Discharge planning
DVT ppx: resumed enoxaparin 40mg q24h for VTE ppx, no gross e/o hemorrhage    Discharge planning
DVT ppx: resume enoxaparin 40mg q24h for VTE ppx, no gross e/o hemorrhage
DVT ppx: resume enoxaparin 40mg q24h for VTE ppx, no gross e/o hemorrhage
BP stable  - c/w metoprolol tartrate 25mg BID  - monitor vital signs
DVT ppx: resume enoxaparin 40mg q24h for VTE ppx, no gross e/o hemorrhage
DVT ppx: resume enoxaparin 40mg q24h for VTE ppx, no gross e/o hemorrhage

## 2022-10-25 NOTE — DIETITIAN INITIAL EVALUATION ADULT - ORAL INTAKE PTA/DIET HISTORY
Patient refused to talk during visit, limited information obtained during interview. Patient has shrimp allergy, per chart.

## 2022-10-25 NOTE — DISCHARGE NOTE NURSING/CASE MANAGEMENT/SOCIAL WORK - NSDCFUADDAPPT_GEN_ALL_CORE_FT
Please follow up with your Primary Care Physician and your doctor from [facility].     You have an appointment with [facility] on November 4, 2022 with Dr. Nikolay Sauer. Address is [address] [place] [address], [facility]. Phone number: [phone].

## 2022-10-25 NOTE — PROGRESS NOTE ADULT - PROBLEM SELECTOR PLAN 1
diffuse joint pains typical of sickle crisis, reticulocyte count elevated  - Hb baseline of 6 (was higher last month as patient received transfusion in anticipation for surgery), no s/s of acute chest, CVA, not a significant decline from baseline, no indication to transfuse at this time  - monitor Hb, should patient require transfusion will consult heme/blood bank as patient with hx of transfusion reaction in the past; no e/o bleeding   - Dilaudid PCA Pump + PRN Zofran + PRN PO Benadryl - increased pump settings on Oct 15 to hydromorphone 0.4mg 6 min lock out, 4hour max dose 12mg; will refrain from adjusting currently as patient needs new IV and not possible to assess whether pain is due to inadequate dosing or because he is not receiving any medication   -c/w 1/2NS hydration + folic acid  - trend CBC w/ diff, LDH, Reticulocyte count   - bowel regimen, monitor vital signs
diffuse joint pains typical of sickle crisis, reticulocyte count elevated  - Hb baseline of 6, no indication to transfuse at this time especially with history of transfusion reaction  - Monitor Hb, should patient require transfusion will consult heme/blood bank as patient with hx of transfusion reaction in the past.    - Transition Dilaudid PCA Pump to oral home pain regimen dilaudid po 2mg q6hr prn  - PRN Zofran + PRN PO Benadryl.    -c/w 1/2NS hydration + folic acid  - trend CBC w/ diff, LDH, Reticulocyte count   - bowel regimen  - Per outpt chart review, pt frequently misses appts (last in July 2022) where he was limited in history and didn't elaborate why he stopped taking Endari/Hydrea.  Is usually on PO dilaudid 4mg q4-6 hrs PRN, pt frequently goes to ER when he runs out of pain medications.  Will have SW evaluate for access to transportation
diffuse joint pains typical of sickle crisis, reticulocyte count elevated  - Hb baseline of 6, no indication to transfuse at this time especially with history of transfusion reaction   - monitor Hb, should patient require transfusion will consult heme/blood bank as patient with hx of transfusion reaction in the past  - Dilaudid PCA Pump + PRN Zofran + PRN PO Benadryl.  Pump settings were increased on Oct 15 to hydromorphone 0.4mg 6 min lock out, 4hour max dose 12mg.  No indication to uptitrate, anticipate transition to PO pain meds tomorrow   -c/w 1/2NS hydration + folic acid  - trend CBC w/ diff, LDH, Reticulocyte count   - bowel regimen  - Per outpt chart review, pt frequently misses appts (last in July 2022) where he was limited in reported history and didn't elaborate why he stopped taking Endari/Hydrea.  Is usually on PO dilaudid 4mg q4-6 hrs PRN, will check ISTOP
diffuse joint pains typical of sickle crisis, reticulocyte count elevated  - Hb 5.2; baseline of 6 (was higher last month as patient received transfusion in anticipation for surgery), no s/s of acute chest, CVA, not a significant decline from baseline, no indication to transfuse at this time  - monitor Hb, should patient require transfusion will consult heme/blood bank as patient with hx of transfusion reaction in the past; no e/o bleeding   - Dilaudid PCA Pump + PRN Zofran + PRN PO Benadryl - increase pump settings to hydromorphone 0.4mg 6 min lock out, 4hour max dose 12mg  -c/w 1/2NS hydration + folic acid  - trend CBC w/ diff, LDH, Reticulocyte count   - bowel regimen, monitor vital signs
diffuse joint pains typical of sickle crisis, reticulocyte count elevated  - Hb baseline of 6, no indication to transfuse at this time especially with history of transfusion reaction  - Monitor Hb, should patient require transfusion will consult heme/blood bank as patient with hx of transfusion reaction in the past.    - Dilaudid PCA Pump + PRN Zofran + PRN PO Benadryl.  Pump settings were increased on Oct 15 to hydromorphone 0.4mg 6 min lock out, 4hour max dose 12mg.  pt states he doesn't feel ready to transition to po today, he thinks he will be ready tomorrow.   -c/w 1/2NS hydration + folic acid  - trend CBC w/ diff, LDH, Reticulocyte count   - bowel regimen  - Per outpt chart review, pt frequently misses appts (last in July 2022) where he was limited in history and didn't elaborate why he stopped taking Endari/Hydrea.  Is usually on PO dilaudid 4mg q4-6 hrs PRN, pt frequently goes to ER when he runs out of pain medications.  Will have SW evaluate for access to transportation
diffuse joint pains typical of sickle crisis, reticulocyte count elevated  - Hb baseline of 6, no indication to transfuse at this time especially with history of transfusion reaction  - monitor Hb, should patient require transfusion will consult heme/blood bank as patient with hx of transfusion reaction in the past  - Dilaudid PCA Pump + PRN Zofran + PRN PO Benadryl.  Pump settings were increased on Oct 15 to hydromorphone 0.4mg 6 min lock out, 4hour max dose 12mg.  Will resume current settings today   -c/w 1/2NS hydration + folic acid  - trend CBC w/ diff, LDH, Reticulocyte count   - bowel regimen  - Per outpt chart review, pt frequently misses appts (last in July 2022) where he was limited in history and didn't elaborate why he stopped taking Endari/Hydrea.  Is usually on PO dilaudid 4mg q4-6 hrs PRN, pt frequently goes to ER when he runs out of pain medications.  Will have SW evaluate for access to transportation
diffuse joint pains typical of sickle crisis, reticulocyte count elevated  - Hb baseline of 6, no indication to transfuse at this time especially with history of transfusion reaction  - Monitor Hb, should patient require transfusion will consult heme/blood bank as patient with hx of transfusion reaction in the past.    - Dilaudid PCA Pump + PRN Zofran + PRN PO Benadryl.  Pump settings were increased on Oct 15 to hydromorphone 0.4mg 6 min lock out, 4hour max dose 12mg.  PCA pump adjusted today; pt states that if he's taken off pump he will return right away to ER  -c/w 1/2NS hydration + folic acid  - trend CBC w/ diff, LDH, Reticulocyte count   - bowel regimen  - Per outpt chart review, pt frequently misses appts (last in July 2022) where he was limited in history and didn't elaborate why he stopped taking Endari/Hydrea.  Is usually on PO dilaudid 4mg q4-6 hrs PRN, pt frequently goes to ER when he runs out of pain medications.  Will have SW evaluate for access to transportation
diffuse joint pains typical of sickle crisis, reticulocyte count elevated  - Hb baseline of 6, no indication to transfuse at this time especially with history of transfusion reaction  - Monitor Hb, should patient require transfusion will consult heme/blood bank as patient with hx of transfusion reaction in the past.    - Transitioned Dilaudid PCA Pump to oral home pain regimen dilaudid po 2mg q6hr prn  - PRN Zofran + PRN PO Benadryl.    -c/w 1/2NS hydration + folic acid  - trend CBC w/ diff, LDH, Reticulocyte count   - bowel regimen  - Per outpt chart review, pt frequently misses appts (last in July 2022) where he was limited in history and didn't elaborate why he stopped taking Endari/Hydrea.  Is usually on PO dilaudid 4mg q4-6 hrs PRN, pt frequently goes to ER when he runs out of pain medications.  Will have SW evaluate for access to transportation prior to dc
diffuse joint pains typical of sickle crisis, reticulocyte count elevated  - Hb baseline of 6, no indication to transfuse at this time especially with history of transfusion reaction  - Monitor Hb, should patient require transfusion will consult heme/blood bank as patient with hx of transfusion reaction in the past.    - Dilaudid PCA Pump + PRN Zofran + PRN PO Benadryl.  Pump settings were increased on Oct 15 to hydromorphone 0.4mg 6 min lock out, 4hour max dose 12mg.  pt states he doesn't feel ready to transition to po today, he thinks he will be ready tomorrow.   -c/w 1/2NS hydration + folic acid  - trend CBC w/ diff, LDH, Reticulocyte count   - bowel regimen  - Per outpt chart review, pt frequently misses appts (last in July 2022) where he was limited in history and didn't elaborate why he stopped taking Endari/Hydrea.  Is usually on PO dilaudid 4mg q4-6 hrs PRN, pt frequently goes to ER when he runs out of pain medications.  Will have SW evaluate for access to transportation
Diffuse joint pains typical of sickle crisis, reticulocyte count elevated  - Hb baseline of 6, no indication to transfuse at this time especially with history of transfusion reaction  - Monitor Hb, should patient require transfusion will consult heme/blood bank as patient with hx of transfusion reaction in the past.    - Transitioned Dilaudid PCA Pump to oral home pain regimen Dilaudid po 2mg q6hr prn; patient states he would like to try 1mg q6h prn   - PRN Zofran + PRN PO Benadryl.    -c/w folic acid  - bowel regimen  - Per outpt chart review, pt frequently misses appts (last in 2022) where he was limited in history and didn't elaborate why he stopped taking Endari/Hydrea.  Is usually on PO dilaudid 4mg q4-6 hrs PRN, pt frequently goes to ER when he runs out of pain medications.  Will have SW evaluate for access to transportation prior to dc  - Patient has appt w/hematologist at [facility]   - Started dc planning but having difficulty covering naloxone as pharmacy has different  on record (attempting to rectify)
diffuse joint pains typical of sickle crisis, reticulocyte count elevated  - Hb baseline of 6, no indication to transfuse at this time especially with history of transfusion reaction  - Monitor Hb, should patient require transfusion will consult heme/blood bank as patient with hx of transfusion reaction in the past.    - Dilaudid PCA Pump + PRN Zofran + PRN PO Benadryl.  Pump settings were increased on Oct 15 to hydromorphone 0.4mg 6 min lock out, 4hour max dose 12mg.  Will resume current settings today   -c/w 1/2NS hydration + folic acid  - trend CBC w/ diff, LDH, Reticulocyte count   - bowel regimen  - Per outpt chart review, pt frequently misses appts (last in July 2022) where he was limited in history and didn't elaborate why he stopped taking Endari/Hydrea.  Is usually on PO dilaudid 4mg q4-6 hrs PRN, pt frequently goes to ER when he runs out of pain medications.  Will have SW evaluate for access to transportation

## 2022-10-25 NOTE — DIETITIAN INITIAL EVALUATION ADULT - OTHER INFO
67 year old male with hx of sickle cell disease c/b acute chest, transfusion reaction, HTN, BPH, recent admission (August 2022) for r/o cholecystitis, who presents with diffuse joint pains and epigastric pain over the past week a/w sickle cell crisis, per chart.   As per PCA, patient consumes 100% of Ensure this morning, 0-25% of breakfast. As per RN, no GI distress noted at this time. As per flow sheet, last bowel movement documented on 10/19/2022. Patient is on senna and polyethylene glycol. No reports of difficulty chewing or swallowing at this time, diet consistency defer to team. As per flow sheet, patient has one intake of 26-50% documented, able to feed self with tray set up. Labs reviewed: Hemoglobin/ Hematocrit- 5.5/16.4(10/20/2022), patient has dx of sickle cell anemia, on folic acid for micronutrient support.

## 2022-10-25 NOTE — PROGRESS NOTE ADULT - PROBLEM SELECTOR PROBLEM 6
Prophylactic measure
Hypertension
Prophylactic measure
Hypertension

## 2022-10-25 NOTE — PROGRESS NOTE ADULT - PROBLEM SELECTOR PROBLEM 1
Sickle cell crisis

## 2022-10-25 NOTE — DIETITIAN INITIAL EVALUATION ADULT - PERTINENT MEDS FT
MEDICATIONS  (STANDING):  artificial  tears Solution 1 Drop(s) Both EYES four times a day  enoxaparin Injectable 40 milliGRAM(s) SubCutaneous every 24 hours  famotidine    Tablet 20 milliGRAM(s) Oral daily  finasteride 5 milliGRAM(s) Oral daily  fluticasone propionate 50 MICROgram(s)/spray Nasal Spray 1 Spray(s) Both Nostrils two times a day  folic acid 1 milliGRAM(s) Oral daily  metoprolol tartrate 25 milliGRAM(s) Oral two times a day  polyethylene glycol 3350 17 Gram(s) Oral daily  senna 2 Tablet(s) Oral at bedtime    MEDICATIONS  (PRN):  acetaminophen     Tablet .. 650 milliGRAM(s) Oral every 6 hours PRN Moderate Pain (4 - 6)  acetaminophen     Tablet .. 650 milliGRAM(s) Oral every 6 hours PRN Temp greater or equal to 38C (100.4F), Mild Pain (1 - 3)  ALBUTerol    90 MICROgram(s) HFA Inhaler 2 Puff(s) Inhalation every 6 hours PRN Shortness of Breath and/or Wheezing  diphenhydrAMINE 25 milliGRAM(s) Oral every 4 hours PRN Pruritus  HYDROmorphone   Tablet 1 milliGRAM(s) Oral every 6 hours PRN Moderate Pain (4 - 6)  ibuprofen  Tablet. 400 milliGRAM(s) Oral every 8 hours PRN Moderate Pain (4 - 6)  naloxone Injectable 0.1 milliGRAM(s) IV Push every 3 minutes PRN For ANY of the following changes in patient status:  A. RR LESS THAN 10 breaths per minute, B. Oxygen saturation LESS THAN 90%, C. Sedation score of 6  ondansetron Injectable 4 milliGRAM(s) IV Push every 6 hours PRN Nausea

## 2022-10-25 NOTE — DIETITIAN NUTRITION RISK NOTIFICATION - TREATMENT: THE FOLLOWING DIET HAS BEEN RECOMMENDED
Diet, Regular:   Nut Restricted  Supplement Feeding Modality:  Oral  Ensure Enlive Cans or Servings Per Day:  3       Frequency:  Daily (10-14-22 @ 18:11) [Active]

## 2022-10-25 NOTE — DISCHARGE NOTE NURSING/CASE MANAGEMENT/SOCIAL WORK - PATIENT PORTAL LINK FT
You can access the FollowMyHealth Patient Portal offered by [facility] by registering at the following website: [email or web address]. By joining [employer]’s FollowMyHealth portal, you will also be able to view your health information using other applications (apps) compatible with our system.

## 2022-10-26 NOTE — ED ADULT TRIAGE NOTE - SOURCE OF INFORMATION
Patient/EMS Ivermectin Pregnancy And Lactation Text: This medication is Pregnancy Category C and it isn't known if it is safe during pregnancy. It is also excreted in breast milk.

## 2022-10-31 ENCOUNTER — NON-APPOINTMENT (OUTPATIENT)
Age: 67
End: 2022-10-31

## 2022-10-31 NOTE — PROGRESS NOTE ADULT - SUBJECTIVE AND OBJECTIVE BOX
64y old  Male who presents with a chief complaint of sickle cell crisis.    Today:  no events.       MEDICATIONS  (STANDING):  dextrose 5% + sodium chloride 0.45%. 1000 milliLiter(s) (125 mL/Hr) IV Continuous <Continuous>  fluticasone propionate 50 MICROgram(s)/spray Nasal Spray 1 Spray(s) Both Nostrils two times a day  heparin  Injectable 5000 Unit(s) SubCutaneous every 12 hours  influenza   Vaccine 0.5 milliLiter(s) IntraMuscular once  metoprolol tartrate 25 milliGRAM(s) Oral two times a day  morphine ER Tablet 30 milliGRAM(s) Oral two times a day  polyethylene glycol 3350 17 Gram(s) Oral daily  senna 2 Tablet(s) Oral at bedtime  tamsulosin 0.4 milliGRAM(s) Oral at bedtime    MEDICATIONS  (PRN):  acetaminophen   Tablet .. 650 milliGRAM(s) Oral every 6 hours PRN Temp greater or equal to 38C (100.4F), Mild Pain (1 - 3)  diphenhydrAMINE 50 milliGRAM(s) Oral every 4 hours PRN Rash and/or Itching  HYDROmorphone   Tablet 4 milliGRAM(s) Oral every 6 hours PRN Severe Pain (7 - 10)  polyethylene glycol 3350 17 Gram(s) Oral at bedtime PRN Constipation      Allergies  No Known Drug Allergies  peanuts (Angioedema)        FAMILY HISTORY:  No pertinent family history in first degree relatives      Vital Signs Last 24 Hrs  T(C): 36.8 (28 Jan 2020 11:05), Max: 37.1 (28 Jan 2020 06:16)  T(F): 98.2 (28 Jan 2020 11:05), Max: 98.7 (28 Jan 2020 06:16)  HR: 80 (28 Jan 2020 11:05) (80 - 81)  BP: 130/73 (28 Jan 2020 11:05) (123/57 - 130/73)  RR: 16 (28 Jan 2020 11:05) (16 - 18)  SpO2: 97% (28 Jan 2020 11:05) (96% - 97%)    PHYSICAL EXAM:    GENERAL: NAD, well-groomed, well-developed  HEAD:  Atraumatic, Normocephalic  EYES: EOMI, PERRLA, conjunctiva and sclera clear  ENMT: No tonsillar erythema, exudates, or enlargement; Moist mucous membranes, Good dentition, No lesions  NECK: Supple, No JVD, Normal thyroid  NERVOUS SYSTEM:  Alert & Oriented X3, Good concentration  CHEST/LUNG: Clear to percussion bilaterally; No rales, rhonchi, wheezing, or rubs  HEART: Regular rate and rhythm; No murmurs, rubs, or gallops  ABDOMEN: Soft, Nontender, Nondistended; Bowel sounds present      LABS: Include Z78.9 (Other Specified Conditions Influencing Health Status) As An Associated Diagnosis?: No Consent: The patient's consent was obtained including but not limited to risks of crusting, scabbing, blistering, scarring, darker or lighter pigmentary change, recurrence, incomplete removal and infection. Detail Level: Detailed Show Spray Paint Technique Variable?: Yes Medical Necessity Clause: This procedure was medically necessary because the lesions that were treated were: Number Of Freeze-Thaw Cycles: 2 freeze-thaw cycles Spray Paint Text: The liquid nitrogen was applied to the skin utilizing a spray paint frosting technique. Medical Necessity Information: It is in your best interest to select a reason for this procedure from the list below. All of these items fulfill various CMS LCD requirements except the new and changing color options. Post-Care Instructions: I reviewed with the patient in detail post-care instructions. Patient is to wear sunprotection, and avoid picking at any of the treated lesions. Pt may apply Vaseline to crusted or scabbing areas. Duration Of Freeze Thaw-Cycle (Seconds): 5-10

## 2022-11-06 ENCOUNTER — INPATIENT (INPATIENT)
Facility: HOSPITAL | Age: 67
LOS: 5 days | Discharge: HOME | End: 2022-11-12
Attending: INTERNAL MEDICINE | Admitting: INTERNAL MEDICINE

## 2022-11-06 VITALS
RESPIRATION RATE: 18 BRPM | OXYGEN SATURATION: 100 % | HEIGHT: 71 IN | SYSTOLIC BLOOD PRESSURE: 123 MMHG | WEIGHT: 125 LBS | HEART RATE: 87 BPM | TEMPERATURE: 99 F | DIASTOLIC BLOOD PRESSURE: 79 MMHG

## 2022-11-06 DIAGNOSIS — E80.6 OTHER DISORDERS OF BILIRUBIN METABOLISM: ICD-10-CM

## 2022-11-06 DIAGNOSIS — N17.9 ACUTE KIDNEY FAILURE, UNSPECIFIED: ICD-10-CM

## 2022-11-06 DIAGNOSIS — D57.00 HB-SS DISEASE WITH CRISIS, UNSPECIFIED: ICD-10-CM

## 2022-11-06 DIAGNOSIS — I10 ESSENTIAL (PRIMARY) HYPERTENSION: ICD-10-CM

## 2022-11-06 DIAGNOSIS — R77.8 OTHER SPECIFIED ABNORMALITIES OF PLASMA PROTEINS: ICD-10-CM

## 2022-11-06 DIAGNOSIS — D64.9 ANEMIA, UNSPECIFIED: ICD-10-CM

## 2022-11-06 LAB
ALBUMIN SERPL ELPH-MCNC: 3.2 G/DL — LOW (ref 3.3–5)
ALP SERPL-CCNC: 167 U/L — HIGH (ref 40–120)
ALT FLD-CCNC: 31 U/L — SIGNIFICANT CHANGE UP (ref 12–78)
ANION GAP SERPL CALC-SCNC: 11 MMOL/L — SIGNIFICANT CHANGE UP (ref 5–17)
APTT BLD: 26.9 SEC — LOW (ref 27.5–35.5)
AST SERPL-CCNC: 61 U/L — HIGH (ref 15–37)
BASOPHILS # BLD AUTO: 0.1 K/UL — SIGNIFICANT CHANGE UP (ref 0–0.2)
BASOPHILS NFR BLD AUTO: 0.6 % — SIGNIFICANT CHANGE UP (ref 0–2)
BILIRUB SERPL-MCNC: 2.2 MG/DL — HIGH (ref 0.2–1.2)
BLD GP AB SCN SERPL QL: SIGNIFICANT CHANGE UP
BUN SERPL-MCNC: 32 MG/DL — HIGH (ref 7–23)
CALCIUM SERPL-MCNC: 9.1 MG/DL — SIGNIFICANT CHANGE UP (ref 8.5–10.1)
CHLORIDE SERPL-SCNC: 113 MMOL/L — HIGH (ref 96–108)
CO2 SERPL-SCNC: 17 MMOL/L — LOW (ref 22–31)
CREAT SERPL-MCNC: 1.79 MG/DL — HIGH (ref 0.5–1.3)
EGFR: 41 ML/MIN/1.73M2 — LOW
EOSINOPHIL # BLD AUTO: 0.09 K/UL — SIGNIFICANT CHANGE UP (ref 0–0.5)
EOSINOPHIL NFR BLD AUTO: 0.5 % — SIGNIFICANT CHANGE UP (ref 0–6)
FLUAV AG NPH QL: SIGNIFICANT CHANGE UP
FLUBV AG NPH QL: SIGNIFICANT CHANGE UP
GLUCOSE SERPL-MCNC: 111 MG/DL — HIGH (ref 70–99)
HCT VFR BLD CALC: 14.2 % — CRITICAL LOW (ref 39–50)
HGB BLD-MCNC: 4.7 G/DL — CRITICAL LOW (ref 13–17)
IMM GRANULOCYTES NFR BLD AUTO: 3.2 % — HIGH (ref 0–0.9)
INR BLD: 1.18 RATIO — HIGH (ref 0.88–1.16)
LYMPHOCYTES # BLD AUTO: 0.83 K/UL — LOW (ref 1–3.3)
LYMPHOCYTES # BLD AUTO: 4.9 % — LOW (ref 13–44)
MCHC RBC-ENTMCNC: 27.6 PG — SIGNIFICANT CHANGE UP (ref 27–34)
MCHC RBC-ENTMCNC: 33.1 G/DL — SIGNIFICANT CHANGE UP (ref 32–36)
MCV RBC AUTO: 83.5 FL — SIGNIFICANT CHANGE UP (ref 80–100)
MONOCYTES # BLD AUTO: 1.28 K/UL — HIGH (ref 0–0.9)
MONOCYTES NFR BLD AUTO: 7.6 % — SIGNIFICANT CHANGE UP (ref 2–14)
NEUTROPHILS # BLD AUTO: 13.99 K/UL — HIGH (ref 1.8–7.4)
NEUTROPHILS NFR BLD AUTO: 83.2 % — HIGH (ref 43–77)
NRBC # BLD: 20 /100 WBCS — HIGH (ref 0–0)
PLATELET # BLD AUTO: 378 K/UL — SIGNIFICANT CHANGE UP (ref 150–400)
POTASSIUM SERPL-MCNC: 4.2 MMOL/L — SIGNIFICANT CHANGE UP (ref 3.5–5.3)
POTASSIUM SERPL-SCNC: 4.2 MMOL/L — SIGNIFICANT CHANGE UP (ref 3.5–5.3)
PROT SERPL-MCNC: 8.1 GM/DL — SIGNIFICANT CHANGE UP (ref 6–8.3)
PROTHROM AB SERPL-ACNC: 14.1 SEC — HIGH (ref 10.5–13.4)
RBC # BLD: 1.7 M/UL — LOW (ref 4.2–5.8)
RBC # BLD: 1.7 M/UL — LOW (ref 4.2–5.8)
RBC # FLD: 22.7 % — HIGH (ref 10.3–14.5)
RETICS #: 142.6 K/UL — HIGH (ref 25–125)
RETICS/RBC NFR: 8.4 % — HIGH (ref 0.5–2.5)
SARS-COV-2 RNA SPEC QL NAA+PROBE: SIGNIFICANT CHANGE UP
SODIUM SERPL-SCNC: 141 MMOL/L — SIGNIFICANT CHANGE UP (ref 135–145)
TROPONIN I, HIGH SENSITIVITY RESULT: 101.2 NG/L — HIGH
TROPONIN I, HIGH SENSITIVITY RESULT: 112.5 NG/L — HIGH
WBC # BLD: 16.82 K/UL — HIGH (ref 3.8–10.5)
WBC # FLD AUTO: 16.82 K/UL — HIGH (ref 3.8–10.5)

## 2022-11-06 PROCEDURE — 71045 X-RAY EXAM CHEST 1 VIEW: CPT | Mod: 26

## 2022-11-06 PROCEDURE — 99285 EMERGENCY DEPT VISIT HI MDM: CPT

## 2022-11-06 PROCEDURE — 99222 1ST HOSP IP/OBS MODERATE 55: CPT

## 2022-11-06 PROCEDURE — 86077 PHYS BLOOD BANK SERV XMATCH: CPT

## 2022-11-06 RX ORDER — SODIUM CHLORIDE 9 MG/ML
1000 INJECTION INTRAMUSCULAR; INTRAVENOUS; SUBCUTANEOUS ONCE
Refills: 0 | Status: COMPLETED | OUTPATIENT
Start: 2022-11-06 | End: 2022-11-06

## 2022-11-06 RX ORDER — HYDROMORPHONE HYDROCHLORIDE 2 MG/ML
1 INJECTION INTRAMUSCULAR; INTRAVENOUS; SUBCUTANEOUS ONCE
Refills: 0 | Status: DISCONTINUED | OUTPATIENT
Start: 2022-11-06 | End: 2022-11-06

## 2022-11-06 RX ORDER — SENNA PLUS 8.6 MG/1
2 TABLET ORAL AT BEDTIME
Refills: 0 | Status: DISCONTINUED | OUTPATIENT
Start: 2022-11-06 | End: 2022-11-12

## 2022-11-06 RX ORDER — SODIUM CHLORIDE 9 MG/ML
1000 INJECTION, SOLUTION INTRAVENOUS
Refills: 0 | Status: COMPLETED | OUTPATIENT
Start: 2022-11-06 | End: 2022-11-07

## 2022-11-06 RX ORDER — KETOROLAC TROMETHAMINE 30 MG/ML
15 SYRINGE (ML) INJECTION ONCE
Refills: 0 | Status: DISCONTINUED | OUTPATIENT
Start: 2022-11-06 | End: 2022-11-06

## 2022-11-06 RX ORDER — FOLIC ACID 0.8 MG
1 TABLET ORAL DAILY
Refills: 0 | Status: DISCONTINUED | OUTPATIENT
Start: 2022-11-07 | End: 2022-11-12

## 2022-11-06 RX ORDER — DIPHENHYDRAMINE HCL 50 MG
50 CAPSULE ORAL EVERY 6 HOURS
Refills: 0 | Status: DISCONTINUED | OUTPATIENT
Start: 2022-11-06 | End: 2022-11-12

## 2022-11-06 RX ORDER — HYDROMORPHONE HYDROCHLORIDE 2 MG/ML
30 INJECTION INTRAMUSCULAR; INTRAVENOUS; SUBCUTANEOUS
Refills: 0 | Status: DISCONTINUED | OUTPATIENT
Start: 2022-11-06 | End: 2022-11-07

## 2022-11-06 RX ORDER — METOPROLOL TARTRATE 50 MG
12.5 TABLET ORAL
Refills: 0 | Status: DISCONTINUED | OUTPATIENT
Start: 2022-11-06 | End: 2022-11-12

## 2022-11-06 RX ADMIN — Medication 15 MILLIGRAM(S): at 18:00

## 2022-11-06 RX ADMIN — HYDROMORPHONE HYDROCHLORIDE 1 MILLIGRAM(S): 2 INJECTION INTRAMUSCULAR; INTRAVENOUS; SUBCUTANEOUS at 18:32

## 2022-11-06 RX ADMIN — HYDROMORPHONE HYDROCHLORIDE 1 MILLIGRAM(S): 2 INJECTION INTRAMUSCULAR; INTRAVENOUS; SUBCUTANEOUS at 19:25

## 2022-11-06 RX ADMIN — HYDROMORPHONE HYDROCHLORIDE 1 MILLIGRAM(S): 2 INJECTION INTRAMUSCULAR; INTRAVENOUS; SUBCUTANEOUS at 21:43

## 2022-11-06 RX ADMIN — SODIUM CHLORIDE 1000 MILLILITER(S): 9 INJECTION INTRAMUSCULAR; INTRAVENOUS; SUBCUTANEOUS at 21:00

## 2022-11-06 RX ADMIN — HYDROMORPHONE HYDROCHLORIDE 1 MILLIGRAM(S): 2 INJECTION INTRAMUSCULAR; INTRAVENOUS; SUBCUTANEOUS at 22:00

## 2022-11-06 RX ADMIN — SODIUM CHLORIDE 1000 MILLILITER(S): 9 INJECTION INTRAMUSCULAR; INTRAVENOUS; SUBCUTANEOUS at 18:00

## 2022-11-06 RX ADMIN — HYDROMORPHONE HYDROCHLORIDE 1 MILLIGRAM(S): 2 INJECTION INTRAMUSCULAR; INTRAVENOUS; SUBCUTANEOUS at 19:55

## 2022-11-06 RX ADMIN — Medication 15 MILLIGRAM(S): at 18:32

## 2022-11-06 RX ADMIN — HYDROMORPHONE HYDROCHLORIDE 1 MILLIGRAM(S): 2 INJECTION INTRAMUSCULAR; INTRAVENOUS; SUBCUTANEOUS at 18:02

## 2022-11-06 NOTE — ED PROVIDER NOTE - PROGRESS NOTE DETAILS
Cullen: Patient anemic, asymptomatic now except for body pains.  Consented for blood, patient denies dark or bloody stools.

## 2022-11-06 NOTE — ED PROVIDER NOTE - OBJECTIVE STATEMENT
68yo male with pmh sickle cell, bph, htn, presenting with diffuse body pain.  States this pain is consistent with pain he typically gets with scc.  Is having cp, abdominal pain and diffuse extremity pain.  No trauma.  No fevers.  No sob, cough, sick contacts.

## 2022-11-06 NOTE — ED ADULT NURSE NOTE - OBJECTIVE STATEMENT
pt presents to the ED c/o pain all over body   pt states took alleve last night w/ minimal relief. hx sickle crisis pt presents to the ED c/o pain all over body. pt states pain is consistent with pain he usually gets w/ SCC.   pt states took alleve last night w/ minimal relief. hx sickle crisis

## 2022-11-06 NOTE — ED ADULT NURSE REASSESSMENT NOTE - NS ED NURSE REASSESS COMMENT FT1
report taken from ANGELICA Gonzales placed on the monitors, pt is A&Ox3 awaiting bed assignment IV intact

## 2022-11-06 NOTE — ED ADULT NURSE NOTE - COVID-19 ORDERING FACILITY
It was a pleasure to meet you! As discussed:      I have ordered your age appropriate labs please complete them. You will need to fast 10-12hrs before your lab appointment. Complete labs two weeks before your next appointment. Schedule with your gynecologist.     Grief (Actual/Anticipated): Care Instructions  Your Care Instructions  Grief is your emotional reaction to a major loss. The words \"sorrow\" and \"heartache\" often are used to describe feelings of grief. You feel grief when you lose a beloved person, pet, place, or thing. It is also natural to feel grief when you lose a valued way of life, such as a job, marriage, or good health. You may begin to grieve before a loss occurs. You may grieve for a loved one who is sick and dying. Children and adults often feel the pain of loss before a big move or divorce. This type of grief helps you get ready for a loss. Grief is different for each person. There is no \"normal\" or \"expected\" period of time for grieving. Some people adjust to their loss within a couple of months. Others may take 2 years or longer, especially if their lives were changed a lot or if the loss was sudden and shocking. Grieving can cause problems such as headaches, loss of appetite, and trouble with thinking or sleeping. You may withdraw from friends and family and behave in ways that are unusual for you. Grief may cause you to question your beliefs and views about life. Grief is natural and does not require medical treatment. But if you have trouble sleeping, it may help to take sleeping pills for a short time. It may help to talk with people who have been through or are going through similar losses. You may also want to talk to a counselor about your feelings. Talking about your loss, sharing your cares and concerns, and getting support from others are important parts of healthy grieving. Follow-up care is a key part of your treatment and safety.  Be sure to make and go to all appointments, and call your doctor if you are having problems. It's also a good idea to know your test results and keep a list of the medicines you take. How can you care for yourself at home? · Get enough sleep. Your mind helps make sense of your life while you sleep. Missing sleep can lead to illness and make it harder for you to deal with your grief. · Eat healthy foods. Try to avoid eating only foods that give you comfort. Ask someone to join you for a meal if you do not like eating alone. Consider taking a multivitamin every day. · Get some exercise every day. Even a walk can help you deal with your grief. Other exercises, such as yoga, can also help you manage stress. · Comfort yourself. Take time to look at photos or use special items that make you feel better. · Stay involved in your life. Do not withdraw from the activities you enjoy. People you know at work, [employer], clubs, or other groups can help you get through your period of grief. · Think about joining a support group to help you deal with your grief. There are many support groups to help people recover from grief. When should you call for help? Call 911 anytime you think you may need emergency care. For example, call if:  ? · You feel you cannot stop from hurting yourself or someone else. ? Watch closely for changes in your health, and be sure to contact your doctor if:  ? · You think you may be depressed. ? · You do not get better as expected. Where can you learn more? Go to http://rupert-louie.info/. Enter H249 in the search box to learn more about \"Grief (Actual/Anticipated): Care Instructions. \"  Current as of: September 24, 2016  Content Version: 11.4  © 9483-9441 [employer]. Care instructions adapted under license by [employer] (which disclaims liability or warranty for this information).  If you have questions about a medical condition or this instruction, always ask your healthcare professional. [address] any warranty or liability for your use of this information. Shortness of Breath: Care Instructions  Your Care Instructions  Shortness of breath has many causes. Sometimes conditions such as anxiety can lead to shortness of breath. Some people get mild shortness of breath when they exercise. Trouble breathing also can be a symptom of a serious problem, such as asthma, lung disease, emphysema, heart problems, and pneumonia. If your shortness of breath continues, you may need tests and treatment. Watch for any changes in your breathing and other symptoms. Follow-up care is a key part of your treatment and safety. Be sure to make and go to all appointments, and call your doctor if you are having problems. It's also a good idea to know your test results and keep a list of the medicines you take. How can you care for yourself at home? · Do not smoke or allow others to smoke around you. If you need help quitting, talk to your doctor about stop-smoking programs and medicines. These can increase your chances of quitting for good. · Get plenty of rest and sleep. · Take your medicines exactly as prescribed. Call your doctor if you think you are having a problem with your medicine. · Find healthy ways to deal with stress. ¨ Exercise daily. ¨ Get plenty of sleep. ¨ Eat regularly and well. When should you call for help? Call 911 anytime you think you may need emergency care. For example, call if:  ? · You have severe shortness of breath. ? · You have symptoms of a heart attack. These may include:  ¨ Chest pain or pressure, or a strange feeling in the chest.  ¨ Sweating. ¨ Shortness of breath. ¨ Nausea or vomiting. ¨ Pain, pressure, or a strange feeling in the back, neck, jaw, or upper belly or in one or both shoulders or arms. ¨ Lightheadedness or sudden weakness. ¨ A fast or irregular heartbeat.   After you call 911, the  may tell you to chew 1 adult-strength or 2 to 4 low-dose aspirin. Wait for an ambulance. Do not try to drive yourself. ?Call your doctor now or seek immediate medical care if:  ? · Your shortness of breath gets worse or you start to wheeze. Wheezing is a high-pitched sound when you breathe. ? · You wake up at night out of breath or have to prop your head up on several pillows to breathe. ? · You are short of breath after only light activity or while at rest.   ? Watch closely for changes in your health, and be sure to contact your doctor if:  ? · You do not get better over the next 1 to 2 days. Where can you learn more? Go to http://rupert-louie.info/. Enter S780 in the search box to learn more about \"Shortness of Breath: Care Instructions. \"  Current as of: May 12, 2017  Content Version: 11.4  © 6422-1082 [employer]. Care instructions adapted under license by [employer] (which disclaims liability or warranty for this information). If you have questions about a medical condition or this instruction, always ask your healthcare professional. [place] [address] any warranty or liability for your use of this information. KENNETH/MAURICIO/Micheal

## 2022-11-06 NOTE — H&P ADULT - PROBLEM SELECTOR PLAN 1
Hb 4.7 on admission.  Two units PRBCs ordered however patient had a positive antibody screen.  Blood transfusion likely will be delayed.  Discussed with patient.    Patient without complaints and comfortable on room air in ED

## 2022-11-06 NOTE — H&P ADULT - NSHPREVIEWOFSYSTEMS_GEN_ALL_CORE
Constitutional: no fever, chills, night sweats  Ears: no hearing changes or ear pain,   Nose: no nasal congestion, sinus pain, or rhinorrhea  Cardio: chest pain positive.  No orthopnea, edema, or palpitations  Resp: dyspnea positive.  No , cough, wheezing  GI: no nausea, vomiting, diarrhea, constipation, hematochezia, or melena  : no dysuria, urinary frequency, hematuria  MSK: back pain, knee pain, hip pain positive.    Skin: no rash, pruritis   Neuro: no weakness, dizziness, lightheadedness, syncope   Heme/Lymph: no bruising or bleeding

## 2022-11-06 NOTE — ED ADULT NURSE NOTE - ED STAT RN HANDOFF DETAILS
report given to RN georgina/tomy. Safety checks compld this shift/Safety rounds completed hourly.  IV sites checked Q2+remains WDL. Meds given as ord with no s/s of adverse RXNs. Fall +skin precs in place. Any issues endorsed to oncoming RN for follow up.  pt pending PRBCS, as per blood bank, antibodies found, awaiting PRBC's ready. v/s stable, on cardiac monitor.

## 2022-11-06 NOTE — ED ADULT NURSE NOTE - ED STAT RN HANDOFF WHERE
"                 Medication Management/Psychiatric Progress Notes     Patient Name: Mainor Madsen    MRN:  [id number]  :  2011    Age: 11 year old  Sex: female    Date:  3/30/2022    Vitals:   VS last checked on 3/24/22: RI=189/60 and pulse=81.    Current Medications:   Current Outpatient Medications   Medication Sig     acetaminophen (TYLENOL) 80 MG chewable tablet Take 80 mg by mouth every 4 hours as needed for mild pain or fever (Patient not taking: Reported on 3/1/2022)     diphenhydrAMINE (BENADRYL) 25 MG tablet Take 25 mg by mouth At Bedtime :1 tablet or 25mg 30 minutes prior to bedtime.     escitalopram (LEXAPRO) 5 MG tablet Take 2.5 mg by mouth daily :1/2 tab being moved from am to after dinner starting 3/29/22 due to compliance issues when taken in am. After 7 days to increase to 1 tab or 5mg daily after dinner.     No current facility-administered medications for this encounter.     Facility-Administered Medications Ordered in Other Encounters   Medication     acetaminophen (TYLENOL) tablet 650 mg     benzocaine-menthol (CEPACOL) 15-3.6 MG lozenge 1 lozenge     calcium carbonate (TUMS) chewable tablet 1,000 mg   *1st day Lexapro of Lexapro on 3/24/22 per patient report. Patient reported on 3/28 only taking med \"2 times\" so far-missed doses. Moved from am to after dinner starting 3/29/22. To increase to 1 tab or 5mg daily on 4/3/22.  *1st night of Benadryl 3/21/22. Patient stated on 3/28/22-desires for prn use only-does not want it scheduled. Denied taking Benadryl last night or 3/29/22.    Review of Systems/Side Effects:  Constitutional    Yes-\"tired.\" Described going to bed at \"11am\" and getting up at :8:10am.\"             Musculoskeletal  No                    Eyes    No            Integumentary    No. History SIB-last engaged in SIB-vague with timeline.         ENT    No            Neurological    No    Respiratory    No           Psychiatric    Yes    Cardiovascular    Yes-history of " "moderate pulmonary valvular stenosis-followed by cardiology yearly. Receives prophylactic Abx. Prior to dental procedures.          Endocrine    No    Gastrointestinal    No          Hemat/Lymph    No    Genitourinary  No           Allergic/Immuno    Yes-allergic to mangos and honeydew=hives. Seasonal allergies.    Subjective:     Saw patient today outside of school-no troubles at home reported-stayed in. No specific plans for later. Energy-\"tired.\" No troubles sleeping endorsed last night-only up till \"11am.\" No dreams/nightmares. Appetite-\"same.\" Troubles concentrating \"a little.\" Discussed also current levels of depression and anxiety. No specific trigger(s) reported. No recurrent safety thoughts endorsed again today. Discussed all meds and what they can help manage. No SE endorsed. Reported taking her Lexapro yesterday at dinner time.  Discussed talking with her auntie about that-that time better for compliance by auntie to give it and also since with a meal decrease chance GI upset. Discussed prior UTOX and substance use. Last use of a substance-THC-\"last Friday.\" No cravings for THC reported again today.  Mentioned with the UTOX when positive then being forwarded for quantitative value to monitor trend of use as well.  Stated she was informed in team today with her therapist contract/reward In place if abstains from using-patient described if not use for 9 days can earn a special lunch/coffee run.  Asked the patient if there was anything else she would like to discuss-\"no.\"  Thanked patient for meeting with her today and stated she would check in again tomorrow-patient in agreement with the plan.    Examination:  General Appearance:  Casual attire-black sweatshirt, burgundy colored hair with lighter purple strands in front, appears older than chronological age, yawned at start of the visits, smaller stature, good eye contact, cooperative, NAD. No objective signs substance use " "appreciated.    Speech:  Normal to softer tone, non-pressured, brief responses.    Thought Process: RRR. Vague at times with details felt more volitional in nature. No anxiety endorsed again today. Prior sources of anxiety include: coming to the program, doing wrong thing around someone, health fears about her friends, going into water and the water creatures that may be present/bite her, and getting out of shape/overweight.    Suicidal Ideation/Homicidal Ideation/Psychosis:  No current SI/HI/plan. History past SI. No past suicide attempts. History SIB-scratching self/punching her legs/cutting self-last engaged in SIB-approx. a week ago/patient is vague on timelines. No psychosis apparent/endorsed.       Orientation to Time, Place, Person:  A+Ox3.    Recent or Remote Memory:  Intact.    Attention Span and Concentration:  Appropriate.    Fund of Knowledge:  Appropriate in conversation. No known prior LD concerns.    Mood and Affect:  \"Tired.\" Depression=\"3\" and anxiety=\"0\" with 0=none, 1=mild and 10=severe. No trigger(s) today. Underlying depression and anxiety with guarded nature.    Muscle Strength/Tone/Gait/and Station:  Normal gait. No TD/tics. Underlying fatigue.    Labs/Tests Ordered or Reviewed:  Psychological testing ordered on 3/21/22-no history prior testing-3/29: impressions: MDD-recurrent-moderate, PTSD, Unspecified ADHD, LD unspecified with Rule outs of ADHD/LD-Math/Substance use DO. Random UTOXs in place with history prior THC and vaping use. Informed by nurse on 3/17/22 that UTOX positive for THC on 3/17/22 when taken-nurse and therapist discussed with aunt on 3/18/22. UTOX done again 3/23/22 and positive-quantitative THC value [id number]. Will continue to order UTOX with quantitative values as an objective measure of use for patient. UTOX obtained 3/29 and positive for THC-await quantitative value.    Risk Assessment:   Monitor.    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     Rule outs: PTSD/MDD/Eating DO-restricting history/ADHD/ODD/Substance use DO/In utero exposure or effects.    Discussion/Plan for Care:  Admitted on no meds. Consider antidepressant for depression and anxiety symptoms.  agreed with Lexapro trial on 3/21/22 when spoke with Dr. Garcia-start on 3/22/22 in am with breakfast-2.5mg for 7 days then 5mg in am. Target dose discussed of 5-10mg daily. 1st day of Lexapro on 3/24/22-missed doses reported on 3/28/22. Moved from am to dinner time starting on 3/29/22-2nd dose Lexapro given am of 3/28/22-aunamanda felt would help with copmpliance and concerns of taking with food as well. Consider OTC sleeping aid for sleep concerns- also agreed with sleeping aid trial when spoke with Dr. Garcia on 3/21/22-to give Benadryl 25mg 30 minutes prior to bedtime. Discussed may make further adjustments if need present. Patient stated she is not taking Benadryl nightly-will only take prn. Aunamanda stated 3/28/22 she will try to give 1 tab of Benadryl nightly if patient will take it. To monitor compliance all meds carefully.    History past trial Melatonin gummis with no effect.    Additional Comments:    Discussed in team today/Wednesday-please see note for full details. Admitted to the program on 3/16/22-referred by guardian-[employer] of which patient refers to as \"aunamanda.\" Initially to start adolescent PHP but then later aunamanda felt being with younger/child side be best environment for patient. Team expressed concerns about this-stayed on child side. No outpatient psychiatrist-therapist working on still scheduling 1st " "family meeting. Seen by pediatrician at the [facility]-can manage meds for patient. Therapist-Gloria Jamil with the [facility]: [phone]. History substance use-vaping and THC use. Discussed UTOX again today and positive this week again-await quantitative-recommending CD evaluation and treatment program-possibly site in [place] or [place]. Considering also referral for DBT at [facility]. Feel CD program should be pursued 1st. Doctor discussed meds. Has lived with \"aunamanda\" when with Mom as well/when she was alive. Also in home-uncle and brother-Jonatan (8) and family pets-1 dog and 4 cats. Enrolled at [employer] and is in the 7th grade-history being grade level but past year below grade level and skipping classes.  would like patient in a new school setting due to concerns patient hanging with \"suresh kids\" at [employer]- is taking lead on this and looking into [employer] schools for patient. Therapist has some possible school options for  to pursue-would be willing to discuss if family session scheduled-none yet. Discussed also eating disorder concerns in a piror meeting-await results testing to help determine if eating DO program needed after program completion as well. Possible discharge date discussed on 4/22/22.     Called patient's auntie on 3/28/22 at noon-[phone]: discussed seeing patient this am and meds.  Asked about forgetting to give Lexapro daily- did state it is hard to remember to give in am and since patient doesn't always eat concerns of giving then if do remember. Requested instead to move to after dinner time.  Fully supported that plan. Since taken this am will start tomorrow after dinner time/new time.  stated despite patient's desires to take Benadryl only prn will try to give nightly hence forth. Stated since patient sleeps upstairs she is not sure when she is asleep.  Supported nightly if patient will take it. All questions answered " and Mo appreciative of the call.    Psychological testing results reviewed after team meeting-to discuss in team next week and adjust diagnoses appropriately.    Time to complete note, discuss in team, later attest to team note, review quantitative UTOX results-no quantitative value back yet to compare, review psychological testing results, and complete billing=30 minutes.    Total Time: 40 minutes          Counseling/Coordination of Care Time: 30 minutes  Scribed by (PA-S Signature):__________________________________________  On behalf of (Physician Signature):_____________________________________  Physician Print Name: _______________________________________________  Pager #:___________________________________________________________     33 D 1D

## 2022-11-06 NOTE — ED ADULT TRIAGE NOTE - WEIGHT IN KG
Assessment/Plan:    No problem-specific Assessment & Plan notes found for this encounter  Diagnoses and all orders for this visit:    Health maintenance examination  See below otherwise normal exam    Diabetes mellitus screening  -     Comprehensive metabolic panel; Future    Need for lipid screening  -     Lipid panel; Future    Acquired nipple deformity  Advise to monitor at this time  Family history of colon cancer  Advised to follow up with gastro  Follow up in 1 year or as needed        Subjective:      Patient ID: Jimena Rees is a 34 y o  male  Patient is here to establish care  He denies any chronic medical problems  His aunt maternal  from colon cancer diagnosed at age 48  He had 3 colonoscopies thus far  F/U with Dr Paola Dubin  Also has a right nipple deformity which he attributes to his nipple being pierced  Not a smoker  The following portions of the patient's history were reviewed and updated as appropriate:   He  has no past medical history on file  He   Patient Active Problem List    Diagnosis Date Noted    Health maintenance examination 2022    Acquired nipple deformity 2022    Family history of colon cancer 2022    Diabetes mellitus screening 2018    Need for lipid screening 2018     He  has a past surgical history that includes Refractive surgery  His family history includes Colon cancer in his maternal aunt and maternal grandfather; Hypertension in his father; Mental illness in his father and mother; Substance Abuse in his father and mother  He  reports that he has quit smoking  He has never used smokeless tobacco  He reports current alcohol use  He reports that he does not use drugs    Current Outpatient Medications   Medication Sig Dispense Refill    calcium polycarbophil (FIBERCON) 625 mg tablet Take 625 mg by mouth daily      Omega-3 Fatty Acids (FISH OIL) 1,000 mg Take 1,000 mg by mouth daily       No current facility-administered medications for this visit  Current Outpatient Medications on File Prior to Visit   Medication Sig    calcium polycarbophil (FIBERCON) 625 mg tablet Take 625 mg by mouth daily    Omega-3 Fatty Acids (FISH OIL) 1,000 mg Take 1,000 mg by mouth daily    [DISCONTINUED] ALPRAZolam (XANAX) 2 MG tablet Take 1 tablet (2 mg total) by mouth once for 1 dose One tab prior to procedure, 30 mins     No current facility-administered medications on file prior to visit  He has No Known Allergies       Review of Systems   Constitutional: Negative for activity change, appetite change, fatigue and fever  HENT: Negative for congestion and ear discharge  Respiratory: Negative for cough and shortness of breath  Cardiovascular: Negative for chest pain and palpitations  Gastrointestinal: Negative for diarrhea and nausea  Musculoskeletal: Negative for arthralgias and back pain  Skin: Negative for color change and rash  Neurological: Negative for dizziness and headaches  Psychiatric/Behavioral: Negative for agitation and behavioral problems  Objective:      /72   Pulse (!) 52   Temp 98 2 °F (36 8 °C)   Ht 5' 4" (1 626 m)   Wt 82 6 kg (182 lb)   SpO2 100%   BMI 31 24 kg/m²          Physical Exam  Constitutional:       General: He is not in acute distress  Appearance: He is well-developed  He is not diaphoretic  Eyes:      General: No scleral icterus  Pupils: Pupils are equal, round, and reactive to light  Cardiovascular:      Rate and Rhythm: Normal rate and regular rhythm  Heart sounds: Normal heart sounds  No murmur heard  Pulmonary:      Effort: Pulmonary effort is normal  No respiratory distress  Breath sounds: Normal breath sounds  No wheezing  Chest:      Comments: Right nipple growth noted  Non tender no inflammation or redness  Abdominal:      General: Bowel sounds are normal  There is no distension  Palpations: Abdomen is soft  Tenderness: There is no abdominal tenderness  Skin:     General: Skin is warm and dry  Findings: No rash  Neurological:      Mental Status: He is alert and oriented to person, place, and time  56.7

## 2022-11-06 NOTE — ED ADULT NURSE NOTE - NSICDXNOPASTSURGICALHX_GEN_ALL_CORE
<-- Click to add NO significant Past Surgical History related to post-partum GDM nutrition therapy related to increased physiological demand

## 2022-11-06 NOTE — H&P ADULT - NSHPLABSRESULTS_GEN_ALL_CORE
Recent Vitals  T(C): 36.7 (11-06-22 @ 23:04), Max: 37.1 (11-06-22 @ 16:57)  HR: 93 (11-06-22 @ 23:04) (67 - 97)  BP: 141/60 (11-06-22 @ 23:04) (123/79 - 144/65)  RR: 20 (11-06-22 @ 23:04) (16 - 20)  SpO2: 99% (11-06-22 @ 23:04) (97% - 100%)                        4.7    16.82 )-----------( 378      ( 06 Nov 2022 17:41 )             14.2     11-06    141  |  113<H>  |  32<H>  ----------------------------<  111<H>  4.2   |  17<L>  |  1.79<H>    Ca    9.1      06 Nov 2022 17:41    TPro  8.1  /  Alb  3.2<L>  /  TBili  2.2<H>  /  DBili  x   /  AST  61<H>  /  ALT  31  /  AlkPhos  167<H>  11-06    PT/INR - ( 06 Nov 2022 19:20 )   PT: 14.1 sec;   INR: 1.18 ratio         PTT - ( 06 Nov 2022 19:20 )  PTT:26.9 sec  LIVER FUNCTIONS - ( 06 Nov 2022 17:41 )  Alb: 3.2 g/dL / Pro: 8.1 gm/dL / ALK PHOS: 167 U/L / ALT: 31 U/L / AST: 61 U/L / GGT: x               Home Medications:  fluticasone 50 mcg/inh nasal spray: 1 spray(s) nasal 2 times a day (29 Aug 2022 11:57)

## 2022-11-06 NOTE — H&P ADULT - ASSESSMENT
Patient is a 67M with a PMH of sickle cell anemia, HTN, and NPH who presents to the ED for generalized body pains.  Patient reports acute on chronic pain in all of his joints.  Patient also states that he has had dyspnea and CP on exertion over the last week but has become significantly worse over the last three days.  Patient reports dyspnea when walking in his home now.  Patient has no other complaints.  Significant anemia found in ED, labs also show mildly elevated troponin level.  Will admit to tele.

## 2022-11-06 NOTE — ED PROVIDER NOTE - CARE PLAN
1 Principal Discharge DX:	Sickle cell crisis   Principal Discharge DX:	Sickle cell crisis  Secondary Diagnosis:	Anemia

## 2022-11-06 NOTE — ED PROVIDER NOTE - PHYSICAL EXAMINATION
General appearance: Nontoxic appearing, conversant, afebrile    Eyes: anicteric sclerae, NELY, EOMI   HENT: Atraumatic; oropharynx clear, MMM and no ulcerations, no pharyngeal erythema or exudate   Neck: Trachea midline; Full range of motion, supple   Pulm: CTA bl, normal respiratory effort and no intercostal retractions, normal work of breathing   CV: RRR, No murmurs, rubs, or gallops   Abdomen: Soft, non-tender, non-distended; no guarding or rebound   Extremities: No peripheral edema, no gross deformities, FROM x4   Skin: Dry, normal temperature, turgor and texture; no rash   Psych: Appropriate affect, cooperative

## 2022-11-07 ENCOUNTER — APPOINTMENT (OUTPATIENT)
Dept: OPHTHALMOLOGY | Facility: CLINIC | Age: 67
End: 2022-11-07

## 2022-11-07 LAB
ALBUMIN SERPL ELPH-MCNC: 2.8 G/DL — LOW (ref 3.3–5)
ALLERGY+IMMUNOLOGY DIAG STUDY NOTE: SIGNIFICANT CHANGE UP
ALP SERPL-CCNC: 128 U/L — HIGH (ref 40–120)
ALT FLD-CCNC: 25 U/L — SIGNIFICANT CHANGE UP (ref 12–78)
ANION GAP SERPL CALC-SCNC: 8 MMOL/L — SIGNIFICANT CHANGE UP (ref 5–17)
AST SERPL-CCNC: 38 U/L — HIGH (ref 15–37)
BILIRUB SERPL-MCNC: 1.7 MG/DL — HIGH (ref 0.2–1.2)
BUN SERPL-MCNC: 25 MG/DL — HIGH (ref 7–23)
CALCIUM SERPL-MCNC: 8.8 MG/DL — SIGNIFICANT CHANGE UP (ref 8.5–10.1)
CHLORIDE SERPL-SCNC: 118 MMOL/L — HIGH (ref 96–108)
CO2 SERPL-SCNC: 18 MMOL/L — LOW (ref 22–31)
CREAT SERPL-MCNC: 1.42 MG/DL — HIGH (ref 0.5–1.3)
DAT IGG-SP REAG RBC-IMP: ABNORMAL
DIR ANTIGLOB POLYSPECIFIC INTERPRETATION: ABNORMAL
EGFR: 53 ML/MIN/1.73M2 — LOW
GLUCOSE SERPL-MCNC: 95 MG/DL — SIGNIFICANT CHANGE UP (ref 70–99)
HCT VFR BLD CALC: 12.4 % — CRITICAL LOW (ref 39–50)
HGB BLD-MCNC: 4.2 G/DL — CRITICAL LOW (ref 13–17)
IAT COMP-SP REAG SERPL QL: ABNORMAL
MAGNESIUM SERPL-MCNC: 1.8 MG/DL — SIGNIFICANT CHANGE UP (ref 1.6–2.6)
MCHC RBC-ENTMCNC: 27.6 PG — SIGNIFICANT CHANGE UP (ref 27–34)
MCHC RBC-ENTMCNC: 33.9 G/DL — SIGNIFICANT CHANGE UP (ref 32–36)
MCV RBC AUTO: 81.6 FL — SIGNIFICANT CHANGE UP (ref 80–100)
NRBC # BLD: 32 /100 WBCS — HIGH (ref 0–0)
PHOSPHATE SERPL-MCNC: 2.4 MG/DL — LOW (ref 2.5–4.5)
PLATELET # BLD AUTO: 367 K/UL — SIGNIFICANT CHANGE UP (ref 150–400)
POTASSIUM SERPL-MCNC: 3.8 MMOL/L — SIGNIFICANT CHANGE UP (ref 3.5–5.3)
POTASSIUM SERPL-SCNC: 3.8 MMOL/L — SIGNIFICANT CHANGE UP (ref 3.5–5.3)
PROT SERPL-MCNC: 6.8 GM/DL — SIGNIFICANT CHANGE UP (ref 6–8.3)
RBC # BLD: 1.52 M/UL — LOW (ref 4.2–5.8)
RBC # FLD: 23.9 % — HIGH (ref 10.3–14.5)
RETICS #: 126.3 K/UL — HIGH (ref 25–125)
RETICS/RBC NFR: 8.4 % — HIGH (ref 0.5–2.5)
SODIUM SERPL-SCNC: 144 MMOL/L — SIGNIFICANT CHANGE UP (ref 135–145)
TROPONIN I, HIGH SENSITIVITY RESULT: 121.3 NG/L — HIGH
WBC # BLD: 13.58 K/UL — HIGH (ref 3.8–10.5)
WBC # FLD AUTO: 13.58 K/UL — HIGH (ref 3.8–10.5)

## 2022-11-07 PROCEDURE — 99232 SBSQ HOSP IP/OBS MODERATE 35: CPT

## 2022-11-07 RX ORDER — INFLUENZA VIRUS VACCINE 15; 15; 15; 15 UG/.5ML; UG/.5ML; UG/.5ML; UG/.5ML
0.7 SUSPENSION INTRAMUSCULAR ONCE
Refills: 0 | Status: DISCONTINUED | OUTPATIENT
Start: 2022-11-07 | End: 2022-11-12

## 2022-11-07 RX ORDER — SODIUM CHLORIDE 9 MG/ML
1000 INJECTION, SOLUTION INTRAVENOUS
Refills: 0 | Status: DISCONTINUED | OUTPATIENT
Start: 2022-11-07 | End: 2022-11-11

## 2022-11-07 RX ORDER — CEFTRIAXONE 500 MG/1
1000 INJECTION, POWDER, FOR SOLUTION INTRAMUSCULAR; INTRAVENOUS EVERY 24 HOURS
Refills: 0 | Status: DISCONTINUED | OUTPATIENT
Start: 2022-11-07 | End: 2022-11-09

## 2022-11-07 RX ORDER — HYDROMORPHONE HYDROCHLORIDE 2 MG/ML
1 INJECTION INTRAMUSCULAR; INTRAVENOUS; SUBCUTANEOUS EVERY 4 HOURS
Refills: 0 | Status: DISCONTINUED | OUTPATIENT
Start: 2022-11-07 | End: 2022-11-11

## 2022-11-07 RX ORDER — HYDROMORPHONE HYDROCHLORIDE 2 MG/ML
0.5 INJECTION INTRAMUSCULAR; INTRAVENOUS; SUBCUTANEOUS EVERY 4 HOURS
Refills: 0 | Status: DISCONTINUED | OUTPATIENT
Start: 2022-11-07 | End: 2022-11-11

## 2022-11-07 RX ORDER — SENNA PLUS 8.6 MG/1
2 TABLET ORAL AT BEDTIME
Refills: 0 | Status: DISCONTINUED | OUTPATIENT
Start: 2022-11-07 | End: 2022-11-12

## 2022-11-07 RX ADMIN — HYDROMORPHONE HYDROCHLORIDE 1 MILLIGRAM(S): 2 INJECTION INTRAMUSCULAR; INTRAVENOUS; SUBCUTANEOUS at 20:30

## 2022-11-07 RX ADMIN — HYDROMORPHONE HYDROCHLORIDE 30 MILLILITER(S): 2 INJECTION INTRAMUSCULAR; INTRAVENOUS; SUBCUTANEOUS at 02:10

## 2022-11-07 RX ADMIN — Medication 12.5 MILLIGRAM(S): at 17:52

## 2022-11-07 RX ADMIN — Medication 12.5 MILLIGRAM(S): at 05:15

## 2022-11-07 RX ADMIN — SODIUM CHLORIDE 75 MILLILITER(S): 9 INJECTION, SOLUTION INTRAVENOUS at 09:45

## 2022-11-07 RX ADMIN — Medication 50 MILLIGRAM(S): at 00:34

## 2022-11-07 RX ADMIN — Medication 1 MILLIGRAM(S): at 12:19

## 2022-11-07 RX ADMIN — HYDROMORPHONE HYDROCHLORIDE 1 MILLIGRAM(S): 2 INJECTION INTRAMUSCULAR; INTRAVENOUS; SUBCUTANEOUS at 19:59

## 2022-11-07 RX ADMIN — HYDROMORPHONE HYDROCHLORIDE 1 MILLIGRAM(S): 2 INJECTION INTRAMUSCULAR; INTRAVENOUS; SUBCUTANEOUS at 00:35

## 2022-11-07 RX ADMIN — SENNA PLUS 2 TABLET(S): 8.6 TABLET ORAL at 22:16

## 2022-11-07 RX ADMIN — CEFTRIAXONE 100 MILLIGRAM(S): 500 INJECTION, POWDER, FOR SOLUTION INTRAMUSCULAR; INTRAVENOUS at 22:16

## 2022-11-07 RX ADMIN — SODIUM CHLORIDE 100 MILLILITER(S): 9 INJECTION, SOLUTION INTRAVENOUS at 00:40

## 2022-11-07 RX ADMIN — Medication 50 MILLIGRAM(S): at 22:21

## 2022-11-07 NOTE — PATIENT PROFILE ADULT - FALL HARM RISK - DATE OF FALL
Quality 226: Preventive Care And Screening: Tobacco Use: Screening And Cessation Intervention: Patient screened for tobacco use and is an ex/non-smoker
Quality 130: Documentation Of Current Medications In The Medical Record: Current Medications Documented
Detail Level: Detailed
07-Oct-2022

## 2022-11-07 NOTE — PROGRESS NOTE ADULT - ASSESSMENT
68 yo male w/ pmhx of sickle cell disease, hx of sickle cell crisis, NPH, HTN admitted to TELE for sickle cell crisis    - no evidence of acute chest (fever/chills). there is shortness of breathe but only exertion and it is likely secondary to his anemia  - 2 units of PRBC ordered but pt has antibodies and we are waiting for blood from the blood bank. pt was informed that blood will take time  - currently on PCA dilaudid pain pump and requesting more pain medication   - pt does not have an HEME doctor and is only on folate acid. unclear why pt is not on hydroxyurea   - pt is not on fluids at this time    PLAN  - c/w TELE  - transfuse 2 units of PRBC  - consult HEME pt is not on hydroxyurea  - start IVF to treat crisis 66 yo male w/ pmhx of sickle cell disease, hx of sickle cell crisis, NPH, HTN admitted to TELE for sickle cell crisis and symptomatic anemia (4.7 heme)    HEME/ONC  # symptomatic anemia  # sickle cell disease  # sickle cell crisis  - no evidence of acute chest (fever/chills). there is shortness of breathe but only exertion and it is likely secondary to his anemia  - 2 units of PRBC ordered but pt has antibodies and we are waiting for blood from the blood bank. pt was informed that blood will take time  - currently on PCA dilaudid pain pump and requesting more pain medication   - pt does not have an HEME doctor and is only on folate acid. unclear why pt is not on hydroxyurea   - pt is not on fluids at this time    CARDIOLOGY  # troponimia  # cardiac ischemia w/o injury  - trend troponin    PLAN  - c/w TELE  - transfuse 2 units of PRBC  - consult HEME pt is not on hydroxyurea  - start IVF to treat crisis 66 yo male w/ pmhx of sickle cell disease, hx of sickle cell crisis, NPH, HTN admitted to TELE for sickle cell crisis and symptomatic anemia (4.7 heme)    HEME/ONC  # symptomatic anemia  # sickle cell disease  # sickle cell crisis  - no evidence of acute chest (fever/chills). there is shortness of breathe but only exertion and it is likely secondary to his anemia  - 2 units of PRBC ordered but pt has antibodies and we are waiting for blood from the blood bank. pt was informed that blood will take time  - currently on PCA dilaudid pain pump and requesting more pain medication   - pt does not have an HEME doctor and is only on folate acid. unclear why pt is not on hydroxyurea   - pt is not on fluids at this time    CARDIOLOGY  # troponimia  # cardiac ischemia w/o injury  - trend troponin    PLAN  - c/w TELE  - transfuse 2 units of PRBC  - consult HEME pt is not on hydroxyurea  - start IVF to treat crisis     - called pt's brother Marc @ [phone] @ 11/7  ~ 130 pm. unable to reach. left voicemail

## 2022-11-07 NOTE — CONSULT NOTE ADULT - SUBJECTIVE AND OBJECTIVE BOX
Hematology Oncology Consult Note    HPI    67M with a PMH of sickle cell anemia, HTN, and NPH who presents to the ED for generalized body pains.  Patient reports acute on chronic pain in all of his joints.  Patient also states that he has had dyspnea and CP on exertion over the last week but has become significantly worse over the last three days.  Patient reports dyspnea when walking in his home now.  Patient has no other complaints.  Significant anemia found in ED, labs also show mildly elevated troponin level.  no BRBPR no tito reported      PMH HTN sickle cell anemia   PSH unknown  Allergy NJKDA  Social hx no smoking no ETOH abuse     Family hx non contributory     Review of system + aches and pains     VSS  Exam comfortable paler   lungs decrease breath sounds  [id number]  abdomen non tender  ext no edema

## 2022-11-07 NOTE — CONSULT NOTE ADULT - ASSESSMENT
Patient is a 67M with a PMH of sickle cell anemia, HTN, and NPH who presents to the ED for generalized body pains.  Patient reports acute on chronic pain in all of his joints.  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

## 2022-11-08 LAB
ALBUMIN SERPL ELPH-MCNC: 2.6 G/DL — LOW (ref 3.3–5)
ALP SERPL-CCNC: 134 U/L — HIGH (ref 40–120)
ALT FLD-CCNC: 21 U/L — SIGNIFICANT CHANGE UP (ref 12–78)
ANION GAP SERPL CALC-SCNC: 7 MMOL/L — SIGNIFICANT CHANGE UP (ref 5–17)
APPEARANCE UR: CLEAR — SIGNIFICANT CHANGE UP
AST SERPL-CCNC: 34 U/L — SIGNIFICANT CHANGE UP (ref 15–37)
BACTERIA # UR AUTO: NEGATIVE — SIGNIFICANT CHANGE UP
BILIRUB SERPL-MCNC: 2.4 MG/DL — HIGH (ref 0.2–1.2)
BILIRUB UR-MCNC: NEGATIVE — SIGNIFICANT CHANGE UP
BUN SERPL-MCNC: 15 MG/DL — SIGNIFICANT CHANGE UP (ref 7–23)
CALCIUM SERPL-MCNC: 8.8 MG/DL — SIGNIFICANT CHANGE UP (ref 8.5–10.1)
CHLORIDE SERPL-SCNC: 115 MMOL/L — HIGH (ref 96–108)
CO2 SERPL-SCNC: 19 MMOL/L — LOW (ref 22–31)
COLOR SPEC: YELLOW — SIGNIFICANT CHANGE UP
CREAT SERPL-MCNC: 1.09 MG/DL — SIGNIFICANT CHANGE UP (ref 0.5–1.3)
DIFF PNL FLD: ABNORMAL
EGFR: 73 ML/MIN/1.73M2 — SIGNIFICANT CHANGE UP
EPI CELLS # UR: SIGNIFICANT CHANGE UP
FERRITIN SERPL-MCNC: 184 NG/ML — SIGNIFICANT CHANGE UP (ref 30–400)
GLUCOSE BLDC GLUCOMTR-MCNC: 116 MG/DL — HIGH (ref 70–99)
GLUCOSE SERPL-MCNC: 84 MG/DL — SIGNIFICANT CHANGE UP (ref 70–99)
GLUCOSE UR QL: NEGATIVE MG/DL — SIGNIFICANT CHANGE UP
HAPTOGLOB SERPL-MCNC: <20 MG/DL — LOW (ref 34–200)
HCT VFR BLD CALC: 21.7 % — LOW (ref 39–50)
HGB BLD-MCNC: 7.2 G/DL — LOW (ref 13–17)
IRON SATN MFR SERPL: 36 % — SIGNIFICANT CHANGE UP (ref 16–55)
IRON SATN MFR SERPL: 99 UG/DL — SIGNIFICANT CHANGE UP (ref 45–165)
KETONES UR-MCNC: NEGATIVE — SIGNIFICANT CHANGE UP
LDH SERPL L TO P-CCNC: 336 U/L — HIGH (ref 50–242)
LEUKOCYTE ESTERASE UR-ACNC: NEGATIVE — SIGNIFICANT CHANGE UP
MAGNESIUM SERPL-MCNC: 1.7 MG/DL — SIGNIFICANT CHANGE UP (ref 1.6–2.6)
MCHC RBC-ENTMCNC: 28.8 PG — SIGNIFICANT CHANGE UP (ref 27–34)
MCHC RBC-ENTMCNC: 33.2 G/DL — SIGNIFICANT CHANGE UP (ref 32–36)
MCV RBC AUTO: 86.8 FL — SIGNIFICANT CHANGE UP (ref 80–100)
NITRITE UR-MCNC: NEGATIVE — SIGNIFICANT CHANGE UP
NRBC # BLD: 54 /100 WBCS — HIGH (ref 0–0)
OB PNL STL: NEGATIVE — SIGNIFICANT CHANGE UP
PH UR: 6 — SIGNIFICANT CHANGE UP (ref 5–8)
PHOSPHATE SERPL-MCNC: 2.8 MG/DL — SIGNIFICANT CHANGE UP (ref 2.5–4.5)
PLATELET # BLD AUTO: 366 K/UL — SIGNIFICANT CHANGE UP (ref 150–400)
POTASSIUM SERPL-MCNC: 3.9 MMOL/L — SIGNIFICANT CHANGE UP (ref 3.5–5.3)
POTASSIUM SERPL-SCNC: 3.9 MMOL/L — SIGNIFICANT CHANGE UP (ref 3.5–5.3)
PROT SERPL-MCNC: 6.7 GM/DL — SIGNIFICANT CHANGE UP (ref 6–8.3)
PROT UR-MCNC: 30 MG/DL
RBC # BLD: 2.5 M/UL — LOW (ref 4.2–5.8)
RBC # FLD: 21.6 % — HIGH (ref 10.3–14.5)
RBC CASTS # UR COMP ASSIST: SIGNIFICANT CHANGE UP /HPF (ref 0–4)
SODIUM SERPL-SCNC: 141 MMOL/L — SIGNIFICANT CHANGE UP (ref 135–145)
SP GR SPEC: 1.01 — SIGNIFICANT CHANGE UP (ref 1.01–1.02)
TIBC SERPL-MCNC: 276 UG/DL — SIGNIFICANT CHANGE UP (ref 220–430)
TROPONIN I, HIGH SENSITIVITY RESULT: 129.7 NG/L — HIGH
UIBC SERPL-MCNC: 178 UG/DL — SIGNIFICANT CHANGE UP (ref 110–370)
UROBILINOGEN FLD QL: NEGATIVE MG/DL — SIGNIFICANT CHANGE UP
WBC # BLD: 14.03 K/UL — HIGH (ref 3.8–10.5)
WBC # FLD AUTO: 14.03 K/UL — HIGH (ref 3.8–10.5)
WBC UR QL: NEGATIVE — SIGNIFICANT CHANGE UP

## 2022-11-08 PROCEDURE — 99232 SBSQ HOSP IP/OBS MODERATE 35: CPT

## 2022-11-08 RX ADMIN — HYDROMORPHONE HYDROCHLORIDE 1 MILLIGRAM(S): 2 INJECTION INTRAMUSCULAR; INTRAVENOUS; SUBCUTANEOUS at 03:31

## 2022-11-08 RX ADMIN — SENNA PLUS 2 TABLET(S): 8.6 TABLET ORAL at 21:58

## 2022-11-08 RX ADMIN — HYDROMORPHONE HYDROCHLORIDE 1 MILLIGRAM(S): 2 INJECTION INTRAMUSCULAR; INTRAVENOUS; SUBCUTANEOUS at 10:33

## 2022-11-08 RX ADMIN — SODIUM CHLORIDE 75 MILLILITER(S): 9 INJECTION, SOLUTION INTRAVENOUS at 05:21

## 2022-11-08 RX ADMIN — HYDROMORPHONE HYDROCHLORIDE 0.5 MILLIGRAM(S): 2 INJECTION INTRAMUSCULAR; INTRAVENOUS; SUBCUTANEOUS at 01:10

## 2022-11-08 RX ADMIN — HYDROMORPHONE HYDROCHLORIDE 0.5 MILLIGRAM(S): 2 INJECTION INTRAMUSCULAR; INTRAVENOUS; SUBCUTANEOUS at 18:55

## 2022-11-08 RX ADMIN — HYDROMORPHONE HYDROCHLORIDE 1 MILLIGRAM(S): 2 INJECTION INTRAMUSCULAR; INTRAVENOUS; SUBCUTANEOUS at 20:52

## 2022-11-08 RX ADMIN — HYDROMORPHONE HYDROCHLORIDE 0.5 MILLIGRAM(S): 2 INJECTION INTRAMUSCULAR; INTRAVENOUS; SUBCUTANEOUS at 07:41

## 2022-11-08 RX ADMIN — HYDROMORPHONE HYDROCHLORIDE 1 MILLIGRAM(S): 2 INJECTION INTRAMUSCULAR; INTRAVENOUS; SUBCUTANEOUS at 11:57

## 2022-11-08 RX ADMIN — HYDROMORPHONE HYDROCHLORIDE 0.5 MILLIGRAM(S): 2 INJECTION INTRAMUSCULAR; INTRAVENOUS; SUBCUTANEOUS at 18:21

## 2022-11-08 RX ADMIN — HYDROMORPHONE HYDROCHLORIDE 1 MILLIGRAM(S): 2 INJECTION INTRAMUSCULAR; INTRAVENOUS; SUBCUTANEOUS at 21:20

## 2022-11-08 RX ADMIN — HYDROMORPHONE HYDROCHLORIDE 0.5 MILLIGRAM(S): 2 INJECTION INTRAMUSCULAR; INTRAVENOUS; SUBCUTANEOUS at 00:42

## 2022-11-08 RX ADMIN — Medication 12.5 MILLIGRAM(S): at 05:19

## 2022-11-08 RX ADMIN — HYDROMORPHONE HYDROCHLORIDE 0.5 MILLIGRAM(S): 2 INJECTION INTRAMUSCULAR; INTRAVENOUS; SUBCUTANEOUS at 08:38

## 2022-11-08 RX ADMIN — CEFTRIAXONE 100 MILLIGRAM(S): 500 INJECTION, POWDER, FOR SOLUTION INTRAMUSCULAR; INTRAVENOUS at 22:04

## 2022-11-08 RX ADMIN — Medication 50 MILLIGRAM(S): at 21:57

## 2022-11-08 RX ADMIN — HYDROMORPHONE HYDROCHLORIDE 1 MILLIGRAM(S): 2 INJECTION INTRAMUSCULAR; INTRAVENOUS; SUBCUTANEOUS at 04:05

## 2022-11-08 NOTE — PROGRESS NOTE ADULT - ASSESSMENT
68 yo male w/ pmhx of sickle cell disease, hx of sickle cell crisis, NPH, HTN admitted to TELE for sickle cell crisis and symptomatic anemia (4.7 heme)    HEME/ONC  # symptomatic anemia  # sickle cell disease  # sickle cell crisis  - no evidence of acute chest (fever/chills). there is shortness of breathe but only exertion and it is likely secondary to his anemia  - sp2 units of PRBC heme ~ 7.2  - pt does not have an HEME doctor and is only on folate acid. unclear why pt is not on hydroxyurea   - pt is not on fluids at this time    CARDIOLOGY  # troponimia  # cardiac ischemia w/o injury  - trend troponin    PLAN  - c/w TELE  - consult HEME pt is not on hydroxyurea  - cw IVF to treat crisis  - start nasal canula oxygen  - consult GI due to positive fobt     - called pt's brother Marc @ [phone] @ 11/7  ~ 130 pm. unable to reach. left voicemail

## 2022-11-08 NOTE — DIETITIAN INITIAL EVALUATION ADULT - OTHER INFO
Pt lives c family who assist him as necessary; no diet restrictions followed at home; drinks nutritional supplement regularly. Pt reports recent wt loss since he has been in sickle cell crisis since September; unable to specify amount of loss; wt loss documented per previous assessment (7/2/22). Pt c Dilaudid pain pump; reports worsening generalized body pain; acute on chronic joint pain.  No reports of any N/V/D or chew/swallowing difficulty; c/o constipation; on bowel regimen.

## 2022-11-08 NOTE — DIETITIAN NUTRITION RISK NOTIFICATION - TREATMENT: THE FOLLOWING DIET HAS BEEN RECOMMENDED
Diet, Regular:   Supplement Feeding Modality:  Oral  Ensure Plus High Protein Cans or Servings Per Day:  1       Frequency:  Three Times a day (11-08-22 @ 14:25) [Pending Verification By Attending]  Diet, DASH/TLC:   Sodium & Cholesterol Restricted  Supplement Feeding Modality:  Oral  Ensure Enlive Cans or Servings Per Day:  1       Frequency:  Two Times a day (11-07-22 @ 16:33) [Active]

## 2022-11-08 NOTE — DIETITIAN INITIAL EVALUATION ADULT - NS FNS DIET ORDER
Diet, DASH/TLC:   Sodium & Cholesterol Restricted  Supplement Feeding Modality:  Oral  Ensure Enlive Cans or Servings Per Day:  1       Frequency:  Two Times a day (11-07-22 @ 16:33)

## 2022-11-08 NOTE — DIETITIAN INITIAL EVALUATION ADULT - WEIGHT IN LBS
Patient is scheduled for a complete physical on 8/24/2020.  Patient was advised to come in for fasting lab work 3-7 days prior to the physical.  Lab visit has not yet been scheduled, but pt was informed thru [facility] to schedule an appointment.  Please place lab order.              118

## 2022-11-08 NOTE — DIETITIAN INITIAL EVALUATION ADULT - PERTINENT LABORATORY DATA
11-08    141  |  115<H>  |  15  ----------------------------<  84  3.9   |  19<L>  |  1.09    Ca    8.8      08 Nov 2022 05:35  Phos  2.8     11-08  Mg     1.7     11-08    TPro  6.7  /  Alb  2.6<L>  /  TBili  2.4<H>  /  DBili  x   /  AST  34  /  ALT  21  /  AlkPhos  134<H>  11-08

## 2022-11-09 PROCEDURE — 99233 SBSQ HOSP IP/OBS HIGH 50: CPT

## 2022-11-09 RX ORDER — HEPARIN SODIUM 5000 [USP'U]/ML
5000 INJECTION INTRAVENOUS; SUBCUTANEOUS EVERY 12 HOURS
Refills: 0 | Status: DISCONTINUED | OUTPATIENT
Start: 2022-11-09 | End: 2022-11-12

## 2022-11-09 RX ADMIN — HYDROMORPHONE HYDROCHLORIDE 1 MILLIGRAM(S): 2 INJECTION INTRAMUSCULAR; INTRAVENOUS; SUBCUTANEOUS at 02:06

## 2022-11-09 RX ADMIN — HYDROMORPHONE HYDROCHLORIDE 0.5 MILLIGRAM(S): 2 INJECTION INTRAMUSCULAR; INTRAVENOUS; SUBCUTANEOUS at 14:50

## 2022-11-09 RX ADMIN — HYDROMORPHONE HYDROCHLORIDE 1 MILLIGRAM(S): 2 INJECTION INTRAMUSCULAR; INTRAVENOUS; SUBCUTANEOUS at 17:15

## 2022-11-09 RX ADMIN — HYDROMORPHONE HYDROCHLORIDE 0.5 MILLIGRAM(S): 2 INJECTION INTRAMUSCULAR; INTRAVENOUS; SUBCUTANEOUS at 00:50

## 2022-11-09 RX ADMIN — HYDROMORPHONE HYDROCHLORIDE 1 MILLIGRAM(S): 2 INJECTION INTRAMUSCULAR; INTRAVENOUS; SUBCUTANEOUS at 01:34

## 2022-11-09 RX ADMIN — HYDROMORPHONE HYDROCHLORIDE 0.5 MILLIGRAM(S): 2 INJECTION INTRAMUSCULAR; INTRAVENOUS; SUBCUTANEOUS at 21:25

## 2022-11-09 RX ADMIN — SODIUM CHLORIDE 75 MILLILITER(S): 9 INJECTION, SOLUTION INTRAVENOUS at 21:21

## 2022-11-09 RX ADMIN — SODIUM CHLORIDE 75 MILLILITER(S): 9 INJECTION, SOLUTION INTRAVENOUS at 17:15

## 2022-11-09 RX ADMIN — Medication 1 MILLIGRAM(S): at 12:01

## 2022-11-09 RX ADMIN — SENNA PLUS 2 TABLET(S): 8.6 TABLET ORAL at 21:22

## 2022-11-09 RX ADMIN — Medication 12.5 MILLIGRAM(S): at 06:00

## 2022-11-09 RX ADMIN — HYDROMORPHONE HYDROCHLORIDE 0.5 MILLIGRAM(S): 2 INJECTION INTRAMUSCULAR; INTRAVENOUS; SUBCUTANEOUS at 20:54

## 2022-11-09 RX ADMIN — HYDROMORPHONE HYDROCHLORIDE 0.5 MILLIGRAM(S): 2 INJECTION INTRAMUSCULAR; INTRAVENOUS; SUBCUTANEOUS at 10:36

## 2022-11-09 RX ADMIN — HYDROMORPHONE HYDROCHLORIDE 0.5 MILLIGRAM(S): 2 INJECTION INTRAMUSCULAR; INTRAVENOUS; SUBCUTANEOUS at 10:00

## 2022-11-09 RX ADMIN — HYDROMORPHONE HYDROCHLORIDE 0.5 MILLIGRAM(S): 2 INJECTION INTRAMUSCULAR; INTRAVENOUS; SUBCUTANEOUS at 14:18

## 2022-11-09 RX ADMIN — Medication 12.5 MILLIGRAM(S): at 17:14

## 2022-11-09 RX ADMIN — HEPARIN SODIUM 5000 UNIT(S): 5000 INJECTION INTRAVENOUS; SUBCUTANEOUS at 17:14

## 2022-11-09 RX ADMIN — HYDROMORPHONE HYDROCHLORIDE 0.5 MILLIGRAM(S): 2 INJECTION INTRAMUSCULAR; INTRAVENOUS; SUBCUTANEOUS at 00:21

## 2022-11-09 RX ADMIN — HYDROMORPHONE HYDROCHLORIDE 0.5 MILLIGRAM(S): 2 INJECTION INTRAMUSCULAR; INTRAVENOUS; SUBCUTANEOUS at 06:10

## 2022-11-09 RX ADMIN — HYDROMORPHONE HYDROCHLORIDE 0.5 MILLIGRAM(S): 2 INJECTION INTRAMUSCULAR; INTRAVENOUS; SUBCUTANEOUS at 05:59

## 2022-11-09 NOTE — PROGRESS NOTE ADULT - ASSESSMENT
66 yo male w/ pmhx of sickle cell disease, hx of sickle cell crisis, NPH, HTN admitted to TELE for sickle cell crisis and symptomatic anemia (4.7 heme)    HEME/ONC  # symptomatic anemia  # sickle cell disease  # sickle cell crisis  - no evidence of acute chest (fever/chills). there is shortness of breathe but only exertion and it is likely secondary to his anemia  - sp2 units of PRBC heme ~ 7.2  - pt does not have an HEME doctor and is only on folate acid. unclear why pt is not on hydroxyurea   - Continue IVF  - Hem following     CARDIOLOGY  # troponimia  # cardiac ischemia w/o injury    HTN:  - BP acceptable  - Continue current meds    EMEKA:  - Resolved after IVF     Start DVT ppx

## 2022-11-09 NOTE — PROGRESS NOTE ADULT - ASSESSMENT
Patient is a 67M with a PMH of sickle cell anemia, HTN, and NPH who presents to the ED for generalized body pains.  Patient reports acute on chronic pain in all of his joints.  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

## 2022-11-10 LAB
ALBUMIN SERPL ELPH-MCNC: 2.7 G/DL — LOW (ref 3.3–5)
ALP SERPL-CCNC: 165 U/L — HIGH (ref 40–120)
ALT FLD-CCNC: 24 U/L — SIGNIFICANT CHANGE UP (ref 12–78)
ANION GAP SERPL CALC-SCNC: 8 MMOL/L — SIGNIFICANT CHANGE UP (ref 5–17)
AST SERPL-CCNC: 34 U/L — SIGNIFICANT CHANGE UP (ref 15–37)
BILIRUB SERPL-MCNC: 1.9 MG/DL — HIGH (ref 0.2–1.2)
BUN SERPL-MCNC: 17 MG/DL — SIGNIFICANT CHANGE UP (ref 7–23)
CALCIUM SERPL-MCNC: 8.9 MG/DL — SIGNIFICANT CHANGE UP (ref 8.5–10.1)
CHLORIDE SERPL-SCNC: 111 MMOL/L — HIGH (ref 96–108)
CO2 SERPL-SCNC: 22 MMOL/L — SIGNIFICANT CHANGE UP (ref 22–31)
CREAT SERPL-MCNC: 1.07 MG/DL — SIGNIFICANT CHANGE UP (ref 0.5–1.3)
EGFR: 75 ML/MIN/1.73M2 — SIGNIFICANT CHANGE UP
GLUCOSE SERPL-MCNC: 106 MG/DL — HIGH (ref 70–99)
HAPTOGLOB SERPL-MCNC: <20 MG/DL — LOW (ref 34–200)
HCT VFR BLD CALC: 22.8 % — LOW (ref 39–50)
HGB BLD-MCNC: 7.6 G/DL — LOW (ref 13–17)
LDH SERPL L TO P-CCNC: 349 U/L — HIGH (ref 50–242)
MCHC RBC-ENTMCNC: 28.4 PG — SIGNIFICANT CHANGE UP (ref 27–34)
MCHC RBC-ENTMCNC: 33.3 G/DL — SIGNIFICANT CHANGE UP (ref 32–36)
MCV RBC AUTO: 85.1 FL — SIGNIFICANT CHANGE UP (ref 80–100)
NRBC # BLD: 13 /100 WBCS — HIGH (ref 0–0)
PLATELET # BLD AUTO: 327 K/UL — SIGNIFICANT CHANGE UP (ref 150–400)
POTASSIUM SERPL-MCNC: 4.2 MMOL/L — SIGNIFICANT CHANGE UP (ref 3.5–5.3)
POTASSIUM SERPL-SCNC: 4.2 MMOL/L — SIGNIFICANT CHANGE UP (ref 3.5–5.3)
PROT SERPL-MCNC: 6.8 GM/DL — SIGNIFICANT CHANGE UP (ref 6–8.3)
RBC # BLD: 2.68 M/UL — LOW (ref 4.2–5.8)
RBC # BLD: 2.68 M/UL — LOW (ref 4.2–5.8)
RBC # FLD: 23.4 % — HIGH (ref 10.3–14.5)
RETICS #: 233 K/UL — HIGH (ref 25–125)
RETICS/RBC NFR: 9.3 % — HIGH (ref 0.5–2.5)
SODIUM SERPL-SCNC: 141 MMOL/L — SIGNIFICANT CHANGE UP (ref 135–145)
WBC # BLD: 10.82 K/UL — HIGH (ref 3.8–10.5)
WBC # FLD AUTO: 10.82 K/UL — HIGH (ref 3.8–10.5)

## 2022-11-10 PROCEDURE — 99232 SBSQ HOSP IP/OBS MODERATE 35: CPT

## 2022-11-10 RX ADMIN — HYDROMORPHONE HYDROCHLORIDE 1 MILLIGRAM(S): 2 INJECTION INTRAMUSCULAR; INTRAVENOUS; SUBCUTANEOUS at 04:49

## 2022-11-10 RX ADMIN — HYDROMORPHONE HYDROCHLORIDE 1 MILLIGRAM(S): 2 INJECTION INTRAMUSCULAR; INTRAVENOUS; SUBCUTANEOUS at 14:21

## 2022-11-10 RX ADMIN — HYDROMORPHONE HYDROCHLORIDE 1 MILLIGRAM(S): 2 INJECTION INTRAMUSCULAR; INTRAVENOUS; SUBCUTANEOUS at 18:04

## 2022-11-10 RX ADMIN — Medication 12.5 MILLIGRAM(S): at 17:09

## 2022-11-10 RX ADMIN — Medication 50 MILLIGRAM(S): at 05:55

## 2022-11-10 RX ADMIN — SODIUM CHLORIDE 75 MILLILITER(S): 9 INJECTION, SOLUTION INTRAVENOUS at 11:14

## 2022-11-10 RX ADMIN — HEPARIN SODIUM 5000 UNIT(S): 5000 INJECTION INTRAVENOUS; SUBCUTANEOUS at 05:47

## 2022-11-10 RX ADMIN — Medication 12.5 MILLIGRAM(S): at 05:47

## 2022-11-10 RX ADMIN — HYDROMORPHONE HYDROCHLORIDE 1 MILLIGRAM(S): 2 INJECTION INTRAMUSCULAR; INTRAVENOUS; SUBCUTANEOUS at 23:07

## 2022-11-10 RX ADMIN — HYDROMORPHONE HYDROCHLORIDE 1 MILLIGRAM(S): 2 INJECTION INTRAMUSCULAR; INTRAVENOUS; SUBCUTANEOUS at 05:10

## 2022-11-10 RX ADMIN — HEPARIN SODIUM 5000 UNIT(S): 5000 INJECTION INTRAVENOUS; SUBCUTANEOUS at 17:09

## 2022-11-10 RX ADMIN — SODIUM CHLORIDE 75 MILLILITER(S): 9 INJECTION, SOLUTION INTRAVENOUS at 19:50

## 2022-11-10 RX ADMIN — HYDROMORPHONE HYDROCHLORIDE 1 MILLIGRAM(S): 2 INJECTION INTRAMUSCULAR; INTRAVENOUS; SUBCUTANEOUS at 18:37

## 2022-11-10 RX ADMIN — HYDROMORPHONE HYDROCHLORIDE 1 MILLIGRAM(S): 2 INJECTION INTRAMUSCULAR; INTRAVENOUS; SUBCUTANEOUS at 11:51

## 2022-11-10 RX ADMIN — Medication 1 MILLIGRAM(S): at 11:13

## 2022-11-10 RX ADMIN — HYDROMORPHONE HYDROCHLORIDE 1 MILLIGRAM(S): 2 INJECTION INTRAMUSCULAR; INTRAVENOUS; SUBCUTANEOUS at 01:18

## 2022-11-10 RX ADMIN — HYDROMORPHONE HYDROCHLORIDE 1 MILLIGRAM(S): 2 INJECTION INTRAMUSCULAR; INTRAVENOUS; SUBCUTANEOUS at 19:51

## 2022-11-10 RX ADMIN — HYDROMORPHONE HYDROCHLORIDE 1 MILLIGRAM(S): 2 INJECTION INTRAMUSCULAR; INTRAVENOUS; SUBCUTANEOUS at 00:36

## 2022-11-10 RX ADMIN — HYDROMORPHONE HYDROCHLORIDE 1 MILLIGRAM(S): 2 INJECTION INTRAMUSCULAR; INTRAVENOUS; SUBCUTANEOUS at 23:51

## 2022-11-10 RX ADMIN — HYDROMORPHONE HYDROCHLORIDE 1 MILLIGRAM(S): 2 INJECTION INTRAMUSCULAR; INTRAVENOUS; SUBCUTANEOUS at 09:34

## 2022-11-10 NOTE — PROGRESS NOTE ADULT - ASSESSMENT
66 yo male w/ pmhx of sickle cell disease, hx of sickle cell crisis, NPH, HTN admitted to TELE for sickle cell crisis and symptomatic anemia (4.7 heme)    HEME/ONC  # symptomatic anemia  # sickle cell disease  # sickle cell crisis  - no evidence of acute chest (fever/chills). there is shortness of breathe but only exertion and it is likely secondary to his anemia  - sp2 units of PRBC heme ~ 7.2  - pt does not have an HEME doctor and is only on folate acid. unclear why pt is not on hydroxyurea   - Continue IVF  - Hem following     CARDIOLOGY  # troponimia  # cardiac ischemia w/o injury    HTN:  - BP acceptable  - Continue current meds    EMEKA:  - Resolved after IVF     DVT ppx

## 2022-11-10 NOTE — PROGRESS NOTE ADULT - ASSESSMENT
Patient is a 67M with a PMH of sickle cell anemia, HTN, and NPH who presents to the ED for generalized body pains.  Patient reports acute on chronic pain in all of his joints.  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

## 2022-11-11 LAB
ALBUMIN SERPL ELPH-MCNC: 2.6 G/DL — LOW (ref 3.3–5)
ALP SERPL-CCNC: 173 U/L — HIGH (ref 40–120)
ALT FLD-CCNC: 24 U/L — SIGNIFICANT CHANGE UP (ref 12–78)
ANION GAP SERPL CALC-SCNC: 6 MMOL/L — SIGNIFICANT CHANGE UP (ref 5–17)
AST SERPL-CCNC: 33 U/L — SIGNIFICANT CHANGE UP (ref 15–37)
BILIRUB SERPL-MCNC: 1.5 MG/DL — HIGH (ref 0.2–1.2)
BUN SERPL-MCNC: 18 MG/DL — SIGNIFICANT CHANGE UP (ref 7–23)
CALCIUM SERPL-MCNC: 8.8 MG/DL — SIGNIFICANT CHANGE UP (ref 8.5–10.1)
CHLORIDE SERPL-SCNC: 110 MMOL/L — HIGH (ref 96–108)
CO2 SERPL-SCNC: 23 MMOL/L — SIGNIFICANT CHANGE UP (ref 22–31)
CREAT SERPL-MCNC: 1.02 MG/DL — SIGNIFICANT CHANGE UP (ref 0.5–1.3)
EGFR: 80 ML/MIN/1.73M2 — SIGNIFICANT CHANGE UP
GLUCOSE SERPL-MCNC: 98 MG/DL — SIGNIFICANT CHANGE UP (ref 70–99)
HCT VFR BLD CALC: 22.5 % — LOW (ref 39–50)
HGB BLD-MCNC: 7.4 G/DL — LOW (ref 13–17)
MCHC RBC-ENTMCNC: 27.8 PG — SIGNIFICANT CHANGE UP (ref 27–34)
MCHC RBC-ENTMCNC: 32.9 G/DL — SIGNIFICANT CHANGE UP (ref 32–36)
MCV RBC AUTO: 84.6 FL — SIGNIFICANT CHANGE UP (ref 80–100)
NRBC # BLD: 6 /100 WBCS — HIGH (ref 0–0)
PLATELET # BLD AUTO: 297 K/UL — SIGNIFICANT CHANGE UP (ref 150–400)
POTASSIUM SERPL-MCNC: 4.3 MMOL/L — SIGNIFICANT CHANGE UP (ref 3.5–5.3)
POTASSIUM SERPL-SCNC: 4.3 MMOL/L — SIGNIFICANT CHANGE UP (ref 3.5–5.3)
PROT SERPL-MCNC: 7 GM/DL — SIGNIFICANT CHANGE UP (ref 6–8.3)
RBC # BLD: 2.66 M/UL — LOW (ref 4.2–5.8)
RBC # FLD: 23.1 % — HIGH (ref 10.3–14.5)
SODIUM SERPL-SCNC: 139 MMOL/L — SIGNIFICANT CHANGE UP (ref 135–145)
WBC # BLD: 7.54 K/UL — SIGNIFICANT CHANGE UP (ref 3.8–10.5)
WBC # FLD AUTO: 7.54 K/UL — SIGNIFICANT CHANGE UP (ref 3.8–10.5)

## 2022-11-11 PROCEDURE — 93306 TTE W/DOPPLER COMPLETE: CPT | Mod: 26

## 2022-11-11 PROCEDURE — 99232 SBSQ HOSP IP/OBS MODERATE 35: CPT

## 2022-11-11 RX ORDER — HYDROMORPHONE HYDROCHLORIDE 2 MG/ML
2 INJECTION INTRAMUSCULAR; INTRAVENOUS; SUBCUTANEOUS
Refills: 0 | Status: DISCONTINUED | OUTPATIENT
Start: 2022-11-11 | End: 2022-11-12

## 2022-11-11 RX ORDER — FINASTERIDE 5 MG/1
5 TABLET, FILM COATED ORAL DAILY
Refills: 0 | Status: DISCONTINUED | OUTPATIENT
Start: 2022-11-11 | End: 2022-11-11

## 2022-11-11 RX ORDER — IBUPROFEN 200 MG
400 TABLET ORAL EVERY 6 HOURS
Refills: 0 | Status: DISCONTINUED | OUTPATIENT
Start: 2022-11-11 | End: 2022-11-12

## 2022-11-11 RX ORDER — FINASTERIDE 5 MG/1
5 TABLET, FILM COATED ORAL DAILY
Refills: 0 | Status: DISCONTINUED | OUTPATIENT
Start: 2022-11-11 | End: 2022-11-12

## 2022-11-11 RX ADMIN — Medication 400 MILLIGRAM(S): at 22:15

## 2022-11-11 RX ADMIN — HYDROMORPHONE HYDROCHLORIDE 2 MILLIGRAM(S): 2 INJECTION INTRAMUSCULAR; INTRAVENOUS; SUBCUTANEOUS at 23:40

## 2022-11-11 RX ADMIN — Medication 1 MILLIGRAM(S): at 11:39

## 2022-11-11 RX ADMIN — FINASTERIDE 5 MILLIGRAM(S): 5 TABLET, FILM COATED ORAL at 21:40

## 2022-11-11 RX ADMIN — HYDROMORPHONE HYDROCHLORIDE 1 MILLIGRAM(S): 2 INJECTION INTRAMUSCULAR; INTRAVENOUS; SUBCUTANEOUS at 09:54

## 2022-11-11 RX ADMIN — HYDROMORPHONE HYDROCHLORIDE 1 MILLIGRAM(S): 2 INJECTION INTRAMUSCULAR; INTRAVENOUS; SUBCUTANEOUS at 05:04

## 2022-11-11 RX ADMIN — Medication 12.5 MILLIGRAM(S): at 05:06

## 2022-11-11 RX ADMIN — HYDROMORPHONE HYDROCHLORIDE 2 MILLIGRAM(S): 2 INJECTION INTRAMUSCULAR; INTRAVENOUS; SUBCUTANEOUS at 18:24

## 2022-11-11 RX ADMIN — HYDROMORPHONE HYDROCHLORIDE 1 MILLIGRAM(S): 2 INJECTION INTRAMUSCULAR; INTRAVENOUS; SUBCUTANEOUS at 15:00

## 2022-11-11 RX ADMIN — SODIUM CHLORIDE 75 MILLILITER(S): 9 INJECTION, SOLUTION INTRAVENOUS at 12:38

## 2022-11-11 RX ADMIN — HYDROMORPHONE HYDROCHLORIDE 1 MILLIGRAM(S): 2 INJECTION INTRAMUSCULAR; INTRAVENOUS; SUBCUTANEOUS at 06:00

## 2022-11-11 RX ADMIN — HEPARIN SODIUM 5000 UNIT(S): 5000 INJECTION INTRAVENOUS; SUBCUTANEOUS at 17:08

## 2022-11-11 RX ADMIN — HYDROMORPHONE HYDROCHLORIDE 1 MILLIGRAM(S): 2 INJECTION INTRAMUSCULAR; INTRAVENOUS; SUBCUTANEOUS at 14:10

## 2022-11-11 RX ADMIN — Medication 12.5 MILLIGRAM(S): at 17:08

## 2022-11-11 RX ADMIN — Medication 400 MILLIGRAM(S): at 21:40

## 2022-11-11 RX ADMIN — HEPARIN SODIUM 5000 UNIT(S): 5000 INJECTION INTRAVENOUS; SUBCUTANEOUS at 05:05

## 2022-11-11 RX ADMIN — HYDROMORPHONE HYDROCHLORIDE 1 MILLIGRAM(S): 2 INJECTION INTRAMUSCULAR; INTRAVENOUS; SUBCUTANEOUS at 09:04

## 2022-11-11 NOTE — CHART NOTE - NSCHARTNOTEFT_GEN_A_CORE
Pt admitted for sickle cell crisis & anemia. PMHx sickle cell disease, HTN, NPH    Factors impacting intake: [X ] none [ ] nausea  [ ] vomiting [ ] diarrhea [ ] constipation  [ ]chewing problems [ ] swallowing issues  [ ] other:     Diet Prescription: Diet, Regular:   Supplement Feeding Modality:  Oral  Ensure Plus High Protein Cans or Servings Per Day:  1       Frequency:  Three Times a day (11-08-22 @ 14:25)    Intake:   Pt eating well; 50-75% most meals; drinking supplement    Current Weight: Weight (kg): 46.6 (11/10) 43.2 (11-07)  % Weight Change7.3% wt gain x 3 days    No edema noted    Pertinent Medications: MEDICATIONS  (STANDING):  folic acid 1 milliGRAM(s) Oral daily  heparin   Injectable 5000 Unit(s) SubCutaneous every 12 hours  influenza  Vaccine (HIGH DOSE) 0.7 milliLiter(s) IntraMuscular once  metoprolol tartrate 12.5 milliGRAM(s) Oral two times a day  senna 2 Tablet(s) Oral at bedtime  senna 2 Tablet(s) Oral at bedtime    MEDICATIONS  (PRN):  diphenhydrAMINE Injectable 50 milliGRAM(s) IV Push every 6 hours PRN Itching  HYDROmorphone   Tablet 2 milliGRAM(s) Oral four times a day PRN Moderate Pain (4 - 6)    Pertinent Labs: 11-11 Na139 mmol/L Glu 98 mg/dL K+ 4.3 mmol/L Cr  1.02 mg/dL BUN 18 mg/dL 11-08 Phos 2.8 mg/dL 11-11 Alb 2.6 g/dL<L>    Skin:   WDL    Estimated Needs:   [X ] no change since previous assessment (11/8)  [ ] recalculated:     Previous Nutrition Diagnosis:   [X ]  Severe Malnutrition in context of chronic illness  Etiology:  Inadequate energy/protein intake related to recurrent sickle cell crises  Signs & Symptoms:  physical findings of severe fat depletion & muscle wasting as noted on 11/8    GOAL:  Pt to consume >50% meals/supplements; maintain wt during LOS - both goals being met at this time    Nutrition Diagnosis is [X ] ongoing  [ ] resolved [ ] not applicable     New Nutrition Diagnosis: [ X] not applicable      Interventions:   continue current diet rx as noted  Recommend  [ ] Change Diet To:  [ ] Nutrition Supplement  [ ] Nutrition Support  [ ] Other:     Monitoring and Evaluation:   [X ] PO intake [ x ] Tolerance to diet prescription [ x ] weights [ x ] labs[ x ] follow up per protocol  [ ] other:

## 2022-11-11 NOTE — PROGRESS NOTE ADULT - ASSESSMENT
66 yo male w/ pmhx of sickle cell disease, hx of sickle cell crisis, NPH, HTN admitted to TELE for sickle cell crisis and symptomatic anemia (4.7 heme)    HEME/ONC  # symptomatic anemia  # sickle cell disease  # sickle cell crisis  - no evidence of acute chest (fever/chills). there is shortness of breathe but only exertion and it is likely secondary to his anemia  - sp2 units of PRBC  - pt does not have an HEME doctor and is only on folate acid. unclear why pt is not on hydroxyurea   - Switch to PO dilaudid  - Hem following     CARDIOLOGY  # troponimia  # cardiac ischemia w/o injury  Echo with no acute finding     HTN:  - BP acceptable  - Continue current meds    EMEKA:  - Resolved after IVF     DVT ppx

## 2022-11-11 NOTE — PROGRESS NOTE ADULT - ASSESSMENT
Patient is a 67M with a PMH of sickle cell anemia, HTN, and NPH who presents to the ED for generalized body pains.  Patient reports acute on chronic pain in all of his joints.  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

## 2022-11-12 ENCOUNTER — TRANSCRIPTION ENCOUNTER (OUTPATIENT)
Age: 67
End: 2022-11-12

## 2022-11-12 VITALS
OXYGEN SATURATION: 98 % | DIASTOLIC BLOOD PRESSURE: 79 MMHG | TEMPERATURE: 98 F | RESPIRATION RATE: 18 BRPM | SYSTOLIC BLOOD PRESSURE: 126 MMHG | HEART RATE: 85 BPM

## 2022-11-12 LAB
HCT VFR BLD CALC: 21.9 % — LOW (ref 39–50)
HGB BLD-MCNC: 7.1 G/DL — LOW (ref 13–17)
MCHC RBC-ENTMCNC: 28.6 PG — SIGNIFICANT CHANGE UP (ref 27–34)
MCHC RBC-ENTMCNC: 32.4 G/DL — SIGNIFICANT CHANGE UP (ref 32–36)
MCV RBC AUTO: 88.3 FL — SIGNIFICANT CHANGE UP (ref 80–100)
NRBC # BLD: 2 /100 WBCS — HIGH (ref 0–0)
PLATELET # BLD AUTO: 280 K/UL — SIGNIFICANT CHANGE UP (ref 150–400)
RBC # BLD: 2.48 M/UL — LOW (ref 4.2–5.8)
RBC # FLD: 22.5 % — HIGH (ref 10.3–14.5)
WBC # BLD: 7.55 K/UL — SIGNIFICANT CHANGE UP (ref 3.8–10.5)
WBC # FLD AUTO: 7.55 K/UL — SIGNIFICANT CHANGE UP (ref 3.8–10.5)

## 2022-11-12 PROCEDURE — 99239 HOSP IP/OBS DSCHRG MGMT >30: CPT

## 2022-11-12 RX ORDER — FOLIC ACID 0.8 MG
1 TABLET ORAL
Qty: 30 | Refills: 0
Start: 2022-11-12 | End: 2022-12-11

## 2022-11-12 RX ORDER — METOPROLOL TARTRATE 50 MG
1 TABLET ORAL
Qty: 60 | Refills: 0
Start: 2022-11-12 | End: 2022-12-11

## 2022-11-12 RX ORDER — FINASTERIDE 5 MG/1
1 TABLET, FILM COATED ORAL
Qty: 30 | Refills: 0
Start: 2022-11-12 | End: 2022-12-11

## 2022-11-12 RX ORDER — HYDROMORPHONE HYDROCHLORIDE 2 MG/ML
1 INJECTION INTRAMUSCULAR; INTRAVENOUS; SUBCUTANEOUS
Qty: 20 | Refills: 0
Start: 2022-11-12 | End: 2022-11-16

## 2022-11-12 RX ADMIN — HEPARIN SODIUM 5000 UNIT(S): 5000 INJECTION INTRAVENOUS; SUBCUTANEOUS at 05:14

## 2022-11-12 RX ADMIN — Medication 400 MILLIGRAM(S): at 12:07

## 2022-11-12 RX ADMIN — Medication 1 MILLIGRAM(S): at 12:07

## 2022-11-12 RX ADMIN — HYDROMORPHONE HYDROCHLORIDE 2 MILLIGRAM(S): 2 INJECTION INTRAMUSCULAR; INTRAVENOUS; SUBCUTANEOUS at 08:15

## 2022-11-12 RX ADMIN — FINASTERIDE 5 MILLIGRAM(S): 5 TABLET, FILM COATED ORAL at 12:07

## 2022-11-12 RX ADMIN — HYDROMORPHONE HYDROCHLORIDE 2 MILLIGRAM(S): 2 INJECTION INTRAMUSCULAR; INTRAVENOUS; SUBCUTANEOUS at 09:25

## 2022-11-12 RX ADMIN — HYDROMORPHONE HYDROCHLORIDE 2 MILLIGRAM(S): 2 INJECTION INTRAMUSCULAR; INTRAVENOUS; SUBCUTANEOUS at 00:30

## 2022-11-12 RX ADMIN — HYDROMORPHONE HYDROCHLORIDE 2 MILLIGRAM(S): 2 INJECTION INTRAMUSCULAR; INTRAVENOUS; SUBCUTANEOUS at 15:26

## 2022-11-12 NOTE — DISCHARGE NOTE PROVIDER - NSDCMRMEDTOKEN_GEN_ALL_CORE_FT
albuterol 90 mcg/inh inhalation aerosol: 2 puff(s) inhaled every 6 hours, As needed, Shortness of Breath and/or Wheezing  finasteride 5 mg oral tablet: 1 tab(s) orally once a day  fluticasone 50 mcg/inh nasal spray: 1 spray(s) nasal 2 times a day  folic acid 1 mg oral tablet: 1 tab(s) orally once a day  HYDROmorphone 2 mg oral tablet: 1 tab(s) orally every 6 hours, As Needed -Severe Pain; MDD:4 TAB   metoprolol tartrate 25 mg oral tablet: 1 tab(s) orally 2 times a day  ocular lubricant ophthalmic solution: 1 drop(s) to each affected eye 4 times a day  polyethylene glycol 3350 oral powder for reconstitution: 17 gram(s) orally once a day  senna leaf extract oral tablet: 2 tab(s) orally once a day (at bedtime)

## 2022-11-12 NOTE — DISCHARGE NOTE NURSING/CASE MANAGEMENT/SOCIAL WORK - NSDCPEFALRISK_GEN_ALL_CORE
For information on Fall & Injury Prevention, visit: [email or web address] OR  [email or web address] OR  https://www.cdc.gov/steadi/patient.html

## 2022-11-12 NOTE — DISCHARGE NOTE PROVIDER - CARE PROVIDER_API CALL
Justina Taylor  HEMATOLOGY  [address] [place] [address]  Phone: [phone]  Fax: [phone]  Follow Up Time:

## 2022-11-12 NOTE — DISCHARGE NOTE PROVIDER - NSDCCPCAREPLAN_GEN_ALL_CORE_FT
PRINCIPAL DISCHARGE DIAGNOSIS  Diagnosis: Sickle cell crisis  Assessment and Plan of Treatment: need hematology follow up      SECONDARY DISCHARGE DIAGNOSES  Diagnosis: Anemia  Assessment and Plan of Treatment:

## 2022-11-12 NOTE — PROGRESS NOTE ADULT - ASSESSMENT
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

## 2022-11-12 NOTE — PROGRESS NOTE ADULT - SUBJECTIVE AND OBJECTIVE BOX
Hematology Oncology Follow Up note   HPI    67M with a PMH of sickle cell anemia, HTN, and NPH who presents to the ED for generalized body pains.  Patient reports acute on chronic pain in all of his joints.  Patient also states that he has had dyspnea and CP on exertion over the last week but has become significantly worse over the last three days.  Patient reports dyspnea when walking in his home now.  Patient has no other complaints.  Significant anemia found in ED, labs also show mildly elevated troponin level.  no BRBPR no tito reported never had colonoscopy in past no hx of exchange transfixion       PMH HTN sickle cell anemia   PSH unknown  Allergy NJKDA  Social hx no smoking no ETOH abuse     Family hx non contributory     Review of system + aches and pains     VSS  Exam comfortable paler   lungs decrease breath sounds  [id number]  abdomen non tender  ext no edema
Patient is a 67y old  Male who presents with a chief complaint of sickle cell anemia, elevated troponin (10 Nov 2022 13:35)      INTERVAL HPI/OVERNIGHT EVENTS:  Pt was seen and examined, no acute events.      MEDICATIONS  (STANDING):  dextrose 5% + sodium chloride 0.45%. 1000 milliLiter(s) (75 mL/Hr) IV Continuous <Continuous>  folic acid 1 milliGRAM(s) Oral daily  heparin   Injectable 5000 Unit(s) SubCutaneous every 12 hours  influenza  Vaccine (HIGH DOSE) 0.7 milliLiter(s) IntraMuscular once  metoprolol tartrate 12.5 milliGRAM(s) Oral two times a day  senna 2 Tablet(s) Oral at bedtime  senna 2 Tablet(s) Oral at bedtime    MEDICATIONS  (PRN):  diphenhydrAMINE Injectable 50 milliGRAM(s) IV Push every 6 hours PRN Itching  HYDROmorphone  Injectable 1 milliGRAM(s) IV Push every 4 hours PRN Severe Pain (7 - 10)  HYDROmorphone  Injectable 0.5 milliGRAM(s) IV Push every 4 hours PRN Moderate Pain (4 - 6)      Allergies  No Known Drug Allergies  peanuts (Anaphylaxis)  peanuts (Angioedema)  pork (Unknown)  shellfish (Unknown)      Vital Signs Last 24 Hrs  T(C): 36.7 (10 Nov 2022 16:40), Max: 36.7 (10 Nov 2022 10:56)  T(F): 98 (10 Nov 2022 16:40), Max: 98 (10 Nov 2022 10:56)  HR: 75 (10 Nov 2022 16:40) (75 - 95)  BP: 135/66 (10 Nov 2022 16:40) (122/86 - 164/87)  BP(mean): --  RR: 17 (10 Nov 2022 16:40) (17 - 18)  SpO2: 97% (10 Nov 2022 16:40) (97% - 100%)    Parameters below as of 10 Nov 2022 16:40  Patient On (Oxygen Delivery Method): room air        PHYSICAL EXAM:  GENERAL: NAD  HEAD:  Atraumatic   EYES: PERRLA  ENMT: Mouth moist  NECK: Supple  NERVOUS SYSTEM:  Awake, alert  CHEST/LUNG: Clear  HEART: RRR, S1. S2  ABDOMEN: Soft, non tender  EXTREMITIES: no edema   SKIN: no rash        LABS:                        7.6    10.82 )-----------( 327      ( 10 Nov 2022 11:00 )             22.8     11-10    141  |  111<H>  |  17  ----------------------------<  106<H>  4.2   |  22  |  1.07    Ca    8.9      10 Nov 2022 11:00    TPro  6.8  /  Alb  2.7<L>  /  TBili  1.9<H>  /  DBili  x   /  AST  34  /  ALT  24  /  AlkPhos  165<H>  11-10        CAPILLARY BLOOD GLUCOSE          RADIOLOGY & ADDITIONAL TESTS:    Imaging Personally Reviewed:  [ ] YES  [ ] NO    Consultant(s) Notes Reviewed:  [ ] YES  [ ] NO    Care Discussed with Consultants/Other Providers [ ] YES  [ ] NO
Hematology Oncology Follow Up note     resting multiple somatic complaints    VSS  Exam comfortable paler   lungs decrease breath sounds  [id number]  abdomen non tender  ext no edema
Patient is a 67y old  Male who presents with a chief complaint of sickle cell anemia, elevated troponin (2022 16:10)      INTERVAL HPI/OVERNIGHT EVENTS:  Pt was seen and examined, no acute events.      MEDICATIONS  (STANDING):  dextrose 5% + sodium chloride 0.45%. 1000 milliLiter(s) (75 mL/Hr) IV Continuous <Continuous>  folic acid 1 milliGRAM(s) Oral daily  heparin   Injectable 5000 Unit(s) SubCutaneous every 12 hours  influenza  Vaccine (HIGH DOSE) 0.7 milliLiter(s) IntraMuscular once  metoprolol tartrate 12.5 milliGRAM(s) Oral two times a day  senna 2 Tablet(s) Oral at bedtime  senna 2 Tablet(s) Oral at bedtime    MEDICATIONS  (PRN):  diphenhydrAMINE Injectable 50 milliGRAM(s) IV Push every 6 hours PRN Itching  HYDROmorphone  Injectable 1 milliGRAM(s) IV Push every 4 hours PRN Severe Pain (7 - 10)  HYDROmorphone  Injectable 0.5 milliGRAM(s) IV Push every 4 hours PRN Moderate Pain (4 - 6)      Allergies  No Known Drug Allergies  peanuts (Anaphylaxis)  peanuts (Angioedema)  pork (Unknown)  shellfish (Unknown)      Vital Signs Last 24 Hrs  T(C): 36.9 (2022 10:24), Max: 36.9 (2022 23:44)  T(F): 98.4 (2022 10:24), Max: 98.4 (2022 23:44)  HR: 65 (2022 10:24) (65 - 83)  BP: 138/79 (2022 10:24) (124/62 - 148/70)  BP(mean): --  RR: 19 (2022 10:24) (18 - 19)  SpO2: 99% (2022 10:24) (97% - 99%)    Parameters below as of 2022 10:24  Patient On (Oxygen Delivery Method): room air        PHYSICAL EXAM:  GENERAL: NAD  HEAD:  Atraumatic   EYES: PERRLA  ENMT: Mouth moist  NECK: Supple  NERVOUS SYSTEM:  Awake, alert  CHEST/LUNG: Clear  HEART: RRR, S1. S2  ABDOMEN: Soft, non tender  EXTREMITIES: no edema   SKIN: no rash      LABS:                        7.2    14.03 )-----------( 366      ( 2022 06:30 )             21.7     11-08    141  |  115<H>  |  15  ----------------------------<  84  3.9   |  19<L>  |  1.09    Ca    8.8      2022 05:35  Phos  2.8       Mg     1.7         TPro  6.7  /  Alb  2.6<L>  /  TBili  2.4<H>  /  DBili  x   /  AST  34  /  ALT  21  /  AlkPhos  134<H>  -      Urinalysis Basic - ( 2022 20:00 )    Color: Yellow / Appearance: Clear / S.010 / pH: x  Gluc: x / Ketone: Negative  / Bili: Negative / Urobili: Negative mg/dL   Blood: x / Protein: 30 mg/dL / Nitrite: Negative   Leuk Esterase: Negative / RBC: 0-2 /HPF / WBC Negative   Sq Epi: x / Non Sq Epi: Occasional / Bacteria: Negative      CAPILLARY BLOOD GLUCOSE      POCT Blood Glucose.: 116 mg/dL (2022 16:59)      RADIOLOGY & ADDITIONAL TESTS:    Imaging Personally Reviewed:  [ ] YES  [ ] NO    Consultant(s) Notes Reviewed:  [ ] YES  [ ] NO    Care Discussed with Consultants/Other Providers [ ] YES  [ ] NO
Patient is a 67y old  Male who presents with a chief complaint of SICKLE CELL CRISIS; ANEMIA     (08 Nov 2022 14:05)    INTERVAL HPI/OVERNIGHT EVENTS: No acute events overnight  SUBJECTIVE: he is upset that he didnt get his ensure overnight. reports still has pain in his body    MEDICATIONS  (STANDING):  cefTRIAXone   IVPB 1000 milliGRAM(s) IV Intermittent every 24 hours  dextrose 5% + sodium chloride 0.45%. 1000 milliLiter(s) (75 mL/Hr) IV Continuous <Continuous>  folic acid 1 milliGRAM(s) Oral daily  influenza  Vaccine (HIGH DOSE) 0.7 milliLiter(s) IntraMuscular once  metoprolol tartrate 12.5 milliGRAM(s) Oral two times a day  senna 2 Tablet(s) Oral at bedtime  senna 2 Tablet(s) Oral at bedtime    MEDICATIONS  (PRN):  diphenhydrAMINE Injectable 50 milliGRAM(s) IV Push every 6 hours PRN Itching  HYDROmorphone  Injectable 1 milliGRAM(s) IV Push every 4 hours PRN Severe Pain (7 - 10)  HYDROmorphone  Injectable 0.5 milliGRAM(s) IV Push every 4 hours PRN Moderate Pain (4 - 6)    Allergies    No Known Drug Allergies  peanuts (Anaphylaxis)  peanuts (Angioedema)  pork (Unknown)  shellfish (Unknown)    Intolerances      REVIEW OF SYSTEMS:  All other systems reviewed and are negative    Vital Signs Last 24 Hrs  T(C): 36.3 (08 Nov 2022 10:40), Max: 37.1 (08 Nov 2022 01:08)  T(F): 97.4 (08 Nov 2022 10:40), Max: 98.8 (08 Nov 2022 01:08)  HR: 71 (08 Nov 2022 10:40) (68 - 92)  BP: 141/80 (08 Nov 2022 10:40) (131/74 - 148/81)  BP(mean): --  RR: 18 (08 Nov 2022 10:40) (18 - 18)  SpO2: 99% (08 Nov 2022 10:40) (95% - 99%)    Parameters below as of 08 Nov 2022 10:40  Patient On (Oxygen Delivery Method): room air      Daily     Daily   I&O's Summary    07 Nov 2022 07:01  -  08 Nov 2022 07:00  --------------------------------------------------------  IN: 1140 mL / OUT: 700 mL / NET: 440 mL    08 Nov 2022 07:01  -  08 Nov 2022 16:10  --------------------------------------------------------  IN: 320 mL / OUT: 410 mL / NET: -90 mL      CAPILLARY BLOOD GLUCOSE        PHYSICAL EXAM:  GENERAL: NAD, well-groomed, well-developed. Frail looking  HEAD:  Atraumatic, Normocephalic  EYES: EOMI, PERRLA, conjunctiva and sclera clear  ENMT: No tonsillar erythema, exudates, or enlargement; Moist mucous membranes, Good dentition, No lesions  NECK: Supple, No JVD, Normal thyroid  NERVOUS SYSTEM:  Alert & Oriented X3, Good concentration; Motor Strength 5/5 B/L upper and lower extremities; DTRs 2+ intact and symmetric  CHEST/LUNG: Clear to percussion bilaterally; No rales, rhonchi, wheezing, or rubs  HEART: Regular rate and rhythm; No murmurs, rubs, or gallops  ABDOMEN: Soft, Nontender, Nondistended; Bowel sounds present  EXTREMITIES:  2+ Peripheral Pulses, No clubbing, cyanosis, or edema  LYMPH: No lymphadenopathy noted  SKIN: No rashes or lesions    Labs                          7.2    14.03 )-----------( 366      ( 08 Nov 2022 06:30 )             21.7     11-08    141  |  115<H>  |  15  ----------------------------<  84  3.9   |  19<L>  |  1.09    Ca    8.8      08 Nov 2022 05:35  Phos  2.8     11-08  Mg     1.7     11-08    TPro  6.7  /  Alb  2.6<L>  /  TBili  2.4<H>  /  DBili  x   /  AST  34  /  ALT  21  /  AlkPhos  134<H>  11-08    PT/INR - ( 06 Nov 2022 19:20 )   PT: 14.1 sec;   INR: 1.18 ratio         PTT - ( 06 Nov 2022 19:20 )  PTT:26.9 sec              
Patient is a 67y old  Male who presents with a chief complaint of sickle cell anemia, elevated troponin (12 Nov 2022 07:38)      INTERVAL HPI/OVERNIGHT EVENTS:  Pt was seen and examined, no acute events.      MEDICATIONS  (STANDING):  finasteride 5 milliGRAM(s) Oral daily  folic acid 1 milliGRAM(s) Oral daily  heparin   Injectable 5000 Unit(s) SubCutaneous every 12 hours  influenza  Vaccine (HIGH DOSE) 0.7 milliLiter(s) IntraMuscular once  metoprolol tartrate 12.5 milliGRAM(s) Oral two times a day  senna 2 Tablet(s) Oral at bedtime  senna 2 Tablet(s) Oral at bedtime    MEDICATIONS  (PRN):  diphenhydrAMINE Injectable 50 milliGRAM(s) IV Push every 6 hours PRN Itching  HYDROmorphone   Tablet 2 milliGRAM(s) Oral four times a day PRN Moderate Pain (4 - 6)  ibuprofen  Tablet. 400 milliGRAM(s) Oral every 6 hours PRN Mild Pain (1 - 3)      Allergies    No Known Drug Allergies  peanuts (Anaphylaxis)  peanuts (Angioedema)  pork (Unknown)  shellfish (Unknown)    Intolerances          Vital Signs Last 24 Hrs  T(C): 36.9 (12 Nov 2022 10:50), Max: 36.9 (12 Nov 2022 10:50)  T(F): 98.4 (12 Nov 2022 10:50), Max: 98.4 (12 Nov 2022 10:50)  HR: 71 (12 Nov 2022 10:50) (61 - 96)  BP: 121/72 (12 Nov 2022 10:50) (116/79 - 131/82)  BP(mean): --  RR: 19 (12 Nov 2022 10:50) (18 - 19)  SpO2: 97% (12 Nov 2022 10:50) (97% - 98%)    Parameters below as of 12 Nov 2022 10:50  Patient On (Oxygen Delivery Method): room air        PHYSICAL EXAM:  GENERAL: NAD  HEAD:  Atraumatic   EYES: PERRLA  ENMT: Mouth moist  NECK: Supple  NERVOUS SYSTEM:  Awake, alert  CHEST/LUNG: Clear  HEART: RRR, S1. S2  ABDOMEN: Soft, non tender  EXTREMITIES: no edema   SKIN: no rash          LABS:                        7.1    7.55  )-----------( 280      ( 12 Nov 2022 06:30 )             21.9     11-11    139  |  110<H>  |  18  ----------------------------<  98  4.3   |  23  |  1.02    Ca    8.8      11 Nov 2022 05:40    TPro  7.0  /  Alb  2.6<L>  /  TBili  1.5<H>  /  DBili  x   /  AST  33  /  ALT  24  /  AlkPhos  173<H>  11-11        CAPILLARY BLOOD GLUCOSE          RADIOLOGY & ADDITIONAL TESTS:    Imaging Personally Reviewed:  [ ] YES  [ ] NO    Consultant(s) Notes Reviewed:  [ ] YES  [ ] NO    Care Discussed with Consultants/Other Providers [ ] YES  [ ] NO
Patient is a 67y old  Male who presents with a chief complaint of sickle cell anemia, elevated troponin (06 Nov 2022 23:44)    INTERVAL HPI/OVERNIGHT EVENTS: overnight no acute events  SUBJECTIVE: no complaints of fever/chills/dyspnea. reports pain all over his body. he reports chest pain but no chest discomfort and it is normal for his typical sickle cell crisis    MEDICATIONS  (STANDING):  dextrose 5% + sodium chloride 0.45%. 1000 milliLiter(s) (75 mL/Hr) IV Continuous <Continuous>  folic acid 1 milliGRAM(s) Oral daily  HYDROmorphone PCA (1 mG/mL) 30 milliLiter(s) PCA Continuous PCA Continuous  influenza  Vaccine (HIGH DOSE) 0.7 milliLiter(s) IntraMuscular once  metoprolol tartrate 12.5 milliGRAM(s) Oral two times a day  senna 2 Tablet(s) Oral at bedtime    MEDICATIONS  (PRN):  diphenhydrAMINE Injectable 50 milliGRAM(s) IV Push every 6 hours PRN Itching    Allergies    No Known Drug Allergies  peanuts (Anaphylaxis)  peanuts (Angioedema)  pork (Unknown)  shellfish (Unknown)    Intolerances      REVIEW OF SYSTEMS:  All other systems reviewed and are negative    Vital Signs Last 24 Hrs  T(C): 36.7 (07 Nov 2022 10:40), Max: 37.1 (06 Nov 2022 16:57)  T(F): 98 (07 Nov 2022 10:40), Max: 98.8 (06 Nov 2022 16:57)  HR: 92 (07 Nov 2022 10:40) (67 - 122)  BP: 126/57 (07 Nov 2022 10:40) (123/79 - 144/65)  BP(mean): 112 (06 Nov 2022 19:06) (112 - 112)  RR: 17 (07 Nov 2022 10:40) (16 - 20)  SpO2: 98% (07 Nov 2022 10:40) (96% - 100%)    Parameters below as of 07 Nov 2022 10:40  Patient On (Oxygen Delivery Method): room air      Daily Height in cm: 180.34 (06 Nov 2022 16:57)    Daily   I&O's Summary    07 Nov 2022 07:01  -  07 Nov 2022 13:19  --------------------------------------------------------  IN: 240 mL / OUT: 0 mL / NET: 240 mL      CAPILLARY BLOOD GLUCOSE        PHYSICAL EXAM:  GENERAL: NAD, well-groomed, well-developed. Frail  HEAD:  Atraumatic, Normocephalic  EYES: EOMI, PERRLA, conjunctiva and sclera clear  ENMT: No tonsillar erythema, exudates, or enlargement; Moist mucous membranes, Good dentition, No lesions  NECK: Supple, No JVD, Normal thyroid  NERVOUS SYSTEM:  Alert & Oriented X3, Good concentration; Motor Strength 5/5 B/L upper and lower extremities; DTRs 2+ intact and symmetric  CHEST/LUNG: Clear to percussion bilaterally; No rales, rhonchi, wheezing, or rubs  HEART: Regular rate and rhythm; No murmurs, rubs, or gallops  ABDOMEN: Soft, Nontender, Nondistended; Bowel sounds present  EXTREMITIES:  2+ Peripheral Pulses, No clubbing, cyanosis, or edema  LYMPH: No lymphadenopathy noted  SKIN: No rashes or lesions    Labs                          4.2    13.58 )-----------( 367      ( 07 Nov 2022 05:20 )             12.4     11-07    144  |  118<H>  |  25<H>  ----------------------------<  95  3.8   |  18<L>  |  1.42<H>    Ca    8.8      07 Nov 2022 05:20  Phos  2.4     11-07  Mg     1.8     11-07    TPro  6.8  /  Alb  2.8<L>  /  TBili  1.7<H>  /  DBili  x   /  AST  38<H>  /  ALT  25  /  AlkPhos  128<H>  11-07    PT/INR - ( 06 Nov 2022 19:20 )   PT: 14.1 sec;   INR: 1.18 ratio         PTT - ( 06 Nov 2022 19:20 )  PTT:26.9 sec            
Patient is a 67y old  Male who presents with a chief complaint of sickle cell anemia, elevated troponin (11 Nov 2022 10:04)      INTERVAL HPI/OVERNIGHT EVENTS:  Pt was seen and examined, no acute events.      MEDICATIONS  (STANDING):  finasteride 5 milliGRAM(s) Oral daily  finasteride 5 milliGRAM(s) Oral daily  folic acid 1 milliGRAM(s) Oral daily  heparin   Injectable 5000 Unit(s) SubCutaneous every 12 hours  influenza  Vaccine (HIGH DOSE) 0.7 milliLiter(s) IntraMuscular once  metoprolol tartrate 12.5 milliGRAM(s) Oral two times a day  senna 2 Tablet(s) Oral at bedtime  senna 2 Tablet(s) Oral at bedtime    MEDICATIONS  (PRN):  diphenhydrAMINE Injectable 50 milliGRAM(s) IV Push every 6 hours PRN Itching  HYDROmorphone   Tablet 2 milliGRAM(s) Oral four times a day PRN Moderate Pain (4 - 6)  ibuprofen  Tablet. 400 milliGRAM(s) Oral every 6 hours PRN Mild Pain (1 - 3)      Allergies  No Known Drug Allergies  peanuts (Anaphylaxis)  peanuts (Angioedema)  pork (Unknown)  shellfish (Unknown)        Vital Signs Last 24 Hrs  T(C): 36.8 (11 Nov 2022 16:56), Max: 37.1 (11 Nov 2022 00:04)  T(F): 98.2 (11 Nov 2022 16:56), Max: 98.7 (11 Nov 2022 00:04)  HR: 96 (11 Nov 2022 16:56) (71 - 96)  BP: 131/82 (11 Nov 2022 16:56) (131/82 - 149/88)  BP(mean): --  RR: 18 (11 Nov 2022 16:56) (17 - 18)  SpO2: 98% (11 Nov 2022 16:56) (95% - 98%)    Parameters below as of 11 Nov 2022 16:56  Patient On (Oxygen Delivery Method): room air        PHYSICAL EXAM:  GENERAL: NAD  HEAD:  Atraumatic   EYES: PERRLA  ENMT: Mouth moist  NECK: Supple  NERVOUS SYSTEM:  Awake, alert  CHEST/LUNG: Clear  HEART: RRR, S1. S2  ABDOMEN: Soft, non tender  EXTREMITIES: no edema   SKIN: no rash      LABS:                        7.4    7.54  )-----------( 297      ( 11 Nov 2022 05:40 )             22.5     11-11    139  |  110<H>  |  18  ----------------------------<  98  4.3   |  23  |  1.02    Ca    8.8      11 Nov 2022 05:40    TPro  7.0  /  Alb  2.6<L>  /  TBili  1.5<H>  /  DBili  x   /  AST  33  /  ALT  24  /  AlkPhos  173<H>  11-11        CAPILLARY BLOOD GLUCOSE          RADIOLOGY & ADDITIONAL TESTS:    Imaging Personally Reviewed:  [ ] YES  [ ] NO    Consultant(s) Notes Reviewed:  [ ] YES  [ ] NO    Care Discussed with Consultants/Other Providers [ ] YES  [ ] NO
Hematology Oncology Follow Up note   HPI    67M with a PMH of sickle cell anemia, HTN, and NPH who presents to the ED for generalized body pains.  Patient reports acute on chronic pain in all of his joints.  Patient also states that he has had dyspnea and CP on exertion over the last week but has become significantly worse over the last three days.  Patient reports dyspnea when walking in his home now.  Patient has no other complaints.  Significant anemia found in ED, labs also show mildly elevated troponin level.  no BRBPR no tito reported never had colonoscopy in past no hx of exchange transfixion       PMH HTN sickle cell anemia   PSH unknown  Allergy NJKDA  Social hx no smoking no ETOH abuse     Family hx non contributory     Review of system + aches and pains     VSS  Exam comfortable paler   lungs decrease breath sounds  [id number]  abdomen non tender  ext no edema
Hematology Oncology Follow Up note   HPI    67M with a PMH of sickle cell anemia, HTN, and NPH who presents to the ED for generalized body pains.  Patient reports acute on chronic pain in all of his joints.  Patient also states that he has had dyspnea and CP on exertion over the last week but has become significantly worse over the last three days.  Patient reports dyspnea when walking in his home now.  Patient has no other complaints.  Significant anemia found in ED, labs also show mildly elevated troponin level.  no BRBPR no tito reported never had colonoscopy in past no hx of exchange transfixion       PMH HTN sickle cell anemia   PSH unknown  Allergy NJKDA  Social hx no smoking no ETOH abuse     Family hx non contributory     Review of system + aches and pains     VSS  Exam comfortable paler   lungs decrease breath sounds  [id number]  abdomen non tender  ext no edema

## 2022-11-12 NOTE — DISCHARGE NOTE PROVIDER - ATTENDING DISCHARGE PHYSICAL EXAMINATION:
Vital Signs Last 24 Hrs  T(C): 36.9 (12 Nov 2022 10:50), Max: 36.9 (12 Nov 2022 10:50)  T(F): 98.4 (12 Nov 2022 10:50), Max: 98.4 (12 Nov 2022 10:50)  HR: 71 (12 Nov 2022 10:50) (61 - 96)  BP: 121/72 (12 Nov 2022 10:50) (116/79 - 131/82)  BP(mean): --  RR: 19 (12 Nov 2022 10:50) (18 - 19)  SpO2: 97% (12 Nov 2022 10:50) (97% - 98%)    Parameters below as of 12 Nov 2022 10:50  Patient On (Oxygen Delivery Method): room air    GENERAL: NAD  HEAD:  Atraumatic   EYES: PERRLA  ENMT: Mouth moist  NECK: Supple  NERVOUS SYSTEM:  Awake, alert  CHEST/LUNG: Clear  HEART: RRR, S1. S2  ABDOMEN: Soft, non tender  EXTREMITIES: no edema   SKIN: no rash

## 2022-11-12 NOTE — PROGRESS NOTE ADULT - REASON FOR ADMISSION
sickle cell anemia, elevated troponin

## 2022-11-12 NOTE — PROGRESS NOTE ADULT - PROBLEM SELECTOR PROBLEM 2
Sickle cell anemia with pain

## 2022-11-12 NOTE — PROGRESS NOTE ADULT - PROVIDER SPECIALTY LIST ADULT
Heme/Onc
Hospitalist
Internal Medicine
Heme/Onc
Hospitalist
Internal Medicine
Heme/Onc
Heme/Onc

## 2022-11-12 NOTE — DISCHARGE NOTE NURSING/CASE MANAGEMENT/SOCIAL WORK - PATIENT PORTAL LINK FT
You can access the FollowMyHealth Patient Portal offered by [facility] by registering at the following website: [email or web address]. By joining [facility]’s FollowMyHealth portal, you will also be able to view your health information using other applications (apps) compatible with our system.

## 2022-11-12 NOTE — PROGRESS NOTE ADULT - PROBLEM SELECTOR PROBLEM 4
EMEKA (acute kidney injury)

## 2022-11-12 NOTE — PROGRESS NOTE ADULT - NUTRITIONAL ASSESSMENT
This patient has been assessed with a concern for Malnutrition and has been determined to have a diagnosis/diagnoses of Severe protein-calorie malnutrition and Underweight (BMI < 19).    This patient is being managed with:   Diet DASH/TLC-  Sodium & Cholesterol Restricted  Supplement Feeding Modality:  Oral  Ensure Enlive Cans or Servings Per Day:  1       Frequency:  Two Times a day  Entered: Nov 7 2022  4:33PM    The following pending diet order is being considered for treatment of Severe protein-calorie malnutrition and Underweight (BMI < 19):  Diet Regular-  Supplement Feeding Modality:  Oral  Ensure Plus High Protein Cans or Servings Per Day:  1       Frequency:  Three Times a day  Entered: Nov 8 2022  2:24PM  
This patient has been assessed with a concern for Malnutrition and has been determined to have a diagnosis/diagnoses of Severe protein-calorie malnutrition and Underweight (BMI < 19).    This patient is being managed with:   Diet Regular-  Supplement Feeding Modality:  Oral  Ensure Plus High Protein Cans or Servings Per Day:  1       Frequency:  Three Times a day  Entered: Nov 8 2022  2:24PM    

## 2022-11-12 NOTE — DISCHARGE NOTE NURSING/CASE MANAGEMENT/SOCIAL WORK - NSDCVIVACCINE_GEN_ALL_CORE_FT
influenza, injectable, quadrivalent, preservative free; 12-Oct-2018 14:38; Juana Bruce (RN); [employer]; [id number] (Exp. Date: 30-Jun-2019); IntraMuscular; Deltoid Left.; 0.5 milliLiter(s); VIS (VIS Published: 07-Aug-2015, VIS Presented: 12-Oct-2018);

## 2022-11-12 NOTE — DISCHARGE NOTE PROVIDER - DETAILS OF MALNUTRITION DIAGNOSIS/DIAGNOSES
This patient has been assessed with a concern for Malnutrition and was treated during this hospitalization for the following Nutrition diagnosis/diagnoses:     -  11/08/2022: Severe protein-calorie malnutrition   -  11/08/2022: Underweight (BMI < 19)

## 2022-11-12 NOTE — DISCHARGE NOTE PROVIDER - EXTENDED VTE YES NO FOR MLM ENOXAPARIN
Body Location Override (Optional - Billing Will Still Be Based On Selected Body Map Location If Applicable): left lateral neck Detail Level: Detailed Size Of Lesion In Cm (Optional): 0.3 Morphology Per Location (Optional): Pink papule with homogeneous pattern- new per patient. Option to biopsy today or watch clinically and recheck in 6 months. Patient will observe and return in 6 months for recheck. Body Location Override (Optional - Billing Will Still Be Based On Selected Body Map Location If Applicable): left lower leg Size Of Lesion In Cm (Optional): 0.1 Morphology Per Location (Optional): Brown grey papule- photo today, plan to recheck in 6 months ,

## 2022-11-15 ENCOUNTER — APPOINTMENT (OUTPATIENT)
Dept: OPHTHALMOLOGY | Facility: CLINIC | Age: 67
End: 2022-11-15

## 2022-11-17 ENCOUNTER — APPOINTMENT (OUTPATIENT)
Dept: HEMATOLOGY ONCOLOGY | Facility: CLINIC | Age: 67
End: 2022-11-17

## 2022-11-17 ENCOUNTER — OUTPATIENT (OUTPATIENT)
Dept: OUTPATIENT SERVICES | Facility: HOSPITAL | Age: 67
LOS: 1 days | End: 2022-11-17

## 2022-11-17 DIAGNOSIS — F70 MILD INTELLECTUAL DISABILITIES: ICD-10-CM

## 2022-11-17 DIAGNOSIS — R77.8 OTHER SPECIFIED ABNORMALITIES OF PLASMA PROTEINS: ICD-10-CM

## 2022-11-17 DIAGNOSIS — D57.00 HB-SS DISEASE WITH CRISIS, UNSPECIFIED: ICD-10-CM

## 2022-11-17 DIAGNOSIS — E80.7 DISORDER OF BILIRUBIN METABOLISM, UNSPECIFIED: ICD-10-CM

## 2022-11-17 DIAGNOSIS — I51.9 HEART DISEASE, UNSPECIFIED: ICD-10-CM

## 2022-11-17 DIAGNOSIS — Z91.013 ALLERGY TO SEAFOOD: ICD-10-CM

## 2022-11-17 DIAGNOSIS — E43 UNSPECIFIED SEVERE PROTEIN-CALORIE MALNUTRITION: ICD-10-CM

## 2022-11-17 DIAGNOSIS — N40.0 BENIGN PROSTATIC HYPERPLASIA WITHOUT LOWER URINARY TRACT SYMPTOMS: ICD-10-CM

## 2022-11-17 DIAGNOSIS — N17.9 ACUTE KIDNEY FAILURE, UNSPECIFIED: ICD-10-CM

## 2022-11-17 DIAGNOSIS — Z86.39 PERSONAL HISTORY OF OTHER ENDOCRINE, NUTRITIONAL AND METABOLIC DISEASE: ICD-10-CM

## 2022-11-17 DIAGNOSIS — I10 ESSENTIAL (PRIMARY) HYPERTENSION: ICD-10-CM

## 2022-11-17 DIAGNOSIS — Z91.010 ALLERGY TO PEANUTS: ICD-10-CM

## 2022-11-17 DIAGNOSIS — H40.9 UNSPECIFIED GLAUCOMA: ICD-10-CM

## 2022-11-17 PROCEDURE — 99443: CPT | Mod: 95

## 2022-11-17 NOTE — HISTORY OF PRESENT ILLNESS
[de-identified] : 66 yo male with SS disese here for F/U.   HE is on Endari and Oxbryta but not on HU for some reason.  \par He says he would like to try HU. \par Hb normally about 7-8 gm/dL.  \par Serum ferritin 184.\par ON metropolol for HTN and NSVT.  EChO in 11/22 showed Grade I diastolic dysfunction. \par  [de-identified] : WAs recently hospitalized for pain crisis brought on by cold weather; Hb dropeed from 7 to 4 gm/dL; had to be transfused 3 units.  \par When I ask him why he is not on HU, he says he doesn't know.  He says HU never caused him any pain.  \par I don't see any contraindication in his labs.\par HE is also supposed to be on Endari and Oxbryta.  \par Needs to reschedule eye appt because missed it while in hospital. \par \par Needs permission form completed for renewal of transportation for medical appts and dentist.

## 2022-11-17 NOTE — ASSESSMENT
[FreeTextEntry1] : 1.  SS disease.  Started HU at 1000 mg daily.  Continue Endari.  F/U labs in 1 month by in person visit at [place].\par Renewed opoidoids.  \par \par 2.  HTN.  Continue metoprolol.\par \par 3.  Social work.  PLEase complete forms for medical and dental appts.\par \par 4.  Glaucoma.  Has F/U with eye doctor in Dec.\par \par 5.  NSVT. Renewed metropolo.\par \par I spent 30 minutes with  Segundo\par \par

## 2022-11-23 ENCOUNTER — APPOINTMENT (OUTPATIENT)
Dept: OPHTHALMOLOGY | Facility: CLINIC | Age: 67
End: 2022-11-23

## 2022-11-23 ENCOUNTER — NON-APPOINTMENT (OUTPATIENT)
Age: 67
End: 2022-11-23

## 2022-11-23 PROCEDURE — 92014 COMPRE OPH EXAM EST PT 1/>: CPT

## 2022-11-28 ENCOUNTER — NON-APPOINTMENT (OUTPATIENT)
Age: 67
End: 2022-11-28

## 2022-12-07 NOTE — DIETITIAN INITIAL EVALUATION ADULT. - 30 CAL FROM
Per Dr Stevens, ok to hold Eliquis until seen by ENT on the 9th. Catrina was made awre.  
Pt called in stating that 2 days ago she called and spoke with Candida re: nose bleeds. She had been in the ER and hd rhino rockets placed. Has appt with ENT on 12/9/2022. Pt states she continues to drip blood from her nose. Discussed with Candida who will discuss with Dr Stevens.  KB  
[phone]

## 2022-12-14 ENCOUNTER — APPOINTMENT (OUTPATIENT)
Dept: OPHTHALMOLOGY | Facility: CLINIC | Age: 67
End: 2022-12-14

## 2022-12-21 ENCOUNTER — APPOINTMENT (OUTPATIENT)
Dept: INTERNAL MEDICINE | Facility: CLINIC | Age: 67
End: 2022-12-21

## 2023-01-01 ENCOUNTER — NON-APPOINTMENT (OUTPATIENT)
Age: 68
End: 2023-01-01

## 2023-01-01 ENCOUNTER — OUTPATIENT (OUTPATIENT)
Dept: OUTPATIENT SERVICES | Facility: HOSPITAL | Age: 68
LOS: 1 days | End: 2023-01-01
Payer: MEDICARE

## 2023-01-01 ENCOUNTER — TRANSCRIPTION ENCOUNTER (OUTPATIENT)
Age: 68
End: 2023-01-01

## 2023-01-01 ENCOUNTER — APPOINTMENT (OUTPATIENT)
Dept: INTERNAL MEDICINE | Facility: CLINIC | Age: 68
End: 2023-01-01
Payer: MEDICARE

## 2023-01-01 ENCOUNTER — APPOINTMENT (OUTPATIENT)
Dept: INTERNAL MEDICINE | Facility: CLINIC | Age: 68
End: 2023-01-01

## 2023-01-01 ENCOUNTER — APPOINTMENT (OUTPATIENT)
Dept: OPHTHALMOLOGY | Facility: CLINIC | Age: 68
End: 2023-01-01
Payer: MEDICARE

## 2023-01-01 ENCOUNTER — OUTPATIENT (OUTPATIENT)
Dept: OUTPATIENT SERVICES | Facility: HOSPITAL | Age: 68
LOS: 1 days | End: 2023-01-01

## 2023-01-01 ENCOUNTER — APPOINTMENT (OUTPATIENT)
Dept: OPHTHALMOLOGY | Facility: AMBULATORY SURGERY CENTER | Age: 68
End: 2023-01-01

## 2023-01-01 ENCOUNTER — APPOINTMENT (OUTPATIENT)
Dept: OPHTHALMOLOGY | Facility: CLINIC | Age: 68
End: 2023-01-01

## 2023-01-01 ENCOUNTER — APPOINTMENT (OUTPATIENT)
Dept: OPHTHALMOLOGY | Facility: HOSPITAL | Age: 68
End: 2023-01-01

## 2023-01-01 ENCOUNTER — APPOINTMENT (OUTPATIENT)
Dept: OPHTHALMOLOGY | Facility: HOSPITAL | Age: 68
End: 2023-01-01
Payer: MEDICARE

## 2023-01-01 ENCOUNTER — INPATIENT (INPATIENT)
Facility: HOSPITAL | Age: 68
LOS: 3 days | Discharge: HOME | End: 2023-07-24
Attending: HOSPITALIST | Admitting: HOSPITALIST
Payer: MEDICARE

## 2023-01-01 ENCOUNTER — APPOINTMENT (OUTPATIENT)
Dept: HEMATOLOGY ONCOLOGY | Facility: CLINIC | Age: 68
End: 2023-01-01

## 2023-01-01 ENCOUNTER — INPATIENT (INPATIENT)
Facility: HOSPITAL | Age: 68
LOS: 7 days | Discharge: HOME | End: 2023-04-19
Attending: HOSPITALIST | Admitting: HOSPITALIST
Payer: MEDICARE

## 2023-01-01 VITALS
BODY MASS INDEX: 15.89 KG/M2 | HEIGHT: 70 IN | WEIGHT: 111 LBS | SYSTOLIC BLOOD PRESSURE: 130 MMHG | DIASTOLIC BLOOD PRESSURE: 70 MMHG | RESPIRATION RATE: 18 BRPM | HEART RATE: 84 BPM | OXYGEN SATURATION: 95 %

## 2023-01-01 VITALS
HEIGHT: 70 IN | WEIGHT: 109 LBS | TEMPERATURE: 97.5 F | BODY MASS INDEX: 15.6 KG/M2 | OXYGEN SATURATION: 96 % | SYSTOLIC BLOOD PRESSURE: 130 MMHG | HEART RATE: 96 BPM | RESPIRATION RATE: 18 BRPM | DIASTOLIC BLOOD PRESSURE: 62 MMHG

## 2023-01-01 VITALS
HEART RATE: 88 BPM | OXYGEN SATURATION: 100 % | SYSTOLIC BLOOD PRESSURE: 132 MMHG | DIASTOLIC BLOOD PRESSURE: 73 MMHG | RESPIRATION RATE: 15 BRPM | TEMPERATURE: 99 F | WEIGHT: 108.03 LBS | HEIGHT: 67.75 IN

## 2023-01-01 VITALS
OXYGEN SATURATION: 99 % | RESPIRATION RATE: 16 BRPM | DIASTOLIC BLOOD PRESSURE: 96 MMHG | TEMPERATURE: 99 F | SYSTOLIC BLOOD PRESSURE: 153 MMHG | HEART RATE: 80 BPM | WEIGHT: 132.06 LBS | HEIGHT: 70 IN

## 2023-01-01 VITALS
DIASTOLIC BLOOD PRESSURE: 78 MMHG | SYSTOLIC BLOOD PRESSURE: 140 MMHG | HEIGHT: 70 IN | OXYGEN SATURATION: 97 % | WEIGHT: 113 LBS | HEART RATE: 87 BPM | BODY MASS INDEX: 16.18 KG/M2

## 2023-01-01 VITALS
HEART RATE: 60 BPM | TEMPERATURE: 97 F | WEIGHT: 115.08 LBS | OXYGEN SATURATION: 95 % | HEIGHT: 67 IN | SYSTOLIC BLOOD PRESSURE: 102 MMHG | RESPIRATION RATE: 14 BRPM | DIASTOLIC BLOOD PRESSURE: 55 MMHG

## 2023-01-01 VITALS
HEART RATE: 70 BPM | RESPIRATION RATE: 19 BRPM | DIASTOLIC BLOOD PRESSURE: 76 MMHG | SYSTOLIC BLOOD PRESSURE: 116 MMHG | TEMPERATURE: 98 F | OXYGEN SATURATION: 99 %

## 2023-01-01 VITALS
HEART RATE: 65 BPM | RESPIRATION RATE: 17 BRPM | OXYGEN SATURATION: 94 % | TEMPERATURE: 98 F | DIASTOLIC BLOOD PRESSURE: 94 MMHG | SYSTOLIC BLOOD PRESSURE: 165 MMHG

## 2023-01-01 VITALS
HEIGHT: 67 IN | RESPIRATION RATE: 20 BRPM | SYSTOLIC BLOOD PRESSURE: 124 MMHG | HEART RATE: 66 BPM | TEMPERATURE: 98 F | DIASTOLIC BLOOD PRESSURE: 64 MMHG | OXYGEN SATURATION: 97 % | WEIGHT: 99.65 LBS

## 2023-01-01 VITALS
DIASTOLIC BLOOD PRESSURE: 59 MMHG | HEART RATE: 76 BPM | SYSTOLIC BLOOD PRESSURE: 136 MMHG | RESPIRATION RATE: 17 BRPM | OXYGEN SATURATION: 99 % | TEMPERATURE: 98 F

## 2023-01-01 VITALS
RESPIRATION RATE: 16 BRPM | SYSTOLIC BLOOD PRESSURE: 140 MMHG | BODY MASS INDEX: 15.75 KG/M2 | DIASTOLIC BLOOD PRESSURE: 80 MMHG | WEIGHT: 110 LBS | HEIGHT: 70 IN | HEART RATE: 68 BPM | OXYGEN SATURATION: 96 %

## 2023-01-01 VITALS — WEIGHT: 107.81 LBS

## 2023-01-01 DIAGNOSIS — H26.9 UNSPECIFIED CATARACT: ICD-10-CM

## 2023-01-01 DIAGNOSIS — I10 ESSENTIAL (PRIMARY) HYPERTENSION: ICD-10-CM

## 2023-01-01 DIAGNOSIS — I47.29 OTHER VENTRICULAR TACHYCARDIA: ICD-10-CM

## 2023-01-01 DIAGNOSIS — D57.00 HB-SS DISEASE WITH CRISIS, UNSPECIFIED: ICD-10-CM

## 2023-01-01 DIAGNOSIS — Z86.2 PERSONAL HISTORY OF DISEASES OF THE BLOOD AND BLOOD-FORMING ORGANS AND CERTAIN DISORDERS INVOLVING THE IMMUNE MECHANISM: ICD-10-CM

## 2023-01-01 DIAGNOSIS — D57.1 SICKLE-CELL DISEASE WITHOUT CRISIS: ICD-10-CM

## 2023-01-01 DIAGNOSIS — Z91.010 ALLERGY TO PEANUTS: ICD-10-CM

## 2023-01-01 DIAGNOSIS — N40.0 BENIGN PROSTATIC HYPERPLASIA WITHOUT LOWER URINARY TRACT SYMPTOMS: ICD-10-CM

## 2023-01-01 DIAGNOSIS — Z01.818 ENCOUNTER FOR OTHER PREPROCEDURAL EXAMINATION: ICD-10-CM

## 2023-01-01 DIAGNOSIS — Z91.148 PATIENT'S OTHER NONCOMPLIANCE WITH MEDICATION REGIMEN FOR OTHER REASON: ICD-10-CM

## 2023-01-01 DIAGNOSIS — J18.9 PNEUMONIA, UNSPECIFIED ORGANISM: ICD-10-CM

## 2023-01-01 DIAGNOSIS — E43 UNSPECIFIED SEVERE PROTEIN-CALORIE MALNUTRITION: ICD-10-CM

## 2023-01-01 DIAGNOSIS — J96.01 ACUTE RESPIRATORY FAILURE WITH HYPOXIA: ICD-10-CM

## 2023-01-01 DIAGNOSIS — N39.0 URINARY TRACT INFECTION, SITE NOT SPECIFIED: ICD-10-CM

## 2023-01-01 DIAGNOSIS — Z98.41 CATARACT EXTRACTION STATUS, RIGHT EYE: Chronic | ICD-10-CM

## 2023-01-01 DIAGNOSIS — Z23 ENCOUNTER FOR IMMUNIZATION: ICD-10-CM

## 2023-01-01 DIAGNOSIS — N17.9 ACUTE KIDNEY FAILURE, UNSPECIFIED: ICD-10-CM

## 2023-01-01 DIAGNOSIS — R79.89 OTHER SPECIFIED ABNORMAL FINDINGS OF BLOOD CHEMISTRY: ICD-10-CM

## 2023-01-01 DIAGNOSIS — K80.20 CALCULUS OF GALLBLADDER WITHOUT CHOLECYSTITIS WITHOUT OBSTRUCTION: ICD-10-CM

## 2023-01-01 DIAGNOSIS — Z29.9 ENCOUNTER FOR PROPHYLACTIC MEASURES, UNSPECIFIED: ICD-10-CM

## 2023-01-01 DIAGNOSIS — H25.811 COMBINED FORMS OF AGE-RELATED CATARACT, RIGHT EYE: ICD-10-CM

## 2023-01-01 DIAGNOSIS — Z91.013 ALLERGY TO SEAFOOD: ICD-10-CM

## 2023-01-01 DIAGNOSIS — H25.812 COMBINED FORMS OF AGE-RELATED CATARACT, LEFT EYE: ICD-10-CM

## 2023-01-01 DIAGNOSIS — D57.1 SICKLE-CELL DISEASE W/OUT CRISIS: ICD-10-CM

## 2023-01-01 DIAGNOSIS — Z91.018 ALLERGY TO OTHER FOODS: ICD-10-CM

## 2023-01-01 DIAGNOSIS — R17 UNSPECIFIED JAUNDICE: ICD-10-CM

## 2023-01-01 DIAGNOSIS — F70 MILD INTELLECTUAL DISABILITIES: ICD-10-CM

## 2023-01-01 LAB
-  AMIKACIN: SIGNIFICANT CHANGE UP
-  AMOXICILLIN/CLAVULANIC ACID: SIGNIFICANT CHANGE UP
-  AMPICILLIN/SULBACTAM: SIGNIFICANT CHANGE UP
-  AMPICILLIN: SIGNIFICANT CHANGE UP
-  AMPICILLIN: SIGNIFICANT CHANGE UP
-  AZTREONAM: SIGNIFICANT CHANGE UP
-  CEFAZOLIN: SIGNIFICANT CHANGE UP
-  CEFEPIME: SIGNIFICANT CHANGE UP
-  CEFOXITIN: SIGNIFICANT CHANGE UP
-  CEFTRIAXONE: SIGNIFICANT CHANGE UP
-  CEFUROXIME: SIGNIFICANT CHANGE UP
-  CIPROFLOXACIN: SIGNIFICANT CHANGE UP
-  CIPROFLOXACIN: SIGNIFICANT CHANGE UP
-  DAPTOMYCIN: SIGNIFICANT CHANGE UP
-  ERTAPENEM: SIGNIFICANT CHANGE UP
-  GENTAMICIN: SIGNIFICANT CHANGE UP
-  IMIPENEM: SIGNIFICANT CHANGE UP
-  LEVOFLOXACIN: SIGNIFICANT CHANGE UP
-  LEVOFLOXACIN: SIGNIFICANT CHANGE UP
-  LINEZOLID: SIGNIFICANT CHANGE UP
-  MEROPENEM: SIGNIFICANT CHANGE UP
-  NITROFURANTOIN: SIGNIFICANT CHANGE UP
-  NITROFURANTOIN: SIGNIFICANT CHANGE UP
-  PIPERACILLIN/TAZOBACTAM: SIGNIFICANT CHANGE UP
-  TETRACYCLINE: SIGNIFICANT CHANGE UP
-  TOBRAMYCIN: SIGNIFICANT CHANGE UP
-  TRIMETHOPRIM/SULFAMETHOXAZOLE: SIGNIFICANT CHANGE UP
-  VANCOMYCIN: SIGNIFICANT CHANGE UP
ALBUMIN SERPL ELPH-MCNC: 2.7 G/DL — LOW (ref 3.3–5)
ALBUMIN SERPL ELPH-MCNC: 2.7 G/DL — LOW (ref 3.3–5)
ALBUMIN SERPL ELPH-MCNC: 2.9 G/DL — LOW (ref 3.3–5)
ALBUMIN SERPL ELPH-MCNC: 2.9 G/DL — LOW (ref 3.3–5)
ALBUMIN SERPL ELPH-MCNC: 3.1 G/DL — LOW (ref 3.3–5)
ALBUMIN SERPL ELPH-MCNC: 3.2 G/DL — LOW (ref 3.3–5)
ALBUMIN SERPL ELPH-MCNC: 3.2 G/DL — LOW (ref 3.3–5)
ALBUMIN SERPL ELPH-MCNC: 3.3 G/DL — SIGNIFICANT CHANGE UP (ref 3.3–5)
ALBUMIN SERPL ELPH-MCNC: 3.5 G/DL — SIGNIFICANT CHANGE UP (ref 3.3–5)
ALBUMIN SERPL ELPH-MCNC: 3.7 G/DL — SIGNIFICANT CHANGE UP (ref 3.3–5)
ALBUMIN SERPL ELPH-MCNC: 3.9 G/DL — SIGNIFICANT CHANGE UP (ref 3.3–5)
ALP SERPL-CCNC: 100 U/L — SIGNIFICANT CHANGE UP (ref 40–120)
ALP SERPL-CCNC: 109 U/L — SIGNIFICANT CHANGE UP (ref 40–120)
ALP SERPL-CCNC: 114 U/L — SIGNIFICANT CHANGE UP (ref 40–120)
ALP SERPL-CCNC: 116 U/L — SIGNIFICANT CHANGE UP (ref 40–120)
ALP SERPL-CCNC: 116 U/L — SIGNIFICANT CHANGE UP (ref 40–120)
ALP SERPL-CCNC: 127 U/L — HIGH (ref 40–120)
ALP SERPL-CCNC: 128 U/L — HIGH (ref 30–120)
ALP SERPL-CCNC: 128 U/L — HIGH (ref 30–120)
ALP SERPL-CCNC: 148 U/L — HIGH (ref 40–120)
ALP SERPL-CCNC: 193 U/L — HIGH (ref 40–120)
ALP SERPL-CCNC: 205 U/L — HIGH (ref 40–120)
ALP SERPL-CCNC: 273 U/L — HIGH (ref 40–120)
ALP SERPL-CCNC: 296 U/L — HIGH (ref 40–120)
ALP SERPL-CCNC: 304 U/L — HIGH (ref 40–120)
ALT FLD-CCNC: 17 U/L — SIGNIFICANT CHANGE UP (ref 4–41)
ALT FLD-CCNC: 17 U/L — SIGNIFICANT CHANGE UP (ref 4–41)
ALT FLD-CCNC: 18 U/L — SIGNIFICANT CHANGE UP (ref 4–41)
ALT FLD-CCNC: 186 U/L — HIGH (ref 12–78)
ALT FLD-CCNC: 199 U/L — HIGH (ref 12–78)
ALT FLD-CCNC: 21 U/L — SIGNIFICANT CHANGE UP (ref 4–41)
ALT FLD-CCNC: 24 U/L — SIGNIFICANT CHANGE UP (ref 4–41)
ALT FLD-CCNC: 276 U/L — HIGH (ref 12–78)
ALT FLD-CCNC: 28 U/L — SIGNIFICANT CHANGE UP (ref 10–60)
ALT FLD-CCNC: 28 U/L — SIGNIFICANT CHANGE UP (ref 10–60)
ALT FLD-CCNC: 28 U/L — SIGNIFICANT CHANGE UP (ref 12–78)
ALT FLD-CCNC: 288 U/L — HIGH (ref 12–78)
ALT FLD-CCNC: 307 U/L — HIGH (ref 12–78)
ALT FLD-CCNC: 35 U/L — SIGNIFICANT CHANGE UP (ref 12–78)
ANION GAP SERPL CALC-SCNC: 10 MMOL/L — SIGNIFICANT CHANGE UP (ref 7–14)
ANION GAP SERPL CALC-SCNC: 11 MMOL/L — SIGNIFICANT CHANGE UP (ref 7–14)
ANION GAP SERPL CALC-SCNC: 13 MMOL/L — SIGNIFICANT CHANGE UP (ref 7–14)
ANION GAP SERPL CALC-SCNC: 3 MMOL/L — LOW (ref 5–17)
ANION GAP SERPL CALC-SCNC: 4 MMOL/L — LOW (ref 5–17)
ANION GAP SERPL CALC-SCNC: 4 MMOL/L — LOW (ref 5–17)
ANION GAP SERPL CALC-SCNC: 5 MMOL/L — SIGNIFICANT CHANGE UP (ref 5–17)
ANION GAP SERPL CALC-SCNC: 6 MMOL/L — SIGNIFICANT CHANGE UP (ref 5–17)
ANION GAP SERPL CALC-SCNC: 7 MMOL/L — SIGNIFICANT CHANGE UP (ref 5–17)
ANION GAP SERPL CALC-SCNC: 8 MMOL/L — SIGNIFICANT CHANGE UP (ref 5–17)
ANISOCYTOSIS BLD QL: SLIGHT — SIGNIFICANT CHANGE UP
ANISOCYTOSIS BLD QL: SLIGHT — SIGNIFICANT CHANGE UP
APPEARANCE UR: ABNORMAL
APPEARANCE UR: CLEAR — SIGNIFICANT CHANGE UP
APPEARANCE UR: CLEAR — SIGNIFICANT CHANGE UP
APTT BLD: 32 SEC — SIGNIFICANT CHANGE UP (ref 27.5–35.5)
APTT BLD: 32.3 SEC — SIGNIFICANT CHANGE UP (ref 27–36.3)
AST SERPL-CCNC: 105 U/L — HIGH (ref 15–37)
AST SERPL-CCNC: 111 U/L — HIGH (ref 15–37)
AST SERPL-CCNC: 198 U/L — HIGH (ref 15–37)
AST SERPL-CCNC: 276 U/L — HIGH (ref 15–37)
AST SERPL-CCNC: 38 U/L — SIGNIFICANT CHANGE UP (ref 4–40)
AST SERPL-CCNC: 38 U/L — SIGNIFICANT CHANGE UP (ref 4–40)
AST SERPL-CCNC: 43 U/L — HIGH (ref 4–40)
AST SERPL-CCNC: 444 U/L — HIGH (ref 15–37)
AST SERPL-CCNC: 46 U/L — HIGH (ref 4–40)
AST SERPL-CCNC: 47 U/L — HIGH (ref 4–40)
AST SERPL-CCNC: 53 U/L — HIGH (ref 15–37)
AST SERPL-CCNC: 60 U/L — HIGH (ref 10–40)
AST SERPL-CCNC: 60 U/L — HIGH (ref 10–40)
AST SERPL-CCNC: 94 U/L — HIGH (ref 15–37)
BACTERIA # UR AUTO: ABNORMAL
BACTERIA # UR AUTO: ABNORMAL /HPF
BASE EXCESS BLDV CALC-SCNC: -3.5 MMOL/L — LOW (ref -2–3)
BASOPHILS # BLD AUTO: 0 K/UL — SIGNIFICANT CHANGE UP (ref 0–0.2)
BASOPHILS # BLD AUTO: 0.04 K/UL — SIGNIFICANT CHANGE UP (ref 0–0.2)
BASOPHILS # BLD AUTO: 0.04 K/UL — SIGNIFICANT CHANGE UP (ref 0–0.2)
BASOPHILS # BLD AUTO: 0.06 K/UL — SIGNIFICANT CHANGE UP (ref 0–0.2)
BASOPHILS # BLD AUTO: 0.08 K/UL — SIGNIFICANT CHANGE UP (ref 0–0.2)
BASOPHILS # BLD AUTO: 0.09 K/UL — SIGNIFICANT CHANGE UP (ref 0–0.2)
BASOPHILS # BLD AUTO: 0.09 K/UL — SIGNIFICANT CHANGE UP (ref 0–0.2)
BASOPHILS # BLD AUTO: 0.12 K/UL — SIGNIFICANT CHANGE UP (ref 0–0.2)
BASOPHILS NFR BLD AUTO: 0 % — SIGNIFICANT CHANGE UP (ref 0–2)
BASOPHILS NFR BLD AUTO: 0.3 % — SIGNIFICANT CHANGE UP (ref 0–2)
BASOPHILS NFR BLD AUTO: 0.5 % — SIGNIFICANT CHANGE UP (ref 0–2)
BASOPHILS NFR BLD AUTO: 0.7 % — SIGNIFICANT CHANGE UP (ref 0–2)
BASOPHILS NFR BLD AUTO: 0.9 % — SIGNIFICANT CHANGE UP (ref 0–2)
BASOPHILS NFR BLD AUTO: 0.9 % — SIGNIFICANT CHANGE UP (ref 0–2)
BASOPHILS NFR BLD AUTO: 1 % — SIGNIFICANT CHANGE UP (ref 0–2)
BASOPHILS NFR BLD AUTO: 1.3 % — SIGNIFICANT CHANGE UP (ref 0–2)
BILIRUB DIRECT SERPL-MCNC: 1.8 MG/DL — HIGH (ref 0–0.3)
BILIRUB DIRECT SERPL-MCNC: 2.8 MG/DL — HIGH (ref 0–0.3)
BILIRUB DIRECT SERPL-MCNC: 4.1 MG/DL — HIGH (ref 0–0.3)
BILIRUB INDIRECT FLD-MCNC: 2.9 MG/DL — HIGH (ref 0.2–1)
BILIRUB INDIRECT FLD-MCNC: 4.7 MG/DL — HIGH (ref 0.2–1)
BILIRUB INDIRECT FLD-MCNC: 6.2 MG/DL — HIGH (ref 0.2–1)
BILIRUB SERPL-MCNC: 1.8 MG/DL — HIGH (ref 0.2–1.2)
BILIRUB SERPL-MCNC: 10.3 MG/DL — HIGH (ref 0.2–1.2)
BILIRUB SERPL-MCNC: 10.3 MG/DL — HIGH (ref 0.2–1.2)
BILIRUB SERPL-MCNC: 2.1 MG/DL — HIGH (ref 0.2–1.2)
BILIRUB SERPL-MCNC: 2.4 MG/DL — HIGH (ref 0.2–1.2)
BILIRUB SERPL-MCNC: 2.8 MG/DL — HIGH (ref 0.2–1.2)
BILIRUB SERPL-MCNC: 3 MG/DL — HIGH (ref 0.2–1.2)
BILIRUB SERPL-MCNC: 3.2 MG/DL — HIGH (ref 0.2–1.2)
BILIRUB SERPL-MCNC: 3.3 MG/DL — HIGH (ref 0.2–1.2)
BILIRUB SERPL-MCNC: 3.4 MG/DL — HIGH (ref 0.2–1.2)
BILIRUB SERPL-MCNC: 4.7 MG/DL — HIGH (ref 0.2–1.2)
BILIRUB SERPL-MCNC: 5.9 MG/DL — HIGH (ref 0.2–1.2)
BILIRUB SERPL-MCNC: 7.5 MG/DL — HIGH (ref 0.2–1.2)
BILIRUB UR-MCNC: NEGATIVE — SIGNIFICANT CHANGE UP
BLD GP AB SCN SERPL QL: SIGNIFICANT CHANGE UP
BLD GP AB SCN SERPL QL: SIGNIFICANT CHANGE UP
BLOOD GAS VENOUS COMPREHENSIVE RESULT: SIGNIFICANT CHANGE UP
BUN SERPL-MCNC: 13 MG/DL — SIGNIFICANT CHANGE UP (ref 7–23)
BUN SERPL-MCNC: 14 MG/DL — SIGNIFICANT CHANGE UP (ref 7–23)
BUN SERPL-MCNC: 14 MG/DL — SIGNIFICANT CHANGE UP (ref 7–23)
BUN SERPL-MCNC: 17 MG/DL — SIGNIFICANT CHANGE UP (ref 7–23)
BUN SERPL-MCNC: 20 MG/DL — SIGNIFICANT CHANGE UP (ref 7–23)
BUN SERPL-MCNC: 23 MG/DL — SIGNIFICANT CHANGE UP (ref 7–23)
BUN SERPL-MCNC: 24 MG/DL — HIGH (ref 7–23)
BUN SERPL-MCNC: 24 MG/DL — HIGH (ref 7–23)
BUN SERPL-MCNC: 28 MG/DL — HIGH (ref 7–23)
BUN SERPL-MCNC: 29 MG/DL — HIGH (ref 7–23)
BUN SERPL-MCNC: 46 MG/DL — HIGH (ref 7–23)
BUN SERPL-MCNC: 46 MG/DL — HIGH (ref 7–23)
BUN SERPL-MCNC: 71 MG/DL — HIGH (ref 7–23)
CALCIUM SERPL-MCNC: 8.4 MG/DL — LOW (ref 8.5–10.1)
CALCIUM SERPL-MCNC: 8.4 MG/DL — SIGNIFICANT CHANGE UP (ref 8.4–10.5)
CALCIUM SERPL-MCNC: 8.6 MG/DL — SIGNIFICANT CHANGE UP (ref 8.5–10.1)
CALCIUM SERPL-MCNC: 8.7 MG/DL — SIGNIFICANT CHANGE UP (ref 8.4–10.5)
CALCIUM SERPL-MCNC: 8.7 MG/DL — SIGNIFICANT CHANGE UP (ref 8.5–10.1)
CALCIUM SERPL-MCNC: 8.8 MG/DL — SIGNIFICANT CHANGE UP (ref 8.5–10.1)
CALCIUM SERPL-MCNC: 8.9 MG/DL — SIGNIFICANT CHANGE UP (ref 8.4–10.5)
CALCIUM SERPL-MCNC: 9 MG/DL — SIGNIFICANT CHANGE UP (ref 8.4–10.5)
CALCIUM SERPL-MCNC: 9 MG/DL — SIGNIFICANT CHANGE UP (ref 8.5–10.1)
CALCIUM SERPL-MCNC: 9.1 MG/DL — SIGNIFICANT CHANGE UP (ref 8.4–10.5)
CALCIUM SERPL-MCNC: 9.1 MG/DL — SIGNIFICANT CHANGE UP (ref 8.4–10.5)
CALCIUM SERPL-MCNC: 9.4 MG/DL — SIGNIFICANT CHANGE UP (ref 8.5–10.1)
CALCIUM SERPL-MCNC: 9.9 MG/DL — SIGNIFICANT CHANGE UP (ref 8.5–10.1)
CAST: 0 /LPF — SIGNIFICANT CHANGE UP (ref 0–4)
CHLORIDE BLDV-SCNC: 113 MMOL/L — HIGH (ref 96–108)
CHLORIDE SERPL-SCNC: 106 MMOL/L — SIGNIFICANT CHANGE UP (ref 96–108)
CHLORIDE SERPL-SCNC: 108 MMOL/L — HIGH (ref 98–107)
CHLORIDE SERPL-SCNC: 108 MMOL/L — HIGH (ref 98–107)
CHLORIDE SERPL-SCNC: 108 MMOL/L — SIGNIFICANT CHANGE UP (ref 96–108)
CHLORIDE SERPL-SCNC: 109 MMOL/L — HIGH (ref 98–107)
CHLORIDE SERPL-SCNC: 109 MMOL/L — HIGH (ref 98–107)
CHLORIDE SERPL-SCNC: 110 MMOL/L — HIGH (ref 96–108)
CHLORIDE SERPL-SCNC: 110 MMOL/L — HIGH (ref 96–108)
CHLORIDE SERPL-SCNC: 111 MMOL/L — HIGH (ref 96–108)
CHLORIDE SERPL-SCNC: 112 MMOL/L — HIGH (ref 96–108)
CHLORIDE SERPL-SCNC: 113 MMOL/L — HIGH (ref 98–107)
CO2 BLDV-SCNC: 23.4 MMOL/L — SIGNIFICANT CHANGE UP (ref 22–26)
CO2 SERPL-SCNC: 18 MMOL/L — LOW (ref 22–31)
CO2 SERPL-SCNC: 18 MMOL/L — LOW (ref 22–31)
CO2 SERPL-SCNC: 19 MMOL/L — LOW (ref 22–31)
CO2 SERPL-SCNC: 22 MMOL/L — SIGNIFICANT CHANGE UP (ref 22–31)
CO2 SERPL-SCNC: 22 MMOL/L — SIGNIFICANT CHANGE UP (ref 22–31)
CO2 SERPL-SCNC: 23 MMOL/L — SIGNIFICANT CHANGE UP (ref 22–31)
CO2 SERPL-SCNC: 23 MMOL/L — SIGNIFICANT CHANGE UP (ref 22–31)
CO2 SERPL-SCNC: 25 MMOL/L — SIGNIFICANT CHANGE UP (ref 22–31)
CO2 SERPL-SCNC: 26 MMOL/L — SIGNIFICANT CHANGE UP (ref 22–31)
CO2 SERPL-SCNC: 28 MMOL/L — SIGNIFICANT CHANGE UP (ref 22–31)
CO2 SERPL-SCNC: 28 MMOL/L — SIGNIFICANT CHANGE UP (ref 22–31)
COLOR SPEC: YELLOW — SIGNIFICANT CHANGE UP
CREAT SERPL-MCNC: 0.98 MG/DL — SIGNIFICANT CHANGE UP (ref 0.5–1.3)
CREAT SERPL-MCNC: 1.02 MG/DL — SIGNIFICANT CHANGE UP (ref 0.5–1.3)
CREAT SERPL-MCNC: 1.02 MG/DL — SIGNIFICANT CHANGE UP (ref 0.5–1.3)
CREAT SERPL-MCNC: 1.06 MG/DL — SIGNIFICANT CHANGE UP (ref 0.5–1.3)
CREAT SERPL-MCNC: 1.1 MG/DL — SIGNIFICANT CHANGE UP (ref 0.5–1.3)
CREAT SERPL-MCNC: 1.12 MG/DL — SIGNIFICANT CHANGE UP (ref 0.5–1.3)
CREAT SERPL-MCNC: 1.12 MG/DL — SIGNIFICANT CHANGE UP (ref 0.5–1.3)
CREAT SERPL-MCNC: 1.13 MG/DL — SIGNIFICANT CHANGE UP (ref 0.5–1.3)
CREAT SERPL-MCNC: 1.22 MG/DL — SIGNIFICANT CHANGE UP (ref 0.5–1.3)
CREAT SERPL-MCNC: 1.28 MG/DL — SIGNIFICANT CHANGE UP (ref 0.5–1.3)
CREAT SERPL-MCNC: 1.37 MG/DL — HIGH (ref 0.5–1.3)
CREAT SERPL-MCNC: 1.38 MG/DL — HIGH (ref 0.5–1.3)
CREAT SERPL-MCNC: 1.4 MG/DL — HIGH (ref 0.5–1.3)
CREAT SERPL-MCNC: 1.77 MG/DL — HIGH (ref 0.5–1.3)
CREAT SERPL-MCNC: 2.03 MG/DL — HIGH (ref 0.5–1.3)
CULTURE RESULTS: SIGNIFICANT CHANGE UP
DACRYOCYTES BLD QL SMEAR: SLIGHT — SIGNIFICANT CHANGE UP
DIFF PNL FLD: ABNORMAL
DIR ANTIGLOB POLYSPECIFIC INTERPRETATION: SIGNIFICANT CHANGE UP
DIR ANTIGLOB POLYSPECIFIC INTERPRETATION: SIGNIFICANT CHANGE UP
EGFR: 37 ML/MIN/1.73M2 — LOW
EGFR: 44 ML/MIN/1.73M2 — LOW
EGFR: 54 ML/MIN/1.73M2 — LOW
EGFR: 55 ML/MIN/1.73M2 — LOW
EGFR: 59 ML/MIN/1.73M2 — LOW
EGFR: 64 ML/MIN/1.73M2 — SIGNIFICANT CHANGE UP
EGFR: 64 ML/MIN/1.73M2 — SIGNIFICANT CHANGE UP
EGFR: 70 ML/MIN/1.73M2 — SIGNIFICANT CHANGE UP
EGFR: 71 ML/MIN/1.73M2 — SIGNIFICANT CHANGE UP
EGFR: 71 ML/MIN/1.73M2 — SIGNIFICANT CHANGE UP
EGFR: 76 ML/MIN/1.73M2 — SIGNIFICANT CHANGE UP
EGFR: 77 ML/MIN/1.73M2 — SIGNIFICANT CHANGE UP
EGFR: 79 ML/MIN/1.73M2 — SIGNIFICANT CHANGE UP
EGFR: 85 ML/MIN/1.73M2 — SIGNIFICANT CHANGE UP
EGFR: 89 ML/MIN/1.73M2 — SIGNIFICANT CHANGE UP
ELLIPTOCYTES BLD QL SMEAR: SLIGHT — SIGNIFICANT CHANGE UP
ELLIPTOCYTES BLD QL SMEAR: SLIGHT — SIGNIFICANT CHANGE UP
EOSINOPHIL # BLD AUTO: 0 K/UL — SIGNIFICANT CHANGE UP (ref 0–0.5)
EOSINOPHIL # BLD AUTO: 0.47 K/UL — SIGNIFICANT CHANGE UP (ref 0–0.5)
EOSINOPHIL # BLD AUTO: 0.93 K/UL — HIGH (ref 0–0.5)
EOSINOPHIL # BLD AUTO: 0.98 K/UL — HIGH (ref 0–0.5)
EOSINOPHIL # BLD AUTO: 1.08 K/UL — HIGH (ref 0–0.5)
EOSINOPHIL # BLD AUTO: 1.12 K/UL — HIGH (ref 0–0.5)
EOSINOPHIL # BLD AUTO: 1.32 K/UL — HIGH (ref 0–0.5)
EOSINOPHIL # BLD AUTO: 1.59 K/UL — HIGH (ref 0–0.5)
EOSINOPHIL NFR BLD AUTO: 0 % — SIGNIFICANT CHANGE UP (ref 0–6)
EOSINOPHIL NFR BLD AUTO: 11 % — HIGH (ref 0–6)
EOSINOPHIL NFR BLD AUTO: 11.6 % — HIGH (ref 0–6)
EOSINOPHIL NFR BLD AUTO: 12.1 % — HIGH (ref 0–6)
EOSINOPHIL NFR BLD AUTO: 12.8 % — HIGH (ref 0–6)
EOSINOPHIL NFR BLD AUTO: 17.3 % — HIGH (ref 0–6)
EOSINOPHIL NFR BLD AUTO: 5.9 % — SIGNIFICANT CHANGE UP (ref 0–6)
EOSINOPHIL NFR BLD AUTO: 9.5 % — HIGH (ref 0–6)
EPI CELLS # UR: SIGNIFICANT CHANGE UP
EPI CELLS # UR: SIGNIFICANT CHANGE UP
FERRITIN SERPL-MCNC: 494 NG/ML — HIGH (ref 30–400)
FOLATE SERPL-MCNC: >20 NG/ML — SIGNIFICANT CHANGE UP
GAS PNL BLDV: 140 MMOL/L — SIGNIFICANT CHANGE UP (ref 136–145)
GLUCOSE BLDV-MCNC: 77 MG/DL — SIGNIFICANT CHANGE UP (ref 70–99)
GLUCOSE SERPL-MCNC: 101 MG/DL — HIGH (ref 70–99)
GLUCOSE SERPL-MCNC: 112 MG/DL — HIGH (ref 70–99)
GLUCOSE SERPL-MCNC: 136 MG/DL — HIGH (ref 70–99)
GLUCOSE SERPL-MCNC: 137 MG/DL — HIGH (ref 70–99)
GLUCOSE SERPL-MCNC: 139 MG/DL — HIGH (ref 70–99)
GLUCOSE SERPL-MCNC: 75 MG/DL — SIGNIFICANT CHANGE UP (ref 70–99)
GLUCOSE SERPL-MCNC: 78 MG/DL — SIGNIFICANT CHANGE UP (ref 70–99)
GLUCOSE SERPL-MCNC: 80 MG/DL — SIGNIFICANT CHANGE UP (ref 70–99)
GLUCOSE SERPL-MCNC: 81 MG/DL — SIGNIFICANT CHANGE UP (ref 70–99)
GLUCOSE SERPL-MCNC: 88 MG/DL — SIGNIFICANT CHANGE UP (ref 70–99)
GLUCOSE SERPL-MCNC: 91 MG/DL — SIGNIFICANT CHANGE UP (ref 70–99)
GLUCOSE SERPL-MCNC: 92 MG/DL — SIGNIFICANT CHANGE UP (ref 70–99)
GLUCOSE SERPL-MCNC: 96 MG/DL — SIGNIFICANT CHANGE UP (ref 70–99)
GLUCOSE UR QL: NEGATIVE MG/DL — SIGNIFICANT CHANGE UP
HAPTOGLOB SERPL-MCNC: <20 MG/DL — LOW (ref 34–200)
HCO3 BLDV-SCNC: 22 MMOL/L — SIGNIFICANT CHANGE UP (ref 22–29)
HCT VFR BLD CALC: 13.9 % — CRITICAL LOW (ref 39–50)
HCT VFR BLD CALC: 15.6 % — CRITICAL LOW (ref 39–50)
HCT VFR BLD CALC: 16.4 % — CRITICAL LOW (ref 39–50)
HCT VFR BLD CALC: 17.1 % — CRITICAL LOW (ref 39–50)
HCT VFR BLD CALC: 18.1 % — CRITICAL LOW (ref 39–50)
HCT VFR BLD CALC: 18.2 % — CRITICAL LOW (ref 39–50)
HCT VFR BLD CALC: 18.3 % — CRITICAL LOW (ref 39–50)
HCT VFR BLD CALC: 18.3 % — CRITICAL LOW (ref 39–50)
HCT VFR BLD CALC: 18.4 % — CRITICAL LOW (ref 39–50)
HCT VFR BLD CALC: 20.2 % — CRITICAL LOW (ref 39–50)
HCT VFR BLD CALC: 20.3 % — CRITICAL LOW (ref 39–50)
HCT VFR BLD CALC: 21 % — CRITICAL LOW (ref 39–50)
HCT VFR BLD CALC: 21.3 % — LOW (ref 39–50)
HCT VFR BLD CALC: 23.5 % — LOW (ref 39–50)
HCT VFR BLD CALC: 25.2 % — LOW (ref 39–50)
HCT VFR BLD CALC: 26.1 % — LOW (ref 39–50)
HCT VFR BLDA CALC: 17 % — CRITICAL LOW (ref 39–51)
HEMOGLOBIN INTERPRETATION: SIGNIFICANT CHANGE UP
HGB A MFR BLD: 0 %
HGB A MFR BLD: 0 % — LOW (ref 95–97.6)
HGB A2 MFR BLD: 3 %
HGB A2 MFR BLD: 3.9 % — HIGH (ref 2.4–3.5)
HGB BLD CALC-MCNC: 5.8 G/DL — CRITICAL LOW (ref 12.6–17.4)
HGB BLD-MCNC: 4.8 G/DL — CRITICAL LOW (ref 13–17)
HGB BLD-MCNC: 5.2 G/DL — CRITICAL LOW (ref 13–17)
HGB BLD-MCNC: 5.5 G/DL — CRITICAL LOW (ref 13–17)
HGB BLD-MCNC: 5.7 G/DL — CRITICAL LOW (ref 13–17)
HGB BLD-MCNC: 5.7 G/DL — CRITICAL LOW (ref 13–17)
HGB BLD-MCNC: 5.9 G/DL — CRITICAL LOW (ref 13–17)
HGB BLD-MCNC: 6.1 G/DL — CRITICAL LOW (ref 13–17)
HGB BLD-MCNC: 6.2 G/DL — CRITICAL LOW (ref 13–17)
HGB BLD-MCNC: 6.4 G/DL — CRITICAL LOW (ref 13–17)
HGB BLD-MCNC: 6.9 G/DL — CRITICAL LOW (ref 13–17)
HGB BLD-MCNC: 7 G/DL — CRITICAL LOW (ref 13–17)
HGB BLD-MCNC: 7.1 G/DL — LOW (ref 13–17)
HGB BLD-MCNC: 7.2 G/DL — LOW (ref 13–17)
HGB BLD-MCNC: 7.7 G/DL — LOW (ref 13–17)
HGB BLD-MCNC: 8.4 G/DL — LOW (ref 13–17)
HGB BLD-MCNC: 8.6 G/DL — LOW (ref 13–17)
HGB F MFR BLD: 4 % — HIGH (ref 0–1.5)
HGB F MFR BLD: 5.1 %
HGB FRACT BLD-IMP: NORMAL
HGB S MFR BLD: 91.9 %
HGB S MFR BLD: 92.1 % — HIGH
HYPOCHROMIA BLD QL: SLIGHT — SIGNIFICANT CHANGE UP
HYPOCHROMIA BLD QL: SLIGHT — SIGNIFICANT CHANGE UP
IANC: 4.89 K/UL — SIGNIFICANT CHANGE UP (ref 1.8–7.4)
IANC: 5.29 K/UL — SIGNIFICANT CHANGE UP (ref 1.8–7.4)
IANC: 5.84 K/UL — SIGNIFICANT CHANGE UP (ref 1.8–7.4)
IANC: 5.96 K/UL — SIGNIFICANT CHANGE UP (ref 1.8–7.4)
IANC: 7.1 K/UL — SIGNIFICANT CHANGE UP (ref 1.8–7.4)
IMM GRANULOCYTES NFR BLD AUTO: 0.5 % — SIGNIFICANT CHANGE UP (ref 0–0.9)
IMM GRANULOCYTES NFR BLD AUTO: 0.6 % — SIGNIFICANT CHANGE UP (ref 0–0.9)
IMM GRANULOCYTES NFR BLD AUTO: 1.1 % — HIGH (ref 0–0.9)
IMM GRANULOCYTES NFR BLD AUTO: 1.1 % — HIGH (ref 0–0.9)
IMM GRANULOCYTES NFR BLD AUTO: 1.5 % — HIGH (ref 0–0.9)
INR BLD: 1.09 RATIO — SIGNIFICANT CHANGE UP (ref 0.88–1.16)
INR BLD: 1.11 RATIO — SIGNIFICANT CHANGE UP (ref 0.88–1.16)
INR BLD: 1.32 RATIO — HIGH (ref 0.88–1.16)
IRON SATN MFR SERPL: 25 % — SIGNIFICANT CHANGE UP (ref 16–55)
IRON SATN MFR SERPL: 68 UG/DL — SIGNIFICANT CHANGE UP (ref 45–165)
KETONES UR-MCNC: NEGATIVE MG/DL — SIGNIFICANT CHANGE UP
KETONES UR-MCNC: NEGATIVE — SIGNIFICANT CHANGE UP
KETONES UR-MCNC: NEGATIVE — SIGNIFICANT CHANGE UP
LACTATE BLDV-MCNC: 0.6 MMOL/L — SIGNIFICANT CHANGE UP (ref 0.5–2)
LDH SERPL L TO P-CCNC: 540 U/L — HIGH (ref 50–242)
LDH SERPL L TO P-CCNC: 561 U/L — HIGH (ref 50–242)
LDH SERPL L TO P-CCNC: 575 U/L — HIGH (ref 50–242)
LDH SERPL L TO P-CCNC: 618 U/L — HIGH (ref 50–242)
LEUKOCYTE ESTERASE UR-ACNC: ABNORMAL
LEUKOCYTE ESTERASE UR-ACNC: ABNORMAL
LEUKOCYTE ESTERASE UR-ACNC: NEGATIVE — SIGNIFICANT CHANGE UP
LIDOCAIN IGE QN: 238 U/L — SIGNIFICANT CHANGE UP (ref 73–393)
LIDOCAIN IGE QN: 44 U/L — SIGNIFICANT CHANGE UP (ref 7–60)
LYMPHOCYTES # BLD AUTO: 0.86 K/UL — LOW (ref 1–3.3)
LYMPHOCYTES # BLD AUTO: 0.9 K/UL — LOW (ref 1–3.3)
LYMPHOCYTES # BLD AUTO: 1.02 K/UL — SIGNIFICANT CHANGE UP (ref 1–3.3)
LYMPHOCYTES # BLD AUTO: 1.09 K/UL — SIGNIFICANT CHANGE UP (ref 1–3.3)
LYMPHOCYTES # BLD AUTO: 1.16 K/UL — SIGNIFICANT CHANGE UP (ref 1–3.3)
LYMPHOCYTES # BLD AUTO: 1.17 K/UL — SIGNIFICANT CHANGE UP (ref 1–3.3)
LYMPHOCYTES # BLD AUTO: 1.37 K/UL — SIGNIFICANT CHANGE UP (ref 1–3.3)
LYMPHOCYTES # BLD AUTO: 1.8 K/UL — SIGNIFICANT CHANGE UP (ref 1–3.3)
LYMPHOCYTES # BLD AUTO: 10.8 % — LOW (ref 13–44)
LYMPHOCYTES # BLD AUTO: 11.1 % — LOW (ref 13–44)
LYMPHOCYTES # BLD AUTO: 11.7 % — LOW (ref 13–44)
LYMPHOCYTES # BLD AUTO: 13.8 % — SIGNIFICANT CHANGE UP (ref 13–44)
LYMPHOCYTES # BLD AUTO: 15 % — SIGNIFICANT CHANGE UP (ref 13–44)
LYMPHOCYTES # BLD AUTO: 16.2 % — SIGNIFICANT CHANGE UP (ref 13–44)
LYMPHOCYTES # BLD AUTO: 8.9 % — LOW (ref 13–44)
LYMPHOCYTES # BLD AUTO: 9.1 % — LOW (ref 13–44)
MACROCYTES BLD QL: SLIGHT — SIGNIFICANT CHANGE UP
MACROCYTES BLD QL: SLIGHT — SIGNIFICANT CHANGE UP
MAGNESIUM SERPL-MCNC: 1.9 MG/DL — SIGNIFICANT CHANGE UP (ref 1.6–2.6)
MAGNESIUM SERPL-MCNC: 1.9 MG/DL — SIGNIFICANT CHANGE UP (ref 1.6–2.6)
MAGNESIUM SERPL-MCNC: 2 MG/DL — SIGNIFICANT CHANGE UP (ref 1.6–2.6)
MAGNESIUM SERPL-MCNC: 2.2 MG/DL — SIGNIFICANT CHANGE UP (ref 1.6–2.6)
MAGNESIUM SERPL-MCNC: 2.4 MG/DL — SIGNIFICANT CHANGE UP (ref 1.6–2.6)
MAGNESIUM SERPL-MCNC: 2.4 MG/DL — SIGNIFICANT CHANGE UP (ref 1.6–2.6)
MANUAL SMEAR VERIFICATION: SIGNIFICANT CHANGE UP
MCHC RBC-ENTMCNC: 29.1 PG — SIGNIFICANT CHANGE UP (ref 27–34)
MCHC RBC-ENTMCNC: 29.1 PG — SIGNIFICANT CHANGE UP (ref 27–34)
MCHC RBC-ENTMCNC: 29.6 PG — SIGNIFICANT CHANGE UP (ref 27–34)
MCHC RBC-ENTMCNC: 29.7 PG — SIGNIFICANT CHANGE UP (ref 27–34)
MCHC RBC-ENTMCNC: 30 PG — SIGNIFICANT CHANGE UP (ref 27–34)
MCHC RBC-ENTMCNC: 30 PG — SIGNIFICANT CHANGE UP (ref 27–34)
MCHC RBC-ENTMCNC: 30.2 PG — SIGNIFICANT CHANGE UP (ref 27–34)
MCHC RBC-ENTMCNC: 30.2 PG — SIGNIFICANT CHANGE UP (ref 27–34)
MCHC RBC-ENTMCNC: 30.4 PG — SIGNIFICANT CHANGE UP (ref 27–34)
MCHC RBC-ENTMCNC: 30.5 PG — SIGNIFICANT CHANGE UP (ref 27–34)
MCHC RBC-ENTMCNC: 30.6 PG — SIGNIFICANT CHANGE UP (ref 27–34)
MCHC RBC-ENTMCNC: 30.6 PG — SIGNIFICANT CHANGE UP (ref 27–34)
MCHC RBC-ENTMCNC: 30.8 PG — SIGNIFICANT CHANGE UP (ref 27–34)
MCHC RBC-ENTMCNC: 30.9 PG — SIGNIFICANT CHANGE UP (ref 27–34)
MCHC RBC-ENTMCNC: 30.9 PG — SIGNIFICANT CHANGE UP (ref 27–34)
MCHC RBC-ENTMCNC: 31.1 GM/DL — LOW (ref 32–36)
MCHC RBC-ENTMCNC: 31.1 GM/DL — LOW (ref 32–36)
MCHC RBC-ENTMCNC: 31.1 PG — SIGNIFICANT CHANGE UP (ref 27–34)
MCHC RBC-ENTMCNC: 32.8 G/DL — SIGNIFICANT CHANGE UP (ref 32–36)
MCHC RBC-ENTMCNC: 33 G/DL — SIGNIFICANT CHANGE UP (ref 32–36)
MCHC RBC-ENTMCNC: 33.2 G/DL — SIGNIFICANT CHANGE UP (ref 32–36)
MCHC RBC-ENTMCNC: 33.3 G/DL — SIGNIFICANT CHANGE UP (ref 32–36)
MCHC RBC-ENTMCNC: 33.3 GM/DL — SIGNIFICANT CHANGE UP (ref 32–36)
MCHC RBC-ENTMCNC: 33.3 GM/DL — SIGNIFICANT CHANGE UP (ref 32–36)
MCHC RBC-ENTMCNC: 33.5 GM/DL — SIGNIFICANT CHANGE UP (ref 32–36)
MCHC RBC-ENTMCNC: 33.8 GM/DL — SIGNIFICANT CHANGE UP (ref 32–36)
MCHC RBC-ENTMCNC: 34 GM/DL — SIGNIFICANT CHANGE UP (ref 32–36)
MCHC RBC-ENTMCNC: 34.1 G/DL — SIGNIFICANT CHANGE UP (ref 32–36)
MCHC RBC-ENTMCNC: 34.5 G/DL — SIGNIFICANT CHANGE UP (ref 32–36)
MCHC RBC-ENTMCNC: 34.5 GM/DL — SIGNIFICANT CHANGE UP (ref 32–36)
MCHC RBC-ENTMCNC: 35.1 G/DL — SIGNIFICANT CHANGE UP (ref 32–36)
MCHC RBC-ENTMCNC: 35.4 G/DL — SIGNIFICANT CHANGE UP (ref 32–36)
MCV RBC AUTO: 86 FL — SIGNIFICANT CHANGE UP (ref 80–100)
MCV RBC AUTO: 86.6 FL — SIGNIFICANT CHANGE UP (ref 80–100)
MCV RBC AUTO: 86.9 FL — SIGNIFICANT CHANGE UP (ref 80–100)
MCV RBC AUTO: 88.8 FL — SIGNIFICANT CHANGE UP (ref 80–100)
MCV RBC AUTO: 89.5 FL — SIGNIFICANT CHANGE UP (ref 80–100)
MCV RBC AUTO: 90 FL — SIGNIFICANT CHANGE UP (ref 80–100)
MCV RBC AUTO: 90 FL — SIGNIFICANT CHANGE UP (ref 80–100)
MCV RBC AUTO: 90.4 FL — SIGNIFICANT CHANGE UP (ref 80–100)
MCV RBC AUTO: 90.6 FL — SIGNIFICANT CHANGE UP (ref 80–100)
MCV RBC AUTO: 91.1 FL — SIGNIFICANT CHANGE UP (ref 80–100)
MCV RBC AUTO: 91.2 FL — SIGNIFICANT CHANGE UP (ref 80–100)
MCV RBC AUTO: 91.4 FL — SIGNIFICANT CHANGE UP (ref 80–100)
MCV RBC AUTO: 92 FL — SIGNIFICANT CHANGE UP (ref 80–100)
MCV RBC AUTO: 93.3 FL — SIGNIFICANT CHANGE UP (ref 80–100)
MCV RBC AUTO: 93.4 FL — SIGNIFICANT CHANGE UP (ref 80–100)
MCV RBC AUTO: 93.4 FL — SIGNIFICANT CHANGE UP (ref 80–100)
METHOD TYPE: SIGNIFICANT CHANGE UP
METHOD TYPE: SIGNIFICANT CHANGE UP
MICROCYTES BLD QL: SLIGHT — SIGNIFICANT CHANGE UP
MICROCYTES BLD QL: SLIGHT — SIGNIFICANT CHANGE UP
MONOCYTES # BLD AUTO: 0.68 K/UL — SIGNIFICANT CHANGE UP (ref 0–0.9)
MONOCYTES # BLD AUTO: 0.71 K/UL — SIGNIFICANT CHANGE UP (ref 0–0.9)
MONOCYTES # BLD AUTO: 0.84 K/UL — SIGNIFICANT CHANGE UP (ref 0–0.9)
MONOCYTES # BLD AUTO: 0.92 K/UL — HIGH (ref 0–0.9)
MONOCYTES # BLD AUTO: 0.93 K/UL — HIGH (ref 0–0.9)
MONOCYTES # BLD AUTO: 0.97 K/UL — HIGH (ref 0–0.9)
MONOCYTES # BLD AUTO: 1.11 K/UL — HIGH (ref 0–0.9)
MONOCYTES # BLD AUTO: 1.19 K/UL — HIGH (ref 0–0.9)
MONOCYTES NFR BLD AUTO: 10 % — SIGNIFICANT CHANGE UP (ref 2–14)
MONOCYTES NFR BLD AUTO: 10.9 % — SIGNIFICANT CHANGE UP (ref 2–14)
MONOCYTES NFR BLD AUTO: 11.5 % — SIGNIFICANT CHANGE UP (ref 2–14)
MONOCYTES NFR BLD AUTO: 12.1 % — SIGNIFICANT CHANGE UP (ref 2–14)
MONOCYTES NFR BLD AUTO: 7 % — SIGNIFICANT CHANGE UP (ref 2–14)
MONOCYTES NFR BLD AUTO: 7.2 % — SIGNIFICANT CHANGE UP (ref 2–14)
MONOCYTES NFR BLD AUTO: 8.6 % — SIGNIFICANT CHANGE UP (ref 2–14)
MONOCYTES NFR BLD AUTO: 9.1 % — SIGNIFICANT CHANGE UP (ref 2–14)
NEUTROPHILS # BLD AUTO: 10.53 K/UL — HIGH (ref 1.8–7.4)
NEUTROPHILS # BLD AUTO: 4.89 K/UL — SIGNIFICANT CHANGE UP (ref 1.8–7.4)
NEUTROPHILS # BLD AUTO: 5.25 K/UL — SIGNIFICANT CHANGE UP (ref 1.8–7.4)
NEUTROPHILS # BLD AUTO: 5.29 K/UL — SIGNIFICANT CHANGE UP (ref 1.8–7.4)
NEUTROPHILS # BLD AUTO: 5.84 K/UL — SIGNIFICANT CHANGE UP (ref 1.8–7.4)
NEUTROPHILS # BLD AUTO: 5.96 K/UL — SIGNIFICANT CHANGE UP (ref 1.8–7.4)
NEUTROPHILS # BLD AUTO: 7.1 K/UL — SIGNIFICANT CHANGE UP (ref 1.8–7.4)
NEUTROPHILS # BLD AUTO: 8.05 K/UL — HIGH (ref 1.8–7.4)
NEUTROPHILS NFR BLD AUTO: 57 % — SIGNIFICANT CHANGE UP (ref 43–77)
NEUTROPHILS NFR BLD AUTO: 58 % — SIGNIFICANT CHANGE UP (ref 43–77)
NEUTROPHILS NFR BLD AUTO: 62.5 % — SIGNIFICANT CHANGE UP (ref 43–77)
NEUTROPHILS NFR BLD AUTO: 64.3 % — SIGNIFICANT CHANGE UP (ref 43–77)
NEUTROPHILS NFR BLD AUTO: 67 % — SIGNIFICANT CHANGE UP (ref 43–77)
NEUTROPHILS NFR BLD AUTO: 72.2 % — SIGNIFICANT CHANGE UP (ref 43–77)
NEUTROPHILS NFR BLD AUTO: 73.6 % — SIGNIFICANT CHANGE UP (ref 43–77)
NEUTROPHILS NFR BLD AUTO: 80.6 % — HIGH (ref 43–77)
NITRITE UR-MCNC: NEGATIVE — SIGNIFICANT CHANGE UP
NRBC # BLD: 0 /100 — SIGNIFICANT CHANGE UP (ref 0–0)
NRBC # BLD: 1 /100 WBCS — HIGH (ref 0–0)
NRBC # BLD: 13 /100 WBCS — HIGH (ref 0–0)
NRBC # BLD: 2 /100 WBCS — HIGH (ref 0–0)
NRBC # BLD: 32 /100 WBCS — HIGH (ref 0–0)
NRBC # BLD: 36 /100 WBCS — HIGH (ref 0–0)
NRBC # BLD: 42 /100 WBCS — HIGH (ref 0–0)
NRBC # BLD: 5 /100 WBCS — HIGH (ref 0–0)
NRBC # BLD: 50 /100 WBCS — HIGH (ref 0–0)
NRBC # BLD: 59 /100 WBCS — HIGH (ref 0–0)
NRBC # BLD: SIGNIFICANT CHANGE UP /100 WBCS (ref 0–0)
NRBC # FLD: 0.13 K/UL — HIGH (ref 0–0)
NRBC # FLD: 0.14 K/UL — HIGH (ref 0–0)
NRBC # FLD: 0.18 K/UL — HIGH (ref 0–0)
ORGANISM # SPEC MICROSCOPIC CNT: SIGNIFICANT CHANGE UP
PCO2 BLDV: 42 MMHG — SIGNIFICANT CHANGE UP (ref 42–55)
PH BLDV: 7.33 — SIGNIFICANT CHANGE UP (ref 7.32–7.43)
PH UR: 6 — SIGNIFICANT CHANGE UP (ref 5–8)
PH UR: 6.5 — SIGNIFICANT CHANGE UP (ref 5–8)
PH UR: 7 — SIGNIFICANT CHANGE UP (ref 5–8)
PHOSPHATE SERPL-MCNC: 2.7 MG/DL — SIGNIFICANT CHANGE UP (ref 2.5–4.5)
PHOSPHATE SERPL-MCNC: 3.1 MG/DL — SIGNIFICANT CHANGE UP (ref 2.5–4.5)
PHOSPHATE SERPL-MCNC: 4.3 MG/DL — SIGNIFICANT CHANGE UP (ref 2.5–4.5)
PLAT MORPH BLD: NORMAL — SIGNIFICANT CHANGE UP
PLAT MORPH BLD: NORMAL — SIGNIFICANT CHANGE UP
PLATELET # BLD AUTO: 153 K/UL — SIGNIFICANT CHANGE UP (ref 150–400)
PLATELET # BLD AUTO: 156 K/UL — SIGNIFICANT CHANGE UP (ref 150–400)
PLATELET # BLD AUTO: 167 K/UL — SIGNIFICANT CHANGE UP (ref 150–400)
PLATELET # BLD AUTO: 167 K/UL — SIGNIFICANT CHANGE UP (ref 150–400)
PLATELET # BLD AUTO: 177 K/UL — SIGNIFICANT CHANGE UP (ref 150–400)
PLATELET # BLD AUTO: 178 K/UL — SIGNIFICANT CHANGE UP (ref 150–400)
PLATELET # BLD AUTO: 181 K/UL — SIGNIFICANT CHANGE UP (ref 150–400)
PLATELET # BLD AUTO: 193 K/UL — SIGNIFICANT CHANGE UP (ref 150–400)
PLATELET # BLD AUTO: 199 K/UL — SIGNIFICANT CHANGE UP (ref 150–400)
PLATELET # BLD AUTO: 204 K/UL — SIGNIFICANT CHANGE UP (ref 150–400)
PLATELET # BLD AUTO: 208 K/UL — SIGNIFICANT CHANGE UP (ref 150–400)
PLATELET # BLD AUTO: 211 K/UL — SIGNIFICANT CHANGE UP (ref 150–400)
PLATELET # BLD AUTO: 221 K/UL — SIGNIFICANT CHANGE UP (ref 150–400)
PLATELET # BLD AUTO: 244 K/UL — SIGNIFICANT CHANGE UP (ref 150–400)
PLATELET # BLD AUTO: 399 K/UL — SIGNIFICANT CHANGE UP (ref 150–400)
PLATELET # BLD AUTO: 399 K/UL — SIGNIFICANT CHANGE UP (ref 150–400)
PO2 BLDV: 34 MMHG — SIGNIFICANT CHANGE UP (ref 25–45)
POIKILOCYTOSIS BLD QL AUTO: SLIGHT — SIGNIFICANT CHANGE UP
POIKILOCYTOSIS BLD QL AUTO: SLIGHT — SIGNIFICANT CHANGE UP
POLYCHROMASIA BLD QL SMEAR: SIGNIFICANT CHANGE UP
POTASSIUM BLDV-SCNC: 5 MMOL/L — SIGNIFICANT CHANGE UP (ref 3.5–5.1)
POTASSIUM SERPL-MCNC: 3.8 MMOL/L — SIGNIFICANT CHANGE UP (ref 3.5–5.3)
POTASSIUM SERPL-MCNC: 4.2 MMOL/L — SIGNIFICANT CHANGE UP (ref 3.5–5.3)
POTASSIUM SERPL-MCNC: 4.3 MMOL/L — SIGNIFICANT CHANGE UP (ref 3.5–5.3)
POTASSIUM SERPL-MCNC: 4.5 MMOL/L — SIGNIFICANT CHANGE UP (ref 3.5–5.3)
POTASSIUM SERPL-MCNC: 4.6 MMOL/L — SIGNIFICANT CHANGE UP (ref 3.5–5.3)
POTASSIUM SERPL-MCNC: 4.7 MMOL/L — SIGNIFICANT CHANGE UP (ref 3.5–5.3)
POTASSIUM SERPL-MCNC: 4.8 MMOL/L — SIGNIFICANT CHANGE UP (ref 3.5–5.3)
POTASSIUM SERPL-MCNC: 5 MMOL/L — SIGNIFICANT CHANGE UP (ref 3.5–5.3)
POTASSIUM SERPL-SCNC: 3.8 MMOL/L — SIGNIFICANT CHANGE UP (ref 3.5–5.3)
POTASSIUM SERPL-SCNC: 4.2 MMOL/L — SIGNIFICANT CHANGE UP (ref 3.5–5.3)
POTASSIUM SERPL-SCNC: 4.3 MMOL/L — SIGNIFICANT CHANGE UP (ref 3.5–5.3)
POTASSIUM SERPL-SCNC: 4.5 MMOL/L — SIGNIFICANT CHANGE UP (ref 3.5–5.3)
POTASSIUM SERPL-SCNC: 4.6 MMOL/L — SIGNIFICANT CHANGE UP (ref 3.5–5.3)
POTASSIUM SERPL-SCNC: 4.7 MMOL/L — SIGNIFICANT CHANGE UP (ref 3.5–5.3)
POTASSIUM SERPL-SCNC: 4.8 MMOL/L — SIGNIFICANT CHANGE UP (ref 3.5–5.3)
POTASSIUM SERPL-SCNC: 5 MMOL/L — SIGNIFICANT CHANGE UP (ref 3.5–5.3)
PROT SERPL-MCNC: 5.9 G/DL — LOW (ref 6–8.3)
PROT SERPL-MCNC: 6.2 G/DL — SIGNIFICANT CHANGE UP (ref 6–8.3)
PROT SERPL-MCNC: 6.3 G/DL — SIGNIFICANT CHANGE UP (ref 6–8.3)
PROT SERPL-MCNC: 6.5 GM/DL — SIGNIFICANT CHANGE UP (ref 6–8.3)
PROT SERPL-MCNC: 6.7 GM/DL — SIGNIFICANT CHANGE UP (ref 6–8.3)
PROT SERPL-MCNC: 6.8 G/DL — SIGNIFICANT CHANGE UP (ref 6–8.3)
PROT SERPL-MCNC: 6.8 GM/DL — SIGNIFICANT CHANGE UP (ref 6–8.3)
PROT SERPL-MCNC: 6.8 GM/DL — SIGNIFICANT CHANGE UP (ref 6–8.3)
PROT SERPL-MCNC: 6.9 GM/DL — SIGNIFICANT CHANGE UP (ref 6–8.3)
PROT SERPL-MCNC: 7.1 G/DL — SIGNIFICANT CHANGE UP (ref 6–8.3)
PROT SERPL-MCNC: 7.2 GM/DL — SIGNIFICANT CHANGE UP (ref 6–8.3)
PROT SERPL-MCNC: 7.3 G/DL — SIGNIFICANT CHANGE UP (ref 6–8.3)
PROT SERPL-MCNC: 7.3 G/DL — SIGNIFICANT CHANGE UP (ref 6–8.3)
PROT SERPL-MCNC: 7.8 GM/DL — SIGNIFICANT CHANGE UP (ref 6–8.3)
PROT UR-MCNC: 100 MG/DL
PROT UR-MCNC: 30 MG/DL
PROT UR-MCNC: 30 MG/DL
PROTHROM AB SERPL-ACNC: 12.9 SEC — SIGNIFICANT CHANGE UP (ref 10.5–13.4)
PROTHROM AB SERPL-ACNC: 13.1 SEC — SIGNIFICANT CHANGE UP (ref 10.5–13.4)
PROTHROM AB SERPL-ACNC: 15.9 SEC — HIGH (ref 10.5–13.4)
RAPID RVP RESULT: SIGNIFICANT CHANGE UP
RBC # BLD: 1.6 M/UL — LOW (ref 4.2–5.8)
RBC # BLD: 1.6 M/UL — LOW (ref 4.2–5.8)
RBC # BLD: 1.71 M/UL — LOW (ref 4.2–5.8)
RBC # BLD: 1.8 M/UL — LOW (ref 4.2–5.8)
RBC # BLD: 1.8 M/UL — LOW (ref 4.2–5.8)
RBC # BLD: 1.91 M/UL — LOW (ref 4.2–5.8)
RBC # BLD: 1.96 M/UL — LOW (ref 4.2–5.8)
RBC # BLD: 1.96 M/UL — LOW (ref 4.2–5.8)
RBC # BLD: 2 M/UL — LOW (ref 4.2–5.8)
RBC # BLD: 2 M/UL — LOW (ref 4.2–5.8)
RBC # BLD: 2.05 M/UL — LOW (ref 4.2–5.8)
RBC # BLD: 2.09 M/UL — LOW (ref 4.2–5.8)
RBC # BLD: 2.09 M/UL — LOW (ref 4.2–5.8)
RBC # BLD: 2.24 M/UL — LOW (ref 4.2–5.8)
RBC # BLD: 2.25 M/UL — LOW (ref 4.2–5.8)
RBC # BLD: 2.33 M/UL — LOW (ref 4.2–5.8)
RBC # BLD: 2.35 M/UL — LOW (ref 4.2–5.8)
RBC # BLD: 2.6 M/UL — LOW (ref 4.2–5.8)
RBC # BLD: 2.8 M/UL — LOW (ref 4.2–5.8)
RBC # BLD: 2.9 M/UL — LOW (ref 4.2–5.8)
RBC # FLD: 19.7 % — HIGH (ref 10.3–14.5)
RBC # FLD: 19.9 % — HIGH (ref 10.3–14.5)
RBC # FLD: 20 % — HIGH (ref 10.3–14.5)
RBC # FLD: 20.1 % — HIGH (ref 10.3–14.5)
RBC # FLD: 20.2 % — HIGH (ref 10.3–14.5)
RBC # FLD: 20.4 % — HIGH (ref 10.3–14.5)
RBC # FLD: 20.7 % — HIGH (ref 10.3–14.5)
RBC # FLD: 20.8 % — HIGH (ref 10.3–14.5)
RBC # FLD: 21.1 % — HIGH (ref 10.3–14.5)
RBC # FLD: 22 % — HIGH (ref 10.3–14.5)
RBC # FLD: 22.1 % — HIGH (ref 10.3–14.5)
RBC # FLD: 22.6 % — HIGH (ref 10.3–14.5)
RBC # FLD: 22.6 % — HIGH (ref 10.3–14.5)
RBC # FLD: 22.7 % — HIGH (ref 10.3–14.5)
RBC # FLD: 23.3 % — HIGH (ref 10.3–14.5)
RBC # FLD: 23.7 % — HIGH (ref 10.3–14.5)
RBC BLD AUTO: ABNORMAL
RBC BLD AUTO: SIGNIFICANT CHANGE UP
RBC CASTS # UR COMP ASSIST: 1 /HPF — SIGNIFICANT CHANGE UP (ref 0–4)
RBC CASTS # UR COMP ASSIST: ABNORMAL /HPF (ref 0–4)
RBC CASTS # UR COMP ASSIST: SIGNIFICANT CHANGE UP /HPF (ref 0–4)
RETICS #: 149.2 K/UL — HIGH (ref 25–125)
RETICS #: 174.9 K/UL — HIGH (ref 25–125)
RETICS #: 199.1 K/UL — HIGH (ref 25–125)
RETICS #: 264.6 K/UL — HIGH (ref 25–125)
RETICS #: 299.1 K/UL — HIGH (ref 25–125)
RETICS/RBC NFR: 10.9 % — HIGH (ref 0.5–2.5)
RETICS/RBC NFR: 11.1 % — HIGH (ref 0.5–2.5)
RETICS/RBC NFR: 12.6 % — HIGH (ref 0.5–2.5)
RETICS/RBC NFR: 14.5 % — HIGH (ref 0.5–2.5)
RETICS/RBC NFR: 7.5 % — HIGH (ref 0.5–2.5)
SAO2 % BLDV: 37.7 % — LOW (ref 67–88)
SARS-COV-2 RNA SPEC QL NAA+PROBE: SIGNIFICANT CHANGE UP
SICKLE CELLS BLD QL SMEAR: SLIGHT — SIGNIFICANT CHANGE UP
SICKLE CELLS BLD QL SMEAR: SLIGHT — SIGNIFICANT CHANGE UP
SODIUM SERPL-SCNC: 136 MMOL/L — SIGNIFICANT CHANGE UP (ref 135–145)
SODIUM SERPL-SCNC: 137 MMOL/L — SIGNIFICANT CHANGE UP (ref 135–145)
SODIUM SERPL-SCNC: 138 MMOL/L — SIGNIFICANT CHANGE UP (ref 135–145)
SODIUM SERPL-SCNC: 139 MMOL/L — SIGNIFICANT CHANGE UP (ref 135–145)
SODIUM SERPL-SCNC: 140 MMOL/L — SIGNIFICANT CHANGE UP (ref 135–145)
SODIUM SERPL-SCNC: 141 MMOL/L — SIGNIFICANT CHANGE UP (ref 135–145)
SODIUM SERPL-SCNC: 142 MMOL/L — SIGNIFICANT CHANGE UP (ref 135–145)
SP GR SPEC: 1 — LOW (ref 1.01–1.02)
SP GR SPEC: 1.01 — SIGNIFICANT CHANGE UP (ref 1.01–1.02)
SP GR SPEC: 1.01 — SIGNIFICANT CHANGE UP (ref 1–1.03)
SPECIMEN SOURCE: SIGNIFICANT CHANGE UP
SQUAMOUS # UR AUTO: 0 /HPF — SIGNIFICANT CHANGE UP (ref 0–5)
TARGETS BLD QL SMEAR: SLIGHT — SIGNIFICANT CHANGE UP
TIBC SERPL-MCNC: 274 UG/DL — SIGNIFICANT CHANGE UP (ref 220–430)
UIBC SERPL-MCNC: 205 UG/DL — SIGNIFICANT CHANGE UP (ref 110–370)
UROBILINOGEN FLD QL: 0.2 MG/DL — SIGNIFICANT CHANGE UP (ref 0.2–1)
UROBILINOGEN FLD QL: 1 MG/DL
UROBILINOGEN FLD QL: 4 MG/DL
VIT B12 SERPL-MCNC: 792 PG/ML — SIGNIFICANT CHANGE UP (ref 232–1245)
WBC # BLD: 11.44 K/UL — HIGH (ref 3.8–10.5)
WBC # BLD: 11.44 K/UL — HIGH (ref 3.8–10.5)
WBC # BLD: 11.61 K/UL — HIGH (ref 3.8–10.5)
WBC # BLD: 11.93 K/UL — HIGH (ref 3.8–10.5)
WBC # BLD: 12.01 K/UL — HIGH (ref 3.8–10.5)
WBC # BLD: 12.04 K/UL — HIGH (ref 3.8–10.5)
WBC # BLD: 12.82 K/UL — HIGH (ref 3.8–10.5)
WBC # BLD: 13.07 K/UL — HIGH (ref 3.8–10.5)
WBC # BLD: 7.94 K/UL — SIGNIFICANT CHANGE UP (ref 3.8–10.5)
WBC # BLD: 8.44 K/UL — SIGNIFICANT CHANGE UP (ref 3.8–10.5)
WBC # BLD: 8.46 K/UL — SIGNIFICANT CHANGE UP (ref 3.8–10.5)
WBC # BLD: 9.01 K/UL — SIGNIFICANT CHANGE UP (ref 3.8–10.5)
WBC # BLD: 9.2 K/UL — SIGNIFICANT CHANGE UP (ref 3.8–10.5)
WBC # BLD: 9.28 K/UL — SIGNIFICANT CHANGE UP (ref 3.8–10.5)
WBC # BLD: 9.49 K/UL — SIGNIFICANT CHANGE UP (ref 3.8–10.5)
WBC # BLD: 9.84 K/UL — SIGNIFICANT CHANGE UP (ref 3.8–10.5)
WBC # FLD AUTO: 11.44 K/UL — HIGH (ref 3.8–10.5)
WBC # FLD AUTO: 11.44 K/UL — HIGH (ref 3.8–10.5)
WBC # FLD AUTO: 11.61 K/UL — HIGH (ref 3.8–10.5)
WBC # FLD AUTO: 11.93 K/UL — HIGH (ref 3.8–10.5)
WBC # FLD AUTO: 12.01 K/UL — HIGH (ref 3.8–10.5)
WBC # FLD AUTO: 12.04 K/UL — HIGH (ref 3.8–10.5)
WBC # FLD AUTO: 12.82 K/UL — HIGH (ref 3.8–10.5)
WBC # FLD AUTO: 13.07 K/UL — HIGH (ref 3.8–10.5)
WBC # FLD AUTO: 7.94 K/UL — SIGNIFICANT CHANGE UP (ref 3.8–10.5)
WBC # FLD AUTO: 8.44 K/UL — SIGNIFICANT CHANGE UP (ref 3.8–10.5)
WBC # FLD AUTO: 8.46 K/UL — SIGNIFICANT CHANGE UP (ref 3.8–10.5)
WBC # FLD AUTO: 9.01 K/UL — SIGNIFICANT CHANGE UP (ref 3.8–10.5)
WBC # FLD AUTO: 9.2 K/UL — SIGNIFICANT CHANGE UP (ref 3.8–10.5)
WBC # FLD AUTO: 9.28 K/UL — SIGNIFICANT CHANGE UP (ref 3.8–10.5)
WBC # FLD AUTO: 9.49 K/UL — SIGNIFICANT CHANGE UP (ref 3.8–10.5)
WBC # FLD AUTO: 9.84 K/UL — SIGNIFICANT CHANGE UP (ref 3.8–10.5)
WBC UR QL: 65 /HPF — HIGH (ref 0–5)
WBC UR QL: SIGNIFICANT CHANGE UP
WBC UR QL: SIGNIFICANT CHANGE UP

## 2023-01-01 PROCEDURE — 99221 1ST HOSP IP/OBS SF/LOW 40: CPT

## 2023-01-01 PROCEDURE — 76705 ECHO EXAM OF ABDOMEN: CPT | Mod: 26

## 2023-01-01 PROCEDURE — 71045 X-RAY EXAM CHEST 1 VIEW: CPT | Mod: 26

## 2023-01-01 PROCEDURE — 99024 POSTOP FOLLOW-UP VISIT: CPT

## 2023-01-01 PROCEDURE — 99213 OFFICE O/P EST LOW 20 MIN: CPT

## 2023-01-01 PROCEDURE — V2632: CPT

## 2023-01-01 PROCEDURE — 66984 XCAPSL CTRC RMVL W/O ECP: CPT | Mod: RT

## 2023-01-01 PROCEDURE — 76937 US GUIDE VASCULAR ACCESS: CPT | Mod: 26

## 2023-01-01 PROCEDURE — 99222 1ST HOSP IP/OBS MODERATE 55: CPT | Mod: GC

## 2023-01-01 PROCEDURE — 86079 PHYS BLOOD BANK SERV AUTHRJ: CPT

## 2023-01-01 PROCEDURE — 66982 XCAPSL CTRC RMVL CPLX WO ECP: CPT | Mod: RT

## 2023-01-01 PROCEDURE — 92025 CPTRIZED CORNEAL TOPOGRAPHY: CPT

## 2023-01-01 PROCEDURE — 85027 COMPLETE CBC AUTOMATED: CPT

## 2023-01-01 PROCEDURE — 92012 INTRM OPH EXAM EST PATIENT: CPT | Mod: 24

## 2023-01-01 PROCEDURE — 99233 SBSQ HOSP IP/OBS HIGH 50: CPT

## 2023-01-01 PROCEDURE — G0463: CPT

## 2023-01-01 PROCEDURE — 93010 ELECTROCARDIOGRAM REPORT: CPT

## 2023-01-01 PROCEDURE — 99239 HOSP IP/OBS DSCHRG MGMT >30: CPT

## 2023-01-01 PROCEDURE — 99232 SBSQ HOSP IP/OBS MODERATE 35: CPT

## 2023-01-01 PROCEDURE — 99223 1ST HOSP IP/OBS HIGH 75: CPT

## 2023-01-01 PROCEDURE — 99214 OFFICE O/P EST MOD 30 MIN: CPT

## 2023-01-01 PROCEDURE — 36481 INSERTION OF CATHETER VEIN: CPT

## 2023-01-01 PROCEDURE — 92012 INTRM OPH EXAM EST PATIENT: CPT

## 2023-01-01 PROCEDURE — 93005 ELECTROCARDIOGRAM TRACING: CPT

## 2023-01-01 PROCEDURE — 92250 FUNDUS PHOTOGRAPHY W/I&R: CPT

## 2023-01-01 PROCEDURE — 36415 COLL VENOUS BLD VENIPUNCTURE: CPT

## 2023-01-01 PROCEDURE — 36000 PLACE NEEDLE IN VEIN: CPT | Mod: 59

## 2023-01-01 PROCEDURE — 99285 EMERGENCY DEPT VISIT HI MDM: CPT | Mod: FS

## 2023-01-01 PROCEDURE — 70450 CT HEAD/BRAIN W/O DYE: CPT | Mod: 26,MA

## 2023-01-01 PROCEDURE — 92136 OPHTHALMIC BIOMETRY: CPT | Mod: 26,RT

## 2023-01-01 PROCEDURE — 74177 CT ABD & PELVIS W/CONTRAST: CPT | Mod: 26,MA

## 2023-01-01 PROCEDURE — 80053 COMPREHEN METABOLIC PANEL: CPT

## 2023-01-01 PROCEDURE — 71250 CT THORAX DX C-: CPT | Mod: 26

## 2023-01-01 PROCEDURE — 86077 PHYS BLOOD BANK SERV XMATCH: CPT

## 2023-01-01 PROCEDURE — 99291 CRITICAL CARE FIRST HOUR: CPT

## 2023-01-01 PROCEDURE — 92136 OPHTHALMIC BIOMETRY: CPT

## 2023-01-01 PROCEDURE — ZZZZZ: CPT

## 2023-01-01 DEVICE — LENS IOL ACRYSOF SN60WF 22.5D
Type: IMPLANTABLE DEVICE | Site: RIGHT | Status: NON-FUNCTIONAL
Removed: 2023-07-26

## 2023-01-01 RX ORDER — CIPROFLOXACIN LACTATE 400MG/40ML
1 VIAL (ML) INTRAVENOUS
Qty: 4 | Refills: 0
Start: 2023-01-01 | End: 2023-01-01

## 2023-01-01 RX ORDER — FOLIC ACID 0.8 MG
1 TABLET ORAL DAILY
Refills: 0 | Status: DISCONTINUED | OUTPATIENT
Start: 2023-01-01 | End: 2023-01-01

## 2023-01-01 RX ORDER — CEFTRIAXONE 500 MG/1
1000 INJECTION, POWDER, FOR SOLUTION INTRAMUSCULAR; INTRAVENOUS ONCE
Refills: 0 | Status: COMPLETED | OUTPATIENT
Start: 2023-01-01 | End: 2023-01-01

## 2023-01-01 RX ORDER — ACETAMINOPHEN 500 MG
1000 TABLET ORAL ONCE
Refills: 0 | Status: COMPLETED | OUTPATIENT
Start: 2023-01-01 | End: 2023-01-01

## 2023-01-01 RX ORDER — HYDROMORPHONE HYDROCHLORIDE 2 MG/ML
2 INJECTION INTRAMUSCULAR; INTRAVENOUS; SUBCUTANEOUS ONCE
Refills: 0 | Status: DISCONTINUED | OUTPATIENT
Start: 2023-01-01 | End: 2023-01-01

## 2023-01-01 RX ORDER — FINASTERIDE 5 MG/1
1 TABLET, FILM COATED ORAL
Refills: 0 | DISCHARGE

## 2023-01-01 RX ORDER — HYDROMORPHONE HYDROCHLORIDE 2 MG/ML
0.5 INJECTION INTRAMUSCULAR; INTRAVENOUS; SUBCUTANEOUS
Refills: 0 | DISCHARGE

## 2023-01-01 RX ORDER — TAMSULOSIN HYDROCHLORIDE 0.4 MG/1
1 CAPSULE ORAL
Qty: 30 | Refills: 0
Start: 2023-01-01 | End: 2023-01-01

## 2023-01-01 RX ORDER — SODIUM CHLORIDE 9 MG/ML
1000 INJECTION INTRAMUSCULAR; INTRAVENOUS; SUBCUTANEOUS ONCE
Refills: 0 | Status: COMPLETED | OUTPATIENT
Start: 2023-01-01 | End: 2023-01-01

## 2023-01-01 RX ORDER — FOLIC ACID 0.8 MG
1 TABLET ORAL
Refills: 0 | DISCHARGE

## 2023-01-01 RX ORDER — HYDROMORPHONE HYDROCHLORIDE 2 MG/ML
2 INJECTION INTRAMUSCULAR; INTRAVENOUS; SUBCUTANEOUS EVERY 4 HOURS
Refills: 0 | Status: DISCONTINUED | OUTPATIENT
Start: 2023-01-01 | End: 2023-01-01

## 2023-01-01 RX ORDER — ALBUTEROL 90 UG/1
2 AEROSOL, METERED ORAL
Qty: 0 | Refills: 0 | DISCHARGE

## 2023-01-01 RX ORDER — SODIUM CHLORIDE 9 MG/ML
1000 INJECTION, SOLUTION INTRAVENOUS
Refills: 0 | Status: COMPLETED | OUTPATIENT
Start: 2023-01-01 | End: 2023-01-01

## 2023-01-01 RX ORDER — HEPARIN SODIUM 5000 [USP'U]/ML
5000 INJECTION INTRAVENOUS; SUBCUTANEOUS EVERY 12 HOURS
Refills: 0 | Status: DISCONTINUED | OUTPATIENT
Start: 2023-01-01 | End: 2023-01-01

## 2023-01-01 RX ORDER — FINASTERIDE 5 MG/1
1 TABLET, FILM COATED ORAL
Qty: 0 | Refills: 0 | DISCHARGE
Start: 2023-01-01

## 2023-01-01 RX ORDER — POLYETHYLENE GLYCOL 3350 17 G/17G
17 POWDER, FOR SOLUTION ORAL
Qty: 0 | Refills: 0 | DISCHARGE

## 2023-01-01 RX ORDER — SODIUM CHLORIDE 9 MG/ML
1000 INJECTION, SOLUTION INTRAVENOUS
Refills: 0 | Status: DISCONTINUED | OUTPATIENT
Start: 2023-01-01 | End: 2023-01-01

## 2023-01-01 RX ORDER — FINASTERIDE 5 MG/1
5 TABLET, FILM COATED ORAL DAILY
Refills: 0 | Status: DISCONTINUED | OUTPATIENT
Start: 2023-01-01 | End: 2023-01-01

## 2023-01-01 RX ORDER — MORPHINE SULFATE 50 MG/1
2 CAPSULE, EXTENDED RELEASE ORAL ONCE
Refills: 0 | Status: DISCONTINUED | OUTPATIENT
Start: 2023-01-01 | End: 2023-01-01

## 2023-01-01 RX ORDER — HYDROMORPHONE HYDROCHLORIDE 2 MG/ML
30 INJECTION INTRAMUSCULAR; INTRAVENOUS; SUBCUTANEOUS
Refills: 0 | Status: DISCONTINUED | OUTPATIENT
Start: 2023-01-01 | End: 2023-01-01

## 2023-01-01 RX ORDER — METOPROLOL TARTRATE 50 MG
1 TABLET ORAL
Refills: 0 | DISCHARGE

## 2023-01-01 RX ORDER — OXYCODONE AND ACETAMINOPHEN 10; 325 MG/1; MG/1
10-325 TABLET ORAL
Qty: 45 | Refills: 0 | Status: ACTIVE | COMMUNITY
Start: 2023-01-01 | End: 1900-01-01

## 2023-01-01 RX ORDER — MORPHINE SULFATE 50 MG/1
30 CAPSULE, EXTENDED RELEASE ORAL
Refills: 0 | Status: DISCONTINUED | OUTPATIENT
Start: 2023-01-01 | End: 2023-01-01

## 2023-01-01 RX ORDER — LISINOPRIL 10 MG/1
10 TABLET ORAL
Refills: 0 | Status: COMPLETED | COMMUNITY
Start: 2018-07-31 | End: 2023-01-01

## 2023-01-01 RX ORDER — HYDROXYUREA 500 MG/1
500 CAPSULE ORAL DAILY
Qty: 60 | Refills: 6 | Status: COMPLETED | COMMUNITY
Start: 2022-11-17 | End: 2023-01-01

## 2023-01-01 RX ORDER — CEFPODOXIME PROXETIL 100 MG
1 TABLET ORAL
Qty: 6 | Refills: 0
Start: 2023-01-01 | End: 2023-01-01

## 2023-01-01 RX ORDER — LANOLIN ALCOHOL/MO/W.PET/CERES
3 CREAM (GRAM) TOPICAL AT BEDTIME
Refills: 0 | Status: DISCONTINUED | OUTPATIENT
Start: 2023-01-01 | End: 2023-01-01

## 2023-01-01 RX ORDER — CEFTRIAXONE 500 MG/1
1000 INJECTION, POWDER, FOR SOLUTION INTRAMUSCULAR; INTRAVENOUS EVERY 24 HOURS
Refills: 0 | Status: COMPLETED | OUTPATIENT
Start: 2023-01-01 | End: 2023-01-01

## 2023-01-01 RX ORDER — FINASTERIDE 5 MG/1
1 TABLET, FILM COATED ORAL
Qty: 0 | Refills: 0 | DISCHARGE

## 2023-01-01 RX ORDER — TAMSULOSIN HYDROCHLORIDE 0.4 MG/1
0.4 CAPSULE ORAL AT BEDTIME
Refills: 0 | Status: DISCONTINUED | OUTPATIENT
Start: 2023-01-01 | End: 2023-01-01

## 2023-01-01 RX ORDER — ONDANSETRON 8 MG/1
4 TABLET, FILM COATED ORAL EVERY 6 HOURS
Refills: 0 | Status: DISCONTINUED | OUTPATIENT
Start: 2023-01-01 | End: 2023-01-01

## 2023-01-01 RX ORDER — CEFTRIAXONE 500 MG/1
INJECTION, POWDER, FOR SOLUTION INTRAMUSCULAR; INTRAVENOUS
Refills: 0 | Status: COMPLETED | OUTPATIENT
Start: 2023-01-01 | End: 2023-01-01

## 2023-01-01 RX ORDER — ACETAMINOPHEN 500 MG
650 TABLET ORAL EVERY 6 HOURS
Refills: 0 | Status: DISCONTINUED | OUTPATIENT
Start: 2023-01-01 | End: 2023-01-01

## 2023-01-01 RX ORDER — HYDROMORPHONE HYDROCHLORIDE 4 MG/1
4 TABLET ORAL EVERY 4 HOURS
Qty: 45 | Refills: 0 | Status: COMPLETED | COMMUNITY
Start: 2021-04-29 | End: 2023-01-01

## 2023-01-01 RX ORDER — HYDROMORPHONE HYDROCHLORIDE 2 MG/ML
1 INJECTION INTRAMUSCULAR; INTRAVENOUS; SUBCUTANEOUS EVERY 4 HOURS
Refills: 0 | Status: DISCONTINUED | OUTPATIENT
Start: 2023-01-01 | End: 2023-01-01

## 2023-01-01 RX ORDER — SENNA PLUS 8.6 MG/1
2 TABLET ORAL AT BEDTIME
Refills: 0 | Status: DISCONTINUED | OUTPATIENT
Start: 2023-01-01 | End: 2023-01-01

## 2023-01-01 RX ORDER — HYDROMORPHONE HYDROCHLORIDE 2 MG/ML
1 INJECTION INTRAMUSCULAR; INTRAVENOUS; SUBCUTANEOUS
Qty: 0 | Refills: 0 | DISCHARGE

## 2023-01-01 RX ORDER — OXYCODONE AND ACETAMINOPHEN 10; 325 MG/1; MG/1
10-325 TABLET ORAL
Qty: 28 | Refills: 0 | Status: COMPLETED | COMMUNITY
Start: 2019-09-20 | End: 2023-01-01

## 2023-01-01 RX ORDER — POLYETHYLENE GLYCOL 3350 17 G/17G
17 POWDER, FOR SOLUTION ORAL ONCE
Refills: 0 | Status: COMPLETED | OUTPATIENT
Start: 2023-01-01 | End: 2023-01-01

## 2023-01-01 RX ORDER — NALOXONE HYDROCHLORIDE 4 MG/.1ML
0.1 SPRAY NASAL
Refills: 0 | Status: DISCONTINUED | OUTPATIENT
Start: 2023-01-01 | End: 2023-01-01

## 2023-01-01 RX ORDER — METOPROLOL TARTRATE 50 MG
1 TABLET ORAL
Qty: 0 | Refills: 0 | DISCHARGE

## 2023-01-01 RX ORDER — MORPHINE SULFATE 50 MG/1
2 CAPSULE, EXTENDED RELEASE ORAL EVERY 6 HOURS
Refills: 0 | Status: DISCONTINUED | OUTPATIENT
Start: 2023-01-01 | End: 2023-01-01

## 2023-01-01 RX ORDER — HYDROMORPHONE HYDROCHLORIDE 2 MG/ML
1 INJECTION INTRAMUSCULAR; INTRAVENOUS; SUBCUTANEOUS ONCE
Refills: 0 | Status: DISCONTINUED | OUTPATIENT
Start: 2023-01-01 | End: 2023-01-01

## 2023-01-01 RX ORDER — SENNOSIDES 8.6 MG TABLETS 8.6 MG/1
8.6 TABLET ORAL
Qty: 60 | Refills: 6 | Status: ACTIVE | COMMUNITY
Start: 2018-07-31 | End: 1900-01-01

## 2023-01-01 RX ORDER — DIPHENHYDRAMINE HCL 50 MG
50 CAPSULE ORAL ONCE
Refills: 0 | Status: COMPLETED | OUTPATIENT
Start: 2023-01-01 | End: 2023-01-01

## 2023-01-01 RX ORDER — OXYCODONE HYDROCHLORIDE 5 MG/1
5 TABLET ORAL EVERY 6 HOURS
Refills: 0 | Status: DISCONTINUED | OUTPATIENT
Start: 2023-01-01 | End: 2023-01-01

## 2023-01-01 RX ORDER — FINASTERIDE 5 MG/1
5 TABLET, FILM COATED ORAL ONCE
Refills: 0 | Status: COMPLETED | OUTPATIENT
Start: 2023-01-01 | End: 2023-01-01

## 2023-01-01 RX ORDER — FLUTICASONE PROPIONATE 220 MCG
1 AEROSOL WITH ADAPTER (GRAM) INHALATION
Qty: 0 | Refills: 0 | DISCHARGE

## 2023-01-01 RX ORDER — CEFTRIAXONE 500 MG/1
1000 INJECTION, POWDER, FOR SOLUTION INTRAMUSCULAR; INTRAVENOUS EVERY 24 HOURS
Refills: 0 | Status: DISCONTINUED | OUTPATIENT
Start: 2023-01-01 | End: 2023-01-01

## 2023-01-01 RX ORDER — ACETAMINOPHEN 500 MG
650 TABLET ORAL EVERY 6 HOURS
Refills: 0 | Status: COMPLETED | OUTPATIENT
Start: 2023-01-01 | End: 2023-01-01

## 2023-01-01 RX ORDER — ALBUTEROL 90 UG/1
2 AEROSOL, METERED ORAL EVERY 6 HOURS
Refills: 0 | Status: DISCONTINUED | OUTPATIENT
Start: 2023-01-01 | End: 2023-01-01

## 2023-01-01 RX ORDER — VOXELOTOR 500 MG/1
500 TABLET, FILM COATED ORAL
Qty: 90 | Refills: 6 | Status: COMPLETED | COMMUNITY
Start: 2022-04-14 | End: 2023-01-01

## 2023-01-01 RX ORDER — POLYETHYLENE GLYCOL 3350 17 G/17G
17 POWDER, FOR SOLUTION ORAL DAILY
Refills: 0 | Status: DISCONTINUED | OUTPATIENT
Start: 2023-01-01 | End: 2023-01-01

## 2023-01-01 RX ORDER — METOPROLOL TARTRATE 50 MG
25 TABLET ORAL
Refills: 0 | Status: DISCONTINUED | OUTPATIENT
Start: 2023-01-01 | End: 2023-01-01

## 2023-01-01 RX ORDER — TAMSULOSIN HYDROCHLORIDE 0.4 MG/1
0.8 CAPSULE ORAL ONCE
Refills: 0 | Status: COMPLETED | OUTPATIENT
Start: 2023-01-01 | End: 2023-01-01

## 2023-01-01 RX ORDER — TAMSULOSIN HYDROCHLORIDE 0.4 MG/1
1 CAPSULE ORAL
Refills: 0 | DISCHARGE

## 2023-01-01 RX ORDER — SENNA PLUS 8.6 MG/1
2 TABLET ORAL
Qty: 0 | Refills: 0 | DISCHARGE

## 2023-01-01 RX ORDER — FOLIC ACID 0.8 MG
1 TABLET ORAL
Qty: 0 | Refills: 0 | DISCHARGE

## 2023-01-01 RX ADMIN — HYDROMORPHONE HYDROCHLORIDE 1 MILLIGRAM(S): 2 INJECTION INTRAMUSCULAR; INTRAVENOUS; SUBCUTANEOUS at 03:53

## 2023-01-01 RX ADMIN — CEFTRIAXONE 100 MILLIGRAM(S): 500 INJECTION, POWDER, FOR SOLUTION INTRAMUSCULAR; INTRAVENOUS at 13:40

## 2023-01-01 RX ADMIN — SODIUM CHLORIDE 75 MILLILITER(S): 9 INJECTION, SOLUTION INTRAVENOUS at 23:27

## 2023-01-01 RX ADMIN — MORPHINE SULFATE 30 MILLILITER(S): 50 CAPSULE, EXTENDED RELEASE ORAL at 19:32

## 2023-01-01 RX ADMIN — HYDROMORPHONE HYDROCHLORIDE 1 MILLIGRAM(S): 2 INJECTION INTRAMUSCULAR; INTRAVENOUS; SUBCUTANEOUS at 04:03

## 2023-01-01 RX ADMIN — HEPARIN SODIUM 5000 UNIT(S): 5000 INJECTION INTRAVENOUS; SUBCUTANEOUS at 05:13

## 2023-01-01 RX ADMIN — SENNA PLUS 2 TABLET(S): 8.6 TABLET ORAL at 21:50

## 2023-01-01 RX ADMIN — HYDROMORPHONE HYDROCHLORIDE 1 MILLIGRAM(S): 2 INJECTION INTRAMUSCULAR; INTRAVENOUS; SUBCUTANEOUS at 09:41

## 2023-01-01 RX ADMIN — HYDROMORPHONE HYDROCHLORIDE 2 MILLIGRAM(S): 2 INJECTION INTRAMUSCULAR; INTRAVENOUS; SUBCUTANEOUS at 02:44

## 2023-01-01 RX ADMIN — HYDROMORPHONE HYDROCHLORIDE 2 MILLIGRAM(S): 2 INJECTION INTRAMUSCULAR; INTRAVENOUS; SUBCUTANEOUS at 22:53

## 2023-01-01 RX ADMIN — Medication 650 MILLIGRAM(S): at 00:18

## 2023-01-01 RX ADMIN — HYDROMORPHONE HYDROCHLORIDE 1 MILLIGRAM(S): 2 INJECTION INTRAMUSCULAR; INTRAVENOUS; SUBCUTANEOUS at 08:00

## 2023-01-01 RX ADMIN — HYDROMORPHONE HYDROCHLORIDE 1 MILLIGRAM(S): 2 INJECTION INTRAMUSCULAR; INTRAVENOUS; SUBCUTANEOUS at 23:25

## 2023-01-01 RX ADMIN — Medication 1 TABLET(S): at 12:24

## 2023-01-01 RX ADMIN — HYDROMORPHONE HYDROCHLORIDE 2 MILLIGRAM(S): 2 INJECTION INTRAMUSCULAR; INTRAVENOUS; SUBCUTANEOUS at 04:33

## 2023-01-01 RX ADMIN — SODIUM CHLORIDE 125 MILLILITER(S): 9 INJECTION, SOLUTION INTRAVENOUS at 18:55

## 2023-01-01 RX ADMIN — Medication 650 MILLIGRAM(S): at 12:48

## 2023-01-01 RX ADMIN — HYDROMORPHONE HYDROCHLORIDE 1 MILLIGRAM(S): 2 INJECTION INTRAMUSCULAR; INTRAVENOUS; SUBCUTANEOUS at 23:15

## 2023-01-01 RX ADMIN — HYDROMORPHONE HYDROCHLORIDE 1 MILLIGRAM(S): 2 INJECTION INTRAMUSCULAR; INTRAVENOUS; SUBCUTANEOUS at 13:24

## 2023-01-01 RX ADMIN — HYDROMORPHONE HYDROCHLORIDE 2 MILLIGRAM(S): 2 INJECTION INTRAMUSCULAR; INTRAVENOUS; SUBCUTANEOUS at 21:36

## 2023-01-01 RX ADMIN — CEFTRIAXONE 100 MILLIGRAM(S): 500 INJECTION, POWDER, FOR SOLUTION INTRAMUSCULAR; INTRAVENOUS at 14:09

## 2023-01-01 RX ADMIN — HYDROMORPHONE HYDROCHLORIDE 1 MILLIGRAM(S): 2 INJECTION INTRAMUSCULAR; INTRAVENOUS; SUBCUTANEOUS at 10:21

## 2023-01-01 RX ADMIN — Medication 25 MILLIGRAM(S): at 05:08

## 2023-01-01 RX ADMIN — OXYCODONE HYDROCHLORIDE 5 MILLIGRAM(S): 5 TABLET ORAL at 08:46

## 2023-01-01 RX ADMIN — OXYCODONE HYDROCHLORIDE 5 MILLIGRAM(S): 5 TABLET ORAL at 21:42

## 2023-01-01 RX ADMIN — SODIUM CHLORIDE 125 MILLILITER(S): 9 INJECTION, SOLUTION INTRAVENOUS at 06:13

## 2023-01-01 RX ADMIN — Medication 1000 MILLIGRAM(S): at 19:36

## 2023-01-01 RX ADMIN — Medication 1 MILLIGRAM(S): at 11:47

## 2023-01-01 RX ADMIN — Medication 650 MILLIGRAM(S): at 11:30

## 2023-01-01 RX ADMIN — HYDROMORPHONE HYDROCHLORIDE 1 MILLIGRAM(S): 2 INJECTION INTRAMUSCULAR; INTRAVENOUS; SUBCUTANEOUS at 13:18

## 2023-01-01 RX ADMIN — MORPHINE SULFATE 30 MILLILITER(S): 50 CAPSULE, EXTENDED RELEASE ORAL at 15:53

## 2023-01-01 RX ADMIN — HYDROMORPHONE HYDROCHLORIDE 2 MILLIGRAM(S): 2 INJECTION INTRAMUSCULAR; INTRAVENOUS; SUBCUTANEOUS at 16:12

## 2023-01-01 RX ADMIN — HYDROMORPHONE HYDROCHLORIDE 1 MILLIGRAM(S): 2 INJECTION INTRAMUSCULAR; INTRAVENOUS; SUBCUTANEOUS at 13:02

## 2023-01-01 RX ADMIN — MORPHINE SULFATE 30 MILLILITER(S): 50 CAPSULE, EXTENDED RELEASE ORAL at 19:31

## 2023-01-01 RX ADMIN — Medication 1 MILLIGRAM(S): at 12:02

## 2023-01-01 RX ADMIN — HYDROMORPHONE HYDROCHLORIDE 2 MILLIGRAM(S): 2 INJECTION INTRAMUSCULAR; INTRAVENOUS; SUBCUTANEOUS at 18:18

## 2023-01-01 RX ADMIN — SODIUM CHLORIDE 1000 MILLILITER(S): 9 INJECTION INTRAMUSCULAR; INTRAVENOUS; SUBCUTANEOUS at 03:54

## 2023-01-01 RX ADMIN — Medication 25 MILLIGRAM(S): at 18:01

## 2023-01-01 RX ADMIN — Medication 1 TABLET(S): at 13:21

## 2023-01-01 RX ADMIN — Medication 650 MILLIGRAM(S): at 05:28

## 2023-01-01 RX ADMIN — Medication 1 MILLIGRAM(S): at 12:48

## 2023-01-01 RX ADMIN — HEPARIN SODIUM 5000 UNIT(S): 5000 INJECTION INTRAVENOUS; SUBCUTANEOUS at 17:51

## 2023-01-01 RX ADMIN — TAMSULOSIN HYDROCHLORIDE 0.4 MILLIGRAM(S): 0.4 CAPSULE ORAL at 23:27

## 2023-01-01 RX ADMIN — FINASTERIDE 5 MILLIGRAM(S): 5 TABLET, FILM COATED ORAL at 11:47

## 2023-01-01 RX ADMIN — FINASTERIDE 5 MILLIGRAM(S): 5 TABLET, FILM COATED ORAL at 12:46

## 2023-01-01 RX ADMIN — HYDROMORPHONE HYDROCHLORIDE 1 MILLIGRAM(S): 2 INJECTION INTRAMUSCULAR; INTRAVENOUS; SUBCUTANEOUS at 18:04

## 2023-01-01 RX ADMIN — Medication 25 MILLIGRAM(S): at 17:40

## 2023-01-01 RX ADMIN — TAMSULOSIN HYDROCHLORIDE 0.4 MILLIGRAM(S): 0.4 CAPSULE ORAL at 21:29

## 2023-01-01 RX ADMIN — HYDROMORPHONE HYDROCHLORIDE 1 MILLIGRAM(S): 2 INJECTION INTRAMUSCULAR; INTRAVENOUS; SUBCUTANEOUS at 22:31

## 2023-01-01 RX ADMIN — Medication 1 MILLIGRAM(S): at 14:34

## 2023-01-01 RX ADMIN — HYDROMORPHONE HYDROCHLORIDE 1 MILLIGRAM(S): 2 INJECTION INTRAMUSCULAR; INTRAVENOUS; SUBCUTANEOUS at 09:15

## 2023-01-01 RX ADMIN — Medication 650 MILLIGRAM(S): at 11:51

## 2023-01-01 RX ADMIN — MORPHINE SULFATE 2 MILLIGRAM(S): 50 CAPSULE, EXTENDED RELEASE ORAL at 07:25

## 2023-01-01 RX ADMIN — FINASTERIDE 5 MILLIGRAM(S): 5 TABLET, FILM COATED ORAL at 12:05

## 2023-01-01 RX ADMIN — Medication 1 TABLET(S): at 11:40

## 2023-01-01 RX ADMIN — SODIUM CHLORIDE 125 MILLILITER(S): 9 INJECTION, SOLUTION INTRAVENOUS at 16:19

## 2023-01-01 RX ADMIN — SODIUM CHLORIDE 125 MILLILITER(S): 9 INJECTION, SOLUTION INTRAVENOUS at 03:48

## 2023-01-01 RX ADMIN — SODIUM CHLORIDE 75 MILLILITER(S): 9 INJECTION, SOLUTION INTRAVENOUS at 02:42

## 2023-01-01 RX ADMIN — HYDROMORPHONE HYDROCHLORIDE 1 MILLIGRAM(S): 2 INJECTION INTRAMUSCULAR; INTRAVENOUS; SUBCUTANEOUS at 13:49

## 2023-01-01 RX ADMIN — Medication 25 MILLIGRAM(S): at 05:49

## 2023-01-01 RX ADMIN — HYDROMORPHONE HYDROCHLORIDE 1 MILLIGRAM(S): 2 INJECTION INTRAMUSCULAR; INTRAVENOUS; SUBCUTANEOUS at 23:00

## 2023-01-01 RX ADMIN — HEPARIN SODIUM 5000 UNIT(S): 5000 INJECTION INTRAVENOUS; SUBCUTANEOUS at 06:11

## 2023-01-01 RX ADMIN — HEPARIN SODIUM 5000 UNIT(S): 5000 INJECTION INTRAVENOUS; SUBCUTANEOUS at 17:33

## 2023-01-01 RX ADMIN — CEFTRIAXONE 100 MILLIGRAM(S): 500 INJECTION, POWDER, FOR SOLUTION INTRAMUSCULAR; INTRAVENOUS at 14:41

## 2023-01-01 RX ADMIN — POLYETHYLENE GLYCOL 3350 17 GRAM(S): 17 POWDER, FOR SOLUTION ORAL at 11:51

## 2023-01-01 RX ADMIN — Medication 50 MILLIGRAM(S): at 06:10

## 2023-01-01 RX ADMIN — POLYETHYLENE GLYCOL 3350 17 GRAM(S): 17 POWDER, FOR SOLUTION ORAL at 11:47

## 2023-01-01 RX ADMIN — CEFTRIAXONE 100 MILLIGRAM(S): 500 INJECTION, POWDER, FOR SOLUTION INTRAMUSCULAR; INTRAVENOUS at 17:48

## 2023-01-01 RX ADMIN — HYDROMORPHONE HYDROCHLORIDE 2 MILLIGRAM(S): 2 INJECTION INTRAMUSCULAR; INTRAVENOUS; SUBCUTANEOUS at 03:11

## 2023-01-01 RX ADMIN — Medication 1 TABLET(S): at 12:05

## 2023-01-01 RX ADMIN — CEFTRIAXONE 100 MILLIGRAM(S): 500 INJECTION, POWDER, FOR SOLUTION INTRAMUSCULAR; INTRAVENOUS at 14:57

## 2023-01-01 RX ADMIN — HYDROMORPHONE HYDROCHLORIDE 1 MILLIGRAM(S): 2 INJECTION INTRAMUSCULAR; INTRAVENOUS; SUBCUTANEOUS at 23:49

## 2023-01-01 RX ADMIN — HYDROMORPHONE HYDROCHLORIDE 1 MILLIGRAM(S): 2 INJECTION INTRAMUSCULAR; INTRAVENOUS; SUBCUTANEOUS at 17:39

## 2023-01-01 RX ADMIN — OXYCODONE HYDROCHLORIDE 5 MILLIGRAM(S): 5 TABLET ORAL at 20:42

## 2023-01-01 RX ADMIN — Medication 1 MILLIGRAM(S): at 11:10

## 2023-01-01 RX ADMIN — FINASTERIDE 5 MILLIGRAM(S): 5 TABLET, FILM COATED ORAL at 11:09

## 2023-01-01 RX ADMIN — HYDROMORPHONE HYDROCHLORIDE 1 MILLIGRAM(S): 2 INJECTION INTRAMUSCULAR; INTRAVENOUS; SUBCUTANEOUS at 23:12

## 2023-01-01 RX ADMIN — Medication 650 MILLIGRAM(S): at 18:23

## 2023-01-01 RX ADMIN — HEPARIN SODIUM 5000 UNIT(S): 5000 INJECTION INTRAVENOUS; SUBCUTANEOUS at 05:16

## 2023-01-01 RX ADMIN — HYDROMORPHONE HYDROCHLORIDE 1 MILLIGRAM(S): 2 INJECTION INTRAMUSCULAR; INTRAVENOUS; SUBCUTANEOUS at 03:48

## 2023-01-01 RX ADMIN — CEFTRIAXONE 1000 MILLIGRAM(S): 500 INJECTION, POWDER, FOR SOLUTION INTRAMUSCULAR; INTRAVENOUS at 15:57

## 2023-01-01 RX ADMIN — FINASTERIDE 5 MILLIGRAM(S): 5 TABLET, FILM COATED ORAL at 14:33

## 2023-01-01 RX ADMIN — HYDROMORPHONE HYDROCHLORIDE 1 MILLIGRAM(S): 2 INJECTION INTRAMUSCULAR; INTRAVENOUS; SUBCUTANEOUS at 23:45

## 2023-01-01 RX ADMIN — OXYCODONE HYDROCHLORIDE 5 MILLIGRAM(S): 5 TABLET ORAL at 09:46

## 2023-01-01 RX ADMIN — MORPHINE SULFATE 30 MILLILITER(S): 50 CAPSULE, EXTENDED RELEASE ORAL at 07:11

## 2023-01-01 RX ADMIN — SENNA PLUS 2 TABLET(S): 8.6 TABLET ORAL at 21:10

## 2023-01-01 RX ADMIN — SODIUM CHLORIDE 75 MILLILITER(S): 9 INJECTION, SOLUTION INTRAVENOUS at 04:20

## 2023-01-01 RX ADMIN — FINASTERIDE 5 MILLIGRAM(S): 5 TABLET, FILM COATED ORAL at 11:39

## 2023-01-01 RX ADMIN — Medication 650 MILLIGRAM(S): at 10:42

## 2023-01-01 RX ADMIN — CEFTRIAXONE 100 MILLIGRAM(S): 500 INJECTION, POWDER, FOR SOLUTION INTRAMUSCULAR; INTRAVENOUS at 17:34

## 2023-01-01 RX ADMIN — Medication 25 MILLIGRAM(S): at 05:29

## 2023-01-01 RX ADMIN — FINASTERIDE 5 MILLIGRAM(S): 5 TABLET, FILM COATED ORAL at 12:28

## 2023-01-01 RX ADMIN — POLYETHYLENE GLYCOL 3350 17 GRAM(S): 17 POWDER, FOR SOLUTION ORAL at 13:42

## 2023-01-01 RX ADMIN — HYDROMORPHONE HYDROCHLORIDE 1 MILLIGRAM(S): 2 INJECTION INTRAMUSCULAR; INTRAVENOUS; SUBCUTANEOUS at 14:02

## 2023-01-01 RX ADMIN — Medication 25 MILLIGRAM(S): at 06:06

## 2023-01-01 RX ADMIN — Medication 25 MILLIGRAM(S): at 17:06

## 2023-01-01 RX ADMIN — Medication 25 MILLIGRAM(S): at 05:30

## 2023-01-01 RX ADMIN — HYDROMORPHONE HYDROCHLORIDE 1 MILLIGRAM(S): 2 INJECTION INTRAMUSCULAR; INTRAVENOUS; SUBCUTANEOUS at 17:57

## 2023-01-01 RX ADMIN — HYDROMORPHONE HYDROCHLORIDE 1 MILLIGRAM(S): 2 INJECTION INTRAMUSCULAR; INTRAVENOUS; SUBCUTANEOUS at 18:14

## 2023-01-01 RX ADMIN — HYDROMORPHONE HYDROCHLORIDE 1 MILLIGRAM(S): 2 INJECTION INTRAMUSCULAR; INTRAVENOUS; SUBCUTANEOUS at 22:46

## 2023-01-01 RX ADMIN — CEFTRIAXONE 100 MILLIGRAM(S): 500 INJECTION, POWDER, FOR SOLUTION INTRAMUSCULAR; INTRAVENOUS at 18:02

## 2023-01-01 RX ADMIN — FINASTERIDE 5 MILLIGRAM(S): 5 TABLET, FILM COATED ORAL at 11:51

## 2023-01-01 RX ADMIN — HYDROMORPHONE HYDROCHLORIDE 2 MILLIGRAM(S): 2 INJECTION INTRAMUSCULAR; INTRAVENOUS; SUBCUTANEOUS at 18:10

## 2023-01-01 RX ADMIN — HYDROMORPHONE HYDROCHLORIDE 1 MILLIGRAM(S): 2 INJECTION INTRAMUSCULAR; INTRAVENOUS; SUBCUTANEOUS at 18:51

## 2023-01-01 RX ADMIN — SENNA PLUS 2 TABLET(S): 8.6 TABLET ORAL at 21:36

## 2023-01-01 RX ADMIN — Medication 1 MILLIGRAM(S): at 11:51

## 2023-01-01 RX ADMIN — SODIUM CHLORIDE 75 MILLILITER(S): 9 INJECTION, SOLUTION INTRAVENOUS at 13:02

## 2023-01-01 RX ADMIN — Medication 1 MILLIGRAM(S): at 13:41

## 2023-01-01 RX ADMIN — HYDROMORPHONE HYDROCHLORIDE 2 MILLIGRAM(S): 2 INJECTION INTRAMUSCULAR; INTRAVENOUS; SUBCUTANEOUS at 06:10

## 2023-01-01 RX ADMIN — HEPARIN SODIUM 5000 UNIT(S): 5000 INJECTION INTRAVENOUS; SUBCUTANEOUS at 17:40

## 2023-01-01 RX ADMIN — Medication 650 MILLIGRAM(S): at 05:49

## 2023-01-01 RX ADMIN — HYDROMORPHONE HYDROCHLORIDE 2 MILLIGRAM(S): 2 INJECTION INTRAMUSCULAR; INTRAVENOUS; SUBCUTANEOUS at 08:48

## 2023-01-01 RX ADMIN — HYDROMORPHONE HYDROCHLORIDE 1 MILLIGRAM(S): 2 INJECTION INTRAMUSCULAR; INTRAVENOUS; SUBCUTANEOUS at 04:30

## 2023-01-01 RX ADMIN — Medication 650 MILLIGRAM(S): at 17:57

## 2023-01-01 RX ADMIN — Medication 650 MILLIGRAM(S): at 06:39

## 2023-01-01 RX ADMIN — HYDROMORPHONE HYDROCHLORIDE 2 MILLIGRAM(S): 2 INJECTION INTRAMUSCULAR; INTRAVENOUS; SUBCUTANEOUS at 22:02

## 2023-01-01 RX ADMIN — HYDROMORPHONE HYDROCHLORIDE 1 MILLIGRAM(S): 2 INJECTION INTRAMUSCULAR; INTRAVENOUS; SUBCUTANEOUS at 08:44

## 2023-01-01 RX ADMIN — MORPHINE SULFATE 2 MILLIGRAM(S): 50 CAPSULE, EXTENDED RELEASE ORAL at 05:14

## 2023-01-01 RX ADMIN — HYDROMORPHONE HYDROCHLORIDE 1 MILLIGRAM(S): 2 INJECTION INTRAMUSCULAR; INTRAVENOUS; SUBCUTANEOUS at 02:41

## 2023-01-01 RX ADMIN — FINASTERIDE 5 MILLIGRAM(S): 5 TABLET, FILM COATED ORAL at 13:55

## 2023-01-01 RX ADMIN — HYDROMORPHONE HYDROCHLORIDE 1 MILLIGRAM(S): 2 INJECTION INTRAMUSCULAR; INTRAVENOUS; SUBCUTANEOUS at 22:22

## 2023-01-01 RX ADMIN — FINASTERIDE 5 MILLIGRAM(S): 5 TABLET, FILM COATED ORAL at 03:07

## 2023-01-01 RX ADMIN — Medication 25 MILLIGRAM(S): at 18:14

## 2023-01-01 RX ADMIN — Medication 650 MILLIGRAM(S): at 18:14

## 2023-01-01 RX ADMIN — HEPARIN SODIUM 5000 UNIT(S): 5000 INJECTION INTRAVENOUS; SUBCUTANEOUS at 17:06

## 2023-01-01 RX ADMIN — SODIUM CHLORIDE 125 MILLILITER(S): 9 INJECTION, SOLUTION INTRAVENOUS at 12:49

## 2023-01-01 RX ADMIN — HYDROMORPHONE HYDROCHLORIDE 2 MILLIGRAM(S): 2 INJECTION INTRAMUSCULAR; INTRAVENOUS; SUBCUTANEOUS at 04:23

## 2023-01-01 RX ADMIN — OXYCODONE HYDROCHLORIDE 5 MILLIGRAM(S): 5 TABLET ORAL at 08:45

## 2023-01-01 RX ADMIN — MORPHINE SULFATE 30 MILLILITER(S): 50 CAPSULE, EXTENDED RELEASE ORAL at 19:17

## 2023-01-01 RX ADMIN — TAMSULOSIN HYDROCHLORIDE 0.4 MILLIGRAM(S): 0.4 CAPSULE ORAL at 21:52

## 2023-01-01 RX ADMIN — Medication 25 MILLIGRAM(S): at 05:16

## 2023-01-01 RX ADMIN — Medication 1 MILLIGRAM(S): at 12:42

## 2023-01-01 RX ADMIN — HYDROMORPHONE HYDROCHLORIDE 1 MILLIGRAM(S): 2 INJECTION INTRAMUSCULAR; INTRAVENOUS; SUBCUTANEOUS at 19:11

## 2023-01-01 RX ADMIN — OXYCODONE HYDROCHLORIDE 5 MILLIGRAM(S): 5 TABLET ORAL at 21:37

## 2023-01-01 RX ADMIN — Medication 650 MILLIGRAM(S): at 11:10

## 2023-01-01 RX ADMIN — Medication 25 MILLIGRAM(S): at 17:58

## 2023-01-01 RX ADMIN — Medication 25 MILLIGRAM(S): at 05:35

## 2023-01-01 RX ADMIN — Medication 25 MILLIGRAM(S): at 06:11

## 2023-01-01 RX ADMIN — Medication 25 MILLIGRAM(S): at 17:50

## 2023-01-01 RX ADMIN — FINASTERIDE 5 MILLIGRAM(S): 5 TABLET, FILM COATED ORAL at 12:48

## 2023-01-01 RX ADMIN — Medication 650 MILLIGRAM(S): at 10:10

## 2023-01-01 RX ADMIN — HYDROMORPHONE HYDROCHLORIDE 2 MILLIGRAM(S): 2 INJECTION INTRAMUSCULAR; INTRAVENOUS; SUBCUTANEOUS at 05:03

## 2023-01-01 RX ADMIN — Medication 1 MILLIGRAM(S): at 11:40

## 2023-01-01 RX ADMIN — SODIUM CHLORIDE 75 MILLILITER(S): 9 INJECTION, SOLUTION INTRAVENOUS at 20:22

## 2023-01-01 RX ADMIN — POLYETHYLENE GLYCOL 3350 17 GRAM(S): 17 POWDER, FOR SOLUTION ORAL at 12:27

## 2023-01-01 RX ADMIN — HYDROMORPHONE HYDROCHLORIDE 1 MILLIGRAM(S): 2 INJECTION INTRAMUSCULAR; INTRAVENOUS; SUBCUTANEOUS at 09:50

## 2023-01-01 RX ADMIN — FINASTERIDE 5 MILLIGRAM(S): 5 TABLET, FILM COATED ORAL at 13:21

## 2023-01-01 RX ADMIN — HYDROMORPHONE HYDROCHLORIDE 1 MILLIGRAM(S): 2 INJECTION INTRAMUSCULAR; INTRAVENOUS; SUBCUTANEOUS at 18:23

## 2023-01-01 RX ADMIN — TAMSULOSIN HYDROCHLORIDE 0.8 MILLIGRAM(S): 0.4 CAPSULE ORAL at 12:35

## 2023-01-01 RX ADMIN — HEPARIN SODIUM 5000 UNIT(S): 5000 INJECTION INTRAVENOUS; SUBCUTANEOUS at 17:39

## 2023-01-01 RX ADMIN — HYDROMORPHONE HYDROCHLORIDE 2 MILLIGRAM(S): 2 INJECTION INTRAMUSCULAR; INTRAVENOUS; SUBCUTANEOUS at 04:50

## 2023-01-01 RX ADMIN — Medication 25 MILLIGRAM(S): at 05:11

## 2023-01-01 RX ADMIN — POLYETHYLENE GLYCOL 3350 17 GRAM(S): 17 POWDER, FOR SOLUTION ORAL at 11:07

## 2023-01-01 RX ADMIN — OXYCODONE HYDROCHLORIDE 5 MILLIGRAM(S): 5 TABLET ORAL at 15:53

## 2023-01-01 RX ADMIN — Medication 25 MILLIGRAM(S): at 18:11

## 2023-01-01 RX ADMIN — HYDROMORPHONE HYDROCHLORIDE 1 MILLIGRAM(S): 2 INJECTION INTRAMUSCULAR; INTRAVENOUS; SUBCUTANEOUS at 14:26

## 2023-01-01 RX ADMIN — Medication 1 MILLIGRAM(S): at 13:20

## 2023-01-01 RX ADMIN — Medication 650 MILLIGRAM(S): at 01:10

## 2023-01-01 RX ADMIN — MORPHINE SULFATE 30 MILLILITER(S): 50 CAPSULE, EXTENDED RELEASE ORAL at 07:32

## 2023-01-01 RX ADMIN — HYDROMORPHONE HYDROCHLORIDE 1 MILLIGRAM(S): 2 INJECTION INTRAMUSCULAR; INTRAVENOUS; SUBCUTANEOUS at 09:21

## 2023-01-01 RX ADMIN — HYDROMORPHONE HYDROCHLORIDE 2 MILLIGRAM(S): 2 INJECTION INTRAMUSCULAR; INTRAVENOUS; SUBCUTANEOUS at 10:08

## 2023-01-01 RX ADMIN — HYDROMORPHONE HYDROCHLORIDE 2 MILLIGRAM(S): 2 INJECTION INTRAMUSCULAR; INTRAVENOUS; SUBCUTANEOUS at 08:03

## 2023-01-01 RX ADMIN — SODIUM CHLORIDE 75 MILLILITER(S): 9 INJECTION, SOLUTION INTRAVENOUS at 07:35

## 2023-01-01 RX ADMIN — HEPARIN SODIUM 5000 UNIT(S): 5000 INJECTION INTRAVENOUS; SUBCUTANEOUS at 18:11

## 2023-01-01 RX ADMIN — HEPARIN SODIUM 5000 UNIT(S): 5000 INJECTION INTRAVENOUS; SUBCUTANEOUS at 05:30

## 2023-01-01 RX ADMIN — HYDROMORPHONE HYDROCHLORIDE 2 MILLIGRAM(S): 2 INJECTION INTRAMUSCULAR; INTRAVENOUS; SUBCUTANEOUS at 23:23

## 2023-01-01 RX ADMIN — Medication 650 MILLIGRAM(S): at 12:42

## 2023-01-01 RX ADMIN — Medication 25 MILLIGRAM(S): at 18:54

## 2023-01-01 RX ADMIN — HYDROMORPHONE HYDROCHLORIDE 2 MILLIGRAM(S): 2 INJECTION INTRAMUSCULAR; INTRAVENOUS; SUBCUTANEOUS at 03:46

## 2023-01-01 RX ADMIN — SODIUM CHLORIDE 125 MILLILITER(S): 9 INJECTION, SOLUTION INTRAVENOUS at 05:30

## 2023-01-01 RX ADMIN — FINASTERIDE 5 MILLIGRAM(S): 5 TABLET, FILM COATED ORAL at 12:25

## 2023-01-01 RX ADMIN — HYDROMORPHONE HYDROCHLORIDE 2 MILLIGRAM(S): 2 INJECTION INTRAMUSCULAR; INTRAVENOUS; SUBCUTANEOUS at 14:41

## 2023-01-01 RX ADMIN — CEFTRIAXONE 100 MILLIGRAM(S): 500 INJECTION, POWDER, FOR SOLUTION INTRAMUSCULAR; INTRAVENOUS at 23:53

## 2023-01-01 RX ADMIN — Medication 1 MILLIGRAM(S): at 12:24

## 2023-01-01 RX ADMIN — Medication 400 MILLIGRAM(S): at 16:59

## 2023-01-01 RX ADMIN — Medication 25 MILLIGRAM(S): at 18:23

## 2023-01-01 RX ADMIN — Medication 25 MILLIGRAM(S): at 05:14

## 2023-01-01 RX ADMIN — HYDROMORPHONE HYDROCHLORIDE 2 MILLIGRAM(S): 2 INJECTION INTRAMUSCULAR; INTRAVENOUS; SUBCUTANEOUS at 10:47

## 2023-01-01 RX ADMIN — POLYETHYLENE GLYCOL 3350 17 GRAM(S): 17 POWDER, FOR SOLUTION ORAL at 14:34

## 2023-01-01 RX ADMIN — SODIUM CHLORIDE 75 MILLILITER(S): 9 INJECTION, SOLUTION INTRAVENOUS at 12:06

## 2023-01-01 RX ADMIN — OXYCODONE HYDROCHLORIDE 5 MILLIGRAM(S): 5 TABLET ORAL at 15:30

## 2023-01-01 RX ADMIN — HYDROMORPHONE HYDROCHLORIDE 1 MILLIGRAM(S): 2 INJECTION INTRAMUSCULAR; INTRAVENOUS; SUBCUTANEOUS at 14:45

## 2023-01-01 RX ADMIN — MORPHINE SULFATE 2 MILLIGRAM(S): 50 CAPSULE, EXTENDED RELEASE ORAL at 05:40

## 2023-01-01 RX ADMIN — FINASTERIDE 5 MILLIGRAM(S): 5 TABLET, FILM COATED ORAL at 12:02

## 2023-01-01 RX ADMIN — MORPHINE SULFATE 30 MILLILITER(S): 50 CAPSULE, EXTENDED RELEASE ORAL at 09:39

## 2023-01-01 RX ADMIN — Medication 25 MILLIGRAM(S): at 05:52

## 2023-01-01 RX ADMIN — Medication 25 MILLIGRAM(S): at 17:39

## 2023-01-01 RX ADMIN — HYDROMORPHONE HYDROCHLORIDE 1 MILLIGRAM(S): 2 INJECTION INTRAMUSCULAR; INTRAVENOUS; SUBCUTANEOUS at 22:54

## 2023-01-01 RX ADMIN — OXYCODONE HYDROCHLORIDE 5 MILLIGRAM(S): 5 TABLET ORAL at 18:53

## 2023-01-01 RX ADMIN — MORPHINE SULFATE 2 MILLIGRAM(S): 50 CAPSULE, EXTENDED RELEASE ORAL at 07:06

## 2023-01-01 RX ADMIN — HEPARIN SODIUM 5000 UNIT(S): 5000 INJECTION INTRAVENOUS; SUBCUTANEOUS at 06:06

## 2023-01-01 RX ADMIN — HYDROMORPHONE HYDROCHLORIDE 1 MILLIGRAM(S): 2 INJECTION INTRAMUSCULAR; INTRAVENOUS; SUBCUTANEOUS at 03:15

## 2023-01-01 RX ADMIN — SODIUM CHLORIDE 125 MILLILITER(S): 9 INJECTION, SOLUTION INTRAVENOUS at 11:51

## 2023-01-01 RX ADMIN — CEFTRIAXONE 100 MILLIGRAM(S): 500 INJECTION, POWDER, FOR SOLUTION INTRAMUSCULAR; INTRAVENOUS at 15:50

## 2023-01-01 RX ADMIN — SENNA PLUS 2 TABLET(S): 8.6 TABLET ORAL at 22:23

## 2023-01-01 RX ADMIN — SODIUM CHLORIDE 125 MILLILITER(S): 9 INJECTION, SOLUTION INTRAVENOUS at 09:51

## 2023-01-01 RX ADMIN — HYDROMORPHONE HYDROCHLORIDE 2 MILLIGRAM(S): 2 INJECTION INTRAMUSCULAR; INTRAVENOUS; SUBCUTANEOUS at 03:14

## 2023-01-01 RX ADMIN — HEPARIN SODIUM 5000 UNIT(S): 5000 INJECTION INTRAVENOUS; SUBCUTANEOUS at 05:08

## 2023-01-01 RX ADMIN — HYDROMORPHONE HYDROCHLORIDE 1 MILLIGRAM(S): 2 INJECTION INTRAMUSCULAR; INTRAVENOUS; SUBCUTANEOUS at 09:30

## 2023-01-01 RX ADMIN — SODIUM CHLORIDE 75 MILLILITER(S): 9 INJECTION, SOLUTION INTRAVENOUS at 05:52

## 2023-01-01 RX ADMIN — Medication 650 MILLIGRAM(S): at 06:22

## 2023-01-01 RX ADMIN — SODIUM CHLORIDE 1000 MILLILITER(S): 9 INJECTION INTRAMUSCULAR; INTRAVENOUS; SUBCUTANEOUS at 16:59

## 2023-01-01 RX ADMIN — HEPARIN SODIUM 5000 UNIT(S): 5000 INJECTION INTRAVENOUS; SUBCUTANEOUS at 18:01

## 2023-01-01 RX ADMIN — MORPHINE SULFATE 30 MILLILITER(S): 50 CAPSULE, EXTENDED RELEASE ORAL at 07:33

## 2023-01-01 RX ADMIN — Medication 1 MILLIGRAM(S): at 12:25

## 2023-01-01 RX ADMIN — MORPHINE SULFATE 2 MILLIGRAM(S): 50 CAPSULE, EXTENDED RELEASE ORAL at 11:41

## 2023-01-01 RX ADMIN — Medication 1 MILLIGRAM(S): at 12:05

## 2023-01-01 NOTE — PATIENT PROFILE ADULT - FLU SEASON?
Pt resting in bed, call light in reach.  Will continue to monitor     Johann West, KOKI  78/17/10 [id number] No

## 2023-01-19 NOTE — ED ADULT NURSE NOTE - HAVE YOU HAD A SECOND COVID-19 BOOSTER?
TRANSFER - IN REPORT:    Verbal report received from Rodrick Marroquin RN on Yosvany Meredith  being received from ER for ordered procedure      Report consisted of patients Situation, Background, Assessment and   Recommendations(SBAR). Information from the following report(s) SBAR was reviewed with the receiving nurse. Opportunity for questions and clarification was provided. Assessment completed upon patients arrival to unit and care assumed. No

## 2023-02-02 ENCOUNTER — OUTPATIENT (OUTPATIENT)
Dept: OUTPATIENT SERVICES | Facility: HOSPITAL | Age: 68
LOS: 1 days | End: 2023-02-02

## 2023-02-02 ENCOUNTER — APPOINTMENT (OUTPATIENT)
Dept: INTERNAL MEDICINE | Facility: CLINIC | Age: 68
End: 2023-02-02
Payer: MEDICARE

## 2023-02-02 VITALS — SYSTOLIC BLOOD PRESSURE: 130 MMHG | OXYGEN SATURATION: 96 % | HEART RATE: 64 BPM | DIASTOLIC BLOOD PRESSURE: 90 MMHG

## 2023-02-02 DIAGNOSIS — Z23 ENCOUNTER FOR IMMUNIZATION: ICD-10-CM

## 2023-02-02 PROCEDURE — 99214 OFFICE O/P EST MOD 30 MIN: CPT

## 2023-02-02 RX ORDER — GLUTAMINE 5 G/1
5 POWDER, FOR SOLUTION ORAL
Qty: 120 | Refills: 11 | Status: ACTIVE | COMMUNITY
Start: 2019-09-20 | End: 1900-01-01

## 2023-02-03 DIAGNOSIS — H26.9 UNSPECIFIED CATARACT: ICD-10-CM

## 2023-02-03 DIAGNOSIS — D57.1 SICKLE-CELL DISEASE WITHOUT CRISIS: ICD-10-CM

## 2023-02-03 DIAGNOSIS — Z01.818 ENCOUNTER FOR OTHER PREPROCEDURAL EXAMINATION: ICD-10-CM

## 2023-02-03 DIAGNOSIS — Z23 ENCOUNTER FOR IMMUNIZATION: ICD-10-CM

## 2023-02-03 DIAGNOSIS — I47.29 OTHER VENTRICULAR TACHYCARDIA: ICD-10-CM

## 2023-02-03 NOTE — HISTORY OF PRESENT ILLNESS
[FreeTextEntry1] : 66 yo male with hgb ss for f/u. [de-identified] : (66 yo male with hgb ss, for f/u. Last hospitalization 11/22. He has hospitalizations at least 2 times per year. He is mo longer interested in taking HU.\par C/o urinary difficulty for the last week. Has been taking finasteride for it.\par Has been seen by ophthalmology. Has significant cataracts, impacting vision. The patient will be scheduling for cataract surgery \par \par The patient had a prolonged hospitalization last Feb> He was found to have iron deficiency anemia, as well as short runs of Vtach, during his stay. He refused colonoscopy at the time.he still refuses work-up for iron deficient anemia\par The patient was given metoprolol, 25 mg BID during his stay. No cardiac studies ( besides EKG) were performed. He is asymptomatic, no further episodes were noted on this past admission. \par He is feeling well today, would like refills of pain medication and metoprolol 25 mg bid. \par \par ophtho as above- no retinopathy noted\par \par Pe: thin male in nad\par vs: 130/90, pulse 64, O2 sat 96\par anicteric\par lungs-cta\par cor: rrr-m\par abd- soft, benign\par ext:-c/c/e\par \par echo: 11/22\par normal LV fx\par grade 1 diastolic dysfunction, moderately enlarged LA\par mild AR, mild TR\par \par A/P- 66 yo male with HGB SS, for pre-op eval prior to cataract surgery\par Will ascertain if procedure will be done under local anesthesia. \par 1. SCD- for now the patient is not taking disease modifying medication\par 2. cataracts- await pre-op labs/anesthesia method\par 3.h/o vtach- continue metoprolol 25 bid\par 4. dilauudid-4 mg tab-#60\par ISTOP checked\par 5. flu shot today\par \par Lluvia Hamm MD

## 2023-02-13 ENCOUNTER — NON-APPOINTMENT (OUTPATIENT)
Age: 68
End: 2023-02-13

## 2023-02-13 ENCOUNTER — APPOINTMENT (OUTPATIENT)
Dept: OPHTHALMOLOGY | Facility: CLINIC | Age: 68
End: 2023-02-13
Payer: MEDICARE

## 2023-02-13 PROCEDURE — 92012 INTRM OPH EXAM EST PATIENT: CPT

## 2023-02-17 ENCOUNTER — NON-APPOINTMENT (OUTPATIENT)
Age: 68
End: 2023-02-17

## 2023-02-23 ENCOUNTER — OUTPATIENT (OUTPATIENT)
Dept: OUTPATIENT SERVICES | Facility: HOSPITAL | Age: 68
LOS: 1 days | End: 2023-02-23

## 2023-02-23 ENCOUNTER — APPOINTMENT (OUTPATIENT)
Dept: INTERNAL MEDICINE | Facility: CLINIC | Age: 68
End: 2023-02-23

## 2023-02-24 ENCOUNTER — NON-APPOINTMENT (OUTPATIENT)
Age: 68
End: 2023-02-24

## 2023-02-24 DIAGNOSIS — D57.1 SICKLE-CELL DISEASE WITHOUT CRISIS: ICD-10-CM

## 2023-02-24 LAB
25(OH)D3 SERPL-MCNC: 68.8 NG/ML
ALBUMIN SERPL ELPH-MCNC: 4.1 G/DL
ALP BLD-CCNC: 122 U/L
ALT SERPL-CCNC: 20 U/L
ANION GAP SERPL CALC-SCNC: 13 MMOL/L
AST SERPL-CCNC: 35 U/L
BASOPHILS # BLD AUTO: 0.14 K/UL
BASOPHILS NFR BLD AUTO: 0.6 %
BILIRUB SERPL-MCNC: 2.8 MG/DL
BUN SERPL-MCNC: 23 MG/DL
CALCIUM SERPL-MCNC: 9.1 MG/DL
CHLORIDE SERPL-SCNC: 104 MMOL/L
CO2 SERPL-SCNC: 20 MMOL/L
CREAT SERPL-MCNC: 1.36 MG/DL
EGFR: 56 ML/MIN/1.73M2
EOSINOPHIL # BLD AUTO: 0.4 K/UL
EOSINOPHIL NFR BLD AUTO: 1.6 %
FERRITIN SERPL-MCNC: 209 NG/ML
GLUCOSE SERPL-MCNC: 94 MG/DL
HCT VFR BLD CALC: 17.1 %
HGB BLD-MCNC: 5.5 G/DL
IMM GRANULOCYTES NFR BLD AUTO: 0.9 %
INR PPP: 1.06 RATIO
LYMPHOCYTES # BLD AUTO: 0.8 K/UL
LYMPHOCYTES NFR BLD AUTO: 3.1 %
MAN DIFF?: NORMAL
MCHC RBC-ENTMCNC: 31.3 PG
MCHC RBC-ENTMCNC: 32.2 GM/DL
MCV RBC AUTO: 97.2 FL
MONOCYTES # BLD AUTO: 1.19 K/UL
MONOCYTES NFR BLD AUTO: 4.7 %
NEUTROPHILS # BLD AUTO: 22.63 K/UL
NEUTROPHILS NFR BLD AUTO: 89.1 %
PLATELET # BLD AUTO: 402 K/UL
POTASSIUM SERPL-SCNC: 5.6 MMOL/L
PROT SERPL-MCNC: 7.6 G/DL
PT BLD: 12.4 SEC
RBC # BLD: 1.76 M/UL
RBC # BLD: 1.76 M/UL
RBC # FLD: 18.9 %
RETICS # AUTO: 6.8 %
RETICS AGGREG/RBC NFR: 121.2 K/UL
SODIUM SERPL-SCNC: 138 MMOL/L
WBC # FLD AUTO: 25.4 K/UL

## 2023-03-31 NOTE — PATIENT PROFILE ADULT - FALL HARM RISK - DEVICES
S/w ptient. Advised Dr. Santana is not accepting any OB patients at this time. Scheduled with Dr. Cardona for 4/19   None

## 2023-04-07 NOTE — HISTORY OF PRESENT ILLNESS
[FreeTextEntry1] : 66 yo male with hgb ss for f/u. [de-identified] : (66 yo male with hgb ss, for f/u. Last hospitalization was 3/23 for VOC and urinary retention. The patient had run out of Flomax prior to admission. He has hospitalizations at least 2 times per year. He is not nterested in taking HU.Patient is feeling "achy and miserable" today. \par In addition, he missed his appointment ( due to hospitalization) for cataract surgery. He would like to reschedule as soon as possible as he is having difficulty performing daily tasks due to poor eyesight.\par \par The patient had a prolonged hospitalization last Feb> He was found to have iron deficiency anemia, as well as short runs of Vtach. He has been taking metoprolol whenever he feels palpitations, despite being asked to take it twice daily. He is afraid that his BP will go too low with daily medication. He also refuses colonoscopy at this juncture, or any w/u for iron deficiency.\par \par ophtho as above- no retinopathy noted\par \par Pe: thin male in nad\par vs: 10/78, pulse 87, O2 sat 97\par anicteric\par lungs-cta\par cor: rrr-m\par abd- soft, benign\par ext:-c/c/e\par \par echo: 11/22\par normal LV fx\par grade 1 diastolic dysfunction, moderately enlarged LA\par mild AR, mild TR\par \par A/P- 66 yo male with HGB SS, dense cataracts.\par \par 1. SCD- for now the patient is not taking disease modifying medication\par 2. cataracts- await re-scheduling\par 3.h/o vtach- continue metoprolol 25 bid, encourage the patient to take it daily. Assure him that his pressure can tolerate this dose\par 4. dilauudid-4 mg tab-#45\par ISTOP checked\par 5. f/u two months\par \par Lluvia Hamm MD

## 2023-04-11 NOTE — ED PROVIDER NOTE - PROGRESS NOTE DETAILS
requiring 3rd dose of dilaudid, to be admitted. Hb at baseline 6.0. improved pain control, requiring 2nd dose.

## 2023-04-11 NOTE — PATIENT PROFILE ADULT - FUNCTIONAL ASSESSMENT - BASIC MOBILITY 6.
3-calculated by average/Not able to assess (calculate score using [facility] averaging method)

## 2023-04-11 NOTE — CONSULT NOTE ADULT - SUBJECTIVE AND OBJECTIVE BOX
Hematology Consult Note    HPI:  57 years old male with h/o sickle cell anemia, HTN present to ED with complain of sickle call pain. Patient reported generalized body pain which started yesterday, simliar to his prior sickle cell pain crisis. Denied any fever, chills, nausea, vomiting, diarrhea. Patient is not following with hematology for a while, only seeing PCP.  Slightly hypertensive, afebrile, sat well at RA. WBC 12, Hb 6.1, plt 211, ret count 149.2, Cr 1.28, glucose 92, RVP negative. CXR image reviewed, dose not appear to have focal consolidation.     SH: no toxic habits (11 Apr 2023 11:19)      Allergies    No Known Drug Allergies  peanuts (Anaphylaxis)  peanuts (Angioedema)  pork (Unknown)  shellfish (Unknown)  Shrimp (Other)    Intolerances        MEDICATIONS  (STANDING):  acetaminophen     Tablet .. 650 milliGRAM(s) Oral every 6 hours  finasteride 5 milliGRAM(s) Oral daily  folic acid 1 milliGRAM(s) Oral daily  metoprolol tartrate 25 milliGRAM(s) Oral two times a day  polyethylene glycol 3350 17 Gram(s) Oral daily  senna 2 Tablet(s) Oral at bedtime  sodium chloride 0.45%. 1000 milliLiter(s) (125 mL/Hr) IV Continuous <Continuous>    MEDICATIONS  (PRN):  albuterol    90 MICROgram(s) HFA Inhaler 2 Puff(s) Inhalation every 6 hours PRN Shortness of Breath and/or Wheezing  HYDROmorphone  Injectable 1 milliGRAM(s) IV Push every 4 hours PRN Severe Pain (7 - 10)  oxyCODONE    IR 5 milliGRAM(s) Oral every 6 hours PRN Moderate Pain (4 - 6)      PAST MEDICAL & SURGICAL HISTORY:  Sickle Cell Disease      Left ventricular dysfunction  mild LVD on echo in 7/18, normal LVF in 12/18      H/O transfusion  ~ pRBC      Acute chest syndrome  Pt unaware      Intellectual disability  ~ mild      Constipation      Hypertension      Sickle cell disease      BPH (benign prostatic hyperplasia)      Sickle cell anemia      No significant past surgical history      No significant past surgical history          FAMILY HISTORY:  FH: hypertension  ~ mother    Family history of sickle cell trait (Mother)  ~ presumed in parents & 7 siblings (none suffer w/ the disease, per patient)        SOCIAL HISTORY: No EtOH, no tobacco    REVIEW OF SYSTEMS:    CONSTITUTIONAL: c/o gen pain and achiness   EYES/ENT: No visual changes;  No vertigo or throat pain   NECK: No pain or stiffness  RESPIRATORY: No cough, wheezing, hemoptysis; No shortness of breath  CARDIOVASCULAR: c/o some crisis CP, denies palpitations   GASTROINTESTINAL: c/o gen abd painh  GENITOURINARY: No dysuria, frequency or hematuria  NEUROLOGICAL: No numbness or weakness  SKIN: No itching, burning, rashes, or lesions   All other review of systems is negative unless indicated above.    Height (cm): 177.8 (04-11 @ 08:17)  Weight (kg): 48.1 (04-11 @ 08:17)  BMI (kg/m2): 15.2 (04-11 @ 08:17)  BSA (m2): 1.59 (04-11 @ 08:17)    T(F): 98.5 (04-11-23 @ 08:17), Max: 98.8 (04-11-23 @ 01:11)  HR: 97 (04-11-23 @ 08:17)  BP: 159/85 (04-11-23 @ 08:17)  RR: 18 (04-11-23 @ 08:17)  SpO2: 97% (04-11-23 @ 08:17)  Wt(kg): --    GENERAL: NAD, cachetic male   HEAD:  Atraumatic, Normocephalic  EYES: EOMI, PERRLA, conjunctiva and sclera clear  NECK: Supple, No JVD  CHEST/LUNG: Clear to auscultation bilaterally; No wheeze  HEART: Regular rate and rhythm; No murmurs, rubs, or gallops  ABDOMEN: c/o some abd tenderness, Soft, Nondistended; Bowel sounds present  EXTREMITIES:  2+ Peripheral Pulses, No clubbing, cyanosis, or edema  NEUROLOGY: non-focal  SKIN: No rashes or lesions                          6.1    12.01 )-----------( 211      ( 11 Apr 2023 03:27 )             18.4       04-11    140  |  111<H>  |  14  ----------------------------<  92  4.7   |  23  |  1.28    Ca    8.7      11 Apr 2023 03:27    TPro  7.2  /  Alb  3.3  /  TBili  2.1<H>  /  DBili  x   /  AST  53<H>  /  ALT  28  /  AlkPhos  100  04-11      Lactate Dehydrogenase, Serum: 540 U/L (04-11 @ 03:27)  Direct Eden Profile (04.11.23 @ 03:27)   Dir Antiglob Polyspecific Interpretation: NEGManual Differential (04.11.23 @ 03:27)   Sickle Cells: Slight  Poikilocytosis: Slight  Microcytosis: Slight  Hypochromia: Slight  Macrocytosis: Slight  Anisocytosis: Slight  Elliptocytes: Slight  Red Cell Morphology: Non specific  Platelet Morphology: Normal  Manual Smear Verification: Performed  Nucleated RBC: 0 /100Reticulocyte Count (04.11.23 @ 03:27)   RBC Count: 2.00 M/uL  Reticulocyte Percent: 7.5 %  Absolute Reticulocytes: 149.2 K/uL     Hematology Consult Note    HPI:  57 years old male with h/o sickle cell anemia, HTN present to ED with complain of sickle call pain. Patient reported generalized body pain which started yesterday, simliar to his prior sickle cell pain crisis. Denied any fever, chills, nausea, vomiting, diarrhea.   Slightly hypertensive, afebrile, sat well at RA. WBC 12, Hb 6.1, plt 211, ret count 149.2, Cr 1.28, glucose 92, RVP negative. CXR image reviewed, dose not appear to have focal consolidation.     SH: no toxic habits (11 Apr 2023 11:19)      Allergies    No Known Drug Allergies  peanuts (Anaphylaxis)  peanuts (Angioedema)  pork (Unknown)  shellfish (Unknown)  Shrimp (Other)    Intolerances        MEDICATIONS  (STANDING):  acetaminophen     Tablet .. 650 milliGRAM(s) Oral every 6 hours  finasteride 5 milliGRAM(s) Oral daily  folic acid 1 milliGRAM(s) Oral daily  metoprolol tartrate 25 milliGRAM(s) Oral two times a day  polyethylene glycol 3350 17 Gram(s) Oral daily  senna 2 Tablet(s) Oral at bedtime  sodium chloride 0.45%. 1000 milliLiter(s) (125 mL/Hr) IV Continuous <Continuous>    MEDICATIONS  (PRN):  albuterol    90 MICROgram(s) HFA Inhaler 2 Puff(s) Inhalation every 6 hours PRN Shortness of Breath and/or Wheezing  HYDROmorphone  Injectable 1 milliGRAM(s) IV Push every 4 hours PRN Severe Pain (7 - 10)  oxyCODONE    IR 5 milliGRAM(s) Oral every 6 hours PRN Moderate Pain (4 - 6)      PAST MEDICAL & SURGICAL HISTORY:  Sickle Cell Disease      Left ventricular dysfunction  mild LVD on echo in 7/18, normal LVF in 12/18      H/O transfusion  ~ pRBC      Acute chest syndrome  Pt unaware      Intellectual disability  ~ mild      Constipation      Hypertension      Sickle cell disease      BPH (benign prostatic hyperplasia)      Sickle cell anemia      No significant past surgical history      No significant past surgical history          FAMILY HISTORY:  FH: hypertension  ~ mother    Family history of sickle cell trait (Mother)  ~ presumed in parents & 7 siblings (none suffer w/ the disease, per patient)        SOCIAL HISTORY: No EtOH, no tobacco    REVIEW OF SYSTEMS:    CONSTITUTIONAL: c/o gen pain and achiness   EYES/ENT: No visual changes;  No vertigo or throat pain   NECK: No pain or stiffness  RESPIRATORY: No cough, wheezing, hemoptysis; No shortness of breath  CARDIOVASCULAR: c/o some crisis CP, denies palpitations   GASTROINTESTINAL: c/o gen abd painh  GENITOURINARY: No dysuria, frequency or hematuria  NEUROLOGICAL: No numbness or weakness  SKIN: No itching, burning, rashes, or lesions   All other review of systems is negative unless indicated above.    Height (cm): 177.8 (04-11 @ 08:17)  Weight (kg): 48.1 (04-11 @ 08:17)  BMI (kg/m2): 15.2 (04-11 @ 08:17)  BSA (m2): 1.59 (04-11 @ 08:17)    T(F): 98.5 (04-11-23 @ 08:17), Max: 98.8 (04-11-23 @ 01:11)  HR: 97 (04-11-23 @ 08:17)  BP: 159/85 (04-11-23 @ 08:17)  RR: 18 (04-11-23 @ 08:17)  SpO2: 97% (04-11-23 @ 08:17)  Wt(kg): --    GENERAL: NAD, cachetic male   HEAD:  Atraumatic, Normocephalic  EYES: EOMI, PERRLA, conjunctiva and sclera clear  NECK: Supple, No JVD  CHEST/LUNG: Clear to auscultation bilaterally; No wheeze  HEART: Regular rate and rhythm; No murmurs, rubs, or gallops  ABDOMEN: c/o some abd tenderness, Soft, Nondistended; Bowel sounds present  EXTREMITIES:  2+ Peripheral Pulses, No clubbing, cyanosis, or edema  NEUROLOGY: non-focal  SKIN: No rashes or lesions                          6.1    12.01 )-----------( 211      ( 11 Apr 2023 03:27 )             18.4       04-11    140  |  111<H>  |  14  ----------------------------<  92  4.7   |  23  |  1.28    Ca    8.7      11 Apr 2023 03:27    TPro  7.2  /  Alb  3.3  /  TBili  2.1<H>  /  DBili  x   /  AST  53<H>  /  ALT  28  /  AlkPhos  100  04-11      Lactate Dehydrogenase, Serum: 540 U/L (04-11 @ 03:27)  Direct Eden Profile (04.11.23 @ 03:27)   Dir Antiglob Polyspecific Interpretation: NEGManual Differential (04.11.23 @ 03:27)   Sickle Cells: Slight  Poikilocytosis: Slight  Microcytosis: Slight  Hypochromia: Slight  Macrocytosis: Slight  Anisocytosis: Slight  Elliptocytes: Slight  Red Cell Morphology: Non specific  Platelet Morphology: Normal  Manual Smear Verification: Performed  Nucleated RBC: 0 /100Reticulocyte Count (04.11.23 @ 03:27)   RBC Count: 2.00 M/uL  Reticulocyte Percent: 7.5 %  Absolute Reticulocytes: 149.2 K/uL     Hematology Consult Note    HPI:  57 years old male with h/o sickle cell anemia, HTN present to ED with complain of sickle call pain. Patient reported generalized body pain which started yesterday, similar  to his prior sickle cell pain crisis. Denied any fever, chills, nausea, vomiting, diarrhea.   Slightly hypertensive, afebrile, sat well at RA. WBC 12, Hb 6.1, plt 211, ret count 149.2, Cr 1.28, glucose 92, RVP negative. CXR image reviewed, dose not appear to have focal consolidation.     SH: no toxic habits (11 Apr 2023 11:19)      Allergies    No Known Drug Allergies  peanuts (Anaphylaxis)  peanuts (Angioedema)  pork (Unknown)  shellfish (Unknown)  Shrimp (Other)    Intolerances        MEDICATIONS  (STANDING):  acetaminophen     Tablet .. 650 milliGRAM(s) Oral every 6 hours  finasteride 5 milliGRAM(s) Oral daily  folic acid 1 milliGRAM(s) Oral daily  metoprolol tartrate 25 milliGRAM(s) Oral two times a day  polyethylene glycol 3350 17 Gram(s) Oral daily  senna 2 Tablet(s) Oral at bedtime  sodium chloride 0.45%. 1000 milliLiter(s) (125 mL/Hr) IV Continuous <Continuous>    MEDICATIONS  (PRN):  albuterol    90 MICROgram(s) HFA Inhaler 2 Puff(s) Inhalation every 6 hours PRN Shortness of Breath and/or Wheezing  HYDROmorphone  Injectable 1 milliGRAM(s) IV Push every 4 hours PRN Severe Pain (7 - 10)  oxyCODONE    IR 5 milliGRAM(s) Oral every 6 hours PRN Moderate Pain (4 - 6)      PAST MEDICAL & SURGICAL HISTORY:  Sickle Cell Disease      Left ventricular dysfunction  mild LVD on echo in 7/18, normal LVF in 12/18      H/O transfusion  ~ pRBC      Acute chest syndrome  Pt unaware      Intellectual disability  ~ mild      Constipation      Hypertension      Sickle cell disease      BPH (benign prostatic hyperplasia)      Sickle cell anemia      No significant past surgical history      No significant past surgical history          FAMILY HISTORY:  FH: hypertension  ~ mother    Family history of sickle cell trait (Mother)  ~ presumed in parents & 7 siblings (none suffer w/ the disease, per patient)        SOCIAL HISTORY: No EtOH, no tobacco    REVIEW OF SYSTEMS:    CONSTITUTIONAL: c/o gen pain and achiness   EYES/ENT: No visual changes;  No vertigo or throat pain   NECK: No pain or stiffness  RESPIRATORY: No cough, wheezing, hemoptysis; No shortness of breath  CARDIOVASCULAR: c/o some crisis CP, denies palpitations   GASTROINTESTINAL: c/o gen abd painh  GENITOURINARY: No dysuria, frequency or hematuria  NEUROLOGICAL: No numbness or weakness  SKIN: No itching, burning, rashes, or lesions   All other review of systems is negative unless indicated above.    Height (cm): 177.8 (04-11 @ 08:17)  Weight (kg): 48.1 (04-11 @ 08:17)  BMI (kg/m2): 15.2 (04-11 @ 08:17)  BSA (m2): 1.59 (04-11 @ 08:17)    T(F): 98.5 (04-11-23 @ 08:17), Max: 98.8 (04-11-23 @ 01:11)  HR: 97 (04-11-23 @ 08:17)  BP: 159/85 (04-11-23 @ 08:17)  RR: 18 (04-11-23 @ 08:17)  SpO2: 97% (04-11-23 @ 08:17)  Wt(kg): --    GENERAL: NAD, cachetic male   HEAD:  Atraumatic, Normocephalic  EYES: EOMI, PERRLA, conjunctiva and sclera clear  NECK: Supple, No JVD  CHEST/LUNG: Clear to auscultation bilaterally; No wheeze  HEART: Regular rate and rhythm; No murmurs, rubs, or gallops  ABDOMEN: c/o some abd tenderness, Soft, Nondistended; Bowel sounds present  EXTREMITIES:  2+ Peripheral Pulses, No clubbing, cyanosis, or edema  NEUROLOGY: non-focal  SKIN: No rashes or lesions                          6.1    12.01 )-----------( 211      ( 11 Apr 2023 03:27 )             18.4       04-11    140  |  111<H>  |  14  ----------------------------<  92  4.7   |  23  |  1.28    Ca    8.7      11 Apr 2023 03:27    TPro  7.2  /  Alb  3.3  /  TBili  2.1<H>  /  DBili  x   /  AST  53<H>  /  ALT  28  /  AlkPhos  100  04-11      Lactate Dehydrogenase, Serum: 540 U/L (04-11 @ 03:27)  Direct Eden Profile (04.11.23 @ 03:27)   Dir Antiglob Polyspecific Interpretation: NEGManual Differential (04.11.23 @ 03:27)   Sickle Cells: Slight  Poikilocytosis: Slight  Microcytosis: Slight  Hypochromia: Slight  Macrocytosis: Slight  Anisocytosis: Slight  Elliptocytes: Slight  Red Cell Morphology: Non specific  Platelet Morphology: Normal  Manual Smear Verification: Performed  Nucleated RBC: 0 /100Reticulocyte Count (04.11.23 @ 03:27)   RBC Count: 2.00 M/uL  Reticulocyte Percent: 7.5 %  Absolute Reticulocytes: 149.2 K/uL

## 2023-04-11 NOTE — ED PROVIDER NOTE - OBJECTIVE STATEMENT
57M PMHx of SCC (hx of acute chest syndrome, transfusion reaction), NPH presenting with typical sickle cell crisis today. Describes diffuse body aches, joint pain, that is typical of his sickle cell crisis. Denies any chest pain, abdominal pain, shortness of breath, nausea/vomiting, headaches, fevers, chills, diarrhea ,constipation, weakness, syncope, hematuria, dysuria, urinary symptoms, subjective neurological deficits, falls, trauma.

## 2023-04-11 NOTE — ED PROVIDER NOTE - CLINICAL SUMMARY MEDICAL DECISION MAKING FREE TEXT BOX
Presentation consistent with typical sickle cell crisis. Given History and Exam at this time, I have low suspicion for Acute Chest Syndrome, Bacteremia, Pneumonia, CVA, Aplastic Crisis, Osteomyelitis, Renal Papillary Necrosis.  - CBC, CMP, LDH, Retic count, T/S, EKG, CXR.  - 1/2 NS IVF hydration, dilaudid prn, toradol prn, diphenhydramine prn.  - Re-evaluation for disposition. PMHx of SCC (hx of acute chest syndrome, transfusion reaction) presenting with consistent with typical sickle cell crisis.   Given History and Exam at this time, I have low suspicion for Acute Chest Syndrome, Bacteremia, Pneumonia, CVA, Aplastic Crisis, Osteomyelitis, Renal Papillary Necrosis.  PLAN:   - CBC, CMP, LDH, Retic count, T/S, EKG, CXR.  - 1/2 NS IVF hydration, dilaudid prn, toradol prn, diphenhydramine prn.  - Re-evaluation for disposition.  - patient requiring 3 doses of dilaudid, will admit for SCC pain control.

## 2023-04-11 NOTE — CONSULT NOTE ADULT - ASSESSMENT
57 years old male with h/o sickle cell anemia, HTN present to ED with complain of sickle call pain.      #SC Crisis  -patient is seen by SC specialist Dr. Hamm, last seen 4/6/23, she stated patient isn't on any medication for SCD (not willing to take HU although it is prescribed Hydroxyurea 500mg 2 tab daily).  -patient denies hx of ACS/ transfusion exchange   - currently no signs of acute chest syndrome  -continue folic acid   - Pain management, Supplemental O2  - IV fluids, encourage oral fluid intake   - INCENTIVE SPIROMETRY, ambulation   - Treat any underlying infectious causes  -patient in crisis hemolysis present, hgb 6.1 on admission ( close to baseline)  - ok to hold off on transfusion unless patient becomes symptomatic.         Thank you for the referral. Will continue to monitor the patient.  Please call with any questions [phone]  Above reviewed with Attending Dr. Eric ARIAS/[facility]  [address] [facility], [address], [place], [place]  [phone]  *Note not finalized until signed by Attending Physician

## 2023-04-11 NOTE — ED PROVIDER NOTE - CRITICAL CARE ATTENDING CONTRIBUTION TO CARE
On my evaluation, this patient had a high probability of imminent or lifethreatening deterioration due to SCC, requiring dilaudid IV multiple doses for pain control, which required my direct attention, intervention, and personal management.     I have personally provided 31 minutes of critical care time exclusive of time spent on separately billable procedures. Time includes review of laboratory data, radiology results, discussion with consultants, and monitoring for potential decompensation. Interventions were performed as documented above.    - Aubrey Cortes MD, Emergency Medicine and Medical Toxicology Attending.

## 2023-04-11 NOTE — ED ADULT NURSE NOTE - OBJECTIVE STATEMENT
Pt is a 57y M AOx4 with a pmh of sickle cell, BPH, and HTN. Pt reports sickle cell pain starting today at 6pm. Pt states "pain is all over". Pt rates the pain a 10 out of 10. Pt took tylenol at 7:30pm to no relief. Pt denies cp, sob, headache, lightheadedness, or dizziness.

## 2023-04-11 NOTE — ED ADULT NURSE NOTE - NSICDXPASTMEDICALHX_GEN_ALL_CORE_FT
PAST MEDICAL HISTORY:  Acute chest syndrome Pt unaware    BPH (benign prostatic hyperplasia)     Constipation     H/O transfusion ~ pRBC    Hypertension     Intellectual disability ~ mild    Left ventricular dysfunction mild LVD on echo in 7/18, normal LVF in 12/18    Sickle cell anemia     Sickle Cell Disease     Sickle cell disease

## 2023-04-11 NOTE — CONSULT NOTE ADULT - NS ATTEND AMEND GEN_ALL_CORE FT
I have examined the patient at bedside and reviewed patient's data and participated in the management of the patient along with Martha SILVA as well as hemotology/med oncology faculty consisting of Dr. Hebert Arevalo, Dr. ROQUE Reyes, Dr. VANDANA Olson, Dr. Alireza Morley, Dr. Marla Mendenhall, Dr. Jonatan Liriano as well as myself during the daily heme/onc case review. I reviewed pertinent clinical information, PE,  labs as well as A/P as outline above.     Pt admitted in sickle cell pain crisis. Supportive care (O2, Dilaudid oxycodone, hydration0 recommended and transfuse only if symptomatic or falls < 6 gm/dl.

## 2023-04-11 NOTE — H&P ADULT - PROBLEM SELECTOR PLAN 1
present with generalized pain  Prior admission history say h/o transfusion reaction, but patient denied any  Reported baseline around 6-7. prior admission in [facility] note reviewed, Hb baseline 6 per note  Denied any SOB, generalized weakness, palpitation, chest pain  Pain control, IV fluid  LDH, ferritin, TIBC, B12, folate, monitor H/H  continue folic acid  Hematology consulted

## 2023-04-11 NOTE — PATIENT PROFILE ADULT - FALL HARM RISK - HARM RISK INTERVENTIONS

## 2023-04-11 NOTE — H&P ADULT - NSHPPHYSICALEXAM_GEN_ALL_CORE
CONSTITUTIONAL: alert and cooperative, no acute distress  EYES: PERRL, conjunctival pallor +, mild scleral icterus   ENT: Mucosa moist, tongue normal  NECK: Neck supple, trachea midline, non-tender  CARDIAC: Normal S1 and S2. Regular rate and rhythms. No Pedal edema. Peripheral pulses intact  LUNGS: Equal air entry both lungs. No rales, rhonchi, wheezing. Normal respiratory effort.   ABDOMEN: Soft, nondistended, nontender. No guarding or rebound tenderness. No hepatomegaly or splenomegaly. Bowel sound normal.   MUSCULOSKELETAL: Normocephalic, atraumatic. No significant deformity or joint abnormality. Myalgia  NEUROLOGICAL: No gross motor or sensory deficits  SKIN: no lesions or eruptions. Normal turgor  PSYCHIATRIC: A&O x 3, appropriate mood and affect.

## 2023-04-11 NOTE — ED ADULT NURSE REASSESSMENT NOTE - NS ED NURSE REASSESS COMMENT FT1
Pt handoff to RN Anupa on 1D. Pt AOX4 and reports pain a 7 out of 10. Meds will be given as ordered. Will continue to monitor

## 2023-04-11 NOTE — H&P ADULT - HISTORY OF PRESENT ILLNESS
57 years old male with h/o sickle cell anemia, HTN present to ED with complain of sickle call pain. Patient reported generalized body pain which started yesterday, simliar to his prior sickle cell pain crisis. Denied any fever, chills, nausea, vomiting, diarrhea. Patient is not following with hematology for a while, only seeing PCP.  Slightly hypertensive, afebrile, sat well at RA. WBC 12, Hb 6.1, plt 211, ret count 149.2, Cr 1.28, glucose 92, RVP negative. CXR image reviewed, dose not appear to have focal consolidation.     SH: no toxic habits

## 2023-04-12 NOTE — PROGRESS NOTE ADULT - PROBLEM SELECTOR PLAN 1
present with generalized pain  Prior admission history say h/o transfusion reaction, but patient denied any  Reported baseline around 6-7. prior admission in [facility] note reviewed, Hb baseline 6 per note  Denied any SOB, generalized weakness, palpitation, chest pain  Pain control, IV fluid  LDH, ferritin, TIBC, B12, folate, monitor H/H  continue folic acid  Hematology consulted

## 2023-04-12 NOTE — PROGRESS NOTE ADULT - SUBJECTIVE AND OBJECTIVE BOX
Patient is a 57y old  Male who presents with a chief complaint of sickle cell crisis (12 Apr 2023 09:28)      INTERVAL HPI/OVERNIGHT EVENTS:  Pt was seen and examined, no acute events.      MEDICATIONS  (STANDING):  acetaminophen     Tablet .. 650 milliGRAM(s) Oral every 6 hours  finasteride 5 milliGRAM(s) Oral daily  folic acid 1 milliGRAM(s) Oral daily  metoprolol tartrate 25 milliGRAM(s) Oral two times a day  polyethylene glycol 3350 17 Gram(s) Oral daily  senna 2 Tablet(s) Oral at bedtime  sodium chloride 0.45%. 1000 milliLiter(s) (125 mL/Hr) IV Continuous <Continuous>    MEDICATIONS  (PRN):  albuterol    90 MICROgram(s) HFA Inhaler 2 Puff(s) Inhalation every 6 hours PRN Shortness of Breath and/or Wheezing  HYDROmorphone  Injectable 1 milliGRAM(s) IV Push every 4 hours PRN Severe Pain (7 - 10)  oxyCODONE    IR 5 milliGRAM(s) Oral every 6 hours PRN Moderate Pain (4 - 6)      Allergies  No Known Drug Allergies  peanuts (Anaphylaxis)  peanuts (Angioedema)  pork (Unknown)  shellfish (Unknown)  Shrimp (Other)        Vital Signs Last 24 Hrs  T(C): 37.8 (12 Apr 2023 11:52), Max: 37.8 (12 Apr 2023 11:52)  T(F): 100.1 (12 Apr 2023 11:52), Max: 100.1 (12 Apr 2023 11:52)  HR: 109 (12 Apr 2023 11:52) (79 - 109)  BP: 128/68 (12 Apr 2023 11:52) (128/68 - 157/79)  BP(mean): --  RR: 16 (12 Apr 2023 11:52) (16 - 19)  SpO2: 92% (12 Apr 2023 05:24) (92% - 100%)    Parameters below as of 12 Apr 2023 05:24  Patient On (Oxygen Delivery Method): room air        PHYSICAL EXAM:  GENERAL: NAD  HEAD:  Atraumatic  EYES: PERRLA  ENMT: Mouth moist   NECK: Supple  NERVOUS SYSTEM:  Awake, alert  CHEST/LUNG: Clear  HEART: RRR, S1, S2  ABDOMEN: Soft, non tender  EXTREMITIES:  no edema BL LE  SKIN: No rash          LABS:                        6.1    12.01 )-----------( 211      ( 11 Apr 2023 03:27 )             18.4     04-11    140  |  111<H>  |  14  ----------------------------<  92  4.7   |  23  |  1.28    Ca    8.7      11 Apr 2023 03:27    TPro  7.2  /  Alb  3.3  /  TBili  2.1<H>  /  DBili  x   /  AST  53<H>  /  ALT  28  /  AlkPhos  100  04-11        CAPILLARY BLOOD GLUCOSE          RADIOLOGY & ADDITIONAL TESTS:    Imaging Personally Reviewed:  [ ] YES  [ ] NO    Consultant(s) Notes Reviewed:  [ ] YES  [ ] NO    Care Discussed with Consultants/Other Providers [ ] YES  [ ] NO

## 2023-04-12 NOTE — PROGRESS NOTE ADULT - ASSESSMENT
57 years old male with h/o sickle cell anemia, HTN present to ED with complain of sickle call pain.      #SC Crisis  -patient is seen by SC specialist Dr. Hamm, last seen 4/6/23, she stated patient isn't on any medication for SCD (not willing to take HU although it is prescribed Hydroxyurea 500mg 2 tab daily).  -patient denies hx of ACS/ transfusion exchange   - currently no signs of acute chest syndrome  -continue folic acid   - Pain management, Supplemental O2  - IV fluids, encourage oral fluid intake   - INCENTIVE SPIROMETRY, ambulation   - Treat any underlying infectious causes  -patient in crisis hemolysis present, hgb 6.1 on admission (close to baseline)-pending hemolysis trend   - ok to hold off on transfusion unless patient becomes symptomatic.         Thank you for the referral. Will continue to monitor the patient.  Please call with any questions [phone]  Above reviewed with Attending Dr. Arvizu   [facility]  [address] [facility], [address], [place], [place]  [phone]  *Note not finalized until signed by Attending Physician

## 2023-04-12 NOTE — PROGRESS NOTE ADULT - SUBJECTIVE AND OBJECTIVE BOX
Heme/Onc Progress note    INTERVAL HPI/OVERNIGHT EVENTS:  Patient S&E at bedside. patient c./o gen pain. On RA in no acute resp. distress  VITAL SIGNS:  T(F): 98.4 (04-12-23 @ 05:24)  HR: 101 (04-12-23 @ 05:24)  BP: 157/79 (04-12-23 @ 05:24)  RR: 19 (04-12-23 @ 05:24)  SpO2: 92% (04-12-23 @ 05:24)  Wt(kg): --    PHYSICAL EXAM:    Constitutional: c/o gen pain, cachetic male   Eyes: EOMI, sclera non-icteric  Neck: supple, no masses, no JVD  Respiratory: diminished b/l   Cardiovascular: RRR, no M/R/G  Gastrointestinal: c/o abd pain, ad=bd soft, no masses palpable, + BS,  Extremities: no c/c/e  Neurological: AAOx3      MEDICATIONS  (STANDING):  acetaminophen     Tablet .. 650 milliGRAM(s) Oral every 6 hours  finasteride 5 milliGRAM(s) Oral daily  folic acid 1 milliGRAM(s) Oral daily  metoprolol tartrate 25 milliGRAM(s) Oral two times a day  polyethylene glycol 3350 17 Gram(s) Oral daily  senna 2 Tablet(s) Oral at bedtime  sodium chloride 0.45%. 1000 milliLiter(s) (125 mL/Hr) IV Continuous <Continuous>    MEDICATIONS  (PRN):  albuterol    90 MICROgram(s) HFA Inhaler 2 Puff(s) Inhalation every 6 hours PRN Shortness of Breath and/or Wheezing  HYDROmorphone  Injectable 1 milliGRAM(s) IV Push every 4 hours PRN Severe Pain (7 - 10)  oxyCODONE    IR 5 milliGRAM(s) Oral every 6 hours PRN Moderate Pain (4 - 6)      Allergies    No Known Drug Allergies  peanuts (Anaphylaxis)  peanuts (Angioedema)  pork (Unknown)  shellfish (Unknown)  Shrimp (Other)    Intolerances        LABS:                        6.1    12.01 )-----------( 211      ( 11 Apr 2023 03:27 )             18.4     04-11    140  |  111<H>  |  14  ----------------------------<  92  4.7   |  23  |  1.28    Ca    8.7      11 Apr 2023 03:27    TPro  7.2  /  Alb  3.3  /  TBili  2.1<H>  /  DBili  x   /  AST  53<H>  /  ALT  28  /  AlkPhos  100  04-11          RADIOLOGY & ADDITIONAL TESTS:  Studies reviewed.    ASSESSMENT & PLAN:

## 2023-04-13 NOTE — DIETITIAN INITIAL EVALUATION ADULT - PERTINENT MEDS FT
MEDICATIONS  (STANDING):  acetaminophen     Tablet .. 650 milliGRAM(s) Oral every 6 hours  finasteride 5 milliGRAM(s) Oral daily  folic acid 1 milliGRAM(s) Oral daily  metoprolol tartrate 25 milliGRAM(s) Oral two times a day  polyethylene glycol 3350 17 Gram(s) Oral daily  senna 2 Tablet(s) Oral at bedtime  sodium chloride 0.45%. 1000 milliLiter(s) (125 mL/Hr) IV Continuous <Continuous>    MEDICATIONS  (PRN):  albuterol    90 MICROgram(s) HFA Inhaler 2 Puff(s) Inhalation every 6 hours PRN Shortness of Breath and/or Wheezing  HYDROmorphone  Injectable 1 milliGRAM(s) IV Push every 4 hours PRN Severe Pain (7 - 10)  oxyCODONE    IR 5 milliGRAM(s) Oral every 6 hours PRN Moderate Pain (4 - 6)

## 2023-04-13 NOTE — DIETITIAN INITIAL EVALUATION ADULT - PERTINENT LABORATORY DATA
04-13    137  |  112<H>  |  46<H>  ----------------------------<  137<H>  5.0   |  19<L>  |  1.77<H>    Ca    8.4<L>      13 Apr 2023 07:20    TPro  6.5  /  Alb  2.9<L>  /  TBili  5.9<H>  /  DBili  x   /  AST  94<H>  /  ALT  35  /  AlkPhos  109  04-13

## 2023-04-13 NOTE — DIETITIAN NUTRITION RISK NOTIFICATION - TREATMENT: THE FOLLOWING DIET HAS BEEN RECOMMENDED
Diet, DASH/TLC:   Sodium & Cholesterol Restricted  Supplement Feeding Modality:  Oral  Ensure Enlive Cans or Servings Per Day:  3       Frequency:  Three Times a day (04-11-23 @ 12:13) [Active]

## 2023-04-13 NOTE — DIETITIAN INITIAL EVALUATION ADULT - NS FNS DIET ORDER
Diet, DASH/TLC:   Sodium & Cholesterol Restricted  Supplement Feeding Modality:  Oral  Ensure Enlive Cans or Servings Per Day:  3       Frequency:  Three Times a day (04-11-23 @ 12:13)

## 2023-04-13 NOTE — PROGRESS NOTE ADULT - SUBJECTIVE AND OBJECTIVE BOX
Patient is a 57y old  Male who presents with a chief complaint of SICKLE CELL CRISIS  (13 Apr 2023 11:52)      INTERVAL HPI/OVERNIGHT EVENTS:  Pt was seen and examined, no acute events.    MEDICATIONS  (STANDING):  acetaminophen     Tablet .. 650 milliGRAM(s) Oral every 6 hours  finasteride 5 milliGRAM(s) Oral daily  folic acid 1 milliGRAM(s) Oral daily  metoprolol tartrate 25 milliGRAM(s) Oral two times a day  polyethylene glycol 3350 17 Gram(s) Oral daily  senna 2 Tablet(s) Oral at bedtime  sodium chloride 0.45%. 1000 milliLiter(s) (125 mL/Hr) IV Continuous <Continuous>    MEDICATIONS  (PRN):  albuterol    90 MICROgram(s) HFA Inhaler 2 Puff(s) Inhalation every 6 hours PRN Shortness of Breath and/or Wheezing  HYDROmorphone  Injectable 1 milliGRAM(s) IV Push every 4 hours PRN Severe Pain (7 - 10)  oxyCODONE    IR 5 milliGRAM(s) Oral every 6 hours PRN Moderate Pain (4 - 6)      Allergies    No Known Drug Allergies  peanuts (Anaphylaxis)  peanuts (Angioedema)  pork (Unknown)  shellfish (Unknown)  Shrimp (Other)    Intolerances          Vital Signs Last 24 Hrs  T(C): 36.9 (13 Apr 2023 11:00), Max: 37.2 (12 Apr 2023 23:59)  T(F): 98.5 (13 Apr 2023 11:00), Max: 98.9 (12 Apr 2023 23:59)  HR: 96 (13 Apr 2023 11:00) (80 - 113)  BP: 101/64 (13 Apr 2023 11:00) (101/64 - 130/72)  BP(mean): --  RR: 18 (13 Apr 2023 11:00) (16 - 20)  SpO2: 90% (13 Apr 2023 11:00) (90% - 96%)    Parameters below as of 13 Apr 2023 11:00  Patient On (Oxygen Delivery Method): nasal cannula  O2 Flow (L/min): 3      PHYSICAL EXAM:  GENERAL: NAD  HEAD:  Atraumatic  EYES: PERRLA  ENMT: Mouth moist   NECK: Supple  NERVOUS SYSTEM:  Awake, alert  CHEST/LUNG: Clear  HEART: RRR, S1, S2  ABDOMEN: Soft, non tender  EXTREMITIES:  no edema BL LE  SKIN: No rash      LABS:                        4.8    13.07 )-----------( 178      ( 13 Apr 2023 07:20 )             13.9     04-13    137  |  112<H>  |  46<H>  ----------------------------<  137<H>  5.0   |  19<L>  |  1.77<H>    Ca    8.4<L>      13 Apr 2023 07:20    TPro  6.5  /  Alb  2.9<L>  /  TBili  5.9<H>  /  DBili  x   /  AST  94<H>  /  ALT  35  /  AlkPhos  109  04-13        CAPILLARY BLOOD GLUCOSE          RADIOLOGY & ADDITIONAL TESTS:    Imaging Personally Reviewed:  [ ] YES  [ ] NO    Consultant(s) Notes Reviewed:  [ ] YES  [ ] NO    Care Discussed with Consultants/Other Providers [ ] YES  [ ] NO

## 2023-04-13 NOTE — PROGRESS NOTE ADULT - PROBLEM SELECTOR PLAN 1
present with generalized pain  Prior admission history say h/o transfusion reaction, but patient denied any  Reported baseline around 6-7. prior admission in [facility] note reviewed, Hb baseline 6 per note  Hb today 4.8, will transfuse 2 units PRBC   Denied any SOB, generalized weakness, palpitation, chest pain, no infiltrate in CXR, afebrile, monitor for ACS  Pain control, IV fluid  continue folic acid  Hematology consulted

## 2023-04-13 NOTE — PROGRESS NOTE ADULT - SUBJECTIVE AND OBJECTIVE BOX
Heme/Onc Progress note    INTERVAL HPI/OVERNIGHT EVENTS:  Patient S&E at bedside. patient refusing labs yesterday, today hgb 4.8, patient appears clinically worse, jaundice c/o blurred vision, sob and gen pain.     VITAL SIGNS:  T(F): 98.8 (04-13-23 @ 05:20)  HR: 106 (04-13-23 @ 05:20)  BP: 109/68 (04-13-23 @ 05:20)  RR: 20 (04-13-23 @ 05:20)  SpO2: 95% (04-13-23 @ 05:20)  Wt(kg): --    PHYSICAL EXAM:    Constitutional: c/o gen pain, and blurred vision, appears sob  Eyes: c/o new onset of blurred vision- (spoke with medicine acp)  Neck: supple, no masses, no JVD  Respiratory: diminished b/l   Cardiovascular: RRR, no M/R/G  Gastrointestinal: c/o gen pain most severe in LLQ   Extremities: no c/c/e  Neurological: AAOx3      MEDICATIONS  (STANDING):  acetaminophen     Tablet .. 650 milliGRAM(s) Oral every 6 hours  finasteride 5 milliGRAM(s) Oral daily  folic acid 1 milliGRAM(s) Oral daily  metoprolol tartrate 25 milliGRAM(s) Oral two times a day  polyethylene glycol 3350 17 Gram(s) Oral daily  senna 2 Tablet(s) Oral at bedtime  sodium chloride 0.45%. 1000 milliLiter(s) (125 mL/Hr) IV Continuous <Continuous>    MEDICATIONS  (PRN):  albuterol    90 MICROgram(s) HFA Inhaler 2 Puff(s) Inhalation every 6 hours PRN Shortness of Breath and/or Wheezing  HYDROmorphone  Injectable 1 milliGRAM(s) IV Push every 4 hours PRN Severe Pain (7 - 10)  oxyCODONE    IR 5 milliGRAM(s) Oral every 6 hours PRN Moderate Pain (4 - 6)      Allergies    No Known Drug Allergies  peanuts (Anaphylaxis)  peanuts (Angioedema)  pork (Unknown)  shellfish (Unknown)  Shrimp (Other)    Intolerances        LABS:                        4.8    13.07 )-----------( 178      ( 13 Apr 2023 07:20 )             13.9     04-13    137  |  112<H>  |  46<H>  ----------------------------<  137<H>  5.0   |  19<L>  |  1.77<H>    Ca    8.4<L>      13 Apr 2023 07:20    TPro  6.5  /  Alb  2.9<L>  /  TBili  5.9<H>  /  DBili  x   /  AST  94<H>  /  ALT  35  /  AlkPhos  109  04-13          RADIOLOGY & ADDITIONAL TESTS:  Studies reviewed.

## 2023-04-13 NOTE — DIETITIAN INITIAL EVALUATION ADULT - PROBLEM SELECTOR PLAN 1
present with generalized pain  Prior admission history say h/o transfusion reaction, but patient denied any  Reported baseline around 6-7. prior admission in [facility] note reviewed, Hb baseline 6 per note  Denied any SOB, generalized weakness, palpitation, chest pain  Pain control, IV fluid  LDH, ferritin, TIBC, B12, folate, monitor H/H  continue folic acid  Hematology consulted

## 2023-04-13 NOTE — PROGRESS NOTE ADULT - ASSESSMENT
57 years old male with h/o sickle cell anemia, HTN present to ED with complain of sickle call pain.      #SC Crisis  -patient is seen by SC specialist Dr. Hamm, last seen 4/6/23, she stated patient isn't on any medication for SCD (not willing to take HU although it is prescribed Hydroxyurea 500mg 2 tab daily).  -patient denies hx of ACS/ transfusion exchange   - patient appears worse clinically with increased hemolysis, initially refusing labs 4/12 but agreeable today.  -Hgb dropped 4.8 and appears symptomatic appears more jaundice and c/o blurred vision  -continue folic acid   - Pain management, Supplemental O2  - IV fluids, encourage oral fluid intake   - INCENTIVE SPIROMETRY, ambulation   - Treat any underlying infectious causes (no infection w/u completed would rec pursuing w/u)  -patient appears symptomatic today agree with plan to give 2U PRBC        Thank you for the referral. Will continue to monitor the patient.  Please call with any questions [phone]  Above reviewed with Attending Dr. Arvizu   [facility]  [address] [facility], [address], [place], [place]  [phone]  *Note not finalized until signed by Attending Physician

## 2023-04-13 NOTE — DIETITIAN INITIAL EVALUATION ADULT - OTHER INFO
Pt seen on medical floor, adm w/ sickle cell crisis; Benign HTN. Pt lives w/ family whom assist him. Drank nutritional supplements 3x/d PTA. Denies N/V/D/C/Chewing/Swallowing issues, No food allergies. Pt doesn't feel well enough to eat or provide this clinician with usual diet hx / weight hx. Unable to determine food insecurity at this time. Left educational material at bedside High Calorie High Protein Nutrition therapy  Pt seen on medical floor, adm w/ sickle cell crisis; Benign HTN. Pt lives w/ family whom assist him. Drank nutritional supplements 3x/d PTA. Denies N/V/D/C/Chewing/Swallowing issues, No food allergies. Pt doesn't feel well enough to eat or provide this clinician with usual diet hx / weight hx. Unable to determine food insecurity at this time. Left educational material at bedside High Calorie High Protein Nutrition therapy. Adm w/ BMI < 19.

## 2023-04-13 NOTE — DIETITIAN INITIAL EVALUATION ADULT - PROBLEM SELECTOR PROBLEM 2
Pt rcvd to int 10 c for mechanical fall while trying to place a cart in her trunk. Pt is awake/alert. Ambulatory at baseline. Today she was using a cane for the first time for instability. Denies use of anticoagulants. Denies head injury/LOC. C/o L hip pain. No bruising/hematoma observed. No external rotation or shortening observed. Will continue to monitor.
Benign essential HTN

## 2023-04-14 NOTE — PROGRESS NOTE ADULT - PROBLEM SELECTOR PLAN 1
present with generalized pain  Prior admission history say h/o transfusion reaction, but patient denied any  Reported baseline around 6-7. prior admission in [facility] note reviewed, Hb baseline 6 per note  Hb yesterday 4.8-->7.1, S/P 2 units PRBC   Denied any SOB, generalized weakness, palpitation, chest pain, afebrile  CXR repeated today, worse infiltrate, start Rocephin,  monitor for ACS  Pain control, IV fluid  continue folic acid  Hematology consulted    Labs consistent with hemolysis  Now with worsening EMEKA, worsening LFTs   Check hepatic function panel  Bladder scan  Renal and GI consulted     DVT ppx added as pt is very high risk of VTE

## 2023-04-14 NOTE — PROGRESS NOTE ADULT - ASSESSMENT
57 years old male with h/o sickle cell anemia, HTN present to ED with complain of sickle call pain.      #SC Crisis  -patient is seen by SC specialist Dr. Hamm, last seen 4/6/23, she stated patient isn't on any medication for SCD (not willing to take HU although it is prescribed Hydroxyurea 500mg 2 tab daily).  -patient denies hx of ACS/ transfusion exchange   - patient appeared to be worse clinically with increased hemolysis,  -Hgb dropped 4.8 on 4/13 and appeared symptomatic s/p 2 U PRBC   -pending todays cbc, but patient clinically appears better today (no sob, less gen pain)   -continue folic acid   - Pain management, Supplemental O2  - IV fluids, encourage oral fluid intake   - INCENTIVE SPIROMETRY, ambulation   - Treat any underlying infectious causes (no infection w/u completed would rec pursuing w/u)   -ok to hold off on transfusion unless patient becomes symptomatic.          Will continue to monitor the patient.  Please call with any questions [phone]  Above reviewed with Attending Dr. Arvizu   [facility]  [address] [facility], [address], [place], [place]  [phone]  *Note not finalized until signed by Attending Physician     57 years old male with h/o sickle cell anemia, HTN present to ED with complain of sickle call pain.      #SC Crisis  -patient is seen by SC specialist Dr. Hamm, last seen 4/6/23, she stated patient isn't on any medication for SCD (not willing to take HU although it is prescribed Hydroxyurea 500mg 2 tab daily).  -patient denies hx of ACS/ transfusion exchange   - patient appeared to be worse clinically with increased hemolysis,  -Hgb dropped 4.8 on 4/13 and appeared symptomatic s/p 2 U PRBC   -today 7.1, new EMEKA Cr. increased to 2.03  -consider nephrology consult   -continue folic acid   - Pain management, Supplemental O2  - IV fluids, encourage oral fluid intake   - INCENTIVE SPIROMETRY, ambulation   - Treat any underlying infectious causes (no infection w/u completed would rec pursuing w/u)   -ok to hold off on transfusion unless patient becomes symptomatic.          Will continue to monitor the patient.  Please call with any questions [phone]  Above reviewed with Attending Dr. Arvizu   [facility]  [address] [facility], [address], [place], [place]  [phone]  *Note not finalized until signed by Attending Physician

## 2023-04-14 NOTE — PROGRESS NOTE ADULT - SUBJECTIVE AND OBJECTIVE BOX
Heme/Onc Progress note    INTERVAL HPI/OVERNIGHT EVENTS:  Patient S&E at bedside. No o/n events,  s/p 2 U PRBC for symptomatic anemia. Patient continues to c/o gen pain but stated better then admission. Denies CP, palpitations, SOB, cough, N/V/D/C, dysuria, changes in bowel movements, LE edema.    VITAL SIGNS:  T(F): 98 (04-14-23 @ 05:15)  HR: 89 (04-14-23 @ 05:15)  BP: 133/74 (04-14-23 @ 05:15)  RR: 19 (04-14-23 @ 05:15)  SpO2: 96% (04-14-23 @ 05:15)  Wt(kg): --    PHYSICAL EXAM:    Constitutional: NAD, cachetic male   Eyes: EOMI, sclera non-icteric  Neck: supple, no masses, no JVD  Respiratory: diminished b/l   Cardiovascular: RRR, no M/R/G  Gastrointestinal: c/o gen tenderness, abd soft, no masses palpable, + BS,   Extremities: no c/c/e  Neurological: AAOx3      MEDICATIONS  (STANDING):  finasteride 5 milliGRAM(s) Oral daily  folic acid 1 milliGRAM(s) Oral daily  metoprolol tartrate 25 milliGRAM(s) Oral two times a day  polyethylene glycol 3350 17 Gram(s) Oral daily  senna 2 Tablet(s) Oral at bedtime  sodium chloride 0.45%. 1000 milliLiter(s) (125 mL/Hr) IV Continuous <Continuous>    MEDICATIONS  (PRN):  albuterol    90 MICROgram(s) HFA Inhaler 2 Puff(s) Inhalation every 6 hours PRN Shortness of Breath and/or Wheezing  HYDROmorphone  Injectable 1 milliGRAM(s) IV Push every 4 hours PRN Severe Pain (7 - 10)  oxyCODONE    IR 5 milliGRAM(s) Oral every 6 hours PRN Moderate Pain (4 - 6)      Allergies    No Known Drug Allergies  peanuts (Anaphylaxis)  peanuts (Angioedema)  pork (Unknown)  shellfish (Unknown)  Shrimp (Other)    Intolerances        LABS:                        4.8    13.07 )-----------( 178      ( 13 Apr 2023 07:20 )             13.9     04-13    137  |  112<H>  |  46<H>  ----------------------------<  137<H>  5.0   |  19<L>  |  1.77<H>    Ca    8.4<L>      13 Apr 2023 07:20    TPro  6.5  /  Alb  2.9<L>  /  TBili  5.9<H>  /  DBili  x   /  AST  94<H>  /  ALT  35  /  AlkPhos  109  04-13          RADIOLOGY & ADDITIONAL TESTS:  Studies reviewed.    ASSESSMENT & PLAN:

## 2023-04-14 NOTE — PROGRESS NOTE ADULT - SUBJECTIVE AND OBJECTIVE BOX
Patient is a 57y old  Male who presents with a chief complaint of sickle cell crisis (14 Apr 2023 09:00)      INTERVAL HPI/OVERNIGHT EVENTS:  Pt was seen and examined, no acute events.      MEDICATIONS  (STANDING):  cefTRIAXone   IVPB 1000 milliGRAM(s) IV Intermittent once  cefTRIAXone   IVPB      finasteride 5 milliGRAM(s) Oral daily  folic acid 1 milliGRAM(s) Oral daily  heparin   Injectable 5000 Unit(s) SubCutaneous every 12 hours  metoprolol tartrate 25 milliGRAM(s) Oral two times a day  polyethylene glycol 3350 17 Gram(s) Oral daily  senna 2 Tablet(s) Oral at bedtime  sodium chloride 0.45%. 1000 milliLiter(s) (125 mL/Hr) IV Continuous <Continuous>    MEDICATIONS  (PRN):  albuterol    90 MICROgram(s) HFA Inhaler 2 Puff(s) Inhalation every 6 hours PRN Shortness of Breath and/or Wheezing  HYDROmorphone  Injectable 1 milliGRAM(s) IV Push every 4 hours PRN Severe Pain (7 - 10)  oxyCODONE    IR 5 milliGRAM(s) Oral every 6 hours PRN Moderate Pain (4 - 6)      Allergies  No Known Drug Allergies  peanuts (Anaphylaxis)  peanuts (Angioedema)  pork (Unknown)  shellfish (Unknown)  Shrimp (Other)        Vital Signs Last 24 Hrs  T(C): 36.8 (14 Apr 2023 11:59), Max: 37.1 (13 Apr 2023 16:29)  T(F): 98.2 (14 Apr 2023 11:59), Max: 98.7 (13 Apr 2023 16:29)  HR: 103 (14 Apr 2023 11:59) (84 - 103)  BP: 132/77 (14 Apr 2023 11:59) (94/53 - 133/74)  BP(mean): 93 (14 Apr 2023 05:15) (67 - 93)  RR: 18 (14 Apr 2023 11:59) (17 - 19)  SpO2: 91% (14 Apr 2023 11:59) (91% - 96%)    Parameters below as of 14 Apr 2023 11:59  Patient On (Oxygen Delivery Method): nasal cannula        PHYSICAL EXAM:  GENERAL: NAD  HEAD:  Atraumatic  EYES: PERRLA  ENMT: Mouth moist   NECK: Supple  NERVOUS SYSTEM:  Awake, alert  CHEST/LUNG: Clear  HEART: RRR, S1, S2  ABDOMEN: Soft, non tender  EXTREMITIES:  no edema BL LE  SKIN: No rash        LABS:                        7.1    12.82 )-----------( 177      ( 14 Apr 2023 11:50 )             20.2     04-14    137  |  112<H>  |  71<H>  ----------------------------<  101<H>  4.8   |  18<L>  |  2.03<H>    Ca    8.6      14 Apr 2023 11:50    TPro  6.9  /  Alb  3.1<L>  /  TBili  10.3<H>  /  DBili  4.1<H>  /  AST  444<H>  /  ALT  307<H>  /  AlkPhos  205<H>  04-14        CAPILLARY BLOOD GLUCOSE          RADIOLOGY & ADDITIONAL TESTS:    Imaging Personally Reviewed:  [ ] YES  [ ] NO    Consultant(s) Notes Reviewed:  [ ] YES  [ ] NO    Care Discussed with Consultants/Other Providers [ ] YES  [ ] NO

## 2023-04-14 NOTE — CONSULT NOTE ADULT - SUBJECTIVE AND OBJECTIVE BOX
[facility] NEPHROLOGY SERVICES, [facility]  NEPHROLOGY AND HYPERTENSION  [address]  [address]  [place] [address]  [phone]    MD MAGDALENO FLYNN MD YELENA ROSENBERG, MD BINNY KOSHY, MD CHRISTOPHER CAPUTO, MD EDWARD BOVER, MD      Information from chart:  "Patient is a 57y old  Male who presents with a chief complaint of sickle cell crisis (14 Apr 2023 14:54)    HPI:  57 years old male with h/o sickle cell anemia, HTN present to ED with complain of sickle call pain. Patient reported generalized body pain which started yesterday, simliar to his prior sickle cell pain crisis. Denied any fever, chills, nausea, vomiting, diarrhea. Patient is not following with hematology for a while, only seeing PCP.  Slightly hypertensive, afebrile, sat well at RA. WBC 12, Hb 6.1, plt 211, ret count 149.2, Cr 1.28, glucose 92, RVP negative. CXR image reviewed, dose not appear to have focal consolidation.       EMEKA associated with severe anemia; SC crisis  NSAIDs rx at home;   SH: no toxic habits (11 Apr 2023 11:19)   "    PAST MEDICAL & SURGICAL HISTORY:  Sickle Cell Disease      Left ventricular dysfunction  mild LVD on echo in 7/18, normal LVF in 12/18      H/O transfusion  ~ pRBC      Acute chest syndrome  Pt unaware      Intellectual disability  ~ mild      Constipation      Hypertension      Sickle cell disease      BPH (benign prostatic hyperplasia)      Sickle cell anemia      No significant past surgical history      No significant past surgical history        FAMILY HISTORY:  FH: hypertension  ~ mother    Family history of sickle cell trait (Mother)  ~ presumed in parents & 7 siblings (none suffer w/ the disease, per patient)      Allergies    No Known Drug Allergies  peanuts (Anaphylaxis)  peanuts (Angioedema)  pork (Unknown)  shellfish (Unknown)  Shrimp (Other)    Intolerances      Home Medications:  acetaminophen 325 mg oral tablet: 2 tab(s) orally every 6 hours, As needed, Temp greater or equal to 38C (100.4F), Mild Pain (1 - 3) (21 Mar 2023 15:12)  albuterol 90 mcg/inh inhalation aerosol: 2 puff(s) inhaled every 6 hours, As Needed (21 Mar 2023 15:12)  finasteride 5 mg oral tablet: 1 tab(s) orally once a day (21 Mar 2023 15:12)  fluticasone 50 mcg/inh inhalation powder: 1 puff(s) inhaled 2 times a day (21 Mar 2023 15:12)  fluticasone 50 mcg/inh nasal spray: 1 spray(s) nasal 2 times a day (29 Aug 2022 11:57)  folic acid 1 mg oral tablet: 1 tab(s) orally once a day (21 Mar 2023 15:12)  ibuprofen 400 mg oral tablet: 1 tab(s) orally every 8 hours, As needed, Moderate Pain (4 - 6) (21 Mar 2023 15:12)  Metoprolol Tartrate 25 mg oral tablet: 1 tab(s) orally 2 times a day (21 Mar 2023 15:12)  ocular lubricant ophthalmic solution: 1 drop(s) to each affected eye 4 times a day (21 Mar 2023 15:12)  polyethylene glycol 3350 oral powder for reconstitution: 17 gram(s) orally once a day (21 Mar 2023 15:12)  senna leaf extract oral tablet: 2 tab(s) orally once a day (at bedtime) (21 Mar 2023 15:12)    MEDICATIONS  (STANDING):  cefTRIAXone   IVPB      finasteride 5 milliGRAM(s) Oral daily  folic acid 1 milliGRAM(s) Oral daily  heparin   Injectable 5000 Unit(s) SubCutaneous every 12 hours  metoprolol tartrate 25 milliGRAM(s) Oral two times a day  polyethylene glycol 3350 17 Gram(s) Oral daily  senna 2 Tablet(s) Oral at bedtime  sodium chloride 0.45%. 1000 milliLiter(s) (125 mL/Hr) IV Continuous <Continuous>    MEDICATIONS  (PRN):  albuterol    90 MICROgram(s) HFA Inhaler 2 Puff(s) Inhalation every 6 hours PRN Shortness of Breath and/or Wheezing  HYDROmorphone  Injectable 1 milliGRAM(s) IV Push every 4 hours PRN Severe Pain (7 - 10)  oxyCODONE    IR 5 milliGRAM(s) Oral every 6 hours PRN Moderate Pain (4 - 6)    Vital Signs Last 24 Hrs  T(C): 36.8 (14 Apr 2023 11:59), Max: 37.1 (13 Apr 2023 16:29)  T(F): 98.2 (14 Apr 2023 11:59), Max: 98.7 (13 Apr 2023 16:29)  HR: 103 (14 Apr 2023 11:59) (84 - 103)  BP: 132/77 (14 Apr 2023 11:59) (94/53 - 133/74)  BP(mean): 93 (14 Apr 2023 05:15) (67 - 93)  RR: 18 (14 Apr 2023 11:59) (17 - 19)  SpO2: 91% (14 Apr 2023 11:59) (91% - 96%)    Parameters below as of 14 Apr 2023 11:59  Patient On (Oxygen Delivery Method): nasal cannula        Daily     Daily     04-13-23 @ 07:01  -  04-14-23 @ 07:00  --------------------------------------------------------  IN: 0 mL / OUT: 200 mL / NET: -200 mL    04-14-23 @ 07:01  -  04-14-23 @ 16:16  --------------------------------------------------------  IN: 100 mL / OUT: 175 mL / NET: -75 mL      CAPILLARY BLOOD GLUCOSE        PHYSICAL EXAM:      T(C): 36.8 (04-14-23 @ 11:59), Max: 37.1 (04-13-23 @ 16:29)  HR: 103 (04-14-23 @ 11:59) (84 - 103)  BP: 132/77 (04-14-23 @ 11:59) (94/53 - 133/74)  RR: 18 (04-14-23 @ 11:59) (17 - 19)  SpO2: 91% (04-14-23 @ 11:59) (91% - 96%)  Wt(kg): --  Lungs clear  Heart S1S2  Abd soft NT ND  Extremities:   tr edema              04-14    137  |  112<H>  |  71<H>  ----------------------------<  101<H>  4.8   |  18<L>  |  2.03<H>    Ca    8.6      14 Apr 2023 11:50    TPro  6.9  /  Alb  3.1<L>  /  TBili  10.3<H>  /  DBili  4.1<H>  /  AST  444<H>  /  ALT  307<H>  /  AlkPhos  205<H>  04-14                          7.1    12.82 )-----------( 177      ( 14 Apr 2023 11:50 )             20.2     Creatinine Trend: 2.03<--, 1.77<--, 1.28<--          Assessment   EMEKA multifactorial risk; pre renal azotemia with profound anemia; NSAIDs related hemodynamic and interstitial nephritis;   SC disease related renal  vasoocclusive disease; hemolysis related pigment ATN      Plan  Judicious IVF;   PRBC and SC crisis management  as per Heme recommendations   Avoid NSAIDs  Prognosis is guarded     Sergio Guaman MD [facility] NEPHROLOGY SERVICES, [facility]  NEPHROLOGY AND HYPERTENSION  [address]  [address]  [place] [address]  [phone]    MD MAGDALENO FLYNN MD YELENA ROSENBERG, MD BINNY KOSHY, MD CHRISTOPHER CAPUTO, MD EDWARD BOVER, MD      Information from chart:  "Patient is a 57y old  Male who presents with a chief complaint of sickle cell crisis (14 Apr 2023 14:54)    HPI:  57 years old male with h/o sickle cell anemia, HTN present to ED with complain of sickle call pain. Patient reported generalized body pain which started yesterday, simliar to his prior sickle cell pain crisis. Denied any fever, chills, nausea, vomiting, diarrhea. Patient is not following with hematology for a while, only seeing PCP.  Slightly hypertensive, afebrile, sat well at RA. WBC 12, Hb 6.1, plt 211, ret count 149.2, Cr 1.28, glucose 92, RVP negative. CXR image reviewed, dose not appear to have focal consolidation.       EMEKA associated with severe anemia; SC crisis  NSAIDs rx at home;   SH: no toxic habits (11 Apr 2023 11:19)   "    PAST MEDICAL & SURGICAL HISTORY:  Sickle Cell Disease      Left ventricular dysfunction  mild LVD on echo in 7/18, normal LVF in 12/18      H/O transfusion  ~ pRBC      Acute chest syndrome  Pt unaware      Intellectual disability  ~ mild      Constipation      Hypertension      Sickle cell disease      BPH (benign prostatic hyperplasia)      Sickle cell anemia      No significant past surgical history      No significant past surgical history        FAMILY HISTORY:  FH: hypertension  ~ mother    Family history of sickle cell trait (Mother)  ~ presumed in parents & 7 siblings (none suffer w/ the disease, per patient)      Allergies    No Known Drug Allergies  peanuts (Anaphylaxis)  peanuts (Angioedema)  pork (Unknown)  shellfish (Unknown)  Shrimp (Other)    Intolerances      Home Medications:  acetaminophen 325 mg oral tablet: 2 tab(s) orally every 6 hours, As needed, Temp greater or equal to 38C (100.4F), Mild Pain (1 - 3) (21 Mar 2023 15:12)  albuterol 90 mcg/inh inhalation aerosol: 2 puff(s) inhaled every 6 hours, As Needed (21 Mar 2023 15:12)  finasteride 5 mg oral tablet: 1 tab(s) orally once a day (21 Mar 2023 15:12)  fluticasone 50 mcg/inh inhalation powder: 1 puff(s) inhaled 2 times a day (21 Mar 2023 15:12)  fluticasone 50 mcg/inh nasal spray: 1 spray(s) nasal 2 times a day (29 Aug 2022 11:57)  folic acid 1 mg oral tablet: 1 tab(s) orally once a day (21 Mar 2023 15:12)  ibuprofen 400 mg oral tablet: 1 tab(s) orally every 8 hours, As needed, Moderate Pain (4 - 6) (21 Mar 2023 15:12)  Metoprolol Tartrate 25 mg oral tablet: 1 tab(s) orally 2 times a day (21 Mar 2023 15:12)  ocular lubricant ophthalmic solution: 1 drop(s) to each affected eye 4 times a day (21 Mar 2023 15:12)  polyethylene glycol 3350 oral powder for reconstitution: 17 gram(s) orally once a day (21 Mar 2023 15:12)  senna leaf extract oral tablet: 2 tab(s) orally once a day (at bedtime) (21 Mar 2023 15:12)    MEDICATIONS  (STANDING):  cefTRIAXone   IVPB      finasteride 5 milliGRAM(s) Oral daily  folic acid 1 milliGRAM(s) Oral daily  heparin   Injectable 5000 Unit(s) SubCutaneous every 12 hours  metoprolol tartrate 25 milliGRAM(s) Oral two times a day  polyethylene glycol 3350 17 Gram(s) Oral daily  senna 2 Tablet(s) Oral at bedtime  sodium chloride 0.45%. 1000 milliLiter(s) (125 mL/Hr) IV Continuous <Continuous>    MEDICATIONS  (PRN):  albuterol    90 MICROgram(s) HFA Inhaler 2 Puff(s) Inhalation every 6 hours PRN Shortness of Breath and/or Wheezing  HYDROmorphone  Injectable 1 milliGRAM(s) IV Push every 4 hours PRN Severe Pain (7 - 10)  oxyCODONE    IR 5 milliGRAM(s) Oral every 6 hours PRN Moderate Pain (4 - 6)    Vital Signs Last 24 Hrs  T(C): 36.8 (14 Apr 2023 11:59), Max: 37.1 (13 Apr 2023 16:29)  T(F): 98.2 (14 Apr 2023 11:59), Max: 98.7 (13 Apr 2023 16:29)  HR: 103 (14 Apr 2023 11:59) (84 - 103)  BP: 132/77 (14 Apr 2023 11:59) (94/53 - 133/74)  BP(mean): 93 (14 Apr 2023 05:15) (67 - 93)  RR: 18 (14 Apr 2023 11:59) (17 - 19)  SpO2: 91% (14 Apr 2023 11:59) (91% - 96%)    Parameters below as of 14 Apr 2023 11:59  Patient On (Oxygen Delivery Method): nasal cannula        Daily     Daily     04-13-23 @ 07:01  -  04-14-23 @ 07:00  --------------------------------------------------------  IN: 0 mL / OUT: 200 mL / NET: -200 mL    04-14-23 @ 07:01  -  04-14-23 @ 16:16  --------------------------------------------------------  IN: 100 mL / OUT: 175 mL / NET: -75 mL      CAPILLARY BLOOD GLUCOSE        PHYSICAL EXAM:      T(C): 36.8 (04-14-23 @ 11:59), Max: 37.1 (04-13-23 @ 16:29)  HR: 103 (04-14-23 @ 11:59) (84 - 103)  BP: 132/77 (04-14-23 @ 11:59) (94/53 - 133/74)  RR: 18 (04-14-23 @ 11:59) (17 - 19)  SpO2: 91% (04-14-23 @ 11:59) (91% - 96%)  Wt(kg): --  Lungs clear  Heart S1S2  Abd soft NT ND  Extremities:   tr edema              04-14    137  |  112<H>  |  71<H>  ----------------------------<  101<H>  4.8   |  18<L>  |  2.03<H>    Ca    8.6      14 Apr 2023 11:50    TPro  6.9  /  Alb  3.1<L>  /  TBili  10.3<H>  /  DBili  4.1<H>  /  AST  444<H>  /  ALT  307<H>  /  AlkPhos  205<H>  04-14                          7.1    12.82 )-----------( 177      ( 14 Apr 2023 11:50 )             20.2     Creatinine Trend: 2.03<--, 1.77<--, 1.28<--          Assessment   EMEKA multifactorial risk; pre renal azotemia with profound anemia; NSAIDs related hemodynamic and interstitial nephritis;   SC disease related renal vasoocclusive disease; hemolysis related pigment ATN; ongoing renal papillary necrosis and infarct.      Plan  Judicious hypotonic IVF with bicarbonate support   PRBC and SC crisis management  as per Heme recommendations   Avoid NSAIDs  Prognosis is guarded     Sergio Guaman MD

## 2023-04-15 NOTE — PROGRESS NOTE ADULT - PROBLEM SELECTOR PLAN 5
appreciate renal consult on 4/14/2023 await labs on today    f/u bladder scan results as requested by my colleague on yesterday appreciate renal consult on 4/14/2023 await labs on today    f/u bladder scan results as requested by my colleague on yesterday  creatinine is improving and  almost normalized

## 2023-04-15 NOTE — PROGRESS NOTE ADULT - PROBLEM SELECTOR PLAN 1
per my colleague's documentation "present with generalized pain  Prior admission history say h/o transfusion reaction, but patient denied any  Reported baseline around 6-7. prior admission in [facility] note reviewed, Hb baseline 6 per note  Hb yesterday 4.8-->7.1, S/P 2 units PRBC   Denied any SOB, generalized weakness, palpitation, chest pain, afebrile  CXR repeated today, worse infiltrate, start Rocephin,  monitor for ACS  Pain control, IV fluid  continue folic acid  Hematology consulted  Labs consistent with hemolysis  Now with worsening EMEKA, worsening LFTs   Check hepatic function panel  Bladder scan  Renal and GI consulted   DVT ppx added as pt is very high risk of VTE"  4/15/2023 await labs from today-- continue with ivf ,pain meds oxygen

## 2023-04-15 NOTE — PROGRESS NOTE ADULT - ASSESSMENT
57 years old male with h/o sickle cell anemia, HTN present to ED with complain of sickle call pain.      #SC Crisis  -patient is seen by SC specialist Dr. Hamm, last seen 4/6/23, she stated patient isn't on any medication for SCD (not willing to take HU although it is prescribed Hydroxyurea 500mg 2 tab daily).  -patient denies hx of ACS/ transfusion exchange   - patient appeared to be worse clinically with increased hemolysis,  -Hgb dropped 4.8 on 4/13 and appeared symptomatic s/p 2 U PRBC   -today 6.4, new EMEKA Cr. increased to 2.03 but today 1.37 w/ hydration  - nephrology consult recs noted  -continue folic acid   - Pain management, Supplemental O2  - IV fluids, encourage oral fluid intake   - INCENTIVE SPIROMETRY, ambulation   - Treat any underlying infectious causes (no infection w/u completed would rec pursuing w/u)   -ok to hold off on transfusion unless patient becomes symptomatic.          Will continue to monitor the patient.  Please call with any questions [phone]  Above reviewed with Attending Dr. Arvizu   [facility]  [address], [address], [place], [place]  [phone]  *Note not finalized until signed by Attending Physician

## 2023-04-15 NOTE — PROGRESS NOTE ADULT - PROBLEM SELECTOR PLAN 3
etiology unclear - await labs from am as ordered by colleague  on yesterday    will get abd/doppler US  r/o vasoocclusive . avoid hepatoxic meds if possible etiology unclear - await labs from am as ordered by colleague  on yesterday   lfts are down trending will monitor for now  if needed  will get abd/doppler US  r/o vasoocclusive . avoid hepatoxic meds if possible

## 2023-04-15 NOTE — PROGRESS NOTE ADULT - SUBJECTIVE AND OBJECTIVE BOX
NEPHROLOGY PROGRESS NOTE    CHIEF COMPLAINT:  EMEKA    HPI:  Renal function better on IVF    EXAM:  T(F): 97.7 (04-15-23 @ 11:13)  HR: 68 (04-15-23 @ 11:13)  BP: 130/60 (04-15-23 @ 11:13)  RR: 18 (04-15-23 @ 11:13)  SpO2: 94% (04-15-23 @ 11:13)    Conversant, in no apparent distress  Normal respiratory effort, lungs clear bilaterally  Heart RRR with no murmur, no peripheral edema         LABS                             6.4    11.93 )-----------( 167      ( 15 Apr 2023 10:40 )             18.1          04-15    139  |  112<H>  |  46<H>  ----------------------------<  139<H>  3.8   |  22  |  1.37<H>    Ca    8.8      15 Apr 2023 10:40  Phos  2.7     04-15  Mg     2.4     04-15    TPro  6.7  /  Alb  2.9<L>  /  TBili  7.5<H>  /  DBili  2.8<H>  /  AST  276<H>  /  ALT  276<H>  /  AlkPhos  193<H>  04-15     Haptoglobin < 20      Urinalysis Basic - ( 2023 21:10 )  Color: Yellow / Appearance: Clear / S.010 / pH: x  Gluc: x / Ketone: Negative  / Bili: Negative / Urobili: 4 mg/dL   Blood: x / Protein: 30 mg/dL / Nitrite: Negative   Leuk Esterase: Trace / RBC: 0-2 /HPF / WBC 3-5   Sq Epi: x / Non Sq Epi: x / Bacteria: x        Assessment   EMEKA multifactorial risk - probably primarily prerenal azotemia and/or SC related vaso occlusive disease - better  Sickle cell crisis    Plan  Continue hypotonic IVF with bicarbonate support   Daily BMP

## 2023-04-15 NOTE — PROGRESS NOTE ADULT - SUBJECTIVE AND OBJECTIVE BOX
Patient is a 57y old  Male who presents with a chief complaint of sickle cell crisis (14 Apr 2023 09:00)      INTERVAL HPI/OVERNIGHT EVENTS:  Pt was seen and examined, no acute events.    MEDICATIONS  (STANDING):  cefTRIAXone   IVPB 1000 milliGRAM(s) IV Intermittent every 24 hours  cefTRIAXone   IVPB      dextrose 5% 1000 milliLiter(s) (125 mL/Hr) IV Continuous <Continuous>  finasteride 5 milliGRAM(s) Oral daily  folic acid 1 milliGRAM(s) Oral daily  heparin   Injectable 5000 Unit(s) SubCutaneous every 12 hours  metoprolol tartrate 25 milliGRAM(s) Oral two times a day  polyethylene glycol 3350 17 Gram(s) Oral daily  senna 2 Tablet(s) Oral at bedtime    MEDICATIONS  (PRN):  albuterol    90 MICROgram(s) HFA Inhaler 2 Puff(s) Inhalation every 6 hours PRN Shortness of Breath and/or Wheezing  HYDROmorphone  Injectable 1 milliGRAM(s) IV Push every 4 hours PRN Severe Pain (7 - 10)  oxyCODONE    IR 5 milliGRAM(s) Oral every 6 hours PRN Moderate Pain (4 - 6)    Allergies  No Known Drug Allergies  peanuts (Anaphylaxis)  peanuts (Angioedema)  pork (Unknown)  shellfish (Unknown)  Shrimp (Other)      Vital Signs Last 24 Hrs  T(C): 36.8 (15 Apr 2023 06:09), Max: 37.1 (14 Apr 2023 23:07)  T(F): 98.3 (15 Apr 2023 06:09), Max: 98.7 (14 Apr 2023 23:07)  HR: 91 (15 Apr 2023 06:09) (90 - 103)  BP: 142/87 (15 Apr 2023 06:09) (132/77 - 162/68)  BP(mean): --  RR: 16 (15 Apr 2023 06:09) (16 - 18)  SpO2: 93% (15 Apr 2023 06:09) (91% - 97%)    Parameters below as of 15 Apr 2023 06:09  Patient On (Oxygen Delivery Method): nasal cannula        PHYSICAL EXAM:  GENERAL: NAD  HEAD:  Atraumatic  EYES: PERRLA  ENMT: Mouth moist   NECK: Supple  NERVOUS SYSTEM:  Awake, alert  CHEST/LUNG: Clear  HEART: RRR, S1, S2  ABDOMEN: Soft, non tender  EXTREMITIES:  no edema BL LE  SKIN: No rash        LABS:                      CAPILLARY BLOOD GLUCOSE          RADIOLOGY & ADDITIONAL TESTS:  < from: Xray Chest 1 View-PORTABLE IMMEDIATE (Xray Chest 1 View-PORTABLE IMMEDIATE .) (04.14.23 @ 12:11) >  IMPRESSION: Metabolic bone disease with bony findings again noted.    Bilateral infiltrates again noted increased on the right.      < end of copied text >      Imaging Personally Reviewed:  [ ] YES  [ ] NO    Consultant(s) Notes Reviewed:  [ ] YES  [ ] NO    Care Discussed with Consultants/Other Providers [ ] YES  [ ] NO Patient is a 57y old  Male who presents with a chief complaint of sickle cell crisis (14 Apr 2023 09:00)      INTERVAL HPI/OVERNIGHT EVENTS:  Pt was seen and examined, no acute events.  today states pain isnt any worse than yesterday     MEDICATIONS  (STANDING):  cefTRIAXone   IVPB 1000 milliGRAM(s) IV Intermittent every 24 hours  cefTRIAXone   IVPB      dextrose 5% 1000 milliLiter(s) (125 mL/Hr) IV Continuous <Continuous>  finasteride 5 milliGRAM(s) Oral daily  folic acid 1 milliGRAM(s) Oral daily  heparin   Injectable 5000 Unit(s) SubCutaneous every 12 hours  metoprolol tartrate 25 milliGRAM(s) Oral two times a day  polyethylene glycol 3350 17 Gram(s) Oral daily  senna 2 Tablet(s) Oral at bedtime    MEDICATIONS  (PRN):  albuterol    90 MICROgram(s) HFA Inhaler 2 Puff(s) Inhalation every 6 hours PRN Shortness of Breath and/or Wheezing  HYDROmorphone  Injectable 1 milliGRAM(s) IV Push every 4 hours PRN Severe Pain (7 - 10)  oxyCODONE    IR 5 milliGRAM(s) Oral every 6 hours PRN Moderate Pain (4 - 6)    Allergies  No Known Drug Allergies  peanuts (Anaphylaxis)  peanuts (Angioedema)  pork (Unknown)  shellfish (Unknown)  Shrimp (Other)      Vital Signs Last 24 Hrs  T(C): 36.8 (15 Apr 2023 06:09), Max: 37.1 (14 Apr 2023 23:07)  T(F): 98.3 (15 Apr 2023 06:09), Max: 98.7 (14 Apr 2023 23:07)  HR: 91 (15 Apr 2023 06:09) (90 - 103)  BP: 142/87 (15 Apr 2023 06:09) (132/77 - 162/68)  BP(mean): --  RR: 16 (15 Apr 2023 06:09) (16 - 18)  SpO2: 93% (15 Apr 2023 06:09) (91% - 97%)    Parameters below as of 15 Apr 2023 06:09  Patient On (Oxygen Delivery Method): nasal cannula        PHYSICAL EXAM:  GENERAL: NAD  HEAD:  Atraumatic  EYES: PERRLA  ENMT: Mouth moist   NECK: Supple  NERVOUS SYSTEM:  Awake, alert  CHEST/LUNG: Clear  HEART: RRR, S1, S2  ABDOMEN: Soft, non tender  EXTREMITIES:  no edema BL LE  SKIN: No rash, but diffusely dry        LABS:                                        6.4    11.93 )-----------( 167      ( 15 Apr 2023 10:40 )             18.1   04-15    139  |  112<H>  |  46<H>  ----------------------------<  139<H>  3.8   |  22  |  1.37<H>    Ca    8.8      15 Apr 2023 10:40  Phos  2.7     04-15  Mg     2.4     04-15    TPro  6.7  /  Alb  2.9<L>  /  TBili  7.5<H>  /  DBili  2.8<H>  /  AST  276<H>  /  ALT  276<H>  /  AlkPhos  193<H>  04-15      CAPILLARY BLOOD GLUCOSE          RADIOLOGY & ADDITIONAL TESTS:  < from: Xray Chest 1 View-PORTABLE IMMEDIATE (Xray Chest 1 View-PORTABLE IMMEDIATE .) (04.14.23 @ 12:11) >  IMPRESSION: Metabolic bone disease with bony findings again noted.    Bilateral infiltrates again noted increased on the right.      < end of copied text >      Imaging Personally Reviewed:  [ ] YES  [ ] NO    Consultant(s) Notes Reviewed:  [ ] YES  [ ] NO    Care Discussed with Consultants/Other Providers [ ] YES  [ ] NO

## 2023-04-15 NOTE — PROGRESS NOTE ADULT - PROBLEM SELECTOR PLAN 4
etiology per documentation presumed possible pna  in setting of sickle cell crisis- reviewed or recent cxr an [id number] infiltrates- consider  ct chest etiology per documentation presumed possible pna  in setting of sickle cell crisis- reviewed or recent cxr an [id number] infiltrates- will order   ct chest . reiterated to patient to use oxygen via nasal cannual

## 2023-04-15 NOTE — PROGRESS NOTE ADULT - SUBJECTIVE AND OBJECTIVE BOX
Patient is a 57y old  Male who presents with a chief complaint of sickle cell crisis (15 Apr 2023 16:07)       Pt is seen and examined  pt is awake and lying in bed  pt has less pain          ROS:  Negative except for:    MEDICATIONS  (STANDING):  cefTRIAXone   IVPB 1000 milliGRAM(s) IV Intermittent every 24 hours  cefTRIAXone   IVPB      finasteride 5 milliGRAM(s) Oral daily  folic acid 1 milliGRAM(s) Oral daily  heparin   Injectable 5000 Unit(s) SubCutaneous every 12 hours  metoprolol tartrate 25 milliGRAM(s) Oral two times a day  polyethylene glycol 3350 17 Gram(s) Oral daily  senna 2 Tablet(s) Oral at bedtime    MEDICATIONS  (PRN):  albuterol    90 MICROgram(s) HFA Inhaler 2 Puff(s) Inhalation every 6 hours PRN Shortness of Breath and/or Wheezing  HYDROmorphone  Injectable 1 milliGRAM(s) IV Push every 4 hours PRN Severe Pain (7 - 10)  oxyCODONE    IR 5 milliGRAM(s) Oral every 6 hours PRN Moderate Pain (4 - 6)      Allergies    pork (Unknown)  peanuts (Anaphylaxis)  Shrimp (Other)  No Known Drug Allergies  shellfish (Unknown)  peanuts (Angioedema)    Intolerances        Vital Signs Last 24 Hrs  T(C): 36.8 (15 Apr 2023 18:22), Max: 37.1 (14 Apr 2023 23:07)  T(F): 98.2 (15 Apr 2023 18:22), Max: 98.7 (14 Apr 2023 23:07)  HR: 92 (15 Apr 2023 18:22) (68 - 100)  BP: 162/88 (15 Apr 2023 18:22) (130/60 - 162/88)  BP(mean): --  RR: 18 (15 Apr 2023 18:22) (16 - 18)  SpO2: 96% (15 Apr 2023 18:22) (93% - 97%)    Parameters below as of 15 Apr 2023 06:09  Patient On (Oxygen Delivery Method): nasal cannula        PHYSICAL EXAM  General: adult in NAD  HEENT: clear oropharynx, anicteric sclera, pink conjunctiva  Neck: supple  CV: normal S1/S2 with no murmur rubs or gallops  Lungs: positive air movement b/l ant lungs,clear to auscultation, no wheezes, no rales  Abdomen: soft non-tender non-distended, no hepatosplenomegaly  Ext: no clubbing cyanosis or edema  Skin: no rashes and no petechiae  Neuro: alert and oriented X 4, no focal deficits  LABS:                          6.4    11.93 )-----------( 167      ( 15 Apr 2023 10:40 )             18.1         Mean Cell Volume : 86.6 fl  Mean Cell Hemoglobin : 30.6 pg  Mean Cell Hemoglobin Concentration : 35.4 g/dL  Auto Neutrophil # : x  Auto Lymphocyte # : x  Auto Monocyte # : x  Auto Eosinophil # : x  Auto Basophil # : x  Auto Neutrophil % : x  Auto Lymphocyte % : x  Auto Monocyte % : x  Auto Eosinophil % : x  Auto Basophil % : x    Serial CBC's  04-15 @ 10:40  Hct-18.1 / Hgb-6.4 / Plat-167 / RBC-2.09 / WBC-11.93          Serial CBC's  04-14 @ 11:50  Hct-20.2 / Hgb-7.1 / Plat-177 / RBC-2.35 / WBC-12.82          Serial CBC's  04-13 @ 07:20  Hct-13.9 / Hgb-4.8 / Plat-178 / RBC-1.60 / WBC-13.07            04-15    139  |  112<H>  |  46<H>  ----------------------------<  139<H>  3.8   |  22  |  1.37<H>    Ca    8.8      15 Apr 2023 10:40  Phos  2.7     04-15  Mg     2.4     04-15    TPro  6.7  /  Alb  2.9<L>  /  TBili  7.5<H>  /  DBili  2.8<H>  /  AST  276<H>  /  ALT  276<H>  /  AlkPhos  193<H>  04-15      PT/INR - ( 15 Apr 2023 10:40 )   PT: 15.9 sec;   INR: 1.32 ratio             WBC Count: 11.93 K/uL (04-15-23 @ 10:40)  Hemoglobin: 6.4 g/dL (04-15-23 @ 10:40)  Hematocrit: 18.1 % (04-15-23 @ 10:40)  Platelet Count - Automated: 167 K/uL (04-15-23 @ 10:40)  Reticulocyte Percent: 12.6 % (04-15-23 @ 10:40)  WBC Count: 12.82 K/uL (04-14-23 @ 11:50)  Hemoglobin: 7.1 g/dL (04-14-23 @ 11:50)  Hematocrit: 20.2 % (04-14-23 @ 11:50)  Platelet Count - Automated: 177 K/uL (04-14-23 @ 11:50)  WBC Count: 13.07 K/uL (04-13-23 @ 07:20)  Hemoglobin: 4.8 g/dL (04-13-23 @ 07:20)  Hematocrit: 13.9 % (04-13-23 @ 07:20)  Platelet Count - Automated: 178 K/uL (04-13-23 @ 07:20)  Reticulocyte Percent: 10.9 % (04-13-23 @ 07:20)  Vitamin B12, Serum: 792 pg/mL (04-12-23 @ 07:20)  Folate, Serum: >20.0 ng/mL (04-12-23 @ 07:20)  WBC Count: 12.01 K/uL (04-11-23 @ 03:27)  Hemoglobin: 6.1 g/dL (04-11-23 @ 03:27)  Hematocrit: 18.4 % (04-11-23 @ 03:27)  Platelet Count - Automated: 211 K/uL (04-11-23 @ 03:27)  Reticulocyte Percent: 7.5 % (04-11-23 @ 03:27)  Ferritin, Serum: 494 ng/mL (04-11-23 @ 02:37)  Iron - Total Binding Capacity.: 274 ug/dL (04-11-23 @ 02:37)                BLOOD SMEAR INTERPRETATION:       RADIOLOGY & ADDITIONAL STUDIES:

## 2023-04-16 NOTE — PROGRESS NOTE ADULT - ASSESSMENT
Patient is a 57y old  Male who presents with a chief complaint of sickle cell crisis (14 Apr 2023 09:00)

## 2023-04-16 NOTE — PROGRESS NOTE ADULT - PROBLEM SELECTOR PLAN 3
etiology unclear - await labs from am as ordered by colleague  on yesterday   lfts are down trending will monitor for now  if needed  will get abd/doppler US  r/o vasoocclusive . avoid hepatoxic meds if possible etiology unclear - await labs from am as ordered by colleague  on yesterday   lfts are down trending will monitor for now,  if needed  will get abd/doppler US  r/o vasoocclusive . avoid hepatoxic meds if possible

## 2023-04-16 NOTE — PROGRESS NOTE ADULT - PROBLEM SELECTOR PLAN 1
per my colleague's documentation "present with generalized pain  Prior admission history say h/o transfusion reaction, but patient denied any  Reported baseline around 6-7. prior admission in [facility] note reviewed, Hb baseline 6 per note  Hb yesterday 4.8-->7.1, S/P 2 units PRBC   Denied any SOB, generalized weakness, palpitation, chest pain, afebrile  CXR repeated today, worse infiltrate, start Rocephin,  monitor for ACS  Pain control, IV fluid  continue folic acid  Hematology consulted  Labs consistent with hemolysis  Now with worsening EMEKA, worsening LFTs   Check hepatic function panel  Bladder scan  Renal and GI consulted   DVT ppx added as pt is very high risk of VTE"  4/15/2023 await labs from today-- continue with ivf ,pain meds oxygen  4/16/2023 hgb on 4/15/23 at 6.4--> follow up with cbc on today per my colleague's documentation "present with generalized pain  Prior admission history say h/o transfusion reaction, but patient denied any  Reported baseline around 6-7. prior admission in [facility] note reviewed, Hb baseline 6 per note  Hb yesterday 4.8-->7.1, S/P 2 units PRBC   Denied any SOB, generalized weakness, palpitation, chest pain, afebrile  CXR repeated today, worse infiltrate, start Rocephin,  monitor for ACS  Pain control, IV fluid  continue folic acid  Hematology consulted  Labs consistent with hemolysis  Now with worsening EMEKA, worsening LFTs   Check hepatic function panel  Bladder scan  Renal and GI consulted   DVT ppx added as pt is very high risk of VTE"  4/15/2023 await labs from today-- continue with ivf ,pain meds oxygen  4/16/2023 hgb on 4/15/23 at 6.4--> cbc at hgb at 6.2 will transfuse on toady and check retic count on labs prior to transfusion. f/u labs in am     will  change pain meds and increase  dose from 1mg to 2mg per my colleague's documentation "present with generalized pain  Prior admission history say h/o transfusion reaction, but patient denied any  Reported baseline around 6-7. prior admission in [facility] note reviewed, Hb baseline 6 per note  Hb yesterday 4.8-->7.1, S/P 2 units PRBC   Denied any SOB, generalized weakness, palpitation, chest pain, afebrile  CXR repeated today, worse infiltrate, start Rocephin,  monitor for ACS  Pain control, IV fluid  continue folic acid  Hematology consulted  Labs consistent with hemolysis  Now with worsening EMEKA, worsening LFTs   Check hepatic function panel  Bladder scan  Renal and GI consulted   DVT ppx added as pt is very high risk of VTE"  4/15/2023 await labs from today-- continue with ivf ,pain meds oxygen  4/16/2023 hgb on 4/15/23 at 6.4--> cbc at hgb at 6.2 will transfuse on today and check retic count on labs prior to transfusion. f/u labs in am     will  change pain meds and increase  dose from 1mg to 2mg   f/u septic w/u

## 2023-04-16 NOTE — PROGRESS NOTE ADULT - PROBLEM SELECTOR PLAN 4
etiology per documentation presumed possible pna  in setting of sickle cell crisis- reviewed or recent cxr an [id number] infiltrates- will order   ct chest . reiterated to patient to use oxygen via nasal cannula etiology per documentation presumed possible pna  in setting of sickle cell crisis- reviewed or recent cxr and worsening infiltrates- will order  ct chest . reiterated to patient to use oxygen via nasal cannula  4/16/2023  ct chest still pending

## 2023-04-16 NOTE — CONSULT NOTE ADULT - SUBJECTIVE AND OBJECTIVE BOX
57M with hx of SCA, HTN, presenting for sickle crisis. Being medicated.     GI consulted for abnormal LFTs that began rising during the hospitalization. Patient reports pain all over, but not specifically in his abdomen. No known history of liver disease.     PMH/PSH--  Sickle Cell Disease  Left ventricular dysfunction  Intellectual disability  Constipation  Hypertension  BPH (benign prostatic hyperplasia)    Allergies--  pork (Unknown)  peanuts (Anaphylaxis)  Shrimp (Other)  No Known Drug Allergies  shellfish (Unknown)  peanuts (Angioedema)      Medications--  Other: albuterol    90 MICROgram(s) HFA Inhaler PRN  finasteride  folic acid  heparin   Injectable  HYDROmorphone  Injectable PRN  metoprolol tartrate  oxyCODONE    IR PRN  polyethylene glycol 3350  senna  sodium chloride 0.45%.    Social History--  EtOH: denies   Tobacco: denies   Drug Use: denies     Family/Marital History--  No pertinent family history in first degree relatives    FH: hypertension    Family history of sickle cell trait (Mother)    No pertinent family history in first degree relatives    Review of Systems:  A >=10-point review of systems was obtained.     Pertinent positives and negatives--  Constitutional: No fevers. No Chills. No Rigors.   Eyes: No visual changes or eye pain  ENMT: No hearing trouble or throat pain  Cardiovascular: No chest pain. No palpitations.  Respiratory: No shortness of breath. No cough.  Gastrointestinal: No nausea or vomiting. No diarrhea or constipation.   Musculoskeletal: +arthralgia and myalgia  Skin: No rashes or lesions  Neurologic: No weakness or disorientation  Psychiatric: Pleasant. Appropriate affect.  Endocrine: Denies polyuria/polydipsia  Heme/Lymphatic: No easy bruising or immune issues    Review of systems otherwise negative except as previously noted.    Physical Exam--  Vital Signs: T(F): 97.6 (04-16-23 @ 16:27), Max: 98.5 (04-16-23 @ 05:17)  HR: 63 (04-16-23 @ 16:27)  BP: 161/86 (04-16-23 @ 16:27)  RR: 18 (04-16-23 @ 16:27)  SpO2: 92% (04-16-23 @ 16:27)    General: Nontoxic-appearing in no acute distress.  HEENT: AT/NC. Anicteric. Conjunctiva pink and moist  Neck: Not rigid. No sense of mass.  Nodes: None palpable.  Lungs: Clear bilaterally without rales, wheezing or rhonchi  Heart: Regular rate and rhythm. No Murmur. No rub. No gallop.   Abdomen: Soft. Nondistended. Nontender. No sense of mass.   Extremities: No cyanosis or clubbing.  Skin: Warm. Dry. Good turgor. No rash.   Psychiatric: Appropriate affect and mood for situation.     Laboratory & Imaging Data--  CBC                        6.2    11.61 )-----------( 153      ( 16 Apr 2023 10:47 )             18.2       Chemistries  04-15    139  |  112<H>  |  46<H>  ----------------------------<  139<H>  3.8   |  22  |  1.37<H>    Ca    8.8      15 Apr 2023 10:40  Phos  2.7     04-15  Mg     2.4     04-15    TPro  6.7  /  Alb  2.9<L>  /  TBili  7.5<H>  /  DBili  2.8<H>  /  AST  276<H>  /  ALT  276<H>  /  AlkPhos  193<H>  04-15    Ferritin 494    Negative for HCV in 2022    No abdominal imaging    Impression: Sickle cell crisis c/b abnormal LFTs in a mixed pattern. DDx - cholestasis of sepsis vs sickle hepatopathy.     Recommend:  - abdominal US with doppler  - trend LFTs daily  - monitor INR  - pan cx if medically appropriate    Discussed with hospitalist via Teams

## 2023-04-16 NOTE — PROGRESS NOTE ADULT - SUBJECTIVE AND OBJECTIVE BOX
Patient is a 57y old  Male who presents with a chief complaint of sickle cell crisis (14 Apr 2023 09:00)      INTERVAL HPI/OVERNIGHT EVENTS:  Pt was seen and examined, no acute events.  today states pain isn't any worse than yesterday       MEDICATIONS  (STANDING):  cefTRIAXone   IVPB 1000 milliGRAM(s) IV Intermittent every 24 hours  cefTRIAXone   IVPB      finasteride 5 milliGRAM(s) Oral daily  folic acid 1 milliGRAM(s) Oral daily  heparin   Injectable 5000 Unit(s) SubCutaneous every 12 hours  metoprolol tartrate 25 milliGRAM(s) Oral two times a day  polyethylene glycol 3350 17 Gram(s) Oral daily  senna 2 Tablet(s) Oral at bedtime    MEDICATIONS  (PRN):  albuterol    90 MICROgram(s) HFA Inhaler 2 Puff(s) Inhalation every 6 hours PRN Shortness of Breath and/or Wheezing  HYDROmorphone  Injectable 1 milliGRAM(s) IV Push every 4 hours PRN Severe Pain (7 - 10)  oxyCODONE    IR 5 milliGRAM(s) Oral every 6 hours PRN Moderate Pain (4 - 6)    Allergies  No Known Drug Allergies  peanuts (Anaphylaxis)  peanuts (Angioedema)  pork (Unknown)  shellfish (Unknown)  Shrimp (Other)    Vital Signs Last 24 Hrs  T(C): 36.9 (16 Apr 2023 05:17), Max: 36.9 (15 Apr 2023 23:54)  T(F): 98.5 (16 Apr 2023 05:17), Max: 98.5 (16 Apr 2023 05:17)  HR: 97 (16 Apr 2023 05:17) (68 - 97)  BP: 149/78 (16 Apr 2023 05:17) (130/60 - 162/88)  BP(mean): --  RR: 18 (16 Apr 2023 05:17) (18 - 18)  SpO2: 90% (16 Apr 2023 05:17) (90% - 96%)          PHYSICAL EXAM:  GENERAL: NAD  HEAD:  Atraumatic  EYES: PERRLA  ENMT: Mouth moist   NECK: Supple  NERVOUS SYSTEM:  Awake, alert  CHEST/LUNG: Clear  HEART: RRR, S1, S2  ABDOMEN: Soft, non tender  EXTREMITIES:  no edema BL LE  SKIN: No rash, but diffusely dry        LABS:                                         6.4    11.93 )-----------( 167      ( 15 Apr 2023 10:40 )             18.1       CAPILLARY BLOOD GLUCOSE          RADIOLOGY & ADDITIONAL TESTS:  < from: Xray Chest 1 View-PORTABLE IMMEDIATE (Xray Chest 1 View-PORTABLE IMMEDIATE .) (04.14.23 @ 12:11) >  IMPRESSION: Metabolic bone disease with bony findings again noted.    Bilateral infiltrates again noted increased on the right.      < end of copied text >      Imaging Personally Reviewed:  [ ] YES  [ ] NO    Consultant(s) Notes Reviewed:  [ ] YES  [ ] NO    Care Discussed with Consultants/Other Providers [ ] YES  [ ] NO Patient is a 57y old  Male who presents with a chief complaint of sickle cell crisis (14 Apr 2023 09:00)      INTERVAL HPI/OVERNIGHT EVENTS:  Pt was seen and examined, no acute events.  today complains of worsening pain all over         MEDICATIONS  (STANDING):  cefTRIAXone   IVPB 1000 milliGRAM(s) IV Intermittent every 24 hours  cefTRIAXone   IVPB      finasteride 5 milliGRAM(s) Oral daily  folic acid 1 milliGRAM(s) Oral daily  heparin   Injectable 5000 Unit(s) SubCutaneous every 12 hours  metoprolol tartrate 25 milliGRAM(s) Oral two times a day  polyethylene glycol 3350 17 Gram(s) Oral daily  senna 2 Tablet(s) Oral at bedtime    MEDICATIONS  (PRN):  albuterol    90 MICROgram(s) HFA Inhaler 2 Puff(s) Inhalation every 6 hours PRN Shortness of Breath and/or Wheezing  HYDROmorphone  Injectable 1 milliGRAM(s) IV Push every 4 hours PRN Severe Pain (7 - 10)  oxyCODONE    IR 5 milliGRAM(s) Oral every 6 hours PRN Moderate Pain (4 - 6)    Allergies  No Known Drug Allergies  peanuts (Anaphylaxis)  peanuts (Angioedema)  pork (Unknown)  shellfish (Unknown)  Shrimp (Other)    Vital Signs Last 24 Hrs  T(C): 36.9 (16 Apr 2023 05:17), Max: 36.9 (15 Apr 2023 23:54)  T(F): 98.5 (16 Apr 2023 05:17), Max: 98.5 (16 Apr 2023 05:17)  HR: 97 (16 Apr 2023 05:17) (68 - 97)  BP: 149/78 (16 Apr 2023 05:17) (130/60 - 162/88)  BP(mean): --  RR: 18 (16 Apr 2023 05:17) (18 - 18)  SpO2: 90% (16 Apr 2023 05:17) (90% - 96%)          PHYSICAL EXAM:  GENERAL: NAD  HEAD:  Atraumatic  EYES: PERRLA  ENMT: Mouth moist   NECK: Supple  NERVOUS SYSTEM:  Awake, alert  CHEST/LUNG: Clear  HEART: RRR, S1, S2  ABDOMEN: Soft, non tender  EXTREMITIES:  no edema BL LE  SKIN: No rash, but diffusely dry        LABS:                                         6.4    11.93 )-----------( 167      ( 15 Apr 2023 10:40 )             18.1       CAPILLARY BLOOD GLUCOSE          RADIOLOGY & ADDITIONAL TESTS:  < from: Xray Chest 1 View-PORTABLE IMMEDIATE (Xray Chest 1 View-PORTABLE IMMEDIATE .) (04.14.23 @ 12:11) >  IMPRESSION: Metabolic bone disease with bony findings again noted.    Bilateral infiltrates again noted increased on the right.      < end of copied text >      Imaging Personally Reviewed:  [ ] YES  [ ] NO    Consultant(s) Notes Reviewed:  [ ] YES  [ ] NO    Care Discussed with Consultants/Other Providers [ ] YES  [ ] NO

## 2023-04-16 NOTE — PROGRESS NOTE ADULT - PROBLEM SELECTOR PLAN 5
appreciate renal consult on 4/14/2023 await labs on today    f/u bladder scan results as requested by my colleague on yesterday  creatinine is improving and  almost normalized appreciate renal consult on 4/14/2023 await labs on today    f/u bladder scan results as requested by my colleague on yesterday  creatinine is improving and  almost normalized  4/16/2023 per nursing bladder  scan was normal

## 2023-04-17 NOTE — PROGRESS NOTE ADULT - PROBLEM SELECTOR PLAN 1
per my colleague's documentation "present with generalized pain  Prior admission history say h/o transfusion reaction, but patient denied any  Reported baseline around 6-7. prior admission in [facility] note reviewed, Hb baseline 6 per note  S/P 2 units PRBC   CXR repeated, worse infiltrate, start Rocephin,  monitor for ACS  Hematology consulted  Labs consistent with hemolysis:  on folic acid  hemolysis labs improving  Renal and GI consulted   hb 6.2 : ?hemodilutional  plan:  Pain control, IV fluid  continue folic acid  f/u septic w/u  c/w rocephin: monitor for ACS

## 2023-04-17 NOTE — PROGRESS NOTE ADULT - ASSESSMENT
57 years old male with h/o sickle cell anemia, HTN present to ED with complain of sickle call pain.      #SC Crisis  -patient is seen by SC specialist Dr. Hamm, last seen 4/6/23, she stated patient isn't on any medication for SCD (not willing to take HU although it is prescribed Hydroxyurea 500mg 2 tab daily).  -patient denies hx of ACS/ transfusion exchange   - patient with increased hemolysis, continue to trend labs    -Hgb dropped 4.8 on 4/13 and appeared symptomatic s/p 2 U PRBC   -most recent 6.2, new EMEKA Cr. increased to 2.03 but most recent 1.37 w/ hydration  - nephrology consult recs noted  -continue folic acid   - Pain management, Supplemental O2 encouraged for patient to use, not very complaint with keeping on-discussed importance)  - IV fluids, encourage oral fluid intake- discussed importance to patient to keep up with PO fluid intake along with IVF  - INCENTIVE SPIROMETRY, ambulation   - Treat any underlying infectious causes -pending cultures, hepatic fxn panel- abx as per med team   -GI onboard for elevated LFT's.-rec abd US    -ok to hold off on transfusion unless patient becomes symptomatic.          Will continue to monitor the patient.  Please call with any questions [phone]  Above reviewed with Attending Dr. Arvizu   [facility]  [address], [address], [place], [place]  [phone]  *Note not finalized until signed by Attending Physician     57 years old male with h/o sickle cell anemia, HTN present to ED with complain of sickle call pain.      #SC Crisis  -patient is seen by SC specialist Dr. Hamm, last seen 4/6/23, she stated patient isn't on any medication for SCD (not willing to take HU although it is prescribed Hydroxyurea 500mg 2 tab daily).  -patient denies hx of ACS/ transfusion exchange   - patient with increased hemolysis, continue to trend labs  -continue to mon closely for s/s of ACS    -Hgb dropped 4.8 on 4/13 and appeared symptomatic s/p 2 U PRBC   -most recent 6.2, new EMEKA Cr. increased to 2.03 but most recent 1.37 w/ hydration  - nephrology consult recs noted  -continue folic acid   - Pain management, Supplemental O2 encouraged for patient to use, not very complaint with keeping on-discussed importance)  - IV fluids, encourage oral fluid intake- discussed importance to patient to keep up with PO fluid intake along with IVF  - INCENTIVE SPIROMETRY, ambulation   - Treat any underlying infectious causes -pending cultures, hepatic fxn panel- abx as per med team   -GI onboard for elevated LFT's.-rec abd US    -ok to hold off on transfusion unless patient becomes symptomatic.          Will continue to monitor the patient.  Please call with any questions [phone]  Above reviewed with Attending Dr. Arvizu   [facility]  [address] [facility], [address], [place], [place]  [phone]  *Note not finalized until signed by Attending Physician

## 2023-04-17 NOTE — PROGRESS NOTE ADULT - PROBLEM SELECTOR PLAN 4
etiology  presumed possible pna  in setting of sickle cell crisis  reviewed or recent cxr and worsening infiltrates-   for ct chest  reiterated to patient to use oxygen via nasal cannula

## 2023-04-17 NOTE — PROGRESS NOTE ADULT - PROBLEM SELECTOR PLAN 3
due to hepatopathy from sickle cell  lfts are down trending    abd/doppler US  r/o vasoocclusive . avoid hepatoxic meds if possible

## 2023-04-17 NOTE — PROGRESS NOTE ADULT - SUBJECTIVE AND OBJECTIVE BOX
[facility] NEPHROLOGY SERVICES, [facility]  NEPHROLOGY AND HYPERTENSION  [address]  [address]  [place] [address]  [phone]    MD MAGDALENO FLYNN MD YELENA ROSENBERG, MD BINNY KOSHY, MD CHRISTOPHER CAPUTO, MD EDWARD BOVER, MD          Patient events noted    MEDICATIONS  (STANDING):  cefTRIAXone   IVPB 1000 milliGRAM(s) IV Intermittent every 24 hours  cefTRIAXone   IVPB      finasteride 5 milliGRAM(s) Oral daily  folic acid 1 milliGRAM(s) Oral daily  heparin   Injectable 5000 Unit(s) SubCutaneous every 12 hours  metoprolol tartrate 25 milliGRAM(s) Oral two times a day  polyethylene glycol 3350 17 Gram(s) Oral daily  senna 2 Tablet(s) Oral at bedtime  sodium chloride 0.45%. 1000 milliLiter(s) (75 mL/Hr) IV Continuous <Continuous>    MEDICATIONS  (PRN):  albuterol    90 MICROgram(s) HFA Inhaler 2 Puff(s) Inhalation every 6 hours PRN Shortness of Breath and/or Wheezing  HYDROmorphone  Injectable 2 milliGRAM(s) IV Push every 4 hours PRN Severe Pain (7 - 10)  oxyCODONE    IR 5 milliGRAM(s) Oral every 6 hours PRN Moderate Pain (4 - 6)      PHYSICAL EXAM:      T(C): 36.7 (04-17-23 @ 16:56), Max: 37.1 (04-16-23 @ 22:15)  HR: 76 (04-17-23 @ 16:56) (72 - 97)  BP: 168/96 (04-17-23 @ 16:56) (155/77 - 177/97)  RR: 18 (04-17-23 @ 16:56) (18 - 20)  SpO2: 94% (04-17-23 @ 16:56) (90% - 95%)  Wt(kg): --  Lungs clear  Heart S1S2  Abd soft NT ND  Extremities:   tr edema                                    8.6    9.49  )-----------( 181      ( 17 Apr 2023 10:09 )             26.1     04-17    139  |  110<H>  |  28<H>  ----------------------------<  96  4.3   |  26  |  1.10    Ca    9.9      17 Apr 2023 10:09  Phos  4.3     04-17  Mg     2.4     04-17    TPro  7.8  /  Alb  3.1<L>  /  TBili  4.7<H>  /  DBili  1.8<H>  /  AST  198<H>  /  ALT  288<H>  /  AlkPhos  304<H>  04-17      LIVER FUNCTIONS - ( 17 Apr 2023 10:09 )  Alb: 3.1 g/dL / Pro: 7.8 gm/dL / ALK PHOS: 304 U/L / ALT: 288 U/L / AST: 198 U/L / GGT: x           Creatinine Trend: 1.10<--, 1.37<--, 2.03<--, 1.77<--, 1.28<--          Assessment   EMEKA multifactorial risk - probably primarily prerenal azotemia and/or SC related vaso occlusive disease - better  Sickle cell crisis    Plan  Continue IVF   Daily BMP        Sergio Guaman MD

## 2023-04-17 NOTE — PROGRESS NOTE ADULT - PROBLEM SELECTOR PLAN 5
creatinine is improving and  almost normalized  4/16/2023 per nursing bladder  scan was normal  renal consulted

## 2023-04-17 NOTE — PROGRESS NOTE ADULT - SUBJECTIVE AND OBJECTIVE BOX
Patient s/e   no acute overnight events  patient reports pain is better  labs reviewed Hb6.2   LFts downtrending  US abdomen dopplers ordered  Review of Systems:  General:denies fever chills, headache, weakness  HEENT: denies blurry vision,diffculty swallowing, difficulty hearing, tinnitus  Cardiovascular: denies chest pain  ,palpitations  Pulmonary:denies shortness of breath, cough, wheezing, hemoptysis  Gastrointestinal: denies abdominal pain, constipation, diarrhea,nausea , vomiting, hematochezia  : denies hematuria, dysuria, or incontinence  Neurological: denies weakness, numbness , tingling, dizziness, tremors  MSK: denies muscle pain, difficulty ambulating, swelling, back pain  skin: denies skin rash, itching, burning, or  skin lesions  Psychiatrical: denies mood disturbances, anxierty, feeling depressed, depression , or difficulty sleeping    Objective:  Vitals  T(C): 36.6 (04-17-23 @ 05:37), Max: 37.2 (04-16-23 @ 21:30)  HR: 97 (04-17-23 @ 05:37) (63 - 97)  BP: 177/97 (04-17-23 @ 05:37) (159/82 - 177/97)  RR: 19 (04-17-23 @ 05:37) (18 - 20)  SpO2: 90% (04-17-23 @ 05:37) (88% - 98%)    Physical Exam:  General: comfortable, no acute distress  HEENT: Atraumatic, no LAD, trachea midline, PERRLA  Cardiovascular: normal s1s2, no murmurs, gallops or fricition rubs  Pulmonary: clear to ausculation Bilaterally, no wheezing , rhonchi  Gastrointestinal: soft non tender non distended, no masses felt, no organomegally  Muscloskeletal: no lower extremity edema, intact bilateral lower extremity pulses  Neurological: CN II-12 intact. No focal weakness  Psychiatrical: normal mood, cooperative  SKIN: no rash, lesions or ulcers    Labs:                          6.2    11.61 )-----------( 153      ( 16 Apr 2023 10:47 )             18.2     04-15    139  |  112<H>  |  46<H>  ----------------------------<  139<H>  3.8   |  22  |  1.37<H>    Ca    8.8      15 Apr 2023 10:40  Phos  2.7     04-15  Mg     2.4     04-15    TPro  6.7  /  Alb  2.9<L>  /  TBili  7.5<H>  /  DBili  2.8<H>  /  AST  276<H>  /  ALT  276<H>  /  AlkPhos  193<H>  04-15    LIVER FUNCTIONS - ( 15 Apr 2023 10:40 )  Alb: 2.9 g/dL / Pro: 6.7 gm/dL / ALK PHOS: 193 U/L / ALT: 276 U/L / AST: 276 U/L / GGT: x           PT/INR - ( 15 Apr 2023 10:40 )   PT: 15.9 sec;   INR: 1.32 ratio               Active Medications  MEDICATIONS  (STANDING):  cefTRIAXone   IVPB 1000 milliGRAM(s) IV Intermittent every 24 hours  cefTRIAXone   IVPB      finasteride 5 milliGRAM(s) Oral daily  folic acid 1 milliGRAM(s) Oral daily  heparin   Injectable 5000 Unit(s) SubCutaneous every 12 hours  metoprolol tartrate 25 milliGRAM(s) Oral two times a day  polyethylene glycol 3350 17 Gram(s) Oral daily  senna 2 Tablet(s) Oral at bedtime  sodium chloride 0.45%. 1000 milliLiter(s) (75 mL/Hr) IV Continuous <Continuous>    MEDICATIONS  (PRN):  albuterol    90 MICROgram(s) HFA Inhaler 2 Puff(s) Inhalation every 6 hours PRN Shortness of Breath and/or Wheezing  HYDROmorphone  Injectable 2 milliGRAM(s) IV Push every 4 hours PRN Severe Pain (7 - 10)  oxyCODONE    IR 5 milliGRAM(s) Oral every 6 hours PRN Moderate Pain (4 - 6)

## 2023-04-17 NOTE — PROGRESS NOTE ADULT - SUBJECTIVE AND OBJECTIVE BOX
Heme/Onc Progress note    INTERVAL HPI/OVERNIGHT EVENTS:  Patient S&E at bedside. No o/n events, stated had difficult time sleeping overnight, but said pain has improved. Patient denies fever, chills, dizziness, weakness, CP, palpitations, SOB, cough, N/V/D/C, dysuria, changes in bowel movements, LE edema.    VITAL SIGNS:  T(F): 97.8 (23 @ 05:37)  HR: 97 (23 @ 05:37)  BP: 177/97 (23 @ 05:37)  RR: 19 (23 @ 05:37)  SpO2: 90% (23 @ 05:37)  Wt(kg): --    PHYSICAL EXAM:    Constitutional: c/o gen pain but improved   Eyes: EOMI, sclera non-icteric  Neck: supple, no masses, no JVD  Respiratory: diminished b/l   Cardiovascular: RRR, no M/R/G  Gastrointestinal: soft, NTND, no masses palpable, + BS,   Extremities: no c/c/e  Neurological: AAOx3      MEDICATIONS  (STANDING):  cefTRIAXone   IVPB 1000 milliGRAM(s) IV Intermittent every 24 hours  cefTRIAXone   IVPB      finasteride 5 milliGRAM(s) Oral daily  folic acid 1 milliGRAM(s) Oral daily  heparin   Injectable 5000 Unit(s) SubCutaneous every 12 hours  metoprolol tartrate 25 milliGRAM(s) Oral two times a day  polyethylene glycol 3350 17 Gram(s) Oral daily  senna 2 Tablet(s) Oral at bedtime  sodium chloride 0.45%. 1000 milliLiter(s) (75 mL/Hr) IV Continuous <Continuous>    MEDICATIONS  (PRN):  albuterol    90 MICROgram(s) HFA Inhaler 2 Puff(s) Inhalation every 6 hours PRN Shortness of Breath and/or Wheezing  HYDROmorphone  Injectable 2 milliGRAM(s) IV Push every 4 hours PRN Severe Pain (7 - 10)  oxyCODONE    IR 5 milliGRAM(s) Oral every 6 hours PRN Moderate Pain (4 - 6)      Allergies    pork (Unknown)  peanuts (Anaphylaxis)  Shrimp (Other)  No Known Drug Allergies  shellfish (Unknown)  peanuts (Angioedema)    Intolerances        LABS:                        6.2    11.61 )-----------( 153      ( 2023 10:47 )             18.2     04-15    139  |  112<H>  |  46<H>  ----------------------------<  139<H>  3.8   |  22  |  1.37<H>    Ca    8.8      15 Apr 2023 10:40  Phos  2.7     04-15  Mg     2.4     04-15    TPro  6.7  /  Alb  2.9<L>  /  TBili  7.5<H>  /  DBili  2.8<H>  /  AST  276<H>  /  ALT  276<H>  /  AlkPhos  193<H>  04-15    PT/INR - ( 15 Apr 2023 10:40 )   PT: 15.9 sec;   INR: 1.32 ratio           Urinalysis Basic - ( 2023 09:30 )    Color: Yellow / Appearance: Clear / S.005 / pH: x  Gluc: x / Ketone: Negative  / Bili: Negative / Urobili: 1 mg/dL   Blood: x / Protein: 100 mg/dL / Nitrite: Negative   Leuk Esterase: Negative / RBC: x / WBC x   Sq Epi: x / Non Sq Epi: x / Bacteria: x        RADIOLOGY & ADDITIONAL TESTS:  Studies reviewed.    ASSESSMENT & PLAN:

## 2023-04-18 NOTE — PROGRESS NOTE ADULT - PROBLEM SELECTOR PLAN 1
Prior admission history say h/o transfusion reaction, but patient denied any  Reported baseline around 6-7. prior admission in [facility] note reviewed, Hb baseline 6 per note  S/P 2 units PRBC   CXR repeated, worse infiltrate, start Rocephin,  monitor for ACS  Hematology consulted  Labs consistent with hemolysis:  on folic acid  hemolysis labs improving  Renal and GI consulted   hb now > 8  patient ambulating halls  septic workup negative  plan:  continue folic acid  c/w rocephin: monitor for ACS  dc IV dilaudid

## 2023-04-18 NOTE — DISCHARGE NOTE PROVIDER - NSDCMRMEDTOKEN_GEN_ALL_CORE_FT
albuterol 90 mcg/inh inhalation aerosol: 2 puff(s) inhaled every 6 hours, As Needed  albuterol 90 mcg/inh inhalation aerosol: 2 puff(s) inhaled every 6 hours, As needed, Shortness of Breath and/or Wheezing  Dilaudid 2 mg oral tablet: 0.5 tab(s) orally every 6 hours    price check [id number] please call thanks MDD:4 mg per day  famotidine 20 mg oral tablet: 1 tab(s) orally once a day    Price check and fill please    Thanks  finasteride 5 mg oral tablet: 1 tab(s) orally once a day  fluticasone 50 mcg/inh inhalation powder: 1 puff(s) inhaled 2 times a day  fluticasone 50 mcg/inh nasal spray: 1 spray(s) nasal 2 times a day  folic acid 1 mg oral tablet: 1 tab(s) orally once a day  folic acid 1 mg oral tablet: 1 tab(s) orally once a day  ibuprofen 400 mg oral tablet: 1 tab(s) orally every 8 hours, As needed, Moderate Pain (4 - 6)  Metoprolol Tartrate 25 mg oral tablet: 1 tab(s) orally 2 times a day  naloxone 4 mg/0.1 mL nasal spray: 1 spray(s) intranasally once a day as needed for possible opiod overdose    Price check and fill please. Thanks   ocular lubricant ophthalmic solution: 1 drop(s) to each affected eye 4 times a day  polyethylene glycol 3350 oral powder for reconstitution: 17 gram(s) orally once a day  senna leaf extract oral tablet: 2 tab(s) orally once a day (at bedtime)

## 2023-04-18 NOTE — PROGRESS NOTE ADULT - PROBLEM SELECTOR PROBLEM 1
Sickle cell crisis

## 2023-04-18 NOTE — PROGRESS NOTE ADULT - NSPROGADDITIONALINFOA_GEN_ALL_CORE
plan  ct chest: mrcp  c/w abx for pna  taper down pain meds  on room air
plan  ct chest:  c/w abx for pna  taper down pain meds  continue to councel on 02 compliance

## 2023-04-18 NOTE — PROGRESS NOTE ADULT - ASSESSMENT
57 years old male with h/o sickle cell anemia, HTN present to ED with complain of sickle call pain.      #SC Crisis  -patient is seen by SC specialist Dr. Hamm, last seen 4/6/23, she stated patient isn't on any medication for SCD (not willing to take HU although it is prescribed Hydroxyurea 500mg 2 tab daily).  -patient denies hx of ACS/ transfusion exchange   - patient  hemolysis improving, continue to trend labs  -continue to mon closely for s/s of ACS    -Hgb dropped 4.8 on 4/13 and appeared symptomatic s/p 2 U PRBC   -most recent 8.4, new EMEKA Cr. improved  w/ hydration  - nephrology consult recs noted  -continue folic acid   - Pain management, Supplemental O2 encouraged for patient to use, not very complaint with keeping on-discussed importance)  - IV fluids, encourage oral fluid intake- discussed importance to patient to keep up with PO fluid intake along with IVF  - INCENTIVE SPIROMETRY, ambulation   - Treat any underlying infectious causes -pending cultures, - abx as per med team  -GI onboard for elevated LFT's.-pending MRCP  -Abd us performed showing Cholelithiasis without secondary signs of acute cholecystitis or biliary dilatation.   -ok to hold off on transfusion unless patient becomes symptomatic.          Will continue to monitor the patient.  Please call with any questions [phone]  Above reviewed with Attending Dr. Arvizu   [facility]  [address] [facility], [address], [place], [place]  [phone]  *Note not finalized until signed by Attending Physician     57 years old male with h/o sickle cell anemia, HTN present to ED with complain of sickle call pain.      #SC Crisis  -patient is seen by SC specialist Dr. Hamm, last seen 4/6/23, she stated patient isn't on any medication for SCD (not willing to take HU although it is prescribed Hydroxyurea 500mg 2 tab daily).  -patient denies hx of ACS/ transfusion exchange   - patient  hemolysis improving, continue to trend labs  -CTC 4/18 showing Consolidative and patchy opacities within right upper lobe and bilateral lower lobes (pneumonia vs acute chest syndrome).  -CT discussed with attending Nolberto doesn't believe to be ACS more likely PNA , patient ambulating on RA without CP, afebrile   -Hgb dropped 4.8 on 4/13 and appeared symptomatic s/p 2 U PRBC   -most recent 8.4, new EMEKA Cr. improved  w/ hydration  - nephrology consult recs noted  -continue folic acid   - Pain management, Supplemental O2 encouraged for patient to use, not very complaint with keeping on-discussed importance)  - IV fluids, encourage oral fluid intake- discussed importance to patient to keep up with PO fluid intake along with IVF  - INCENTIVE SPIROMETRY, ambulation   - Treat any underlying infectious causes -pending cultures, - abx as per med team  -GI onboard for elevated LFT's.-pending MRCP  -Abd us performed showing Cholelithiasis without secondary signs of acute cholecystitis or biliary dilatation.   -ok to hold off on transfusion unless patient becomes symptomatic.          Will continue to monitor the patient.  Please call with any questions [phone]  Above reviewed with Attending Dr. Arvizu   [facility]  [address] [facility], [address], [place], [place]  [phone]  *Note not finalized until signed by Attending Physician

## 2023-04-18 NOTE — PROGRESS NOTE ADULT - PROBLEM SELECTOR PLAN 4
etiology  presumed possible pna  in setting of sickle cell crisis  reviewed or recent cxr and worsening infiltrates-   ct chest  reiterated to patient to use oxygen via nasal cannula

## 2023-04-18 NOTE — PROGRESS NOTE ADULT - PROBLEM SELECTOR PLAN 3
due to hepatopathy from sickle cell  lfts are down trending  and noted Cholelithasis on US  plan  MRCP

## 2023-04-18 NOTE — PROGRESS NOTE ADULT - PROBLEM SELECTOR PLAN 2
On metoprolol
On metoprolol  Monitor BP and titrate BP med
On metoprolol    4/15/2023 bp fair will monitor for today - conceren that Hypotension-  may have contributed to EMEKA- therefore will cautiously adjust meds
On metoprolol    4/15/2023 bp fair will monitor for today - concern that Hypotension-  may have contributed to EMEKA- therefore will cautiously adjust meds
On metoprolol

## 2023-04-18 NOTE — DISCHARGE NOTE PROVIDER - HOSPITAL COURSE
·  Problem: Sickle cell crisis.   ·  Plan: Prior admission history say h/o transfusion reaction, but patient denied any  Reported baseline around 6-7. prior admission in [facility] note reviewed, Hb baseline 6 per note  S/P 2 units PRBC   CXR repeated, worse infiltrate, start Rocephin,  monitor for ACS  Hematology consulted  Labs consistent with hemolysis:  on folic acid  hemolysis labs improving  Renal and GI consulted   hb now > 8  patient ambulating halls  septic workup negative  plan:  continue folic acid  c/w rocephin: monitor for ACS  dc IV dilaudid.     Problem/Plan - 2:  ·  Problem: Benign essential HTN.   ·  Plan: On metoprolol.     Problem/Plan - 3:  ·  Problem: Elevated LFTs.   ·  Plan: due to hepatopathy from sickle cell  lfts are down trending  and noted Cholelithasis on US  plan  MRCP.     Problem/Plan - 4:  ·  Problem: Acute respiratory failure with hypoxia.   ·  Plan: etiology  presumed possible pna  in setting of sickle cell crisis  reviewed or recent cxr and worsening infiltrates-   ct chest  reiterated to patient to use oxygen via nasal cannula.     Problem/Plan - 5:  ·  Problem: EMEKA (acute kidney injury).   ·  Plan: creatinine is improving and  almost normalized  4/16/2023 per nursing bladder  scan was normal  renal consulted.       Additional Information:  Additional Information: plan  ct chest: mrcp  c/w abx for pna  taper down pain meds  on room air

## 2023-04-18 NOTE — PROGRESS NOTE ADULT - SUBJECTIVE AND OBJECTIVE BOX
Heme/Onc Progress note    INTERVAL HPI/OVERNIGHT EVENTS:  Patient S&E at bedside. No o/n events, patient resting comfortably.     VITAL SIGNS:  T(F): 98.6 (23 @ 05:18)  HR: 91 (23 @ 05:18)  BP: 160/89 (23 @ 05:18)  RR: 18 (23 @ 05:18)  SpO2: 93% (23 @ 05:18)  Wt(kg): --    PHYSICAL EXAM:    Constitutional: NAD  Eyes: EOMI, sclera non-icteric  Neck: supple, no masses, no JVD  Respiratory: diminished b/l   Cardiovascular: RRR, no M/R/G  Gastrointestinal: soft, NTND, no masses palpable, + BS,   Extremities: no c/c/e  Neurological: AAOx3      MEDICATIONS  (STANDING):  cefTRIAXone   IVPB 1000 milliGRAM(s) IV Intermittent every 24 hours  cefTRIAXone   IVPB      finasteride 5 milliGRAM(s) Oral daily  folic acid 1 milliGRAM(s) Oral daily  heparin   Injectable 5000 Unit(s) SubCutaneous every 12 hours  metoprolol tartrate 25 milliGRAM(s) Oral two times a day  polyethylene glycol 3350 17 Gram(s) Oral daily  senna 2 Tablet(s) Oral at bedtime  sodium chloride 0.45%. 1000 milliLiter(s) (75 mL/Hr) IV Continuous <Continuous>    MEDICATIONS  (PRN):  albuterol    90 MICROgram(s) HFA Inhaler 2 Puff(s) Inhalation every 6 hours PRN Shortness of Breath and/or Wheezing  oxyCODONE    IR 5 milliGRAM(s) Oral every 6 hours PRN Moderate Pain (4 - 6)      Allergies    pork (Unknown)  peanuts (Anaphylaxis)  Shrimp (Other)  No Known Drug Allergies  shellfish (Unknown)  peanuts (Angioedema)    Intolerances        LABS:                        8.4    9.20  )-----------( 167      ( 2023 05:30 )             25.2     04-18    136  |  108  |  24<H>  ----------------------------<  80  4.2   |  22  |  0.98    Ca    9.0      2023 05:30  Phos  4.3     04-17  Mg     2.4         TPro  6.8  /  Alb  2.7<L>  /  TBili  3.3<H>  /  DBili  x   /  AST  111<H>  /  ALT  199<H>  /  AlkPhos  296<H>      PT/INR - ( 2023 10:09 )   PT: 13.1 sec;   INR: 1.09 ratio         PTT - ( 2023 10:09 )  PTT:32.0 sec  Urinalysis Basic - ( 2023 09:30 )    Color: Yellow / Appearance: Clear / S.005 / pH: x  Gluc: x / Ketone: Negative  / Bili: Negative / Urobili: 1 mg/dL   Blood: x / Protein: 100 mg/dL / Nitrite: Negative   Leuk Esterase: Negative / RBC: 3-5 /HPF / WBC 0-2   Sq Epi: x / Non Sq Epi: x / Bacteria: Occasional        RADIOLOGY & ADDITIONAL TESTS:  Studies reviewed.    < from: US Abdomen Limited (23 @ 08:26) >  ACC: [id number] EXAM:  US ABDOMEN LIMITED   ORDERED BY: DANIEL ENGLISH     PROCEDURE DATE:  2023          INTERPRETATION:  CLINICAL INFORMATION: Sickle cell crisis    COMPARISON: None available.    TECHNIQUE: Sonography of the right upper quadrant.    FINDINGS:    Liver: Within normal limits.  Bile ducts: Normal caliber. Common bile duct measures 4 mm.  Gallbladder: Cholelithiasis without wall thickening or pericholecystic   fluid. Negative ultrasound Mcghee's sign.  Pancreas: Visualized portions are within normal limits.  Right kidney: 9.1 cm. No hydronephrosis. Increased cortical echotexture   can be seen in the setting of medical renal disease  Ascites: None.  IVC: Visualized portions are within normal limits.    IMPRESSION:  Cholelithiasis without secondary signs of acute cholecystitis or biliary   dilatation.  Increased echotexture right kidney can be seen in the setting of medical   renal disease.        --- End of Report ---           Heme/Onc Progress note    INTERVAL HPI/OVERNIGHT EVENTS:  Patient S&E at bedside. No o/n events, patient resting comfortably.     VITAL SIGNS:  T(F): 98.6 (23 @ 05:18)  HR: 91 (23 @ 05:18)  BP: 160/89 (23 @ 05:18)  RR: 18 (23 @ 05:18)  SpO2: 93% (23 @ 05:18)  Wt(kg): --    PHYSICAL EXAM:    Constitutional: NAD  Eyes: EOMI, sclera non-icteric  Neck: supple, no masses, no JVD  Respiratory: diminished b/l   Cardiovascular: RRR, no M/R/G  Gastrointestinal: soft, NTND, no masses palpable, + BS,   Extremities: no c/c/e  Neurological: AAOx3      MEDICATIONS  (STANDING):  cefTRIAXone   IVPB 1000 milliGRAM(s) IV Intermittent every 24 hours  cefTRIAXone   IVPB      finasteride 5 milliGRAM(s) Oral daily  folic acid 1 milliGRAM(s) Oral daily  heparin   Injectable 5000 Unit(s) SubCutaneous every 12 hours  metoprolol tartrate 25 milliGRAM(s) Oral two times a day  polyethylene glycol 3350 17 Gram(s) Oral daily  senna 2 Tablet(s) Oral at bedtime  sodium chloride 0.45%. 1000 milliLiter(s) (75 mL/Hr) IV Continuous <Continuous>    MEDICATIONS  (PRN):  albuterol    90 MICROgram(s) HFA Inhaler 2 Puff(s) Inhalation every 6 hours PRN Shortness of Breath and/or Wheezing  oxyCODONE    IR 5 milliGRAM(s) Oral every 6 hours PRN Moderate Pain (4 - 6)      Allergies    pork (Unknown)  peanuts (Anaphylaxis)  Shrimp (Other)  No Known Drug Allergies  shellfish (Unknown)  peanuts (Angioedema)    Intolerances        LABS:                        8.4    9.20  )-----------( 167      ( 2023 05:30 )             25.2     04-18    136  |  108  |  24<H>  ----------------------------<  80  4.2   |  22  |  0.98    Ca    9.0      2023 05:30  Phos  4.3     04-17  Mg     2.4         TPro  6.8  /  Alb  2.7<L>  /  TBili  3.3<H>  /  DBili  x   /  AST  111<H>  /  ALT  199<H>  /  AlkPhos  296<H>      PT/INR - ( 2023 10:09 )   PT: 13.1 sec;   INR: 1.09 ratio         PTT - ( 2023 10:09 )  PTT:32.0 sec  Urinalysis Basic - ( 2023 09:30 )    Color: Yellow / Appearance: Clear / S.005 / pH: x  Gluc: x / Ketone: Negative  / Bili: Negative / Urobili: 1 mg/dL   Blood: x / Protein: 100 mg/dL / Nitrite: Negative   Leuk Esterase: Negative / RBC: 3-5 /HPF / WBC 0-2   Sq Epi: x / Non Sq Epi: x / Bacteria: Occasional        RADIOLOGY & ADDITIONAL TESTS:  Studies reviewed.    < from: US Abdomen Limited (23 @ 08:26) >  ACC: [id number] EXAM:  US ABDOMEN LIMITED   ORDERED BY: DANIEL ENGLISH     PROCEDURE DATE:  2023          INTERPRETATION:  CLINICAL INFORMATION: Sickle cell crisis    COMPARISON: None available.    TECHNIQUE: Sonography of the right upper quadrant.    FINDINGS:    Liver: Within normal limits.  Bile ducts: Normal caliber. Common bile duct measures 4 mm.  Gallbladder: Cholelithiasis without wall thickening or pericholecystic   fluid. Negative ultrasound Mcghee's sign.  Pancreas: Visualized portions are within normal limits.  Right kidney: 9.1 cm. No hydronephrosis. Increased cortical echotexture   can be seen in the setting of medical renal disease  Ascites: None.  IVC: Visualized portions are within normal limits.    IMPRESSION:  Cholelithiasis without secondary signs of acute cholecystitis or biliary   dilatation.  Increased echotexture right kidney can be seen in the setting of medical   renal disease.        --- End of Report ---    < from: CT Chest No Cont (23 @ 12:18) >  ACC: [id number] EXAM:  CT CHEST   ORDERED BY: NORMA BROWER     PROCEDURE DATE:  2023          INTERPRETATION:  INDICATION: sickle cell crisis  TECHNIQUE: Unenhanced CT of the chest. Coronal, sagittal, and MIP images   were reconstructed and reviewed.  COMPARISON: CT chest 2022    FINDINGS:    AIRWAYS, LUNGS, PLEURA: Patent central airways. Consolidative and patchy   opacities within right upper lobe and bilateral lower lobes likely or   represent pneumonia or acute chest syndrome. Mild linear scarring within   the lung bases. No pleural effusion.    LYMPH NODES, MEDIASTINUM: No lymphadenopathy.    HEART, VESSELS: Cardiomegaly. No pericardial effusion. Thoracic aorta and   pulmonary artery normal in diameter.    VISUALIZED UPPER ABDOMEN: Hepatomegaly. Cholelithiasis.    CHEST WALL, BONES: The bones are dense. H-type vertebrae.    LOWER NECK: Within normal limits.    IMPRESSION:    Consolidative and patchy opacities within right upper lobe and bilateral   lower lobes likely or represent pneumonia or acute chest syndrome.    --- End of Report ---

## 2023-04-18 NOTE — DISCHARGE NOTE PROVIDER - DETAILS OF MALNUTRITION DIAGNOSIS/DIAGNOSES
This patient has been assessed with a concern for Malnutrition and was treated during this hospitalization for the following Nutrition diagnosis/diagnoses:     -  04/13/2023: Severe protein-calorie malnutrition   -  04/13/2023: Underweight (BMI < 19)

## 2023-04-18 NOTE — PROGRESS NOTE ADULT - SUBJECTIVE AND OBJECTIVE BOX
Patient seen and examined  pain controlled  vitals stable afebrile  labs reviewed: LFTs downtrending  US abdomen with cholelithasis without acute alberto  MRCP pending  continued on IV abx for pna  PATIENT AMBULATiNG WITHOUT DIFFICULTY  Review of Systems:  General:denies fever chills, headache, weakness  HEENT: denies blurry vision,diffculty swallowing, difficulty hearing, tinnitus  Cardiovascular: denies chest pain  ,palpitations  Pulmonary:denies shortness of breath, cough, wheezing, hemoptysis  Gastrointestinal: denies abdominal pain, constipation, diarrhea,nausea , vomiting, hematochezia  : denies hematuria, dysuria, or incontinence  Neurological: denies weakness, numbness , tingling, dizziness, tremors  MSK: denies muscle pain, difficulty ambulating, swelling, back pain  skin: denies skin rash, itching, burning, or  skin lesions  Psychiatrical: denies mood disturbances, anxierty, feeling depressed, depression , or difficulty sleeping    Objective:  Vitals  T(C): 37 (04-18-23 @ 05:18), Max: 37 (04-18-23 @ 05:18)  HR: 91 (04-18-23 @ 05:18) (76 - 91)  BP: 160/89 (04-18-23 @ 05:18) (155/77 - 168/96)  RR: 18 (04-18-23 @ 05:18) (18 - 19)  SpO2: 93% (04-18-23 @ 05:18) (93% - 94%)    Physical Exam:  General: comfortable, no acute distress  HEENT: Atraumatic, no LAD, trachea midline, PERRLA  Cardiovascular: normal s1s2, no murmurs, gallops or fricition rubs  Pulmonary: clear to ausculation Bilaterally, no wheezing , rhonchi  Gastrointestinal: soft non tender non distended, no masses felt, no organomegally  Muscloskeletal: no lower extremity edema, intact bilateral lower extremity pulses  Neurological: CN II-12 intact. No focal weakness  Psychiatrical: normal mood, cooperative  SKIN: no rash, lesions or ulcers    Labs:                          8.4    9.20  )-----------( 167      ( 18 Apr 2023 05:30 )             25.2     04-18    136  |  108  |  24<H>  ----------------------------<  80  4.2   |  22  |  0.98    Ca    9.0      18 Apr 2023 05:30  Phos  4.3     04-17  Mg     2.4     04-17    TPro  6.8  /  Alb  2.7<L>  /  TBili  3.3<H>  /  DBili  x   /  AST  111<H>  /  ALT  199<H>  /  AlkPhos  296<H>  04-18    LIVER FUNCTIONS - ( 18 Apr 2023 05:30 )  Alb: 2.7 g/dL / Pro: 6.8 gm/dL / ALK PHOS: 296 U/L / ALT: 199 U/L / AST: 111 U/L / GGT: x           PT/INR - ( 17 Apr 2023 10:09 )   PT: 13.1 sec;   INR: 1.09 ratio         PTT - ( 17 Apr 2023 10:09 )  PTT:32.0 sec      Active Medications  MEDICATIONS  (STANDING):  cefTRIAXone   IVPB 1000 milliGRAM(s) IV Intermittent every 24 hours  cefTRIAXone   IVPB      finasteride 5 milliGRAM(s) Oral daily  folic acid 1 milliGRAM(s) Oral daily  heparin   Injectable 5000 Unit(s) SubCutaneous every 12 hours  metoprolol tartrate 25 milliGRAM(s) Oral two times a day  polyethylene glycol 3350 17 Gram(s) Oral daily  senna 2 Tablet(s) Oral at bedtime  sodium chloride 0.45%. 1000 milliLiter(s) (75 mL/Hr) IV Continuous <Continuous>    MEDICATIONS  (PRN):  albuterol    90 MICROgram(s) HFA Inhaler 2 Puff(s) Inhalation every 6 hours PRN Shortness of Breath and/or Wheezing  oxyCODONE    IR 5 milliGRAM(s) Oral every 6 hours PRN Moderate Pain (4 - 6)

## 2023-04-18 NOTE — PROGRESS NOTE ADULT - NUTRITIONAL ASSESSMENT
This patient has been assessed with a concern for Malnutrition and has been determined to have a diagnosis/diagnoses of Severe protein-calorie malnutrition and Underweight (BMI < 19).    This patient is being managed with:   Diet DASH/TLC-  Sodium & Cholesterol Restricted  Supplement Feeding Modality:  Oral  Ensure Enlive Cans or Servings Per Day:  3       Frequency:  Three Times a day  Entered: Apr 11 2023 12:12PM  

## 2023-04-19 NOTE — DISCHARGE NOTE NURSING/CASE MANAGEMENT/SOCIAL WORK - NSDCVIVACCINE_GEN_ALL_CORE_FT
influenza, injectable, quadrivalent, preservative free; 12-Oct-2018 14:38; Juana Bruce (RN); [employer]; [id number] (Exp. Date: 30-Jun-2019); IntraMuscular; Deltoid Left.; 0.5 milliLiter(s); VIS (VIS Published: 07-Aug-2015, VIS Presented: 12-Oct-2018);

## 2023-04-19 NOTE — PROGRESS NOTE ADULT - ASSESSMENT
57 years old male with h/o sickle cell anemia, HTN present to ED with complain of sickle call pain.      #SC Crisis  -patient is seen by SC specialist Dr. Hamm, last seen 4/6/23, she stated patient isn't on any medication for SCD (not willing to take HU although it is prescribed Hydroxyurea 500mg 2 tab daily).  -patient denies hx of ACS/ transfusion exchange   - patient  hemolysis improving, continue to trend labs  -Hgb dropped 4.8 on 4/13 and appeared symptomatic s/p 2 U PRBC   -most recent 8.4, new EMEKA Cr. improved  w/ hydration  - nephrology consult recs noted  -continue folic acid   - Pain management, Supplemental O2 encouraged for patient to use, not very complaint with keeping on-discussed importance)  - IV fluids, encourage oral fluid intake- discussed importance to patient to keep up with PO fluid intake along with IVF  - INCENTIVE SPIROMETRY, ambulation   - Treat any underlying infectious causes -BC (-), - abx as per med team  -CTC 4/18 showing Consolidative and patchy opacities within right upper lobe and bilateral lower lobes (pneumonia vs acute chest syndrome).  -CT discussed with attending (4/18) Nolberto doesn't believe to be ACS more likely PNA , patient ambulating on RA without CP, afebrile, reached out to patients primary hematologist (Dr. Hamm) to have better d/c plan  -GI onboard for elevated LFT's. trending downward   -Abd us performed showing Cholelithiasis without secondary signs of acute cholecystitis or biliary dilatation.   -ok to hold off on transfusion unless patient becomes symptomatic.          Will continue to monitor the patient.  Please call with any questions [phone]  Above reviewed with Attending Dr. Wesley ARIAS/[facility]  [address] [facility], [address], [place], [place]  [phone]  *Note not finalized until signed by Attending Physician

## 2023-04-19 NOTE — PROGRESS NOTE ADULT - NS ATTEND AMEND GEN_ALL_CORE FT
I have examined the patient at bedside and reviewed patient's data and participated in the management of the patient along with Martha SILVA as well as hemotology/med oncology faculty consisting of Dr. Hebert Arevalo, Dr. ROQUE Reyes, Dr. VANDANA Olson, Dr. Alireza Morley, Dr. Marla Mendenhall, Dr. Jonatan Liriano as well as myself during the daily heme/onc case review. I reviewed pertinent clinical information, PE,  labs as well as A/P as outline above.     Remains in pain. Continues hydration O2 pain meds
I have examined the patient at bedside and reviewed patient's data and participated in the management of the patient along with Martha SILVA as well as hemotology/med oncology faculty consisting of Dr. Hebert Arevalo, Dr. ROQUE Reyes, Dr. VANDANA Olson, Dr. Alireza Morley, Dr. Marla Mendenhall, Dr. Jonatan Liriano as well as myself during the daily heme/onc case review. I reviewed pertinent clinical information, PE,  labs as well as A/P as outline above.     After agreeing to allow blood drawing, Hgb 4.8 and pt with increased pain. Recommend 2 u PRBC tx and evaluate for infection exacerbating pain.
#Sickle cell crisis:   #PNA:   Clinically stable.   Discharged home on PO Abx.   Out-pt f/u with primary Hematologist Dr. Hamm within 1 week. Correspondence sent to her
I have examined the patient at bedside and reviewed patient's data and participated in the management of the patient along with Martha SILVA as well as hemotology/med oncology faculty consisting of Dr. Hebert Arevalo, Dr. ROQUE Reyes, Dr. VANDANA Olson, Dr. Alireza Morley, Dr. Marla Mendenhall, Dr. Jonatan Liriano as well as myself during the daily heme/onc case review. I reviewed pertinent clinical information, PE,  labs as well as A/P as outline above.     Pt with SSD and pain crisis. Pain slowly improving and Cr decreased with increased oral intake. Continue supportive care.
I have examined the patient at bedside and reviewed patient's data and participated in the management of the patient along with Martha SILVA as well as hemotology/med oncology faculty consisting of Dr. Hebert Arevalo, Dr. ROQUE Reyes, Dr. VANDANA Olson, Dr. Alireza Morley, Dr. Marla Mendenhall, Dr. Jonatan Liriano as well as myself during the daily heme/onc case review. I reviewed pertinent clinical information, PE,  labs as well as A/P as outline above.     Pt with SSD w/ pain crisis. CT chest more consistent with PNA rather than acute chest syndrome as pt is w/o SOB. or low PO2.
I have examined the patient at bedside and reviewed patient's data and participated in the management of the patient along with Martha SILVA as well as hemotology/med oncology faculty consisting of Dr. Hebert Arevalo, Dr. ROQUE Reyes, Dr. VANDANA Olson, Dr. Alireza Morley, Dr. Marla Mendenhall, Dr. Jonatan Liriano as well as myself during the daily heme/onc case review. I reviewed pertinent clinical information, PE,  labs as well as A/P as outline above.     In better pain control, but Cr increased. Recommend renal and ID consults.

## 2023-04-19 NOTE — PROGRESS NOTE ADULT - PROVIDER SPECIALTY LIST ADULT
Hospitalist
Heme/Onc
Nephrology
Heme/Onc
Heme/Onc
Nephrology
Heme/Onc
Hospitalist
Internal Medicine
Internal Medicine
Hospitalist

## 2023-04-19 NOTE — CHART NOTE - NSCHARTNOTEFT_GEN_A_CORE
Notified by RN stating that Patient c/o difficulty urinating. Feels the urge to urinate, however has a weak and trickling stream of urine. Per RN, he is requesting finasteride. Medications list verified and per Attending's note, patient discharged on finasteride 5mg PO daily. Chart reviewed. 57 years old male with h/o sickle cell anemia, HTN presented to ED c/o sickle call pain.     Vital Signs Last 24 Hrs  T(C): 37.1 (19 Apr 2023 02:55), Max: 37.3 (18 Apr 2023 16:48)  T(F): 98.8 (19 Apr 2023 02:55), Max: 99.1 (18 Apr 2023 16:48)  HR: 70 (19 Apr 2023 02:55) (59 - 91)  BP: 157/75 (19 Apr 2023 02:55) (157/75 - 169/83)  RR: 18 (19 Apr 2023 02:55) (17 - 18)  SpO2: 96% (19 Apr 2023 02:55) (93% - 96%)    O2 Parameters below as of 19 Apr 2023 02:55  Patient On (Oxygen Delivery Method): room air      -Bladder scan x1  -Finasteride 5mg PO x1 Notified by RN stating that Patient c/o difficulty urinating. Feels the urge to urinate, however has a weak and trickling stream of urine. Per RN, he is requesting finasteride. Medications list verified/reviewed and per Attending's note, patient discharged on finasteride 5mg PO daily. Chart reviewed. 57 years old male with h/o sickle cell anemia, HTN presented to ED c/o sickle call pain.     Vital Signs Last 24 Hrs  T(C): 37.1 (19 Apr 2023 02:55), Max: 37.3 (18 Apr 2023 16:48)  T(F): 98.8 (19 Apr 2023 02:55), Max: 99.1 (18 Apr 2023 16:48)  HR: 70 (19 Apr 2023 02:55) (59 - 91)  BP: 157/75 (19 Apr 2023 02:55) (157/75 - 169/83)  RR: 18 (19 Apr 2023 02:55) (17 - 18)  SpO2: 96% (19 Apr 2023 02:55) (93% - 96%)    O2 Parameters below as of 19 Apr 2023 02:55  Patient On (Oxygen Delivery Method): room air      -Bladder scan x1  -Finasteride 5mg PO x1

## 2023-04-19 NOTE — PROGRESS NOTE ADULT - REASON FOR ADMISSION
sickle cell crisis

## 2023-04-19 NOTE — DISCHARGE NOTE NURSING/CASE MANAGEMENT/SOCIAL WORK - PATIENT PORTAL LINK FT
You can access the FollowMyHealth Patient Portal offered by [facility] by registering at the following website: [email or web address]. By joining [facility]’s FollowMyHealth portal, you will also be able to view your health information using other applications (apps) compatible with our system.

## 2023-04-19 NOTE — PROGRESS NOTE ADULT - SUBJECTIVE AND OBJECTIVE BOX
Heme/Onc Progress note    INTERVAL HPI/OVERNIGHT EVENTS:  Patient S&E at bedside. No o/n events, patient IV came out overnight and refused to have new one placed so missed dose of ceftiaxone.    VITAL SIGNS:  T(F): 98.6 (23 @ 05:04)  HR: 70 (23 @ 05:04)  BP: 124/73 (23 @ 05:04)  RR: 18 (23 @ 05:04)  SpO2: 96% (23 @ 05:04)  Wt(kg): --    PHYSICAL EXAM:    Constitutional: NAD  Eyes: EOMI, sclera non-icteric  Neck: supple, no masses, no JVD  Respiratory: CTA b/l, good air entry b/l  Cardiovascular: RRR, no M/R/G  Gastrointestinal: soft, NTND, no masses palpable, + BS, no hepatosplenomegaly  Extremities: no c/c/e  Neurological: AAOx3      MEDICATIONS  (STANDING):  cefTRIAXone   IVPB 1000 milliGRAM(s) IV Intermittent every 24 hours  cefTRIAXone   IVPB      finasteride 5 milliGRAM(s) Oral daily  folic acid 1 milliGRAM(s) Oral daily  heparin   Injectable 5000 Unit(s) SubCutaneous every 12 hours  HYDROmorphone  Injectable 1 milliGRAM(s) IntraMuscular once  metoprolol tartrate 25 milliGRAM(s) Oral two times a day  polyethylene glycol 3350 17 Gram(s) Oral daily  senna 2 Tablet(s) Oral at bedtime  sodium chloride 0.45%. 1000 milliLiter(s) (75 mL/Hr) IV Continuous <Continuous>    MEDICATIONS  (PRN):  albuterol    90 MICROgram(s) HFA Inhaler 2 Puff(s) Inhalation every 6 hours PRN Shortness of Breath and/or Wheezing      Allergies    pork (Unknown)  peanuts (Anaphylaxis)  Shrimp (Other)  No Known Drug Allergies  shellfish (Unknown)  peanuts (Angioedema)    Intolerances        LABS:                        7.7    9.01  )-----------( 156      ( 2023 10:20 )             23.5     04-18    139  |  110<H>  |  24<H>  ----------------------------<  112<H>  4.6   |  23  |  1.02    Ca    9.4      2023 10:20  Phos  4.3     04-17  Mg     2.4     -17    TPro  6.8  /  Alb  2.7<L>  /  TBili  3.2<H>  /  DBili  x   /  AST  105<H>  /  ALT  186<H>  /  AlkPhos  273<H>  04-18    PT/INR - ( 2023 10:09 )   PT: 13.1 sec;   INR: 1.09 ratio         PTT - ( 2023 10:09 )  PTT:32.0 sec  Urinalysis Basic - ( 2023 09:30 )    Color: Yellow / Appearance: Clear / S.005 / pH: x  Gluc: x / Ketone: Negative  / Bili: Negative / Urobili: 1 mg/dL   Blood: x / Protein: 100 mg/dL / Nitrite: Negative   Leuk Esterase: Negative / RBC: 3-5 /HPF / WBC 0-2   Sq Epi: x / Non Sq Epi: x / Bacteria: Occasional        RADIOLOGY & ADDITIONAL TESTS:  Studies reviewed.    ASSESSMENT & PLAN:  Heme/Onc Progress note    INTERVAL HPI/OVERNIGHT EVENTS:  Patient S&E at bedside. No o/n events, patient IV came out overnight and refused to have new one placed so missed dose of ceftriaxone    VITAL SIGNS:  T(F): 98.6 (23 @ 05:04)  HR: 70 (23 @ 05:04)  BP: 124/73 (23 @ 05:04)  RR: 18 (23 @ 05:04)  SpO2: 96% (23 @ 05:04)  Wt(kg): --    PHYSICAL EXAM:    Constitutional: NAD, some gen pain  Eyes: EOMI, sclera non-icteric  Neck: supple, no masses, no JVD  Respiratory: diminished b/l   Cardiovascular: RRR, no M/R/G  Gastrointestinal: soft, NTND, no masses palpable, + BS,   Extremities: no c/c/e  Neurological: AAOx3      MEDICATIONS  (STANDING):  cefTRIAXone   IVPB 1000 milliGRAM(s) IV Intermittent every 24 hours  cefTRIAXone   IVPB      finasteride 5 milliGRAM(s) Oral daily  folic acid 1 milliGRAM(s) Oral daily  heparin   Injectable 5000 Unit(s) SubCutaneous every 12 hours  HYDROmorphone  Injectable 1 milliGRAM(s) IntraMuscular once  metoprolol tartrate 25 milliGRAM(s) Oral two times a day  polyethylene glycol 3350 17 Gram(s) Oral daily  senna 2 Tablet(s) Oral at bedtime  sodium chloride 0.45%. 1000 milliLiter(s) (75 mL/Hr) IV Continuous <Continuous>    MEDICATIONS  (PRN):  albuterol    90 MICROgram(s) HFA Inhaler 2 Puff(s) Inhalation every 6 hours PRN Shortness of Breath and/or Wheezing      Allergies    pork (Unknown)  peanuts (Anaphylaxis)  Shrimp (Other)  No Known Drug Allergies  shellfish (Unknown)  peanuts (Angioedema)    Intolerances        LABS:                        7.7    9.01  )-----------( 156      ( 2023 10:20 )             23.5     04-    139  |  110<H>  |  24<H>  ----------------------------<  112<H>  4.6   |  23  |  1.02    Ca    9.4      2023 10:20  Phos  4.3       Mg     2.4         TPro  6.8  /  Alb  2.7<L>  /  TBili  3.2<H>  /  DBili  x   /  AST  105<H>  /  ALT  186<H>  /  AlkPhos  273<H>      PT/INR - ( 2023 10:09 )   PT: 13.1 sec;   INR: 1.09 ratio         PTT - ( 2023 10:09 )  PTT:32.0 sec  Urinalysis Basic - ( 2023 09:30 )    Color: Yellow / Appearance: Clear / S.005 / pH: x  Gluc: x / Ketone: Negative  / Bili: Negative / Urobili: 1 mg/dL   Blood: x / Protein: 100 mg/dL / Nitrite: Negative   Leuk Esterase: Negative / RBC: 3-5 /HPF / WBC 0-2   Sq Epi: x / Non Sq Epi: x / Bacteria: Occasional        RADIOLOGY & ADDITIONAL TESTS:  Studies reviewed.    < from: CT Chest No Cont (23 @ 12:18) >  ACC: [id number] EXAM:  CT CHEST   ORDERED BY: NORMA BROWER     PROCEDURE DATE:  2023          INTERPRETATION:  INDICATION: sickle cell crisis  TECHNIQUE: Unenhanced CT of the chest. Coronal, sagittal, and MIP images   were reconstructed and reviewed.  COMPARISON: CT chest 2022    FINDINGS:    AIRWAYS, LUNGS, PLEURA: Patent central airways. Consolidative and patchy   opacities within right upper lobe and bilateral lower lobes likely or   represent pneumonia or acute chest syndrome. Mild linear scarring within   the lung bases. No pleural effusion.    LYMPH NODES, MEDIASTINUM: No lymphadenopathy.    HEART, VESSELS: Cardiomegaly. No pericardial effusion. Thoracic aorta and   pulmonary artery normal in diameter.    VISUALIZED UPPER ABDOMEN: Hepatomegaly. Cholelithiasis.    CHEST WALL, BONES: The bones are dense. H-type vertebrae.    LOWER NECK: Within normal limits.    IMPRESSION:    Consolidative and patchy opacities within right upper lobe and bilateral   lower lobes likely or represent pneumonia or acute chest syndrome.    --- End of Report ---

## 2023-05-18 NOTE — H&P ADULT - PROBLEM SELECTOR PLAN 3
SCr 1.3 (baseline ~1)  - likely pre-renal vs. d/t Aleve use  - start 1/2NS hydration   - trend creatinine normal/well-groomed/no distress

## 2023-07-03 NOTE — PROVIDER CONTACT NOTE (CRITICAL VALUE NOTIFICATION) - BACKGROUND
Pt went down with transport to ultrasound via stretcher.  Electronically signed by Jordon Luciano RN on 7/3/2023 at 9:36 AM Sickle cell  disease

## 2023-07-18 NOTE — H&P PST ADULT - NSICDXPASTMEDICALHX_GEN_ALL_CORE_FT
PAST MEDICAL HISTORY:  BPH (benign prostatic hyperplasia)     Cataracts, bilateral     H/O transfusion ~ pRBC    Hypertension     Intellectual disability ~ mild    Left ventricular dysfunction mild LVD on echo in 7/18, normal LVF in 12/18    Sickle cell anemia     Sickle Cell Disease      PAST MEDICAL HISTORY:  BPH (benign prostatic hyperplasia)     Cataracts, bilateral     H/O transfusion ~ pRBC    Hypertension     Intellectual disability ~ mild    Sickle cell anemia     Sickle Cell Disease      PAST MEDICAL HISTORY:  BPH (benign prostatic hyperplasia)     Cataracts, bilateral     H/O transfusion ~ pRBC    [place] (hard of hearing)     Hypertension     Intellectual disability ~ mild    Poor historian     Sickle cell anemia     Sickle Cell Disease

## 2023-07-18 NOTE — H&P PST ADULT - HISTORY OF PRESENT ILLNESS
57 years old male with h/o sickle cell anemia, HTN present t This is a 68 years old male with h/o sickle cell anemia, HTN , BPH presents with progressive loss of vision in right eye . scheduled for right eye cataract extraction on 7/26/23

## 2023-07-18 NOTE — H&P PST ADULT - NEUROLOGICAL
details… sensation intact/responds to pain sensation intact/responds to pain/responds to verbal commands

## 2023-07-18 NOTE — H&P PST ADULT - PROBLEM SELECTOR PLAN 1
scheduled for right cataract extraction with IOL on 7/26/23  will obtain medical clearance   Pre op instructions

## 2023-07-20 PROBLEM — H91.90 UNSPECIFIED HEARING LOSS, UNSPECIFIED EAR: Chronic | Status: ACTIVE | Noted: 2023-01-01

## 2023-07-20 PROBLEM — H26.9 UNSPECIFIED CATARACT: Chronic | Status: ACTIVE | Noted: 2023-01-01

## 2023-07-20 PROBLEM — Z01.818 PRE-OP EXAM: Status: ACTIVE | Noted: 2023-02-03

## 2023-07-20 PROBLEM — Z78.9 OTHER SPECIFIED HEALTH STATUS: Chronic | Status: ACTIVE | Noted: 2023-01-01

## 2023-07-20 NOTE — ED PROVIDER NOTE - CARE PLAN
Principal Discharge DX:	Anemia, sickle cell with crisis  Secondary Diagnosis:	Sickle cell pain crisis   1

## 2023-07-20 NOTE — ED ADULT TRIAGE NOTE - CHIEF COMPLAINT QUOTE
Pt sent by Dr. Hamm for abnormal hemoglobin level, reports needs a blood transfusion. Pt also reports is having sickle cell pain, says "pain is all over". Denies chest pain, SOB. Hx anemia, sickle cell

## 2023-07-20 NOTE — ED PROVIDER NOTE - NS_EDPROVIDERDISPOUSERTYPE_ED_A_ED
Arterial Line  Performed by: Indy Arita MD  Authorized by: Indy Arita MD     General Information and Staff    Procedure Start:  6/24/2020 5:00 PM  Anesthesiologist:  Indy Arita MD  Performed By:  Anesthesiologist  Patient Location:  OR  Indic Attending Attestation (For Attendings USE Only)...

## 2023-07-20 NOTE — ED PROVIDER NOTE - OBJECTIVE STATEMENT
69 y/o M with pmhx of SCD presents to the ED sent to the ED by Dr. Hamm, PCP for low H&H. Pt states he was getting pre-op labs for cataract surgery and was found to have low H&H. Pt is unsure what his baseline H&H is and unsure what the level was today. Pt currently c/o "pain all over", reporting pain in b/l LE and UE and in LLQ. Pt reports associated HA, generalized and lethargy. Pt rates pain as 6/10, described as sharp. Pt tried taking Aleve yesterday with no relief of pain. Denies CP, SOB, recent fever/chills, cough, N/V, dizziness, palpitations.

## 2023-07-20 NOTE — ED PROVIDER NOTE - ATTENDING CONTRIBUTION TO CARE
Michelle Virgen MD attending physician patient is a 68-year-old man who comes with a history of sickle cell disease for 2 reasons 1 he has generalized pain including significant abdominal pain the reason he comes to the ED is because Dr. Hamm who is his PCP found he had a low hemoglobin and that he needs transfusion for his anemia.  He has cataract surgery planned and they want to improve his blood prior to surgery.      On exam patient with tenderness in his abdomen left lower quadrant suprapubic area is able to move all extremities his conjunctiva are pale.    Patient will be treated for pain and lab work is sent.  I signed out at the end of my shift to the oncoming attending pending lab work and disposition    Michelle Virgen MD attending physician.  I attest I performed a history and physical of the patient and discussed their management with the resident and or advanced care provider.  I reviewed the resident and/or ACP's note and agree with the documented findings and plan of care with the exception of my documented changes if any.  My medical decision making and observations are found above.

## 2023-07-20 NOTE — ED ADULT NURSE NOTE - NSFALLUNIVINTERV_ED_ALL_ED
As a means of avoiding spread of COVID-19, this visit is being conducted by video.  Referred to medical weight management at last appointment.  Has orientation with medical weight management today.   Bed/Stretcher in lowest position, wheels locked, appropriate side rails in place/Call bell, personal items and telephone in reach/Instruct patient to call for assistance before getting out of bed/chair/stretcher/Non-slip footwear applied when patient is off stretcher/[place] to call system/Physically safe environment - no spills, clutter or unnecessary equipment/Purposeful proactive rounding/Room/bathroom lighting operational, light cord in reach

## 2023-07-20 NOTE — ED ADULT NURSE NOTE - OBJECTIVE STATEMENT
Pt AOX 4 c/o Left sided leg pain, generalized joint pain, denies chest pain or SOB, sent by PMD for low Hg & Hct; pt had hospitalization 6 months ago for same, needed transfusions and pain management; Hx of Sickle cell crisis; pt ambulates without assistance, skin dry warm and intact; attempted IV access unable to obtain, MDs notified.

## 2023-07-20 NOTE — ED PROVIDER NOTE - PHYSICAL EXAMINATION
General: In no acute distress, alert and cooperative  Head: Normocephalic, atraumatic  Eyes: +conjunctival pallor. PERRLA, no scleral icterus, EOMI  Neck: Soft and supple  Cardiac: Regular rate and regular rhythm, no murmurs, peripheral pulses 2+ and symmetric in all extremities, no LE edema.  Resp: Unlabored respiratory effort, lungs CTAB  Abd: +tenderness to palpation in LLQ and suprapubic area. soft, non-distended, no guarding or rebound tenderness  MSK: Spine midline and non-tender, no CVA tenderness b/l  Skin: Warm and dry, no rashes/abrasions/lacerations  Neuro: AO x 3, moves all extremities symmetrically, Motor strength 5/5 bilaterally UE and LE, sensation grossly intact

## 2023-07-20 NOTE — ED PROVIDER NOTE - PROGRESS NOTE DETAILS
SHIRA Rojas: Spoke with Dr. Hamm, who sent pt to ED for transfusion to optimize prior to cataract surgery on Monday. Per Dr. Hamm, pt needs hgb closer to 7 in order for surgery to occur.   Notified of critical value from the lab, pt's hgb/hct currently 5.5/16.4. Will give two units and reassess for pain control. Vaishali Montero MD PGY-2: per blood bank patient's blood has to come from outside facility due to multiple antibodies, will not be here until the AM. CDU full. will admit to medicine for transfusion. patient endorsed to Dr. Carvajal

## 2023-07-20 NOTE — ED PROVIDER NOTE - CLINICAL SUMMARY MEDICAL DECISION MAKING FREE TEXT BOX
67 y/o M with pmhx of SCD presents to the ED sent to the ED by Dr. Hamm, PCP for low H&H. Pt states he was getting pre-op labs for cataract surgery and was found to have low H&H. Pt is unsure what his baseline H&H is and unsure what the level was today. Pt currently c/o "pain all over", reporting pain in b/l LE and UE and in LLQ. Pt reports associated HA, generalized and lethargy. Pt rates pain as 6/10, described as sharp. Pt tried taking Aleve yesterday with no relief of pain. Denies CP, SOB, recent fever/chills, cough, N/V, dizziness, palpitations.    Patient currently afebrile, hemodynamically stable, spO2 100%. Based on history and physical, differentials include but are not limited to anemia vs aplastic anemia vs pain crisis. Plan to assess patient for acute pathology as listed above with labs, ECG. Will administer medications for pain relief and fluids, follow-up on results, and reassess. Pending labs, imaging and pain control for dispo. 67 y/o M with pmhx of SCD presents to the ED sent to the ED by Dr. Hamm, PCP for low H&H. Pt states he was getting pre-op labs for cataract surgery and was found to have low H&H. Pt is unsure what his baseline H&H is and unsure what the level was today. Pt currently c/o "pain all over", reporting pain in b/l LE and UE and in LLQ. Pt reports associated HA, generalized and lethargy. Pt rates pain as 6/10, described as sharp. Pt tried taking Aleve yesterday with no relief of pain. Denies CP, SOB, recent fever/chills, cough, N/V, dizziness, palpitations.    Patient currently afebrile, hemodynamically stable, spO2 100%. Based on history and physical, differentials include but are not limited to anemia vs aplastic anemia vs pain crisis. Plan to assess patient for acute pathology as listed above with labs, ECG. Will administer medications for pain relief and fluids, follow-up on results, and reassess. Pending labs, imaging and pain control for dispo.    Michelle Virgen MD attending physician patient is a 68-year-old man who comes with a history of sickle cell disease for 2 reasons 1 he has generalized pain including significant abdominal pain the reason he comes to the ED is because Dr. Hamm who is his PCP found he had a low hemoglobin and that he needs transfusion for his anemia.  He has cataract surgery planned and they want to improve his blood prior to surgery.      On exam patient with tenderness in his abdomen left lower quadrant suprapubic area is able to move all extremities his conjunctiva are pale.    Patient will be treated for pain and lab work is sent.  I signed out at the end of my shift to the oncoming attending pending lab work and disposition

## 2023-07-21 NOTE — H&P ADULT - HISTORY OF PRESENT ILLNESS
This is a 69 y/o M with pmhx of sickle cell anemia, HTN presented to the ED for transfusion.  The patient is overall a poor historian. The patient is planning to have cataract surgery however is found at Dr. Hamm's lab to have a hb of 5.9. The patient was sent from her office to the ED for a blood transfusion The patient denies shortness of breath or weakness at this time. Denies bleeding in stool. Denies chest pain. The patient also endorsed dysuria for 1 week. Denies fevers at home. Denies cloudy urine no hematuria. **Alt MRN [id number] *** from [place]    This is a 69 y/o M with pmhx of sickle cell anemia, HTN presented to the ED for transfusion.  The patient is overall a poor historian. The patient is planning to have cataract surgery however is found at Dr. Hamm's lab to have a hb of 5.9. The patient was sent from her office to the ED for a blood transfusion The patient denies shortness of breath or weakness at this time. Denies bleeding in stool. Denies chest pain. The patient also endorsed dysuria for 1 week. Denies fevers at home. Denies cloudy urine no hematuria. The patient also has back pain for 1 week, states it is sickle cell pain.

## 2023-07-21 NOTE — H&P ADULT - PROBLEM SELECTOR PLAN 2
Assessment:  - patient with hx of sickle cell presented for back pain    Plan:  - patient states dilaudid makes him nauseous, refusing dilaudid pump, explained risks and benefits  - will give morphine 2 mg Q6hr PRN, transition to oral PO  - will f/u CXR

## 2023-07-21 NOTE — H&P ADULT - NSHPPHYSICALEXAM_GEN_ALL_CORE
PHYSICAL EXAM:  VITALS: Vital Signs Last 24 Hrs  T(C): 36.8 (21 Jul 2023 04:00), Max: 37.2 (20 Jul 2023 17:50)  T(F): 98.3 (21 Jul 2023 04:00), Max: 98.9 (20 Jul 2023 17:50)  HR: 75 (21 Jul 2023 05:12) (59 - 78)  BP: 145/63 (21 Jul 2023 05:12) (136/59 - 163/79)  BP(mean): --  RR: 20 (21 Jul 2023 05:12) (17 - 20)  SpO2: 100% (21 Jul 2023 05:12) (99% - 100%)    Parameters below as of 21 Jul 2023 05:12  Patient On (Oxygen Delivery Method): room air      GENERAL: NAD, comfortable at bedside  HEAD:  Atraumatic, Normocephalic  EYES: EOMI, PERRL, conjunctiva and sclera clear  ENT: Moist Mucus Membranes present, no ulcers appreciated  NECK: Supple, No JVD  CHEST/LUNG: Clear to auscultation bilaterally; No wheezes, rales or rhonchi, no accessory muscle use  HEART: Regular rate and rhythm; No murmurs, rubs, or gallops, (+)S1, S2  ABDOMEN: Soft, Nontender, Nondistended; Normal Bowel sounds   EXTREMITIES: No clubbing, cyanosis, or edema  PSYCH: normal mood and affect  NEUROLOGY: AAOx3, non-focal  SKIN: No rashes or lesions PHYSICAL EXAM:  VITALS: Vital Signs Last 24 Hrs  T(C): 36.8 (21 Jul 2023 04:00), Max: 37.2 (20 Jul 2023 17:50)  T(F): 98.3 (21 Jul 2023 04:00), Max: 98.9 (20 Jul 2023 17:50)  HR: 75 (21 Jul 2023 05:12) (59 - 78)  BP: 145/63 (21 Jul 2023 05:12) (136/59 - 163/79)  BP(mean): --  RR: 20 (21 Jul 2023 05:12) (17 - 20)  SpO2: 100% (21 Jul 2023 05:12) (99% - 100%)    Parameters below as of 21 Jul 2023 05:12  Patient On (Oxygen Delivery Method): room air      GENERAL: NAD, comfortable at bedside  HEAD:  Atraumatic, Normocephalic  EYES: EOMI, PERRL, conjunctiva and sclera clear  ENT: Moist Mucus Membranes present, no ulcers appreciated  NECK: Supple, No JVD  CHEST/LUNG: Clear to auscultation bilaterally; No wheezes, rales or rhonchi, no accessory muscle use  HEART: Regular rate and rhythm; No murmurs, rubs, or gallops, (+)S1, S2  ABDOMEN: Soft, Nontender, Nondistended; Normal Bowel sounds, no suprapubic tenderness  EXTREMITIES: No clubbing, cyanosis, or edema  PSYCH: normal mood and affect  NEUROLOGY: AAOx2, non-focal  SKIN: No rashes or lesions

## 2023-07-21 NOTE — H&P ADULT - ASSESSMENT
69 y/o M with pmhx of sickle cell anemia, HTN presented to the ED for transfusion in the setting of sickle cell disease. Patient has many antibodies making finding a matching pRBC difficulty. Admit for UTI and optimization/transfusion for cataract surgery.

## 2023-07-21 NOTE — CONSULT NOTE ADULT - SUBJECTIVE AND OBJECTIVE BOX
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 The patient is planning to have cataract surgery and is followed by his internist, Dr. Lluvia Hamm for his SCD. However, his Hb was found to be 5.9 at the office. Although it has been stable, Dr. Hamm stent him in to be more optimized from a transfusion standpoint prior to cataract surgery. He has known antibodies. He denies SOB or weakness, fevers, bleeding in stool or urine, or chest pain. He endorses dysuria for 1 week and back pain typical of his sickle cell crises.    He is on Endari 10 g QD and folic acid 1 mg QD.  He also is on Dilaudid 4 mg Q4 hours PRN and ibuprofen 600 mg TID for pain.      ****INCOMPLETE****      Allergies    No Known Allergies    Intolerances        MEDICATIONS  (STANDING):  cefTRIAXone   IVPB 1000 milliGRAM(s) IV Intermittent every 24 hours  finasteride 5 milliGRAM(s) Oral daily  folic acid 1 milliGRAM(s) Oral daily  heparin   Injectable 5000 Unit(s) SubCutaneous every 12 hours  metoprolol tartrate 25 milliGRAM(s) Oral two times a day  morphine PCA (1 mG/mL) 30 milliLiter(s) PCA Continuous PCA Continuous  multivitamin 1 Tablet(s) Oral daily  sodium chloride 0.45%. 1000 milliLiter(s) (75 mL/Hr) IV Continuous <Continuous>  tamsulosin 0.4 milliGRAM(s) Oral at bedtime    MEDICATIONS  (PRN):  acetaminophen     Tablet .. 650 milliGRAM(s) Oral every 6 hours PRN Temp greater or equal to 38C (100.4F), Mild Pain (1 - 3)  melatonin 3 milliGRAM(s) Oral at bedtime PRN Insomnia  naloxone Injectable 0.1 milliGRAM(s) IV Push every 3 minutes PRN For ANY of the following changes in patient status:  A. RR LESS THAN 10 breaths per minute, B. Oxygen saturation LESS THAN 90%, C. Sedation score of 6  ondansetron Injectable 4 milliGRAM(s) IV Push every 6 hours PRN Nausea      PAST MEDICAL & SURGICAL HISTORY:  Sickle cell disease      No significant past surgical history          FAMILY HISTORY:  No pertinent family history in first degree relatives ___________________        SOCIAL HISTORY: No significant alcohol, tobacco, or drug use history    REVIEW OF SYSTEMS:  CONSTITUTIONAL: no fever  EYES/ENT: No visual changes; no throat pain  NECK: No pain or stiffness  RESPIRATORY: no SOB or cough  CARDIOVASCULAR: No chest pain or palpitations  GASTROINTESTINAL: No abdominal pain. No N/V/D/C  GENITOURINARY: No dysuria, change in frequency, or hematuria  NEUROLOGICAL: No numbness or focal weakness  SKIN: No itching, burning, rashes, or lesions  Psych: No depression  MSK: no joint pain  Allergy: no urticaria    Height (cm): 172.7 (07-21 @ 04:00)  Weight (kg): 48.8 (07-21 @ 04:00)  BMI (kg/m2): 16.4 (07-21 @ 04:00)  BSA (m2): 1.57 (07-21 @ 04:00)    T(F): 99.1 (07-21-23 @ 07:05), Max: 99.1 (07-21-23 @ 07:05)  HR: 74 (07-21-23 @ 07:05)  BP: 140/72 (07-21-23 @ 07:05)  RR: 18 (07-21-23 @ 07:05)  SpO2: 100% (07-21-23 @ 07:05)  Wt(kg): --    GENERAL: NAD  HEENT: EOMI, MMM, no oropharyngeal lesions or erythema appreciated  Pulm: no increased WOB, CTAB/L  CV: RRR, S1, S2, no m/g/r  ABDOMEN: soft, NT, ND, no masses felt, no HSM  MSK: nl ROM  EXTREMITIES: no appreciable edema in b/l LE  Neuro: A&Ox3, no focal deficits  SKIN: warm and dry, no visible rash                          5.2    7.94  )-----------( 204      ( 21 Jul 2023 06:50 )             15.6       07-21    142  |  113<H>  |  20  ----------------------------<  136<H>  4.8   |  19<L>  |  1.22    Ca    8.9      21 Jul 2023 06:50  Phos  3.1     07-21  Mg     2.20     07-21    TPro  6.2  /  Alb  3.2<L>  /  TBili  1.8<H>  /  DBili  x   /  AST  38  /  ALT  18  /  AlkPhos  116  07-21      Magnesium: 2.20 mg/dL (07-21 @ 06:50)  Phosphorus: 3.1 mg/dL (07-21 @ 06:50)       HPI:  **Alternate MRN [id number]**    67 yo M with a PMH of sickle cell anemia (HbSS disease) and HTN who presented to the ED for transfusion. The patient is planning to have cataract surgery and is followed by his internist, Dr. Lluvia Hamm for his SCD. However, his Hb was found to be 5.9 at the office. Although it has been stable, Dr. Hamm stent him in to be more optimized from a transfusion standpoint prior to cataract surgery. He has known antibodies. He denies SOB or weakness, fevers, bleeding in stool or urine, or chest pain. He endorses dysuria for 1 week and back pain typical of his sickle cell crises.    He is on Endari 10 g QD and folic acid 1 mg QD.  He also is on Dilaudid 4 mg Q4 hours PRN and ibuprofen 600 mg TID for pain.      ****INCOMPLETE****      Allergies    No Known Allergies    Intolerances        MEDICATIONS  (STANDING):  cefTRIAXone   IVPB 1000 milliGRAM(s) IV Intermittent every 24 hours  finasteride 5 milliGRAM(s) Oral daily  folic acid 1 milliGRAM(s) Oral daily  heparin   Injectable 5000 Unit(s) SubCutaneous every 12 hours  metoprolol tartrate 25 milliGRAM(s) Oral two times a day  morphine PCA (1 mG/mL) 30 milliLiter(s) PCA Continuous PCA Continuous  multivitamin 1 Tablet(s) Oral daily  sodium chloride 0.45%. 1000 milliLiter(s) (75 mL/Hr) IV Continuous <Continuous>  tamsulosin 0.4 milliGRAM(s) Oral at bedtime    MEDICATIONS  (PRN):  acetaminophen     Tablet .. 650 milliGRAM(s) Oral every 6 hours PRN Temp greater or equal to 38C (100.4F), Mild Pain (1 - 3)  melatonin 3 milliGRAM(s) Oral at bedtime PRN Insomnia  naloxone Injectable 0.1 milliGRAM(s) IV Push every 3 minutes PRN For ANY of the following changes in patient status:  A. RR LESS THAN 10 breaths per minute, B. Oxygen saturation LESS THAN 90%, C. Sedation score of 6  ondansetron Injectable 4 milliGRAM(s) IV Push every 6 hours PRN Nausea      PAST MEDICAL & SURGICAL HISTORY:  Sickle cell disease      No significant past surgical history          FAMILY HISTORY:  No pertinent family history in first degree relatives ___________________        SOCIAL HISTORY: No significant alcohol, tobacco, or drug use history    REVIEW OF SYSTEMS:  CONSTITUTIONAL: no fever  EYES/ENT: No visual changes; no throat pain  NECK: No pain or stiffness  RESPIRATORY: no SOB or cough  CARDIOVASCULAR: No chest pain or palpitations  GASTROINTESTINAL: No abdominal pain. No N/V/D/C  GENITOURINARY: No dysuria, change in frequency, or hematuria  NEUROLOGICAL: No numbness or focal weakness  SKIN: No itching, burning, rashes, or lesions  Psych: No depression  MSK: no joint pain  Allergy: no urticaria    Height (cm): 172.7 (07-21 @ 04:00)  Weight (kg): 48.8 (07-21 @ 04:00)  BMI (kg/m2): 16.4 (07-21 @ 04:00)  BSA (m2): 1.57 (07-21 @ 04:00)    T(F): 99.1 (07-21-23 @ 07:05), Max: 99.1 (07-21-23 @ 07:05)  HR: 74 (07-21-23 @ 07:05)  BP: 140/72 (07-21-23 @ 07:05)  RR: 18 (07-21-23 @ 07:05)  SpO2: 100% (07-21-23 @ 07:05)  Wt(kg): --    GENERAL: NAD  HEENT: EOMI, MMM, no oropharyngeal lesions or erythema appreciated  Pulm: no increased WOB, CTAB/L  CV: RRR, S1, S2, no m/g/r  ABDOMEN: soft, NT, ND, no masses felt, no HSM  MSK: nl ROM  EXTREMITIES: no appreciable edema in b/l LE  Neuro: A&Ox3, no focal deficits  SKIN: warm and dry, no visible rash                          5.2    7.94  )-----------( 204      ( 21 Jul 2023 06:50 )             15.6       07-21    142  |  113<H>  |  20  ----------------------------<  136<H>  4.8   |  19<L>  |  1.22    Ca    8.9      21 Jul 2023 06:50  Phos  3.1     07-21  Mg     2.20     07-21    TPro  6.2  /  Alb  3.2<L>  /  TBili  1.8<H>  /  DBili  x   /  AST  38  /  ALT  18  /  AlkPhos  116  07-21      Magnesium: 2.20 mg/dL (07-21 @ 06:50)  Phosphorus: 3.1 mg/dL (07-21 @ 06:50)       HPI:  **Alternate MRN [id number]**    67 yo M with a PMH of sickle cell anemia (HbSS disease) and HTN who presented to the ED for transfusion. The patient is planning to have cataract surgery and is followed by his internist, Dr. Lluvia Hamm for his SCD. However, his Hb was found to be 5.9 at the office. It had been near 8 in April, although has been in the 5s frequently. Dr. Hamm sent him in to the ED to be more optimized from a transfusion standpoint prior to cataract surgery. He has known antibodies. He has a mild cough but denies SOB or weakness, fevers, bleeding in stool or urine, or chest pain. He does have diffuse joint pains typical of his sickle cell crises, which he has a couple times per year. He had a crisis at [place] in May. He endorses dysuria for 1 week.    He is on folic acid 1 mg QD. He is supposed to be on Endari 10 g QD, but stopped a while ago because he felt like he didn't need it.  He also is on Dilaudid 4 mg Q4 hours PRN and ibuprofen 600 mg TID (usually takes with the Dilaudid) for pain; he does not need it every day.  No prior history of acute chest syndrome and no prior exchange transfusion per patient.    He is scheduled for R cataract surgery on 7/26/23.      Allergies    No Known Allergies    Intolerances        MEDICATIONS  (STANDING):  cefTRIAXone   IVPB 1000 milliGRAM(s) IV Intermittent every 24 hours  finasteride 5 milliGRAM(s) Oral daily  folic acid 1 milliGRAM(s) Oral daily  heparin   Injectable 5000 Unit(s) SubCutaneous every 12 hours  metoprolol tartrate 25 milliGRAM(s) Oral two times a day  morphine PCA (1 mG/mL) 30 milliLiter(s) PCA Continuous PCA Continuous  multivitamin 1 Tablet(s) Oral daily  sodium chloride 0.45%. 1000 milliLiter(s) (75 mL/Hr) IV Continuous <Continuous>  tamsulosin 0.4 milliGRAM(s) Oral at bedtime    MEDICATIONS  (PRN):  acetaminophen     Tablet .. 650 milliGRAM(s) Oral every 6 hours PRN Temp greater or equal to 38C (100.4F), Mild Pain (1 - 3)  melatonin 3 milliGRAM(s) Oral at bedtime PRN Insomnia  naloxone Injectable 0.1 milliGRAM(s) IV Push every 3 minutes PRN For ANY of the following changes in patient status:  A. RR LESS THAN 10 breaths per minute, B. Oxygen saturation LESS THAN 90%, C. Sedation score of 6  ondansetron Injectable 4 milliGRAM(s) IV Push every 6 hours PRN Nausea      PAST MEDICAL & SURGICAL HISTORY:  Sickle cell disease      No significant past surgical history          FAMILY HISTORY:  No pertinent family history in first degree relatives of sickle cell disease.        SOCIAL HISTORY: No significant alcohol, tobacco, or drug use history    REVIEW OF SYSTEMS:  CONSTITUTIONAL: no fever  EYES/ENT: No visual changes; no throat pain  NECK: No pain or stiffness  RESPIRATORY: + cough. No SOB  CARDIOVASCULAR: No chest pain or palpitations  GASTROINTESTINAL: No abdominal pain. No N/V/D/C  GENITOURINARY: + dysuria. No change in frequency or hematuria  NEUROLOGICAL: No numbness or focal weakness  SKIN: No itching, burning, rashes, or lesions  Psych: No depression  MSK: + joint pains  Allergy: no urticaria    Height (cm): 172.7 (07-21 @ 04:00)  Weight (kg): 48.8 (07-21 @ 04:00)  BMI (kg/m2): 16.4 (07-21 @ 04:00)  BSA (m2): 1.57 (07-21 @ 04:00)    T(F): 99.1 (07-21-23 @ 07:05), Max: 99.1 (07-21-23 @ 07:05)  HR: 74 (07-21-23 @ 07:05)  BP: 140/72 (07-21-23 @ 07:05)  RR: 18 (07-21-23 @ 07:05)  SpO2: 100% (07-21-23 @ 07:05)  Wt(kg): --    GENERAL: NAD  HEENT: EOMI, MM dry, no oropharyngeal lesions or erythema appreciated  Pulm: no increased WOB, CTAB/L  CV: RRR, S1, S2. + II/VI systolic LUSB murmur. No g/r  ABDOMEN: soft, + mild RUQ TTP. ND, no masses felt, no HSM  MSK: nl ROM  EXTREMITIES: no appreciable edema in b/l LE  Neuro: A&Ox3, no focal deficits  SKIN: warm and dry, no visible rash                          5.2    7.94  )-----------( 204      ( 21 Jul 2023 06:50 )             15.6       07-21    142  |  113<H>  |  20  ----------------------------<  136<H>  4.8   |  19<L>  |  1.22    Ca    8.9      21 Jul 2023 06:50  Phos  3.1     07-21  Mg     2.20     07-21    TPro  6.2  /  Alb  3.2<L>  /  TBili  1.8<H>  /  DBili  x   /  AST  38  /  ALT  18  /  AlkPhos  116  07-21      Magnesium: 2.20 mg/dL (07-21 @ 06:50)  Phosphorus: 3.1 mg/dL (07-21 @ 06:50)

## 2023-07-21 NOTE — H&P ADULT - PROBLEM SELECTOR PLAN 1
Assessment:  - patient presented from Dr. Hamm's office for pre-op clearance for cataract surgery  - Hb 5.5  - patient has multiple ab making crossmatching pRBC difficult    Plan:  - I have spoken with the [facility] blood bank director, Dr. Deleon ([phone]). She states that the blood bank currently does not have compatible blood at this time. They are still actively looking for pRBC from outside facility. Recommended to follow up in the AM for pRBC status  - patient has no symptoms of anemia at this time  - will repeat CBC in the AM  - as per Dr. Hamm outpatient notes, goal Hb >7.0 for surgery Assessment:  - patient presented from Dr. Hamm's office for pre-op clearance for cataract surgery  - Hb 5.5  - patient has multiple ab making crossmatching pRBC difficult    Plan:  - I have spoken with the [facility] blood bank director, Dr. Deleon ([phone]). She states that the blood bank currently does not have compatible blood at this time. They are still actively looking for pRBC from outside facility. Recommended to follow up in the AM for pRBC status  - patient has no symptoms of anemia at this time  - will repeat CBC in the AM  - as per Dr. Hamm outpatient notes, goal Hb >7.0 for surgery  - heme/onc consult

## 2023-07-21 NOTE — H&P ADULT - NSHPREVIEWOFSYSTEMS_GEN_ALL_CORE
REVIEW OF SYSTEMS:    CONSTITUTIONAL: No weakness, fevers or chills  EYES/ENT: No visual changes;  No dysphagia; No sore throat; No rhinorrhea; No sinus pain/pressure  NECK: No pain or stiffness  RESPIRATORY: No cough, wheezing, hemoptysis; No shortness of breath  CARDIOVASCULAR: No chest pain or palpitations; No lower extremity edema  GASTROINTESTINAL: No abdominal or epigastric pain. No nausea, vomiting, or hematemesis; No diarrhea or constipation. No melena or hematochezia.  GENITOURINARY: + dysuria, no frequency or hematuria  NEUROLOGICAL: No numbness or weakness  MSK: ambulates with cane   SKIN: No itching, burning, rashes, or lesions   All other review of systems is negative unless indicated above.

## 2023-07-21 NOTE — PATIENT PROFILE ADULT - FUNCTIONAL ASSESSMENT - BASIC MOBILITY 5.
Bedside and Verbal shift change report given to Mickey Borja RN (oncoming nurse) by Vero Bob RN (offgoing nurse). Report included the following information SBAR, Kardex, ED Summary, Intake/Output, MAR, and Recent Results. 4 = No assist / stand by assistance

## 2023-07-21 NOTE — ED ADULT NURSE REASSESSMENT NOTE - NS ED NURSE REASSESS COMMENT FT1
A&O3. Respiraitons even and unlabored. Blood bank called regarding PRBC, still pending cross match with antibodies, will update me when prbc are ready.
Break RN: Patient awake and resting in stretcher; respirations even and unlabored, no signs/symptoms of acute distress. MD Rojas and SHIRA Aguero at bedside placing ultrasound guided IV. Will provide care as needed.
Patient in spot 26a. A&O3. respirations even and unlabored. No signs of acute distress noted. Blood bank called, PRBC still not available and in the hospital yet. Report to be given to Floor RN. Comfort and safety maintained.
Received report from Day RN nayely CORBIN. Patient received in spot 26. A&O3. Respirations even and unlabored. No signs of acute distress noted. Patient lying in stretcher comfortably. Blood bank called regarding prbc, patient antibody cross check to be performed, will contact when blood is ready. Stretcher in lowest position. Call bell within reach.

## 2023-07-21 NOTE — H&P ADULT - NSHPLABSRESULTS_GEN_ALL_CORE
5.5    8.46  )-----------( 244      ( 2023 16:46 )             16.4       07-20    141  |  109<H>  |  23  ----------------------------<  81  4.8   |  19<L>  |  1.38<H>    Ca    9.1      2023 16:46    TPro  6.8  /  Alb  3.9  /  TBili  2.4<H>  /  DBili  x   /  AST  46<H>  /  ALT  21  /  AlkPhos  127<H>  07-20            PT/INR - ( 2023 16:46 )   PT: 12.9 sec;   INR: 1.11 ratio         PTT - ( 2023 16:46 )  PTT:32.3 sec    16:46 - VBG - pH: 7.33  | pCO2: 42    | pO2: 34    | Lactate: 0.6        Urinalysis Basic - ( 2023 21:27 )    Color: Yellow / Appearance: Cloudy / S.012 / pH: x  Gluc: x / Ketone: Negative mg/dL  / Bili: Negative / Urobili: 0.2 mg/dL   Blood: x / Protein: 30 mg/dL / Nitrite: Negative   Leuk Esterase: Moderate / RBC: 1 /HPF / WBC 65 /HPF   Sq Epi: x / Non Sq Epi: 0 /HPF / Bacteria: Too Numerous to count /HPF            CXR: As per my read - pending, Will wait for prelim/official read    EKG: As per my read - NSR no ST changes, QTc 411 ms

## 2023-07-21 NOTE — PATIENT PROFILE ADULT - FALL HARM RISK - HARM RISK INTERVENTIONS

## 2023-07-21 NOTE — CONSULT NOTE ADULT - ASSESSMENT
HPI:  **Alt MRN [id number] *** from [employer]    69 yo M with a PMH of sickle cell anemia (HbSS disease) and HTN who was sent to the ED by his internist to be optimized from a transfusion standpoint prior to planned cataract surgery. Hematology consulted for assistance given he has multiple antibodies.    #Sickle Cell Disease / Panagglutinin Antibody  - Hb 5.5 on admission, stable since at least 2020  - Goal Hb 7.0 prior to surgery    ****INCOMPLETE****   **Alternate MRN [id number]**    69 yo M with a PMH of sickle cell anemia (HbSS disease) and HTN who was sent to the ED by his internist to be optimized from a transfusion standpoint prior to planned cataract surgery. Hematology consulted for assistance given he has multiple antibodies.    #Sickle Cell Disease / Panagglutinin Antibody  - Hb 5.5 on admission, stable since at least 2020  - Goal Hb 7.0 prior to surgery    ****INCOMPLETE****   **Alternate MRN [id number]**    67 yo M with a PMH of sickle cell anemia (HbSS disease) and HTN who was sent to the ED by his internist to be optimized from a transfusion standpoint prior to planned cataract surgery. Hematology consulted for assistance given he has multiple antibodies.    #Sickle Cell Crisis / Sickle Cell Disease / Panagglutinin Antibody  - Hb 5.5 on admission, was in 7s-8s in April, but frequently fluctuates to the 5s in the setting of pain crises  - Currently on PCA pump. Pain control per primary team  - Consider ICS if needed. However, no concern for acute chest syndrome  - Per patient's sickle cell disease specialist, goal Hb 7.0 prior to surgery  - HbS 92.1%, but does not require exchange transfusion  - Discussed with blood bank, who has 1 incompatible unit of PRBCs, but are checking specific antibodies from [facility] risk of transfusion reaction. If low-risk, will be given 1U PRBCs (likely this weekend) and awaiting search for 2nd unit of PRBCs  - Currently scheduled for R cataract surgery 7/26 at [facility]  - In terms of sickle cell disease, continue with folic acid. Patient should be on Endari 10 grams BID outpatient but does not take it. Recommend restarting it outpatient if pt is willing to      Aryles Hedjar, MD, PGY-5  Hematology/Oncology Fellow  [facility]  Pager: [phone]  After 5PM and on weekends and holidays, please call the inpatient fellow on call. **Alternate MRN [id number]**    67 yo M with a PMH of sickle cell anemia (HbSS disease) and HTN who was sent to the ED by his internist to be optimized from a transfusion standpoint prior to planned cataract surgery. Hematology consulted for assistance given he has multiple antibodies.    #Sickle Cell Crisis / Sickle Cell Disease / Panagglutinin Antibody  - Hb 5.5 on admission, was in 7s-8s in April, but frequently fluctuates to the 5s in the setting of pain crises  - Currently on PCA pump. Pain control per primary team  - Consider ICS if needed. However, no concern for acute chest syndrome  - Per patient's sickle cell disease specialist, goal Hb 7.0 prior to surgery  - HbS 92.1%, but does not require exchange transfusion  - Discussed with blood bank, who has 1 incompatible unit of PRBCs, but are checking specific antibodies from [facility] risk of transfusion reaction. If low-risk, will be given 1U PRBCs (likely this weekend) and awaiting search for 2nd unit of PRBCs  - Currently scheduled for R cataract surgery 7/26 at [facility]  - In terms of sickle cell disease, continue with folic acid. Patient should be on Endari 10 grams BID outpatient but does not take it. Recommend restarting it outpatient if pt is willing to      Aryllianne Osorio MD, PGY-5  Hematology/Oncology Fellow  [facility]  Pager: [phone]  After 5PM and on weekends and holidays, please call the inpatient fellow on call.

## 2023-07-21 NOTE — CHART NOTE - NSCHARTNOTEFT_GEN_A_CORE
Patient seen and 2nd touch, admitted after midnight.     Patient complains of leg pain that is severe. States oral meds are not enough. Requesting PCA. Does not like Dilaudid due to side effects (nausea, sedation). Requesting morphine PCA. Morphine PCA ordered.     Physical Exam:  General: appears well laying in bed  Cardiac: normal S1 and S2 no murmurs rubs or gallops  Lungs: CTAB now wheezes rales or rhonchi  Abdomen: soft non-tender non-distended   Lower Extremities: painful to palpation w/o edema     Plan:   [ ] f/u blood blank regarding blood transfusion (patient difficult to match given history of multiple transfusions in the setting of sickle cell disease)   [ ] hematology consult for sickle cell optimization prior to cataract surgery   [ ] ordered morphine PCA for pain control

## 2023-07-21 NOTE — CONSULT NOTE ADULT - ATTENDING COMMENTS
-------------------------------------------------------------------------  Patient seen and examined with fellows Dr. Galvan and Dr. Osorio  Patient admitted for elective transfusion of pRBC for goal Hg to 7 g/dL in preparation for cataract surgery per the request of her PMD (Dr. Hamm). Patient scheduled for surgery on 7/26/23. Anticipate discharge in 24 to 48 hours as long as pain well controlled; follow CBC, Retic

## 2023-07-21 NOTE — H&P ADULT - PROBLEM SELECTOR PLAN 3
Assessment:  - patient found to have a UTI  - UA grossly positive  - CT shows cystitis Circumferential bladder wall thickening, which can be seen in the setting of cystitis. Recommend correlation with urinalysis    Plan:  - will start ceftriaxone  - f/u blood cultures, urine culture Assessment:  - patient found to have a UTI  - UA grossly positive  - CT shows cystitis Circumferential bladder wall thickening, which can be seen in the setting of cystitis. Recommend correlation with urinalysis    Plan:  - will start ceftriaxone  - f/u blood cultures, urine culture  - no abdominal pain, monitor for cholecystitis for now Assessment:  - patient found to have a UTI  - UA grossly positive  - CT shows cystitis Circumferential bladder wall thickening, which can be seen in the setting of cystitis. Recommend correlation with urinalysis    Plan:  - will start ceftriaxone  - f/u  urine culture  - will hold off getting blood cultures as the patient is anemic  - no abdominal pain, monitor for cholecystitis for now Assessment:  - patient found to have a UTI  - UA grossly positive  - CT shows cystitis Circumferential bladder wall thickening, which can be seen in the setting of cystitis. Recommend correlation with urinalysis    Plan:  - will start ceftriaxone  - f/u  urine culture  - will hold off getting blood cultures and hep c testing as the patient is anemic to avoid unnecessary blood draws  - no abdominal pain, monitor for cholecystitis for now

## 2023-07-21 NOTE — PATIENT PROFILE ADULT - FALL HARM RISK - FACTORS
I talked to pt . He feels better now. I encouraged him to go to ER to be checked or at least get scheduled urgently due to possible dangerous arrhythmia, needs EKG. He could not come today (fr [place]). Teaching needed and he agreed to be scheduled in Am in [place] w. dr Jeffers. I told him to go to ER, immediately, if happens again. He held all his card meds. I told him to not hold the carvedilol in view of possible arrhythmia. He verbalized understanding .   Other medical problems

## 2023-07-22 NOTE — PHYSICAL THERAPY INITIAL EVALUATION ADULT - LEVEL OF INDEPENDENCE: SIT/STAND, REHAB EVAL
Pt refused OOB mobility assessment because he is having pain in his legs; requesting for PT to come back tomorrow

## 2023-07-22 NOTE — PHYSICAL THERAPY INITIAL EVALUATION ADULT - ADDITIONAL COMMENTS
----- Message from Lida Basilio MA sent at 10/20/2020  8:57 AM CDT -----  Regarding: FW: Referral Request  Contact: [phone]    ----- Message -----  From: Jonathan Overton  Sent: 10/20/2020   8:52 AM CDT  To: , #  Subject: Referral Request                                 Good morning,  I have my 6 month follow up with Dr. Singh for the pec tear surgery on November 25. They told me that my referral ended last  week. If I could please get another one for this upcoming appointment I would really appreciate it. It for Doctor Monica Singh at [facility]. Thank you     
Pt lives in a private house with "not much" steps to get in, resides on the main level, lives with relatives, was independent with all functional mobility and ADL performance without using an assistive device.    Pt left semi-supine in bed, all lines intact, all needs in reach, bed alarm set, in NAD. ANGELICA Phan aware. Heart rate 63 beats per minute.

## 2023-07-22 NOTE — PHYSICAL THERAPY INITIAL EVALUATION ADULT - PERTINENT HX OF CURRENT PROBLEM, REHAB EVAL
68 year old Male with PMHx of sickle cell anemia, HTN presented to the ED for transfusion in the setting of sickle cell disease. Pt has many antibodies making finding a matching pRBC difficulty. Admit for UTI and optimization/transfusion for cataract surgery.

## 2023-07-23 NOTE — DISCHARGE NOTE PROVIDER - NSDCMRMEDTOKEN_GEN_ALL_CORE_FT
Dilaudid 2 mg oral tablet: 0.5 tab(s) orally every 6 hours as needed for  severe pain  finasteride 5 mg oral tablet: 1 tab(s) orally once a day  folic acid 1 mg oral tablet: 1 tab(s) orally once a day  Metoprolol Tartrate 25 mg oral tablet: 1 tab(s) orally 2 times a day  tamsulosin 0.4 mg oral capsule: 1 cap(s) orally once a day   ciprofloxacin 500 mg oral tablet: 1 tab(s) orally 2 times a day  Dilaudid 2 mg oral tablet: 0.5 tab(s) orally every 6 hours as needed for  severe pain  finasteride 5 mg oral tablet: 1 tab(s) orally once a day  folic acid 1 mg oral tablet: 1 tab(s) orally once a day  Metoprolol Tartrate 25 mg oral tablet: 1 tab(s) orally 2 times a day  Multiple Vitamins oral tablet: 1 tab(s) orally once a day  tamsulosin 0.4 mg oral capsule: 1 cap(s) orally once a day

## 2023-07-23 NOTE — DISCHARGE NOTE PROVIDER - NSDCFUADDAPPT_GEN_ALL_CORE_FT
Follow up with your primary care physician for further monitoring in 1-2 weeks. Please call to arrange appointment.   Follow up with your primary care physician for further monitoring in 1-2 weeks. Please call to arrange appointment.    Keep your cataract surgery appt for 7/26

## 2023-07-23 NOTE — PROGRESS NOTE ADULT - PROBLEM SELECTOR PLAN 1
Assessment:  - patient presented from Dr. Hamm's office for pre-op clearance for cataract surgery  - Hb 5.5-> 6.9-> 7.0  - patient has multiple ab making crossmatching pRBC difficult    Plan:  - s/p transfusion 1u, awaiting 2nd unit (blood bank finding matched blood)   - patient has no symptoms of anemia at this time  - as per Dr. Hamm outpatient notes, goal Hb >7.0 for surgery  - heme/onc consult appreciated
Assessment:  - patient presented from Dr. Hamm's office for pre-op clearance for cataract surgery  - Hb 5.5-> 6.9-> 7.0  - patient has multiple ab making crossmatching pRBC difficult    Plan:  - s/p transfusion 1u, awaiting 2nd unit (blood bank finding matched blood)   - patient has no symptoms of anemia at this time  - as per Dr. Hamm outpatient notes, goal Hb >7.0 for surgery  - heme/onc consult appreciated
Assessment:  - patient presented from Dr. Hamm's office for pre-op clearance for cataract surgery  - Hb 5.5-> 6.9  - patient has multiple ab making crossmatching pRBC difficult    Plan:  - s/p transfusion 1u, awaiting 2nd unit (blood bank finding matched blood)   - patient has no symptoms of anemia at this time  - as per Dr. Hamm outpatient notes, goal Hb >7.0 for surgery  - heme/onc consult appreciated

## 2023-07-23 NOTE — PROGRESS NOTE ADULT - ASSESSMENT
69 y/o M with pmhx of sickle cell anemia, HTN presented to the ED for transfusion in the setting of sickle cell disease. Patient has many antibodies making finding a matching pRBC difficulty. Admit for UTI and optimization/transfusion for cataract surgery.
69 y/o M with pmhx of sickle cell anemia, HTN presented to the ED for transfusion in the setting of sickle cell disease. Patient has many antibodies making finding a matching pRBC difficulty. Admit for UTI and optimization/transfusion for cataract surgery.
67 y/o M with pmhx of sickle cell anemia, HTN presented to the ED for transfusion in the setting of sickle cell disease. Patient has many antibodies making finding a matching pRBC difficulty. Admit for UTI and optimization/transfusion for cataract surgery.

## 2023-07-23 NOTE — DISCHARGE NOTE PROVIDER - NSDCCPCAREPLAN_GEN_ALL_CORE_FT
PRINCIPAL DISCHARGE DIAGNOSIS  Diagnosis: Anemia, sickle cell with crisis  Assessment and Plan of Treatment: You were admitted with low blood counts (anemia). You needed a blood transfusion due to your up and coming cataract surgery. Please follow up with your sickle cell doctor after discharge for further management of your sickle cell anemia.      SECONDARY DISCHARGE DIAGNOSES  Diagnosis: Sickle cell pain crisis  Assessment and Plan of Treatment: You complained of leg pain this was because of pain crises. You were treated with intravenous pain medications and you improved. Please follow up with your sickle cell doctor for further management and take all medications as prescribed.

## 2023-07-23 NOTE — PROGRESS NOTE ADULT - PROBLEM SELECTOR PLAN 2
Assessment:  - patient with hx of sickle cell complains of bilateral lower extremity pain    Plan:  - patient states dilaudid makes him nauseous, refusing dilaudid pump, explained risks and benefits  - amenable to morphine PCA --> 1mg demand dose, 30 minute lockout, 8mg 4 hours  - CXR--> Patient is rotated which limits exam. There may be some cardiomegaly present. There may be some slight increased interstitial markings in the right infrahilar region. Some sclerotic changes related to the right humeral head.
Assessment:  - patient with hx of sickle cell complains of bilateral lower extremity pain    Plan:  - patient states dilaudid makes him nauseous, refusing dilaudid pump, explained risks and benefits  - amenable to morphine PCA --> 1mg demand dose, 30 minute lockout, 8mg 4 hours. States these settings are working for him.  - CXR--> Patient is rotated which limits exam. There may be some cardiomegaly present. There may be some slight increased interstitial markings in the right infrahilar region. Some sclerotic changes related to the right humeral head.
Assessment:  - patient with hx of sickle cell complains of bilateral lower extremity pain    Plan:  - patient states dilaudid makes him nauseous, refusing dilaudid pump, explained risks and benefits  - amenable to morphine PCA --> 1mg demand dose, 30 minute lockout, 8mg 4 hours. States these settings are working for him.  - CXR--> Patient is rotated which limits exam. There may be some cardiomegaly present. There may be some slight increased interstitial markings in the right infrahilar region. Some sclerotic changes related to the right humeral head.

## 2023-07-23 NOTE — PROVIDER CONTACT NOTE (OTHER) - ACTION/TREATMENT ORDERED:
No new order at this time. Continue to monitor.
Provider is made aware, Hbg currently at 7. No intervention ordered at this time.

## 2023-07-23 NOTE — DISCHARGE NOTE PROVIDER - HOSPITAL COURSE
69 y/o M with pmhx of sickle cell anemia, HTN presented to the ED for transfusion in the setting of sickle cell disease and need for cataract surgery. Patient has many antibodies making finding a matching pRBC difficulty. Admitted for UTI and optimization/transfusion for cataract surgery. Patient hemoglobin 5.5 on presentation patient transfused goal   hemoglobin >7. Patient hemoglobin at goal of day of discharge. Hematology followed during admission. Patient course complicated by pain crises managed on pca. Patient course complicated by UTI Ucx grew E coli. CT showed cystitis. Patient seen by PT. Patient medically optimized for discharge.     Plan of care discussed with patient and ACP.

## 2023-07-23 NOTE — DISCHARGE NOTE PROVIDER - ATTENDING DISCHARGE PHYSICAL EXAMINATION:
pt seen and examined by me  planned for dc today  hg 7.2  pt was hoping to stay here until his surg 7/26; d/w pt infection risk and importance for having this procedure done  pt understands  VSS  CHEST b/l AE  a/w pre-op anemia req transfusion for goal hg above 7  pt with difficult match due to antibodies; rec'd 1 unit and now at goal  medically optimized for cataract surgery planned 7/26  medically stable for dc home  to give dose of CTX here today and complete 2 more days of PO abx at home (cipro 500 bid x 2 days)

## 2023-07-23 NOTE — PROGRESS NOTE ADULT - SUBJECTIVE AND OBJECTIVE BOX
Leidy Franks MD   [employer] Teams, Pager [phone]    INTERVAL HPI/OVERNIGHT EVENTS:  Patient was seen and examined states his pain is much improved with current pca settings. ROS negative.     VITAL SIGNS:  T(F): 98.5 (07-23-23 @ 12:00)  HR: 65 (07-23-23 @ 12:00)  BP: 139/77 (07-23-23 @ 12:00)  RR: 17 (07-23-23 @ 12:00)  SpO2: 100% (07-23-23 @ 12:00)  Wt(kg): --    PHYSICAL EXAM:  GENERAL: NAD, comfortable at bedside  HEAD:  Atraumatic, Normocephalic  EYES: conjunctiva and sclera clear  ENT: Moist Mucus Membranes present, no ulcers appreciated  NECK: Supple, No JVD  CHEST/LUNG: Clear to auscultation bilaterally; No wheezes, rales or rhonchi, no accessory muscle use  HEART: Regular rate and rhythm; No murmurs, rubs, or gallops, (+)S1, S2  ABDOMEN: Soft, Nontender, Nondistended; Normal Bowel sounds, no suprapubic tenderness  EXTREMITIES: No clubbing, cyanosis, or edema  PSYCH: normal mood and affect  NEUROLOGY: non-focal  SKIN: No rashes or lesions    MEDICATIONS  (STANDING):  cefTRIAXone   IVPB 1000 milliGRAM(s) IV Intermittent every 24 hours  finasteride 5 milliGRAM(s) Oral daily  folic acid 1 milliGRAM(s) Oral daily  heparin   Injectable 5000 Unit(s) SubCutaneous every 12 hours  metoprolol tartrate 25 milliGRAM(s) Oral two times a day  morphine PCA (5 mG/mL) 30 milliLiter(s) PCA Continuous PCA Continuous  multivitamin 1 Tablet(s) Oral daily  polyethylene glycol 3350 17 Gram(s) Oral daily  senna 2 Tablet(s) Oral at bedtime  sodium chloride 0.45%. 1000 milliLiter(s) (75 mL/Hr) IV Continuous <Continuous>  tamsulosin 0.4 milliGRAM(s) Oral at bedtime    MEDICATIONS  (PRN):  acetaminophen     Tablet .. 650 milliGRAM(s) Oral every 6 hours PRN Temp greater or equal to 38C (100.4F), Mild Pain (1 - 3)  melatonin 3 milliGRAM(s) Oral at bedtime PRN Insomnia  naloxone Injectable 0.1 milliGRAM(s) IV Push every 3 minutes PRN For ANY of the following changes in patient status:  A. RR LESS THAN 10 breaths per minute, B. Oxygen saturation LESS THAN 90%, C. Sedation score of 6  ondansetron Injectable 4 milliGRAM(s) IV Push every 6 hours PRN Nausea      Allergies    No Known Allergies    Intolerances        LABS:                        7.0    9.28  )-----------( 193      ( 23 Jul 2023 06:07 )             21.0     07-23    138  |  109<H>  |  13  ----------------------------<  75  4.6   |  19<L>  |  1.06    Ca    8.4      23 Jul 2023 06:07  Phos  3.1     07-23  Mg     1.90     07-23    TPro  6.3  /  Alb  3.2<L>  /  TBili  3.4<H>  /  DBili  x   /  AST  43<H>  /  ALT  17  /  AlkPhos  114  07-23      Urinalysis Basic - ( 23 Jul 2023 06:07 )    Color: x / Appearance: x / SG: x / pH: x  Gluc: 75 mg/dL / Ketone: x  / Bili: x / Urobili: x   Blood: x / Protein: x / Nitrite: x   Leuk Esterase: x / RBC: x / WBC x   Sq Epi: x / Non Sq Epi: x / Bacteria: x        RADIOLOGY & ADDITIONAL TESTS:  Reviewed
Leidy Franks MD   [employer] Teams, Pager [phone]    INTERVAL HPI/OVERNIGHT EVENTS:  Patient was seen and examined. Patient states his leg pain is much improved, states the current PCA setting are helping him. He is otherwise w/o complaints. ROS otherwise negative.     VITAL SIGNS:  T(F): 98.5 (07-23-23 @ 12:00)  HR: 65 (07-23-23 @ 12:00)  BP: 139/77 (07-23-23 @ 12:00)  RR: 17 (07-23-23 @ 12:00)  SpO2: 100% (07-23-23 @ 12:00)  Wt(kg): --    PHYSICAL EXAM:  GENERAL: NAD, comfortable at bedside  HEAD:  Atraumatic, Normocephalic  EYES: conjunctiva and sclera clear  ENT: Moist Mucus Membranes present, no ulcers appreciated  NECK: Supple, No JVD  CHEST/LUNG: Clear to auscultation bilaterally; No wheezes, rales or rhonchi, no accessory muscle use  HEART: Regular rate and rhythm; No murmurs, rubs, or gallops, (+)S1, S2  ABDOMEN: Soft, Nontender, Nondistended; Normal Bowel sounds, no suprapubic tenderness  EXTREMITIES: No clubbing, cyanosis, or edema  PSYCH: normal mood and affect  NEUROLOGY: non-focal  SKIN: No rashes or lesions    MEDICATIONS  (STANDING):  cefTRIAXone   IVPB 1000 milliGRAM(s) IV Intermittent every 24 hours  finasteride 5 milliGRAM(s) Oral daily  folic acid 1 milliGRAM(s) Oral daily  heparin   Injectable 5000 Unit(s) SubCutaneous every 12 hours  metoprolol tartrate 25 milliGRAM(s) Oral two times a day  morphine PCA (5 mG/mL) 30 milliLiter(s) PCA Continuous PCA Continuous  multivitamin 1 Tablet(s) Oral daily  polyethylene glycol 3350 17 Gram(s) Oral daily  senna 2 Tablet(s) Oral at bedtime  sodium chloride 0.45%. 1000 milliLiter(s) (75 mL/Hr) IV Continuous <Continuous>  tamsulosin 0.4 milliGRAM(s) Oral at bedtime    MEDICATIONS  (PRN):  acetaminophen     Tablet .. 650 milliGRAM(s) Oral every 6 hours PRN Temp greater or equal to 38C (100.4F), Mild Pain (1 - 3)  melatonin 3 milliGRAM(s) Oral at bedtime PRN Insomnia  naloxone Injectable 0.1 milliGRAM(s) IV Push every 3 minutes PRN For ANY of the following changes in patient status:  A. RR LESS THAN 10 breaths per minute, B. Oxygen saturation LESS THAN 90%, C. Sedation score of 6  ondansetron Injectable 4 milliGRAM(s) IV Push every 6 hours PRN Nausea      Allergies    No Known Allergies    Intolerances        LABS:                        7.0    9.28  )-----------( 193      ( 23 Jul 2023 06:07 )             21.0     07-23    138  |  109<H>  |  13  ----------------------------<  75  4.6   |  19<L>  |  1.06    Ca    8.4      23 Jul 2023 06:07  Phos  3.1     07-23  Mg     1.90     07-23    TPro  6.3  /  Alb  3.2<L>  /  TBili  3.4<H>  /  DBili  x   /  AST  43<H>  /  ALT  17  /  AlkPhos  114  07-23      Urinalysis Basic - ( 23 Jul 2023 06:07 )    Color: x / Appearance: x / SG: x / pH: x  Gluc: 75 mg/dL / Ketone: x  / Bili: x / Urobili: x   Blood: x / Protein: x / Nitrite: x   Leuk Esterase: x / RBC: x / WBC x   Sq Epi: x / Non Sq Epi: x / Bacteria: x        RADIOLOGY & ADDITIONAL TESTS:  Reviewed
Leidy Franks MD   [employer] Teams, Pager [phone]    INTERVAL HPI/OVERNIGHT EVENTS:  Patient was seen and examined. Patient complaining of lower extremity pain, asking for increased demand dose states he does not get relief with .5mg of morphine. Patient otherwise w/o complaints. ROS otherwise negative.     VITAL SIGNS:  T(F): 98.7 (07-22-23 @ 08:00)  HR: 63 (07-22-23 @ 08:00)  BP: 149/77 (07-22-23 @ 08:00)  RR: 17 (07-22-23 @ 08:00)  SpO2: 98% (07-22-23 @ 08:00)  Wt(kg): --    PHYSICAL EXAM:  GENERAL: NAD, comfortable at bedside  HEAD:  Atraumatic, Normocephalic  EYES: conjunctiva and sclera clear  ENT: Moist Mucus Membranes present, no ulcers appreciated  NECK: Supple, No JVD  CHEST/LUNG: Clear to auscultation bilaterally; No wheezes, rales or rhonchi, no accessory muscle use  HEART: Regular rate and rhythm; No murmurs, rubs, or gallops, (+)S1, S2  ABDOMEN: Soft, Nontender, Nondistended; Normal Bowel sounds, no suprapubic tenderness  EXTREMITIES: No clubbing, cyanosis, or edema  PSYCH: normal mood and affect  NEUROLOGY: non-focal  SKIN: No rashes or lesions    MEDICATIONS  (STANDING):  cefTRIAXone   IVPB 1000 milliGRAM(s) IV Intermittent every 24 hours  finasteride 5 milliGRAM(s) Oral daily  folic acid 1 milliGRAM(s) Oral daily  heparin   Injectable 5000 Unit(s) SubCutaneous every 12 hours  metoprolol tartrate 25 milliGRAM(s) Oral two times a day  morphine PCA (5 mG/mL) 30 milliLiter(s) PCA Continuous PCA Continuous  multivitamin 1 Tablet(s) Oral daily  sodium chloride 0.45%. 1000 milliLiter(s) (75 mL/Hr) IV Continuous <Continuous>  tamsulosin 0.4 milliGRAM(s) Oral at bedtime    MEDICATIONS  (PRN):  acetaminophen     Tablet .. 650 milliGRAM(s) Oral every 6 hours PRN Temp greater or equal to 38C (100.4F), Mild Pain (1 - 3)  melatonin 3 milliGRAM(s) Oral at bedtime PRN Insomnia  naloxone Injectable 0.1 milliGRAM(s) IV Push every 3 minutes PRN For ANY of the following changes in patient status:  A. RR LESS THAN 10 breaths per minute, B. Oxygen saturation LESS THAN 90%, C. Sedation score of 6  ondansetron Injectable 4 milliGRAM(s) IV Push every 6 hours PRN Nausea      Allergies    No Known Allergies    Intolerances        LABS:                        6.9    9.84  )-----------( 208      ( 22 Jul 2023 06:35 )             20.3     07-22    137  |  108<H>  |  14  ----------------------------<  88  4.5   |  18<L>  |  1.13    Ca    8.7      22 Jul 2023 06:35  Phos  3.1     07-22  Mg     1.90     07-22    TPro  5.9<L>  /  Alb  3.5  /  TBili  3.0<H>  /  DBili  x   /  AST  38  /  ALT  17  /  AlkPhos  116  07-22    PT/INR - ( 20 Jul 2023 16:46 )   PT: 12.9 sec;   INR: 1.11 ratio         PTT - ( 20 Jul 2023 16:46 )  PTT:32.3 sec  Urinalysis Basic - ( 22 Jul 2023 06:35 )    Color: x / Appearance: x / SG: x / pH: x  Gluc: 88 mg/dL / Ketone: x  / Bili: x / Urobili: x   Blood: x / Protein: x / Nitrite: x   Leuk Esterase: x / RBC: x / WBC x   Sq Epi: x / Non Sq Epi: x / Bacteria: x        RADIOLOGY & ADDITIONAL TESTS:  Reviewed

## 2023-07-23 NOTE — PROVIDER CONTACT NOTE (OTHER) - SITUATION
Pt order 1 unit of PRBC  from 7/20 per blood bank blood is unavailable
Pt admitted with anemia H/H 5.5/16.4; has order for PRBC [id number]. Received call from blood bank that units are not available in hospital at this time. Provider Gu notified to contact blood bank.

## 2023-07-23 NOTE — PROGRESS NOTE ADULT - PROBLEM SELECTOR PLAN 4
- heparin subq for dvt ppx    PT eval pending     CM consult placed to assess for safe discharge patient expresses concern regarding his sight and living independently.
- heparin subq for dvt ppx    PT eval pending     CM consult placed to assess for safe discharge patient expresses concern regarding his sight and living independently.
- heparin subq for dvt ppx    PT eval

## 2023-07-23 NOTE — PROGRESS NOTE ADULT - TIME BILLING
Time spent on review of vital signs, labs, prior documentation, consultant notes. Patient interviewed and examined during this encounter. Plan of care was discussed with patient, and ACP. Encounter documented.

## 2023-07-23 NOTE — DISCHARGE NOTE PROVIDER - CARE PROVIDER_API CALL
Lluvia Hamm  Internal Medicine  [phone] [place], [place] [address]  Phone: [phone]  Fax: [phone]  Follow Up Time: 2 weeks

## 2023-07-23 NOTE — PROVIDER CONTACT NOTE (OTHER) - REASON
Pt order 1 unit of PRBC  from 7/20 per blood bank blood is unavailable
As per blood bank, PRBC is not available in hospital at this time

## 2023-07-23 NOTE — DISCHARGE NOTE PROVIDER - DETAILS OF MALNUTRITION DIAGNOSIS/DIAGNOSES
This patient has been assessed with a concern for Malnutrition and was treated during this hospitalization for the following Nutrition diagnosis/diagnoses:     -  07/24/2023: Severe protein-calorie malnutrition   -  07/24/2023: Underweight (BMI < 19)

## 2023-07-24 NOTE — DIETITIAN INITIAL EVALUATION ADULT - OTHER INFO
Per chart review, 69 y/o M with pmhx of sickle cell anemia, HTN presented to the ED for transfusion in the setting of sickle cell disease. Patient has many antibodies making finding a matching pRBC difficulty. Admit for UTI and optimization/transfusion for cataract surgery.    Patient seen at bedside. Reports appetite remains the same. As per RN flow sheet, pt is able to consume % of his meals in hospital most of the time. IVF provided for hydration. Pt noted with poor dentition, currently on soft and bite sized diet consistency. Denies chewing and swallowing difficulties. Denies any GI distress (nausea/vomiting/diarrhea/constipation.) Last BM last night. Labs notable with low H/H, pt dc with SCC, s/p 1U PRBC on 7/21 as per chart review. Pt currently on MVI, folic acid for micronutrient coverage.     Nutrition education provided on importance of maintain hydration. Discussed protein rich food choices. Encouraged pt to consume well balanced diet. Pt shows understanding. RD to remain available for further nutritional interventions as indicated.

## 2023-07-24 NOTE — DISCHARGE NOTE NURSING/CASE MANAGEMENT/SOCIAL WORK - PATIENT PORTAL LINK FT
You can access the FollowMyHealth Patient Portal offered by [facility] by registering at the following website: [email or web address]. By joining [employer]’s FollowMyHealth portal, you will also be able to view your health information using other applications (apps) compatible with our system.

## 2023-07-24 NOTE — DIETITIAN INITIAL EVALUATION ADULT - NS FNS DIET ORDER
Diet, Soft and Bite Sized   Supplement Feeding Modality: Oral Ensure Enlive Cans or Servings Per Day: 1 Frequency: Three Times a day

## 2023-07-24 NOTE — DIETITIAN INITIAL EVALUATION ADULT - PROBLEM/PLAN-2
DISPLAY PLAN FREE TEXT
Implemented All Fall with Harm Risk Interventions:  [place] to call system. Call bell, personal items and telephone within reach. Instruct patient to call for assistance. Room bathroom lighting operational. Non-slip footwear when patient is off stretcher. Physically safe environment: no spills, clutter or unnecessary equipment. Stretcher in lowest position, wheels locked, appropriate side rails in place. Provide visual cue, wrist band, yellow gown, etc. Monitor gait and stability. Monitor for mental status changes and reorient to person, place, and time. Review medications for side effects contributing to fall risk. Reinforce activity limits and safety measures with patient and family. Provide visual clues: red socks.

## 2023-07-24 NOTE — DISCHARGE NOTE NURSING/CASE MANAGEMENT/SOCIAL WORK - NSFLUVACAGEDISCH_IMM_ALL_CORE
Patient has an appointment to follow up with Dr. Irving on 3/3 and wants to make sure the lab orders get put in because he will be going to [address]. He said this last time he had to wait there for a long time because orders were not put in.    Adult

## 2023-07-24 NOTE — DIETITIAN INITIAL EVALUATION ADULT - SIGNS/SYMPTOMS
PO<=50% of EER>= 1mo, Moderate to severe fat loss and muscle wasting  PO<=50% of EER>= 1mo, Moderate to severe fat loss and muscle wasting, BMI<19

## 2023-07-24 NOTE — DIETITIAN INITIAL EVALUATION ADULT - ORAL INTAKE PTA/DIET HISTORY
Patient reports his appetite is ok in general. Pt likely is not eating adequately.  Pt lives alone, states he has a room to live. Patient reports his appetite is "ok" in general. Consumes 1-2 meals daily. Pt likely is not eating adequately. Denies any diet followed. Pt unable to provide his UBW. Pt lives alone, states he "has a room to live". SW is following.  Patient reports his appetite is "ok" in general. Consumes 2 meals daily. Pt likely is not eating adequately.  Denies any diet followed. Pt unable to provide his UBW. Pt noted with vision impairment. Pt lives alone, states he "has a room to live".

## 2023-07-24 NOTE — DIETITIAN INITIAL EVALUATION ADULT - PROBLEM SELECTOR PLAN 1
Assessment:  - patient presented from Dr. Hamm's office for pre-op clearance for cataract surgery  - Hb 5.5  - patient has multiple ab making crossmatching pRBC difficult    Plan:  - I have spoken with the [facility] blood bank director, Dr. Deleon ([phone]). She states that the blood bank currently does not have compatible blood at this time. They are still actively looking for pRBC from outside facility. Recommended to follow up in the AM for pRBC status  - patient has no symptoms of anemia at this time  - will repeat CBC in the AM  - as per Dr. Hamm outpatient notes, goal Hb >7.0 for surgery  - heme/onc consult

## 2023-07-24 NOTE — DIETITIAN INITIAL EVALUATION ADULT - PERTINENT MEDS FT
MEDICATIONS  (STANDING):  cefTRIAXone   IVPB 1000 milliGRAM(s) IV Intermittent every 24 hours  finasteride 5 milliGRAM(s) Oral daily  folic acid 1 milliGRAM(s) Oral daily  heparin   Injectable 5000 Unit(s) SubCutaneous every 12 hours  metoprolol tartrate 25 milliGRAM(s) Oral two times a day  morphine PCA (5 mG/mL) 30 milliLiter(s) PCA Continuous PCA Continuous  multivitamin 1 Tablet(s) Oral daily  polyethylene glycol 3350 17 Gram(s) Oral daily  senna 2 Tablet(s) Oral at bedtime  sodium chloride 0.45%. 1000 milliLiter(s) (75 mL/Hr) IV Continuous <Continuous>  tamsulosin 0.4 milliGRAM(s) Oral at bedtime    MEDICATIONS  (PRN):  acetaminophen     Tablet .. 650 milliGRAM(s) Oral every 6 hours PRN Temp greater or equal to 38C (100.4F), Mild Pain (1 - 3)  melatonin 3 milliGRAM(s) Oral at bedtime PRN Insomnia  naloxone Injectable 0.1 milliGRAM(s) IV Push every 3 minutes PRN For ANY of the following changes in patient status:  A. RR LESS THAN 10 breaths per minute, B. Oxygen saturation LESS THAN 90%, C. Sedation score of 6  ondansetron Injectable 4 milliGRAM(s) IV Push every 6 hours PRN Nausea

## 2023-07-24 NOTE — DIETITIAN INITIAL EVALUATION ADULT - ADD RECOMMEND
1) Recommend continue with current diet, which remains appropriate at this time.   2) Recommend continue provide Ensure Enlive 3x daily (1050 fran and 60 gm protein).  3) Monitor PO intake, Labs, weights, BMs, and skin integrity.   4) RD to remain available for further nutritional interventions as indicated.  1) Recommend continue with current diet, which remains appropriate at this time.   2) Recommend continue provide Ensure Enlive 3x daily (1050 fran and 60 gm protein).  3) Monitor PO intake, Labs, weights, BMs, and skin integrity.   4) Recommend SW consult for possible food insecurity.   5) RD to remain available for further nutritional interventions as indicated.

## 2023-07-24 NOTE — DIETITIAN INITIAL EVALUATION ADULT - PERTINENT LABORATORY DATA
07-24    137  |  108<H>  |  17  ----------------------------<  78  4.6   |  19<L>  |  1.02    Ca    9.0      24 Jul 2023 07:16  Phos  3.1     07-24  Mg     2.00     07-24    TPro  7.1  /  Alb  3.7  /  TBili  2.8<H>  /  DBili  x   /  AST  47<H>  /  ALT  24  /  AlkPhos  148<H>  07-24

## 2023-07-24 NOTE — HISTORY OF PRESENT ILLNESS
[FreeTextEntry1] : 66 yo male with hgb ss for f/u. [de-identified] : 67 yo male with hgb ss, for pre-operative evaluation for right eye cataract surgery. Last hospitalization was 4/11/23 for VOC and urinary retention. The patient has some SCD pain today in his thighs, 4/10 in intensity. 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 The cataracts are significantly negatively affecting his life.\par No CP, no dyspnea, no palpitations, no cough, no LE edema.\par + H/O HTN/short runs of vtach noted on hospitalization 2/22-, for which he takes metoprolol 25 mg BID\par h/o guaiac+ iron deficiency anemia, patient has refused all workup. \par \par ophtho as above- no retinopathy noted\par \par Pe: thin male in nad\par vs: 140/80, pulse 68, O2 sat 96\par mild icterus\par oropharynx- poor dentition\par lungs-cta\par cor: rrr-1/6 JACK\par abd- soft, benign\par ext:-c/c/e\par \par 7/18/23\par HGB- 5.9\par Creat-1.4\par \par \par echo: 11/22\par normal LV fx\par grade 1 diastolic dysfunction, moderately enlarged LA\par mild AR, mild TR\par \par EKG- 7/18/23- NSR rate 61\par LVH with repolarization abn\par \par A/P- 67 yo male with HGB SS, for cataract surgery- The benefits of this surgery outweigh the risks. This is a low-risk surgery.\par 1. SCD- Will refer to ED for transfusion- aim for HGB of 7.0\par 2. no evidence of Vtach on EKG, patient is asymptomatic for palpitations\par Continue metoprolol 25 mg BID\par 3. EKG- LVH/repolarization abnormality-no VTACH\par 4. advise patient to stop all NSAIDS as of 7/21/23\par 5. early ambulation\par \par Lluvia Hamm MD\par [phone]\par \par Addendum- patient referred to [facility] for transfusion. Received two units PRBCS- HGB 7.2\par Patient is cleared for surgery.\par DEYA Hamm MD\par \par \par 1. SCD- for now the patient is not taking disease modifying medication\par 2. cataracts- await re-scheduling\par 3.h/o vtach- continue metoprolol 25 bid, encourage the patient to take it daily. Assure him that his pressure can tolerate this dose\par 4. dilauudid-4 mg tab-#45\par ISTOP checked\par 5. f/u two months\par \par Lluvia Hamm MD

## 2023-07-26 NOTE — ASU PATIENT PROFILE, ADULT - BARIATRIC
Subjective   Patient ID: Guido Bell is a 75 y.o. male who presents for No chief complaint on file..    HPI sick visit same day after hours no staff no chest pain no shortness of breath no side effect with medication returns from [place] sometimes he will feel lightheaded needs to put his head between his legs and feels better his weight is about the same as before despite starting the metformin not having hypoglycemia no palpitations    Review of Systems    Objective   There were no vitals taken for this visit.    Physical Exam vital signs noted alert and oriented x3 NCAT no JVD chest clear to auscultation CV regular rate and rhythm S1-S2 extremities no clubbing cyanosis or edema normal distal pulses    Assessment/Plan impression autonomic diabetes other diagnoses htn  Plan good hydration slow positional changes elevate legs wear stocking hose increase fludrocortisone to 1/2 tablet p.o. twice daily follow-up with neurology requisition to be made review prior laboratory results to the emergency room if other symptoms discussed with patient and wife in the meantime continue to monitor blood pressure for management and medication    Review prior laboratory results for potassium and kidney function and blood sugar and what to do with metformin (check)  neurology requisition (check)          no

## 2023-07-26 NOTE — ASU PATIENT PROFILE, ADULT - FALL HARM RISK - HARM RISK INTERVENTIONS

## 2023-07-26 NOTE — ASU PREOP CHECKLIST - DNR CLARIFICATION FORM COMPLETED
Marilyn Wilder is requesting a refill of linaGLIPtin (TRADJENTA) 5 MG tablet and lisinopril (ZESTRIL) 20 MG tablet for a 90 day supply and would like sent to Mail order pharmacy , [facility].     
Patient notified Medication refilled as requested per protocol.  Patient verbalized understanding.    
n/a

## 2023-07-26 NOTE — ASU PREOP CHECKLIST - WAS PATIENT ON BETA BLOCKER?
Yes Mercedes Flap Text: The defect edges were debeveled with a #15 scalpel blade. Given the location of the defect, shape of the defect and the proximity to free margins a Mercedes flap was deemed most appropriate. Using a sterile surgical marker, an appropriate advancement flap was drawn incorporating the defect and placing the expected incisions within the relaxed skin tension lines where possible. The area thus outlined was incised deep to adipose tissue with a #15 scalpel blade. The skin margins were undermined to an appropriate distance in all directions utilizing iris scissors. Following this, the designed flap was advanced and carried over into the primary defect and sutured into place.

## 2023-07-26 NOTE — ASU PATIENT PROFILE, ADULT - PAIN LOCATION, PROFILE
Anesthesia Post Evaluation    Patient: Nanda ALMONTE Colin III    Procedure(s) Performed: Procedure(s) (LRB):  AORTOGRAM, WITH SERIALOGRAPHY (N/A)  STENTS, ILIAC, BILATERAL (Bilateral)    Final Anesthesia Type: MAC      Patient location during evaluation: PACU  Patient participation: Yes- Able to Participate  Level of consciousness: awake and alert and oriented  Post-procedure vital signs: reviewed and stable  Pain management: adequate  Airway patency: patent    PONV status at discharge: No PONV  Anesthetic complications: no      Cardiovascular status: blood pressure returned to baseline, hemodynamically stable and stable  Respiratory status: unassisted, room air and spontaneous ventilation  Hydration status: euvolemic  Follow-up not needed.          Vitals Value Taken Time   /79 03/15/22 1730   Temp 35.9 °C (96.6 °F) 03/15/22 1430   Pulse 65 03/15/22 1730   Resp 18 03/15/22 1500   SpO2 97 % 03/15/22 1730         No case tracking events are documented in the log.      Pain/Fani Score: Fani Score: 10 (3/15/2022  5:30 PM)        
knee/back

## 2023-07-26 NOTE — ASU DISCHARGE PLAN (ADULT/PEDIATRIC) - CARE PROVIDER_API CALL
[facility]  Ophthalmology  [address] [place] [address], [address]  [place] [address]  Phone: [phone]  Fax: [phone]  Scheduled Appointment: 07/27/2023 10:45 AM

## 2023-07-26 NOTE — ASU PATIENT PROFILE, ADULT - NSICDXPASTMEDICALHX_GEN_ALL_CORE_FT
PAST MEDICAL HISTORY:  BPH (benign prostatic hyperplasia)     Cataracts, bilateral     H/O transfusion ~ pRBC    [place] (hard of hearing)     Hypertension     Intellectual disability ~ mild    Poor historian     Sickle cell anemia     Sickle Cell Disease

## 2023-08-18 NOTE — PROGRESS NOTE ADULT - CONVERSATION DETAILS
14-Aug-2023
16-Aug-2023
Patient being discharged today. Discussed if he had considered advanced directives previously or identified a health care proxy. States he has several family members who he believes could make medical decisions on his behalf if he ever were unable to, declines to give a specific name at this time. In terms of advanced directives, discussed if he would want CPR or mechanical ventilation, he will think on it (focused on pending discharge at this time), but is ok with being Full Code.
16-Aug-2023
11-Aug-2023
16-Aug-2023
14-Aug-2023

## 2023-09-06 NOTE — PHYSICAL THERAPY INITIAL EVALUATION ADULT - DID THE PATIENT HAVE SURGERY?
Physical Therapy Visit    Visit Type: Daily Treatment Note  Visit: 3  Referring Provider: Wing Steve MD  Medical Diagnosis (from order): Diagnosis Information    Diagnosis  V45.89 (ICD-9-CM) - Z98.890 (ICD-10-CM) - S/P arthroscopy of shoulder         SUBJECTIVE                                                                                                               Patient reports that her shoulder is less painful but has a pulsing or throbbing quality to her symptoms, rates at 3-4/10. Patient reports that she has not had further issues at work with being told to do tasks that are beyond her restrictions.   Patient reports HEP is going well, does use ice after due to some increased soreness. When asked patient reports she has been favoring the table slides over the pulleys     Patient has her daughter Katarzyna translate this session.     Pain / Symptoms  - Pain rating (out of 10): Current: 3       OBJECTIVE                                                                                                                     Range of Motion (ROM)   (degrees unless noted; active unless noted; norms in ( ); negative=lacking to 0, positive=beyond 0)  Shoulder:    - Flexion (180):        • Right: 116 pain    - Abduction (180):        • Right: 82 pain  Comments: Abduction reported as less painful than flexion                        Treatment     Therapeutic Exercise  Wand ER in 45 degrees abduction, patient supine RUE only, cues for gentle stretch   Sub max resisted IR and ER against yellow band, RUE only, 5 second hold 5 reps each side, patient reports tolerable soreness with ER, no issues with IR  Dowel eben RUE pronation and supination, starting with hand close to middle of 1lbs dowel, progressing to have hand on proximal portion of rubber  of dowel, ~12 reps, patient reports shoulder fatigue following, RUE only   RUE AROM flexion, attempted in supine and partially reclined, patient reports a cramping pain in both  positions, discontinued  RUE elbow flexion against 1, progressing to 2 lbs, in partially reclined position  RUE resisted elbow extension RUE in semi reclined position, yellow band resistance, ~10 reps    Skilled input: verbal instruction/cues    Writer verbally educated and received verbal consent for hand placement, positioning of patient, and techniques to be performed today from patient   Home Exercise Program  Access Code: [email or web address]  URL: [email or web address]  Date: 09/06/2023  Prepared by: Krzysztof Villa    Exercises  - Seated Scapular Retraction  - 2-3 x daily - 7 x weekly - 3-5 reps - 5-10 seconds hold  - Seated Shoulder Flexion Towel Slide at Table Top  - 2-3 x daily - 7 x weekly - 8-10 reps - 1-2 seconds hold  - Sidelying Shoulder External Rotation (Mirrored)  - 2 x daily - 7 x weekly - 8-10 reps  - Seated Shoulder Flexion AAROM with Pulley Behind  - 2-3 x daily - 7 x weekly  - Isometric Shoulder Adduction (Mirrored)  - 2 x daily - 7 x weekly - 6-8 reps - 3-5 seconds hold  - Supine Isometric Shoulder Extension with Towel  - 2 x daily - 7 x weekly - 6-8 reps - 3-5 seconds hold  - Shoulder External Rotation Reactive Isometrics  - 1-2 x daily - 7 x weekly - 4-6 reps - 3-5 seconds hold  - Shoulder External Rotation Reactive Isometrics (Mirrored)  - 1-2 x daily - 7 x weekly - 4-6 reps - 3-5 seconds hold      ASSESSMENT                                                                                                            No issues with introduction of RUE IR and ER isometrics, flexion remains fairly challenging for patient when in supine or semi reclined postures, however appears to have less difficulty with sitting or standing postures for UE flexion. May shift focus to these postures to work on flexion given this.            PLAN                                                                                                                           Suggestions for next session as  indicated: Progress per plan of care       Therapy procedure time and total treatment time can be found documented on the Time Entry flowsheet     n/a

## 2023-09-19 PROBLEM — I10 HYPERTENSION: Status: ACTIVE | Noted: 2018-07-31

## 2023-09-19 NOTE — ED PROVIDER NOTE - PSYCHIATRIC, MLM
Spine appears normal, range of motion is not limited, no muscle or joint tenderness
Alert and oriented to person, place, time/situation. normal mood and affect. no apparent risk to self or others.

## 2023-09-21 NOTE — ED ADULT NURSE NOTE - CAS TRG GENERAL AIRWAY, MLM
Hyperkeratotic tissue is present. Seen sub R 1,2 met heads    Assessment:   Diagnosis Orders   1. Hemiplegia, unspecified etiology, unspecified hemiplegia type, unspecified laterality ([address])        2. Corns and callosities        3. Dermatophytosis of nail R/L            Plan:  Nails as mentioned above debrided in length and thickness. Paring of 2 benign hyperkeratotic lesions took place with #10 blade or tissue nippers. Patient advised OTC methods for treatment of the mycotic nails. Patient will follow up in 10 weeks. Patent

## 2023-09-27 NOTE — PATIENT PROFILE ADULT - TOBACCO USE
LVM for patient to call and reschedule 10/20 appointment with Dr Palmer Khanna at the [facility] office, thanks.
Never smoker

## 2023-09-30 NOTE — ED ADULT NURSE NOTE - PAIN RATING/NUMBER SCALE (0-10): ACTIVITY
Service:   ·  Service Pulmonary Peds     Subjective Data:   ID Statement:  CADENCE RODRIGUEZ is a 8 month old Female who is Hospital Day # 265 and POD #51 for 1. Tracheostomy.    Additional Information:  Overnight Events: Patient had an uneventful night.   Additional Information:    Cadence Johnston had no acute events overnight    Nutrition:   Diet:    Diet Order: Infant Formula  Enfacare 22,Concentrate To: 22 calories/ounce  31 ml / hour  GT, <Continuous>, Give x24 Hours Rate: 31  Special Instructions:  Enfacare 750mL 22kcal/oz given @ 31ml/hr x 24hr  7/25/2023 14:12     Objective Data:     Objective Information:        T   P  R  BP   MAP  SpO2   Value  36.1  154  30  97/65      96%  Date/Time 7/29 9:03 7/29 9:03 7/29 9:03 7/29 9:03    7/29 9:03  Range  (36C - 36.5C )  (105 - 154 )  (20 - 42 )  (82 - 99 )/ (44 - 68 )    (92% - 100% )   As of 29-Jul-2023 05:53:00, patient is on 1% oxygen via ventilator assisted.      ---- Intake and Output  -----  Mn/Dy/Year Time  Intake   Output  Net  Jul 29, 2023 6:00 am  385   110  275  Jul 28, 2023 10:00 pm  710   130  580  Jul 28, 2023 2:00 pm  0   280  -280    The Intake and Output Totals for the last 24 hours are:      Intake   Output  Net      1095   520  575    Physical Exam by System:    Constitutional: asleep comfortably, in no acute distress   Eyes: no discharge evident   ENMT: MMM (evidenced by salivation while asleep)   Head/Neck: Normocephalic, trach in place w/ no erythema,  clean.   Respiratory/Thorax: Breathing comfortably, trach/vent  in place. No increased work of breathing. mildly coarse lung sounds bilaterally   Cardiovascular: <2 seconds cap refill, no murmurs,  rubs or gallops evident upon auscultation; 1-2+ radial pulses   Gastrointestinal: Soft, non-distended, nontender,  bowel sounds active. GT in place, clean.   Extremities: Warm and well perfused, no edema, 2+  radial pulses.   Neurological: Alert/ awakens to tactile/ auditory  stimuli while asleep  (stethoscope for thoracic related exam and to voice); no tremors   Skin: Warm, well-perfused, no rashes or lesions.  Hyperpigmented <1 mm macule on L upper arm c/w prior access.  2 hyperpigmented <1 mm macules flanking G tube site c/w prior sutures. Non-diaphoretic.     Medications:    Medications:          Continuous Medications       --------------------------------  No continuous medications are active       Scheduled Medications       --------------------------------    1. Albuterol   90 micrograms/ Inhalation MDI - PEDS:  4  inhalation  Inhalation  Every 6 Hours    2. Chlorothiazide  Oral Liquid - PEDS:  135  mg  Gastrostomy Tube  Every 12 Hours    3. cloNIDine  (CATAPRES) Oral Liquid - PEDS:  6.2  microgram(s)  Gastrostomy Tube  Every 8 Hours    4. Enalapril  Oral Liquid - PEDS:  0.68  mg  Gastrostomy Tube  Every 12 Hours    5. Famotidine  Oral Liquid - PEDS:  3.4  mg  Gastrostomy Tube  Every 12 Hours    6. Fluticasone  110 microgram/ lnhalation MDI - PEDS:  1  inhalation  Inhalation  Every 12 Hours    7. Gabapentin  Oral Liquid - PEDS:  55  mg  Gastrostomy Tube  Every 8 Hours    8. Melatonin  Oral Liquid - PEDS:  1  mg  Gastrostomy Tube  At Bedtime    9. Midazolam  Oral Liquid - PEDS:  0.8  mg  Gastrostomy Tube  Every 4 Hours    10. Multivitamin  Pediatric Oral Liquid  (POLY-VI-SOL) - PEDS:  1  mL  Gastrostomy Tube  Every 24 Hours    11. Triamcinolone  0.1% Topical - PEDS:  1  application(s)  Topical  2 Times a Day    12. Triamcinolone  0.1% Topical - PEDS:  1  application(s)  Topical  2 Times a Day         PRN Medications       --------------------------------    1. Acetaminophen  Oral Liquid - PEDS:  100  mg  Gastrostomy Tube  Every 6 Hours    2. Albuterol   90 micrograms/ Inhalation MDI - PEDS:  2  inhalation  Inhalation  Every 4 Hours    3. Emollient  Topical Cream - PEDS:  1  application(s)  Topical  3 Times a Day    4. Midazolam  Oral Liquid - PEDS:  0.4  mg  Gastrostomy Tube  Every 3 Hours    5.  Simethicone  Oral Liquid Drops - PEDS:  20  mg  Oral  Every 6 Hours        Assessment/Plan:   Assessment:    Cadence Johnston is an 8 month old previously 26 wk GA female with chronic respiratory failure 2/2 severe BPD with tracheostomy and ventilator dependence, GT dependence for nutrition optimization,  neuroirritability, and multiple sequelae of prematurity including retinopathy, anemia, and metabolic bone disease. Active issues requiring hospitalization include neurosedation currently with versed weaning, respiratory optimization, and nutrition management.  Optimizing nutrition and has been tolerating feeds, stable respiratory status, weaning sedation. Overall stable.     Plan:  CNS  #Agitation/sedation  *Palliative following   - Wean Versed from 1mg q4h to 0.8mg q4h (7/28); following weans will be by dosage frequency. Revisit plan on 8/2.  - Versed 0.05mg/kg, 0.4mg/dose Q3H PRN   - Clonidine 1mcg/kg Q8H  - Gabapentin 8.5mg/kg Q8H    #ICU delirium  - Melatonin 1mg QHS  #ROP, stage 0 zone 2, s/p laser surgery 6/9/23   *Personalized ROP drops: 2% cyclopent, 1% tropicamide, 2.5% phenylephrine prior to exams   - F/u with ophtho in January 2024  - ophtho reported NTD for nystagmus at this time, and will continue to follow at 6 month intervals    CV  #pHTN screening  - 6/5 echo negative for pHTN   - Needs repeat echo prior to discharge     #htn - likely multifactorial in the setting of extreme prematurity, and UAC line placement, no renal parenchymal abnormalities  - nephro recs  - repeat RFP tomorrow since starting enalapril  - continue chlorothiazide 20/kilo q12h  - enalapril 0.1 mg/kg BID  - clonidine which he is on for sedation wean will also effect BP    Renal  #htn as above - no renal parenchymal abnormality    RESP  #Chronic respiratory failure 2/2 BPD  #Trach/vent dependence   - Peds Bivona 3.5   - Current settings: PSSV, PEEP +8, TV 7.4/kg, PS 8-35, iT 0.4-1.0  - Flovent 110mcg 1 puff BID  - Albuterol 90mcg 4 puff  Q6H  - PRN albuterol q2h  - triamcenolone BID for granulation tissue at the stoma. If irritation/bleeding continues to be a problem, may need to consult ENT for cauterization.  - Dr. Lewis to coordinate w/ ENT for scheduling for an airway eval, most likely in August--follow up with him sometime next week    ISAC  #GT dependence   - GT 12Fr, 1.2cm  #Nutrition   - Current feeds: changed to Enfacare 22 Kcal continuous feed run at 31 ml/hr over 24 hours   - revisit consolidation of feeds on 7/31, discuss with RD  - Vent to farrel bag during feeds  - Goal weight gain: 15g/day  - Weekly weights  #G-tube irritation  - triamcinolone ointment BID at tube site  #Fluid overload   - Diuril 20mg/kg Q12H  - KCl dc'd 7/25 f/u RFP 7/31  #reflux  *GI following  - famotidine 3.4 mg q12h GT    ENDO   #Metabolic bone disease of prematurity   *Endocrine following  - poly-vi-sol 1 mg    HEME  #Anemia of prematurity  - Transfusion thresholds: Hct <25, Plt <50  - Hgb 7/20 14.4, d/c'd iron  #Hyperbilirubinemia, direct, resolved   - s/p genetics workup for [place], microarray normal     VACCINES  - Vaccinated through 2 month vaccines   - Mom and dad want to wait for closer to discharge for 4 month vaccines (discussed 7/26, at this time discussed possible need to restart vaccinations if waiting too long)     Labs:  - RFP on 7/31    Klarissa Bloom MD  PGY3, med-peds     Attestation:   Note Completion:  I am a:  Resident/Fellow   Attending Attestation I saw and evaluated the patient.  I personally obtained the key and critical portions of the history and physical exam or was physically present for key and  critical portions performed by the resident/fellow. I reviewed the resident/fellow?s documentation and discussed the patient with the resident/fellow.  I agree with the resident/fellow?s medical decision making as documented in the resident ?s note    I personally evaluated the patient on 30-Jul-2023   Comments/ Additional Findings     On my exam today Cadence Johnston was lying on her side in bed,  awake and calm, Vent syncing well, with good trigger synchrony. RR 45, [id number].          Electronic Signatures:  Allen Feliciano)  (Signed 30-Jul-2023 12:39)   Authored: Assessment/Plan, Note Completion   Co-Signer: Service, Subjective Data, Nutrition, Objective Data, Assessment/Plan, Note Completion  Klarissa Bloom (Resident))  (Signed 30-Jul-2023 07:34)   Authored: Service, Subjective Data, Nutrition, Objective  Data, Assessment/Plan, Note Completion      Last Updated: 30-Jul-2023 12:39 by Allen Feliciano)    9

## 2023-10-19 PROBLEM — I47.29 NSVT (NONSUSTAINED VENTRICULAR TACHYCARDIA): Status: ACTIVE | Noted: 2021-03-11

## 2023-10-19 PROBLEM — H26.9 CATARACT: Status: ACTIVE | Noted: 2023-02-03

## 2023-11-14 NOTE — H&P PST ADULT - HEIGHT IN FEET
1900--bedside report received from melanie aly, pt resting in bed oriented x 4, has no complaints at this time call bell in reach assessment as noted    0700--bedside report given to melanie aly, rn who is assuming care of pt 5

## 2023-11-14 NOTE — H&P PST ADULT - NSICDXFAMILYHX_GEN_ALL_CORE_FT
FAMILY HISTORY:  FH: hypertension, ~ mother  No pertinent family history in first degree relatives

## 2023-11-14 NOTE — H&P PST ADULT - NSICDXPASTMEDICALHX_GEN_ALL_CORE_FT
PAST MEDICAL HISTORY:  BPH (benign prostatic hyperplasia)     Cataracts, bilateral     H/O transfusion ~ pRBC    [place] (hard of hearing)     Hypertension     Intellectual disability ~ mild    Poor historian     Sickle Cell Disease      PAST MEDICAL HISTORY:  BPH (benign prostatic hyperplasia)     Cataracts, bilateral     H/O transfusion ~ pRBC    [place] (hard of hearing)     Hypertension     Intellectual disability ~ mild    Poor historian     Sickle Cell Disease      PAST MEDICAL HISTORY:  BPH (benign prostatic hyperplasia)     Cataracts, bilateral     H/O transfusion ~ pRBC    [place] (hard of hearing)     Hypertension     Intellectual disability ~ mild    Poor historian     Sickle Cell Disease

## 2023-11-14 NOTE — H&P PST ADULT - PROBLEM SELECTOR PLAN 1
cataract extraction with IOL left eye; preop instructions given; to go for medical clearance with Dr. Hamm-she deals with his sickle cell disease

## 2023-11-14 NOTE — H&P PST ADULT - HISTORY OF PRESENT ILLNESS
This is a 68 years old male with h/o sickle cell anemia, HTN , BPH presents with progressive loss of vision in left eye . scheduled for left  eye cataract extraction

## 2023-11-14 NOTE — H&P PST ADULT - PROBLEM SELECTOR PLAN 2
todays hgb 5.7 and HCT 18.3; called dr Hawkins office, spoke to ARMANDO, a nurse; told her of his results and faxed over a copy; she will speak to Dr. Hamm and she will call me back.

## 2023-11-14 NOTE — H&P PST ADULT - HEIGHT IN INCHES
12/11/2020    [facility]    Chief Complaint   Patient presents with    6 Month Follow-Up    Medication Refill       HPI  History obtained from the patient. Iwona Neely is a 34 y.o. male who presents today for 6 month follow up. Hypothyroid - Compliant with Levothyroxine 25 mcg daily. Seizure Disorder - Compliant with Depakote  mg 3 tabs nightly. Patient states that it has been years since he had a seizure. Colon Polyp - Patient has had a colonoscopy within last 6 months, and he was instructed to return in 5 years for another colonoscopy. Gout - Last flare of gout was last month when he ran out of allopurinol and had trouble getting a refill. Health Maintenance - Flu vaccine    REVIEW OF SYMPTOMS  Review of Systems   Respiratory: Negative for cough and shortness of breath. Cardiovascular: Negative for chest pain and palpitations. Gastrointestinal: Negative for abdominal pain, blood in stool, constipation and diarrhea.        PAST MEDICAL HISTORY  Past Medical History:   Diagnosis Date    [address] [address]     Gout     Seizure disorder ([facility]     Thyroid disease        FAMILY HISTORY  Family History   Problem Relation Age of Onset    Arthritis Mother     Lupus Mother     Diabetes Father     Coronary Art Dis Father     Colon Cancer Paternal Uncle        SOCIAL HISTORY  Social History     Socioeconomic History    Marital status:      Spouse name: Not on file    Number of children: Not on file    Years of education: Not on file    Highest education level: Not on file   Occupational History    Not on file   Social Needs    Financial resource strain: Not on file    Food insecurity     Worry: Not on file     Inability: Not on file    Transportation needs     Medical: Not on file     Non-medical: Not on file   Tobacco Use    Smoking status: Never Smoker    Smokeless tobacco: Never Used   Substance and Sexual Activity    Alcohol use: Yes     Comment: OCCASIONAL    Drug use: No    Sexual activity: Not on file   Lifestyle    Physical activity     Days per week: Not on file     Minutes per session: Not on file    Stress: Not on file   Relationships    Social connections     Talks on phone: Not on file     Gets together: Not on file     Attends Rastafarian service: Not on file     Active member of club or organization: Not on file     Attends meetings of clubs or organizations: Not on file     Relationship status: Not on file    Intimate partner violence     Fear of current or ex partner: Not on file     Emotionally abused: Not on file     Physically abused: Not on file     Forced sexual activity: Not on file   Other Topics Concern    Not on file   Social History Narrative    Not on file        SURGICAL HISTORY  Past Surgical History:   Procedure Laterality Date    APPENDECTOMY      COLONOSCOPY  2010    Polyp    COLONOSCOPY  11/2/15    colon polyp, diverticulosis, internal hemorrhoids    COLONOSCOPY  07/10/2020    COLONOSCOPY N/A 7/10/2020    COLONOSCOPY POLYPECTOMY SNARE/COLD BIOPSY performed by Samanta Olmstead MD at [address] [facility]  Current Outpatient Medications   Medication Sig Dispense Refill    divalproex (DEPAKOTE ER) 500 MG extended release tablet Take 3 tablets by mouth nightly Indications: 3 tabs by mouth at bedtime 270 tablet 1    levothyroxine (SYNTHROID) 25 MCG tablet Take 1 tablet by mouth every morning (before breakfast) Take 30-60 minutes before eating or drinking 90 tablet 1    ibuprofen (ADVIL;MOTRIN) 800 MG tablet Take 1 tablet by mouth daily as needed for Pain TAKES  tablet 0    allopurinol (ZYLOPRIM) 300 MG tablet Take 1 tablet by mouth daily TAKE 1 TABLET BY MOUTH EVERY DAY 30 tablet 5     No current facility-administered medications for this visit.         ALLERGIES  No Known Allergies    RECENT LABS    Lab Results   Component Value Date    LABA1C 5.4 08/14/2019     Lab Results   Component Value Date    .3 08/14/2019       No results found for: CHOL  No results found for: Fatuma Tolentino [facility]    Lab Results   Component Value Date    WBC 5.3 08/14/2019    HGB 14.3 08/14/2019    HCT 41.8 08/14/2019    MCV 88.9 08/14/2019     08/14/2019       PHYSICAL EXAM  /78   Pulse 61   Temp 97.3 °F (36.3 °C)   Ht 5' 6\" (1.676 m)   Wt 197 lb (89.4 kg)   SpO2 98%   BMI 31.80 kg/m²     Physical Exam  Constitutional:       Appearance: Normal appearance. HENT:      Head: Normocephalic and atraumatic. Eyes:      Comments: EOM grossly intact. Cardiovascular:      Rate and Rhythm: Normal rate and regular rhythm. Heart sounds: No murmur. No friction rub. No gallop. Pulmonary:      Effort: Pulmonary effort is normal.      Breath sounds: Normal breath sounds. No wheezing, rhonchi or rales. Skin:     General: Skin is warm and dry. Neurological:      Mental Status: He is alert and oriented to person, place, and time. Comments: Cranial nerves II-XII grossly intact   Psychiatric:         Mood and Affect: Mood normal.         Behavior: Behavior normal.         ASSESSMENT & PLAN  1. Hypothyroidism, unspecified type  Refilled medication. I instructed the patient to return for fasting labs at his earliest convenience. - levothyroxine (SYNTHROID) 25 MCG tablet; Take 1 tablet by mouth every morning (before breakfast) Take 30-60 minutes before eating or drinking  Dispense: 90 tablet; Refill: 1  - Comprehensive Metabolic Panel; Future  - CBC Auto Differential; Future  - T4, Free; Future  - TSH without Reflex; Future  - Lipid Panel; Future    2. Seizure ([address]  Refilled medication. I instructed the patient to return for fasting labs at his earliest convenience. - divalproex (DEPAKOTE ER) 500 MG extended release tablet; Take 3 tablets by mouth nightly Indications: 3 tabs by mouth at bedtime  Dispense: 270 tablet; Refill: 1  - Valproic acid level, total and free; Future    3.  Gout, unspecified cause, unspecified chronicity, unspecified site  We will check uric acid level and advise based on findings. I explained to the patient that if his level is above 6.0, we will increase his dose of allopurinol.  - Uric Acid; Future    4. Lipid screening  Health maintenance requirement. - Lipid Panel; Future    5. Needs flu shot  Jayla FIGUEROA, administered this today in the office.  - INFLUENZA, QUADV, 3 YRS AND OLDER, IM PF, PREFILL SYR OR SDV, 0.5ML (AFLURIA QUADV, PF)          Return in about 6 months (around 6/11/2021).             Electronically signed by Mallory Younger PA-C on 12/11/2020 7.75

## 2023-11-19 NOTE — PROGRESS NOTE ADULT - ASSESSMENT
66 yo male w/ pmhx of sickle cell disease, hx of sickle cell crisis, NPH, HTN admitted to TELE for sickle cell crisis and symptomatic anemia (4.7 heme)    HEME/ONC  # symptomatic anemia  # sickle cell disease  # sickle cell crisis  - no evidence of acute chest (fever/chills). there is shortness of breathe but only exertion and it is likely secondary to his anemia  - sp2 units of PRBC  - pt does not have an HEME doctor and is only on folate acid. unclear why pt is not on hydroxyurea   - Switched to PO dilaudid  - Hem following     CARDIOLOGY  # troponimia  # cardiac ischemia w/o injury  Echo with no acute finding     HTN:  - BP acceptable  - Continue current meds    EMEKA:  - Resolved after IVF     DVT ppx    DC prep     19-Nov-2023 09:33

## 2024-01-01 ENCOUNTER — NON-APPOINTMENT (OUTPATIENT)
Age: 69
End: 2024-01-01

## 2024-01-01 ENCOUNTER — APPOINTMENT (OUTPATIENT)
Dept: INTERNAL MEDICINE | Facility: CLINIC | Age: 69
End: 2024-01-01

## 2024-01-01 ENCOUNTER — APPOINTMENT (OUTPATIENT)
Dept: OPHTHALMOLOGY | Facility: CLINIC | Age: 69
End: 2024-01-01
Payer: MEDICARE

## 2024-01-01 ENCOUNTER — APPOINTMENT (OUTPATIENT)
Dept: OPHTHALMOLOGY | Facility: CLINIC | Age: 69
End: 2024-01-01

## 2024-01-01 PROCEDURE — 92012 INTRM OPH EXAM EST PATIENT: CPT

## 2024-01-01 PROCEDURE — 92136 OPHTHALMIC BIOMETRY: CPT

## 2024-01-01 RX ORDER — TAMSULOSIN HYDROCHLORIDE 0.4 MG/1
0.4 CAPSULE ORAL
Qty: 60 | Refills: 6 | Status: ACTIVE | COMMUNITY
Start: 2023-01-01 | End: 1900-01-01

## 2024-01-01 RX ORDER — ACETAMINOPHEN 500 MG/1
500 TABLET ORAL
Qty: 180 | Refills: 3 | Status: ACTIVE | COMMUNITY
Start: 2018-07-31 | End: 1900-01-01

## 2024-01-01 RX ORDER — FINASTERIDE 5 MG/1
5 TABLET, FILM COATED ORAL
Qty: 30 | Refills: 6 | Status: ACTIVE | COMMUNITY
Start: 2020-05-13 | End: 1900-01-01

## 2024-01-01 RX ORDER — DOCUSATE SODIUM 100 MG/1
100 CAPSULE, LIQUID FILLED ORAL 3 TIMES DAILY
Qty: 90 | Refills: 3 | Status: COMPLETED | COMMUNITY
Start: 2018-07-31 | End: 2024-01-01

## 2024-01-01 RX ORDER — METOPROLOL TARTRATE 25 MG/1
25 TABLET, FILM COATED ORAL TWICE DAILY
Qty: 60 | Refills: 6 | Status: ACTIVE | COMMUNITY
Start: 2022-01-31 | End: 1900-01-01

## 2024-01-01 RX ORDER — HYDROMORPHONE HYDROCHLORIDE 4 MG/1
4 TABLET ORAL
Qty: 45 | Refills: 0 | Status: ACTIVE | COMMUNITY
Start: 2023-01-01 | End: 1900-01-01

## 2024-01-01 RX ORDER — IBUPROFEN 600 MG/1
600 TABLET, FILM COATED ORAL 3 TIMES DAILY
Qty: 60 | Refills: 3 | Status: ACTIVE | COMMUNITY
Start: 2019-04-08 | End: 1900-01-01

## 2024-01-01 NOTE — ED ADULT NURSE NOTE - NSIMPLEMENTINTERV_GEN_ALL_ED
Implemented All Fall Risk Interventions:  [place] to call system. Call bell, personal items and telephone within reach. Instruct patient to call for assistance. Room bathroom lighting operational. Non-slip footwear when patient is off stretcher. Physically safe environment: no spills, clutter or unnecessary equipment. Stretcher in lowest position, wheels locked, appropriate side rails in place. Provide visual cue, wrist band, yellow gown, etc. Monitor gait and stability. Monitor for mental status changes and reorient to person, place, and time. Review medications for side effects contributing to fall risk. Reinforce activity limits and safety measures with patient and family. Maternal declined, after counseling and support

## 2024-01-20 NOTE — H&P ADULT - NSRESEARCHGRANT_MLMHIDDEN_GEN_A_CORE
Pt bib parents c/o SRIDHAR and vomiting. Mom reports pt woke up with vomiting, pt vomiting on arrival, mucous noted to emesis. Denies recently illness or fevers. Pt pwd, lung sounds clear, breathing non-labored. Making purposeful movements and cooing/babbling appropriately.  
yes
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
## 2024-03-01 ENCOUNTER — APPOINTMENT (OUTPATIENT)
Dept: INTERNAL MEDICINE | Facility: CLINIC | Age: 69
End: 2024-03-01

## 2024-03-18 NOTE — DIETITIAN INITIAL EVALUATION ADULT. - ENTER TO (ML/KG)
[facility]  [address]  [address]  [place], [place] [address]  Tel: [phone]      3/18/2024   SUBJECTIVE/OBJECTIVE  HPI    Feng Shin (:  2002) is a 22 y.o. male, here for evaluation of the following medical concerns:  Chief Complaint   Patient presents with    Fatigue     Going on for 3 weeks   Labs done 03/15/2024     Nausea & Vomiting     Not every day of vomiting but some only in the mornings       Depression     Has appointment with Psych on Thursday     Anxiety   Patient is a 21 y.o. male  has a past medical history of ADHD (attention deficit hyperactivity disorder), Anxiety, and Depression. who presents to concerns regarding dehydration.    Patient was seen in the emergency room at [facility] on 3/15/2024 reported fatigue for 3 weeks.  Sleeps 7 hours daily chronic fatigue, denied any URI symptoms.  History of chronic depression.  Heavy marijuana use.  CBC showed no acute findings, drug screen positive for cannabis, BMP within normal limits, UA negative.    Fatigue  This is a new problem. The current episode started 3 weeks ago.  Weakness in extremities, as if weights are tied to wrist and ankles.  Endorses nausea and vomiting in the morning,   Sleeping 6-7 hours. Takes 45 minutes -1 hour to fall asleep, unrefreshing sleep.  Eating more than usual, 3 meals a day.  Feels tired, fatigued, or sleepy during daytime.  Does not snore or stop breathing during sleep. Use to play hockey, season ended.    No chest pain, palpitations,  shortness of breath. Denies abnormal bleeding.   Endorses change in memory or increased forgetfulness. Affecting daily personal and professional life. Hard to communicate or think. Endorses depression or anxiety.  Vaping Delta 8, cartridges. Stopped 3/15/23     Associated symptoms include anorexia (difficulty eating more -eggs, sausage, tortilla, PBJ), a change in bowel habit (all day), chills, diaphoresis, fatigue, myalgias, nausea, 
30

## 2024-03-26 NOTE — ED ADULT NURSE NOTE - IS THE PATIENT ABLE TO BE SCREENED?
Chronic, controlled. Latest blood pressure and vitals reviewed-     Temp:  [97.5 °F (36.4 °C)-98.1 °F (36.7 °C)]   Pulse:  [41-80]   Resp:  [16-18]   BP: ()/(49-78)   SpO2:  [92 %-100 %] .   Home meds for hypertension were reviewed and noted below.   Hypertension Medications               amLODIPine (NORVASC) 10 MG tablet TAKE 1 TABLET BY MOUTH EVERY DAY FOR SYSTOLIC BLOOD PRESSURE GREATER THAN 120    metoprolol succinate (TOPROL-XL) 50 MG 24 hr tablet Take 1 tablet (50 mg total) by mouth once daily.    sotaloL (BETAPACE) 80 MG tablet Take 80 mg by mouth 2 (two) times daily.    torsemide (DEMADEX) 20 MG Tab Take 20 mg by mouth.            While in the hospital, will manage blood pressure as follows; Continue home antihypertensive regimen continue amlodipine, hold metoprolol.     Will utilize p.r.n. blood pressure medication only if patient's blood pressure greater than 180/110 and she develops symptoms such as worsening chest pain or shortness of breath.   Yes

## 2024-04-24 NOTE — DISCHARGE NOTE PROVIDER - NSDCHC_MEDRECSTATUS_GEN_ALL_CORE
Routing e-advice to Dr. Conteh's phone nurse message pool to advise.    Last OB/Gyn appointment was 3/1/22 wit DR. Noyola..   Admission Reconciliation is Completed  Discharge Reconciliation is Not Complete Admission Reconciliation is Completed  Discharge Reconciliation is Completed

## 2024-05-29 NOTE — ED ADULT TRIAGE NOTE - NS ED NURSE AMBULANCES
Detail Level: Simple Detail Level: Generalized Detail Level: Detailed [facility]

## 2024-06-05 NOTE — DISCHARGE NOTE PROVIDER - HOSPITAL COURSE
Podiatry - Clinic Note    Nighat Aly : 1957 Sex: female MRN: [id number] 2024   Chief Complaint   Patient presents with    New Patient    Foot    Office Visit       ASSESSMENT:  1. Symptomatic hammer digit syndrome, bilateral feet                PLAN: Evaluated patient.   Conservative and surgical options were discussed, all questions were answered.   Patient declines further conservative care and requests surgical intervention.     Procedure, postoperative course, short/long-term prognosis, outcomes, expectations discussed at length and in depth, no guarantees as to the outcome were given or implied. The patient was informed that the foot condition could be better, worse, or the same and continued and/or recurrent pain, nerve pain, nerve entrapment, neuritis, infection, non- or slowly resolving swelling, overcorrection, undercorrection, delayed healing, nonhealing, recurrence of deformity/lesion(s), numbness, tingling, abnormal sensation in the ankle/foot, bleeding, blood clots, less than expected results, hematoma, hypertrophic scar, loss of digit, loss of limb, loss of life, or need for further surgeries are all possible complications.spoke about possible use of graft, hardware implantation. Discussed with patient that she will not be able to drive until further notice, will be partial or non-weightbearing, and will be wearing a boot or cast for up to 4-12 weeks following surgery, possibly longer if complications arise. Surgery to be done on an outpatient basis. Encouraged patient to contact the primary care physician and discuss the upcoming procedure, & request preoperative history and physical/clearance for surgery.     Procedure: digital arthroplasty with kwire fixation, 3rd digit, right foot     Proposed date of procedure: 24           Visit Vitals  /80   LMP 2012          SUBJECTIVE: This 66 year old female new patient presents with complaint of painful calluses  overlying 3rd digits of both feet.  Relates pain while walking and standing.  Admits she trims the calluses herself but symptoms persist.  States her foot pain is affecting her daily living and inquires about surgical intervention.         Past Medical History, Medications, Allergies, Social History:  I have reviewed the patient's medications and allergies, past medical, surgical, social and family history, updating these as appropriate. See histories section of the electronic medical record for a display of this information.  ROS and PFSH reviewed with patient                Active Ambulatory Problems     Diagnosis Date Noted    Depression 02/01/2013    Glucose intolerance (impaired glucose tolerance) 02/01/2013    Other chronic allergic conjunctivitis 02/05/2013    Vasomotor rhinitis      Chest pain 05/01/2015    Right knee pain 08/23/2017           Resolved Ambulatory Problems     Diagnosis Date Noted    Headache(784.0) 02/01/2013    Allergic rhinitis, cause unspecified 02/05/2013    Acute sinusitis, unspecified 02/05/2013           Past Medical History:   Diagnosis Date    Acute pain of right knee 08/30/2017    Disorder of bone and cartilage, unspecified 03/21/2011    GERD (gastroesophageal reflux disease)      IFG (impaired fasting glucose)      Obesity      PMDD (premenstrual dysphoric disorder)      Vasomotor rhinitis        History - past surgical         Past Surgical History:   Procedure Laterality Date    Colonoscopy   01/30/2009    Dexa bone density axial skeleton   03/22/2011    Esophagogastroduodenoscopy transoral flex w/bx single or mult   01/30/2009    Hysteroscopy endometrial ablation   11/18/2002    Knee surgery Left      Robotic assisted hysterectomy   08/07/2018     sacrocolpopexy, b/l salpingectomy, left oophorectomy, TVT sling, posterior repair    Rotator cuff repair   04/30/2008               ALLERGIES:   Allergen Reactions    Vicodin [Hydrocodone-Acetaminophen] VISUAL DISTURBANCE and  ANAPHYLAXIS       hallucinations    Seasonal        PHYSICAL EXAMINATION:  General: No apparent distress. Alert and oriented x3. T  Vascular: Pedal pulses are palpable  temperature is uniform; warm to cool proximal to distal with capillary refill time less than 5 seconds to the digits. Pedal edema is absent. Hair growth is present and in normal distribution below the knee. Varicosities are absent.   Neurological: sensation intact.   Dermatologic: Skin turgor is decreased.   Area of localized erythema overlies the left and right third digit-proximal interphalangeal joint (PIPJ).  Positive hyperkeratosis overlying this joint, tenderness upon palpation  Musculoskeletal: Calves are supple and nontender upon palpation bilaterally.   Reducible contracture of lesser digits bilateral feet contributes to symptomatic third digit, negative for discomfort with ROM of the affected joint.   Bunion deformities observed bilaterally with decreased ROM of the 1st MPJs.                  Betsey Menjivar DPM        The 21st Century Cures Act makes medical notes like these available to patients in the interest of transparency. However, be advised this is a medical document. It is intended as peer to peer communication. It is written in medical language and may contain abbreviations or verbiage that are unfamiliar to the general public. It may appear blunt or direct. Medical documents are intended to carry relevant information, facts as evident, and the clinical opinion of the provider.     68 yo male w/ pmhx of sickle cell disease, hx of sickle cell crisis, NPH, HTN admitted to TELE for sickle cell crisis and symptomatic anemia (4.7 heme)      # symptomatic anemia  # sickle cell disease  # sickle cell crisis  - no evidence of acute chest (fever/chills). there is shortness of breathe but only exertion and it is likely secondary to his anemia  - sp 2 units of PRBC, hb stable  - Continue folic acid  - Dc on PO dilaudid  - Hem follow up    # troponimia  # cardiac ischemia w/o injury  No CP, no acute EKG change  Echo with no acute finding     HTN:  - BP acceptable  - Continue current meds    EMEKA:  - Resolved after IVF     DVT ppx

## 2024-06-24 NOTE — H&P ADULT - NSHPPOAPULMEMBOLUS_GEN_A_CORE
Subjective:   Patient ID:  Gianluca Singh no

## 2024-09-03 NOTE — PHYSICAL THERAPY INITIAL EVALUATION ADULT - SITTING BALANCE: STATIC
11/2023 TC/HDL ratio 2.9, , trig 53  Goal TC/HDL ratio 3.4 or less, LDL 99 or less,  or less, and TRIG 150 or less.     Lifestyle managed  Not on statin but takes red yeast rice.  Diet and exercise recommendations revisited.     To lower this with lifestyle measures:  -make sure you are avoiding refined carbs such as breads, pasta, cereal, candy, soda,  nutrition bars, granola, chips, and sugar sweetened beverages.      -eat 5- 7 servings daily of veggies,  healthy protein such as chicken, fish,  beans, and eggs, and include healthy fats in your diet such as seeds, nuts, olive oil, avocados, and salmon.   -exercise 4 - 6 days per week as you are able, 150 minutes total weekly divided up is recommended with 3-4 of those days including resistance/strength training.  -consider taking the fiber product psyllium capsules or powder 2 times daily (generic brand is fine) , or a brand name psyllium such as [employer] or Metamucil.  -consider also adding plant stanols and sterols such as Nature Made CholestOff, available over the counter.    good balance

## 2024-10-17 NOTE — ED PROVIDER NOTE - CADM POA URETHRAL CATHETER
[facility] EMERGENCY DEPARTMENT ENCOUNTER    Basic Information  Name: Valdemar Baptiste Age: 71 year old Sex: male   MRN: [id number] Encounter: 6/22/2023, 6:01 PM  PCP: Tin Rowley DO    Chief Complaint  Chief Complaint   Patient presents with   • Cough   • Hypotension       History of Present Illness    71-year-old male presents to the emergency department from his nursing facility with concern for pneumonia.  Patient states that he has been having a productive cough for the past week.  Patient states that he has been febrile, and did have a chest x-ray today showing concern for pneumonia.  Additionally, patient has reportedly been on IV antibiotics for an infected decubitus ulcer with underlying osteomyelitis.  Pt denies any chest pain or SOB. Pt denies any palpitations, near-syncope or syncope. Pt denies any n/v.Pt denies any headaches, neck pain, weakness/numbness/tingling, blurred/double vision, vertigo. Pt denies any abd pain,  diarrhea, melena/hematochezia.  Patient states that he has had a Marquez catheter for several weeks.      Health Status  Allergies:  ALLERGIES:  No Known Allergies    Current Medications:  No current facility-administered medications on file prior to encounter.     No current outpatient medications on file prior to encounter.       Past Medical History    Past Medical History:   Diagnosis Date   • High cholesterol    • Osteomyelitis of sacrum (CMD)    • Parkinson disease (CMD)    • Personal history of malignant neoplasm of prostate        No past surgical history on file.    No family history on file.         Review of Systems  Other than systems discussed in HPI, 10 point review of systems is negative    Physical Exam    Vitals:    06/22/23 1814 06/22/23 1829 06/22/23 1844 06/22/23 1859   BP: 99/52 93/57 104/55 101/55   Pulse: 72 72 72 70   Resp: (!) 30 (!) 27 (!) 28 (!) 39   Temp:       TempSrc:       SpO2: 97% 98% 98% 98%             General: Awake, alert and in no acute distress.  
  Skin:  Warm, dry, no rash. No petechia/purpura  Head:  Normocephalic, atraumatic.    Neck:  Supple, trachea midline, no tenderness, no JVD, No nuchal rigidity/meningismus.    Eye:  Pupils are equal, round and reactive to light, extraocular movements are intact, normal conjunctiva.    Ears, nose, mouth and throat:  Tympanic membranes clear, oral mucosa moist, no pharyngeal erythema or exudate.    Cardiovascular:  Regular rate and rhythm, No murmur, Normal peripheral perfusion, No edema.    Respiratory:   Patient tachypneic with diminished breath sounds bilaterally  Chest wall:  No tenderness.   Back:  Nontender, Normal alignment, no step-offs.  Large ulcer noted to the lower back/sacrum with muscle exposure.  Mild surrounding erythema.  No fluctuance or purulent drainage noted.  Mussculoskeletal:  Normal ROM, no tenderness, no swelling, no deformity.    Gastrointestinal:  Soft, Non distended, Non-tender, Normal bowel sounds, No organomegaly  : Marquez catheter in place with yellow urine noted  Neurological:  Alert and oriented to person, place, time, and situation, No focal neurological deficit observed, CN II-XII intact. 5/5 strength in bue and ble. Sensation intact in bue and ble  Lymphatics:  No lymphadenopathy.   Psychiatric:  Cooperative, appropriate mood & affect.           Medical Decision Making  Rationale:  Multiple differential diagnoses were considered. The patient / caregivers were apprised of diagnostic / treatment options including alternate modes of care, in addition to the risks and benefits, for this medical condition. Based on this discussion the patient / caregiver agrees with this chosen diagnostic and treatment plan.    Orders:  Medications   lidocaine 2% urethral (UROJET) 2 % jelly 10 mL (has no administration in time range)   vancomycin 1,500 mg in sodium chloride 0.9% 250 mL IVPB (1,500 mg Intravenous New Bag 6/22/23 6650)   piperacillin-tazobactam (ZOSYN) 3.375 g in sodium chloride 0.9 % 
100 mL IVPB (0 g Intravenous Completed 6/22/23 1834)   sodium chloride (NORMAL SALINE) 0.9 % bolus 1,000 mL (0 mLs Intravenous Completed 6/22/23 1953)   iohexol (OMNIPAQUE 350 INJECT) contrast solution 62 mL (62 mLs Intravenous Given 6/22/23 2001)       EKG:  Time: 1747  Nsr, lad, nl intervals, non-specific st-t abn    EKG interpreted by ED physician.     Laboratory Results:  Results for orders placed or performed during the hospital encounter of 06/22/23   Urinalysis & Reflex Microscopy With Culture If Indicated   Result Value Ref Range    COLOR, URINALYSIS Red     APPEARANCE, URINALYSIS Cloudy     GLUCOSE, URINALYSIS Negative Negative mg/dL    BILIRUBIN, URINALYSIS Negative Negative    KETONES, URINALYSIS Negative Negative mg/dL    SPECIFIC GRAVITY, URINALYSIS 1.015 1.005 - 1.030    OCCULT BLOOD, URINALYSIS Large (A) Negative    PH, URINALYSIS 8.0 (H) 5.0 - 7.0    PROTEIN, URINALYSIS 100 (A) Negative mg/dL    UROBILINOGEN, URINALYSIS 0.2 0.2, 1.0 mg/dL    NITRITE, URINALYSIS Negative Negative    LEUKOCYTE ESTERASE, URINALYSIS Small (A) Negative    SQUAMOUS EPITHELIAL, URINALYSIS None Seen None Seen, 1 to 5 /hpf    ERYTHROCYTES, URINALYSIS >100 (A) None Seen, 1 to 2 /hpf    LEUKOCYTES, URINALYSIS 6 to 10 (A) None Seen, 1 to 5 /hpf    BACTERIA, URINALYSIS None Seen None Seen /hpf    HYALINE CASTS, URINALYSIS None Seen None Seen, 1 to 5 /lpf   COVID/Flu/RSV panel   Result Value Ref Range    Rapid SARS-COV-2 by PCR Not Detected Not Detected / Detected / Presumptive Positive / Inhibitors present    Influenza A by PCR Not Detected Not Detected    Influenza B by PCR Not Detected Not Detected    RSV BY PCR Not Detected Not Detected    Isolation Guidelines      Procedural Comment     Comprehensive Metabolic Panel   Result Value Ref Range    Fasting Status      Sodium 138 135 - 145 mmol/L    Potassium 4.0 3.4 - 5.1 mmol/L    Chloride 105 97 - 110 mmol/L    Carbon Dioxide 30 21 - 32 mmol/L    Anion Gap 7 7 - 19 mmol/L    
Glucose 110 (H) 70 - 99 mg/dL    BUN 29 (H) 6 - 20 mg/dL    Creatinine 0.66 (L) 0.67 - 1.17 mg/dL    Glomerular Filtration Rate >90 >=60    BUN/Cr 44 (H) 7 - 25    Calcium 8.4 8.4 - 10.2 mg/dL    Bilirubin, Total 0.6 0.2 - 1.0 mg/dL    GOT/AST 29 <=37 Units/L    GPT/ALT 11 <64 Units/L    Alkaline Phosphatase 67 45 - 117 Units/L    Albumin 2.5 (L) 3.6 - 5.1 g/dL    Protein, Total 6.3 (L) 6.4 - 8.2 g/dL    Globulin 3.8 2.0 - 4.0 g/dL    A/G Ratio 0.7 (L) 1.0 - 2.4   TROPONIN I, HIGH SENSITIVITY   Result Value Ref Range    Troponin I, High Sensitivity 4 <77 ng/L   Prothrombin Time   Result Value Ref Range    Prothrombin Time 11.4 9.7 - 11.8 sec    INR 1.1     Partial Thromboplastin Time   Result Value Ref Range    PTT 27 22 - 30 sec   Lactic Acid Venous With Reflex   Result Value Ref Range    Lactate, Venous 0.7 0.0 - 2.0 mmol/L   Magnesium   Result Value Ref Range    Magnesium 2.3 1.7 - 2.4 mg/dL   Procalcitonin   Result Value Ref Range    Procalcitonin <0.05 <=0.09 ng/mL   CBC with Automated Differential (performable only)   Result Value Ref Range    WBC 5.3 4.2 - 11.0 K/mcL    RBC 3.00 (L) 4.50 - 5.90 mil/mcL    HGB 8.3 (L) 13.0 - 17.0 g/dL    HCT 27.0 (L) 39.0 - 51.0 %    MCV 90.0 78.0 - 100.0 fl    MCH 27.7 26.0 - 34.0 pg    MCHC 30.7 (L) 32.0 - 36.5 g/dL    RDW-CV 14.5 11.0 - 15.0 %    RDW-SD 46.5 39.0 - 50.0 fL     140 - 450 K/mcL    NRBC 0 <=0 /100 WBC    Neutrophil, Percent 71 %    Lymphocytes, Percent 17 %    Mono, Percent 9 %    Eosinophils, Percent 3 %    Basophils, Percent 0 %    Immature Granulocytes 0 %    Absolute Neutrophils 3.7 1.8 - 7.7 K/mcL    Absolute Lymphocytes 0.9 (L) 1.0 - 4.0 K/mcL    Absolute Monocytes 0.5 0.3 - 0.9 K/mcL    Absolute Eosinophils  0.2 0.0 - 0.5 K/mcL    Absolute Basophils 0.0 0.0 - 0.3 K/mcL    Absolute Immature Granulocytes 0.0 0.0 - 0.2 K/mcL   Blood Culture    Specimen: Blood   Result Value Ref Range    Culture, Blood or Bone Marrow No Growth <24 hours    Blood 
Culture    Specimen: Blood   Result Value Ref Range    Culture, Blood or Bone Marrow No Growth <24 hours      Hemoglobin 7.2 on 6/11/2023 via [facility] Everywhere  Radiology Results:  CTA CHEST PULMONARY EMBOLISM   Final Result   1. No evidence of PE or aortic aneurysm.    2. Bibasilar discoid atelectasis without infiltrate or effusion.        Electronically Signed by: DAVID DUBOSE M.D.    Signed on: 6/22/2023 8:34 PM    Workstation ID: [id number]      XR CHEST PA OR AP 1 VIEW   Final Result   No acute cardiopulmonary disease.        Electronically Signed by: DAVID DUBOSE M.D.    Signed on: 6/22/2023 6:44 PM    Workstation ID: [id number]          ED Course  Vitals:    06/22/23 1814 06/22/23 1829 06/22/23 1844 06/22/23 1859   BP: 99/52 93/57 104/55 101/55   Pulse: 72 72 72 70   Resp: (!) 30 (!) 27 (!) 28 (!) 39   Temp:       TempSrc:       SpO2: 97% 98% 98% 98%     2041  On reexam, patient is resting comfortably.  Results of all test were discussed extensively with the patient as well as patient's wife at bedside.  I discussed that no pneumonia was noted on CT.  Etiology of fever may be secondary to urinary findings, but also may be secondary to the large decubitus ulcer noted.  Given the fever of 101 today despite already being on antibiotics, I discussed keeping him for continued evaluation as he may need further wound for evaluation, and/or debridement.  They expressed understanding, and are agreeable     2045  Leonela, best practice, made aware of patient via [employer].  She has accepted patient, and advises to place patient in inpatient status.  She advises to admit under Dr. Lindsay    2054  Dr. Rios, infectious disease, made aware of consult via [employer].  No additional recommendations at this time per Dr. Rios  Impression and Plan  Diagnosis:  1. Fever, unspecified fever cause    2. Cough, unspecified type    3. Pressure injury of skin, unspecified injury stage, unspecified location  
      Condition:  STABLE      Disposition:    Placed in INPATIENT STATUS WITH TELEMETRY  under Dr. Lindsay .      Counseled:  Patient, Regarding diagnosis, Regarding diagnostic results, Regarding treatment and Patient understood      MD Amelie Foster Michael K, MD  06/22/23 2049       Steve Kinsey MD  06/22/23 2056    
No
3 = A little assistance

## 2024-10-22 NOTE — ED PROVIDER NOTE - NS HIV RISK FACTOR YES
at this time/unable to perform Detail Level: Generalized Instructions: This plan will send the code FBSD to the PM system.  DO NOT or CHANGE the price. Price (Do Not Change): 0.00 Accepted

## 2024-11-14 NOTE — PATIENT PROFILE ADULT - TRANSPORTATION
Patient's mom called in frantically trying to obtain medication for daughter that was sent in. Per mother Saundra insurance will not pay for two 1 mg tablets a day will only pay for one. Writer called pharmacy to clarify. Per pharmacist, patient only approved for one tablet daily. Writer gave verbal to change prescription to 2 mg tablet 1 tablet po in the evening. Returned call to patient's mother Saundra to inform her pharmacy will fill. Educated patient's mother that moving forward if the plan is to increase dose a prior authorization may be needed in the future if it is prescribed more than one tablet daily. Will relay message to provider.   
no

## 2024-11-22 NOTE — H&P PST ADULT - FUNCTIONAL STATUS
Detail Level: Detailed Doxycycline Counseling:  Patient counseled regarding possible photosensitivity and increased risk for sunburn.  Patient instructed to avoid sunlight, if possible.  When exposed to sunlight, patients should wear protective clothing, sunglasses, and sunscreen.  The patient was instructed to call the office immediately if the following severe adverse effects occur:  hearing changes, easy bruising/bleeding, severe headache, or vision changes.  The patient verbalized understanding of the proper use and possible adverse effects of doxycycline.  All of the patient's questions and concerns were addressed. High Dose Vitamin A Pregnancy And Lactation Text: High dose vitamin A therapy is contraindicated during pregnancy and breast feeding. Topical Sulfur Applications Counseling: Topical Sulfur Counseling: Patient counseled that this medication may cause skin irritation or allergic reactions.  In the event of skin irritation, the patient was advised to reduce the amount of the drug applied or use it less frequently.   The patient verbalized understanding of the proper use and possible adverse effects of topical sulfur application.  All of the patient's questions and concerns were addressed. Benzoyl Peroxide Pregnancy And Lactation Text: This medication is Pregnancy Category C. It is unknown if benzoyl peroxide is excreted in breast milk. Dapsone Counseling: I discussed with the patient the risks of dapsone including but not limited to hemolytic anemia, agranulocytosis, rashes, methemoglobinemia, kidney failure, peripheral neuropathy, headaches, GI upset, and liver toxicity.  Patients who start dapsone require monitoring including baseline LFTs and weekly CBCs for the first month, then every month thereafter.  The patient verbalized understanding of the proper use and possible adverse effects of dapsone.  All of the patient's questions and concerns were addressed. Isotretinoin Pregnancy And Lactation Text: This medication is Pregnancy Category X and is considered extremely dangerous during pregnancy. It is unknown if it is excreted in breast milk. Tetracycline Counseling: Patient counseled regarding possible photosensitivity and increased risk for sunburn.  Patient instructed to avoid sunlight, if possible.  When exposed to sunlight, patients should wear protective clothing, sunglasses, and sunscreen.  The patient was instructed to call the office immediately if the following severe adverse effects occur:  hearing changes, easy bruising/bleeding, severe headache, or vision changes.  The patient verbalized understanding of the proper use and possible adverse effects of tetracycline.  All of the patient's questions and concerns were addressed. Patient understands to avoid pregnancy while on therapy due to potential birth defects. Winlevi Counseling:  I discussed with the patient the risks of topical clascoterone including but not limited to erythema, scaling, itching, and stinging. Patient voiced their understanding. Azelaic Acid Pregnancy And Lactation Text: This medication is considered safe during pregnancy and breast feeding. Spironolactone Counseling: Patient advised regarding risks of diarrhea, abdominal pain, hyperkalemia, birth defects (for female patients), liver toxicity and renal toxicity. The patient may need blood work to monitor liver and kidney function and potassium levels while on therapy. The patient verbalized understanding of the proper use and possible adverse effects of spironolactone.  All of the patient's questions and concerns were addressed. Detail Level: Zone Birth Control Pills Counseling: Birth Control Pill Counseling: I discussed with the patient the potential side effects of OCPs including but not limited to increased risk of stroke, heart attack, thrombophlebitis, deep venous thrombosis, hepatic adenomas, breast changes, GI upset, headaches, and depression.  The patient verbalized understanding of the proper use and possible adverse effects of OCPs. All of the patient's questions and concerns were addressed. Erythromycin Pregnancy And Lactation Text: This medication is Pregnancy Category B and is considered safe during pregnancy. It is also excreted in breast milk. Sarecycline Counseling: Patient advised regarding possible photosensitivity and discoloration of the teeth, skin, lips, tongue and gums.  Patient instructed to avoid sunlight, if possible.  When exposed to sunlight, patients should wear protective clothing, sunglasses, and sunscreen.  The patient was instructed to call the office immediately if the following severe adverse effects occur:  hearing changes, easy bruising/bleeding, severe headache, or vision changes.  The patient verbalized understanding of the proper use and possible adverse effects of sarecycline.  All of the patient's questions and concerns were addressed. Aklief Pregnancy And Lactation Text: It is unknown if this medication is safe to use during pregnancy.  It is unknown if this medication is excreted in breast milk.  Breastfeeding women should use the topical cream on the smallest area of the skin for the shortest time needed while breastfeeding.  Do not apply to nipple and areola. Tazorac Counseling:  Patient advised that medication is irritating and drying.  Patient may need to apply sparingly and wash off after an hour before eventually leaving it on overnight.  The patient verbalized understanding of the proper use and possible adverse effects of tazorac.  All of the patient's questions and concerns were addressed. Bactrim Counseling:  I discussed with the patient the risks of sulfa antibiotics including but not limited to GI upset, allergic reaction, drug rash, diarrhea, dizziness, photosensitivity, and yeast infections.  Rarely, more serious reactions can occur including but not limited to aplastic anemia, agranulocytosis, methemoglobinemia, blood dyscrasias, liver or kidney failure, lung infiltrates or desquamative/blistering drug rashes. Topical Clindamycin Counseling: Patient counseled that this medication may cause skin irritation or allergic reactions.  In the event of skin irritation, the patient was advised to reduce the amount of the drug applied or use it less frequently.   The patient verbalized understanding of the proper use and possible adverse effects of clindamycin.  All of the patient's questions and concerns were addressed. Tetracycline Pregnancy And Lactation Text: This medication is Pregnancy Category D and not consider safe during pregnancy. It is also excreted in breast milk. Azithromycin Counseling:  I discussed with the patient the risks of azithromycin including but not limited to GI upset, allergic reaction, drug rash, diarrhea, and yeast infections. Winlevi Pregnancy And Lactation Text: This medication is considered safe during pregnancy and breastfeeding. Use Enhanced Medication Counseling?: No Doxycycline Pregnancy And Lactation Text: This medication is Pregnancy Category D and not consider safe during pregnancy. It is also excreted in breast milk but is considered safe for shorter treatment courses. Minocycline Counseling: Patient advised regarding possible photosensitivity and discoloration of the teeth, skin, lips, tongue and gums.  Patient instructed to avoid sunlight, if possible.  When exposed to sunlight, patients should wear protective clothing, sunglasses, and sunscreen.  The patient was instructed to call the office immediately if the following severe adverse effects occur:  hearing changes, easy bruising/bleeding, severe headache, or vision changes.  The patient verbalized understanding of the proper use and possible adverse effects of minocycline.  All of the patient's questions and concerns were addressed. Dapsone Pregnancy And Lactation Text: This medication is Pregnancy Category C and is not considered safe during pregnancy or breast feeding. Benzoyl Peroxide Counseling: Patient counseled that medicine may cause skin irritation and bleach clothing.  In the event of skin irritation, the patient was advised to reduce the amount of the drug applied or use it less frequently.   The patient verbalized understanding of the proper use and possible adverse effects of benzoyl peroxide.  All of the patient's questions and concerns were addressed. Spironolactone Pregnancy And Lactation Text: This medication can cause feminization of the male fetus and should be avoided during pregnancy. The active metabolite is also found in breast milk. High Dose Vitamin A Counseling: Side effects reviewed, pt to contact office should one occur. Topical Sulfur Applications Pregnancy And Lactation Text: This medication is Pregnancy Category C and has an unknown safety profile during pregnancy. It is unknown if this topical medication is excreted in breast milk. Topical Retinoid counseling:  Patient advised to apply a pea-sized amount only at bedtime and wait 30 minutes after washing their face before applying.  If too drying, patient may add a non-comedogenic moisturizer. The patient verbalized understanding of the proper use and possible adverse effects of retinoids.  All of the patient's questions and concerns were addressed. Topical Clindamycin Pregnancy And Lactation Text: This medication is Pregnancy Category B and is considered safe during pregnancy. It is unknown if it is excreted in breast milk. Birth Control Pills Pregnancy And Lactation Text: This medication should be avoided if pregnant and for the first 30 days post-partum. Isotretinoin Counseling: Patient should get monthly blood tests, not donate blood, not drive at night if vision affected, not share medication, and not undergo elective surgery for 6 months after tx completed. Side effects reviewed, pt to contact office should one occur. Bactrim Pregnancy And Lactation Text: This medication is Pregnancy Category D and is known to cause fetal risk.  It is also excreted in breast milk. Tazorac Pregnancy And Lactation Text: This medication is not safe during pregnancy. It is unknown if this medication is excreted in breast milk. Erythromycin Counseling:  I discussed with the patient the risks of erythromycin including but not limited to GI upset, allergic reaction, drug rash, diarrhea, increase in liver enzymes, and yeast infections. Azelaic Acid Counseling: Patient counseled that medicine may cause skin irritation and to avoid applying near the eyes.  In the event of skin irritation, the patient was advised to reduce the amount of the drug applied or use it less frequently.   The patient verbalized understanding of the proper use and possible adverse effects of azelaic acid.  All of the patient's questions and concerns were addressed. Aklief counseling:  Patient advised to apply a pea-sized amount only at bedtime and wait 30 minutes after washing their face before applying.  If too drying, patient may add a non-comedogenic moisturizer.  The most commonly reported side effects including irritation, redness, scaling, dryness, stinging, burning, itching, and increased risk of sunburn.  The patient verbalized understanding of the proper use and possible adverse effects of retinoids.  All of the patient's questions and concerns were addressed. Topical Retinoid Pregnancy And Lactation Text: This medication is Pregnancy Category C. It is unknown if this medication is excreted in breast milk. Azithromycin Pregnancy And Lactation Text: This medication is considered safe during pregnancy and is also secreted in breast milk. 4-10 METS

## 2025-02-21 NOTE — PATIENT PROFILE ADULT. - EXTENSIONS OF SELF_ADULT
very well with respect to his healing\" and desired input from tumor board, medical oncology, and radiation oncology regarding further treatment    9/1/23 Saw Dr. Esquivel who stated he would \"prefer to avoid chemotherapy in this case since no survival advantage would be expected\" and placed referral to radiation oncology for consideration of postoperative radiation    10/9/23 - 11/22/23 Completed 6000 cGy of radiation to the right post-operative tonsil     Primary tonsillar squamous cell carcinoma (HCC) (Resolved)   6/5/2023 Procedure    Flexible nasopharyngoscopy, Dr. Gutierrez    Procedure: Flexible nasopharyngoscopy  Indication: The patient has abnormality of the oropharynx with a tonsillar mass and warrants a thorough examination the entirety of his pharyngeal soft tissues as well as his larynx secondary to his hoarseness.  Findings: Following informed consent the patient's right nasal airway was sprayed with atomized ingestant and atomized lidocaine.  This enabled me to pass a flexible chip tip video endoscope through the right side and thoroughly examined the entirety of his pharyngeal and laryngeal space.  I found the structures to be abnormal for raised bilobed mass of the right palatine fossa but no other ulceration leukoplakia erythroplakia was seen.  His nasopharynx was free of lesions.  His vocal folds were fully mobile.  His supraglottis was within normal limits.  He occasionally demonstrated hyper adduction.  There was no pooling in either piriform sinus.     6/9/2023 Imaging    CT abdomen/pelvis w con    FINDINGS:  Subcentimeter hypodensities in the liver, too small to accurately   characterize.  Normal spleen.  Normal kidneys.  Normal adrenal glands.  Normal pancreas.  No visible gallstones. No biliary dilatation.     No evidence of bowel obstruction or colitis.  Normal appendix.  Mild diffuse bladder wall thickening.  No ascites. No pneumoperitoneum.  No lymphadenopathy.  No acute fracture.  Eyeglasses/Dentures

## 2025-05-22 NOTE — ED ADULT NURSE NOTE - NS ED NURSE REPORT GIVEN DT
Detail Level: Detailed Depth Of Biopsy: dermis Was A Bandage Applied: Yes Size Of Lesion In Cm: 0 Biopsy Type: H and E Biopsy Method: double edge Personna blade 14-Oct-2022 13:19 Anesthesia Type: 1% lidocaine with epinephrine Anesthesia Volume In Cc: 0.5 Hemostasis: Aluminum Chloride Wound Care: No ointment Dressing: bandage Destruction After The Procedure: No Type Of Destruction Used: Curettage Curettage Text: The wound bed was treated with curettage after the biopsy was performed. Cryotherapy Text: The wound bed was treated with cryotherapy after the biopsy was performed. Electrodesiccation Text: The wound bed was treated with electrodesiccation after the biopsy was performed. Electrodesiccation And Curettage Text: The wound bed was treated with electrodesiccation and curettage after the biopsy was performed. Silver Nitrate Text: The wound bed was treated with silver nitrate after the biopsy was performed. Lab: [id number] Lab Facility: [id number] Medical Necessity Information: It is in your best interest to select a reason for this procedure from the list below. All of these items fulfill various CMS LCD requirements except the new and changing color options. Consent: Written consent was obtained and risks were reviewed including but not limited to scarring, infection, bleeding, scabbing, incomplete removal, nerve damage and allergy to anesthesia. Post-Care Instructions: I reviewed with the patient in detail post-care instructions. Patient is to keep the biopsy site dry overnight, and then apply bacitracin twice daily until healed. Patient may apply hydrogen peroxide soaks to remove any crusting. Notification Instructions: Patient will be notified of biopsy results. However, patient instructed to call the office if not contacted within 2 weeks. Results will be faxed to PCP when they are available. Billing Type: Third-Party Bill Information: Selecting Yes will display possible errors in your note based on the variables you have selected. This validation is only offered as a suggestion for you. PLEASE NOTE THAT THE VALIDATION TEXT WILL BE REMOVED WHEN YOU FINALIZE YOUR NOTE. IF YOU WANT TO FAX A PRELIMINARY NOTE YOU WILL NEED TO TOGGLE THIS TO 'NO' IF YOU DO NOT WANT IT IN YOUR FAXED NOTE.
